# Patient Record
Sex: FEMALE | Race: WHITE | NOT HISPANIC OR LATINO | Employment: UNEMPLOYED | URBAN - METROPOLITAN AREA
[De-identification: names, ages, dates, MRNs, and addresses within clinical notes are randomized per-mention and may not be internally consistent; named-entity substitution may affect disease eponyms.]

---

## 2009-06-05 LAB
EXTERNAL HIV CONFIRMATION: NORMAL
EXTERNAL HIV SCREEN: NORMAL

## 2017-02-01 ENCOUNTER — GENERIC CONVERSION - ENCOUNTER (OUTPATIENT)
Dept: OTHER | Facility: OTHER | Age: 37
End: 2017-02-01

## 2017-03-01 ENCOUNTER — ALLSCRIPTS OFFICE VISIT (OUTPATIENT)
Dept: OTHER | Facility: OTHER | Age: 37
End: 2017-03-01

## 2017-03-02 ENCOUNTER — ALLSCRIPTS OFFICE VISIT (OUTPATIENT)
Dept: OTHER | Facility: OTHER | Age: 37
End: 2017-03-02

## 2017-03-13 ENCOUNTER — ALLSCRIPTS OFFICE VISIT (OUTPATIENT)
Dept: OTHER | Facility: OTHER | Age: 37
End: 2017-03-13

## 2017-04-11 ENCOUNTER — ALLSCRIPTS OFFICE VISIT (OUTPATIENT)
Dept: OTHER | Facility: OTHER | Age: 37
End: 2017-04-11

## 2017-04-11 DIAGNOSIS — M54.9 DORSALGIA: ICD-10-CM

## 2017-04-11 DIAGNOSIS — M79.7 FIBROMYALGIA: ICD-10-CM

## 2017-04-14 ENCOUNTER — TRANSCRIBE ORDERS (OUTPATIENT)
Dept: SLEEP CENTER | Facility: CLINIC | Age: 37
End: 2017-04-14

## 2017-04-14 ENCOUNTER — HOSPITAL ENCOUNTER (OUTPATIENT)
Dept: SLEEP CENTER | Facility: CLINIC | Age: 37
Discharge: HOME | End: 2017-04-14
Payer: COMMERCIAL

## 2017-04-14 DIAGNOSIS — G47.411 NARCOLEPSY WITH CATAPLEXY(347.01): Primary | ICD-10-CM

## 2017-04-14 DIAGNOSIS — G47.33 OBSTRUCTIVE SLEEP APNEA (ADULT) (PEDIATRIC): ICD-10-CM

## 2017-04-26 ENCOUNTER — ALLSCRIPTS OFFICE VISIT (OUTPATIENT)
Dept: OTHER | Facility: OTHER | Age: 37
End: 2017-04-26

## 2017-05-02 ENCOUNTER — GENERIC CONVERSION - ENCOUNTER (OUTPATIENT)
Dept: OTHER | Facility: OTHER | Age: 37
End: 2017-05-02

## 2017-05-06 ENCOUNTER — HOSPITAL ENCOUNTER (EMERGENCY)
Facility: HOSPITAL | Age: 37
Discharge: HOME | End: 2017-05-06
Attending: EMERGENCY MEDICINE | Admitting: EMERGENCY MEDICINE
Payer: COMMERCIAL

## 2017-05-06 ENCOUNTER — APPOINTMENT (EMERGENCY)
Dept: RADIOLOGY | Facility: HOSPITAL | Age: 37
End: 2017-05-06
Payer: COMMERCIAL

## 2017-05-06 VITALS
HEART RATE: 86 BPM | HEIGHT: 60 IN | RESPIRATION RATE: 18 BRPM | TEMPERATURE: 97.5 F | BODY MASS INDEX: 33.38 KG/M2 | OXYGEN SATURATION: 97 % | WEIGHT: 170 LBS | DIASTOLIC BLOOD PRESSURE: 59 MMHG | SYSTOLIC BLOOD PRESSURE: 96 MMHG

## 2017-05-06 DIAGNOSIS — N92.1 MENORRHAGIA WITH IRREGULAR CYCLE: ICD-10-CM

## 2017-05-06 DIAGNOSIS — R10.2 CHRONIC SUPRAPUBIC PAIN: ICD-10-CM

## 2017-05-06 DIAGNOSIS — N93.9 VAGINAL BLEEDING: Primary | ICD-10-CM

## 2017-05-06 DIAGNOSIS — G89.29 CHRONIC SUPRAPUBIC PAIN: ICD-10-CM

## 2017-05-06 LAB
ALBUMIN SERPL BCP-MCNC: 3.4 G/DL (ref 3.5–5)
ALP SERPL-CCNC: 50 U/L (ref 46–116)
ALT SERPL W P-5'-P-CCNC: 18 U/L (ref 12–78)
ANION GAP SERPL CALCULATED.3IONS-SCNC: 9 MMOL/L (ref 4–13)
AST SERPL W P-5'-P-CCNC: 11 U/L (ref 5–45)
BACTERIA UR QL AUTO: ABNORMAL /HPF
BASOPHILS # BLD AUTO: 0.01 THOUSANDS/ΜL (ref 0–0.1)
BASOPHILS NFR BLD AUTO: 0 % (ref 0–1)
BILIRUB SERPL-MCNC: 0.14 MG/DL (ref 0.2–1)
BILIRUB UR QL STRIP: NEGATIVE
BUN SERPL-MCNC: 13 MG/DL (ref 5–25)
CALCIUM SERPL-MCNC: 8.5 MG/DL (ref 8.3–10.1)
CHLORIDE SERPL-SCNC: 111 MMOL/L (ref 100–108)
CLARITY UR: CLEAR
CO2 SERPL-SCNC: 23 MMOL/L (ref 21–32)
COLOR UR: ABNORMAL
COLOR, POC: NORMAL
CREAT SERPL-MCNC: 0.96 MG/DL (ref 0.6–1.3)
EOSINOPHIL # BLD AUTO: 0.04 THOUSAND/ΜL (ref 0–0.61)
EOSINOPHIL NFR BLD AUTO: 1 % (ref 0–6)
ERYTHROCYTE [DISTWIDTH] IN BLOOD BY AUTOMATED COUNT: 16.7 % (ref 11.6–15.1)
GFR SERPL CREATININE-BSD FRML MDRD: >60 ML/MIN/1.73SQ M
GLUCOSE SERPL-MCNC: 119 MG/DL (ref 65–140)
GLUCOSE UR STRIP-MCNC: NEGATIVE MG/DL
HCG UR QL: NEGATIVE
HCT VFR BLD AUTO: 34.7 % (ref 34.8–46.1)
HGB BLD-MCNC: 11.1 G/DL (ref 11.5–15.4)
HGB UR QL STRIP.AUTO: ABNORMAL
HYALINE CASTS #/AREA URNS LPF: ABNORMAL /LPF
KETONES UR STRIP-MCNC: NEGATIVE MG/DL
LEUKOCYTE ESTERASE UR QL STRIP: NEGATIVE
LYMPHOCYTES # BLD AUTO: 2.07 THOUSANDS/ΜL (ref 0.6–4.47)
LYMPHOCYTES NFR BLD AUTO: 34 % (ref 14–44)
MCH RBC QN AUTO: 27.5 PG (ref 26.8–34.3)
MCHC RBC AUTO-ENTMCNC: 32 G/DL (ref 31.4–37.4)
MCV RBC AUTO: 86 FL (ref 82–98)
MONOCYTES # BLD AUTO: 0.34 THOUSAND/ΜL (ref 0.17–1.22)
MONOCYTES NFR BLD AUTO: 6 % (ref 4–12)
NEUTROPHILS # BLD AUTO: 3.55 THOUSANDS/ΜL (ref 1.85–7.62)
NEUTS SEG NFR BLD AUTO: 59 % (ref 43–75)
NITRITE UR QL STRIP: NEGATIVE
NON-SQ EPI CELLS URNS QL MICRO: ABNORMAL /HPF
NRBC BLD AUTO-RTO: 0 /100 WBCS
PH UR STRIP.AUTO: 6 [PH] (ref 4.5–8)
PLATELET # BLD AUTO: 244 THOUSANDS/UL (ref 149–390)
PMV BLD AUTO: 8.9 FL (ref 8.9–12.7)
POTASSIUM SERPL-SCNC: 3.5 MMOL/L (ref 3.5–5.3)
PROT SERPL-MCNC: 7 G/DL (ref 6.4–8.2)
PROT UR STRIP-MCNC: ABNORMAL MG/DL
RBC # BLD AUTO: 4.03 MILLION/UL (ref 3.81–5.12)
RBC #/AREA URNS AUTO: ABNORMAL /HPF
SODIUM SERPL-SCNC: 143 MMOL/L (ref 136–145)
SP GR UR STRIP.AUTO: 1.02 (ref 1–1.03)
TSH SERPL DL<=0.05 MIU/L-ACNC: 3.22 UIU/ML (ref 0.36–3.74)
UROBILINOGEN UR QL STRIP.AUTO: 0.2 E.U./DL
WBC # BLD AUTO: 6.02 THOUSAND/UL (ref 4.31–10.16)
WBC #/AREA URNS AUTO: ABNORMAL /HPF

## 2017-05-06 PROCEDURE — 87086 URINE CULTURE/COLONY COUNT: CPT

## 2017-05-06 PROCEDURE — 99284 EMERGENCY DEPT VISIT MOD MDM: CPT

## 2017-05-06 PROCEDURE — 96374 THER/PROPH/DIAG INJ IV PUSH: CPT

## 2017-05-06 PROCEDURE — 81001 URINALYSIS AUTO W/SCOPE: CPT

## 2017-05-06 PROCEDURE — 36415 COLL VENOUS BLD VENIPUNCTURE: CPT | Performed by: EMERGENCY MEDICINE

## 2017-05-06 PROCEDURE — 81025 URINE PREGNANCY TEST: CPT | Performed by: EMERGENCY MEDICINE

## 2017-05-06 PROCEDURE — 85025 COMPLETE CBC W/AUTO DIFF WBC: CPT | Performed by: EMERGENCY MEDICINE

## 2017-05-06 PROCEDURE — 76856 US EXAM PELVIC COMPLETE: CPT

## 2017-05-06 PROCEDURE — 76830 TRANSVAGINAL US NON-OB: CPT

## 2017-05-06 PROCEDURE — 80053 COMPREHEN METABOLIC PANEL: CPT | Performed by: EMERGENCY MEDICINE

## 2017-05-06 PROCEDURE — 81002 URINALYSIS NONAUTO W/O SCOPE: CPT | Performed by: EMERGENCY MEDICINE

## 2017-05-06 PROCEDURE — 85245 CLOT FACTOR VIII VW RISTOCTN: CPT | Performed by: EMERGENCY MEDICINE

## 2017-05-06 PROCEDURE — 84443 ASSAY THYROID STIM HORMONE: CPT | Performed by: EMERGENCY MEDICINE

## 2017-05-06 RX ORDER — TRAMADOL HYDROCHLORIDE 50 MG/1
TABLET ORAL
COMMUNITY
Start: 2016-01-05 | End: 2018-04-26 | Stop reason: SDUPTHER

## 2017-05-06 RX ORDER — KETOROLAC TROMETHAMINE 30 MG/ML
15 INJECTION, SOLUTION INTRAMUSCULAR; INTRAVENOUS ONCE
Status: COMPLETED | OUTPATIENT
Start: 2017-05-06 | End: 2017-05-06

## 2017-05-06 RX ORDER — DULOXETIN HYDROCHLORIDE 60 MG/1
90 CAPSULE, DELAYED RELEASE ORAL DAILY
COMMUNITY
End: 2017-10-19 | Stop reason: ALTCHOICE

## 2017-05-06 RX ORDER — PYRIDOXINE HCL (VITAMIN B6) 100 MG
150 TABLET ORAL
COMMUNITY
End: 2018-09-14

## 2017-05-06 RX ORDER — FOLIC ACID 0.8 MG
TABLET ORAL
COMMUNITY
End: 2018-04-26 | Stop reason: SDUPTHER

## 2017-05-06 RX ORDER — TRAZODONE HYDROCHLORIDE 100 MG/1
100 TABLET ORAL
COMMUNITY
End: 2018-04-26 | Stop reason: SDUPTHER

## 2017-05-06 RX ORDER — RIZATRIPTAN BENZOATE 10 MG/1
TABLET, ORALLY DISINTEGRATING ORAL
COMMUNITY
End: 2018-04-26 | Stop reason: SDUPTHER

## 2017-05-06 RX ORDER — CYCLOBENZAPRINE HCL 5 MG
10 TABLET ORAL
COMMUNITY
Start: 2015-08-24 | End: 2018-04-26 | Stop reason: SDUPTHER

## 2017-05-06 RX ORDER — TOPIRAMATE 50 MG/1
TABLET, FILM COATED ORAL
COMMUNITY
Start: 2015-08-10 | End: 2018-04-26 | Stop reason: SDUPTHER

## 2017-05-06 RX ORDER — LORAZEPAM 1 MG/1
2 TABLET ORAL
COMMUNITY
Start: 2015-06-12 | End: 2018-04-26 | Stop reason: ALTCHOICE

## 2017-05-06 RX ORDER — DULOXETIN HYDROCHLORIDE 30 MG/1
CAPSULE, DELAYED RELEASE ORAL
COMMUNITY
Start: 2014-05-05 | End: 2018-04-14 | Stop reason: SDUPTHER

## 2017-05-06 RX ORDER — GABAPENTIN 400 MG/1
400 CAPSULE ORAL 3 TIMES DAILY
COMMUNITY
Start: 2017-06-13 | End: 2018-04-26 | Stop reason: SDUPTHER

## 2017-05-06 RX ORDER — NAPROXEN 500 MG/1
500 TABLET ORAL 2 TIMES DAILY WITH MEALS
Qty: 10 TABLET | Refills: 0 | Status: SHIPPED | OUTPATIENT
Start: 2017-05-06 | End: 2017-10-19 | Stop reason: ALTCHOICE

## 2017-05-06 RX ORDER — RIBOFLAVIN (VITAMIN B2) 100 MG
TABLET ORAL
COMMUNITY
End: 2018-09-14

## 2017-05-06 RX ADMIN — KETOROLAC TROMETHAMINE 15 MG: 30 INJECTION, SOLUTION INTRAMUSCULAR at 13:05

## 2017-05-08 LAB — BACTERIA UR CULT: NORMAL

## 2017-05-10 LAB — VWF:RCO ACT/NOR PPP PL AGG: 59 % (ref 50–200)

## 2017-05-24 ENCOUNTER — ALLSCRIPTS OFFICE VISIT (OUTPATIENT)
Dept: OTHER | Facility: OTHER | Age: 37
End: 2017-05-24
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
## 2017-05-31 ENCOUNTER — LAB CONVERSION - ENCOUNTER (OUTPATIENT)
Dept: OTHER | Facility: OTHER | Age: 37
End: 2017-05-31

## 2017-05-31 ENCOUNTER — GENERIC CONVERSION - ENCOUNTER (OUTPATIENT)
Dept: OTHER | Facility: OTHER | Age: 37
End: 2017-05-31

## 2017-05-31 LAB
LYME 18 KD IGG (HISTORICAL): REACTIVE
LYME 23 KD IGG (HISTORICAL): REACTIVE
LYME 23 KD IGM (HISTORICAL): REACTIVE
LYME 28 KD IGG (HISTORICAL): ABNORMAL
LYME 30 KD IGG (HISTORICAL): ABNORMAL
LYME 39 KD IGG (HISTORICAL): REACTIVE
LYME 39 KD IGM (HISTORICAL): ABNORMAL
LYME 41 KD IGG (HISTORICAL): REACTIVE
LYME 41 KD IGM (HISTORICAL): ABNORMAL
LYME 45 KD IGG (HISTORICAL): ABNORMAL
LYME 58 KD IGG (HISTORICAL): REACTIVE
LYME 66 KD IGG (HISTORICAL): ABNORMAL
LYME 93 KD IGG (HISTORICAL): ABNORMAL
LYME IGG (HISTORICAL): POSITIVE
LYME IGG/IGM AB (HISTORICAL): 1.15 INDEX
LYME IGM (HISTORICAL): NEGATIVE

## 2017-06-14 ENCOUNTER — ALLSCRIPTS OFFICE VISIT (OUTPATIENT)
Dept: OTHER | Facility: OTHER | Age: 37
End: 2017-06-14

## 2017-07-05 ENCOUNTER — ALLSCRIPTS OFFICE VISIT (OUTPATIENT)
Dept: OTHER | Facility: OTHER | Age: 37
End: 2017-07-05

## 2017-08-25 ENCOUNTER — ALLSCRIPTS OFFICE VISIT (OUTPATIENT)
Dept: OTHER | Facility: OTHER | Age: 37
End: 2017-08-25

## 2017-10-04 ENCOUNTER — GENERIC CONVERSION - ENCOUNTER (OUTPATIENT)
Dept: OTHER | Facility: OTHER | Age: 37
End: 2017-10-04

## 2017-10-12 ENCOUNTER — HOSPITAL ENCOUNTER (OUTPATIENT)
Dept: SLEEP CENTER | Facility: CLINIC | Age: 37
Discharge: HOME | End: 2017-10-12
Payer: COMMERCIAL

## 2017-10-12 ENCOUNTER — TRANSCRIBE ORDERS (OUTPATIENT)
Dept: SLEEP CENTER | Facility: CLINIC | Age: 37
End: 2017-10-12

## 2017-10-12 DIAGNOSIS — G47.411 CATAPLEXY: Primary | ICD-10-CM

## 2017-10-12 DIAGNOSIS — G47.411 NARCOLEPSY WITH CATAPLEXY: ICD-10-CM

## 2017-10-19 ENCOUNTER — HOSPITAL ENCOUNTER (EMERGENCY)
Facility: HOSPITAL | Age: 37
Discharge: HOME | End: 2017-10-19
Attending: EMERGENCY MEDICINE
Payer: COMMERCIAL

## 2017-10-19 VITALS
BODY MASS INDEX: 34.36 KG/M2 | SYSTOLIC BLOOD PRESSURE: 109 MMHG | DIASTOLIC BLOOD PRESSURE: 71 MMHG | HEART RATE: 82 BPM | WEIGHT: 175 LBS | RESPIRATION RATE: 18 BRPM | TEMPERATURE: 98.7 F | HEIGHT: 60 IN | OXYGEN SATURATION: 100 %

## 2017-10-19 DIAGNOSIS — M79.7 FIBROMYALGIA: Primary | ICD-10-CM

## 2017-10-19 LAB
BILIRUB UR QL STRIP: NEGATIVE
CLARITY UR: CLEAR
COLOR UR: YELLOW
COLOR, POC: NORMAL
EXT PREG TEST URINE: NEGATIVE
GLUCOSE UR STRIP-MCNC: NEGATIVE MG/DL
HGB UR QL STRIP.AUTO: NEGATIVE
KETONES UR STRIP-MCNC: NEGATIVE MG/DL
LEUKOCYTE ESTERASE UR QL STRIP: NEGATIVE
NITRITE UR QL STRIP: NEGATIVE
PH UR STRIP.AUTO: 5 [PH] (ref 4.5–8)
PROT UR STRIP-MCNC: NEGATIVE MG/DL
SP GR UR STRIP.AUTO: 1.02 (ref 1–1.03)
UROBILINOGEN UR QL STRIP.AUTO: 0.2 E.U./DL

## 2017-10-19 PROCEDURE — 96374 THER/PROPH/DIAG INJ IV PUSH: CPT

## 2017-10-19 PROCEDURE — 81003 URINALYSIS AUTO W/O SCOPE: CPT

## 2017-10-19 PROCEDURE — 81025 URINE PREGNANCY TEST: CPT | Performed by: EMERGENCY MEDICINE

## 2017-10-19 PROCEDURE — 99283 EMERGENCY DEPT VISIT LOW MDM: CPT

## 2017-10-19 PROCEDURE — 81002 URINALYSIS NONAUTO W/O SCOPE: CPT | Performed by: EMERGENCY MEDICINE

## 2017-10-19 RX ADMIN — LIDOCAINE HYDROCHLORIDE 119 MG: 10 INJECTION, SOLUTION INFILTRATION; PERINEURAL at 20:22

## 2017-10-19 NOTE — ED PROVIDER NOTES
History  Chief Complaint   Patient presents with    Pain     pt reports fibromyalgia flare up since monday afternoon  pt reports pain along both sides of head and bilateral flank and knee pain  pt reports tops of feet hurting as well  HPI     49-year-old female presents for all over body pain  Patient reports having fibromyalgia since he was 6years old  Over the past two days she has had worsening of her typical fibromyalgia flares  Pains in her shoulders back he has top of her foot more on the right  Denies any neurologic symptoms, fever chills dysuria vaginal bleeding vaginal discharge  Exam is unremarkable  Assessment plan:  Chronic pain  Informed the patient we cannot give her opioids  Will treat with IV lidocaine  Medical decision making:  Patient reports minimal relief  Will be discharged Voltaren cream provided  Prior to Admission Medications   Prescriptions Last Dose Informant Patient Reported? Taking?    Calcium-Magnesium-Vitamin D (CALCIUM 500 PO) 10/19/2017 at Unknown time  Yes Yes   Sig: Calcium 500 MG CAPS  TAKE 1 CAPSULE 3 times daily   Refills: 0    Active   Cholecalciferol (VITAMIN D3) 2000 units CHEW 10/19/2017 at Unknown time  Yes Yes   Sig: Vitamin D3 2000 UNIT Oral Tablet  Take 1 tablet twice daily   Refills: 0    Active   DULoxetine (CYMBALTA) 30 mg delayed release capsule 10/19/2017 at Unknown time  Yes Yes   Sig: DULoxetine HCl - 30 MG Oral Capsule Delayed Release Particles  TAKE 3 CAPSULES DAILY   Quantity: 90;  Refills: 3       Ariella Coyer M D ;  Started 5-May-2014  Active   LORazepam (ATIVAN) 1 mg tablet 10/18/2017 at Unknown time  Yes Yes   Si mg daily at bedtime as needed   Magnesium 500 MG CAPS 10/19/2017 at Unknown time  Yes Yes   Sig: Magnesium 500 MG Oral Capsule  Take 1 capsule twice daily   Refills: 0    Active   Multiple Vitamins-Minerals (DAILY MULTIVITAMIN PO) 10/19/2017 at Unknown time  Yes Yes   Sig: Daily Multivitamin TABS  Take 1 tablet twice daily   Refills: 0    Active   Pyridoxine HCl (VITAMIN B-6) 100 MG TABS 10/19/2017 at Unknown time  Yes Yes   Si mg   Riboflavin (B-2) 100 MG TABS 10/19/2017 at Unknown time  Yes Yes   Sig: B-2 TABS  TAKE 1 TABLET DAILY  Refills: 0    Active   cyclobenzaprine (FLEXERIL) 5 mg tablet 10/19/2017 at Unknown time  Yes Yes   Sig: 10 mg daily at bedtime   gabapentin (NEURONTIN) 400 mg capsule Past Month at Unknown time  Yes Yes   Sig: Take 400 mg by mouth 3 (three) times a day     rizatriptan (MAXALT-MLT) 10 MG disintegrating tablet Past Month at Unknown time  Yes Yes   Sig: Rizatriptan Benzoate 10 MG Oral Tablet Dispersible  TAKE 1 TABLET AT ONSET OF HEADACHE  MAY REPEAT EVERY 2 HOURS AS NEEDED  MAXIMUM 3 TABLETS IN 24 HOURS  Quantity: 9;  Refills: 1       Jose TURPIN ; Active   topiramate (TOPAMAX) 50 MG tablet 10/19/2017 at Unknown time  Yes Yes   Sig: Topiramate 50 MG Oral Tablet  take 2 tablets by mouth at bedtime   Quantity: 60;  Refills: 6       Rigoberto Crump MD;  Rajni Espinoza 10-Aug-2015  Active   traMADol Alvena Patience) 50 mg tablet 10/19/2017 at Unknown time  Yes Yes   Sig: TraMADol HCl - 50 MG Oral Tablet  TAKE 1 TABLET 3 TIMES DAILY AS NEEDED  Quantity: 90;  Refills: 5       Maranda Spencer DO;  Started 2016  Active   traZODone (DESYREL) 100 mg tablet 10/19/2017 at Unknown time  Yes Yes   Sig: Take 100 mg by mouth      Facility-Administered Medications: None       Past Medical History:   Diagnosis Date    Fibromyalgia     Migraine     Psychiatric disorder        History reviewed  No pertinent surgical history  History reviewed  No pertinent family history  I have reviewed and agree with the history as documented  Social History   Substance Use Topics    Smoking status: Never Smoker    Smokeless tobacco: Never Used    Alcohol use No        Review of Systems   Constitutional: Negative for diaphoresis, fatigue and fever     HENT: Negative for facial swelling and nosebleeds  Eyes: Negative for pain and visual disturbance  Respiratory: Negative for apnea, cough, shortness of breath and wheezing  Cardiovascular: Negative for chest pain and leg swelling  Gastrointestinal: Negative for abdominal distention, abdominal pain, anal bleeding, blood in stool, nausea, rectal pain and vomiting  Genitourinary: Negative for difficulty urinating, dysuria and flank pain  Musculoskeletal: Positive for myalgias  Negative for back pain, neck pain and neck stiffness  Neurological: Negative for dizziness, syncope, weakness, light-headedness and headaches  All other systems reviewed and are negative  Physical Exam  ED Triage Vitals [10/19/17 1849]   Temperature Pulse Respirations Blood Pressure SpO2   98 7 °F (37 1 °C) 89 18 103/66 98 %      Temp Source Heart Rate Source Patient Position - Orthostatic VS BP Location FiO2 (%)   Oral Monitor Sitting Left arm --      Pain Score       Worst Possible Pain           Physical Exam   Constitutional: She is oriented to person, place, and time  She appears well-developed and well-nourished  No distress  HENT:   Head: Normocephalic and atraumatic  Nose: Nose normal    Eyes: Conjunctivae and EOM are normal  Pupils are equal, round, and reactive to light  No scleral icterus  Neck: Normal range of motion  Neck supple  No JVD present  No tracheal deviation present  No thyromegaly present  Cardiovascular: Normal rate, regular rhythm, normal heart sounds and intact distal pulses  Exam reveals no gallop and no friction rub  Pulmonary/Chest: Effort normal and breath sounds normal  No respiratory distress  She has no wheezes  She has no rales  She exhibits no tenderness  Abdominal: Soft  Bowel sounds are normal  She exhibits no distension and no mass  There is no tenderness  There is no rebound and no guarding  No hernia  Musculoskeletal: Normal range of motion  She exhibits no edema, tenderness or deformity     Neurological: She is alert and oriented to person, place, and time  She has normal reflexes  No cranial nerve deficit  Coordination normal    Skin: Skin is warm and dry  She is not diaphoretic  No erythema  Psychiatric: She has a normal mood and affect  Her behavior is normal    Nursing note and vitals reviewed  ED Medications  Medications   lidocaine (XYLOCAINE) 1 % 119 mg in dextrose 5 % 50 mL IVPB (119 mg Intravenous Given 10/19/17 2022)       Diagnostic Studies  Labs Reviewed   POCT PREGNANCY, URINE - Normal       Result Value Ref Range Status    EXT PREG TEST UR (Ref: Negative) negative   Final   POCT URINALYSIS DIPSTICK - Normal    Color, UA see results   Final   ED URINE MACROSCOPIC - Normal    Color, UA Yellow   Final    Clarity, UA Clear   Final    pH, UA 5 0  4 5 - 8 0 Final    Leukocytes, UA Negative  Negative Final    Nitrite, UA Negative  Negative Final    Protein, UA Negative  Negative mg/dl Final    Glucose, UA Negative  Negative mg/dl Final    Ketones, UA Negative  Negative mg/dl Final    Urobilinogen, UA 0 2  0 2, 1 0 E U /dl E U /dl Final    Bilirubin, UA Negative  Negative Final    Blood, UA Negative  Negative Final    Specific Gravity, UA 1 020  1 003 - 1 030 Final    Narrative:     CLINITEK RESULT       No orders to display       Procedures  Procedures      Phone Consults  ED Phone Contact    ED Course  ED Course                                MDM  Number of Diagnoses or Management Options  Fibromyalgia: new and does not require workup    CritCare Time    Disposition  Final diagnoses:   Fibromyalgia     ED Disposition     ED Disposition Condition Comment    Discharge  Esther Griffith discharge to home/self care      Condition at discharge: Good        Follow-up Information     Follow up With Specialties Details Why Contact Info    Katherine Logan MD Family Medicine  As needed [address] [facility]  [phone]          Discharge Medication List as of 10/19/2017  9:03 PM      CONTINUE these medications which have NOT CHANGED    Details   Calcium-Magnesium-Vitamin D (CALCIUM 500 PO) Calcium 500 MG CAPS  TAKE 1 CAPSULE 3 times daily   Refills: 0    Active, Historical Med      Cholecalciferol (VITAMIN D3) 2000 units CHEW Vitamin D3 2000 UNIT Oral Tablet  Take 1 tablet twice daily   Refills: 0    Active, Historical Med      cyclobenzaprine (FLEXERIL) 5 mg tablet 10 mg daily at bedtime, Starting 8/24/2015, Until Discontinued, Historical Med      DULoxetine (CYMBALTA) 30 mg delayed release capsule DULoxetine HCl - 30 MG Oral Capsule Delayed Release Particles  TAKE 3 CAPSULES DAILY   Quantity: 90;  Refills: 3       Sharon TURPIN ;  Started [id number]  Active, Historical Med      gabapentin (NEURONTIN) 400 mg capsule Take 400 mg by mouth 3 (three) times a day  , Starting Tue 6/13/2017, Historical Med      LORazepam (ATIVAN) 1 mg tablet 2 mg daily at bedtime as needed, Starting 6/12/2015, Until Discontinued, Historical Med      Magnesium 500 MG CAPS Magnesium 500 MG Oral Capsule  Take 1 capsule twice daily   Refills: 0    Active, Historical Med      Multiple Vitamins-Minerals (DAILY MULTIVITAMIN PO) Daily Multivitamin TABS  Take 1 tablet twice daily   Refills: 0    Active, Historical Med      Pyridoxine HCl (VITAMIN B-6) 100 MG TABS 150 mg, Until Discontinued, Historical Med      Riboflavin (B-2) 100 MG TABS B-2 TABS  TAKE 1 TABLET DAILY  Refills: 0    Active, Historical Med      rizatriptan (MAXALT-MLT) 10 MG disintegrating tablet Rizatriptan Benzoate 10 MG Oral Tablet Dispersible  TAKE 1 TABLET AT ONSET OF HEADACHE  MAY REPEAT EVERY 2 HOURS AS NEEDED  MAXIMUM 3 TABLETS IN 24 HOURS     Quantity: 9;  Refills: 1       Yunior TURPIN ; Active, Historical Med      topiramate (TOPAMAX) 50 MG tablet Topiramate 50 MG Oral Tablet  take 2 tablets by mouth at bedtime   Quantity: 60;  Refills: 6       Jesus Manuel Hernandez MD;  Started 10-Aug-2015  Active, Historical Med      traMADol (ULTRAM) 50 mg tablet TraMADol HCl - 50 MG Oral Tablet  TAKE 1 TABLET 3 TIMES DAILY AS NEEDED  Quantity: 90;  Refills: 5       Maranda Spencer DO;  Started 5-Jan-2016  Active, Historical Med      traZODone (DESYREL) 100 mg tablet Take 100 mg by mouth, Until Discontinued, Historical Med           No discharge procedures on file  ED Provider  Attending physically available and evaluated Marilou Olvera I managed the patient along with the ED Attending      Electronically Signed by       Carlos Greer DO  Resident  10/20/17 0284

## 2017-10-20 NOTE — DISCHARGE INSTRUCTIONS
Fibromyalgia   WHAT YOU NEED TO KNOW:   Fibromyalgia is a long-term condition that causes pain and tender points throughout your body  Fibromyalgia can start at any age and is more common in women than in men  DISCHARGE INSTRUCTIONS:   Medicines:   · Acetaminophen and ibuprofen: These medicines decrease pain  They are available without a doctor's order  Ask your healthcare provider which medicine is right for you  Ask how much to take and how often to take it  Follow directions  These medicines can cause stomach bleeding if not taken correctly  Ibuprofen can cause kidney damage  Acetaminophen can cause liver damage  · Pain medicine: You may be given a prescription medicine to decrease pain  Do not wait until the pain is severe before you take this medicine  · Muscle relaxers  help decrease pain and muscle spasms  · Antidepressants: These help decrease depression, pain, and fatigue  · Antiseizure medicine: This is used to reduce fibromyalgia pain  · Take your medicine as directed  Contact your healthcare provider if you think your medicine is not helping or if you have side effects  Tell him of her if you are allergic to any medicine  Keep a list of the medicines, vitamins, and herbs you take  Include the amounts, and when and why you take them  Bring the list or the pill bottles to follow-up visits  Carry your medicine list with you in case of an emergency  Follow up with your healthcare provider or pain specialist as directed:  Write down your questions so you remember to ask them during your visits  Manage your symptoms:   · Keep a pain diary:  Record your symptoms and what activity caused them  This may also help you track pain cycles and show a pattern to your symptoms  · Exercise:  Ask your healthcare provider about the best exercise plan for you  Exercise and other strength-training activities may decrease pain and sleep problems      · Set good sleep habits:  Do not nap during the day  Go to bed at the same time each night  Make sure your bedroom is dark, quiet, and comfortable  Do not stay in bed if you cannot sleep  Get up and do something relaxing until you are sleepy  Do not drink caffeine or alcohol right before you go to bed  These can make it difficult for you to sleep  Limit other liquids to help decrease your need to urinate in the night  Contact your healthcare provider or pain specialist if:   · Your pain increases, even after you take pain medicine  · You have difficulty sleeping  · You have questions or concerns about your condition or care  Return to the emergency department if:   · You are depressed and feel you cannot cope with your condition  © 2017 [employer] Information is for End User's use only and may not be sold, redistributed or otherwise used for commercial purposes  All illustrations and images included in CareNotes® are the copyrighted property of [employer]  or Gilberto Gutierrez  The above information is an  only  It is not intended as medical advice for individual conditions or treatments  Talk to your doctor, nurse or pharmacist before following any medical regimen to see if it is safe and effective for you

## 2017-10-20 NOTE — ED ATTENDING ATTESTATION
Rachael Batres DO, saw and evaluated the patient  I have discussed the patient with the resident/non-physician practitioner and agree with the resident's/non-physician practitioner's findings, Plan of Care, and MDM as documented in the resident's/non-physician practitioner's note, except where noted  All available labs and Radiology studies were reviewed  At this point I agree with the current assessment done in the Emergency Department  I have conducted an independent evaluation of this patient a history and physical is as follows:  49-year-old female presenting with acute on chronic pain  Patient states he has history of repeated episodes of Lyme disease as well as fibromyalgia  Patient does follow with a psychiatrist for episode of depression as well  No recent injury or illness  No recent trauma  Patient states she has pain in her similar regions to her chronic issues but now has developed bilateral knee pain as well as bilateral ankle pain  There is no warmth or erythema on either of those joints  Patient is afebrile  There is no meningismus or nuchal rigidity  No rash  Denies any chest pain or shortness of breath or abdominal pain  No nausea or vomiting  No diarrhea  No change in medications recently  Patient is able to tolerate her chronic pain medications  After lengthy discussion with patient and review of records, will administer IV lidocaine at 1 5 milligrams/kilogram per lidocaine for pain protocol  Will reassess after medication, will check urinalysis in the interim  Negative urinalysis, patient was treated with IV lidocaine per protocol with relief of symptoms and patient states she feels better  Follow-up with PCP, return if worsens        Critical Care Time  CritCare Time

## 2017-12-21 ENCOUNTER — GENERIC CONVERSION - ENCOUNTER (OUTPATIENT)
Dept: FAMILY MEDICINE CLINIC | Facility: CLINIC | Age: 37
End: 2017-12-21

## 2017-12-21 ENCOUNTER — GENERIC CONVERSION - ENCOUNTER (OUTPATIENT)
Dept: OTHER | Facility: OTHER | Age: 37
End: 2017-12-21

## 2018-01-09 NOTE — PSYCH
Treatment Plan Tracking    #1 Treatment Plan not completed within required time limits due to: Client presented with emotional/behavioral issues that required clinical intervention            Signatures   Electronically signed by : HUBERT Baca,JOSÉ; Mar 23 2016  3:08PM EST                       (Author)

## 2018-01-09 NOTE — PSYCH
Psych Med Mgmt    Appearance: was calm and cooperative, adequate hygiene and grooming and good eye contact  Observed mood: depressed and anxious  Observed mood: affect was constricted  Speech: a normal rate and fluent  Thought processes: coherent/organized  Hallucinations: no hallucinations present  Thought Content: no delusions  Abnormal Thoughts: The patient has no suicidal thoughts and no homicidal thoughts  Orientation: The patient is oriented to person, place and time, oriented to person, oriented to place and oriented to time  Recent and Remote Memory: short term memory intact and long term memory intact  Attention Span And Concentration: concentration intact  Insight: Limited insight  Judgment: Her judgment was limited  Muscle Strength And Tone  Muscle strength and tone were normal    The patient is experiencing moderate to severe pain  From fibromyalgia  Goals addressed in session: Medication Management       Treatment Recommendations: Continue current treatment  Risks, Benefits And Possible Side Effects Of Medications: Risks, benefits, and possible side effects of medications explained to patient and patient verbalizes understanding  She reports normal appetite, normal energy level, no weight change and increase in number of sleep hours   Patient stated that she feel Fibromyalgia is not well controlled and due to pain she remains sedentary and often feels fatigued and unmotivated  She is reporting feeling more depressed and feeling hopeless about getting any better  She did mentioned she will like to seek a second opinion from another rheumatologist       Assessment    1  Major depressive disorder, recurrent episode, moderate with anxious distress (296 32)   (F33 1)    Plan    1  DULoxetine HCl - 30 MG Oral Capsule Delayed Release Particles (Cymbalta);   TAKE 3 CAPSULE Once In The Morning    2   LORazepam 1 MG Oral Tablet; TAKE 1 TABLET Every 6 hours    Review of Systems    Constitutional: as noted in HPI  Substance Abuse Hx    Substance Abuse History: Denies  Active Problems    1  Acute bilateral low back pain without sciatica (724 2,338 19) (M54 5)   2  Alcoholism (303 90) (F10 20)   3  Allergic rhinitis (477 9) (J30 9)   4  Back pain (724 5) (M54 9)   5  Chronic fatigue (780 79) (R53 82)   6  Chronic migraine without aura (346 70) (G43 709)   7  Chronic pain (338 29) (G89 29)   8  Classic migraine with aura (346 00) (G43 109)   9  Classic Migraine With Typical Aura (346 00)   10  Depression with anxiety (300 4) (F41 8)   11  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, with unspecified complication (019 6)    (F19 159)   12  Fever (780 60) (R50 9)   13  Fibromyalgia (729 1) (M79 7)   14  Headache (784 0) (R51)   15  Hypokalemia (276 8) (E87 6)   16  Impaired fasting glucose (790 21) (R73 01)   17  Insomnia (780 52) (G47 00)   18  Lyme disease (088 81) (A69 20)   19  Major depressive disorder, recurrent episode, moderate with anxious distress (296 32)    (F33 1)   20  Malaise and fatigue (780 79) (R53 81,R53 83)   21  Menorrhagia (626 2) (N92 0)   22  Muscle spasms of head or neck (728 85) (M62 838)   23  Myalgia (729 1) (M79 1)   24  Narcolepsy (347 00) (G47 419)   25  Neck pain (723 1) (M54 2)   26  Obesity (278 00) (E66 9)   27  Sinus disease (473 9) (J34 9)   28  Sleep apnea (780 57) (G47 30)   29  Snoring (786 09) (R06 83)   30  Tension type headache (339 10) (G44 209)   31  Tick bite (919 4,E906 4) [facility])    Past Medical History    1  History of Acute otitis externa, unspecified laterality   2  History of Acute otitis media, unspecified laterality   3  History of Acute upper respiratory infection (465 9) (J06 9)   4  History of Atypical chest pain (786 59) (R07 89)   5  History of Chronic sinusitis (473 9) (J32 9)   6  History of Dysuria (788 1) (R30 0)   7   History of Encounter for routine gynecological examination with Papanicolaou smear of   cervix (V72 31,V76 2) (Z01 419)   8  History of depression (V11 8) (Z86 59)   9  History of migraine (V12 49) (Z86 69)   10  History of polyarthritis (V13 4) (Z87 39)   11  History of Memory loss (780 93) (R41 3)    The active problems and past medical history were reviewed and updated today  Allergies    1  Penicillins   2  Decadron TABS   3  Tetracyclines    4  Seasonal    Current Meds   1  B-2 TABS; TAKE 1 TABLET DAILY; Therapy: (Recorded:09May2016) to Recorded   2  B-6 100 MG Oral Tablet; TAKE 1 TABLET DAILY AS DIRECTED; Therapy: (Recorded:09May2016) to Recorded   3  Calcium 500 MG CAPS; TAKE 1 CAPSULE 3 times daily; Therapy: (Recorded:09May2016) to Recorded   4  Cyclobenzaprine HCl - 10 MG Oral Tablet; TAKE 1 TABLET AT BEDTIME as needed for   muscle spasms; Therapy: 95BNJ2824 to (Freddy Malik)  Requested for: 70GMV0436; Last   Rx:26Jan2017 Ordered   5  Daily Multivitamin TABS; Take 1 tablet twice daily; Therapy: (Recorded:09May2016) to Recorded   6  DULoxetine HCl - 30 MG Oral Capsule Delayed Release Particles (Cymbalta); TAKE 3   CAPSULE Once In The Morning; Therapy: 65VCF5739 to (Freddy Malik)  Requested for: 31Wvh8801; Last   Rx:26Csk1938 Ordered   7  Gabapentin 300 MG Oral Capsule; TAKE  1  CAPSULE 3 times daily; Therapy: 75Ddf5932 to (Evaluate:18Aqy2675)  Requested for: 70NBM7769; Last   Rx:26Feb2017 Ordered   8  Gabapentin 300 MG Oral Capsule; Take 1 capsule twice daily for 1 week, then take 1   capsule 3 times a day; Last Rx:30Nov2016 Ordered   9  LORazepam 1 MG Oral Tablet; TAKE 1 TABLET Every 6 hours; Therapy: 04OGK3056 to (Evaluate:08Apr2017)  Requested for: 86FJW9350; Last   Rx:09Mar2017 Ordered   10  Magnesium 500 MG Oral Capsule; Take 1 capsule twice daily; Therapy: (Recorded:09May2016) to Recorded   11   Rizatriptan Benzoate 10 MG Oral Tablet Dispersible (Maxalt-MLT); TAKE 1 TAB AT ONSET    OF HEADACHE MAY REPEAT EVERY 2 HOURS AS NEEDED MAX 3 TABS/24 HRS; Therapy: 87Nrm0153 to (Evaluate:29Nov2016)  Requested for: 27Lqw3265; Last    Rx:85Sco9206 Ordered   12  Topiramate 50 MG Oral Tablet (Topamax); take 2 tablets by mouth at bedtime; Therapy: 51VWH9955 to (Evaluate:24Apr2017)  Requested for: 79Ojh1759; Last    Rx:44Dyo0312 Ordered   13  TraZODone HCl - 100 MG Oral Tablet; TAKE 1 OR 2 TABLETS AT BEDTIME AS NEEDED    FOR SLEEP; Therapy: 41KDK4990 to (Evaluate:21Apr2017)  Requested for: 58EIT7568; Last    Rx:61Itc0667 Ordered   14  Vitamin D3 2000 UNIT Oral Tablet; Take 1 tablet twice daily; Therapy: (Recorded:70Rci7973) to Recorded    The medication list was reviewed and updated today  Family Psych History  Father    1  Family history of Diabetes Mellitus (V18 0)  Brother    2  Family history of substance abuse (V17 0) (Z81 4)  Maternal Grandmother    3  Family history of Diabetes Mellitus (V18 0)   4  Family history of rheumatoid arthritis (V17 7) (Z82 61)   5  Family history of Malignant Melanoma Of The Skin (V16 8)  Paternal Grandmother    10  Family history of chronic kidney disease (V18 69) (Z84 1)   7  Family history of rheumatoid arthritis (V17 7) (Z82 61)   8  Family history of Major depressive disorder, recurrent episode, severe  Maternal Great Grandmother    9  Family history of malignant neoplasm of stomach (V16 0) (Z80 0)  Paternal Grandfather    8  Family history of Diabetes Mellitus (V18 0)   11  Family history of Lung Cancer (V16 1)  Paternal Aunt    15  Family history of Major depressive disorder, recurrent episode, severe  Maternal Uncle    13  Family history of substance abuse (V17 0) (Z81 4)  Family History    15  Family history of Alcoholism   15  Denied: Family history of Drug Use   16  Denied: Family history of Crohn's disease   16  Denied: Family history of psoriasis   18  Denied: Family history of systemic lupus erythematosus   19  Denied: Family history of ulcerative colitis   20   Family history of Never A Smoker    The family history was reviewed and updated today  Social History    · Caffeine use (V49 89) (F15 90)   · Denied: History of Drug Use   · Never A Smoker   · No drug use   · Rarely consumes alcohol (V49 89) (Z78 9)   · Single   · Social alcohol use (Z78 9)  The social history was reviewed and updated today  The social history was reviewed and is unchanged  End of Encounter Meds    1  Gabapentin 300 MG Oral Capsule; TAKE  1  CAPSULE 3 times daily; Therapy: 86Nyl2696 to (Evaluate:36Dzo8250)  Requested for: 61FCL7187; Last   Rx:26Feb2017 Ordered    2  Topiramate 50 MG Oral Tablet (Topamax); take 2 tablets by mouth at bedtime; Therapy: 04KCA5741 to (Evaluate:24Apr2017)  Requested for: 58PWQ8352; Last   Rx:26Sep2016 Ordered    3  Rizatriptan Benzoate 10 MG Oral Tablet Dispersible (Maxalt-MLT); TAKE 1 TAB AT ONSET   OF HEADACHE MAY REPEAT EVERY 2 HOURS AS NEEDED MAX 3 TABS/24 HRS; Therapy: 36Uri5526 to (Evaluate:29Nov2016)  Requested for: 58Rmp0967; Last   Rx:58Uyy6309 Ordered    4  TraZODone HCl - 100 MG Oral Tablet; TAKE 1 OR 2 TABLETS AT BEDTIME AS NEEDED   FOR SLEEP; Therapy: 97GWM0316 to (Evaluate:21Apr2017)  Requested for: 29GCT6778; Last   Rx:23Oct2016 Ordered    5  DULoxetine HCl - 30 MG Oral Capsule Delayed Release Particles (Cymbalta); TAKE 3   CAPSULE Once In The Morning; Therapy: 34CAF0560 to (Evaluate:70Szs3784)  Requested for: 10RLA5215; Last   Rx:13Mar2017 Ordered    6  Cyclobenzaprine HCl - 10 MG Oral Tablet; TAKE 1 TABLET AT BEDTIME as needed for   muscle spasms; Therapy: 33GKT4599 to (Jose Christie)  Requested for: 65MKE0759; Last   Rx:26Jan2017 Ordered   7  Gabapentin 300 MG Oral Capsule; Take 1 capsule twice daily for 1 week, then take 1   capsule 3 times a day; Last Rx:30Nov2016 Ordered    8  LORazepam 1 MG Oral Tablet; TAKE 1 TABLET Every 6 hours; Therapy: 04ZKF5611 to (Evaluate:11Jun2017)  Requested for: 43KJB5005; Last   Rx:13Mar2017 Ordered    9   B-2 TABS; TAKE 1 TABLET DAILY; Therapy: (Recorded:09May2016) to Recorded   10  B-6 100 MG Oral Tablet; TAKE 1 TABLET DAILY AS DIRECTED; Therapy: (Recorded:09May2016) to Recorded   11  Calcium 500 MG CAPS; TAKE 1 CAPSULE 3 times daily; Therapy: (Recorded:09May2016) to Recorded   12  Daily Multivitamin TABS; Take 1 tablet twice daily; Therapy: (Recorded:09May2016) to Recorded   13  Magnesium 500 MG Oral Capsule; Take 1 capsule twice daily; Therapy: (Recorded:09May2016) to Recorded   14  Vitamin D3 2000 UNIT Oral Tablet; Take 1 tablet twice daily;     Therapy: (Recorded:09May2016) to Recorded    Future Appointments    Date/Time Provider Specialty Site   04/05/2017 01:15 PM Janet Villarreal MS, APRN, [facility]BS  [facility]   05/03/2017 01:15 PM Janet Villarreal MS, APRN, [facility]BS  [facility]   06/07/2017 01:15 PM Papo Macario MS, APRN, [facility]NS[facility]BS  [facility]     Signatures   Electronically signed by : FARIBA Pickard ; Mar 13 2017  2:23PM EST                       (Author)

## 2018-01-09 NOTE — PSYCH
Message  Message Free Text Note Form: at 3:20 PM this writer (her therapist)I had left her a voice mail as she had not yet arrived for her 3 PM ind  counseling appointment scheduled for today at 3 PM; she will be regarded as a "no show" appointment  Active Problems    1  Alcoholism (303 90) (F10 20)   2  Allergic rhinitis (477 9) (J30 9)   3  Chronic fatigue (780 79) (R53 82)   4  Chronic migraine without aura (346 70) (G43 709)   5  Classic migraine with aura (346 00) (G43 109)   6  Classic Migraine With Typical Aura (346 00)   7  Depression with anxiety (300 4) (F41 8)   8  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, with unspecified complication (574 4)   (J51 773)   9  Fever (780 60) (R50 9)   10  Fibromyalgia (729 1) (M79 7)   11  Headache (784 0) (R51)   12  Hypokalemia (276 8) (E87 6)   13  Impaired fasting glucose (790 21) (R73 01)   14  Insomnia (780 52) (G47 00)   15  Lyme disease (088 81) (A69 20)   16  Major depressive disorder, recurrent episode, moderate with anxious distress (296 32)    (F33 1)   17  Malaise and fatigue (780 79) (R53 81,R53 83)   18  Muscle spasms of head or neck (728 85) (M62 838)   19  Obesity (278 00) (E66 9)   20  Polyarthritis (716 50) (M13 0)   21  Sinus disease (473 9) (J34 9)   22  Snoring (786 09) (R06 83)   23  Tension type headache (339 10) (G44 209)   24  Tick bite (919 4,E906 4) (W57 XXXA)    Current Meds   1  B-2 TABS; TAKE 1 TABLET DAILY; Therapy: (Recorded:01Waq1195) to Recorded   2  B-6 100 MG Oral Tablet; TAKE 1 TABLET DAILY AS DIRECTED; Therapy: (Recorded:57Tbb2558) to Recorded   3  Calcium 500 MG CAPS; TAKE 1 CAPSULE 3 times daily; Therapy: (Eura Actis) to Recorded   4  Cyclobenzaprine HCl - 5 MG Oral Tablet; TAKE 1 TABLET BY MOUTH TWICE A DAY AS   NEEDED FOR MUSCLE SPASM; Therapy: 81Evf4872 to (Evaluate:80Xyi8131)  Requested for: 47BTP0834; Last   Rx:25Jan2016 Ordered   5  Daily Multivitamin TABS; Take 1 tablet twice daily;    Therapy: (Jaya Avila) to Recorded   6  DULoxetine HCl - 30 MG Oral Capsule Delayed Release Particles; take 3 capsules daily; Therapy: 19BJS5083 to (Evaluate:57Gju4289)  Requested for: 13Gzw9371; Last   Rx:01Hwo6081 Ordered   7  Ginger CAPS; Take 1 capsule twice daily; Therapy: (Jaya Avila) to Recorded   8  LORazepam 1 MG Oral Tablet; TAKE 1 TABLET Twice daily PRN; Therapy: 07EFB1495 to (Evaluate:55Yfv7478)  Requested for: 09Ovo2063; Last   Rx:63Lev7712 Ordered   9  Magnesium 500 MG Oral Capsule; Take 1 capsule twice daily; Therapy: (Recorded:87Yvo7711) to Recorded   10  Rizatriptan Benzoate 10 MG Oral Tablet Dispersible (Maxalt-MLT); TAKE 1 TABLET AT    ONSET OF HEADACHE  MAY REPEAT EVERY 2 HOURS AS NEEDED  MAXIMUM 3    TABLETS IN 24 HOURS  Requested for: 17Hhp1958; Last Rx:87Lkk8382 Ordered   11  Topiramate 50 MG Oral Tablet (Topamax); take 2 tablets by mouth at bedtime; Therapy: 11JTX7471 to (Nova Lowers)  Requested for: 29SUP8949; Last    Rx:07Mar2016 Ordered   12  TraMADol HCl - 50 MG Oral Tablet; TAKE 1 TABLET 3 TIMES DAILY AS NEEDED; Therapy: 67GEW9431 to (Evaluate:04Pgg2382); Last Rx:05Jan2016 Ordered   13  TraZODone HCl - 100 MG Oral Tablet; TAKE 1 OR 2 TABLETS AT BEDTIME AS NEEDED    FOR SLEEP; Therapy: 22BAO2891 to (Evaluate:30Oct2016)  Requested for: 35YHY8945; Last    Rx:32Eao4647 Ordered   14  Turmeric CAPS; Take 1 capsule twice daily; Therapy: (Jaya Avila) to Recorded   15  Vitamin D3 2000 UNIT Oral Tablet; Take 1 tablet twice daily; Therapy: (Recorded:48Uqj4047) to Recorded    Allergies    1  Penicillins   2  Decadron TABS   3  Tetracyclines    4   Seasonal    Signatures   Electronically signed by : Terry Shields, HUBERT,PAYALW; May  9 2016  3:48PM EST                       (Author)
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
## 2018-01-10 ENCOUNTER — ALLSCRIPTS OFFICE VISIT (OUTPATIENT)
Dept: OTHER | Facility: OTHER | Age: 38
End: 2018-01-10
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
## 2018-01-10 NOTE — MISCELLANEOUS
Provider Comments  Provider Comments:   Pt  was a no show for her Rheumatology re-evaluation today, 3/1/17        Signatures   Electronically signed by : Shannen Uribe DO; Mar  1 2017 11:45AM EST                       (Author)

## 2018-01-10 NOTE — PSYCH
Psych Med Mgmt    Appearance: was calm and cooperative, adequate hygiene and grooming and good eye contact  Observed mood: mood appropriate  Observed mood: affect was constricted  Speech: a normal rate and fluent  Thought processes: coherent/organized  Hallucinations: no hallucinations present  Thought Content: no delusions  Abnormal Thoughts: The patient has no suicidal thoughts and no homicidal thoughts  Orientation: The patient is oriented to person, place and time, oriented to person, oriented to place and oriented to time  Recent and Remote Memory: short term memory intact and long term memory intact  Attention Span And Concentration: concentration intact  Insight: Limited insight  Judgment: Her judgment was limited  Muscle Strength And Tone  Muscle strength and tone were normal    The patient is experiencing moderate to severe pain  Fibromyalgia  Goals addressed in session: Medication Management       Treatment Recommendations: Continue current medications  Risks, Benefits And Possible Side Effects Of Medications: Risks, benefits, and possible side effects of medications explained to patient and patient verbalizes understanding  She reports normal appetite, decreased energy, no weight change and normal number of sleep hours  She stated that the increase in Cymbalta has helped her with her depression and chronic pain  Her sleep specialist had recommended Adderall twice daily for daytime fatigue and somnolence but she is not able to afford the cost of the medication  Vitals  Signs [Data Includes: Current Encounter]   Recorded: 15Apr2016 10:15AM   Height: 5 ft   Weight: 176 lb   BMI Calculated: 34 37  BSA Calculated: 1 77    Assessment    1  Major depressive disorder, recurrent episode, moderate with anxious distress (296 32)   (F33 1)    Plan    1   LORazepam 1 MG Oral Tablet; TAKE 1 TABLET Twice daily PRN    Review of Systems    Constitutional: No fever, no chills, feels well, no tiredness, no recent weight gain or loss and as noted in HPI  Cardiovascular: no complaints of slow or fast heart rate, no chest pain, no palpitations  Respiratory: no complaints of shortness of breath, no wheezing, no dyspnea on exertion  Gastrointestinal: no complaints of abdominal pain, no constipation, no nausea, no diarrhea, no vomiting  Genitourinary: no complaints of dysuria, no incontinence, no pelvic pain, no urinary frequency  Musculoskeletal: no complaints of arthralgia, no myalgias, no limb pain, no joint stiffness  Integumentary: no complaints of skin rash, no itching, no dry skin  Neurological: no complaints of headache, no confusion, no numbness, no dizziness  Substance Abuse Hx    Substance Abuse History: Denies  Active Problems    1  Alcoholism (303 90) (F10 20)   2  Allergic rhinitis (477 9) (J30 9)   3  Chronic fatigue (780 79) (R53 82)   4  Chronic migraine without aura (346 70) (G43 709)   5  Classic migraine with aura (346 00) (G43 109)   6  Classic Migraine With Typical Aura (346 00)   7  Depression with anxiety (300 4) (F41 8)   8  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, with unspecified complication (256 8)   (K80 396)   9  Fever (780 60) (R50 9)   10  Fibromyalgia (729 1) (M79 7)   11  Headache (784 0) (R51)   12  Hypokalemia (276 8) (E87 6)   13  Impaired fasting glucose (790 21) (R73 01)   14  Insomnia (780 52) (G47 00)   15  Lyme disease (088 81) (A69 20)   16  Major depressive disorder, recurrent episode, moderate with anxious distress (296 32)    (F33 1)   17  Malaise and fatigue (780 79) (R53 81,R53 83)   18  Muscle spasms of head or neck (728 85) (M62 838)   19  Obesity (278 00) (E66 9)   20  Polyarthritis (716 50) (M13 0)   21  Sinus disease (473 9) (J34 9)   22  Snoring (786 09) (R06 83)   23  Tension type headache (339 10) (G44 209)   24  Tick bite (919 4,E906 4) [facility])    Past Medical History    1   History of Acute otitis externa, unspecified laterality   2  History of Acute otitis media, unspecified laterality   3  History of Acute upper respiratory infection (465 9) (J06 9)   4  History of Atypical chest pain (786 59) (R07 89)   5  History of Chronic sinusitis (473 9) (J32 9)   6  History of Dysuria (788 1) (R30 0)   7  History of Encounter for routine gynecological examination with Papanicolaou smear of   cervix (V72 31,V76 2) (Z01 419)   8  History of depression (V11 8) (Z86 59)   9  History of migraine (V12 49) (Z86 69)   10  History of Memory loss (780 93) (R41 3)    The active problems and past medical history were reviewed and updated today  Allergies    1  Penicillins   2  Decadron TABS   3  Tetracyclines    4  Seasonal    Current Meds   1  B-2 TABS; TAKE 1 TABLET DAILY; Therapy: (Recorded:12Bva7776) to Recorded   2  B-6 100 MG Oral Tablet; TAKE 1 TABLET DAILY AS DIRECTED; Therapy: (Recorded:21Sep2015) to Recorded   3  Calcium 500 MG CAPS; TAKE 1 CAPSULE 3 times daily; Therapy: (Guerrero Mcfarland) to Recorded   4  Cyclobenzaprine HCl - 5 MG Oral Tablet; TAKE 1 TABLET BY MOUTH TWICE A DAY AS   NEEDED FOR MUSCLE SPASM; Therapy: 84Aeh6676 to (Evaluate:11Fuu3030)  Requested for: 06MOZ0322; Last   Rx:25Jan2016 Ordered   5  Daily Multivitamin TABS; Take 1 tablet twice daily; Therapy: (Guerrero Mcfarland) to Recorded   6  DULoxetine HCl - 30 MG Oral Capsule Delayed Release Particles; take 3 capsules daily; Therapy: 97HDC4522 to (Evaluate:68Kfo3252)  Requested for: 04Apr2016; Last   Rx:04Apr2016 Ordered   7  Ginger CAPS; Take 1 capsule twice daily; Therapy: (Guerrero Mcfarland) to Recorded   8  LORazepam 1 MG Oral Tablet; TAKE 1 TABLET Twice daily PRN; Therapy: 71PDF8217 to (Pura Johnson)  Requested for: 45TOL3284; Last   Rx:55Oxl6205 Ordered   9  Magnesium 500 MG Oral Capsule; Take 1 capsule twice daily; Therapy: (Recorded:98Zga2310) to Recorded   10   Rizatriptan Benzoate 10 MG Oral Tablet Dispersible; TAKE 1 TABLET AT ONSET OF    HEADACHE  MAY REPEAT EVERY 2 HOURS AS NEEDED  MAXIMUM 3 TABLETS IN 24    HOURS  Requested for: 02Eot4822; Last Rx:93Izq3944 Ordered   11  Topiramate 50 MG Oral Tablet; take 2 tablets by mouth at bedtime; Therapy: 56TKG5913 to (Veronika Pipe)  Requested for: 93ICZ0611; Last    Rx:07Mar2016 Ordered   12  TraMADol HCl - 50 MG Oral Tablet; TAKE 1 TABLET 3 TIMES DAILY AS NEEDED; Therapy: 25ACH3428 to (Evaluate:23Tcv7892); Last Rx:89Nbq6670 Ordered   13  TraZODone HCl - 100 MG Oral Tablet; TAKE 1 OR 2 TABLETS AT BEDTIME AS NEEDED    FOR SLEEP  Requested for: 86Bzx5193; Last Rx:74Sts8048 Ordered   14  Turmeric CAPS; Take 1 capsule twice daily; Therapy: (Carson Rodriguez) to Recorded   15  Vitamin D3 2000 UNIT Oral Tablet; Take 1 tablet twice daily; Therapy: (Recorded:63Flq7043) to Recorded    The medication list was reviewed and updated today  Family Psych History    1  Family history of Diabetes Mellitus (V18 0)    2  Family history of substance abuse (V17 0) (Z81 4)    3  Family history of Diabetes Mellitus (V18 0)   4  Family history of rheumatoid arthritis (V17 7) (Z82 61)   5  Family history of Malignant Melanoma Of The Skin (V16 8)    6  Family history of rheumatoid arthritis (V17 7) (Z82 61)   7  Family history of Major depressive disorder, recurrent episode, severe    8  Family history of Diabetes Mellitus (V18 0)   9  Family history of Lung Cancer (V16 1)    10  Family history of Major depressive disorder, recurrent episode, severe    11  Family history of substance abuse (V17 0) (Z81 4)    12  Family history of Alcoholism   13  Denied: Family history of Drug Use   14  Denied: Family history of Crohn's disease   13  Denied: Family history of psoriasis   16  Denied: Family history of systemic lupus erythematosus   17  Denied: Family history of ulcerative colitis   18   Family history of Never A Smoker    The family history was reviewed and updated today  Social History    · Caffeine use (V49 89) (F15 90)   · Denied: History of Drug Use   · Never A Smoker   · No alcohol use   · No drug use   · Single   · Social alcohol use (Z78 9)  The social history was reviewed and updated today  The social history was reviewed and is unchanged  End of Encounter Meds    1  Topiramate 50 MG Oral Tablet (Topamax); take 2 tablets by mouth at bedtime; Therapy: 61LQV2531 to (Brad Arriaza)  Requested for: 68OZL7793; Last   Rx:07Mar2016 Ordered    2  Rizatriptan Benzoate 10 MG Oral Tablet Dispersible (Maxalt-MLT); TAKE 1 TABLET AT   ONSET OF HEADACHE  MAY REPEAT EVERY 2 HOURS AS NEEDED  MAXIMUM 3   TABLETS IN 24 HOURS  Requested for: 68Hzu7453; Last Rx:73Tgb8624 Ordered    3  TraZODone HCl - 100 MG Oral Tablet; TAKE 1 OR 2 TABLETS AT BEDTIME AS NEEDED   FOR SLEEP  Requested for: 55Qjn1097; Last Rx:80Keh7658 Ordered    4  DULoxetine HCl - 30 MG Oral Capsule Delayed Release Particles; take 3 capsules daily; Therapy: 65AOK4733 to (Evaluate:95Whz3787)  Requested for: 18Puf1169; Last   Rx:94Egi3836 Ordered    5  LORazepam 1 MG Oral Tablet; TAKE 1 TABLET Twice daily PRN; Therapy: 57YRA9006 to (Evaluate:42Grp1046)  Requested for: 91Ltb4611; Last   Rx:41Vjm9948 Ordered    6  TraMADol HCl - 50 MG Oral Tablet; TAKE 1 TABLET 3 TIMES DAILY AS NEEDED; Therapy: 72YLJ5636 to (Evaluate:75Row9982); Last Rx:05Jan2016 Ordered    7  Cyclobenzaprine HCl - 5 MG Oral Tablet; TAKE 1 TABLET BY MOUTH TWICE A DAY AS   NEEDED FOR MUSCLE SPASM; Therapy: 92Zxq3663 to (Evaluate:69Gfj1832)  Requested for: 12OAE6092; Last   Rx:25Jan2016 Ordered    8  B-2 TABS; TAKE 1 TABLET DAILY; Therapy: (July Tom) to Recorded   9  B-6 100 MG Oral Tablet; TAKE 1 TABLET DAILY AS DIRECTED; Therapy: (Recorded:24Nij5016) to Recorded   10  Calcium 500 MG CAPS; TAKE 1 CAPSULE 3 times daily; Therapy: (July Tom) to Recorded   11   Daily Multivitamin TABS; Take 1 tablet twice daily; Therapy: (Livermore Breeding) to Recorded   12  Jelly CAPS; Take 1 capsule twice daily; Therapy: (Trace Breeding) to Recorded   13  Magnesium 500 MG Oral Capsule; Take 1 capsule twice daily; Therapy: (Livermore Breeding) to Recorded   14  Turmeric CAPS; Take 1 capsule twice daily; Therapy: (Trace Breeding) to Recorded   15  Vitamin D3 2000 UNIT Oral Tablet; Take 1 tablet twice daily;     Therapy: (Recorded:48Cis3809) to Recorded    Future Appointments    Date/Time Provider Specialty Site   06/01/2016 10:20 AM Alexander Mattson DO Rheumatology [facility]   06/30/2016 08:00 AM Nadia Franklin [facility] [place] Neurology [facility] NEUROLOGY [facility]   04/27/2016 09:00 AM [facility] [place]   05/02/2016 08:00 AM Ad[facility]   05/09/2016 03:00 PM Dyane [place], MSW, LCSW Psychiatry [facility] THERAPISTS   05/16/2016 08:00 AM Dyane [place], MSW, LCSW Psychiatry [facility] THERAPISTS   05/23/2016 08:00 AM Dyane [place], MSW, LCSW Psychiatry [facility] THERAPISTS   05/31/2016 09:00 AM Dyane [place], MSW, LCSW Psychiatry [facility]ISTS   06/06/2016 08:00 AM Dyane [place], MSW, LCSW Psychiatry [facility] THERAPISTS   06/13/2016 08:00 AM Dyane [place], MSW, LCSW Psychiatry [facility] THERAPISTS   06/20/2016 09:00 AM Dyane [place], MSW, LCSW Psychiatry [facility] THERAPISTS   06/27/2016 09:00 AM Dyane [place]     Signatures   Electronically signed by : FARIBA Brooks ; Apr 15 2016 10:19AM EST                       (Author)

## 2018-01-10 NOTE — PSYCH
1  Depression with anxiety (300 4) (F41 8)   2  Major depressive disorder, recurrent episode, moderate with anxious distress (296 32)   (F33 1)      Date of Initial Treatment Plan: 7/22/15  Date of Current Treatment Plan: 4/27/16  Treatment Plan 3  Strengths/Personal Resources for Self Care: "(I am) smart, funny, friendly, loyal, patient, fun, interesting, artistic,different "  Diagnosis:   Axis I: F41 8, F 33 1   Axis II: deferred   Axis III: please refer to "active problems"     Current Challenges/Problems/Needs: depression; chronic medical issues with chronic pain;  Long Term Goals:   #1 I want to feel more assertive (not aggressive)  Target Date: 8/27/16      #2 I want to remove ( effectively manage) the resentment from memories  Target Date: 8/27/16      #3 I will continue to more effectively cope with the fibromyalgia including apply for disability  Target Date: 8/27/16      Short Term Objectives:   Goal 1:   A  I want to understand the difference between communication that is assertive and aggressive including specific strategies  B  I want to identify those with whom I do well with assertiveness and those with whom I could do better with assertiveness  C  I will understand the relationship between victim and assertiveness vs aggressiveness  D  I will choose a new rheumatologist ("I don't feel as though I am making any headway  I will meet with my fam  dr  to get a referral for a new rheumatologist ")  E  I will learn Linda Shames my qualities/strengths/assets and learn/review/update my affirmations  F  I will take other steps to improve my self-confidence  Target Date: 8/27/16      Goal 2:   A  I will identify the memories associated with the resentment  B  I will identify the thinking (neg ) I use with the resentment  C  I will identify thinking (pos /realistic) to help reduce/eliminate the resentment and include acceptance  Target Date: 8/27/16      Goal 3:   A   I will contact the Soc  Sec  office regarding my recent denial  B  I will take next steps as directed (including via Soc  Sec  office and my )  Target Date: 8/27/16      GOAL 1: Modality: Individual 1 x per month Target Date: 8/27/16         GOAL 2: Modality: Individual 1 x per month Target Date: 8/27/16         GOAL 3: Modality: Individual 1 x per month Target Date: 8/27/176         The first scheduled review date is 8/27/26  The expected length of service is ongoing  Level of functioning at initial assessment: 50  The highest level of functioning in the past year was unknown  The current level of functioning is 56             Patient Signature: _________________________________ Date/Time: ______________       Electronically signed by : Zachery Abernathy, MSWLCSWMSEZEQUIEL,JOSÉ; Apr 27 2016 10:12AM EST                       (Author)

## 2018-01-11 NOTE — PSYCH
Progress Note  Psychotherapy Provided [facility]: Group Therapy provided today  Goals addressed in session:   Goal #1  D: Pt was one of 3 pts who participated in today's "Pain and Positive Coping" Group  Topics explored included: Introductions; Confidentiality; Each pt's Sharing of their current Level of Pain, contributing factors,and Treatments they were undergoing for the Pain; some Psychoeducation by this Therapist/ Facilitator re: Medications they were receiving, and to give non-opioid meds  a chance to work, along with Adjunctive Therapies and Activities; Shelby Support Shared peer-to peer by members of Group; and the final segment of today's Group was comprised of Calm Deep-Breathing/ Relaxation/ Guided Imagery and Mindfulness, accompanied by [employer] via CD  Pt participated actively in Group  She stated that her Pain=7 today, from her Fibromyalgia, and she expressed being discouraged, depressed, frustrated, related to not being able to tolerate Work anymore, due to Pain and fatigue,and lack of pain relief from medications which have been prescribed (including Gabapentin currently)  Pt stated she resorted to taking a rare/ prn dose of her past- prescribed percocet, when her pain was unbearable one day, in the past 2 weeks  (Pt was given Listening, and Validation as a person,by this Therapist, but also was advised to notify her current Pain Management physician of the pain Med  which worked/ relieved her pain,as her prescription is up to 1years old, pt reported ) Pt also expressed her Financial stress, no income currently, she lives at her parents' home again after some years on her own, which entails some interpersonal stress of its own  Pt has applied for Social Security Disability, has an 's for advocacy now, as she feels very badly about accepting money from her elderly Grandmother, whom pt helps at home  Pt sleeps a lot, during her days at home,and has low motivation   Pt' s Purpose in life, personal strengths and goals, were explored, in Group  Peers gave input and support  Pt identified that her current Purpose is to help be Supportive of BOTH of her Grandmothers, who live at her parents' home currently,and to help parents with household chores, as able  Pt to get up, do at least one Goal per day,and give Self-affirmation for same  Pt had no evidence of any SI Adrien Yeni /AH or VH today  Pt stated she felt a little more relaxed, after the Deep-Breathing/ Relaxation segment of Group  A: Major Depressive disorder, recurrent, moderate, F33 1; Chronic pain/ Fibromyalgia, M79 7; Financial stress  P: Continue Treatment Plan, Meds, and Psychotherapy  Pain Scale and Suicide Risk [facility]: Current Pain Assessment: moderate to severe   On a scale of 0 to 10, the patient rates current pain at 7   Current suicide risk is low   Behavioral Health Treatment Plan [facility]: Diagnosis and Treatment Plan explained to patient, patient relates understanding diagnosis and is agreeable to Treatment Plan  Assessment    1  Major depressive disorder, recurrent episode, moderate with anxious distress (296 32)   (F33 1)   2  Chronic pain (338 29) (G89 29)   3   Fibromyalgia (729 1) (M79 7)    Signatures   Electronically signed by : Zulma Thacker MSAPRNPMHCNS-BC; Mar  1 2017  6:52PM EST                       (Author)

## 2018-01-11 NOTE — PSYCH
Progress Note  Psychotherapy Provided [facility]: Individual Psychotherapy 45 mins  minutes provided today  Goals addressed in session:   (G# 1) states her recent diagnosis of narcolepsy warranted the sleep specialist to prescribe Xyrem, " (to be taken) between 9:30 PM and 10:30 PM,at which point was advised to awaken herself appox  10:30 PM to take the second dose with sleep duration expected until 9 AM the following day; At that time she reports is to take a stimulant (several being considered by her insurance company for authorization including Adderoll and Ritain); states will be solely taking the stimulant alleging that "my insurance denied it (the Xyrem);" states her Soc  Sec  dis  application was denied due to her report of her being late with submitting a portion of the required medical information; She notes will resubmit the information and add Dr Terry Baker sleep study results  When discussing assertiveness she cited examples of her ability to do so adding she has the most difficult time doing so with her father  She added in her observations of her parents' interactions, he has no difficulty with her mother's (his wife's) assertiveness with him  A: presents as receptive to the counseling process and as eager to create a quality of life for herself; P: (G#1) will explore factors in her relationship with her father regarding the issue of assertiveness;   Pain Scale and Suicide Risk [facility]: On a scale of 0 to 10, the patient rates current pain at 0   Current suicide risk is low   Behavioral Health Treatment Plan [facility]: Diagnosis and Treatment Plan explained to patient, patient relates understanding diagnosis and is agreeable to Treatment Plan            Results/Data  Encounter Results   PHQ-9 Adult Depression Screening [id number] 12:15PM Breonna Liao     Test Name Result Flag Reference   PHQ-9 Adult Depression Score 11     Q1: 1, Q2: 1, Q3: 1, Q4: 2, Q5: 3, Q6: 1, Q7: 2, Q8: 0, Q9: 0 PHQ-9 Adult Depression Screening Positive     PHQ-9 Difficulty Level Somewhat difficult     PHQ-9 Severity Moderate Depression       Results   PHQ-9 Adult Depression Screening 23Mar2016 12:15PM Laura Simple     Test Name Result Flag Reference   PHQ-9 Adult Depression Score 11     Q1: 1, Q2: 1, Q3: 1, Q4: 2, Q5: 3, Q6: 1, Q7: 2, Q8: 0, Q9: 0   PHQ-9 Adult Depression Screening Positive     PHQ-9 Difficulty Level Somewhat difficult     PHQ-9 Severity Moderate Depression         Assessment    1  Depression with anxiety (300 4) (F41 8)   2   Major depressive disorder, recurrent episode, moderate with anxious distress (296 32)   (F33 1)    Signatures   Electronically signed by : Kaitlynn Echevarria, HUBERT,PAYALW; Mar 23 2016  1:26PM EST                       (Author)

## 2018-01-11 NOTE — PSYCH
Progress Note  Psychotherapy Provided [facility]: Individual Psychotherapy 45 mins  minutes provided today  Goals addressed in session:   (G# 1 )  is worried about her mother who is not only involved in caregiving for Esther's pat  gr  mother (the latter resides with Danielle Heard and her parents) as well as her mother's own mother, age 80 (latter lives outside their family home);  has been taking on a once/day bradford gelatin in smoothies ("today is day three")-it is supposed to firm up your ligaments and take your pain away;" "It was really good  " states the tension in the home has "leveled off" since, per her report, her father has become more involved in his mother's care needs; She contends he is "figuring out" the stress this (his mother's health care needs) is creating in the family, particularly experienced by Danielle Heard and her mother  She noted that her pat  gr  mo  was denied Hospice status at this time; She states has "a sort of date" tonight which she is looking forward to  discussed/reviewed the importance of her establishing some limits for herself regarding her caregiving as well as other responsibilities; A: presents today as somewhat less stressed and cites a sense of "relief" that her father is beginning to involve himself in his mother's care needs; P:(G# 1) will continue to assert herself regarding her needs (including her protecting her time); Pain Scale and Suicide Risk [facility]: On a scale of 0 to 10, the patient rates current pain at 6   Current suicide risk is low   Behavioral Health Treatment Plan [facility]: Diagnosis and Treatment Plan explained to patient, patient relates understanding diagnosis and is agreeable to Treatment Plan            Results/Data  PHQ-9 Adult Depression Screening [id number] 03:28PM rBodie Baig     Test Name Result Flag Reference   PHQ-9 Adult Depression Score 7     Q1: 2, Q2: 1, Q3: 3, Q4: 1, Q5: 0, Q6: 0, Q7: 0, Q8: 0, Q9: 0   PHQ-9 Adult Depression Screening Negative     PHQ-9 Difficulty Level Very difficult     PHQ-9 Severity Mild Depression       PHQ-9 Adult Depression Screening [id number] 03:28PM Nida Haas     Test Name Result Flag Reference   PHQ-9 Adult Depression Score 7     Q1: 2, Q2: 1, Q3: 3, Q4: 1, Q5: 0, Q6: 0, Q7: 0, Q8: 0, Q9: 0   PHQ-9 Adult Depression Screening Negative     PHQ-9 Difficulty Level Very difficult     PHQ-9 Severity Mild Depression         Assessment    1   Depression with anxiety (300 4) (F41 8)    Signatures   Electronically signed by : Sina Carranza, HUBERT,PAYALW; Jul 20 2016  5:21PM EST                       (Author)

## 2018-01-12 NOTE — PSYCH
Message  Message Free Text Note Form: left message today at 12: 32 PM at the number provided as she had not yet arrived for her ind  psych  session scheduled for today at [facility]; will be regarded as a "now show" appointment and correspondence will be sent; Active Problems    1  Acute bilateral low back pain without sciatica (724 2,338 19) (M54 5)   2  Alcoholism (303 90) (F10 20)   3  Allergic rhinitis (477 9) (J30 9)   4  Back pain (724 5) (M54 9)   5  Chronic fatigue (780 79) (R53 82)   6  Chronic migraine without aura (346 70) (G43 709)   7  Classic migraine with aura (346 00) (G43 109)   8  Classic Migraine With Typical Aura (346 00)   9  Depression with anxiety (300 4) (F41 8)   10  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, with unspecified complication (298 2)    (F19 159)   11  Fever (780 60) (R50 9)   12  Fibromyalgia (729 1) (M79 7)   13  Headache (784 0) (R51)   14  Hypokalemia (276 8) (E87 6)   15  Impaired fasting glucose (790 21) (R73 01)   16  Insomnia (780 52) (G47 00)   17  Lyme disease (088 81) (A69 20)   18  Major depressive disorder, recurrent episode, moderate with anxious distress (296 32)    (F33 1)   19  Malaise and fatigue (780 79) (R53 81,R53 83)   20  Muscle spasms of head or neck (728 85) (M62 838)   21  Myalgia (729 1) (M79 1)   22  Neck pain (723 1) (M54 2)   23  Obesity (278 00) (E66 9)   24  Polyarthritis (716 50) (M13 0)   25  Sinus disease (473 9) (J34 9)   26  Sleep apnea (780 57) (G47 30)   27  Snoring (786 09) (R06 83)   28  Tension type headache (339 10) (G44 209)   29  Tick bite (919 4,E906 4) (W57 XXXA)    Current Meds   1  B-2 TABS; TAKE 1 TABLET DAILY; Therapy: (Recorded:78Tym8896) to Recorded   2  B-6 100 MG Oral Tablet; TAKE 1 TABLET DAILY AS DIRECTED; Therapy: (Recorded:09May2016) to Recorded   3  Calcium 500 MG CAPS; TAKE 1 CAPSULE 3 times daily; Therapy: (Recorded:09May2016) to Recorded   4  Cephalexin 500 MG Oral Capsule; abdi 1 caps    TID with food;   Therapy: 20EVP0397 to (Last Rx:10Aug2016)  Requested for: 10Aug2016 Ordered   5  Cyclobenzaprine HCl - 10 MG Oral Tablet; take 1 tab  at bedtime for muscle spasms; Therapy: 42DOS0821 to (Last Rx:01Jvo4063)  Requested for: 37Vlz1939 Ordered   6  Daily Multivitamin TABS; Take 1 tablet twice daily; Therapy: (Recorded:09May2016) to Recorded   7  DULoxetine HCl - 30 MG Oral Capsule Delayed Release Particles (Cymbalta); TAKE 3   CAPSULE Once In The Morning; Therapy: 72KFU0498 to (Evaluate:09Oct2016)  Requested for: 10NWJ3361; Last   Rx:06Uif7321 Ordered   8  LORazepam 1 MG Oral Tablet; TAKE 1 TABLET Every 6 hours; Therapy: 93DQH7364 to (Evaluate:09Oct2016)  Requested for: 72PVA5978; Last   Rx:91Wbf0721 Ordered   9  Magnesium 500 MG Oral Capsule; Take 1 capsule twice daily; Therapy: (Recorded:09May2016) to Recorded   10  Metaxalone 800 MG Oral Tablet (Skelaxin); TAKE 1 TABLET AT BEDTIME; Therapy: 90ZES2806 to (Mesha Astorga)  Requested for: 98MOQ3249; Last    Rx:01Jun2016 Ordered   11  Rizatriptan Benzoate 10 MG Oral Tablet Dispersible (Maxalt-MLT); TAKE 1 TABLET AT    ONSET OF HEADACHE  MAY REPEAT EVERY 2 HOURS AS NEEDED  MAXIMUM 3    TABLETS IN 24 HOURS  Requested for: 29MFB3637; Last Rx:00Won1190 Ordered   12  Topiramate 50 MG Oral Tablet (Topamax); take 2 tablets by mouth at bedtime; Therapy: 28VLH7516 to (Willrosina Beer)  Requested for: 02BUM2023; Last    Rx:07Mar2016 Ordered   13  TraZODone HCl - 100 MG Oral Tablet; TAKE 1 OR 2 TABLETS AT BEDTIME AS NEEDED    FOR SLEEP; Therapy: 45SNO4972 to (Evaluate:30Oct2016)  Requested for: 61GIA1771; Last    Rx:03May2016 Ordered   14  Vitamin D3 2000 UNIT Oral Tablet; Take 1 tablet twice daily; Therapy: (Recorded:09May2016) to Recorded    Allergies    1  Penicillins   2  Decadron TABS   3  Tetracyclines    4   Seasonal    Signatures   Electronically signed by : Rodney Jay, MAGALYSWHERMELINDA,JOSÉ; Aug 15 2016 12:33PM EST (Author)

## 2018-01-12 NOTE — PSYCH
Message  Message Free Text Note Form: Pt called/ Cx for Pain Group, sick today  --MT      Active Problems    1  Acute bilateral low back pain without sciatica (724 2,338 19) (M54 5)   2  Alcoholism (303 90) (F10 20)   3  Allergic rhinitis (477 9) (J30 9)   4  Back pain (724 5) (M54 9)   5  Chronic fatigue (780 79) (R53 82)   6  Chronic migraine without aura (346 70) (G43 709)   7  Chronic pain (338 29) (G89 29)   8  Classic migraine with aura (346 00) (G43 109)   9  Classic Migraine With Typical Aura (346 00)   10  Depression with anxiety (300 4) (F41 8)   11  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, with unspecified complication (079 5)    (F19 159)   12  Fever (780 60) (R50 9)   13  Fibromyalgia (729 1) (M79 7)   14  Headache (784 0) (R51)   15  Hypokalemia (276 8) (E87 6)   16  Impaired fasting glucose (790 21) (R73 01)   17  Insomnia (780 52) (G47 00)   18  Lyme disease (088 81) (A69 20)   19  Major depressive disorder, recurrent episode, moderate with anxious distress (296 32)    (F33 1)   20  Malaise and fatigue (780 79) (R53 81,R53 83)   21  Menorrhagia (626 2) (N92 0)   22  Muscle spasms of head or neck (728 85) (M62 838)   23  Myalgia (729 1) (M79 1)   24  Narcolepsy (347 00) (G47 419)   25  Neck pain (723 1) (M54 2)   26  Obesity (278 00) (E66 9)   27  Sinus disease (473 9) (J34 9)   28  Sleep apnea (780 57) (G47 30)   29  Snoring (786 09) (R06 83)   30  Tension type headache (339 10) (G44 209)   31  Tick bite (919 4,E906 4) (W57 XXXA)    Current Meds   1  B-2 TABS; TAKE 1 TABLET DAILY; Therapy: (Recorded:59Blg7860) to Recorded   2  B-6 100 MG Oral Tablet; TAKE 1 TABLET DAILY AS DIRECTED; Therapy: (Recorded:47Cne0610) to Recorded   3  Calcium 500 MG CAPS; TAKE 1 CAPSULE 3 times daily; Therapy: (Recorded:09May2016) to Recorded   4  Cyclobenzaprine HCl - 10 MG Oral Tablet; TAKE 1 TABLET AT BEDTIME as needed for   muscle spasms;    Therapy: 20AIX3207 to (Evaluate:26May2017)  Requested for: 49JEX9199; Last   WN:82GOX8940 Ordered   5  Daily Multivitamin TABS; Take 1 tablet twice daily; Therapy: (Recorded:09May2016) to Recorded   6  DULoxetine HCl - 30 MG Oral Capsule Delayed Release Particles (Cymbalta); TAKE 3   CAPSULE Once In The Morning; Therapy: 66FVR8488 to (Evaluate:74Oat5354)  Requested for: 49OUS0078; Last   Rx:25Nov2016 Ordered   7  Gabapentin 300 MG Oral Capsule; Take 1 capsule twice daily for 1 week, then take 1   capsule 3 times a day; Last Rx:30Nov2016 Ordered   8  LORazepam 1 MG Oral Tablet; TAKE 4 TABLET Bedtime; Therapy: (Recorded:30Nov2016) to Recorded   9  Magnesium 500 MG Oral Capsule; Take 1 capsule twice daily; Therapy: (Recorded:09May2016) to Recorded   10  Rizatriptan Benzoate 10 MG Oral Tablet Dispersible (Maxalt-MLT); TAKE 1 TAB AT ONSET    OF HEADACHE MAY REPEAT EVERY 2 HOURS AS NEEDED MAX 3 TABS/24 HRS; Therapy: 56Ybw0841 to (Evaluate:29Nov2016)  Requested for: 35Hpe1886; Last    Rx:91Pqh8589 Ordered   11  Topiramate 50 MG Oral Tablet (Topamax); take 2 tablets by mouth at bedtime; Therapy: 81CBT5795 to (Evaluate:24Apr2017)  Requested for: 76NGO8812; Last    Rx:47Ocq5572 Ordered   12  TraZODone HCl - 100 MG Oral Tablet; TAKE 1 OR 2 TABLETS AT BEDTIME AS NEEDED    FOR SLEEP; Therapy: 37ITG1614 to (Evaluate:21Apr2017)  Requested for: 63EKQ1196; Last    Rx:23Pag5613 Ordered   13  Vitamin D3 2000 UNIT Oral Tablet; Take 1 tablet twice daily; Therapy: (Recorded:09May2016) to Recorded    Allergies    1  Penicillins   2  Decadron TABS   3  Tetracyclines    4   Seasonal    Signatures   Electronically signed by : ANITA MarieMHCNS-BC; Feb 1 2017  3:36PM EST                       (Author)

## 2018-01-12 NOTE — RESULT NOTES
Message   Call patient   Iron level is normal       Verified Results  (1) IRON [id number] 04:57PM Jaswinder Horton   REPORT COMMENT:  CBC DONE FOR TASSCIA MAURILIO CC TO FOLLOW  FASTING:NO     Test Name Result Flag Reference   IRON, TOTAL 130 mcg/dL

## 2018-01-12 NOTE — PSYCH
Progress Note  Psychotherapy Provided [facility]: Individual Psychotherapy 45 mins  minutes provided today  Goals addressed in session:   (G#1 ) reports "trouble falling asleep just this week   there is a reason for it: excitement about the trip (going to the beach with family)" and learning about a close friend's reported difficulty with his marriage; discussed her role as his friend to be effectively responsive to he friend; "I'm at a tale end of a nasty cold  I am really glad it is finally ending  So I can feel good (for her impending vacation)  " She states is completing a regimen of an antibiotic  states had met with her rheumatologist regarding the suspecting of the Lyme Disease and her report of a recent week of a high level of pain; effective 8/31/16 was prescribed Gabapenton 300 mg tabs  (re "pain the nerves") which she plans to start this evening for one week followed by 2x/day and the third week 3x/day; describes her pain level today (level "6") as "much better;" discussed her ongoing efforts at self-care and self-advocacy in her personal relationships as well in her dealing with her medical issues; confiirmed this practice is providing Soc  Sec  Dis  office with required information as part of the application process; A: presents today as somewhat less stressed; states feels good about her recent appt  with the rheumatologist adding, "I felt my needs were met " P: (G# 1) will follow through on her vacation within realistic limits regarding her medical issues; Pain Scale and Suicide Risk [facility]: On a scale of 0 to 10, the patient rates current pain at 6   Current suicide risk is low   Behavioral Health Treatment Plan [facility]: Diagnosis and Treatment Plan explained to patient, patient relates understanding diagnosis and is agreeable to Treatment Plan            Results/Data  PHQ-9 Adult Depression Screening [id number] 11:18AM [facility]     Test Name Result Flag Reference   PHQ-9 Adult Depression Score 7     Over the last two weeks, how often have you been bothered by any of the following problems? Little interest or pleasure in doing things: Not at all - 0  Feeling down, depressed, or hopeless: Several days - 1  Trouble falling or staying asleep, or sleeping too much: More than half the days - 2  Feeling tired or having little energy: More than half the days - 2  Poor appetite or over eating: Not at all - 0  Feeling bad about yourself - or that you are a failure or have let yourself or your family down: Not at all - 0  Trouble concentrating on things, such as reading the newspaper or watching television: More than half the days - 2  Moving or speaking so slowly that other people could have noticed  Or the opposite -  being so fidgety or restless that you have been moving around a lot more than usual: Not at all - 0  Thoughts that you would be better off dead, or of hurting yourself in some way: Not at all - 0   PHQ-9 Adult Depression Screening Negative     PHQ-9 Difficulty Level Somewhat difficult     PHQ-9 Severity Mild Depression       PHQ-9 Adult Depression Screening [id number] 11:18AM "[employer]" [employer]     Test Name Result Flag Reference   PHQ-9 Adult Depression Score 7     Over the last two weeks, how often have you been bothered by any of the following problems? Little interest or pleasure in doing things: Not at all - 0  Feeling down, depressed, or hopeless: Several days - 1  Trouble falling or staying asleep, or sleeping too much: More than half the days - 2  Feeling tired or having little energy: More than half the days - 2  Poor appetite or over eating: Not at all - 0  Feeling bad about yourself - or that you are a failure or have let yourself or your family down: Not at all - 0  Trouble concentrating on things, such as reading the newspaper or watching television: More than half the days - 2  Moving or speaking so slowly that other people could have noticed   Or the opposite - being so fidgety or restless that you have been moving around a lot more than usual: Not at all - 0  Thoughts that you would be better off dead, or of hurting yourself in some way: Not at all - 0   PHQ-9 Adult Depression Screening Negative     PHQ-9 Difficulty Level Somewhat difficult     PHQ-9 Severity Mild Depression         Assessment    1  Major depressive disorder, recurrent episode, moderate with anxious distress (296 32)   (F33 1)   2   Depression with anxiety (300 4) (F41 8)    Signatures   Electronically signed by : HUBERT Perez LCSW; Sep  1 2016 12:04PM EST                       (Author)    Electronically signed by : HUBERT Perez LCSW; Sep  1 2016  3:21PM EST                       (Author)

## 2018-01-13 VITALS
HEART RATE: 84 BPM | BODY MASS INDEX: 29.45 KG/M2 | WEIGHT: 172.5 LBS | RESPIRATION RATE: 18 BRPM | HEIGHT: 64 IN | DIASTOLIC BLOOD PRESSURE: 74 MMHG | TEMPERATURE: 98 F | SYSTOLIC BLOOD PRESSURE: 102 MMHG

## 2018-01-13 VITALS
BODY MASS INDEX: 30.11 KG/M2 | HEART RATE: 94 BPM | DIASTOLIC BLOOD PRESSURE: 70 MMHG | SYSTOLIC BLOOD PRESSURE: 98 MMHG | RESPIRATION RATE: 16 BRPM | TEMPERATURE: 98.4 F | WEIGHT: 176.38 LBS | HEIGHT: 64 IN

## 2018-01-13 NOTE — PSYCH
Psych Med Mgmt    Appearance: was calm and cooperative, adequate hygiene and grooming and good eye contact  Observed mood: depressed and anxious  Observed mood: affect was constricted  Speech: a normal rate and fluent  Thought processes: coherent/organized  Hallucinations: no hallucinations present  Thought Content: no delusions  Abnormal Thoughts: The patient has no suicidal thoughts and no homicidal thoughts  Orientation: The patient is oriented to person, place and time, oriented to person, oriented to place and oriented to time  Recent and Remote Memory: short term memory intact and long term memory intact  Attention Span And Concentration: concentration intact  Insight: Limited insight  Judgment: Her judgment was limited  Muscle Strength And Tone  Muscle strength and tone were normal    The patient is experiencing moderate to severe pain  Goals addressed in session: Medication Management       Treatment Recommendations: Continue current treatment  Risks, Benefits And Possible Side Effects Of Medications: Risks, benefits, and possible side effects of medications explained to patient and patient verbalizes understanding  She reports normal appetite, decreased energy, no weight change and decrease in number of sleep hours   Patient stated that her depression is the same or maybe slightly worse because she continues to struggle with chronic pain  She stated her rheumatologist started her on gabapentin but she is not able to say if it is beneficial at all yet  She did joined the chronic pain support group offered here  Denies medication side effects related to Cymbalta  When I asked about sleep she stated that either Gabapentin or her muscle relaxer are helping     She stated her disability case was denied for a third time and she will continue to appeal       Vitals  Signs   Recorded: 31DGS9713 01:43PM   Height: 5 ft 4 in  Weight: 160 lb   BMI Calculated: 27 46  BSA Calculated: 1 78    Assessment    1  Major depressive disorder, recurrent episode, moderate with anxious distress (296 32)   (F33 1)   2  Insomnia (780 52) (G47 00)   3  Chronic pain (338 29) (G89 29)   4  Fibromyalgia (729 1) (M79 7)    Plan    1  DULoxetine HCl - 30 MG Oral Capsule Delayed Release Particles (Cymbalta);   TAKE 3 CAPSULE Once In The Morning    2  LORazepam 1 MG Oral Tablet; TAKE 1 TABLET Every 6 hours    Review of Systems    Constitutional: as noted in HPI  Active Problems    1  Acute bilateral low back pain without sciatica (724 2,338 19) (M54 5)   2  Alcoholism (303 90) (F10 20)   3  Allergic rhinitis (477 9) (J30 9)   4  Back pain (724 5) (M54 9)   5  Chronic fatigue (780 79) (R53 82)   6  Chronic migraine without aura (346 70) (G43 709)   7  Chronic pain (338 29) (G89 29)   8  Classic migraine with aura (346 00) (G43 109)   9  Classic Migraine With Typical Aura (346 00)   10  Depression with anxiety (300 4) (F41 8)   11  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, with unspecified complication (386 4)    (F19 159)   12  Fever (780 60) (R50 9)   13  Fibromyalgia (729 1) (M79 7)   14  Headache (784 0) (R51)   15  Hypokalemia (276 8) (E87 6)   16  Impaired fasting glucose (790 21) (R73 01)   17  Insomnia (780 52) (G47 00)   18  Lyme disease (088 81) (A69 20)   19  Major depressive disorder, recurrent episode, moderate with anxious distress (296 32)    (F33 1)   20  Malaise and fatigue (780 79) (R53 81,R53 83)   21  Menorrhagia (626 2) (N92 0)   22  Muscle spasms of head or neck (728 85) (M62 838)   23  Myalgia (729 1) (M79 1)   24  Neck pain (723 1) (M54 2)   25  Obesity (278 00) (E66 9)   26  Sinus disease (473 9) (J34 9)   27  Sleep apnea (780 57) (G47 30)   28  Snoring (786 09) (R06 83)   29  Tension type headache (339 10) (G41 209)   30  Tick bite (919 4,E906 4) [facility])    Past Medical History    1  History of Acute otitis externa, unspecified laterality   2   History of Acute otitis media, unspecified laterality   3  History of Acute upper respiratory infection (465 9) (J06 9)   4  History of Atypical chest pain (786 59) (R07 89)   5  History of Chronic sinusitis (473 9) (J32 9)   6  History of Dysuria (788 1) (R30 0)   7  History of Encounter for routine gynecological examination with Papanicolaou smear of   cervix (V72 31,V76 2) (Z01 419)   8  History of depression (V11 8) (Z86 59)   9  History of migraine (V12 49) (Z86 69)   10  History of polyarthritis (V13 4) (Z87 39)   11  History of Memory loss (780 93) (R41 3)    The active problems and past medical history were reviewed and updated today  Allergies    1  Penicillins   2  Decadron TABS   3  Tetracyclines    4  Seasonal    Current Meds   1  B-2 TABS; TAKE 1 TABLET DAILY; Therapy: (Recorded:09May2016) to Recorded   2  B-6 100 MG Oral Tablet; TAKE 1 TABLET DAILY AS DIRECTED; Therapy: (Recorded:09May2016) to Recorded   3  Calcium 500 MG CAPS; TAKE 1 CAPSULE 3 times daily; Therapy: (Recorded:09May2016) to Recorded   4  Cyclobenzaprine HCl - 10 MG Oral Tablet; take 1 tab  at bedtime for muscle spasms; Therapy: 94MBF7766 to (Last Rx:04Oct2016)  Requested for: 04Oct2016 Ordered   5  Daily Multivitamin TABS; Take 1 tablet twice daily; Therapy: (Recorded:09May2016) to Recorded   6  DULoxetine HCl - 30 MG Oral Capsule Delayed Release Particles (Cymbalta); TAKE 3   CAPSULE Once In The Morning; Therapy: 77EOI9720 to ([phone])  Requested for: 75Qaj0126; Last   Rx:47Eav4434 Ordered   7  Gabapentin 300 MG Oral Capsule; 1 TAB QHS X 1 week, THEN 1 TAB BID X 1 week,   THEN 1 TAB TID; Therapy: 41Mic7208 to (Evaluate:61Ylw8386)  Requested for: 41Qbg2993; Last   Rx:92Fai7835 Ordered   8  LORazepam 1 MG Oral Tablet; TAKE 1 TABLET Every 6 hours; Therapy: 61YQP6493 to (Evaluate:09Oct2016)  Requested for: 92EOR3601; Last   Rx:23Xjj4659 Ordered   9  Magnesium 500 MG Oral Capsule; Take 1 capsule twice daily;    Therapy: (Recorded:09May2016) to Recorded   10  Rizatriptan Benzoate 10 MG Oral Tablet Dispersible (Maxalt-MLT); TAKE 1 TAB AT ONSET    OF HEADACHE MAY REPEAT EVERY 2 HOURS AS NEEDED MAX 3 TABS/24 HRS; Therapy: 33Wvp8415 to (Evaluate:29Nov2016)  Requested for: 19Xxu5819; Last    Rx:23Mfk8582 Ordered   11  Topiramate 50 MG Oral Tablet (Topamax); take 2 tablets by mouth at bedtime; Therapy: 31OJJ9181 to (Evaluate:24Apr2017)  Requested for: 13VRA7883; Last    Rx:92Xxo7016 Ordered   12  TraZODone HCl - 100 MG Oral Tablet; TAKE 1 OR 2 TABLETS AT BEDTIME AS NEEDED    FOR SLEEP; Therapy: 56LMY2238 to (Evaluate:30Oct2016)  Requested for: 61OEE7301; Last    Rx:85Jnc6345 Ordered   13  Vitamin D3 2000 UNIT Oral Tablet; Take 1 tablet twice daily; Therapy: (Recorded:09May2016) to Recorded    The medication list was reviewed and updated today  Family Psych History  Father    1  Family history of Diabetes Mellitus (V18 0)  Brother    2  Family history of substance abuse (V17 0) (Z81 4)  Maternal Grandmother    3  Family history of Diabetes Mellitus (V18 0)   4  Family history of rheumatoid arthritis (V17 7) (Z82 61)   5  Family history of Malignant Melanoma Of The Skin (V16 8)  Paternal Grandmother    10  Family history of rheumatoid arthritis (V17 7) (Z82 61)   7  Family history of Major depressive disorder, recurrent episode, severe  Maternal Great Grandmother    8  Family history of malignant neoplasm of stomach (V16 0) (Z80 0)  Paternal Grandfather    5  Family history of Diabetes Mellitus (V18 0)   10  Family history of Lung Cancer (V16 1)  Paternal Aunt    6  Family history of Major depressive disorder, recurrent episode, severe  Maternal Uncle    12  Family history of substance abuse (V17 0) (Z81 4)  Family History    15  Family history of Alcoholism   14  Denied: Family history of Drug Use   15  Denied: Family history of Crohn's disease   12  Denied: Family history of psoriasis   17   Denied: Family history of systemic lupus erythematosus   18  Denied: Family history of ulcerative colitis   19  Family history of Never A Smoker    The family history was reviewed and updated today  Social History    · Caffeine use (V49 89) (F15 90)   · Denied: History of Drug Use   · Never A Smoker   · No alcohol use   · No drug use   · Single   · Social alcohol use (Z78 9)  The social history was reviewed and updated today  The social history was reviewed and is unchanged  End of Encounter Meds    1  Gabapentin 300 MG Oral Capsule; 1 TAB QHS X 1 week, THEN 1 TAB BID X 1 week,   THEN 1 TAB TID; Therapy: 68Ehz2664 to (Evaluate:27Feb2017)  Requested for: 09Eri6286; Last   Rx:35Atz3067 Ordered    2  Topiramate 50 MG Oral Tablet (Topamax); take 2 tablets by mouth at bedtime; Therapy: 87UAZ4670 to (Evaluate:24Apr2017)  Requested for: 02DVV2525; Last   Rx:96Ifm5560 Ordered    3  Rizatriptan Benzoate 10 MG Oral Tablet Dispersible (Maxalt-MLT); TAKE 1 TAB AT ONSET   OF HEADACHE MAY REPEAT EVERY 2 HOURS AS NEEDED MAX 3 TABS/24 HRS; Therapy: 64Pxi3306 to (Evaluate:29Nov2016)  Requested for: 57Heu0353; Last   Rx:07Ggw4237 Ordered    4  TraZODone HCl - 100 MG Oral Tablet; TAKE 1 OR 2 TABLETS AT BEDTIME AS NEEDED   FOR SLEEP; Therapy: 90OZU5165 to (Evaluate:30Oct2016)  Requested for: 31GRH1962; Last   Rx:69Zdw2900 Ordered    5  DULoxetine HCl - 30 MG Oral Capsule Delayed Release Particles (Cymbalta); TAKE 3   CAPSULE Once In The Morning; Therapy: 22PDI9734 to (Evaluate:09Jan2017)  Requested for: 73Nwc9900; Last   Rx:11Oct2016 Ordered    6  LORazepam 1 MG Oral Tablet; TAKE 1 TABLET Every 6 hours; Therapy: 67YRO6383 to (Evaluate:09Jan2017)  Requested for: 27TNS1793; Last   Rx:11Oct2016 Ordered    7  Cyclobenzaprine HCl - 10 MG Oral Tablet; take 1 tab  at bedtime for muscle spasms; Therapy: 57GWP2904 to (Last Rx:04Oct2016)  Requested for: 04Oct2016 Ordered    8  B-2 TABS; TAKE 1 TABLET DAILY;    Therapy: (Recorded:36Hyf7062) to Recorded   9  B-6 100 MG Oral Tablet; TAKE 1 TABLET DAILY AS DIRECTED; Therapy: (Recorded:09May2016) to Recorded   10  Calcium 500 MG CAPS; TAKE 1 CAPSULE 3 times daily; Therapy: (Recorded:09May2016) to Recorded   11  Daily Multivitamin TABS; Take 1 tablet twice daily; Therapy: (Recorded:09May2016) to Recorded   12  Magnesium 500 MG Oral Capsule; Take 1 capsule twice daily; Therapy: (Recorded:09May2016) to Recorded   13  Vitamin D3 2000 UNIT Oral Tablet; Take 1 tablet twice daily;     Therapy: (Recorded:09May2016) to Recorded    Future Appointments    Date/Time Provider Specialty Site   11/30/2016 01:20 PM [facility],  Rheumatology [address]    10/24/2016 01:00 PM HERMELINDA Jack, JOSÉ Psychiatry [facility]   11/10/2016 03:00 PM HERMELINDA Jack LCSW Psychiatry [facility]   11/28/2016 11:00 AM HERMELINDA Jack LCSW Psychiatry [facility]   12/12/2016 04:00 PM HERMELINDA Jack LCSW Psychiatry [facility]   12/27/2016 03:00 PM HERMELINDA Jack, JOSÉ Psychiatry [facility]     Signatures   Electronically signed by : FARIBA Brooks ; Oct 11 2016  1:47PM EST                       (Author)

## 2018-01-13 NOTE — PSYCH
Psych Med Mgmt    Appearance: was calm and cooperative, adequate hygiene and grooming and good eye contact  Observed mood: depressed  Observed mood: affect was constricted  Speech: a normal rate and fluent  Thought processes: coherent/organized  Hallucinations: no hallucinations present  Thought Content: no delusions  Abnormal Thoughts: The patient has no suicidal thoughts and no homicidal thoughts  Orientation: The patient is oriented to person, place and time, oriented to person, oriented to place and oriented to time  Recent and Remote Memory: short term memory intact and long term memory intact  Attention Span And Concentration: concentration impaired  Insight: Limited insight  Judgment: Her judgment was limited  Muscle Strength And Tone  Muscle strength and tone were normal    The patient is experiencing moderate to severe pain  Goals addressed in session: Medication Management       Treatment Recommendations: Continue current medications  Risks, Benefits And Possible Side Effects Of Medications: Risks, benefits, and possible side effects of medications explained to patient and patient verbalizes understanding  She reports normal appetite, normal energy level, no weight change and decrease in number of sleep hours   Mood is stable  She suffers from chronic pain and she has seen a rheumatologist whom recommended increase in Cymbalta to 120 mg but that did not helped the pain and made her feel more depressed or feel apathetic  She is going to see a Lyme's disease specialist and is pursuing alternative medicine approach for pain  She continues to see Dr Lacey Landa for migraines, reported, increased anxiety and she wanted to try again a q6 hour dosing  Assessment    1  Fibromyalgia (729 1) (M79 7)   2  Major depressive disorder, recurrent episode, moderate with anxious distress (296 32)   (F33 1)   3  Insomnia (780 52) (G47 00)    Plan    1   From  DULoxetine HCl - 60 MG Oral Capsule Delayed Release Particles TAKE   2 CAPSULES AT BEDTIME To DULoxetine HCl - 30 MG Oral Capsule Delayed Release   Particles (Cymbalta) TAKE 3 CAPSULE Once In The Morning    2  From  LORazepam 1 MG Oral Tablet TAKE 1 TABLET Twice daily PRN To   LORazepam 1 MG Oral Tablet TAKE 1 TABLET Every 6 hours    Review of Systems    Constitutional: feeling tired  Substance Abuse Hx    Substance Abuse History: Denies current  Active Problems    1  Acute bilateral low back pain without sciatica (724 2,338 19) (M54 5)   2  Alcoholism (303 90) (F10 20)   3  Allergic rhinitis (477 9) (J30 9)   4  Chronic fatigue (780 79) (R53 82)   5  Chronic migraine without aura (346 70) (G43 709)   6  Classic migraine with aura (346 00) (G43 109)   7  Classic Migraine With Typical Aura (346 00)   8  Depression with anxiety (300 4) (F41 8)   9  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, with unspecified complication (634 9)   (I84 247)   10  Fever (780 60) (R50 9)   11  Fibromyalgia (729 1) (M79 7)   12  Headache (784 0) (R51)   13  Hypokalemia (276 8) (E87 6)   14  Impaired fasting glucose (790 21) (R73 01)   15  Insomnia (780 52) (G47 00)   16  Lyme disease (088 81) (A69 20)   17  Major depressive disorder, recurrent episode, moderate with anxious distress (296 32)    (F33 1)   18  Malaise and fatigue (780 79) (R53 81,R53 83)   19  Muscle spasms of head or neck (728 85) (M62 838)   20  Neck pain (723 1) (M54 2)   21  Obesity (278 00) (E66 9)   22  Polyarthritis (716 50) (M13 0)   23  Sinus disease (473 9) (J34 9)   24  Sleep apnea (780 57) (G47 30)   25  Snoring (786 09) (R06 83)   26  Tension type headache (339 10) (G44 209)   27  Tick bite (919 4,E906 4) [facility])    Past Medical History    1  History of Acute otitis externa, unspecified laterality   2  History of Acute otitis media, unspecified laterality   3  History of Acute upper respiratory infection (465 9) (J06 9)   4   History of Atypical chest pain (786 59) (R07 89)   5  History of Chronic sinusitis (473 9) (J32 9)   6  History of Dysuria (788 1) (R30 0)   7  History of Encounter for routine gynecological examination with Papanicolaou smear of   cervix (V72 31,V76 2) (Z01 419)   8  History of depression (V11 8) (Z86 59)   9  History of migraine (V12 49) (Z86 69)   10  History of Memory loss (780 93) (R41 3)    The active problems and past medical history were reviewed and updated today  Allergies    1  Penicillins   2  Decadron TABS   3  Tetracyclines    4  Seasonal    Current Meds   1  B-2 TABS; TAKE 1 TABLET DAILY; Therapy: (Recorded:09May2016) to Recorded   2  B-6 100 MG Oral Tablet; TAKE 1 TABLET DAILY AS DIRECTED; Therapy: (Recorded:09May2016) to Recorded   3  Calcium 500 MG CAPS; TAKE 1 CAPSULE 3 times daily; Therapy: (Recorded:09May2016) to Recorded   4  Daily Multivitamin TABS; Take 1 tablet twice daily; Therapy: (Recorded:09May2016) to Recorded   5  DULoxetine HCl - 60 MG Oral Capsule Delayed Release Particles; TAKE 2 CAPSULES   AT BEDTIME; Therapy: 67NRR4634 to (Fauzia Mass)  Requested for: 41TVK2708; Last   Rx:01Jun2016 Ordered   6  LORazepam 1 MG Oral Tablet; TAKE 1 TABLET Twice daily PRN; Therapy: 35CYI9130 to (Evaluate:99Iha8386)  Requested for: 62YIO3331; Last   Rx:15Apr2016 Ordered   7  Magnesium 500 MG Oral Capsule; Take 1 capsule twice daily; Therapy: (Recorded:09May2016) to Recorded   8  Metaxalone 800 MG Oral Tablet; TAKE 1 TABLET AT BEDTIME; Therapy: 01HSL8283 to (Fauzia Mass)  Requested for: 01FAG3322; Last   Rx:01Jun2016 Ordered   9  Rizatriptan Benzoate 10 MG Oral Tablet Dispersible; TAKE 1 TABLET AT ONSET OF   HEADACHE  MAY REPEAT EVERY 2 HOURS AS NEEDED  MAXIMUM 3 TABLETS IN 24   HOURS  Requested for: 68OEO8936; Last Rx:83Lwb5124 Ordered   10  Topiramate 50 MG Oral Tablet; take 2 tablets by mouth at bedtime;     Therapy: 25RKZ9749 to (Jorge Barron)  Requested for: 66VJL1196; Last    Rx:07Mar2016 Ordered   11  TraZODone HCl - 100 MG Oral Tablet; TAKE 1 OR 2 TABLETS AT BEDTIME AS NEEDED    FOR SLEEP; Therapy: 83BBC1657 to (Evaluate:72Edk5925)  Requested for: 19BWH8089; Last    Rx:86Cne6887 Ordered   12  Vitamin D3 2000 UNIT Oral Tablet; Take 1 tablet twice daily; Therapy: (Recorded:09May2016) to Recorded    The medication list was reviewed and updated today  Family Psych History  Father    1  Family history of Diabetes Mellitus (V18 0)  Brother    2  Family history of substance abuse (V17 0) (Z81 4)  Maternal Grandmother    3  Family history of Diabetes Mellitus (V18 0)   4  Family history of rheumatoid arthritis (V17 7) (Z82 61)   5  Family history of Malignant Melanoma Of The Skin (V16 8)  Paternal Grandmother    10  Family history of rheumatoid arthritis (V17 7) (Z82 61)   7  Family history of Major depressive disorder, recurrent episode, severe  Maternal Great Grandmother    8  Family history of malignant neoplasm of stomach (V16 0) (Z80 0)  Paternal Grandfather    5  Family history of Diabetes Mellitus (V18 0)   10  Family history of Lung Cancer (V16 1)  Paternal Aunt    6  Family history of Major depressive disorder, recurrent episode, severe  Maternal Uncle    12  Family history of substance abuse (V17 0) (Z81 4)  Family History    15  Family history of Alcoholism   14  Denied: Family history of Drug Use   15  Denied: Family history of Crohn's disease   12  Denied: Family history of psoriasis   17  Denied: Family history of systemic lupus erythematosus   18  Denied: Family history of ulcerative colitis   19  Family history of Never A Smoker    The family history was reviewed and updated today  Social History    · Caffeine use (V49 89) (F15 90)   · Denied: History of Drug Use   · Never A Smoker   · No alcohol use   · No drug use   · Single   · Social alcohol use (Z78 9)  The social history was reviewed and updated today  The social history was reviewed and is unchanged        End of Encounter Meds    1  Topiramate 50 MG Oral Tablet (Topamax); take 2 tablets by mouth at bedtime; Therapy: 87JVH6111 to ([id number] [phone] [id number])  Requested for: 14PGN1659; Last   Rx:07Mar2016 Ordered    2  Rizatriptan Benzoate 10 MG Oral Tablet Dispersible (Maxalt-MLT); TAKE 1 TABLET AT   ONSET OF HEADACHE  MAY REPEAT EVERY 2 HOURS AS NEEDED  MAXIMUM 3   TABLETS IN 24 HOURS  Requested for: 38UWH4968; Last Rx:49Edh8414 Ordered    3  TraZODone HCl - 100 MG Oral Tablet; TAKE 1 OR 2 TABLETS AT BEDTIME AS NEEDED   FOR SLEEP; Therapy: 12JMP0758 to (Evaluate:30Oct2016)  Requested for: 44WHP4765; Last   Rx:03May2016 Ordered    4  DULoxetine HCl - 30 MG Oral Capsule Delayed Release Particles (Cymbalta); TAKE 3   CAPSULE Once In The Morning; Therapy: 63FUD3046 to (Evaluate:09Oct2016)  Requested for: 69NIE4249; Last   Rx:16Ylz1260 Ordered   5  Metaxalone 800 MG Oral Tablet (Skelaxin); TAKE 1 TABLET AT BEDTIME; Therapy: 80SZU2874 to (26 100188)  Requested for: 43KXP4905; Last   Rx:01Jun2016 Ordered    6  LORazepam 1 MG Oral Tablet; TAKE 1 TABLET Every 6 hours; Therapy: 46DIY6362 to (Evaluate:09Oct2016)  Requested for: 19LUN8462; Last   Rx:56Fai2360 Ordered    7  B-2 TABS; TAKE 1 TABLET DAILY; Therapy: (Recorded:09May2016) to Recorded   8  B-6 100 MG Oral Tablet; TAKE 1 TABLET DAILY AS DIRECTED; Therapy: (Recorded:09May2016) to Recorded   9  Calcium 500 MG CAPS; TAKE 1 CAPSULE 3 times daily; Therapy: (Recorded:09May2016) to Recorded   10  Daily Multivitamin TABS; Take 1 tablet twice daily; Therapy: (Recorded:09May2016) to Recorded   11  Magnesium 500 MG Oral Capsule; Take 1 capsule twice daily; Therapy: (Recorded:09May2016) to Recorded   12  Vitamin D3 2000 UNIT Oral Tablet; Take 1 tablet twice daily;     Therapy: (Recorded:09May2016) to Recorded    Future Appointments    Date/Time Provider Specialty Site   08/31/2016 10:00 AM Dayton Brown DO Rheumatology [facility]   07/20/2016 03:00 PM Alejandro Varela, MSW, LCSW Psychiatry [facility]   08/01/2016 11:00 AM Darcy Santos, MSW, LCSW Psychiatry [facility]   08/08/2016 10:00 AM Darcy Santos, MSW, LCSW Psychiatry [facility]   08/15/2016 12:00 PM Darcy Santos MSW, LCSW Psychiatry [facility]   09/01/2016 11:00 AM Darcy Snatos, MSW, LCSW Psychiatry [facility]   09/15/2016 01:00 PM Darcy Santos MSW, LCSW Psychiatry [facility]   09/22/2016 02:00 PM Darcy Santos, MSW, LCSW Psychiatry [facility]     Signatures   Electronically signed by : FARIBA Jane ; Jul 11 2016  9:59AM EST                       (Author)

## 2018-01-13 NOTE — PSYCH
Progress Note  Psychotherapy Provided [facility]: Individual Psychotherapy 45 mins  minutes provided today  Goals addressed in session:   (G# 1 ) "It is kind of down day  My pain level is at an "8" today " She states is "on pause" with doing her (pat) grandmother's grocery shopping indicating, "I just can't handle it that well  I'm stressed out about my other (maternal) grandmother  She was hospitalized overnight this past week-end  "Both of my grandmother's have COPD " As a result of her follow up sleep study appointment, on Feb 8-9 will have an on-site sleep study  states her follow up with her neurologist will be continuing to study her migraine experiences and how to treat them; She reports having had a very negative experience (she referred to it as a "psychosis") with steroids (as a form of treatment for migraines) following a three-day period of symptoms (ex , reported hallucinations, tremors)  She states has learned that the migraines she experiences are correlated, in part, with her menses  discussed her having set limits with what she is able to do regarding the care needs of both of her grandmothers to afford some time to devote to her own self-care; A: presents as trying to be positive regarding her reported medical issues and was quite pleasant today despite her reported pain level; P: (G# 1) will continue to be assertive regarding balancing her self-care needs with what others expect of/want from her;   Pain Scale and Suicide Risk [facility]: On a scale of 0 to 10, the patient rates current pain at 8   Current suicide risk is low   Behavioral Health Treatment Plan [facility]: Diagnosis and Treatment Plan explained to patient, patient relates understanding diagnosis and is agreeable to Treatment Plan            Results/Data  Encounter Results   PHQ-9 Adult Depression Screening [id number] 04:21PM Brittani Padron     Test Name Result Flag Reference   PHQ-9 Adult Depression Score 8     Q1: 1, Q2: 2, Q3: 2, Q4: 2, Q5: 0, Q6: 0, Q7: 0, Q8: 1, Q9: 0   PHQ-9 Adult Depression Screening Negative     PHQ-9 Difficulty Level Somewhat difficult     PHQ-9 Severity Mild Depression       Results   PHQ-9 Adult Depression Screening [id number] 04:21PM Guy Foster     Test Name Result Flag Reference   PHQ-9 Adult Depression Score 8     Q1: 1, Q2: 2, Q3: 2, Q4: 2, Q5: 0, Q6: 0, Q7: 0, Q8: 1, Q9: 0   PHQ-9 Adult Depression Screening Negative     PHQ-9 Difficulty Level Somewhat difficult     PHQ-9 Severity Mild Depression         Assessment    1  Depression with anxiety (300 4) (F41 8)   2   Major depressive disorder, recurrent episode, moderate with anxious distress (296 32)   (F33 1)    Signatures   Electronically signed by : BAY Young; Feb  3 2016  6:33PM EST                       (Author)

## 2018-01-13 NOTE — PSYCH
Treatment Plan Tracking    #1 Treatment Plan not completed within required time limits due to: Client presented with emotional/behavioral issues that required clinical intervention            Signatures   Electronically signed by : HUBERT Izquierdo,JOSÉ; Dec 12 2016  5:02PM EST                       (Author)

## 2018-01-13 NOTE — PSYCH
1  Major depressive disorder, recurrent episode, moderate with anxious distress (296 32)   (F33 1)      Date of Initial Treatment Plan: 7/22/15  Date of Current Treatment Plan: 8/08/16  Treatment Plan 4  Strengths/Personal Resources for Self Care: "(I am) smart, funny, friendly, loyal, patient, fun interesting, artistic, different "  Diagnosis:   Axis I: F33 1   Axis II: deferred   Axis III: please refer to"active problem;"     Area of Needs: depression; chronic medical issues with chronic pain;  Long Term Goals:   #1 I want to continue to find ways to express my freedom (ex , assertiveness) while living with my family so that I may be content (enjoy life) despite illness and limitations  Target Date: 12/8/16      #2 I want to remove (effectively manage) the resentment from memories  Completion Date: 8/8/16     #3 I will continue to more effectively cope with the fibromyalgia including the social security disability application process  Target Date: 12/8/16    Short Term Objectives:   Goal 1:   A  I will continue to use assertiveness strategies to convert from being a victim to being a survivor (learning from the neg  experiences)  B  I will be more mindful of reaching out to extended family to maintain positive interactions with them  C  As differing viewpoints may occur within my family, I will select which situations warrant discussions  Goal 2:       Completion Date: 8/8/16     Goal 3:   A  I will continue to take care of myself (ex , challenging neg  thinking) during the waiting period of a decision about my Soc  Sec  Dis  application ("a brand new application" submitted on 7 / 27 /16)  B  I will contact my , if indicated during this waiting period     Target Date: 12/8/16      GOAL 1: Modality: Individual 2 x per month Target Date: 12/8/16         GOAL 2: Modality: Individual 2 x per month Target Date: 12/8/16         GOAL 3: Modality: Individual 2 x per month Target Date: 12/8/16             The first scheduled review date is 12/8/16  The expected length of service is ongoing  Level of functioning at initial assessment: 50  The highest level of functioning in the past year was unknown  The current level of functioning is 57             Patient Signature: _________________________________ Date/Time: ______________       Electronically signed by : Julia Pavon MSWLCSWMSEZEQUIEL,JOSÉ; Aug  8 2016 11:23AM EST                       (Author)

## 2018-01-13 NOTE — PSYCH
Message  Message Free Text Note Form: 11:20 AM called Canelo Britt at telephone provided as she had not yet arrived for her ind  psych  appt  She noted she had overslept due to her report of fibromyalgia pain and interrupted sleep  She will be regarded as a "no show" appointment  Active Problems    1  Acute bilateral low back pain without sciatica (724 2,338 19) (M54 5)   2  Alcoholism (303 90) (F10 20)   3  Allergic rhinitis (477 9) (J30 9)   4  Chronic fatigue (780 79) (R53 82)   5  Chronic migraine without aura (346 70) (G43 709)   6  Classic migraine with aura (346 00) (G43 109)   7  Classic Migraine With Typical Aura (346 00)   8  Depression with anxiety (300 4) (F41 8)   9  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, with unspecified complication (424 4)   (F29 461)   10  Fever (780 60) (R50 9)   11  Fibromyalgia (729 1) (M79 7)   12  Headache (784 0) (R51)   13  Hypokalemia (276 8) (E87 6)   14  Impaired fasting glucose (790 21) (R73 01)   15  Insomnia (780 52) (G47 00)   16  Lyme disease (088 81) (A69 20)   17  Major depressive disorder, recurrent episode, moderate with anxious distress (296 32)    (F33 1)   18  Malaise and fatigue (780 79) (R53 81,R53 83)   19  Muscle spasms of head or neck (728 85) (M62 838)   20  Neck pain (723 1) (M54 2)   21  Obesity (278 00) (E66 9)   22  Polyarthritis (716 50) (M13 0)   23  Sinus disease (473 9) (J34 9)   24  Sleep apnea (780 57) (G47 30)   25  Snoring (786 09) (R06 83)   26  Tension type headache (339 10) (G44 209)   27  Tick bite (919 4,E906 4) (W57 XXXA)    Current Meds   1  B-2 TABS; TAKE 1 TABLET DAILY; Therapy: (Recorded:09May2016) to Recorded   2  B-6 100 MG Oral Tablet; TAKE 1 TABLET DAILY AS DIRECTED; Therapy: (Recorded:09May2016) to Recorded   3  Calcium 500 MG CAPS; TAKE 1 CAPSULE 3 times daily; Therapy: (Recorded:09May2016) to Recorded   4  Daily Multivitamin TABS; Take 1 tablet twice daily; Therapy: (Recorded:09May2016) to Recorded   5  DULoxetine HCl - 30 MG Oral Capsule Delayed Release Particles (Cymbalta); TAKE 3   CAPSULE Once In The Morning; Therapy: 25PPU2452 to (Evaluate:09Oct2016)  Requested for: 68OSH0539; Last   Rx:19Lpm5649 Ordered   6  LORazepam 1 MG Oral Tablet; TAKE 1 TABLET Every 6 hours; Therapy: 97WNX1615 to (Evaluate:09Oct2016)  Requested for: 75GLA2976; Last   Rx:40Qeb1148 Ordered   7  Magnesium 500 MG Oral Capsule; Take 1 capsule twice daily; Therapy: (Recorded:09May2016) to Recorded   8  Metaxalone 800 MG Oral Tablet (Skelaxin); TAKE 1 TABLET AT BEDTIME; Therapy: 19KON8880 to (Murkerry Alcaraz)  Requested for: 48DKI8732; Last   Rx:01Jun2016 Ordered   9  Rizatriptan Benzoate 10 MG Oral Tablet Dispersible (Maxalt-MLT); TAKE 1 TABLET AT   ONSET OF HEADACHE  MAY REPEAT EVERY 2 HOURS AS NEEDED  MAXIMUM 3   TABLETS IN 24 HOURS  Requested for: 89ZAP4529; Last Rx:74Dtc8210 Ordered   10  Topiramate 50 MG Oral Tablet (Topamax); take 2 tablets by mouth at bedtime; Therapy: 06SYF3484 to (Lori Vernon)  Requested for: 37YSM2425; Last    Rx:07Mar2016 Ordered   11  TraZODone HCl - 100 MG Oral Tablet; TAKE 1 OR 2 TABLETS AT BEDTIME AS NEEDED    FOR SLEEP; Therapy: 04RZO1104 to (Evaluate:30Oct2016)  Requested for: 94BJL0718; Last    Rx:39Chc5979 Ordered   12  Vitamin D3 2000 UNIT Oral Tablet; Take 1 tablet twice daily; Therapy: (Recorded:09May2016) to Recorded    Allergies    1  Penicillins   2  Decadron TABS   3  Tetracyclines    4   Seasonal    Signatures   Electronically signed by : Kelli Adan, HERMELINDALCSWMSEZEQUIEL,PAYALW; Aug  1 2016  3:36PM EST                       (Author)

## 2018-01-13 NOTE — PSYCH
Progress Note  Psychotherapy Provided [facility]: Individual Psychotherapy 45 mins  minutes provided today  Goals addressed in session:   (G# 1 ) states her grandmother was transferred from the hospital to [facility] facilities for physical therapy and occupational therapy services for an initial six days; She states is feeling some relief with her not being required to provide caregiving services to her grandmother  She shared an example of her report of this grandmother "trying to guilt me out" regarding a favor she had asked of [facility]  She noted her father acknowledged that the situation at home cannot continue (regarding the reported degree of care needs of her grandmother) including "I'd be better off if I ran away and never came back  It is not that I want to kill myself " She noted the high level of pressure and anxiety she experiences regarding the alleged expectations of her father regarding his mother's care needs as well as her own medical issues including chronic pain  discussed/reviewed the importance of self-care including asserting herself and establishing some limits; discussed [facility]; A: presents as tearful regarding the reported caregiving expectations of her father as well as her own medical issues; She noted does not have time to participate in the PHP due to demands on her time regarding her grandmother's caregiving needs  P:(G#1) will consider the PHP program and will attend her first session of the pain mgt  group facilitated by licensed nurse practitioner, Kofi Jacobo;   Pain Scale and Suicide Risk  [facility]runo Escobar: On a scale of 0 to 10, the patient rates current pain at 6   Current suicide risk is low   Behavioral Health Treatment Plan [facility]: Diagnosis and Treatment Plan explained to patient, patient relates understanding diagnosis and is agreeable to Treatment Plan            Results/Data  Encounter Results   PHQ-9 Adult Depression Screening [id number] 08: [address]     Test Name Result Flag Reference   PHQ-9 Adult Depression Score 19     Q1: 2, Q2: 3, Q3: 3, Q4: 3, Q5: 1, Q6: 3, Q7: 2, Q8: 1, Q9: 1   PHQ-9 Adult Depression Screening Positive     PHQ-9 Difficulty Level Extremely difficult     PHQ-9 Severity      Moderately Severe Depression     Results   PHQ-9 Adult Depression Screening 06Jun2016 08:30AM William Cramer     Test Name Result Flag Reference   PHQ-9 Adult Depression Score 19     Q1: 2, Q2: 3, Q3: 3, Q4: 3, Q5: 1, Q6: 3, Q7: 2, Q8: 1, Q9: 1   PHQ-9 Adult Depression Screening Positive     PHQ-9 Difficulty Level Extremely difficult     PHQ-9 Severity      Moderately Severe Depression       Assessment    1  Depression with anxiety (300 4) (F41 8)   2   Major depressive disorder, recurrent episode, moderate with anxious distress (296 32)   (F33 1)    Signatures   Electronically signed by : Radha Grigsby, MSWLCSWMSEZEQUIEL,PAYALW; Jun 6 2016  9:20AM EST                       (Author)

## 2018-01-13 NOTE — PSYCH
Message  Message Free Text Note Form: was informed that she had left a message on 10/3/1/6 @ 2:58 PM to cancel her id psych  appt  for 11/10/16 @ 3 PM due to her report of her having surgery; Active Problems    1  Acute bilateral low back pain without sciatica (724 2,338 19) (M54 5)   2  Alcoholism (303 90) (F10 20)   3  Allergic rhinitis (477 9) (J30 9)   4  Back pain (724 5) (M54 9)   5  Chronic fatigue (780 79) (R53 82)   6  Chronic migraine without aura (346 70) (G43 709)   7  Chronic pain (338 29) (G89 29)   8  Classic migraine with aura (346 00) (G43 109)   9  Classic Migraine With Typical Aura (346 00)   10  Depression with anxiety (300 4) (F41 8)   11  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, with unspecified complication (979 3)    (F19 159)   12  Fever (780 60) (R50 9)   13  Fibromyalgia (729 1) (M79 7)   14  Headache (784 0) (R51)   15  Hypokalemia (276 8) (E87 6)   16  Impaired fasting glucose (790 21) (R73 01)   17  Insomnia (780 52) (G47 00)   18  Lyme disease (088 81) (A69 20)   19  Major depressive disorder, recurrent episode, moderate with anxious distress (296 32)    (F33 1)   20  Malaise and fatigue (780 79) (R53 81,R53 83)   21  Menorrhagia (626 2) (N92 0)   22  Muscle spasms of head or neck (728 85) (M62 838)   23  Myalgia (729 1) (M79 1)   24  Neck pain (723 1) (M54 2)   25  Obesity (278 00) (E66 9)   26  Sinus disease (473 9) (J34 9)   27  Sleep apnea (780 57) (G47 30)   28  Snoring (786 09) (R06 83)   29  Tension type headache (339 10) (G44 209)   30  Tick bite (919 4,E906 4) (W57 XXXA)    Current Meds   1  B-2 TABS; TAKE 1 TABLET DAILY; Therapy: (Recorded:16Jhc1637) to Recorded   2  B-6 100 MG Oral Tablet; TAKE 1 TABLET DAILY AS DIRECTED; Therapy: (Recorded:09May2016) to Recorded   3  Calcium 500 MG CAPS; TAKE 1 CAPSULE 3 times daily; Therapy: (Recorded:09May2016) to Recorded   4   Cyclobenzaprine HCl - 10 MG Oral Tablet; take 1 tab  at bedtime for muscle spasms; Therapy: 59UCJ4661 to (Last Rx:04Oct2016)  Requested for: 91Lvf4963 Ordered   5  Daily Multivitamin TABS; Take 1 tablet twice daily; Therapy: (Recorded:09May2016) to Recorded   6  DULoxetine HCl - 30 MG Oral Capsule Delayed Release Particles (Cymbalta); TAKE 3   CAPSULE Once In The Morning; Therapy: 28EPA2321 to (Evaluate:09Jan2017)  Requested for: 11HLS3354; Last   Rx:11Oct2016 Ordered   7  Gabapentin 300 MG Oral Capsule; 1 TAB QHS X 1 week, THEN 1 TAB BID X 1 week,   THEN 1 TAB TID; Therapy: 71Erc9202 to (Evaluate:27Feb2017)  Requested for: 68Von7515; Last   Rx:83Rmq7142 Ordered   8  LORazepam 1 MG Oral Tablet; TAKE 1 TABLET Every 6 hours; Therapy: 62XIJ0791 to (Evaluate:09Jan2017)  Requested for: 84MXV5458; Last   Rx:11Oct2016 Ordered   9  Magnesium 500 MG Oral Capsule; Take 1 capsule twice daily; Therapy: (Recorded:09May2016) to Recorded   10  Rizatriptan Benzoate 10 MG Oral Tablet Dispersible (Maxalt-MLT); TAKE 1 TAB AT ONSET    OF HEADACHE MAY REPEAT EVERY 2 HOURS AS NEEDED MAX 3 TABS/24 HRS; Therapy: 98Dpl2784 to (Evaluate:29Nov2016)  Requested for: 77Tsa2221; Last    Rx:60Lfm5886 Ordered   11  Topiramate 50 MG Oral Tablet (Topamax); take 2 tablets by mouth at bedtime; Therapy: 54VNC1654 to (Evaluate:24Apr2017)  Requested for: 67CNW5269; Last    Rx:24Vze5962 Ordered   12  TraZODone HCl - 100 MG Oral Tablet; TAKE 1 OR 2 TABLETS AT BEDTIME AS NEEDED    FOR SLEEP; Therapy: 28IOK6329 to (Evaluate:21Apr2017)  Requested for: 04GJA0858; Last    Rx:12Yaw6969 Ordered   13  Vitamin D3 2000 UNIT Oral Tablet; Take 1 tablet twice daily; Therapy: (Recorded:09May2016) to Recorded    Allergies    1  Penicillins   2  Decadron TABS   3  Tetracyclines    4   Seasonal    Signatures   Electronically signed by : Timmy Mooney, HUBERTJOSÉ; Nov 1 2016  9:19AM EST                       (Author)

## 2018-01-14 VITALS
SYSTOLIC BLOOD PRESSURE: 106 MMHG | HEIGHT: 64 IN | BODY MASS INDEX: 29.43 KG/M2 | HEART RATE: 92 BPM | TEMPERATURE: 98.5 F | WEIGHT: 172.38 LBS | DIASTOLIC BLOOD PRESSURE: 80 MMHG | RESPIRATION RATE: 16 BRPM
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
## 2018-01-14 NOTE — RESULT NOTES
Verified Results  (Q) LYME DISEASE AB, TOTAL W/REFL WB (IGG, IGM) [id number] 03:52PM Rafi [place]     Test Name Result Flag Reference   LYME AB SCREEN 1 15 index H    Index                Interpretation                     -----                --------------                     < 0 90               Negative                     0  90-1 09            Equivocal                     > 1 09               Positive      As recommended by the Food and Drug Administration   (FDA), all samples with positive or equivocal   results in a Borrelia burgdorferi antibody screen  will be tested using a blot method  Positive or   equivocal screening test results should not be   interpreted as truly positive until verified as such   using a supplemental assay (e g , B  burgdorferi blot)  The screening test and/or blot for B  burgdorferi   antibodies may be falsely negative in early stages  of Lyme disease, including the period when erythema   migrans is apparent  LYME DISEASE AB (IGG) WB POSITIVE A NEGATIVE   18 KD (IGG) BAND REACTIVE A    23 KD (IGG) BAND REACTIVE A    28 KD (IGG) BAND NON-REACTIVE     30 KD (IGG) BAND NON-REACTIVE     39 KD (IGG) BAND REACTIVE A    41 KD (IGG) BAND REACTIVE A    45 KD (IGG) BAND NON-REACTIVE     58 KD (IGG) BAND REACTIVE A    66 KD (IGG) BAND NON-REACTIVE     93 KD (IGG) BAND NON-REACTIVE     LYME DISEASE AB (IGM) WB NEGATIVE  NEGATIVE   23 KD (IGM) BAND REACTIVE A    39 KD (IGM) BAND NON-REACTIVE     41 KD (IGM) BAND NON-REACTIVE     As per CDC criteria, a Lyme disease IgG Immunoblot must  show reactivity to at least 5 of 10 specific borrelial  proteins to be considered positive; similarly, a   positive Lyme disease IgM immunoblot requires  reactivity to 2 of 3 specific borrelial proteins    Although considered negative, IgG reactivity to fewer  specific borrelial proteins or IgM reactivity to only  1 protein may indicate recent B  burgdorferi infection  and warrant testing of a later sample  A positive IgM  but negative IgG result obtained more than a month  after onset of symptoms likely represents a false-  positive IgM result rather than acute Lyme disease  In rare instances, Lyme disease immunoblot reactivity  may represent antibodies induced by exposure to other  spirochetes

## 2018-01-15 NOTE — RESULT NOTES
Message  I called patient with blood work results  Lyme disease test by [facility] is positive for IgG Ab  Patient reports tick bites earlier  this summer  Patient complains generalized body aches  Will start on Keflex 500 mg one capsule 3 times daily for 2 weeks  Patient would like to schedule  appointment with Lyms disease specialist in [place]  Recommended to call our office with physician's name, will  fax test results  Verified Results  (Q) CBC (INCLUDES DIFF/PLT) (REFL) [id number] 11:47AM Kaushal Doty     Test Name Result Flag Reference   WHITE BLOOD CELL COUNT 6 5 Thousand/uL  3 8-10 8   RED BLOOD CELL COUNT 4 42 Million/uL  3 80-5 10   HEMOGLOBIN 13 1 g/dL  11 7-15 5   HEMATOCRIT 40 3 %  35 0-45 0   MCV 91 3 fL  80 0-100 0   MCH 29 8 pg  27 0-33 0   MCHC 32 6 g/dL  32 0-36 0   RDW 14 3 %  11 0-15 0   PLATELET COUNT 237 Thousand/uL  140-400   MPV 8 4 fL  7 5-11 5   ABSOLUTE NEUTROPHILS 3933 cells/uL  3764-8881   ABSOLUTE LYMPHOCYTES 2152 cells/uL  850-3900   ABSOLUTE MONOCYTES 364 cells/uL  200-950   ABSOLUTE EOSINOPHILS 33 cells/uL     ABSOLUTE BASOPHILS 20 cells/uL  0-200   NEUTROPHILS 60 5 %     LYMPHOCYTES 33 1 %     MONOCYTES 5 6 %     EOSINOPHILS 0 5 %     BASOPHILS 0 3 %       (Q) COMPREHENSIVE METABOLIC PNL W/ADJUSTED CALCIUM [id number] 11:47AM Kaushal Doty     Test Name Result Flag Reference   GLUCOSE 90 mg/dL  65-99   Fasting reference interval   UREA NITROGEN (BUN) 14 mg/dL  7-25   CREATININE 0 97 mg/dL  0 50-1 10   eGFR NON-AFR   AMERICAN 74 mL/min/1 73m2  > OR = 60   eGFR AFRICAN AMERICAN 86 mL/min/1 73m2  > OR = 60   BUN/CREATININE RATIO   4-56   NOT APPLICABLE (calc)   SODIUM 140 mmol/L  135-146   POTASSIUM 3 7 mmol/L  3 5-5 3   CHLORIDE 109 mmol/L     CARBON DIOXIDE 20 mmol/L  20-31   CALCIUM 9 0 mg/dL  8 6-10 2   CALCIUM (ADJUSTED FOR$ALBUMIN) 9 1 mg/dL (calc)  8 6-10 2   PROTEIN, TOTAL 6 5 g/dL  6 1-8 1   ALBUMIN 4 2 g/dL  3 6-5 1 GLOBULIN 2 3 g/dL (calc)  1 9-3 7   ALBUMIN/GLOBULIN RATIO 1 8 (calc)  1 0-2 5   BILIRUBIN, TOTAL 0 3 mg/dL  0 2-1 2   ALKALINE PHOSPHATASE 49 U/L     AST 22 U/L  10-30   ALT 15 U/L  6-29     (Q) TSH, 3RD GENERATION [id number] 11:47AM Gena Salazar   REPORT COMMENT:  FASTING:YES     Test Name Result Flag Reference   TSH 1 77 mIU/L     Reference Range                         > or = 20 Years  0 40-4 50                              Pregnancy Ranges            First trimester    0 26-2 66            Second trimester   0 55-2 73            Third trimester    0 43-2 91     (Q) LYME DISEASE ANTIBODIES (IGG, IGM) [facility] [id number] 11:47AM Gena Salazar     Test Name Result Flag Reference   LYME DISEASE AB (IGG) WB POSITIVE A NEGATIVE   18 KD (IGG) BAND REACTIVE A    23 KD (IGG) BAND REACTIVE A    28 KD (IGG) BAND REACTIVE A    30 KD (IGG) BAND REACTIVE A    39 KD (IGG) BAND REACTIVE A    41 KD (IGG) BAND REACTIVE A    45 KD (IGG) BAND NON-REACTIVE     58 KD (IGG) BAND REACTIVE A    66 KD (IGG) BAND NON-REACTIVE     93 KD (IGG) BAND NON-REACTIVE     LYME DISEASE AB (IGM) WB NEGATIVE  NEGATIVE   23 KD (IGM) BAND NON-REACTIVE     39 KD (IGM) BAND NON-REACTIVE     41 KD (IGM) BAND NON-REACTIVE     As per CDC criteria, a Lyme disease IgG Immunoblot must  show reactivity to at least 5 of 10 specific borrelial  proteins to be considered positive; similarly, a   positive Lyme disease IgM immunoblot requires  reactivity to 2 of 3 specific borrelial proteins  Although considered negative, IgG reactivity to fewer  specific borrelial proteins or IgM reactivity to only  1 protein may indicate recent B  burgdorferi infection  and warrant testing of a later sample  A positive IgM  but negative IgG result obtained more than a month  after onset of symptoms likely represents a false-  positive IgM result rather than acute Lyme disease    In rare instances, Lyme disease immunoblot reactivity  may represent antibodies induced by exposure to other  spirochetes  Plan  Lyme disease    · Cephalexin 500 MG Oral Capsule; abdi 1 caps    TID with food    Signatures   Electronically signed by : FARIBA Parekh ; Aug 10 2016  4:36PM EST                       (Author)

## 2018-01-15 NOTE — PSYCH
Message  Message Free Text Note Form: called her at 1:30 PM as she had not yet arrived for her ind  psych  appt  for today at 1 PM; She stated she is home ill and was waiting for a call from her treating physician to determine next steps; Her appt  will be cancelled  Active Problems    1  Acute bilateral low back pain without sciatica (724 2,338 19) (M54 5)   2  Alcoholism (303 90) (F10 20)   3  Allergic rhinitis (477 9) (J30 9)   4  Back pain (724 5) (M54 9)   5  Chronic fatigue (780 79) (R53 82)   6  Chronic migraine without aura (346 70) (G43 709)   7  Chronic pain (338 29) (G89 29)   8  Classic migraine with aura (346 00) (G43 109)   9  Classic Migraine With Typical Aura (346 00)   10  Depression with anxiety (300 4) (F41 8)   11  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, with unspecified complication (827 8)    (F19 159)   12  Fever (780 60) (R50 9)   13  Fibromyalgia (729 1) (M79 7)   14  Headache (784 0) (R51)   15  Hypokalemia (276 8) (E87 6)   16  Impaired fasting glucose (790 21) (R73 01)   17  Insomnia (780 52) (G47 00)   18  Lyme disease (088 81) (A69 20)   19  Major depressive disorder, recurrent episode, moderate with anxious distress (296 32)    (F33 1)   20  Malaise and fatigue (780 79) (R53 81,R53 83)   21  Menorrhagia (626 2) (N92 0)   22  Muscle spasms of head or neck (728 85) (M62 838)   23  Myalgia (729 1) (M79 1)   24  Neck pain (723 1) (M54 2)   25  Obesity (278 00) (E66 9)   26  Sinus disease (473 9) (J34 9)   27  Sleep apnea (780 57) (G47 30)   28  Snoring (786 09) (R06 83)   29  Tension type headache (339 10) (G44 209)   30  Tick bite (919 4,E906 4) (W57 XXXA)    Current Meds   1  B-2 TABS; TAKE 1 TABLET DAILY; Therapy: (Recorded:02Ood6041) to Recorded   2  B-6 100 MG Oral Tablet; TAKE 1 TABLET DAILY AS DIRECTED; Therapy: (Recorded:09May2016) to Recorded   3  Calcium 500 MG CAPS; TAKE 1 CAPSULE 3 times daily; Therapy: (Recorded:09May2016) to Recorded   4  Cyclobenzaprine HCl - 10 MG Oral Tablet; take 1 tab  at bedtime for muscle spasms; Therapy: 53XZX3719 to (Last Rx:04Oct2016)  Requested for: 04Oct2016 Ordered   5  Daily Multivitamin TABS; Take 1 tablet twice daily; Therapy: (Recorded:09May2016) to Recorded   6  DULoxetine HCl - 30 MG Oral Capsule Delayed Release Particles (Cymbalta); TAKE 3   CAPSULE Once In The Morning; Therapy: 97QQE7317 to (Evaluate:09Jan2017)  Requested for: 39DKZ6545; Last   Rx:11Oct2016 Ordered   7  Gabapentin 300 MG Oral Capsule; 1 TAB QHS X 1 week, THEN 1 TAB BID X 1 week,   THEN 1 TAB TID; Therapy: 45Pws8128 to (Evaluate:27Feb2017)  Requested for: 56Ian6839; Last   Rx:90Hgb8994 Ordered   8  LORazepam 1 MG Oral Tablet; TAKE 1 TABLET Every 6 hours; Therapy: 78TAV7962 to (Evaluate:09Jan2017)  Requested for: 60FTM2473; Last   Rx:11Oct2016 Ordered   9  Magnesium 500 MG Oral Capsule; Take 1 capsule twice daily; Therapy: (Recorded:09May2016) to Recorded   10  Rizatriptan Benzoate 10 MG Oral Tablet Dispersible (Maxalt-MLT); TAKE 1 TAB AT ONSET    OF HEADACHE MAY REPEAT EVERY 2 HOURS AS NEEDED MAX 3 TABS/24 HRS; Therapy: 48Cez0527 to (Evaluate:29Nov2016)  Requested for: 49Aqz7019; Last    Rx:85Vee5443 Ordered   11  Topiramate 50 MG Oral Tablet (Topamax); take 2 tablets by mouth at bedtime; Therapy: 36XDR2743 to (Evaluate:24Apr2017)  Requested for: 92NPV1578; Last    Rx:15Olu6183 Ordered   12  TraZODone HCl - 100 MG Oral Tablet; TAKE 1 OR 2 TABLETS AT BEDTIME AS NEEDED    FOR SLEEP; Therapy: 80CTY1887 to (Evaluate:21Apr2017)  Requested for: 72PRZ2213; Last    Rx:35Ebi4533 Ordered   13  Vitamin D3 2000 UNIT Oral Tablet; Take 1 tablet twice daily; Therapy: (Recorded:09May2016) to Recorded    Allergies    1  Penicillins   2  Decadron TABS   3  Tetracyclines    4   Seasonal    Signatures   Electronically signed by : Katelynn Amezquita, HERMELINDALCSWHERMELINDA,JOSÉ; Oct 24 2016  1:50PM EST                       (Author)

## 2018-01-15 NOTE — PSYCH
Psych Med Mgmt    Appearance: was calm and cooperative, adequate hygiene and grooming and good eye contact  Observed mood: depressed  Observed mood: affect was constricted  Speech: a normal rate and fluent  Thought processes: coherent/organized  Hallucinations: no hallucinations present  Thought Content: no delusions  Abnormal Thoughts: The patient has no suicidal thoughts and no homicidal thoughts  Orientation: The patient is oriented to person, place and time, oriented to person, oriented to place and oriented to time  Recent and Remote Memory: short term memory intact and long term memory intact  Attention Span And Concentration: concentration intact  Insight: Limited insight  Judgment: Her judgment was limited  Muscle Strength And Tone  Muscle strength and tone were normal    The patient is experiencing moderate to severe pain  Goals addressed in session: Medication Management       Treatment Recommendations: Continue current treatment  Risks, Benefits And Possible Side Effects Of Medications: Risks, benefits, and possible side effects of medications explained to patient and patient verbalizes understanding  She reports normal appetite, decreased energy, no weight change and increase in number of sleep hours   Mood is dysphoric as she is struggling with chronic pain  She was referred to specialist to evaluate chronic Lyme's disease  She also sees Neurologist for Migraines and Rheumatologist    She stated she is appealing SSI  She stated that increasing Gabapentin was initially difficult because it exacerbated her pain  We talked about slowing down on further increases and she agress  Assessment    1  Major depressive disorder, recurrent episode, moderate with anxious distress (296 32)   (F33 1)   2  Insomnia (780 52) (G47 00)    Plan    1   Gabapentin 400 MG Oral Capsule; TAKE 1 CAPSULE 3 TIMES DAILY    2  TraZODone HCl - 100 MG Oral Tablet; TAKE 1 OR 2 TABLETS AT BEDTIME AS   NEEDED FOR SLEEP    3  DULoxetine HCl - 30 MG Oral Capsule Delayed Release Particles (Cymbalta);   TAKE 3 CAPSULE Once In The Morning    4  LORazepam 1 MG Oral Tablet; TAKE 1 TABLET Every 6 hours; Do Not Fill Before:   [id number]    Review of Systems    Constitutional: as noted in HPI  Active Problems    1  Acute bilateral low back pain without sciatica (724 2,338 19) (M54 5)   2  Alcoholism (303 90) (F10 20)   3  Allergic rhinitis (477 9) (J30 9)   4  Back pain (724 5) (M54 9)   5  Chronic fatigue (780 79) (R53 82)   6  Chronic migraine without aura (346 70) (G43 709)   7  Chronic pain (338 29) (G89 29)   8  Classic migraine with aura (346 00) (G43 109)   9  Classic Migraine With Typical Aura (346 00)   10  Depression with anxiety (300 4) (F41 8)   11  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, with unspecified complication (035 2)    (F19 159)   12  Fever (780 60) (R50 9)   13  Fibromyalgia (729 1) (M79 7)   14  Headache (784 0) (R51)   15  Hypokalemia (276 8) (E87 6)   16  Impaired fasting glucose (790 21) (R73 01)   17  Insomnia (780 52) (G47 00)   18  Lyme disease (088 81) (A69 20)   19  Major depressive disorder, recurrent episode, moderate with anxious distress (296 32)    (F33 1)   20  Malaise and fatigue (780 79) (R53 81,R53 83)   21  Menorrhagia (626 2) (N92 0)   22  Muscle spasms of head or neck (728 85) (M62 838)   23  Myalgia (729 1) (M79 1)   24  Narcolepsy (347 00) (G47 419)   25  Neck pain (723 1) (M54 2)   26  Obesity (278 00) (E66 9)   27  Sinus disease (473 9) (J34 9)   28  Sleep apnea (780 57) (G47 30)   29  Snoring (786 09) (R06 83)   30  Tension type headache (339 10) (G44 209)   31  Tick bite (919 4,E906 4) [facility]    Past Medical History    1  History of Acute otitis externa, unspecified laterality   2  History of Acute otitis media, unspecified laterality   3  History of Acute upper respiratory infection (465 9) (J06 9)   4   History of Atypical chest pain (786 59) (R07 89)   5  History of Chronic sinusitis (473 9) (J32 9)   6  History of Dysuria (788 1) (R30 0)   7  History of Encounter for routine gynecological examination with Papanicolaou smear of   cervix (V72 31,V76 2) (Z01 419)   8  History of depression (V11 8) (Z86 59)   9  History of migraine (V12 49) (Z86 69)   10  History of polyarthritis (V13 4) (Z87 39)   11  History of Memory loss (780 93) (R41 3)    The active problems and past medical history were reviewed and updated today  Allergies    1  Penicillins   2  Decadron TABS   3  Tetracyclines    4  Seasonal    Current Meds   1  B-2 TABS; TAKE 1 TABLET DAILY; Therapy: (Recorded:09May2016) to Recorded   2  B-6 100 MG Oral Tablet; TAKE 1 TABLET DAILY AS DIRECTED; Therapy: (Recorded:09May2016) to Recorded   3  Calcium 500 MG CAPS; TAKE 1 CAPSULE 3 times daily; Therapy: (Recorded:09May2016) to Recorded   4  Cyclobenzaprine HCl - 10 MG Oral Tablet; TAKE 1 TABLET AT BEDTIME as needed for   muscle spasms; Therapy: 21IWI5194 to (Bebeto Torre)  Requested for: 71CCH6420; Last   Rx:26Jan2017 Ordered   5  Daily Multivitamin TABS; Take 1 tablet twice daily; Therapy: (Recorded:09May2016) to Recorded   6  DULoxetine HCl - 30 MG Oral Capsule Delayed Release Particles; TAKE 3 CAPSULE   Once In The Morning; Therapy: 97QEU6832 to (Evaluate:25Aug2017)  Requested for: 69LFW3631; Last   Rx:13Mar2017 Ordered   7  Gabapentin 400 MG Oral Capsule; TAKE 1 CAPSULE 3 TIMES DAILY; Therapy: 91Kmo7553 to (Evaluate:11Jun2017)  Requested for: 22BZX0762; Last   Rx:13Mar2017 Ordered   8  LORazepam 1 MG Oral Tablet; TAKE 1 TABLET Every 6 hours; Therapy: 75HMI7744 to (Evaluate:11Jun2017)  Requested for: 90KXK8445; Last   Rx:13Mar2017 Ordered   9  Magnesium 500 MG Oral Capsule; Take 1 capsule twice daily; Therapy: (Recorded:09May2016) to Recorded   10   Rizatriptan Benzoate 10 MG Oral Tablet Dispersible; TAKE 1 TAB AT ONSET OF    HEADACHE MAY REPEAT EVERY 2 HOURS AS NEEDED MAX 3 TABS/24 HRS; Therapy: 33Vjw9363 to (Evaluate:05Kmw9621)  Requested for: 74Fpl0751; Last    Rx:33Zcr5570 Ordered   11  Topiramate 50 MG Oral Tablet; take 2 tablets by mouth at bedtime; Therapy: 58NML3241 to (Evaluate:24Apr2017)  Requested for: 92CPU4601; Last    Rx:50Jso4788 Ordered   12  TraZODone HCl - 100 MG Oral Tablet; TAKE 1 OR 2 TABLETS AT BEDTIME AS NEEDED    FOR SLEEP; Therapy: 67VMQ6746 to (Evaluate:53Byt8001)  Requested for: 69TXW8983; Last    Rx:37Wsw3247 Ordered   13  Vitamin D3 2000 UNIT Oral Tablet; Take 1 tablet twice daily; Therapy: (Recorded:61Par9086) to Recorded    The medication list was reviewed and updated today  Family Psych History  Father    1  Family history of Diabetes Mellitus (V18 0)  Brother    2  Family history of substance abuse (V17 0) (Z81 4)  Maternal Grandmother    3  Family history of Diabetes Mellitus (V18 0)   4  Family history of rheumatoid arthritis (V17 7) (Z82 61)   5  Family history of Malignant Melanoma Of The Skin (V16 8)  Paternal Grandmother    10  Family history of chronic kidney disease (V18 69) (Z84 1)   7  Family history of rheumatoid arthritis (V17 7) (Z82 61)   8  Family history of Major depressive disorder, recurrent episode, severe  Maternal Great Grandmother    9  Family history of malignant neoplasm of stomach (V16 0) (Z80 0)  Paternal Grandfather    8  Family history of Diabetes Mellitus (V18 0)   11  Family history of Lung Cancer (V16 1)  Paternal Aunt    15  Family history of Major depressive disorder, recurrent episode, severe  Maternal Uncle    13  Family history of substance abuse (V17 0) (Z81 4)  Family History    15  Family history of Alcoholism   15  Denied: Family history of Drug Use   16  Denied: Family history of Crohn's disease   16  Denied: Family history of psoriasis   18  Denied: Family history of systemic lupus erythematosus   19  Denied: Family history of ulcerative colitis   20   Family history of Never A Smoker    The family history was reviewed and updated today  Social History    · Caffeine use (V49 89) (F15 90)   · Denied: History of Drug Use   · Never A Smoker   · No drug use   · Rarely consumes alcohol (V49 89) (Z78 9)   · Single   · Social alcohol use (Z78 9)  The social history was reviewed and updated today  The social history was reviewed and is unchanged  End of Encounter Meds    1  Gabapentin 400 MG Oral Capsule; TAKE 1 CAPSULE 3 TIMES DAILY; Therapy: 12Axk6313 to (Evaluate:12Jrv8129)  Requested for: 26Apr2017; Last   Rx:26Apr2017; Status: ACTIVE - Transmit to Pharmacy - Awaiting Verification Ordered    2  Topiramate 50 MG Oral Tablet (Topamax); take 2 tablets by mouth at bedtime; Therapy: 56GCZ2070 to (Evaluate:24Apr2017)  Requested for: 51OIQ2630; Last   Rx:63Ouj8708 Ordered    3  Rizatriptan Benzoate 10 MG Oral Tablet Dispersible (Maxalt-MLT); TAKE 1 TAB AT ONSET   OF HEADACHE MAY REPEAT EVERY 2 HOURS AS NEEDED MAX 3 TABS/24 HRS; Therapy: 18Mwb4078 to (Evaluate:29Nov2016)  Requested for: 47Oes4647; Last   Rx:02Zol6577 Ordered    4  TraZODone HCl - 100 MG Oral Tablet; TAKE 1 OR 2 TABLETS AT BEDTIME AS NEEDED   FOR SLEEP; Therapy: 68OBT8385 to (Zach Barnhart)  Requested for: 26Apr2017; Last   Rx:26Apr2017; Status: ACTIVE - Transmit to Pharmacy - Awaiting Verification Ordered    5  DULoxetine HCl - 30 MG Oral Capsule Delayed Release Particles (Cymbalta); TAKE 3   CAPSULE Once In The Morning; Therapy: 73QJE8694 to (Evaluate:55Phi6837)  Requested for: 26Apr2017; Last   Rx:13Mar2017 Ordered    6  Cyclobenzaprine HCl - 10 MG Oral Tablet; TAKE 1 TABLET AT BEDTIME as needed for   muscle spasms; Therapy: 52YPH6575 to (Delmi Pendleton)  Requested for: 00RFK2771; Last   Rx:26Jan2017 Ordered    7  LORazepam 1 MG Oral Tablet; TAKE 1 TABLET Every 6 hours; Therapy: 33HPE2215 to (Evaluate:57Mrb5561)  Requested for: 26Apr2017; Last   Rx:26Apr2017 Ordered    8   B-2 TABS; TAKE 1 TABLET DAILY; Therapy: (Recorded:09May2016) to Recorded   9  B-6 100 MG Oral Tablet; TAKE 1 TABLET DAILY AS DIRECTED; Therapy: (Recorded:09May2016) to Recorded   10  Calcium 500 MG CAPS; TAKE 1 CAPSULE 3 times daily; Therapy: (Recorded:09May2016) to Recorded   11  Daily Multivitamin TABS; Take 1 tablet twice daily; Therapy: (Recorded:09May2016) to Recorded   12  Magnesium 500 MG Oral Capsule; Take 1 capsule twice daily; Therapy: (Recorded:09May2016) to Recorded   13  Vitamin D3 2000 UNIT Oral Tablet; Take 1 tablet twice daily;     Therapy: (Recorded:09May2016) to Recorded    Future Appointments    Date/Time Provider Specialty Site   05/03/2017 01:15 PM Beatrice Guthrie MS, APRN, [facility]_BS  [facility]   06/07/2017 01:15 PM Beatrice Guthrie MS, APRN, [facility]BS  [facility]   07/05/2017 01:15 PM Yolande Macario MS, APRN, [facility]NS_BS  [facility]     Signatures   Electronically signed by : FARIBA Proctor ; Apr 26 2017  3:25PM EST                       (Author)

## 2018-01-15 NOTE — PSYCH
Psych Med Mgmt    Appearance: was calm and cooperative, adequate hygiene and grooming and good eye contact  Observed mood: depressed  Observed mood: affect was constricted  Speech: a normal rate and fluent  Thought processes: coherent/organized  Hallucinations: no hallucinations present  Thought Content: no delusions  Abnormal Thoughts: The patient has no suicidal thoughts and no homicidal thoughts  Orientation: The patient is oriented to person, place and time, oriented to person, oriented to place and oriented to time  Recent and Remote Memory: short term memory intact and long term memory intact  Attention Span And Concentration: concentration impaired  Insight: Limited insight  Judgment: Her judgment was limited  Muscle Strength And Tone  Muscle strength and tone were normal    The patient is experiencing moderate to severe pain  Goals addressed in session: Medication Management     Treatment Recommendations: Continue current medications  Risks, Benefits And Possible Side Effects Of Medications: Risks, benefits, and possible side effects of medications explained to patient and patient verbalizes understanding  She reports normal appetite, decreased energy, no weight change and decrease in number of sleep hours   Patient stated she met with the doctor from infectious disease to rule out chronic Lyme's disease and she was told there is no such thing  She was not given a better explanation for her symptoms and she was not offered any treatment  She is feeling depressed and discouraged  She mention probably seeing a Holistic Health practitioner for advice  She stated she continues to feel pain, increasing Cymbalta in the past resulted in worsening mood symptoms  We will then increase Gabapentin to 400 mg tid  Continues to wait on the decision for her appeal    She stated that she and her mother started gathering her GM belongings and clearing her room after her death  Assessment    1  Insomnia (780 52) (G47 00)   2  Fibromyalgia (729 1) (M79 7)   3  Major depressive disorder, recurrent episode, moderate with anxious distress (296 32)   (F33 1)    Plan    1  From  Gabapentin 300 MG Oral Capsule TAKE 1 CAPSULE 3 TIMES DAILY To   Gabapentin 400 MG Oral Capsule TAKE 1 CAPSULE 3 TIMES DAILY    2  TraZODone HCl - 100 MG Oral Tablet; TAKE 1 OR 2 TABLETS AT BEDTIME AS   NEEDED FOR SLEEP    3  DULoxetine HCl - 30 MG Oral Capsule Delayed Release Particles (Cymbalta);   TAKE 3 CAPSULE Once In The Morning    4  LORazepam 1 MG Oral Tablet; TAKE 1 TABLET Every 6 hours; Do Not Fill Before:   64Sow5411    Review of Systems    Constitutional: No fever, no chills, feels well, no tiredness, no recent weight gain or loss  Cardiovascular: no complaints of slow or fast heart rate, no chest pain, no palpitations  Respiratory: no complaints of shortness of breath, no wheezing, no dyspnea on exertion  Gastrointestinal: no complaints of abdominal pain, no constipation, no nausea, no diarrhea, no vomiting  Genitourinary: no complaints of dysuria, no incontinence, no pelvic pain, no urinary frequency  Musculoskeletal: no complaints of arthralgia, no myalgias, no limb pain, no joint stiffness  Integumentary: no complaints of skin rash, no itching, no dry skin  Neurological: no complaints of headache, no confusion, no numbness, no dizziness  Substance Abuse Hx    Substance Abuse History: Denies  Active Problems    1  Acne vulgaris (706 1) (L70 0)   2  Acute bilateral low back pain without sciatica (724 2,338 19) (M54 5)   3  Alcoholism (303 90) (F10 20)   4  Allergic rhinitis (477 9) (J30 9)   5  Back pain (724 5) (M54 9)   6  Chronic fatigue (780 79) (R53 82)   7  Chronic migraine without aura (346 70) (G43 709)   8  Chronic pain (338 29) (G89 29)   9  Classic migraine with aura (346 00) (G43 109)   10  Classic Migraine With Typical Aura (346 00)   11   Depression with anxiety (300 4) (F41 8)   12  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, with unspecified complication (061 6)    (F19 159)   13  Fever (780 60) (R50 9)   14  Fibromyalgia (729 1) (M79 7)   15  Headache (784 0) (R51)   16  Hypokalemia (276 8) (E87 6)   17  Impaired fasting glucose (790 21) (R73 01)   18  Insomnia (780 52) (G47 00)   19  Lyme disease (088 81) (A69 20)   20  Major depressive disorder, recurrent episode, moderate with anxious distress (296 32)    (F33 1)   21  Malaise and fatigue (780 79) (R53 81,R53 83)   22  Menorrhagia (626 2) (N92 0)   23  Muscle spasms of head or neck (728 85) (M62 838)   24  Myalgia (729 1) (M79 1)   25  Narcolepsy (347 00) (G47 419)   26  Neck pain (723 1) (M54 2)   27  Obesity (278 00) (E66 9)   28  Sinus disease (473 9) (J34 9)   29  Sleep apnea (780 57) (G47 30)   30  Snoring (786 09) (R06 83)   31  Tension type headache (339 10) (G44 209)   32  Tick bite (919 4,E906 4) (W57 XXXA)   33  Vaginal yeast infection (112 1) (B37 3)    Past Medical History    1  History of Acute otitis externa, unspecified laterality   2  History of Acute otitis media, unspecified laterality   3  History of Acute upper respiratory infection (465 9) (J06 9)   4  History of Atypical chest pain (786 59) (R07 89)   5  History of Chronic sinusitis (473 9) (J32 9)   6  History of Dysuria (788 1) (R30 0)   7  History of Encounter for routine gynecological examination with Papanicolaou smear of   cervix (V72 31,V76 2) (Z01 419)   8  History of depression (V11 8) (Z86 59)   9  History of migraine (V12 49) (Z86 69)   10  History of polyarthritis (V13 4) (Z87 39)   11  History of Memory loss (780 93) (R41 3)    The active problems and past medical history were reviewed and updated today  Allergies    1  Penicillins   2  Decadron TABS   3  Tetracyclines    4  Seasonal    Current Meds   1  Curcumin 95 CAPS; Take 1 capsule twice daily; Therapy: (Recorded:71Dgs0758) to Recorded   2   Cyclobenzaprine HCl - 10 MG Oral Tablet; TAKE 1 TABLET AT BEDTIME as needed for   muscle spasms; Therapy: 23HSB3913 to (Chiqui Hurtado)  Requested for: 98CJD6762; Last   Rx:04Jun2017 Ordered   3  Daily Multivitamin TABS; Take 1 tablet twice daily; Therapy: (Recorded:09May2016) to Recorded   4  DULoxetine HCl - 30 MG Oral Capsule Delayed Release Particles; TAKE 3 CAPSULE   Once In The Morning; Therapy: 41KUF3142 to (Evaluate:13Nov2017)  Requested for: 14Ytf0534; Last   Rx:06Pvs9168 Ordered   5  Gabapentin 300 MG Oral Capsule; TAKE 1 CAPSULE 3 TIMES DAILY; Therapy: (Recorded:69Xmu5002) to Recorded   6  LORazepam 1 MG Oral Tablet; TAKE 1 TABLET Every 6 hours; Therapy: 35DUT7564 to (Evaluate:78Zre6831)  Requested for: 26Apr2017; Last   Rx:26Apr2017 Ordered   7  Magnesium 500 MG Oral Capsule; Take 1 capsule twice daily; Therapy: (Recorded:09May2016) to Recorded   8  Norethindrone 0 35 MG Oral Tablet; Therapy: 33RIH2211 to Recorded   9  Rizatriptan Benzoate 10 MG Oral Tablet Disintegrating; TAKE 1 TAB AT ONSET OF   HEADACHE MAY REPEAT EVERY 2 HOURS AS NEEDED MAX 3 TABS/24 HRS; Therapy: 27Tbp5896 to (Evaluate:29Nov2016)  Requested for: 74Dvp8813; Last   Rx:80Erx6262 Ordered   10  Topiramate 50 MG Oral Tablet; take 2 tablets by mouth at bedtime; Therapy: 00DAK3811 to (Evaluate:61Euk3158)  Requested for: 00RKJ2586; Last    Rx:22May2017 Ordered   11  TraZODone HCl - 100 MG Oral Tablet; TAKE 1 OR 2 TABLETS AT BEDTIME AS NEEDED    FOR SLEEP; Therapy: 40LBF6491 to (Gwendalyn Kehr)  Requested for: 26Apr2017; Last    Rx:26Apr2017 Ordered   12  Vitamin D3 2000 UNIT Oral Tablet; Take 1 tablet twice daily; Therapy: (Recorded:09May2016) to Recorded    The medication list was reviewed and updated today  Family Psych History  Father    1  Family history of Diabetes Mellitus (V18 0)  Brother    2  Family history of substance abuse (V17 0) (Z81 4)  Maternal Grandmother    3  Family history of Diabetes Mellitus (V18 0)   4  Family history of rheumatoid arthritis (V17 7) (Z82 61)   5  Family history of Malignant Melanoma Of The Skin (V16 8)  Paternal Grandmother    10  Family history of chronic kidney disease (V18 69) (Z84 1)   7  Family history of rheumatoid arthritis (V17 7) (Z82 61)   8  Family history of Major depressive disorder, recurrent episode, severe  Maternal Great Grandmother    9  Family history of malignant neoplasm of stomach (V16 0) (Z80 0)  Paternal Grandfather    8  Family history of Diabetes Mellitus (V18 0)   11  Family history of Lung Cancer (V16 1)  Paternal Aunt    15  Family history of Major depressive disorder, recurrent episode, severe  Maternal Uncle    13  Family history of substance abuse (V17 0) (Z81 4)  Family History    15  Family history of Alcoholism   15  Denied: Family history of Drug Use   16  Denied: Family history of Crohn's disease   16  Denied: Family history of psoriasis   18  Denied: Family history of systemic lupus erythematosus   19  Denied: Family history of ulcerative colitis   20  Family history of Never A Smoker    The family history was reviewed and updated today  Social History    · Caffeine use (V49 89) (F15 90)   · Denied: History of Drug Use   · Never A Smoker   · No drug use   · Rarely consumes alcohol (V49 89) (Z78 9)   · Single   · Social alcohol use (Z78 9)  The social history was reviewed and updated today  The social history was reviewed and is unchanged  End of Encounter Meds    1  Topiramate 50 MG Oral Tablet (Topamax); take 2 tablets by mouth at bedtime; Therapy: 68OCR2252 to (Evaluate:24Kfu4688)  Requested for: 31RHO1876; Last   Rx:99Ndq5103 Ordered    2  Curcumin 95 CAPS; Take 1 capsule twice daily; Therapy: (Recorded:05Ema4660) to Recorded   3  Gabapentin 400 MG Oral Capsule; TAKE 1 CAPSULE 3 TIMES DAILY; Last   Rx:43Lpr6270 Ordered    4   Rizatriptan Benzoate 10 MG Oral Tablet Disintegrating (Maxalt-MLT); TAKE 1 TAB AT   ONSET OF HEADACHE MAY REPEAT EVERY 2 HOURS AS NEEDED MAX 3 TABS/24   HRS; Therapy: 63Zmn6531 to (Evaluate:29Nov2016)  Requested for: 70Xpj7815; Last   Rx:27Air3571 Ordered    5  TraZODone HCl - 100 MG Oral Tablet; TAKE 1 OR 2 TABLETS AT BEDTIME AS NEEDED   FOR SLEEP; Therapy: 91JLI4601 to (Riley Christianson)  Requested for: 31Fnj0490; Last   Rx:59Oja8356 Ordered    6  DULoxetine HCl - 30 MG Oral Capsule Delayed Release Particles (Cymbalta); TAKE 3   CAPSULE Once In The Morning; Therapy: 95PCL6577 to (Evaluate:13Nov2017)  Requested for: 50Dzm0286; Last   Rx:70Pop0356 Ordered    7  Cyclobenzaprine HCl - 10 MG Oral Tablet; TAKE 1 TABLET AT BEDTIME as needed for   muscle spasms; Therapy: 27ZBD9784 to (Get Garcia)  Requested for: 74QFB3079; Last   Rx:04Jun2017 Ordered    8  LORazepam 1 MG Oral Tablet; TAKE 1 TABLET Every 6 hours; Therapy: 90NXO1409 to (Evaluate:22Mnq4043)  Requested for: 98Feb4738; Last   Rx:27Rgx6699 Ordered    9  Daily Multivitamin TABS; Take 1 tablet twice daily; Therapy: (Recorded:09May2016) to Recorded   10  Magnesium 500 MG Oral Capsule; Take 1 capsule twice daily; Therapy: (Recorded:09May2016) to Recorded   11  Norethindrone 0 35 MG Oral Tablet; Therapy: 79WPV0781 to Recorded   12  Vitamin D3 2000 UNIT Oral Tablet; Take 1 tablet twice daily;     Therapy: (Recorded:09May2016) to Recorded    Signatures   Electronically signed by : FARIBA Mckeon ; Aug 25 2017 10:47AM EST                       (Author)

## 2018-01-15 NOTE — PSYCH
Progress Note  Psychotherapy Provided  [facility]: Individual Psychotherapy 45 mins  minutes provided today  Goals addressed in session:   (G# 1) states "the pain last night was everywhere;" states took "two of each of Tramadol, Clyclobenaprine and states, "It did not help " states has a level "5" pain today "mostly in my lower back;" She states has hesitated to contact her specialist (rheumatologist) due to her reported concern of her possibly being viewed as "seeking drugs " She noted that her rheumatologist's alleged impression was based on her falling asleep during the 20 min  wait in this physician's "back office" while waiting for the doctor to come in  "I am kind of at a loss as to what to say (during future visits with this specialist)  " She noted within the past week was diagnosed with narcolepsy which she states may account for her reportedly falling asleep while waiting for her rheumatologist; discussed her previous efforts to address her reported chronic pain including her taking "detox baths:" epsom salts/lavender oil/baking soda; "It is relaxing " She reports she, also, uses a "cool mister" at night while in bed  discussed ways to more effectively represent herself (advocate for herself) during her doctors' appointments to include her making certain her sleep study report is transferred to the rheumatologist's files [facility]; discussed the use of imagry (handout) as part of a a pain mgt  process; discussed assertiveness strategies in addressing her pain mgt  concerns with her rheumatologist; A: presents as frustrated with her report of a lack of an effective medication regimen in the treating of her chronic pain (fibromyalgia); She wants to advocate for herself to have a more effective treatment regimen; P:(G#1) will assert herself in her discussions with her rheumatologist regarding regarding pain mgt   will read the imagry handout to explore the use of this technique in pain mgt  Pain Scale and Suicide Risk [facility]: On a scale of 0 to 10, the patient rates current pain at 5   Current suicide risk is low   Behavioral Health Treatment Plan [facility]: Diagnosis and Treatment Plan explained to patient, patient relates understanding diagnosis and is agreeable to Treatment Plan  Results/Data  Encounter Results   PHQ-9 Adult Depression Screening 02Mar2016 04:38PM Magan Verma     Test Name Result Flag Reference   PHQ-9 Adult Depression Score 11     Q1: 1, Q2: 1, Q3: 2, Q4: 2, Q5: 3, Q6: 1, Q7: 0, Q8: 0, Q9: 1   PHQ-9 Adult Depression Screening Positive     PHQ-9 Difficulty Level Somewhat difficult     PHQ-9 Severity Moderate Depression       Results   PHQ-9 Adult Depression Screening 02Mar2016 04:38PM Magan Verma     Test Name Result Flag Reference   PHQ-9 Adult Depression Score 11     Q1: 1, Q2: 1, Q3: 2, Q4: 2, Q5: 3, Q6: 1, Q7: 0, Q8: 0, Q9: 1   PHQ-9 Adult Depression Screening Positive     PHQ-9 Difficulty Level Somewhat difficult     PHQ-9 Severity Moderate Depression         Assessment    1  Depression with anxiety (300 4) (F41 8)   2   Major depressive disorder, recurrent episode, moderate with anxious distress (296 32)   (F33 1)    Signatures   Electronically signed by : HUBERT Puente,JOSÉ; Mar  2 2016  7:40PM EST                       (Author)
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
## 2018-01-15 NOTE — MISCELLANEOUS
Provider Comments  Provider Comments:   I left message on patients cell phone regarding her missed appointment from today  I left our office number if she would like to re- schedule  Signatures   Electronically signed by : KARI Rouse;  Aug  9 2016  6:22PM EST                       (Author)

## 2018-01-16 NOTE — PSYCH
Progress Note  Psychotherapy Provided [facility]: Group Therapy provided today  Goals addressed in session:   Goal #1  D: Pt was one of 4 patients who participated in "Pain and Positive Coping" GROUP today, facilitated by this Therapist Aggie Macario, MS, APRN, PMHCNS)  Topics explored today included: Overview; Confidentiality; Introduction of self by each member; Sharing of Types of Pain, Origin, and Level of Pain which each member experiences today; Psychoeducation by group facilitator re: Chronic vs  Acute pain, and some Treatments available; Group members' Sharing of Treatments, Meds  , and Therapies which have helped them; [place] Support and Validation by Group members; and the final segment was comprised of Calm Deep-Breathing/ Visualization/ Mindfulness, accompanied by [employer] via CD  Pt  Román Aminah) participated actively,and shared her history of Migraine Headaches, Fibromyalgia,and other medical conditions  Her pain =8 today, including headache and all-over-body pain  She shared that topical "heat patches" help to decrease her pain the most  She offered empathy and support to other Group members  She stated that she struggles with Major Depressive Disorder, in addition to the Chronic Pain, and that she has been unable to work for years, now  She expressed frustration with the [employer] system,as she has been Denied Disability benefits 3 times, now,and she has substantial Financial hardship and stress---has thousands of dollars of unpaid medical bills,and she is currently living back home with parent(s)  She stated she has also struggled with past alcohol abuse, but she is coping without the use of alcohol , at this time  Pt was assisted with some positive cognitive- reframing,and she is taking "one day at a time" [address] Mindfulness, to go forward each day   she did have determination to actually attend today's Group, which was a positive step for her to do   Pt expressed appreciation to Group members for their support,and she thanked this Facilitator for having Group  Pt had no evidence of any SI / HI / AH / VH during Group  A: Major Depressive Disorder, Recurrent, moderate, F33 1; Chronic Pain, G89 29; Pt appeared to benefit from Group Therapy  P: Continue Treatment Plan, Medications, Individual Psychotherapy by Pilar Molina, MSW/ PAYALW,and Group Psychotherapy provide by this therapist        Pain Scale and Suicide Risk [facility]: Current Pain Assessment: moderate to severe   On a scale of 0 to 10, the patient rates current pain at 8   Current suicide risk is low   Assessment    1  Major depressive disorder, recurrent episode, moderate with anxious distress (296 32)   (F33 1)   2   Chronic pain (338 29) (G89 29)    Signatures   Electronically signed by : Olimpia Melendez MSAPRNPMHCNS-BC; Oct  5 2016  3:26PM EST                       (Author)

## 2018-01-16 NOTE — PSYCH
Progress Note  Psychotherapy Provided [facility]: Individual Psychotherapy 45 mins  minutes provided today  Goals addressed in session:   (G#3 )  had made a main mistake when recently completing her Soc  Sec (SS)  Dis  application and noted was able to alert the Soc  Sec representatives about this matter; She stated was informed by the [employer] [employer] [facility] representatives that a mailing for this practice to complete (Dr Emily Marsh and this writer, her therapist) as part of the application process) will be submitted to this practice shortly  She noted her mother was supportive of her during this process of applying for Social Security Disability and in dealing her mistake  She states her mother "barks as me" which she notes has her question her mother's happiness  "I usually just ignore it (her alleged "barking") " reports her pain level of "5" today "is mostly in my legs and knees;" reports has a follow up 1/22/16 appointment re a recent sleep study; , also,has a follow up appt  with the neurologist within the next two or three weeks  She did share a situation about being in contact with a long-time friend whom she reports is experiencing family problems and reported it was a positive experience for her  She states is looking forward to having future conversations with this person  discussed/reviewed the empowerment she is experiencing in her efforts to better understand her mother as opposed to Adrianmarito Christin engaging in conflicts with her; discussed/reviewed strategies to deal with her mother's alleged behaviors directed toward Esther; A: presents as having a broader view of her life (ex , exploring options) in anticipation of her being more independent in her future; P: (G# 3) will continue to follow the steps as part of the SS disability application process;   Pain Scale and Suicide Risk [facility]: On a scale of 0 to 10, the patient rates current pain at 5   Current suicide risk is low      Behavioral Health Treatment Plan [facility]: Diagnosis and Treatment Plan explained to patient, patient relates understanding diagnosis and is agreeable to Treatment Plan  Assessment    1   Depression with anxiety (300 4) (F41 8)    Signatures   Electronically signed by : BAY Hudson; Jan 19 2016  5:09PM EST                       (Author)

## 2018-01-16 NOTE — PSYCH
Progress Note  Psychotherapy Provided [facility]: Individual Psychotherapy 45 mins  minutes provided today  Goals addressed in session:   (G# 1 ) reports a pain level of 8 5 "starting in the back of my head and ending at the back of my hips; It is nice to reason to leave the house (ex , for this appointment)  " reports this week is the first week of taking the full dose of the Gabatentin 300mg dosage daily; reports sleeping too much since most recent counseling appointment (9/1/16); "It seems like that is all I do " "I think it might have (in part) to do with my period  "  has had a three week cycle of her menses at this time;  is, also, gradually adjusting to the discontinuance of depo pravera injection which she states has had every three months for the past two years;  had had much pain and heavy flows which she states the depo pravera is supposed to address; She states had been diagnose with endometriosis  states has a rheumatology appointment in two months and reports had had more frequent migraine headaches; She described her father as becoming more impatient and as having "minimal tolerance" for his mother  She described her paternal grandmother as having increasing short term memory impairment  "When my dad is upset, he takes it out on me " discussed strategies for addressing her reported concerns with her relationship with her father; discussed/reviewed the ongoing importance of her advocating for herself regarding her care needs; A: presents as wanting to continue to advocate for and empower herself regarding her relationships with others, particularly with her father; P: (G# 1) will continue to assert herself with her father;   Pain Scale and Suicide Risk [facility]: On a scale of 0 to 10, the patient rates current pain at 8   Current suicide risk is low      Behavioral Health Treatment Plan [facility]: Diagnosis and Treatment Plan explained to patient, patient relates understanding diagnosis and is agreeable to Treatment Plan  Results/Data  PHQ-9 Adult Depression Screening 22Sep2016 02:20PM Chip Bouchangely     Test Name Result Flag Reference   PHQ-9 Adult Depression Score 12     Over the last two weeks, how often have you been bothered by any of the following problems? Little interest or pleasure in doing things: Nearly every day - 3  Feeling down, depressed, or hopeless: Nearly every day - 3  Trouble falling or staying asleep, or sleeping too much: Nearly every day - 3  Feeling tired or having little energy: Nearly every day - 3  Poor appetite or over eating: Not at all - 0  Feeling bad about yourself - or that you are a failure or have let yourself or your family down: Not at all - 0  Trouble concentrating on things, such as reading the newspaper or watching television: Not at all - 0  Moving or speaking so slowly that other people could have noticed  Or the opposite -  being so fidgety or restless that you have been moving around a lot more than usual: Not at all - 0  Thoughts that you would be better off dead, or of hurting yourself in some way: Not at all - 0   PHQ-9 Adult Depression Screening Positive     PHQ-9 Difficulty Level Very difficult     PHQ-9 Severity Moderate Depression       PHQ-9 Adult Depression Screening 22Sep2016 02:20PM Chip Bouchangely     Test Name Result Flag Reference   PHQ-9 Adult Depression Score 12     Over the last two weeks, how often have you been bothered by any of the following problems?   Little interest or pleasure in doing things: Nearly every day - 3  Feeling down, depressed, or hopeless: Nearly every day - 3  Trouble falling or staying asleep, or sleeping too much: Nearly every day - 3  Feeling tired or having little energy: Nearly every day - 3  Poor appetite or over eating: Not at all - 0  Feeling bad about yourself - or that you are a failure or have let yourself or your family down: Not at all - 0  Trouble concentrating on things, such as reading the newspaper or watching television: Not at all - 0  Moving or speaking so slowly that other people could have noticed  Or the opposite -  being so fidgety or restless that you have been moving around a lot more than usual: Not at all - 0  Thoughts that you would be better off dead, or of hurting yourself in some way: Not at all - 0   PHQ-9 Adult Depression Screening Positive     PHQ-9 Difficulty Level Very difficult     PHQ-9 Severity Moderate Depression         Assessment    1  Depression with anxiety (300 4) (F41 8)   2   Major depressive disorder, recurrent episode, moderate with anxious distress (296 32)   (F33 1)    Signatures   Electronically signed by : HUBERT Mccartney LCSW; Sep 22 2016  3:06PM EST                       (Author)    Electronically signed by : HUBERT Mccartney LCSW; Sep 22 2016  3:07PM EST                       (Author)

## 2018-01-16 NOTE — PSYCH
Progress Note  Psychotherapy Provided [facility]: Individual Psychotherapy 45 mins  minutes provided today  Goals addressed in session:   (G# 1 ) states did contact the Soc  Sec  office to request an appeal to her reported recent denial; states her mother contacted the [address] [place] [address] on Aging regarding the importance of her grandmother having a needs assessment; She states her mother spoke candidly with Hari Fernandes about her (renea) "putting her life on hold" regarding her grandmother's needs  reports a stress level of "7-8" ("My eyeballs are pulsating today  I have a busy day today ") today; She noted has an aqua therapy [facility]) appointment following this session  "I feel good while I am doing it  It (temporarily) relieves pain, and I am exercising " states has taken supplements and "things that have been medically proven to give energy and they just don't work on me; It may have something to do with my sleep  I don't enter REM sleep (sleep study, Feb , 2016)  " states while the sleep specialist, Dr Darrick Weiner, had recommended a certain medication (liquid) which she states her insurance did not cover due to reported "narcotic" implications; states while her insurance covered "the stimulant" part of this liquid medication was covered, she reported the co-pay "was in the hundreds of dollars;" states she is using sleep hygiene strategies some of which are inconsistent in achieving quality sleep; discussed/reviewed the ongoing importance of her advocating for herself regarding her medical care and other self-care needs; A: presents as determined to be more assertive and consistent in advocating for herself; She is realistic in anticipating the change (regarding her directing the responsibility for her grandmother's care to her father) will be challenging   While her mother is supportive of her (per above), Lor Newsome her awareness of her mother's alleged difficulties in dealing with her  ( Esther's father)  P: (G#1) will continue with her asserting herself regarding the need for her to protect her time;   Pain Scale and Suicide Risk [facility]: On a scale of 0 to 10, the patient rates current pain at 0   Current suicide risk is low   Behavioral Health Treatment Plan [facility]: Diagnosis and Treatment Plan explained to patient, patient relates understanding diagnosis and is agreeable to Treatment Plan  Results/Data  Encounter Results   PHQ-9 Adult Depression Screening [id number] 08:16AM Laura Eldridge     Test Name Result Flag Reference   PHQ-9 Adult Depression Score 13     Q1: 2, Q2: 2, Q3: 1, Q4: 3, Q5: 2, Q6: 1, Q7: 2, Q8: 0, Q9: 0   PHQ-9 Adult Depression Screening Positive     PHQ-9 Difficulty Level Very difficult     PHQ-9 Severity Moderate Depression       Results   PHQ-9 Adult Depression Screening [id number] 08:16AM Gilles Mcmanus     Test Name Result Flag Reference   PHQ-9 Adult Depression Score 13     Q1: 2, Q2: 2, Q3: 1, Q4: 3, Q5: 2, Q6: 1, Q7: 2, Q8: 0, Q9: 0   PHQ-9 Adult Depression Screening Positive     PHQ-9 Difficulty Level Very difficult     PHQ-9 Severity Moderate Depression         Assessment    1   Depression with anxiety (300 4) (F41 8)    Signatures   Electronically signed by : Kaitlynn Echevarria, MAGALYSWHERMELINDA,PAYALW; May  2 2016  9:09AM EST                       (Author)

## 2018-01-16 NOTE — PROGRESS NOTES
Assessment    1  Fibromyalgia (729 1) (M79 7)   2  Chronic fatigue (780 79) (R53 82)   3  Chronic migraine without aura (346 70) (G43 709)   4  Major depressive disorder, recurrent episode, moderate with anxious distress (296 32)   (F33 1)   5  Obesity (278 00) (E66 9)    Plan     1  From  Gabapentin 300 MG Oral Capsule TAKE 1 CAPSULE Bedtime To   Gabapentin 300 MG Oral Capsule Take 1 capsule twice daily for 1 week, then take   1 capsule 3 times a day   2  Call [phone] if: The pain seems worse ; Status:Complete;   Done: 44CKB0729   3  Call [phone] if: You have questions or concerns about your problem ;   Status:Complete;   Done: 34MDA8012    Follow-up visit in 3 months Evaluation and Treatment  Follow-up  Status: Hold For - Scheduling  Requested for: 56GHE0500  Ordered; For: Chronic fatigue, Chronic migraine without aura, Fibromyalgia; Ordered By: Hitesh Tong  Performed:   Due: 46ZUF9750     Discussion/Summary    Ms Griffith continues to have good and bad days as far as her pain  She continues to have significant pain in her shoulders, back, and posterior hips  She often has difficulty sleeping due to pain  She states that her pain is constant  Staying in the same position for prolonged periods or moving for more than 2 hours seems to worsen her pain  She states that cold and heat exposure also worsen her pain  She is currently taking gabapentin 300 mg only at bedtime as she did not titrate the dose up to 300 mg 3 times a day  She continues to take Topamax for her headaches, but she continues to have headaches on a near daily basis  She denies any obvious joint swelling  She does complaints of tremors in her hands when holding objects, as well as some larger tremors in her arms at times  She reports morning stiffness typically lasting one hour before improvement  She also reports nonrestorative sleep and fatigue  On exam, there is no active synovitis   She has diffuse myofascial tender points throughout the spine, as well as the bilateral upper and lower extremities  No new labs were available for review today  At this time, her fibromyalgia does appear active and uncontrolled despite Cymbalta and gabapentin  I will increase her gabapentin to 300 mg twice a day for the next week, then increase it further to 300 mg 3 times a day  She will continue the Cymbalta at its current dose  I will reevaluate her in 3 months  She will call in the interim if there are any questions or concerns  Counseling  Rheumatology Counseling Documentation: The patient was counseled regarding instructions for management, impressions and risks and benefits of treatment options  Chief Complaint  F/U FMS   Patient is here today for follow up of chronic conditions described in HPI  History of Present Illness  Pt  returns for F/U for FMS  Having good and bad days  c/o significant pain in shoulders, back, and posterior hips  + difficulty sleeping due to pain  Parr is constant  Staying in the same position for prolonged periods or moving for more than 2 hours worsens pain  Cold and heat also worsen pain  Taking Gabapentin 300mg QHS currently -> she did not titrate dose  Still taking Topamax for HA, but still having chronic HA  No obvious joint swelling  c/o tremors in arms when holding objects in hands  Occasionally has larger tremors in arms  + AM stiffness x 1 hour  + non-restorative sleep  + fatigue  RAPID3: 16 3/30      Review of Systems    Constitutional: fever, fatigue and chills, but no recent weight gain, no recent weight loss and no anorexia  HEENT: feeling congested and sore throat  Cardiovascular: dyspnea on exertion, but no swelling in the arms or legs    The patient presents with complaints of nocturnal episodes of chest pain or pressure, described as pleuritic  Respiratory: pleurisy, but no shortness of breath    The patient presents with complaints of unusual or persistent cough, described as dry  Gastrointestinal: nausea, but no vomiting, no heartburn, no diarrhea, no constipation, no melena and no BRBPR    The patient presents with complaints of occasional episodes of abdominal pain  Genitourinary: no dysuria and no hematuria  Musculoskeletal: as noted in HPI  Integumentary no rash  Endocrine no polyuria and no polydipsia  Hematologic/Lymphatic: no unusual bleeding    The patient presents with complaints of a tendency for easy bruising  Symptoms are unchanged  Neurological: headache, but no tingling and no weakness    The patient presents with complaints of vertigo or dizziness, described as spinning sensation (upon awakening in AM)  Psychiatric: insomnia and non-restorative sleep  Active Problems    1  Acute bilateral low back pain without sciatica (724 2,338 19) (M54 5)   2  Alcoholism (303 90) (F10 20)   3  Allergic rhinitis (477 9) (J30 9)   4  Back pain (724 5) (M54 9)   5  Chronic fatigue (780 79) (R53 82)   6  Chronic migraine without aura (346 70) (G43 709)   7  Chronic pain (338 29) (G89 29)   8  Classic migraine with aura (346 00) (G43 109)   9  Classic Migraine With Typical Aura (346 00)   10  Depression with anxiety (300 4) (F41 8)   11  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, with unspecified complication (231 6)    (F19 159)   12  Fever (780 60) (R50 9)   13  Fibromyalgia (729 1) (M79 7)   14  Headache (784 0) (R51)   15  Hypokalemia (276 8) (E87 6)   16  Impaired fasting glucose (790 21) (R73 01)   17  Insomnia (780 52) (G47 00)   18  Lyme disease (088 81) (A69 20)   19  Major depressive disorder, recurrent episode, moderate with anxious distress (296 32)    (F33 1)   20  Malaise and fatigue (780 79) (R53 81,R53 83)   21  Menorrhagia (626 2) (N92 0)   22  Muscle spasms of head or neck (728 85) (M62 838)   23  Myalgia (729 1) (M79 1)   24  Neck pain (723 1) (M54 2)   25  Obesity (278 00) (E66 9)   26  Sinus disease (473 9) (J34 9)   27   Sleep apnea (780 57) (G47 30)   28  Snoring (786 09) (R06 83)   29  Tension type headache (339 10) (G44 209)   30  Tick bite (919 4,E906 4) [facility])    Past Medical History    1  History of Acute otitis externa, unspecified laterality   2  History of Acute otitis media, unspecified laterality   3  History of Acute upper respiratory infection (465 9) (J06 9)   4  History of Atypical chest pain (786 59) (R07 89)   5  History of Chronic sinusitis (473 9) (J32 9)   6  History of Dysuria (788 1) (R30 0)   7  History of Encounter for routine gynecological examination with Papanicolaou smear of   cervix (V72 31,V76 2) (Z01 419)   8  History of depression (V11 8) (Z86 59)   9  History of migraine (V12 49) (Z86 69)   10  History of polyarthritis (V13 4) (Z87 39)   11  History of Memory loss (780 93) (R41 3)    The active problems and past medical history were reviewed and updated today  Surgical History    1  History of Endometrial Biopsy By Hysteroscopy    The surgical history was reviewed and updated today  Family History  Father    1  Family history of Diabetes Mellitus (V18 0)  Brother    2  Family history of substance abuse (V17 0) (Z81 4)  Maternal Grandmother    3  Family history of Diabetes Mellitus (V18 0)   4  Family history of rheumatoid arthritis (V17 7) (Z82 61)   5  Family history of Malignant Melanoma Of The Skin (V16 8)  Paternal Grandmother    10  Family history of chronic kidney disease (V18 69) (Z84 1)   7  Family history of rheumatoid arthritis (V17 7) (Z82 61)   8  Family history of Major depressive disorder, recurrent episode, severe  Maternal Great Grandmother    9  Family history of malignant neoplasm of stomach (V16 0) (Z80 0)  Paternal Grandfather    8  Family history of Diabetes Mellitus (V18 0)   11  Family history of Lung Cancer (V16 1)  Paternal Aunt    15  Family history of Major depressive disorder, recurrent episode, severe  Maternal Uncle    13   Family history of substance abuse (V17 0) (Z81 4)  Family History    14  Family history of Alcoholism   15  Denied: Family history of Drug Use   16  Denied: Family history of Crohn's disease   16  Denied: Family history of psoriasis   18  Denied: Family history of systemic lupus erythematosus   19  Denied: Family history of ulcerative colitis   20  Family history of Never A Smoker    The family history was reviewed and updated today  Social History    · Caffeine use (V49 89) (F15 90)   · Denied: History of Drug Use   · Never A Smoker   · No drug use   · Rarely consumes alcohol (V49 89) (Z78 9)   · Single   · Social alcohol use (Z78 9)  The social history was reviewed and updated today  The social history was reviewed and is unchanged  Current Meds   1  B-2 TABS; TAKE 1 TABLET DAILY; Therapy: (Recorded:09May2016) to Recorded   2  B-6 100 MG Oral Tablet; TAKE 1 TABLET DAILY AS DIRECTED; Therapy: (Recorded:09May2016) to Recorded   3  Calcium 500 MG CAPS; TAKE 1 CAPSULE 3 times daily; Therapy: (Recorded:09May2016) to Recorded   4  Cyclobenzaprine HCl - 10 MG Oral Tablet; take 1 tab  at bedtime for muscle spasms; Therapy: 51LTR5404 to (Last Rx:04Oct2016)  Requested for: 04Oct2016 Ordered   5  Daily Multivitamin TABS; Take 1 tablet twice daily; Therapy: (Recorded:09May2016) to Recorded   6  DULoxetine HCl - 30 MG Oral Capsule Delayed Release Particles; TAKE 3 CAPSULE   Once In The Morning; Therapy: 61PPZ5348 to (Evaluate:94Imr9631)  Requested for: 15AQT6614; Last   Rx:25Nov2016 Ordered   7  Gabapentin 300 MG Oral Capsule; TAKE 1 CAPSULE Bedtime; Therapy: (Recorded:30Nov2016) to Recorded   8  LORazepam 1 MG Oral Tablet; TAKE 4 TABLET Bedtime; Therapy: (Recorded:30Nov2016) to Recorded   9  Magnesium 500 MG Oral Capsule; Take 1 capsule twice daily; Therapy: (Recorded:09May2016) to Recorded   10   Rizatriptan Benzoate 10 MG Oral Tablet Dispersible; TAKE 1 TAB AT ONSET OF    HEADACHE MAY REPEAT EVERY 2 HOURS AS NEEDED MAX 3 TABS/24 HRS; Therapy: 14Nqx3520 to (Evaluate:29Nov2016)  Requested for: 47Wgq1281; Last    Rx:36Npv9018 Ordered   11  Topiramate 50 MG Oral Tablet; take 2 tablets by mouth at bedtime; Therapy: 60HER0639 to (Evaluate:24Apr2017)  Requested for: 02NYX9457; Last    Rx:29Xlo9754 Ordered   12  TraZODone HCl - 100 MG Oral Tablet; TAKE 1 OR 2 TABLETS AT BEDTIME AS NEEDED    FOR SLEEP; Therapy: 84OHL5191 to (Evaluate:21Apr2017)  Requested for: 66KEW5099; Last    Rx:75Csu4019 Ordered   13  Vitamin D3 2000 UNIT Oral Tablet; Take 1 tablet twice daily; Therapy: (Recorded:72Ofa4925) to Recorded    The medication list was reviewed and updated today  Immunizations  Influenza --- Michela Blumney: [id number] Felt: 13-Oct-2015     Allergies    1  Penicillins   2  Decadron TABS   3  Tetracyclines    4  Seasonal    Vitals  Signs   Recorded: 76WON2873 01:51PM   Heart Rate: 88  Systolic: 052  Diastolic: 66  Weight: 544 lb   BMI Calculated: 28 32  BSA Calculated: 1 8    Physical Exam    Constitutional   General appearance: Abnormal   overweight and appears tired  Eyes   Conjunctiva and lids: No swelling, erythema or discharge  Pupils and irises: Equal, round and reactive to light  Ears, Nose, Mouth, and Throat   External inspection of ears and nose: Normal     Oropharynx: Normal with no erythema, edema, exudate lesions, or ulcers  Pulmonary   Respiratory effort: No increased work of breathing or signs of respiratory distress  Auscultation of lungs: Clear to auscultation  Cardiovascular   Auscultation of heart: Normal rate and rhythm, normal S1 and S2, without murmurs  Examination of extremities for edema and/or varicosities: Normal     Lymphatic   Palpation of lymph nodes in neck: No lymphadenopathy  Psychiatric   Orientation to person, place, and time: Normal     Mood and affect: Normal         Right elbow tenderness  Right glenohumeral joint tenderness  Left Elbow tenderness  Left glenohumeral joint tenderness  Right ankle tenderness  Right hip tenderness  Left ankle tenderness  Left hip tenderness  Skin - Skin and subcutaneous tissue: Abnormal  Clinical impression: acne on the face  Neurologic - Sensation: Normal    Additional Findings - + multiple myofascial tender points  The patient has tenderness in all PIP and DIP joints of the right hand  The patient has tenderness in all PIP and DIP joints of the left hand  Future Appointments    Date/Time Provider Specialty Site   01/10/2017 01:40 PM FARIBA Shaw   Psychiatry [address] [facility]   03/01/2017 11:20 AM Malinda Amaya DO Rheumatology [facility]OLOG[facility]   12/07/2016 01:15 PM Amy Lau MS, APRN, PMHCNS_BS  [facility] THER[facility]   12/12/2016 04:00 PM HERMELINDA Taylor, [facility] Psychiatry  [address] THERAPISTS     Signatures   Electronically signed by : Bishop Chayo DO; Nov 30 2016  3:48PM EST                       (Author)

## 2018-01-17 ENCOUNTER — GENERIC CONVERSION - ENCOUNTER (OUTPATIENT)
Dept: OTHER | Facility: OTHER | Age: 38
End: 2018-01-17

## 2018-01-17 NOTE — PSYCH
Message  Message Free Text Note Form: at 8: 20 AM had left a message with Sheryle Pickle as she had not yet arrived for her 8 AM ind  counseling appointment; She left a message at 9:26 AM to cancel her ind  counseling appt  scheduled for today at 8 AM;      Active Problems    1  Acute bilateral low back pain without sciatica (724 2,338 19) (M54 5)   2  Alcoholism (303 90) (F10 20)   3  Allergic rhinitis (477 9) (J30 9)   4  Chronic fatigue (780 79) (R53 82)   5  Chronic migraine without aura (346 70) (G43 709)   6  Classic migraine with aura (346 00) (G43 109)   7  Classic Migraine With Typical Aura (346 00)   8  Depression with anxiety (300 4) (F41 8)   9  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, with unspecified complication (349 5)   (Z12 180)   10  Fever (780 60) (R50 9)   11  Fibromyalgia (729 1) (M79 7)   12  Headache (784 0) (R51)   13  Hypokalemia (276 8) (E87 6)   14  Impaired fasting glucose (790 21) (R73 01)   15  Insomnia (780 52) (G47 00)   16  Lyme disease (088 81) (A69 20)   17  Major depressive disorder, recurrent episode, moderate with anxious distress (296 32)    (F33 1)   18  Malaise and fatigue (780 79) (R53 81,R53 83)   19  Muscle spasms of head or neck (728 85) (M62 838)   20  Neck pain (723 1) (M54 2)   21  Obesity (278 00) (E66 9)   22  Polyarthritis (716 50) (M13 0)   23  Sinus disease (473 9) (J34 9)   24  Sleep apnea (780 57) (G47 30)   25  Snoring (786 09) (R06 83)   26  Tension type headache (339 10) (G44 209)   27  Tick bite (919 4,E906 4) (W57 XXXA)    Current Meds   1  B-2 TABS; TAKE 1 TABLET DAILY; Therapy: (Recorded:09May2016) to Recorded   2  B-6 100 MG Oral Tablet; TAKE 1 TABLET DAILY AS DIRECTED; Therapy: (Recorded:09May2016) to Recorded   3  Calcium 500 MG CAPS; TAKE 1 CAPSULE 3 times daily; Therapy: (Recorded:09May2016) to Recorded   4  Daily Multivitamin TABS; Take 1 tablet twice daily; Therapy: (Recorded:09May2016) to Recorded   5   DULoxetine HCl - 60 MG Oral Capsule Delayed Release Particles; TAKE 2 CAPSULES   AT BEDTIME; Therapy: 40DPD8895 to (Arty Carota)  Requested for: 01YUG2361; Last   Rx:01Jun2016 Ordered   6  LORazepam 1 MG Oral Tablet; TAKE 1 TABLET Twice daily PRN; Therapy: 91XDR4914 to (Evaluate:07Twc5775)  Requested for: 75TMS2152; Last   Rx:15Apr2016 Ordered   7  Magnesium 500 MG Oral Capsule; Take 1 capsule twice daily; Therapy: (Recorded:09May2016) to Recorded   8  Metaxalone 800 MG Oral Tablet (Skelaxin); TAKE 1 TABLET AT BEDTIME; Therapy: 20DOL6069 to (Arty Carota)  Requested for: 30MCX8486; Last   Rx:01Jun2016 Ordered   9  Rizatriptan Benzoate 10 MG Oral Tablet Dispersible (Maxalt-MLT); TAKE 1 TABLET AT   ONSET OF HEADACHE  MAY REPEAT EVERY 2 HOURS AS NEEDED  MAXIMUM 3   TABLETS IN 24 HOURS  Requested for: 13KAI2247; Last Rx:57Ney1260 Ordered   10  Topiramate 50 MG Oral Tablet (Topamax); take 2 tablets by mouth at bedtime; Therapy: 39RUG9280 to [phone])  Requested for: 75QMR1770; Last    Rx:07Mar2016 Ordered   11  TraZODone HCl - 100 MG Oral Tablet; TAKE 1 OR 2 TABLETS AT BEDTIME AS NEEDED    FOR SLEEP; Therapy: 26FMW3472 to (Evaluate:30Oct2016)  Requested for: 84SDN9198; Last    Rx:62Vcw7408 Ordered   12  Vitamin D3 2000 UNIT Oral Tablet; Take 1 tablet twice daily; Therapy: (Recorded:09May2016) to Recorded    Allergies    1  Penicillins   2  Decadron TABS   3  Tetracyclines    4   Seasonal    Signatures   Electronically signed by : Domingo Cast, MSWLCSWMSEZEQUIEL,PAYALW; Jun 13 2016 10:21AM EST                       (Author)

## 2018-01-17 NOTE — PSYCH
Progress Note  Psychotherapy Provided [facility]: Individual Psychotherapy 45 mins  minutes provided today  Goals addressed in session:   (G# 1 ) reports a pain level of "8" today which she attributed to the weather; She noted recent surgery was to "remove a mystery mass in my uterus and a D & C;" states while the biopsy was negative, she noted the "left over skin inside, it looks a little iffy (re Cancer); They want to put me on birth control " states had had previous "success" with Progesteron (sp?); states her 80 yr  old paternal grandmother (resides with Ramesh Meléndez and her parents) was not approved for Hospice and the need for basic care in the home remains; She noted it as a key stressor in her life as well as the well-being of her maternal grandmother who resides in her own home with Esther's uncle and his wife  She states attempts to maintain as consistent contact with her as possible  discussed the importance of her remaining mindful of the boundaries regarding the reported care needs of her two grandmothers; A: presents as concerned about the reported need for her to be on a birth control regimen; She is making efforts to establish limits with herself regarding the reported care needs of both of her grandmothers  P:(G#1) will continue to protect her time for self-care in her dealing with chronic pain and other medical issues; Pain Scale and Suicide Risk [facility]: On a scale of 0 to 10, the patient rates current pain at 8   Current suicide risk is low   Behavioral Health Treatment Plan [employer]: Diagnosis and Treatment Plan explained to patient, patient relates understanding diagnosis and is agreeable to Treatment Plan  Results/Data  PHQ-9 Adult Depression Screening [id number] 04:51PM Yaakovtiffany Arguellesaramis     Test Name Result Flag Reference   PHQ-9 Adult Depression Score 15     Over the last two weeks, how often have you been bothered by any of the following problems?   Little interest or pleasure in doing things: Nearly every day - 3  Feeling down, depressed, or hopeless: More than half the days - 2  Trouble falling or staying asleep, or sleeping too much: More than half the days - 2  Feeling tired or having little energy: More than half the days - 2  Poor appetite or over eating: Not at all - 0  Feeling bad about yourself - or that you are a failure or have let yourself or your family down: Not at all - 0  Trouble concentrating on things, such as reading the newspaper or watching television: Nearly every day - 3  Moving or speaking so slowly that other people could have noticed  Or the opposite -  being so fidgety or restless that you have been moving around a lot more than usual: Nearly every day - 3  Thoughts that you would be better off dead, or of hurting yourself in some way: Not at all - 0   PHQ-9 Adult Depression Screening Positive     PHQ-9 Difficulty Level Extremely difficult     PHQ-9 Severity      Moderately Severe Depression       Assessment    1   Major depressive disorder, recurrent episode, moderate with anxious distress (296 32)   (F33 1)    Signatures   Electronically signed by : Daryle Bottoms, MSWLCSWMSEZEQUIEL,JOSÉ; Dec 12 2016  7:48PM EST                       (Author)

## 2018-01-17 NOTE — PSYCH
Progress Note  Psychotherapy Provided [facility]: Individual Psychotherapy 45 mins  minutes provided today  Goals addressed in session:   (G# 1) states her paternal grandmother has been changed to Hospice status; She reports her grandmother has returned home (lives with Yaya Anaya and her parents), and she noted, "She is not doing well " She stated that her grandmother's memory is worsening adding, "She is very weak " She needs assistance with transfer  She noted her grandmother fell in the middle of the night with her parents having to assist her with getting back into bed  She noted her anxiety is increasing at the thought of her returning home (lives with her parents and her paternal grandmother) to resume caregiving responsibilities with her paternal grandmother  She noted her grandmother has visiting nurse services twice/week along with occupational therapy and physical therapy once/week  "Since I have been house sitting," she states, her friend helped her food shop  "I feel really good to which she, in part, attributes to eating more healthfully while house sitting as well as remembering to take her vitamins  states her using aroma therapy can be helpful at times; discussed her improved awareness of the need for her to establish limits for herself regarding allowing time for her self-care; A: presents as creating a little more time for herself and confirms she is looking forward to a week beach trip during mid-Sept ; She presented as a little more upbeat today and confirmed her father is aware he is to be assuming the lead regarding her paternal grandmother's (her father's mother's) care needs; P: (G# 1) will continue to assert herself regarding her care needs;   Pain Scale and Suicide Risk [facility]: On a scale of 0 to 10, the patient rates current pain at 0   Current suicide risk is low      Behavioral Health Treatment Plan [facility]: Diagnosis and Treatment Plan explained to patient, patient relates understanding diagnosis and is agreeable to Treatment Plan  Results/Data  Encounter Results   PHQ-9 Adult Depression Screening [id number] 02:32PM William Chess     Test Name Result Flag Reference   PHQ-9 Adult Depression Score 3     Q1: 0, Q2: 0, Q3: 3, Q4: 0, Q5: 0, Q6: 0, Q7: 0, Q8: 0, Q9: 0   PHQ-9 Adult Depression Screening Negative     PHQ-9 Difficulty Level Not difficult at all     PHQ-9 Severity Minimal Depression       Results   PHQ-9 Adult Depression Screening [id number] 02:32PM Vesta Pain, Elvira Mediate     Test Name Result Flag Reference   PHQ-9 Adult Depression Score 3     Q1: 0, Q2: 0, Q3: 3, Q4: 0, Q5: 0, Q6: 0, Q7: 0, Q8: 0, Q9: 0   PHQ-9 Adult Depression Screening Negative     PHQ-9 Difficulty Level Not difficult at all     PHQ-9 Severity Minimal Depression         Assessment    1   Depression with anxiety (300 4) (F41 8)    Signatures   Electronically signed by : Radha Grigsby, MSWLCSWMSEZEQUIEL,PAYALW; Jul 7 2016  3:07PM EST                       (Author)

## 2018-01-17 NOTE — CONSULTS
Chief Complaint  Chief Complaint Free Text Note Form: Here for evaluation of Lyme disease      History of Present Illness  HPI: 27-year-old female with a diagnosis of chronic fatigue, chronic pain, and fibromyalgia  She was previously being seen by rheumatology with the last visit in November 2016  She did have a no-show in March  The patient states that she is currently seeking another rheumatologist  The patient states that she was diagnosed with Lyme disease in the past and has had multiple courses of doxycycline  Review of her prior lab results reveal positive Lyme Western blot IgG in June 2015, August 2016, and more recently in May of this year  The patient states she gets "outbreaks" of Lyme characterized by incapacitating pain in her joints requiring her to lay in bed associated with fevers and chills  Most recently in May of this year she had multiple "tick bites" on her leg  She did not see the tick but saw the scab  She had extremely itchy red streak in this area but no large ovoid rash  She was seen by her primary care physician at that time and was treated with Keflex  She had normal CBC and CMP at that time  Upon questioning today her main complaints are fogginess, back pain, and joint pain  After discussing in detail the pathophysiology of Lyme and the lack of evidence for continued antibiotic treatment the patient expressed that she was not interested in antibiotics and was seeking alternative therapies like ?silver colloid  Review of Systems  Complete-Female:   Constitutional: feeling poorly and feeling tired, but no fever and no chills  Eyes: No complaints of eye pain, no red eyes, no eyesight problems, no discharge, no dry eyes, no itching of eyes  ENT: no complaints of earache, no loss of hearing, no nose bleeds, no nasal discharge, no sore throat, no hoarseness     Cardiovascular: No complaints of slow heart rate, no fast heart rate, no chest pain, no palpitations, no leg claudication, no lower extremity edema  Respiratory: No complaints of shortness of breath, no wheezing, no cough, no SOB on exertion, no orthopnea, no PND  Gastrointestinal: No complaints of abdominal pain, no constipation, no nausea or vomiting, no diarrhea, no bloody stools  Musculoskeletal: No complaints of arthralgias, no myalgias, no joint swelling or stiffness, no limb pain or swelling  Integumentary: No complaints of skin rash or lesions, no itching, no skin wounds, no breast pain or lump  Neurological: as noted in HPI  Psychiatric: as noted in HPI  Endocrine: as noted in HPI  Hematologic/Lymphatic: No complaints of swollen glands, no swollen glands in the neck, does not bleed easily, does not bruise easily  ROS Reviewed:   ROS reviewed  Past Medical History  Active Problems And Past Medical History Reviewed: The active problems and past medical history were reviewed and updated today  Surgical History  Surgical History Reviewed: The surgical history was reviewed and updated today  Family History  Family History Reviewed: The family history was reviewed and updated today  Social History  Social History Reviewed: The social history was reviewed and updated today  The social history was reviewed and is unchanged  Current Meds   1  Curcumin 95 CAPS; Take 1 capsule twice daily; Therapy: (Recorded:07Lvz6851) to Recorded   2  Cyclobenzaprine HCl - 10 MG Oral Tablet; TAKE 1 TABLET AT BEDTIME as needed for   muscle spasms; Therapy: 43WHB5342 to (Andra Mota)  Requested for: 79HKL7999; Last   Rx:04Jun2017 Ordered   3  Daily Multivitamin TABS; Take 1 tablet twice daily; Therapy: (Recorded:04Sup8037) to Recorded   4  DULoxetine HCl - 30 MG Oral Capsule Delayed Release Particles; TAKE 3 CAPSULE   Once In The Morning; Therapy: 22HAQ1761 to (Evaluate:98Rfv1460)  Requested for: 26Apr2017; Last   Rx:13Mar2017 Ordered   5   Gabapentin 300 MG Oral Capsule; TAKE 1 CAPSULE 3 TIMES DAILY; Therapy: (Recorded:29Qlz7465) to Recorded   6  LORazepam 1 MG Oral Tablet; TAKE 1 TABLET Every 6 hours; Therapy: 24DWC7343 to (Evaluate:50Cmm0022)  Requested for: 26Apr2017; Last   Rx:26Apr2017 Ordered   7  Magnesium 500 MG Oral Capsule; Take 1 capsule twice daily; Therapy: (Recorded:09May2016) to Recorded   8  Norethindrone 0 35 MG Oral Tablet; Therapy: 62BYQ1733 to Recorded   9  Rizatriptan Benzoate 10 MG Oral Tablet Disintegrating; TAKE 1 TAB AT ONSET OF   HEADACHE MAY REPEAT EVERY 2 HOURS AS NEEDED MAX 3 TABS/24 HRS; Therapy: 02Vfc2755 to (Evaluate:29Nov2016)  Requested for: 74App6349; Last   Rx:00Atw6703 Ordered   10  Topiramate 50 MG Oral Tablet; take 2 tablets by mouth at bedtime; Therapy: 74KSL0412 to (Evaluate:94Qsz2317)  Requested for: 59YQD5660; Last    Rx:22May2017 Ordered   11  TraZODone HCl - 100 MG Oral Tablet; TAKE 1 OR 2 TABLETS AT BEDTIME AS NEEDED    FOR SLEEP; Therapy: 17CAG6246 to (Shyrl Maye)  Requested for: 26Apr2017; Last    Rx:26Apr2017 Ordered   12  Vitamin D3 2000 UNIT Oral Tablet; Take 1 tablet twice daily; Therapy: (Recorded:09May2016) to Recorded    Allergies    1  Penicillins   2  Decadron TABS   3  Tetracyclines    4  Seasonal    Vitals  Signs   Recorded: 42DJS6160 10:13AM   Temperature: 97 7 F  Heart Rate: 98  Respiration: 16  Systolic: 719  Diastolic: 68  BP Cuff Size: Large  Height: 5 ft 0 5 in  Weight: 179 lb 12 8 oz  BMI Calculated: 34 54  BSA Calculated: 1 79    Physical Exam    Constitutional   General appearance: No acute distress, well appearing and well nourished  Eyes   Conjunctiva and lids: No swelling, erythema or discharge  Ears, Nose, Mouth, and Throat   External inspection of ears and nose: Normal     Oropharynx: Normal with no erythema, edema, exudate or lesions  Pulmonary   Respiratory effort: No increased work of breathing or signs of respiratory distress  Auscultation of lungs: Clear to auscultation  Cardiovascular   Auscultation of heart: Normal rate and rhythm, normal S1 and S2, without murmurs  Examination of extremities for edema and/or varicosities: Normal     Abdomen   Abdomen: Non-tender, no masses  Liver and spleen: No hepatomegaly or splenomegaly  Lymphatic   Palpation of lymph nodes in neck: No lymphadenopathy  Musculoskeletal   Gait and station: Normal     Digits and nails: Normal without clubbing or cyanosis  Inspection/palpation of joints, bones, and muscles: Normal     Skin   Skin and subcutaneous tissue: Normal without rashes or lesions  Neurologic   Sensation: No sensory loss  Psychiatric   Orientation to person, place, and time: Normal     Mood and affect: Normal   Slightly slow to answer questions  Results/Data  (Q) LYME DISEASE AB, TOTAL W/REFL WB (IGG, IGM) [id number] 03:52PM Rose Mary Chung     Test Name Result Flag Reference   LYME AB SCREEN 1 15 index H    Index                Interpretation                     -----                --------------                     < 0 90               Negative                     0  90-1 09            Equivocal                     > 1 09               Positive      As recommended by the Food and Drug Administration   (FDA), all samples with positive or equivocal   results in a Borrelia burgdorferi antibody screen  will be tested using a blot method  Positive or   equivocal screening test results should not be   interpreted as truly positive until verified as such   using a supplemental assay (e g , B  burgdorferi blot)  The screening test and/or blot for B  burgdorferi   antibodies may be falsely negative in early stages  of Lyme disease, including the period when erythema   migrans is apparent     LYME DISEASE AB (IGG) WB POSITIVE A NEGATIVE   18 KD (IGG) BAND REACTIVE A    23 KD (IGG) BAND REACTIVE A    28 KD (IGG) BAND NON-REACTIVE     30 KD (IGG) BAND NON-REACTIVE     39 KD (IGG) BAND REACTIVE A    41 KD (IGG) BAND REACTIVE A    45 KD (IGG) BAND NON-REACTIVE     58 KD (IGG) BAND REACTIVE A    66 KD (IGG) BAND NON-REACTIVE     93 KD (IGG) BAND NON-REACTIVE     LYME DISEASE AB (IGM) WB NEGATIVE  NEGATIVE   23 KD (IGM) BAND REACTIVE A    39 KD (IGM) BAND NON-REACTIVE     41 KD (IGM) BAND NON-REACTIVE     As per CDC criteria, a Lyme disease IgG Immunoblot must  show reactivity to at least 5 of 10 specific borrelial  proteins to be considered positive; similarly, a   positive Lyme disease IgM immunoblot requires  reactivity to 2 of 3 specific borrelial proteins  Although considered negative, IgG reactivity to fewer  specific borrelial proteins or IgM reactivity to only  1 protein may indicate recent B  burgdorferi infection  and warrant testing of a later sample  A positive IgM  but negative IgG result obtained more than a month  after onset of symptoms likely represents a false-  positive IgM result rather than acute Lyme disease  In rare instances, Lyme disease immunoblot reactivity  may represent antibodies induced by exposure to other  spirochetes       (Q) CBC (INCLUDES DIFF/PLT) (REFL) [id number] 11:47AM "[employer]"     Test Name Result Flag Reference   WHITE BLOOD CELL COUNT 6 5 Thousand/uL  3 8-10 8   RED BLOOD CELL COUNT 4 42 Million/uL  3 80-5 10   HEMOGLOBIN 13 1 g/dL  11 7-15 5   HEMATOCRIT 40 3 %  35 0-45 0   MCV 91 3 fL  80 0-100 0   MCH 29 8 pg  27 0-33 0   MCHC 32 6 g/dL  32 0-36 0   RDW 14 3 %  11 0-15 0   PLATELET COUNT 112 Thousand/uL  140-400   MPV 8 4 fL  7 5-11 5   ABSOLUTE NEUTROPHILS 3933 cells/uL  0660-4661   ABSOLUTE LYMPHOCYTES 2152 cells/uL  850-3900   ABSOLUTE MONOCYTES 364 cells/uL  200-950   ABSOLUTE EOSINOPHILS 33 cells/uL     ABSOLUTE BASOPHILS 20 cells/uL  0-200   NEUTROPHILS 60 5 %     LYMPHOCYTES 33 1 %     MONOCYTES 5 6 %     EOSINOPHILS 0 5 %     BASOPHILS 0 3 %       (Q) COMPREHENSIVE METABOLIC PNL W/ADJUSTED CALCIUM [id number] 11:47AM "[employer]"     Test Name Result Flag Reference   GLUCOSE 90 mg/dL  65-99   Fasting reference interval   UREA NITROGEN (BUN) 14 mg/dL  7-25   CREATININE 0 97 mg/dL  0 50-1 10   eGFR NON-AFR  AMERICAN 74 mL/min/1 73m2  > OR = 60   eGFR AFRICAN AMERICAN 86 mL/min/1 73m2  > OR = 60   BUN/CREATININE RATIO   1-20   NOT APPLICABLE (calc)   SODIUM 140 mmol/L  135-146   POTASSIUM 3 7 mmol/L  3 5-5 3   CHLORIDE 109 mmol/L     CARBON DIOXIDE 20 mmol/L  20-31   CALCIUM 9 0 mg/dL  8 6-10 2   CALCIUM (ADJUSTED FOR$ALBUMIN) 9 1 mg/dL (calc)  8 6-10 2   PROTEIN, TOTAL 6 5 g/dL  6 1-8 1   ALBUMIN 4 2 g/dL  3 6-5 1   GLOBULIN 2 3 g/dL (calc)  1 9-3 7   ALBUMIN/GLOBULIN RATIO 1 8 (calc)  1 0-2 5   BILIRUBIN, TOTAL 0 3 mg/dL  0 2-1 2   ALKALINE PHOSPHATASE 49 U/L     AST 22 U/L  10-30   ALT 15 U/L  6-29     (Q) LYME DISEASE ANTIBODIES (IGG, IGM) WESTERN BLOT [id number] 11:47AM Sari Leyden     Test Name Result Flag Reference   LYME DISEASE AB (IGG) WB POSITIVE A NEGATIVE   18 KD (IGG) BAND REACTIVE A    23 KD (IGG) BAND REACTIVE A    28 KD (IGG) BAND REACTIVE A    30 KD (IGG) BAND REACTIVE A    39 KD (IGG) BAND REACTIVE A    41 KD (IGG) BAND REACTIVE A    45 KD (IGG) BAND NON-REACTIVE     58 KD (IGG) BAND REACTIVE A    66 KD (IGG) BAND NON-REACTIVE     93 KD (IGG) BAND NON-REACTIVE     LYME DISEASE AB (IGM) WB NEGATIVE  NEGATIVE   23 KD (IGM) BAND NON-REACTIVE     39 KD (IGM) BAND NON-REACTIVE     41 KD (IGM) BAND NON-REACTIVE     As per CDC criteria, a Lyme disease IgG Immunoblot must  show reactivity to at least 5 of 10 specific borrelial  proteins to be considered positive; similarly, a   positive Lyme disease IgM immunoblot requires  reactivity to 2 of 3 specific borrelial proteins  Although considered negative, IgG reactivity to fewer  specific borrelial proteins or IgM reactivity to only  1 protein may indicate recent B  burgdorferi infection  and warrant testing of a later sample   A positive IgM  but negative IgG result obtained more than a month  after onset of symptoms likely represents a false-  positive IgM result rather than acute Lyme disease  In rare instances, Lyme disease immunoblot reactivity  may represent antibodies induced by exposure to other  spirochetes  (Q) LYME DISEASE AB, TOTAL W/REFL WB (IGG, IGM) [id number] 12:39PM Azalia Crenshaw     Test Name Result Flag Reference   LYME AB SCREEN 2 05 index H    Index                 Interpretation                     -----                 --------------                     < or = 0 90           Negative                     0  91-1 09             Equivocal                     > or = 1 10           Positive     The use of purified VlsE-1 and PepC10 antigens in this  assay provides improved specificity compared to assays  that utilize whole cell lysates of B  burgdorferi, the  causative agent of Lyme disease, and slightly better  sensitivity compared to the C6 antibody assay  As recommended by the Food and Drug   Administration (FDA), all samples with positive or   equivocal results in a Borrelia burgdorferi antibody    EIA (screening) will be tested using a blot method  Positive or equivocal screening test results should not   be interpreted as truly positive until verified as such   using a supplemental assay (e g , B  burgdorferi blot)  The screening test and/or blot for B  burgdorferi   antibodies may be falsely negative in early stages of   Lyme disease, including the period when erythema   migrans is apparent     LYME DISEASE AB (IGG) WB NEGATIVE  NEGATIVE   18 KD (IGG) BAND REACTIVE A    23 KD (IGG) BAND NON-REACTIVE     28 KD (IGG) BAND NON-REACTIVE     30 KD (IGG) BAND NON-REACTIVE     39 KD (IGG) BAND REACTIVE A    41 KD (IGG) BAND REACTIVE A    45 KD (IGG) BAND NON-REACTIVE     58 KD (IGG) BAND REACTIVE A    66 KD (IGG) BAND NON-REACTIVE     93 KD (IGG) BAND NON-REACTIVE     LYME DISEASE AB (IGM) WB NEGATIVE  NEGATIVE   23 KD (IGM) BAND NON-REACTIVE     39 KD (IGM) BAND NON-REACTIVE     41 KD (IGM) BAND NON-REACTIVE     As per CDC criteria, a Lyme disease IgG Immunoblot must  show reactivity to at least 5 of 10 specific borrelial  proteins to be considered positive; similarly, a   positive Lyme disease IgM immunoblot requires  reactivity to 2 of 3 specific borrelial proteins  Although considered negative, IgG reactivity to fewer  specific borrelial proteins or IgM reactivity to only  1 protein may indicate recent B  burgdorferi infection  and warrant testing of a later sample  A positive IgM  but negative IgG result obtained more than a month  after onset of symptoms likely represents a false-  positive IgM result rather than acute Lyme disease  In rare instances, Lyme disease immunoblot reactivity  may represent antibodies induced by exposure to other  spirochetes  * MRI Brain With and Without Contrast 18Jun2015 09:49AM Roya Pinto     Test Name Result Flag Reference   MRI Brain W & W/O (Report)     [address] [facility];;[place];[address]   06/18/2015 1015   06/18/2015 1059   N/A     MRI BRAIN WITH AND WITHOUT CONTRAST     INDICATION- Whole head migraines  Visual disturbance  346 70     COMPARISON- None  TECHNIQUE-   Sagittal T1, axial T2, axial FLAIR, axial T1, axial gradient imaging,   axial diffusion  Sagittal, axial and coronal T1 postcontrast   Axial T1 3-D FSPGR  8 mL of Gadavist was injected intravenously without immediate   consequence  IMAGE QUALITY-  Diagnostic  FINDINGS-     BRAIN PARENCHYMA- There is no discrete mass, mass effect or midline   shift  No abnormal white matter signal identified  Brainstem and   cerebellum demonstrate normal signal  There is no intracranial   hemorrhage  There is no evidence of acute infarction and diffusion   imaging is unremarkable  VENTRICLES- Normal      POSTCONTRAST IMAGING- Postcontrast imaging of the brain demonstrates   no abnormal enhancement       SELLA AND PITUITARY GLAND- Normal      ORBITS- Normal      PARANASAL SINUSES- Moderate diffuse paranasal sinus mucosal thickening   most pronounced within the maxillary sinuses where there is peripheral   retention cyst formation  VASCULATURE- Evaluation of the major intracranial vasculature   demonstrates appropriate flow voids  CALVARIUM AND SKULL BASE- Normal      EXTRACRANIAL SOFT TISSUES- Normal      IMPRESSION-     Normal MRI of the brain  Moderate diffuse paranasal sinus mucosal thickening  Transcribed on- CAMILLA Putnam DO   [place] Radiologist- [address] CAMILLA Gonzalez DO   Electronically [facility]    Released Date Time- 06/18/15 1437   ------------------------------------------------------------------------------   9381^GENE A TORRIE   9381^GENE A TRORIE     (Q) LYME DISEASE AB, TOTAL W/REFL WB (IGG, IGM) [id number] 02:31PM Tawanna Aj   REPORT COMMENT:  FASTING:NO     Test Name Result Flag Reference   LYME AB SCREEN 2 02 index H    Index                 Interpretation                     -----                 --------------                     < or = 0 90           Negative                     0  91-1 09             Equivocal                     > or = 1 10           Positive     The use of purified VlsE-1 and PepC10 antigens in this  assay provides improved specificity compared to assays  that utilize whole cell lysates of B  burgdorferi, the  causative agent of Lyme disease, and slightly better  sensitivity compared to the C6 antibody assay  As recommended by the Food and Drug   Administration (FDA), all samples with positive or   equivocal results in a Borrelia burgdorferi antibody    EIA (screening) will be tested using a blot method  Positive or equivocal screening test results should not   be interpreted as truly positive until verified as such   using a supplemental assay (e g , B  burgdorferi blot)  The screening test and/or blot for B  burgdorferi   antibodies may be falsely negative in early stages of   Lyme disease, including the period when erythema   migrans is apparent  LYME DISEASE AB (IGG) WB POSITIVE A NEGATIVE   18 KD (IGG) BAND REACTIVE A    23 KD (IGG) BAND REACTIVE A    28 KD (IGG) BAND NON-REACTIVE     30 KD (IGG) BAND NON-REACTIVE     39 KD (IGG) BAND REACTIVE A    41 KD (IGG) BAND REACTIVE A    45 KD (IGG) BAND NON-REACTIVE     58 KD (IGG) BAND REACTIVE A    66 KD (IGG) BAND NON-REACTIVE     93 KD (IGG) BAND NON-REACTIVE     LYME DISEASE AB (IGM) WB NEGATIVE  NEGATIVE   23 KD (IGM) BAND NON-REACTIVE     39 KD (IGM) BAND NON-REACTIVE     41 KD (IGM) BAND NON-REACTIVE     As per CDC criteria, a Lyme disease IgG Immunoblot must  show reactivity to at least 5 of 10 specific borrelial  proteins to be considered positive; similarly, a   positive Lyme disease IgM immunoblot requires  reactivity to 2 of 3 specific borrelial proteins  Although considered negative, IgG reactivity to fewer  specific borrelial proteins or IgM reactivity to only  1 protein may indicate recent B  burgdorferi infection  and warrant testing of a later sample  A positive IgM  but negative IgG result obtained more than a month  after onset of symptoms likely represents a false-  positive IgM result rather than acute Lyme disease  In rare instances, Lyme disease immunoblot reactivity  may represent antibodies induced by exposure to other  spirochetes  Assessment    1  Chronic pain (338 29) (G89 29)   2  Chronic fatigue (780 79) (R53 82)   3  Fibromyalgia (729 1) (M79 7)   4  Lyme disease (088 81) (A69 20)    Discussion/Summary  Discussion Summary:   1   History of Lyme disease  Status post multiple treatment courses of doxycycline  No clinical or exam evidence of acute infection  Recent reported tick bite seems most consistent with allergic reaction versus acute Lyme  Suspect recurrent IgG Lyme Western blot reflects prior infection  Rec:  -Explained the pathophysiology of Lyme disease in detail with the patient  I emphasized that her current clinical syndrome appears to be on likely related to active infection  I explained she has been adequately treated for any prior line infection  No further workup or treatment from an infectious disease standpoint is indicated  -Explained to the patient I am unfamiliar with any alternative Lyme treatments  Advised her to exhibit caution when seeking alternative medical opinions and evaluate the source objectively  Emphasized that we follow currently published IDSA guidelines that do not endorse these therapies  2  Fibromyalgia/chronic fatigue/chronic pain  Unlikely related to #1  Rec:  -Encouraged continued outpatient follow-up with PMD  -The patient is currently seeking another rheumatologist    RTO PRN  Future Appointments    Date/Time Provider Specialty Site   08/25/2017 09:40 AM [facility]     Signatures   Electronically signed by : FARIBA Hummel ; Jul 5 2017 11:06AM EST                       (Author)
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
## 2018-01-18 NOTE — MISCELLANEOUS
Provider Comments  Provider Comments:   Patient no showed for her rheumatology appointment today        Signatures   Electronically signed by : MARIEL Nielsen; Mar  2 2017  4:20PM EST                       (Author)

## 2018-01-18 NOTE — PROGRESS NOTES
Assessment    1  Fibromyalgia (729 1) (M79 7)   2  Chronic fatigue (780 79) (R53 82)   3  Chronic migraine without aura (346 70) (G43 709)   4  Major depressive disorder, recurrent episode, moderate with anxious distress (296 32)   (F33 1)   5  Lyme disease (088 81) (A69 20)   6  Obesity (278 00) (E66 9)    Plan    1  Gabapentin 300 MG Oral Capsule; 1 TAB QHS X 1 week, THEN 1 TAB BID X 1   week, THEN 1 TAB TID   2  Follow-up visit in 3 months Evaluation and Treatment  Follow-up  Status: Hold For -   Scheduling  Requested for: 03Rpd1599    3  DULoxetine HCl - 30 MG Oral Capsule Delayed Release Particles (Cymbalta);   TAKE 3 CAPSULE Once In The Morning    4  Call [phone] if: The pain seems worse ; Status:Complete;   Done: 36Zvy3586   5  Call [phone] if: You have questions or concerns about your problem ;   Status:Complete;   Done: 43Yti8458    Discussion/Summary    Ms Griffith continues to have significant pain, which she is unsure if this may be due to ongoing issues with her Lyme disease  She recently underwent repeat Lyme testing, and was found to be positive again, so she is being treated with another course of and a biotics  She complains of pain throughout her spine, from the base of her neck to the tip of her tailbone  She also complains of issues with intermittent sciatica at times  She also states that she can have muscle or joint pain in the arms and legs as well  She also complains of significant pain in her knees and feet  She states that her pain is intermittent and barometric pressure changes seem to worsen her pain at times  She is not currently taking any medications for her pain  She reports swelling in her knees  She denies any other obvious joint swelling  She reports morning stiffness typically lasting several hours before improvement  She also reports difficulty sleeping because of pain, nonrestorative sleep, and fatigue   She does state that she felt feverish several days this last week, but she did not take her temperature  On exam, there is no active synovitis  She has diffuse myofascial tender points throughout the spine, as well as the bilateral upper and lower extremities  Review of laboratory studies from August 8, 2016 revealed a normal CBC, CMP, and TSH  A Lyme Western blot showed a negative IgM antibody, but a reactive IgG antibody  At this time, her history, exam, and laboratory studies do appear most consistent with fibromyalgia which is active and uncontrolled  I do not believe that Lyme disease is having any impact on her symptoms at this time  We did discuss several treatment options, and we have opted to add gabapentin 300 mg daily at bedtime for 1 week, then 300 mg twice a day for 1 week, then 300 mg 3 times a day  She will continue her Cymbalta and Topamax at their current doses  We will reevaluate her in 3 months  She will call in the interim if there are any questions or concerns  Counseling  Rheumatology Counseling Documentation: The patient was counseled regarding instructions for management, impressions and risks and benefits of treatment options  Chief Complaint  F/U FMS   Patient is here today for follow up of chronic conditions described in HPI  History of Present Illness  Pt  returns for F/U for FMS  Still with significant pain  Unsure if it is related to continued Lyme disease  c/o pain throughout spine -> from base of neck to tip of tailbone  c/o issues with sciatica at times  Can have muscle or joint pain in arms and legs as well  + significant pain in knees and feet  Pain is intermittent  Barometric pressure changes worsen pain sometimes  No pain meds taken at this time  + swelling in knees  No other obvious joint swelling  + AM stiffness x several hours  + difficulty sleeping due to pain  + non-restorative sleep  + fatigue  Felt feverish several days last week       RAPID3: not completed      Review of Systems    Constitutional: fatigue, but no fever, no recent weight gain, no chills and no anorexia    The patient presents with complaints of recent 10 lb weight loss (in 2 months; unintentional)  HEENT: feeling congested    The patient presents with complaints of sore throat (2/2 recent URI)  Cardiovascular: no chest pain or pressure, no dyspnea on exertion and no swelling in the arms or legs  Respiratory: sputum, but no shortness of breath and no pleurisy    The patient presents with complaints of unusual or persistent cough (2/2 recent URI; + clear sputum)  Gastrointestinal: heartburn, but no abdominal pain, no nausea, no vomiting, no diarrhea, no constipation, no melena and no BRBPR  Genitourinary: no dysuria and no hematuria  Musculoskeletal: as noted in HPI  Integumentary no rash  The patient presents with complaints of polydipsia starting about 4 days ago  Hematologic/Lymphatic: no unusual bleeding    The patient presents with complaints of a tendency for easy bruising  Symptoms are unchanged  Neurological: headache, but no tingling and no weakness    The patient presents with complaints of vertigo or dizziness, described as spinning sensation starting about 4 days ago Her symptoms are caused by URI (? 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, with unspecified complication (803 3)    (F19 159)   11  Fever (780 60) (R50 9)   12  Fibromyalgia (729 1) (M79 7)   13  Headache (784 0) (R51)   14   Hypokalemia (276 8) (E87 6)   15  Impaired fasting glucose (790 21) (R73 01)   16  Insomnia (780 52) (G47 00)   17  Lyme disease (088 81) (A69 20)   18  Major depressive disorder, recurrent episode, moderate with anxious distress (296 32)    (F33 1)   19  Malaise and fatigue (780 79) (R53 81,R53 83)   20  Muscle spasms of head or neck (728 85) (M62 838)   21  Myalgia (729 1) (M79 1)   22  Neck pain (723 1) (M54 2)   23  Obesity (278 00) (E66 9)   24  Sinus disease (473 9) (J34 9)   25  Sleep apnea (780 57) (G47 30)   26  Snoring (786 09) (R06 83)   27  Tension type headache (339 10) (G44 209)   28  Tick bite (919 4,E906 4) [facility])    Past Medical History    1  History of Acute otitis externa, unspecified laterality   2  History of Acute otitis media, unspecified laterality   3  History of Acute upper respiratory infection (465 9) (J06 9)   4  History of Atypical chest pain (786 59) (R07 89)   5  History of Chronic sinusitis (473 9) (J32 9)   6  History of Dysuria (788 1) (R30 0)   7  History of Encounter for routine gynecological examination with Papanicolaou smear of   cervix (V72 31,V76 2) (Z01 419)   8  History of depression (V11 8) (Z86 59)   9  History of migraine (V12 49) (Z86 69)   10  History of polyarthritis (V13 4) (Z87 39)   11  History of Memory loss (780 93) (R41 3)    The active problems and past medical history were reviewed and updated today  Surgical History    1  Denied: History Of Prior Surgery    The surgical history was reviewed and updated today  Family History  Father    1  Family history of Diabetes Mellitus (V18 0)  Brother    2  Family history of substance abuse (V17 0) (Z81 4)  Maternal Grandmother    3  Family history of Diabetes Mellitus (V18 0)   4  Family history of rheumatoid arthritis (V17 7) (Z82 61)   5  Family history of Malignant Melanoma Of The Skin (V16 8)  Paternal Grandmother    10  Family history of rheumatoid arthritis (V17 7) (Z82 61)   7   Family history of Major depressive disorder, recurrent episode, severe  Maternal Great Grandmother    8  Family history of malignant neoplasm of stomach (V16 0) (Z80 0)  Paternal Grandfather    5  Family history of Diabetes Mellitus (V18 0)   10  Family history of Lung Cancer (V16 1)  Paternal Aunt    6  Family history of Major depressive disorder, recurrent episode, severe  Maternal Uncle    12  Family history of substance abuse (V17 0) (Z81 4)  Family History    15  Family history of Alcoholism   14  Denied: Family history of Drug Use   15  Denied: Family history of Crohn's disease   12  Denied: Family history of psoriasis   17  Denied: Family history of systemic lupus erythematosus   18  Denied: Family history of ulcerative colitis   19  Family history of Never A Smoker    The family history was reviewed and updated today  Social History    · Caffeine use (V49 89) (F15 90)   · Denied: History of Drug Use   · Never A Smoker   · No alcohol use   · No drug use   · Single   · Social alcohol use (Z78 9)  The social history was reviewed and updated today  The social history was reviewed and is unchanged  Current Meds   1  B-2 TABS; TAKE 1 TABLET DAILY; Therapy: (Recorded:09May2016) to Recorded   2  B-6 100 MG Oral Tablet; TAKE 1 TABLET DAILY AS DIRECTED; Therapy: (Recorded:09May2016) to Recorded   3  Calcium 500 MG CAPS; TAKE 1 CAPSULE 3 times daily; Therapy: (Recorded:09May2016) to Recorded   4  Cephalexin 500 MG Oral Capsule; abdi 1 caps  TID with food; Therapy: 47NPN8650 to (Last Rx:10Aug2016)  Requested for: 10Aug2016 Ordered   5  Cyclobenzaprine HCl - 10 MG Oral Tablet; take 1 tab  at bedtime for muscle spasms; Therapy: 89ZEG9050 to (Last Rx:13Ytl9873)  Requested for: 75Vmw7320 Ordered   6  Daily Multivitamin TABS; Take 1 tablet twice daily; Therapy: (Recorded:09May2016) to Recorded   7  DULoxetine HCl - 30 MG Oral Capsule Delayed Release Particles; TAKE 3 CAPSULE   Once In The Morning;    Therapy: 69LWN5022 to (Evaluate:09Oct2016)  Requested for: 81FTM2358; Last   Rx:82Rhi5603 Ordered   8  LORazepam 1 MG Oral Tablet; TAKE 1 TABLET Every 6 hours; Therapy: 32OFH2954 to (Evaluate:09Oct2016)  Requested for: 46OSN4113; Last   Rx:63Zmm0999 Ordered   9  Magnesium 500 MG Oral Capsule; Take 1 capsule twice daily; Therapy: (Recorded:09May2016) to Recorded   10  Metaxalone 800 MG Oral Tablet; TAKE 1 TABLET AT BEDTIME; Therapy: 40SOR4824 to (Geovanni Soriano)  Requested for: 22GLY1347; Last    Rx:01Jun2016 Ordered   11  Rizatriptan Benzoate 10 MG Oral Tablet Dispersible; TAKE 1 TABLET AT ONSET OF    HEADACHE  MAY REPEAT EVERY 2 HOURS AS NEEDED  MAXIMUM 3 TABLETS IN 24    HOURS  Requested for: 65YAT0316; Last Rx:29Jjf6760 Ordered   12  Topiramate 50 MG Oral Tablet; take 2 tablets by mouth at bedtime; Therapy: 31PCV4817 to (Harlan Pump)  Requested for: 10OBV0129; Last    Rx:07Mar2016 Ordered   13  TraZODone HCl - 100 MG Oral Tablet; TAKE 1 OR 2 TABLETS AT BEDTIME AS NEEDED    FOR SLEEP; Therapy: 37PFR6640 to (Evaluate:30Oct2016)  Requested for: 77ASE9912; Last    Rx:03May2016 Ordered   14  Vitamin D3 2000 UNIT Oral Tablet; Take 1 tablet twice daily; Therapy: (Recorded:09May2016) to Recorded    The medication list was reviewed and updated today  Immunizations  Influenza --- Yosi Backers: [id number] Lye: 13-Oct-2017     Allergies    1  Penicillins   2  Decadron TABS   3  Tetracyclines    4  Seasonal    Vitals  Signs   Recorded: 45OTI0888 40:93FL   Systolic: 826  Diastolic: 64  Heart Rate: 80  Weight: 159 lb 2 08 oz    Physical Exam    Constitutional   General appearance: Abnormal   overweight and appears tired  Eyes   Conjunctiva and lids: No swelling, erythema or discharge  Pupils and irises: Equal, round and reactive to light  Ears, Nose, Mouth, and Throat   External inspection of ears and nose: Normal     Oropharynx: Normal with no erythema, edema, exudate lesions, or ulcers      Pulmonary Respiratory effort: No increased work of breathing or signs of respiratory distress  Auscultation of lungs: Clear to auscultation  Cardiovascular   Auscultation of heart: Normal rate and rhythm, normal S1 and S2, without murmurs  Examination of extremities for edema and/or varicosities: Normal     Lymphatic   Palpation of lymph nodes in neck: No lymphadenopathy  Psychiatric   Orientation to person, place, and time: Normal     Mood and affect: Normal         Right elbow tenderness  Right glenohumeral joint tenderness  Left glenohumeral joint tenderness  Right ankle tenderness  Left ankle tenderness  Left hip tenderness  Skin - Skin and subcutaneous tissue: Abnormal  Clinical impression: acne on the face  Neurologic - Sensation: Normal    Additional Findings - + multiple myofascial tender points  The patient has tenderness in all MCP and PIP joints of the right hand        Results/Data  (Q) CBC (INCLUDES DIFF/PLT) (REFL) 33TFR0555 11:47AM [facility]     Test Name Result Flag Reference   WHITE BLOOD CELL COUNT 6 5 Thousand/uL  3 8-10 8   RED BLOOD CELL COUNT 4 42 Million/uL  3 80-5 10   HEMOGLOBIN 13 1 g/dL  11 7-15 5   HEMATOCRIT 40 3 %  35 0-45 0   MCV 91 3 fL  80 0-100 0   MCH 29 8 pg  27 0-33 0   MCHC 32 6 g/dL  32 0-36 0   RDW 14 3 %  11 0-15 0   PLATELET COUNT 430 Thousand/uL  140-400   MPV 8 4 fL  7 5-11 5   ABSOLUTE NEUTROPHILS 3933 cells/uL  0126-5284   ABSOLUTE LYMPHOCYTES 2152 cells/uL  850-3900   ABSOLUTE MONOCYTES 364 cells/uL  200-950   ABSOLUTE EOSINOPHILS 33 cells/uL     ABSOLUTE BASOPHILS 20 cells/uL  0-200   NEUTROPHILS 60 5 %     LYMPHOCYTES 33 1 %     MONOCYTES 5 6 %     EOSINOPHILS 0 5 %     BASOPHILS 0 3 %       (Q) COMPREHENSIVE METABOLIC PNL W/ADJUSTED CALCIUM 85Bau3174 11:47AM [facility]     Test Name Result Flag Reference   GLUCOSE 90 mg/dL  65-99   Fasting reference interval   UREA NITROGEN (BUN) 14 mg/dL  7-25   CREATININE 0 97 mg/dL 0  50-1 10   eGFR NON-AFR  AMERICAN 74 mL/min/1 73m2  > OR = 60   eGFR AFRICAN AMERICAN 86 mL/min/1 73m2  > OR = 60   BUN/CREATININE RATIO   8-86   NOT APPLICABLE (calc)   SODIUM 140 mmol/L  135-146   POTASSIUM 3 7 mmol/L  3 5-5 3   CHLORIDE 109 mmol/L     CARBON DIOXIDE 20 mmol/L  20-31   CALCIUM 9 0 mg/dL  8 6-10 2   CALCIUM (ADJUSTED FOR$ALBUMIN) 9 1 mg/dL (calc)  8 6-10 2   PROTEIN, TOTAL 6 5 g/dL  6 1-8 1   ALBUMIN 4 2 g/dL  3 6-5 1   GLOBULIN 2 3 g/dL (calc)  1 9-3 7   ALBUMIN/GLOBULIN RATIO 1 8 (calc)  1 0-2 5   BILIRUBIN, TOTAL 0 3 mg/dL  0 2-1 2   ALKALINE PHOSPHATASE 49 U/L     AST 22 U/L  10-30   ALT 15 U/L  6-29     (Q) TSH, 3RD GENERATION 85Lwr7126 11:47AM [facility]    REPORT COMMENT:  FASTING:YES     Test Name Result Flag Reference   TSH 1 77 mIU/L     Reference Range                         > or = 20 Years  0 40-4 50                              Pregnancy Ranges            First trimester    0 26-2 66            Second trimester   0 55-2 73            Third trimester    0 43-2 91     (Q) LYME DISEASE ANTIBODIES (IGG, IGM) WESTERN BLOT 03Dzm4521 11:47AM [facility]      Test Name Result Flag Reference   LYME DISEASE AB (IGG) WB POSITIVE A NEGATIVE   18 KD (IGG) BAND REACTIVE A    23 KD (IGG) BAND REACTIVE A    28 KD (IGG) BAND REACTIVE A    30 KD (IGG) BAND REACTIVE A    39 KD (IGG) BAND REACTIVE A    41 KD (IGG) BAND REACTIVE A    45 KD (IGG) BAND NON-REACTIVE     58 KD (IGG) BAND REACTIVE A    66 KD (IGG) BAND NON-REACTIVE     93 KD (IGG) BAND NON-REACTIVE     LYME DISEASE AB (IGM) WB NEGATIVE  NEGATIVE   23 KD (IGM) BAND NON-REACTIVE     39 KD (IGM) BAND NON-REACTIVE     41 KD (IGM) BAND NON-REACTIVE     As per CDC criteria, a Lyme disease IgG Immunoblot must  show reactivity to at least 5 of 10 specific borrelial  proteins to be considered positive; similarly, a   positive Lyme disease IgM immunoblot requires  reactivity to 2 of 3 specific borrelial proteins    Although considered negative, IgG reactivity to fewer  specific borrelial proteins or IgM reactivity to only  1 protein may indicate recent B  burgdorferi infection  and warrant testing of a later sample  A positive IgM  but negative IgG result obtained more than a month  after onset of symptoms likely represents a false-  positive IgM result rather than acute Lyme disease  In rare instances, Lyme disease immunoblot reactivity  may represent antibodies induced by exposure to other  spirochetes  Future Appointments    Date/Time Provider Specialty Site   10/11/2016 01:20 PM FARIBA Gentile   Psychiatry [facility]   11/30/2016 01:20 PM Tammie Felix DO Rheumatology [facility] RHEUMATOLOGY [address] [place] [address] E   09/07/2016 01:15 PM Bruno Macario, MS, APRN, PMHCNS_BS  [facility]   10/05/2016 01:15 PM Julia Aceves MS, APRN, PMHCNS_BS  [facility]   09/01/2016 11:00 AM HERMELINDA Null, JOSÉ Psychiatry [facility]   09/15/2016 01:00 PM HERMELINDA Null, JOSÉ Psychiatry [facility]   09/22/2016 02:00 PM HERMELINDA Null, [facility] Psychiatry [facility] [address] THERAPISTS     Signatures   Electronically signed by : Ranulfo Johnson DO; Aug 31 2016 10:50AM EST                       (Author)

## 2018-01-19 ENCOUNTER — APPOINTMENT (EMERGENCY)
Dept: RADIOLOGY | Facility: HOSPITAL | Age: 38
End: 2018-01-19
Payer: COMMERCIAL

## 2018-01-19 ENCOUNTER — HOSPITAL ENCOUNTER (EMERGENCY)
Facility: HOSPITAL | Age: 38
Discharge: HOME | End: 2018-01-19
Attending: EMERGENCY MEDICINE | Admitting: EMERGENCY MEDICINE
Payer: COMMERCIAL

## 2018-01-19 VITALS
WEIGHT: 170 LBS | RESPIRATION RATE: 16 BRPM | SYSTOLIC BLOOD PRESSURE: 120 MMHG | BODY MASS INDEX: 33.2 KG/M2 | OXYGEN SATURATION: 99 % | HEART RATE: 77 BPM | DIASTOLIC BLOOD PRESSURE: 67 MMHG | TEMPERATURE: 98.1 F

## 2018-01-19 DIAGNOSIS — R10.9 ABDOMINAL PAIN: Primary | ICD-10-CM

## 2018-01-19 LAB
BILIRUB UR QL STRIP: NEGATIVE
CLARITY UR: CLEAR
COLOR UR: YELLOW
COLOR, POC: NORMAL
EXT PREG TEST URINE: NEGATIVE
GLUCOSE UR STRIP-MCNC: NEGATIVE MG/DL
HGB UR QL STRIP.AUTO: NEGATIVE
KETONES UR STRIP-MCNC: NEGATIVE MG/DL
LEUKOCYTE ESTERASE UR QL STRIP: NEGATIVE
NITRITE UR QL STRIP: NEGATIVE
PH UR STRIP.AUTO: 5.5 [PH] (ref 4.5–8)
PROT UR STRIP-MCNC: NEGATIVE MG/DL
SP GR UR STRIP.AUTO: <=1.005 (ref 1–1.03)
UROBILINOGEN UR QL STRIP.AUTO: 0.2 E.U./DL

## 2018-01-19 PROCEDURE — 74176 CT ABD & PELVIS W/O CONTRAST: CPT

## 2018-01-19 PROCEDURE — 96372 THER/PROPH/DIAG INJ SC/IM: CPT

## 2018-01-19 PROCEDURE — 81003 URINALYSIS AUTO W/O SCOPE: CPT

## 2018-01-19 PROCEDURE — 81025 URINE PREGNANCY TEST: CPT | Performed by: EMERGENCY MEDICINE

## 2018-01-19 PROCEDURE — 81002 URINALYSIS NONAUTO W/O SCOPE: CPT | Performed by: EMERGENCY MEDICINE

## 2018-01-19 PROCEDURE — 99284 EMERGENCY DEPT VISIT MOD MDM: CPT

## 2018-01-19 RX ORDER — NAPROXEN 500 MG/1
500 TABLET ORAL 2 TIMES DAILY WITH MEALS
Qty: 20 TABLET | Refills: 0 | Status: SHIPPED | OUTPATIENT
Start: 2018-01-19 | End: 2018-04-26 | Stop reason: ALTCHOICE

## 2018-01-19 RX ORDER — ACETAMINOPHEN 325 MG/1
650 TABLET ORAL ONCE
Status: COMPLETED | OUTPATIENT
Start: 2018-01-19 | End: 2018-01-19

## 2018-01-19 RX ORDER — KETOROLAC TROMETHAMINE 30 MG/ML
15 INJECTION, SOLUTION INTRAMUSCULAR; INTRAVENOUS ONCE
Status: COMPLETED | OUTPATIENT
Start: 2018-01-19 | End: 2018-01-19

## 2018-01-19 RX ADMIN — KETOROLAC TROMETHAMINE 15 MG: 30 INJECTION, SOLUTION INTRAMUSCULAR at 22:07

## 2018-01-19 RX ADMIN — ACETAMINOPHEN 650 MG: 325 TABLET, FILM COATED ORAL at 20:17

## 2018-01-20 NOTE — ED PROVIDER NOTES
History  Chief Complaint   Patient presents with    Pelvic Pain     Pt states she has IUD placed 4 weeks ago  Pt states that she has had pelvic pain since it has been placed, pt states that today the cramping has been getting worse  HPI  This is a 66-year-old female that presents today with diffuse abdominal pain after having a Mirena IUD in place about a month ago  She states she started having this pain for which she did follow up with her gynecologist who stated this is normal tap some pain after IUD placement  Patient states her pain has not gotten any better no nausea or vomiting  No diarrhea  She states diffuse abdominal pain  She states she has been taking Tylenol with mild relief  Denies any fevers or chills  States he has been having some normal vaginal discharge  No urinary complaints  66-year-old female presenting with pelvic pain  Will do a CT scan to assess the location of the IUD  Check urine and symptomatic treatment  Prior to Admission Medications   Prescriptions Last Dose Informant Patient Reported? Taking?    Calcium-Magnesium-Vitamin D (CALCIUM 500 PO)   Yes No   Sig: Calcium 500 MG CAPS  TAKE 1 CAPSULE 3 times daily   Refills: 0    Active   Cholecalciferol (VITAMIN D3) 2000 units CHEW   Yes No   Sig: Vitamin D3 2000 UNIT Oral Tablet  Take 1 tablet twice daily   Refills: 0    Active   DULoxetine (CYMBALTA) 30 mg delayed release capsule   Yes No   Sig: DULoxetine HCl - 30 MG Oral Capsule Delayed Release Particles  TAKE 3 CAPSULES DAILY   Quantity: 90;  Refills: 3       [place] LINK D ;  Started 5-May-2014  Active   LORazepam (ATIVAN) 1 mg tablet   Yes No   Si mg daily at bedtime as needed   Magnesium 500 MG CAPS   Yes No   Sig: Magnesium 500 MG Oral Capsule  Take 1 capsule twice daily   Refills: 0    Active   Multiple Vitamins-Minerals (DAILY MULTIVITAMIN PO)   Yes No   Sig: Daily Multivitamin TABS  Take 1 tablet twice daily   Refills: 0    Active   Pyridoxine HCl (VITAMIN B-6) 100 MG TABS   Yes No   Si mg   Riboflavin (B-2) 100 MG TABS   Yes No   Sig: B-2 TABS  TAKE 1 TABLET DAILY  Refills: 0    Active   cyclobenzaprine (FLEXERIL) 5 mg tablet   Yes No   Sig: 10 mg daily at bedtime   diclofenac sodium (VOLTAREN) 1 %   No No   Sig: Apply 2 g topically 4 (four) times a day   gabapentin (NEURONTIN) 400 mg capsule   Yes No   Sig: Take 400 mg by mouth 3 (three) times a day     rizatriptan (MAXALT-MLT) 10 MG disintegrating tablet   Yes No   Sig: Rizatriptan Benzoate 10 MG Oral Tablet Dispersible  TAKE 1 TABLET AT ONSET OF HEADACHE  MAY REPEAT EVERY 2 HOURS AS NEEDED  MAXIMUM 3 TABLETS IN 24 HOURS  Quantity: 9;  Refills: 1       Eric TURPIN ; Active   topiramate (TOPAMAX) 50 MG tablet   Yes No   Sig: Topiramate 50 MG Oral Tablet  take 2 tablets by mouth at bedtime   Quantity: 60;  Refills: 6       Jennifer Reaves 10-Aug-2015  Active   traMADol (ULTRAM) 50 mg tablet   Yes No   Sig: TraMADol HCl - 50 MG Oral Tablet  TAKE 1 TABLET 3 TIMES DAILY AS NEEDED  Quantity: 90;  Refills: 5       Maranda Spencer DO;  Started 2016  Active   traZODone (DESYREL) 100 mg tablet   Yes No   Sig: Take 100 mg by mouth      Facility-Administered Medications: None       Past Medical History:   Diagnosis Date    Fibromyalgia     Migraine     Psychiatric disorder        History reviewed  No pertinent surgical history  History reviewed  No pertinent family history  I have reviewed and agree with the history as documented      Social History   Substance Use Topics    Smoking status: Never Smoker    Smokeless tobacco: Never Used    Alcohol use No        Review of Systems  REVIEW OF SYSTEMS  Constitutional:  Denies fever or chills   Eyes:  Denies change in visual acuity   HENT:  Denies nasal congestion or sore throat   Respiratory:  Denies cough or shortness of breath   Cardiovascular:  Denies chest pain or edema   GI:  Denies nausea, vomiting, bloody stools or diarrhea +abdominal pain  :  Denies dysuria   Musculoskeletal:  Denies back pain or joint pain   Integument:  Denies rash   Neurologic:  Denies headache, focal weakness or sensory changes   Endocrine:  Denies polyuria or polydipsia   Lymphatic:  Denies swollen glands   Psychiatric:  Denies depression or anxiety     Physical Exam  ED Triage Vitals [01/19/18 1724]   Temperature Pulse Respirations Blood Pressure SpO2   98 2 °F (36 8 °C) 95 18 118/65 98 %      Temp Source Heart Rate Source Patient Position - Orthostatic VS BP Location FiO2 (%)   Oral Monitor Sitting Left arm --      Pain Score       Worst Possible Pain           Orthostatic Vital Signs  Vitals:    01/19/18 1724 01/19/18 2014 01/19/18 2200   BP: 118/65 120/78 120/67   Pulse: 95 71 77   Patient Position - Orthostatic VS: Sitting Sitting        Physical Exam  PHYSICAL EXAM    Constitutional:  Well developed, well nourished, no acute distress, non-toxic appearance    HEENT:  Atraumatic, PERRL, conjunctiva normal  Oropharynx moist, no pharyngeal exudates  Neck- normal range of motion, no tenderness, supple   Respiratory:  No respiratory distress, normal breath sounds, no rales, no wheezing   Cardiovascular:  Normal rate, normal rhythm, no murmurs, no gallops, no rubs   GI:  Soft, nondistended, normal bowel sounds, mild tenderness diffusely abdomen , no organomegaly, no mass, no rebound, no guarding   :  No costovertebral angle tenderness   Musculoskeletal:  No edema, no tenderness, no deformities   Back- no tenderness  Integument:  Well hydrated, no rash   Lymphatic:  No lymphadenopathy noted   Neurologic:  Alert & oriented x 3, CN 2-12 normal, normal motor function, normal sensory function, no focal deficits noted   Psychiatric:  Speech and behavior appropriate     ED Medications  Medications   acetaminophen (TYLENOL) tablet 650 mg (650 mg Oral Given 1/19/18 2017)   ketorolac (TORADOL) injection 15 mg (15 mg Intramuscular Given 1/19/18 2207) Diagnostic Studies  Results Reviewed     Procedure Component Value Units Date/Time    POCT urinalysis dipstick [[id number]]  (Normal) Resulted:  01/19/18 2213    Lab Status:  Final result Specimen:  Urine Updated:  01/19/18 2213     Color, UA yellow/clear    POCT pregnancy, urine [[id number]]  (Normal) Resulted:  01/19/18 2213    Lab Status:  Final result Updated:  01/19/18 2213     EXT PREG TEST UR (Ref: Negative) NEGATIVE    ED Urine Macroscopic [[id number]]  (Normal) Collected:  01/19/18 2215    Lab Status:  Final result Specimen:  Urine Updated:  01/19/18 2212     Color, UA Yellow     Clarity, UA Clear     pH, UA 5 5     Leukocytes, UA Negative     Nitrite, UA Negative     Protein, UA Negative mg/dl      Glucose, UA Negative mg/dl      Ketones, UA Negative mg/dl      Urobilinogen, UA 0 2 E U /dl      Bilirubin, UA Negative     Blood, UA Negative     Specific Gravity, UA <=1 005    Narrative:       [facility] RESULT                 CT abdomen pelvis wo contrast   Final Result by Wayne Kamara MD (01/19 2255)         1  No evidence of bowel obstruction, colitis, diverticulitis or appendicitis  Stool distending the entire colon may indicate constipation  2  Intrauterine device in expected position within the uterus  Workstation performed: [id number]               Procedures  Procedures      Phone Consults  ED Phone Contact    ED Course  ED Course                                MDM  CritCare Time    Disposition  Final diagnoses:   Abdominal pain     Time reflects when diagnosis was documented in both MDM as applicable and the Disposition within this note     Time User Action Codes Description Comment    1/19/2018 10:59 PM Quynh Young [R10 9] Abdominal pain       ED Disposition     ED Disposition Condition Comment    Discharge  Yisel James discharge to home/self care      Condition at discharge: Good        Follow-up Information     Follow up With Specialties Details Why Contact Troy Zimmerman Polly Galeana MD Gynecology Schedule an appointment as soon as possible for a visit  [address] [place] [address] Dillon [address]cl[address]ton [address] [place] [address]  [phone]          Discharge Medication List as of 1/19/2018 11:00 PM      START taking these medications    Details   naproxen (NAPROSYN) 500 mg tablet Take 1 tablet by mouth 2 (two) times a day with meals, Starting Fri 1/19/2018, Print         CONTINUE these medications which have NOT CHANGED    Details   Calcium-Magnesium-Vitamin D (CALCIUM 500 PO) Calcium 500 MG CAPS  TAKE 1 CAPSULE 3 times daily   Refills: 0    Active, Historical Med      Cholecalciferol (VITAMIN D3) 2000 units CHEW Vitamin D3 2000 UNIT Oral Tablet  Take 1 tablet twice daily   Refills: 0    Active, Historical Med      cyclobenzaprine (FLEXERIL) 5 mg tablet 10 mg daily at bedtime, Starting 8/24/2015, Until Discontinued, Historical Med      diclofenac sodium (VOLTAREN) 1 % Apply 2 g topically 4 (four) times a day, Starting Thu 10/19/2017, Print      DULoxetine (CYMBALTA) 30 mg delayed release capsule DULoxetine HCl - 30 MG Oral Capsule Delayed Release Particles  TAKE 3 CAPSULES DAILY   Quantity: 90;  Refills: 3       Shaquille TURPIN ;  Started [id number]  Active, Historical Med      gabapentin (NEURONTIN) 400 mg capsule Take 400 mg by mouth 3 (three) times a day  , Starting Tue 6/13/2017, Historical Med      LORazepam (ATIVAN) 1 mg tablet 2 mg daily at bedtime as needed, Starting 6/12/2015, Until Discontinued, Historical Med      Magnesium 500 MG CAPS Magnesium 500 MG Oral Capsule  Take 1 capsule twice daily   Refills: 0    Active, Historical Med      Multiple Vitamins-Minerals (DAILY MULTIVITAMIN PO) Daily Multivitamin TABS  Take 1 tablet twice daily   Refills: 0    Active, Historical Med      Pyridoxine HCl (VITAMIN B-6) 100 MG TABS 150 mg, Until Discontinued, Historical Med      Riboflavin (B-2) 100 MG TABS B-2 TABS  TAKE 1 TABLET DAILY     Refills: 0    Active, Historical Med      rizatriptan (MAXALT-MLT) 10 MG disintegrating tablet Rizatriptan Benzoate 10 MG Oral Tablet Dispersible  TAKE 1 TABLET AT ONSET OF HEADACHE  MAY REPEAT EVERY 2 HOURS AS NEEDED  MAXIMUM 3 TABLETS IN 24 HOURS  Quantity: 9;  Refills: 1       Jolie TURPIN ; Active, Historical Med      topiramate (TOPAMAX) 50 MG tablet Topiramate 50 MG Oral Tablet  take 2 tablets by mouth at bedtime   Quantity: 60;  Refills: 6       Julisa Rolon MD;  Started 10-Aug-2015  Active, Historical Med      traMADol (ULTRAM) 50 mg tablet TraMADol HCl - 50 MG Oral Tablet  TAKE 1 TABLET 3 TIMES DAILY AS NEEDED  Quantity: 90;  Refills: 5       Maranda Spencer DO;  Started 5-Jan-2016  Active, Historical Med      traZODone (DESYREL) 100 mg tablet Take 100 mg by mouth, Until Discontinued, Historical Med           No discharge procedures on file  ED Provider  Attending physically available and evaluated Nelsonjt Archibald  ZIA managed the patient along with the ED Attending      Electronically Signed by         Rosy Vidal MD  01/20/18 4378

## 2018-01-20 NOTE — ED NOTES
Pt reported "I had an IUD placed 4 weeks ago, I have been in contact with my OBGYN, and even saw her 2 weeks ago, she told me I could expect this pain for about the next 3 months, but I think something is wrong, I did have occasional spotting, and a very light discharge"     Schuyler Deal, ALBA  01/19/18 4820

## 2018-01-20 NOTE — DISCHARGE INSTRUCTIONS
Abdominal Pain   WHAT YOU NEED TO KNOW:   Abdominal pain can be dull, achy, or sharp  You may have pain in one area of your abdomen, or in your entire abdomen  Your pain may be caused by a condition such as constipation, food sensitivity or poisoning, infection, or a blockage  Abdominal pain can also be from a hernia, appendicitis, or an ulcer  Liver, gallbladder, or kidney conditions can also cause abdominal pain  The cause of your abdominal pain may be unknown  DISCHARGE INSTRUCTIONS:   Return to the emergency department if:   · You have new chest pain or shortness of breath  · You have pulsing pain in your upper abdomen or lower back that suddenly becomes constant  · Your pain is in the right lower abdominal area and worsens with movement  · You have a fever over 100 4°F (38°C) or shaking chills  · You are vomiting and cannot keep food or liquids down  · Your pain does not improve or gets worse over the next 8 to 12 hours  · You see blood in your vomit or bowel movements, or they look black and tarry  · Your skin or the whites of your eyes turn yellow  · You are a woman and have a large amount of vaginal bleeding that is not your monthly period  Contact your healthcare provider if:   · You have pain in your lower back  · You are a man and have pain in your testicles  · You have pain when you urinate  · You have questions or concerns about your condition or care  Follow up with your healthcare provider within 24 hours or as directed:  Write down your questions so you remember to ask them during your visits  Medicines:   · Medicines  may be given to calm your stomach and prevent vomiting or to decrease pain  Ask how to take pain medicine safely  · Take your medicine as directed  Contact your healthcare provider if you think your medicine is not helping or if you have side effects  Tell him of her if you are allergic to any medicine   Keep a list of the medicines, vitamins, and herbs you take  Include the amounts, and when and why you take them  Bring the list or the pill bottles to follow-up visits  Carry your medicine list with you in case of an emergency  © 2017 [address] [employer] Information is for End User's use only and may not be sold, redistributed or otherwise used for commercial purposes  All illustrations and images included in CareNotes® are the copyrighted property of [employer]  or [address]  The above information is an  only  It is not intended as medical advice for individual conditions or treatments  Talk to your doctor, nurse or pharmacist before following any medical regimen to see if it is safe and effective for you

## 2018-01-20 NOTE — ED ATTENDING ATTESTATION
Kareem Aaron MD, saw and evaluated the patient  I have discussed the patient with the resident/non-physician practitioner and agree with the resident's/non-physician practitioner's findings, Plan of Care, and MDM as documented in the resident's/non-physician practitioner's note, except where noted  All available labs and Radiology studies were reviewed  At this point I agree with the current assessment done in the Emergency Department  I have conducted an independent evaluation of this patient a history and physical is as follows:      Critical Care Time  CritCare Time    Procedures     33 yo female with hx of fibromyalgia, c/o diffuse abdominal pain since placing mirena for last four weeks  Pt followed up with ob who reassured patient  Pt with no n/v/d, no urinary complaints  Vss, afebrile, lungs cta, rrr, abdomen soft diffuse tenderness, no rebound  Non contrast ct, urine, pain meds

## 2018-01-22 VITALS
DIASTOLIC BLOOD PRESSURE: 68 MMHG | BODY MASS INDEX: 33.95 KG/M2 | RESPIRATION RATE: 16 BRPM | TEMPERATURE: 97.7 F | SYSTOLIC BLOOD PRESSURE: 114 MMHG | WEIGHT: 179.8 LBS | HEART RATE: 98 BPM | HEIGHT: 61 IN

## 2018-01-22 VITALS
HEIGHT: 61 IN | BODY MASS INDEX: 33.12 KG/M2 | TEMPERATURE: 98.6 F | WEIGHT: 175.4 LBS | RESPIRATION RATE: 16 BRPM | SYSTOLIC BLOOD PRESSURE: 102 MMHG | HEART RATE: 84 BPM | DIASTOLIC BLOOD PRESSURE: 68 MMHG

## 2018-01-23 NOTE — PSYCH
Message  Message Free Text Note Form: lm today at 1: 62 PM to cancel her ind  psych  appt  for today @ 2 PM; reports not feeling well and going to the ED; Active Problems    1  Acne vulgaris (706 1) (L70 0)   2  Acute bilateral low back pain without sciatica (724 2,338 19) (M54 5)   3  Acute bilateral low back pain without sciatica (724 2,338 19) (M54 5)   4  Alcoholism (303 90) (F10 20)   5  Allergic rhinitis (477 9) (J30 9)   6  Back pain (724 5) (M54 9)   7  Chronic fatigue (780 79) (R53 82)   8  Chronic migraine without aura (346 70) (G43 709)   9  Chronic pain (338 29) (G89 29)   10  Classic migraine with aura (346 00) (G43 109)   11  Classic Migraine With Typical Aura (346 00)   12  Depression with anxiety (300 4) (F41 8)   13  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, with unspecified complication (239 4)    (F19 959)   14  Fever (780 60) (R50 9)   15  Fibromyalgia (729 1) (M79 7)   16  Headache (784 0) (R51)   17  Hypokalemia (276 8) (E87 6)   18  Impaired fasting glucose (790 21) (R73 01)   19  Insomnia (780 52) (G47 00)   20  Lyme disease (088 81) (A69 20)   21  Major depressive disorder, recurrent episode, moderate with anxious distress (296 32)    (F33 1)   22  Malaise and fatigue (780 79) (R53 81,R53 83)   23  Menorrhagia (626 2) (N92 0)   24  Muscle spasms of head or neck (728 85) (M62 838)   25  Myalgia (729 1) (M79 1)   26  Narcolepsy (347 00) (G47 419)   27  Neck pain (723 1) (M54 2)   28  Need for influenza vaccination (V04 81) (Z23)   29  Obesity (278 00) (E66 9)   30  Sinus disease (473 9) (J34 9)   31  Sleep apnea (780 57) (G47 30)   32  Snoring (786 09) (R06 83)   33  Tension type headache (339 10) (G44 209)   34  Tick bite (919 4,E906 4) (W57 XXXA)   35  Vaginal yeast infection (112 1) (B37 3)    Current Meds   1  Curcumin 95 CAPS; Take 1 capsule twice daily; Therapy: (Recorded:77Svm8058) to Recorded   2   Cyclobenzaprine HCl - 10 MG Oral Tablet; TAKE 1 TABLET AT BEDTIME as needed for   muscle spasms; Therapy: 13FTU2658 to (Evaluate:11Jun2018)  Requested for: 36Upv6949; Last   Rx:62Pxa0739 Ordered   3  Daily Multivitamin TABS; Take 1 tablet twice daily; Therapy: (Recorded:09May2016) to Recorded   4  DULoxetine HCl - 30 MG Oral Capsule Delayed Release Particles (Cymbalta); TAKE 3   CAPSULE Once In The Morning; Therapy: 93HCB5937 to (Andrea Reasons)  Requested for: 92NIN9632; Last   Rx:10Jan2018 Ordered   5  Hydrocodone-Acetaminophen 5-325 MG Oral Tablet; TAKE 1 TABLET Every twelve hours   PRN severe pain; Therapy[facility] 74GPJ3925 [facility]); Last Rx:16Jan2018 Ordered   6  LORazepam 1 MG Oral Tablet; TAKE 1 TABLET Every 6 hours; Therapy: 43LMZ8997 to (Evaluate:56Abq6145)  Requested for: 13HPB9486; Last   Rx:10Jan2018 Ordered   7  Lyrica 75 MG Oral Capsule; TAKE 1 CAPSULE TWICE DAILY; Therapy: 65TWD7611 to (Evaluate:09Feb2018); Last Rx:10Jan2018 Ordered   8  Magnesium 500 MG Oral Capsule; Take 1 capsule twice daily; Therapy: (Recorded:09May2016) to Recorded   9  Norethindrone 0 35 MG Oral Tablet; Therapy: 18RLF9711 to Recorded   10  Rizatriptan Benzoate 10 MG Oral Tablet Disintegrating (Maxalt-MLT); TAKE 1 TAB AT    ONSET OF HEADACHE MAY REPEAT EVERY 2 HOURS AS NEEDED MAX 3 TABS/24    HRS; Therapy: 75Cuw9324 to (Evaluate:29Nov2016)  Requested for: 53Ucn9241; Last    Rx:76Bcv1921 Ordered   11  Topiramate 50 MG Oral Tablet (Topamax); take 2 tablets by mouth at bedtime; Therapy: 11TIM4048 to (Evaluate:29Pgv5250)  Requested for: 14Esw4667; Last    Rx:78Lur1355 Ordered   12  TraZODone HCl - 100 MG Oral Tablet; TAKE 1 OR 2 TABLETS AT BEDTIME AS NEEDED    FOR SLEEP; Therapy: 67SDP1752 to [phone])  Requested for: 00FES6690; Last    Rx:63Ygu5991 Ordered   13  Vitamin D3 2000 UNIT Oral Tablet; Take 1 tablet twice daily; Therapy: (Recorded:31Mdn0767) to Recorded    Allergies    1  Penicillins   2  Decadron TABS   3  Tetracyclines    4  Seasonal    Signatures   Electronically signed by : HERMELINDA LewisLCSWHERMELINDA,JOSÉ; Jan 17 2018  2:19PM EST                       (Author)

## 2018-02-26 NOTE — PSYCH
Psych Med Mgmt    Appearance: was calm and cooperative, adequate hygiene and grooming and good eye contact  Observed mood: mood appropriate  Observed mood: affect appropriate  Speech: a normal rate  Thought processes: coherent/organized  Hallucinations: no hallucinations present  Thought Content: no delusions  Abnormal Thoughts: The patient has no suicidal thoughts and no homicidal thoughts  Orientation: The patient is oriented to person, place and time, oriented to person, oriented to place and oriented to time  Recent and Remote Memory: short term memory intact and long term memory intact  Attention Span And Concentration: concentration intact  Insight: Limited insight  Judgment: Her judgment was limited  Muscle Strength And Tone  Muscle strength and tone were normal       Goals addressed in session: Medication Management     Treatment Recommendations: Continue current treatment  Risks, Benefits And Possible Side Effects Of Medications: Risks, benefits, and possible side effects of medications explained to patient and patient verbalizes understanding  The patient has been filling controlled prescriptions on time as prescribed to [facility]     She reports normal appetite, normal energy level, no weight change and normal number of sleep hours  Mood has been depressed  Denies medication side effects   Still reporting body aches and pain that interfere with her ability to function on a daily basis, feels very hopeless and helpless  No recent health changes   No new medications  Assessment    1  Fibromyalgia (729 1) (M79 7)   2  Major depressive disorder, recurrent episode, moderate with anxious distress (296 32)   (F33 1)    Plan    1  TraZODone HCl - 100 MG Oral Tablet; TAKE 1 OR 2 TABLETS AT BEDTIME AS   NEEDED FOR SLEEP    2   DULoxetine HCl - 30 MG Oral Capsule Delayed Release Particles (Cymbalta);   TAKE 3 CAPSULE Once In The Morning    3  Lyrica 75 MG Oral Capsule; TAKE 1 CAPSULE TWICE DAILY    4  LORazepam 1 MG Oral Tablet; TAKE 1 TABLET Every 6 hours    Review of Systems    Constitutional: No fever, no chills, feels well, no tiredness, no recent weight gain or loss  Cardiovascular: no complaints of slow or fast heart rate, no chest pain, no palpitations  Respiratory: no complaints of shortness of breath, no wheezing, no dyspnea on exertion  Gastrointestinal: no complaints of abdominal pain, no constipation, no nausea, no diarrhea, no vomiting  Genitourinary: no complaints of dysuria, no incontinence, no pelvic pain, no urinary frequency  Musculoskeletal: arthralgias and myalgias  Integumentary: no complaints of skin rash, no itching, no dry skin  Neurological: no complaints of headache, no confusion, no numbness, no dizziness  Substance Abuse Hx    Substance Abuse History: Denies  Active Problems    1  Acne vulgaris (706 1) (L70 0)   2  Acute bilateral low back pain without sciatica (724 2,338 19) (M54 5)   3  Acute bilateral low back pain without sciatica (724 2,338 19) (M54 5)   4  Alcoholism (303 90) (F10 20)   5  Allergic rhinitis (477 9) (J30 9)   6  Back pain (724 5) (M54 9)   7  Chronic fatigue (780 79) (R53 82)   8  Chronic migraine without aura (346 70) (G43 709)   9  Chronic pain (338 29) (G89 29)   10  Classic migraine with aura (346 00) (G43 109)   11  Classic Migraine With Typical Aura (346 00)   12  Depression with anxiety (300 4) (F41 8)   13  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, with unspecified complication (001 5)    (F19 959)   14  Fever (780 60) (R50 9)   15  Fibromyalgia (729 1) (M79 7)   16  Headache (784 0) (R51)   17  Hypokalemia (276 8) (E87 6)   18  Impaired fasting glucose (790 21) (R73 01)   19  Insomnia (780 52) (G47 00)   20  Lyme disease (088 81) (A69 20)   21  Major depressive disorder, recurrent episode, moderate with anxious distress (296 32)    (F33 1)   22  Malaise and fatigue (780 79) (R53 81,R53 83)   23  Menorrhagia (626 2) (N92 0)   24  Muscle spasms of head or neck (728 85) (M62 838)   25  Myalgia (729 1) (M79 1)   26  Narcolepsy (347 00) (G47 419)   27  Neck pain (723 1) (M54 2)   28  Need for influenza vaccination (V04 81) (Z23)   29  Obesity (278 00) (E66 9)   30  Sinus disease (473 9) (J34 9)   31  Sleep apnea (780 57) (G47 30)   32  Snoring (786 09) (R06 83)   33  Tension type headache (339 10) (G44 209)   34  Tick bite (919 4,E906 4) (W57 XXXA)   35  Vaginal yeast infection (112 1) (B37 3)    Past Medical History    1  History of Acute otitis externa, unspecified laterality   2  History of Acute otitis media, unspecified laterality   3  History of Acute upper respiratory infection (465 9) (J06 9)   4  History of Atypical chest pain (786 59) (R07 89)   5  History of Chronic sinusitis (473 9) (J32 9)   6  History of Dysuria (788 1) (R30 0)   7  History of Encounter for routine gynecological examination with Papanicolaou smear of   cervix (V72 31,V76 2) (Z01 419)   8  History of depression (V11 8) (Z86 59)   9  History of migraine (V12 49) (Z86 69)   10  History of polyarthritis (V13 4) (Z87 39)   11  History of Memory loss (780 93) (R41 3)    The active problems and past medical history were reviewed and updated today  Allergies    1  Penicillins   2  Decadron TABS   3  Tetracyclines    4  Seasonal    Current Meds   1  Curcumin 95 CAPS; Take 1 capsule twice daily; Therapy: (Recorded:38Owh5767) to Recorded   2  Cyclobenzaprine HCl - 10 MG Oral Tablet; TAKE 1 TABLET AT BEDTIME as needed for   muscle spasms; Therapy: 96OJW0874 to (Evaluate:11Jun2018)  Requested for: 14Zhq7820; Last   Rx:22Bux9019 Ordered   3  Daily Multivitamin TABS; Take 1 tablet twice daily; Therapy: (Recorded:98Yrt9721) to Recorded   4  DULoxetine HCl - 30 MG Oral Capsule Delayed Release Particles; TAKE 3 CAPSULE   Once In The Morning;    Therapy: 45YHQ1804 to (Evaluate:18Feb2018) Requested for: 20Nov2017; Last   Rx:20Nov2017 Ordered   5  Hydrocodone-Acetaminophen 5-325 MG Oral Tablet; TAKE 1 TABLET Every twelve hours   PRN severe pain; Therapy: 68FOY5829 to (Evaluate:02Nov2017); Last Rx:26Oct2017 Ordered   6  LORazepam 1 MG Oral Tablet; TAKE 1 TABLET Every 6 hours; Therapy: 64QQS6258 to (Evaluate:46Arl8579)  Requested for: 11Syl3044; Last   Rx:14Pbm8424 Ordered   7  Magnesium 500 MG Oral Capsule; Take 1 capsule twice daily; Therapy: (Recorded:09May2016) to Recorded   8  Norethindrone 0 35 MG Oral Tablet; Therapy: 52KYX6629 to Recorded   9  Rizatriptan Benzoate 10 MG Oral Tablet Disintegrating; TAKE 1 TAB AT ONSET OF   HEADACHE MAY REPEAT EVERY 2 HOURS AS NEEDED MAX 3 TABS/24 HRS; Therapy: 35Woy5464 to (Evaluate:29Nov2016)  Requested for: 55Qvk6633; Last   Rx:42Ods4269 Ordered   10  Topiramate 50 MG Oral Tablet; take 2 tablets by mouth at bedtime; Therapy: 45PFE5309 to (Evaluate:38Leu4323)  Requested for: 56Rfu3770; Last    Rx:58Ktj8999 Ordered   11  TraZODone HCl - 100 MG Oral Tablet; TAKE 1 OR 2 TABLETS AT BEDTIME AS NEEDED    FOR SLEEP; Therapy: 84QGD4395 to (Evaluate:19Mar2018)  Requested for: 76FWF2481; Last    Rx:04Cyk7941 Ordered   12  Vitamin D3 2000 UNIT Oral Tablet; Take 1 tablet twice daily; Therapy: (Recorded:09May2016) to Recorded    The medication list was reviewed and updated today  Family Psych History  Father    1  Family history of Diabetes Mellitus (V18 0)  Brother    2  Family history of substance abuse (V17 0) (Z81 4)  Maternal Grandmother    3  Family history of Diabetes Mellitus (V18 0)   4  Family history of rheumatoid arthritis (V17 7) (Z82 61)   5  Family history of Malignant Melanoma Of The Skin (V16 8)  Paternal Grandmother    10  Family history of chronic kidney disease (V18 69) (Z84 1)   7  Family history of rheumatoid arthritis (V17 7) (Z82 61)   8   Family history of Major depressive disorder, recurrent episode, severe  Maternal Roc Grow Grandmother    9  Family history of malignant neoplasm of stomach (V16 0) (Z80 0)  Paternal Grandfather    8  Family history of Diabetes Mellitus (V18 0)   11  Family history of Lung Cancer (V16 1)  Paternal Aunt    15  Family history of Major depressive disorder, recurrent episode, severe  Maternal Uncle    13  Family history of substance abuse (V17 0) (Z81 4)  Family History    15  Family history of Alcoholism   15  Denied: Family history of Drug Use   16  Denied: Family history of Crohn's disease   16  Denied: Family history of psoriasis   18  Denied: Family history of systemic lupus erythematosus   19  Denied: Family history of ulcerative colitis   20  Family history of Never A Smoker    The family history was reviewed and updated today  Social History    · Caffeine use (V49 89) (F15 90)   · Denied: History of Drug Use   · Never A Smoker   · No drug use   · Rarely consumes alcohol (V49 89) (Z78 9)   · Single   · Social alcohol use (Z78 9)  The social history was reviewed and updated today  The social history was reviewed and is unchanged  End of Encounter Meds    1  Hydrocodone-Acetaminophen 5-325 MG Oral Tablet; TAKE 1 TABLET Every twelve hours   PRN severe pain; Therapy: 75KCC2202 to (Evaluate:02Nov2017); Last Rx:26Oct2017 Ordered    2  Topiramate 50 MG Oral Tablet (Topamax); take 2 tablets by mouth at bedtime; Therapy: 18XMC1416 to (Evaluate:82Ftp7912)  Requested for: 12Hns9663; Last   Rx:21Lpt9637 Ordered    3  Curcumin 95 CAPS; Take 1 capsule twice daily; Therapy: (Recorded:82Rnz4473) to Recorded    4  Rizatriptan Benzoate 10 MG Oral Tablet Disintegrating (Maxalt-MLT); TAKE 1 TAB AT   ONSET OF HEADACHE MAY REPEAT EVERY 2 HOURS AS NEEDED MAX 3 TABS/24   HRS; Therapy: 82Gcc0880 to (Evaluate:29Nov2016)  Requested for: 32Laj3265; Last   Rx:54Svd7102 Ordered    5  TraZODone HCl - 100 MG Oral Tablet; TAKE 1 OR 2 TABLETS AT BEDTIME AS NEEDED   FOR SLEEP;    Therapy: 03ZNF5282 to [phone])  Requested for: 86AGG6642; Last   Rx:07Jto2214 Ordered    6  DULoxetine HCl - 30 MG Oral Capsule Delayed Release Particles (Cymbalta); TAKE 3   CAPSULE Once In The Morning; Therapy: 52XHA3281 to ([place])  Requested for: 00GMM0659; Last   Rx:10Jan2018 Ordered    7  Cyclobenzaprine HCl - 10 MG Oral Tablet; TAKE 1 TABLET AT BEDTIME as needed for   muscle spasms; Therapy: 63YSC3724 to (Evaluate:11Jun2018)  Requested for: 13Dec2017; Last   Rx:13Dec2017 Ordered   8  Lyrica 75 MG Oral Capsule; TAKE 1 CAPSULE TWICE DAILY; Therapy: 49PMD8155 to (Evaluate:09Feb2018); Last Rx:10Jan2018 Ordered    9  LORazepam 1 MG Oral Tablet; TAKE 1 TABLET Every 6 hours; Therapy: 44OBB7020 to (Evaluate:10Apr2018)  Requested for: 78VGF6046; Last   Rx:10Jan2018 Ordered    10  Daily Multivitamin TABS; Take 1 tablet twice daily; Therapy: (Recorded:09May2016) to Recorded   11  Magnesium 500 MG Oral Capsule; Take 1 capsule twice daily; Therapy: (Recorded:09May2016) to Recorded   12  Norethindrone 0 35 MG Oral Tablet; Therapy: 26ZYO7340 to Recorded   13  Vitamin D3 2000 UNIT Oral Tablet; Take 1 tablet twice daily; Therapy: (Recorded:84Edk5816) to Recorded    Future Appointments    Date/Time Provider Specialty Site   04/26/2018 01:20 PM FARIBA Vegas   Psychiatry [facility]   01/17/2018 02:00 PM [facility]     Signatures   Electronically signed by : FARIBA Mireles ; Jan 12 2018 12:50PM EST                       (Author)

## 2018-03-07 NOTE — PSYCH
Message  Patient No Show Letter - Behavioral Health:     Date: 12/08/2016     Dear Benedicto Toledo,     We missed seeing you for a scheduled appointment at   [facility] on 12-7-16 at 1:15pm with Yesica Torrez  Our goal is to offer the best possible care to our patients, so we are concerned when you are unable to keep a scheduled appointment  Please call us at [phone] so that we can reschedule the appointment for a date and time that will work for you  We understand that circumstances may arise which make it impossible for you to keep a scheduled appointment  Should this happen in the future, please call us as soon as you know the appointment will be missed  The earlier you let us know, the more likely we can offer your scheduled appointment time to another patient  We hope to hear from you soon       Sincerely,   Yesica Torrez      Signatures   Electronically signed by : Jace Watkins, ; Dec  8 2016  8:37AM EST                       (Author)

## 2018-04-14 DIAGNOSIS — F33.2 MDD (MAJOR DEPRESSIVE DISORDER), RECURRENT SEVERE, WITHOUT PSYCHOSIS (HCC): Primary | ICD-10-CM

## 2018-04-16 RX ORDER — DULOXETIN HYDROCHLORIDE 30 MG/1
CAPSULE, DELAYED RELEASE ORAL
Qty: 30 CAPSULE | Refills: 2 | Status: SHIPPED | OUTPATIENT
Start: 2018-04-16 | End: 2018-04-26 | Stop reason: SDUPTHER

## 2018-04-16 RX ORDER — DULOXETIN HYDROCHLORIDE 60 MG/1
CAPSULE, DELAYED RELEASE ORAL
Qty: 30 CAPSULE | Refills: 2 | Status: SHIPPED | OUTPATIENT
Start: 2018-04-16 | End: 2018-04-26 | Stop reason: SDUPTHER

## 2018-04-26 ENCOUNTER — DOCUMENTATION (OUTPATIENT)
Dept: PSYCHIATRY | Facility: CLINIC | Age: 38
End: 2018-04-26

## 2018-04-26 ENCOUNTER — OFFICE VISIT (OUTPATIENT)
Dept: PSYCHIATRY | Facility: CLINIC | Age: 38
End: 2018-04-26
Payer: COMMERCIAL

## 2018-04-26 DIAGNOSIS — F33.2 MDD (MAJOR DEPRESSIVE DISORDER), RECURRENT SEVERE, WITHOUT PSYCHOSIS (HCC): Primary | ICD-10-CM

## 2018-04-26 DIAGNOSIS — M79.7 FIBROMYALGIA: ICD-10-CM

## 2018-04-26 DIAGNOSIS — F33.1 MAJOR DEPRESSIVE DISORDER, RECURRENT EPISODE, MODERATE WITH ANXIOUS DISTRESS (HCC): ICD-10-CM

## 2018-04-26 DIAGNOSIS — F51.05 INSOMNIA DUE TO OTHER MENTAL DISORDER: ICD-10-CM

## 2018-04-26 DIAGNOSIS — F99 INSOMNIA DUE TO OTHER MENTAL DISORDER: ICD-10-CM

## 2018-04-26 PROBLEM — L70.0 ACNE VULGARIS: Status: ACTIVE | Noted: 2017-05-24

## 2018-04-26 PROBLEM — B37.3 VAGINAL YEAST INFECTION: Status: ACTIVE | Noted: 2017-06-14

## 2018-04-26 PROBLEM — G43.909 MIGRAINE: Status: ACTIVE | Noted: 2018-01-30

## 2018-04-26 PROBLEM — G47.411 NARCOLEPSY CATAPLEXY SYNDROME: Status: ACTIVE | Noted: 2018-01-30

## 2018-04-26 PROBLEM — B37.31 VAGINAL YEAST INFECTION: Status: ACTIVE | Noted: 2017-06-14

## 2018-04-26 PROCEDURE — 99213 OFFICE O/P EST LOW 20 MIN: CPT | Performed by: PSYCHIATRY & NEUROLOGY

## 2018-04-26 RX ORDER — TOPIRAMATE 50 MG/1
2 TABLET, FILM COATED ORAL 2 TIMES DAILY
COMMUNITY
Start: 2015-08-10 | End: 2018-07-11 | Stop reason: SDUPTHER

## 2018-04-26 RX ORDER — GABAPENTIN 400 MG/1
1 CAPSULE ORAL 3 TIMES DAILY
COMMUNITY
End: 2018-04-26 | Stop reason: SDUPTHER

## 2018-04-26 RX ORDER — RIZATRIPTAN BENZOATE 10 MG/1
1 TABLET, ORALLY DISINTEGRATING ORAL AS NEEDED
COMMUNITY
Start: 2016-08-31 | End: 2018-05-29 | Stop reason: SDUPTHER

## 2018-04-26 RX ORDER — DULOXETIN HYDROCHLORIDE 30 MG/1
1 CAPSULE, DELAYED RELEASE ORAL DAILY
COMMUNITY
Start: 2016-06-01 | End: 2018-04-26 | Stop reason: SDUPTHER

## 2018-04-26 RX ORDER — DULOXETIN HYDROCHLORIDE 30 MG/1
30 CAPSULE, DELAYED RELEASE ORAL DAILY
Qty: 30 CAPSULE | Refills: 2 | Status: SHIPPED | OUTPATIENT
Start: 2018-04-26 | End: 2018-07-17 | Stop reason: SDUPTHER

## 2018-04-26 RX ORDER — TRAZODONE HYDROCHLORIDE 100 MG/1
100-200 TABLET ORAL
Qty: 60 TABLET | Refills: 2 | Status: SHIPPED | OUTPATIENT
Start: 2018-04-26 | End: 2019-04-18 | Stop reason: SDUPTHER

## 2018-04-26 RX ORDER — DULOXETIN HYDROCHLORIDE 60 MG/1
60 CAPSULE, DELAYED RELEASE ORAL DAILY
Qty: 30 CAPSULE | Refills: 2 | Status: SHIPPED | OUTPATIENT
Start: 2018-04-26 | End: 2018-07-17 | Stop reason: SDUPTHER

## 2018-04-26 RX ORDER — PREGABALIN 75 MG/1
1 CAPSULE ORAL 2 TIMES DAILY
COMMUNITY
Start: 2018-01-10 | End: 2018-07-18 | Stop reason: SDUPTHER

## 2018-04-26 RX ORDER — TRAZODONE HYDROCHLORIDE 100 MG/1
1-2 TABLET ORAL
COMMUNITY
Start: 2016-05-03 | End: 2018-04-26 | Stop reason: SDUPTHER

## 2018-04-26 RX ORDER — PROPRANOLOL/HYDROCHLOROTHIAZID 40 MG-25MG
1 TABLET ORAL 2 TIMES DAILY
COMMUNITY
End: 2018-09-14

## 2018-04-26 RX ORDER — ACETAMINOPHEN AND CODEINE PHOSPHATE 120; 12 MG/5ML; MG/5ML
1 SOLUTION ORAL DAILY
COMMUNITY
Start: 2017-05-09 | End: 2018-09-14

## 2018-04-26 RX ORDER — LORAZEPAM 1 MG/1
1 TABLET ORAL EVERY 6 HOURS
COMMUNITY
Start: 2017-03-09 | End: 2018-04-26 | Stop reason: SDUPTHER

## 2018-04-26 RX ORDER — LORAZEPAM 1 MG/1
1 TABLET ORAL EVERY 6 HOURS
Qty: 120 TABLET | Refills: 2 | Status: SHIPPED | OUTPATIENT
Start: 2018-04-26 | End: 2018-10-01 | Stop reason: SDUPTHER

## 2018-04-26 RX ORDER — ERGOCALCIFEROL (VITAMIN D2) 10 MCG
1 TABLET ORAL 2 TIMES DAILY
COMMUNITY
End: 2018-04-26 | Stop reason: SDUPTHER

## 2018-04-26 RX ORDER — THIAMINE HCL 100 MG
1 TABLET ORAL 2 TIMES DAILY
COMMUNITY
End: 2018-04-26 | Stop reason: SDUPTHER

## 2018-04-26 RX ORDER — HYDROCODONE BITARTRATE AND ACETAMINOPHEN 5; 325 MG/1; MG/1
1 TABLET ORAL EVERY 12 HOURS PRN
COMMUNITY
Start: 2016-05-13 | End: 2018-09-14

## 2018-04-26 RX ORDER — ACETAMINOPHEN 160 MG
1 TABLET,DISINTEGRATING ORAL 2 TIMES DAILY
COMMUNITY
End: 2019-03-24

## 2018-04-26 RX ORDER — CYCLOBENZAPRINE HCL 10 MG
1 TABLET ORAL
COMMUNITY
Start: 2016-08-04 | End: 2018-04-26 | Stop reason: SDUPTHER

## 2018-04-26 RX ORDER — DULOXETIN HYDROCHLORIDE 60 MG/1
1 CAPSULE, DELAYED RELEASE ORAL DAILY
COMMUNITY
Start: 2018-01-18 | End: 2018-04-26 | Stop reason: SDUPTHER

## 2018-04-26 NOTE — PSYCH
Subjective: Medication management      Patient ID: Marcellus Bal is a 34 y o  female  HPI ROS Appetite Changes and Sleep: normal appetite, normal energy level, no weight change and normal number of sleep hours   Patient reported depressed mood related to her chronic pain  She is sending application for Assistance with Lyrica since her insurance will not cover  Review Of Systems:     Mood Anxiety, Depression and Emotional Lability   Behavior Impulsive Behavior   Thought Content Disturbing Thoughts, Feelings and Unreasonalbe or Irrational Fears   General Relationship Problems, Emotional Problems, Sleep Disturbances and Decreased Functioning   Personality Normal   Other Psych Symptoms Normal   Constitutional Negative   ENT Negative   Cardiovascular Negative   Respiratory Negative   Gastrointestinal Negative   Genitourinary Negative   Musculoskeletal Negative   Integumentary Negative   Neurological Negative   Endocrine Normal    Other Symptoms Normal              Laboratory Results: No results found for this or any previous visit  Substance Abuse History:  History   Drug Use No       Family Psychiatric History: No family history on file  The following portions of the patient's history were reviewed and updated as appropriate: allergies, current medications, past family history, past medical history, past social history, past surgical history and problem list     Social History     Social History    Marital status: Single     Spouse name: N/A    Number of children: N/A    Years of education: N/A     Occupational History    Not on file       Social History Main Topics    Smoking status: Never Smoker    Smokeless tobacco: Never Used    Alcohol use No    Drug use: No    Sexual activity: Not on file     Other Topics Concern    Not on file     Social History Narrative    No narrative on file     Social History     Social History Narrative    No narrative on file       Objective:       Mental status:  Appearance calm and cooperative , adequate hygiene and grooming and good eye contact    Mood depressed and anxious   Affect affect was constricted   Speech a normal rate   Thought Processes coherent/organized and normal thought processes   Hallucinations no hallucinations present    Thought Content no delusions   Abnormal Thoughts no suicidal thoughts  and no homicidal thoughts    Orientation  oriented to person and place and time   Remote Memory short term memory intact and long term memory intact   Attention Span concentration impaired   Intellect Appears to be of Average Intelligence   Insight Limited insight   Judgement judgment was limited   Muscle Strength Muscle strength and tone were normal and Normal gait    Language no difficulty naming common objects, no difficulty repeating a phrase  and no difficulty writing a sentence    Fund of Knowledge displays adequate knowledge of current events, adequate fund of knowledge regarding past history and adequate fund of knowledge regarding vocabulary    Pain moderate to severe   Pain Scale 8       Assessment/Plan:       Diagnoses and all orders for this visit:    MDD (major depressive disorder), recurrent severe, without psychosis ([facility] )  -     DULoxetine (CYMBALTA) 30 mg delayed release capsule; Take 1 capsule (30 mg total) by mouth daily  -     DULoxetine (CYMBALTA) 60 mg delayed release capsule; Take 1 capsule (60 mg total) by mouth daily  -     LORazepam (ATIVAN) 1 mg tablet; Take 1 tablet (1 mg total) by mouth every 6 (six) hours  -     traZODone (DESYREL) 100 mg tablet; Take 1-2 tablets (100-200 mg total) by mouth daily at bedtime    Other orders  -     Turmeric 500 MG CAPS; Take 1 capsule by mouth 2 (two) times a day  -     HYDROcodone-acetaminophen (NORCO) 5-325 mg per tablet; Take 1 tablet by mouth every 12 (twelve) hours as needed  -     pregabalin (LYRICA) 75 mg capsule;  Take 1 capsule by mouth 2 (two) times a day  -     norethindrone (Jose Magen) 0 35 MG tablet; Take 1 tablet by mouth daily  -     Cholecalciferol (VITAMIN D3) 2000 units capsule; Take 1 tablet by mouth 2 (two) times a day  -     Discontinue: cyclobenzaprine (FLEXERIL) 10 mg tablet; Take 1 tablet by mouth daily at bedtime  -     Discontinue: DULoxetine (CYMBALTA) 30 mg delayed release capsule; Take 1 capsule by mouth daily  -     Discontinue: DULoxetine (CYMBALTA) 60 mg delayed release capsule; Take 1 capsule by mouth daily  -     rizatriptan (MAXALT-MLT) 10 MG disintegrating tablet; Take 1 tablet by mouth as needed  -     topiramate (TOPAMAX) 50 MG tablet; Take 2 tablets by mouth 2 (two) times a day  -             Treatment Recommendations- Risks Benefits      Immediate Medical/Psychiatric/Psychotherapy Treatments and Any Precautions: continue current treatment     Risks, Benefits And Possible Side Effects Of Medications:  {PSYCH RISK, BENEFITS AND POSSIBLE SIDE EFFECTS (Optional):95098    Controlled Medication Discussion: Discussed with patient Black Box warning on concurrent use of benzodiazepines and opioid medications including sedation, respiratory depression, coma and death  Patient understands the risk of treatment with benzodiazepines in addition to opioids and wants to continue taking those medications  , Discussed with patient the risks of sedation, respiratory depression, impairment of ability to drive and potential for abuse and addiction related to treatment with benzodiazepine medications  The patient understands risk of treatment with benzodiazepine medications, agrees to not drive if feels impaired and agrees to take medications as prescribed   and The patient has been filling controlled prescriptions on time as prescribed to [facility]

## 2018-04-26 NOTE — PROGRESS NOTES
Spoke to patient, she forgot her lorazepam prescription at the office after the appointment, confirmed with her, she only uses [facility] pharmacy on [address]  [address],patient notified that will call the prescription in  Called in Lorazepam prescription to [facility] pharmacy prescriber tera

## 2018-05-02 ENCOUNTER — TELEPHONE (OUTPATIENT)
Dept: PSYCHIATRY | Facility: CLINIC | Age: 38
End: 2018-05-02

## 2018-05-02 NOTE — TELEPHONE ENCOUNTER
----- Message from Say Novoa sent at 5/2/2018  1:24 PM EDT -----  Vito Smith came for a 1pm appt today with [facility] but was not scheduled  She said she was a former patient of Marii's but hasn't seen her in more than a year  Can you follow up with her?  Her telephone number is [phone]

## 2018-05-22 ENCOUNTER — TELEPHONE (OUTPATIENT)
Dept: BEHAVIORAL/MENTAL HEALTH CLINIC | Facility: CLINIC | Age: 38
End: 2018-05-22

## 2018-05-23 ENCOUNTER — TELEPHONE (OUTPATIENT)
Dept: BEHAVIORAL/MENTAL HEALTH CLINIC | Facility: CLINIC | Age: 38
End: 2018-05-23

## 2018-05-29 DIAGNOSIS — G43.009 MIGRAINE WITHOUT AURA AND WITHOUT STATUS MIGRAINOSUS, NOT INTRACTABLE: Primary | ICD-10-CM

## 2018-05-29 RX ORDER — RIZATRIPTAN BENZOATE 10 MG/1
TABLET, ORALLY DISINTEGRATING ORAL
Qty: 9 TABLET | Refills: 2 | Status: SHIPPED | OUTPATIENT
Start: 2018-05-29 | End: 2018-06-07 | Stop reason: ALTCHOICE

## 2018-06-06 ENCOUNTER — TELEPHONE (OUTPATIENT)
Dept: FAMILY MEDICINE CLINIC | Facility: CLINIC | Age: 38
End: 2018-06-06

## 2018-06-06 NOTE — TELEPHONE ENCOUNTER
Pharmacy stated that patient's insurance does not cover Rizatriptan  I called patient and asked what other medication she tried, she said Sumatriptan caused severe headaches, and that is what the insurance would cover  She tried Meloxicam in the past and had no problem

## 2018-06-06 NOTE — TELEPHONE ENCOUNTER
Called [facility] pharmacy and the lady I spoke to helped out by trying to run it to see the error messages  Bartholome Pinks Bartholome Pinks They insurance won't cover the Disintegrating tablets, will cover regular tablets  Bartholome Pinks Bartholome Pinks The [employer] of the medication that is covered is # [address]-33 and we can add that to pharmacy notes

## 2018-06-07 DIAGNOSIS — G43.709 CHRONIC MIGRAINE WITHOUT AURA WITHOUT STATUS MIGRAINOSUS, NOT INTRACTABLE: Primary | ICD-10-CM

## 2018-06-07 RX ORDER — RIZATRIPTAN BENZOATE 10 MG/1
10 TABLET ORAL ONCE AS NEEDED
Qty: 9 TABLET | Refills: 3 | Status: SHIPPED | OUTPATIENT
Start: 2018-06-07 | End: 2018-09-19 | Stop reason: SDUPTHER

## 2018-06-07 NOTE — TELEPHONE ENCOUNTER
Call patient  Notify her that Rx was sent to pharmacy for Maxalt tablets W  [employer] will not cover disintegrating tablets)

## 2018-06-15 DIAGNOSIS — M62.838 MUSCLE SPASM: Primary | ICD-10-CM

## 2018-06-15 RX ORDER — CYCLOBENZAPRINE HCL 10 MG
TABLET ORAL
Qty: 30 TABLET | Refills: 5 | Status: SHIPPED | OUTPATIENT
Start: 2018-06-15 | End: 2019-01-02 | Stop reason: SDUPTHER

## 2018-07-11 DIAGNOSIS — G43.709 CHRONIC MIGRAINE WITHOUT AURA WITHOUT STATUS MIGRAINOSUS, NOT INTRACTABLE: Primary | ICD-10-CM

## 2018-07-11 RX ORDER — TOPIRAMATE 50 MG/1
TABLET, FILM COATED ORAL
Qty: 60 TABLET | Refills: 6 | Status: SHIPPED | OUTPATIENT
Start: 2018-07-11 | End: 2019-02-14 | Stop reason: SDUPTHER

## 2018-07-16 NOTE — TELEPHONE ENCOUNTER
Is completely out of her lerica and you have to call it in through [employer], the assistant program  Please call her back she has all the information you will need

## 2018-07-17 DIAGNOSIS — F33.2 MDD (MAJOR DEPRESSIVE DISORDER), RECURRENT SEVERE, WITHOUT PSYCHOSIS (HCC): ICD-10-CM

## 2018-07-17 RX ORDER — DULOXETIN HYDROCHLORIDE 60 MG/1
CAPSULE, DELAYED RELEASE ORAL
Qty: 30 CAPSULE | Refills: 2 | Status: SHIPPED | OUTPATIENT
Start: 2018-07-17 | End: 2018-10-09 | Stop reason: SDUPTHER

## 2018-07-17 RX ORDER — DULOXETIN HYDROCHLORIDE 30 MG/1
CAPSULE, DELAYED RELEASE ORAL
Qty: 30 CAPSULE | Refills: 2 | Status: SHIPPED | OUTPATIENT
Start: 2018-07-17 | End: 2018-10-09 | Stop reason: SDUPTHER

## 2018-07-17 NOTE — TELEPHONE ENCOUNTER
I called Sharon Marquez and she states she has been accepted for [employer] Patient Assistance Program but she now needs a refill  She last had filed Lyrica on 5/24/2018  She states she likes how this medication works for her  Has been out of it for 3 weeks  A call needs to be placed at [facility] at: [phone] Option #3  Lyrica- Dose was 75 mg BID     Prescription # [id number]  Patient ID # [id number]

## 2018-07-18 DIAGNOSIS — M79.7 FIBROMYALGIA: Primary | ICD-10-CM

## 2018-07-18 RX ORDER — PREGABALIN 75 MG/1
75 CAPSULE ORAL 2 TIMES DAILY
Qty: 60 CAPSULE | Refills: 3
Start: 2018-07-18 | End: 2018-07-24 | Stop reason: SDUPTHER

## 2018-07-18 NOTE — TELEPHONE ENCOUNTER
I spoke with a representative for [employer]  Requested a printed prescription be faxed to them at [phone]  Include name, patient ID # and birth date on cover letter and at the bottom of the prescription  Will ask Dr Cookie Pepper to print if not done already

## 2018-07-18 NOTE — TELEPHONE ENCOUNTER
I am aware of Stella's call with [facility]  A prescription for Lyrica needs to be called in to them  If you order, I can call it in

## 2018-07-24 DIAGNOSIS — M79.7 FIBROMYALGIA: ICD-10-CM

## 2018-07-24 RX ORDER — PREGABALIN 75 MG/1
75 CAPSULE ORAL 2 TIMES DAILY
Qty: 60 CAPSULE | Refills: 2 | Status: SHIPPED | OUTPATIENT
Start: 2018-07-24 | End: 2018-09-04 | Stop reason: SDUPTHER

## 2018-08-08 ENCOUNTER — OFFICE VISIT (OUTPATIENT)
Dept: BEHAVIORAL/MENTAL HEALTH CLINIC | Facility: CLINIC | Age: 38
End: 2018-08-08
Payer: COMMERCIAL

## 2018-08-08 DIAGNOSIS — F33.2 MAJOR DEPRESSIVE DISORDER, RECURRENT SEVERE WITHOUT PSYCHOTIC FEATURES (HCC): Primary | ICD-10-CM

## 2018-08-08 PROCEDURE — 90791 PSYCH DIAGNOSTIC EVALUATION: CPT | Performed by: SOCIAL WORKER
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
## 2018-08-21 ENCOUNTER — DOCUMENTATION (OUTPATIENT)
Dept: BEHAVIORAL/MENTAL HEALTH CLINIC | Facility: CLINIC | Age: 38
End: 2018-08-21

## 2018-08-21 ENCOUNTER — TELEPHONE (OUTPATIENT)
Dept: PSYCHIATRY | Facility: CLINIC | Age: 38
End: 2018-08-21

## 2018-08-21 ENCOUNTER — OFFICE VISIT (OUTPATIENT)
Dept: BEHAVIORAL/MENTAL HEALTH CLINIC | Facility: CLINIC | Age: 38
End: 2018-08-21
Payer: COMMERCIAL

## 2018-08-21 DIAGNOSIS — F41.1 ANXIETY, GENERALIZED: ICD-10-CM

## 2018-08-21 DIAGNOSIS — F33.1 MAJOR DEPRESSIVE DISORDER, RECURRENT EPISODE, MODERATE DEGREE (HCC): Primary | ICD-10-CM

## 2018-08-21 PROCEDURE — 90834 PSYTX W PT 45 MINUTES: CPT | Performed by: SOCIAL WORKER

## 2018-08-21 NOTE — PSYCH
Psychotherapy Provided: Individual Psychotherapy 45 minutes PHQ 9 = 25; states has not had a med ck appt with Dr Yesenia Sutton since 4/26/18; states her plan to view the Lyrica website regarding  (currently @ 75 mg at 2x/day; reports taking the Lyrica via Dr Yesenia Sutton for approximately two weeks) whether to increase the dosage to three times/day; "My body pain is just so bad " The pain was making me so unlike myself that she asked Nadia Mukherjee to consider Lyrica  states the Lyrica "has helped a lot;" reports her pain level reported today is "mild (at level 7)" compared to the previous week; "It makes me so tired  If it were not for this appointment I would probably still be asleep " states due to having no income "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 " She listed a host of bills/expenses pending within the next month  states was denied by Soc  Sec  Dis  benefits for the first time in 2011 and most recently within the past week; discussed self-esteem as integral to her emotional well-being; A: presents as sad regarding the reported SSD benefits denial yet willing to perservere and reapply; P:(G#1 ) will develop her treatment plan to include addressing self-esteem issues; Length of time in session: 45 minutes, follow up in 2 week    Goals addressed in session: Goal 1     Pain:      moderate to severe    7    Current suicide risk : [employer]: Diagnosis and Treatment Plan explained to Mamie Exon relates understanding diagnosis and is agreeable to Treatment Plan   Yes

## 2018-08-21 NOTE — PROGRESS NOTES
Deidre Calvo  1980       Date of Initial Treatment Plan: 8/21/18   Date of Current Treatment Plan: 08/21/18    Treatment Plan Number1    Strengths/Personal Resources for Self Care: "witty, smart, funny, creative, caring, resourceful;"    Diagnosis:   F41 1, F33 1    Area of Needs:depression; anxiety; chronic pain; Long Term Goal 1: AI want to continue to work on effectively managing the depression  Target Date:12/21/18  Completion Date: n/a         Short Term Objectives for Goal 1: AI want to identify/review the depression/neg self-talk mesages  B  I will identify review the depression-recuing positive/realistic self-talk messages  C  I will continue to work with Dr Gretchen Whelan regarding meidcation management  D  I will continue to make sure I am taking care of myself  Target Date: 12/21/18  Completion Date: n/a    Long Term Goal 2: I want to continue to effectively manage the anxiety  Target Date: 12/21/18  Completion Date: N/A    Short Term Objectives for Goal 2: AA  I will identify the anxiety self-talk messages (ex , expecting to predict the future)  B  I will identify anxiety-reducing pos  realistic self-talk messages (ex , focus on what I know so far)  C  I want to remain of what can set off (ex , "My dad ")the anxiety  C  I will continue to make sure I am taking of myself (ex , praying)  D  I will continue with medication maangement  Target Date: 12/2/118  Completion Date: n/a         Long Term Goal # 3: I want to continue to work on strengthening my self-esteem  Target Date: 12/21/18  Completion Date: N/A    Short Term Objectives for Goal 3: AI will identify those who have impacted me negatively and those, positively  B  I will identify my strengths and what I like about myself  C  I will continue with self-care     Target Date: 12/21/18  Completion Date: n/a    GOAL 1: Modality: Individual 2x per month   Completion Date n/a The person responsible for carrying out the plan is Esther    GOAL 2: Modality: Individual 2x per month   Completion Date n/a and The person(s) responsible for carrying out the plan is  Esther     GOAL 3: Modality: Individual 2x per month   Completion Date n/a and The person(s) responsible for carrying out the plan is  Nuzhat 24: Diagnosis and Treatment Plan explained to Sacha Dante relates understanding diagnosis and is agreeable to Treatment Plan  yes    Client Comments : Please share your thoughts, feelings, need and/or experiences regarding your treatment plan:       __________________________________________________________________    __________________________________________________________________    __________________________________________________________________    __________________________________________________________________    _______________________________________                Patient signature, Date Time: __________________________________________             Physician cosigner signature, Date, Time: ________________________________

## 2018-08-21 NOTE — TELEPHONE ENCOUNTER
Pt was wondering if it would be possible to increase her current dosage on Lyrica Rx  Pt is aware you are out of office

## 2018-09-04 DIAGNOSIS — M79.7 FIBROMYALGIA: ICD-10-CM

## 2018-09-04 RX ORDER — PREGABALIN 100 MG/1
100 CAPSULE ORAL 2 TIMES DAILY
Qty: 60 CAPSULE | Refills: 2 | Status: SHIPPED | OUTPATIENT
Start: 2018-09-04 | End: 2018-09-05 | Stop reason: SDUPTHER

## 2018-09-05 DIAGNOSIS — M79.7 FIBROMYALGIA: ICD-10-CM

## 2018-09-05 RX ORDER — PREGABALIN 100 MG/1
100 CAPSULE ORAL 2 TIMES DAILY
Qty: 60 CAPSULE | Refills: 2 | Status: SHIPPED | OUTPATIENT
Start: 2018-09-05 | End: 2018-09-11 | Stop reason: SDUPTHER

## 2018-09-10 ENCOUNTER — TELEPHONE (OUTPATIENT)
Dept: PSYCHIATRY | Facility: CLINIC | Age: 38
End: 2018-09-10

## 2018-09-10 NOTE — TELEPHONE ENCOUNTER
Message left :  pripr auth needed for Lyrica 100mg, BID  Insuarance:  Skip Hop, phone [phone]  ID #[id number]

## 2018-09-11 DIAGNOSIS — M79.7 FIBROMYALGIA: ICD-10-CM

## 2018-09-11 RX ORDER — PREGABALIN 100 MG/1
100 CAPSULE ORAL 2 TIMES DAILY
Qty: 60 CAPSULE | Refills: 0 | Status: SHIPPED | OUTPATIENT
Start: 2018-09-11 | End: 2018-10-09 | Stop reason: SDUPTHER

## 2018-09-11 NOTE — TELEPHONE ENCOUNTER
I spoke with Ashlee Melton  Lyrica prescription for 100mg BID was sent to retail (which would need prior auth), but Ashlee Melton is still participating in [facility] Patient Assistance Program  I reviewed renewal in July with refills  Ashlee Melton said she called Leonel Donohue, but was told she had not refills and did not call the office, thinking she had to wait until her appointment to talk with Dr Barbara Babb  I gave Ashlee Melton my number and I will call her when prescription faxed so she can follow up with P  A P to receive medication

## 2018-09-11 NOTE — TELEPHONE ENCOUNTER
On 07/24/18, printed script for Lyrica 75mg BID, #60 for 30 days, 2 RF was faxed to [employer] as [facility] was participating in their Patient Assistance program  Will ask Dr Carmelo Pollard to review  If still participating, would need to fax the printed script as was done in July

## 2018-09-14 ENCOUNTER — OFFICE VISIT (OUTPATIENT)
Dept: FAMILY MEDICINE CLINIC | Facility: CLINIC | Age: 38
End: 2018-09-14
Payer: COMMERCIAL

## 2018-09-14 VITALS
HEART RATE: 80 BPM | WEIGHT: 164 LBS | BODY MASS INDEX: 32.2 KG/M2 | DIASTOLIC BLOOD PRESSURE: 80 MMHG | RESPIRATION RATE: 12 BRPM | HEIGHT: 60 IN | SYSTOLIC BLOOD PRESSURE: 122 MMHG | TEMPERATURE: 98.8 F

## 2018-09-14 DIAGNOSIS — G43.009 MIGRAINE WITHOUT AURA AND WITHOUT STATUS MIGRAINOSUS, NOT INTRACTABLE: Primary | ICD-10-CM

## 2018-09-14 PROCEDURE — 99213 OFFICE O/P EST LOW 20 MIN: CPT | Performed by: NURSE PRACTITIONER

## 2018-09-14 PROCEDURE — 3008F BODY MASS INDEX DOCD: CPT | Performed by: NURSE PRACTITIONER

## 2018-09-14 RX ORDER — KETOROLAC TROMETHAMINE 30 MG/ML
30 INJECTION, SOLUTION INTRAMUSCULAR; INTRAVENOUS ONCE
Status: COMPLETED | OUTPATIENT
Start: 2018-09-14 | End: 2018-09-14

## 2018-09-14 RX ADMIN — KETOROLAC TROMETHAMINE 30 MG: 30 INJECTION, SOLUTION INTRAMUSCULAR; INTRAVENOUS at 15:32

## 2018-09-14 NOTE — PROGRESS NOTES
Chief Complaint   Patient presents with    Migraine     patient states she has has a migraine come and go for the last 4 days     Assessment/Plan:    Migraine without aura and without status migrainosus, not intractable  Toradol 30 mg IM injection administered in the office today  Continue Topamax 50 mg QD via Neurology   Has Maxalt for prn use  Rest in a cool, dark room  Stay well hydrated, ensure adequate sleep  Keep a HA diary  Schedule a follow up appointment with Neurology        Diagnoses and all orders for this visit:    Migraine without aura and without status migrainosus, not intractable  -     ketorolac (TORADOL) injection 30 mg; Inject 1 mL (30 mg total) into a muscle once           Subjective:      Patient ID: Diane Oliva is a 34 y o  female  HPI   Pt presents by herself today for an acute visit  C/O a migraine on and off for the past 4 days   Associated symptoms include nausea but no vomiting  Mild dizziness  No photo/phonophobia  No weakness, numbness/ tingling   No change in vision, tinnitus    No change to her routine (diet, exercise or sleep)  Not menstruating   Does report a common trigger for her is seasonal changes     Pt was following with Neurology for migraines, last seen over a year ago (is overdue for a visit)  She is taking Topamax 50 mg QD for her migraines via Dr Clarissa Stanford  Also has Maxalt for prn use   Maxalt typically helps relieve a migraine but is not currently helping    Pt follows with Rheumatology for Fibromyalgia ([facility])  Currently taking Cymbalta, Lyrica    The following portions of the patient's history were reviewed and updated as appropriate: allergies, current medications, past medical history, past social history and problem list     Review of Systems   Constitutional: Positive for fatigue  Negative for activity change, appetite change, chills, diaphoresis, fever and unexpected weight change     HENT: Negative for congestion, ear discharge, ear pain, sinus pressure, sneezing, tinnitus, trouble swallowing and voice change  Eyes: Negative for photophobia, pain, discharge, redness, itching and visual disturbance  Respiratory: Negative for cough, chest tightness, shortness of breath and wheezing  Cardiovascular: Negative for chest pain, palpitations and leg swelling  Gastrointestinal: Positive for nausea  Negative for abdominal pain, blood in stool, constipation, diarrhea and vomiting  Genitourinary: Negative for dysuria  Musculoskeletal: Negative for arthralgias and myalgias  Skin: Negative for wound  Neurological: Positive for dizziness and headaches  Negative for tremors, seizures, syncope, facial asymmetry, speech difficulty, weakness, light-headedness and numbness  Hematological: Negative for adenopathy  Objective:      /80   Pulse 80   Temp 98 8 °F (37 1 °C) (Tympanic)   Resp 12   Ht 5' (1 524 m)   Wt 74 4 kg (164 lb)   LMP 08/24/2018   BMI 32 03 kg/m²          Physical Exam   Constitutional: She is oriented to person, place, and time  She appears well-developed and well-nourished  No distress  HENT:   Head: Normocephalic and atraumatic  Eyes: Conjunctivae are normal  Pupils are equal, round, and reactive to light  Neck: Normal range of motion  Neck supple  Cardiovascular: Normal rate, regular rhythm and normal heart sounds  No murmur heard  Pulmonary/Chest: Effort normal and breath sounds normal  No respiratory distress  She has no wheezes  Abdominal: Soft  Bowel sounds are normal  She exhibits no distension  There is no tenderness  Musculoskeletal: Normal range of motion  Lymphadenopathy:     She has no cervical adenopathy  Neurological: She is alert and oriented to person, place, and time  She has normal reflexes  No cranial nerve deficit  Coordination normal    Skin: Skin is warm and dry  No rash noted  She is not diaphoretic  No erythema  Psychiatric: She has a normal mood and affect

## 2018-09-14 NOTE — ASSESSMENT & PLAN NOTE
Toradol 30 mg IM injection administered in the office today  Continue Topamax 50 mg QD via Neurology   Has Maxalt for prn use  Rest in a cool, dark room  Stay well hydrated, ensure adequate sleep  Keep a HA diary  Schedule a follow up appointment with Neurology

## 2018-09-19 DIAGNOSIS — G43.709 CHRONIC MIGRAINE WITHOUT AURA WITHOUT STATUS MIGRAINOSUS, NOT INTRACTABLE: ICD-10-CM

## 2018-09-19 RX ORDER — RIZATRIPTAN BENZOATE 10 MG/1
10 TABLET ORAL ONCE AS NEEDED
Qty: 9 TABLET | Refills: 0 | Status: SHIPPED | OUTPATIENT
Start: 2018-09-19 | End: 2018-12-24 | Stop reason: SDUPTHER

## 2018-09-19 NOTE — TELEPHONE ENCOUNTER
Attempted to fax printed prescription for Lyrica multiple times  Successfully faxed today from alternate office  I spoke with [facility] and reviewed info  She has my number and will call with any concerns

## 2018-09-27 ENCOUNTER — TELEPHONE (OUTPATIENT)
Dept: NEUROLOGY | Facility: CLINIC | Age: 38
End: 2018-09-27

## 2018-10-01 DIAGNOSIS — F33.2 MDD (MAJOR DEPRESSIVE DISORDER), RECURRENT SEVERE, WITHOUT PSYCHOSIS (HCC): ICD-10-CM

## 2018-10-01 RX ORDER — LORAZEPAM 1 MG/1
1 TABLET ORAL EVERY 6 HOURS
Qty: 120 TABLET | Refills: 0 | Status: SHIPPED | OUTPATIENT
Start: 2018-10-01 | End: 2018-11-07 | Stop reason: SDUPTHER

## 2018-10-05 ENCOUNTER — DOCUMENTATION (OUTPATIENT)
Dept: PSYCHIATRY | Facility: CLINIC | Age: 38
End: 2018-10-05

## 2018-10-08 ENCOUNTER — OFFICE VISIT (OUTPATIENT)
Dept: BEHAVIORAL/MENTAL HEALTH CLINIC | Facility: CLINIC | Age: 38
End: 2018-10-08
Payer: COMMERCIAL

## 2018-10-08 ENCOUNTER — TELEPHONE (OUTPATIENT)
Dept: BEHAVIORAL/MENTAL HEALTH CLINIC | Facility: CLINIC | Age: 38
End: 2018-10-08

## 2018-10-08 DIAGNOSIS — F41.1 ANXIETY, GENERALIZED: ICD-10-CM

## 2018-10-08 DIAGNOSIS — F33.1 MAJOR DEPRESSIVE DISORDER, RECURRENT EPISODE, MODERATE (HCC): Primary | ICD-10-CM

## 2018-10-08 PROCEDURE — 90834 PSYTX W PT 45 MINUTES: CPT | Performed by: SOCIAL WORKER
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
## 2018-10-09 DIAGNOSIS — M79.7 FIBROMYALGIA: ICD-10-CM

## 2018-10-09 DIAGNOSIS — F33.2 MDD (MAJOR DEPRESSIVE DISORDER), RECURRENT SEVERE, WITHOUT PSYCHOSIS (HCC): ICD-10-CM

## 2018-10-09 RX ORDER — DULOXETIN HYDROCHLORIDE 60 MG/1
CAPSULE, DELAYED RELEASE ORAL
Qty: 30 CAPSULE | Refills: 2 | Status: SHIPPED | OUTPATIENT
Start: 2018-10-09 | End: 2019-01-06 | Stop reason: SDUPTHER

## 2018-10-09 RX ORDER — DULOXETIN HYDROCHLORIDE 30 MG/1
CAPSULE, DELAYED RELEASE ORAL
Qty: 30 CAPSULE | Refills: 2 | Status: SHIPPED | OUTPATIENT
Start: 2018-10-09 | End: 2019-01-06 | Stop reason: SDUPTHER

## 2018-10-09 RX ORDER — PREGABALIN 100 MG/1
100 CAPSULE ORAL 2 TIMES DAILY
Qty: 60 CAPSULE | Refills: 2 | Status: SHIPPED | OUTPATIENT
Start: 2018-10-09 | End: 2018-11-12 | Stop reason: SDUPTHER

## 2018-10-18 ENCOUNTER — TELEPHONE (OUTPATIENT)
Dept: FAMILY MEDICINE CLINIC | Facility: CLINIC | Age: 38
End: 2018-10-18

## 2018-10-18 DIAGNOSIS — G43.009 MIGRAINE WITHOUT AURA AND WITHOUT STATUS MIGRAINOSUS, NOT INTRACTABLE: Primary | ICD-10-CM

## 2018-10-18 NOTE — TELEPHONE ENCOUNTER
Patient has an appointment at [address] [place] [address] Neurology 10/25/18 for migraines and needs an order placed in [facility]

## 2018-10-23 ENCOUNTER — OFFICE VISIT (OUTPATIENT)
Dept: BEHAVIORAL/MENTAL HEALTH CLINIC | Facility: CLINIC | Age: 38
End: 2018-10-23
Payer: COMMERCIAL

## 2018-10-23 DIAGNOSIS — F33.2 MAJOR DEPRESSIVE DISORDER, RECURRENT SEVERE WITHOUT PSYCHOTIC FEATURES (HCC): ICD-10-CM

## 2018-10-23 DIAGNOSIS — F33.1 MAJOR DEPRESSIVE DISORDER, RECURRENT EPISODE, MODERATE (HCC): Primary | ICD-10-CM

## 2018-10-23 DIAGNOSIS — F41.1 ANXIETY, GENERALIZED: ICD-10-CM

## 2018-10-23 PROCEDURE — 90834 PSYTX W PT 45 MINUTES: CPT | Performed by: SOCIAL WORKER

## 2018-10-24 ENCOUNTER — OFFICE VISIT (OUTPATIENT)
Dept: FAMILY MEDICINE CLINIC | Facility: CLINIC | Age: 38
End: 2018-10-24
Payer: COMMERCIAL

## 2018-10-24 VITALS
HEIGHT: 60 IN | DIASTOLIC BLOOD PRESSURE: 68 MMHG | WEIGHT: 166 LBS | OXYGEN SATURATION: 99 % | BODY MASS INDEX: 32.59 KG/M2 | HEART RATE: 90 BPM | SYSTOLIC BLOOD PRESSURE: 108 MMHG | TEMPERATURE: 98.4 F | RESPIRATION RATE: 12 BRPM

## 2018-10-24 DIAGNOSIS — R10.13 DYSPEPSIA: Primary | ICD-10-CM

## 2018-10-24 DIAGNOSIS — R19.7 DIARRHEA OF PRESUMED INFECTIOUS ORIGIN: ICD-10-CM

## 2018-10-24 PROBLEM — B37.3 VAGINAL YEAST INFECTION: Status: RESOLVED | Noted: 2017-06-14 | Resolved: 2018-10-24

## 2018-10-24 PROBLEM — B37.31 VAGINAL YEAST INFECTION: Status: RESOLVED | Noted: 2017-06-14 | Resolved: 2018-10-24

## 2018-10-24 PROCEDURE — 99214 OFFICE O/P EST MOD 30 MIN: CPT | Performed by: FAMILY MEDICINE

## 2018-10-24 RX ORDER — SACCHAROMYCES BOULARDII 250 MG
250 CAPSULE ORAL 2 TIMES DAILY
Qty: 20 CAPSULE | Refills: 1 | Status: SHIPPED | OUTPATIENT
Start: 2018-10-24 | End: 2019-06-04 | Stop reason: ALTCHOICE

## 2018-10-24 RX ORDER — METRONIDAZOLE 500 MG/1
500 TABLET ORAL EVERY 12 HOURS SCHEDULED
Qty: 14 TABLET | Refills: 0 | Status: SHIPPED | OUTPATIENT
Start: 2018-10-24 | End: 2018-10-31

## 2018-10-24 RX ORDER — ONDANSETRON 4 MG/1
4 TABLET, FILM COATED ORAL EVERY 8 HOURS PRN
Qty: 30 TABLET | Refills: 0 | Status: SHIPPED | OUTPATIENT
Start: 2018-10-24 | End: 2018-12-09 | Stop reason: SDUPTHER

## 2018-10-24 NOTE — ASSESSMENT & PLAN NOTE
Will start probiotic  Prescription given for Zofran 4 mg to take 1 tablet every 8 h r as needed for nausea      Consider gastroenterology evaluation if symptoms persist

## 2018-10-24 NOTE — ASSESSMENT & PLAN NOTE
Will check stool culture, stool for Giardia  Recommended to increase fluid intake, avoid fried, fatty foods  Will start Flagyl 500 mg 1 tablet twice daily after she submits stool samples to the lab  Take  Florastor 250 mg 1 capsule twice daily  Check CBC with dif, CMP

## 2018-10-24 NOTE — PROGRESS NOTES
Chief Complaint   Patient presents with    Nausea    Diarrhea     x 10 days     Health Maintenance   Topic Date Due    Pneumococcal PPSV23 Medium Risk Adult (1 of 1 - PPSV23) 01/06/1997    INFLUENZA VACCINE  07/01/2018    DTaP,Tdap,and Td Vaccines (2 - Td) 09/29/2019     Assessment/Plan:    Diarrhea of presumed infectious origin  Will check stool culture, stool for Giardia  Recommended to increase fluid intake, avoid fried, fatty foods  Will start Flagyl 500 mg 1 tablet twice daily after she submits stool samples to the lab  Take  Florastor 250 mg 1 capsule twice daily  Check CBC with dif, CMP  Dyspepsia  Will start probiotic  Prescription given for Zofran 4 mg to take 1 tablet every 8 h r as needed for nausea  Consider gastroenterology evaluation if symptoms persist     Schedule follow-up visit in 2 weeks  Diagnoses and all orders for this visit:    Dyspepsia  -     saccharomyces boulardii (FLORASTOR) 250 mg capsule; Take 1 capsule (250 mg total) by mouth 2 (two) times a day  -     Comprehensive metabolic panel; Future  -     ondansetron (ZOFRAN) 4 mg tablet; Take 1 tablet (4 mg total) by mouth every 8 (eight) hours as needed for nausea or vomiting    Diarrhea of presumed infectious origin  -     Stool Enteric Bacterial Panel by PCR; Future  -     Giardia antigen; Future  -     metroNIDAZOLE (FLAGYL) 500 mg tablet; Take 1 tablet (500 mg total) by mouth every 12 (twelve) hours for 7 days  -     saccharomyces boulardii (FLORASTOR) 250 mg capsule; Take 1 capsule (250 mg total) by mouth 2 (two) times a day  -     CBC and differential; Future  -     Comprehensive metabolic panel; Future          Subjective:      Patient ID: Siena Tracy is a 34 y o  female  HPI     Patient presents to the office c/o nausea, decreased appetite, diarrheal stools with yellow mucus, abdominal cramps for the last 10 days  Denies vomiting  C/o low grade fever  No blood in stool      Patient states that her symptoms developed after she ate a heavy meal and drank  flavored tea  Denies recent travel  No recent antibiotic use  No recent changes in medications  Denies tobacco use  No Prior H/o IBS  The following portions of the patient's history were reviewed and updated as appropriate: allergies, past family history, past medical history, past social history, past surgical history and problem list     Review of Systems   Constitutional: Positive for appetite change (decreased), fatigue and fever (low grade )  Negative for chills  HENT: Negative for congestion, ear pain, sore throat and trouble swallowing  Eyes: Negative for pain, discharge, redness, itching and visual disturbance  Respiratory: Negative for cough, chest tightness, shortness of breath and wheezing  Cardiovascular: Negative for chest pain, palpitations and leg swelling  Gastrointestinal:        See HPI   Genitourinary: Negative for difficulty urinating, dysuria, flank pain, frequency and hematuria  Musculoskeletal: Positive for arthralgias and myalgias  Negative for joint swelling  Skin: Negative for rash and wound  Neurological: Negative for dizziness, syncope and headaches  Hematological: Negative  Objective:      /68 (BP Location: Left arm, Patient Position: Sitting, Cuff Size: Adult)   Pulse 90   Temp 98 4 °F (36 9 °C) (Oral)   Resp 12   Ht 5' (1 524 m)   Wt 75 3 kg (166 lb)   SpO2 99%   BMI 32 42 kg/m²        Physical Exam   Constitutional: She appears well-developed and well-nourished  No distress  Obese   HENT:   Head: Normocephalic and atraumatic  Right Ear: External ear normal    Left Ear: External ear normal    Mouth/Throat: Oropharynx is clear and moist    Eyes: Pupils are equal, round, and reactive to light  Conjunctivae are normal    Cardiovascular: Normal rate, regular rhythm and normal heart sounds  No murmur heard    No BL LE edema   Pulmonary/Chest: Effort normal and breath sounds normal  She has no wheezes  She has no rales  Abdominal: Soft  Bowel sounds are normal  There is no tenderness  Musculoskeletal: Normal range of motion  She exhibits no edema, tenderness or deformity  Skin: Skin is warm and dry  Acne on face   Psychiatric: She has a normal mood and affect  Nursing note and vitals reviewed

## 2018-10-26 ENCOUNTER — APPOINTMENT (OUTPATIENT)
Dept: LAB | Facility: HOSPITAL | Age: 38
End: 2018-10-26
Payer: COMMERCIAL

## 2018-10-26 DIAGNOSIS — R10.13 DYSPEPSIA: ICD-10-CM

## 2018-10-26 DIAGNOSIS — R19.7 DIARRHEA OF PRESUMED INFECTIOUS ORIGIN: ICD-10-CM

## 2018-10-26 LAB
ALBUMIN SERPL BCP-MCNC: 3.9 G/DL (ref 3.5–5)
ALP SERPL-CCNC: 45 U/L (ref 46–116)
ALT SERPL W P-5'-P-CCNC: 24 U/L (ref 12–78)
ANION GAP SERPL CALCULATED.3IONS-SCNC: 8 MMOL/L (ref 4–13)
AST SERPL W P-5'-P-CCNC: 12 U/L (ref 5–45)
BASOPHILS # BLD AUTO: 0.02 THOUSANDS/ΜL (ref 0–0.1)
BASOPHILS NFR BLD AUTO: 0 % (ref 0–1)
BILIRUB SERPL-MCNC: 0.49 MG/DL (ref 0.2–1)
BUN SERPL-MCNC: 11 MG/DL (ref 5–25)
CALCIUM SERPL-MCNC: 8.5 MG/DL (ref 8.3–10.1)
CHLORIDE SERPL-SCNC: 112 MMOL/L (ref 100–108)
CO2 SERPL-SCNC: 20 MMOL/L (ref 21–32)
CREAT SERPL-MCNC: 1.02 MG/DL (ref 0.6–1.3)
EOSINOPHIL # BLD AUTO: 0.02 THOUSAND/ΜL (ref 0–0.61)
EOSINOPHIL NFR BLD AUTO: 0 % (ref 0–6)
ERYTHROCYTE [DISTWIDTH] IN BLOOD BY AUTOMATED COUNT: 13.2 % (ref 11.6–15.1)
GFR SERPL CREATININE-BSD FRML MDRD: 69 ML/MIN/1.73SQ M
GLUCOSE P FAST SERPL-MCNC: 85 MG/DL (ref 65–99)
HCT VFR BLD AUTO: 40.4 % (ref 34.8–46.1)
HGB BLD-MCNC: 13.1 G/DL (ref 11.5–15.4)
IMM GRANULOCYTES # BLD AUTO: 0.02 THOUSAND/UL (ref 0–0.2)
IMM GRANULOCYTES NFR BLD AUTO: 0 % (ref 0–2)
LYMPHOCYTES # BLD AUTO: 2.22 THOUSANDS/ΜL (ref 0.6–4.47)
LYMPHOCYTES NFR BLD AUTO: 31 % (ref 14–44)
MCH RBC QN AUTO: 29.9 PG (ref 26.8–34.3)
MCHC RBC AUTO-ENTMCNC: 32.4 G/DL (ref 31.4–37.4)
MCV RBC AUTO: 92 FL (ref 82–98)
MONOCYTES # BLD AUTO: 0.33 THOUSAND/ΜL (ref 0.17–1.22)
MONOCYTES NFR BLD AUTO: 5 % (ref 4–12)
NEUTROPHILS # BLD AUTO: 4.65 THOUSANDS/ΜL (ref 1.85–7.62)
NEUTS SEG NFR BLD AUTO: 64 % (ref 43–75)
NRBC BLD AUTO-RTO: 0 /100 WBCS
PLATELET # BLD AUTO: 225 THOUSANDS/UL (ref 149–390)
PMV BLD AUTO: 9.6 FL (ref 8.9–12.7)
POTASSIUM SERPL-SCNC: 3.7 MMOL/L (ref 3.5–5.3)
PROT SERPL-MCNC: 7.4 G/DL (ref 6.4–8.2)
RBC # BLD AUTO: 4.38 MILLION/UL (ref 3.81–5.12)
SODIUM SERPL-SCNC: 140 MMOL/L (ref 136–145)
WBC # BLD AUTO: 7.26 THOUSAND/UL (ref 4.31–10.16)

## 2018-10-26 PROCEDURE — 36415 COLL VENOUS BLD VENIPUNCTURE: CPT

## 2018-10-26 PROCEDURE — 85025 COMPLETE CBC W/AUTO DIFF WBC: CPT

## 2018-10-26 PROCEDURE — 80053 COMPREHEN METABOLIC PANEL: CPT

## 2018-10-30 ENCOUNTER — TELEPHONE (OUTPATIENT)
Dept: FAMILY MEDICINE CLINIC | Facility: CLINIC | Age: 38
End: 2018-10-30

## 2018-10-30 ENCOUNTER — HOSPITAL ENCOUNTER (EMERGENCY)
Facility: HOSPITAL | Age: 38
Discharge: HOME | End: 2018-10-30
Attending: EMERGENCY MEDICINE
Payer: COMMERCIAL

## 2018-10-30 VITALS
HEART RATE: 68 BPM | BODY MASS INDEX: 32.59 KG/M2 | SYSTOLIC BLOOD PRESSURE: 103 MMHG | WEIGHT: 166.01 LBS | HEIGHT: 60 IN | RESPIRATION RATE: 18 BRPM | OXYGEN SATURATION: 98 % | DIASTOLIC BLOOD PRESSURE: 65 MMHG | TEMPERATURE: 98.7 F

## 2018-10-30 DIAGNOSIS — M79.10 MYALGIA: Primary | ICD-10-CM

## 2018-10-30 LAB
ANION GAP SERPL CALCULATED.3IONS-SCNC: 7 MMOL/L (ref 4–13)
BASOPHILS # BLD AUTO: 0.02 THOUSANDS/ΜL (ref 0–0.1)
BASOPHILS NFR BLD AUTO: 0 % (ref 0–1)
BUN SERPL-MCNC: 10 MG/DL (ref 5–25)
CALCIUM SERPL-MCNC: 8.4 MG/DL (ref 8.3–10.1)
CHLORIDE SERPL-SCNC: 108 MMOL/L (ref 100–108)
CO2 SERPL-SCNC: 21 MMOL/L (ref 21–32)
CREAT SERPL-MCNC: 1.04 MG/DL (ref 0.6–1.3)
CRP SERPL HS-MCNC: 5.03 MG/L
EOSINOPHIL # BLD AUTO: 0.01 THOUSAND/ΜL (ref 0–0.61)
EOSINOPHIL NFR BLD AUTO: 0 % (ref 0–6)
ERYTHROCYTE [DISTWIDTH] IN BLOOD BY AUTOMATED COUNT: 13.2 % (ref 11.6–15.1)
GFR SERPL CREATININE-BSD FRML MDRD: 67 ML/MIN/1.73SQ M
GLUCOSE SERPL-MCNC: 70 MG/DL (ref 65–140)
HCT VFR BLD AUTO: 38.1 % (ref 34.8–46.1)
HGB BLD-MCNC: 12.7 G/DL (ref 11.5–15.4)
IMM GRANULOCYTES # BLD AUTO: 0.02 THOUSAND/UL (ref 0–0.2)
IMM GRANULOCYTES NFR BLD AUTO: 0 % (ref 0–2)
LYMPHOCYTES # BLD AUTO: 2.06 THOUSANDS/ΜL (ref 0.6–4.47)
LYMPHOCYTES NFR BLD AUTO: 25 % (ref 14–44)
MCH RBC QN AUTO: 30.1 PG (ref 26.8–34.3)
MCHC RBC AUTO-ENTMCNC: 33.3 G/DL (ref 31.4–37.4)
MCV RBC AUTO: 90 FL (ref 82–98)
MONOCYTES # BLD AUTO: 0.42 THOUSAND/ΜL (ref 0.17–1.22)
MONOCYTES NFR BLD AUTO: 5 % (ref 4–12)
NEUTROPHILS # BLD AUTO: 5.59 THOUSANDS/ΜL (ref 1.85–7.62)
NEUTS SEG NFR BLD AUTO: 70 % (ref 43–75)
NRBC BLD AUTO-RTO: 0 /100 WBCS
PLATELET # BLD AUTO: 176 THOUSANDS/UL (ref 149–390)
PMV BLD AUTO: 9.8 FL (ref 8.9–12.7)
POTASSIUM SERPL-SCNC: 3.4 MMOL/L (ref 3.5–5.3)
RBC # BLD AUTO: 4.22 MILLION/UL (ref 3.81–5.12)
SODIUM SERPL-SCNC: 136 MMOL/L (ref 136–145)
WBC # BLD AUTO: 8.12 THOUSAND/UL (ref 4.31–10.16)

## 2018-10-30 PROCEDURE — 99283 EMERGENCY DEPT VISIT LOW MDM: CPT

## 2018-10-30 PROCEDURE — 86141 C-REACTIVE PROTEIN HS: CPT | Performed by: EMERGENCY MEDICINE

## 2018-10-30 PROCEDURE — 96374 THER/PROPH/DIAG INJ IV PUSH: CPT

## 2018-10-30 PROCEDURE — 36415 COLL VENOUS BLD VENIPUNCTURE: CPT | Performed by: EMERGENCY MEDICINE

## 2018-10-30 PROCEDURE — 80048 BASIC METABOLIC PNL TOTAL CA: CPT | Performed by: EMERGENCY MEDICINE

## 2018-10-30 PROCEDURE — 96361 HYDRATE IV INFUSION ADD-ON: CPT

## 2018-10-30 PROCEDURE — 96375 TX/PRO/DX INJ NEW DRUG ADDON: CPT

## 2018-10-30 PROCEDURE — 85025 COMPLETE CBC W/AUTO DIFF WBC: CPT | Performed by: EMERGENCY MEDICINE

## 2018-10-30 RX ORDER — KETOROLAC TROMETHAMINE 30 MG/ML
15 INJECTION, SOLUTION INTRAMUSCULAR; INTRAVENOUS ONCE
Status: COMPLETED | OUTPATIENT
Start: 2018-10-30 | End: 2018-10-30

## 2018-10-30 RX ORDER — ONDANSETRON 2 MG/ML
4 INJECTION INTRAMUSCULAR; INTRAVENOUS ONCE
Status: COMPLETED | OUTPATIENT
Start: 2018-10-30 | End: 2018-10-30

## 2018-10-30 RX ORDER — PYRIDOXINE HCL (VITAMIN B6) 100 MG
100 TABLET ORAL DAILY
COMMUNITY
End: 2019-01-04

## 2018-10-30 RX ORDER — KETOROLAC TROMETHAMINE 30 MG/ML
15 INJECTION, SOLUTION INTRAMUSCULAR; INTRAVENOUS ONCE
Status: DISCONTINUED | OUTPATIENT
Start: 2018-10-30 | End: 2018-10-30

## 2018-10-30 RX ORDER — ONDANSETRON HYDROCHLORIDE 4 MG/5ML
4 SOLUTION ORAL ONCE
Status: DISCONTINUED | OUTPATIENT
Start: 2018-10-30 | End: 2018-10-30

## 2018-10-30 RX ADMIN — ONDANSETRON 4 MG: 2 INJECTION INTRAMUSCULAR; INTRAVENOUS at 18:28

## 2018-10-30 RX ADMIN — SODIUM CHLORIDE 1000 ML: 0.9 INJECTION, SOLUTION INTRAVENOUS at 18:28

## 2018-10-30 RX ADMIN — KETOROLAC TROMETHAMINE 15 MG: 30 INJECTION, SOLUTION INTRAMUSCULAR at 18:28

## 2018-10-30 NOTE — ED PROVIDER NOTES
History  Chief Complaint   Patient presents with    Generalized Body Aches     Pt stated seen family physician last week and put on rice and jello diet  Pt c/o generalized body aches and loss of appeitte     HPI   Patient is 42-year-old female past medical history fibromyalgia, anxiety, depression presenting with 2 days of generalized body pain, myalgia  Patient states that she developed abdominal pain nausea, loose bowel movements roughly 2 weeks ago, evaluated in her primary care provider's office 1 week ago for this complaint, placed on rice and Jell-O diet, started on metronidazole for presumed infectious diarrhea  Patient states that she has not had a bowel movement since that time  Additionally, patient states that around the time of evaluation she had brief nonproductive cough, subjective fevers  Patient states that she woke up yesterday morning with a generalized severe body aches from the tips of her toes to the top of her head   Patient states that this pain is unlike the pain that she has previously had with her fibromyalgia  At this time, patient states that she has subjective fever and chills but denies cough, sputum production, chest pain, abdominal pain, abdominal distention  Patient states that she is has been passing flatus  Prior to Admission Medications   Prescriptions Last Dose Informant Patient Reported? Taking?    Cholecalciferol (VITAMIN D3) 2000 units capsule 10/30/2018 at Unknown time Self Yes Yes   Sig: Take 1 tablet by mouth 2 (two) times a day   DULoxetine (CYMBALTA) 30 mg delayed release capsule 10/30/2018 at Unknown time Self No Yes   Sig: TAKE 1 CAPSULE ONCE IN THE MORNING   DULoxetine (CYMBALTA) 60 mg delayed release capsule 10/30/2018 at Unknown time Self No Yes   Sig: TAKE ONE CAPSULE BY MOUTH EVERY DAY   LORazepam (ATIVAN) 1 mg tablet 10/30/2018 at Unknown time Self No Yes   Sig: Take 1 tablet (1 mg total) by mouth every 6 (six) hours   cyclobenzaprine (FLEXERIL) 10 mg tablet 10/30/2018 at Unknown time Self No Yes   Sig: TAKE 1 TABLET AT BEDTIME AS NEEDED FOR MUSCLE SPASMS   metroNIDAZOLE (FLAGYL) 500 mg tablet 10/30/2018 at Unknown time  No Yes   Sig: Take 1 tablet (500 mg total) by mouth every 12 (twelve) hours for 7 days   ondansetron (ZOFRAN) 4 mg tablet 10/30/2018 at Unknown time  No Yes   Sig: Take 1 tablet (4 mg total) by mouth every 8 (eight) hours as needed for nausea or vomiting   pregabalin (LYRICA) 100 mg capsule 10/30/2018 at Unknown time Self No Yes   Sig: Take 1 capsule (100 mg total) by mouth 2 (two) times a day   pyridoxine (B-6) 100 MG tablet 10/30/2018 at Unknown time  Yes Yes   Sig: Take 100 mg by mouth daily   rizatriptan (MAXALT) 10 MG tablet Past Month at Unknown time Self No Yes   Sig: Take 1 tablet (10 mg total) by mouth once as needed for migraine for up to 1 dose May repeat in 2 hours if needed   saccharomyces boulardii (FLORASTOR) 250 mg capsule 10/30/2018 at Unknown time  No Yes   Sig: Take 1 capsule (250 mg total) by mouth 2 (two) times a day   topiramate (TOPAMAX) 50 MG tablet 10/29/2018 at Unknown time Self No Yes   Sig: TAKE 2 TABLETS BY MOUTH AT BEDTIME   traZODone (DESYREL) 100 mg tablet 10/29/2018 at Unknown time Self No Yes   Sig: Take 1-2 tablets (100-200 mg total) by mouth daily at bedtime      Facility-Administered Medications: None       Past Medical History:   Diagnosis Date    Chronic sinusitis     Depression     Fibromyalgia     Migraine     Polyarthritis     Last assessed 9/21/2015    Psychiatric disorder        Past Surgical History:   Procedure Laterality Date    HYSTEROSCOPY      Endometrial Biopsy By Hysteroscopy       Family History   Problem Relation Age of Onset    Diabetes Father     Substance Abuse Brother     Diabetes Maternal Grandmother     Rheum arthritis Maternal Grandmother     Melanoma Maternal Grandmother     Kidney disease Paternal Grandmother     Rheum arthritis Paternal Grandmother     Depression Paternal Grandmother     Diabetes Paternal Grandfather     Lung cancer Paternal Grandfather     Substance Abuse Maternal Uncle     Depression Paternal Aunt     Alcohol abuse Family     Stomach cancer Family      I have reviewed and agree with the history as documented  Social History   Substance Use Topics    Smoking status: Never Smoker    Smokeless tobacco: Never Used    Alcohol use No      Comment: Per [employer]; Social use        Review of Systems   Constitutional: Positive for fever (subjective)  Negative for chills and fatigue  HENT: Negative for hearing loss, rhinorrhea and sinus pain  Eyes: Negative for visual disturbance  Respiratory: Negative for cough, chest tightness and shortness of breath  Cardiovascular: Negative for chest pain  Gastrointestinal: Positive for constipation and diarrhea  Negative for abdominal distention, abdominal pain, nausea and vomiting  Endocrine: Negative for polydipsia and polyuria  Genitourinary: Negative for dysuria and hematuria  Musculoskeletal: Positive for myalgias (generalized for past 48 hours)  Skin: Negative for color change and rash  Neurological: Negative for dizziness and headaches  Physical Exam  ED Triage Vitals [10/30/18 1533]   Temperature Pulse Respirations Blood Pressure SpO2   98 7 °F (37 1 °C) 74 18 116/59 97 %      Temp Source Heart Rate Source Patient Position - Orthostatic VS BP Location FiO2 (%)   Oral Monitor Sitting Left arm --      Pain Score       6           Orthostatic Vital Signs  Vitals:    10/30/18 1533 10/30/18 1832   BP: 116/59 103/65   Pulse: 74 68   Patient Position - Orthostatic VS: Sitting Lying       Physical Exam   Constitutional: She is oriented to person, place, and time  She appears well-developed and well-nourished  No distress  HENT:   Head: Normocephalic and atraumatic     Right Ear: External ear normal    Left Ear: External ear normal    Nose: Nose normal    Mouth/Throat: Oropharynx is clear and moist  No oropharyngeal exudate  Eyes: Pupils are equal, round, and reactive to light  Neck: Normal range of motion  Cardiovascular: Normal rate, regular rhythm and normal heart sounds  Exam reveals no gallop and no friction rub  No murmur heard  Pulmonary/Chest: Effort normal and breath sounds normal  No respiratory distress  She has no wheezes  She exhibits no tenderness  Abdominal: Soft  Bowel sounds are normal  She exhibits no distension and no mass  There is no tenderness  There is no guarding  Musculoskeletal: Normal range of motion  She exhibits no edema or deformity  Lymphadenopathy:     She has no cervical adenopathy  Neurological: She is alert and oriented to person, place, and time  Skin: Skin is warm and dry  Capillary refill takes less than 2 seconds  She is not diaphoretic  Psychiatric: She has a normal mood and affect  Vitals reviewed  ED Medications  Medications   sodium chloride 0 9 % bolus 1,000 mL (0 mL Intravenous Stopped 10/30/18 1928)   ketorolac (TORADOL) injection 15 mg (15 mg Intravenous Given 10/30/18 1828)   ondansetron (ZOFRAN) injection 4 mg (4 mg Intravenous Given 10/30/18 1828)       Diagnostic Studies  Results Reviewed     Procedure Component Value Units Date/Time    Basic metabolic panel [[id number]]  (Abnormal) Collected:  10/30/18 1827    Lab Status:  Final result Specimen:  Blood from Arm, Left Updated:  10/30/18 1910     Sodium 136 mmol/L      Potassium 3 4 (L) mmol/L      Chloride 108 mmol/L      CO2 21 mmol/L      ANION GAP 7 mmol/L      BUN 10 mg/dL      Creatinine 1 04 mg/dL      Glucose 70 mg/dL      Calcium 8 4 mg/dL      eGFR 67 ml/min/1 73sq m     Narrative:         National Kidney Disease Education Program recommendations are as follows:  GFR calculation is accurate only with a steady state creatinine  Chronic Kidney disease less than 60 ml/min/1 73 sq  meters  Kidney failure less than 15 ml/min/1 73 sq  meters      High sensitivity CRP [[id number]] Collected:  10/30/18 1827    Lab Status:  Final result Specimen:  Blood from Arm, Left Updated:  10/30/18 1910     CRP, High Sensitivity 5 03 mg/L     Narrative:               HsCRP Level       Relative Risk           <1 0 mg/L          Low           1 0 to 3 0 mg/L    Average           >3 0 mg/L          High      CBC and differential [[id number]] Collected:  10/30/18 1827    Lab Status:  Final result Specimen:  Blood from Arm, Left Updated:  10/30/18 1856     WBC 8 12 Thousand/uL      RBC 4 22 Million/uL      Hemoglobin 12 7 g/dL      Hematocrit 38 1 %      MCV 90 fL      MCH 30 1 pg      MCHC 33 3 g/dL      RDW 13 2 %      MPV 9 8 fL      Platelets 072 Thousands/uL      nRBC 0 /100 WBCs      Neutrophils Relative 70 %      Immat GRANS % 0 %      Lymphocytes Relative 25 %      Monocytes Relative 5 %      Eosinophils Relative 0 %      Basophils Relative 0 %      Neutrophils Absolute 5 59 Thousands/µL      Immature Grans Absolute 0 02 Thousand/uL      Lymphocytes Absolute 2 06 Thousands/µL      Monocytes Absolute 0 42 Thousand/µL      Eosinophils Absolute 0 01 Thousand/µL      Basophils Absolute 0 02 Thousands/µL     POCT pregnancy, urine [[id number]]     Lab Status:  No result     POCT urinalysis dipstick [[id number]]     Lab Status:  No result                  No orders to display         Procedures  Procedures      Phone Consults  ED Phone Contact    ED Course                               MDM  Number of Diagnoses or Management Options  Myalgia:   Diagnosis management comments: Patient is 34year old female pmh fibromyalgia presenting with 1 week of diarrhea followed by constipation, 48 hours of myalgias  Patient has additionally had several days of subjective fevers  Patient has not had any recent change in physical activity, is not on any medications that would put her at risk for rhabdomyolysis   CBC, BMP, CRP all evaluated and normal  Patient given IM toradol with improvement of symptoms, 1 L normal saline, discharged to home with instructions to follow up with her PCP  Amount and/or Complexity of Data Reviewed  Clinical lab tests: ordered and reviewed  Decide to obtain previous medical records or to obtain history from someone other than the patient: yes  Review and summarize past medical records: yes      CritCare Time    Disposition  Final diagnoses:   Myalgia     Time reflects when diagnosis was documented in both MDM as applicable and the Disposition within this note     Time User Action Codes Description Comment    10/30/2018  7:15 PM Ruben Aguilar [M79 10] Myalgia       ED Disposition     ED Disposition Condition Comment    Discharge  Krzysztof Santos discharge to home/self care  Condition at discharge: Good        Follow-up Information     Follow up With Specialties Details Why Contact Info Additional [address] [facility] Emergency Medicine  As needed [address]  [phone]  ED, [address] [place] [address], [address] [place] [address], [address] In 1 week  [facility] [address] [facility]  [phone]             Discharge Medication List as of 10/30/2018  7:18 PM      CONTINUE these medications which have NOT CHANGED    Details   Cholecalciferol (VITAMIN D3) 2000 units capsule Take 1 tablet by mouth 2 (two) times a day, Historical Med      cyclobenzaprine (FLEXERIL) 10 mg tablet TAKE 1 TABLET AT BEDTIME AS NEEDED FOR MUSCLE SPASMS, Normal      !! DULoxetine (CYMBALTA) 30 mg delayed release capsule TAKE 1 CAPSULE ONCE IN THE MORNING, Normal      !!  DULoxetine (CYMBALTA) 60 mg delayed release capsule TAKE ONE CAPSULE BY MOUTH EVERY DAY, Normal      LORazepam (ATIVAN) 1 mg tablet Take 1 tablet (1 mg total) by mouth every 6 (six) hours, Starting Mon 10/1/2018, Normal      metroNIDAZOLE (FLAGYL) 500 mg tablet Take 1 tablet (500 mg total) by mouth every 12 (twelve) hours for 7 days, Starting Wed 10/24/2018, Until Wed 10/31/2018, Normal      ondansetron (ZOFRAN) 4 mg tablet Take 1 tablet (4 mg total) by mouth every 8 (eight) hours as needed for nausea or vomiting, Starting Wed 10/24/2018, Normal      pregabalin (LYRICA) 100 mg capsule Take 1 capsule (100 mg total) by mouth 2 (two) times a day, Starting Tue 10/9/2018, Normal      pyridoxine (B-6) 100 MG tablet Take 100 mg by mouth daily, Historical Med      rizatriptan (MAXALT) 10 MG tablet Take 1 tablet (10 mg total) by mouth once as needed for migraine for up to 1 dose May repeat in 2 hours if needed, Starting Wed 9/19/2018, Normal      saccharomyces boulardii (FLORASTOR) 250 mg capsule Take 1 capsule (250 mg total) by mouth 2 (two) times a day, Starting Wed 10/24/2018, Normal      topiramate (TOPAMAX) 50 MG tablet TAKE 2 TABLETS BY MOUTH AT BEDTIME, Normal      traZODone (DESYREL) 100 mg tablet Take 1-2 tablets (100-200 mg total) by mouth daily at bedtime, Starting Thu 4/26/2018, Normal       !! - Potential duplicate medications found  Please discuss with provider  No discharge procedures on file  ED Provider  Attending physically available and evaluated [place] Pasquale LIZARRAGA managed the patient along with the ED Attending      Electronically Signed by         Alvaro Tian MD  10/30/18 5115

## 2018-10-30 NOTE — TELEPHONE ENCOUNTER
FYI:  Patient was here last Wednesday and has been eating the rice and jello diet - she is now unable to eat the rice and hurts head to toe and has not been able to pass her bowels to give a specimen  Patient is going to [facility] Emergency Room

## 2018-10-30 NOTE — ED ATTENDING ATTESTATION
Justine Cavazos DO, saw and evaluated the patient  I have discussed the patient with the resident/non-physician practitioner and agree with the resident's/non-physician practitioner's findings, Plan of Care, and MDM as documented in the resident's/non-physician practitioner's note, except where noted  All available labs and Radiology studies were reviewed  At this point I agree with the current assessment done in the Emergency Department  I have conducted an independent evaluation of this patient a history and physical is as follows:    Patient is a 49-year-old female complaining of generalized myalgias and body aches  Patient states pain is most notable in her bilateral hips  Patient has history of fibromyalgia and states she normally gets flares like this but this 1 feels more intense  Has also noted some nausea and vomiting since last week  Patient did see her primary care physician who had some routine labs which were unremarkable except for a stat bicarb of 20 and patient placed on a brat diet at that time  No further episodes of vomiting but has had some episodes of nausea  No recent travel, no recent antibiotics  No recent change in medications  Patient is afebrile, heart is regular with no murmurs rubs gallops, abdomen soft nontender nondistended, there is no rash, no meningismus  There is no lower extremity edema or calf tenderness  Will check baseline labs compared to recent previous values, add CRP, administer IV fluids, IV Toradol and reassess  Labs reviewed  Patient feels better after IV fluids and IV Toradol  Follow up with primary care physician, return if worsens  Patient is not tachycardic, tachypneic nor hypoxic  There is no calf pain tenderness or asymmetry  No shortness of breath  Afebrile            Critical Care Time  CritCare Time    Procedures

## 2018-11-05 ENCOUNTER — TELEPHONE (OUTPATIENT)
Dept: BEHAVIORAL/MENTAL HEALTH CLINIC | Facility: CLINIC | Age: 38
End: 2018-11-05

## 2018-11-05 RX ORDER — MEDROXYPROGESTERONE ACETATE 10 MG/1
TABLET ORAL
Refills: 0 | COMMUNITY
Start: 2018-10-22 | End: 2019-01-31 | Stop reason: ALTCHOICE

## 2018-11-05 NOTE — TELEPHONE ENCOUNTER
Patient phoned, she is still having symptoms - everything she eats feels like she is digesting glass, has a headache with no fever, body aches and wants to sleep all of the time  Patient would like a return call at [phone]

## 2018-11-07 ENCOUNTER — LAB (OUTPATIENT)
Dept: LAB | Facility: HOSPITAL | Age: 38
End: 2018-11-07
Payer: COMMERCIAL

## 2018-11-07 ENCOUNTER — OFFICE VISIT (OUTPATIENT)
Dept: FAMILY MEDICINE CLINIC | Facility: CLINIC | Age: 38
End: 2018-11-07
Payer: COMMERCIAL

## 2018-11-07 ENCOUNTER — TRANSCRIBE ORDERS (OUTPATIENT)
Dept: LAB | Facility: HOSPITAL | Age: 38
End: 2018-11-07

## 2018-11-07 VITALS
WEIGHT: 159 LBS | BODY MASS INDEX: 31.22 KG/M2 | DIASTOLIC BLOOD PRESSURE: 80 MMHG | HEART RATE: 104 BPM | TEMPERATURE: 98.6 F | SYSTOLIC BLOOD PRESSURE: 116 MMHG | HEIGHT: 60 IN | OXYGEN SATURATION: 97 %

## 2018-11-07 DIAGNOSIS — M25.50 ARTHRALGIA OF MULTIPLE JOINTS: Primary | ICD-10-CM

## 2018-11-07 DIAGNOSIS — E87.6 HYPOKALEMIA: ICD-10-CM

## 2018-11-07 DIAGNOSIS — M25.50 ARTHRALGIA OF MULTIPLE JOINTS: ICD-10-CM

## 2018-11-07 DIAGNOSIS — R52 GENERALIZED BODY ACHES: ICD-10-CM

## 2018-11-07 DIAGNOSIS — K59.09 OTHER CONSTIPATION: ICD-10-CM

## 2018-11-07 DIAGNOSIS — M79.7 FIBROMYALGIA: ICD-10-CM

## 2018-11-07 DIAGNOSIS — A69.20 LYME DISEASE: ICD-10-CM

## 2018-11-07 DIAGNOSIS — M25.50 PAIN IN JOINT, MULTIPLE SITES: Primary | ICD-10-CM

## 2018-11-07 DIAGNOSIS — R10.13 DYSPEPSIA: ICD-10-CM

## 2018-11-07 DIAGNOSIS — F33.2 MDD (MAJOR DEPRESSIVE DISORDER), RECURRENT SEVERE, WITHOUT PSYCHOSIS (HCC): ICD-10-CM

## 2018-11-07 LAB
ANION GAP SERPL CALCULATED.3IONS-SCNC: 10 MMOL/L (ref 4–13)
BUN SERPL-MCNC: 15 MG/DL (ref 5–25)
CALCIUM SERPL-MCNC: 8.7 MG/DL (ref 8.3–10.1)
CHLORIDE SERPL-SCNC: 110 MMOL/L (ref 100–108)
CO2 SERPL-SCNC: 19 MMOL/L (ref 21–32)
CREAT SERPL-MCNC: 1.03 MG/DL (ref 0.6–1.3)
GFR SERPL CREATININE-BSD FRML MDRD: 68 ML/MIN/1.73SQ M
GLUCOSE SERPL-MCNC: 115 MG/DL (ref 65–140)
POTASSIUM SERPL-SCNC: 3.5 MMOL/L (ref 3.5–5.3)
SODIUM SERPL-SCNC: 139 MMOL/L (ref 136–145)

## 2018-11-07 PROCEDURE — 86038 ANTINUCLEAR ANTIBODIES: CPT

## 2018-11-07 PROCEDURE — 86617 LYME DISEASE ANTIBODY: CPT

## 2018-11-07 PROCEDURE — 36415 COLL VENOUS BLD VENIPUNCTURE: CPT

## 2018-11-07 PROCEDURE — 86618 LYME DISEASE ANTIBODY: CPT

## 2018-11-07 PROCEDURE — 86430 RHEUMATOID FACTOR TEST QUAL: CPT

## 2018-11-07 PROCEDURE — 99214 OFFICE O/P EST MOD 30 MIN: CPT | Performed by: FAMILY MEDICINE

## 2018-11-07 PROCEDURE — 80048 BASIC METABOLIC PNL TOTAL CA: CPT

## 2018-11-07 NOTE — TELEPHONE ENCOUNTER
Kristie Mcallister, pharmacist from [facility],called to report that Lyrica 100 mg will need prior authorization

## 2018-11-07 NOTE — PROGRESS NOTES
Chief Complaint   Patient presents with    Follow-up     2 week follow up     Health Maintenance   Topic Date Due    Pneumococcal PPSV23 Medium Risk Adult (1 of 1 - PPSV23) 01/06/1997    INFLUENZA VACCINE  07/01/2018    DTaP,Tdap,and Td Vaccines (2 - Td) 09/29/2019     Assessment/Plan:    Arthralgia of multiple joints  Will check MANJIT, Rheumatoid factor, Lyme disease Ab by Western Blot  Recommended to schedule rheumatology evaluation  Take Tylenol PRN for pain  Dyspepsia  Avoid fried, fatty foods, caffeine  Take  Zofran 4 mg 1 tablet every 6-8 hours as needed for nausea  Schedule gastroenterology evaluation  Other constipation  Recommended to keep sufficient fluid intake, increase fiber in diet  Start Metamucil daily  Schedule gastroenterology evaluation  Fibromyalgia  Patient currently taking Lyrica 100 mg twice daily  Recommended rheumatology evaluation  Hypokalemia  Patient was advised to increase dietary potassium intake  Check BMP  Diagnoses and all orders for this visit:    Arthralgia of multiple joints  -     Lyme disease, western blot; Future  -     Ambulatory referral to Rheumatology; Future  -     MANJIT Screen w/ Reflex to Titer/Pattern; Future  -     Rheumatoid Factor; Future    Generalized body aches  -     Lyme disease, western blot; Future  -     Ambulatory referral to Rheumatology; Future    Dyspepsia  -     Ambulatory referral to Gastroenterology; Future    Other constipation  -     Ambulatory referral to Gastroenterology; Future    Fibromyalgia  -     Ambulatory referral to Rheumatology; Future    Hypokalemia  -     Basic metabolic panel; Future          Subjective:      Patient ID: Massimo Flaherty is a 34 y o  female  HPI     Patient presents for 2 week follow-up office visit  She was seen in the office on 10/24/18 c/o diarrhea, nausea, decreased appetite, abdominal cramps      She was started on Flagyl 500 mg twice daily for 1 week, prescribed Zofran for nausea and recommended to start on probiotic  Diarrhea resolved  Now patient c/o constipation, continues with nausea, decreased appetite  She was seen in [facility] emergency room on 10/30/18 for generalized body aches subjective fevers  Blood work showed creatinine 1 04, glucose 70, potassium 3 4  Patient has h/o Fibromyalgia, Lyme disease in the past   She denies recent tick bites, but reports possible spider bites on her neck  afew days ago  No rashes  C/o generalized joint pains and pain on the top of her head  Patient has anxiety, depression  She has been followed by psychiatry  Patient is planning to schedule appointment with rheumatologist     Denies tobacco, alcohol or drug use  The following portions of the patient's history were reviewed and updated as appropriate: allergies, current medications, past family history, past social history, past surgical history and problem list     Review of Systems   Constitutional: Positive for appetite change (decreased appetite), fatigue and fever (subjective fevers)  Negative for activity change and chills  HENT: Negative for congestion, ear pain, nosebleeds, sore throat, tinnitus and trouble swallowing  Eyes: Negative for pain, discharge, redness, itching and visual disturbance  Respiratory: Negative for cough, chest tightness, shortness of breath and wheezing  Cardiovascular: Negative for chest pain, palpitations and leg swelling  Gastrointestinal: Positive for abdominal pain (abdominal crapms), constipation and nausea  Negative for blood in stool, diarrhea and vomiting  Genitourinary: Negative for difficulty urinating, dysuria, flank pain, frequency, hematuria and pelvic pain  Musculoskeletal: Positive for arthralgias and myalgias  Negative for back pain and joint swelling  Skin: Negative for rash and wound  Neurological: Positive for headaches (pain on the top of her head)   Negative for dizziness and syncope  Hematological: Negative  Psychiatric/Behavioral: Positive for sleep disturbance  Negative for suicidal ideas  Anxiety / Depression         Objective:      /80 (BP Location: Left arm, Patient Position: Sitting, Cuff Size: Adult)   Pulse 104   Temp 98 6 °F (37 °C) (Oral)   Ht 5' (1 524 m)   Wt 72 1 kg (159 lb)   SpO2 97%   BMI 31 05 kg/m²          Physical Exam   Constitutional: She appears well-developed and well-nourished  No distress  Obese   HENT:   Head: Normocephalic and atraumatic  Right Ear: External ear normal    Mouth/Throat: Oropharynx is clear and moist    Eyes: Pupils are equal, round, and reactive to light  Conjunctivae are normal    Neck: Normal range of motion  Neck supple  Cardiovascular: Normal rate, regular rhythm and normal heart sounds  No murmur heard  No BL LE edema   Pulmonary/Chest: Effort normal and breath sounds normal  She has no wheezes  She has no rales  Abdominal: Soft  Bowel sounds are normal  There is no tenderness  Musculoskeletal: Normal range of motion  She exhibits no edema, tenderness or deformity  Lymphadenopathy:     She has no cervical adenopathy  Skin: Skin is warm and dry  Acne on face   Psychiatric:   Anxious   Nursing note and vitals reviewed

## 2018-11-08 ENCOUNTER — DOCUMENTATION (OUTPATIENT)
Dept: PSYCHIATRY | Facility: CLINIC | Age: 38
End: 2018-11-08

## 2018-11-08 LAB
B BURGDOR IGG SER IA-ACNC: 2.46
B BURGDOR IGM SER IA-ACNC: 0.35
RHEUMATOID FACT SER QL LA: NEGATIVE

## 2018-11-08 RX ORDER — LORAZEPAM 1 MG/1
1 TABLET ORAL EVERY 6 HOURS
Qty: 120 TABLET | Refills: 0 | Status: SHIPPED | OUTPATIENT
Start: 2018-11-08 | End: 2018-12-09 | Stop reason: SDUPTHER

## 2018-11-08 NOTE — ASSESSMENT & PLAN NOTE
Will check MANJIT, Rheumatoid factor, Lyme disease Ab by Western Blot  Recommended to schedule rheumatology evaluation  Take Tylenol PRN for pain

## 2018-11-08 NOTE — ASSESSMENT & PLAN NOTE
Avoid fried, fatty foods, caffeine  Take  Zofran 4 mg 1 tablet every 6-8 hours as needed for nausea  Schedule gastroenterology evaluation

## 2018-11-08 NOTE — PROGRESS NOTES
Prior authorization for Lyrica 100 mg sent to [facility] via [employer]   Will await outcome of prior authorization request

## 2018-11-08 NOTE — ASSESSMENT & PLAN NOTE
Recommended to keep sufficient fluid intake, increase fiber in diet  Start Metamucil daily  Schedule gastroenterology evaluation

## 2018-11-08 NOTE — PROGRESS NOTES
Left message on patient's cell phone to call back for results, as well as on the home phone with her spouse

## 2018-11-09 ENCOUNTER — TELEPHONE (OUTPATIENT)
Dept: FAMILY MEDICINE CLINIC | Facility: CLINIC | Age: 38
End: 2018-11-09

## 2018-11-09 LAB
B BURGDOR IGG PATRN SER IB-IMP: POSITIVE
B BURGDOR IGM PATRN SER IB-IMP: POSITIVE
B BURGDOR18KD IGG SER QL IB: PRESENT
B BURGDOR23KD IGG SER QL IB: PRESENT
B BURGDOR23KD IGM SER QL IB: PRESENT
B BURGDOR28KD IGG SER QL IB: ABNORMAL
B BURGDOR30KD IGG SER QL IB: ABNORMAL
B BURGDOR39KD IGG SER QL IB: PRESENT
B BURGDOR39KD IGM SER QL IB: PRESENT
B BURGDOR41KD IGG SER QL IB: PRESENT
B BURGDOR41KD IGM SER QL IB: ABNORMAL
B BURGDOR45KD IGG SER QL IB: ABNORMAL
B BURGDOR58KD IGG SER QL IB: PRESENT
B BURGDOR66KD IGG SER QL IB: ABNORMAL
B BURGDOR93KD IGG SER QL IB: ABNORMAL
RYE IGE QN: NEGATIVE

## 2018-11-09 RX ORDER — CEPHALEXIN 500 MG/1
500 CAPSULE ORAL EVERY 6 HOURS SCHEDULED
Qty: 42 CAPSULE | Refills: 0 | Status: SHIPPED | OUTPATIENT
Start: 2018-11-09 | End: 2018-11-13 | Stop reason: SDUPTHER

## 2018-11-09 NOTE — TELEPHONE ENCOUNTER
Patient stated that Dr Isaura Andino called her today to tell her to go to a Rheumatologist  She checked and there was a year long waiting list  Wanted to know if Dr Isaura Andino would recommend another Rheumatologist or what she should do in the meantime  Can be reached at [phone]

## 2018-11-11 NOTE — TELEPHONE ENCOUNTER
Please call [facility] to schedule appointment for patient  ( Abram Stephens, Dr Sushma Rodriguez or other physician in the group) for Generalized arthralgias, positive Lyme diasease test, Fibromyalgia)  Send records if necessary  Notify patient

## 2018-11-12 ENCOUNTER — TELEPHONE (OUTPATIENT)
Dept: PSYCHIATRY | Facility: CLINIC | Age: 38
End: 2018-11-12

## 2018-11-12 ENCOUNTER — DOCUMENTATION (OUTPATIENT)
Dept: PSYCHIATRY | Facility: CLINIC | Age: 38
End: 2018-11-12

## 2018-11-12 DIAGNOSIS — M79.7 FIBROMYALGIA: ICD-10-CM

## 2018-11-12 RX ORDER — PREGABALIN 100 MG/1
100 CAPSULE ORAL 2 TIMES DAILY
Qty: 60 CAPSULE | Refills: 2 | Status: SHIPPED | OUTPATIENT
Start: 2018-11-12 | End: 2019-05-31 | Stop reason: SDUPTHER

## 2018-11-12 NOTE — PROGRESS NOTES
A new P  A for Lyrica was resubmitted to [place] [id number] via [address] [place] [address]  Fax confirmed   Contact plan to follow up on [id number]

## 2018-11-12 NOTE — TELEPHONE ENCOUNTER
Called [facility] and scheduled patient with Dr Barney Ladd for this Thursday, 11/15/18 at 1pm  Location is at their [facility] office, [address] [place] [address]  Called phone # [phone]  I faxed notes and lab results to them at [phone]  Made patient aware

## 2018-11-12 NOTE — TELEPHONE ENCOUNTER
Lyrica was approved from 8/118/18 to 11/8/19   I called [facility] pharmacy and they are processing the request

## 2018-11-13 ENCOUNTER — OFFICE VISIT (OUTPATIENT)
Dept: GASTROENTEROLOGY | Facility: MEDICAL CENTER | Age: 38
End: 2018-11-13
Payer: COMMERCIAL

## 2018-11-13 VITALS
HEIGHT: 60 IN | HEART RATE: 105 BPM | WEIGHT: 160 LBS | TEMPERATURE: 97.7 F | BODY MASS INDEX: 31.41 KG/M2 | SYSTOLIC BLOOD PRESSURE: 114 MMHG | DIASTOLIC BLOOD PRESSURE: 72 MMHG

## 2018-11-13 DIAGNOSIS — K59.09 OTHER CONSTIPATION: ICD-10-CM

## 2018-11-13 DIAGNOSIS — M25.50 PAIN IN JOINT, MULTIPLE SITES: ICD-10-CM

## 2018-11-13 DIAGNOSIS — R10.13 DYSPEPSIA: ICD-10-CM

## 2018-11-13 DIAGNOSIS — A69.20 LYME DISEASE: ICD-10-CM

## 2018-11-13 PROCEDURE — 99244 OFF/OP CNSLTJ NEW/EST MOD 40: CPT | Performed by: INTERNAL MEDICINE

## 2018-11-13 RX ORDER — POLYETHYLENE GLYCOL 3350 17 G/17G
17 POWDER, FOR SOLUTION ORAL DAILY
Qty: 528 G | Refills: 1 | Status: SHIPPED | OUTPATIENT
Start: 2018-11-13 | End: 2019-01-31 | Stop reason: ALTCHOICE

## 2018-11-13 RX ORDER — OMEPRAZOLE 40 MG/1
40 CAPSULE, DELAYED RELEASE ORAL DAILY
Qty: 30 CAPSULE | Refills: 1 | Status: SHIPPED | OUTPATIENT
Start: 2018-11-13 | End: 2019-03-24

## 2018-11-13 RX ORDER — CEPHALEXIN 500 MG/1
500 CAPSULE ORAL EVERY 6 HOURS SCHEDULED
Qty: 56 CAPSULE | Refills: 0 | Status: SHIPPED | OUTPATIENT
Start: 2018-11-13 | End: 2018-11-27

## 2018-11-13 NOTE — LETTER
November 13, 2018     [address]    Patient: Antoinette Medina   YOB: 1980   Date of Visit: 11/13/2018       Dear Dr He Conte:    Thank you for referring Antoinette Medina to me for evaluation  Below are my notes for this consultation  If you have questions, please do not hesitate to call me  I look forward to following your patient along with you  Sincerely,        Balnca Matt MD        CC: No Recipients  Blanca Matt MD  11/13/2018 12:31 PM  Sign at close encounter  [facility] - Outpatient Consultation  Antoinette Medina [address] y o  female MRN: [id number]  Encounter: [id number]          ASSESSMENT AND PLAN:      1  Dyspepsia  - it could be associated with stress and functional pain  - stool antigen test to rule out H pylori infection  - a trial of PPI to see whether that can relieve her symptoms  - abdominal US to rule out gallstone disease  2  Other constipation  - patient was counseled on importance of increased fluid and fiber intake  - a trial of MiraLax to see whether it can repeat leave her constipation  Follow up in 1 month in the office  If she continues to have symptoms or her symptoms worsens, consider to do  ______________________________________________________________________    HPI:     18-year-old female with history of fibromyalgia, recent diagnosis of Lyme disease referred for evaluation of epigastric pain  Patient reported she has been having abdominal pain in the past 2 weeks  She reported every time she eats she feels digesting glasses in my intestine   Patient reported only time she does not feel abdominal pain is when she was asleep  She takes trazodone to go to sleep every night  Every morning when she wake up she started to feel abdominal pain  pain was slightly relieved with apple cider vinegar in the past 3 weeks but remains persistent       Patient reported she has been having a lot of stress in life which exacerbates her abdominal pain  She underwent a CT scan in January for abdominal pain was found to have significant amount of stool  She reported feeling constipated  She has bowel movement daily but small amount of bowel movement every time  She admitted to have small pellet stool  REVIEW OF SYSTEMS:    CONSTITUTIONAL: Denies any fever, chills, rigors, and weight loss  HEENT: No earache or tinnitus  Denies hearing loss or visual disturbances  CARDIOVASCULAR: No chest pain or palpitations  RESPIRATORY: Denies any cough, hemoptysis, shortness of breath or dyspnea on exertion  GASTROINTESTINAL: As noted in the History of Present Illness  GENITOURINARY: No problems with urination  Denies any hematuria or dysuria  NEUROLOGIC: No dizziness or vertigo, denies headaches  MUSCULOSKELETAL: Denies any muscle or joint pain  SKIN: Denies skin rashes or itching  ENDOCRINE: Denies excessive thirst  Denies intolerance to heat or cold  PSYCHOSOCIAL: Denies depression or anxiety  Denies any recent memory loss  Historical Information   Past Medical History:   Diagnosis Date    Chronic sinusitis     Depression     Fibromyalgia     Migraine     Obesity     Polyarthritis     Last assessed 9/21/2015    Psychiatric disorder      Past Surgical History:   Procedure Laterality Date    HYSTEROSCOPY      Endometrial Biopsy By Hysteroscopy     Social History   History   Alcohol Use No     Comment: Per [employer];  Social use     History   Drug Use No     History   Smoking Status    Never Smoker   Smokeless Tobacco    Never Used     Family History   Problem Relation Age of Onset    Diabetes Father     Substance Abuse Brother     Diabetes Maternal Grandmother     Rheum arthritis Maternal Grandmother     Melanoma Maternal Grandmother     Kidney disease Paternal Grandmother     Rheum arthritis Paternal Grandmother     Depression Paternal Grandmother     Diabetes Paternal Grandfather     Lung cancer Paternal Grandfather     Substance Abuse Maternal Uncle     Depression Paternal Aunt     Alcohol abuse Family     Stomach cancer Family        Meds/Allergies       Current Outpatient Prescriptions:     cephalexin (KEFLEX) 500 mg capsule    Cholecalciferol (VITAMIN D3) 2000 units capsule    cyclobenzaprine (FLEXERIL) 10 mg tablet    DULoxetine (CYMBALTA) 30 mg delayed release capsule    DULoxetine (CYMBALTA) 60 mg delayed release capsule    LORazepam (ATIVAN) 1 mg tablet    medroxyPROGESTERone (PROVERA) 10 mg tablet    ondansetron (ZOFRAN) 4 mg tablet    pregabalin (LYRICA) 100 mg capsule    pyridoxine (B-6) 100 MG tablet    rizatriptan (MAXALT) 10 MG tablet    saccharomyces boulardii (FLORASTOR) 250 mg capsule    topiramate (TOPAMAX) 50 MG tablet    traZODone (DESYREL) 100 mg tablet    Allergies   Allergen Reactions    Decadrol [Dexamethasone] Other (See Comments)     Category: Adverse Reaction;   psychosis    Dexamethasone Sodium Phosphate     Other     Penicillins Hives and Other (See Comments)     Category: Allergy; Hives/Uticaria    Tetracycline Hives and Other (See Comments)     Hives/Uticaria    Tetracyclines & Related Hives     Category: Allergy;            Objective     Blood pressure 114/72, pulse 105, temperature 97 7 °F (36 5 °C), temperature source Tympanic, height 5' (1 524 m), weight 72 6 kg (160 lb)  Body mass index is 31 25 kg/m²  PHYSICAL EXAM:      General Appearance:   Alert, cooperative, no distress   HEENT:   Normocephalic, atraumatic, anicteric      Neck:  Supple, symmetrical, trachea midline   Lungs:   Clear to auscultation bilaterally; no rales, rhonchi or wheezing; respirations unlabored    Heart[de-identified]   Regular rate and rhythm; no murmur, rub, or gallop     Abdomen:   Soft, non-tender, non-distended; normal bowel sounds; no masses, no organomegaly    Genitalia:   Deferred    Rectal:   Deferred    Extremities:  No cyanosis, clubbing or edema    Pulses:  2+ and symmetric    Skin:  No jaundice, rashes, or lesions    Lymph nodes:  No palpable cervical lymphadenopathy        Lab Results:   No visits with results within 1 Day(s) from this visit  Latest known visit with results is:   Lab on 11/07/2018   Component Date Value    Sodium 11/07/2018 139     Potassium 11/07/2018 3 5     Chloride 11/07/2018 110*    CO2 11/07/2018 19*    ANION GAP 11/07/2018 10     BUN 11/07/2018 15     Creatinine 11/07/2018 1 03     Glucose 11/07/2018 115     Calcium 11/07/2018 8 7     eGFR 11/07/2018 68     MANJIT 11/07/2018 Negative          Radiology Results:   No results found

## 2018-11-13 NOTE — TELEPHONE ENCOUNTER
Prescription sent was for 42 Capsules of Keflex, but for 14 days, 4 times daily would need 56 tablets

## 2018-11-13 NOTE — LETTER
November 13, 2018     No Recipients    Patient: Migdalia Veras   YOB: 1980   Date of Visit: 11/13/2018       Dear Dr Tiffany Painter Recipients: Thank you for referring Migdalia Veras to me for evaluation  Below are my notes for this consultation  If you have questions, please do not hesitate to call me  I look forward to following your patient along with you  Sincerely,        Natalie Yost MD        CC: No Recipients  Natalie Yost MD  11/13/2018 12:31 PM  Sign at close encounter  [facility] - Outpatient Consultation  Migdalia Veras 34 y o  female MRN: [id number]  Encounter: [id number]          ASSESSMENT AND PLAN:      1  Dyspepsia  - it could be associated with stress and functional pain  - stool antigen test to rule out H pylori infection  - a trial of PPI to see whether that can relieve her symptoms  - abdominal US to rule out gallstone disease  2  Other constipation  - patient was counseled on importance of increased fluid and fiber intake  - a trial of MiraLax to see whether it can repeat leave her constipation  Follow up in 1 month in the office  If she continues to have symptoms or her symptoms worsens, consider to do  ______________________________________________________________________    HPI:     70-year-old female with history of fibromyalgia, recent diagnosis of Lyme disease referred for evaluation of epigastric pain  Patient reported she has been having abdominal pain in the past 2 weeks  She reported every time she eats she feels digesting glasses in my intestine   Patient reported only time she does not feel abdominal pain is when she was asleep  She takes trazodone to go to sleep every night  Every morning when she wake up she started to feel abdominal pain  pain was slightly relieved with apple cider vinegar in the past 3 weeks but remains persistent       Patient reported she has been having a lot of stress in life which exacerbates her abdominal pain  She underwent a CT scan in January for abdominal pain was found to have significant amount of stool  She reported feeling constipated  She has bowel movement daily but small amount of bowel movement every time  She admitted to have small pellet stool  REVIEW OF SYSTEMS:    CONSTITUTIONAL: Denies any fever, chills, rigors, and weight loss  HEENT: No earache or tinnitus  Denies hearing loss or visual disturbances  CARDIOVASCULAR: No chest pain or palpitations  RESPIRATORY: Denies any cough, hemoptysis, shortness of breath or dyspnea on exertion  GASTROINTESTINAL: As noted in the History of Present Illness  GENITOURINARY: No problems with urination  Denies any hematuria or dysuria  NEUROLOGIC: No dizziness or vertigo, denies headaches  MUSCULOSKELETAL: Denies any muscle or joint pain  SKIN: Denies skin rashes or itching  ENDOCRINE: Denies excessive thirst  Denies intolerance to heat or cold  PSYCHOSOCIAL: Denies depression or anxiety  Denies any recent memory loss  Historical Information   Past Medical History:   Diagnosis Date    Chronic sinusitis     Depression     Fibromyalgia     Migraine     Obesity     Polyarthritis     Last assessed 9/21/2015    Psychiatric disorder      Past Surgical History:   Procedure Laterality Date    HYSTEROSCOPY      Endometrial Biopsy By Hysteroscopy     Social History   History   Alcohol Use No     Comment: Per [employer];  Social use     History   Drug Use No     History   Smoking Status    Never Smoker   Smokeless Tobacco    Never Used     Family History   Problem Relation Age of Onset    Diabetes Father     Substance Abuse Brother     Diabetes Maternal Grandmother     Rheum arthritis Maternal Grandmother     Melanoma Maternal Grandmother     Kidney disease Paternal Grandmother     Rheum arthritis Paternal Grandmother     Depression Paternal Grandmother     Diabetes Paternal Grandfather     Lung cancer Paternal Grandfather     Substance Abuse Maternal Uncle     Depression Paternal Aunt     Alcohol abuse Family     Stomach cancer Family        Meds/Allergies       Current Outpatient Prescriptions:     cephalexin (KEFLEX) 500 mg capsule    Cholecalciferol (VITAMIN D3) 2000 units capsule    cyclobenzaprine (FLEXERIL) 10 mg tablet    DULoxetine (CYMBALTA) 30 mg delayed release capsule    DULoxetine (CYMBALTA) 60 mg delayed release capsule    LORazepam (ATIVAN) 1 mg tablet    medroxyPROGESTERone (PROVERA) 10 mg tablet    ondansetron (ZOFRAN) 4 mg tablet    pregabalin (LYRICA) 100 mg capsule    pyridoxine (B-6) 100 MG tablet    rizatriptan (MAXALT) 10 MG tablet    saccharomyces boulardii (FLORASTOR) 250 mg capsule    topiramate (TOPAMAX) 50 MG tablet    traZODone (DESYREL) 100 mg tablet    Allergies   Allergen Reactions    Decadrol [Dexamethasone] Other (See Comments)     Category: Adverse Reaction;   psychosis    Dexamethasone Sodium Phosphate     Other     Penicillins Hives and Other (See Comments)     Category: Allergy; Hives/Uticaria    Tetracycline Hives and Other (See Comments)     Hives/Uticaria    Tetracyclines & Related Hives     Category: Allergy;            Objective     Blood pressure 114/72, pulse 105, temperature 97 7 °F (36 5 °C), temperature source Tympanic, height 5' (1 524 m), weight 72 6 kg (160 lb)  Body mass index is 31 25 kg/m²  PHYSICAL EXAM:      General Appearance:   Alert, cooperative, no distress   HEENT:   Normocephalic, atraumatic, anicteric      Neck:  Supple, symmetrical, trachea midline   Lungs:   Clear to auscultation bilaterally; no rales, rhonchi or wheezing; respirations unlabored    Heart[de-identified]   Regular rate and rhythm; no murmur, rub, or gallop     Abdomen:   Soft, non-tender, non-distended; normal bowel sounds; no masses, no organomegaly    Genitalia:   Deferred    Rectal:   Deferred    Extremities:  No cyanosis, clubbing or edema    Pulses:  2+ and symmetric  Skin:  No jaundice, rashes, or lesions    Lymph nodes:  No palpable cervical lymphadenopathy        Lab Results:   No visits with results within 1 Day(s) from this visit  Latest known visit with results is:   Lab on 11/07/2018   Component Date Value    Sodium 11/07/2018 139     Potassium 11/07/2018 3 5     Chloride 11/07/2018 110*    CO2 11/07/2018 19*    ANION GAP 11/07/2018 10     BUN 11/07/2018 15     Creatinine 11/07/2018 1 03     Glucose 11/07/2018 115     Calcium 11/07/2018 8 7     eGFR 11/07/2018 68     MANJIT 11/07/2018 Negative          Radiology Results:   No results found

## 2018-11-13 NOTE — PROGRESS NOTES
[facility] - Outpatient Consultation  Antoinette Medina 34 y o  female MRN: [id number]  Encounter: [id number]          ASSESSMENT AND PLAN:      1  Dyspepsia  - it could be associated with stress and functional pain  - stool antigen test to rule out H pylori infection  - a trial of PPI to see whether that can relieve her symptoms  - abdominal US to rule out gallstone disease  2  Other constipation  - patient was counseled on importance of increased fluid and fiber intake  - a trial of MiraLax to see whether it can repeat leave her constipation  Follow up in 1 month in the office  If she continues to have symptoms or her symptoms worsens, consider to do endoscopic intervention  ______________________________________________________________________    HPI:     69-year-old female with history of fibromyalgia, recent diagnosis of Lyme disease referred for evaluation of epigastric pain  Patient reported she has been having abdominal pain in the past 2 weeks  She reported every time she eats she feels digesting glasses in my intestine   Patient reported only time she does not feel abdominal pain is when she was asleep  She takes trazodone to go to sleep every night  Every morning when she wake up she started to feel abdominal pain  pain was slightly relieved with apple cider vinegar in the past 3 weeks but remains persistent  Patient reported she has been having a lot of stress in life which exacerbates her abdominal pain  She underwent a CT scan in January for abdominal pain was found to have significant amount of stool  She reported feeling constipated  She has bowel movement daily but small amount of bowel movement every time  She admitted to have small pellet stool  REVIEW OF SYSTEMS:    CONSTITUTIONAL: Denies any fever, chills, rigors, and weight loss  HEENT: No earache or tinnitus  Denies hearing loss or visual disturbances    CARDIOVASCULAR: No chest pain or palpitations  RESPIRATORY: Denies any cough, hemoptysis, shortness of breath or dyspnea on exertion  GASTROINTESTINAL: As noted in the History of Present Illness  GENITOURINARY: No problems with urination  Denies any hematuria or dysuria  NEUROLOGIC: No dizziness or vertigo, denies headaches  MUSCULOSKELETAL: Denies any muscle or joint pain  SKIN: Denies skin rashes or itching  ENDOCRINE: Denies excessive thirst  Denies intolerance to heat or cold  PSYCHOSOCIAL: Denies depression or anxiety  Denies any recent memory loss  Historical Information   Past Medical History:   Diagnosis Date    Chronic sinusitis     Depression     Fibromyalgia     Migraine     Obesity     Polyarthritis     Last assessed 9/21/2015    Psychiatric disorder      Past Surgical History:   Procedure Laterality Date    HYSTEROSCOPY      Endometrial Biopsy By Hysteroscopy     Social History   History   Alcohol Use No     Comment: Per [employer];  Social use     History   Drug Use No     History   Smoking Status    Never Smoker   Smokeless Tobacco    Never Used     Family History   Problem Relation Age of Onset    Diabetes Father     Substance Abuse Brother     Diabetes Maternal Grandmother     Rheum arthritis Maternal Grandmother     Melanoma Maternal Grandmother     Kidney disease Paternal Grandmother     Rheum arthritis Paternal Grandmother     Depression Paternal Grandmother     Diabetes Paternal Grandfather     Lung cancer Paternal Grandfather     Substance Abuse Maternal Uncle     Depression Paternal Aunt     Alcohol abuse Family     Stomach cancer Family        Meds/Allergies       Current Outpatient Prescriptions:     cephalexin (KEFLEX) 500 mg capsule    Cholecalciferol (VITAMIN D3) 2000 units capsule    cyclobenzaprine (FLEXERIL) 10 mg tablet    DULoxetine (CYMBALTA) 30 mg delayed release capsule    DULoxetine (CYMBALTA) 60 mg delayed release capsule    LORazepam (ATIVAN) 1 mg tablet    medroxyPROGESTERone (PROVERA) 10 mg tablet    ondansetron (ZOFRAN) 4 mg tablet    pregabalin (LYRICA) 100 mg capsule    pyridoxine (B-6) 100 MG tablet    rizatriptan (MAXALT) 10 MG tablet    saccharomyces boulardii (FLORASTOR) 250 mg capsule    topiramate (TOPAMAX) 50 MG tablet    traZODone (DESYREL) 100 mg tablet    Allergies   Allergen Reactions    Decadrol [Dexamethasone] Other (See Comments)     Category: Adverse Reaction;   psychosis    Dexamethasone Sodium Phosphate     Other     Penicillins Hives and Other (See Comments)     Category: Allergy; Hives/Uticaria    Tetracycline Hives and Other (See Comments)     Hives/Uticaria    Tetracyclines & Related Hives     Category: Allergy;            Objective     Blood pressure 114/72, pulse 105, temperature 97 7 °F (36 5 °C), temperature source Tympanic, height 5' (1 524 m), weight 72 6 kg (160 lb)  Body mass index is 31 25 kg/m²  PHYSICAL EXAM:      General Appearance:   Alert, cooperative, no distress   HEENT:   Normocephalic, atraumatic, anicteric      Neck:  Supple, symmetrical, trachea midline   Lungs:   Clear to auscultation bilaterally; no rales, rhonchi or wheezing; respirations unlabored    Heart[de-identified]   Regular rate and rhythm; no murmur, rub, or gallop  Abdomen:   Soft, non-tender, non-distended; normal bowel sounds; no masses, no organomegaly    Genitalia:   Deferred    Rectal:   Deferred    Extremities:  No cyanosis, clubbing or edema    Pulses:  2+ and symmetric    Skin:  No jaundice, rashes, or lesions    Lymph nodes:  No palpable cervical lymphadenopathy        Lab Results:   No visits with results within 1 Day(s) from this visit     Latest known visit with results is:   Lab on 11/07/2018   Component Date Value    Sodium 11/07/2018 139     Potassium 11/07/2018 3 5     Chloride 11/07/2018 110*    CO2 11/07/2018 19*    ANION GAP 11/07/2018 10     BUN 11/07/2018 15     Creatinine 11/07/2018 1 03     Glucose 11/07/2018 115     Calcium 11/07/2018 8 7     eGFR 11/07/2018 68     MANJIT 11/07/2018 Negative          Radiology Results:   No results found

## 2018-11-15 ENCOUNTER — TELEPHONE (OUTPATIENT)
Dept: BEHAVIORAL/MENTAL HEALTH CLINIC | Facility: CLINIC | Age: 38
End: 2018-11-15

## 2018-11-16 ENCOUNTER — HOSPITAL ENCOUNTER (OUTPATIENT)
Dept: ULTRASOUND IMAGING | Facility: MEDICAL CENTER | Age: 38
Discharge: HOME | End: 2018-11-16
Attending: INTERNAL MEDICINE
Payer: COMMERCIAL

## 2018-11-16 DIAGNOSIS — R10.13 DYSPEPSIA: ICD-10-CM

## 2018-11-16 PROCEDURE — 76705 ECHO EXAM OF ABDOMEN: CPT

## 2018-11-23 DIAGNOSIS — K76.9 LIVER LESION: Primary | ICD-10-CM

## 2018-11-26 ENCOUNTER — DOCUMENTATION (OUTPATIENT)
Dept: GASTROENTEROLOGY | Facility: MEDICAL CENTER | Age: 38
End: 2018-11-26

## 2018-11-30 ENCOUNTER — TELEPHONE (OUTPATIENT)
Dept: FAMILY MEDICINE CLINIC | Facility: CLINIC | Age: 38
End: 2018-11-30

## 2018-11-30 NOTE — TELEPHONE ENCOUNTER
Please call patient  She can take Tylenol for pain, if pain persists recommend to go to the urgent care center to get evaluated or call tomorrow morning for acute visit  I reviewed U/S of the abdomen which showed liver lesion, possible hemangioma which is a benign structure for which radiologist recommended MRI of the abdomen  Recommend to follow up with gastroenterology regarding test results

## 2018-11-30 NOTE — TELEPHONE ENCOUNTER
Requesting Dr Lenin Henning give her a call, concerning instructions on healthcare situation      [phone]

## 2018-11-30 NOTE — TELEPHONE ENCOUNTER
I called patient back, she states she has pain on both sides of her back near her kidneys, since yesterday continuing to today  Since it began, it has been a constant feeling of a dull ache, but on the high scale of discomfort, never felt this sensation before  U/S done by GI Dr Duarte Fearing, showed liver lesion that she is following up with an MRI for  She denies and nausea, vomiting, or diarrhea, and denies any blood in urine  Please advise?

## 2018-12-06 ENCOUNTER — APPOINTMENT (OUTPATIENT)
Dept: LAB | Facility: HOSPITAL | Age: 38
End: 2018-12-06
Payer: COMMERCIAL

## 2018-12-06 ENCOUNTER — TRANSCRIBE ORDERS (OUTPATIENT)
Dept: LAB | Facility: HOSPITAL | Age: 38
End: 2018-12-06

## 2018-12-06 DIAGNOSIS — R53.82 CHRONIC FATIGUE: ICD-10-CM

## 2018-12-06 DIAGNOSIS — M13.0 POLYARTHROPATHY: ICD-10-CM

## 2018-12-06 DIAGNOSIS — Z13.21 SCREENING FOR MALNUTRITION: ICD-10-CM

## 2018-12-06 DIAGNOSIS — M79.7 SCAPULOHUMERAL FIBROSITIS: ICD-10-CM

## 2018-12-06 DIAGNOSIS — Z79.899 NEED FOR PROPHYLACTIC CHEMOTHERAPY: Primary | ICD-10-CM

## 2018-12-06 DIAGNOSIS — R51.9 FACIAL PAIN: ICD-10-CM

## 2018-12-06 DIAGNOSIS — M79.10 MYALGIA: ICD-10-CM

## 2018-12-06 DIAGNOSIS — A69.20 LYME DISEASE: ICD-10-CM

## 2018-12-06 LAB
25(OH)D3 SERPL-MCNC: 22.5 NG/ML (ref 30–100)
ALBUMIN SERPL BCP-MCNC: 3.8 G/DL (ref 3.5–5)
ALP SERPL-CCNC: 53 U/L (ref 46–116)
ALT SERPL W P-5'-P-CCNC: 58 U/L (ref 12–78)
ANION GAP SERPL CALCULATED.3IONS-SCNC: 7 MMOL/L (ref 4–13)
AST SERPL W P-5'-P-CCNC: 30 U/L (ref 5–45)
BASOPHILS # BLD AUTO: 0.02 THOUSANDS/ΜL (ref 0–0.1)
BASOPHILS NFR BLD AUTO: 0 % (ref 0–1)
BILIRUB SERPL-MCNC: 0.27 MG/DL (ref 0.2–1)
BILIRUB UR QL STRIP: NEGATIVE
BUN SERPL-MCNC: 21 MG/DL (ref 5–25)
CALCIUM SERPL-MCNC: 8.6 MG/DL (ref 8.3–10.1)
CHLORIDE SERPL-SCNC: 108 MMOL/L (ref 100–108)
CK SERPL-CCNC: 50 U/L (ref 26–192)
CLARITY UR: ABNORMAL
CO2 SERPL-SCNC: 23 MMOL/L (ref 21–32)
COLOR UR: ABNORMAL
CREAT SERPL-MCNC: 0.94 MG/DL (ref 0.6–1.3)
CRP SERPL QL: 4.7 MG/L
EOSINOPHIL # BLD AUTO: 0.02 THOUSAND/ΜL (ref 0–0.61)
EOSINOPHIL NFR BLD AUTO: 0 % (ref 0–6)
ERYTHROCYTE [DISTWIDTH] IN BLOOD BY AUTOMATED COUNT: 13.3 % (ref 11.6–15.1)
ERYTHROCYTE [SEDIMENTATION RATE] IN BLOOD: 5 MM/HOUR (ref 0–20)
GFR SERPL CREATININE-BSD FRML MDRD: 76 ML/MIN/1.73SQ M
GLUCOSE SERPL-MCNC: 109 MG/DL (ref 65–140)
GLUCOSE UR STRIP-MCNC: NEGATIVE MG/DL
HCT VFR BLD AUTO: 39.4 % (ref 34.8–46.1)
HGB BLD-MCNC: 12.9 G/DL (ref 11.5–15.4)
HGB UR QL STRIP.AUTO: NEGATIVE
IMM GRANULOCYTES # BLD AUTO: 0.04 THOUSAND/UL (ref 0–0.2)
IMM GRANULOCYTES NFR BLD AUTO: 0 % (ref 0–2)
KETONES UR STRIP-MCNC: ABNORMAL MG/DL
LEUKOCYTE ESTERASE UR QL STRIP: NEGATIVE
LYMPHOCYTES # BLD AUTO: 2.41 THOUSANDS/ΜL (ref 0.6–4.47)
LYMPHOCYTES NFR BLD AUTO: 25 % (ref 14–44)
MCH RBC QN AUTO: 29.9 PG (ref 26.8–34.3)
MCHC RBC AUTO-ENTMCNC: 32.7 G/DL (ref 31.4–37.4)
MCV RBC AUTO: 91 FL (ref 82–98)
MONOCYTES # BLD AUTO: 0.55 THOUSAND/ΜL (ref 0.17–1.22)
MONOCYTES NFR BLD AUTO: 6 % (ref 4–12)
NEUTROPHILS # BLD AUTO: 6.65 THOUSANDS/ΜL (ref 1.85–7.62)
NEUTS SEG NFR BLD AUTO: 69 % (ref 43–75)
NITRITE UR QL STRIP: NEGATIVE
NRBC BLD AUTO-RTO: 0 /100 WBCS
PH UR STRIP.AUTO: 5.5 [PH] (ref 4.5–8)
PLATELET # BLD AUTO: 188 THOUSANDS/UL (ref 149–390)
PMV BLD AUTO: 9.2 FL (ref 8.9–12.7)
POTASSIUM SERPL-SCNC: 3.5 MMOL/L (ref 3.5–5.3)
PROT SERPL-MCNC: 7.2 G/DL (ref 6.4–8.2)
PROT UR STRIP-MCNC: NEGATIVE MG/DL
PTH-INTACT SERPL-MCNC: 34.9 PG/ML (ref 18.4–80.1)
RBC # BLD AUTO: 4.31 MILLION/UL (ref 3.81–5.12)
SODIUM SERPL-SCNC: 138 MMOL/L (ref 136–145)
SP GR UR STRIP.AUTO: 1.03 (ref 1–1.03)
TSH SERPL DL<=0.05 MIU/L-ACNC: 3.89 UIU/ML (ref 0.36–3.74)
UROBILINOGEN UR QL STRIP.AUTO: 0.2 E.U./DL
VIT B12 SERPL-MCNC: 534 PG/ML (ref 100–900)
WBC # BLD AUTO: 9.69 THOUSAND/UL (ref 4.31–10.16)

## 2018-12-06 PROCEDURE — 86430 RHEUMATOID FACTOR TEST QUAL: CPT

## 2018-12-06 PROCEDURE — 83970 ASSAY OF PARATHORMONE: CPT

## 2018-12-06 PROCEDURE — 84165 PROTEIN E-PHORESIS SERUM: CPT | Performed by: PATHOLOGY

## 2018-12-06 PROCEDURE — 82955 ASSAY OF G6PD ENZYME: CPT

## 2018-12-06 PROCEDURE — 82306 VITAMIN D 25 HYDROXY: CPT

## 2018-12-06 PROCEDURE — 86803 HEPATITIS C AB TEST: CPT

## 2018-12-06 PROCEDURE — 81003 URINALYSIS AUTO W/O SCOPE: CPT

## 2018-12-06 PROCEDURE — 82607 VITAMIN B-12: CPT

## 2018-12-06 PROCEDURE — 85652 RBC SED RATE AUTOMATED: CPT

## 2018-12-06 PROCEDURE — 81374 HLA I TYPING 1 ANTIGEN LR: CPT

## 2018-12-06 PROCEDURE — 86706 HEP B SURFACE ANTIBODY: CPT

## 2018-12-06 PROCEDURE — 86140 C-REACTIVE PROTEIN: CPT

## 2018-12-06 PROCEDURE — 86200 CCP ANTIBODY: CPT

## 2018-12-06 PROCEDURE — 86704 HEP B CORE ANTIBODY TOTAL: CPT

## 2018-12-06 PROCEDURE — 82550 ASSAY OF CK (CPK): CPT

## 2018-12-06 PROCEDURE — 86480 TB TEST CELL IMMUN MEASURE: CPT

## 2018-12-06 PROCEDURE — 84443 ASSAY THYROID STIM HORMONE: CPT

## 2018-12-06 PROCEDURE — 87340 HEPATITIS B SURFACE AG IA: CPT

## 2018-12-06 PROCEDURE — 84165 PROTEIN E-PHORESIS SERUM: CPT

## 2018-12-06 PROCEDURE — 86038 ANTINUCLEAR ANTIBODIES: CPT

## 2018-12-06 PROCEDURE — 85025 COMPLETE CBC W/AUTO DIFF WBC: CPT

## 2018-12-06 PROCEDURE — 80053 COMPREHEN METABOLIC PANEL: CPT

## 2018-12-06 PROCEDURE — 36415 COLL VENOUS BLD VENIPUNCTURE: CPT

## 2018-12-06 PROCEDURE — 82164 ANGIOTENSIN I ENZYME TEST: CPT

## 2018-12-07 LAB
ACE SERPL-CCNC: 31 U/L (ref 14–82)
G6PD BLD QN: 244 U/10E12 RBC (ref 146–376)
HBV CORE AB SER QL: NORMAL
HBV SURFACE AB SER-ACNC: <3.1 MIU/ML
HBV SURFACE AG SER QL: NORMAL
HCV AB SER QL: NORMAL
RBC # BLD AUTO: 4.84 X10E6/UL (ref 3.77–5.28)
RHEUMATOID FACT SER QL LA: NEGATIVE
RYE IGE QN: NEGATIVE

## 2018-12-08 LAB
ALBUMIN SERPL ELPH-MCNC: 4.49 G/DL (ref 3.5–5)
ALBUMIN SERPL ELPH-MCNC: 63.2 % (ref 52–65)
ALPHA1 GLOB SERPL ELPH-MCNC: 0.31 G/DL (ref 0.1–0.4)
ALPHA1 GLOB SERPL ELPH-MCNC: 4.4 % (ref 2.5–5)
ALPHA2 GLOB SERPL ELPH-MCNC: 0.6 G/DL (ref 0.4–1.2)
ALPHA2 GLOB SERPL ELPH-MCNC: 8.5 % (ref 7–13)
BETA GLOB ABNORMAL SERPL ELPH-MCNC: 0.41 G/DL (ref 0.4–0.8)
BETA1 GLOB SERPL ELPH-MCNC: 5.8 % (ref 5–13)
BETA2 GLOB SERPL ELPH-MCNC: 4.7 % (ref 2–8)
BETA2+GAMMA GLOB SERPL ELPH-MCNC: 0.33 G/DL (ref 0.2–0.5)
CCP IGA+IGG SERPL IA-ACNC: 5 UNITS (ref 0–19)
GAMMA GLOB ABNORMAL SERPL ELPH-MCNC: 0.95 G/DL (ref 0.5–1.6)
GAMMA GLOB SERPL ELPH-MCNC: 13.4 % (ref 12–22)
IGG/ALB SER: 1.72 {RATIO} (ref 1.1–1.8)
PROT PATTERN SERPL ELPH-IMP: NORMAL
PROT SERPL-MCNC: 7.1 G/DL (ref 6.4–8.2)

## 2018-12-09 DIAGNOSIS — R10.13 DYSPEPSIA: ICD-10-CM

## 2018-12-09 DIAGNOSIS — F33.2 MDD (MAJOR DEPRESSIVE DISORDER), RECURRENT SEVERE, WITHOUT PSYCHOSIS (HCC): ICD-10-CM

## 2018-12-09 RX ORDER — ONDANSETRON 4 MG/1
TABLET, FILM COATED ORAL
Qty: 30 TABLET | Refills: 0 | Status: SHIPPED | OUTPATIENT
Start: 2018-12-09 | End: 2019-04-02 | Stop reason: HOSPADM

## 2018-12-10 ENCOUNTER — HOSPITAL ENCOUNTER (OUTPATIENT)
Dept: MRI IMAGING | Facility: HOSPITAL | Age: 38
Discharge: HOME | End: 2018-12-10
Attending: INTERNAL MEDICINE
Payer: COMMERCIAL

## 2018-12-10 DIAGNOSIS — K76.9 LIVER LESION: ICD-10-CM

## 2018-12-10 LAB
GAMMA INTERFERON BACKGROUND BLD IA-ACNC: 0.02 IU/ML
M TB IFN-G BLD-IMP: NEGATIVE
M TB IFN-G CD4+ BCKGRND COR BLD-ACNC: 0 IU/ML
M TB IFN-G CD4+ BCKGRND COR BLD-ACNC: 0 IU/ML
MITOGEN IGNF BCKGRD COR BLD-ACNC: >10 IU/ML

## 2018-12-10 PROCEDURE — 74183 MRI ABD W/O CNTR FLWD CNTR: CPT

## 2018-12-10 PROCEDURE — A9585 GADOBUTROL INJECTION: HCPCS | Performed by: INTERNAL MEDICINE

## 2018-12-10 RX ORDER — LORAZEPAM 1 MG/1
1 TABLET ORAL EVERY 6 HOURS
Qty: 120 TABLET | Refills: 0 | Status: SHIPPED | OUTPATIENT
Start: 2018-12-10 | End: 2019-01-15 | Stop reason: SDUPTHER

## 2018-12-10 RX ADMIN — GADOBUTROL 7 ML: 604.72 INJECTION INTRAVENOUS at 09:21

## 2018-12-13 ENCOUNTER — DOCUMENTATION (OUTPATIENT)
Dept: PSYCHIATRY | Facility: CLINIC | Age: 38
End: 2018-12-13

## 2018-12-13 LAB — HLA-B27 QL NAA+PROBE: NEGATIVE

## 2018-12-18 ENCOUNTER — DOCUMENTATION (OUTPATIENT)
Dept: PSYCHIATRY | Facility: CLINIC | Age: 38
End: 2018-12-18

## 2018-12-24 DIAGNOSIS — G43.709 CHRONIC MIGRAINE WITHOUT AURA WITHOUT STATUS MIGRAINOSUS, NOT INTRACTABLE: ICD-10-CM

## 2018-12-24 NOTE — TELEPHONE ENCOUNTER
Patient is requesting a prior auth for her Rizatriptan 10 mg  Prn for migraines      [facility]

## 2018-12-26 ENCOUNTER — TELEPHONE (OUTPATIENT)
Dept: GASTROENTEROLOGY | Facility: MEDICAL CENTER | Age: 38
End: 2018-12-26

## 2018-12-26 NOTE — TELEPHONE ENCOUNTER
I get a response that they cannot find patient in database when I send through her insurance authorization

## 2018-12-26 NOTE — TELEPHONE ENCOUNTER
----- Message from La Lopez MD sent at 12/21/2018  2:33 PM EST -----  MRI confirmed the liver nodule is a hemangioma, a benign condition  No need for further imaging testing

## 2018-12-31 RX ORDER — RIZATRIPTAN BENZOATE 10 MG/1
10 TABLET ORAL ONCE AS NEEDED
Qty: 9 TABLET | Refills: 0 | Status: SHIPPED | OUTPATIENT
Start: 2018-12-31 | End: 2019-10-22

## 2019-01-02 DIAGNOSIS — M62.838 MUSCLE SPASM: ICD-10-CM

## 2019-01-02 RX ORDER — CYCLOBENZAPRINE HCL 10 MG
TABLET ORAL
Qty: 30 TABLET | Refills: 5 | Status: SHIPPED | OUTPATIENT
Start: 2019-01-02 | End: 2019-03-24

## 2019-01-04 ENCOUNTER — OFFICE VISIT (OUTPATIENT)
Dept: GASTROENTEROLOGY | Facility: MEDICAL CENTER | Age: 39
End: 2019-01-04
Payer: COMMERCIAL

## 2019-01-04 VITALS
SYSTOLIC BLOOD PRESSURE: 114 MMHG | HEART RATE: 88 BPM | BODY MASS INDEX: 32.98 KG/M2 | WEIGHT: 168 LBS | TEMPERATURE: 98.8 F | HEIGHT: 60 IN | DIASTOLIC BLOOD PRESSURE: 76 MMHG

## 2019-01-04 DIAGNOSIS — R10.13 DYSPEPSIA: Primary | ICD-10-CM

## 2019-01-04 DIAGNOSIS — K59.09 OTHER CONSTIPATION: ICD-10-CM

## 2019-01-04 PROCEDURE — 99213 OFFICE O/P EST LOW 20 MIN: CPT | Performed by: PHYSICIAN ASSISTANT

## 2019-01-04 NOTE — PROGRESS NOTES
[facility] - Outpatient Follow-up Note  Alexis Gosselin 34 y o  female MRN: [id number]  Encounter: [id number]          ASSESSMENT AND PLAN:      1  Dyspepsia: she was started on PPI but is no longer taking this  She states she was diagnosed with lyme disease and after starting antibiotic and using apple cidar vinegar at bedtime her symptoms improved  She is no longer on PPI  She uses antacid as needed for spicy meals   -may use tums or H2 blocker PRN    2  Other constipation: resolved with miralax  -continue  miralax 17g daily PRN    -f/u PRN    ______________________________________________________________________    SUBJECTIVE:  33-year-old female here for follow-up of dyspepsia and constipation  She states that she was recently diagnosed with Lyme disease and started on an antibiotic  She states after starting this medication her symptoms greatly improved  She was previously on a PPI for acid reflux however she used apple cider vinegar at bedtime and now her symptoms and abdominal pain have completely resolved  She was using MiraLax for 3 days in her constipation resolved  She denies any GI complaints including dysphagia, daily reflux, abdominal pain, diarrhea, constipation, melena or hematochezia  REVIEW OF SYSTEMS IS OTHERWISE NEGATIVE  Historical Information   Past Medical History:   Diagnosis Date    Chronic sinusitis     Depression     Fibromyalgia     Migraine     Obesity     Polyarthritis     Last assessed 9/21/2015    Psychiatric disorder      Past Surgical History:   Procedure Laterality Date    HYSTEROSCOPY      Endometrial Biopsy By Hysteroscopy     Social History   History   Alcohol Use No     Comment: Per [employer];  Social use     History   Drug Use No     History   Smoking Status    Never Smoker   Smokeless Tobacco    Never Used     Family History   Problem Relation Age of Onset    Diabetes Father     Substance Abuse Brother     Diabetes Maternal Grandmother     Rheum arthritis Maternal Grandmother     Melanoma Maternal Grandmother     Kidney disease Paternal Grandmother     Rheum arthritis Paternal Grandmother     Depression Paternal Grandmother     Diabetes Paternal Grandfather     Lung cancer Paternal Grandfather     Substance Abuse Maternal Uncle     Depression Paternal Aunt     Alcohol abuse Family     Stomach cancer Family        Meds/Allergies       Current Outpatient Prescriptions:     Cholecalciferol (VITAMIN D3) 2000 units capsule    cyclobenzaprine (FLEXERIL) 10 mg tablet    DULoxetine (CYMBALTA) 30 mg delayed release capsule    DULoxetine (CYMBALTA) 60 mg delayed release capsule    LORazepam (ATIVAN) 1 mg tablet    medroxyPROGESTERone (PROVERA) 10 mg tablet    omeprazole (PriLOSEC) 40 MG capsule    ondansetron (ZOFRAN) 4 mg tablet    pregabalin (LYRICA) 100 mg capsule    rizatriptan (MAXALT) 10 MG tablet    saccharomyces boulardii (FLORASTOR) 250 mg capsule    topiramate (TOPAMAX) 50 MG tablet    traZODone (DESYREL) 100 mg tablet    polyethylene glycol (GLYCOLAX) powder    Allergies   Allergen Reactions    Decadrol [Dexamethasone] Other (See Comments)     Category: Adverse Reaction;   psychosis    Dexamethasone Sodium Phosphate     Other     Penicillins Hives and Other (See Comments)     Category: Allergy; Hives/Uticaria    Tetracycline Hives and Other (See Comments)     Hives/Uticaria    Tetracyclines & Related Hives     Category: Allergy;            Objective     Blood pressure 114/76, pulse 88, temperature 98 8 °F (37 1 °C), temperature source Tympanic, height 5' (1 524 m), weight 76 2 kg (168 lb)  Body mass index is 32 81 kg/m²  PHYSICAL EXAM:      General Appearance:   Alert, cooperative, no distress   HEENT:   Normocephalic, atraumatic, anicteric   right eye exhibits no discharge  Left eye exhibits no discharge   No scleral icterus   Neck:  Supple, symmetrical, trachea midline, no stridor    Lungs:   Clear to auscultation bilaterally; no rales, rhonchi or wheezing; respirations unlabored    Heart[de-identified]   Regular rate and rhythm; no murmur, rub, or gallop  Abdomen:   Soft, non-tender, non-distended; normal bowel sounds; no masses, no organomegaly    Genitalia:   Deferred    Rectal:   Deferred    Extremities:  No cyanosis, clubbing or edema        Skin:  No jaundice, rashes, or lesions          Lab Results:   No visits with results within 1 Day(s) from this visit     Latest known visit with results is:   Transcribe Orders on 12/06/2018   Component Date Value    Color, UA 12/06/2018 Dk Yellow     Clarity, UA 12/06/2018 Cloudy     Specific Gravity, UA 12/06/2018 1 030     pH, UA 12/06/2018 5 5     Leukocytes, UA 12/06/2018 Negative     Nitrite, UA 12/06/2018 Negative     Protein, UA 12/06/2018 Negative     Glucose, UA 12/06/2018 Negative     Ketones, UA 12/06/2018 Trace*    Urobilinogen, UA 12/06/2018 0 2     Bilirubin, UA 12/06/2018 Negative     Blood, UA 12/06/2018 Negative     WBC 12/06/2018 9 69     RBC 12/06/2018 4 31     Hemoglobin 12/06/2018 12 9     Hematocrit 12/06/2018 39 4     MCV 12/06/2018 91     MCH 12/06/2018 29 9     MCHC 12/06/2018 32 7     RDW 12/06/2018 13 3     MPV 12/06/2018 9 2     Platelets 41/82/2175 188     nRBC 12/06/2018 0     Neutrophils Relative 12/06/2018 69     Immat GRANS % 12/06/2018 0     Lymphocytes Relative 12/06/2018 25     Monocytes Relative 12/06/2018 6     Eosinophils Relative 12/06/2018 0     Basophils Relative 12/06/2018 0     Neutrophils Absolute 12/06/2018 6 65     Immature Grans Absolute 12/06/2018 0 04     Lymphocytes Absolute 12/06/2018 2 41     Monocytes Absolute 12/06/2018 0 55     Eosinophils Absolute 12/06/2018 0 02     Basophils Absolute 12/06/2018 0 02     Sodium 12/06/2018 138     Potassium 12/06/2018 3 5     Chloride 12/06/2018 108     CO2 12/06/2018 23     ANION GAP 12/06/2018 7     BUN 12/06/2018 21     Creatinine 12/06/2018 0 94     Glucose 12/06/2018 109     Calcium 12/06/2018 8 6     AST 12/06/2018 30     ALT 12/06/2018 58     Alkaline Phosphatase 12/06/2018 53     Total Protein 12/06/2018 7 2     Albumin 12/06/2018 3 8     Total Bilirubin 12/06/2018 0 27     eGFR 12/06/2018 76     CRP 12/06/2018 4 7*    Sed Rate 12/06/2018 5     Hep B Core Total Ab 12/06/2018 Non-reactive     Hep B S Ab 12/06/2018 <3 10     Hepatitis B Surface Ag 12/06/2018 Non-reactive     Hepatitis C Ab 12/06/2018 Non-reactive     QFT Nil 12/06/2018 0 02     QFT TB1-NIL 12/06/2018 0 00     QFT TB2-NIL 12/06/2018 0 00     QFT Mitogen-NIL 12/06/2018 >10 00     QFT Final Interpretation 12/06/2018 Negative     MANJIT 12/06/2018 Negative     Angio Convert Enzyme 12/06/2018 31     Total CK 12/06/2018 50     Vit D, 25-Hydroxy 12/06/2018 22 5*    Vitamin B-12 12/06/2018 534     TSH 3RD GENERATON 12/06/2018 3 890*    HLA B27 12/06/2018 Negative     A/G Ratio 12/06/2018 1 72     Albumin Electrophoresis 12/06/2018 63 2     Albumin CONC 12/06/2018 4 49     Alpha 1 12/06/2018 4 4     ALPHA 1 CONC 12/06/2018 0 31     Alpha 2 12/06/2018 8 5     ALPHA 2 CONC 12/06/2018 0 60     Beta-1 12/06/2018 5 8     BETA 1 CONC 12/06/2018 0 41     Beta-2 12/06/2018 4 7     BETA 2 CONC 12/06/2018 0 33     Gamma Globulin 12/06/2018 13 4     GAMMA CONC 12/06/2018 0 95     SPEP Interpretation 12/06/2018 The serum total protein, albumin and electrophoresis are within normal limits  No monoclonal bands noted   Reviewed by: Roula Patton MD  ([id number])  **Electronic Signature**     Total Protein 43/52/3444 7 1     Cyclic Citrullin Peptide* 12/06/2018 5     G6PD, Quant 12/06/2018 244     Red Blood Cell Count 12/06/2018 4 84     PTH 12/06/2018 34 9     Rheumatoid Factor 12/06/2018 Negative          Radiology Results:   Mri Abdomen W Wo Contrast    Result Date: 12/11/2018  Narrative: MRI OF THE ABDOMEN (LIVER) WITH AND WITHOUT CONTRAST INDICATION: Liver lesion seen on ultrasound COMPARISON:  January 19, 2018 CT, unenhanced  November 16, 2018 ultrasound TECHNIQUE:  The following pulse sequences were obtained on a 1 5 T scanner:  Coronal and axial T2 with TE of 90 and 180 respectively, axial T2 with fat saturation, axial FIESTA fat-sat, axial T1-weighted in-and-out-of phase, axial DWI/ADC, pre-contrast axial T1 with fat saturation, post-contrast dynamic axial T1 with fat saturation at 20, 70, and 180 seconds, followed by coronal and 7 minute delayed axial T1 with fat saturation  IV Contrast:  7 mL of gadobutrol injection (MULTI-DOSE) FINDINGS: LIVER:  General:  Normal in size and contour  No loss of signal on out-of-phase images to suggest hepatic steatosis  Lesions:  Several liver lesions are identified  The largest correlates with that seen on the recent ultrasound, located in posterior right segment (segment 7) measuring 1 2 x 1 2 cm  It is round well-defined and homogeneously T2 hyperintense  It is hypointense on T1-weighted imaging and shows discontinuous peripheral centripetal enhancement pattern, most consistent with that seen in a benign cavernous hemangioma  This correlates well with the sonographic features as well  There are at least 3 additional tiny nodules, 5 mm and less in size with similar features suggesting additional smaller hemangiomata  These are too small to more accurately characterize  Specifically, anterior segment series 11 image 53, left lateral segment image 40 posterior segment image 33  Vasculature:  Portal and hepatic veins patent without evidence of thrombosis  BILIARY TREE:  Normal   GALLBLADDER:  Normal  PANCREAS:  Normal  ADRENAL GLANDS:  Normal  SPLEEN:  Normal  KIDNEYS:  Normal  ABDOMINAL CAVITY:  No lymphadenopathy or mass  No ascites  BOWEL:  Unremarkable MRI appearance  OSSEOUS STRUCTURES:  No osseous destruction   EXTRAHEPATIC VASCULAR STRUCTURES:  Visualized vasculature is normal  ABDOMINAL WALL:  Normal  LOWER CHEST:  Unremarkable MRI appearance  Impression: Nodule seen on recent ultrasound is demonstrated on this exam and most consistent with a hemangioma  Additional tiny nodules with similar features as noted above, likely also representing hemangiomata  These are too small to more accurately characterize

## 2019-01-06 DIAGNOSIS — F33.2 MDD (MAJOR DEPRESSIVE DISORDER), RECURRENT SEVERE, WITHOUT PSYCHOSIS (HCC): ICD-10-CM

## 2019-01-06 RX ORDER — DULOXETIN HYDROCHLORIDE 30 MG/1
CAPSULE, DELAYED RELEASE ORAL
Qty: 30 CAPSULE | Refills: 2 | Status: ON HOLD | OUTPATIENT
Start: 2019-01-06 | End: 2019-04-02 | Stop reason: SDUPTHER

## 2019-01-06 RX ORDER — DULOXETIN HYDROCHLORIDE 60 MG/1
CAPSULE, DELAYED RELEASE ORAL
Qty: 30 CAPSULE | Refills: 2 | Status: ON HOLD | OUTPATIENT
Start: 2019-01-06 | End: 2019-04-02 | Stop reason: SDUPTHER

## 2019-01-11 ENCOUNTER — TELEPHONE (OUTPATIENT)
Dept: FAMILY MEDICINE CLINIC | Facility: CLINIC | Age: 39
End: 2019-01-11

## 2019-01-11 DIAGNOSIS — G43.009 MIGRAINE WITHOUT AURA AND WITHOUT STATUS MIGRAINOSUS, NOT INTRACTABLE: Primary | ICD-10-CM

## 2019-01-11 NOTE — TELEPHONE ENCOUNTER
Stephen Andrade, Neurology () phoned requesting a physician order with diagnosis [id number] for an appointment on 1/18/19

## 2019-01-15 DIAGNOSIS — F33.2 MDD (MAJOR DEPRESSIVE DISORDER), RECURRENT SEVERE, WITHOUT PSYCHOSIS (HCC): ICD-10-CM

## 2019-01-15 RX ORDER — LORAZEPAM 1 MG/1
1 TABLET ORAL EVERY 6 HOURS
Qty: 120 TABLET | Refills: 0 | Status: SHIPPED | OUTPATIENT
Start: 2019-01-15 | End: 2019-02-28 | Stop reason: SDUPTHER

## 2019-01-30 ENCOUNTER — TELEPHONE (OUTPATIENT)
Dept: BEHAVIORAL/MENTAL HEALTH CLINIC | Facility: CLINIC | Age: 39
End: 2019-01-30

## 2019-01-30 ENCOUNTER — DOCUMENTATION (OUTPATIENT)
Dept: BEHAVIORAL/MENTAL HEALTH CLINIC | Facility: CLINIC | Age: 39
End: 2019-01-30

## 2019-01-30 NOTE — PROGRESS NOTES
Treatment Plan Tracking    # 1Treatment Plan not completed within required time limits due to: Client cancelled her ind psych appt for 1/30/19 due to inclement weather and , thus, unable to update treatment plan  Ria Julien

## 2019-01-31 ENCOUNTER — OFFICE VISIT (OUTPATIENT)
Dept: FAMILY MEDICINE CLINIC | Facility: CLINIC | Age: 39
End: 2019-01-31

## 2019-01-31 VITALS
HEART RATE: 80 BPM | SYSTOLIC BLOOD PRESSURE: 110 MMHG | RESPIRATION RATE: 16 BRPM | DIASTOLIC BLOOD PRESSURE: 60 MMHG | WEIGHT: 173.2 LBS | BODY MASS INDEX: 34 KG/M2 | HEIGHT: 60 IN | TEMPERATURE: 97.9 F

## 2019-01-31 DIAGNOSIS — J01.90 ACUTE NON-RECURRENT SINUSITIS, UNSPECIFIED LOCATION: Primary | ICD-10-CM

## 2019-01-31 PROCEDURE — 1036F TOBACCO NON-USER: CPT | Performed by: NURSE PRACTITIONER

## 2019-01-31 PROCEDURE — 3008F BODY MASS INDEX DOCD: CPT | Performed by: NURSE PRACTITIONER

## 2019-01-31 PROCEDURE — 99213 OFFICE O/P EST LOW 20 MIN: CPT | Performed by: NURSE PRACTITIONER

## 2019-01-31 RX ORDER — GUAIFENESIN 600 MG
1200 TABLET, EXTENDED RELEASE 12 HR ORAL EVERY 12 HOURS SCHEDULED
Qty: 20 TABLET | Refills: 0 | Status: SHIPPED | OUTPATIENT
Start: 2019-01-31 | End: 2019-03-24

## 2019-01-31 RX ORDER — LEVOFLOXACIN 750 MG/1
750 TABLET ORAL EVERY 24 HOURS
Qty: 5 TABLET | Refills: 0 | Status: SHIPPED | OUTPATIENT
Start: 2019-01-31 | End: 2019-02-05

## 2019-01-31 NOTE — PROGRESS NOTES
Chief Complaint   Patient presents with    Cold Like Symptoms     possible sinus infection, for 7 days     Assessment/Plan:    Acute sinusitis- to start Levaquin  May take Mucinex  mg 1-2 tabs BID prn  Flonase or saline nasal spray OTC  Declined a cough suppressant today  Warm, salt water gargles, throat lozenges  Increase PO fluids and rest   OTC Tylenol prn, max of 3g/24 hours  Call office if symptoms worsen or persist     Diagnoses and all orders for this visit:    Acute non-recurrent sinusitis, unspecified location  -     levofloxacin (LEVAQUIN) 750 mg tablet; Take 1 tablet (750 mg total) by mouth every 24 hours for 5 days  -     guaiFENesin (MUCINEX) 600 mg 12 hr tablet; Take 2 tablets (1,200 mg total) by mouth every 12 (twelve) hours          Subjective:      Patient ID: Joanne Duarte is a 37 y o  female  HPI     Pt presents by herself today for an acute visit  C/O a sore throat for the past week   Feels her glands in her neck are swollen   +chest congestion  +productive cough  +nasal congestion with rhinorrhea  +dull headache   Feels warm but not checking her temps at home  No body aches  No N/V/D, appetite is normal     Non smoker  Denies H/O asthma   She did not have her flu vaccine this season     Taking OTC generic cold and flu medication     The following portions of the patient's history were reviewed and updated as appropriate: allergies, current medications, past medical history, past social history and problem list     Review of Systems   Constitutional: Positive for chills, fatigue and fever  Negative for unexpected weight change  HENT: Positive for congestion, postnasal drip, rhinorrhea, sinus pressure and sore throat  Negative for ear pain and hearing loss  Eyes: Negative for visual disturbance  Respiratory: Positive for cough  Negative for chest tightness, shortness of breath and wheezing  Cardiovascular: Negative for chest pain, palpitations and leg swelling  Gastrointestinal: Negative for abdominal pain, blood in stool, constipation, diarrhea, nausea and vomiting  Genitourinary: Negative for dysuria  Musculoskeletal: Negative for arthralgias and myalgias  Skin: Negative for color change, pallor, rash and wound  Neurological: Negative for dizziness, weakness, numbness and headaches  Objective:      /60   Pulse 80   Temp 97 9 °F (36 6 °C) (Oral)   Resp 16   Ht 5' (1 524 m)   Wt 78 6 kg (173 lb 3 2 oz)   BMI 33 83 kg/m²          Physical Exam   Constitutional: She is oriented to person, place, and time  She appears well-developed and well-nourished  No distress  HENT:   Head: Normocephalic and atraumatic  Right Ear: Hearing, tympanic membrane, external ear and ear canal normal    Left Ear: Hearing, tympanic membrane, external ear and ear canal normal    Nose: Mucosal edema (erythematous) present  Right sinus exhibits maxillary sinus tenderness and frontal sinus tenderness  Left sinus exhibits maxillary sinus tenderness and frontal sinus tenderness  Mouth/Throat: Mucous membranes are normal  Posterior oropharyngeal erythema present  No oropharyngeal exudate  Eyes: Pupils are equal, round, and reactive to light  Conjunctivae are normal    Neck: Normal range of motion  Neck supple  No thyromegaly present  Cardiovascular: Normal rate, regular rhythm and normal heart sounds  No murmur heard  Pulmonary/Chest: Effort normal and breath sounds normal  No respiratory distress  She has no wheezes  Abdominal: Soft  Bowel sounds are normal  She exhibits no distension  There is no tenderness  Musculoskeletal: Normal range of motion  Lymphadenopathy:     She has no cervical adenopathy  Neurological: She is alert and oriented to person, place, and time  Skin: Skin is warm and dry  She is not diaphoretic  Psychiatric: She has a normal mood and affect

## 2019-02-14 DIAGNOSIS — G43.709 CHRONIC MIGRAINE WITHOUT AURA WITHOUT STATUS MIGRAINOSUS, NOT INTRACTABLE: ICD-10-CM

## 2019-02-14 RX ORDER — TOPIRAMATE 50 MG/1
TABLET, FILM COATED ORAL
Qty: 60 TABLET | Refills: 0 | Status: SHIPPED | OUTPATIENT
Start: 2019-02-14 | End: 2019-03-16 | Stop reason: SDUPTHER

## 2019-02-27 ENCOUNTER — DOCUMENTATION (OUTPATIENT)
Dept: BEHAVIORAL/MENTAL HEALTH CLINIC | Facility: CLINIC | Age: 39
End: 2019-02-27

## 2019-02-27 ENCOUNTER — OFFICE VISIT (OUTPATIENT)
Dept: BEHAVIORAL/MENTAL HEALTH CLINIC | Facility: CLINIC | Age: 39
End: 2019-02-27
Payer: COMMERCIAL

## 2019-02-27 DIAGNOSIS — F41.1 ANXIETY, GENERALIZED: ICD-10-CM

## 2019-02-27 DIAGNOSIS — F33.2 SEVERE RECURRENT MAJOR DEPRESSION WITHOUT PSYCHOTIC FEATURES (HCC): Primary | ICD-10-CM

## 2019-02-27 PROCEDURE — 90834 PSYTX W PT 45 MINUTES: CPT | Performed by: SOCIAL WORKER

## 2019-02-27 NOTE — PROGRESS NOTES
Treatment Plan Tracking    # 1Treatment Plan not completed within required time limits due to: Client ahd to cancel two appointments

## 2019-02-27 NOTE — PROGRESS NOTES
Lieutenant Scherer  1980       Date of Initial Treatment Plan:8/21/18  Date of Current Treatment Plan: 02/27/19    Treatment Plan Number 2    Strengths/Personal Resources for Self Care: "witty, smart, funny, creative, caring, resourceful, good hair;"      Diagnosis:   F41 1, F33 1    Area of Needs: depression, anxiety, chronic pain    Long Term Goal 1: AI want to continue to work on effectively managing the depression  Target Date: 6/27/19  Completion Date: n/a         Short Term Objectives for Goal 1: AI will cotinue to be aware of the depression/neg self-talk message  B  I will continue to be aware of the depressio-reducing positive/realistic self-talk messages; C  I will continue to work with Dr Carlos Enrique Mosher regarding my medication management  D  I will continue to make sure I am taking care of myself  Target Date: 6/27/19  Completion Date: n/a    Long Term Goal 2: I will continue to effectively manage the anxiety  Target Date: 6/27/19  Completion Date:     Short Term Objectives for Goal 2: AI will remain aware of the anxiety self-talk messages (ex , predicting the future)  B  I will remain aware of the anxiety-reducing postiive  realistic self-talk messages (focusing on what I know)  C I want to remain aware of the triggers and ways to offset them  D  I will continue to take care of myself  E I will continue with medication management  Target Date: 6/27/19  Completion Date: n/a         Long Term Goal # 3: I will continue to work on strengthening my self-esteem  Target Date: 6/27/19  Completion Date: N/A    Short Term Objectives for Goal 3: AA> I will identify those who have impacted me neg  and pos  B  I willidentify/use my strengths/assets/qualities  and what I like about myself  C  I will continue with self care  D  I will continue to explore options (advocate) to improve my quality of life       Target Date: 6/27/19  Completion Date: n/a    GOAL 1: Modality: Individual 1x per month   Completion Date n/a and The person(s) responsible for carrying out the plan is  Esther    GOAL 2: Modality: Individual 1x per month   Completion Date n/a and The person(s) responsible for carrying out the plan is   Esther     GOAL 3: Modality: Individual 1x per month   Completion Date n/a  and The person(s) responsible for carrying out the plan is  [place] [address]: Diagnosis and Treatment Plan explained to Christina Decree relates understanding diagnosis and is agreeable to Treatment Plan  yes      Client Comments : Please share your thoughts, feelings, need and/or experiences regarding your treatment plan:       __________________________________________________________________    __________________________________________________________________    __________________________________________________________________    __________________________________________________________________    _______________________________________                Patient signature, Date Time: __________________________________________             Physician cosigner signature, Date, Time: ________________________________

## 2019-02-27 NOTE — PSYCH
Psychotherapy Provided: Individual Psychotherapy 45 minutes PHQ 9 = 22; CAROL 7 = 16; reports the Soc Sec Dis was denied during mid 2018;  has a suicidal plan but not the means to harm herself;  has plans to request her records from [facility] as a basis to submit for another Soc Sec Dis application; "I am reading all of the rules (to re apply for Soc Sec dis status)  "  had been sick for one month with Lyme Disease with a strong regimen of antibiotics; She noted had contracted a "bad head cold (states her father and mother had had "head colds" prior to [facility] having become ill with it)" which caused another delay factor regarding her being able to deal with her effects   has no money to have her car inspected which she states would not pass due to a reported cracke dwinshield which she, also, cannot, afford to have repaired; reports an increased pain level leading to a decreased energy level; states her mother is "at her wits end about my grandmother;"  is just beginning to reconnect "a little" with some friends; A: offered her the option to participate in the [employer] (a previous participant) to which she responded with "40 boxes of stuff" she needs to sort through including her additional effects at a grandmother's home which was auctioned (had been in reverse mortgage status); She is open to considering public housing   P: (G# 1) will continue to follow through on the SSD application process and explore housing options;  will reconsider [facility] if indicated following her having to attend to other matters; will schedule a med ck appt with Dr Padmini Arriaza following this session today;    Length of time in session: 45 minutes, follow up in 1 month    Goals addressed in session: Goal 1     Pain:      moderate to severe    5    Current suicide risk : Tahmina 1153: Diagnosis and Treatment Plan explained to Cynthia Willy rodrigues understanding diagnosis and is agreeable to Treatment Plan   Yes

## 2019-02-28 DIAGNOSIS — F33.2 MDD (MAJOR DEPRESSIVE DISORDER), RECURRENT SEVERE, WITHOUT PSYCHOSIS (HCC): ICD-10-CM

## 2019-02-28 RX ORDER — LORAZEPAM 1 MG/1
1 TABLET ORAL EVERY 6 HOURS
Qty: 120 TABLET | Refills: 0 | Status: SHIPPED | OUTPATIENT
Start: 2019-02-28 | End: 2019-04-02 | Stop reason: HOSPADM

## 2019-03-16 DIAGNOSIS — G43.709 CHRONIC MIGRAINE WITHOUT AURA WITHOUT STATUS MIGRAINOSUS, NOT INTRACTABLE: ICD-10-CM

## 2019-03-18 RX ORDER — TOPIRAMATE 50 MG/1
TABLET, FILM COATED ORAL
Qty: 60 TABLET | Refills: 0 | Status: SHIPPED | OUTPATIENT
Start: 2019-03-18 | End: 2019-04-02 | Stop reason: HOSPADM

## 2019-03-24 ENCOUNTER — TELEPHONE (OUTPATIENT)
Dept: OTHER | Facility: OTHER | Age: 39
End: 2019-03-24

## 2019-03-24 ENCOUNTER — HOSPITAL ENCOUNTER (EMERGENCY)
Facility: HOSPITAL | Age: 39
End: 2019-03-25
Attending: EMERGENCY MEDICINE | Admitting: EMERGENCY MEDICINE
Payer: COMMERCIAL

## 2019-03-24 DIAGNOSIS — F33.2 SEVERE EPISODE OF RECURRENT MAJOR DEPRESSIVE DISORDER, WITHOUT PSYCHOTIC FEATURES (HCC): Primary | ICD-10-CM

## 2019-03-24 LAB
AMPHETAMINES SERPL QL SCN: NEGATIVE
BARBITURATES UR QL: NEGATIVE
BENZODIAZ UR QL: NEGATIVE
BILIRUB UR QL STRIP: NEGATIVE
CLARITY UR: CLEAR
COCAINE UR QL: NEGATIVE
COLOR UR: YELLOW
COLOR, POC: YELLOW
ETHANOL EXG-MCNC: 0 MG/DL
EXT PREG TEST URINE: NEGATIVE
GLUCOSE UR STRIP-MCNC: NEGATIVE MG/DL
HGB UR QL STRIP.AUTO: NEGATIVE
KETONES UR STRIP-MCNC: NEGATIVE MG/DL
LEUKOCYTE ESTERASE UR QL STRIP: NEGATIVE
METHADONE UR QL: NEGATIVE
NITRITE UR QL STRIP: NEGATIVE
OPIATES UR QL SCN: NEGATIVE
PCP UR QL: NEGATIVE
PH UR STRIP.AUTO: 6 [PH] (ref 4.5–8)
PROT UR STRIP-MCNC: NEGATIVE MG/DL
SP GR UR STRIP.AUTO: 1.01 (ref 1–1.03)
THC UR QL: NEGATIVE
UROBILINOGEN UR QL STRIP.AUTO: 0.2 E.U./DL

## 2019-03-24 PROCEDURE — 81025 URINE PREGNANCY TEST: CPT | Performed by: EMERGENCY MEDICINE

## 2019-03-24 PROCEDURE — 80307 DRUG TEST PRSMV CHEM ANLYZR: CPT

## 2019-03-24 PROCEDURE — 99285 EMERGENCY DEPT VISIT HI MDM: CPT

## 2019-03-24 PROCEDURE — 82075 ASSAY OF BREATH ETHANOL: CPT | Performed by: EMERGENCY MEDICINE

## 2019-03-24 PROCEDURE — 81003 URINALYSIS AUTO W/O SCOPE: CPT

## 2019-03-24 RX ORDER — ACETAMINOPHEN 325 MG/1
975 TABLET ORAL ONCE
Status: COMPLETED | OUTPATIENT
Start: 2019-03-24 | End: 2019-03-24

## 2019-03-24 RX ADMIN — ACETAMINOPHEN 975 MG: 325 TABLET ORAL at 23:32

## 2019-03-24 NOTE — TELEPHONE ENCOUNTER
Pauly Vidal 1980  CONFIDENTIALTY NOTICE: This fax transmission is intended only for the addressee  It contains information that is legally privileged,  confidential or otherwise protected from use or disclosure  If you are not the intended recipient, you are strictly prohibited from reviewing,  disclosing, copying using or disseminating any of this information or taking any action in reliance on or regarding this information  If you have  received this fax in error, please notify us immediately by telephone so that we can arrange for its return to us  Page:   Call Id: [id number]  Health Call  Standard Call Report  Health Call  Patient Name: Pauly Vidal  Gender: Female  : 1980  Age: 37 Y 2 M 25 D  Return Phone  Number: [phone] (Current)  Address: [address] [place] [address]/[facility]/Zip: [address] [place] [address]  Practice Name: [address] [place] [address]  Practice Charged:  Physician:  Caller Name: Damon Wild  Relationship To  Patient: Mother  Return Phone Number: [phone] (Current)  Presenting Problem: "I want to know if I should take my  daughter to get evaluated in the ER  My daughter does not want to get  out of bed and I know she'smore  depressed "  Service Type: Triage  Charged Service 1: N/A  Pharmacy Name and  Number:  Nurse Assessment  Nurse: Rocky Campuzano Date/Time: 3/24/2019 4:42:09 PM  Type of assessment required:  ---General (Adult or Child)  Duration of Current S/S  ---Has been on-going  Location/Radiation  ---Psychiatric  Temperature (F) and route:  ---Denies fever  Symptom Specific Meds (Dose/Time):  ---Daily meds as ordered  Other S/S  ---Patient is very soft spoken and has a very flat tone  Patient stated that she has a  history of depression and that she feels herself "going further down deep " Has only  been out of bed to go to the bathroom, not talkative, and has showered just once  Pain Scale on scale of 1-10, 10 being the worst:  ---Denies pain    Symptom progression:  Ayanna Johnie 1980  CONFIDENTIALTY NOTICE: This fax transmission is intended only for the addressee  It contains information that is legally privileged,  confidential or otherwise protected from use or disclosure  If you are not the intended recipient, you are strictly prohibited from reviewing,  disclosing, copying using or disseminating any of this information or taking any action in reliance on or regarding this information  If you have  received this fax in error, please notify us immediately by telephone so that we can arrange for its return to us  Page: 2 of 2  Call Id: [id number]  Nurse Assessment  ---worse  Intake and Output  ---Decreased appetite but is drinking fluids Normal output  LMP/ Pregnancy:  ---1 week ago / Denies pregnancy  Breastfeeding  ---No  Last Exam/Treatment:  ---01/31/2019 for sinus infection  Protocols  Protocol Title Nurse Date/Time  Depression Mikaela Santossona 3/24/2019 4:16:57 PM  Question Caller Affirmed  Disp  Time Disposition Final User  3/24/2019 5:08:43 PM Go to ED Now Mela Rodríguez RN, Jamie Vidal  3/24/2019 5:09:35 PM RN Triaged Yes Mela Rodríguez RN, [facility] Advice Given Per Protocol  GO TO ED NOW: You need to be seen in the Emergency Department  Go to the ER at _______[facility]  Leave  now  Drive carefully  ALTERNATE DISPOSITION - CALL MENTAL HEALTH PROFESSIONAL NOW: If patient has a private  psychiatrist, psychologist or counselor, recommend the caller phone the mental health professional now  DRIVING: Another adult should  drive  CARE ADVICE given per Depression (Adult) guideline  Caller Understands: Yes  Caller Disagree/Comply: Comply  PreDisposition: Unsure  Comments  User: Jonny Awan RN Date/Time: 3/24/2019 5:08:27 PM  Spoke with patient and her mother  Mother is very concerned and feels helpless  Patient stated that she does not have any  intentions of hurting herself or anyone else   Patient stated that she just feels as though depression is worse  Unable to perform  ADL's  Patient is willing to go to ER  Patient stated, "It's the last place I want to go but I think I need to " Parents are going to  bring her to [facility]
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
## 2019-03-24 NOTE — ED ATTENDING ATTESTATION
Deandre Garcia MD, saw and evaluated the patient  I have discussed the patient with the resident/non-physician practitioner and agree with the resident's/non-physician practitioner's findings, Plan of Care, and MDM as documented in the resident's/non-physician practitioner's note, except where noted  All available labs and Radiology studies were reviewed  I was present for key portions of any procedure(s) performed by the resident/non-physician practitioner and I was immediately available to provide assistance  At this point I agree with the current assessment done in the Emergency Department    I have conducted an independent evaluation of this patient a history and physical is as follows:   the patient is here with depression she states he is very depressed she has been hospitalized for depression past she has been sleeping 20 hours a day   she has not homicidal   crisis consult  Critical Care Time  Procedures

## 2019-03-25 ENCOUNTER — HOSPITAL ENCOUNTER (INPATIENT)
Facility: HOSPITAL | Age: 39
LOS: 8 days | Discharge: HOME | DRG: 751 | End: 2019-04-02
Attending: PSYCHIATRY & NEUROLOGY | Admitting: PSYCHIATRY & NEUROLOGY
Payer: COMMERCIAL

## 2019-03-25 VITALS
SYSTOLIC BLOOD PRESSURE: 100 MMHG | WEIGHT: 173.28 LBS | TEMPERATURE: 98.1 F | BODY MASS INDEX: 34.02 KG/M2 | HEART RATE: 82 BPM | HEIGHT: 60 IN | DIASTOLIC BLOOD PRESSURE: 65 MMHG | OXYGEN SATURATION: 100 % | RESPIRATION RATE: 16 BRPM

## 2019-03-25 DIAGNOSIS — F51.05 INSOMNIA DUE TO OTHER MENTAL DISORDER: ICD-10-CM

## 2019-03-25 DIAGNOSIS — F33.2 SEVERE EPISODE OF RECURRENT MAJOR DEPRESSIVE DISORDER, WITHOUT PSYCHOTIC FEATURES (HCC): ICD-10-CM

## 2019-03-25 DIAGNOSIS — F99 INSOMNIA DUE TO OTHER MENTAL DISORDER: ICD-10-CM

## 2019-03-25 DIAGNOSIS — F33.2 MAJOR DEPRESSIVE DISORDER, RECURRENT SEVERE WITHOUT PSYCHOTIC FEATURES (HCC): Primary | ICD-10-CM

## 2019-03-25 DIAGNOSIS — F33.2 MDD (MAJOR DEPRESSIVE DISORDER), RECURRENT SEVERE, WITHOUT PSYCHOSIS (HCC): ICD-10-CM

## 2019-03-25 LAB
AMPHETAMINES SERPL QL SCN: NEGATIVE
BARBITURATES UR QL: NEGATIVE
BENZODIAZ UR QL: NEGATIVE
COCAINE UR QL: NEGATIVE
METHADONE UR QL: NEGATIVE
OPIATES UR QL SCN: NEGATIVE
PCP UR QL: NEGATIVE
THC UR QL: NEGATIVE
TSH SERPL DL<=0.05 MIU/L-ACNC: 6.47 UIU/ML (ref 0.36–3.74)

## 2019-03-25 PROCEDURE — 84443 ASSAY THYROID STIM HORMONE: CPT | Performed by: NURSE PRACTITIONER

## 2019-03-25 PROCEDURE — 80307 DRUG TEST PRSMV CHEM ANLYZR: CPT | Performed by: NURSE PRACTITIONER

## 2019-03-25 RX ORDER — OLANZAPINE 5 MG/1
5 TABLET ORAL EVERY 6 HOURS PRN
Status: CANCELLED | OUTPATIENT
Start: 2019-03-25

## 2019-03-25 RX ORDER — BENZTROPINE MESYLATE 1 MG/ML
1 INJECTION INTRAMUSCULAR; INTRAVENOUS EVERY 6 HOURS PRN
Status: DISCONTINUED | OUTPATIENT
Start: 2019-03-25 | End: 2019-04-02 | Stop reason: HOSPADM

## 2019-03-25 RX ORDER — HALOPERIDOL 5 MG/ML
5 INJECTION INTRAMUSCULAR EVERY 6 HOURS PRN
Status: DISCONTINUED | OUTPATIENT
Start: 2019-03-25 | End: 2019-04-02 | Stop reason: HOSPADM

## 2019-03-25 RX ORDER — ACETAMINOPHEN 325 MG/1
650 TABLET ORAL EVERY 6 HOURS PRN
Status: CANCELLED | OUTPATIENT
Start: 2019-03-25

## 2019-03-25 RX ORDER — LORAZEPAM 1 MG/1
1 TABLET ORAL EVERY 6 HOURS PRN
Status: DISCONTINUED | OUTPATIENT
Start: 2019-03-25 | End: 2019-04-02 | Stop reason: HOSPADM

## 2019-03-25 RX ORDER — ACETAMINOPHEN 325 MG/1
650 TABLET ORAL EVERY 4 HOURS PRN
Status: CANCELLED | OUTPATIENT
Start: 2019-03-25

## 2019-03-25 RX ORDER — IBUPROFEN 400 MG/1
400 TABLET ORAL ONCE
Status: COMPLETED | OUTPATIENT
Start: 2019-03-25 | End: 2019-03-25

## 2019-03-25 RX ORDER — LORAZEPAM 2 MG/ML
1 INJECTION INTRAMUSCULAR EVERY 6 HOURS PRN
Status: DISCONTINUED | OUTPATIENT
Start: 2019-03-25 | End: 2019-04-02 | Stop reason: HOSPADM

## 2019-03-25 RX ORDER — OLANZAPINE 5 MG/1
5 TABLET ORAL EVERY 6 HOURS PRN
Status: DISCONTINUED | OUTPATIENT
Start: 2019-03-25 | End: 2019-04-02 | Stop reason: HOSPADM

## 2019-03-25 RX ORDER — MAGNESIUM HYDROXIDE/ALUMINUM HYDROXICE/SIMETHICONE 120; 1200; 1200 MG/30ML; MG/30ML; MG/30ML
30 SUSPENSION ORAL EVERY 4 HOURS PRN
Status: CANCELLED | OUTPATIENT
Start: 2019-03-25

## 2019-03-25 RX ORDER — DULOXETIN HYDROCHLORIDE 20 MG/1
20 CAPSULE, DELAYED RELEASE ORAL DAILY
Status: DISCONTINUED | OUTPATIENT
Start: 2019-03-25 | End: 2019-03-26

## 2019-03-25 RX ORDER — HYDROXYZINE HYDROCHLORIDE 25 MG/1
25 TABLET, FILM COATED ORAL EVERY 6 HOURS PRN
Status: CANCELLED | OUTPATIENT
Start: 2019-03-25

## 2019-03-25 RX ORDER — TOPIRAMATE 25 MG/1
50 TABLET ORAL 2 TIMES DAILY
Status: CANCELLED | OUTPATIENT
Start: 2019-03-25

## 2019-03-25 RX ORDER — DULOXETIN HYDROCHLORIDE 20 MG/1
20 CAPSULE, DELAYED RELEASE ORAL DAILY
Status: CANCELLED | OUTPATIENT
Start: 2019-03-25

## 2019-03-25 RX ORDER — BENZTROPINE MESYLATE 1 MG/ML
1 INJECTION INTRAMUSCULAR; INTRAVENOUS EVERY 6 HOURS PRN
Status: CANCELLED | OUTPATIENT
Start: 2019-03-25

## 2019-03-25 RX ORDER — LORAZEPAM 0.5 MG/1
1 TABLET ORAL EVERY 6 HOURS PRN
Status: CANCELLED | OUTPATIENT
Start: 2019-03-25

## 2019-03-25 RX ORDER — HYDROXYZINE HYDROCHLORIDE 25 MG/1
25 TABLET, FILM COATED ORAL EVERY 6 HOURS PRN
Status: DISCONTINUED | OUTPATIENT
Start: 2019-03-25 | End: 2019-04-02 | Stop reason: HOSPADM

## 2019-03-25 RX ORDER — MAGNESIUM HYDROXIDE/ALUMINUM HYDROXICE/SIMETHICONE 120; 1200; 1200 MG/30ML; MG/30ML; MG/30ML
30 SUSPENSION ORAL EVERY 4 HOURS PRN
Status: DISCONTINUED | OUTPATIENT
Start: 2019-03-25 | End: 2019-04-02 | Stop reason: HOSPADM

## 2019-03-25 RX ORDER — BENZTROPINE MESYLATE 1 MG/1
1 TABLET ORAL EVERY 6 HOURS PRN
Status: CANCELLED | OUTPATIENT
Start: 2019-03-25

## 2019-03-25 RX ORDER — HALOPERIDOL 5 MG
5 TABLET ORAL EVERY 8 HOURS PRN
Status: CANCELLED | OUTPATIENT
Start: 2019-03-25

## 2019-03-25 RX ORDER — OLANZAPINE 10 MG/1
5 INJECTION, POWDER, LYOPHILIZED, FOR SOLUTION INTRAMUSCULAR EVERY 6 HOURS PRN
Status: DISCONTINUED | OUTPATIENT
Start: 2019-03-25 | End: 2019-04-02 | Stop reason: HOSPADM

## 2019-03-25 RX ORDER — RISPERIDONE 1 MG/1
1 TABLET, ORALLY DISINTEGRATING ORAL EVERY 8 HOURS PRN
Status: CANCELLED | OUTPATIENT
Start: 2019-03-25

## 2019-03-25 RX ORDER — HALOPERIDOL 5 MG/ML
5 INJECTION INTRAMUSCULAR EVERY 6 HOURS PRN
Status: CANCELLED | OUTPATIENT
Start: 2019-03-25

## 2019-03-25 RX ORDER — ACETAMINOPHEN 325 MG/1
650 TABLET ORAL EVERY 4 HOURS PRN
Status: DISCONTINUED | OUTPATIENT
Start: 2019-03-25 | End: 2019-04-02 | Stop reason: HOSPADM

## 2019-03-25 RX ORDER — BENZTROPINE MESYLATE 1 MG/1
1 TABLET ORAL EVERY 6 HOURS PRN
Status: DISCONTINUED | OUTPATIENT
Start: 2019-03-25 | End: 2019-04-02 | Stop reason: HOSPADM

## 2019-03-25 RX ORDER — TOPIRAMATE 25 MG/1
50 TABLET ORAL 2 TIMES DAILY
Status: DISCONTINUED | OUTPATIENT
Start: 2019-03-25 | End: 2019-03-26

## 2019-03-25 RX ORDER — TRAZODONE HYDROCHLORIDE 50 MG/1
50 TABLET ORAL
Status: CANCELLED | OUTPATIENT
Start: 2019-03-25

## 2019-03-25 RX ORDER — RISPERIDONE 1 MG/1
1 TABLET, ORALLY DISINTEGRATING ORAL EVERY 8 HOURS PRN
Status: DISCONTINUED | OUTPATIENT
Start: 2019-03-25 | End: 2019-04-02 | Stop reason: HOSPADM

## 2019-03-25 RX ORDER — LORAZEPAM 2 MG/ML
1 INJECTION INTRAMUSCULAR EVERY 6 HOURS PRN
Status: CANCELLED | OUTPATIENT
Start: 2019-03-25

## 2019-03-25 RX ORDER — ACETAMINOPHEN 325 MG/1
975 TABLET ORAL EVERY 6 HOURS PRN
Status: CANCELLED | OUTPATIENT
Start: 2019-03-25

## 2019-03-25 RX ORDER — TRAZODONE HYDROCHLORIDE 100 MG/1
100 TABLET ORAL ONCE
Status: COMPLETED | OUTPATIENT
Start: 2019-03-25 | End: 2019-03-25

## 2019-03-25 RX ORDER — TRAZODONE HYDROCHLORIDE 50 MG/1
50 TABLET ORAL
Status: DISCONTINUED | OUTPATIENT
Start: 2019-03-25 | End: 2019-03-26

## 2019-03-25 RX ORDER — HALOPERIDOL 5 MG
5 TABLET ORAL EVERY 8 HOURS PRN
Status: DISCONTINUED | OUTPATIENT
Start: 2019-03-25 | End: 2019-04-02 | Stop reason: HOSPADM

## 2019-03-25 RX ORDER — ACETAMINOPHEN 325 MG/1
975 TABLET ORAL EVERY 6 HOURS PRN
Status: DISCONTINUED | OUTPATIENT
Start: 2019-03-25 | End: 2019-04-02 | Stop reason: HOSPADM

## 2019-03-25 RX ORDER — ACETAMINOPHEN 325 MG/1
650 TABLET ORAL EVERY 6 HOURS PRN
Status: DISCONTINUED | OUTPATIENT
Start: 2019-03-25 | End: 2019-04-02 | Stop reason: HOSPADM

## 2019-03-25 RX ORDER — OLANZAPINE 10 MG/1
5 INJECTION, POWDER, LYOPHILIZED, FOR SOLUTION INTRAMUSCULAR EVERY 6 HOURS PRN
Status: CANCELLED | OUTPATIENT
Start: 2019-03-25

## 2019-03-25 RX ADMIN — IBUPROFEN 400 MG: 400 TABLET ORAL at 04:46

## 2019-03-25 RX ADMIN — TOPIRAMATE 50 MG: 25 TABLET, FILM COATED ORAL at 17:54

## 2019-03-25 RX ADMIN — TRAZODONE HYDROCHLORIDE 50 MG: 50 TABLET ORAL at 21:53

## 2019-03-25 RX ADMIN — DULOXETINE HYDROCHLORIDE 20 MG: 20 CAPSULE, DELAYED RELEASE ORAL at 17:55

## 2019-03-25 RX ADMIN — TRAZODONE HYDROCHLORIDE 100 MG: 100 TABLET ORAL at 02:29

## 2019-03-25 NOTE — ED NOTES
Insurance Authorization:   Phone call placed to [employer]  Phone number: [phone]  Spoke to Mata Lavon    2 days approved  Level of care: IPU  Review on pending placement   Authorization # call upon arrival         EVS (Eligibility Verification System) called  [phone]    Automated system indicates: Managed care with [facility]

## 2019-03-25 NOTE — EMTALA/ACUTE CARE TRANSFER
[address]  [address] [place] [id number]  Dept: Lexie BLACKMAN Rex Wood                                         1980                              MRN [id number]    I have been informed of my rights regarding examination, treatment, and transfer   by Dr Federica Fuentes DO    Benefits: Specialized equipment and/or services available at the receiving facility (Include comment)________________________    Risks: Increased discomfort during transfer      Consent for Transfer:  I acknowledge that my medical condition has been evaluated and explained to me by the emergency department physician or other qualified medical person and/or my attending physician, who has recommended that I be transferred to the service of  Accepting Physician: Aleks Bahena at [address]  Name, [address] : Rosemarie Marcum  The above potential benefits of such transfer, the potential risks associated with such transfer, and the probable risks of not being transferred have been explained to me, and I fully understand them  The doctor has explained that, in my case, the benefits of transfer outweigh the risks  I agree to be transferred  I authorize the performance of emergency medical procedures and treatments upon me in both transit and upon arrival at the receiving facility  Additionally, I authorize the release of any and all medical records to the receiving facility and request they be transported with me, if possible  I understand that the safest mode of transportation during a medical emergency is an ambulance and that the Hospital advocates the use of this mode of transport  Risks of traveling to the receiving facility by car, including absence of medical control, life sustaining equipment, such as oxygen, and medical personnel has been explained to me and I fully understand them      (PENNY CORRECT BOX BELOW)  [  ]  I consent to the stated transfer and to be transported by ambulance/helicopter  [  ]  I consent to the stated transfer, but refuse transportation by ambulance and accept full responsibility for my transportation by car  I understand the risks of non-ambulance transfers and I exonerate the Hospital and its staff from any deterioration in my condition that results from this refusal     X___________________________________________    DATE  19  TIME________  Signature of patient or legally responsible individual signing on patient behalf           RELATIONSHIP TO PATIENT_________________________          Provider Certification    NAME Jana Lino                                         1980                              MRN [id number]    A medical screening exam was performed on the above named patient  Based on the examination:    Condition Necessitating Transfer The encounter diagnosis was Severe episode of recurrent major depressive disorder, without psychotic features ([facility] [address] )  Patient Condition: The patient has been stabilized such that within reasonable medical probability, no material deterioration of the patient condition or the condition of the unborn child(dashawn) is likely to result from the transfer    Reason for Transfer: Level of Care needed not available at this facility    Transfer Requirements: [address]   · Space available and qualified personnel available for treatment as acknowledged by    · Agreed to accept transfer and to provide appropriate medical treatment as acknowledged by       [place]  · Appropriate medical records of the examination and treatment of the patient are provided at the time of transfer   [address], [address] _______  · Transfer will be performed by qualified personnel from    and appropriate transfer equipment as required, including the use of necessary and appropriate life support measures      Provider Certification: I have examined the patient and explained the following risks and benefits of being transferred/refusing transfer to the patient/family:  Consent was not obtained as patient is committed to psychiatric facility and transfer is mandated      Based on these reasonable risks and benefits to the patient and/or the unborn child(dashawn), and based upon the information available at the time of the patients examination, I certify that the medical benefits reasonably to be expected from the provision of appropriate medical treatments at another medical facility outweigh the increasing risks, if any, to the individuals medical condition, and in the case of labor to the unborn child, from effecting the transfer      X____________________________________________ DATE 03/25/19        TIME_______      ORIGINAL - SEND TO MEDICAL RECORDS   COPY - SEND WITH PATIENT DURING TRANSFER

## 2019-03-25 NOTE — ED NOTES
Patient is accepted at [facility] 2[facility]  Patient is accepted by SHYANNE Chaudhry per [address] is arranged with [facility] is scheduled for      Nurse report is to be called to [phone] prior to patient transfer

## 2019-03-25 NOTE — ED NOTES
Pt came to the ED tonight with her mother because she ahs been sleeping until 8 or 9pm for the past week  Pt states that she is only getting up to eat and go to the bathroom  Mother who is at bedside confirms this  She reports that 3mths ago she had a medication change with her psychiatrist and she feels since the change she has gotten progressively worse  She has not been able to make her appointments because her psychiatrist has "weird appointment times that" are "throwing" her off and making her slightly late  Pt states that it takes all of her energy to get to her appointments and it can take hours for her to get ready  Pt reports taking her meds but does not like the combination  She denies SI/HI/AH/VH however, her increased depression is making her unable to function within her everyday life  Pt willing and signed 61 51 81,    Pt is requesting to stay within [facility] because her psychiatrist is with them   There are no younger adult beds available at this time, pt need morning placement within [facility]

## 2019-03-25 NOTE — ED NOTES
Two bags of patient belongings returned to patient and transport team at this time  Patient confirmed that these were the only two belongings that she had with her        Princess Jaramillo RN  03/25/19 3523

## 2019-03-26 LAB
ALBUMIN SERPL BCP-MCNC: 3.9 G/DL (ref 3.5–5)
ALP SERPL-CCNC: 53 U/L (ref 46–116)
ALT SERPL W P-5'-P-CCNC: 39 U/L (ref 12–78)
ANION GAP SERPL CALCULATED.3IONS-SCNC: 10 MMOL/L (ref 4–13)
AST SERPL W P-5'-P-CCNC: 23 U/L (ref 5–45)
BASOPHILS # BLD AUTO: 0.02 THOUSANDS/ΜL (ref 0–0.1)
BASOPHILS NFR BLD AUTO: 0 % (ref 0–1)
BILIRUB SERPL-MCNC: 0.4 MG/DL (ref 0.2–1)
BUN SERPL-MCNC: 13 MG/DL (ref 5–25)
CALCIUM SERPL-MCNC: 8.8 MG/DL (ref 8.3–10.1)
CHLORIDE SERPL-SCNC: 107 MMOL/L (ref 100–108)
CHOLEST SERPL-MCNC: 166 MG/DL (ref 50–200)
CO2 SERPL-SCNC: 23 MMOL/L (ref 21–32)
CREAT SERPL-MCNC: 0.84 MG/DL (ref 0.6–1.3)
EOSINOPHIL # BLD AUTO: 0.06 THOUSAND/ΜL (ref 0–0.61)
EOSINOPHIL NFR BLD AUTO: 1 % (ref 0–6)
ERYTHROCYTE [DISTWIDTH] IN BLOOD BY AUTOMATED COUNT: 13.2 % (ref 11.6–15.1)
GFR SERPL CREATININE-BSD FRML MDRD: 87 ML/MIN/1.73SQ M
GLUCOSE P FAST SERPL-MCNC: 89 MG/DL (ref 65–99)
GLUCOSE SERPL-MCNC: 89 MG/DL (ref 65–140)
HCT VFR BLD AUTO: 38.1 % (ref 34.8–46.1)
HDLC SERPL-MCNC: 52 MG/DL (ref 40–60)
HGB BLD-MCNC: 12.9 G/DL (ref 11.5–15.4)
IMM GRANULOCYTES # BLD AUTO: 0.02 THOUSAND/UL (ref 0–0.2)
IMM GRANULOCYTES NFR BLD AUTO: 0 % (ref 0–2)
LDLC SERPL CALC-MCNC: 94 MG/DL (ref 0–100)
LYMPHOCYTES # BLD AUTO: 3.67 THOUSANDS/ΜL (ref 0.6–4.47)
LYMPHOCYTES NFR BLD AUTO: 35 % (ref 14–44)
MCH RBC QN AUTO: 30.5 PG (ref 26.8–34.3)
MCHC RBC AUTO-ENTMCNC: 33.9 G/DL (ref 31.4–37.4)
MCV RBC AUTO: 90 FL (ref 82–98)
MONOCYTES # BLD AUTO: 0.6 THOUSAND/ΜL (ref 0.17–1.22)
MONOCYTES NFR BLD AUTO: 6 % (ref 4–12)
NEUTROPHILS # BLD AUTO: 6.18 THOUSANDS/ΜL (ref 1.85–7.62)
NEUTS SEG NFR BLD AUTO: 58 % (ref 43–75)
NONHDLC SERPL-MCNC: 114 MG/DL
NRBC BLD AUTO-RTO: 0 /100 WBCS
PLATELET # BLD AUTO: 211 THOUSANDS/UL (ref 149–390)
PMV BLD AUTO: 9.5 FL (ref 8.9–12.7)
POTASSIUM SERPL-SCNC: 3.6 MMOL/L (ref 3.5–5.3)
PROT SERPL-MCNC: 7.2 G/DL (ref 6.4–8.2)
RBC # BLD AUTO: 4.23 MILLION/UL (ref 3.81–5.12)
RPR SER QL: NORMAL
SODIUM SERPL-SCNC: 140 MMOL/L (ref 136–145)
TRIGL SERPL-MCNC: 99 MG/DL
WBC # BLD AUTO: 10.55 THOUSAND/UL (ref 4.31–10.16)

## 2019-03-26 PROCEDURE — 86592 SYPHILIS TEST NON-TREP QUAL: CPT | Performed by: NURSE PRACTITIONER

## 2019-03-26 PROCEDURE — 80061 LIPID PANEL: CPT | Performed by: NURSE PRACTITIONER

## 2019-03-26 PROCEDURE — 99253 IP/OBS CNSLTJ NEW/EST LOW 45: CPT | Performed by: PHYSICIAN ASSISTANT

## 2019-03-26 PROCEDURE — 80053 COMPREHEN METABOLIC PANEL: CPT | Performed by: NURSE PRACTITIONER

## 2019-03-26 PROCEDURE — 99223 1ST HOSP IP/OBS HIGH 75: CPT | Performed by: PSYCHIATRY & NEUROLOGY

## 2019-03-26 PROCEDURE — 90686 IIV4 VACC NO PRSV 0.5 ML IM: CPT | Performed by: PSYCHIATRY & NEUROLOGY

## 2019-03-26 PROCEDURE — 85025 COMPLETE CBC W/AUTO DIFF WBC: CPT | Performed by: NURSE PRACTITIONER

## 2019-03-26 RX ORDER — ARIPIPRAZOLE 2 MG/1
2 TABLET ORAL DAILY
Status: DISCONTINUED | OUTPATIENT
Start: 2019-03-26 | End: 2019-03-28

## 2019-03-26 RX ORDER — SACCHAROMYCES BOULARDII 250 MG
250 CAPSULE ORAL 2 TIMES DAILY
Status: DISCONTINUED | OUTPATIENT
Start: 2019-03-26 | End: 2019-04-02 | Stop reason: HOSPADM

## 2019-03-26 RX ORDER — ZOLPIDEM TARTRATE 5 MG/1
5 TABLET ORAL
Status: DISCONTINUED | OUTPATIENT
Start: 2019-03-26 | End: 2019-03-28

## 2019-03-26 RX ORDER — TRAZODONE HYDROCHLORIDE 100 MG/1
100 TABLET ORAL
Status: DISCONTINUED | OUTPATIENT
Start: 2019-03-26 | End: 2019-04-02 | Stop reason: HOSPADM

## 2019-03-26 RX ORDER — PREGABALIN 100 MG/1
100 CAPSULE ORAL 2 TIMES DAILY
Status: DISCONTINUED | OUTPATIENT
Start: 2019-03-26 | End: 2019-04-02 | Stop reason: HOSPADM

## 2019-03-26 RX ORDER — SUMATRIPTAN 25 MG/1
50 TABLET, FILM COATED ORAL ONCE AS NEEDED
Status: DISCONTINUED | OUTPATIENT
Start: 2019-03-26 | End: 2019-04-02 | Stop reason: HOSPADM

## 2019-03-26 RX ORDER — DULOXETIN HYDROCHLORIDE 30 MG/1
90 CAPSULE, DELAYED RELEASE ORAL
Status: DISCONTINUED | OUTPATIENT
Start: 2019-03-26 | End: 2019-04-02 | Stop reason: HOSPADM

## 2019-03-26 RX ADMIN — INFLUENZA VIRUS VACCINE 0.5 ML: 15; 15; 15; 15 SUSPENSION INTRAMUSCULAR at 22:09

## 2019-03-26 RX ADMIN — DULOXETINE 90 MG: 30 CAPSULE, DELAYED RELEASE ORAL at 21:47

## 2019-03-26 RX ADMIN — Medication 250 MG: at 10:11

## 2019-03-26 RX ADMIN — TRAZODONE HYDROCHLORIDE 100 MG: 100 TABLET ORAL at 00:21

## 2019-03-26 RX ADMIN — ZOLPIDEM TARTRATE 5 MG: 5 TABLET, FILM COATED ORAL at 21:48

## 2019-03-26 RX ADMIN — ARIPIPRAZOLE 2 MG: 2 TABLET ORAL at 10:11

## 2019-03-26 RX ADMIN — PREGABALIN 100 MG: 100 CAPSULE ORAL at 10:11

## 2019-03-26 RX ADMIN — Medication 250 MG: at 18:05

## 2019-03-26 RX ADMIN — PREGABALIN 100 MG: 100 CAPSULE ORAL at 18:05

## 2019-03-27 ENCOUNTER — TELEPHONE (OUTPATIENT)
Dept: PSYCHIATRY | Facility: CLINIC | Age: 39
End: 2019-03-27

## 2019-03-27 LAB
T4 FREE SERPL-MCNC: 0.88 NG/DL (ref 0.76–1.46)
TSH SERPL DL<=0.05 MIU/L-ACNC: 4.92 UIU/ML (ref 0.36–3.74)

## 2019-03-27 PROCEDURE — 84439 ASSAY OF FREE THYROXINE: CPT | Performed by: PSYCHIATRY & NEUROLOGY

## 2019-03-27 PROCEDURE — 84443 ASSAY THYROID STIM HORMONE: CPT | Performed by: PSYCHIATRY & NEUROLOGY

## 2019-03-27 PROCEDURE — 99232 SBSQ HOSP IP/OBS MODERATE 35: CPT | Performed by: PSYCHIATRY & NEUROLOGY

## 2019-03-27 RX ADMIN — Medication 250 MG: at 09:49

## 2019-03-27 RX ADMIN — DULOXETINE 90 MG: 30 CAPSULE, DELAYED RELEASE ORAL at 21:52

## 2019-03-27 RX ADMIN — ACETAMINOPHEN 650 MG: 325 TABLET, FILM COATED ORAL at 14:44

## 2019-03-27 RX ADMIN — ARIPIPRAZOLE 2 MG: 2 TABLET ORAL at 09:49

## 2019-03-27 RX ADMIN — Medication 250 MG: at 18:23

## 2019-03-27 RX ADMIN — ZOLPIDEM TARTRATE 5 MG: 5 TABLET, FILM COATED ORAL at 21:52

## 2019-03-27 RX ADMIN — PREGABALIN 100 MG: 100 CAPSULE ORAL at 09:49

## 2019-03-27 RX ADMIN — PREGABALIN 100 MG: 100 CAPSULE ORAL at 18:23

## 2019-03-28 ENCOUNTER — TELEPHONE (OUTPATIENT)
Dept: BEHAVIORAL/MENTAL HEALTH CLINIC | Facility: CLINIC | Age: 39
End: 2019-03-28

## 2019-03-28 PROCEDURE — 99232 SBSQ HOSP IP/OBS MODERATE 35: CPT | Performed by: PSYCHIATRY & NEUROLOGY

## 2019-03-28 RX ORDER — ARIPIPRAZOLE 5 MG/1
5 TABLET ORAL DAILY
Status: DISCONTINUED | OUTPATIENT
Start: 2019-03-29 | End: 2019-04-02 | Stop reason: HOSPADM

## 2019-03-28 RX ORDER — ZOLPIDEM TARTRATE 5 MG/1
10 TABLET ORAL
Status: DISCONTINUED | OUTPATIENT
Start: 2019-03-28 | End: 2019-04-02 | Stop reason: HOSPADM

## 2019-03-28 RX ORDER — ARIPIPRAZOLE 2 MG/1
2 TABLET ORAL ONCE
Status: COMPLETED | OUTPATIENT
Start: 2019-03-28 | End: 2019-03-28

## 2019-03-28 RX ADMIN — DULOXETINE 90 MG: 30 CAPSULE, DELAYED RELEASE ORAL at 21:35

## 2019-03-28 RX ADMIN — ARIPIPRAZOLE 2 MG: 2 TABLET ORAL at 08:23

## 2019-03-28 RX ADMIN — Medication 250 MG: at 08:23

## 2019-03-28 RX ADMIN — PREGABALIN 100 MG: 100 CAPSULE ORAL at 17:39

## 2019-03-28 RX ADMIN — Medication 250 MG: at 17:39

## 2019-03-28 RX ADMIN — HYDROXYZINE HYDROCHLORIDE 25 MG: 25 TABLET, FILM COATED ORAL at 15:39

## 2019-03-28 RX ADMIN — ARIPIPRAZOLE 2 MG: 2 TABLET ORAL at 11:30

## 2019-03-28 RX ADMIN — TRAZODONE HYDROCHLORIDE 100 MG: 100 TABLET ORAL at 01:24

## 2019-03-28 RX ADMIN — PREGABALIN 100 MG: 100 CAPSULE ORAL at 08:23

## 2019-03-28 RX ADMIN — ZOLPIDEM TARTRATE 10 MG: 5 TABLET, FILM COATED ORAL at 21:35

## 2019-03-29 ENCOUNTER — TELEPHONE (OUTPATIENT)
Dept: FAMILY MEDICINE CLINIC | Facility: CLINIC | Age: 39
End: 2019-03-29

## 2019-03-29 PROCEDURE — 99232 SBSQ HOSP IP/OBS MODERATE 35: CPT | Performed by: PSYCHIATRY & NEUROLOGY

## 2019-03-29 RX ADMIN — ARIPIPRAZOLE 5 MG: 5 TABLET ORAL at 09:16

## 2019-03-29 RX ADMIN — HYDROXYZINE HYDROCHLORIDE 25 MG: 25 TABLET, FILM COATED ORAL at 12:36

## 2019-03-29 RX ADMIN — DULOXETINE 90 MG: 30 CAPSULE, DELAYED RELEASE ORAL at 21:41

## 2019-03-29 RX ADMIN — PREGABALIN 100 MG: 100 CAPSULE ORAL at 09:16

## 2019-03-29 RX ADMIN — ZOLPIDEM TARTRATE 10 MG: 5 TABLET, FILM COATED ORAL at 21:41

## 2019-03-29 RX ADMIN — PREGABALIN 100 MG: 100 CAPSULE ORAL at 18:51

## 2019-03-29 RX ADMIN — Medication 250 MG: at 18:51

## 2019-03-29 RX ADMIN — Medication 250 MG: at 09:16

## 2019-03-30 PROCEDURE — 99232 SBSQ HOSP IP/OBS MODERATE 35: CPT | Performed by: PSYCHIATRY & NEUROLOGY

## 2019-03-30 RX ADMIN — DULOXETINE 90 MG: 30 CAPSULE, DELAYED RELEASE ORAL at 21:16

## 2019-03-30 RX ADMIN — ZOLPIDEM TARTRATE 10 MG: 5 TABLET, FILM COATED ORAL at 21:16

## 2019-03-30 RX ADMIN — PREGABALIN 100 MG: 100 CAPSULE ORAL at 09:38

## 2019-03-30 RX ADMIN — Medication 250 MG: at 09:38

## 2019-03-30 RX ADMIN — Medication 250 MG: at 18:12

## 2019-03-30 RX ADMIN — PREGABALIN 100 MG: 100 CAPSULE ORAL at 18:13

## 2019-03-30 RX ADMIN — ARIPIPRAZOLE 5 MG: 5 TABLET ORAL at 09:38

## 2019-03-31 PROCEDURE — 99232 SBSQ HOSP IP/OBS MODERATE 35: CPT | Performed by: PSYCHIATRY & NEUROLOGY

## 2019-03-31 RX ADMIN — PREGABALIN 100 MG: 100 CAPSULE ORAL at 09:14

## 2019-03-31 RX ADMIN — DULOXETINE 90 MG: 30 CAPSULE, DELAYED RELEASE ORAL at 21:11

## 2019-03-31 RX ADMIN — PREGABALIN 100 MG: 100 CAPSULE ORAL at 18:18

## 2019-03-31 RX ADMIN — ARIPIPRAZOLE 5 MG: 5 TABLET ORAL at 09:14

## 2019-03-31 RX ADMIN — Medication 250 MG: at 18:18

## 2019-03-31 RX ADMIN — ZOLPIDEM TARTRATE 10 MG: 5 TABLET, FILM COATED ORAL at 21:11

## 2019-03-31 RX ADMIN — Medication 250 MG: at 09:14

## 2019-04-01 PROCEDURE — 99232 SBSQ HOSP IP/OBS MODERATE 35: CPT | Performed by: PSYCHIATRY & NEUROLOGY

## 2019-04-01 RX ADMIN — PREGABALIN 100 MG: 100 CAPSULE ORAL at 09:34

## 2019-04-01 RX ADMIN — ZOLPIDEM TARTRATE 10 MG: 5 TABLET, FILM COATED ORAL at 21:28

## 2019-04-01 RX ADMIN — Medication 250 MG: at 17:14

## 2019-04-01 RX ADMIN — Medication 250 MG: at 09:34

## 2019-04-01 RX ADMIN — DULOXETINE 90 MG: 30 CAPSULE, DELAYED RELEASE ORAL at 21:28

## 2019-04-01 RX ADMIN — TRAZODONE HYDROCHLORIDE 100 MG: 100 TABLET ORAL at 00:44

## 2019-04-01 RX ADMIN — ARIPIPRAZOLE 5 MG: 5 TABLET ORAL at 09:34

## 2019-04-01 RX ADMIN — PREGABALIN 100 MG: 100 CAPSULE ORAL at 17:14

## 2019-04-02 VITALS
DIASTOLIC BLOOD PRESSURE: 80 MMHG | RESPIRATION RATE: 20 BRPM | HEART RATE: 94 BPM | BODY MASS INDEX: 33.57 KG/M2 | HEIGHT: 60 IN | WEIGHT: 171 LBS | SYSTOLIC BLOOD PRESSURE: 135 MMHG | TEMPERATURE: 98 F

## 2019-04-02 PROCEDURE — 99239 HOSP IP/OBS DSCHRG MGMT >30: CPT | Performed by: PSYCHIATRY & NEUROLOGY

## 2019-04-02 RX ORDER — DULOXETIN HYDROCHLORIDE 60 MG/1
CAPSULE, DELAYED RELEASE ORAL
Qty: 30 CAPSULE | Refills: 0 | Status: SHIPPED | OUTPATIENT
Start: 2019-04-02 | End: 2019-04-25 | Stop reason: SDUPTHER

## 2019-04-02 RX ORDER — ARIPIPRAZOLE 5 MG/1
5 TABLET ORAL DAILY
Qty: 30 TABLET | Refills: 0 | Status: SHIPPED | OUTPATIENT
Start: 2019-04-03 | End: 2019-04-25 | Stop reason: SDUPTHER

## 2019-04-02 RX ORDER — DULOXETIN HYDROCHLORIDE 30 MG/1
CAPSULE, DELAYED RELEASE ORAL
Qty: 30 CAPSULE | Refills: 0 | Status: SHIPPED | OUTPATIENT
Start: 2019-04-02 | End: 2019-04-25 | Stop reason: SDUPTHER

## 2019-04-02 RX ORDER — ZOLPIDEM TARTRATE 10 MG/1
10 TABLET ORAL
Qty: 30 TABLET | Refills: 0 | Status: SHIPPED | OUTPATIENT
Start: 2019-04-02 | End: 2019-04-18

## 2019-04-02 RX ADMIN — PREGABALIN 100 MG: 100 CAPSULE ORAL at 09:19

## 2019-04-02 RX ADMIN — ARIPIPRAZOLE 5 MG: 5 TABLET ORAL at 09:19

## 2019-04-02 RX ADMIN — Medication 250 MG: at 09:19

## 2019-04-03 ENCOUNTER — OFFICE VISIT (OUTPATIENT)
Dept: PSYCHIATRY | Facility: CLINIC | Age: 39
End: 2019-04-03
Payer: COMMERCIAL

## 2019-04-03 ENCOUNTER — OFFICE VISIT (OUTPATIENT)
Dept: PSYCHOLOGY | Facility: CLINIC | Age: 39
End: 2019-04-03
Payer: COMMERCIAL

## 2019-04-03 VITALS
RESPIRATION RATE: 18 BRPM | SYSTOLIC BLOOD PRESSURE: 124 MMHG | BODY MASS INDEX: 32.39 KG/M2 | HEART RATE: 90 BPM | DIASTOLIC BLOOD PRESSURE: 82 MMHG | TEMPERATURE: 98 F | WEIGHT: 165 LBS | HEIGHT: 60 IN

## 2019-04-03 DIAGNOSIS — F33.2 SEVERE RECURRENT MAJOR DEPRESSION WITHOUT PSYCHOTIC FEATURES (HCC): Primary | ICD-10-CM

## 2019-04-03 DIAGNOSIS — F33.2 MAJOR DEPRESSIVE DISORDER, RECURRENT SEVERE WITHOUT PSYCHOTIC FEATURES (HCC): Primary | ICD-10-CM

## 2019-04-03 PROBLEM — R19.7 DIARRHEA OF PRESUMED INFECTIOUS ORIGIN: Status: RESOLVED | Noted: 2018-10-24 | Resolved: 2019-04-03

## 2019-04-03 PROBLEM — G43.909 MIGRAINE: Status: RESOLVED | Noted: 2018-01-30 | Resolved: 2019-04-03

## 2019-04-03 PROCEDURE — 90791 PSYCH DIAGNOSTIC EVALUATION: CPT

## 2019-04-03 PROCEDURE — H0035 MH PARTIAL HOSP TX UNDER 24H: HCPCS

## 2019-04-03 PROCEDURE — 99213 OFFICE O/P EST LOW 20 MIN: CPT | Performed by: PSYCHIATRY & NEUROLOGY

## 2019-04-04 ENCOUNTER — OFFICE VISIT (OUTPATIENT)
Dept: PSYCHOLOGY | Facility: CLINIC | Age: 39
End: 2019-04-04
Payer: COMMERCIAL

## 2019-04-04 VITALS
DIASTOLIC BLOOD PRESSURE: 80 MMHG | TEMPERATURE: 98.1 F | SYSTOLIC BLOOD PRESSURE: 120 MMHG | HEART RATE: 80 BPM | RESPIRATION RATE: 16 BRPM

## 2019-04-04 DIAGNOSIS — F33.2 SEVERE RECURRENT MAJOR DEPRESSION WITHOUT PSYCHOTIC FEATURES (HCC): Primary | ICD-10-CM

## 2019-04-04 PROCEDURE — H0035 MH PARTIAL HOSP TX UNDER 24H: HCPCS

## 2019-04-05 ENCOUNTER — TRANSITIONAL CARE MANAGEMENT (OUTPATIENT)
Dept: FAMILY MEDICINE CLINIC | Facility: CLINIC | Age: 39
End: 2019-04-05

## 2019-04-05 ENCOUNTER — OFFICE VISIT (OUTPATIENT)
Dept: FAMILY MEDICINE CLINIC | Facility: CLINIC | Age: 39
End: 2019-04-05
Payer: COMMERCIAL

## 2019-04-05 ENCOUNTER — OFFICE VISIT (OUTPATIENT)
Dept: PSYCHOLOGY | Facility: CLINIC | Age: 39
End: 2019-04-05
Payer: COMMERCIAL

## 2019-04-05 VITALS
HEART RATE: 80 BPM | SYSTOLIC BLOOD PRESSURE: 122 MMHG | RESPIRATION RATE: 18 BRPM | DIASTOLIC BLOOD PRESSURE: 76 MMHG | TEMPERATURE: 98 F

## 2019-04-05 VITALS
WEIGHT: 167 LBS | TEMPERATURE: 97.6 F | BODY MASS INDEX: 32.79 KG/M2 | RESPIRATION RATE: 16 BRPM | SYSTOLIC BLOOD PRESSURE: 120 MMHG | HEART RATE: 100 BPM | HEIGHT: 60 IN | OXYGEN SATURATION: 99 % | DIASTOLIC BLOOD PRESSURE: 82 MMHG

## 2019-04-05 DIAGNOSIS — E55.9 VITAMIN D DEFICIENCY: ICD-10-CM

## 2019-04-05 DIAGNOSIS — F33.2 MAJOR DEPRESSIVE DISORDER, RECURRENT SEVERE WITHOUT PSYCHOTIC FEATURES (HCC): Primary | ICD-10-CM

## 2019-04-05 DIAGNOSIS — F41.1 ANXIETY, GENERALIZED: ICD-10-CM

## 2019-04-05 DIAGNOSIS — F33.2 SEVERE RECURRENT MAJOR DEPRESSION WITHOUT PSYCHOTIC FEATURES (HCC): Primary | ICD-10-CM

## 2019-04-05 DIAGNOSIS — Z12.39 SCREENING FOR BREAST CANCER: ICD-10-CM

## 2019-04-05 DIAGNOSIS — R73.01 IMPAIRED FASTING GLUCOSE: ICD-10-CM

## 2019-04-05 DIAGNOSIS — M79.7 FIBROMYALGIA: ICD-10-CM

## 2019-04-05 DIAGNOSIS — R79.89 ELEVATED TSH: ICD-10-CM

## 2019-04-05 PROCEDURE — 1111F DSCHRG MED/CURRENT MED MERGE: CPT | Performed by: FAMILY MEDICINE

## 2019-04-05 PROCEDURE — H0035 MH PARTIAL HOSP TX UNDER 24H: HCPCS

## 2019-04-05 PROCEDURE — 99496 TRANSJ CARE MGMT HIGH F2F 7D: CPT | Performed by: FAMILY MEDICINE

## 2019-04-08 ENCOUNTER — OFFICE VISIT (OUTPATIENT)
Dept: PSYCHOLOGY | Facility: CLINIC | Age: 39
End: 2019-04-08
Payer: COMMERCIAL

## 2019-04-08 DIAGNOSIS — F33.2 SEVERE RECURRENT MAJOR DEPRESSION WITHOUT PSYCHOTIC FEATURES (HCC): Primary | ICD-10-CM

## 2019-04-08 PROCEDURE — H0035 MH PARTIAL HOSP TX UNDER 24H: HCPCS

## 2019-04-09 ENCOUNTER — OFFICE VISIT (OUTPATIENT)
Dept: PSYCHOLOGY | Facility: CLINIC | Age: 39
End: 2019-04-09
Payer: COMMERCIAL

## 2019-04-09 DIAGNOSIS — F33.2 SEVERE RECURRENT MAJOR DEPRESSION WITHOUT PSYCHOTIC FEATURES (HCC): Primary | ICD-10-CM

## 2019-04-09 PROCEDURE — H0035 MH PARTIAL HOSP TX UNDER 24H: HCPCS

## 2019-04-10 ENCOUNTER — OFFICE VISIT (OUTPATIENT)
Dept: GASTROENTEROLOGY | Facility: MEDICAL CENTER | Age: 39
End: 2019-04-10
Payer: COMMERCIAL

## 2019-04-10 VITALS
DIASTOLIC BLOOD PRESSURE: 98 MMHG | TEMPERATURE: 98.1 F | BODY MASS INDEX: 31.72 KG/M2 | HEART RATE: 105 BPM | WEIGHT: 162.4 LBS | SYSTOLIC BLOOD PRESSURE: 105 MMHG

## 2019-04-10 DIAGNOSIS — R10.13 DYSPEPSIA: ICD-10-CM

## 2019-04-10 DIAGNOSIS — K59.04 CHRONIC IDIOPATHIC CONSTIPATION: Primary | ICD-10-CM

## 2019-04-10 DIAGNOSIS — R19.5 LOOSE STOOLS: ICD-10-CM

## 2019-04-10 DIAGNOSIS — K92.1 HEMATOCHEZIA: ICD-10-CM

## 2019-04-10 PROCEDURE — 99214 OFFICE O/P EST MOD 30 MIN: CPT | Performed by: PHYSICIAN ASSISTANT

## 2019-04-11 ENCOUNTER — OFFICE VISIT (OUTPATIENT)
Dept: PSYCHOLOGY | Facility: CLINIC | Age: 39
End: 2019-04-11
Payer: COMMERCIAL

## 2019-04-11 DIAGNOSIS — F33.2 SEVERE RECURRENT MAJOR DEPRESSION WITHOUT PSYCHOTIC FEATURES (HCC): Primary | ICD-10-CM

## 2019-04-11 PROBLEM — K92.1 HEMATOCHEZIA: Status: ACTIVE | Noted: 2019-04-11

## 2019-04-11 PROBLEM — K59.04 CHRONIC IDIOPATHIC CONSTIPATION: Status: ACTIVE | Noted: 2019-04-11

## 2019-04-11 PROBLEM — R19.5 LOOSE STOOLS: Status: ACTIVE | Noted: 2019-04-11

## 2019-04-11 PROCEDURE — H0035 MH PARTIAL HOSP TX UNDER 24H: HCPCS

## 2019-04-12 ENCOUNTER — OFFICE VISIT (OUTPATIENT)
Dept: PSYCHOLOGY | Facility: CLINIC | Age: 39
End: 2019-04-12

## 2019-04-12 ENCOUNTER — DOCUMENTATION (OUTPATIENT)
Dept: PSYCHOLOGY | Facility: CLINIC | Age: 39
End: 2019-04-12

## 2019-04-12 DIAGNOSIS — F33.2 MAJOR DEPRESSIVE DISORDER, RECURRENT SEVERE WITHOUT PSYCHOTIC FEATURES (HCC): Primary | ICD-10-CM

## 2019-04-15 ENCOUNTER — TELEPHONE (OUTPATIENT)
Dept: BEHAVIORAL/MENTAL HEALTH CLINIC | Facility: CLINIC | Age: 39
End: 2019-04-15

## 2019-04-15 ENCOUNTER — DOCUMENTATION (OUTPATIENT)
Dept: PSYCHOLOGY | Facility: CLINIC | Age: 39
End: 2019-04-15

## 2019-04-15 ENCOUNTER — OFFICE VISIT (OUTPATIENT)
Dept: PSYCHOLOGY | Facility: CLINIC | Age: 39
End: 2019-04-15
Payer: COMMERCIAL

## 2019-04-15 DIAGNOSIS — F33.2 SEVERE RECURRENT MAJOR DEPRESSION WITHOUT PSYCHOTIC FEATURES (HCC): Primary | ICD-10-CM

## 2019-04-16 ENCOUNTER — TELEPHONE (OUTPATIENT)
Dept: PSYCHIATRY | Facility: CLINIC | Age: 39
End: 2019-04-16

## 2019-04-17 ENCOUNTER — OFFICE VISIT (OUTPATIENT)
Dept: BEHAVIORAL/MENTAL HEALTH CLINIC | Facility: CLINIC | Age: 39
End: 2019-04-17
Payer: COMMERCIAL

## 2019-04-17 ENCOUNTER — HOSPITAL ENCOUNTER (EMERGENCY)
Facility: HOSPITAL | Age: 39
Discharge: HOME | End: 2019-04-17
Attending: EMERGENCY MEDICINE | Admitting: EMERGENCY MEDICINE
Payer: COMMERCIAL

## 2019-04-17 VITALS
SYSTOLIC BLOOD PRESSURE: 186 MMHG | OXYGEN SATURATION: 96 % | RESPIRATION RATE: 18 BRPM | DIASTOLIC BLOOD PRESSURE: 108 MMHG | TEMPERATURE: 98.1 F | HEART RATE: 89 BPM

## 2019-04-17 DIAGNOSIS — F33.2 MAJOR DEPRESSIVE DISORDER, RECURRENT SEVERE WITHOUT PSYCHOTIC FEATURES (HCC): ICD-10-CM

## 2019-04-17 DIAGNOSIS — F41.1 ANXIETY, GENERALIZED: ICD-10-CM

## 2019-04-17 DIAGNOSIS — F41.9 ANXIETY: Primary | ICD-10-CM

## 2019-04-17 PROCEDURE — 99284 EMERGENCY DEPT VISIT MOD MDM: CPT

## 2019-04-17 PROCEDURE — 90834 PSYTX W PT 45 MINUTES: CPT | Performed by: SOCIAL WORKER

## 2019-04-17 PROCEDURE — 99283 EMERGENCY DEPT VISIT LOW MDM: CPT | Performed by: EMERGENCY MEDICINE

## 2019-04-17 RX ORDER — LORAZEPAM 0.5 MG/1
1 TABLET ORAL ONCE
Status: COMPLETED | OUTPATIENT
Start: 2019-04-17 | End: 2019-04-17

## 2019-04-17 RX ADMIN — LORAZEPAM 1 MG: 0.5 TABLET ORAL at 07:00

## 2019-04-18 ENCOUNTER — OFFICE VISIT (OUTPATIENT)
Dept: PSYCHIATRY | Facility: CLINIC | Age: 39
End: 2019-04-18
Payer: COMMERCIAL

## 2019-04-18 VITALS
HEART RATE: 112 BPM | WEIGHT: 159 LBS | BODY MASS INDEX: 31.22 KG/M2 | DIASTOLIC BLOOD PRESSURE: 92 MMHG | HEIGHT: 60 IN | RESPIRATION RATE: 16 BRPM | SYSTOLIC BLOOD PRESSURE: 142 MMHG

## 2019-04-18 DIAGNOSIS — M79.7 FIBROMYALGIA: Primary | ICD-10-CM

## 2019-04-18 DIAGNOSIS — F33.2 MAJOR DEPRESSIVE DISORDER, RECURRENT SEVERE WITHOUT PSYCHOTIC FEATURES (HCC): ICD-10-CM

## 2019-04-18 DIAGNOSIS — F41.1 ANXIETY, GENERALIZED: ICD-10-CM

## 2019-04-18 DIAGNOSIS — F33.2 MDD (MAJOR DEPRESSIVE DISORDER), RECURRENT SEVERE, WITHOUT PSYCHOSIS (HCC): ICD-10-CM

## 2019-04-18 PROCEDURE — 99213 OFFICE O/P EST LOW 20 MIN: CPT | Performed by: NURSE PRACTITIONER

## 2019-04-18 RX ORDER — TRAZODONE HYDROCHLORIDE 100 MG/1
100-200 TABLET ORAL
Qty: 30 TABLET | Refills: 1 | Status: SHIPPED | OUTPATIENT
Start: 2019-04-18 | End: 2019-05-31 | Stop reason: SDUPTHER

## 2019-04-18 RX ORDER — CHOLECALCIFEROL (VITAMIN D3) 125 MCG
5 CAPSULE ORAL
COMMUNITY
End: 2019-06-04 | Stop reason: ALTCHOICE

## 2019-04-22 ENCOUNTER — OFFICE VISIT (OUTPATIENT)
Dept: BEHAVIORAL/MENTAL HEALTH CLINIC | Facility: CLINIC | Age: 39
End: 2019-04-22
Payer: COMMERCIAL

## 2019-04-22 DIAGNOSIS — F33.2 SEVERE RECURRENT MAJOR DEPRESSION WITHOUT PSYCHOTIC FEATURES (HCC): Primary | ICD-10-CM

## 2019-04-22 DIAGNOSIS — F41.1 ANXIETY, GENERALIZED: ICD-10-CM

## 2019-04-22 PROCEDURE — 90834 PSYTX W PT 45 MINUTES: CPT | Performed by: SOCIAL WORKER

## 2019-04-25 ENCOUNTER — OFFICE VISIT (OUTPATIENT)
Dept: PSYCHIATRY | Facility: CLINIC | Age: 39
End: 2019-04-25
Payer: COMMERCIAL

## 2019-04-25 DIAGNOSIS — F33.2 MAJOR DEPRESSIVE DISORDER, RECURRENT SEVERE WITHOUT PSYCHOTIC FEATURES (HCC): ICD-10-CM

## 2019-04-25 DIAGNOSIS — F41.1 GAD (GENERALIZED ANXIETY DISORDER): Primary | ICD-10-CM

## 2019-04-25 DIAGNOSIS — F33.2 MDD (MAJOR DEPRESSIVE DISORDER), RECURRENT SEVERE, WITHOUT PSYCHOSIS (HCC): ICD-10-CM

## 2019-04-25 PROCEDURE — 99213 OFFICE O/P EST LOW 20 MIN: CPT | Performed by: PSYCHIATRY & NEUROLOGY

## 2019-04-25 RX ORDER — LORAZEPAM 1 MG/1
0.5 TABLET ORAL EVERY 6 HOURS PRN
Qty: 120 TABLET | Refills: 0 | Status: SHIPPED | OUTPATIENT
Start: 2019-04-25 | End: 2019-05-31 | Stop reason: SDUPTHER

## 2019-04-25 RX ORDER — DULOXETIN HYDROCHLORIDE 30 MG/1
CAPSULE, DELAYED RELEASE ORAL
Qty: 30 CAPSULE | Refills: 2 | Status: SHIPPED | OUTPATIENT
Start: 2019-04-25 | End: 2019-09-02 | Stop reason: SDUPTHER

## 2019-04-25 RX ORDER — DULOXETIN HYDROCHLORIDE 60 MG/1
CAPSULE, DELAYED RELEASE ORAL
Qty: 30 CAPSULE | Refills: 2 | Status: SHIPPED | OUTPATIENT
Start: 2019-04-25 | End: 2019-09-02 | Stop reason: SDUPTHER

## 2019-04-25 RX ORDER — ARIPIPRAZOLE 5 MG/1
5 TABLET ORAL DAILY
Qty: 30 TABLET | Refills: 2 | Status: SHIPPED | OUTPATIENT
Start: 2019-04-25 | End: 2019-05-31 | Stop reason: SDUPTHER

## 2019-05-02 DIAGNOSIS — M79.7 FIBROMYALGIA: ICD-10-CM

## 2019-05-06 ENCOUNTER — OFFICE VISIT (OUTPATIENT)
Dept: BEHAVIORAL/MENTAL HEALTH CLINIC | Facility: CLINIC | Age: 39
End: 2019-05-06
Payer: COMMERCIAL

## 2019-05-06 DIAGNOSIS — F33.2 MAJOR DEPRESSIVE DISORDER, RECURRENT SEVERE WITHOUT PSYCHOTIC FEATURES (HCC): ICD-10-CM

## 2019-05-06 DIAGNOSIS — M79.7 FIBROMYALGIA: ICD-10-CM

## 2019-05-06 DIAGNOSIS — F41.1 ANXIETY, GENERALIZED: ICD-10-CM

## 2019-05-06 PROCEDURE — 90834 PSYTX W PT 45 MINUTES: CPT | Performed by: SOCIAL WORKER

## 2019-05-06 RX ORDER — PREGABALIN 100 MG/1
100 CAPSULE ORAL 2 TIMES DAILY
Qty: 60 CAPSULE | Refills: 2 | Status: SHIPPED | OUTPATIENT
Start: 2019-05-06 | End: 2019-07-15 | Stop reason: SDUPTHER

## 2019-05-20 ENCOUNTER — OFFICE VISIT (OUTPATIENT)
Dept: BEHAVIORAL/MENTAL HEALTH CLINIC | Facility: CLINIC | Age: 39
End: 2019-05-20
Payer: COMMERCIAL

## 2019-05-20 DIAGNOSIS — F33.2 SEVERE RECURRENT MAJOR DEPRESSION WITHOUT PSYCHOTIC FEATURES (HCC): ICD-10-CM

## 2019-05-20 DIAGNOSIS — F41.1 ANXIETY, GENERALIZED: Primary | ICD-10-CM

## 2019-05-20 PROCEDURE — 90834 PSYTX W PT 45 MINUTES: CPT | Performed by: SOCIAL WORKER

## 2019-05-30 ENCOUNTER — TELEPHONE (OUTPATIENT)
Dept: GASTROENTEROLOGY | Facility: MEDICAL CENTER | Age: 39
End: 2019-05-30

## 2019-05-31 ENCOUNTER — OFFICE VISIT (OUTPATIENT)
Dept: PSYCHIATRY | Facility: CLINIC | Age: 39
End: 2019-05-31
Payer: COMMERCIAL

## 2019-05-31 DIAGNOSIS — F41.1 GAD (GENERALIZED ANXIETY DISORDER): ICD-10-CM

## 2019-05-31 DIAGNOSIS — F33.2 MDD (MAJOR DEPRESSIVE DISORDER), RECURRENT SEVERE, WITHOUT PSYCHOSIS (HCC): ICD-10-CM

## 2019-05-31 DIAGNOSIS — M79.7 FIBROMYALGIA: ICD-10-CM

## 2019-05-31 DIAGNOSIS — F33.2 MAJOR DEPRESSIVE DISORDER, RECURRENT SEVERE WITHOUT PSYCHOTIC FEATURES (HCC): ICD-10-CM

## 2019-05-31 PROCEDURE — 99213 OFFICE O/P EST LOW 20 MIN: CPT | Performed by: PSYCHIATRY & NEUROLOGY

## 2019-05-31 RX ORDER — ARIPIPRAZOLE 2 MG/1
2 TABLET ORAL DAILY
Qty: 30 TABLET | Refills: 0 | Status: SHIPPED | OUTPATIENT
Start: 2019-05-31 | End: 2019-10-15 | Stop reason: ALTCHOICE

## 2019-05-31 RX ORDER — LORAZEPAM 0.5 MG/1
0.5 TABLET ORAL EVERY 6 HOURS PRN
Qty: 120 TABLET | Refills: 2 | Status: SHIPPED | OUTPATIENT
Start: 2019-05-31 | End: 2019-08-30 | Stop reason: SDUPTHER

## 2019-05-31 RX ORDER — TRAZODONE HYDROCHLORIDE 100 MG/1
100-200 TABLET ORAL
Qty: 60 TABLET | Refills: 2 | Status: SHIPPED | OUTPATIENT
Start: 2019-05-31 | End: 2019-08-30 | Stop reason: SDUPTHER

## 2019-05-31 RX ORDER — CYCLOBENZAPRINE HCL 10 MG
10 TABLET ORAL
Refills: 5 | COMMUNITY
Start: 2019-05-27 | End: 2019-08-30 | Stop reason: SDUPTHER

## 2019-06-03 ENCOUNTER — ANESTHESIA EVENT (OUTPATIENT)
Dept: GASTROENTEROLOGY | Facility: MEDICAL CENTER | Age: 39
End: 2019-06-03

## 2019-06-04 ENCOUNTER — HOSPITAL ENCOUNTER (OUTPATIENT)
Dept: GASTROENTEROLOGY | Facility: MEDICAL CENTER | Age: 39
Setting detail: OUTPATIENT SURGERY
Discharge: HOME | End: 2019-06-04
Attending: INTERNAL MEDICINE | Admitting: INTERNAL MEDICINE
Payer: COMMERCIAL

## 2019-06-04 ENCOUNTER — ANESTHESIA (OUTPATIENT)
Dept: GASTROENTEROLOGY | Facility: MEDICAL CENTER | Age: 39
End: 2019-06-04

## 2019-06-04 VITALS
WEIGHT: 169 LBS | DIASTOLIC BLOOD PRESSURE: 70 MMHG | RESPIRATION RATE: 16 BRPM | OXYGEN SATURATION: 100 % | HEART RATE: 78 BPM | HEIGHT: 60 IN | TEMPERATURE: 98.4 F | SYSTOLIC BLOOD PRESSURE: 105 MMHG | BODY MASS INDEX: 33.18 KG/M2

## 2019-06-04 DIAGNOSIS — R19.5 OTHER FECAL ABNORMALITIES: ICD-10-CM

## 2019-06-04 DIAGNOSIS — K92.1 MELENA: ICD-10-CM

## 2019-06-04 DIAGNOSIS — K59.04 CHRONIC IDIOPATHIC CONSTIPATION: ICD-10-CM

## 2019-06-04 LAB — EXT PREGNANCY TEST URINE: NEGATIVE

## 2019-06-04 PROCEDURE — 45380 COLONOSCOPY AND BIOPSY: CPT | Performed by: INTERNAL MEDICINE

## 2019-06-04 PROCEDURE — 81025 URINE PREGNANCY TEST: CPT | Performed by: ANESTHESIOLOGY

## 2019-06-04 PROCEDURE — 88305 TISSUE EXAM BY PATHOLOGIST: CPT | Performed by: PATHOLOGY

## 2019-06-04 RX ORDER — CHOLECALCIFEROL (VITAMIN D3) 25 MCG
1 CAPSULE ORAL DAILY
COMMUNITY

## 2019-06-04 RX ORDER — SODIUM CHLORIDE 9 MG/ML
125 INJECTION, SOLUTION INTRAVENOUS CONTINUOUS
Status: DISCONTINUED | OUTPATIENT
Start: 2019-06-04 | End: 2019-06-08 | Stop reason: HOSPADM

## 2019-06-04 RX ORDER — PROPOFOL 10 MG/ML
INJECTION, EMULSION INTRAVENOUS AS NEEDED
Status: DISCONTINUED | OUTPATIENT
Start: 2019-06-04 | End: 2019-06-04 | Stop reason: SURG

## 2019-06-04 RX ADMIN — SODIUM CHLORIDE: 0.9 INJECTION, SOLUTION INTRAVENOUS at 13:35

## 2019-06-04 RX ADMIN — PROPOFOL 30 MG: 10 INJECTION, EMULSION INTRAVENOUS at 13:40

## 2019-06-04 RX ADMIN — PROPOFOL 20 MG: 10 INJECTION, EMULSION INTRAVENOUS at 13:34

## 2019-06-04 RX ADMIN — SODIUM CHLORIDE 125 ML/HR: 0.9 INJECTION, SOLUTION INTRAVENOUS at 12:04

## 2019-06-04 RX ADMIN — PROPOFOL 30 MG: 10 INJECTION, EMULSION INTRAVENOUS at 13:26

## 2019-06-04 RX ADMIN — PROPOFOL 30 MG: 10 INJECTION, EMULSION INTRAVENOUS at 13:31

## 2019-06-04 RX ADMIN — PROPOFOL 60 MG: 10 INJECTION, EMULSION INTRAVENOUS at 13:21

## 2019-06-04 RX ADMIN — PROPOFOL 30 MG: 10 INJECTION, EMULSION INTRAVENOUS at 13:23

## 2019-06-04 RX ADMIN — PROPOFOL 30 MG: 10 INJECTION, EMULSION INTRAVENOUS at 13:43

## 2019-06-04 RX ADMIN — PROPOFOL 30 MG: 10 INJECTION, EMULSION INTRAVENOUS at 13:28

## 2019-06-04 RX ADMIN — LIDOCAINE HYDROCHLORIDE 40 MG: 20 INJECTION, SOLUTION INTRAVENOUS at 13:21

## 2019-06-04 RX ADMIN — PROPOFOL 30 MG: 10 INJECTION, EMULSION INTRAVENOUS at 13:37

## 2019-06-10 ENCOUNTER — HOSPITAL ENCOUNTER (OUTPATIENT)
Dept: RADIOLOGY | Facility: HOSPITAL | Age: 39
Discharge: HOME | End: 2019-06-10
Payer: COMMERCIAL

## 2019-06-10 VITALS — HEIGHT: 60 IN | WEIGHT: 169 LBS | BODY MASS INDEX: 33.18 KG/M2

## 2019-06-10 DIAGNOSIS — Z12.39 SCREENING FOR BREAST CANCER: ICD-10-CM

## 2019-06-10 PROCEDURE — 77067 SCR MAMMO BI INCL CAD: CPT

## 2019-06-13 ENCOUNTER — HOSPITAL ENCOUNTER (OUTPATIENT)
Dept: MAMMOGRAPHY | Facility: CLINIC | Age: 39
Discharge: HOME | End: 2019-06-13
Payer: COMMERCIAL

## 2019-06-13 ENCOUNTER — HOSPITAL ENCOUNTER (OUTPATIENT)
Dept: ULTRASOUND IMAGING | Facility: CLINIC | Age: 39
Discharge: HOME | End: 2019-06-13
Payer: COMMERCIAL

## 2019-06-13 VITALS — HEIGHT: 60 IN | BODY MASS INDEX: 33.18 KG/M2 | WEIGHT: 169 LBS

## 2019-06-13 DIAGNOSIS — R92.8 ABNORMAL MAMMOGRAM: ICD-10-CM

## 2019-06-13 PROCEDURE — 77065 DX MAMMO INCL CAD UNI: CPT

## 2019-06-13 PROCEDURE — G0279 TOMOSYNTHESIS, MAMMO: HCPCS

## 2019-06-13 PROCEDURE — 76642 ULTRASOUND BREAST LIMITED: CPT

## 2019-06-17 ENCOUNTER — HOSPITAL ENCOUNTER (OUTPATIENT)
Dept: MAMMOGRAPHY | Facility: CLINIC | Age: 39
Discharge: HOME | End: 2019-06-17
Payer: COMMERCIAL

## 2019-06-17 ENCOUNTER — HOSPITAL ENCOUNTER (OUTPATIENT)
Dept: ULTRASOUND IMAGING | Facility: CLINIC | Age: 39
Discharge: HOME | End: 2019-06-17
Admitting: FAMILY MEDICINE
Payer: COMMERCIAL

## 2019-06-17 VITALS
BODY MASS INDEX: 33.18 KG/M2 | HEART RATE: 68 BPM | SYSTOLIC BLOOD PRESSURE: 118 MMHG | HEIGHT: 60 IN | WEIGHT: 169 LBS | DIASTOLIC BLOOD PRESSURE: 72 MMHG

## 2019-06-17 DIAGNOSIS — Z98.890 STATUS POST BREAST BIOPSY: ICD-10-CM

## 2019-06-17 DIAGNOSIS — R92.8 ABNORMAL ULTRASOUND OF BREAST: ICD-10-CM

## 2019-06-17 PROCEDURE — 19083 BX BREAST 1ST LESION US IMAG: CPT

## 2019-06-17 PROCEDURE — 88305 TISSUE EXAM BY PATHOLOGIST: CPT | Performed by: PATHOLOGY

## 2019-06-17 RX ORDER — LIDOCAINE HYDROCHLORIDE 10 MG/ML
4 INJECTION, SOLUTION INFILTRATION; PERINEURAL ONCE
Status: COMPLETED | OUTPATIENT
Start: 2019-06-17 | End: 2019-06-17

## 2019-06-17 RX ADMIN — LIDOCAINE HYDROCHLORIDE 4 ML: 10 INJECTION, SOLUTION INFILTRATION; PERINEURAL at 11:25

## 2019-06-18 ENCOUNTER — TELEPHONE (OUTPATIENT)
Dept: MAMMOGRAPHY | Facility: CLINIC | Age: 39
End: 2019-06-18

## 2019-06-24 ENCOUNTER — SOCIAL WORK (OUTPATIENT)
Dept: BEHAVIORAL/MENTAL HEALTH CLINIC | Facility: CLINIC | Age: 39
End: 2019-06-24
Payer: COMMERCIAL

## 2019-06-24 DIAGNOSIS — F33.2 SEVERE RECURRENT MAJOR DEPRESSION WITHOUT PSYCHOTIC FEATURES (HCC): ICD-10-CM

## 2019-06-24 DIAGNOSIS — F41.1 ANXIETY, GENERALIZED: ICD-10-CM

## 2019-06-24 DIAGNOSIS — R52 GENERALIZED BODY ACHES: ICD-10-CM

## 2019-06-24 PROCEDURE — 90834 PSYTX W PT 45 MINUTES: CPT | Performed by: SOCIAL WORKER

## 2019-07-15 ENCOUNTER — SOCIAL WORK (OUTPATIENT)
Dept: BEHAVIORAL/MENTAL HEALTH CLINIC | Facility: CLINIC | Age: 39
End: 2019-07-15
Payer: COMMERCIAL

## 2019-07-15 ENCOUNTER — TELEPHONE (OUTPATIENT)
Dept: PSYCHIATRY | Facility: CLINIC | Age: 39
End: 2019-07-15

## 2019-07-15 DIAGNOSIS — F41.1 GAD (GENERALIZED ANXIETY DISORDER): ICD-10-CM

## 2019-07-15 DIAGNOSIS — F41.1 GENERALIZED ANXIETY DISORDER: ICD-10-CM

## 2019-07-15 DIAGNOSIS — F33.2 SEVERE RECURRENT MAJOR DEPRESSION WITHOUT PSYCHOTIC FEATURES (HCC): Primary | ICD-10-CM

## 2019-07-15 DIAGNOSIS — M79.7 FIBROMYALGIA: ICD-10-CM

## 2019-07-15 PROCEDURE — 90834 PSYTX W PT 45 MINUTES: CPT | Performed by: SOCIAL WORKER

## 2019-07-15 RX ORDER — PREGABALIN 100 MG/1
100 CAPSULE ORAL 2 TIMES DAILY
Qty: 60 CAPSULE | Refills: 2 | Status: SHIPPED | OUTPATIENT
Start: 2019-07-15 | End: 2019-10-15 | Stop reason: SDUPTHER

## 2019-07-15 NOTE — TELEPHONE ENCOUNTER
Patient will be in later today to also drop off paperwork and an LOKESH for [place] assistance she just  Received this and it needs to be done by Thursday

## 2019-07-15 NOTE — TELEPHONE ENCOUNTER
Patient stopped in with form for you to fill out- [place] Form  Form and LOKESH are on your schedule  She would like to  the form when completed
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
## 2019-07-16 RX ORDER — LORAZEPAM 1 MG/1
TABLET ORAL
Qty: 60 TABLET | Refills: 1 | Status: SHIPPED | OUTPATIENT
Start: 2019-07-16 | End: 2019-10-02 | Stop reason: SDUPTHER

## 2019-07-30 DIAGNOSIS — F33.2 MAJOR DEPRESSIVE DISORDER, RECURRENT SEVERE WITHOUT PSYCHOTIC FEATURES (HCC): ICD-10-CM

## 2019-07-30 RX ORDER — ARIPIPRAZOLE 5 MG/1
5 TABLET ORAL DAILY
Qty: 30 TABLET | Refills: 2 | Status: SHIPPED | OUTPATIENT
Start: 2019-07-30 | End: 2019-10-15 | Stop reason: ALTCHOICE

## 2019-08-07 ENCOUNTER — SOCIAL WORK (OUTPATIENT)
Dept: BEHAVIORAL/MENTAL HEALTH CLINIC | Facility: CLINIC | Age: 39
End: 2019-08-07
Payer: COMMERCIAL

## 2019-08-07 DIAGNOSIS — F33.2 SEVERE RECURRENT MAJOR DEPRESSION WITHOUT PSYCHOTIC FEATURES (HCC): Primary | ICD-10-CM

## 2019-08-07 DIAGNOSIS — F41.1 GENERALIZED ANXIETY DISORDER: ICD-10-CM

## 2019-08-07 PROCEDURE — 90834 PSYTX W PT 45 MINUTES: CPT | Performed by: SOCIAL WORKER

## 2019-08-07 NOTE — PSYCH
Psychotherapy Provided: Individual Psychotherapy 45 minutes states had been in significant pain following attending an all day [employer] event with reported cramped seating; states she and her mother have been working together regarding the care of needs for her maternal grandmother (who is currently hospitalized for approximately 5 days); "There is nothing anybody can do (regarding the maternal grandmother's medical status)  " [facility] sees herself as being supportive to her mother with her report of the mother's two brothers not being as helpful  "They (her mother's two brothers) have been in a long  standing feud which complicates things (reagarding caregiving issues with the maternal grandmother) " She states her mother retired from her job and is in training 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")  "I am feeling ok, and I am very tired a lot  So I nap " She noted her mother asks her why she is napping   She reported while was granted eligibility the program was discontinued effective 7/31/19  states was denied SSI benefits and reports has obtained  (Shelry Copeland ) to address the matter; discussed guided imagry as a helpful coping mechanism regarding pain mgt strategies; discussed/reviewed the psychological (ex , depression) and physiological impact (ex , insomnia) of chroni pain handout) and suggested [facility] shared the information in the handout with her mother;   A: is receptive to using guided imagry and created her own "scene" during a guided imagry exercise during this session; P:(G#1) will practice her guided imagry exercise; will share the handout information with her mother for discussion;    Length of time in session: 45 minutes, follow up in 2 week    Goals addressed in session: Goal 1     Pain:      moderate to severe    5    Current suicide risk : [address]: Diagnosis and Treatment Plan explained to Mamie Exon relates understanding diagnosis and is agreeable to Treatment Plan   Yes

## 2019-08-19 DIAGNOSIS — M62.838 MUSCLE SPASM: ICD-10-CM

## 2019-08-19 RX ORDER — CYCLOBENZAPRINE HCL 10 MG
TABLET ORAL
Qty: 30 TABLET | Refills: 5 | Status: SHIPPED | OUTPATIENT
Start: 2019-08-19 | End: 2020-02-24

## 2019-08-21 DIAGNOSIS — M79.7 FIBROMYALGIA: ICD-10-CM

## 2019-08-21 DIAGNOSIS — F33.2 MAJOR DEPRESSIVE DISORDER, RECURRENT SEVERE WITHOUT PSYCHOTIC FEATURES (HCC): ICD-10-CM

## 2019-08-21 RX ORDER — ARIPIPRAZOLE 5 MG/1
5 TABLET ORAL DAILY
Qty: 30 TABLET | Refills: 2 | Status: CANCELLED | OUTPATIENT
Start: 2019-08-21

## 2019-08-21 RX ORDER — PREGABALIN 100 MG/1
100 CAPSULE ORAL 2 TIMES DAILY
Qty: 60 CAPSULE | Refills: 2 | Status: CANCELLED | OUTPATIENT
Start: 2019-08-21

## 2019-08-26 ENCOUNTER — SOCIAL WORK (OUTPATIENT)
Dept: BEHAVIORAL/MENTAL HEALTH CLINIC | Facility: CLINIC | Age: 39
End: 2019-08-26
Payer: COMMERCIAL

## 2019-08-26 DIAGNOSIS — F41.1 GENERALIZED ANXIETY DISORDER: ICD-10-CM

## 2019-08-26 DIAGNOSIS — F33.2 SEVERE RECURRENT MAJOR DEPRESSION WITHOUT PSYCHOTIC FEATURES (HCC): Primary | ICD-10-CM

## 2019-08-26 PROCEDURE — 90834 PSYTX W PT 45 MINUTES: CPT | Performed by: SOCIAL WORKER

## 2019-08-26 NOTE — PSYCH
Psychotherapy Provided: Individual Psychotherapy 45 minutes states her maternal grandmother is currently in a nursing home receiving rehab services after which time will, hopefully, be discharged to her assisted living residence; "She has been slipping here and there for the past couple of years  " states she has experienced a lot of pain during the past weekend with her report of her spending a lot of time in bed as a result;  "puttered around" in her room when she had gotten out of bed and was able to spend time with her friend this past Friday (prior to the onset of her reported pain during this past weekend);  is waiting for her Soc Sec Dis (SSD)  information to arrive at her home followed by her completing the appeal information followed by the submission of all of this information to her , Justin Scotland ("He will review the information and decide if he will accept my case ");  is has been a little easier during the past few days regarding her father's temperament; "He seems a little bit happier these days  I think it has to do with my mom being home (semi retiring)  " states her mother will be driving a [employer] for special needs children during this coming '19-'20 school year;  will be house sitting with a pool which she can use which she states is looking forward to; discussed/reviewed the importance of her ongoing efforts to create/maintain a quality of life regarding her reported stressors within her family as well as her own medical issues (including chronic pain); A: She presents as encouraged by her contact with the SSD  "to see if he will accept my case " She is in the process of expediting the transfer for her clinical information to the , Justin Scotland  She offers no complaints about her medications   She did give her mother the handout about the psychological and psychological impact of chronic pain (no f/u discussion at this time); P:(G#1) will initiate a converstion with her mother regarding the handout from the previous most recent counseling session;       Length of time in session: 45 minutes, follow up in 1 month    Goals addressed in session: Goal 1     Pain:      moderate to severe: "My legs and the top of my head (she rubbed the top of her head at this time)  "    5    Current suicide risk : Low         Behavioral Health Treatment Plan [facility]: Diagnosis and Treatment Plan explained to Cody Other relates understanding diagnosis and is agreeable to Treatment Plan   Yes

## 2019-08-27 ENCOUNTER — OFFICE VISIT (OUTPATIENT)
Dept: GASTROENTEROLOGY | Facility: MEDICAL CENTER | Age: 39
End: 2019-08-27
Payer: COMMERCIAL

## 2019-08-27 VITALS
HEART RATE: 73 BPM | WEIGHT: 189 LBS | SYSTOLIC BLOOD PRESSURE: 116 MMHG | DIASTOLIC BLOOD PRESSURE: 78 MMHG | TEMPERATURE: 96.8 F | HEIGHT: 60 IN | BODY MASS INDEX: 37.11 KG/M2

## 2019-08-27 DIAGNOSIS — K59.04 CHRONIC IDIOPATHIC CONSTIPATION: Primary | ICD-10-CM

## 2019-08-27 DIAGNOSIS — K92.1 HEMATOCHEZIA: ICD-10-CM

## 2019-08-27 PROCEDURE — 99214 OFFICE O/P EST MOD 30 MIN: CPT | Performed by: PHYSICIAN ASSISTANT

## 2019-08-27 RX ORDER — MEDROXYPROGESTERONE ACETATE 150 MG/ML
INJECTION, SUSPENSION INTRAMUSCULAR
Refills: 5 | COMMUNITY
Start: 2019-08-08 | End: 2022-01-06

## 2019-08-27 NOTE — PROGRESS NOTES
Assessment/Plan:     Diagnoses and all orders for this visit:    Chronic idiopathic constipation  She has a history of constipation  She is having daily bowel movements with MiraLax however feels and incomplete evacuation  I did suggest she can titrate her MiraLax up to twice a day to see if this would help  We also discussed Linzess and Amitiza and if this is ineffective we can try 1 of these other medications  Also recommended increasing water intake as well as fiber as this will help her as well  Hematochezia  She was previously having rectal bleeding as well but has not had any recent episodes  This is likely due to her hemorrhoids are seen on colonoscopy and her constipation as mentioned above  Hopefully by treating her constipation she will have no further episodes  Will see her back in 6 months or sooner if necessary  Subjective:      Patient ID: Servando Wells is a 37 y o  female  HPI     This is a follow-up for constipation, rectal bleeding and to discuss her recent colonoscopy  She states her constipation has continued  She is taking MiraLax daily and does have 1 bowel movement per day however feels as if she needs to go more  She has not had any rectal bleeding since her last visit  She denies any abdominal pain  She denies any nausea or vomiting  She does have occasional heartburn when she eats spicy foods for which she takes Tums as needed  Colonoscopy was performed in June which showed a poor bowel prep, 1 polyp in the sigmoid and small internal hemorrhoids  Random biopsies were negative for microscopic colitis and the polyp was colonic mucosa      Patient Active Problem List   Diagnosis    Acne vulgaris    Allergic rhinitis    Back pain    Chronic fatigue    Chronic pain    Migraine without aura and without status migrainosus, not intractable    Migraine with aura    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, [facility]    Fibromyalgia    Headache    Impaired fasting glucose    Lyme disease    Malaise and fatigue    Menorrhagia    Muscle spasms of head or neck    Myalgia    Narcolepsy    Narcolepsy cataplexy syndrome    Neck pain    Obesity    Sinus disease    Sleep apnea    Snoring    Tick bite    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 psychotic features ([facility])    Generalized anxiety disorder    Dyspepsia    Arthralgia of multiple joints    Generalized body aches    Other constipation    Vitamin D deficiency    Elevated TSH    Loose stools    Hematochezia    Chronic idiopathic constipation     Allergies   Allergen Reactions    Decadrol [Dexamethasone] Other (See Comments)     Category: Adverse Reaction;   psychosis    Dexamethasone Sodium Phosphate     Other     Penicillins Hives and Other (See Comments)     Category: Allergy; Hives/Uticaria    Tetracycline Hives and Other (See Comments)     Hives/Uticaria    Tetracyclines & Related Hives     Category:  Allergy;      Current Outpatient Medications on File Prior to Visit   Medication Sig    ARIPiprazole (ABILIFY) 2 mg tablet Take 1 tablet (2 mg total) by mouth daily At 9am    ARIPiprazole (ABILIFY) 5 mg tablet TAKE 1 TABLET (5 MG TOTAL) BY MOUTH DAILY AT 9AM    Calcium Carbonate-Vitamin D (CALCIUM 500/D PO) Take 1 capsule by mouth daily    Cholecalciferol (VITAMIN D-3) 1000 units CAPS Take 1 capsule by mouth daily    cyclobenzaprine (FLEXERIL) 10 mg tablet Take 10 mg by mouth daily at bedtime as needed    cyclobenzaprine (FLEXERIL) 10 mg tablet TAKE 1 TABLET AT BEDTIME AS NEEDED FOR MUSCLE SPASMS    DULoxetine (CYMBALTA) 30 mg delayed release capsule Take 1 capsule (30mg) with 60mg capsule (90mg total) by mouth daily (9am)    DULoxetine (CYMBALTA) 60 mg delayed release capsule Take 1 capsule (60mg) with 30mg capsule (90mg total) by mouth daily (9am)    IRON PO Take by mouth    LORazepam (ATIVAN) 0 5 mg tablet Take 1 tablet (0 5 mg total) by mouth every 6 (six) hours as needed for anxiety    LORazepam (ATIVAN) 1 mg tablet TAKE 1/2 TABLET (0 5 MG TOTAL) BY MOUTH EVERY 6 (SIX) HOURS AS NEEDED FOR ANXIETY    pregabalin (LYRICA) 100 mg capsule Take 1 capsule (100 mg total) by mouth 2 (two) times a day    rizatriptan (MAXALT) 10 MG tablet Take 1 tablet (10 mg total) by mouth once as needed for migraine for up to 1 dose May repeat in 2 hours if needed    traZODone (DESYREL) 100 mg tablet Take 1-2 tablets (100-200 mg total) by mouth daily at bedtime as needed for sleep    MAGNESIUM OXIDE PO Take by mouth    medroxyPROGESTERone (DEPO-PROVERA) 150 mg/mL injection INJECT MONTHLY FOR 6 MONTH     No current facility-administered medications on file prior to visit        Family History   Problem Relation Age of Onset    Diabetes Father     Kidney disease Father     Substance Abuse Brother     Diabetes Maternal Grandmother     Rheum arthritis Maternal Grandmother     Melanoma Maternal Grandmother     Kidney disease Paternal Grandmother     Rheum arthritis Paternal Grandmother     Depression Paternal Grandmother     Diabetes Paternal Grandfather     Lung cancer Paternal Grandfather     Substance Abuse Maternal Uncle     Prostate cancer Maternal Uncle 61    Depression Paternal Aunt     Alcohol abuse Family     Stomach cancer Family     Irregular heart beat Mother      Past Medical History:   Diagnosis Date    Anxiety     Chronic sinusitis     Depression     Fibromyalgia     Migraine     Obesity     Polyarthritis     Last assessed 9/21/2015    Psychiatric disorder      Social History     Socioeconomic History    Marital status: Single     Spouse name: None    Number of children: 0    Years of education: 15 years     Highest education level: GED or equivalent   Occupational History    Occupation: unemployed   Social Needs    Financial resource strain: Hard    Food insecurity:     Worry: Never true     Inability: Never true   Keerthi Honorio Transportation needs:     Medical: Yes     Non-medical: Yes   Tobacco Use    Smoking status: Never Smoker    Smokeless tobacco: Never Used   Substance and Sexual Activity    Alcohol use: No     Frequency: Never     Drinks per session: 1 or 2     Binge frequency: Never     Comment: 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     Drug use: Not Currently    Sexual activity: Not Currently   Lifestyle    Physical activity:     Days per week: 5 days     Minutes per session: 30 min    Stress: To some extent   Relationships    Social connections:     Talks on phone: More than three times a week     Gets together: More than three times a week     Attends Confucianism service: More than 4 times per year     Active member of club or organization: Yes     Attends meetings of clubs or organizations: More than 4 times per year     Relationship status: Never     Intimate partner violence:     Fear of current or ex partner: No     Emotionally abused: No     Physically abused: No     Forced sexual activity: No   Other Topics Concern    None   Social History Narrative    Caffeine use     Past Surgical History:   Procedure Laterality Date    DENTAL SURGERY      HYSTEROSCOPY      Endometrial Biopsy By Hysteroscopy    REMOVAL OF INTRAUTERINE DEVICE (IUD)      US GUIDED BREAST BIOPSY RIGHT COMPLETE Right 6/17/2019         Review of Systems   All other systems reviewed and are negative  Objective:      /78 (BP Location: Left arm, Patient Position: Sitting, Cuff Size: Adult)   Pulse 73   Temp (!) 96 8 °F (36 °C) (Tympanic)   Ht 5' (1 524 m)   Wt 85 7 kg (189 lb)   BMI 36 91 kg/m²          Physical Exam   Constitutional: She is oriented to person, place, and time  She appears well-developed and well-nourished  HENT:   Head: Normocephalic and atraumatic  Eyes: Conjunctivae and EOM are normal    Neck: Normal range of motion  Cardiovascular: Normal rate and regular rhythm  Pulmonary/Chest: Effort normal and breath sounds normal    Abdominal: Soft  Bowel sounds are normal  She exhibits no distension  There is no tenderness  Musculoskeletal: Normal range of motion  Neurological: She is alert and oriented to person, place, and time  Skin: Skin is warm and dry  Psychiatric: She has a normal mood and affect   Her behavior is normal

## 2019-08-30 ENCOUNTER — OFFICE VISIT (OUTPATIENT)
Dept: FAMILY MEDICINE CLINIC | Facility: CLINIC | Age: 39
End: 2019-08-30
Payer: COMMERCIAL

## 2019-08-30 VITALS
DIASTOLIC BLOOD PRESSURE: 70 MMHG | TEMPERATURE: 99.3 F | HEART RATE: 96 BPM | RESPIRATION RATE: 16 BRPM | HEIGHT: 60 IN | SYSTOLIC BLOOD PRESSURE: 110 MMHG | WEIGHT: 188.8 LBS | OXYGEN SATURATION: 97 % | BODY MASS INDEX: 37.07 KG/M2

## 2019-08-30 DIAGNOSIS — R73.01 IMPAIRED FASTING GLUCOSE: Primary | ICD-10-CM

## 2019-08-30 DIAGNOSIS — F33.2 SEVERE RECURRENT MAJOR DEPRESSION WITHOUT PSYCHOTIC FEATURES (HCC): ICD-10-CM

## 2019-08-30 DIAGNOSIS — R53.82 CHRONIC FATIGUE: ICD-10-CM

## 2019-08-30 DIAGNOSIS — F33.2 MDD (MAJOR DEPRESSIVE DISORDER), RECURRENT SEVERE, WITHOUT PSYCHOSIS (HCC): ICD-10-CM

## 2019-08-30 DIAGNOSIS — R79.89 ELEVATED TSH: ICD-10-CM

## 2019-08-30 DIAGNOSIS — E55.9 VITAMIN D DEFICIENCY: ICD-10-CM

## 2019-08-30 DIAGNOSIS — J02.9 SORE THROAT: ICD-10-CM

## 2019-08-30 DIAGNOSIS — E66.09 CLASS 2 OBESITY DUE TO EXCESS CALORIES WITHOUT SERIOUS COMORBIDITY WITH BODY MASS INDEX (BMI) OF 36.0 TO 36.9 IN ADULT: ICD-10-CM

## 2019-08-30 DIAGNOSIS — M79.7 FIBROMYALGIA: ICD-10-CM

## 2019-08-30 DIAGNOSIS — F41.1 GENERALIZED ANXIETY DISORDER: ICD-10-CM

## 2019-08-30 PROCEDURE — 3008F BODY MASS INDEX DOCD: CPT | Performed by: FAMILY MEDICINE

## 2019-08-30 PROCEDURE — 99214 OFFICE O/P EST MOD 30 MIN: CPT | Performed by: FAMILY MEDICINE

## 2019-08-30 RX ORDER — TRAZODONE HYDROCHLORIDE 100 MG/1
100-200 TABLET ORAL
Qty: 60 TABLET | Refills: 2 | Status: SHIPPED | OUTPATIENT
Start: 2019-08-30 | End: 2019-12-11 | Stop reason: SDUPTHER

## 2019-08-30 NOTE — TELEPHONE ENCOUNTER
Refill request received for Esther's Trazodone  Next appointment with Dr Rachael Hernandez is 10/15/19  For Nikki's review in Dr Timbo Hernandez absence

## 2019-08-30 NOTE — PROGRESS NOTES
Chief Complaint   Patient presents with    Follow-up     4 month follow up     Health Maintenance   Topic Date Due    Pneumococcal Vaccine: Pediatrics (0 to 5 Years) and At-Risk Patients (6 to 59 Years) (1 of 3 - PCV13) 01/06/1984    INFLUENZA VACCINE  10/08/2019 (Originally 7/1/2019)    DTaP,Tdap,and Td Vaccines (2 - Td) 09/29/2019    MAMMOGRAM  06/13/2020    BMI: Followup Plan  08/30/2020    BMI: Adult  08/30/2020    CRC Screening: Colonoscopy  06/04/2022    Pneumococcal Vaccine: 65+ Years (1 of 2 - PCV13) 01/06/2043    Hepatitis C Screening  Completed    HEPATITIS B VACCINES  Aged Out     BMI Counseling: Body mass index is 36 87 kg/m²  Discussed the patient's BMI with her  The BMI is above average  BMI counseling and education was provided to the patient  Nutrition recommendations include reducing portion sizes, decreasing overall calorie intake, 3-5 servings of fruits/vegetables daily, reducing fast food intake, consuming healthier snacks, decreasing soda and/or juice intake, moderation in carbohydrate intake, increasing intake of lean protein, reducing intake of saturated fat and trans fat and reducing intake of cholesterol  Exercise recommendations include moderate aerobic physical activity for 150 minutes/week  Assessment/Plan:    Impaired fasting glucose  Follow a low carb diet, avoid concentrated sweets, work on weight reduction  Fibromyalgia  Symptoms are stable  Continue Lyrica 100 mg twice daily, Cymbalta  Encouraged regular exercise  Patient plans to start doing Pilates at home  Class 2 obesity due to excess calories without serious comorbidity in adult  Discussed dietary and lifestyle modifications with patient  Encouraged weight reduction  Generalized anxiety disorder  Patient denies recent panic, anxiety attacks  Continue psychotherapy  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 psychotic features ([facility] )  Mood has been stable      Follow- up with psychiatrist   Eric Raymond  Elevated TSH  TSH level was borderline elevated in March 2019  Recommended to recheck blood work   Reprinted orders from 4/19  Vitamin D deficiency  Continue Vit D 1000 IU daily  Sore throat  R/o allergies  Start Claritin 10 mg daily  Recommended to call with update on symptoms in 1-2 weeks  HM: patient is due for Tdap booster next month  She will check with insurance regarding coverage  Follow up with gynecologist for routine pelvic exam and Pap smear  Check BL screening mammogram next year  I have spent 25 minutes with Patient  today in which greater than 50% of this time was spent in counseling/coordination of care regarding Diagnostic results, Risks and benefits of tx options, Intructions for management, Patient and family education, Importance of tx compliance, Risk factor reductions and Impressions  Schedule follow-up office visit in 6 months  Diagnoses and all orders for this visit:    Impaired fasting glucose    Chronic fatigue    Fibromyalgia    Class 2 obesity due to excess calories without serious comorbidity with body mass index (BMI) of 36 0 to 36 9 in adult    Generalized anxiety disorder    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 psychotic features (HCC)    Elevated TSH    Vitamin D deficiency    Sore throat          Subjective:      Patient ID: Lawyer Morris is a 37 y o  female  HPI     Patient is 47-year-old female with fibromyalgia, chronic fatigue, impaired fasting glucose, obesity, generalized anxiety disorder, depression, Vit D deficiency, chronic constipation  She presents for 4 month follow-up office visit  Reviewed current medications, last blood test results from March 2019  TSH was mildly elevated at 4 921  Lipid panel - within normal range  Hb 12 9  Patient is not seeing rheumatologist anymore  She takes Lyrica and Cymbalta for fibromyalgia  C/o low back pain/ muscle spasms in lower back      She plans to start doing Pilates at home, has video types  Denies chest pain, shortness of breath, dizziness  Depression / Anxiety -symptoms are stable  Management per psychiatry  C/o sore throat, pain in her mouth for the past 2 weeks  No fever or chills  Denies postnasal drip  Patient had mammogram done in June 2017 which showed right breast mass  She had R breast diagnostic mammogram, R breast ultrasound and  ultrasound-guided core biopsy of right breast mass  Pathology report showed fibroadenoma  Patient has chronic constipation  She was seen on 8/27/129 by gastroenterologist Dr Millie Loyd who recommended to take MiraLax 1-2 times daily  Patient had colonoscopy done in 6/19 which showed no significant abnormalities  She has hemorrhoids  Denies rectal bleeding  Denies tobacco use  The following portions of the patient's history were reviewed and updated as appropriate: allergies, current medications, past medical history, past social history, past surgical history and problem list     Review of Systems   Constitutional: Positive for fatigue (chronic)  Negative for activity change, appetite change, chills and fever  HENT: Positive for sore throat  Negative for congestion, dental problem, ear pain, hearing loss, mouth sores, nosebleeds, postnasal drip and trouble swallowing  Eyes: Negative for pain, discharge, redness, itching and visual disturbance  Respiratory: Negative for cough, chest tightness, shortness of breath and wheezing  Cardiovascular: Negative for chest pain, palpitations and leg swelling  Gastrointestinal: Positive for constipation  Negative for abdominal pain, blood in stool, diarrhea, nausea and vomiting  Genitourinary: Negative for difficulty urinating, dysuria, flank pain, frequency, hematuria and pelvic pain  Musculoskeletal: Positive for back pain (low back pain ) and myalgias  Negative for arthralgias, gait problem, joint swelling and neck pain  Skin: Negative for rash and wound  Neurological: Positive for headaches (ocasionally)  Negative for dizziness and syncope  Hematological: Negative  Psychiatric/Behavioral: Positive for sleep disturbance  Negative for suicidal ideas  Anxiety / Depression - mood has been stable         Objective:      /70 (BP Location: Left arm, Patient Position: Sitting, Cuff Size: Adult)   Pulse 96   Temp 99 3 °F (37 4 °C) (Tympanic)   Resp 16   Ht 5' (1 524 m)   Wt 85 6 kg (188 lb 12 8 oz)   SpO2 97%   BMI 36 87 kg/m²          Physical Exam   Constitutional: She appears well-developed and well-nourished  Obese   HENT:   Head: Normocephalic and atraumatic  Right Ear: External ear normal    Left Ear: External ear normal    Nose: Nose normal    Mild posterior pharyngeal erythema  No exudate  Eyes: Pupils are equal, round, and reactive to light  Conjunctivae are normal    Neck: Normal range of motion  Neck supple  No thyromegaly present  Cardiovascular: Normal rate, regular rhythm and normal heart sounds  No murmur heard  No BL LE edema   Pulmonary/Chest: Effort normal and breath sounds normal    Abdominal: Soft  Bowel sounds are normal  There is no tenderness  Musculoskeletal: Normal range of motion  She exhibits no edema, tenderness or deformity  Lymphadenopathy:     She has no cervical adenopathy  Skin: Skin is warm and dry  No rash noted  Psychiatric: She has a normal mood and affect  Her behavior is normal    Nursing note and vitals reviewed

## 2019-08-31 PROBLEM — J02.9 SORE THROAT: Status: ACTIVE | Noted: 2019-08-31

## 2019-08-31 NOTE — ASSESSMENT & PLAN NOTE
Symptoms are stable  Continue Lyrica 100 mg twice daily, Cymbalta  Encouraged regular exercise  Patient plans to start doing Pilates at home

## 2019-08-31 NOTE — ASSESSMENT & PLAN NOTE
R/o allergies  Start Claritin 10 mg daily  Recommended to call with update on symptoms in 1-2 weeks

## 2019-08-31 NOTE — ASSESSMENT & PLAN NOTE
TSH level was borderline elevated in March 2019  Recommended to recheck blood work   Reprinted orders from 4/19

## 2019-09-02 DIAGNOSIS — F33.2 MDD (MAJOR DEPRESSIVE DISORDER), RECURRENT SEVERE, WITHOUT PSYCHOSIS (HCC): ICD-10-CM

## 2019-09-02 RX ORDER — DULOXETIN HYDROCHLORIDE 60 MG/1
CAPSULE, DELAYED RELEASE ORAL
Qty: 30 CAPSULE | Refills: 2 | Status: SHIPPED | OUTPATIENT
Start: 2019-09-02 | End: 2019-11-27 | Stop reason: SDUPTHER

## 2019-09-02 RX ORDER — DULOXETIN HYDROCHLORIDE 30 MG/1
CAPSULE, DELAYED RELEASE ORAL
Qty: 30 CAPSULE | Refills: 2 | Status: SHIPPED | OUTPATIENT
Start: 2019-09-02 | End: 2019-11-27 | Stop reason: SDUPTHER

## 2019-09-16 ENCOUNTER — SOCIAL WORK (OUTPATIENT)
Dept: BEHAVIORAL/MENTAL HEALTH CLINIC | Facility: CLINIC | Age: 39
End: 2019-09-16
Payer: COMMERCIAL

## 2019-09-16 DIAGNOSIS — F33.2 SEVERE RECURRENT MAJOR DEPRESSION WITHOUT PSYCHOTIC FEATURES (HCC): ICD-10-CM

## 2019-09-16 DIAGNOSIS — F41.1 GENERALIZED ANXIETY DISORDER: Primary | ICD-10-CM

## 2019-09-16 PROCEDURE — 90834 PSYTX W PT 45 MINUTES: CPT | Performed by: SOCIAL WORKER

## 2019-09-16 NOTE — PSYCH
Psychotherapy Provided: Individual Psychotherapy 45 minutes PHQ 9 = 1; attributes her reported minimal level of depression to her having dog sat for two dogs for one week and staying in the family home for whom had babysat two dogs  : " I had the entire day to myself everyday "  has received her clinical records to submit to Torsten Jihan "so he can decide if he will take my case or not;"  She had been denied SSI in June, 2019 and is reapplying at this time  She notes is applying solely on basis of behavioral health factors "because I was told (informally) that my chances are getter with using one medical issue;" is not dealing with a rheumatologist at this time nor a pain management specialist at this time; "The Lyrica is pretty much taking care of it (the pain levels) and added is able to contact her family doctor if pain levels increase/become more difficult to manage  She noted the most recent time she had felt the need to contact her family doctor regarding pain issues was "last year " She reports she continues with her being able to use food stamps  She noted, "out of the blue an old friend of mind" started chatting with her on line and adds she has not heard from him for 21 years  states had chatted with him during four consecutive evenings and clov[place]aye has had no response from him in 6 -7 days; She described her father as "cranky" adding, "I know his health is not that great, but it would be nice if he would just be civil to me " headache patterns:  attributes having eaten too much cheese recently to her reported current headache; states stress levels are also a factor; heat/humidity/barimetric pressure are reported factors to the headaches she reports she experiences; When asked about her self-esteem, "I am doing pretty good  I am better at blocking them (the negative self put-down thinking)  (The positive self talk) seems a little more permanent   I don't seem to get down on myself as often, feeling guilty or self blame for things I am not responsible for " When asked she notes her social life is a positive force in her life  "You do need to cultivate your friendships " She noted she enjoys walking at a trail near by her home  Discussed/reviewed that despite the fact of her father's alleged behaviors toward her,, she is learning to accept the matter and redirect her attention elsewhere; A: presents as not as depressed and contends is not experiencing anxiety at this time; P: (G#1) will continue with her self care and follow through with Ramya Bender regarding her SSI application;      Length of time in session: 45 minutes, follow up in 2 week    Goals addressed in session: Goal 1     Pain:      moderate to severe   "I have a headache "  5    Current suicide risk : [address]: Diagnosis and Treatment Plan explained to Alisson Booth relates understanding diagnosis and is agreeable to Treatment Plan   Yes

## 2019-09-17 DIAGNOSIS — G43.709 CHRONIC MIGRAINE WITHOUT AURA WITHOUT STATUS MIGRAINOSUS, NOT INTRACTABLE: ICD-10-CM

## 2019-09-17 RX ORDER — RIZATRIPTAN BENZOATE 10 MG/1
TABLET ORAL
Qty: 9 TABLET | Refills: 3 | Status: SHIPPED | OUTPATIENT
Start: 2019-09-17 | End: 2019-10-22

## 2019-09-25 ENCOUNTER — APPOINTMENT (OUTPATIENT)
Dept: LAB | Facility: HOSPITAL | Age: 39
End: 2019-09-25
Payer: COMMERCIAL

## 2019-09-25 DIAGNOSIS — R73.01 IMPAIRED FASTING GLUCOSE: ICD-10-CM

## 2019-09-25 DIAGNOSIS — R79.89 ELEVATED TSH: ICD-10-CM

## 2019-09-25 DIAGNOSIS — E55.9 VITAMIN D DEFICIENCY: ICD-10-CM

## 2019-09-25 LAB
25(OH)D3 SERPL-MCNC: 34.7 NG/ML (ref 30–100)
ALBUMIN SERPL BCP-MCNC: 3.8 G/DL (ref 3.5–5)
ALP SERPL-CCNC: 42 U/L (ref 46–116)
ALT SERPL W P-5'-P-CCNC: 20 U/L (ref 12–78)
ANION GAP SERPL CALCULATED.3IONS-SCNC: 5 MMOL/L (ref 4–13)
AST SERPL W P-5'-P-CCNC: 12 U/L (ref 5–45)
BILIRUB SERPL-MCNC: 0.4 MG/DL (ref 0.2–1)
BUN SERPL-MCNC: 16 MG/DL (ref 5–25)
CALCIUM SERPL-MCNC: 8.8 MG/DL (ref 8.3–10.1)
CHLORIDE SERPL-SCNC: 110 MMOL/L (ref 100–108)
CO2 SERPL-SCNC: 23 MMOL/L (ref 21–32)
CREAT SERPL-MCNC: 0.97 MG/DL (ref 0.6–1.3)
EST. AVERAGE GLUCOSE BLD GHB EST-MCNC: 105 MG/DL
GFR SERPL CREATININE-BSD FRML MDRD: 73 ML/MIN/1.73SQ M
GLUCOSE P FAST SERPL-MCNC: 93 MG/DL (ref 65–99)
HBA1C MFR BLD: 5.3 % (ref 4.2–6.3)
POTASSIUM SERPL-SCNC: 3.8 MMOL/L (ref 3.5–5.3)
PROT SERPL-MCNC: 7.2 G/DL (ref 6.4–8.2)
SODIUM SERPL-SCNC: 138 MMOL/L (ref 136–145)
TSH SERPL DL<=0.05 MIU/L-ACNC: 1.5 UIU/ML (ref 0.36–3.74)

## 2019-09-25 PROCEDURE — 36415 COLL VENOUS BLD VENIPUNCTURE: CPT

## 2019-09-25 PROCEDURE — 82306 VITAMIN D 25 HYDROXY: CPT

## 2019-09-25 PROCEDURE — 83036 HEMOGLOBIN GLYCOSYLATED A1C: CPT

## 2019-09-25 PROCEDURE — 80053 COMPREHEN METABOLIC PANEL: CPT

## 2019-09-25 PROCEDURE — 84443 ASSAY THYROID STIM HORMONE: CPT

## 2019-10-02 DIAGNOSIS — F41.1 GAD (GENERALIZED ANXIETY DISORDER): ICD-10-CM

## 2019-10-03 RX ORDER — LORAZEPAM 1 MG/1
TABLET ORAL
Qty: 60 TABLET | Refills: 1 | Status: SHIPPED | OUTPATIENT
Start: 2019-10-03 | End: 2019-12-29 | Stop reason: SDUPTHER

## 2019-10-07 ENCOUNTER — SOCIAL WORK (OUTPATIENT)
Dept: BEHAVIORAL/MENTAL HEALTH CLINIC | Facility: CLINIC | Age: 39
End: 2019-10-07
Payer: COMMERCIAL

## 2019-10-07 ENCOUNTER — TELEPHONE (OUTPATIENT)
Dept: PSYCHIATRY | Facility: CLINIC | Age: 39
End: 2019-10-07

## 2019-10-07 DIAGNOSIS — R52 GENERALIZED BODY ACHES: ICD-10-CM

## 2019-10-07 DIAGNOSIS — F41.1 GENERALIZED ANXIETY DISORDER: Primary | ICD-10-CM

## 2019-10-07 DIAGNOSIS — F33.2 SEVERE RECURRENT MAJOR DEPRESSION WITHOUT PSYCHOTIC FEATURES (HCC): ICD-10-CM

## 2019-10-07 PROCEDURE — 90834 PSYTX W PT 45 MINUTES: CPT | Performed by: SOCIAL WORKER

## 2019-10-07 NOTE — TELEPHONE ENCOUNTER
Patient says her depression is getting worse  She wanted to speak to you before her appointment  She is also in pain

## 2019-10-07 NOTE — BH TREATMENT PLAN
Berl Distance  1980       Date of Initial Treatment Plan: 8/21/18  Date of Current Treatment Plan: 10/07/19    Treatment Plan Number 4     Strengths/Personal Resources for Self Care: "witty, smart, funny, creative, caring, resourceful, good hair;"    Diagnosis:   1  Generalized anxiety disorder     2  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 psychotic features ([facility] [address] )     3  Generalized body aches         Area of Needs: depression, anxiety and   chronic pain    Long Term Goal 1: AI want to continue to effectively manage the depression ans anxiety  Target Date: 2/7/20  Completion Date: n/a         Short Term Objectives for Goal 1: AI will continue to be aware of the depression/anxiety - neg self-talk mesages and revert to being more aware of the depression/anxiety pos-realistic self talk messages (lower depression/anxiety)  B  I will continue to work with Dr Easton Singh on medication management  C  I will continue to make sure I am taking care of myself  Target Date: 2/7/20  Completion Date: n/a    Long Term Goal 2: I will continue to work on strengthening my self-esteem  Target Date: 2/7/20  Completion Date: N/A    Short Term Objectives for Goal 2: AI will remain aware of those who have impacted me positively and disregard the messages from those who were negative influences  B  I will reivew and use my qualities/strengths/assets as reminderrs of my worth  C  I will continue with self care (including using my effective communication skills such as assertiveness)  D   I will continue to exploreoptions to improve my quality of life  Target Date: 2/7/20  Completion Date: n/a         Long Term Goal # 3: I willc otninue to kerryarn and implement effective pain management strategies  Target Date: 2/7/20  Completion Date: N/A    Short Term Objectives for Goal 3: AI will use effectively pain management strategies  B  I will explore medical cannibus (to include discussing the matter with Dr Easton Singh)  Target Date: 2/7/20  Completion Date: n/a        GOAL 1: Modality: Individual 2x per month   Completion Date n/a  Chelsey Anisha is responsible for her treatment plan        GOAL 2: Modality: Ind 2x/month; completion date: Fannie Kelly is responsible for completing her treatment plan      GOAL 3: Modality: Individual 2x per month   Completion Date n/a and The person(s) responsible for carrying out the plan is  Nuz[address]: Diagnosis and Treatment Plan explained to Sofie Mejia relates understanding diagnosis and is agreeable to Treatment Plan  yes      Client Comments : Please share your thoughts, feelings, need and/or experiences regarding your treatment plan:

## 2019-10-07 NOTE — PSYCH
Psychotherapy Provided: Individual Psychotherapy 45 minutes PHQ 9 = 12; CAROL 7 = 6; reports no "outside things" are contributing to her reported symptoms including a reported increse in pain regarding the fybromyalgia with "a lot of sleep over the weekend;" "It could be the change in seasons  I'm am concerned  It is a good thing I see Dr Kamran Hazel next week (10/15/19) "  When asked she identified her greatest concern that her reported "slump will become permanent " She noted this reported experience has happened before which she noted had resulted in her being admitted to the in pt behavioral health unit at the [facility] (approximately, )  She reports is in a holding pattern regarding the SSI with Ramya Richards ("whether or not he wants to take my case")  reports the chronic pain she reports she is experiencing caused her to cancel plans within the past week; She reports had to cancel attending a Sikhism service and a   "When I hurt that bad, all I want to do is sleep  Not that is makes it any better  If I go an hybernate a little bit, I can come out of the funk  I don't want to sleep through my life  It seems that is what I am doing lately "  When asked she described the chronic pain as background noise that becomes so loud it takes over  To quiet it (the noise) I go to bed and sleep "  She reports the chronic pain "does not affect it (her relationships with her friends) too much  I do keep in touch via text " "Because I do not have the same exact meal schedule that he (her father) does  He thinks all that I do is sit around and eat  I think he likes to annoy me "  She noted she "picks my battles" with her father   states her mother continues with her new job as a [employer]   as well as her dealing with her mother (Esther's 80year old maternal grandmother) regarding care giving needs (including the latter's recent one week hospitalization); discussed/reviewed her efforts at self care to deal with the reported chronic pain and her decision to contact Dr Madiha Guillen prior to the next med ck appt(please refer to information noted above); A: presents as working to be positive adding her looking forward to her coordinating an outfit for a special [employer] service this coming weekend; She , also, reports her participating in a social event at [employer] is a positive experpience for her  P:(G#1 ) will continue with self care strategies and follow up with Dr Reyes Barrs;    Length of time in session: 45 minutes, follow up in 2 week    Goals addressed in session: Goal 1     Pain:      moderate to severe (FOR THE PAST WEEK)    8    Current suicide risk : [address]: Diagnosis and Treatment Plan explained to Nils Solitario relates understanding diagnosis and is agreeable to Treatment Plan   Yes

## 2019-10-08 NOTE — TELEPHONE ENCOUNTER
Nursing called Esther back and left VM with Nursing phone # as there was no answer  Next apptmnt 10/15/19 with Dr Iesha Kuhn

## 2019-10-15 ENCOUNTER — OFFICE VISIT (OUTPATIENT)
Dept: PSYCHIATRY | Facility: CLINIC | Age: 39
End: 2019-10-15
Payer: COMMERCIAL

## 2019-10-15 ENCOUNTER — DOCUMENTATION (OUTPATIENT)
Dept: PSYCHIATRY | Facility: CLINIC | Age: 39
End: 2019-10-15

## 2019-10-15 DIAGNOSIS — M79.7 FIBROMYALGIA: ICD-10-CM

## 2019-10-15 DIAGNOSIS — F33.2 MDD (MAJOR DEPRESSIVE DISORDER), RECURRENT SEVERE, WITHOUT PSYCHOSIS (HCC): Primary | ICD-10-CM

## 2019-10-15 PROCEDURE — 99213 OFFICE O/P EST LOW 20 MIN: CPT | Performed by: PSYCHIATRY & NEUROLOGY

## 2019-10-15 RX ORDER — PREGABALIN 100 MG/1
100 CAPSULE ORAL 2 TIMES DAILY
Qty: 60 CAPSULE | Refills: 2 | Status: SHIPPED | OUTPATIENT
Start: 2019-10-15 | End: 2020-02-12 | Stop reason: ALTCHOICE

## 2019-10-15 RX ORDER — QUETIAPINE FUMARATE 50 MG/1
50 TABLET, EXTENDED RELEASE ORAL
Qty: 30 TABLET | Refills: 0 | Status: SHIPPED | OUTPATIENT
Start: 2019-10-15 | End: 2019-11-15 | Stop reason: SDUPTHER

## 2019-10-15 NOTE — PSYCH
Subjective: Medication Management      Patient ID: Shirley Bolanos is a 37 y o  female  HPI ROS Appetite Changes and Sleep: normal appetite, decreased energy, no weight change and normal number of sleep hours     Patient stated she continues to struggle with depressed mood and symptoms are lasting weeks at time and overall feels more depressed  She was not able to tolerate Abilify 5 mg and current dose 2 mg does not seem to be effective  Will switch to Seroquel XR 50 mg qhs and will increase to 100 mg after 3 days  Will f/u in 5 weeks  Review Of Systems:     Mood Depression   Behavior Normal    Thought Content Disturbing Thoughts, Feelings and Unreasonalbe or Irrational Fears   General Emotional Problems and Decreased Functioning   Personality Normal   Other Psych Symptoms Normal   Constitutional Negative   ENT Negative   Cardiovascular Negative   Respiratory Negative   Gastrointestinal Negative   Genitourinary Negative   Musculoskeletal Negative   Integumentary Negative   Neurological Negative   Endocrine Normal    Other Symptoms Normal              Laboratory Results: No results found for this or any previous visit      Substance Abuse History:  Social History     Substance and Sexual Activity   Drug Use Not Currently       Family Psychiatric History:   Family History   Problem Relation Age of Onset    Diabetes Father     Kidney disease Father     Substance Abuse Brother     Diabetes Maternal Grandmother     Rheum arthritis Maternal Grandmother     Melanoma Maternal Grandmother     Kidney disease Paternal Grandmother     Rheum arthritis Paternal Grandmother     Depression Paternal Grandmother     Diabetes Paternal Grandfather     Lung cancer Paternal Grandfather     Substance Abuse Maternal Uncle     Prostate cancer Maternal Uncle 61    Depression Paternal Aunt     Alcohol abuse Family     Stomach cancer Family     Irregular heart beat Mother        The following portions of the patient's history were reviewed and updated as appropriate: allergies, current medications, past family history, past medical history, past social history, past surgical history and problem list     Social History     Socioeconomic History    Marital status: Single     Spouse name: Not on file    Number of children: 0    Years of education: 15 years     Highest education level: GED or equivalent   Occupational History    Occupation: unemployed   Social Needs    Financial resource strain: Hard    Food insecurity:     Worry: Never true     Inability: Never true    Transportation needs:     Medical: Yes     Non-medical: Yes   Tobacco Use    Smoking status: Never Smoker    Smokeless tobacco: Never Used   Substance and Sexual Activity    Alcohol use: No     Frequency: Never     Drinks per session: 1 or 2     Binge frequency: Never     Comment: 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     Drug use: Not Currently    Sexual activity: Not Currently   Lifestyle    Physical activity:     Days per week: 5 days     Minutes per session: 30 min    Stress:  To some extent   Relationships    Social connections:     Talks on phone: More than three times a week     Gets together: More than three times a week     Attends Amish service: More than 4 times per year     Active member of club or organization: Yes     Attends meetings of clubs or organizations: More than 4 times per year     Relationship status: Never     Intimate partner violence:     Fear of current or ex partner: No     Emotionally abused: No     Physically abused: No     Forced sexual activity: No   Other Topics Concern    Not on file   Social History Narrative    Caffeine use     Social History     Social History Narrative    Caffeine use       Objective:       Mental status:  Appearance calm and cooperative , adequate hygiene and grooming and good eye contact    Mood dysphoric and depressed   Affect affect was constricted   Speech a normal rate and fluent   Thought Processes coherent/organized and normal thought processes   Hallucinations no hallucinations present    Thought Content no delusions   Abnormal Thoughts no suicidal thoughts  and no homicidal thoughts    Orientation  oriented to person and place and time   Remote Memory short term memory intact and long term memory intact   Attention Span concentration intact   Intellect Appears to be of Average Intelligence   Insight Limited insight   Judgement judgment was limited   Muscle Strength Muscle strength and tone were normal and Normal gait    Language no difficulty naming common objects, no difficulty repeating a phrase  and no difficulty writing a sentence    Fund of Knowledge displays adequate knowledge of current events, adequate fund of knowledge regarding past history and adequate fund of knowledge regarding vocabulary                Assessment/Plan:       Diagnoses and all orders for this visit:    MDD (major depressive disorder), recurrent severe, without psychosis ([facility] )  -     QUEtiapine (SEROquel XR) 50 mg; Take 1 tablet (50 mg total) by mouth daily at bedtime    Fibromyalgia  -     pregabalin (LYRICA) 100 mg capsule;  Take 1 capsule (100 mg total) by mouth 2 (two) times a day            Treatment Recommendations- Risks Benefits      Immediate Medical/Psychiatric/Psychotherapy Treatments and Any Precautions: d/c Abilify, start trial of Seroquel XR 50 mg     Risks, Benefits And Possible Side Effects Of Medications:  {PSYCH RISK, BENEFITS AND POSSIBLE SIDE EFFECTS (Optional):31691

## 2019-10-15 NOTE — PROGRESS NOTES
Appointment 8/19/19 was cancelled due to Provider being out of the office, Will RS at a later time      Supervised by Phil Griffith MA

## 2019-10-17 ENCOUNTER — TELEPHONE (OUTPATIENT)
Dept: PSYCHIATRY | Facility: CLINIC | Age: 39
End: 2019-10-17

## 2019-10-17 NOTE — TELEPHONE ENCOUNTER
Daxa River returned Nursing call and stated that she is still taking Trazodone to sleep ordered by Jourdan Franco in Dr Pauline Rosenthal absence  Nursing will wait for Dr Pauline Rosenthal instructions before contacting [employer]  For Dr Pauline Rosenthal review

## 2019-10-17 NOTE — TELEPHONE ENCOUNTER
Fax received from [phone]  stating "Alternative Requested"   If Trazodone has been discontinued, please contact [facility]'s insurance company in order for Quetiapine ER 50 mg tablet to be filled "    On [facility]'s current medications Trazodone has not been discontinued  Last ordered on 8/30/19 1-2 tabs at  with two refills by [employer] [facility]  Please advise if Trazodone will be discontinued so insurance company can be contacted to approve fill for Quetiapine ER 50 mg tabs  For Dr Peyman Velazquez review

## 2019-10-18 ENCOUNTER — TELEPHONE (OUTPATIENT)
Dept: PSYCHIATRY | Facility: CLINIC | Age: 39
End: 2019-10-18

## 2019-10-18 NOTE — TELEPHONE ENCOUNTER
Nursing submitted prior auth for Esther's Quetiapine ER 50 mg to [employer] via Igor Solo [phone]  Waiting for decision on prior auth  For Dr Erica Castillo information

## 2019-10-18 NOTE — TELEPHONE ENCOUNTER
Nursing called [facility] in response to Alternative Requested notice for Quetiapine ER 50 mg  They stated that we should have received a prior auth request for Quetiapine as it will not go through insurance even though Trazodone has been D/C'd  They were not able to give BIN or PCN numbers stating that they did not have them  NA#[id number]  Nursing will follow up with [facility] [phone]      For Dr Marquis Menchaca information

## 2019-10-20 DIAGNOSIS — F33.2 MAJOR DEPRESSIVE DISORDER, RECURRENT SEVERE WITHOUT PSYCHOTIC FEATURES (HCC): ICD-10-CM

## 2019-10-21 ENCOUNTER — TELEPHONE (OUTPATIENT)
Dept: PSYCHIATRY | Facility: CLINIC | Age: 39
End: 2019-10-21

## 2019-10-21 RX ORDER — ARIPIPRAZOLE 5 MG/1
5 TABLET ORAL DAILY
Qty: 30 TABLET | Refills: 2 | Status: SHIPPED | OUTPATIENT
Start: 2019-10-21 | End: 2019-11-01 | Stop reason: ALTCHOICE

## 2019-10-21 NOTE — TELEPHONE ENCOUNTER
Nursing called Elva Stewart to inform that Prior Auth for Quetiapine ER 50 mg Tabs have been approved but for one month only (usually no prior auth needed but since Elva Stewart is taking Aripiprazole 5 mg tabs, a prior auth was needed ) [facility] also advising that Quetiapine ER 50 mg tab will be covered as long as Aripiprazole 5 mg tab is not refilled  Since there was no answer, VM was left for Elva Stewart requesting a return call and Nursing phone # left  For Dr Lou Veliz review: Will Aripiprazole continue to be prescribed along with Quetiapine as it appears from [facility]'s letter that additional prior authorizations may be required if both are prescribed together

## 2019-10-21 NOTE — TELEPHONE ENCOUNTER
Prior Auth approved for Esther's Quetiapine ER 50 mg tabs by Seferino Ramos  Approval is for one month only from 10/18/19-11/18/19 as approval states:  "This medicatin is formulary/preferred and will pay on its own at pharmacy point of service for next fill provided Aripiprazole 5 mg tab is not refilled " [facility] further stated that if [facility] pharmacy has any billing issues they should contact Seferino Ramos directly  Colleen Merritt and her pharmacy called to inform of above prior auth and additional statement from S[employer]erino Ramos  For Dr Erica Castillo information

## 2019-10-22 DIAGNOSIS — G43.709 CHRONIC MIGRAINE WITHOUT AURA WITHOUT STATUS MIGRAINOSUS, NOT INTRACTABLE: ICD-10-CM

## 2019-10-22 RX ORDER — RIZATRIPTAN BENZOATE 10 MG/1
TABLET ORAL
Qty: 9 TABLET | Refills: 3 | Status: SHIPPED | OUTPATIENT
Start: 2019-10-22 | End: 2020-06-03

## 2019-10-22 NOTE — TELEPHONE ENCOUNTER
Patient is requesting a refill of rizatriptan (MAXALT) 10 mg tablet, take 1 as needed, 9 tablets to [facility], [address], [place]  Per patient, this medication will need prior authorization  Patient can be contacted at [phone] with any questions

## 2019-10-23 NOTE — TELEPHONE ENCOUNTER
Nursing called Joi Hernandez to review that Seroquel is formulary under her insurance and will not need prior auth, they stated, upon refill if Abilify has been discontinued  Dr Sunil Perdue confirmed that Joi Hernandez will not longer take Abilify with introduction of Seroquel  There was no answer and detailed VM was left again with Nursing phone number for return call with any questions or additional concerns  For Dr Tiago wolfe

## 2019-10-29 ENCOUNTER — DOCUMENTATION (OUTPATIENT)
Dept: BEHAVIORAL/MENTAL HEALTH CLINIC | Facility: CLINIC | Age: 39
End: 2019-10-29

## 2019-10-29 NOTE — PROGRESS NOTES
10/29/19Comfort deluca today @ 8:52 AM to cancel her ind psych appt for today @ 9 AM citing illness;

## 2019-11-01 ENCOUNTER — TELEPHONE (OUTPATIENT)
Dept: PSYCHIATRY | Facility: CLINIC | Age: 39
End: 2019-11-01

## 2019-11-01 NOTE — TELEPHONE ENCOUNTER
Nursing called Rocio Isaacs and communicated Dr Kamille Vieyra message and Rocio Isaacs stated she would call to follow up next week  For Dr Kamille Vieyra information

## 2019-11-01 NOTE — TELEPHONE ENCOUNTER
I don't think it is related but if no improvement over the weekend then may stop Seroquel and call the office

## 2019-11-01 NOTE — TELEPHONE ENCOUNTER
Emmanuel Sesay called and left a VM on Nursing VM stating that she is experiencing numbness and tingling in her hands and feet and wanted to know if Dr Hernandez Speaker thinks this is a side effect of Seroquel or interaction of Seroquel with her other medications  Nursing returned call but there was no answer  A VM was left with Nursing phone number for return call  For Dr Maicol wolfe

## 2019-11-05 ENCOUNTER — TELEPHONE (OUTPATIENT)
Dept: PSYCHIATRY | Facility: CLINIC | Age: 39
End: 2019-11-05

## 2019-11-05 NOTE — TELEPHONE ENCOUNTER
Nursing called Esther to relay Dr Ammy Mcguire message and there was no answer on her mobile  VM was left with message and Nursing phone # to follow up  For Dr Ammy Mcguire information

## 2019-11-05 NOTE — TELEPHONE ENCOUNTER
Nursing received a call from [facility] Patient stating that she is still having numbness in her feet, and now in one arm and in one hand  As advised by Dr Hernandez Nogueira, she is calling to let her know that she stopped the Seroquel due to this continuing, and worsening numbness  She wanted to know if Dr Hernandez Nogueira was going to order a new prescription for her to replace Seroquel  She asked Nursing to call and let her know  For Dr Perla wolfe

## 2019-11-11 NOTE — TELEPHONE ENCOUNTER
Nursing received VM from [facility] Patient  She asked if Dr Hernandez Nogueira order a replacement medication for Seroquel that was stopped due to side effects of numbness in hands and feet  She stated that her depression is increasing and is "really bad "    For Dr Perla Garcia review

## 2019-11-12 ENCOUNTER — TELEPHONE (OUTPATIENT)
Dept: PSYCHIATRY | Facility: CLINIC | Age: 39
End: 2019-11-12

## 2019-11-12 DIAGNOSIS — F33.2 MDD (MAJOR DEPRESSIVE DISORDER), RECURRENT SEVERE, WITHOUT PSYCHOSIS (HCC): Primary | ICD-10-CM

## 2019-11-12 RX ORDER — ZIPRASIDONE HYDROCHLORIDE 20 MG/1
20 CAPSULE ORAL 2 TIMES DAILY WITH MEALS
Qty: 60 CAPSULE | Refills: 0 | Status: SHIPPED | OUTPATIENT
Start: 2019-11-12 | End: 2019-12-09 | Stop reason: SDUPTHER

## 2019-11-12 NOTE — TELEPHONE ENCOUNTER
Nursing will call Esther to inform Dr Iesha Kuhn sent Zulma to her pharmacy  For Dr Kamille Vieyra information

## 2019-11-12 NOTE — TELEPHONE ENCOUNTER
Nursing spoke with Emmanuel Sesay and relayed Dr Maicol Michael message that she can prescribe Geodon if Emmanuel Sesay agreed  She stated that she is willing to try this medication  Pharmacy still [facility]  For Dr Maicol Michael review

## 2019-11-13 ENCOUNTER — SOCIAL WORK (OUTPATIENT)
Dept: BEHAVIORAL/MENTAL HEALTH CLINIC | Facility: CLINIC | Age: 39
End: 2019-11-13
Payer: COMMERCIAL

## 2019-11-13 DIAGNOSIS — F33.2 SEVERE RECURRENT MAJOR DEPRESSION WITHOUT PSYCHOTIC FEATURES (HCC): ICD-10-CM

## 2019-11-13 DIAGNOSIS — F33.2 MAJOR DEPRESSIVE DISORDER, RECURRENT SEVERE WITHOUT PSYCHOTIC FEATURES (HCC): Primary | ICD-10-CM

## 2019-11-13 PROCEDURE — 90834 PSYTX W PT 45 MINUTES: CPT | Performed by: SOCIAL WORKER

## 2019-11-13 NOTE — PSYCH
Psychotherapy Provided: Individual Psychotherapy 45 minutes  PHQ 9 =18; CAROL 7 = 12; "My depression seems out of control right now (for the past four or five weeks)  My mood is constantly so low  I really don't get anything done during the day " reports Dr Ruba Berry had prescribed a mood stabilizer and reported numbness in her feet adding Dr Quique Greenberg, then, d/c'd this medication (Rohit Judith)  states this med had been discontinued for approximately two weeks adding that the reported numbness in her feet "finally stopped (for the past four days);" She reports is waiting for the insurance (states had been contacted by the pharmacy on 11/12/19) to approve the newly prescribed Geodon to replace the Quitiapine  She reports had previously been on a "high dose" of Abilify adding, as a result, "My mood was too high " "Waiting is so hard when I can't seem to focus on anything other than (my ) being drepessed " "I am proud of myself (that I came to this counseling session on a cold day) adding, "I really didn't feel like coming and would have gone back to sleep " She attributed this current depression level to "bio chemical" factors and "being in the process of applications with Social Security Disability (SSD) and her "not knowing what is going on for a long period of time  I am anxious about the outcome  I am sitting and waiting for things to get better like sitting and waiting for a medication to take effect " While she describes her mother as supportive, she noted her mother acknowledged she "has not been through it herself  She has asked me a few times if I need to go to the hospital (and her confirming declining to consider this option)  " She noted her mother has observed, "I have been staring a lot " A: When asked her to consider that the reporting staring may be a coping mechanism to offset the challenge of waiting for the outcome of the SSD application   She reports that "starting and stopping medication has made me gain weight (reports has gained 5 lbs within the past month) "  She noted in her research had found information indicating that Geogeorgina is not known for causing weight gain  states her efforts to contact some friends to go for coffee, etc , have not been responded to; She attributed this factor to some of her friends being  with children  She states, "I have been forgotten (by her Rastafari )  I expect it (as part of the culture in her specific [place])  " She contended if one does not attend services it is assumed something is wrong and people do not inquire  states her family conducts weekly Yazidi at home adding, "It can make us stronger (in one's relationship with one's family) or it can lead to contention (among us) "  She noted, "He (her father) does not respect me " discussed/reviewed the ongoing importance of her own self care efforts to include her review of her affirmations and considering (had previously been offered this option) her participating in a support group (this writer, her psychotherapist facilitates groups two of which are appropriate for her); A: She did make the effort to attend her counseling session today despite the extreme cold weather  She seems reluctant to participate in one of the groups adding that the time of day (5 PM) is a heavy traffic time  She is receptive to reviewing her affirmations and when asked what she can do for herself today (including her attendance at this counseling session), she stated she plans to wash her blankets  P: (G#1 ) will continue with self care including her reviewing her affirmations; Length of time in session: 45 minutes, follow up in 2 week    Goals addressed in session: Goal 1     Pain:      none    0    Current suicide risk : Low         Behavioral Health Treatment Plan [facility]: Diagnosis and Treatment Plan explained to Emma Blake relates understanding diagnosis and is agreeable to Treatment Plan   Yes

## 2019-11-15 DIAGNOSIS — F33.2 MDD (MAJOR DEPRESSIVE DISORDER), RECURRENT SEVERE, WITHOUT PSYCHOSIS (HCC): ICD-10-CM

## 2019-11-15 RX ORDER — QUETIAPINE FUMARATE 50 MG/1
TABLET, EXTENDED RELEASE ORAL
Qty: 30 TABLET | Refills: 0 | Status: SHIPPED | OUTPATIENT
Start: 2019-11-15 | End: 2020-02-12 | Stop reason: ALTCHOICE

## 2019-11-19 ENCOUNTER — TELEPHONE (OUTPATIENT)
Dept: PSYCHIATRY | Facility: CLINIC | Age: 39
End: 2019-11-19

## 2019-11-27 DIAGNOSIS — F33.2 MDD (MAJOR DEPRESSIVE DISORDER), RECURRENT SEVERE, WITHOUT PSYCHOSIS (HCC): ICD-10-CM

## 2019-11-27 RX ORDER — DULOXETIN HYDROCHLORIDE 30 MG/1
CAPSULE, DELAYED RELEASE ORAL
Qty: 30 CAPSULE | Refills: 2 | Status: SHIPPED | OUTPATIENT
Start: 2019-11-27 | End: 2020-02-12 | Stop reason: SDUPTHER

## 2019-11-27 RX ORDER — DULOXETIN HYDROCHLORIDE 60 MG/1
CAPSULE, DELAYED RELEASE ORAL
Qty: 30 CAPSULE | Refills: 2 | Status: SHIPPED | OUTPATIENT
Start: 2019-11-27 | End: 2020-02-12 | Stop reason: SDUPTHER

## 2019-12-03 ENCOUNTER — DOCUMENTATION (OUTPATIENT)
Dept: BEHAVIORAL/MENTAL HEALTH CLINIC | Facility: CLINIC | Age: 39
End: 2019-12-03

## 2019-12-03 NOTE — PROGRESS NOTES
TELEPHONE 12/3/19 @ 12: 27 PM She lm to cancel her ind psych appt for today @ 3 PM reporting she has the flu

## 2019-12-09 ENCOUNTER — TELEPHONE (OUTPATIENT)
Dept: PSYCHIATRY | Facility: CLINIC | Age: 39
End: 2019-12-09

## 2019-12-09 DIAGNOSIS — F33.2 MDD (MAJOR DEPRESSIVE DISORDER), RECURRENT SEVERE, WITHOUT PSYCHOSIS (HCC): ICD-10-CM

## 2019-12-09 RX ORDER — ZIPRASIDONE HYDROCHLORIDE 40 MG/1
40 CAPSULE ORAL 2 TIMES DAILY WITH MEALS
Qty: 60 CAPSULE | Refills: 2 | Status: SHIPPED | OUTPATIENT
Start: 2019-12-09 | End: 2019-12-27 | Stop reason: SDUPTHER

## 2019-12-09 NOTE — TELEPHONE ENCOUNTER
Nursing received a phone message from patient and she is requesting to have her Geodon increased  She feels slight effectiveness from the medication and wanted to inquire about an increase  She  is reporting insomnia and is inquiring whether she can go back on Trazodone for her sleep difficulties  Nursing did attempt to call patient but there was no answer and nursing left message for patient to call office    For Dr Nj Muñoz to review

## 2019-12-10 ENCOUNTER — TELEPHONE (OUTPATIENT)
Dept: PSYCHIATRY | Facility: CLINIC | Age: 39
End: 2019-12-10

## 2019-12-10 NOTE — PROGRESS NOTES
Meme Mcdonough left  on Nursing phone stating she does need refill of Trazodone please  She stated at some point in the future she hopes she will not need it but for now it is needed  For Dr Dee Kamara review

## 2019-12-11 ENCOUNTER — IMMUNIZATIONS (OUTPATIENT)
Dept: FAMILY MEDICINE CLINIC | Facility: CLINIC | Age: 39
End: 2019-12-11
Payer: COMMERCIAL

## 2019-12-11 DIAGNOSIS — Z23 ENCOUNTER FOR IMMUNIZATION: ICD-10-CM

## 2019-12-11 DIAGNOSIS — F33.2 MDD (MAJOR DEPRESSIVE DISORDER), RECURRENT SEVERE, WITHOUT PSYCHOSIS (HCC): ICD-10-CM

## 2019-12-11 PROCEDURE — 90471 IMMUNIZATION ADMIN: CPT

## 2019-12-11 PROCEDURE — 90686 IIV4 VACC NO PRSV 0.5 ML IM: CPT

## 2019-12-11 RX ORDER — TRAZODONE HYDROCHLORIDE 100 MG/1
100-200 TABLET ORAL
Qty: 60 TABLET | Refills: 2 | Status: SHIPPED | OUTPATIENT
Start: 2019-12-11 | End: 2020-02-12 | Stop reason: SDUPTHER

## 2019-12-12 DIAGNOSIS — F33.2 MDD (MAJOR DEPRESSIVE DISORDER), RECURRENT SEVERE, WITHOUT PSYCHOSIS (HCC): ICD-10-CM

## 2019-12-12 RX ORDER — ZIPRASIDONE HYDROCHLORIDE 20 MG/1
20 CAPSULE ORAL 2 TIMES DAILY WITH MEALS
Qty: 60 CAPSULE | Refills: 0 | OUTPATIENT
Start: 2019-12-12

## 2019-12-23 ENCOUNTER — TELEPHONE (OUTPATIENT)
Dept: PSYCHIATRY | Facility: CLINIC | Age: 39
End: 2019-12-23

## 2019-12-23 NOTE — TELEPHONE ENCOUNTER
Kim Centeno left  on Nursing phone stating that she "wanted to bring to Dr Boy Kearns attentions that the Geodon 40 mgs ordered twice daily is not working for me and I am not feeling like I should " She asked if Dr Nj Muñoz had any additional suggestions for her regarding alternative medication  For Dr Boy Kearns review

## 2019-12-27 DIAGNOSIS — F33.2 MDD (MAJOR DEPRESSIVE DISORDER), RECURRENT SEVERE, WITHOUT PSYCHOSIS (HCC): ICD-10-CM

## 2019-12-27 RX ORDER — ZIPRASIDONE HYDROCHLORIDE 60 MG/1
60 CAPSULE ORAL 2 TIMES DAILY WITH MEALS
Qty: 60 CAPSULE | Refills: 2 | Status: SHIPPED | OUTPATIENT
Start: 2019-12-27 | End: 2020-02-12 | Stop reason: ALTCHOICE

## 2019-12-27 NOTE — TELEPHONE ENCOUNTER
I spoke with Chika Olivares and and reviewed Dr Stephanie Toro' question  She is willing to try an increase in dose of Geodon  She felt the increase from 20 mg to 40 mg had initially helped  I verified the pharmacy in EHR is her preferred pharmacy and she will get a notification if there is a new prescription ready for her  I gave her the nursing number to call as needed

## 2019-12-29 DIAGNOSIS — F41.1 GAD (GENERALIZED ANXIETY DISORDER): ICD-10-CM

## 2019-12-30 RX ORDER — LORAZEPAM 1 MG/1
TABLET ORAL
Qty: 60 TABLET | Refills: 1 | Status: SHIPPED | OUTPATIENT
Start: 2019-12-30 | End: 2020-02-12 | Stop reason: SDUPTHER

## 2020-01-08 ENCOUNTER — SOCIAL WORK (OUTPATIENT)
Dept: BEHAVIORAL/MENTAL HEALTH CLINIC | Facility: CLINIC | Age: 40
End: 2020-01-08
Payer: COMMERCIAL

## 2020-01-08 DIAGNOSIS — F33.2 SEVERE RECURRENT MAJOR DEPRESSION WITHOUT PSYCHOTIC FEATURES (HCC): Primary | ICD-10-CM

## 2020-01-08 PROCEDURE — 90834 PSYTX W PT 45 MINUTES: CPT | Performed by: SOCIAL WORKER

## 2020-01-08 NOTE — PSYCH
Psychotherapy Provided: Individual Psychotherapy 45 minutes states her maternal grandmother had passed away last week (resided in a long term care community); "She (her mother)'s ok " states her mother is making arrangements for a [employer] for [employer] maternal grandmotherher; "She (her mother) is super woman " "I am stressing out at the idea of working part time (per her report, via her parents wanting her to do so)  " states has been denied ("again") SSI within the past several weeks and in engaged in the appeal process; She expressed concern about her being able to afford insurance premiums and medications if she has limited health insurance coverage and income with a part time job  states during the six week period of her taking a mood stabilizer ("They have already upped the dose twice   most recently during week of January 1, 2020"), Geodon, "It is helping but not sure it is helping to the degree that I need " She noted her desire to give the medication "a little more time" prior to conferring with Dr Iesha Kuhn  When reflecting on part time work, she expressed much apprehension about working around other people  She noted her "best jobs" involved her working with minimal people around her  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, reception areas/clients, filing;"  Discussed/reviewed her exploring types of jobs available from simply exploring with NO commitment or plan; discussed/reviewed her focus to be on "self" and to distance herself from others' expectations of her; A: presents as upset with her parents and states feels minimal, if any support or (informed) understanding from them; She does not want to create additional conflicts with them  She is willing to explore the "job world " P:(G#1) will explore job opportunities knowing with her health there would be significant limitations;     Length of time in session: 45 minutes, follow up in 2 week    Goals addressed in session: Goal 1     Pain:      moderate to severe    8    Current suicide risk : Low         Behavioral Health Treatment Plan [facility]: Diagnosis and Treatment Plan explained to Erin Suarez relates understanding diagnosis and is agreeable to Treatment Plan   Yes

## 2020-02-05 ENCOUNTER — DOCUMENTATION (OUTPATIENT)
Dept: BEHAVIORAL/MENTAL HEALTH CLINIC | Facility: CLINIC | Age: 40
End: 2020-02-05

## 2020-02-05 NOTE — PROGRESS NOTES
2/5/2020 @ 10:09 AM She lm to cancel her ind psych for today @ 10 AM reporting that her car will not start

## 2020-02-05 NOTE — PROGRESS NOTES
Treatment Plan Tracking    # 1Treatment Plan not completed within required time limits due to: She "alted cacncelled due to reported car troubles and, thus, unable to update her txt plan  Les Lobato

## 2020-02-11 NOTE — TELEPHONE ENCOUNTER
Nursing left message for patient that Dr Stephanie Toro did send a new prescription for increased dosage of Geodon  Message also left that Dr Stephanie Toro feels increase in Geodon will assist with sleep difficulties, requested for patient to call office to discuss changes and what her thoughts were regarding her request about the Trazodone  Will await return call from patient  Subjective: She continues to have bleeding on and off.  She had a normal CBC in December 2019.  She had an ultrasound that suggested a polyp in the endometrium and also a small ovarian cyst.  The ultrasound was repeated today.  Dr. Stoll reported to me verbally that the ultrasound shows a polyp in the endometrium but the ovarian cyst has resolved.    She is asking to have an ablation or something to control the bleeding.  She is not a candidate for oral contraceptives because she is diabetic.  She is over 35.  She declines a Mirena IUD.  She is wondering about an ablation.      The past medical history, social history, past surgical history and family history as shown below have been reviewed by me today.    Past Medical History:   Diagnosis Date     Abnormal Pap smear 2006, 2007,     ASC-H, ASCUS + HPV 45     Calculus of kidney 2002     Cervical high risk HPV (human papillomavirus) test positive     + HPV 45 (high risk)     History of colposcopy with cervical biopsy 2/9/06, 3/15/07    koilocytosis 2007        Allergies   Allergen Reactions     Amoxicillin Hives     Anti-Nausea      Anxiety, agitation,severe paranoia. Zofran, Reglan are some of them.  DRAMAMINE WITHOUT ISSUES       Demerol Hcl [Meperidine Hcl] Nausea     Indomethacin Nausea and Vomiting     Macrobid [Nitrofuran Derivatives] Hives     Reglan [Metoclopramide] Other (See Comments)     Acute paranoia, severe     Zofran [Ondansetron] Other (See Comments)     Severe anxiety, agitation     Current Outpatient Medications   Medication Sig Dispense Refill     acetaminophen (TYLENOL) 500 MG tablet Take 1,000 mg by mouth every 6 hours as needed for mild pain       blood glucose (CONTOUR NEXT TEST) test strip 1 strip by In Vitro route 4 times daily 150 each 3     blood glucose monitoring (NO BRAND SPECIFIED) meter device kit Use to test blood sugar 2 times daily or as directed. 1 kit 0     cyclobenzaprine (FLEXERIL) 5 MG tablet Take 1 tablet (5 mg) by mouth 3  times daily as needed for muscle spasms 90 tablet 0     ibuprofen (ADVIL) 200 MG tablet Take 200 mg by mouth every 4 hours as needed for mild pain       metFORMIN (GLUCOPHAGE) 500 MG tablet 1 tablet BID 60 tablet 11     Omega-3 Fatty Acids (FISH OIL) 1200 MG capsule Take 1,200 mg by mouth daily       omeprazole (PRILOSEC) 20 MG DR capsule TAKE 1 CAPSULE BY MOUTH DAILY, 30 TO 60 MINUTES BEFORE A MEAL. 30 capsule 11     Past Surgical History:   Procedure Laterality Date     C REMOVAL OF KIDNEY STONE      two surgeries, 2002,2003     CHOLECYSTECTOMY, LAPOROSCOPIC  5/11/2007    Cholecystectomy, Laparoscopic     COLONOSCOPY  05/17/10     COLONOSCOPY N/A 8/27/2019    Procedure: COLONOSCOPY;  Surgeon: Paramjit Kingston DO;  Location: [facility]     CONIZATION  7/1/2011    Procedure:CONIZATION; cold knife and punch biopsy of mole on back; Surgeon:SYDNEY FORD; Location:[facility]     DILATION AND CURETTAGE, HYSTEROSCOPY, LAPAROSCOPY, COMBINED Right 9/24/2015    Procedure: COMBINED DILATION AND CURETTAGE, HYSTEROSCOPY, LAPAROSCOPY;  Surgeon: Sydney Ford MD;  [facility]     DISCECTOMY LUMBAR MINIMALLY INVASIVE ONE LEVEL Left 1/23/2019    Procedure: Minimally Invasive Surgery Left Lumbar 5-Sacral 1 microdiscectomy;  Surgeon: Mason Roe MD;  [facility]     ESOPHAGOSCOPY, GASTROSCOPY, DUODENOSCOPY (EGD), COMBINED  5/21/2014    Procedure: COMBINED ESOPHAGOSCOPY, GASTROSCOPY, DUODENOSCOPY (EGD), BIOPSY SINGLE OR MULTIPLE;  Surgeon: Earl Lane MD;  Location: [facility]     EXCISE LESION TRUNK  7/1/2011    Procedure:EXCISE LESION TRUNK; Surgeon:SYDNEY FORD; Location:[facility]     HC FRAGMENTING OF KIDNEY STONE  11/30/2005     HC RX ECTOP PREG BY SCOPE,RMV TUBE/OVRY  12/20/2007    Right sided salpingectomy and removal of ruptured ectopic pregnancy. D&C.     HC UGI ENDOSCOPY, SIMPLE EXAM  09/01/09     LAPAROSCOPIC APPENDECTOMY N/A 9/24/2015    Procedure: LAPAROSCOPIC  APPENDECTOMY;  Surgeon: James Cuellar MD;  [facility]     LAPAROSCOPIC CYSTECTOMY OVARIAN (BENIGN) N/A 9/24/2015    Procedure: LAPAROSCOPIC CYSTECTOMY OVARIAN (BENIGN);  Surgeon: Greg Ford MD;  [facility]     LAPAROSCOPIC OOPHORECTOMY Right 9/24/2015    Procedure: LAPAROSCOPIC OOPHORECTOMY;  Surgeon: Greg Ford MD;  [facility]     LITHOTRIPSY  01/17/2007     PELVIS LAPAROSCOPY,DX  10/16/2000    Diagnostic laparoscopy, Endometrial biopsy.     TUBAL/ECTOPIC PREGNANCY  01/23/2007    Lap removal of left ruptured ectopic pregnancy & salpingectomy, D&C     Social History     Socioeconomic History     Marital status:      Spouse name: Joseph     Number of children: 1     Years of education: 9     Highest education level: None   Occupational History     Employer: NONE   Social Needs     Financial resource strain: None     Food insecurity:     Worry: None     Inability: None     Transportation needs:     Medical: None     Non-medical: None   Tobacco Use     Smoking status: Current Some Day Smoker     Packs/day: 0.00     Years: 12.00     Pack years: 0.00     Types: Cigarettes     Last attempt to quit: 7/14/2009     Years since quitting: 10.5     Smokeless tobacco: Never Used     Tobacco comment: As of 10 /2014, pt has had a few cigs here and there   Substance and Sexual Activity     Alcohol use: Yes     Alcohol/week: 0.0 standard drinks     Comment: every few months     Drug use: No     Sexual activity: Yes     Partners: Male     Birth control/protection: None   Lifestyle     Physical activity:     Days per week: None     Minutes per session: None     Stress: None   Relationships     Social connections:     Talks on phone: None     Gets together: None     Attends Nondenominational service: None     Active member of club or organization: None     Attends meetings of clubs or organizations: None     Relationship status: None     Intimate partner violence:     Fear of  current or ex partner: None     Emotionally abused: None     Physically abused: None     Forced sexual activity: None   Other Topics Concern      Service No     Blood Transfusions No     Caffeine Concern Yes     Comment: Reports 1 can soda/week  Advised not more than 16 ounces caffeien/day.     Occupational Exposure No     Hobby Hazards No     Sleep Concern No     Stress Concern Yes     Comment: will discuss with      Weight Concern Yes     Comment: Eating disorder in childhood.  Hx. gestational diab.     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     Exercise No     Comment: Advised walking 30 min/day.     Bike Helmet No     Seat Belt Yes     Self-Exams Not Asked     Parent/sibling w/ CABG, MI or angioplasty before 65F 55M? Not Asked   Social History Narrative     and lives at home with her  and daughter.     Family History   Problem Relation Age of Onset     Diabetes Maternal Grandmother         adult onset     Anesthesia Reaction Maternal Grandmother         can't tolerate Novacaine.     Respiratory Maternal Grandmother         COPD and emphysema.     Thyroid Disease Maternal Grandmother      Heart Disease Maternal Grandmother         CHF     Diabetes Paternal Grandfather         adult o nset     Hypertension Paternal Grandfather      Cerebrovascular Disease Paternal Grandfather      Heart Disease Paternal Grandfather         MI, replaced valve,angioplasty     Thyroid Disease Paternal Grandfather      Cancer Maternal Grandfather         skin cancer     Heart Disease Maternal Grandfather         MI     Obesity Mother         on thyroid medication     Thyroid Disease Mother      Diabetes Mother      Rheumatologic Disease Mother      Heart Disease Father      Hypertension Father         and TIA     Lipids Father      Breast Cancer Paternal Grandmother      Arrhythmia Other      Cancer Maternal Aunt         breast/brain cancer      Depression Paternal Aunt      Cerebrovascular Disease Paternal Uncle      Cerebrovascular Disease Paternal Aunt        ROS: A 12 point review of systems was done. Except for what is listed above in the HPI, the systems review is negative .      Objective: Vital signs: Blood pressure 124/84, pulse 74, temperature 97.7  F (36.5  C), temperature source Temporal, resp. rate 14, weight 98.2 kg (216 lb 9.6 oz), SpO2 97 %, not currently breastfeeding.  HEENT:    Sclerae and conjunctiva are normal.   Ear canals and TMs look normal.  Nasal mucosa is pink  - no polyps or masses seen.  Throat is unremarkable . Mucous membranes are moist.   Neck is supple, mobile, no adenopathy or masses palpable. The thyroid feels normal.   Normal range of motion noted.  Chest is clear to auscultation.  No wheezes, rales or rhonchi heard.  Cardiac exam is normal with s1, s2, no murmurs or adventitious sounds.Normal rate and rhythm is heard.   Abdomen is soft,  nondistended, No masses felt.No HSM. No guarding or rigidity or rebound   noted. Palpation reveals  no    tenderness   Normal bowel sounds heard.    Pelvic exam done with my nurse Triny present.  External genitalia look normal.  Vaginal vault is without bleeding or discharge.  Cervix is recessed.  A Pap was obtained.  After receiving her informed consent and endometrial biopsy was obtained.  Pipelle sounded to 6 cm.  Bimanual exam was not repeated because she was uncomfortable and she just had a pelvic ultrasound today.        Assessment/Plan:  A total of 25 minutes were spent face-to-face with this patient during today's consultation, with more than 50% of that time devoted to conversation and counseling about the management decisions.      1.  35-year-old female with menorrhagia.  She is absolutely sure she cannot be pregnant because she has had bilateral salpingectomies for ruptured ectopic pregnancies.  She is requesting endometrial ablation.  I think this is a reasonable plan.  She  has had previous cervical dysplasia and would also consider hysterectomy but she is not a good surgical risk at this point because she is morbidly obese and her diabetes is not well controlled.    2.  I will review the endometrial pathology from the biopsy today and then we will make some plans for the ablation.  I have given her a booklet to read and she will come back in several weeks to discuss the pathology results and what she has reviewed in the booklet.  If it is normal biopsy then we will proceed with ablation if she is willing.         The above information was dictating using Dragon voice software and as a result there may be some irregularities that were not detected in my review of this note.    SHARAN Ford MD

## 2020-02-12 ENCOUNTER — OFFICE VISIT (OUTPATIENT)
Dept: PSYCHIATRY | Facility: CLINIC | Age: 40
End: 2020-02-12
Payer: COMMERCIAL

## 2020-02-12 DIAGNOSIS — F41.1 GAD (GENERALIZED ANXIETY DISORDER): ICD-10-CM

## 2020-02-12 DIAGNOSIS — F33.2 MDD (MAJOR DEPRESSIVE DISORDER), RECURRENT SEVERE, WITHOUT PSYCHOSIS (HCC): ICD-10-CM

## 2020-02-12 PROCEDURE — 99213 OFFICE O/P EST LOW 20 MIN: CPT | Performed by: PSYCHIATRY & NEUROLOGY

## 2020-02-12 RX ORDER — LORAZEPAM 1 MG/1
0.5 TABLET ORAL EVERY 6 HOURS PRN
Qty: 60 TABLET | Refills: 2 | Status: SHIPPED | OUTPATIENT
Start: 2020-02-12 | End: 2020-06-04

## 2020-02-12 RX ORDER — TOPIRAMATE 25 MG/1
25 CAPSULE, COATED PELLETS ORAL 2 TIMES DAILY
Qty: 60 CAPSULE | Refills: 2 | Status: SHIPPED | OUTPATIENT
Start: 2020-02-12 | End: 2020-03-25 | Stop reason: SDUPTHER

## 2020-02-12 RX ORDER — TRAZODONE HYDROCHLORIDE 100 MG/1
100 TABLET ORAL
Qty: 30 TABLET | Refills: 2 | Status: SHIPPED | OUTPATIENT
Start: 2020-02-12 | End: 2020-03-11

## 2020-02-12 RX ORDER — DULOXETIN HYDROCHLORIDE 30 MG/1
30 CAPSULE, DELAYED RELEASE ORAL DAILY
Qty: 30 CAPSULE | Refills: 2 | Status: SHIPPED | OUTPATIENT
Start: 2020-02-12 | End: 2020-05-29

## 2020-02-12 RX ORDER — DULOXETIN HYDROCHLORIDE 60 MG/1
60 CAPSULE, DELAYED RELEASE ORAL DAILY
Qty: 30 CAPSULE | Refills: 2 | Status: SHIPPED | OUTPATIENT
Start: 2020-02-12 | End: 2020-05-29

## 2020-02-12 NOTE — PSYCH
Subjective:Medication Management    Patient ID: Marilyn Cosme is a 41 y o  female  HPI ROS Appetite Changes and Sleep: normal appetite, decreased energy, no weight change and normal number of sleep hours     Patient stated she tried Geodon and increase dose to 60 mg bid and she took with food every time but no significant improvement in depression  Tried before Wellbutrin, Buspar, Abilify, and her insurance will   Not cover for Seroquel XR  Agree to start trial of Topamax 25 mg bid  Review Of Systems:     Mood Anxiety and Depression   Behavior Normal    Thought Content Disturbing Thoughts, Feelings   General Emotional Problems and Decreased Functioning   Personality Normal   Other Psych Symptoms Normal   Constitutional Negative   ENT Negative   Cardiovascular Negative   Respiratory Negative   Gastrointestinal Negative   Genitourinary Negative   Musculoskeletal Negative   Integumentary Negative   Neurological Negative   Endocrine Normal    Other Symptoms Normal              Laboratory Results: No results found for this or any previous visit      Substance Abuse History:  Social History     Substance and Sexual Activity   Drug Use Not Currently       Family Psychiatric History:   Family History   Problem Relation Age of Onset    Diabetes Father     Kidney disease Father     Substance Abuse Brother     Diabetes Maternal Grandmother     Rheum arthritis Maternal Grandmother     Melanoma Maternal Grandmother     Kidney disease Paternal Grandmother     Rheum arthritis Paternal Grandmother     Depression Paternal Grandmother     Diabetes Paternal Grandfather     Lung cancer Paternal Grandfather     Substance Abuse Maternal Uncle     Prostate cancer Maternal Uncle 61    Depression Paternal Aunt     Alcohol abuse Family     Stomach cancer Family     Irregular heart beat Mother        The following portions of the patient's history were reviewed and updated as appropriate: allergies, current medications, past family history, past medical history, past social history, past surgical history and problem list     Social History     Socioeconomic History    Marital status: Single     Spouse name: Not on file    Number of children: 0    Years of education: 15 years     Highest education level: GED or equivalent   Occupational History    Occupation: unemployed   Social Needs    Financial resource strain: Hard    Food insecurity:     Worry: Never true     Inability: Never true    Transportation needs:     Medical: Yes     Non-medical: Yes   Tobacco Use    Smoking status: Never Smoker    Smokeless tobacco: Never Used   Substance and Sexual Activity    Alcohol use: No     Frequency: Never     Drinks per session: 1 or 2     Binge frequency: Never     Comment: 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     Drug use: Not Currently    Sexual activity: Not Currently   Lifestyle    Physical activity:     Days per week: 5 days     Minutes per session: 30 min    Stress:  To some extent   Relationships    Social connections:     Talks on phone: More than three times a week     Gets together: More than three times a week     Attends Bahai service: More than 4 times per year     Active member of club or organization: Yes     Attends meetings of clubs or organizations: More than 4 times per year     Relationship status: Never     Intimate partner violence:     Fear of current or ex partner: No     Emotionally abused: No     Physically abused: No     Forced sexual activity: No   Other Topics Concern    Not on file   Social History Narrative    Caffeine use     Social History     Social History Narrative    Caffeine use       Objective:       Mental status:  Appearance calm and cooperative , adequate hygiene and grooming and good eye contact    Mood dysphoric and depressed   Affect affect was constricted   Speech a normal rate and fluent   Thought Processes coherent/organized and normal thought processes   Hallucinations no hallucinations present    Thought Content no delusions   Abnormal Thoughts no suicidal thoughts  and no homicidal thoughts    Orientation  oriented to person and place and time   Remote Memory short term memory intact, short term memory impaired and long term memory impaired   Attention Span concentration intact   Intellect Appears to be of Average Intelligence   Insight Limited insight   Judgement judgment was limited   Muscle Strength Muscle strength and tone were normal and Normal gait    Language no difficulty naming common objects, no difficulty repeating a phrase  and no difficulty writing a sentence    Fund of Knowledge displays adequate knowledge of current events, adequate fund of knowledge regarding past history and adequate fund of knowledge regarding vocabulary    Pain none   Pain Scale 0       Assessment/Plan:       Diagnoses and all orders for this visit:    MDD (major depressive disorder), recurrent severe, without psychosis ([facility] )  -     traZODone (DESYREL) 100 mg tablet; Take 1 tablet (100 mg total) by mouth daily at bedtime as needed for sleep  -     DULoxetine (CYMBALTA) 60 mg delayed release capsule; Take 1 capsule (60 mg total) by mouth daily  -     DULoxetine (CYMBALTA) 30 mg delayed release capsule; Take 1 capsule (30 mg total) by mouth daily  -     topiramate (TOPAMAX) 25 mg sprinkle capsule; Take 1 capsule (25 mg total) by mouth 2 (two) times a day    CAROL (generalized anxiety disorder)  -     LORazepam (ATIVAN) 1 mg tablet; Take 0 5 tablets (0 5 mg total) by mouth every 6 (six) hours as needed for anxiety            Treatment Recommendations- Risks Benefits      Immediate Medical/Psychiatric/Psychotherapy Treatments and Any Precautions: d/c Geodon, start trial of Topamax 25 mg po bid for MDD augmentation      Risks, Benefits And Possible Side Effects Of Medications:  {PSYCH RISK, BENEFITS AND POSSIBLE SIDE EFFECTS (Optional):58332    Controlled Medication Discussion: Discussed with patient Black Box warning on concurrent use of benzodiazepines and opioid medications including sedation, respiratory depression, coma and death  Patient understands the risk of treatment with benzodiazepines in addition to opioids and wants to continue taking those medications  , Discussed with patient the risks of sedation, respiratory depression, impairment of ability to drive and potential for abuse and addiction related to treatment with benzodiazepine medications  The patient understands risk of treatment with benzodiazepine medications, agrees to not drive if feels impaired and agrees to take medications as prescribed   and The patient has been filling controlled prescriptions on time as prescribed to [facility] [address] [place] [address]

## 2020-02-16 ENCOUNTER — TELEPHONE (OUTPATIENT)
Dept: OTHER | Facility: OTHER | Age: 40
End: 2020-02-16

## 2020-02-17 ENCOUNTER — TELEPHONE (OUTPATIENT)
Dept: PSYCHIATRY | Facility: CLINIC | Age: 40
End: 2020-02-17

## 2020-02-17 NOTE — TELEPHONE ENCOUNTER
Bulmaro Callahan returned my call and informed me that she still feels as though she is having some side effects from stopping the Geodon  She feels hot, more depressed, tired, and feels as though if her skin wasn't on her she would be going in all different directions  States she was advised by Dr Sally Griffith to stop the Geodon and start Topamax  She did this on Friday  She can deal with it for another day or so while waiting for further direction  She was advised to go to the ER if symptoms become unmanageable  Will refer to Dr Mavis Wild in Dr Luz Maria Rojas absence

## 2020-02-17 NOTE — TELEPHONE ENCOUNTER
Patient is calling because she saw Maria Antonia Vines 2/12 and she was instructed to stop taking a Giodon 60mg and she thinks she is having withdrawn symptoms and will like to consult with the Dr  On call      Paged pt info to Dr Falk via TT

## 2020-02-17 NOTE — TELEPHONE ENCOUNTER
Spoke with patient  She reports having a lot of jitteriness, hot flashes since stopping Geodon about 3 days ago  She reports that she abruptly stopped the medication and hasn't been feeling well since  Discussed with patient taking Geodon 60 mg once daily for 1 week and then stopping to ease any discontinuation side effects  Also advised to use Ativan prn for restlessness feelings  Will f/u with Dr Fatimah Mora on return to office

## 2020-02-17 NOTE — TELEPHONE ENCOUNTER
Called Esther and left a message that I wanted to follow up and see how she is doing  Left office number for her to call back

## 2020-02-24 DIAGNOSIS — M62.838 MUSCLE SPASM: ICD-10-CM

## 2020-02-24 RX ORDER — CYCLOBENZAPRINE HCL 10 MG
TABLET ORAL
Qty: 30 TABLET | Refills: 5 | Status: SHIPPED | OUTPATIENT
Start: 2020-02-24 | End: 2020-08-25

## 2020-02-25 ENCOUNTER — SOCIAL WORK (OUTPATIENT)
Dept: BEHAVIORAL/MENTAL HEALTH CLINIC | Facility: CLINIC | Age: 40
End: 2020-02-25
Payer: COMMERCIAL

## 2020-02-25 ENCOUNTER — DOCUMENTATION (OUTPATIENT)
Dept: BEHAVIORAL/MENTAL HEALTH CLINIC | Facility: CLINIC | Age: 40
End: 2020-02-25

## 2020-02-25 DIAGNOSIS — F33.2 SEVERE RECURRENT MAJOR DEPRESSION WITHOUT PSYCHOTIC FEATURES (HCC): Primary | ICD-10-CM

## 2020-02-25 DIAGNOSIS — F41.1 GENERALIZED ANXIETY DISORDER: ICD-10-CM

## 2020-02-25 PROCEDURE — 1036F TOBACCO NON-USER: CPT | Performed by: SOCIAL WORKER

## 2020-02-25 PROCEDURE — 90834 PSYTX W PT 45 MINUTES: CPT | Performed by: SOCIAL WORKER

## 2020-02-25 NOTE — BH TREATMENT PLAN
Rudolph Mccauley  1980        Date of Initial Treatment Plan:  8/21/2018   Date of Current Treatment Plan: 02/25/20    Treatment Plan Number  5   Strengths/Personal Resources for Self Care: " witty, smart, funny creative, caring, resourceful, good hair;"  Diagnosis:   1  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 psychotic features ([facility] [address] )     2  Generalized anxiety disorder         Area of Needs: depression/anxiety/chronic pain    Long Term Goal 1: AI want to continue to effectively manage the depression and the anxiety  Target Date: 6/25/2020  Completion Date: n/a         Short Term Objectives for Goal 1: AI will continue to be aware of the depression/anxiety-neg self talk messages and redirect to being more aware of the depression/anxiety pos-realitic messages (lower depression/anxiety)  B I will continue to work iwgenesis Grant on medication management  C I will continue to make sure I am taking care of myself  Target Date: 6/25/2020  Completion Date: n/a    Long Term Goal 2: I will continue to work on strengthening my self esteem  Target Date: 6/25/2020  Completion Date: N/A    Short Term Objectives for Goal 2: AI will remain aware of those who have impacted me positively and disregard the messages from those who were negative influences  B  I will review and use my qualities/strengths/assets asreminders of my worth  C  I will continue with self care (including using my  effective communication skills such as assertiveness)  D  I will continue to explore options to improve my qulaity of life  Target Date: 6/25/2020  Completion Date: na         Long Term Goal # 3: I will continue to learn and implement effective pain management strategies  Target Date: 6/25/2020  Completion Date: N/A    Short Term Objectives for Goal 3: AI will use effective pain management strategies  B   I will explore medical cannibus (to include discussing the matter with Dr Alma Grant)       Target Date: 6/25/2020  Completion Date: n/a    GOAL 1: Modality: Individual 2x per month   Completion Date n/a and The person(s) responsible for carrying out the plan is  Esther    GOAL 2: Modality: Individual 2x per month   Completion Date n/a and The person(s) responsible for carrying out the plan is  Esther     GOAL 3: Modality: Individual 2x per month   Completion Date n/a and The person(s) responsible for carrying out the plan is  [address]: Diagnosis and Treatment Plan explained to Allyson Ryder relates understanding diagnosis and is agreeable to Treatment Plan  yes      Client Comments : Please share your thoughts, feelings, need and/or experiences regarding your treatment plan:

## 2020-02-25 NOTE — PSYCH
Psychotherapy Provided: Individual Psychotherapy 45 minutes "It was a rough week " per her report in her efforts discontinue the Geodon  Reports had lm with the answering service during the week Presidents' Day weekend alleging she had not received a call back from the on call physician  States in Dr Dee Kamara absence (2/17/2020) she reports contacted "the nurse line Toma Glynn)" adding the experience was "good" with a return call reported from another psychiatrist in Dr Dee Kamara absence on 2/17/2020  "Hopefully this week will go ok (as of 2/24/2020 is no longer taking Geodon)  " states her fatigue level (as had reported was to an extreme during the initial "cold turkey" plan to d/c the Geodon) has improved at this time; She noted, "We are going to have a couple living with us upstairs (in her parents' home where BRAD, also, lives until the couple obtains their assignment as part of the [address])  "I have my hearing date for the Social Security Disability hearing as:  4/21/2020 at the Social Security offices: 4th and [address] [facility], [facility], [address] [facility]  She is dealing with Foster Triana Esq as her representative  Discussed/reviewed boundaries regarding the addition of two people to her home for an indefinite period of time; discussed/reviewed her chronic pain and her efforts to manage it (within a reported minimally supportive environment at home with her parents); A: continues with her pleasant demeanor and in her efforts to cope with the reported stressors of living at home with her parents (alleges no source of income ofher own); P:(G#1 ) will udpate txt plan to include self care;    Length of time in session: 45 minutes, follow up in 1 month    Goals addressed in session: Goal 1     Pain:      moderate to severe ("in my back")    4    Current suicide risk : [address]: Diagnosis and Treatment Plan explained to Troy Strickland relates understanding diagnosis and is agreeable to Treatment Plan   Yes

## 2020-02-25 NOTE — PROGRESS NOTES
Treatment Plan Tracking    # 1Treatment Plan not completed within required time limits due to: Client had cancelled two consecutive apointments: illness and car trouble reproted; Ibis Sequin

## 2020-02-27 ENCOUNTER — OFFICE VISIT (OUTPATIENT)
Dept: GASTROENTEROLOGY | Facility: MEDICAL CENTER | Age: 40
End: 2020-02-27
Payer: COMMERCIAL

## 2020-02-27 VITALS
DIASTOLIC BLOOD PRESSURE: 80 MMHG | WEIGHT: 198 LBS | TEMPERATURE: 97.6 F | HEART RATE: 100 BPM | SYSTOLIC BLOOD PRESSURE: 126 MMHG | HEIGHT: 60 IN | BODY MASS INDEX: 38.87 KG/M2

## 2020-02-27 DIAGNOSIS — K59.04 CHRONIC IDIOPATHIC CONSTIPATION: Primary | ICD-10-CM

## 2020-02-27 DIAGNOSIS — K63.5 HYPERPLASTIC COLONIC POLYP, UNSPECIFIED PART OF COLON: ICD-10-CM

## 2020-02-27 PROCEDURE — 3008F BODY MASS INDEX DOCD: CPT | Performed by: PHYSICIAN ASSISTANT

## 2020-02-27 PROCEDURE — 99214 OFFICE O/P EST MOD 30 MIN: CPT | Performed by: PHYSICIAN ASSISTANT

## 2020-02-27 PROCEDURE — 1036F TOBACCO NON-USER: CPT | Performed by: PHYSICIAN ASSISTANT

## 2020-02-27 NOTE — PROGRESS NOTES
[facility] - Outpatient Follow-up Note  Chloe Castillo 41 y o  female MRN: [id number]  Encounter: [id number]          ASSESSMENT AND PLAN:      1  Chronic idiopathic constipation: hx of constipation  She was previously on miralax 17g daily  Last visit in 8/2019 she was still having incomplete evacuation with the miralax once a day  Today she states she is no longer on miralax and her constipation has resolved  She is having one formed BM daily without straining or hard stools and her hematochezia has also resolved  -recommend 20-25 g of fiber daily  -recommend drinking 8 glasses of 8 oz of water daily  -daily cardiovascular exercise  -MiraLax as needed  -follow up p r n     2  Colon cancer screening:   She underwent a colonoscopy in June of 2019 with a small polyp removed, poor bowel prep otherwise normal   Biopsies revealed a benign polyp as well as random colon biopsy  Repeat recommended in 3 years due to poor prep  -recall entered for June of 2022    ______________________________________________________________________    SUBJECTIVE:  Chloe Castillo is a 47-year-old female here for follow-up of chronic idiopathic constipation complicated by hematochezia  She underwent a colonoscopy in June of 2019 with a small polyp removed, poor bowel prep, small internal hemorrhoids otherwise colonoscopy was normal   The biopsies revealed a benign polyp as well as random colon biopsy  Repeat was recommended in 3 years due to her fair bowel prep  She was taking MiraLax 17 g once a day and felt like she was not having complete evacuation  She was recommended to increase this to twice a day  She states that at this time she is no longer on MiraLax or any stool softener or laxative and she is having 1 formed soft bowel movement on a daily basis without straining, hard stools or any further hematochezia  Done her of what change, she denies any medication, diet changes    She denies any nausea, vomiting, reflux, poor appetite, weight loss, change in bowel habits, melena or hematochezia  REVIEW OF SYSTEMS IS OTHERWISE NEGATIVE        Historical Information   Past Medical History:   Diagnosis Date    Anxiety     Chronic sinusitis     Depression     Fibromyalgia     Migraine     Obesity     Polyarthritis     Last assessed 9/21/2015    Psychiatric disorder      Past Surgical History:   Procedure Laterality Date    COLONOSCOPY  06/2019   Lamine Singh DENTAL SURGERY      HYSTEROSCOPY      Endometrial Biopsy By Hysteroscopy    REMOVAL OF INTRAUTERINE DEVICE (IUD)      US GUIDED BREAST BIOPSY RIGHT COMPLETE Right 6/17/2019     Social History   Social History     Substance and Sexual Activity   Alcohol Use No    Frequency: Never    Drinks per session: 1 or 2    Binge frequency: Never    Comment: 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      Social History     Substance and Sexual Activity   Drug Use Not Currently     Social History     Tobacco Use   Smoking Status Never Smoker   Smokeless Tobacco Never Used     Family History   Problem Relation Age of Onset    Diabetes Father     Kidney disease Father     Substance Abuse Brother     Diabetes Maternal Grandmother     Rheum arthritis Maternal Grandmother     Melanoma Maternal Grandmother     Kidney disease Paternal Grandmother     Rheum arthritis Paternal Grandmother     Depression Paternal Grandmother     Diabetes Paternal Grandfather     Lung cancer Paternal Grandfather     Substance Abuse Maternal Uncle     Prostate cancer Maternal Uncle 61    Depression Paternal Aunt     Alcohol abuse Family     Stomach cancer Family     Irregular heart beat Mother        Meds/Allergies       Current Outpatient Medications:     Calcium Carbonate-Vitamin D (CALCIUM 500/D PO)    Cholecalciferol (VITAMIN D-3) 1000 units CAPS    cyclobenzaprine (FLEXERIL) 10 mg tablet    DULoxetine (CYMBALTA) 30 mg delayed release capsule    DULoxetine (CYMBALTA) 60 mg delayed release capsule    IRON PO    LORazepam (ATIVAN) 1 mg tablet    medroxyPROGESTERone (DEPO-PROVERA) 150 mg/mL injection    rizatriptan (MAXALT) 10 MG tablet    topiramate (TOPAMAX) 25 mg sprinkle capsule    traZODone (DESYREL) 100 mg tablet    MAGNESIUM OXIDE PO    Allergies   Allergen Reactions    Decadrol [Dexamethasone] Other (See Comments)     Category: Adverse Reaction;   psychosis    Dexamethasone Sodium Phosphate     Other     Penicillins Hives and Other (See Comments)     Category: Allergy; Hives/Uticaria    Tetracycline Hives and Other (See Comments)     Hives/Uticaria    Tetracyclines & Related Hives     Category: Allergy;            Objective     Blood pressure 126/80, pulse 100, temperature 97 6 °F (36 4 °C), temperature source Tympanic, height 5' (1 524 m), weight 89 8 kg (198 lb)  Body mass index is 38 67 kg/m²  PHYSICAL EXAM:      General Appearance:   Alert, cooperative, no distress   HEENT:   Normocephalic, atraumatic, anicteric  Right eye exhibits no discharge  Left eye exhibits no discharge  No scleral icterus    Neck:  Supple, symmetrical, trachea midline, no stridor    Lungs:   Clear to auscultation bilaterally; no rales, rhonchi or wheezing; respirations unlabored    Heart[de-identified]   Regular rate and rhythm; no murmur, rub, or gallop  Abdomen:   Soft, non-tender, non-distended; normal bowel sounds; no masses, no organomegaly    Genitalia:   Deferred    Rectal:   Deferred    Extremities:  No cyanosis, clubbing or edema        Skin:  No jaundice, rashes, or lesions          Lab Results:   No visits with results within 1 Day(s) from this visit     Latest known visit with results is:   Appointment on 09/25/2019   Component Date Value    Vit D, 25-Hydroxy 09/25/2019 34 7     Sodium 09/25/2019 138     Potassium 09/25/2019 3 8     Chloride 09/25/2019 110*    CO2 09/25/2019 23     ANION GAP 09/25/2019 5     BUN 09/25/2019 16     Creatinine 09/25/2019 0 97  Glucose, Fasting 09/25/2019 93     Calcium 09/25/2019 8 8     AST 09/25/2019 12     ALT 09/25/2019 20     Alkaline Phosphatase 09/25/2019 42*    Total Protein 09/25/2019 7 2     Albumin 09/25/2019 3 8     Total Bilirubin 09/25/2019 0 40     eGFR 09/25/2019 73     Hemoglobin A1C 09/25/2019 5 3     EAG 09/25/2019 105     TSH 3RD GENERATON 09/25/2019 1 500          Radiology Results:   No results found

## 2020-03-01 PROBLEM — K59.04 CHRONIC IDIOPATHIC CONSTIPATION: Status: ACTIVE | Noted: 2018-11-07

## 2020-03-10 ENCOUNTER — SOCIAL WORK (OUTPATIENT)
Dept: BEHAVIORAL/MENTAL HEALTH CLINIC | Facility: CLINIC | Age: 40
End: 2020-03-10
Payer: COMMERCIAL

## 2020-03-10 DIAGNOSIS — F33.2 SEVERE RECURRENT MAJOR DEPRESSION WITHOUT PSYCHOTIC FEATURES (HCC): Primary | ICD-10-CM

## 2020-03-10 DIAGNOSIS — F41.1 GENERALIZED ANXIETY DISORDER: ICD-10-CM

## 2020-03-10 PROCEDURE — 90834 PSYTX W PT 45 MINUTES: CPT | Performed by: SOCIAL WORKER

## 2020-03-10 NOTE — PSYCH
Psychotherapy Provided: Individual Psychotherapy 45 minutes  PHQ 9 = 15; CAROL 7 = 15;  is "pleaseantly surprised at the depression scale score ("I thought it would be higher ") and "surprised it (the score is not higher regarding the Anxiety score); She noted, per her reported recent discussion with Dr Ricci Pickard, she had completed the d/cing of the Geodon and completed the transition to taking the replacement medication, Topomax  She responded that the current dosage of Topomax is "just ok " She noted her plans to discuss the possible increase in the Topomax dosage with Dr Ricci Pickard  She reports, "I am pretty tired all of the time  I don't think the medications have anything to do with it  I am not doing (much of ) anything  I think it is related to the depression "  She noted with [employer] members staying with her family temporarily, things are less tense sosa her family members in the home    states her parents (with whom she resides) are also each dealing with colds with reported  durations of 5 -10 days, to date;  has a Soc Sec  Dis  hearing date: Tuesday, April 21, 2020 @ 8 :30 AM at the [facility] site   has an appt later today with her  regarding related matters; "I have been sleeping as much as I can  I know that is not good for me " She commented that with her parents being ill, they are not "after me, on me (about what they think I should be doing)  "  "If I would walk and have tea, I have people for that  I do have an network of friends I could fall back on  It is hit or miss   They have their own things " discussed the importance of her own self care and taking initiative which can be difficult for her due to her medical issues; A: presents as frustrated and having cautious expectations regarding the upcoming SS Dis hearing; P:(G#1 ) will continue to focus on self care;    Length of time in session: 45 minutes, follow up in 2 week    Goals addressed in session: Goal 1     Pain:      moderate; "It is a headache (upon awakening)  "    4    Current suicide risk : Low         Behavioral Health Treatment Plan [facility]: Diagnosis and Treatment Plan explained to Allyson Ryder relates understanding diagnosis and is agreeable to Treatment Plan   Yes

## 2020-03-11 ENCOUNTER — TELEPHONE (OUTPATIENT)
Dept: PSYCHIATRY | Facility: CLINIC | Age: 40
End: 2020-03-11

## 2020-03-11 DIAGNOSIS — F33.2 MDD (MAJOR DEPRESSIVE DISORDER), RECURRENT SEVERE, WITHOUT PSYCHOSIS (HCC): ICD-10-CM

## 2020-03-11 RX ORDER — TRAZODONE HYDROCHLORIDE 100 MG/1
TABLET ORAL
Qty: 60 TABLET | Refills: 2 | Status: SHIPPED | OUTPATIENT
Start: 2020-03-11 | End: 2020-06-06

## 2020-03-16 ENCOUNTER — TELEPHONE (OUTPATIENT)
Dept: BEHAVIORAL/MENTAL HEALTH CLINIC | Facility: CLINIC | Age: 40
End: 2020-03-16

## 2020-03-16 NOTE — TELEPHONE ENCOUNTER
Left message for Ian Chowdhury to come in and sign an LOKESH Esmer Valdivia [place]) at the request of Sara Villa

## 2020-03-23 ENCOUNTER — DOCUMENTATION (OUTPATIENT)
Dept: BEHAVIORAL/MENTAL HEALTH CLINIC | Facility: CLINIC | Age: 40
End: 2020-03-23

## 2020-03-23 NOTE — PROGRESS NOTES
3/23/2020 @ 8:47 AM This writer (her psychotherapist) lm at the telephone number provided for follow up regarding a message that, per support staff, Sahara Schilling, had left on 3/16/2020 for Shalom Gr to come to the office to sign a release of information regarding the processing of her behavioral health records as part of the [address] [facility] application process

## 2020-03-25 ENCOUNTER — TELEMEDICINE (OUTPATIENT)
Dept: PSYCHIATRY | Facility: CLINIC | Age: 40
End: 2020-03-25
Payer: COMMERCIAL

## 2020-03-25 DIAGNOSIS — F33.2 MDD (MAJOR DEPRESSIVE DISORDER), RECURRENT SEVERE, WITHOUT PSYCHOSIS (HCC): ICD-10-CM

## 2020-03-25 PROCEDURE — 99212 OFFICE O/P EST SF 10 MIN: CPT | Performed by: PSYCHIATRY & NEUROLOGY

## 2020-03-25 RX ORDER — TOPIRAMATE 25 MG/1
50 CAPSULE, COATED PELLETS ORAL 2 TIMES DAILY
Qty: 120 CAPSULE | Refills: 2 | Status: SHIPPED | OUTPATIENT
Start: 2020-03-25 | End: 2020-06-12 | Stop reason: SDUPTHER

## 2020-03-25 NOTE — PSYCH
Virtual Regular Visit    Problem List Items Addressed This Visit     None      Visit Diagnoses     MDD (major depressive disorder), recurrent severe, without psychosis ([facility] [address] )        Relevant Medications    topiramate (TOPAMAX) 25 mg sprinkle capsule            Reason for visit is  Medication management     Encounter provider Memo Bautista MD    Provider located at [facility]         After connecting through [employer], the patient was identified by name and date of birth  Merly Jones was informed that this is a telemedicine visit and that the visit is being conducted through telephone which may not be secure and therefore, might not be HIPAA-compliant  My office door was closed  No one else was in the room  She acknowledged consent and understanding of privacy and security of the video platform  The patient has agreed to participate and understands they can discontinue the visit at any time  Subjective  eMrly Jones is a 41 y o  female with MDD and CAROL  Emmanuel Sesay was not able to come in for appointment because she has been ill and she suspects to have COVID 19  She will be reaching out to PCP for evaluation and testing  She stated she otherwise tolerating her medication regimen well and denies side effects  No other health changes  She will be ready to try dose increase on Topamax to 50 mg bid for mood stabilization   Will f/u in 6 weeks        Past Medical History:   Diagnosis Date    Anxiety     Chronic sinusitis     Depression     Fibromyalgia     Migraine     Obesity     Polyarthritis     Last assessed 9/21/2015    Psychiatric disorder        Past Surgical History:   Procedure Laterality Date    COLONOSCOPY  06/2019   Alisha Waddell DENTAL SURGERY      HYSTEROSCOPY      Endometrial Biopsy By Hysteroscopy    REMOVAL OF INTRAUTERINE DEVICE (IUD)      US GUIDED BREAST BIOPSY RIGHT COMPLETE Right 6/17/2019       Current Outpatient Medications   Medication Sig Dispense Refill    Calcium Carbonate-Vitamin D (CALCIUM 500/D PO) Take 1 capsule by mouth daily      Cholecalciferol (VITAMIN D-3) 1000 units CAPS Take 1 capsule by mouth daily      cyclobenzaprine (FLEXERIL) 10 mg tablet TAKE 1 TABLET BY MOUTH AT BEDTIME AS NEEDED FOR MUSCLE SPASMS 30 tablet 5    DULoxetine (CYMBALTA) 30 mg delayed release capsule Take 1 capsule (30 mg total) by mouth daily 30 capsule 2    DULoxetine (CYMBALTA) 60 mg delayed release capsule Take 1 capsule (60 mg total) by mouth daily 30 capsule 2    IRON PO Take by mouth      LORazepam (ATIVAN) 1 mg tablet Take 0 5 tablets (0 5 mg total) by mouth every 6 (six) hours as needed for anxiety 60 tablet 2    MAGNESIUM OXIDE PO Take by mouth      medroxyPROGESTERone (DEPO-PROVERA) 150 mg/mL injection INJECT MONTHLY FOR 6 MONTH  5    rizatriptan (MAXALT) 10 MG tablet Take 1 tablet by mouth once as needed for migraine, May repeat in 2 hours if needed 9 tablet 3    topiramate (TOPAMAX) 25 mg sprinkle capsule Take 2 capsules (50 mg total) by mouth 2 (two) times a day 120 capsule 2    traZODone (DESYREL) 100 mg tablet TAKE 1-2 TABLETS (100-200 MG TOTAL) BY MOUTH DAILY AT BEDTIME AS NEEDED FOR SLEEP 60 tablet 2     No current facility-administered medications for this visit  Allergies   Allergen Reactions    Decadrol [Dexamethasone] Other (See Comments)     Category: Adverse Reaction;   psychosis    Dexamethasone Sodium Phosphate     Other     Penicillins Hives and Other (See Comments)     Category: Allergy; Hives/Uticaria    Tetracycline Hives and Other (See Comments)     Hives/Uticaria    Tetracyclines & Related Hives     Category: Allergy; Review of Systems: as stated in HPI      I spent 15 minutes with the patient during this visit

## 2020-04-07 ENCOUNTER — TELEPHONE (OUTPATIENT)
Dept: PSYCHIATRY | Facility: CLINIC | Age: 40
End: 2020-04-07

## 2020-04-14 ENCOUNTER — TELEPHONE (OUTPATIENT)
Dept: PSYCHIATRY | Facility: CLINIC | Age: 40
End: 2020-04-14

## 2020-04-22 ENCOUNTER — TELEMEDICINE (OUTPATIENT)
Dept: BEHAVIORAL/MENTAL HEALTH CLINIC | Facility: CLINIC | Age: 40
End: 2020-04-22
Payer: COMMERCIAL

## 2020-04-22 DIAGNOSIS — F33.2 SEVERE RECURRENT MAJOR DEPRESSION WITHOUT PSYCHOTIC FEATURES (HCC): Primary | ICD-10-CM

## 2020-04-22 DIAGNOSIS — F41.1 GENERALIZED ANXIETY DISORDER: ICD-10-CM

## 2020-04-22 PROCEDURE — 90832 PSYTX W PT 30 MINUTES: CPT | Performed by: SOCIAL WORKER

## 2020-05-05 ENCOUNTER — TELEMEDICINE (OUTPATIENT)
Dept: BEHAVIORAL/MENTAL HEALTH CLINIC | Facility: CLINIC | Age: 40
End: 2020-05-05
Payer: COMMERCIAL

## 2020-05-05 DIAGNOSIS — F41.1 GENERALIZED ANXIETY DISORDER: ICD-10-CM

## 2020-05-05 DIAGNOSIS — F33.2 SEVERE RECURRENT MAJOR DEPRESSION WITHOUT PSYCHOTIC FEATURES (HCC): Primary | ICD-10-CM

## 2020-05-05 PROCEDURE — 90834 PSYTX W PT 45 MINUTES: CPT | Performed by: SOCIAL WORKER

## 2020-05-29 DIAGNOSIS — F33.2 MDD (MAJOR DEPRESSIVE DISORDER), RECURRENT SEVERE, WITHOUT PSYCHOSIS (HCC): ICD-10-CM

## 2020-05-29 RX ORDER — DULOXETIN HYDROCHLORIDE 30 MG/1
CAPSULE, DELAYED RELEASE ORAL
Qty: 30 CAPSULE | Refills: 2 | Status: SHIPPED | OUTPATIENT
Start: 2020-05-29 | End: 2020-08-06 | Stop reason: SDUPTHER

## 2020-05-29 RX ORDER — DULOXETIN HYDROCHLORIDE 60 MG/1
CAPSULE, DELAYED RELEASE ORAL
Qty: 30 CAPSULE | Refills: 2 | Status: SHIPPED | OUTPATIENT
Start: 2020-05-29 | End: 2020-08-06 | Stop reason: SDUPTHER

## 2020-06-02 ENCOUNTER — TELEMEDICINE (OUTPATIENT)
Dept: BEHAVIORAL/MENTAL HEALTH CLINIC | Facility: CLINIC | Age: 40
End: 2020-06-02
Payer: COMMERCIAL

## 2020-06-02 DIAGNOSIS — F33.2 SEVERE RECURRENT MAJOR DEPRESSION WITHOUT PSYCHOTIC FEATURES (HCC): Primary | ICD-10-CM

## 2020-06-02 DIAGNOSIS — F41.1 GAD (GENERALIZED ANXIETY DISORDER): ICD-10-CM

## 2020-06-02 DIAGNOSIS — F41.1 GENERALIZED ANXIETY DISORDER: ICD-10-CM

## 2020-06-02 PROCEDURE — 90832 PSYTX W PT 30 MINUTES: CPT | Performed by: SOCIAL WORKER

## 2020-06-03 DIAGNOSIS — G43.709 CHRONIC MIGRAINE WITHOUT AURA WITHOUT STATUS MIGRAINOSUS, NOT INTRACTABLE: ICD-10-CM

## 2020-06-03 RX ORDER — RIZATRIPTAN BENZOATE 10 MG/1
TABLET ORAL
Qty: 9 TABLET | Refills: 3 | Status: SHIPPED | OUTPATIENT
Start: 2020-06-03 | End: 2020-07-21 | Stop reason: SDUPTHER

## 2020-06-04 RX ORDER — LORAZEPAM 1 MG/1
0.5 TABLET ORAL EVERY 6 HOURS PRN
Qty: 60 TABLET | Refills: 2 | Status: SHIPPED | OUTPATIENT
Start: 2020-06-04 | End: 2020-08-06 | Stop reason: SDUPTHER

## 2020-06-06 DIAGNOSIS — F33.2 MDD (MAJOR DEPRESSIVE DISORDER), RECURRENT SEVERE, WITHOUT PSYCHOSIS (HCC): ICD-10-CM

## 2020-06-06 RX ORDER — TRAZODONE HYDROCHLORIDE 100 MG/1
TABLET ORAL
Qty: 60 TABLET | Refills: 2 | Status: SHIPPED | OUTPATIENT
Start: 2020-06-06 | End: 2020-08-06 | Stop reason: SDUPTHER

## 2020-06-12 ENCOUNTER — TELEMEDICINE (OUTPATIENT)
Dept: PSYCHIATRY | Facility: CLINIC | Age: 40
End: 2020-06-12
Payer: COMMERCIAL

## 2020-06-12 DIAGNOSIS — F33.2 MDD (MAJOR DEPRESSIVE DISORDER), RECURRENT SEVERE, WITHOUT PSYCHOSIS (HCC): ICD-10-CM

## 2020-06-12 DIAGNOSIS — F33.2 SEVERE RECURRENT MAJOR DEPRESSION WITHOUT PSYCHOTIC FEATURES (HCC): Primary | ICD-10-CM

## 2020-06-12 PROCEDURE — 99213 OFFICE O/P EST LOW 20 MIN: CPT | Performed by: PSYCHIATRY & NEUROLOGY

## 2020-06-12 RX ORDER — TOPIRAMATE 25 MG/1
50 CAPSULE, COATED PELLETS ORAL 2 TIMES DAILY
Qty: 120 CAPSULE | Refills: 2 | Status: SHIPPED | OUTPATIENT
Start: 2020-06-12 | End: 2020-08-06

## 2020-06-12 RX ORDER — QUETIAPINE FUMARATE 50 MG/1
50 TABLET, EXTENDED RELEASE ORAL
Qty: 30 TABLET | Refills: 2 | Status: SHIPPED | OUTPATIENT
Start: 2020-06-12 | End: 2020-06-22

## 2020-06-16 ENCOUNTER — TELEPHONE (OUTPATIENT)
Dept: PSYCHIATRY | Facility: CLINIC | Age: 40
End: 2020-06-16

## 2020-06-16 ENCOUNTER — TELEMEDICINE (OUTPATIENT)
Dept: FAMILY MEDICINE CLINIC | Facility: CLINIC | Age: 40
End: 2020-06-16
Payer: COMMERCIAL

## 2020-06-16 VITALS
DIASTOLIC BLOOD PRESSURE: 87 MMHG | HEIGHT: 60 IN | HEART RATE: 101 BPM | SYSTOLIC BLOOD PRESSURE: 127 MMHG | TEMPERATURE: 98.5 F | WEIGHT: 182.8 LBS | BODY MASS INDEX: 35.89 KG/M2

## 2020-06-16 DIAGNOSIS — M79.10 MYALGIA: ICD-10-CM

## 2020-06-16 DIAGNOSIS — M25.50 ARTHRALGIA OF MULTIPLE JOINTS: Primary | ICD-10-CM

## 2020-06-16 DIAGNOSIS — M79.7 FIBROMYALGIA: ICD-10-CM

## 2020-06-16 DIAGNOSIS — G43.009 MIGRAINE WITHOUT AURA AND WITHOUT STATUS MIGRAINOSUS, NOT INTRACTABLE: ICD-10-CM

## 2020-06-16 PROBLEM — J02.9 SORE THROAT: Status: RESOLVED | Noted: 2019-08-31 | Resolved: 2020-06-16

## 2020-06-16 PROCEDURE — 99214 OFFICE O/P EST MOD 30 MIN: CPT | Performed by: FAMILY MEDICINE

## 2020-06-17 ENCOUNTER — TELEPHONE (OUTPATIENT)
Dept: NEUROLOGY | Facility: CLINIC | Age: 40
End: 2020-06-17

## 2020-06-21 ENCOUNTER — TELEPHONE (OUTPATIENT)
Dept: OTHER | Facility: OTHER | Age: 40
End: 2020-06-21

## 2020-06-22 ENCOUNTER — TELEPHONE (OUTPATIENT)
Dept: PSYCHIATRY | Facility: CLINIC | Age: 40
End: 2020-06-22

## 2020-06-22 DIAGNOSIS — R39.15 URINARY URGENCY: Primary | ICD-10-CM

## 2020-06-22 DIAGNOSIS — F33.2 SEVERE RECURRENT MAJOR DEPRESSION WITHOUT PSYCHOTIC FEATURES (HCC): Primary | ICD-10-CM

## 2020-06-22 RX ORDER — ARIPIPRAZOLE 2 MG/1
2 TABLET ORAL DAILY
Qty: 30 TABLET | Refills: 2 | Status: SHIPPED | OUTPATIENT
Start: 2020-06-22 | End: 2020-08-06 | Stop reason: SDUPTHER

## 2020-06-22 NOTE — TELEPHONE ENCOUNTER
Spoke with renea  She said she took Seroquel back in December and had to stop due to the tingling so she really didn't want to start this back up again  She denies any tingling of hands or feet currently and she is basing this off of the past experience  She is willing to start the abilify  She uses [facility] on [address]

## 2020-06-22 NOTE — TELEPHONE ENCOUNTER
Her options are Abilify, Rexulti vs Seroquel XR  Is she having symptoms now ????  Or is just based in the past???

## 2020-06-22 NOTE — TELEPHONE ENCOUNTER
Alka Ortega called stating that at her last appoinmtment she was prescribed Seroquel  She said in the past she had taken Seroquel and had to stop because of tingling in her hands and feet  She is asking if it is possible to try a different drug instead?

## 2020-06-23 ENCOUNTER — APPOINTMENT (OUTPATIENT)
Dept: LAB | Facility: HOSPITAL | Age: 40
End: 2020-06-23
Payer: COMMERCIAL

## 2020-06-23 LAB
BACTERIA UR QL AUTO: ABNORMAL /HPF
BILIRUB UR QL STRIP: NEGATIVE
CLARITY UR: ABNORMAL
COLOR UR: YELLOW
GLUCOSE UR STRIP-MCNC: NEGATIVE MG/DL
HGB UR QL STRIP.AUTO: NEGATIVE
HYALINE CASTS #/AREA URNS LPF: ABNORMAL /LPF
KETONES UR STRIP-MCNC: NEGATIVE MG/DL
LEUKOCYTE ESTERASE UR QL STRIP: ABNORMAL
NITRITE UR QL STRIP: NEGATIVE
NON-SQ EPI CELLS URNS QL MICRO: ABNORMAL /HPF
PH UR STRIP.AUTO: 5.5 [PH]
PROT UR STRIP-MCNC: NEGATIVE MG/DL
RBC #/AREA URNS AUTO: ABNORMAL /HPF
SP GR UR STRIP.AUTO: 1.01 (ref 1–1.03)
UROBILINOGEN UR QL STRIP.AUTO: 0.2 E.U./DL
WBC #/AREA URNS AUTO: ABNORMAL /HPF

## 2020-06-23 PROCEDURE — 87086 URINE CULTURE/COLONY COUNT: CPT | Performed by: FAMILY MEDICINE

## 2020-06-23 PROCEDURE — 81001 URINALYSIS AUTO W/SCOPE: CPT | Performed by: FAMILY MEDICINE

## 2020-06-24 DIAGNOSIS — A69.20 LYME DISEASE: Primary | ICD-10-CM

## 2020-06-24 DIAGNOSIS — M79.10 MYALGIA: ICD-10-CM

## 2020-06-24 DIAGNOSIS — M25.50 ARTHRALGIA OF MULTIPLE JOINTS: ICD-10-CM

## 2020-06-24 LAB
25(OH)D3 SERPL-MCNC: 31 NG/ML (ref 30–100)
ALBUMIN SERPL-MCNC: 4.5 G/DL (ref 3.6–5.1)
ALBUMIN/GLOB SERPL: 2 (CALC) (ref 1–2.5)
ALP SERPL-CCNC: 60 U/L (ref 31–125)
ALT SERPL-CCNC: 10 U/L (ref 6–29)
ANA SER QL IF: NEGATIVE
AST SERPL-CCNC: 14 U/L (ref 10–30)
B BURGDOR AB SER QL IA: 1.11 INDEX
B BURGDOR IGG SER QL IB: POSITIVE
B BURGDOR IGM SER QL IB: NEGATIVE
B BURGDOR18KD IGG SER QL IB: REACTIVE
B BURGDOR23KD IGG SER QL IB: REACTIVE
B BURGDOR23KD IGM SER QL IB: ABNORMAL
B BURGDOR28KD IGG SER QL IB: ABNORMAL
B BURGDOR30KD IGG SER QL IB: ABNORMAL
B BURGDOR39KD IGG SER QL IB: REACTIVE
B BURGDOR39KD IGM SER QL IB: ABNORMAL
B BURGDOR41KD IGG SER QL IB: REACTIVE
B BURGDOR41KD IGM SER QL IB: ABNORMAL
B BURGDOR45KD IGG SER QL IB: ABNORMAL
B BURGDOR58KD IGG SER QL IB: REACTIVE
B BURGDOR66KD IGG SER QL IB: ABNORMAL
B BURGDOR93KD IGG SER QL IB: ABNORMAL
BACTERIA UR CULT: NORMAL
BASOPHILS # BLD AUTO: 30 CELLS/UL (ref 0–200)
BASOPHILS NFR BLD AUTO: 0.4 %
BILIRUB SERPL-MCNC: 0.5 MG/DL (ref 0.2–1.2)
BUN SERPL-MCNC: 15 MG/DL (ref 7–25)
BUN/CREAT SERPL: ABNORMAL (CALC) (ref 6–22)
CALCIUM SERPL-MCNC: 9.4 MG/DL (ref 8.6–10.2)
CHLORIDE SERPL-SCNC: 108 MMOL/L (ref 98–110)
CO2 SERPL-SCNC: 22 MMOL/L (ref 20–32)
CREAT SERPL-MCNC: 1.09 MG/DL (ref 0.5–1.1)
CRP SERPL-MCNC: 4.3 MG/L
EOSINOPHIL # BLD AUTO: 53 CELLS/UL (ref 15–500)
EOSINOPHIL NFR BLD AUTO: 0.7 %
ERYTHROCYTE [DISTWIDTH] IN BLOOD BY AUTOMATED COUNT: 12.9 % (ref 11–15)
ERYTHROCYTE [SEDIMENTATION RATE] IN BLOOD BY WESTERGREN METHOD: 6 MM/H
GLOBULIN SER CALC-MCNC: 2.3 G/DL (CALC) (ref 1.9–3.7)
GLUCOSE SERPL-MCNC: 100 MG/DL (ref 65–99)
HCT VFR BLD AUTO: 41.4 % (ref 35–45)
HGB BLD-MCNC: 14.1 G/DL (ref 11.7–15.5)
LYMPHOCYTES # BLD AUTO: 2706 CELLS/UL (ref 850–3900)
LYMPHOCYTES NFR BLD AUTO: 35.6 %
MCH RBC QN AUTO: 31.6 PG (ref 27–33)
MCHC RBC AUTO-ENTMCNC: 34.1 G/DL (ref 32–36)
MCV RBC AUTO: 92.8 FL (ref 80–100)
MONOCYTES # BLD AUTO: 380 CELLS/UL (ref 200–950)
MONOCYTES NFR BLD AUTO: 5 %
NEUTROPHILS # BLD AUTO: 4431 CELLS/UL (ref 1500–7800)
NEUTROPHILS NFR BLD AUTO: 58.3 %
PLATELET # BLD AUTO: 220 THOUSAND/UL (ref 140–400)
PMV BLD REES-ECKER: 9.6 FL (ref 7.5–12.5)
POTASSIUM SERPL-SCNC: 3.6 MMOL/L (ref 3.5–5.3)
PROT SERPL-MCNC: 6.8 G/DL (ref 6.1–8.1)
RBC # BLD AUTO: 4.46 MILLION/UL (ref 3.8–5.1)
SL AMB EGFR AFRICAN AMERICAN: 73 ML/MIN/1.73M2
SL AMB EGFR NON AFRICAN AMERICAN: 63 ML/MIN/1.73M2
SODIUM SERPL-SCNC: 138 MMOL/L (ref 135–146)
TSH SERPL-ACNC: 2.2 MIU/L
WBC # BLD AUTO: 7.6 THOUSAND/UL (ref 3.8–10.8)

## 2020-06-24 RX ORDER — CEPHALEXIN 500 MG/1
CAPSULE ORAL
Qty: 30 CAPSULE | Refills: 0 | Status: SHIPPED | OUTPATIENT
Start: 2020-06-24 | End: 2020-07-04

## 2020-06-30 ENCOUNTER — HOSPITAL ENCOUNTER (EMERGENCY)
Facility: HOSPITAL | Age: 40
Discharge: HOME | End: 2020-06-30
Attending: EMERGENCY MEDICINE | Admitting: EMERGENCY MEDICINE
Payer: COMMERCIAL

## 2020-06-30 ENCOUNTER — APPOINTMENT (EMERGENCY)
Dept: RADIOLOGY | Facility: HOSPITAL | Age: 40
End: 2020-06-30
Payer: COMMERCIAL

## 2020-06-30 VITALS
TEMPERATURE: 99.2 F | RESPIRATION RATE: 16 BRPM | HEART RATE: 101 BPM | SYSTOLIC BLOOD PRESSURE: 116 MMHG | WEIGHT: 182 LBS | OXYGEN SATURATION: 100 % | BODY MASS INDEX: 35.54 KG/M2 | DIASTOLIC BLOOD PRESSURE: 65 MMHG

## 2020-06-30 DIAGNOSIS — M54.9 BACK PAIN: ICD-10-CM

## 2020-06-30 DIAGNOSIS — R10.9 ABDOMINAL PAIN: Primary | ICD-10-CM

## 2020-06-30 LAB
ALBUMIN SERPL BCP-MCNC: 3.9 G/DL (ref 3.5–5)
ALP SERPL-CCNC: 66 U/L (ref 46–116)
ALT SERPL W P-5'-P-CCNC: 16 U/L (ref 12–78)
ANION GAP SERPL CALCULATED.3IONS-SCNC: 10 MMOL/L (ref 4–13)
AST SERPL W P-5'-P-CCNC: 7 U/L (ref 5–45)
BACTERIA UR QL AUTO: ABNORMAL /HPF
BASOPHILS # BLD AUTO: 0.02 THOUSANDS/ΜL (ref 0–0.1)
BASOPHILS NFR BLD AUTO: 0 % (ref 0–1)
BILIRUB SERPL-MCNC: 0.32 MG/DL (ref 0.2–1)
BILIRUB UR QL STRIP: NEGATIVE
BUN SERPL-MCNC: 12 MG/DL (ref 5–25)
CALCIUM SERPL-MCNC: 8.8 MG/DL (ref 8.3–10.1)
CHLORIDE SERPL-SCNC: 112 MMOL/L (ref 100–108)
CLARITY UR: CLEAR
CO2 SERPL-SCNC: 20 MMOL/L (ref 21–32)
COLOR UR: YELLOW
CREAT SERPL-MCNC: 0.87 MG/DL (ref 0.6–1.3)
EOSINOPHIL # BLD AUTO: 0.03 THOUSAND/ΜL (ref 0–0.61)
EOSINOPHIL NFR BLD AUTO: 0 % (ref 0–6)
ERYTHROCYTE [DISTWIDTH] IN BLOOD BY AUTOMATED COUNT: 13.8 % (ref 11.6–15.1)
EXT PREG TEST URINE: NEGATIVE
EXT. CONTROL ED NAV: NORMAL
GFR SERPL CREATININE-BSD FRML MDRD: 82 ML/MIN/1.73SQ M
GLUCOSE SERPL-MCNC: 116 MG/DL (ref 65–140)
GLUCOSE UR STRIP-MCNC: NEGATIVE MG/DL
HCT VFR BLD AUTO: 39.6 % (ref 34.8–46.1)
HGB BLD-MCNC: 13.2 G/DL (ref 11.5–15.4)
HGB UR QL STRIP.AUTO: ABNORMAL
HYALINE CASTS #/AREA URNS LPF: ABNORMAL /LPF
IMM GRANULOCYTES # BLD AUTO: 0.03 THOUSAND/UL (ref 0–0.2)
IMM GRANULOCYTES NFR BLD AUTO: 0 % (ref 0–2)
KETONES UR STRIP-MCNC: NEGATIVE MG/DL
LEUKOCYTE ESTERASE UR QL STRIP: ABNORMAL
LIPASE SERPL-CCNC: 73 U/L (ref 73–393)
LYMPHOCYTES # BLD AUTO: 2.63 THOUSANDS/ΜL (ref 0.6–4.47)
LYMPHOCYTES NFR BLD AUTO: 38 % (ref 14–44)
MCH RBC QN AUTO: 31.2 PG (ref 26.8–34.3)
MCHC RBC AUTO-ENTMCNC: 33.3 G/DL (ref 31.4–37.4)
MCV RBC AUTO: 94 FL (ref 82–98)
MONOCYTES # BLD AUTO: 0.41 THOUSAND/ΜL (ref 0.17–1.22)
MONOCYTES NFR BLD AUTO: 6 % (ref 4–12)
NEUTROPHILS # BLD AUTO: 3.75 THOUSANDS/ΜL (ref 1.85–7.62)
NEUTS SEG NFR BLD AUTO: 56 % (ref 43–75)
NITRITE UR QL STRIP: NEGATIVE
NON-SQ EPI CELLS URNS QL MICRO: ABNORMAL /HPF
NRBC BLD AUTO-RTO: 0 /100 WBCS
PH UR STRIP.AUTO: 5 [PH] (ref 4.5–8)
PLATELET # BLD AUTO: 197 THOUSANDS/UL (ref 149–390)
PMV BLD AUTO: 9.3 FL (ref 8.9–12.7)
POTASSIUM SERPL-SCNC: 3.4 MMOL/L (ref 3.5–5.3)
PROT SERPL-MCNC: 7.2 G/DL (ref 6.4–8.2)
PROT UR STRIP-MCNC: NEGATIVE MG/DL
RBC # BLD AUTO: 4.23 MILLION/UL (ref 3.81–5.12)
RBC #/AREA URNS AUTO: ABNORMAL /HPF
SODIUM SERPL-SCNC: 142 MMOL/L (ref 136–145)
SP GR UR STRIP.AUTO: 1.02 (ref 1–1.03)
UROBILINOGEN UR QL STRIP.AUTO: 0.2 E.U./DL
WBC # BLD AUTO: 6.87 THOUSAND/UL (ref 4.31–10.16)
WBC #/AREA URNS AUTO: ABNORMAL /HPF

## 2020-06-30 PROCEDURE — 96361 HYDRATE IV INFUSION ADD-ON: CPT

## 2020-06-30 PROCEDURE — 81025 URINE PREGNANCY TEST: CPT | Performed by: EMERGENCY MEDICINE

## 2020-06-30 PROCEDURE — 36415 COLL VENOUS BLD VENIPUNCTURE: CPT | Performed by: EMERGENCY MEDICINE

## 2020-06-30 PROCEDURE — 96374 THER/PROPH/DIAG INJ IV PUSH: CPT

## 2020-06-30 PROCEDURE — 74177 CT ABD & PELVIS W/CONTRAST: CPT

## 2020-06-30 PROCEDURE — 99284 EMERGENCY DEPT VISIT MOD MDM: CPT

## 2020-06-30 PROCEDURE — 85025 COMPLETE CBC W/AUTO DIFF WBC: CPT | Performed by: EMERGENCY MEDICINE

## 2020-06-30 PROCEDURE — 81001 URINALYSIS AUTO W/SCOPE: CPT

## 2020-06-30 PROCEDURE — 99284 EMERGENCY DEPT VISIT MOD MDM: CPT | Performed by: EMERGENCY MEDICINE

## 2020-06-30 PROCEDURE — 80053 COMPREHEN METABOLIC PANEL: CPT | Performed by: EMERGENCY MEDICINE

## 2020-06-30 PROCEDURE — 83690 ASSAY OF LIPASE: CPT | Performed by: EMERGENCY MEDICINE

## 2020-06-30 RX ORDER — KETOROLAC TROMETHAMINE 30 MG/ML
15 INJECTION, SOLUTION INTRAMUSCULAR; INTRAVENOUS ONCE
Status: COMPLETED | OUTPATIENT
Start: 2020-06-30 | End: 2020-06-30

## 2020-06-30 RX ORDER — LIDOCAINE 50 MG/G
1 PATCH TOPICAL ONCE
Status: DISCONTINUED | OUTPATIENT
Start: 2020-06-30 | End: 2020-06-30 | Stop reason: HOSPADM

## 2020-06-30 RX ADMIN — IOHEXOL 100 ML: 350 INJECTION, SOLUTION INTRAVENOUS at 19:37

## 2020-06-30 RX ADMIN — SODIUM CHLORIDE 1000 ML: 0.9 INJECTION, SOLUTION INTRAVENOUS at 18:28

## 2020-06-30 RX ADMIN — KETOROLAC TROMETHAMINE 15 MG: 30 INJECTION, SOLUTION INTRAMUSCULAR at 18:27

## 2020-06-30 NOTE — ED PROVIDER NOTES
History  Chief Complaint   Patient presents with    Back Pain     driving home from grocery store reported pain in LLQ groin pain "scratching from the inside pain", also left lower back pain, no fever or chills, no nausea no vomiting, severe back pain for 2 weeks, negative lyme titer, given clindamycin for bladder infection     Rex Wood is a 41year old female with a past medical history significant for MDD, CAROL, and Lyme disease (Lyme test two days ago was negative) who presents today with acute LLQ abdominal pain  About one month ago the patient began experiencing diffuse abdominal pain which has progressed throughout the month  The patient was also diagnosed with acute cystitis by her PCP last Thursday and was prescribed clindamycin which has provided minimal relief  On her way to the grocery store today she developed acute stabbing pain in her LLQ  The pain is described as both throbbing and stabbing and is rated as a 9/10 without radiation  She has a history of ovarian cysts, but states that this feels different  Her last menstrual period was last week  She is not sexually active and is currently on a contraceptive agent  She has not had any other vaginal discharge  She denies nausea, vomiting, diarrhea, constipation, and fevers  Prior to Admission Medications   Prescriptions Last Dose Informant Patient Reported? Taking?    ARIPiprazole (ABILIFY) 2 mg tablet   No No   Sig: Take 1 tablet (2 mg total) by mouth daily   Calcium Carbonate-Vitamin D (CALCIUM 500/D PO)  Self Yes No   Sig: Take 1 capsule by mouth daily   Cholecalciferol (VITAMIN D-3) 1000 units CAPS  Self Yes No   Sig: Take 1 capsule by mouth daily   DULoxetine (CYMBALTA) 30 mg delayed release capsule  Self No No   Sig: TAKE 1 CAPSULE BY MOUTH EVERY DAY   DULoxetine (CYMBALTA) 60 mg delayed release capsule  Self No No   Sig: TAKE 1 CAPSULE BY MOUTH EVERY DAY   IRON PO  Self Yes No   Sig: Take by mouth   LORazepam (ATIVAN) 1 mg tablet Self No No   Sig: TAKE 0 5 TABLETS (0 5 MG TOTAL) BY MOUTH EVERY 6 (SIX) HOURS AS NEEDED FOR ANXIETY   MAGNESIUM OXIDE PO  Self Yes No   Sig: Take by mouth   cephalexin (KEFLEX) 500 mg capsule   No No   Sig: Take 1 caps   3 times daily with food   cyclobenzaprine (FLEXERIL) 10 mg tablet  Self No No   Sig: TAKE 1 TABLET BY MOUTH AT BEDTIME AS NEEDED FOR MUSCLE SPASMS   medroxyPROGESTERone (DEPO-PROVERA) 150 mg/mL injection  Self Yes No   Sig: INJECT MONTHLY FOR 6 MONTH   rizatriptan (MAXALT) 10 MG tablet  Self No No   Sig: TAKE 1 TABLET BY MOUTH ONCE AS NEEDED FOR MIGRAINE, MAY REPEAT IN 2 HOURS IF NEEDED   topiramate (TOPAMAX) 25 mg sprinkle capsule  Self No No   Sig: Take 2 capsules (50 mg total) by mouth 2 (two) times a day   traZODone (DESYREL) 100 mg tablet  Self No No   Sig: TAKE 1-2 TABLETS (100-200 MG TOTAL) BY MOUTH DAILY AT BEDTIME AS NEEDED FOR SLEEP      Facility-Administered Medications: None       Past Medical History:   Diagnosis Date    Anxiety     Chronic sinusitis     Depression     Fibromyalgia     Migraine     Obesity     Polyarthritis     Last assessed 9/21/2015    Psychiatric disorder        Past Surgical History:   Procedure Laterality Date    COLONOSCOPY  06/2019    DENTAL SURGERY      HYSTEROSCOPY      Endometrial Biopsy By Hysteroscopy    REMOVAL OF INTRAUTERINE DEVICE (IUD)      US GUIDED BREAST BIOPSY RIGHT COMPLETE Right 6/17/2019       Family History   Problem Relation Age of Onset    Diabetes Father     Kidney disease Father     Substance Abuse Brother     Diabetes Maternal Grandmother     Rheum arthritis Maternal Grandmother     Melanoma Maternal Grandmother     Kidney disease Paternal Grandmother     Rheum arthritis Paternal Grandmother     Depression Paternal Grandmother     Diabetes Paternal Grandfather     Lung cancer Paternal Grandfather     Substance Abuse Maternal Uncle     Prostate cancer Maternal Uncle 61    Depression Paternal Aunt     Alcohol abuse Family     Stomach cancer Family     Irregular heart beat Mother      I have reviewed and agree with the history as documented  E-Cigarette/Vaping    E-Cigarette Use Never User      E-Cigarette/Vaping Substances    Nicotine No     THC No     CBD No      Social History     Tobacco Use    Smoking status: Never Smoker    Smokeless tobacco: Never Used   Substance Use Topics    Alcohol use: No     Frequency: Never     Drinks per session: 1 or 2     Binge frequency: Never     Comment: 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     Drug use: Not Currently        Review of Systems   Constitutional: Negative for chills, diaphoresis, fatigue and fever  Respiratory: Negative for cough and shortness of breath  Cardiovascular: Negative for chest pain and palpitations  Gastrointestinal: Positive for abdominal pain  Negative for abdominal distention, constipation, diarrhea, nausea and vomiting  Genitourinary: Positive for difficulty urinating, frequency and urgency  Negative for dysuria and hematuria  Musculoskeletal: Positive for back pain  Negative for arthralgias, myalgias and neck pain  Neurological: Negative for dizziness, syncope, light-headedness and headaches  All other systems reviewed and are negative        Physical Exam  ED Triage Vitals   Temperature Pulse Respirations Blood Pressure SpO2   06/30/20 1708 06/30/20 1708 06/30/20 1708 06/30/20 1708 06/30/20 1708   98 °F (36 7 °C) (!) 107 18 117/80 97 %      Temp Source Heart Rate Source Patient Position - Orthostatic VS BP Location FiO2 (%)   06/30/20 1708 06/30/20 1708 06/30/20 1708 06/30/20 1708 --   Oral Monitor Sitting Left arm       Pain Score       06/30/20 1742       9             Orthostatic Vital Signs  Vitals:    06/30/20 1708 06/30/20 1742 06/30/20 1943   BP: 117/80 141/86 116/65   Pulse: (!) 107 (!) 117 101   Patient Position - Orthostatic VS: Sitting Sitting        Physical Exam   Constitutional: She is oriented to person, place, and time  She appears well-nourished  HENT:   Head: Normocephalic and atraumatic  Right Ear: External ear normal    Left Ear: External ear normal    Mouth/Throat: Oropharynx is clear and moist    Eyes: Pupils are equal, round, and reactive to light  Conjunctivae and EOM are normal  Right eye exhibits no discharge  Left eye exhibits no discharge  No scleral icterus  Neck: Normal range of motion  Neck supple  No JVD present  Cardiovascular: Normal rate, regular rhythm, normal heart sounds and intact distal pulses  Exam reveals no gallop and no friction rub  No murmur heard  Pulmonary/Chest: Effort normal and breath sounds normal  No respiratory distress  She has no wheezes  She has no rales  Abdominal: Soft  Bowel sounds are normal  She exhibits no distension  There is tenderness in the left lower quadrant  There is no rigidity, no rebound, no guarding and no CVA tenderness  Musculoskeletal: Normal range of motion  She exhibits no tenderness or deformity  Back diffusely tender to palpation  Neurological: She is alert and oriented to person, place, and time  She has normal strength  No cranial nerve deficit or sensory deficit  She exhibits normal muscle tone  Coordination normal    Skin: Skin is warm  She is not diaphoretic  Psychiatric: She has a normal mood and affect  Her behavior is normal  Judgment and thought content normal    Vitals reviewed        ED Medications  Medications   lidocaine (LIDODERM) 5 % patch 1 patch (has no administration in time range)   sodium chloride 0 9 % bolus 1,000 mL (0 mL Intravenous Stopped 6/30/20 1932)   ketorolac (TORADOL) injection 15 mg (15 mg Intravenous Given 6/30/20 1827)   iohexol (OMNIPAQUE) 350 MG/ML injection (MULTI-DOSE) 100 mL (100 mL Intravenous Given 6/30/20 1937)       Diagnostic Studies  Results Reviewed     Procedure Component Value Units Date/Time    Lipase [[id number]]  (Normal) Collected:  06/30/20 1828    Lab Status:  Final result Specimen:  Blood from Arm, Left Updated:  06/30/20 1915     Lipase 73 u/L     Comprehensive metabolic panel [[id number]]  (Abnormal) Collected:  06/30/20 1828    Lab Status:  Final result Specimen:  Blood from Arm, Left Updated:  06/30/20 1915     Sodium 142 mmol/L      Potassium 3 4 mmol/L      Chloride 112 mmol/L      CO2 20 mmol/L      ANION GAP 10 mmol/L      BUN 12 mg/dL      Creatinine 0 87 mg/dL      Glucose 116 mg/dL      Calcium 8 8 mg/dL      AST 7 U/L      ALT 16 U/L      Alkaline Phosphatase 66 U/L      Total Protein 7 2 g/dL      Albumin 3 9 g/dL      Total Bilirubin 0 32 mg/dL      eGFR 82 ml/min/1 73sq m     Narrative:       National Kidney Disease Foundation guidelines for Chronic Kidney Disease (CKD):     Stage 1 with normal or high GFR (GFR > 90 mL/min/1 73 square meters)    Stage 2 Mild CKD (GFR = 60-89 mL/min/1 73 square meters)    Stage 3A Moderate CKD (GFR = 45-59 mL/min/1 73 square meters)    Stage 3B Moderate CKD (GFR = 30-44 mL/min/1 73 square meters)    Stage 4 Severe CKD (GFR = 15-29 mL/min/1 73 square meters)    Stage 5 End Stage CKD (GFR <15 mL/min/1 73 square meters)  Note: GFR calculation is accurate only with a steady state creatinine    Urine Microscopic [[id number]]  (Abnormal) Collected:  06/30/20 1848    Lab Status:  Final result Specimen:  Urine, Clean Catch Updated:  06/30/20 1910     RBC, UA 4-10 /hpf      WBC, UA 4-10 /hpf      Epithelial Cells Moderate /hpf      Bacteria, UA None Seen /hpf      Hyaline Casts, UA 5-10 /lpf     CBC and differential [[id number]] Collected:  06/30/20 1828    Lab Status:  Final result Specimen:  Blood from Arm, Left Updated:  06/30/20 1855     WBC 6 87 Thousand/uL      RBC 4 23 Million/uL      Hemoglobin 13 2 g/dL      Hematocrit 39 6 %      MCV 94 fL      MCH 31 2 pg      MCHC 33 3 g/dL      RDW 13 8 %      MPV 9 3 fL      Platelets 824 Thousands/uL      nRBC 0 /100 WBCs      Neutrophils Relative 56 %      Immat GRANS % 0 % Lymphocytes Relative 38 %      Monocytes Relative 6 %      Eosinophils Relative 0 %      Basophils Relative 0 %      Neutrophils Absolute 3 75 Thousands/µL      Immature Grans Absolute 0 03 Thousand/uL      Lymphocytes Absolute 2 63 Thousands/µL      Monocytes Absolute 0 41 Thousand/µL      Eosinophils Absolute 0 03 Thousand/µL      Basophils Absolute 0 02 Thousands/µL     POCT pregnancy, urine [[id number]]  (Normal) Resulted:  06/30/20 1849    Lab Status:  Final result Updated:  06/30/20 1849     EXT PREG TEST UR (Ref: Negative) negative     Control valid    POCT urinalysis dipstick [[id number]]  (Normal) Resulted:  06/30/20 1849    Lab Status:  Final result Specimen:  Urine Updated:  06/30/20 1849    Urine Macroscopic, POC [[id number]]  (Abnormal) Collected:  06/30/20 1848    Lab Status:  Final result Specimen:  Urine Updated:  06/30/20 1848     Color, UA Yellow     Clarity, UA Clear     pH, UA 5 0     Leukocytes, UA Trace     Nitrite, UA Negative     Protein, UA Negative mg/dl      Glucose, UA Negative mg/dl      Ketones, UA Negative mg/dl      Urobilinogen, UA 0 2 E U /dl      Bilirubin, UA Negative     Blood, UA Trace     Specific [place], UA 1 025    Narrative:       [facility] RESULT                 CT abdomen pelvis with contrast   Final Result by Aroldo Davila MD (06/30 1954)      No acute CT findings  Workstation performed: [id number]               Procedures  Procedures      ED Course                                           MDM  Number of Diagnoses or Management Options  Abdominal pain:   Back pain:   Diagnosis management comments: Urinalysis without signs of infection  Blood work was overall without signs of acute pathology  CT scan of abdomen and pelvis failed to reveal any significant underlying pathology  Patient felt improved after receiving fluids and Toradol here in the emergency department  A Lidoderm patch was provided for generalized, longer duration analgesia for her chronic back pain  I discussed that there is nothing acute from our standpoint at this time that she should follow-up with her primary care provider for further investigation as needed  Return precautions discussed  Disposition  Final diagnoses:   Abdominal pain   Back pain     Time reflects when diagnosis was documented in both MDM as applicable and the Disposition within this note     Time User Action Codes Description Comment    6/30/2020  8:08 PM Fauzia Dago Add [R10 9] Abdominal pain     6/30/2020  8:08 PM Fauzia Dago Add [M54 9] Back pain       ED Disposition     ED Disposition Condition Date/Time Comment    Discharge Stable Tue Jun 30, 2020  8:07 PM Leticiamonika Scherer discharge to home/self care  Follow-up Information     Follow up With Specialties Details Why Contact Info Additional Information    Adali Meyers MD Family Medicine  Discuss ED visit [id number] [facility] [phone]       [address] [place] [address] Emergency Department Emergency Medicine   [phone]  [phone] [facility] ED, [address] [place] [address], [id number]   [phone]          Discharge Medication List as of 6/30/2020  8:09 PM      CONTINUE these medications which have NOT CHANGED    Details   ARIPiprazole (ABILIFY) 2 mg tablet Take 1 tablet (2 mg total) by mouth daily, Starting Mon 6/22/2020, Normal      Calcium Carbonate-Vitamin D (CALCIUM 500/D PO) Take 1 capsule by mouth daily, Historical Med      cephalexin (KEFLEX) 500 mg capsule Take 1 caps  3 times daily with food, Normal      Cholecalciferol (VITAMIN D-3) 1000 units CAPS Take 1 capsule by mouth daily, Historical Med      cyclobenzaprine (FLEXERIL) 10 mg tablet TAKE 1 TABLET BY MOUTH AT BEDTIME AS NEEDED FOR MUSCLE SPASMS, Normal      !! DULoxetine (CYMBALTA) 30 mg delayed release capsule TAKE 1 CAPSULE BY MOUTH EVERY DAY, Normal      !!  DULoxetine (CYMBALTA) 60 mg delayed release capsule TAKE 1 CAPSULE BY MOUTH EVERY DAY, Normal      IRON PO Take by mouth, Historical Med      LORazepam (ATIVAN) 1 mg tablet TAKE 0 5 TABLETS (0 5 MG TOTAL) BY MOUTH EVERY 6 (SIX) HOURS AS NEEDED FOR ANXIETY, Starting Thu 6/4/2020, Normal      MAGNESIUM OXIDE PO Take by mouth, Historical Med      medroxyPROGESTERone (DEPO-PROVERA) 150 mg/mL injection INJECT MONTHLY FOR 6 MONTH, Historical Med      rizatriptan (MAXALT) 10 MG tablet TAKE 1 TABLET BY MOUTH ONCE AS NEEDED FOR MIGRAINE, MAY REPEAT IN 2 HOURS IF NEEDED, Normal      topiramate (TOPAMAX) 25 mg sprinkle capsule Take 2 capsules (50 mg total) by mouth 2 (two) times a day, Starting Fri 6/12/2020, Normal      traZODone (DESYREL) 100 mg tablet TAKE 1-2 TABLETS (100-200 MG TOTAL) BY MOUTH DAILY AT BEDTIME AS NEEDED FOR SLEEP, Normal       !! - Potential duplicate medications found  Please discuss with provider  No discharge procedures on file  PDMP Review       Value Time User    PDMP Reviewed  Yes 2/12/2020 10:47 AM Alix Queen MD           ED Provider  Attending physically available and evaluated Alexis Gosselin  I managed the patient along with the ED Attending      Electronically Signed by         Mago Arora MD  06/30/20 5450

## 2020-06-30 NOTE — ED ATTENDING ATTESTATION
6/30/2020  Tina LIZARRAGA MD, saw and evaluated the patient  I have discussed the patient with the resident/non-physician practitioner and agree with the resident's/non-physician practitioner's findings, Plan of Care, and MDM as documented in the resident's/non-physician practitioner's note, except where noted  All available labs and Radiology studies were reviewed  I was present for key portions of any procedure(s) performed by the resident/non-physician practitioner and I was immediately available to provide assistance  At this point I agree with the current assessment done in the Emergency Department  I have conducted an independent evaluation of this patient a history and physical is as follows:    ED Course     Patient presents for evaluation due to sudden onset of left lower quadrant abdominal pain while driving  Patient states the pain is severe and does not radiate  Patient has a history of ovarian cyst but states that this pain is different  She denies any vaginal bleeding, discharge, urinary complaints, or diarrhea  No additional complaints  Exam: AAOx3, NAD, RRR, CTA, left lower quadrant tenderness palpation, no motor/sensory deficits  A/P:  Abdominal pain  Will check labs, urine, will CT abdomen and pelvis to evaluate for intra-abdominal pathology      Critical Care Time  Procedures

## 2020-07-02 ENCOUNTER — HOSPITAL ENCOUNTER (OUTPATIENT)
Dept: RADIOLOGY | Facility: HOSPITAL | Age: 40
Discharge: HOME | End: 2020-07-02
Payer: COMMERCIAL

## 2020-07-02 ENCOUNTER — OFFICE VISIT (OUTPATIENT)
Dept: FAMILY MEDICINE CLINIC | Facility: CLINIC | Age: 40
End: 2020-07-02
Payer: COMMERCIAL

## 2020-07-02 ENCOUNTER — TRANSCRIBE ORDERS (OUTPATIENT)
Dept: RADIOLOGY | Facility: HOSPITAL | Age: 40
End: 2020-07-02

## 2020-07-02 VITALS
RESPIRATION RATE: 14 BRPM | HEIGHT: 60 IN | DIASTOLIC BLOOD PRESSURE: 70 MMHG | SYSTOLIC BLOOD PRESSURE: 110 MMHG | HEART RATE: 106 BPM | TEMPERATURE: 98.9 F | OXYGEN SATURATION: 98 % | BODY MASS INDEX: 36.32 KG/M2 | WEIGHT: 185 LBS

## 2020-07-02 DIAGNOSIS — R10.32 LEFT LOWER QUADRANT ABDOMINAL PAIN: ICD-10-CM

## 2020-07-02 DIAGNOSIS — M54.50 ACUTE MIDLINE LOW BACK PAIN WITHOUT SCIATICA: ICD-10-CM

## 2020-07-02 DIAGNOSIS — M54.50 ACUTE MIDLINE LOW BACK PAIN WITHOUT SCIATICA: Primary | ICD-10-CM

## 2020-07-02 DIAGNOSIS — M79.7 FIBROMYALGIA: ICD-10-CM

## 2020-07-02 DIAGNOSIS — F41.1 GENERALIZED ANXIETY DISORDER: ICD-10-CM

## 2020-07-02 DIAGNOSIS — E87.6 HYPOKALEMIA: ICD-10-CM

## 2020-07-02 PROCEDURE — 3008F BODY MASS INDEX DOCD: CPT | Performed by: FAMILY MEDICINE

## 2020-07-02 PROCEDURE — 99214 OFFICE O/P EST MOD 30 MIN: CPT | Performed by: FAMILY MEDICINE

## 2020-07-02 PROCEDURE — 1036F TOBACCO NON-USER: CPT | Performed by: FAMILY MEDICINE

## 2020-07-02 PROCEDURE — 72110 X-RAY EXAM L-2 SPINE 4/>VWS: CPT

## 2020-07-02 RX ORDER — HYOSCYAMINE SULFATE 0.125 MG
0.12 TABLET ORAL EVERY 6 HOURS PRN
Qty: 30 TABLET | Refills: 0 | Status: SHIPPED | OUTPATIENT
Start: 2020-07-02 | End: 2020-07-14 | Stop reason: CLARIF

## 2020-07-02 NOTE — PROGRESS NOTES
Chief Complaint   Patient presents with    Follow-up     ED 6/30/2020 Back Pain, UTI     Health Maintenance   Topic Date Due    HIV Screening  01/06/1993    Annual Physical  01/06/1996    Cervical Cancer Screening  01/06/1999    DTaP,Tdap,and Td Vaccines (2 - Td) 09/29/2019    Influenza Vaccine  07/01/2020    MAMMOGRAM  06/13/2020    BMI: Followup Plan  08/31/2020    BMI: Adult  07/02/2021    CRC Screening: Colonoscopy  06/04/2022    Pneumococcal Vaccine: 65+ Years (1 of 2 - PCV13) 01/06/2043    Hepatitis C Screening  Completed    Pneumococcal Vaccine: Pediatrics (0 to 5 Years) and At-Risk Patients (6 to 59 Years)  Aged Out    HIB Vaccine  Aged Out    Hepatitis B Vaccine  Aged Out    IPV Vaccine  Aged Out    Hepatitis A Vaccine  Aged Out    Meningococcal ACWY Vaccine  Aged Out    HPV Vaccine  Aged Out     BMI Counseling: Body mass index is 36 13 kg/m²  The BMI is above normal  Nutrition recommendations include reducing portion sizes, decreasing overall calorie intake, 3-5 servings of fruits/vegetables daily, reducing fast food intake, consuming healthier snacks, decreasing soda and/or juice intake, moderation in carbohydrate intake, increasing intake of lean protein, reducing intake of saturated fat and trans fat and reducing intake of cholesterol  Exercise recommendations include exercising 3-5 times per week  Assessment/Plan:    Back pain  Will order x-ray of the lumbar spine to rule out bone abnormality  Recommended to take Flexeril 10 mg one tablet at bedtime and 5 mg during the day for back spasms  Referral given for physical therapy  Consider evaluation by pain management if symptoms persist or worsen  Patient was advised to call office with update on symptoms next week  Left lower quadrant abdominal pain  CT of the abdomen and pelvis showed no acute finding  recommended to stay well hydrated avoid fried fatty foods    Prescription sent to the pharmacy for Levsin 0 125 mg to take 1 tablet every 6 hours PRN for abdominal spasms  Patient has history of ovarian cysts  Recommended to schedule appointment with her gynecologist for pelvic exam     Fibromyalgia  Continue Cymbalta  Take Flexeril for muscle spasms  Generalized anxiety disorder  Management per psychiatrist Dr Adrien Carvajal  Continue psychotherapy every 2 weeks  Hypokalemia  Blood work done in ER on June 30, 2020 showed potassium 3 4  Recommended patient to increase dietary potassium intake: bananas,  oranges, orange juice  I have spent 25 minutes with Patient  today in which greater than 50% of this time was spent in counseling/coordination of care regarding Diagnostic results, Risks and benefits of tx options, Intructions for management, Patient and family education, Importance of tx compliance, Risk factor reductions and Impressions  Diagnoses and all orders for this visit:    Acute midline low back pain without sciatica  -     XR spine lumbar minimum 4 views non injury; Future  -     Ambulatory referral to Physical Therapy; Future    Left lower quadrant abdominal pain  -     hyoscyamine (Levsin) 0 125 MG tablet; Take 1 tablet (0 125 mg total) by mouth every 6 (six) hours as needed for cramping    Fibromyalgia    Generalized anxiety disorder    Hypokalemia    Other orders  -     Quercetin 250 MG TABS; Take 250 mg by mouth 2 (two) times a day          Subjective:      Patient ID: Lety Alvarado is a 41 y o  female  HPI     Patient presents for ER follow-up visit for low back pain, left lower quadrant pain  Patient was seen in [facility] emergency room on June 30, 2020  CT of the abdomen and pelvis showed no acute findings  Blood work showed creatinine 0 87, potassium 3 4, LFT's - WNL, lipase 73, glucose 116  Patient was given IV fluids and Toradol 15 mg IV with mild improvement in symptoms  Patient had urine culture done on June 23, 2020 which showed mixed contaminants  Denies fever, chills  Still c/o moderate to severe pain midline low back pain increasing with certain movements  Denies tingling, numbness in lower extremities  No prior history of herniated disc  No falls  Patient has history of ovarian cysts  Currently on Depo Provera injections every 3 months as per gynecology  Patient states that she started getting periods with Depo-Provera every 3 months and was not having periods when was getting Depo-Provera monthly  Denies burning on urination  No blood in urine  Still c/o pain in left lower abdomen  No nausea, vomiting, diarrhea  No constipation  Patient has H/o Fibromyalgia, Lyme disease  She had blood test done on 6/22/20 which showed Lyme disease IgG antibody positive, IgM antibody negative  Due to worsening arthralgias and myalgias 2 weeks ago patient was started on antibiotic therapy with Cephalexin for possible flare up of  Lyme disease  Patient reports improvement muscles and joint pains  She takes Flexeril 10 mg at bedtime  Patient has generalized anxietydisorder, recurrent depression  Management per psychiatrist Dr Jonathan Thacker  Patient is seen psychotherapist every 2 weeks  The following portions of the patient's history were reviewed and updated as appropriate: allergies, current medications, past family history, past medical history, past surgical history and problem list     Review of Systems   Constitutional: Positive for fatigue (chronic)  Negative for activity change, appetite change, chills and fever  HENT: Negative for congestion, ear pain, mouth sores, nosebleeds, sore throat, tinnitus and trouble swallowing  Eyes: Negative for pain, discharge, redness, itching and visual disturbance  Respiratory: Negative for cough, chest tightness, shortness of breath and wheezing  Cardiovascular: Negative for chest pain, palpitations and leg swelling     Gastrointestinal:        See HPI   Genitourinary: Positive for frequency  Negative for difficulty urinating, dysuria, flank pain, hematuria, vaginal bleeding and vaginal discharge  Musculoskeletal: Positive for arthralgias, back pain and myalgias (improved)  Negative for joint swelling and neck pain  Skin: Negative for rash and wound  Neurological: Negative for dizziness, syncope and headaches  Hematological: Negative  Psychiatric/Behavioral:        Anxiety / Depression - mood has been stable  Objective:      /70 (BP Location: Left arm, Patient Position: Sitting, Cuff Size: Adult)   Pulse (!) 106   Temp 98 9 °F (37 2 °C) (Oral)   Resp 14   Ht 5' (1 524 m)   Wt 83 9 kg (185 lb)   LMP 06/15/2020   SpO2 98%   BMI 36 13 kg/m²          Physical Exam   Constitutional: She appears well-developed and well-nourished  Obese   HENT:   Head: Normocephalic and atraumatic  Right Ear: External ear normal    Left Ear: External ear normal    Eyes: Pupils are equal, round, and reactive to light  Conjunctivae are normal    Neck: Normal range of motion  Neck supple  Cardiovascular: Normal rate, regular rhythm and normal heart sounds  No murmur heard  No BL LE edema   Pulmonary/Chest: Effort normal and breath sounds normal    Abdominal: Soft  Bowel sounds are normal  There is tenderness (in L LQ)  There is no rebound and no guarding  Musculoskeletal:   Low back: tenderness in L-S area at midline  Decreased ROM with flexion , extension, lateral bending  SLR is neg  BL   Skin: Skin is warm and dry  No rash noted  Psychiatric: She has a normal mood and affect  Nursing note and vitals reviewed

## 2020-07-02 NOTE — ASSESSMENT & PLAN NOTE
Will order x-ray of the lumbar spine to rule out bone abnormality  Recommended to take Flexeril 10 mg one tablet at bedtime and 5 mg during the day for back spasms  Referral given for physical therapy  Consider evaluation by pain management if symptoms persist or worsen  Patient was advised to call office with update on symptoms next week

## 2020-07-02 NOTE — ASSESSMENT & PLAN NOTE
CT of the abdomen and pelvis showed no acute finding  recommended to stay well hydrated avoid fried fatty foods  Prescription sent to the pharmacy for Levsin 0 125 mg to take 1 tablet every 6 hours PRN for abdominal spasms  Patient has history of ovarian cysts    Recommended to schedule appointment with her gynecologist for pelvic exam

## 2020-07-02 NOTE — ASSESSMENT & PLAN NOTE
Blood work done in ER on June 30, 2020 showed potassium 3 4  Recommended patient to increase dietary potassium intake: bananas,  oranges, orange juice

## 2020-07-06 ENCOUNTER — TELEPHONE (OUTPATIENT)
Dept: PSYCHIATRY | Facility: CLINIC | Age: 40
End: 2020-07-06

## 2020-07-09 ENCOUNTER — TELEPHONE (OUTPATIENT)
Dept: FAMILY MEDICINE CLINIC | Facility: CLINIC | Age: 40
End: 2020-07-09

## 2020-07-14 DIAGNOSIS — R10.32 LEFT LOWER QUADRANT ABDOMINAL PAIN: Primary | ICD-10-CM

## 2020-07-14 RX ORDER — DICYCLOMINE HYDROCHLORIDE 10 MG/1
CAPSULE ORAL
Qty: 30 CAPSULE | Refills: 0 | Status: SHIPPED | OUTPATIENT
Start: 2020-07-14 | End: 2020-10-22

## 2020-07-14 NOTE — TELEPHONE ENCOUNTER
Please inform patient that I sent prescription to pharmacy for Dicyclomine 10 mg to take 1 capsule every 6-8 hours PRN for abdominal spasms

## 2020-07-14 NOTE — TELEPHONE ENCOUNTER
Patient's insurance denied the request  She would have to have tried Dicyclomine, Diphenoxylate-Atropine, or Lomotil first  Please advise?

## 2020-07-16 ENCOUNTER — TELEMEDICINE (OUTPATIENT)
Dept: PSYCHIATRY | Facility: CLINIC | Age: 40
End: 2020-07-16
Payer: COMMERCIAL

## 2020-07-16 DIAGNOSIS — F41.1 GENERALIZED ANXIETY DISORDER: Primary | ICD-10-CM

## 2020-07-16 DIAGNOSIS — F33.1 MAJOR DEPRESSIVE DISORDER, RECURRENT EPISODE, MODERATE (HCC): ICD-10-CM

## 2020-07-16 PROCEDURE — 99213 OFFICE O/P EST LOW 20 MIN: CPT | Performed by: PSYCHIATRY & NEUROLOGY

## 2020-07-16 NOTE — PSYCH
Virtual Regular Visit      Assessment/Plan:    Problem List Items Addressed This Visit     None               Reason for visit is for follow up   Encounter provider Anna Escobar MD    Provider located at [address] [place] [address] [address]      Recent Visits  No visits were found meeting these conditions  Showing recent visits within past 7 days and meeting all other requirements     Today's Visits  Date Type Provider Dept   07/16/20 Telemedicine Anna Escobar MD [facility] today's visits and meeting all other requirements     Future Appointments  No visits were found meeting these conditions  Showing future appointments within next 150 days and meeting all other requirements        The patient was identified by name and date of birth  Crys Last was informed that this is a telemedicine visit and that the visit is being conducted through [employer]  My office door was closed  No one else was in the room  She acknowledged consent and understanding of privacy and security of the video platform  The patient has agreed to participate and understands they can discontinue the visit at any time  Patient is aware this is a billable service  Subjective  Crys Last is a 41 y o  female with MDD and CAROL  Since last evaluated ORLÉANS stated she did not started Seroquel XR 50 mg because she remembered she had tried this before and she experienced side effects from it  I decided to switch her to Abilify 2 mg and she stated she has tolerated well the medication change and she has avila feeling less depressed and more motivated since it was started  She will like to continue current dose and check back in with me in 4 weeks to determine if a dose increase is needed  She denies recent health changes or new medications  She does not need any medication refills at this time        HPI     Past Medical History:   Diagnosis Date    Anxiety     Chronic sinusitis     Depression     Fibromyalgia     Migraine     Obesity     Polyarthritis     Last assessed 9/21/2015    Psychiatric disorder        Past Surgical History:   Procedure Laterality Date    COLONOSCOPY  06/2019   [facility] DENTAL SURGERY      HYSTEROSCOPY      Endometrial Biopsy By Hysteroscopy    REMOVAL OF INTRAUTERINE DEVICE (IUD)      US GUIDED BREAST BIOPSY RIGHT COMPLETE Right 6/17/2019       Current Outpatient Medications   Medication Sig Dispense Refill    ARIPiprazole (ABILIFY) 2 mg tablet Take 1 tablet (2 mg total) by mouth daily 30 tablet 2    Calcium Carbonate-Vitamin D (CALCIUM 500/D PO) Take 1 capsule by mouth daily      Cholecalciferol (VITAMIN D-3) 1000 units CAPS Take 1 capsule by mouth daily      cyclobenzaprine (FLEXERIL) 10 mg tablet TAKE 1 TABLET BY MOUTH AT BEDTIME AS NEEDED FOR MUSCLE SPASMS 30 tablet 5    dicyclomine (BENTYL) 10 mg capsule Take 1 caps  every 6-8 hr  PRN for abdominal spasms 30 capsule 0    DULoxetine (CYMBALTA) 30 mg delayed release capsule TAKE 1 CAPSULE BY MOUTH EVERY DAY 30 capsule 2    DULoxetine (CYMBALTA) 60 mg delayed release capsule TAKE 1 CAPSULE BY MOUTH EVERY DAY 30 capsule 2    IRON PO Take by mouth      LORazepam (ATIVAN) 1 mg tablet TAKE 0 5 TABLETS (0 5 MG TOTAL) BY MOUTH EVERY 6 (SIX) HOURS AS NEEDED FOR ANXIETY 60 tablet 2    MAGNESIUM OXIDE PO Take by mouth      medroxyPROGESTERone (DEPO-PROVERA) 150 mg/mL injection INJECT MONTHLY FOR 6 MONTH  5    Quercetin 250 MG TABS Take 250 mg by mouth 2 (two) times a day      rizatriptan (MAXALT) 10 MG tablet TAKE 1 TABLET BY MOUTH ONCE AS NEEDED FOR MIGRAINE, MAY REPEAT IN 2 HOURS IF NEEDED 9 tablet 3    topiramate (TOPAMAX) 25 mg sprinkle capsule Take 2 capsules (50 mg total) by mouth 2 (two) times a day 120 capsule 2    traZODone (DESYREL) 100 mg tablet TAKE 1-2 TABLETS (100-200 MG TOTAL) BY MOUTH DAILY AT BEDTIME AS NEEDED FOR SLEEP 60 tablet 2     No current facility-administered medications for this visit  Allergies   Allergen Reactions    Decadrol [Dexamethasone] Other (See Comments)     Category: Adverse Reaction;   psychosis    Dexamethasone Sodium Phosphate     Other     Penicillins Hives and Other (See Comments)     Category: Allergy; Hives/Uticaria    Tetracycline Hives and Other (See Comments)     Hives/Uticaria    Tetracyclines & Related Hives     Category: Allergy; Review of Systems      Mood Anxiety and Depression   Behavior Normal    Thought Content Disturbing Thoughts, Feelings   General Emotional Problems and Decreased Functioning   Personality Normal   Other Psych Symptoms Normal   Constitutional Negative   ENT Negative   Cardiovascular Negative   Respiratory Negative   Gastrointestinal Negative   Genitourinary Negative   Musculoskeletal Negative   Integumentary Negative   Neurological Negative   Endocrine Normal    Other Symptoms Normal              Laboratory Results: No results found for this or any previous visit      Substance Abuse History:  Social History     Substance and Sexual Activity   Drug Use Not Currently       Family Psychiatric History:   Family History   Problem Relation Age of Onset    Diabetes Father     Kidney disease Father     Substance Abuse Brother     Diabetes Maternal Grandmother     Rheum arthritis Maternal Grandmother     Melanoma Maternal Grandmother     Kidney disease Paternal Grandmother     Rheum arthritis Paternal Grandmother     Depression Paternal Grandmother     Diabetes Paternal Grandfather     Lung cancer Paternal Grandfather     Substance Abuse Maternal Uncle     Prostate cancer Maternal Uncle 61    Depression Paternal Aunt     Alcohol abuse Family     Stomach cancer Family     Irregular heart beat Mother        The following portions of the patient's history were reviewed and updated as appropriate: allergies, current medications, past family history, past medical history, past social history, past surgical history and problem list     Social History     Socioeconomic History    Marital status: Single     Spouse name: Not on file    Number of children: 0    Years of education: 15 years     Highest education level: GED or equivalent   Occupational History    Occupation: unemployed   Social Needs    Financial resource strain: Hard    Food insecurity:     Worry: Never true     Inability: Never true    Transportation needs:     Medical: Yes     Non-medical: Yes   Tobacco Use    Smoking status: Never Smoker    Smokeless tobacco: Never Used   Substance and Sexual Activity    Alcohol use: No     Frequency: Never     Drinks per session: 1 or 2     Binge frequency: Never     Comment: 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     Drug use: Not Currently    Sexual activity: Not Currently   Lifestyle    Physical activity:     Days per week: 5 days     Minutes per session: 30 min    Stress:  To some extent   Relationships    Social connections:     Talks on phone: More than three times a week     Gets together: More than three times a week     Attends [employer] service: More than 4 times per year     Active member of club or organization: Yes     Attends meetings of clubs or organizations: More than 4 times per year     Relationship status: Never     Intimate partner violence:     Fear of current or ex partner: No     Emotionally abused: No     Physically abused: No     Forced sexual activity: No   Other Topics Concern    Not on file   Social History Narrative    Caffeine use     Social History     Social History Narrative    Caffeine use       Objective:       Mental status:  Appearance calm and cooperative , adequate hygiene and grooming and good eye contact    Mood dysphoric and depressed   Affect affect was constricted   Speech a normal rate and fluent   Thought Processes coherent/organized and normal thought processes Hallucinations no hallucinations present    Thought Content no delusions   Abnormal Thoughts no suicidal thoughts  and no homicidal thoughts    Orientation  oriented to person and place and time   Remote Memory short term memory intact and long term memory intact   Attention Span concentration intact   Intellect Appears to be of Average Intelligence   Insight Limited insight   Judgement judgment was limited   Muscle Strength n/a   Language no difficulty naming common objects, no difficulty repeating a phrase  and no difficulty writing a sentence    Fund of Knowledge displays adequate knowledge of current events, adequate fund of knowledge regarding past history and adequate fund of knowledge regarding vocabulary    Pain none   Pain Scale 0       Assessment/Plan:       There are no diagnoses linked to this encounter  Treatment Recommendations- Risks Benefits      Immediate Medical/Psychiatric/Psychotherapy Treatments and Any Precautions: continue current treatment     Risks, Benefits And Possible Side Effects Of Medications:  {PSYCH RISK, BENEFITS AND POSSIBLE SIDE EFFECTS (Optional):98658    Controlled Medication Discussion: Discussed with patient Black Box warning on concurrent use of benzodiazepines and opioid medications including sedation, respiratory depression, coma and death  Patient understands the risk of treatment with benzodiazepines in addition to opioids and wants to continue taking those medications  , Discussed with patient the risks of sedation, respiratory depression, impairment of ability to drive and potential for abuse and addiction related to treatment with benzodiazepine medications  The patient understands risk of treatment with benzodiazepine medications, agrees to not drive if feels impaired and agrees to take medications as prescribed   and The patient has been filling controlled prescriptions on time as prescribed to [facility]  Psychotherapy Provided:     Individual psychotherapy provided: No    I spent 20 minutes directly with the patient during this visit      VIRTUAL VISIT DISCLAIMER    Migdalia Rather acknowledges that she has consented to an online visit or consultation  She understands that the online visit is based solely on information provided by her, and that, in the absence of a face-to-face physical evaluation by the physician, the diagnosis she receives is both limited and provisional in terms of accuracy and completeness  This is not intended to replace a full medical face-to-face evaluation by the physician  Migdalia Rather understands and accepts these terms

## 2020-07-20 ENCOUNTER — EVALUATION (OUTPATIENT)
Dept: PHYSICAL THERAPY | Facility: CLINIC | Age: 40
End: 2020-07-20
Payer: COMMERCIAL

## 2020-07-20 DIAGNOSIS — M54.50 ACUTE MIDLINE LOW BACK PAIN WITHOUT SCIATICA: Primary | ICD-10-CM

## 2020-07-20 PROCEDURE — 97161 PT EVAL LOW COMPLEX 20 MIN: CPT | Performed by: PHYSICAL THERAPIST

## 2020-07-20 PROCEDURE — 97110 THERAPEUTIC EXERCISES: CPT | Performed by: PHYSICAL THERAPIST

## 2020-07-20 NOTE — PROGRESS NOTES
PT Evaluation     Today's date: 2020  Patient name: Lorraine Madsen  : 1980  MRN: [id number]  Referring provider: Brittani Boo MD  Dx:   Encounter Diagnosis     ICD-10-CM    1  Acute midline low back pain without sciatica M54 5 Ambulatory referral to Physical Therapy                  Assessment  Assessment details: Lorraine Madsen is a 41 y o  Female who presents with LBP  Pt has decreased B LE strength and pain with movement  Pt currently has difficulty bending, interrupted sleep, pain with twisting and pushing, pain first thing in the morning  Pt would benefit from skilled PT to address the above impairments and to return to pain free function  Impairments: impaired physical strength, lacks appropriate home exercise program and pain with function  Functional limitations: difficulty bending, interrupted sleep, pain with twisting and pushing, pain first thing in the morning  Symptom irritability: moderateUnderstanding of Dx/Px/POC: good   Prognosis: good    Goals  1  Pt will be independent with HEP upon DC  2  Pt's lumbar ROM will be pain free to allow for bending with ease  3  Pt's B LE strength will be at least 4/5 t/o   4  Pt's pain will be no more than 3/10 to allow for uninterrupted sleep  Plan  Patient would benefit from: skilled physical therapy  Planned modality interventions: low level laser therapy  Planned therapy interventions: joint mobilization, manual therapy, therapeutic activities, therapeutic exercise and neuromuscular re-education  Frequency: 2x week  Duration in visits: 12  Duration in weeks: 6  Treatment plan discussed with: patient        Subjective Evaluation    History of Present Illness  Date of onset: 6/15/2020  Mechanism of injury: Pt reports an insidious onset of LBP that began 6 weeks ago  X-rays were unremarkable  Pt has attempted anti-inflammatories which have not given her relief  Pt denies radicular symptoms  Pt presents to OP PT            Not a recurrent problem   Quality of life: good    Pain  Current pain ratin  At best pain ratin  At worst pain ratin  Location: LB  Quality: dull ache and burning  Relieving factors: rest  Progression: no change      Diagnostic Tests  X-ray: normal  Treatments  No previous or current treatments  Patient Goals  Patient goals for therapy: decreased pain          Objective     Palpation   Left   Tenderness of the lumbar paraspinals and quadratus lumborum  Right   Tenderness of the lumbar paraspinals and quadratus lumborum  Active Range of Motion     Lumbar   Normal active range of motion    Joint Play     Hypermobile: L3, L4 and L5     Pain: L3, L4 and L5     Strength/Myotome Testing     Left Hip   Planes of Motion   Flexion: 4-  Extension: 4  Abduction: 4-    Right Hip   Planes of Motion   Flexion: 4-  Extension: 4  Abduction: 4-    Left Knee   Flexion: 5  Extension: 5    Right Knee   Flexion: 5  Extension: 5    Left Ankle/Foot   Dorsiflexion: 5    Right Ankle/Foot   Dorsiflexion: 5    Tests     Lumbar     Left   Positive passive SLR  Right   Positive passive SLR                Precautions: none      Manuals 7/20            PA glides to lumbar spine             Laser to B lumbar paraspinals             Massage to B lumbar paraspinals                          Neuro Re-Ed                                                                 Ther Ex             Bike with LR             DKTC 10x10"            LTR 5"x15            Supine hip flexor stretch 3x30"            PPT with ball squeeze             Supine SLR             Bridging             SL clamshells             Prone hip ext             Prone prop                                                    Ther Activity             Tband walk out with press             Posture tband

## 2020-07-21 ENCOUNTER — CONSULT (OUTPATIENT)
Dept: NEUROLOGY | Facility: CLINIC | Age: 40
End: 2020-07-21
Payer: COMMERCIAL

## 2020-07-21 VITALS
HEART RATE: 115 BPM | WEIGHT: 182.4 LBS | SYSTOLIC BLOOD PRESSURE: 112 MMHG | TEMPERATURE: 98.9 F | DIASTOLIC BLOOD PRESSURE: 70 MMHG | BODY MASS INDEX: 35.62 KG/M2

## 2020-07-21 DIAGNOSIS — G43.709 CHRONIC MIGRAINE WITHOUT AURA WITHOUT STATUS MIGRAINOSUS, NOT INTRACTABLE: ICD-10-CM

## 2020-07-21 DIAGNOSIS — G43.109 MIGRAINE WITH AURA AND WITHOUT STATUS MIGRAINOSUS, NOT INTRACTABLE: ICD-10-CM

## 2020-07-21 PROCEDURE — 99243 OFF/OP CNSLTJ NEW/EST LOW 30: CPT | Performed by: PSYCHIATRY & NEUROLOGY

## 2020-07-21 PROCEDURE — 1036F TOBACCO NON-USER: CPT | Performed by: PSYCHIATRY & NEUROLOGY

## 2020-07-21 RX ORDER — TOPIRAMATE 50 MG/1
50 TABLET, FILM COATED ORAL DAILY
Qty: 30 TABLET | Refills: 2 | Status: SHIPPED | OUTPATIENT
Start: 2020-07-21 | End: 2020-08-06 | Stop reason: SDUPTHER

## 2020-07-21 RX ORDER — RIZATRIPTAN BENZOATE 10 MG/1
10 TABLET ORAL AS NEEDED
Qty: 15 TABLET | Refills: 0 | Status: SHIPPED | OUTPATIENT
Start: 2020-07-21 | End: 2020-08-17

## 2020-07-21 NOTE — ASSESSMENT & PLAN NOTE
Assessment   Joanne Duarte is a 41 y o  right handed female with a past medical history significant for migraine disorder for the last 20 years who is seen in Neurology Clinic for headaches  The history and exam are most consistent with migraines  Patient has tried Topamax, with no relief and reports taking Maxalt for abortive therapy which seems to help  Return to clinic for follow up in 3 months  Recommendations as listed below:  Plan/Recommendations:   · Continue Topamax, take 50 mg in the morning and 100 mg at night, if after 1 month headaches haven't improved increase the Topamax to 100mg in the morning  · Continue taking Maxalt 10 mg p r n   With onset of acute headache avoid taking more than 2x a week  · May take naproxen as needed along with the Maxalt  · Recommend to stay well hydrated and ensure adequate sleep  · Avoid migraine triggers  · Follow-up recommended in:

## 2020-07-21 NOTE — PROGRESS NOTES
Patient ID: Yisel James is a 41 y o  female  Assessment/Plan:    Migraine without aura and without status migrainosus, not intractable  Assessment   Yisel James is a 41 y o  right handed female with a past medical history significant for migraine disorder for the last 20 years who is seen in Neurology Clinic for headaches  The history and exam are most consistent with migraines  Patient has tried Topamax, with no relief and reports taking Maxalt for abortive therapy which seems to help  Return to clinic for follow up in 3 months  Recommendations as listed below:  Plan/Recommendations:   · Continue Topamax, take 50 mg in the morning and 100 mg at night, if after 1 month headaches haven't improved increase the Topamax to 100mg in the morning  · Continue taking Maxalt 10 mg p r n  With onset of acute headache avoid taking more than 2x a week  · May take naproxen as needed along with the Maxalt  · Recommend to stay well hydrated and ensure adequate sleep  · Avoid migraine triggers  · Follow-up recommended in:                  Subjective:    HPI     Yisel James is a 41 y o  female  With a past medical history significant for previous migraines for the last 20 years who is seen in neurology clinic today for headaches  Patient states headaches which initially began 20 year(s) ago  Headache is described as throbbing, occionsally dull ache also occionsinally  Burning or ice pack sensation  Description of pain: throbbing pain, bilateral in the temporal area  Duration of individual headaches: last for about 3 hours  The headaches start in the temple region and spread to the front of her head and move to the top  She reports she will occionally get them in the occipital region as well  Sometimes 1/2 of the time when they are on the top of her head, she feels a burning sensation and touches her head and it feels warm to the touch and well as the top of her head    During a migraine the pain scale is 10/10  Since they began 20 years ago the character of the headache as not changed  Patient reports she gets the headache 2-3 times a week  She d  She has tired Topamax in the past, She stopped topamax 5 years ago and then started again reports does not work, currently she is taking 50mg BID prescribed to her by her psychiatrist for concurrent depression  Associated with aura, smell metallic burning occionsally N no vomitting  , during the headache she reports she gets photophobia and phonophobia feels like tunnel vision,   Prior neurological history: negative for migraine headaches  Prior medications tried:  - Preventative:  Topamax 50 mg b i d ,   -Abortive:  Maxalt 10 mg (rizatriptan)  On further questioning patient also reports that for the past 6 weeks she has been getting electrical shock-like sensations in her upper and middle back  She reports that does not feel muscular patient, and it happens randomly  She reports that she has been having these is sensations for the past 6 weeks continuously  She also reports on further questioning that she has had trouble with urinary incontinence, reports that this happens at least once a week for the past 6 weeks  She reports that she feels that she has to go to the bathroom, and when she closed the the bathroom she she sometimes has an accident prior to reaching the toilet  No family history of multiple sclerosis or any neurological issues  Patient denies blurry vision, gait abnormalities  The following portions of the patient's history were reviewed and updated as appropriate: allergies, current medications, past family history, past medical history, past social history, past surgical history and problem list          Objective:    Blood pressure 112/70, pulse (!) 115, temperature 98 9 °F (37 2 °C), weight 82 7 kg (182 lb 6 4 oz)  Physical Exam   Constitutional: She appears well-developed and well-nourished  HENT:   Head: Normocephalic and atraumatic     Eyes: Pupils are equal, round, and reactive to light  EOM are normal    Pulmonary/Chest: No respiratory distress  Musculoskeletal: Normal range of motion  She exhibits no edema, tenderness or deformity  Neurological: She is alert  Nursing note and vitals reviewed  Neurological Exam  Mental Status  Alert  Cranial Nerves  CN III, IV, VI: Extraocular movements intact bilaterally  Pupils equal round and reactive to light bilaterally  Mental Status: The patient was awake, alert, attentive, oriented to person, place, and time  Recent and remote memory intact to conversation with no evidence of language dysfunction  Satisfactory fund of knowledge  Normal attention span and concentration  Able to name, repeat, describe a complex scene  Cranial Nerves:   I: smell Not tested   II: visual fields Full to confrontation  Pupils equal, round, reactive to light with normal accomodation  Fundus: benign fundus  III,IV,VI: extraocular muscles EOMI, no nystagmus   V: masseter and pterygoid strength full  Sensation in the V1 through V3 distributions intact to pinprick and light touch bilaterally  VII: Face is symmetric with no weakness noted  VIII: Audition intact to finger rub bilaterally  IX/X: Uvula midline  Soft palate elevation symmetric  XI: Trapezius and SCM strength 5/5 B/L  XII: Tongue midline with no atrophy or fasciculations with appropriate movement  Motor Examination:   No pronator drift  Bulk: Normal  No atrophy Tone: Normal  Fasciculations: None        Deltoid Biceps Triceps WE   WF   FF IO     Right        5         5          5         5      5      5   5        Left           5        5          5          5      5     5   5                       IP        Quad   Ham     TA       Gastroc   Right      5            5          5         5                5  Left         5            5         5         5                5       Reflexes:                   Biceps Brachioradialis Triceps Patella Achilles Plantars   Right          2+            2+                  2+        2+       2+         Down   Left            2+             2+                 2+         2+       2+         Down     Clonus: None      Coordination: Patient able to perform normal finger-to-nose and heel to shin appropriately  Normal rapid alternating movements  Sensory: Normal sensation to light touch, pin prick and vibratory sensation throughout  Gait:normal stance and posture, normal stride length and arm swing, normal turn around  Patient able to perform tandem gait without difficulty  Able toe walk and heel walk without difficulty  Romberg negative  ROS:    Review of Systems   Constitutional: Positive for fatigue  Negative for appetite change and fever  HENT: Positive for congestion, tinnitus and trouble swallowing  Negative for hearing loss and voice change  Hoarseness     Eyes: Negative  Negative for photophobia and pain  Respiratory: Positive for cough  Negative for shortness of breath  Cardiovascular: Negative  Negative for palpitations  Gastrointestinal: Positive for abdominal pain  Negative for nausea and vomiting  Endocrine: Negative  Negative for cold intolerance  Genitourinary: Positive for frequency and urgency  Negative for dysuria  Musculoskeletal: Positive for back pain and gait problem  Negative for myalgias and neck pain  Skin: Negative  Negative for rash  Neurological: Positive for light-headedness and headaches  Negative for dizziness, tremors, seizures, syncope, facial asymmetry, speech difficulty, weakness and numbness  Memory problems   Hematological: Bruises/bleeds easily  Psychiatric/Behavioral: Negative for confusion, hallucinations and sleep disturbance  The patient is nervous/anxious           Depression

## 2020-07-24 ENCOUNTER — OFFICE VISIT (OUTPATIENT)
Dept: PHYSICAL THERAPY | Facility: CLINIC | Age: 40
End: 2020-07-24
Payer: COMMERCIAL

## 2020-07-24 DIAGNOSIS — M54.50 ACUTE MIDLINE LOW BACK PAIN WITHOUT SCIATICA: Primary | ICD-10-CM

## 2020-07-24 PROCEDURE — 97110 THERAPEUTIC EXERCISES: CPT

## 2020-07-24 PROCEDURE — 97140 MANUAL THERAPY 1/> REGIONS: CPT

## 2020-07-24 NOTE — PROGRESS NOTES
Daily Note     Today's date: 2020  Patient name: Eliane Giordano  : 1980  MRN: [id number]  Referring provider: Tej Reyes MD  Dx:   Encounter Diagnosis     ICD-10-CM    1  Acute midline low back pain without sciatica M54 5                   Subjective: Pt reports feeling a little sore after IE  "Pretty good" upon arrival to therapy  Objective: See treatment diary below      Assessment: Reviewed/performed HEP  Performed new exercises w/o complaint  Added laser to B lumbar PVM, f/b STM to same  Responded well to the latter  Tolerated treatment well  Issued updated written HEP instructions, reviewed same w/pt  Patient would benefit from continued PT      Plan: Continue per plan of care        Precautions: none      Manuals  724           PA glides to lumbar spine             Laser to B lumbar paraspinals  4'           Massage to B lumbar paraspinals  8'                        Neuro Re-Ed                                                                 Ther Ex             Bike with LR  5'           DKTC 10x10" 10x  10"           LTR 5"x15 5"x15           Supine hip flexor stretch 3x30" 3x30"           PPT with ball squeeze  5"x10           Supine SLR  x10ea           Bridging  x10           SL clamshells             Prone hip ext             Prone prop                                                    Ther Activity             Tband walk out with press             Posture tband

## 2020-07-28 ENCOUNTER — OFFICE VISIT (OUTPATIENT)
Dept: PHYSICAL THERAPY | Facility: CLINIC | Age: 40
End: 2020-07-28
Payer: COMMERCIAL

## 2020-07-28 DIAGNOSIS — M54.50 ACUTE MIDLINE LOW BACK PAIN WITHOUT SCIATICA: Primary | ICD-10-CM

## 2020-07-28 PROCEDURE — 97110 THERAPEUTIC EXERCISES: CPT

## 2020-07-28 PROCEDURE — 97140 MANUAL THERAPY 1/> REGIONS: CPT

## 2020-07-28 NOTE — PROGRESS NOTES
Daily Note     Today's date: 2020  Patient name: Luisito Mccarthy  : 1980  MRN: [id number]  Referring provider: Anam Sue MD  Dx:   Encounter Diagnosis     ICD-10-CM    1  Acute midline low back pain without sciatica M54 5                   Subjective: "I had no pain for a good 3 hours after LV " Notes "yesterday was bad," adding today she feels good  Reports she had pain R T12-L1 area with LTR yesterday, limited repetitions  Objective: See treatment diary below      Assessment: Performed new exercises and modified LTR (shoulder width) w/o difficulty or discomfort  Responded well to manual therapies  Issued updated written HEP instructions, reviewed same w/pt  Tolerated treatment well  Would benefit from cont therapy  Plan: Continue per plan of care        Precautions: none      Manuals  724           PA glides to lumbar spine             Laser to B lumbar paraspinals  4' 4'          Massage to B lumbar paraspinals  8' 8'                       Neuro Re-Ed                                                                 Ther Ex             Bike with LR  5' 5'          DKTC 10x10" 10x  10" 10x  10"          LTR 5"x15 5"x15 5"x15          Supine hip flexor stretch 3x30" 3x30" 3x30"          PPT with ball squeeze  5"x10 5"x10          Supine SLR  x10ea x10ea          Bridging  x10 x10          SL clamshells   x10ea          Prone hip ext   x10ea          Prone prop    2'                                                 Ther Activity             Tband walk out with press             Posture tband

## 2020-07-31 ENCOUNTER — OFFICE VISIT (OUTPATIENT)
Dept: PHYSICAL THERAPY | Facility: CLINIC | Age: 40
End: 2020-07-31
Payer: COMMERCIAL

## 2020-07-31 DIAGNOSIS — M54.50 ACUTE MIDLINE LOW BACK PAIN WITHOUT SCIATICA: Primary | ICD-10-CM

## 2020-07-31 PROCEDURE — 97140 MANUAL THERAPY 1/> REGIONS: CPT

## 2020-07-31 PROCEDURE — 97110 THERAPEUTIC EXERCISES: CPT

## 2020-07-31 NOTE — PROGRESS NOTES
Daily Note     Today's date: 2020  Patient name: Paradise Guardado  : 1980  MRN: [id number]  Referring provider: Katharina Armas MD  Dx: No diagnosis found  Subjective: Pt overslept and was 10 minutes late for appt  "I felt great, for about 4 hours," after LV  Notes she woke during the night with some pain, applied topical analgesic with some relief  Reports her LB feels pretty good this morning  Objective: See treatment diary below      Assessment: Performed exercise program and progression w/o complaint  Tolerated treatment well  Patient would benefit from continued PT      Plan: Continue per plan of care        Precautions: none      Manuals  724          PA glides to lumbar spine             Laser to B lumbar paraspinals  4' 4' 4'         Massage to B lumbar paraspinals  8' 8' 8'                      Neuro Re-Ed                                                                 Ther Ex             Bike with LR  5' 5' 5'         DKTC 10x10" 10x  10" 10x  10" 10x  10"         LTR 5"x15 5"x15 5"x15 5"x15         Supine hip flexor stretch 3x30" 3x30" 3x30" 3x30"         PPT with ball squeeze  5"x10 5"x10 5"x15         Supine SLR  x10ea x10ea x10ea         Bridging  x10 x10 x10         SL clamshells   x10ea x10         Prone hip ext   x10ea x10ea         Prone prop    2'   2'                                                Ther Activity             Tband walk out with press             Posture tband

## 2020-08-03 ENCOUNTER — OFFICE VISIT (OUTPATIENT)
Dept: PHYSICAL THERAPY | Facility: CLINIC | Age: 40
End: 2020-08-03
Payer: COMMERCIAL

## 2020-08-03 DIAGNOSIS — M54.50 ACUTE MIDLINE LOW BACK PAIN WITHOUT SCIATICA: Primary | ICD-10-CM

## 2020-08-03 PROCEDURE — 97140 MANUAL THERAPY 1/> REGIONS: CPT

## 2020-08-03 PROCEDURE — 97110 THERAPEUTIC EXERCISES: CPT

## 2020-08-03 NOTE — PROGRESS NOTES
Daily Note     Today's date: 8/3/2020  Patient name: Kaleigh Cates  : 1980  MRN: [id number]  Referring provider: Margaret Bond MD  Dx:   Encounter Diagnosis     ICD-10-CM    1  Acute midline low back pain without sciatica  M54 5                   Subjective: "My back has been killing me " Notes it started Saturday night  Objective: See treatment diary below      Assessment: Performed exercise program w/o increasing symptoms  Responded well to manual therapies  Tolerated treatment well  Relief after tx  Patient would benefit from continued PT      Plan: Continue per plan of care        Precautions: none      Manuals  724 7/28 7/31 8/3        PA glides to lumbar spine     MD        Laser to B lumbar paraspinals  4' 4' 4' 4'        Massage to B lumbar paraspinals  8' 8' 8' 8'                     Neuro Re-Ed                                                                 Ther Ex             Bike with LR  5' 5' 5' 5'        DKTC 10x10" 10x  10" 10x  10" 10x  10" 10x  10"        LTR 5"x15 5"x15 5"x15 5"x15 5"x15        Supine hip flexor stretch 3x30" 3x30" 3x30" 3x30" 3x30"        PPT with ball squeeze  5"x10 5"x10 5"x15 5"x15        Supine SLR  x10ea x10ea x10ea x10ea        Bridging  x10 x10 x10 x10        SL clamshells   x10ea x10 x15ea        Prone hip ext   x10ea x10ea  x10ea        Prone prop    2'   2'  2'                                               Ther Activity             Tband walk out with press             Posture tband

## 2020-08-06 ENCOUNTER — APPOINTMENT (OUTPATIENT)
Dept: PHYSICAL THERAPY | Facility: CLINIC | Age: 40
End: 2020-08-06
Payer: COMMERCIAL

## 2020-08-06 ENCOUNTER — OFFICE VISIT (OUTPATIENT)
Dept: PSYCHIATRY | Facility: CLINIC | Age: 40
End: 2020-08-06
Payer: COMMERCIAL

## 2020-08-06 DIAGNOSIS — F41.1 GAD (GENERALIZED ANXIETY DISORDER): ICD-10-CM

## 2020-08-06 DIAGNOSIS — F33.2 MDD (MAJOR DEPRESSIVE DISORDER), RECURRENT SEVERE, WITHOUT PSYCHOSIS (HCC): ICD-10-CM

## 2020-08-06 DIAGNOSIS — F33.2 SEVERE RECURRENT MAJOR DEPRESSION WITHOUT PSYCHOTIC FEATURES (HCC): ICD-10-CM

## 2020-08-06 DIAGNOSIS — G43.109 MIGRAINE WITH AURA AND WITHOUT STATUS MIGRAINOSUS, NOT INTRACTABLE: ICD-10-CM

## 2020-08-06 PROCEDURE — 99213 OFFICE O/P EST LOW 20 MIN: CPT | Performed by: PSYCHIATRY & NEUROLOGY

## 2020-08-06 RX ORDER — DULOXETIN HYDROCHLORIDE 30 MG/1
30 CAPSULE, DELAYED RELEASE ORAL DAILY
Qty: 30 CAPSULE | Refills: 2 | Status: SHIPPED | OUTPATIENT
Start: 2020-08-06 | End: 2020-10-22 | Stop reason: SDUPTHER

## 2020-08-06 RX ORDER — DULOXETIN HYDROCHLORIDE 60 MG/1
60 CAPSULE, DELAYED RELEASE ORAL DAILY
Qty: 30 CAPSULE | Refills: 2 | Status: SHIPPED | OUTPATIENT
Start: 2020-08-06 | End: 2020-10-22 | Stop reason: SDUPTHER

## 2020-08-06 RX ORDER — LORAZEPAM 1 MG/1
0.5 TABLET ORAL EVERY 6 HOURS PRN
Qty: 60 TABLET | Refills: 2 | Status: SHIPPED | OUTPATIENT
Start: 2020-08-06 | End: 2020-10-22 | Stop reason: SDUPTHER

## 2020-08-06 RX ORDER — TRAZODONE HYDROCHLORIDE 100 MG/1
TABLET ORAL
Qty: 60 TABLET | Refills: 2 | Status: SHIPPED | OUTPATIENT
Start: 2020-08-06 | End: 2020-10-22

## 2020-08-06 RX ORDER — ARIPIPRAZOLE 2 MG/1
2 TABLET ORAL DAILY
Qty: 30 TABLET | Refills: 2 | Status: SHIPPED | OUTPATIENT
Start: 2020-08-06 | End: 2020-10-22 | Stop reason: SDUPTHER

## 2020-08-06 RX ORDER — TOPIRAMATE 50 MG/1
TABLET, FILM COATED ORAL
Qty: 90 TABLET | Refills: 2 | Status: SHIPPED | OUTPATIENT
Start: 2020-08-06 | End: 2020-10-22 | Stop reason: SDUPTHER

## 2020-08-06 NOTE — PSYCH
Subjective:Medication Management    Patient ID: Nicole Francisco is a 41 y o  female  HPI ROS Appetite Changes and Sleep: normal appetite, decreased energy, no weight change and normal number of sleep hours   Patient remains compliant with medications and denies side effects  No recent health changes or new medications  She stated her mood continues to be stable after agding Abilify 2 mg to her regimen  She will like to continue current regimen and agrees to f/u in 8 weeks this time  Will send prescriptions refills  She also mentioned neurologist increase Topamax to 50 mg qam and 100 mg qhs for her migraines which has helped  Review Of Systems:     Mood Anxiety, Depression and Emotional Lability   Behavior Normal    Thought Content Disturbing Thoughts, Feelings   General Emotional Problems and Decreased Functioning   Personality Normal   Other Psych Symptoms Normal   Constitutional Negative   ENT Negative   Cardiovascular Negative   Respiratory Negative   Gastrointestinal Negative   Genitourinary Negative   Musculoskeletal Negative   Integumentary Negative   Neurological Negative   Endocrine Normal    Other Symptoms Normal              Laboratory Results: No results found for this or any previous visit      Substance Abuse History:  Social History     Substance and Sexual Activity   Drug Use Not Currently       Family Psychiatric History:   Family History   Problem Relation Age of Onset    Diabetes Father     Kidney disease Father     Substance Abuse Brother     Diabetes Maternal Grandmother     Rheum arthritis Maternal Grandmother     Melanoma Maternal Grandmother     Kidney disease Paternal Grandmother     Rheum arthritis Paternal Grandmother     Depression Paternal Grandmother     Diabetes Paternal Grandfather     Lung cancer Paternal Grandfather     Substance Abuse Maternal Uncle     Prostate cancer Maternal Uncle 61    Depression Paternal Aunt     Alcohol abuse Family     Stomach cancer Family  Irregular heart beat Mother        The following portions of the patient's history were reviewed and updated as appropriate: allergies, current medications, past family history, past medical history, past social history, past surgical history and problem list     Social History     Socioeconomic History    Marital status: Single     Spouse name: Not on file    Number of children: 0    Years of education: 15 years     Highest education level: GED or equivalent   Occupational History    Occupation: unemployed   Social Needs    Financial resource strain: Hard    Food insecurity     Worry: Never true     Inability: Never true    Transportation needs     Medical: Yes     Non-medical: Yes   Tobacco Use    Smoking status: Never Smoker    Smokeless tobacco: Never Used   Substance and Sexual Activity    Alcohol use: No     Frequency: Never     Drinks per session: 1 or 2     Binge frequency: Never     Comment: 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     Drug use: Not Currently    Sexual activity: Not Currently   Lifestyle    Physical activity     Days per week: 5 days     Minutes per session: 30 min    Stress:  To some extent   Relationships    Social connections     Talks on phone: More than three times a week     Gets together: More than three times a week     Attends [employer] service: More than 4 times per year     Active member of club or organization: Yes     Attends meetings of clubs or organizations: More than 4 times per year     Relationship status: Never     Intimate partner violence     Fear of current or ex partner: No     Emotionally abused: No     Physically abused: No     Forced sexual activity: No   Other Topics Concern    Not on file   Social History Narrative    Caffeine use     Social History     Social History Narrative    Caffeine use       Objective:       Mental status:  Appearance calm and cooperative , adequate hygiene and grooming and good eye contact Mood depressed   Affect affect was constricted   Speech a normal rate and fluent   Thought Processes coherent/organized and normal thought processes   Hallucinations no hallucinations present    Thought Content no delusions   Abnormal Thoughts no suicidal thoughts  and no homicidal thoughts    Orientation  oriented to person and place and time   Remote Memory short term memory intact and long term memory intact   Attention Span concentration intact   Intellect Appears to be of Average Intelligence   Insight Limited insight   Judgement judgment was limited   Muscle Strength Muscle strength and tone were normal and Normal gait    Language no difficulty naming common objects, no difficulty repeating a phrase  and no difficulty writing a sentence    Fund of Knowledge displays adequate knowledge of current events, adequate fund of knowledge regarding past history and adequate fund of knowledge regarding vocabulary    Pain moderate to severe   Pain Scale 6       Assessment/Plan:       Diagnoses and all orders for this visit:    MDD (major depressive disorder), recurrent severe, without psychosis ([facility] )  -     DULoxetine (CYMBALTA) 30 mg delayed release capsule; Take 1 capsule (30 mg total) by mouth daily  -     DULoxetine (CYMBALTA) 60 mg delayed release capsule; Take 1 capsule (60 mg total) by mouth daily  -     traZODone (DESYREL) 100 mg tablet; Take 1-2 tabs po qhs    CAROL (generalized anxiety disorder)  -     LORazepam (ATIVAN) 1 mg tablet; Take 0 5 tablets (0 5 mg total) by mouth every 6 (six) hours as needed for anxiety    Migraine with aura and without status migrainosus, not intractable  -     topiramate (TOPAMAX) 50 MG tablet; Take 1 pill  (50 mg) in the morning and take 2 pills (100mg) at night  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 psychotic features (HCC)  -     ARIPiprazole (ABILIFY) 2 mg tablet;  Take 1 tablet (2 mg total) by mouth daily            Treatment Recommendations- Risks Benefits Immediate Medical/Psychiatric/Psychotherapy Treatments and Any Precautions: continue current medications     Risks, Benefits And Possible Side Effects Of Medications:  {PSYCH RISK, BENEFITS AND POSSIBLE SIDE EFFECTS (Optional):76361    Controlled Medication Discussion: Discussed with patient Black Box warning on concurrent use of benzodiazepines and opioid medications including sedation, respiratory depression, coma and death  Patient understands the risk of treatment with benzodiazepines in addition to opioids and wants to continue taking those medications  , Discussed with patient the risks of sedation, respiratory depression, impairment of ability to drive and potential for abuse and addiction related to treatment with benzodiazepine medications  The patient understands risk of treatment with benzodiazepine medications, agrees to not drive if feels impaired and agrees to take medications as prescribed  and The patient has been filling controlled prescriptions on time as prescribed to [facility]  program       Psychotherapy Provided:     Individual psychotherapy provided: Yes  Counseling was provided during the session today for 16 minutes  Medications, treatment progress and treatment plan reviewed with Alka Ortega  Medication changes discussed with Esther  Medication education provided to Alka Ortega  Coping strategies including compliance with medications and contacting a therapist reviewed with Alka Ortega  Importance of medication and treatment compliance reviewed with Esther  Educated on importance of medication and treatment compliance  Supportive therapy provided

## 2020-08-10 ENCOUNTER — OFFICE VISIT (OUTPATIENT)
Dept: PHYSICAL THERAPY | Facility: CLINIC | Age: 40
End: 2020-08-10
Payer: COMMERCIAL

## 2020-08-10 DIAGNOSIS — M54.50 ACUTE MIDLINE LOW BACK PAIN WITHOUT SCIATICA: Primary | ICD-10-CM

## 2020-08-10 PROCEDURE — 97110 THERAPEUTIC EXERCISES: CPT

## 2020-08-10 PROCEDURE — 97140 MANUAL THERAPY 1/> REGIONS: CPT

## 2020-08-10 NOTE — PROGRESS NOTES
Daily Note     Today's date: 8/10/2020  Patient name: Nelson Archibald  : 1980  MRN: [id number]  Referring provider: Jolie Hand MD  Dx:   Encounter Diagnosis     ICD-10-CM    1  Acute midline low back pain without sciatica  M54 5                   Subjective: "I felt great after LV "  "It was all over the place over the weekend," adding she had menstrual cramps and a HA  Today reports LBP level is 3/10, upon arrival to therapy  Objective: See treatment diary below      Assessment: Performed new exercises and ex progressions w/o increasing symptoms  Responded well to manual therapies  Tolerated treatment well  Relief after tx, decreased pain level 0/10  Patient would benefit from continued PT      Plan: Continue per plan of care        Precautions: none      Manuals  724 7/28 7/31 8/3 8/10       PA glides to lumbar spine     MD LOBATO       Laser to B lumbar paraspinals  4' 4' 4' 4' 4'       Massage to B lumbar paraspinals  8' 8' 8' 8' 8'                    Neuro Re-Ed                                                                 Ther Ex             Bike with LR  5' 5' 5' 5' 6'       DKTC 10x10" 10x  10" 10x  10" 10x  10" 10x  10" 10x  10"       LTR 5"x15 5"x15 5"x15 5"x15 5"x15 5"x15       Supine hip flexor stretch 3x30" 3x30" 3x30" 3x30" 3x30" 3x30"       PPT with ball squeeze  5"x10 5"x10 5"x15 5"x15 5"x15       Supine SLR  x10ea x10ea x10ea x10ea x15ea       Bridging  x10 x10 x10 x10 x15 TB      SL clamshells   x10ea x10 x15ea x15       Prone hip ext   x10ea x10ea  x10ea         Prone prop    2'   2'  2'  2'                                              Ther Activity             Tband walk out with press             Posture tband      GTB  [id number]

## 2020-08-13 ENCOUNTER — SOCIAL WORK (OUTPATIENT)
Dept: BEHAVIORAL/MENTAL HEALTH CLINIC | Facility: CLINIC | Age: 40
End: 2020-08-13
Payer: COMMERCIAL

## 2020-08-13 ENCOUNTER — OFFICE VISIT (OUTPATIENT)
Dept: PHYSICAL THERAPY | Facility: CLINIC | Age: 40
End: 2020-08-13
Payer: COMMERCIAL

## 2020-08-13 DIAGNOSIS — M54.50 ACUTE MIDLINE LOW BACK PAIN WITHOUT SCIATICA: Primary | ICD-10-CM

## 2020-08-13 DIAGNOSIS — F41.1 GENERALIZED ANXIETY DISORDER: ICD-10-CM

## 2020-08-13 DIAGNOSIS — F33.1 MAJOR DEPRESSIVE DISORDER, RECURRENT EPISODE, MODERATE (HCC): Primary | ICD-10-CM

## 2020-08-13 PROCEDURE — 97110 THERAPEUTIC EXERCISES: CPT

## 2020-08-13 PROCEDURE — 97140 MANUAL THERAPY 1/> REGIONS: CPT

## 2020-08-13 PROCEDURE — 90834 PSYTX W PT 45 MINUTES: CPT | Performed by: PSYCHIATRY & NEUROLOGY
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
## 2020-08-15 DIAGNOSIS — G43.709 CHRONIC MIGRAINE WITHOUT AURA WITHOUT STATUS MIGRAINOSUS, NOT INTRACTABLE: ICD-10-CM

## 2020-08-15 DIAGNOSIS — G43.109 MIGRAINE WITH AURA AND WITHOUT STATUS MIGRAINOSUS, NOT INTRACTABLE: ICD-10-CM

## 2020-08-17 ENCOUNTER — OFFICE VISIT (OUTPATIENT)
Dept: PHYSICAL THERAPY | Facility: CLINIC | Age: 40
End: 2020-08-17
Payer: COMMERCIAL

## 2020-08-17 DIAGNOSIS — M54.50 ACUTE MIDLINE LOW BACK PAIN WITHOUT SCIATICA: Primary | ICD-10-CM

## 2020-08-17 PROCEDURE — 97112 NEUROMUSCULAR REEDUCATION: CPT | Performed by: PHYSICAL MEDICINE & REHABILITATION

## 2020-08-17 PROCEDURE — 97140 MANUAL THERAPY 1/> REGIONS: CPT | Performed by: PHYSICAL MEDICINE & REHABILITATION

## 2020-08-17 PROCEDURE — 97110 THERAPEUTIC EXERCISES: CPT | Performed by: PHYSICAL MEDICINE & REHABILITATION

## 2020-08-17 RX ORDER — RIZATRIPTAN BENZOATE 10 MG/1
10 TABLET ORAL AS NEEDED
Qty: 12 TABLET | Refills: 1 | Status: SHIPPED | OUTPATIENT
Start: 2020-08-17 | End: 2020-10-09

## 2020-08-17 NOTE — PROGRESS NOTES
Daily Note     Today's date: 2020  Patient name: Pati Cohn  : 1980  MRN: [id number]  Referring provider: Jarred Orozco MD  Dx:   Encounter Diagnosis     ICD-10-CM    1  Acute midline low back pain without sciatica  M54 5                   Subjective: Patient notes increased pain and "inflammation through the muscle groups we worked on last time " Increased pain with onset about 3 hours after last session  Objective: See treatment diary below      Assessment: Patient able to perform intervention as charted with frequent encouragement despite little objective challenge  Timed breathing instructed today for home use, with positive response within session  Patient would benefit from continued PT  Assess response nv and continue as able  Plan: Continue per plan of care        Precautions: none      Manuals  724  8/3 8/10 8/13 8/17     PA glides to lumbar spine     MD MD LOBATO LH     Laser to B lumbar paraspinals  4' 4' 4' 4' 4' 4' np     Massage to B lumbar paraspinals  8' 8' 8' 8' 8' 8' [facility]                  Neuro Re-Ed                                                                 Ther Ex             Bike with LR  5' 5' 5' 5' 6' 6' 6'     DKTC 10x10" 10x  10" 10x  10" 10x  10" 10x  10" 10x  10" 10x  10" W/ pball, 10x10"     LTR 5"x15 5"x15 5"x15 5"x15 5"x15 5"x15 5"x15 10x5"     Supine hip flexor stretch 3x30" 3x30" 3x30" 3x30" 3x30" 3x30" 3x30"      PPT with ball squeeze  5"x10 5"x10 5"x15 5"x15 5"x15 5"x20      Supine SLR  x10ea x10ea x10ea x10ea x15ea x15ea      Bridging  x10 x10 x10 x10 x15 x15      SL clamshells   x10ea x10 x15ea x15 RTB  x15      Prone hip ext   x10ea x10ea  x10ea    x10ea      Prone prop    2'   2'  2'  2'  2'              Lumbar roll outs 10x             Seated Lumbar ROM on pball 10x ea             Deep breathing 5'     Ther Activity             Tband walk out with press             Posture tband      GTB  x15ea GTB  x15  ea

## 2020-08-18 ENCOUNTER — OFFICE VISIT (OUTPATIENT)
Dept: FAMILY MEDICINE CLINIC | Facility: CLINIC | Age: 40
End: 2020-08-18
Payer: COMMERCIAL

## 2020-08-18 VITALS
RESPIRATION RATE: 16 BRPM | WEIGHT: 181.2 LBS | DIASTOLIC BLOOD PRESSURE: 82 MMHG | TEMPERATURE: 99 F | SYSTOLIC BLOOD PRESSURE: 120 MMHG | HEIGHT: 60 IN | HEART RATE: 98 BPM | BODY MASS INDEX: 35.57 KG/M2 | OXYGEN SATURATION: 98 %

## 2020-08-18 DIAGNOSIS — M54.41 ACUTE MIDLINE LOW BACK PAIN WITH BILATERAL SCIATICA: Primary | ICD-10-CM

## 2020-08-18 DIAGNOSIS — M54.42 ACUTE MIDLINE LOW BACK PAIN WITH BILATERAL SCIATICA: Primary | ICD-10-CM

## 2020-08-18 PROCEDURE — 3008F BODY MASS INDEX DOCD: CPT | Performed by: FAMILY MEDICINE

## 2020-08-18 PROCEDURE — 1036F TOBACCO NON-USER: CPT | Performed by: FAMILY MEDICINE

## 2020-08-18 PROCEDURE — 99213 OFFICE O/P EST LOW 20 MIN: CPT | Performed by: FAMILY MEDICINE

## 2020-08-18 RX ORDER — LIDOCAINE 50 MG/G
1 PATCH TOPICAL DAILY
Qty: 10 PATCH | Refills: 1 | Status: SHIPPED | OUTPATIENT
Start: 2020-08-18 | End: 2020-10-05

## 2020-08-18 RX ORDER — METHOCARBAMOL 750 MG/1
750 TABLET, FILM COATED ORAL EVERY 8 HOURS SCHEDULED
Qty: 30 TABLET | Refills: 1 | Status: SHIPPED | OUTPATIENT
Start: 2020-08-18 | End: 2020-08-21 | Stop reason: ALTCHOICE

## 2020-08-18 NOTE — ASSESSMENT & PLAN NOTE
Recommended to stop Flexeril  Prescription sent to the pharmacy for Methocarbamol 750 mg to take 1 tablet every 8 hr  PRN for back pain/back spasms  Try Lidocaine 5 % patches  Continue physical therapy with modalities  Consider evaluation by pain management if symptoms persist or worsen

## 2020-08-18 NOTE — PROGRESS NOTES
Chief Complaint   Patient presents with    Muscle Pain     Health Maintenance   Topic Date Due    HIV Screening  01/06/1993    Annual Physical  01/06/1996    Cervical Cancer Screening  01/06/1999    DTaP,Tdap,and Td Vaccines (2 - Td) 09/29/2019    MAMMOGRAM  06/13/2020    Influenza Vaccine  07/01/2020    BMI: Followup Plan  07/02/2021    BMI: Adult  08/18/2021    Colonoscopy Surveillance  06/04/2022    Hepatitis C Screening  Completed    Pneumococcal Vaccine: Pediatrics (0 to 5 Years) and At-Risk Patients (6 to 59 Years)  Aged Out    HIB Vaccine  Aged Out    Hepatitis B Vaccine  Aged Out    IPV Vaccine  Aged Out    Hepatitis A Vaccine  Aged Out    Meningococcal ACWY Vaccine  Aged Out    HPV Vaccine  Aged Out     Assessment/Plan:    Acute midline low back pain with sciatica  Recommended to stop Flexeril  Prescription sent to the pharmacy for Methocarbamol 750 mg to take 1 tablet every 8 hr  PRN for back pain/back spasms  Try Lidocaine 5 % patches  Continue physical therapy with modalities  Consider evaluation by pain management if symptoms persist or worsen  Diagnoses and all orders for this visit:    Acute midline low back pain with bilateral sciatica  -     methocarbamol (ROBAXIN) 750 mg tablet; Take 1 tablet (750 mg total) by mouth every 8 (eight) hours  -     lidocaine (LIDODERM) 5 %; Apply 1 patch topically daily Remove & Discard patch within 12 hours or as directed by MD          Subjective:      Patient ID: Kathlyn Leventhal is a 41 y o  female  HPI     Patient presents today c/o worsening back pain since yesterday after her physical therapy session  C/o pain starting at mid- back area and going down to lower back, hips, posterior thighs  Back pain worsens with movements  Patient started taking Flexeril 10 mg 3 times daily, Took Ibuprofen 800 mg with no significant relief  She tried a heating pad  Denies tingling, numbness in lower extremities        Patient has H/o fibromyalgia, CAROL, Depression  Currently taking Cymbalta 90 mg daily  Patient reports developing psychosis while taking Prednisone in the past     X-ray of the lumbar spine from July 2, 2020 showed degenerative disc disease  No evidence of acute fracture or destructive osseous lesion  The following portions of the patient's history were reviewed and updated as appropriate: allergies, past family history, past medical history, past social history, past surgical history and problem list     Review of Systems   Constitutional: Positive for fatigue  Negative for activity change, appetite change, chills and fever  Respiratory: Negative for cough, chest tightness, shortness of breath and wheezing  Cardiovascular: Negative for chest pain, palpitations and leg swelling  Gastrointestinal: Negative for abdominal pain, constipation, diarrhea, nausea and vomiting  Genitourinary: Negative for difficulty urinating, dysuria, flank pain, frequency and hematuria  Musculoskeletal: Positive for back pain and myalgias  Negative for joint swelling and neck pain  Skin: Negative for rash and wound  Neurological: Positive for headaches  Negative for dizziness  Hematological: Negative  Psychiatric/Behavioral: Positive for sleep disturbance  Anxiety /Depression          Objective:      /82 (BP Location: Left arm, Patient Position: Sitting, Cuff Size: Large)   Pulse 98   Temp 99 °F (37 2 °C) (Tympanic)   Resp 16   Ht 5' (1 524 m)   Wt 82 2 kg (181 lb 3 2 oz)   SpO2 98%   BMI 35 39 kg/m²          Physical Exam  Vitals signs and nursing note reviewed  Constitutional:       General: She is not in acute distress  Appearance: Normal appearance  She is obese  HENT:      Head: Normocephalic and atraumatic  Eyes:      Conjunctiva/sclera: Conjunctivae normal       Pupils: Pupils are equal, round, and reactive to light  Neck:      Musculoskeletal: Normal range of motion and neck supple  Cardiovascular:      Rate and Rhythm: Regular rhythm  Heart sounds: No murmur  Pulmonary:      Effort: Pulmonary effort is normal       Breath sounds: Normal breath sounds  Abdominal:      General: Bowel sounds are normal  There is no distension  Palpations: Abdomen is soft  Tenderness: There is no abdominal tenderness  Musculoskeletal:      Comments: Back exam: decreased ROM with flexion, extension  Tenderness in mid-back, L-S area  Neurological:      Mental Status: She is alert

## 2020-08-19 DIAGNOSIS — M62.838 MUSCLE SPASM: ICD-10-CM

## 2020-08-19 RX ORDER — CYCLOBENZAPRINE HCL 10 MG
TABLET ORAL
Qty: 30 TABLET | Refills: 5 | OUTPATIENT
Start: 2020-08-19

## 2020-08-19 NOTE — TELEPHONE ENCOUNTER
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  After hours call the   hospital at [phone] or   [phone]. Ask for the BMT   fellow on-call            Follow-ups after your visit        Your next 10 appointments already scheduled     Aug 25, 2017 11:30 AM CDT   [facility] Lab Draw with [facility] LAB DRAW   [facility] Lab Draw ([facility])    [address] [place] [id number]   [phone]            Aug 25, 2017 12:00 PM CDT   (Arrive by 11:45 AM)   BMT 28 Day Anniversary Visit with Charanjit Umanzor MD   [facility] Blood and Marrow Transplant ([facility])    [address] [place] [id number]   [phone]            Sep 05, 2017 10:30 AM CDT   [facility] Lab Draw with [facility] LAB DRAW   [facility] Draw ([facility])    [address] [place] [address]   [phone]            Sep 05, 2017 11:00 AM CDT   BMT Anniversary Visit with  BMT MIMA #1   [facility] Marrow Transplant ([facility])    [address] [place] [phone]  Requested medication(s) are due for refill today: Yes  Patient has already received a courtesy refill: No  Other reason request has been forwarded to provider:   [phone]            Sep 12, 2017 10:30 AM CDT   [facility] Lab Draw with [facility] DRAW   [facility] Lab Draw ([facility])    [address] [id number] [phone]            Sep 12, 2017 11:00 AM CDT   BMT Anniversary Visit with  BMT MIMA #1   [facility] Blood and Marrow Transplant ([facility])    [address] [id number] [phone]            Sep 22, 2017 10:00 AM CDT   [facility] Lab Draw with  [facility] LAB DRAW   [facility] Draw ([facility])    [address] [id number] [phone]            Sep 22, 2017 10:30 AM CDT   BMT Anniversary Visit with Charanjit Umanzor MD   [facility] Blood and Marrow Transplant ([facility])    [address] [place] [id number] [phone]            Sep 26, 2017 10:30 AM CDT   [facility] Lab Draw with  [facility] LAB DRAW   [facility] Draw ([facility])    [address] [id number] [phone]            Sep 26, 2017 11:00 AM CDT   BMT Anniversary Visit with  BMT MIMA #1   [facility] Mar[facility] ([facility])    [address] [place] [id number] [phone]              Who to contact     If you have questions or need follow up information about today's clinic visit or your schedule please contact [facility] BLOOD AND MARROW TRANSPLANT directly at [phone].  Normal or non-critical lab and imaging results will be communicated to you by MyChart, letter or phone within 4 business days after the clinic has received the results. If you do not hear from us within 7 days, please contact the clinic through MyChart or phone. If you have a critical or abnormal lab result, we will notify you by phone  "as soon as possible.  Submit refill requests through [employer] or call your pharmacy and they will forward the refill request to us. Please allow 3 business days for your refill to be completed.          Additional Information About Your Visit        [employer] Information     [employer] lets you send messages to your doctor, view your test results, renew your prescriptions, schedule appointments and more. To sign up, go to [email or web address] . Click on \"Log in\" on the left side of the screen, which will take you to the Welcome page. Then click on \"Sign up Now\" on the right side of the page.     You will be asked to enter the access code listed below, as well as some personal information. Please follow the directions to create your username and password.     Your access code is: [email or web address]  Expires: 2017  3:48 PM     Your access code will  in 90 days. If you need help or a new code, please call your [place] clinic or [phone].        Care EveryWhere ID     This is your Care EveryWhere ID. This could be used by other organizations to access your [place] medical records  [id number]        Your Vitals Were     Pulse Temperature Respirations Height Pulse Oximetry BMI (Body Mass Index)    94 97.7  F (36.5  C) (Oral) 16 1.73 m (5' 8.11\") 98% 30.9 kg/m2       Blood Pressure from Last 3 Encounters:   17 151/81   17 143/83   17 (!) 169/96    Weight from Last 3 Encounters:   17 92.5 kg (203 lb 14.4 oz)   17 93.4 kg (206 lb)   17 92.9 kg (204 lb 11.2 oz)              We Performed the Following     CBC with platelets differential     CMV DNA quantification     Comprehensive metabolic panel     Magnesium     Tacrolimus level          Today's Medication Changes          These changes are accurate as of: 17 10:38 AM.  If you have any questions, ask your nurse or doctor.               These medicines have changed or have updated prescriptions.        Dose/Directions    " magnesium oxide 400 MG tablet   Commonly known as:  MAG-OX   This may have changed:  additional instructions   Used for:  AML (acute myeloid leukemia) in remission (H)        8 tabs daily   Quantity:  100 tablet   Refills:  1         Stop taking these medicines if you haven't already. Please contact your care team if you have questions.     levofloxacin 250 MG tablet   Commonly known as:  LEVAQUIN                Where to get your medicines      These medications were sent to [facility] #[id number] - [place], [place] - [address]  [address] [facility] [address]     Phone:  [phone]     acyclovir 800 MG tablet    diltiazem 360 MG 24 hr CD capsule    magnesium oxide 400 MG tablet    pantoprazole 40 MG EC tablet    ursodiol 300 MG capsule         Some of these will need a paper prescription and others can be bought over the counter.  Ask your nurse if you have questions.     Bring a paper prescription for each of these medications     LORazepam 0.5 MG tablet                Recent Review Flowsheet Data     BMT Recent Results Latest Ref Rng & Units 8/7/2017 8/9/2017 8/11/2017 8/15/2017 8/16/2017 8/18/2017 8/22/2017    WBC 4.0 - 11.0 10e9/L 5.8 5.1 4.1 2.4(L) 3.4(L) 3.3(L) 2.8(L)    Hemoglobin 13.3 - 17.7 g/dL 11.6(L) 12.0(L) 10.9(L) 11.2(L) 11.1(L) 11.3(L) 11.4(L)    Platelet Count 150 - 450 10e9/L 75(L) 117(L) 156 152 144(L) 135(L) 134(L)    Neutrophils (Absolute) 1.6 - 8.3 10e9/L 3.3 3.0 2.4 1.0(L) 2.3 2.0 -    INR 0.86 - 1.14 - - - - - - -    Sodium 133 - 144 mmol/L 136 137 138 137 137 137 139    Potassium 3.4 - 5.3 mmol/L 4.2 4.2 3.9 4.2 4.0 3.9 3.7    Chloride 94 - 109 mmol/L 103 105 104 104 104 105 105    Glucose 70 - 99 mg/dL 178(H) 153(H) 194(H) 186(H) 160(H) 154(H) 203(H)    Urea Nitrogen 7 - 30 mg/dL 14 13 12 14 12 12 11    Creatinine 0.66 - 1.25 mg/dL 0.99 0.96 1.03 0.96 0.94 1.00 0.98    Calcium (Total) 8.5 - 10.1 mg/dL 8.6 8.7 8.6 8.7 8.8 9.0 8.8    Protein (Total) 6.8 - 8.8 g/dL 6.7(L)  - - 7.2 - - 6.9    Albumin 3.4 - 5.0 g/dL 3.4 - - 3.4 - - 3.5    Bilirubin (Direct) 0.0 - 0.2 mg/dL - - - - - - -    Alkaline Phosphatase 40 - 150 U/L 90 - - 76 - - 72    AST 0 - 45 U/L 20 - - 20 - - 20    ALT 0 - 70 U/L 24 - - 21 - - 24    MCV 78 - 100 fl 94 92 93 92 90 91 91               Primary Care Provider    Physician No Ref-Primary       No address on file        Equal Access to Services     ASHLYN FLANAGAN : Hadii aad ku hadsimonegeovanna Sobrenda, waaxda luqadaha, qaybta kaalmada lolita, vishal albright . So [facility] [place] [facility] [phone].    ATENCIÓN: Si habla español, tiene a gusman disposición servicios gratuitos de asistencia lingüística. Ll[facility] [phone].    We comply with applicable federal civil rights laws and [place] laws. We do not discriminate on the basis of race, color, national origin, age, disability sex, sexual orientation or gender identity.            Thank you!     Thank you for choosing [facility] BLOOD [facility] MARROW TRANSPLANT  for your care. Our goal is always to provide you with excellent care. Hearing back from our patients is one way we can continue to improve our services. Please take a few minutes to complete the written survey that you may receive in the mail after your visit with us. Thank you!             Your Updated Medication List - Protect others around you: Learn how to safely use, store and throw away your medicines at www.disposemymeds.org.          This list is accurate as of: 8/22/17 10:38 AM.  Always use your most recent med list.                   Brand Name Dispense Instructions for use Diagnosis    acyclovir 800 MG tablet    ZOVIRAX    150 tablet    Take 1 tablet (800 mg) by mouth 5 times daily    Stem cell transplant candidate, Acute myeloid leukemia in remission (H), History of peripheral stem cell transplant (H), Aspergillosis (H)       cholecalciferol 1000 UNITS capsule    vitamin  -D     Take 1 capsule by mouth daily        cyclobenzaprine 5 MG tablet     FLEXERIL     Take 1 tablet (5 mg) by mouth 3 times daily as needed for muscle spasms        diltiazem 360 MG 24 hr CD capsule    CARDIZEM CD; CARTIA XT    90 capsule    Take 1 capsule (360 mg) by mouth daily    AML (acute myeloid leukemia) in remission (H)       FLONASE NA      Spray in nostril as needed        guaiFENesin 600 MG 12 hr tablet    MUCINEX     Take 600 mg by mouth        heparin lock flush 10 UNIT/ML Soln injection     30 vial    5 mLs by Intracatheter route daily In each lumen    AML (acute myeloid leukemia) in remission (H)       insulin aspart 100 UNIT/ML injection    NovoLOG FLEXPEN    3 mL    Give insulin based on High sliding scale  Also give prescribed amount before meals based on carbohydrate coverage    AML (acute myeloid leukemia) in remission (H)       insulin glargine 100 UNIT/ML injection    LANTUS    3 mL    Inject 10 Units Subcutaneous daily    Type 2 diabetes mellitus without complication, without long-term current use of insulin (H)       insulin pen needle 30G X 8 MM     100 each    Use when delivering insulin as directed.    Type 2 diabetes mellitus without complication, without long-term current use of insulin (H)       LORazepam 0.5 MG tablet    ATIVAN    40 tablet    Take 1-2 tablets (0.5-1 mg) by mouth every 4 hours as needed for anxiety (nausea/vomiting/sleep)    Stem cell transplant candidate       losartan 50 MG tablet    COZAAR    30 tablet    Take 1 tablet (50 mg) by mouth daily    AML (acute myeloid leukemia) in remission (H)       magnesium oxide 400 MG tablet    MAG-OX    100 tablet    8 tabs daily    AML (acute myeloid leukemia) in remission (H)       MULTI COMPLETE PO           mycophenolate 500 MG tablet    GENERIC EQUIVALENT    84 tablet    Take 3 tablets (1,500 mg) by mouth 2 times daily    AML (acute myeloid leukemia) in remission (H)       ondansetron 8 MG tablet    ZOFRAN    30 tablet    Take 1 tablet (8 mg) by mouth every 8 hours as needed for nausea    Stem cell  transplant candidate       pantoprazole 40 MG EC tablet    PROTONIX    90 tablet    Take 1 tablet (40 mg) by mouth daily    Stem cell transplant candidate       psyllium Packet    METAMUCIL/KONSYL    90 packet    Take 1 packet by mouth 3 times daily    AML (acute myeloid leukemia) in remission (H)       sulfamethoxazole-trimethoprim 800-160 MG per tablet    BACTRIM DS/SEPTRA DS    30 tablet    Clinic will tell you when to start 1 tablet twice daily by mouth on Mondays and Tuesdays, start around day +28    Stem cell transplant candidate       tacrolimus 0.5 MG capsule    GENERIC EQUIVALENT    56 capsule    Take 2 capsules (1 mg) by mouth 2 times daily    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

## 2020-08-20 ENCOUNTER — APPOINTMENT (OUTPATIENT)
Dept: PHYSICAL THERAPY | Facility: CLINIC | Age: 40
End: 2020-08-20
Payer: COMMERCIAL

## 2020-08-21 ENCOUNTER — TELEPHONE (OUTPATIENT)
Dept: FAMILY MEDICINE CLINIC | Facility: CLINIC | Age: 40
End: 2020-08-21

## 2020-08-21 DIAGNOSIS — M54.42 ACUTE MIDLINE LOW BACK PAIN WITH BILATERAL SCIATICA: Primary | ICD-10-CM

## 2020-08-21 DIAGNOSIS — M54.41 ACUTE MIDLINE LOW BACK PAIN WITH BILATERAL SCIATICA: Primary | ICD-10-CM

## 2020-08-21 RX ORDER — NAPROXEN 500 MG/1
500 TABLET ORAL 2 TIMES DAILY WITH MEALS
Qty: 30 TABLET | Refills: 0 | Status: SHIPPED | OUTPATIENT
Start: 2020-08-21 | End: 2020-10-20 | Stop reason: ALTCHOICE

## 2020-08-21 NOTE — TELEPHONE ENCOUNTER
I called patient  He c/o low back pain, back spasm  Pain radiates down to her legs  Recommended to stop Methocarbamol and start taking Flexeril 10 mg 1 tablet every 8 hours as needed for muscle spasms  Patient has Flexeril tablets at home  Rx sent to the pharmacy for Naproxen 500 mg to take 1 tablet twice daily with food for pain  Instructed patient not to take Aleve, Ibuprofen while taking Naproxen  Will refer to pain management for further evaluation  Order placed in chart  Patient was advised to go to the ER over the weekend if pain persists or worsens

## 2020-08-24 ENCOUNTER — OFFICE VISIT (OUTPATIENT)
Dept: PHYSICAL THERAPY | Facility: CLINIC | Age: 40
End: 2020-08-24
Payer: COMMERCIAL

## 2020-08-24 DIAGNOSIS — M54.50 ACUTE MIDLINE LOW BACK PAIN WITHOUT SCIATICA: Primary | ICD-10-CM

## 2020-08-24 PROCEDURE — 97110 THERAPEUTIC EXERCISES: CPT | Performed by: PHYSICAL THERAPIST

## 2020-08-24 PROCEDURE — 97140 MANUAL THERAPY 1/> REGIONS: CPT | Performed by: PHYSICAL THERAPIST

## 2020-08-25 DIAGNOSIS — M62.838 MUSCLE SPASM: ICD-10-CM

## 2020-08-25 RX ORDER — CYCLOBENZAPRINE HCL 10 MG
TABLET ORAL
Qty: 30 TABLET | Refills: 3 | Status: SHIPPED | OUTPATIENT
Start: 2020-08-25 | End: 2020-12-18

## 2020-08-25 NOTE — TELEPHONE ENCOUNTER
I spoke to patient and she said the methocarbamol wasn't working so she said you gave her the cyclobenzaprine instead  She had 3 left and requested the refill

## 2020-08-27 ENCOUNTER — OFFICE VISIT (OUTPATIENT)
Dept: PHYSICAL THERAPY | Facility: CLINIC | Age: 40
End: 2020-08-27
Payer: COMMERCIAL

## 2020-08-27 DIAGNOSIS — M54.50 ACUTE MIDLINE LOW BACK PAIN WITHOUT SCIATICA: Primary | ICD-10-CM

## 2020-08-27 PROCEDURE — 97140 MANUAL THERAPY 1/> REGIONS: CPT

## 2020-08-27 PROCEDURE — 97110 THERAPEUTIC EXERCISES: CPT

## 2020-08-27 NOTE — PROGRESS NOTES
Daily Note     Today's date: 2020  Patient name: Joanne Duarte  : 1980  MRN: [id number]  Referring provider: Dot Cool MD  Dx: No diagnosis found  Subjective:  "Feeling better," vs LV  Objective: See treatment diary below      Assessment: Performed exercise program w/o complaint  Responded well to manual therapies  Pt was pain free after MET for R ant rot  Tolerated treatment well  Patient would benefit from continued PT      Plan: Continue per plan of care        Precautions: none      Manuals  724  8/3 8/10 8/13 8/17 8/24 8/27   PA glides to lumbar spine     MD MD LOBATO [facility] MD np   Laser to B lumbar paraspinals  4' 4' 4' 4' 4' 4' np 4' 4'   Massage to B lumbar paraspinals  8' 8' 8' 8' 8' 8' LH MD DEAN   MET for R ant rot         MD KT   Neuro Re-Ed                                                                 Ther Ex            Bike with LR  5' 5' 5' 5' 6' 6' 6'  6'   DKTC 10x10" 10x  10" 10x  10" 10x  10" 10x  10" 10x  10" 10x  10" W/ pball, 10x10" 10x10"  10x10"   LTR 5"x15 5"x15 5"x15 5"x15 5"x15 5"x15 5"x15 10x5" 5"x15 5"x15   Supine hip flexor stretch 3x30" 3x30" 3x30" 3x30" 3x30" 3x30" 3x30"      PPT with ball squeeze  5"x10 5"x10 5"x15 5"x15 5"x15 5"x20  5"x20 5"x20   Supine SLR  x10ea x10ea x10ea x10ea x15ea x15ea  x15 ea x15 ea   Bridging  x10 x10 x10 x10 x15 x15  x15 x15   SL clamshells   x10ea x10 x15ea x15 RTB  x15  RTBx 15 RTB  x15   Prone hip ext   x10ea x10ea  x10ea    x10ea  x10ea x10ea   Prone prop    2'   2'  2'  2'  2'  2'  2'           Lumbar roll outs 10x             Seated Lumbar ROM on pball 10x ea             Deep breathing 5'     Ther Activity             Tband walk out with press             Posture tband      GTB  x15ea GTB  x15  ea

## 2020-08-31 ENCOUNTER — OFFICE VISIT (OUTPATIENT)
Dept: PHYSICAL THERAPY | Facility: CLINIC | Age: 40
End: 2020-08-31
Payer: COMMERCIAL

## 2020-08-31 DIAGNOSIS — M54.50 ACUTE MIDLINE LOW BACK PAIN WITHOUT SCIATICA: Primary | ICD-10-CM

## 2020-08-31 PROCEDURE — 97140 MANUAL THERAPY 1/> REGIONS: CPT

## 2020-08-31 PROCEDURE — 97110 THERAPEUTIC EXERCISES: CPT

## 2020-08-31 NOTE — PROGRESS NOTES
Daily Note     Today's date: 2020  Patient name: Lorraine Madsen  : 1980  MRN: [id number]  Referring provider: Brittani Boo MD  Dx:   Encounter Diagnosis     ICD-10-CM    1  Acute midline low back pain without sciatica  M54 5                   Subjective: "Pretty good "      Objective: See treatment diary below      Assessment: Demonstrates good knowledge of exercise program, performed same w/o issue  Tolerated treatment well  Patient would benefit from continued PT      Plan: Continue per plan of care        Precautions: none      Manuals 8/31  7/28 7/31 8/3 8/10 8/13 8/17 8/24 8/27   PA glides to lumbar spine     MD MD LOBATO [address] MD np   Laser to B lumbar paraspinals 4'  4' 4' 4' 4' 4' np 4' 4'   Massage to B lumbar paraspinals 8' MO  8' 8' 8' 8' 8' LH MD MO   MET for R ant rot         MD KT   Neuro Re-Ed                                                                 Ther Ex            Bike with LR 6'  5' 5' 5' 6' 6' 6'  6'   DKTC 10x10"  10x  10" 10x  10" 10x  10" 10x  10" 10x  10" W/ pball, 10x10" 10x10"  10x10"   LTR 5"x15  5"x15 5"x15 5"x15 5"x15 5"x15 10x5" 5"x15 5"x15   Supine hip flexor stretch   3x30" 3x30" 3x30" 3x30" 3x30"      PPT with ball squeeze 5"x20  5"x10 5"x15 5"x15 5"x15 5"x20  5"x20 5"x20   Supine SLR x15ea  x10ea x10ea x10ea x15ea x15ea  x15 ea x15 ea   Bridging x15  x10 x10 x10 x15 x15  x15 x15   SL clamshells RTB  x15  x10ea x10 x15ea x15 RTB  x15  RTBx 15 RTB  x15   Prone hip ext x10ea  x10ea x10ea  x10ea    x10ea  x10ea x10ea   Prone prop 2'   2'   2'  2'  2'  2'  2'  2'           Lumbar roll outs 10x             Seated Lumbar ROM on pball 10x ea             Deep breathing 5'     Ther Activity             Tband walk out with press             Posture tband      GTB  x15ea GTB  x15  ea

## 2020-09-03 ENCOUNTER — OFFICE VISIT (OUTPATIENT)
Dept: PHYSICAL THERAPY | Facility: CLINIC | Age: 40
End: 2020-09-03
Payer: COMMERCIAL

## 2020-09-03 DIAGNOSIS — M54.50 ACUTE MIDLINE LOW BACK PAIN WITHOUT SCIATICA: Primary | ICD-10-CM

## 2020-09-03 PROCEDURE — 97140 MANUAL THERAPY 1/> REGIONS: CPT

## 2020-09-03 PROCEDURE — 97110 THERAPEUTIC EXERCISES: CPT

## 2020-09-03 NOTE — PROGRESS NOTES
Daily Note     Today's date: 9/3/2020  Patient name: Ani Peres  : 1980  MRN: [id number]  Referring provider: Salud Quintanilla MD  Dx: No diagnosis found  Subjective: "It's ok, I just have pain sometimes, it's annoying "      Objective: See treatment diary below      Assessment: Performed exercise progressions w/o complaint  Cont to respond well to manual therapies  Tolerated treatment well  Patient would benefit from continued PT      Plan: Continue per plan of care  Precautions: none      Manuals 8/31 9/3  7/31 8/3 8/10 8/13 8/17 8/24 8/27   PA glides to lumbar spine     MD MD LOBATO [facility] MD np   Laser to B lumbar paraspinals 4' 4'  4' 4' 4' 4' np 4' 4'   Massage to B lumbar paraspinals 8' MO 8' MO  8' 8' 8' 8' LH MD DEAN   MET for R ant rot         MD KT   Neuro Re-Ed                                                                 Ther Ex            Bike with LR 6' 6'  5' 5' 6' 6' 6'  6'   DKTC 10x10" 10x10"  10x  10" 10x  10" 10x  10" 10x  10" W/ pball, 10x10" 10x10"  10x10"   LTR 5"x15 5'x15  5"x15 5"x15 5"x15 5"x15 10x5" 5"x15 5"x15   Supine hip flexor stretch    3x30" 3x30" 3x30" 3x30"      PPT with ball squeeze 5"x20 5"x20  5"x15 5"x15 5"x15 5"x20  5"x20 5"x20   Supine SLR x15ea x20ea  x10ea x10ea x15ea x15ea  x15 ea x15 ea   Bridging x15 W/ball sq   5"  x20  x10 x10 x15 x15  x15 x15   SL clamshells RTB  x15 GTB  x15    x10 x15ea x15 RTB  x15  RTBx 15 RTB  x15   Prone hip ext x10ea x15ea  x10ea  x10ea    x10ea  x10ea x10ea   Prone prop 2'  2'    2'  2'  2'  2'  2'  2'           Lumbar roll outs 10x             Seated Lumbar ROM on pball 10x ea             Deep breathing 5'     Ther Activity             Tband walk out with press             Posture tband      GTB  x15ea GTB  x15  ea

## 2020-09-08 ENCOUNTER — APPOINTMENT (OUTPATIENT)
Dept: PHYSICAL THERAPY | Facility: CLINIC | Age: 40
End: 2020-09-08
Payer: COMMERCIAL

## 2020-09-10 ENCOUNTER — OFFICE VISIT (OUTPATIENT)
Dept: PHYSICAL THERAPY | Facility: CLINIC | Age: 40
End: 2020-09-10
Payer: COMMERCIAL

## 2020-09-10 DIAGNOSIS — M54.50 ACUTE MIDLINE LOW BACK PAIN WITHOUT SCIATICA: Primary | ICD-10-CM

## 2020-09-10 PROCEDURE — 97140 MANUAL THERAPY 1/> REGIONS: CPT

## 2020-09-10 PROCEDURE — 97110 THERAPEUTIC EXERCISES: CPT

## 2020-09-10 NOTE — PROGRESS NOTES
Daily Note     Today's date: 9/10/2020  Patient name: Alina Wallace  : 1980  MRN: [id number]  Referring provider: Sari Leyden, MD  Dx: No diagnosis found  Subjective: Pt reports she had a 36 hour "flu," symptoms were fever, vomiting, "I hurt all over " Notes her fever broke Tues night  Pt reports she called her Drs office, was told there is "something going around "    Objective: See treatment diary below      Assessment: Able to perform exercise program w/o issue  Relief after manual therapies  Tolerated treatment well  Patient would benefit from continued PT      Plan: Continue per plan of care  Precautions: none      Manuals 8/31 9/3 9/10  8/3 8/10 8/13 8/17 8/24 8/27   PA glides to lumbar spine     MD MD L[facility]ATO [facility] MD np   Laser to B lumbar paraspinals 4' 4' 4'  4' 4' 4' np 4' 4'   Massage to B lumbar paraspinals 8' MO 8' MO 8' MO  8' 8' 8' LH MD DEAN   MET for R ant rot         MD KT   Neuro Re-Ed                                                                 Ther Ex            Bike with LR 6' 6' 6'  5' 6' 6' 6'  6'   DKTC 10x10" 10x10" 10x10"  10x  10" 10x  10" 10x  10" W/ pball, 10x10" 10x10"  10x10"   LTR 5"x15 5"x15 5"x15  5"x15 5"x15 5"x15 10x5" 5"x15 5"x15   Supine hip flexor stretch     3x30" 3x30" 3x30"      PPT with ball squeeze 5"x20 5"x20 5"x20  5"x15 5"x15 5"x20  5"x20 5"x20   Supine SLR x15ea x20ea x20ea  x10ea x15ea x15ea  x15 ea x15 ea   Bridging x15 W/ball sq   5"  x20 W/ball  Sq 5"  x20  x10 x15 x15  x15 x15   SL clamshells RTB  x15 GTB  x15   GTB  x15    x15ea x15 RTB  x15  RTBx 15 RTB  x15   Prone hip ext x10ea x15ea x15ea   x10ea    x10ea  x10ea x10ea   Prone prop 2'  2' 2'   2'  2'  2'  2'  2'           Lumbar roll outs 10x             Seated Lumbar ROM on pball 10x ea             Deep breathing 5'     Ther Activity             Tband walk out with press             Posture tband      GTB  x15ea GTB  x15  ea

## 2020-09-15 ENCOUNTER — OFFICE VISIT (OUTPATIENT)
Dept: PHYSICAL THERAPY | Facility: CLINIC | Age: 40
End: 2020-09-15
Payer: COMMERCIAL

## 2020-09-15 DIAGNOSIS — M54.50 ACUTE MIDLINE LOW BACK PAIN WITHOUT SCIATICA: Primary | ICD-10-CM

## 2020-09-15 PROCEDURE — 97110 THERAPEUTIC EXERCISES: CPT | Performed by: PHYSICAL THERAPIST

## 2020-09-15 PROCEDURE — 97112 NEUROMUSCULAR REEDUCATION: CPT | Performed by: PHYSICAL THERAPIST

## 2020-09-15 NOTE — PROGRESS NOTES
Daily Note     Today's date: 9/15/2020  Patient name: Lorraine Madsen  : 1980  MRN: [id number]  Referring provider: Brittani Boo MD  Dx:   Encounter Diagnosis     ICD-10-CM    1  Acute midline low back pain without sciatica  M54 5                   Subjective: "I don't have any pain  I'm feeling good "      Objective: See treatment diary below      Assessment: Held on all manual therapy due to pt being pain free  Progressed program with good tolerance  Plan: Continue per plan of care  Precautions: none      Manuals 8/31 9/3 9/10 9/15    8/17 8/24 8/27   PA glides to lumbar spine         MD np   Laser to B lumbar paraspinals 4' 4' 4'     np 4' 4'   Massage to B lumbar paraspinals 8' MO 8' MO 8' MO     LH MD MO   MET for R ant rot         MD KT   Neuro Re-Ed             Posture tband    GTB x15ea         Walk out with tband press    GTB x15ea         Step up with tband pull down                          Ther Ex            Bike with LR 6' 6' 6' 6' 8'   6'  6'   DKTC 10x10" 10x10" 10x10" DC    W/ pball, 10x10" 10x10"  10x10"   LTR 5"x15 5"x15 5"x15 5"x15    10x5" 5"x15 5"x15   Supine hip flexor stretch    DC         PPT with ball squeeze 5"x20 5"x20 5"x20 DC     5"x20 5"x20   Supine SLR x15ea x20ea x20ea x20ea     x15 ea x15 ea   Bridging x15 W/ball sq   5"  x20 W/ball  Sq 5"  x20 5"x20     x15 x15   SL clamshells RTB  x15 GTB  x15   GTB  x15   BTB 5"x20     RTBx 15 RTB  x15   Prone hip ext x10ea x15ea x15ea x20ea     x10ea x10ea   Prone alt UE/LE    x15ea         Prone donkey kick    x15ea         Quad alt UE/LE             Prone prop 2'  2' 2'      2'  2'           Lumbar roll outs 10x                               Ther Activity

## 2020-09-18 ENCOUNTER — OFFICE VISIT (OUTPATIENT)
Dept: PHYSICAL THERAPY | Facility: CLINIC | Age: 40
End: 2020-09-18
Payer: COMMERCIAL

## 2020-09-18 DIAGNOSIS — M54.50 ACUTE MIDLINE LOW BACK PAIN WITHOUT SCIATICA: Primary | ICD-10-CM

## 2020-09-18 PROCEDURE — 97112 NEUROMUSCULAR REEDUCATION: CPT

## 2020-09-18 PROCEDURE — 97110 THERAPEUTIC EXERCISES: CPT

## 2020-09-18 NOTE — PROGRESS NOTES
Daily Note     Today's date: 2020  Patient name: Prabhjot Malagon  : 1980  MRN: [id number]  Referring provider: Bharat Hernández MD  Dx:   Encounter Diagnosis     ICD-10-CM    1  Acute midline low back pain without sciatica  M54 5                   Subjective: Pt reports she did well w/o manual therapies LV  Today states, "I'm hurting in my back, and between my shoulder blades "      Objective: See treatment diary below      Assessment: Performed exercise program w/o increasing symptoms  Required vcing throughout GTB exercises  Tolerated treatment well  Patient would benefit from continued PT      Plan: Continue per plan of care  Precautions: none      Manuals 8/31 9/3 9/10 9/15 9/18   8/17 8/24 8/27   PA glides to lumbar spine         MD np   Laser to B lumbar paraspinals 4' 4' 4'     np 4' 4'   Massage to B lumbar paraspinals 8' MO 8' MO 8' MO     LH MD MO   MET for R ant rot         MD KT   Neuro Re-Ed             Posture tband    GTB x15ea GTB  x15ea        Walk out with tband press    GTB x15ea GTB  x15ea        Step up with tband pull down                          Ther Ex            Bike with LR 6' 6' 6' 6' 8'   6'  6'   DKTC 10x10" 10x10" 10x10" DC ----   W/ pball, 10x10" 10x10"  10x10"   LTR 5"x15 5"x15 5"x15 5"x15 5"x15   10x5" 5"x15 5"x15   Supine hip flexor stretch    DC ----        PPT with ball squeeze 5"x20 5"x20 5"x20 DC ----    5"x20 5"x20   Supine SLR x15ea x20ea x20ea x20ea x20ea    x15 ea x15 ea   Bridging x15 W/ball sq   5"  x20 W/ball  Sq 5"  x20 5"x20 5"x20    x15 x15   SL clamshells RTB  x15 GTB  x15   GTB  x15   BTB 5"x20 BTB  5"x20    RTBx 15 RTB  x15   Prone hip ext x10ea x15ea x15ea x20ea  x20ea    x10ea x10ea   Prone alt UE/LE    x15ea  x15ea        Prone donkey kick    x15ea  x15ea        Quad alt UE/LE             Prone prop 2'  2' 2'      2'  2'           Lumbar roll outs 10x                               Ther Activity

## 2020-09-21 ENCOUNTER — OFFICE VISIT (OUTPATIENT)
Dept: PHYSICAL THERAPY | Facility: CLINIC | Age: 40
End: 2020-09-21
Payer: COMMERCIAL

## 2020-09-21 DIAGNOSIS — M54.50 ACUTE MIDLINE LOW BACK PAIN WITHOUT SCIATICA: Primary | ICD-10-CM

## 2020-09-21 PROCEDURE — 97110 THERAPEUTIC EXERCISES: CPT

## 2020-09-21 PROCEDURE — 97112 NEUROMUSCULAR REEDUCATION: CPT

## 2020-09-21 NOTE — PROGRESS NOTES
Daily Note     Today's date: 2020  Patient name: Prabhjot Malagon  : 1980  MRN: [id number]  Referring provider: Bharat Hernández MD  Dx:   Encounter Diagnosis     ICD-10-CM    1  Acute midline low back pain without sciatica  M54 5                   Subjective: "A little worn out " Notes her LB "it's OK, I didn't need a lidacaine patch "      Objective: See treatment diary below      Assessment: Performed exercise progressions w/o issue  Tolerated treatment well  Patient would benefit from continued PT      Plan: Continue per plan of care  Precautions: none      Manuals 8/31 9/3 9/10 9/15 9/18 9/21  8/17 8/24 8/27   PA glides to lumbar spine         MD np   Laser to B lumbar paraspinals 4' 4' 4'     np 4' 4'   Massage to B lumbar paraspinals 8' MO 8' MO 8' MO     LH MD DEAN   MET for R ant rot         MD KT   Neuro Re-Ed             Posture tband    GTB x15ea GTB  x15ea GTB  x20ea       Walk out with tband press    GTB x15ea GTB  x15ea GTB  x20ea       Step up with tband pull down                          Ther Ex            Bike with LR 6' 6' 6' 6' 8' 8'  6'  6'   DKTC 10x10" 10x10" 10x10" DC ---- ----  W/ pball, 10x10" 10x10"  10x10"   LTR 5"x15 5"x15 5"x15 5"x15 5"x15 5'x15  10x5" 5"x15 5"x15   Supine hip flexor stretch    DC ---- ----       PPT with ball squeeze 5"x20 5"x20 5"x20 DC ---- ----   5"x20 5"x20   Supine SLR x15ea x20ea x20ea x20ea x20ea x20ea   x15 ea x15 ea   Bridging x15 W/ball sq   5"  x20 W/ball  Sq 5"  x20 5"x20 5"x20 5"x20   x15 x15   SL clamshells RTB  x15 GTB  x15   GTB  x15   BTB 5"x20 BTB  5"x20 BTB  5"x20   RTBx 15 RTB  x15   Prone hip ext x10ea x15ea x15ea x20ea  x20ea x20ea   x10ea x10ea   Prone alt UE/LE    x15ea  x15ea x20ea       Prone donkey kick    x15ea  x15ea x20ea       Quad alt UE/LE             Prone prop 2'  2' 2'      2'  2'           Lumbar roll outs 10x                               Ther Activity

## 2020-09-24 ENCOUNTER — OFFICE VISIT (OUTPATIENT)
Dept: PHYSICAL THERAPY | Facility: CLINIC | Age: 40
End: 2020-09-24
Payer: COMMERCIAL

## 2020-09-24 DIAGNOSIS — M54.50 ACUTE MIDLINE LOW BACK PAIN WITHOUT SCIATICA: Primary | ICD-10-CM

## 2020-09-24 PROCEDURE — 97110 THERAPEUTIC EXERCISES: CPT

## 2020-09-24 PROCEDURE — 97112 NEUROMUSCULAR REEDUCATION: CPT

## 2020-09-24 NOTE — PROGRESS NOTES
Daily Note     Today's date: 2020  Patient name: Yisel James  : 1980  MRN: [id number]  Referring provider: Lindsey Lopes MD  Dx:   Encounter Diagnosis     ICD-10-CM    1  Acute midline low back pain without sciatica  M54 5                   Subjective: Pt called to say she was running late, arrived 17 min late, but was accommodated for tx  Pt states she feels "OK," adding "I'll be glad to sleep in my own bed " Pt has been dog sitting at a friends house this week  Objective: See treatment diary below      Assessment: Performed exercise program w/o difficulty or discomfort  Tolerated treatment well  Patient would benefit from continued PT      Plan: Continue per plan of care  Precautions: none      Manuals 8/31 9/3 9/10 9/15 9/18 9/21 9/24      PA glides to lumbar spine             Laser to B lumbar paraspinals 4' 4' 4'          Massage to B lumbar paraspinals 8' MO 8' MO 8' MO          MET for R ant rot             Neuro Re-Ed             Posture tband    GTB x15ea GTB  x15ea GTB  x20ea GTB  x20ea      Walk out with tband press    GTB x15ea GTB  x15ea GTB  x20ea GTB  x20ea      Step up with tband pull down                          Ther Ex             Bike with LR 6' 6' 6' 6' 8' 8' 5'      DKTC 10x10" 10x10" 10x10" DC ---- ---- ----      LTR 5"x15 5"x15 5"x15 5"x15 5"x15 5"x15 5"x15      Supine hip flexor stretch    DC ---- ---- ----      PPT with ball squeeze 5"x20 5"x20 5"x20 DC ---- ---- ----      Supine SLR x15ea x20ea x20ea x20ea x20ea x20ea x20ea      Bridging x15 W/ball sq   5"  x20 W/ball  Sq 5"  x20 5"x20 5"x20 5"x20 5"x20      SL clamshells RTB  x15 GTB  x15   GTB  x15   BTB 5"x20 BTB  5"x20 BTB  5"x20 BTB  5'x20      Prone hip ext x10ea x15ea x15ea x20ea  x20ea x20ea x20ea      Prone alt UE/LE    x15ea  x15ea x20ea x20ea      Prone donkey kick    x15ea  x15ea x20ea x20ea      Quad alt UE/LE             Prone prop 2'  2' 2'                                                 Ther Activity                                                                                                                        D

## 2020-09-28 ENCOUNTER — APPOINTMENT (OUTPATIENT)
Dept: PHYSICAL THERAPY | Facility: CLINIC | Age: 40
End: 2020-09-28
Payer: COMMERCIAL

## 2020-09-29 ENCOUNTER — TELEPHONE (OUTPATIENT)
Dept: NEUROLOGY | Facility: CLINIC | Age: 40
End: 2020-09-29

## 2020-09-29 NOTE — TELEPHONE ENCOUNTER
Left message for patient confirming the appointment with Dr Christopher Nayak on 10/13/20 at 2 pm in the [facility] office  Asking for patient to call the office to confirm that they will be keeping the appointment  Please discuss and document

## 2020-09-30 ENCOUNTER — TELEPHONE (OUTPATIENT)
Dept: OTHER | Facility: OTHER | Age: 40
End: 2020-09-30

## 2020-09-30 NOTE — TELEPHONE ENCOUNTER
PT is returning missed call from [employer]  She's call to confirm that Friday morning at 10am is fine  She will be in the office for her appointment

## 2020-10-01 ENCOUNTER — OFFICE VISIT (OUTPATIENT)
Dept: PHYSICAL THERAPY | Facility: CLINIC | Age: 40
End: 2020-10-01
Payer: COMMERCIAL

## 2020-10-01 DIAGNOSIS — M54.50 ACUTE MIDLINE LOW BACK PAIN WITHOUT SCIATICA: Primary | ICD-10-CM

## 2020-10-01 PROCEDURE — 97112 NEUROMUSCULAR REEDUCATION: CPT

## 2020-10-01 PROCEDURE — 97110 THERAPEUTIC EXERCISES: CPT

## 2020-10-02 ENCOUNTER — SOCIAL WORK (OUTPATIENT)
Dept: BEHAVIORAL/MENTAL HEALTH CLINIC | Facility: CLINIC | Age: 40
End: 2020-10-02
Payer: COMMERCIAL

## 2020-10-02 DIAGNOSIS — F33.1 MAJOR DEPRESSIVE DISORDER, RECURRENT EPISODE, MODERATE (HCC): Primary | ICD-10-CM

## 2020-10-02 DIAGNOSIS — F41.1 GENERALIZED ANXIETY DISORDER: ICD-10-CM

## 2020-10-02 PROCEDURE — 90834 PSYTX W PT 45 MINUTES: CPT | Performed by: PSYCHIATRY & NEUROLOGY

## 2020-10-03 DIAGNOSIS — M54.41 ACUTE MIDLINE LOW BACK PAIN WITH BILATERAL SCIATICA: ICD-10-CM

## 2020-10-03 DIAGNOSIS — M54.42 ACUTE MIDLINE LOW BACK PAIN WITH BILATERAL SCIATICA: ICD-10-CM

## 2020-10-05 ENCOUNTER — APPOINTMENT (OUTPATIENT)
Dept: PHYSICAL THERAPY | Facility: CLINIC | Age: 40
End: 2020-10-05
Payer: COMMERCIAL

## 2020-10-05 RX ORDER — LIDOCAINE 50 MG/G
1 PATCH TOPICAL DAILY
Qty: 10 PATCH | Refills: 1 | Status: SHIPPED | OUTPATIENT
Start: 2020-10-05 | End: 2021-10-05

## 2020-10-08 ENCOUNTER — APPOINTMENT (OUTPATIENT)
Dept: PHYSICAL THERAPY | Facility: CLINIC | Age: 40
End: 2020-10-08
Payer: COMMERCIAL

## 2020-10-09 DIAGNOSIS — G43.109 MIGRAINE WITH AURA AND WITHOUT STATUS MIGRAINOSUS, NOT INTRACTABLE: ICD-10-CM

## 2020-10-09 DIAGNOSIS — G43.709 CHRONIC MIGRAINE WITHOUT AURA WITHOUT STATUS MIGRAINOSUS, NOT INTRACTABLE: ICD-10-CM

## 2020-10-09 RX ORDER — RIZATRIPTAN BENZOATE 10 MG/1
10 TABLET ORAL AS NEEDED
Qty: 12 TABLET | Refills: 1 | Status: SHIPPED | OUTPATIENT
Start: 2020-10-09 | End: 2021-04-20

## 2020-10-12 ENCOUNTER — OFFICE VISIT (OUTPATIENT)
Dept: PHYSICAL THERAPY | Facility: CLINIC | Age: 40
End: 2020-10-12
Payer: COMMERCIAL

## 2020-10-12 DIAGNOSIS — M54.50 ACUTE MIDLINE LOW BACK PAIN WITHOUT SCIATICA: Primary | ICD-10-CM

## 2020-10-12 PROCEDURE — 97112 NEUROMUSCULAR REEDUCATION: CPT

## 2020-10-12 PROCEDURE — 97110 THERAPEUTIC EXERCISES: CPT

## 2020-10-13 ENCOUNTER — OFFICE VISIT (OUTPATIENT)
Dept: NEUROLOGY | Facility: CLINIC | Age: 40
End: 2020-10-13
Payer: COMMERCIAL

## 2020-10-13 VITALS
DIASTOLIC BLOOD PRESSURE: 76 MMHG | TEMPERATURE: 94.4 F | WEIGHT: 184.4 LBS | SYSTOLIC BLOOD PRESSURE: 118 MMHG | HEART RATE: 101 BPM | BODY MASS INDEX: 36.01 KG/M2

## 2020-10-13 DIAGNOSIS — G43.709 CHRONIC MIGRAINE WITHOUT AURA WITHOUT STATUS MIGRAINOSUS, NOT INTRACTABLE: Primary | ICD-10-CM

## 2020-10-13 DIAGNOSIS — G43.009 MIGRAINE WITHOUT AURA AND WITHOUT STATUS MIGRAINOSUS, NOT INTRACTABLE: ICD-10-CM

## 2020-10-13 PROCEDURE — 99214 OFFICE O/P EST MOD 30 MIN: CPT | Performed by: PSYCHIATRY & NEUROLOGY

## 2020-10-15 ENCOUNTER — OFFICE VISIT (OUTPATIENT)
Dept: PHYSICAL THERAPY | Facility: CLINIC | Age: 40
End: 2020-10-15
Payer: COMMERCIAL

## 2020-10-15 DIAGNOSIS — M54.50 ACUTE MIDLINE LOW BACK PAIN WITHOUT SCIATICA: Primary | ICD-10-CM

## 2020-10-15 PROCEDURE — 97110 THERAPEUTIC EXERCISES: CPT

## 2020-10-15 PROCEDURE — 97112 NEUROMUSCULAR REEDUCATION: CPT

## 2020-10-19 ENCOUNTER — OFFICE VISIT (OUTPATIENT)
Dept: PHYSICAL THERAPY | Facility: CLINIC | Age: 40
End: 2020-10-19
Payer: COMMERCIAL

## 2020-10-19 DIAGNOSIS — M54.50 ACUTE MIDLINE LOW BACK PAIN WITHOUT SCIATICA: Primary | ICD-10-CM

## 2020-10-19 PROCEDURE — 97112 NEUROMUSCULAR REEDUCATION: CPT

## 2020-10-19 PROCEDURE — 97110 THERAPEUTIC EXERCISES: CPT

## 2020-10-20 ENCOUNTER — APPOINTMENT (OUTPATIENT)
Dept: RADIOLOGY | Facility: MEDICAL CENTER | Age: 40
End: 2020-10-20
Payer: COMMERCIAL

## 2020-10-20 ENCOUNTER — OFFICE VISIT (OUTPATIENT)
Dept: RHEUMATOLOGY | Facility: CLINIC | Age: 40
End: 2020-10-20

## 2020-10-20 VITALS
HEIGHT: 60 IN | BODY MASS INDEX: 36.12 KG/M2 | WEIGHT: 184 LBS | DIASTOLIC BLOOD PRESSURE: 78 MMHG | TEMPERATURE: 98.4 F | SYSTOLIC BLOOD PRESSURE: 112 MMHG

## 2020-10-20 DIAGNOSIS — G89.29 CHRONIC BILATERAL LOW BACK PAIN WITHOUT SCIATICA: Primary | ICD-10-CM

## 2020-10-20 DIAGNOSIS — G89.29 CHRONIC BILATERAL LOW BACK PAIN WITHOUT SCIATICA: ICD-10-CM

## 2020-10-20 DIAGNOSIS — M54.50 CHRONIC BILATERAL LOW BACK PAIN WITHOUT SCIATICA: Primary | ICD-10-CM

## 2020-10-20 DIAGNOSIS — M25.50 ARTHRALGIA OF MULTIPLE JOINTS: ICD-10-CM

## 2020-10-20 DIAGNOSIS — M54.50 CHRONIC BILATERAL LOW BACK PAIN WITHOUT SCIATICA: ICD-10-CM

## 2020-10-20 DIAGNOSIS — A69.20 LYME DISEASE: ICD-10-CM

## 2020-10-20 DIAGNOSIS — M79.10 MYALGIA: ICD-10-CM

## 2020-10-20 PROCEDURE — 72200 X-RAY EXAM SI JOINTS: CPT

## 2020-10-20 PROCEDURE — 99244 OFF/OP CNSLTJ NEW/EST MOD 40: CPT | Performed by: INTERNAL MEDICINE

## 2020-10-20 RX ORDER — CELECOXIB 100 MG/1
100 CAPSULE ORAL 2 TIMES DAILY
Qty: 60 CAPSULE | Refills: 6 | Status: SHIPPED | OUTPATIENT
Start: 2020-10-20 | End: 2021-04-19 | Stop reason: SDUPTHER

## 2020-10-20 NOTE — PROGRESS NOTES
Assessment and Plan: Carmencita Munson is a 41 y o   female who presents as a Rheumatology consult referred by her PCP Maninder Pandya MD for evaluation of possible inflammatory arthritis  Inflammatory arthritis workup in the past was negative  SI joint x-rays ordered to workup patient's back pain but returned unremarkable  Her diffuse body pain and fibromyalgia tender points seems more consistent with fibromyalgia  Asked patient to follow-up with psychiatry regarding adding on nortriptyline for fibromyalgia  Hip bursitis exercises printed out for patient for her trochanteric bursitis found on physical exam  Her joint pain is likely secondary to osteoarthritis rather than inflammatory arthritis  Prescribed celecoxib 100mg po bid for joint pain  Plan:  Diagnoses and all orders for this visit:    Chronic bilateral low back pain without sciatica  -     XR sacroiliac joints < 3 views; Future    Lyme disease  -     Ambulatory referral to Rheumatology    Myalgia  -     Ambulatory referral to Rheumatology    Arthralgia of multiple joints  -     Ambulatory referral to Rheumatology  -     celecoxib (CeleBREX) 100 mg capsule; Take 1 capsule (100 mg total) by mouth 2 (two) times a day    Follow-up plan: Return to clinic in 6 months      HPI  Carmencita Munson is a 41 y o   female who presents as a Rheumatology consult referred by her PCP Maninedr Pandya MD for evaluation of possible inflammatory arthritis  Patient used to see Dr Curtis Brian in the past; last clinic visit was 11/3/16; was diagnosed with fibromyalgia, gabapentin didn't help, was on Lyrica, which stopped helping a year ago  She then saw [facility] rheumatologist Dr Karen Barbour on 1/3018  She complains of upper and lower back pain and stiffness worse in morning and later in day; lasts 3 hours in the morning  Back pain is 7/10 in severity  Also admits that her elbows and knees get swollen and warm  Pain in shoulders, elbows, hips, and knees are 3/10 in severity   Has tried naproxen, Advil, Tylenol, aspirin, and meloxicam      Review of Systems  Review of Systems   Constitutional: Positive for fatigue  Negative for chills, fever and unexpected weight change  HENT: Positive for sinus pressure and sinus pain  Negative for mouth sores and trouble swallowing  Eyes: Positive for pain  Negative for visual disturbance  Respiratory: Positive for cough  Negative for shortness of breath  Cardiovascular: Positive for chest pain  Negative for leg swelling  Gastrointestinal: Positive for constipation  Negative for abdominal pain, blood in stool, diarrhea and nausea  Indigestion/heartburn   Endocrine: Positive for polydipsia  Musculoskeletal: Positive for arthralgias, back pain, myalgias and neck pain  Negative for joint swelling  Skin: Negative for color change and rash  Allergic/Immunologic: Positive for environmental allergies  Neurological: Positive for dizziness, weakness and headaches  Negative for numbness  Hematological: Positive for adenopathy  Bruises/bleeds easily  Psychiatric/Behavioral: Positive for dysphoric mood and sleep disturbance  The patient is nervous/anxious  Allergies  Allergies   Allergen Reactions    Amoxicillin-Pot Clavulanate     Decadrol [Dexamethasone] Other (See Comments)     Category: Adverse Reaction;   psychosis    Dexamethasone Sodium Phosphate     Other     Penicillins Hives and Other (See Comments)     Category: Allergy; Hives/Uticaria    Tetracycline Hives and Other (See Comments)     Hives/Uticaria    Tetracyclines & Related Hives     Category:  Allergy;        Home Medications    Current Outpatient Medications:     Calcium Carbonate-Vitamin D (CALCIUM 500/D PO), Take 1 capsule by mouth daily, Disp: , Rfl:     Cholecalciferol (VITAMIN D-3) 1000 units CAPS, Take 1 capsule by mouth daily, Disp: , Rfl:     Galcanezumab-gnlm 120 MG/ML SOAJ, Inject 1 ml SC in each thigh or stomach for a total of two injections the first time  Then 1 ml SC every 30 days, Disp: 2 pen, Rfl: 3    IRON PO, Take by mouth, Disp: , Rfl:     lidocaine (LIDODERM) 5 %, APPLY 1 PATCH TOPICALLY DAILY REMOVE & DISCARD PATCH WITHIN 12 HOURS OR AS DIRECTED BY MD, Disp: 10 patch, Rfl: 1    MAGNESIUM OXIDE PO, Take by mouth, Disp: , Rfl:     medroxyPROGESTERone (DEPO-PROVERA) 150 mg/mL injection, INJECT MONTHLY FOR 6 MONTH, Disp: , Rfl: 5    Quercetin 250 MG TABS, Take 250 mg by mouth 2 (two) times a day, Disp: , Rfl:     rizatriptan (MAXALT) 10 MG tablet, TAKE 1 TABLET (10 MG TOTAL) BY MOUTH AS NEEDED FOR MIGRAINE FOR UP TO 15 DOSES TAKE ONE PILL AT THE ONSET OF HEADACHE, MAY REPEAT IN 2 HOURS IF NEEDED   PLEASE TAKE REGIMEN UP TO MAX 2X PER WEEK , Disp: 12 tablet, Rfl: 1    ARIPiprazole (ABILIFY) 2 mg tablet, Take 1 tablet (2 mg total) by mouth daily, Disp: 30 tablet, Rfl: 2    celecoxib (CeleBREX) 100 mg capsule, Take 1 capsule (100 mg total) by mouth 2 (two) times a day, Disp: 60 capsule, Rfl: 6    cyclobenzaprine (FLEXERIL) 10 mg tablet, TAKE 1 TABLET BY MOUTH AT BEDTIME AS NEEDED FOR MUSCLE SPASMS, Disp: 30 tablet, Rfl: 3    DULoxetine (CYMBALTA) 30 mg delayed release capsule, Take 1 capsule (30 mg total) by mouth daily, Disp: 30 capsule, Rfl: 2    DULoxetine (CYMBALTA) 60 mg delayed release capsule, Take 1 capsule (60 mg total) by mouth daily, Disp: 30 capsule, Rfl: 2    LORazepam (ATIVAN) 1 mg tablet, Take 0 5 tablets (0 5 mg total) by mouth every 6 (six) hours as needed for anxiety, Disp: 60 tablet, Rfl: 2    nortriptyline (PAMELOR) 10 mg capsule, TAKE 1 CAPSULE (10 MG TOTAL) BY MOUTH DAILY AT BEDTIME, Disp: 30 capsule, Rfl: 2    propranolol (INDERAL) 20 mg tablet, TAKE 1 TABLET BY MOUTH NIGHTLY, Disp: 30 tablet, Rfl: 1    zonisamide (ZONEGRAN) 100 mg capsule, Take 1 cap po qhs for 1 week, then 2 caps po qhs for 1 week then 3 caps po qhs for 1 week, Disp: 63 capsule, Rfl: 0    Past Medical History  Past Medical History:   Diagnosis Date  Anxiety     Chronic sinusitis     Depression     Fibromyalgia     Migraine     Obesity     Polyarthritis     Last assessed 9/21/2015    Psychiatric disorder        Past Surgical History   Past Surgical History:   Procedure Laterality Date    COLONOSCOPY  06/2019    DENTAL SURGERY      HYSTEROSCOPY      Endometrial Biopsy By Hysteroscopy    REMOVAL OF INTRAUTERINE DEVICE (IUD)      US GUIDED BREAST BIOPSY RIGHT COMPLETE Right 6/17/2019       Family History    Family History   Problem Relation Age of Onset    Diabetes Father     Kidney disease Father     Substance Abuse Brother     Diabetes Maternal Grandmother     Rheum arthritis Maternal Grandmother     Melanoma Maternal Grandmother     Kidney disease Paternal Grandmother     Rheum arthritis Paternal Grandmother     Depression Paternal Grandmother     Diabetes Paternal Grandfather     Lung cancer Paternal Grandfather     Substance Abuse Maternal Uncle     Prostate cancer Maternal Uncle 61    Depression Paternal Aunt     Alcohol abuse Family     Stomach cancer Family     Irregular heart beat Mother    father - stage V CKD from diabetes  Maternal grandmother - RA      Social History  Occupation: used to work as an  for an office, last worked 5 years ago  Social History     Substance and Sexual Activity   Alcohol Use No    Frequency: Never    Drinks per session: 1 or 2    Binge frequency: Never    Comment: 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      Social History     Substance and Sexual Activity   Drug Use Not Currently     Social History     Tobacco Use   Smoking Status Never Smoker   Smokeless Tobacco Never Used       Objective:  Vitals:    10/20/20 0845   BP: 112/78   BP Location: Right arm   Patient Position: Sitting   Cuff Size: Standard   Temp: 98 4 °F (36 9 °C)   TempSrc: Temporal   Weight: 83 5 kg (184 lb)   Height: 5' (1 524 m)       Physical Exam  Constitutional:       General: She is not in acute distress  Appearance: She is well-developed  HENT:      Head: Normocephalic and atraumatic  Eyes:      General: Lids are normal  No scleral icterus  Conjunctiva/sclera: Conjunctivae normal    Neck:      Musculoskeletal: Neck supple  No muscular tenderness  Thyroid: No thyromegaly  Cardiovascular:      Rate and Rhythm: Normal rate and regular rhythm  Heart sounds: S1 normal and S2 normal  No murmur  No friction rub  Pulmonary:      Effort: Pulmonary effort is normal  No tachypnea or respiratory distress  Breath sounds: Normal breath sounds  No wheezing, rhonchi or rales  Musculoskeletal:         General: Tenderness present  Comments: paraspinal and spinal tenderness; diffuse fibromyalgia tender points; bilateral trochanteric bursa tenderness   Lymphadenopathy:      Head:      Right side of head: No submental or submandibular adenopathy  Left side of head: No submental or submandibular adenopathy  Cervical: No cervical adenopathy  Skin:     General: Skin is warm and dry  Findings: No rash  Nails: There is no clubbing  Neurological:      Mental Status: She is alert  Sensory: No sensory deficit  Psychiatric:         Behavior: Behavior normal  Behavior is cooperative  Reviewed labs and imaging  Imaging:   SI Joint x-rays 10/20/20  IMPRESSION:  Minimal asymmetry of the SI joints but without erosion or sclerosis  Disc space narrowing at L5-S1      Labs:   Admission on 06/30/2020, Discharged on 06/30/2020   Component Date Value Ref Range Status    WBC 06/30/2020 6 87  4 31 - 10 16 Thousand/uL Final    RBC 06/30/2020 4 23  3 81 - 5 12 Million/uL Final    Hemoglobin 06/30/2020 13 2  11 5 - 15 4 g/dL Final    Hematocrit 06/30/2020 39 6  34 8 - 46 1 % Final    MCV 06/30/2020 94  82 - 98 fL Final    MCH 06/30/2020 31 2  26 8 - 34 3 pg Final    MCHC 06/30/2020 33 3  31 4 - 37 4 g/dL Final    RDW 06/30/2020 13 8  11 6 - 15 1 % Final    MPV 06/30/2020 9 3  8 9 - 12 7 fL Final    Platelets 33/92/9155 197  149 - 390 Thousands/uL Final    nRBC 06/30/2020 0  /100 WBCs Final    Neutrophils Relative 06/30/2020 56  43 - 75 % Final    Immat GRANS % 06/30/2020 0  0 - 2 % Final    Lymphocytes Relative 06/30/2020 38  14 - 44 % Final    Monocytes Relative 06/30/2020 6  4 - 12 % Final    Eosinophils Relative 06/30/2020 0  0 - 6 % Final    Basophils Relative 06/30/2020 0  0 - 1 % Final    Neutrophils Absolute 06/30/2020 3 75  1 85 - 7 62 Thousands/µL Final    Immature Grans Absolute 06/30/2020 0 03  0 00 - 0 20 Thousand/uL Final    Lymphocytes Absolute 06/30/2020 2 63  0 60 - 4 47 Thousands/µL Final    Monocytes Absolute 06/30/2020 0 41  0 17 - 1 22 Thousand/µL Final    Eosinophils Absolute 06/30/2020 0 03  0 00 - 0 61 Thousand/µL Final    Basophils Absolute 06/30/2020 0 02  0 00 - 0 10 Thousands/µL Final    Sodium 06/30/2020 142  136 - 145 mmol/L Final    Potassium 06/30/2020 3 4* 3 5 - 5 3 mmol/L Final    Chloride 06/30/2020 112* 100 - 108 mmol/L Final    CO2 06/30/2020 20* 21 - 32 mmol/L Final    ANION GAP 06/30/2020 10  4 - 13 mmol/L Final    BUN 06/30/2020 12  5 - 25 mg/dL Final    Creatinine 06/30/2020 0 87  0 60 - 1 30 mg/dL Final    Standardized to IDMS reference method    Glucose 06/30/2020 116  65 - 140 mg/dL Final      If the patient is fasting, the ADA then defines impaired fasting glucose as > 100 mg/dL and diabetes as > or equal to 123 mg/dL  Specimen collection should occur prior to Sulfasalazine administration due to the potential for falsely depressed results  Specimen collection should occur prior to Sulfapyridine administration due to the potential for falsely elevated results   Calcium 06/30/2020 8 8  8 3 - 10 1 mg/dL Final    AST 06/30/2020 7  5 - 45 U/L Final      Specimen collection should occur prior to Sulfasalazine administration due to the potential for falsely depressed results       ALT 06/30/2020 16  12 - 78 U/L Final      Specimen collection should occur prior to Sulfasalazine and/or Sulfapyridine administration due to the potential for falsely depressed results   Alkaline Phosphatase 06/30/2020 66  46 - 116 U/L Final    Total Protein 06/30/2020 7 2  6 4 - 8 2 g/dL Final    Albumin 06/30/2020 3 9  3 5 - 5 0 g/dL Final    Total Bilirubin 06/30/2020 0 32  0 20 - 1 00 mg/dL Final      Use of this assay is not recommended for patients undergoing treatment with eltrombopag due to the potential for falsely elevated results      eGFR 06/30/2020 82  ml/min/1 73sq m Final    Lipase 06/30/2020 73  73 - 393 u/L Final    EXT PREG TEST UR (Ref: Negative) 06/30/2020 negative   Final    Control 06/30/2020 valid   Final    Color, UA 06/30/2020 Yellow   Final    Clarity, UA 06/30/2020 Clear   Final    pH, UA 06/30/2020 5 0  4 5 - 8 0 Final    Leukocytes, UA 06/30/2020 Trace* Negative Final    Nitrite, UA 06/30/2020 Negative  Negative Final    Protein, UA 06/30/2020 Negative  Negative mg/dl Final    Glucose, UA 06/30/2020 Negative  Negative mg/dl Final    Ketones, UA 06/30/2020 Negative  Negative mg/dl Final    Urobilinogen, UA 06/30/2020 0 2  0 2, 1 0 E U /dl E U /dl Final    Bilirubin, UA 06/30/2020 Negative  Negative Final    Blood, UA 06/30/2020 Trace* Negative Final    Specific [place], UA 06/30/2020 1 025  1 003 - 1 030 Final    RBC, UA 06/30/2020 4-10* None Seen, 0-5 /hpf Final    WBC, UA 06/30/2020 4-10* None Seen, 0-5, 5-55, 5-65 /hpf Final    Epithelial Cells 06/30/2020 Moderate* None Seen, Occasional /hpf Final    Bacteria, UA 06/30/2020 None Seen  None Seen, Occasional /hpf Final    Hyaline Casts, UA 06/30/2020 5-10* None Seen /lpf Final   Orders Only on 06/22/2020   Component Date Value Ref Range Status    Glucose, Random 06/22/2020 100* 65 - 99 mg/dL Final    Comment:               Fasting reference interval     For someone without known diabetes, a glucose value  between 100 and 125 mg/dL is consistent with  prediabetes and should be confirmed with a  follow-up test          BUN 06/22/2020 15  7 - 25 mg/dL Final    Creatinine 06/22/2020 1 09  0 50 - 1 10 mg/dL Final    eGFR Non  06/22/2020 63  > OR = 60 mL/min/1 73m2 Final    eGFR  06/22/2020 73  > OR = 60 mL/min/1 73m2 Final    SL AMB BUN/CREATININE RATIO 23/57/3264 NOT APPLICABLE  6 - 22 (calc) Final    Sodium 06/22/2020 138  135 - 146 mmol/L Final    Potassium 06/22/2020 3 6  3 5 - 5 3 mmol/L Final    Chloride 06/22/2020 108  98 - 110 mmol/L Final    CO2 06/22/2020 22  20 - 32 mmol/L Final    Calcium 06/22/2020 9 4  8 6 - 10 2 mg/dL Final    Protein, Total 06/22/2020 6 8  6 1 - 8 1 g/dL Final    Albumin 06/22/2020 4 5  3 6 - 5 1 g/dL Final    Globulin 06/22/2020 2 3  1 9 - 3 7 g/dL (calc) Final    Albumin/Globulin Ratio 06/22/2020 2 0  1 0 - 2 5 (calc) Final    TOTAL BILIRUBIN 06/22/2020 0 5  0 2 - 1 2 mg/dL Final    Alkaline Phosphatase 06/22/2020 60  31 - 125 U/L Final    AST 06/22/2020 14  10 - 30 U/L Final    ALT 06/22/2020 10  6 - 29 U/L Final    Sedimentation Rate 06/22/2020 6  < OR = 20 mm/h Final    White Blood Cell Count 06/22/2020 7 6  3 8 - 10 8 Thousand/uL Final    Red Blood Cell Count 06/22/2020 4 46  3 80 - 5 10 Million/uL Final    Hemoglobin 06/22/2020 14 1  11 7 - 15 5 g/dL Final    HCT 06/22/2020 41 4  35 0 - 45 0 % Final    MCV 06/22/2020 92 8  80 0 - 100 0 fL Final    MCH 06/22/2020 31 6  27 0 - 33 0 pg Final    MCHC 06/22/2020 34 1  32 0 - 36 0 g/dL Final    RDW 06/22/2020 12 9  11 0 - 15 0 % Final    Platelet Count 23/10/4859 220  140 - 400 Thousand/uL Final    SL AMB MPV 06/22/2020 9 6  7 5 - 12 5 fL Final    Neutrophils (Absolute) 06/22/2020 4,431  1,500 - 7,800 cells/uL Final    Lymphocytes (Absolute) 06/22/2020 2,706  850 - 3,900 cells/uL Final    Monocytes (Absolute) 06/22/2020 380  200 - 950 cells/uL Final    Eosinophils (Absolute) 06/22/2020 53  15 - 500 cells/uL Final    Basophils ABS 06/22/2020 30  0 - 200 cells/uL Final    Neutrophils 06/22/2020 58 3  % Final    Lymphocytes 06/22/2020 35 6  % Final    Monocytes 06/22/2020 5 0  % Final    Eosinophils 06/22/2020 0 7  % Final    Basophils PCT 06/22/2020 0 4  % Final    Lyme Ab Screen 06/22/2020 1 11* index Final    Comment:                    Index                Interpretation                     -----                --------------                     < 0 90               Negative                     0  90-1 09            Equivocal                     > 1 09               Positive      As recommended by the Food and Drug Administration   (FDA), all samples with positive or equivocal   results in a Borrelia burgdorferi antibody screen  will be tested using a blot method  Positive or   equivocal screening test results should not be   interpreted as truly positive until verified as such   using a supplemental assay (e g , B  burgdorferi blot)  The screening test and/or blot for B  burgdorferi   antibodies may be falsely negative in early stages  of Lyme disease, including the period when erythema   migrans is apparent           Lyme Disease Ab (IgG), Blot 06/22/2020 POSITIVE* NEGATIVE Final    Lyme 18 kD IgG 06/22/2020 REACTIVE*  Final    Lyme 23 kD IgG 06/22/2020 REACTIVE*  Final    Lyme 28 kD IgG 06/22/2020 NON-REACTIVE   Final    Lyme 30 kD IgG 06/22/2020 NON-REACTIVE   Final    Lyme 39 kD IgG 06/22/2020 REACTIVE*  Final    Lyme 41 kD IgG 06/22/2020 REACTIVE*  Final    Lyme 45 kD IgG 06/22/2020 NON-REACTIVE   Final    Lyme 58 kD IgG 06/22/2020 REACTIVE*  Final    Lyme 66 kD IgG 06/22/2020 NON-REACTIVE   Final    Lyme 93 kD IgG 06/22/2020 NON-REACTIVE   Final    Lyme Disease Ab (IgM), Blot 06/22/2020 NEGATIVE  NEGATIVE Final    Lyme 23 kD IgM 06/22/2020 NON-REACTIVE   Final    Lyme 39 kD IgM 06/22/2020 NON-REACTIVE   Final    Lyme 41 kD IgM 06/22/2020 NON-REACTIVE Final    Comment: As per CDC criteria, a Lyme disease IgG Immunoblot must  show reactivity to at least 5 of 10 specific borrelial  proteins to be considered positive; similarly, a   positive Lyme disease IgM immunoblot requires  reactivity to 2 of 3 specific borrelial proteins  Although considered negative, IgG reactivity to fewer  specific borrelial proteins or IgM reactivity to only  1 protein may indicate recent B  burgdorferi infection  and warrant testing of a later sample  A positive IgM  but negative IgG result obtained more than a month  after onset of symptoms likely represents a false-  positive IgM result rather than acute Lyme disease  In rare instances, Lyme disease immunoblot reactivity  may represent antibodies induced by exposure to other  spirochetes  Lyme immunoblot testing should only be performed on  samples from patients who have had a Positive or  Equivocal result in a screening assay   MANJIT Screen, IFA 06/22/2020 NEGATIVE  NEGATIVE Final    Comment: MANJIT IFA is a first line screen for detecting the  presence of up to approximately 150 autoantibodies in  various autoimmune diseases  A negative MANJIT IFA result  suggests an MANJIT-associated autoimmune disease is not  present at this time, but is not definitive  If there  is high clinical suspicion for Sjogren's syndrome,  testing for anti-SS-A/Ro antibody should be considered  Anti-Cindy-1 antibody should be considered for clinically  suspected inflammatory myopathies  AC-0: Negative     International Consensus on MANJIT Patterns  (https://doi org/10 1515/iccs-3926-9080)     For additional information, please refer to  http://Mosaic/faq/TTF669  (This link is being provided for informational/  educational purposes only )          C-Reactive Protein, Quant 06/22/2020 4 3  <8 0 mg/L Final    Vitamin D, 25-Hydroxy, Serum 06/22/2020 31  30 - 100 ng/mL Final    Comment: Vitamin D Status         25-OH Vitamin D: Deficiency:                    <20 ng/mL  Insufficiency:             20 - 29 ng/mL  Optimal:                 > or = 30 ng/mL     For 25-OH Vitamin D testing on patients on   D2-supplementation and patients for whom quantitation   of D2 and D3 fractions is required, the QuestAssureD(TM)  25-OH VIT D, (D2,D3), LC/MS/MS is recommended: order   code 39004 (patients >2yrs)  See Note 1     Note 1     For additional information, please refer to   http://edulio/faq/TPG077   (This link is being provided for informational/  educational purposes only )      TSH W/RFX TO FREE T4 06/22/2020 2 20  mIU/L Final    Comment:           Reference Range                         > or = 20 Years  0 40-4 50                              Pregnancy Ranges            First trimester    0 26-2 66            Second trimester   0 55-2 73            Third trimester    0 43-2 91     Orders Only on 06/22/2020   Component Date Value Ref Range Status    Urine Culture 06/23/2020 80,000-89,000 cfu/ml    Final    Mixed Contaminants X4    Clarity, UA 06/23/2020 Cloudy   Final    Color, UA 06/23/2020 Yellow   Final    Specific [place], UA 06/23/2020 1 015  1 003 - 1 030 Final    pH, UA 06/23/2020 5 5  4 5, 5 0, 5 5, 6 0, 6 5, 7 0, 7 5, 8 0 Final    Glucose, UA 06/23/2020 Negative  Negative mg/dl Final    Ketones, UA 06/23/2020 Negative  Negative mg/dl Final    Blood, UA 06/23/2020 Negative  Negative Final    Protein, UA 06/23/2020 Negative  Negative mg/dl Final    Nitrite, UA 06/23/2020 Negative  Negative Final    Bilirubin, UA 06/23/2020 Negative  Negative Final    Urobilinogen, UA 06/23/2020 0 2  0 2, 1 0 E U /dl E U /dl Final    Leukocytes, UA 06/23/2020 Small* Negative Final    WBC, UA 06/23/2020 4-10* None Seen, 0-5, 5-55, 5-65 /hpf Final    RBC, UA 06/23/2020 None Seen  None Seen, 0-5 /hpf Final    Hyaline Casts, UA 06/23/2020 None Seen  None Seen /lpf Final    Bacteria, UA 06/23/2020 None Seen  None Seen, Occasional /hpf Final    Epithelial Cells 06/23/2020 Moderate* None Seen, Occasional /hpf Final

## 2020-10-20 NOTE — PATIENT INSTRUCTIONS
Do SI joint x-rays  Try celecoxib twice a day  Follow-up with psychiatry regarding adding on nortriptyline for fibromyalgia    Return to clinic in 6 months    Fibromyalgia   WHAT Martinead:   What is fibromyalgia? Fibromyalgia is a long-term condition that causes pain and tender points throughout your body  Fibromyalgia can start at any age and is more common in women than in men  What causes fibromyalgia? Healthcare providers do not know exactly what causes fibromyalgia  Problems with chemicals that send pain messages to and from the brain are thought to cause fibromyalgia  It may also be caused or triggered by any of the following:  · Hormone changes    · Physical injury    · Intense emotional trauma from sexual, physical, or emotional abuse  What are the signs and symptoms of fibromyalgia? The most common symptom of fibromyalgia is widespread pain for at least 3 months  You may also have tender spots  Tender spots are specific areas or points on both sides of your body that are painful when pressed  You may have tender spots in your neck, upper chest, shoulders, or shoulder blades  Other common areas are the elbows, lower back, sides of the thighs, and knees  You may also have any of the following:  · Fatigue and difficulty sleeping    · Diarrhea, constipation, pain, or bloating    · Headaches, memory problems, difficulty concentrating, or anxiety    · Numbness, muscle stiffness, or swelling of the hands and feet    · Pounding, racing heartbeats or chest pain  How is fibromyalgia diagnosed? Your healthcare provider will examine you and ask about your symptoms and other health conditions  He will do a manual tender point exam and press on specific sites or points in your body  Increased pain in most of these spots means a positive tender point exam  There are no specific lab tests to diagnose fibromyalgia   Blood and urine tests, a spinal tap, or sleep studies may be done to rule out other causes of pain   How is fibromyalgia treated? Fibromyalgia can be treated but not cured  The following can help you manage your pain and other symptoms:  · Acetaminophen and ibuprofen: These medicines decrease pain  They are available without a doctor's order  Ask your healthcare provider which medicine is right for you  Ask how much to take and how often to take it  Follow directions  These medicines can cause stomach bleeding if not taken correctly  Ibuprofen can cause kidney damage  Acetaminophen can cause liver damage  · Pain medicine: You may be given a prescription medicine to decrease pain  Do not wait until the pain is severe before you take this medicine  · Muscle relaxers  help decrease pain and muscle spasms  · Antidepressants: These help decrease depression, pain, and fatigue  · Antiseizure medicine: This is used to reduce fibromyalgia pain  What are the risks of fibromyalgia? If untreated, your symptoms may get worse  Pain may make it difficult to do daily activities  Your risk for fatigue, headaches, and depression may increase  How can I manage my symptoms? · Keep a pain diary:  Record your symptoms and what activity caused them  This may also help you track pain cycles and show a pattern to your symptoms  · Exercise:  Ask your healthcare provider about the best exercise plan for you  Exercise and other strength-training activities may decrease pain and sleep problems  · Set good sleep habits:  Do not nap during the day  Go to bed at the same time each night  Make sure your bedroom is dark, quiet, and comfortable  Do not stay in bed if you cannot sleep  Get up and do something relaxing until you are sleepy  Do not drink caffeine or alcohol right before you go to bed  These can make it difficult for you to sleep  Limit other liquids to help decrease your need to urinate in the night  Where can I find support and more information?    · National Chronic Fatigue Syndrome and [facility] , [address] [place] [address]  Phone: 4- 111 - 219-5634  Web Address: [employer] ee  org  When should I contact my healthcare provider? · You pain increases, even after you take your pain medicine  · You have difficulty sleeping  · You have questions or concerns about your condition or care  When should I seek immediate care or call 911? · You are depressed and feel you cannot cope with your condition  CARE AGREEMENT:   You have the right to help plan your care  Learn about your health condition and how it may be treated  Discuss treatment options with your caregivers to decide what care you want to receive  You always have the right to refuse treatment  The above information is an  only  It is not intended as medical advice for individual conditions or treatments  Talk to your doctor, nurse or pharmacist before following any medical regimen to see if it is safe and effective for you  © 2017 [address] [employer] Information is for End User's use only and may not be sold, redistributed or otherwise used for commercial purposes  All illustrations and images included in CareNotes® are the copyrighted property of [employer]  or Gilberto Gutierrez  Arthritis   AMBULATORY CARE:   Arthritis  is a disease that causes inflammation in one or more joints  There are many types of arthritis, such as osteoarthritis, rheumatoid arthritis, and septic arthritis  Some types cause inflammation in the joints  Other types wear away the cartilage between joints  This makes the bones of the joint rub together when you move the joint  Your symptoms may be constant, or symptoms may come and go  Arthritis often gets worse over time and can cause permanent joint damage    Common signs and symptoms of arthritis:   · Pain, swelling, or stiffness in the joint    · Limited range of motion in the joint    · Warmth or redness over the joint    · Tenderness when you touch the joint    · Stiff joints in the morning that loosen with movement    · A creaking or grinding sound when you move the joint    · Fever  Seek care immediately if:   · You have a fever and severe joint pain or swelling  · You cannot move the affected joint  · You have severe joint pain you cannot tolerate  Contact your healthcare provider if:   · Your pain or swelling does not get better with treatment  · You have questions or concerns about your condition or care  Treatment  will depend on the type of arthritis you have and if it is severe  You may need any of the following:  · Acetaminophen  decreases pain and fever  It is available without a doctor's order  Ask how much to take and how often to take it  Follow directions  Acetaminophen can cause liver damage if not taken correctly  · NSAIDs , such as ibuprofen, help decrease swelling, pain, and fever  This medicine is available with or without a doctor's order  NSAIDs can cause stomach bleeding or kidney problems in certain people  If you take blood thinner medicine, always ask your healthcare provider if NSAIDs are safe for you  Always read the medicine label and follow directions  · Steroid medicine  helps reduce swelling and pain  · Surgery  may be needed to repair or replace a damaged joint  Manage arthritis:   · Rest your painful joint so it can heal   Your healthcare provider may recommend crutches or a walker if the affected joint is in a leg  · Apply ice or heat to the joint  Both can help decrease swelling and pain  Ice may also help prevent tissue damage  Use an ice pack, or put crushed ice in a plastic bag  Cover it with a towel and place it on your joint for 15 to 20 minutes every hour or as directed  You can apply heat for 20 minutes every 2 hours  Heat treatment includes hot packs or heat lamps  · Elevate your joint  Elevation helps reduce swelling and pain   Raise your joint above the level of your heart as often as you can  Prop your painful joint on pillows to keep it above your heart comfortably  · Go to physical or occupational therapy as directed  A physical therapist can teach you exercises to improve flexibility and range of motion  You may also be shown non-weight-bearing exercises that are safe for your joints, such as swimming  Exercise can help keep your joints flexible and reduce pain  An occupational therapist can help you learn to do your daily activities when your joints are stiff or sore  · Maintain a healthy weight  Extra weight puts increased pressure on your joints  Ask your healthcare provider what you should weigh  If you need to lose weight, he can help you create a weight loss program  Weight loss can help reduce pain and increase your ability to do your activities  The amount of exercise you do may vary each day, depending on your symptoms  · Wear flat or low-heeled shoes  This will help decrease pain and reduce pressure on your ankle, knee, and hip joints  · Use support devices as directed  You may be given splints to wear on your hands to help your joints rest and to decrease inflammation  While you sleep, use a pillow that is firm enough to support your neck and head  Other equipment  that may help you move and prevent falls:  · Orthotic shoes or insoles  help support your feet when you walk  · Crutches, a cane, or a walker  may help decrease your risk for falling  They also decrease stress on affected joints  · Devices to prevent falls  include raised toilet seats and bathtub bars to help you get up from sitting  Handrails can be placed in areas where you need balance and support  Follow up with your healthcare provider or rheumatologist as directed:  Write down your questions so you remember to ask them during your visits    © 2017 [facility] Information is for End User's use only and may not be sold, redistributed or otherwise used for commercial purposes  All illustrations and images included in CareNotes® are the copyrighted property of [employer]  or Gilberto Gtuierrez  The above information is an  only  It is not intended as medical advice for individual conditions or treatments  Talk to your doctor, nurse or pharmacist before following any medical regimen to see if it is safe and effective for you

## 2020-10-20 NOTE — LETTER
January 27, 2021     Rickie Marsh, [address]    Patient: Jones Dong   YOB: 1980   Date of Visit: 10/20/2020       Dear Dr Reji Sanches:    Thank you for referring Jones Dong to me for evaluation  Below are my notes for this consultation  If you have questions, please do not hesitate to call me  I look forward to following your patient along with you  Sincerely,        Mookie Bacon MD        CC: No Recipients  Mookie Bacon MD  1/27/2021  9:52 AM  Signed  Assessment and Plan: Jones Dong is a 41 y o   female who presents as a Rheumatology consult referred by her PCP Beck Sullivan MD for evaluation of possible inflammatory arthritis  Inflammatory arthritis workup in the past was negative  SI joint x-rays ordered to workup patient's back pain but returned unremarkable  Her diffuse body pain and fibromyalgia tender points seems more consistent with fibromyalgia  Asked patient to follow-up with psychiatry regarding adding on nortriptyline for fibromyalgia  Hip bursitis exercises printed out for patient for her trochanteric bursitis found on physical exam  Her joint pain is likely secondary to osteoarthritis rather than inflammatory arthritis  Prescribed celecoxib 100mg po bid for joint pain  Plan:  Diagnoses and all orders for this visit:    Chronic bilateral low back pain without sciatica  -     XR sacroiliac joints < 3 views; Future    Lyme disease  -     Ambulatory referral to Rheumatology    Myalgia  -     Ambulatory referral to Rheumatology    Arthralgia of multiple joints  -     Ambulatory referral to Rheumatology  -     celecoxib (CeleBREX) 100 mg capsule; Take 1 capsule (100 mg total) by mouth 2 (two) times a day    Follow-up plan: Return to clinic in 6 months      HPI  Jones Dong is a 41 y o   female who presents as a Rheumatology consult referred by her PCP Beck Sullivan MD for evaluation of possible inflammatory arthritis  Patient used to see Dr Malika Valdez in the past; last clinic visit was 11/3/16; was diagnosed with fibromyalgia, gabapentin didn't help, was on Lyrica, which stopped helping a year ago  She then saw [facility] rheumatologist Dr Bob Coleman on 1/3018  She complains of upper and lower back pain and stiffness worse in morning and later in day; lasts 3 hours in the morning  Back pain is 7/10 in severity  Also admits that her elbows and knees get swollen and warm  Pain in shoulders, elbows, hips, and knees are 3/10 in severity  Has tried naproxen, Advil, Tylenol, aspirin, and meloxicam      Review of Systems  Review of Systems   Constitutional: Positive for fatigue  Negative for chills, fever and unexpected weight change  HENT: Positive for sinus pressure and sinus pain  Negative for mouth sores and trouble swallowing  Eyes: Positive for pain  Negative for visual disturbance  Respiratory: Positive for cough  Negative for shortness of breath  Cardiovascular: Positive for chest pain  Negative for leg swelling  Gastrointestinal: Positive for constipation  Negative for abdominal pain, blood in stool, diarrhea and nausea  Indigestion/heartburn   Endocrine: Positive for polydipsia  Musculoskeletal: Positive for arthralgias, back pain, myalgias and neck pain  Negative for joint swelling  Skin: Negative for color change and rash  Allergic/Immunologic: Positive for environmental allergies  Neurological: Positive for dizziness, weakness and headaches  Negative for numbness  Hematological: Positive for adenopathy  Bruises/bleeds easily  Psychiatric/Behavioral: Positive for dysphoric mood and sleep disturbance  The patient is nervous/anxious  Allergies  Allergies   Allergen Reactions    Amoxicillin-Pot Clavulanate     Decadrol [Dexamethasone] Other (See Comments)     Category:  Adverse Reaction;   psychosis    Dexamethasone Sodium Phosphate     Other     Penicillins Hives and Other (See Comments) Category: Allergy; Hives/Uticaria    Tetracycline Hives and Other (See Comments)     Hives/Uticaria    Tetracyclines & Related Hives     Category: Allergy;        Home Medications    Current Outpatient Medications:     Calcium Carbonate-Vitamin D (CALCIUM 500/D PO), Take 1 capsule by mouth daily, Disp: , Rfl:     Cholecalciferol (VITAMIN D-3) 1000 units CAPS, Take 1 capsule by mouth daily, Disp: , Rfl:     Galcanezumab-gnlm 120 MG/ML SOAJ, Inject 1 ml SC in each thigh or stomach for a total of two injections the first time  Then 1 ml SC every 30 days, Disp: 2 pen, Rfl: 3    IRON PO, Take by mouth, Disp: , Rfl:     lidocaine (LIDODERM) 5 %, APPLY 1 PATCH TOPICALLY DAILY REMOVE & DISCARD PATCH WITHIN 12 HOURS OR AS DIRECTED BY MD, Disp: 10 patch, Rfl: 1    MAGNESIUM OXIDE PO, Take by mouth, Disp: , Rfl:     medroxyPROGESTERone (DEPO-PROVERA) 150 mg/mL injection, INJECT MONTHLY FOR 6 MONTH, Disp: , Rfl: 5    Quercetin 250 MG TABS, Take 250 mg by mouth 2 (two) times a day, Disp: , Rfl:     rizatriptan (MAXALT) 10 MG tablet, TAKE 1 TABLET (10 MG TOTAL) BY MOUTH AS NEEDED FOR MIGRAINE FOR UP TO 15 DOSES TAKE ONE PILL AT THE ONSET OF HEADACHE, MAY REPEAT IN 2 HOURS IF NEEDED   PLEASE TAKE REGIMEN UP TO MAX 2X PER WEEK , Disp: 12 tablet, Rfl: 1    ARIPiprazole (ABILIFY) 2 mg tablet, Take 1 tablet (2 mg total) by mouth daily, Disp: 30 tablet, Rfl: 2    celecoxib (CeleBREX) 100 mg capsule, Take 1 capsule (100 mg total) by mouth 2 (two) times a day, Disp: 60 capsule, Rfl: 6    cyclobenzaprine (FLEXERIL) 10 mg tablet, TAKE 1 TABLET BY MOUTH AT BEDTIME AS NEEDED FOR MUSCLE SPASMS, Disp: 30 tablet, Rfl: 3    DULoxetine (CYMBALTA) 30 mg delayed release capsule, Take 1 capsule (30 mg total) by mouth daily, Disp: 30 capsule, Rfl: 2    DULoxetine (CYMBALTA) 60 mg delayed release capsule, Take 1 capsule (60 mg total) by mouth daily, Disp: 30 capsule, Rfl: 2    LORazepam (ATIVAN) 1 mg tablet, Take 0 5 tablets (0 5 mg total) by mouth every 6 (six) hours as needed for anxiety, Disp: 60 tablet, Rfl: 2    nortriptyline (PAMELOR) 10 mg capsule, TAKE 1 CAPSULE (10 MG TOTAL) BY MOUTH DAILY AT BEDTIME, Disp: 30 capsule, Rfl: 2    propranolol (INDERAL) 20 mg tablet, TAKE 1 TABLET BY MOUTH NIGHTLY, Disp: 30 tablet, Rfl: 1    zonisamide (ZONEGRAN) 100 mg capsule, Take 1 cap po qhs for 1 week, then 2 caps po qhs for 1 week then 3 caps po qhs for 1 week, Disp: 63 capsule, Rfl: 0    Past Medical History  Past Medical History:   Diagnosis Date    Anxiety     Chronic sinusitis     Depression     Fibromyalgia     Migraine     Obesity     Polyarthritis     Last assessed 9/21/2015    Psychiatric disorder        Past Surgical History   Past Surgical History:   Procedure Laterality Date    COLONOSCOPY  06/2019    DENTAL SURGERY      HYSTEROSCOPY      Endometrial Biopsy By Hysteroscopy    REMOVAL OF INTRAUTERINE DEVICE (IUD)      US GUIDED BREAST BIOPSY RIGHT COMPLETE Right 6/17/2019       Family History    Family History   Problem Relation Age of Onset    Diabetes Father     Kidney disease Father     Substance Abuse Brother     Diabetes Maternal Grandmother     Rheum arthritis Maternal Grandmother     Melanoma Maternal Grandmother     Kidney disease Paternal Grandmother     Rheum arthritis Paternal Grandmother     Depression Paternal Grandmother     Diabetes Paternal Grandfather     Lung cancer Paternal Grandfather     Substance Abuse Maternal Uncle     Prostate cancer Maternal Uncle 61    Depression Paternal Aunt     Alcohol abuse Family     Stomach cancer Family     Irregular heart beat Mother    father - stage V CKD from diabetes  Maternal grandmother - RA      Social History  Occupation: used to work as an  for an office, last worked 5 years ago  Social History     Substance and Sexual Activity   Alcohol Use No    Frequency: Never    Drinks per session: 1 or 2    Binge frequency: Never Comment: 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      Social History     Substance and Sexual Activity   Drug Use Not Currently     Social History     Tobacco Use   Smoking Status Never Smoker   Smokeless Tobacco Never Used       Objective:  Vitals:    10/20/20 0845   BP: 112/78   BP Location: Right arm   Patient Position: Sitting   Cuff Size: Standard   Temp: 98 4 °F (36 9 °C)   TempSrc: Temporal   Weight: 83 5 kg (184 lb)   Height: 5' (1 524 m)       Physical Exam  Constitutional:       General: She is not in acute distress  Appearance: She is well-developed  HENT:      Head: Normocephalic and atraumatic  Eyes:      General: Lids are normal  No scleral icterus  Conjunctiva/sclera: Conjunctivae normal    Neck:      Musculoskeletal: Neck supple  No muscular tenderness  Thyroid: No thyromegaly  Cardiovascular:      Rate and Rhythm: Normal rate and regular rhythm  Heart sounds: S1 normal and S2 normal  No murmur  No friction rub  Pulmonary:      Effort: Pulmonary effort is normal  No tachypnea or respiratory distress  Breath sounds: Normal breath sounds  No wheezing, rhonchi or rales  Musculoskeletal:         General: Tenderness present  Comments: paraspinal and spinal tenderness; diffuse fibromyalgia tender points; bilateral trochanteric bursa tenderness   Lymphadenopathy:      Head:      Right side of head: No submental or submandibular adenopathy  Left side of head: No submental or submandibular adenopathy  Cervical: No cervical adenopathy  Skin:     General: Skin is warm and dry  Findings: No rash  Nails: There is no clubbing  Neurological:      Mental Status: She is alert  Sensory: No sensory deficit  Psychiatric:         Behavior: Behavior normal  Behavior is cooperative  Reviewed labs and imaging      Imaging:   SI Joint x-rays 10/20/20  IMPRESSION:  Minimal asymmetry of the SI joints but without erosion or sclerosis  Disc space narrowing at L5-S1      Labs:   Admission on 06/30/2020, Discharged on 06/30/2020   Component Date Value Ref Range Status    WBC 06/30/2020 6 87  4 31 - 10 16 Thousand/uL Final    RBC 06/30/2020 4 23  3 81 - 5 12 Million/uL Final    Hemoglobin 06/30/2020 13 2  11 5 - 15 4 g/dL Final    Hematocrit 06/30/2020 39 6  34 8 - 46 1 % Final    MCV 06/30/2020 94  82 - 98 fL Final    MCH 06/30/2020 31 2  26 8 - 34 3 pg Final    MCHC 06/30/2020 33 3  31 4 - 37 4 g/dL Final    RDW 06/30/2020 13 8  11 6 - 15 1 % Final    MPV 06/30/2020 9 3  8 9 - 12 7 fL Final    Platelets 88/09/6007 197  149 - 390 Thousands/uL Final    nRBC 06/30/2020 0  /100 WBCs Final    Neutrophils Relative 06/30/2020 56  43 - 75 % Final    Immat GRANS % 06/30/2020 0  0 - 2 % Final    Lymphocytes Relative 06/30/2020 38  14 - 44 % Final    Monocytes Relative 06/30/2020 6  4 - 12 % Final    Eosinophils Relative 06/30/2020 0  0 - 6 % Final    Basophils Relative 06/30/2020 0  0 - 1 % Final    Neutrophils Absolute 06/30/2020 3 75  1 85 - 7 62 Thousands/µL Final    Immature Grans Absolute 06/30/2020 0 03  0 00 - 0 20 Thousand/uL Final    Lymphocytes Absolute 06/30/2020 2 63  0 60 - 4 47 Thousands/µL Final    Monocytes Absolute 06/30/2020 0 41  0 17 - 1 22 Thousand/µL Final    Eosinophils Absolute 06/30/2020 0 03  0 00 - 0 61 Thousand/µL Final    Basophils Absolute 06/30/2020 0 02  0 00 - 0 10 Thousands/µL Final    Sodium 06/30/2020 142  136 - 145 mmol/L Final    Potassium 06/30/2020 3 4* 3 5 - 5 3 mmol/L Final    Chloride 06/30/2020 112* 100 - 108 mmol/L Final    CO2 06/30/2020 20* 21 - 32 mmol/L Final    ANION GAP 06/30/2020 10  4 - 13 mmol/L Final    BUN 06/30/2020 12  5 - 25 mg/dL Final    Creatinine 06/30/2020 0 87  0 60 - 1 30 mg/dL Final    Standardized to IDMS reference method    Glucose 06/30/2020 116  65 - 140 mg/dL Final      If the patient is fasting, the ADA then defines impaired fasting glucose as > 100 mg/dL and diabetes as > or equal to 123 mg/dL  Specimen collection should occur prior to Sulfasalazine administration due to the potential for falsely depressed results  Specimen collection should occur prior to Sulfapyridine administration due to the potential for falsely elevated results   Calcium 06/30/2020 8 8  8 3 - 10 1 mg/dL Final    AST 06/30/2020 7  5 - 45 U/L Final      Specimen collection should occur prior to Sulfasalazine administration due to the potential for falsely depressed results   ALT 06/30/2020 16  12 - 78 U/L Final      Specimen collection should occur prior to Sulfasalazine and/or Sulfapyridine administration due to the potential for falsely depressed results   Alkaline Phosphatase 06/30/2020 66  46 - 116 U/L Final    Total Protein 06/30/2020 7 2  6 4 - 8 2 g/dL Final    Albumin 06/30/2020 3 9  3 5 - 5 0 g/dL Final    Total Bilirubin 06/30/2020 0 32  0 20 - 1 00 mg/dL Final      Use of this assay is not recommended for patients undergoing treatment with eltrombopag due to the potential for falsely elevated results      eGFR 06/30/2020 82  ml/min/1 73sq m Final    Lipase 06/30/2020 73  73 - 393 u/L Final    EXT PREG TEST UR (Ref: Negative) 06/30/2020 negative   Final    Control 06/30/2020 valid   Final    Color, UA 06/30/2020 Yellow   Final    Clarity, UA 06/30/2020 Clear   Final    pH, UA 06/30/2020 5 0  4 5 - 8 0 Final    Leukocytes, UA 06/30/2020 Trace* Negative Final    Nitrite, UA 06/30/2020 Negative  Negative Final    Protein, UA 06/30/2020 Negative  Negative mg/dl Final    Glucose, UA 06/30/2020 Negative  Negative mg/dl Final    Ketones, UA 06/30/2020 Negative  Negative mg/dl Final    Urobilinogen, UA 06/30/2020 0 2  0 2, 1 0 E U /dl E U /dl Final    Bilirubin, UA 06/30/2020 Negative  Negative Final    Blood, UA 06/30/2020 Trace* Negative Final    Specific [place], UA 06/30/2020 1 025  1 003 - 1 030 Final    RBC, UA 06/30/2020 4-10* None Seen, 0-5 /hpf Final    WBC, UA 06/30/2020 4-10* None Seen, 0-5, 5-55, 5-65 /hpf Final    Epithelial Cells 06/30/2020 Moderate* None Seen, Occasional /hpf Final    Bacteria, UA 06/30/2020 None Seen  None Seen, Occasional /hpf Final    Hyaline Casts, UA 06/30/2020 5-10* None Seen /lpf Final   Orders Only on 06/22/2020   Component Date Value Ref Range Status    Glucose, Random 06/22/2020 100* 65 - 99 mg/dL Final    Comment:               Fasting reference interval     For someone without known diabetes, a glucose value  between 100 and 125 mg/dL is consistent with  prediabetes and should be confirmed with a  follow-up test          BUN 06/22/2020 15  7 - 25 mg/dL Final    Creatinine 06/22/2020 1 09  0 50 - 1 10 mg/dL Final    eGFR Non  06/22/2020 63  > OR = 60 mL/min/1 73m2 Final    eGFR  06/22/2020 73  > OR = 60 mL/min/1 73m2 Final    SL AMB BUN/CREATININE RATIO 02/02/9094 NOT APPLICABLE  6 - 22 (calc) Final    Sodium 06/22/2020 138  135 - 146 mmol/L Final    Potassium 06/22/2020 3 6  3 5 - 5 3 mmol/L Final    Chloride 06/22/2020 108  98 - 110 mmol/L Final    CO2 06/22/2020 22  20 - 32 mmol/L Final    Calcium 06/22/2020 9 4  8 6 - 10 2 mg/dL Final    Protein, Total 06/22/2020 6 8  6 1 - 8 1 g/dL Final    Albumin 06/22/2020 4 5  3 6 - 5 1 g/dL Final    Globulin 06/22/2020 2 3  1 9 - 3 7 g/dL (calc) Final    Albumin/Globulin Ratio 06/22/2020 2 0  1 0 - 2 5 (calc) Final    TOTAL BILIRUBIN 06/22/2020 0 5  0 2 - 1 2 mg/dL Final    Alkaline Phosphatase 06/22/2020 60  31 - 125 U/L Final    AST 06/22/2020 14  10 - 30 U/L Final    ALT 06/22/2020 10  6 - 29 U/L Final    Sedimentation Rate 06/22/2020 6  < OR = 20 mm/h Final    White Blood Cell Count 06/22/2020 7 6  3 8 - 10 8 Thousand/uL Final    Red Blood Cell Count 06/22/2020 4 46  3 80 - 5 10 Million/uL Final    Hemoglobin 06/22/2020 14 1  11 7 - 15 5 g/dL Final    HCT 06/22/2020 41 4  35 0 - 45 0 % Final    MCV 06/22/2020 92 8  80 0 - 100 0 fL Final    MCH 06/22/2020 31 6  27 0 - 33 0 pg Final    MCHC 06/22/2020 34 1  32 0 - 36 0 g/dL Final    RDW 06/22/2020 12 9  11 0 - 15 0 % Final    Platelet Count 63/15/3899 220  140 - 400 Thousand/uL Final    SL AMB MPV 06/22/2020 9 6  7 5 - 12 5 fL Final    Neutrophils (Absolute) 06/22/2020 4,431  1,500 - 7,800 cells/uL Final    Lymphocytes (Absolute) 06/22/2020 2,706  850 - 3,900 cells/uL Final    Monocytes (Absolute) 06/22/2020 380  200 - 950 cells/uL Final    Eosinophils (Absolute) 06/22/2020 53  15 - 500 cells/uL Final    Basophils ABS 06/22/2020 30  0 - 200 cells/uL Final    Neutrophils 06/22/2020 58 3  % Final    Lymphocytes 06/22/2020 35 6  % Final    Monocytes 06/22/2020 5 0  % Final    Eosinophils 06/22/2020 0 7  % Final    Basophils PCT 06/22/2020 0 4  % Final    Lyme Ab Screen 06/22/2020 1 11* index Final    Comment:                    Index                Interpretation                     -----                --------------                     < 0 90               Negative                     0  90-1 09            Equivocal                     > 1 09               Positive      As recommended by the Food and Drug Administration   (FDA), all samples with positive or equivocal   results in a Borrelia burgdorferi antibody screen  will be tested using a blot method  Positive or   equivocal screening test results should not be   interpreted as truly positive until verified as such   using a supplemental assay (e g , B  burgdorferi blot)  The screening test and/or blot for B  burgdorferi   antibodies may be falsely negative in early stages  of Lyme disease, including the period when erythema   migrans is apparent           Lyme Disease Ab (IgG), Blot 06/22/2020 POSITIVE* NEGATIVE Final    Lyme 18 kD IgG 06/22/2020 REACTIVE*  Final    Lyme 23 kD IgG 06/22/2020 REACTIVE*  Final    Lyme 28 kD IgG 06/22/2020 NON-REACTIVE   Final    Lyme 30 kD IgG 06/22/2020 NON-REACTIVE   Final    Lyme 39 kD IgG 06/22/2020 REACTIVE*  Final    Lyme 41 kD IgG 06/22/2020 REACTIVE*  Final    Lyme 45 kD IgG 06/22/2020 NON-REACTIVE   Final    Lyme 58 kD IgG 06/22/2020 REACTIVE*  Final    Lyme 66 kD IgG 06/22/2020 NON-REACTIVE   Final    Lyme 93 kD IgG 06/22/2020 NON-REACTIVE   Final    Lyme Disease Ab (IgM), Blot 06/22/2020 NEGATIVE  NEGATIVE Final    Lyme 23 kD IgM 06/22/2020 NON-REACTIVE   Final    Lyme 39 kD IgM 06/22/2020 NON-REACTIVE   Final    Lyme 41 kD IgM 06/22/2020 NON-REACTIVE   Final    Comment: As per CDC criteria, a Lyme disease IgG Immunoblot must  show reactivity to at least 5 of 10 specific borrelial  proteins to be considered positive; similarly, a   positive Lyme disease IgM immunoblot requires  reactivity to 2 of 3 specific borrelial proteins  Although considered negative, IgG reactivity to fewer  specific borrelial proteins or IgM reactivity to only  1 protein may indicate recent B  burgdorferi infection  and warrant testing of a later sample  A positive IgM  but negative IgG result obtained more than a month  after onset of symptoms likely represents a false-  positive IgM result rather than acute Lyme disease  In rare instances, Lyme disease immunoblot reactivity  may represent antibodies induced by exposure to other  spirochetes  Lyme immunoblot testing should only be performed on  samples from patients who have had a Positive or  Equivocal result in a screening assay   MANJIT Screen, IFA 06/22/2020 NEGATIVE  NEGATIVE Final    Comment: MANJIT IFA is a first line screen for detecting the  presence of up to approximately 150 autoantibodies in  various autoimmune diseases  A negative MANJIT IFA result  suggests an MANJIT-associated autoimmune disease is not  present at this time, but is not definitive  If there  is high clinical suspicion for Sjogren's syndrome,  testing for anti-SS-A/Ro antibody should be considered    Anti-Cindy-1 antibody should be considered for clinically  suspected inflammatory myopathies  AC-0: Negative     International Consensus on MANJIT Patterns  (https://doi org/10 1515/lklz-3227-4738)     For additional information, please refer to  http://[email or web address]  (This link is being provided for informational/  educational purposes only )          C-Reactive Protein, Quant 06/22/2020 4 3  <8 0 mg/L Final    Vitamin D, 25-Hydroxy, Serum 06/22/2020 31  30 - 100 ng/mL Final    Comment: Vitamin D Status         25-OH Vitamin D:     Deficiency:                    <20 ng/mL  Insufficiency:             20 - 29 ng/mL  Optimal:                 > or = 30 ng/mL     For 25-OH Vitamin D testing on patients on   D2-supplementation and patients for whom quantitation   of D2 and D3 fractions is required, the QuestAssureD(TM)  25-OH VIT D, (D2,D3), LC/MS/MS is recommended: order   code 53553 (patients >2yrs)  See Note 1     Note 1     For additional information, please refer to   [email or web address]/AUU648   (This link is being provided for informational/  educational purposes only )      TSH W/RFX TO FREE T4 06/22/2020 2 20  mIU/L Final    Comment:           Reference Range                         > or = 20 Years  0 40-4 50                              Pregnancy Ranges            First trimester    0 26-2 66            Second trimester   0 55-2 73            Third trimester    0 43-2 91     Orders Only on 06/22/2020   Component Date Value Ref Range Status    Urine Culture 06/23/2020 80,000-89,000 cfu/ml    Final    Mixed Contaminants X4    Clarity, UA 06/23/2020 Cloudy   Final    Color, UA 06/23/2020 Yellow   Final    Specific [place], UA 06/23/2020 1 015  1 003 - 1 030 Final    pH, UA 06/23/2020 5 5  4 5, 5 0, 5 5, 6 0, 6 5, 7 0, 7 5, 8 0 Final    Glucose, UA 06/23/2020 Negative  Negative mg/dl Final    Ketones, UA 06/23/2020 Negative  Negative mg/dl Final    Blood, UA 06/23/2020 Negative  Negative Final  Protein, UA 06/23/2020 Negative  Negative mg/dl Final    Nitrite, UA 06/23/2020 Negative  Negative Final    Bilirubin, UA 06/23/2020 Negative  Negative Final    Urobilinogen, UA 06/23/2020 0 2  0 2, 1 0 E U /dl E U /dl Final    Leukocytes, UA 06/23/2020 Small* Negative Final    WBC, UA 06/23/2020 4-10* None Seen, 0-5, 5-55, 5-65 /hpf Final    RBC, UA 06/23/2020 None Seen  None Seen, 0-5 /hpf Final    Hyaline Casts, UA 06/23/2020 None Seen  None Seen /lpf Final    Bacteria, UA 06/23/2020 None Seen  None Seen, Occasional /hpf Final    Epithelial Cells 06/23/2020 Moderate* None Seen, Occasional /hpf Final

## 2020-10-21 ENCOUNTER — TELEPHONE (OUTPATIENT)
Dept: NEUROLOGY | Facility: CLINIC | Age: 40
End: 2020-10-21

## 2020-10-21 DIAGNOSIS — G43.709 CHRONIC MIGRAINE WITHOUT AURA WITHOUT STATUS MIGRAINOSUS, NOT INTRACTABLE: Primary | ICD-10-CM

## 2020-10-22 ENCOUNTER — OFFICE VISIT (OUTPATIENT)
Dept: PSYCHIATRY | Facility: CLINIC | Age: 40
End: 2020-10-22
Payer: COMMERCIAL

## 2020-10-22 ENCOUNTER — OFFICE VISIT (OUTPATIENT)
Dept: PHYSICAL THERAPY | Facility: CLINIC | Age: 40
End: 2020-10-22
Payer: COMMERCIAL

## 2020-10-22 DIAGNOSIS — M54.50 ACUTE MIDLINE LOW BACK PAIN WITHOUT SCIATICA: Primary | ICD-10-CM

## 2020-10-22 DIAGNOSIS — G43.109 MIGRAINE WITH AURA AND WITHOUT STATUS MIGRAINOSUS, NOT INTRACTABLE: ICD-10-CM

## 2020-10-22 DIAGNOSIS — M79.7 FIBROMYALGIA: Primary | ICD-10-CM

## 2020-10-22 DIAGNOSIS — F33.2 SEVERE RECURRENT MAJOR DEPRESSION WITHOUT PSYCHOTIC FEATURES (HCC): ICD-10-CM

## 2020-10-22 DIAGNOSIS — F41.1 GAD (GENERALIZED ANXIETY DISORDER): ICD-10-CM

## 2020-10-22 DIAGNOSIS — F33.2 MDD (MAJOR DEPRESSIVE DISORDER), RECURRENT SEVERE, WITHOUT PSYCHOSIS (HCC): ICD-10-CM

## 2020-10-22 PROCEDURE — 97110 THERAPEUTIC EXERCISES: CPT

## 2020-10-22 PROCEDURE — 97140 MANUAL THERAPY 1/> REGIONS: CPT

## 2020-10-22 PROCEDURE — 99213 OFFICE O/P EST LOW 20 MIN: CPT | Performed by: PSYCHIATRY & NEUROLOGY

## 2020-10-22 RX ORDER — ARIPIPRAZOLE 2 MG/1
2 TABLET ORAL DAILY
Qty: 30 TABLET | Refills: 2 | Status: SHIPPED | OUTPATIENT
Start: 2020-10-22 | End: 2021-01-20 | Stop reason: SDUPTHER

## 2020-10-22 RX ORDER — TOPIRAMATE 50 MG/1
TABLET, FILM COATED ORAL
Qty: 60 TABLET | Refills: 2 | OUTPATIENT
Start: 2020-10-22

## 2020-10-22 RX ORDER — DULOXETIN HYDROCHLORIDE 30 MG/1
30 CAPSULE, DELAYED RELEASE ORAL DAILY
Qty: 30 CAPSULE | Refills: 2 | Status: SHIPPED | OUTPATIENT
Start: 2020-10-22 | End: 2021-01-20 | Stop reason: SDUPTHER

## 2020-10-22 RX ORDER — TOPIRAMATE 50 MG/1
50 TABLET, FILM COATED ORAL 2 TIMES DAILY
Qty: 60 TABLET | Refills: 2 | Status: SHIPPED | OUTPATIENT
Start: 2020-10-22 | End: 2020-11-09

## 2020-10-22 RX ORDER — LORAZEPAM 1 MG/1
0.5 TABLET ORAL EVERY 6 HOURS PRN
Qty: 60 TABLET | Refills: 2 | Status: SHIPPED | OUTPATIENT
Start: 2020-10-22 | End: 2021-01-20 | Stop reason: SDUPTHER

## 2020-10-22 RX ORDER — NORTRIPTYLINE HYDROCHLORIDE 10 MG/1
10 CAPSULE ORAL
Qty: 30 CAPSULE | Refills: 2 | Status: SHIPPED | OUTPATIENT
Start: 2020-10-22 | End: 2021-01-19

## 2020-10-22 RX ORDER — TOPIRAMATE 50 MG/1
50 TABLET, FILM COATED ORAL 2 TIMES DAILY
Qty: 60 TABLET | Refills: 2 | Status: SHIPPED | OUTPATIENT
Start: 2020-10-22 | End: 2020-10-22 | Stop reason: SDUPTHER

## 2020-10-22 RX ORDER — DULOXETIN HYDROCHLORIDE 60 MG/1
60 CAPSULE, DELAYED RELEASE ORAL DAILY
Qty: 30 CAPSULE | Refills: 2 | Status: SHIPPED | OUTPATIENT
Start: 2020-10-22 | End: 2021-01-20 | Stop reason: SDUPTHER

## 2020-10-23 ENCOUNTER — IMMUNIZATIONS (OUTPATIENT)
Dept: FAMILY MEDICINE CLINIC | Facility: CLINIC | Age: 40
End: 2020-10-23
Payer: COMMERCIAL

## 2020-10-23 DIAGNOSIS — Z23 NEED FOR INFLUENZA VACCINATION: Primary | ICD-10-CM

## 2020-10-23 PROCEDURE — 90686 IIV4 VACC NO PRSV 0.5 ML IM: CPT

## 2020-10-23 PROCEDURE — 90471 IMMUNIZATION ADMIN: CPT

## 2020-10-27 ENCOUNTER — OFFICE VISIT (OUTPATIENT)
Dept: PHYSICAL THERAPY | Facility: CLINIC | Age: 40
End: 2020-10-27
Payer: COMMERCIAL

## 2020-10-27 DIAGNOSIS — M54.50 ACUTE MIDLINE LOW BACK PAIN WITHOUT SCIATICA: Primary | ICD-10-CM

## 2020-10-27 PROCEDURE — 97110 THERAPEUTIC EXERCISES: CPT

## 2020-10-27 PROCEDURE — 97140 MANUAL THERAPY 1/> REGIONS: CPT

## 2020-10-30 ENCOUNTER — EVALUATION (OUTPATIENT)
Dept: PHYSICAL THERAPY | Facility: CLINIC | Age: 40
End: 2020-10-30
Payer: COMMERCIAL

## 2020-10-30 ENCOUNTER — SOCIAL WORK (OUTPATIENT)
Dept: BEHAVIORAL/MENTAL HEALTH CLINIC | Facility: CLINIC | Age: 40
End: 2020-10-30
Payer: COMMERCIAL

## 2020-10-30 DIAGNOSIS — M54.50 ACUTE MIDLINE LOW BACK PAIN WITHOUT SCIATICA: Primary | ICD-10-CM

## 2020-10-30 DIAGNOSIS — F41.1 GENERALIZED ANXIETY DISORDER: ICD-10-CM

## 2020-10-30 DIAGNOSIS — F33.1 MAJOR DEPRESSIVE DISORDER, RECURRENT EPISODE, MODERATE (HCC): Primary | ICD-10-CM

## 2020-10-30 PROCEDURE — 97110 THERAPEUTIC EXERCISES: CPT | Performed by: PHYSICAL THERAPIST

## 2020-10-30 PROCEDURE — 97140 MANUAL THERAPY 1/> REGIONS: CPT | Performed by: PHYSICAL THERAPIST

## 2020-10-30 PROCEDURE — 90834 PSYTX W PT 45 MINUTES: CPT | Performed by: PSYCHIATRY & NEUROLOGY

## 2020-10-31 RX ORDER — PROPRANOLOL HYDROCHLORIDE 20 MG/1
TABLET ORAL
Qty: 30 TABLET | Refills: 1 | Status: SHIPPED | OUTPATIENT
Start: 2020-10-31 | End: 2020-12-18

## 2020-11-02 ENCOUNTER — TELEPHONE (OUTPATIENT)
Dept: NEUROLOGY | Facility: CLINIC | Age: 40
End: 2020-11-02

## 2020-11-03 ENCOUNTER — OFFICE VISIT (OUTPATIENT)
Dept: PHYSICAL THERAPY | Facility: CLINIC | Age: 40
End: 2020-11-03
Payer: COMMERCIAL

## 2020-11-03 DIAGNOSIS — M54.50 ACUTE MIDLINE LOW BACK PAIN WITHOUT SCIATICA: Primary | ICD-10-CM

## 2020-11-03 PROCEDURE — 97140 MANUAL THERAPY 1/> REGIONS: CPT

## 2020-11-03 PROCEDURE — 97110 THERAPEUTIC EXERCISES: CPT

## 2020-11-06 ENCOUNTER — APPOINTMENT (OUTPATIENT)
Dept: PHYSICAL THERAPY | Facility: CLINIC | Age: 40
End: 2020-11-06
Payer: COMMERCIAL

## 2020-11-08 DIAGNOSIS — G43.109 MIGRAINE WITH AURA AND WITHOUT STATUS MIGRAINOSUS, NOT INTRACTABLE: ICD-10-CM

## 2020-11-09 RX ORDER — TOPIRAMATE 50 MG/1
TABLET, FILM COATED ORAL
Qty: 90 TABLET | Refills: 2 | Status: SHIPPED | OUTPATIENT
Start: 2020-11-09 | End: 2021-01-20

## 2020-11-10 ENCOUNTER — OFFICE VISIT (OUTPATIENT)
Dept: PHYSICAL THERAPY | Facility: CLINIC | Age: 40
End: 2020-11-10
Payer: COMMERCIAL

## 2020-11-10 DIAGNOSIS — M54.50 ACUTE MIDLINE LOW BACK PAIN WITHOUT SCIATICA: Primary | ICD-10-CM

## 2020-11-10 PROCEDURE — 97140 MANUAL THERAPY 1/> REGIONS: CPT

## 2020-11-10 PROCEDURE — 97110 THERAPEUTIC EXERCISES: CPT

## 2020-11-12 ENCOUNTER — SOCIAL WORK (OUTPATIENT)
Dept: BEHAVIORAL/MENTAL HEALTH CLINIC | Facility: CLINIC | Age: 40
End: 2020-11-12
Payer: COMMERCIAL

## 2020-11-12 DIAGNOSIS — F41.1 GENERALIZED ANXIETY DISORDER: ICD-10-CM

## 2020-11-12 DIAGNOSIS — F33.2 SEVERE RECURRENT MAJOR DEPRESSION WITHOUT PSYCHOTIC FEATURES (HCC): Primary | ICD-10-CM

## 2020-11-12 PROCEDURE — 90834 PSYTX W PT 45 MINUTES: CPT | Performed by: PSYCHIATRY & NEUROLOGY

## 2020-11-13 ENCOUNTER — OFFICE VISIT (OUTPATIENT)
Dept: PHYSICAL THERAPY | Facility: CLINIC | Age: 40
End: 2020-11-13
Payer: COMMERCIAL

## 2020-11-13 DIAGNOSIS — M54.50 ACUTE MIDLINE LOW BACK PAIN WITHOUT SCIATICA: Primary | ICD-10-CM

## 2020-11-13 PROCEDURE — 97110 THERAPEUTIC EXERCISES: CPT

## 2020-11-13 PROCEDURE — 97140 MANUAL THERAPY 1/> REGIONS: CPT

## 2020-11-17 ENCOUNTER — OFFICE VISIT (OUTPATIENT)
Dept: PHYSICAL THERAPY | Facility: CLINIC | Age: 40
End: 2020-11-17
Payer: COMMERCIAL

## 2020-11-17 DIAGNOSIS — M54.50 ACUTE MIDLINE LOW BACK PAIN WITHOUT SCIATICA: Primary | ICD-10-CM

## 2020-11-17 PROCEDURE — 97110 THERAPEUTIC EXERCISES: CPT

## 2020-11-17 PROCEDURE — 97140 MANUAL THERAPY 1/> REGIONS: CPT

## 2020-11-20 ENCOUNTER — OFFICE VISIT (OUTPATIENT)
Dept: PHYSICAL THERAPY | Facility: CLINIC | Age: 40
End: 2020-11-20
Payer: COMMERCIAL

## 2020-11-20 DIAGNOSIS — M54.50 ACUTE MIDLINE LOW BACK PAIN WITHOUT SCIATICA: Primary | ICD-10-CM

## 2020-11-20 PROCEDURE — 97110 THERAPEUTIC EXERCISES: CPT

## 2020-11-20 PROCEDURE — 97140 MANUAL THERAPY 1/> REGIONS: CPT

## 2020-11-24 ENCOUNTER — OFFICE VISIT (OUTPATIENT)
Dept: PHYSICAL THERAPY | Facility: CLINIC | Age: 40
End: 2020-11-24
Payer: COMMERCIAL

## 2020-11-24 DIAGNOSIS — M54.50 ACUTE MIDLINE LOW BACK PAIN WITHOUT SCIATICA: Primary | ICD-10-CM

## 2020-11-24 PROCEDURE — 97140 MANUAL THERAPY 1/> REGIONS: CPT

## 2020-11-24 PROCEDURE — 97110 THERAPEUTIC EXERCISES: CPT

## 2020-11-27 ENCOUNTER — OFFICE VISIT (OUTPATIENT)
Dept: PHYSICAL THERAPY | Facility: CLINIC | Age: 40
End: 2020-11-27
Payer: COMMERCIAL

## 2020-11-27 DIAGNOSIS — M54.50 ACUTE MIDLINE LOW BACK PAIN WITHOUT SCIATICA: Primary | ICD-10-CM

## 2020-11-27 PROCEDURE — 97110 THERAPEUTIC EXERCISES: CPT

## 2020-11-27 PROCEDURE — 97140 MANUAL THERAPY 1/> REGIONS: CPT

## 2020-12-02 ENCOUNTER — EVALUATION (OUTPATIENT)
Dept: PHYSICAL THERAPY | Facility: CLINIC | Age: 40
End: 2020-12-02
Payer: COMMERCIAL

## 2020-12-02 ENCOUNTER — APPOINTMENT (OUTPATIENT)
Dept: PHYSICAL THERAPY | Facility: CLINIC | Age: 40
End: 2020-12-02
Payer: COMMERCIAL

## 2020-12-02 DIAGNOSIS — M54.50 ACUTE MIDLINE LOW BACK PAIN WITHOUT SCIATICA: Primary | ICD-10-CM

## 2020-12-02 PROCEDURE — 97110 THERAPEUTIC EXERCISES: CPT | Performed by: PHYSICAL THERAPIST

## 2020-12-02 PROCEDURE — 97112 NEUROMUSCULAR REEDUCATION: CPT | Performed by: PHYSICAL THERAPIST

## 2020-12-02 PROCEDURE — 97140 MANUAL THERAPY 1/> REGIONS: CPT | Performed by: PHYSICAL THERAPIST

## 2020-12-16 ENCOUNTER — TELEMEDICINE (OUTPATIENT)
Dept: BEHAVIORAL/MENTAL HEALTH CLINIC | Facility: CLINIC | Age: 40
End: 2020-12-16
Payer: COMMERCIAL

## 2020-12-16 DIAGNOSIS — F41.1 GENERALIZED ANXIETY DISORDER: ICD-10-CM

## 2020-12-16 DIAGNOSIS — F33.1 MAJOR DEPRESSIVE DISORDER, RECURRENT EPISODE, MODERATE (HCC): Primary | ICD-10-CM

## 2020-12-16 PROCEDURE — 90834 PSYTX W PT 45 MINUTES: CPT | Performed by: PSYCHIATRY & NEUROLOGY

## 2020-12-17 DIAGNOSIS — G43.709 CHRONIC MIGRAINE WITHOUT AURA WITHOUT STATUS MIGRAINOSUS, NOT INTRACTABLE: ICD-10-CM

## 2020-12-18 DIAGNOSIS — M62.838 MUSCLE SPASM: ICD-10-CM

## 2020-12-18 RX ORDER — CYCLOBENZAPRINE HCL 10 MG
TABLET ORAL
Qty: 30 TABLET | Refills: 3 | Status: SHIPPED | OUTPATIENT
Start: 2020-12-18 | End: 2021-04-12

## 2020-12-18 RX ORDER — PROPRANOLOL HYDROCHLORIDE 20 MG/1
TABLET ORAL
Qty: 30 TABLET | Refills: 1 | Status: SHIPPED | OUTPATIENT
Start: 2020-12-18 | End: 2021-02-23 | Stop reason: SDUPTHER

## 2021-01-06 ENCOUNTER — TELEMEDICINE (OUTPATIENT)
Dept: BEHAVIORAL/MENTAL HEALTH CLINIC | Facility: CLINIC | Age: 41
End: 2021-01-06
Payer: COMMERCIAL

## 2021-01-06 DIAGNOSIS — F41.1 GENERALIZED ANXIETY DISORDER: ICD-10-CM

## 2021-01-06 DIAGNOSIS — F33.2 SEVERE RECURRENT MAJOR DEPRESSION WITHOUT PSYCHOTIC FEATURES (HCC): Primary | ICD-10-CM

## 2021-01-06 PROCEDURE — 90834 PSYTX W PT 45 MINUTES: CPT | Performed by: PSYCHIATRY & NEUROLOGY

## 2021-01-06 NOTE — PSYCH
This note was not shared with the patient due to this is a psychotherapy note    Virtual Regular Visit  Session time 9785-6651 (total time 33 minutes)    Assessment/Plan:    Problem List Items Addressed This Visit        Other    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 psychotic features ([facility] [address] ) - Primary    Generalized anxiety disorder               Reason for visit is No chief complaint on file  Encounter provider HERMELINDA Watkins    Provider located at [address] [place] [address] [address]  [facility] [id number]      Recent Visits  No visits were found meeting these conditions  Showing recent visits within past 7 days and meeting all other requirements     Future Appointments  No visits were found meeting these conditions  Showing future appointments within next 150 days and meeting all other requirements        The patient was identified by name and date of birth  John Guevara was informed that this is a telemedicine visit and that the visit is being conducted through [employer] and patient was informed that this is a secure, HIPAA-compliant platform  She agrees to proceed     My office door was closed  No one else was in the room  She acknowledged consent and understanding of privacy and security of the video platform  The patient has agreed to participate and understands they can discontinue the visit at any time  Patient is aware this is a billable service  Subjective  John Guevara is a 35 y o  female presenting for follow up  [facility] shared that she has been doing somewhat better lately, with her [employer] account restored and making a bit more money on that, as well as feeling more motivated to work on downsizing and organizing her second room to create her reading space  She also is no longer doing physical therapy, so she is in less pain right now, which helps her mood    She noted that she is not currently dating but would like to be, but has been reaching out to friends and keeping more in touch with them  Although she is typically the initiator of contact, she said that she understands that her friends are busy, that she used to be the one that they had to reach out to, and now it is her turn to be the one to reach out  Collin Vizcarra shared that the holidays were quiet and relaxing with her mom home to be a buffer between herself and her father, but that her father has been "picking on" her more lately for little things like blowing her nose too loudly  Therefore, she tries not to spend too much time around him  She also noted that her maternal grandmother  a year ago New Year's Aster, so she and her mom were reminiscing about her, struggling with some sadness and grief about their loss  Encouraged Collin Vizcarra to take the time she needs to remember and talk about her grandmother, to process her grief  A: Esther overall seems to be making some improvements in both managing her anxiety and depression  She was more cheerful today, and did not have as much on her mind to talk about today, as she has more positive things happening right now that are keeping her focused  P: Collin Vizcarra will continue to reach out socially, will work on [employer] and organizing her space for quiet time, and will try to set aside her father's negative comments to her in order to continue with her positive mood  She will follow up in Feb as scheduled, unless a cancellation allows for earlier appointment        HPI     Past Medical History:   Diagnosis Date    Anxiety     Chronic sinusitis     Depression     Fibromyalgia     Migraine     Obesity     Polyarthritis     Last assessed 2015    Psychiatric disorder        Past Surgical History:   Procedure Laterality Date    COLONOSCOPY  2019   [facility] DENTAL SURGERY      HYSTEROSCOPY      Endometrial Biopsy By Hysteroscopy    REMOVAL OF INTRAUTERINE DEVICE (IUD)      US GUIDED BREAST BIOPSY RIGHT COMPLETE Right 2019 Current Outpatient Medications   Medication Sig Dispense Refill    ARIPiprazole (ABILIFY) 2 mg tablet Take 1 tablet (2 mg total) by mouth daily 30 tablet 2    Calcium Carbonate-Vitamin D (CALCIUM 500/D PO) Take 1 capsule by mouth daily      celecoxib (CeleBREX) 100 mg capsule Take 1 capsule (100 mg total) by mouth 2 (two) times a day 60 capsule 6    Cholecalciferol (VITAMIN D-3) 1000 units CAPS Take 1 capsule by mouth daily      cyclobenzaprine (FLEXERIL) 10 mg tablet TAKE 1 TABLET BY MOUTH AT BEDTIME AS NEEDED FOR MUSCLE SPASMS 30 tablet 3    DULoxetine (CYMBALTA) 30 mg delayed release capsule Take 1 capsule (30 mg total) by mouth daily 30 capsule 2    DULoxetine (CYMBALTA) 60 mg delayed release capsule Take 1 capsule (60 mg total) by mouth daily 30 capsule 2    Galcanezumab-gnlm 120 MG/ML SOAJ Inject 1 ml SC in each thigh or stomach for a total of two injections the first time  Then 1 ml SC every 30 days 2 pen 3    IRON PO Take by mouth      lidocaine (LIDODERM) 5 % APPLY 1 PATCH TOPICALLY DAILY REMOVE & DISCARD PATCH WITHIN 12 HOURS OR AS DIRECTED BY MD 10 patch 1    LORazepam (ATIVAN) 1 mg tablet Take 0 5 tablets (0 5 mg total) by mouth every 6 (six) hours as needed for anxiety 60 tablet 2    MAGNESIUM OXIDE PO Take by mouth      medroxyPROGESTERone (DEPO-PROVERA) 150 mg/mL injection INJECT MONTHLY FOR 6 MONTH  5    nortriptyline (PAMELOR) 10 mg capsule Take 1 capsule (10 mg total) by mouth daily at bedtime 30 capsule 2    propranolol (INDERAL) 20 mg tablet TAKE 1 TABLET BY MOUTH NIGHTLY 30 tablet 1    Quercetin 250 MG TABS Take 250 mg by mouth 2 (two) times a day      rizatriptan (MAXALT) 10 MG tablet TAKE 1 TABLET (10 MG TOTAL) BY MOUTH AS NEEDED FOR MIGRAINE FOR UP TO 15 DOSES TAKE ONE PILL AT THE ONSET OF HEADACHE, MAY REPEAT IN 2 HOURS IF NEEDED  PLEASE TAKE REGIMEN UP TO MAX 2X PER WEEK   12 tablet 1    topiramate (TOPAMAX) 50 MG tablet TAKE 1 PILL (50 MG) IN THE MORNING AND TAKE 2 PILLS (100MG) AT NIGHT  90 tablet 2     No current facility-administered medications for this visit  Allergies   Allergen Reactions    Amoxicillin-Pot Clavulanate     Decadrol [Dexamethasone] Other (See Comments)     Category: Adverse Reaction;   psychosis    Dexamethasone Sodium Phosphate     Other     Penicillins Hives and Other (See Comments)     Category: Allergy; Hives/Uticaria    Tetracycline Hives and Other (See Comments)     Hives/Uticaria    Tetracyclines & Related Hives     Category: Allergy; Review of Systems    Video Exam    There were no vitals filed for this visit  Physical Exam     I spent 33 minutes directly with the patient during this visit      VIRTUAL VISIT DISCLAIMER    Barbie Serra acknowledges that she has consented to an online visit or consultation  She understands that the online visit is based solely on information provided by her, and that, in the absence of a face-to-face physical evaluation by the physician, the diagnosis she receives is both limited and provisional in terms of accuracy and completeness  This is not intended to replace a full medical face-to-face evaluation by the physician  Barbie Serra understands and accepts these terms

## 2021-01-08 ENCOUNTER — TELEPHONE (OUTPATIENT)
Dept: PSYCHIATRY | Facility: CLINIC | Age: 41
End: 2021-01-08

## 2021-01-08 NOTE — TELEPHONE ENCOUNTER
Ailyn Ortiz [facility] requesting a refill for lorazepam, she spoke to the pharmacy Monday and they were going to request a refill for her  Medication list reviewed and spoke with [facility]  The prescription from 10/22/20 was on hold  She was able to process and will be ready later today  I spoke with Ailyn Ortiz and reviewed the above

## 2021-01-18 DIAGNOSIS — M79.7 FIBROMYALGIA: ICD-10-CM

## 2021-01-19 RX ORDER — NORTRIPTYLINE HYDROCHLORIDE 10 MG/1
10 CAPSULE ORAL
Qty: 30 CAPSULE | Refills: 2 | Status: SHIPPED | OUTPATIENT
Start: 2021-01-19 | End: 2021-04-15

## 2021-01-20 ENCOUNTER — OFFICE VISIT (OUTPATIENT)
Dept: PSYCHIATRY | Facility: CLINIC | Age: 41
End: 2021-01-20
Payer: COMMERCIAL

## 2021-01-20 DIAGNOSIS — E66.9 OBESITY (BMI 35.0-39.9 WITHOUT COMORBIDITY): Primary | ICD-10-CM

## 2021-01-20 DIAGNOSIS — F33.2 SEVERE RECURRENT MAJOR DEPRESSION WITHOUT PSYCHOTIC FEATURES (HCC): ICD-10-CM

## 2021-01-20 DIAGNOSIS — F33.2 MDD (MAJOR DEPRESSIVE DISORDER), RECURRENT SEVERE, WITHOUT PSYCHOSIS (HCC): ICD-10-CM

## 2021-01-20 DIAGNOSIS — F41.1 GAD (GENERALIZED ANXIETY DISORDER): ICD-10-CM

## 2021-01-20 PROCEDURE — 99213 OFFICE O/P EST LOW 20 MIN: CPT | Performed by: PSYCHIATRY & NEUROLOGY

## 2021-01-20 RX ORDER — DULOXETIN HYDROCHLORIDE 30 MG/1
30 CAPSULE, DELAYED RELEASE ORAL DAILY
Qty: 30 CAPSULE | Refills: 2 | Status: SHIPPED | OUTPATIENT
Start: 2021-01-20 | End: 2021-04-20

## 2021-01-20 RX ORDER — ARIPIPRAZOLE 2 MG/1
2 TABLET ORAL DAILY
Qty: 30 TABLET | Refills: 2 | Status: SHIPPED | OUTPATIENT
Start: 2021-01-20 | End: 2021-04-20

## 2021-01-20 RX ORDER — LORAZEPAM 1 MG/1
0.5 TABLET ORAL EVERY 6 HOURS PRN
Qty: 60 TABLET | Refills: 2 | Status: SHIPPED | OUTPATIENT
Start: 2021-01-20 | End: 2021-04-20 | Stop reason: SDUPTHER

## 2021-01-20 RX ORDER — ZONISAMIDE 100 MG/1
CAPSULE ORAL
Qty: 63 CAPSULE | Refills: 0 | Status: SHIPPED | OUTPATIENT
Start: 2021-01-20 | End: 2021-02-13

## 2021-01-20 RX ORDER — DULOXETIN HYDROCHLORIDE 60 MG/1
60 CAPSULE, DELAYED RELEASE ORAL DAILY
Qty: 30 CAPSULE | Refills: 2 | Status: SHIPPED | OUTPATIENT
Start: 2021-01-20 | End: 2021-04-20

## 2021-01-20 NOTE — PSYCH
Subjective: Medication Management      Patient ID: Giovana Solitario is a 35 y o  female  HPI ROS Appetite Changes and Sleep: normal appetite, normal energy level, no weight change and normal number of sleep hours   Patient remains compliant with medications and denies side effects  No recent health changes or new medications  She has tolerated Nortriptyline at bedtime and is sleeping better  She continues Topamax but does not feel benefits when it comes to weight loss  Will switch to Zonegran for weight loss  She agrees to follow up in 3 months or sooner if needed  Review Of Systems:     Mood Anxiety and Depression   Behavior Normal    Thought Content Disturbing Thoughts, Feelings   General Emotional Problems and Decreased Functioning   Personality Normal   Other Psych Symptoms Normal   Constitutional Negative   ENT Negative   Cardiovascular Negative   Respiratory Negative   Gastrointestinal Negative   Genitourinary Negative   Musculoskeletal Negative   Integumentary Negative   Neurological Negative   Endocrine Normal    Other Symptoms Normal              Laboratory Results: No results found for this or any previous visit      Substance Abuse History:  Social History     Substance and Sexual Activity   Drug Use Not Currently       Family Psychiatric History:   Family History   Problem Relation Age of Onset    Diabetes Father     Kidney disease Father     Substance Abuse Brother     Diabetes Maternal Grandmother     Rheum arthritis Maternal Grandmother     Melanoma Maternal Grandmother     Kidney disease Paternal Grandmother     Rheum arthritis Paternal Grandmother     Depression Paternal Grandmother     Diabetes Paternal Grandfather     Lung cancer Paternal Grandfather     Substance Abuse Maternal Uncle     Prostate cancer Maternal Uncle 61    Depression Paternal Aunt     Alcohol abuse Family     Stomach cancer Family     Irregular heart beat Mother        The following portions of the patient's history were reviewed and updated as appropriate: allergies, current medications, past family history, past medical history, past social history, past surgical history and problem list     Social History     Socioeconomic History    Marital status: Single     Spouse name: Not on file    Number of children: 0    Years of education: 15 years     Highest education level: GED or equivalent   Occupational History    Occupation: unemployed   Social Needs    Financial resource strain: Hard    Food insecurity     Worry: Never true     Inability: Never true    Transportation needs     Medical: Yes     Non-medical: Yes   Tobacco Use    Smoking status: Never Smoker    Smokeless tobacco: Never Used   Substance and Sexual Activity    Alcohol use: No     Frequency: Never     Drinks per session: 1 or 2     Binge frequency: Never     Comment: 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     Drug use: Not Currently    Sexual activity: Not Currently   Lifestyle    Physical activity     Days per week: 5 days     Minutes per session: 30 min    Stress:  To some extent   Relationships    Social connections     Talks on phone: More than three times a week     Gets together: More than three times a week     Attends Jewish service: More than 4 times per year     Active member of club or organization: Yes     Attends meetings of clubs or organizations: More than 4 times per year     Relationship status: Never     Intimate partner violence     Fear of current or ex partner: No     Emotionally abused: No     Physically abused: No     Forced sexual activity: No   Other Topics Concern    Not on file   Social History Narrative    Caffeine use     Social History     Social History Narrative    Caffeine use       Objective:       Mental status:  Appearance calm and cooperative , adequate hygiene and grooming and good eye contact    Mood dysphoric and depressed   Affect affect was constricted   Speech a normal rate and fluent   Thought Processes coherent/organized and normal thought processes   Hallucinations no hallucinations present    Thought Content no delusions   Abnormal Thoughts no suicidal thoughts  and no homicidal thoughts    Orientation  oriented to person and place and time   Remote Memory short term memory intact and long term memory intact   Attention Span concentration impaired   Intellect Appears to be of Average Intelligence   Insight Limited insight   Judgement judgment was limited   Muscle Strength Muscle strength and tone were normal and Normal gait    Language no difficulty naming common objects, no difficulty repeating a phrase  and no difficulty writing a sentence    Fund of Knowledge displays adequate knowledge of current events, adequate fund of knowledge regarding past history and adequate fund of knowledge regarding vocabulary                Assessment/Plan:       Diagnoses and all orders for this visit:    Obesity (BMI 35 0-39 9 without comorbidity)  -     zonisamide (ZONEGRAN) 100 mg capsule; Take 1 cap po qhs for 1 week, then 2 caps po qhs for 1 week then 3 caps po qhs for 1 week    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 psychotic features (HCC)  -     ARIPiprazole (ABILIFY) 2 mg tablet; Take 1 tablet (2 mg total) by mouth daily    MDD (major depressive disorder), recurrent severe, without psychosis (HCC)  -     DULoxetine (CYMBALTA) 30 mg delayed release capsule; Take 1 capsule (30 mg total) by mouth daily  -     DULoxetine (CYMBALTA) 60 mg delayed release capsule; Take 1 capsule (60 mg total) by mouth daily    CAROL (generalized anxiety disorder)  -     LORazepam (ATIVAN) 1 mg tablet;  Take 0 5 tablets (0 5 mg total) by mouth every 6 (six) hours as needed for anxiety            Treatment Recommendations- Risks Benefits      Immediate Medical/Psychiatric/Psychotherapy Treatments and Any Precautions: continue current treatment     Risks, Benefits And Possible Side Effects Of Medications:  {PSYCH RISK, BENEFITS AND POSSIBLE SIDE EFFECTS (Optional):11257    Controlled Medication Discussion: Discussed with patient Black Box warning on concurrent use of benzodiazepines and opioid medications including sedation, respiratory depression, coma and death  Patient understands the risk of treatment with benzodiazepines in addition to opioids and wants to continue taking those medications  , Discussed with patient the risks of sedation, respiratory depression, impairment of ability to drive and potential for abuse and addiction related to treatment with benzodiazepine medications  The patient understands risk of treatment with benzodiazepine medications, agrees to not drive if feels impaired and agrees to take medications as prescribed  and The patient has been filling controlled prescriptions on time as prescribed to [facility]  program       Psychotherapy Provided:     Individual psychotherapy provided: Yes  Counseling was provided during the session today for 16 minutes  Medications, treatment progress and treatment plan reviewed with Emmanuel Sesay  Medication changes discussed with Esther  Medication education provided to Emmanuel Sesay  Coping strategies including compliance with medications, exercising, maintain healthy diet, maintain heathy sleeping hygiene and maintain positive attitude reviewed with Esther  Importance of medication and treatment compliance reviewed with Esther  Educated on importance of medication and treatment compliance  Importance of follow up with family physician for medical issues reviewed with Emmanuel Sesay  Supportive therapy provided

## 2021-02-12 DIAGNOSIS — E66.9 OBESITY (BMI 35.0-39.9 WITHOUT COMORBIDITY): ICD-10-CM

## 2021-02-13 RX ORDER — ZONISAMIDE 100 MG/1
300 CAPSULE ORAL DAILY
Qty: 90 CAPSULE | Refills: 2 | Status: SHIPPED | OUTPATIENT
Start: 2021-02-13 | End: 2021-03-02

## 2021-02-22 ENCOUNTER — TELEPHONE (OUTPATIENT)
Dept: BEHAVIORAL/MENTAL HEALTH CLINIC | Facility: CLINIC | Age: 41
End: 2021-02-22

## 2021-02-22 NOTE — TELEPHONE ENCOUNTER
[facility] canceled her appointment today at 1200 due to migraine   ----- Message from Wilma Fiore sent at 2/22/2021 11:26 AM EST -----  Regarding: Cancellation  Migraine

## 2021-02-23 DIAGNOSIS — G43.709 CHRONIC MIGRAINE WITHOUT AURA WITHOUT STATUS MIGRAINOSUS, NOT INTRACTABLE: ICD-10-CM

## 2021-02-23 RX ORDER — PROPRANOLOL HYDROCHLORIDE 20 MG/1
TABLET ORAL
Qty: 30 TABLET | Refills: 1 | Status: SHIPPED | OUTPATIENT
Start: 2021-02-23 | End: 2021-04-15

## 2021-02-23 NOTE — TELEPHONE ENCOUNTER
Medication refill check list    Correct patient? yes   Correct medication name, dose, and pill size? yes   Correct provider? yes   Last and Next appt  scheduled? Yes, last date 10/13/20 & next date 05/25/21   Right pharmacy listed? yes   Correct quantity for 30 or 90 days? yes   Is the patient out of refills? When was it last prescribed? Yes, last date 12/18/20   Directions match what the patient says they are taking?  yes   Enough refills? (none for controlled substances, 1 year for routine medications) yes

## 2021-03-01 ENCOUNTER — TELEPHONE (OUTPATIENT)
Dept: PSYCHIATRY | Facility: CLINIC | Age: 41
End: 2021-03-01

## 2021-03-01 NOTE — TELEPHONE ENCOUNTER
Rocio Isaacs called and stated for about a week now she has had a bad headache  She is thinking this is from the Zonisamide she was prescribed  She is asking for direction on how to wean off of this medication       Please review

## 2021-03-01 NOTE — TELEPHONE ENCOUNTER
Spoke with renea and advised to wean off by 100 mg per week as instructed by Dr Ricci Taylor verbalized understanding of same and will follow up with nursing

## 2021-03-02 ENCOUNTER — HOSPITAL ENCOUNTER (EMERGENCY)
Facility: HOSPITAL | Age: 41
Discharge: HOME | End: 2021-03-02
Attending: EMERGENCY MEDICINE
Payer: COMMERCIAL

## 2021-03-02 ENCOUNTER — NURSE TRIAGE (OUTPATIENT)
Dept: OTHER | Facility: OTHER | Age: 41
End: 2021-03-02

## 2021-03-02 ENCOUNTER — TELEPHONE (OUTPATIENT)
Dept: FAMILY MEDICINE CLINIC | Facility: CLINIC | Age: 41
End: 2021-03-02

## 2021-03-02 VITALS
SYSTOLIC BLOOD PRESSURE: 142 MMHG | HEART RATE: 94 BPM | OXYGEN SATURATION: 99 % | HEIGHT: 60 IN | TEMPERATURE: 98.2 F | DIASTOLIC BLOOD PRESSURE: 85 MMHG | WEIGHT: 185 LBS | BODY MASS INDEX: 36.32 KG/M2 | RESPIRATION RATE: 16 BRPM

## 2021-03-02 DIAGNOSIS — G44.209 ACUTE NON INTRACTABLE TENSION-TYPE HEADACHE: Primary | ICD-10-CM

## 2021-03-02 PROCEDURE — 99284 EMERGENCY DEPT VISIT MOD MDM: CPT | Performed by: EMERGENCY MEDICINE

## 2021-03-02 PROCEDURE — 96365 THER/PROPH/DIAG IV INF INIT: CPT

## 2021-03-02 PROCEDURE — 99283 EMERGENCY DEPT VISIT LOW MDM: CPT

## 2021-03-02 PROCEDURE — 96375 TX/PRO/DX INJ NEW DRUG ADDON: CPT

## 2021-03-02 RX ORDER — MEDROXYPROGESTERONE ACETATE 150 MG/ML
INJECTION, SUSPENSION INTRAMUSCULAR
COMMUNITY
Start: 2020-12-13 | End: 2021-10-05

## 2021-03-02 RX ORDER — KETOROLAC TROMETHAMINE 30 MG/ML
15 INJECTION, SOLUTION INTRAMUSCULAR; INTRAVENOUS ONCE
Status: COMPLETED | OUTPATIENT
Start: 2021-03-02 | End: 2021-03-02

## 2021-03-02 RX ORDER — MAGNESIUM SULFATE HEPTAHYDRATE 40 MG/ML
2 INJECTION, SOLUTION INTRAVENOUS ONCE
Status: COMPLETED | OUTPATIENT
Start: 2021-03-02 | End: 2021-03-02

## 2021-03-02 RX ORDER — TOPIRAMATE 50 MG/1
50 TABLET, FILM COATED ORAL 2 TIMES DAILY
COMMUNITY
Start: 2021-02-09 | End: 2021-03-14

## 2021-03-02 RX ORDER — METOCLOPRAMIDE HYDROCHLORIDE 5 MG/ML
10 INJECTION INTRAMUSCULAR; INTRAVENOUS ONCE
Status: COMPLETED | OUTPATIENT
Start: 2021-03-02 | End: 2021-03-02

## 2021-03-02 RX ADMIN — KETOROLAC TROMETHAMINE 15 MG: 30 INJECTION, SOLUTION INTRAMUSCULAR at 20:46

## 2021-03-02 RX ADMIN — METOCLOPRAMIDE 10 MG: 5 INJECTION, SOLUTION INTRAMUSCULAR; INTRAVENOUS at 20:46

## 2021-03-02 RX ADMIN — SODIUM CHLORIDE 1000 ML: 0.9 INJECTION, SOLUTION INTRAVENOUS at 20:49

## 2021-03-02 RX ADMIN — MAGNESIUM SULFATE HEPTAHYDRATE 2 G: 40 INJECTION, SOLUTION INTRAVENOUS at 20:47

## 2021-03-02 NOTE — TELEPHONE ENCOUNTER
I spoke to patient  She is established with Neurology and will see them again in May  She said Advil and Tramadol do not relieve her symptoms, but she said that Tylenol #3 with Codeine and Hydrocodone do work  I stated that we would not prescribe a narcotic for headaches  She is prescribed Rizatriptan but said she can't take it while she is also on a beta blocker    Please advise?

## 2021-03-02 NOTE — TELEPHONE ENCOUNTER
Chelsey Lancaster called again today and LM on nursing line  She said the headaches are so bad she has to lay in bed all day  She is wondering if she could get something ordered for the headaches  OTC pain reliever are ineffective       Chelsey Lancaster [phone]

## 2021-03-02 NOTE — TELEPHONE ENCOUNTER
Please call patient  Check what medication she has been taking previously for headaches  Check if patient has been followed by Neurology

## 2021-03-02 NOTE — TELEPHONE ENCOUNTER
Patient is on Zonisamide from her psychiatrist   Florencia Vasquez it is causing her headaches  She called psych office and was told this is a side effect from this med and to call PCP to get something for her headaches

## 2021-03-02 NOTE — TELEPHONE ENCOUNTER
Tam Quijano opted to call her PCP and see what they recommend   She said Ibuprofen does not work for her

## 2021-03-03 NOTE — ED PROVIDER NOTES
History  Chief Complaint   Patient presents with    Headache     pt c/o headache starting Saturday  Pt has hx of migraines but unable to take usual medication due to starting a beta blocker  HPI    40-year-old woman with a history of headaches presents to the emergency department for headache  Patient has been having her current headache since Saturday  She feels throbbing pain on the top of her head  She tried treating the headache with acetaminophen and CBD oil without any significant improvement  The patient has been seen in the emergency department previously for headaches and says she normally gets relief with a migraine cocktail  She was recently started on an appetite suppressant by her psychiatrist, who also told her that this could increase the frequency/intensity of her headaches  Her psychiatrist recently decreased the dose to see if that would help  Patient also follows with a neurologist for her headaches, although says she is not currently on any kind of abortive medicines for headaches  She has no visual changes, nausea, vomiting, numbness or weakness in extremities, difficulty with balance  She has not been febrile  No neck pain  No falls/head injuries  Prior to Admission Medications   Prescriptions Last Dose Informant Patient Reported? Taking?    ARIPiprazole (ABILIFY) 2 mg tablet   No No   Sig: Take 1 tablet (2 mg total) by mouth daily   Calcium Carbonate-Vitamin D (CALCIUM 500/D PO)  Self Yes No   Sig: Take 1 capsule by mouth daily   Cholecalciferol (VITAMIN D-3) 1000 units CAPS  Self Yes No   Sig: Take 1 capsule by mouth daily   DULoxetine (CYMBALTA) 30 mg delayed release capsule   No No   Sig: Take 1 capsule (30 mg total) by mouth daily   DULoxetine (CYMBALTA) 60 mg delayed release capsule   No No   Sig: Take 1 capsule (60 mg total) by mouth daily   Galcanezumab-gnlm 120 MG/ML SOAJ   No No   Sig: Inject 1 ml SC in each thigh or stomach for a total of two injections the first time  Then 1 ml SC every 30 days   IRON PO  Self Yes No   Sig: Take by mouth   LORazepam (ATIVAN) 1 mg tablet   No No   Sig: Take 0 5 tablets (0 5 mg total) by mouth every 6 (six) hours as needed for anxiety   MAGNESIUM OXIDE PO  Self Yes No   Sig: Take by mouth   Quercetin 250 MG TABS  Self Yes No   Sig: Take 250 mg by mouth 2 (two) times a day   celecoxib (CeleBREX) 100 mg capsule   No No   Sig: Take 1 capsule (100 mg total) by mouth 2 (two) times a day   cyclobenzaprine (FLEXERIL) 10 mg tablet   No No   Sig: TAKE 1 TABLET BY MOUTH AT BEDTIME AS NEEDED FOR MUSCLE SPASMS   lidocaine (LIDODERM) 5 %   No No   Sig: APPLY 1 PATCH TOPICALLY DAILY REMOVE & DISCARD PATCH WITHIN 12 HOURS OR AS DIRECTED BY MD   medroxyPROGESTERone (DEPO-PROVERA) 150 mg/mL injection  Self Yes No   Sig: INJECT MONTHLY FOR 6 MONTH   medroxyPROGESTERone acetate (DEPO-PROVERA SYRINGE) 150 mg/mL injection   Yes No   Sig: INJECT MONTHLY X 6 MONTHS, BRING TO OFFICE   nortriptyline (PAMELOR) 10 mg capsule   No No   Sig: TAKE 1 CAPSULE (10 MG TOTAL) BY MOUTH DAILY AT BEDTIME   propranolol (INDERAL) 20 mg tablet   No No   Sig: TAKE 1 TABLET BY MOUTH NIGHTLY   rizatriptan (MAXALT) 10 MG tablet   No No   Sig: TAKE 1 TABLET (10 MG TOTAL) BY MOUTH AS NEEDED FOR MIGRAINE FOR UP TO 15 DOSES TAKE ONE PILL AT THE ONSET OF HEADACHE, MAY REPEAT IN 2 HOURS IF NEEDED  PLEASE TAKE REGIMEN UP TO MAX 2X PER WEEK     topiramate (TOPAMAX) 50 MG tablet   Yes No   Sig: Take 50 mg by mouth 2 (two) times a day      Facility-Administered Medications: None       Past Medical History:   Diagnosis Date    Anxiety     Chronic sinusitis     Depression     Fibromyalgia     Migraine     Obesity     Polyarthritis     Last assessed 9/21/2015    Psychiatric disorder        Past Surgical History:   Procedure Laterality Date    [facility]OSCOPY  06/2019   [facility]      HYSTEROSCOPY      Endometrial Biopsy By Hysteroscopy    REMOVAL OF INTRAUTERINE DEVICE (IUD)      US GUIDED BREAST BIOPSY RIGHT COMPLETE Right 6/17/2019       Family History   Problem Relation Age of Onset    Diabetes Father     Kidney disease Father     Substance Abuse Brother     Diabetes Maternal Grandmother     Rheum arthritis Maternal Grandmother     Melanoma Maternal Grandmother     Kidney disease Paternal Grandmother     Rheum arthritis Paternal Grandmother     Depression Paternal Grandmother     Diabetes Paternal Grandfather     Lung cancer Paternal Grandfather     Substance Abuse Maternal Uncle     Prostate cancer Maternal Uncle 61    Depression Paternal Aunt     Alcohol abuse Family     Stomach cancer Family     Irregular heart beat Mother      I have reviewed and agree with the history as documented  E-Cigarette/Vaping    E-Cigarette Use Never User      E-Cigarette/Vaping Substances    Nicotine No     THC No     CBD No      Social History     Tobacco Use    Smoking status: Never Smoker    Smokeless tobacco: Never Used   Substance Use Topics    Alcohol use: No     Frequency: Never     Drinks per session: 1 or 2     Binge frequency: Never     Comment: 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     Drug use: Not Currently        Review of Systems   Constitutional: Negative for chills and fever  Respiratory: Negative for shortness of breath  Cardiovascular: Negative for chest pain  Gastrointestinal: Negative for abdominal pain, nausea and vomiting  Musculoskeletal: Negative for arthralgias, myalgias, neck pain and neck stiffness  Neurological: Positive for headaches  Negative for dizziness, weakness, light-headedness and numbness  All other systems reviewed and are negative        Physical Exam  ED Triage Vitals   Temperature Pulse Respirations Blood Pressure SpO2   03/02/21 2000 03/02/21 2007 03/02/21 2007 03/02/21 2007 03/02/21 2007   98 2 °F (36 8 °C) 94 16 142/85 99 %      Temp Source Heart Rate Source Patient Position - Orthostatic VS BP Location FiO2 (%)   03/02/21 2000 03/02/21 2007 03/02/21 2007 03/02/21 2007 --   Oral Monitor Lying Right arm       Pain Score       03/02/21 2008       8             Orthostatic Vital Signs  Vitals:    03/02/21 2007   BP: 142/85   Pulse: 94   Patient Position - Orthostatic VS: Lying       Physical Exam  Vitals signs and nursing note reviewed  Constitutional:       General: She is not in acute distress  Appearance: She is well-developed  She is not diaphoretic  HENT:      Head: Normocephalic and atraumatic  Eyes:      General: No scleral icterus  Conjunctiva/sclera: Conjunctivae normal       Pupils: Pupils are equal, round, and reactive to light  Comments: Visual fields grossly intact  Neck:      Musculoskeletal: Normal range of motion and neck supple  Cardiovascular:      Rate and Rhythm: Normal rate and regular rhythm  Heart sounds: No murmur  No friction rub  No gallop  Pulmonary:      Breath sounds: Normal breath sounds  No wheezing or rales  Abdominal:      General: There is no distension  Palpations: Abdomen is soft  Tenderness: There is no abdominal tenderness  There is no guarding or rebound  Musculoskeletal: Normal range of motion  General: No tenderness  Skin:     General: Skin is warm and dry  Coloration: Skin is not pale  Findings: No erythema  Neurological:      Mental Status: She is alert and oriented to person, place, and time  Cranial Nerves: No cranial nerve deficit  Sensory: No sensory deficit  Motor: No abnormal muscle tone     Psychiatric:         Behavior: Behavior normal          ED Medications  Medications   ketorolac (TORADOL) injection 15 mg (15 mg Intravenous Given 3/2/21 2046)   metoclopramide (REGLAN) injection 10 mg (10 mg Intravenous Given 3/2/21 2046)   sodium chloride 0 9 % bolus 1,000 mL (0 mL Intravenous Stopped 3/2/21 2149)   magnesium sulfate 2 g/50 mL IVPB (premix) 2 g (0 g Intravenous Stopped 3/2/21 2149) Diagnostic Studies  Results Reviewed     None                 No orders to display         Procedures  Procedures      ED Course                                       MDM  Number of Diagnoses or Management Options  Acute non intractable tension-type headache: new and does not require workup     Amount and/or Complexity of Data Reviewed  Decide to obtain previous medical records or to obtain history from someone other than the patient: yes  Review and summarize past medical records: yes    Patient Progress  Patient progress: improved     47-year-old woman here with headache  Patient has no focal neurologic deficit  She has normal vitals  No thunderclap onset concerning for [address]  No infectious signs/symptoms concerning for meningitis  Will treat symptomatically with IV fluids, ketorolac, 2 g of IV magnesium  Patiental ready took acetaminophen prior to arrival     Patient reassessed prior to to discharge and had improvement with above intervention  She will follow-up with neurology discuss abortive medications for headaches  Disposition  Final diagnoses:   Acute non intractable tension-type headache     Time reflects when diagnosis was documented in both MDM as applicable and the Disposition within this note     Time User Action Codes Description Comment    3/2/2021 10:04 PM Neena Aguilar [[id number] [id number]] Acute non intractable tension-type headache       ED Disposition     ED Disposition Condition Date/Time Comment    Discharge Good Tue Mar 2, 2021 10:04 PM Trevon Sinha discharge to home/self care              Follow-up Information     Follow up With Specialties Details Why Contact Info    [address], DO Neurology Schedule an appointment as soon as possible for a visit  Headache managment [address] [place] [address] [address]  [phone]            Discharge Medication List as of 3/2/2021 10:05 PM      CONTINUE these medications which have NOT CHANGED    Details   ARIPiprazole (ABILIFY) 2 mg tablet Take 1 tablet (2 mg total) by mouth daily, Starting Wed 1/20/2021, Normal      Calcium Carbonate-Vitamin D (CALCIUM 500/D PO) Take 1 capsule by mouth daily, Historical Med      celecoxib (CeleBREX) 100 mg capsule Take 1 capsule (100 mg total) by mouth 2 (two) times a day, Starting Tue 10/20/2020, Normal      Cholecalciferol (VITAMIN D-3) 1000 units CAPS Take 1 capsule by mouth daily, Historical Med      cyclobenzaprine (FLEXERIL) 10 mg tablet TAKE 1 TABLET BY MOUTH AT BEDTIME AS NEEDED FOR MUSCLE SPASMS, Normal      !! DULoxetine (CYMBALTA) 30 mg delayed release capsule Take 1 capsule (30 mg total) by mouth daily, Starting Wed 1/20/2021, Normal      !! DULoxetine (CYMBALTA) 60 mg delayed release capsule Take 1 capsule (60 mg total) by mouth daily, Starting Wed 1/20/2021, Normal      Galcanezumab-gnlm 120 MG/ML SOAJ Inject 1 ml SC in each thigh or stomach for a total of two injections the first time   Then 1 ml SC every 30 days, Normal      IRON PO Take by mouth, Historical Med      lidocaine (LIDODERM) 5 % APPLY 1 PATCH TOPICALLY DAILY REMOVE & DISCARD PATCH WITHIN 12 HOURS OR AS DIRECTED BY MD, Starting Mon 10/5/2020, Normal      LORazepam (ATIVAN) 1 mg tablet Take 0 5 tablets (0 5 mg total) by mouth every 6 (six) hours as needed for anxiety, Starting Wed 1/20/2021, Normal      MAGNESIUM OXIDE PO Take by mouth, Historical Med      medroxyPROGESTERone (DEPO-PROVERA) 150 mg/mL injection INJECT MONTHLY FOR 6 MONTH, Historical Med      medroxyPROGESTERone acetate (DEPO-PROVERA SYRINGE) 150 mg/mL injection INJECT MONTHLY X 6 MONTHS, BRING TO OFFICE, Historical Med      nortriptyline (PAMELOR) 10 mg capsule TAKE 1 CAPSULE (10 MG TOTAL) BY MOUTH DAILY AT BEDTIME, Starting Tue 1/19/2021, Normal      propranolol (INDERAL) 20 mg tablet TAKE 1 TABLET BY MOUTH NIGHTLY, Normal      Quercetin 250 MG TABS Take 250 mg by mouth 2 (two) times a day, Historical Med      rizatriptan (MAXALT) 10 MG tablet TAKE 1 TABLET (10 MG TOTAL) BY MOUTH AS NEEDED FOR MIGRAINE FOR UP TO 15 DOSES TAKE ONE PILL AT THE ONSET OF HEADACHE, MAY REPEAT IN 2 HOURS IF NEEDED  PLEASE TAKE REGIMEN UP TO MAX 2X PER WEEK , Starting Fri 10/9/2020, Normal      topiramate (TOPAMAX) 50 MG tablet Take 50 mg by mouth 2 (two) times a day, Starting Tue 2/9/2021, Historical Med       !! - Potential duplicate medications found  Please discuss with provider  No discharge procedures on file  PDMP Review       Value Time User    PDMP Reviewed  Yes 1/20/2021  9:50 AM Winston Montague MD           ED Provider  Attending physically available and evaluated Carmencita Munson I managed the patient along with the ED Attending      Electronically Signed by         Jose David Adrian MD  03/04/21 [id number]

## 2021-03-03 NOTE — ED ATTENDING ATTESTATION
3/2/2021  IJalen MD, saw and evaluated the patient  I have discussed the patient with the resident/non-physician practitioner and agree with the resident's/non-physician practitioner's findings, Plan of Care, and MDM as documented in the resident's/non-physician practitioner's note, except where noted  All available labs and Radiology studies were reviewed  I was present for key portions of any procedure(s) performed by the resident/non-physician practitioner and I was immediately available to provide assistance  At this point I agree with the current assessment done in the Emergency Department  I have conducted an independent evaluation of this patient a history and physical is as follows:    ED Course     Patient presents for evaluation due to several days of a headache  Patient states that she has a history of migraines and usually has improvement with a migraine cocktail  Patient was recently started on Zonegran as an appetite suppressant and believes that this is the cause of her headache  No additional complaints  A/P:  Headache  Patient with improvement after assuring cocktail  Will discharge with outpatient follow-up      Critical Care Time  Procedures

## 2021-03-03 NOTE — TELEPHONE ENCOUNTER
Reason for Disposition   [1] SEVERE headache (e g , excruciating) AND [2] not improved after 2 hours of pain medicine    Answer Assessment - Initial Assessment Questions  1  LOCATION: "Where does it hurt?"       "mostly around the top and in the back kind of like a tension headache but worse"  2  ONSET: "When did the headache start?" (Minutes, hours or days)       Over the weekend  3  PATTERN: "Does the pain come and go, or has it been constant since it started?"      constant  4  SEVERITY: "How bad is the pain?" and "What does it keep you from doing?"  (e g , Scale 1-10; mild, moderate, or severe)    - MILD (1-3): doesn't interfere with normal activities     - MODERATE (4-7): interferes with normal activities or awakens from sleep     - SEVERE (8-10): excruciating pain, unable to do any normal activities         7  5  RECURRENT SYMPTOM: "Have you ever had headaches before?" If so, ask: "When was the last time?" and "What happened that time?"       Usually doesn't get headaches like this usually migraines  6  CAUSE: "What do you think is causing the headache?"      The medication she has been taking from psychiatrist   7  MIGRAINE: "Have you been diagnosed with migraine headaches?" If so, ask: "Is this headache similar?"       Yes- no feels different  8  HEAD INJURY: "Has there been any recent injury to the head?"       denies  9  OTHER SYMPTOMS: "Do you have any other symptoms?" (fever, stiff neck, eye pain, sore throat, cold symptoms)      denies  10   PREGNANCY: "Is there any chance you are pregnant?" "When was your last menstrual period?"        denies    Protocols used: HEADACHE-ADULT-

## 2021-03-03 NOTE — TELEPHONE ENCOUNTER
Regarding: Headache  ----- Message from Kapil Sorto sent at 3/2/2021  7:01 PM EST -----  " I have a headache"

## 2021-03-03 NOTE — TELEPHONE ENCOUNTER
I received a message from answering service  Patient c/o severe headache  Recommended to go to ER for evaluation  Patient agreed

## 2021-03-03 NOTE — TELEPHONE ENCOUNTER
Kevin Abreu @ / Esther Griffith  [birth year]/ c/o of severe headache- spoke with nurse in office today who advised narcotics would not be prescribed, patient unsure what to do "doesn't feel like a normal migraine" being weaned of medication by psychiatry causing the headaches- multiple medication interactions per pt tylenol or anything OTC does not work for her- please advise

## 2021-03-12 DIAGNOSIS — G43.109 MIGRAINE WITH AURA, NOT INTRACTABLE, WITHOUT STATUS MIGRAINOSUS: ICD-10-CM

## 2021-03-14 RX ORDER — TOPIRAMATE 50 MG/1
TABLET, FILM COATED ORAL
Qty: 60 TABLET | Refills: 2 | Status: SHIPPED | OUTPATIENT
Start: 2021-03-14 | End: 2021-04-20

## 2021-03-19 ENCOUNTER — TELEPHONE (OUTPATIENT)
Dept: PSYCHIATRY | Facility: CLINIC | Age: 41
End: 2021-03-19

## 2021-03-19 ENCOUNTER — TELEMEDICINE (OUTPATIENT)
Dept: BEHAVIORAL/MENTAL HEALTH CLINIC | Facility: CLINIC | Age: 41
End: 2021-03-19
Payer: COMMERCIAL

## 2021-03-19 DIAGNOSIS — F33.1 MAJOR DEPRESSIVE DISORDER, RECURRENT EPISODE, MODERATE (HCC): Primary | ICD-10-CM

## 2021-03-19 DIAGNOSIS — F41.1 GENERALIZED ANXIETY DISORDER: ICD-10-CM

## 2021-03-19 PROCEDURE — 90834 PSYTX W PT 45 MINUTES: CPT | Performed by: PSYCHIATRY & NEUROLOGY

## 2021-03-19 NOTE — TELEPHONE ENCOUNTER
Kristy Contreras called and LM on nursing line  She said she has discontinued the Ziprasidone and her headaches are better  Now she is having trouble sleeping  She is asking if she can take her Trazadone she has and if there are any interactions she needs to be worried about       Please review

## 2021-03-19 NOTE — PSYCH
This note was not shared with the patient due to this is a psychotherapy note    Virtual Regular Visit  It was my intent to perform this visit via video technology but the patient was not able to do a video connection so the visit was completed via audio telephone only  Session time [id number] (total time 31 minutes)    Assessment/Plan:    Problem List Items Addressed This Visit        Other    Major depressive disorder, recurrent episode, moderate (HCC) - Primary    Generalized anxiety disorder               Reason for visit is No chief complaint on file  Encounter provider HERMELINDA Elaine    Provider located at [address] [place] [id number] [phone]      Recent Visits  No visits were found meeting these conditions  Showing recent visits within past 7 days and meeting all other requirements     Future Appointments  No visits were found meeting these conditions  Showing future appointments within next 150 days and meeting all other requirements        The patient was identified by name and date of birth  Varinder Rodriguez was informed that this is a telemedicine visit and that the visit is being conducted through telephone  My office door was closed  No one else was in the room  She acknowledged consent and understanding of privacy and security of the video platform  The patient has agreed to participate and understands they can discontinue the visit at any time  Patient is aware this is a billable service  Subjective  Varinder Rodriguez is a 35 y o  female presenting for follow up  Shalom Gr shared that things are going well for her, 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, and 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  She has been reaching out to friends, and recently asked a elier friend over for dinner in the hopes of dating    She noted that things at home are "good," about the same as before with her father, so she continues to spend as little time around him as possible  She said that overall, her attitude has been better, trying not to take others' irritability or stress personally, as she recognizes that everyone is having a difficult time right now  She also said that she is building her self-confidence, learning a lot about what she is capable of doing with her new business  Encouraged Esther's positive shift in mindset and her efforts at building her confidence  A: Chika Olivares presented as excited and euthymic today, with a bright sound to her voice and much more positive statements about herself than in past sessions  She seems motivated to continue to take action to improve her life, and is more confident in herself  P: Chika Olivares will continue to focus on building her business and becoming more financially stable, and will continue to focus on positive attitude  She will return in two weeks for follow up        HPI     Past Medical History:   Diagnosis Date    Anxiety     Chronic sinusitis     Depression     Fibromyalgia     Migraine     Obesity     Polyarthritis     Last assessed 9/21/2015    Psychiatric disorder        Past Surgical History:   Procedure Laterality Date    COLONOSCOPY  06/2019    DENTAL SURGERY      HYSTEROSCOPY      Endometrial Biopsy By Hysteroscopy    REMOVAL OF INTRAUTERINE DEVICE (IUD)      US GUIDED BREAST BIOPSY RIGHT COMPLETE Right 6/17/2019       Current Outpatient Medications   Medication Sig Dispense Refill    ARIPiprazole (ABILIFY) 2 mg tablet Take 1 tablet (2 mg total) by mouth daily 30 tablet 2    Calcium Carbonate-Vitamin D (CALCIUM 500/D PO) Take 1 capsule by mouth daily      celecoxib (CeleBREX) 100 mg capsule Take 1 capsule (100 mg total) by mouth 2 (two) times a day 60 capsule 6    Cholecalciferol (VITAMIN D-3) 1000 units CAPS Take 1 capsule by mouth daily      cyclobenzaprine (FLEXERIL) 10 mg tablet TAKE 1 TABLET BY MOUTH AT BEDTIME AS NEEDED FOR MUSCLE SPASMS 30 tablet 3    DULoxetine (CYMBALTA) 30 mg delayed release capsule Take 1 capsule (30 mg total) by mouth daily 30 capsule 2    DULoxetine (CYMBALTA) 60 mg delayed release capsule Take 1 capsule (60 mg total) by mouth daily 30 capsule 2    Galcanezumab-gnlm 120 MG/ML SOAJ Inject 1 ml SC in each thigh or stomach for a total of two injections the first time  Then 1 ml SC every 30 days 2 pen 3    IRON PO Take by mouth      lidocaine (LIDODERM) 5 % APPLY 1 PATCH TOPICALLY DAILY REMOVE & DISCARD PATCH WITHIN 12 HOURS OR AS DIRECTED BY MD 10 patch 1    LORazepam (ATIVAN) 1 mg tablet Take 0 5 tablets (0 5 mg total) by mouth every 6 (six) hours as needed for anxiety 60 tablet 2    MAGNESIUM OXIDE PO Take by mouth      medroxyPROGESTERone (DEPO-PROVERA) 150 mg/mL injection INJECT MONTHLY FOR 6 MONTH  5    medroxyPROGESTERone acetate (DEPO-PROVERA SYRINGE) 150 mg/mL injection INJECT MONTHLY X 6 MONTHS, BRING TO OFFICE      nortriptyline (PAMELOR) 10 mg capsule TAKE 1 CAPSULE (10 MG TOTAL) BY MOUTH DAILY AT BEDTIME 30 capsule 2    propranolol (INDERAL) 20 mg tablet TAKE 1 TABLET BY MOUTH NIGHTLY 30 tablet 1    Quercetin 250 MG TABS Take 250 mg by mouth 2 (two) times a day      rizatriptan (MAXALT) 10 MG tablet TAKE 1 TABLET (10 MG TOTAL) BY MOUTH AS NEEDED FOR MIGRAINE FOR UP TO 15 DOSES TAKE ONE PILL AT THE ONSET OF HEADACHE, MAY REPEAT IN 2 HOURS IF NEEDED  PLEASE TAKE REGIMEN UP TO MAX 2X PER WEEK  12 tablet 1    topiramate (TOPAMAX) 50 MG tablet TAKE 1 TABLET BY MOUTH TWICE A DAY 60 tablet 2     No current facility-administered medications for this visit  Allergies   Allergen Reactions    Amoxicillin-Pot Clavulanate     Decadrol [Dexamethasone] Other (See Comments)     Category: Adverse Reaction;   psychosis    Dexamethasone Sodium Phosphate     Other     Penicillins Hives and Other (See Comments)     Category: Allergy;    Hives/Uticaria    Tetracycline Hives and Other (See Comments)     Hives/Uticaria    Tetracyclines & Related Hives     Category: Allergy; Review of Systems    Video Exam    There were no vitals filed for this visit  Physical Exam     I spent 31 minutes directly with the patient during this visit      VIRTUAL VISIT DISCLAIMER    Marilyn Cosme acknowledges that she has consented to an online visit or consultation  She understands that the online visit is based solely on information provided by her, and that, in the absence of a face-to-face physical evaluation by the physician, the diagnosis she receives is both limited and provisional in terms of accuracy and completeness  This is not intended to replace a full medical face-to-face evaluation by the physician  Marilyn Cosme understands and accepts these terms

## 2021-03-19 NOTE — BH TREATMENT PLAN
Roldan Jean  1980        Date of Initial Treatment Plan:  8/21/2018   Date of Current Treatment Plan: 03/19/21    Treatment Plan Number  7    Strengths/Personal Resources for Self Care: " witty, smart, funny creative, caring, resourceful, good hair;"  Diagnosis:   1  Major depressive disorder, recurrent episode, moderate ([facility] [address] )     2  Generalized anxiety disorder         Area of Needs: depression/anxiety/chronic pain    Long Term Goal 1: AI want to continue to effectively manage the depression and the anxiety  Target Date: 7/19/2021  Completion Date: n/a         Short Term Objectives for Goal 1: A I will continue to be aware of the depression/anxiety and negative self talk messages and redirect to positive and realistic messages (lower depression/anxiety)  B I will continue to work with Dr Jacob Porras on medication management  C I will continue to make sure I am taking care of myself  D: I will continue to reach out to friends to stay socially connected     Long Term Goal 2: I will continue to work on strengthening my self esteem  Target Date: 7/19/2021  Completion Date: N/A    Short Term Objectives for Goal 2: AI will remain aware of those who have impacted me positively and disregard the messages from those who were negative influences  B  I will review and use my qualities/strengths/assets as reminders of my worth  C  I will continue with self care (including using my effective communication skills such as assertiveness)  D  I will continue to explore options to improve my quality of life  Long Term Goal # 3: I will continue to learn and implement effective pain management strategies  Target Date: 7/19/2021  Completion Date: N/A    Short Term Objectives for Goal 3: AI will use effective pain management strategies   B   I will explore medical cannibis (to include discussing the matter with Dr Jacob Porras), C: I will explore yoga as a possible pain management strategy     GOAL 1: Modality: Individual 2x per month   Completion Date n/a and The person(s) responsible for carrying out the plan is  Esther    GOAL 2: Modality: Individual 2x per month   Completion Date n/a and The person(s) responsible for carrying out the plan is  Esther     GOAL 3: Modality: Individual 2x per month   Completion Date n/a and The person(s) responsible for carrying out the plan is  Dwightddingchyna 24: Diagnosis and Treatment Plan explained to Georgiana Montalvo relates understanding diagnosis and is agreeable to Treatment Plan  yes      Client Comments : Please share your thoughts, feelings, need and/or experiences regarding your treatment plan: n/a    **Verbal consent provided today due to Jeni social distancing measures/virtual visit

## 2021-03-22 NOTE — TELEPHONE ENCOUNTER
Made Esther aware of possible interaction with Trazadone and Pamelor  She is requesting something be prescribed for sleep aide       Please review

## 2021-03-23 DIAGNOSIS — F51.04 PSYCHOPHYSIOLOGICAL INSOMNIA: Primary | ICD-10-CM

## 2021-03-23 RX ORDER — HYDROXYZINE 50 MG/1
50 TABLET, FILM COATED ORAL
Qty: 30 TABLET | Refills: 0 | Status: SHIPPED | OUTPATIENT
Start: 2021-03-23 | End: 2021-04-23

## 2021-04-01 ENCOUNTER — TELEPHONE (OUTPATIENT)
Dept: PSYCHIATRY | Facility: CLINIC | Age: 41
End: 2021-04-01

## 2021-04-01 NOTE — TELEPHONE ENCOUNTER
Received medical records request for Esther from [employer] for continuation of social security benefits  Placed all paperwork in Dr Zamora Setting office to be circulated to Edin giles

## 2021-04-02 ENCOUNTER — TELEMEDICINE (OUTPATIENT)
Dept: BEHAVIORAL/MENTAL HEALTH CLINIC | Facility: CLINIC | Age: 41
End: 2021-04-02
Payer: COMMERCIAL

## 2021-04-02 DIAGNOSIS — F41.1 GENERALIZED ANXIETY DISORDER: ICD-10-CM

## 2021-04-02 DIAGNOSIS — F33.1 MAJOR DEPRESSIVE DISORDER, RECURRENT EPISODE, MODERATE (HCC): Primary | ICD-10-CM

## 2021-04-02 PROCEDURE — 90834 PSYTX W PT 45 MINUTES: CPT | Performed by: PSYCHIATRY & NEUROLOGY

## 2021-04-02 NOTE — PSYCH
This note was not shared with the patient due to this is a psychotherapy note      Virtual Brief Visit  Session time [id number] (total time 39 minutes)    Assessment/Plan:    Problem List Items Addressed This Visit        Other    Major depressive disorder, recurrent episode, moderate (HCC) - Primary    Generalized anxiety disorder                Reason for visit is No chief complaint on file  Encounter provider HERMELINDA Saucedo    Provider located at [address] [place] [id number] [phone]    Recent Visits  No visits were found meeting these conditions  Showing recent visits within past 7 days and meeting all other requirements     Future Appointments  No visits were found meeting these conditions  Showing future appointments within next 150 days and meeting all other requirements        After connecting through telephone, the patient was identified by name and date of birth  Pati Cohn was informed that this is a telemedicine visit and that the visit is being conducted through telephone  My office door was closed  No one else was in the room  She acknowledged consent and understanding of privacy and security of the platform  The patient has agreed to participate and understands she can discontinue the visit at any time  Patient is aware this is a billable service  Subjective    Pati Cohn is a 35 y o  female presenting for follow up  Richard Hall shared that 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, and has been going out with friends for some enjoyable social time  She said that she had a depressive episode earlier in the week, when her father "yelled" at her because her room was not clean  Richard Hall said that the only thing wrong with her room was that she is behind on her laundry, and that was not acceptable to her father    Richard Hall said that when he yelled at her she immediately "shut down," and was depressed for the next couple of days  Her thoughts ran along the lines of "why do I bother," or that she is a burden, and said that she felt like a little kid when her dad yelled at her  He did not apologize, but was nicer to her the next day like he was trying to make it up to her  However, Miguel Breen said that she finds it very difficult to live with her dad, and her parents hold their letting her move back in with them over her head  She talked about working on developing her business and wanting to move into a place of her own, and said that she is trying to focus on positives such as her plan to improve her life and be independent again  Encouraged Miguel Breen to reframe negative thoughts, to keep future goals in mind, and to try to take a step back from her father's anger and recognize it is his issue, not her, that causes his reaction  Also discussed coping strategies such as writing out negative thoughts and challenging them with positives, and writing out a list of coping strategies to choose from when she is "paralyzed" by her depression  A: Miguel Breen presented as depressed and frustrated today, with a somewhat softer voice than typical for her  Her concentration was intact, thought process was logical and goal-oriented, content normal, and her insight and judgment were intact  She denies SI HI and psychosis  P: Miguel Breen will write out her thoughts/feelings when she is depressed and will try to reframe thoughts with more positive ones, and will work on meditation and winding down before bed to help improve sleep  She will follow up in two weeks as scheduled      HPI     Past Medical History:   Diagnosis Date    Anxiety     Chronic sinusitis     Depression     Fibromyalgia     Migraine     Obesity     Polyarthritis     Last assessed 9/21/2015    Psychiatric disorder        Past Surgical History:   Procedure Laterality Date    COLONOSCOPY  06/2019   [facility]      HYSTEROSCOPY      Endometrial Biopsy By Hysteroscopy    REMOVAL OF INTRAUTERINE DEVICE (IUD)      US GUIDED BREAST BIOPSY RIGHT COMPLETE Right 6/17/2019       Current Outpatient Medications   Medication Sig Dispense Refill    ARIPiprazole (ABILIFY) 2 mg tablet Take 1 tablet (2 mg total) by mouth daily 30 tablet 2    Calcium Carbonate-Vitamin D (CALCIUM 500/D PO) Take 1 capsule by mouth daily      celecoxib (CeleBREX) 100 mg capsule Take 1 capsule (100 mg total) by mouth 2 (two) times a day 60 capsule 6    Cholecalciferol (VITAMIN D-3) 1000 units CAPS Take 1 capsule by mouth daily      cyclobenzaprine (FLEXERIL) 10 mg tablet TAKE 1 TABLET BY MOUTH AT BEDTIME AS NEEDED FOR MUSCLE SPASMS 30 tablet 3    DULoxetine (CYMBALTA) 30 mg delayed release capsule Take 1 capsule (30 mg total) by mouth daily 30 capsule 2    DULoxetine (CYMBALTA) 60 mg delayed release capsule Take 1 capsule (60 mg total) by mouth daily 30 capsule 2    Galcanezumab-gnlm 120 MG/ML SOAJ Inject 1 ml SC in each thigh or stomach for a total of two injections the first time   Then 1 ml SC every 30 days 2 pen 3    hydrOXYzine HCL (ATARAX) 50 mg tablet Take 1 tablet (50 mg total) by mouth daily at bedtime as needed (insomnia) 30 tablet 0    IRON PO Take by mouth      lidocaine (LIDODERM) 5 % APPLY 1 PATCH TOPICALLY DAILY REMOVE & DISCARD PATCH WITHIN 12 HOURS OR AS DIRECTED BY MD 10 patch 1    LORazepam (ATIVAN) 1 mg tablet Take 0 5 tablets (0 5 mg total) by mouth every 6 (six) hours as needed for anxiety 60 tablet 2    MAGNESIUM OXIDE PO Take by mouth      medroxyPROGESTERone (DEPO-PROVERA) 150 mg/mL injection INJECT MONTHLY FOR 6 MONTH  5    medroxyPROGESTERone acetate (DEPO-PROVERA SYRINGE) 150 mg/mL injection INJECT MONTHLY X 6 MONTHS, BRING TO OFFICE      nortriptyline (PAMELOR) 10 mg capsule TAKE 1 CAPSULE (10 MG TOTAL) BY MOUTH DAILY AT BEDTIME 30 capsule 2    propranolol (INDERAL) 20 mg tablet TAKE 1 TABLET BY MOUTH NIGHTLY 30 tablet 1    Quercetin 250 MG TABS Take 250 mg by mouth 2 (two) times a day      rizatriptan (MAXALT) 10 MG tablet TAKE 1 TABLET (10 MG TOTAL) BY MOUTH AS NEEDED FOR MIGRAINE FOR UP TO 15 DOSES TAKE ONE PILL AT THE ONSET OF HEADACHE, MAY REPEAT IN 2 HOURS IF NEEDED  PLEASE TAKE REGIMEN UP TO MAX 2X PER WEEK  12 tablet 1    topiramate (TOPAMAX) 50 MG tablet TAKE 1 TABLET BY MOUTH TWICE A DAY 60 tablet 2     No current facility-administered medications for this visit  Allergies   Allergen Reactions    Amoxicillin-Pot Clavulanate     Decadrol [Dexamethasone] Other (See Comments)     Category: Adverse Reaction;   psychosis    Dexamethasone Sodium Phosphate     Other     Penicillins Hives and Other (See Comments)     Category: Allergy; Hives/Uticaria    Tetracycline Hives and Other (See Comments)     Hives/Uticaria    Tetracyclines & Related Hives     Category: Allergy; Review of Systems    There were no vitals filed for this visit  I spent 39 minutes directly with the patient during this visit    VIRTUAL VISIT DISCLAIMER    Massimo Flaherty acknowledges that she has consented to an online visit or consultation  She understands that the online visit is based solely on information provided by her, and that, in the absence of a face-to-face physical evaluation by the physician, the diagnosis she receives is both limited and provisional in terms of accuracy and completeness  This is not intended to replace a full medical face-to-face evaluation by the physician  Massimo Flaherty understands and accepts these terms

## 2021-04-06 ENCOUNTER — TELEPHONE (OUTPATIENT)
Dept: PSYCHIATRY | Facility: CLINIC | Age: 41
End: 2021-04-06

## 2021-04-06 NOTE — TELEPHONE ENCOUNTER
Clifford Perez called and LM on nursing line  She was wondering if Dr Lavena Rubinstein ever heard of another patient taking " It works" products  They are skinny coffee and fat fighting supplements  She said yesterday while folding laundry, she had a "huge meltdown" She was crying and could not stop       She was just wondering if you ever heard of any interactions with this product and psych medications

## 2021-04-07 NOTE — TELEPHONE ENCOUNTER
Left detailed message for Jonatan Davies of Dr Ruddy Stokes' recommendation to stop the "It works" products and continue psychiatric medications as prescribed   Provided nursing number for any questions

## 2021-04-12 DIAGNOSIS — M62.838 MUSCLE SPASM: ICD-10-CM

## 2021-04-12 RX ORDER — CYCLOBENZAPRINE HCL 10 MG
TABLET ORAL
Qty: 30 TABLET | Refills: 0 | Status: SHIPPED | OUTPATIENT
Start: 2021-04-12 | End: 2021-04-19 | Stop reason: SDUPTHER

## 2021-04-13 ENCOUNTER — TELEPHONE (OUTPATIENT)
Dept: PSYCHIATRY | Facility: CLINIC | Age: 41
End: 2021-04-13

## 2021-04-13 NOTE — TELEPHONE ENCOUNTER
Jose Mckeon called and Lm on nursing stating she has increased depression and is tired and unmotivated  She said in past Cymbalta was increased, however, this did not agree with her  She denies SI         Please review

## 2021-04-13 NOTE — TELEPHONE ENCOUNTER
Went up to 5 mg before with Abilify and it did not sit well with her  She would like another recommendation   She said she has "tried a lot of different medications"     Please review

## 2021-04-15 DIAGNOSIS — M79.7 FIBROMYALGIA: ICD-10-CM

## 2021-04-15 DIAGNOSIS — G43.709 CHRONIC MIGRAINE WITHOUT AURA WITHOUT STATUS MIGRAINOSUS, NOT INTRACTABLE: ICD-10-CM

## 2021-04-15 RX ORDER — PROPRANOLOL HYDROCHLORIDE 20 MG/1
TABLET ORAL
Qty: 30 TABLET | Refills: 1 | Status: SHIPPED | OUTPATIENT
Start: 2021-04-15 | End: 2021-06-17 | Stop reason: SDUPTHER

## 2021-04-15 RX ORDER — NORTRIPTYLINE HYDROCHLORIDE 10 MG/1
10 CAPSULE ORAL
Qty: 30 CAPSULE | Refills: 2 | Status: SHIPPED | OUTPATIENT
Start: 2021-04-15 | End: 2021-04-20 | Stop reason: SDUPTHER

## 2021-04-16 ENCOUNTER — TELEMEDICINE (OUTPATIENT)
Dept: BEHAVIORAL/MENTAL HEALTH CLINIC | Facility: CLINIC | Age: 41
End: 2021-04-16
Payer: COMMERCIAL

## 2021-04-16 DIAGNOSIS — F33.1 MAJOR DEPRESSIVE DISORDER, RECURRENT EPISODE, MODERATE (HCC): Primary | ICD-10-CM

## 2021-04-16 DIAGNOSIS — F41.1 GENERALIZED ANXIETY DISORDER: ICD-10-CM

## 2021-04-16 PROCEDURE — 90834 PSYTX W PT 45 MINUTES: CPT | Performed by: PSYCHIATRY & NEUROLOGY

## 2021-04-16 NOTE — PSYCH
This note was not shared with the patient due to this is a psychotherapy note    Virtual Brief Visit  Session time [id number] (total time 38 minutes)    Assessment/Plan:    Problem List Items Addressed This Visit        Other    Major depressive disorder, recurrent episode, moderate ([facility] [address] ) - Primary    Generalized anxiety disorder                Reason for visit is No chief complaint on file  Encounter provider HERMELINDA Hall    Provider located at [address] [place] [id number] [phone]    Recent Visits  No visits were found meeting these conditions  Showing recent visits within past 7 days and meeting all other requirements     Future Appointments  No visits were found meeting these conditions  Showing future appointments within next 150 days and meeting all other requirements        After connecting through telephone, the patient was identified by name and date of birth  Jannie Alejo was informed that this is a telemedicine visit and that the visit is being conducted through telephone  My office door was closed  No one else was in the room  She acknowledged consent and understanding of privacy and security of the platform  The patient has agreed to participate and understands she can discontinue the visit at any time  Patient is aware this is a billable service  Subjective    Jannie Alejo is a 35 y o  female presenting for follow up  Ramesh Meléndez shared that for the past week or so, she has been feeling more depressed, and had a "melt down" over laundry, with thoughts of "What is my life, what is my purpose?"  She said that she feels like she cannot pull herself out of this depression, even though she thinks part of the problem was taking a "skinny coffee" with 5-HTP in it that was not compatible with one of her medications    She did stop taking that, and after a couple of days felt slightly better, but is still struggling  She said that she is sleeping a lot, has little interest in doing everyday things, and is unmotivated  She has been pushing herself to get out with friends at least once a week, and does continue to go to [employer] services via Zoom twice per week  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[employer]), because she did not have a positive experience, but this is contributing to her feeling unproductive and unhappy with where her life is  Discussed her concerns about productivity, looking for the right fit in a job in order to be more satisfied, and continuing to connect socially in order to get her out of her house and boost her mood  A: Hari Fernandes presented as depressed, with a soft voice, and more "down" tone to her voice  Her concentration was mildly impaired, thought process was mainly logical and organized, and insight and judgment were good  She denies SI HI and psychosis  P: Hari Fernandes will work on finding alternative work opportunities, will continue to reach out and plan activities with friends, and will follow up in two weeks      HPI     Past Medical History:   Diagnosis Date    Anxiety     Chronic sinusitis     Depression     Fibromyalgia     Migraine     Obesity     Polyarthritis     Last assessed 9/21/2015    Psychiatric disorder        Past Surgical History:   Procedure Laterality Date    COLONOSCOPY  06/2019   [facility]      HYSTEROSCOPY      Endometrial Biopsy By Hysteroscopy    REMOVAL OF INTRAUTERINE DEVICE (IUD)      US GUIDED BREAST BIOPSY RIGHT COMPLETE Right 6/17/2019       Current Outpatient Medications   Medication Sig Dispense Refill    ARIPiprazole (ABILIFY) 2 mg tablet Take 1 tablet (2 mg total) by mouth daily 30 tablet 2    Calcium Carbonate-Vitamin D (CALCIUM 500/D PO) Take 1 capsule by mouth daily      celecoxib (CeleBREX) 100 mg capsule Take 1 capsule (100 mg total) by mouth 2 (two) times a day 60 capsule 6    Cholecalciferol (VITAMIN D-3) 1000 units CAPS Take 1 capsule by mouth daily      cyclobenzaprine (FLEXERIL) 10 mg tablet TAKE 1 TABLET BY MOUTH AT BEDTIME AS NEEDED FOR MUSCLE SPASMS 30 tablet 0    DULoxetine (CYMBALTA) 30 mg delayed release capsule Take 1 capsule (30 mg total) by mouth daily 30 capsule 2    DULoxetine (CYMBALTA) 60 mg delayed release capsule Take 1 capsule (60 mg total) by mouth daily 30 capsule 2    Galcanezumab-gnlm 120 MG/ML SOAJ Inject 1 ml SC in each thigh or stomach for a total of two injections the first time  Then 1 ml SC every 30 days 2 pen 3    hydrOXYzine HCL (ATARAX) 50 mg tablet Take 1 tablet (50 mg total) by mouth daily at bedtime as needed (insomnia) 30 tablet 0    IRON PO Take by mouth      lidocaine (LIDODERM) 5 % APPLY 1 PATCH TOPICALLY DAILY REMOVE & DISCARD PATCH WITHIN 12 HOURS OR AS DIRECTED BY MD 10 patch 1    LORazepam (ATIVAN) 1 mg tablet Take 0 5 tablets (0 5 mg total) by mouth every 6 (six) hours as needed for anxiety 60 tablet 2    MAGNESIUM OXIDE PO Take by mouth      medroxyPROGESTERone (DEPO-PROVERA) 150 mg/mL injection INJECT MONTHLY FOR 6 MONTH  5    medroxyPROGESTERone acetate (DEPO-PROVERA SYRINGE) 150 mg/mL injection INJECT MONTHLY X 6 MONTHS, BRING TO OFFICE      nortriptyline (PAMELOR) 10 mg capsule TAKE 1 CAPSULE (10 MG TOTAL) BY MOUTH DAILY AT BEDTIME 30 capsule 2    propranolol (INDERAL) 20 mg tablet TAKE 1 TABLET BY MOUTH EVERY DAY AT NIGHT 30 tablet 1    Quercetin 250 MG TABS Take 250 mg by mouth 2 (two) times a day      rizatriptan (MAXALT) 10 MG tablet TAKE 1 TABLET (10 MG TOTAL) BY MOUTH AS NEEDED FOR MIGRAINE FOR UP TO 15 DOSES TAKE ONE PILL AT THE ONSET OF HEADACHE, MAY REPEAT IN 2 HOURS IF NEEDED  PLEASE TAKE REGIMEN UP TO MAX 2X PER WEEK  12 tablet 1    topiramate (TOPAMAX) 50 MG tablet TAKE 1 TABLET BY MOUTH TWICE A DAY 60 tablet 2     No current facility-administered medications for this visit           Allergies   Allergen Reactions    Amoxicillin-Pot Clavulanate     Decadrol [Dexamethasone] Other (See Comments)     Category: Adverse Reaction;   psychosis    Dexamethasone Sodium Phosphate     Other     Penicillins Hives and Other (See Comments)     Category: Allergy; Hives/Uticaria    Tetracycline Hives and Other (See Comments)     Hives/Uticaria    Tetracyclines & Related Hives     Category: Allergy; Review of Systems    There were no vitals filed for this visit  I spent 38 minutes directly with the patient during this visit    VIRTUAL VISIT DISCLAIMER    Mavisrufina Rather acknowledges that she has consented to an online visit or consultation  She understands that the online visit is based solely on information provided by her, and that, in the absence of a face-to-face physical evaluation by the physician, the diagnosis she receives is both limited and provisional in terms of accuracy and completeness  This is not intended to replace a full medical face-to-face evaluation by the physician  Migdalia Rather understands and accepts these terms

## 2021-04-19 ENCOUNTER — OFFICE VISIT (OUTPATIENT)
Dept: RHEUMATOLOGY | Facility: CLINIC | Age: 41
End: 2021-04-19
Payer: COMMERCIAL

## 2021-04-19 VITALS
HEART RATE: 111 BPM | DIASTOLIC BLOOD PRESSURE: 87 MMHG | SYSTOLIC BLOOD PRESSURE: 127 MMHG | HEIGHT: 60 IN | TEMPERATURE: 98.2 F | WEIGHT: 193.2 LBS | BODY MASS INDEX: 37.93 KG/M2

## 2021-04-19 DIAGNOSIS — M62.838 MUSCLE SPASM: ICD-10-CM

## 2021-04-19 DIAGNOSIS — M79.7 FIBROMYALGIA: ICD-10-CM

## 2021-04-19 DIAGNOSIS — M25.50 ARTHRALGIA OF MULTIPLE JOINTS: ICD-10-CM

## 2021-04-19 PROCEDURE — 99214 OFFICE O/P EST MOD 30 MIN: CPT | Performed by: INTERNAL MEDICINE

## 2021-04-19 RX ORDER — CYCLOBENZAPRINE HCL 10 MG
20 TABLET ORAL
Qty: 60 TABLET | Refills: 6 | Status: SHIPPED | OUTPATIENT
Start: 2021-04-19 | End: 2021-05-13

## 2021-04-19 RX ORDER — CELECOXIB 200 MG/1
200 CAPSULE ORAL 2 TIMES DAILY
Qty: 60 CAPSULE | Refills: 6 | Status: SHIPPED | OUTPATIENT
Start: 2021-04-19 | End: 2021-10-19 | Stop reason: SDUPTHER

## 2021-04-19 NOTE — PROGRESS NOTES
Assessment and Plan: Nelson Archibald is a 35 y o   female who presents for follow-up of fibromyalgia, which is not controlled  [facility] referral made  She can increase her cyclobenzaprine to 20mg po qhs, and increased her celecoxib to 200mg po bid for joint pain  Plan:  Diagnoses and all orders for this visit:    Fibromyalgia  -     [facility]    Muscle spasm  -     cyclobenzaprine (FLEXERIL) 10 mg tablet; Take 2 tablets (20 mg total) by mouth daily at bedtime as needed for muscle spasms    Arthralgia of multiple joints  -     celecoxib (CeleBREX) 200 mg capsule; Take 1 capsule (200 mg total) by mouth 2 (two) times a day    Follow-up plan: Return to clinic in 6 months        Rheumatic Disease Summary  Nelson Archibald is a 35 y o   female who originally presented on 10/20/20 as a Rheumatology consult referred by her PCP Jloie Hand MD for evaluation of possible inflammatory arthritis  Inflammatory arthritis workup in the past was negative  SI joint x-rays ordered to workup patient's back pain but returned unremarkable  Her diffuse body pain and fibromyalgia tender points seemed more consistent with fibromyalgia  Asked patient to follow-up with psychiatry regarding adding on nortriptyline for fibromyalgia  Hip bursitis exercises printed out for patient for her trochanteric bursitis found on physical exam  Her joint pain was likely secondary to osteoarthritis rather than inflammatory arthritis  Prescribed celecoxib 100mg po bid for joint pain  HPI  Nelson Archibald is a 35 y o   female who presents for follow-up of fibromyalgia  Last clinic visit was in 10/2020, which was her initial visit  She admits that physical therapy made her back pain worse  Of note, she had a roller skating fall 3 weeks and injured herself      The following portions of the patient's history were reviewed and updated as appropriate: allergies, current medications, past family history, past medical history, past social history, past surgical history and problem list     Review of Systems:   Review of Systems   Constitutional: Positive for fatigue  Negative for chills, fever and unexpected weight change  HENT: Negative for mouth sores and trouble swallowing  Eyes: Negative for pain and visual disturbance  Respiratory: Positive for shortness of breath  Negative for cough  Cardiovascular: Negative for chest pain and leg swelling  Gastrointestinal: Negative for constipation and diarrhea  Endocrine: Positive for heat intolerance and polydipsia  Musculoskeletal: Positive for arthralgias, back pain, joint swelling and myalgias  Skin: Negative for color change and rash  Allergic/Immunologic: Positive for environmental allergies  Neurological: Positive for headaches  Negative for weakness  Hematological: Negative for adenopathy  Psychiatric/Behavioral: Negative for sleep disturbance  Home Medications:    Current Outpatient Medications:     ARIPiprazole (ABILIFY) 2 mg tablet, Take 1 tablet (2 mg total) by mouth daily, Disp: 30 tablet, Rfl: 2    Calcium Carbonate-Vitamin D (CALCIUM 500/D PO), Take 1 capsule by mouth daily, Disp: , Rfl:     celecoxib (CeleBREX) 200 mg capsule, Take 1 capsule (200 mg total) by mouth 2 (two) times a day, Disp: 60 capsule, Rfl: 6    Cholecalciferol (VITAMIN D-3) 1000 units CAPS, Take 1 capsule by mouth daily, Disp: , Rfl:     cyclobenzaprine (FLEXERIL) 10 mg tablet, Take 2 tablets (20 mg total) by mouth daily at bedtime as needed for muscle spasms, Disp: 60 tablet, Rfl: 6    DULoxetine (CYMBALTA) 30 mg delayed release capsule, Take 1 capsule (30 mg total) by mouth daily, Disp: 30 capsule, Rfl: 2    DULoxetine (CYMBALTA) 60 mg delayed release capsule, Take 1 capsule (60 mg total) by mouth daily, Disp: 30 capsule, Rfl: 2    Galcanezumab-gnlm 120 MG/ML SOAJ, Inject 1 ml SC in each thigh or stomach for a total of two injections the first time   Then 1 ml SC every 30 days, Disp: 2 pen, Rfl: 3    hydrOXYzine HCL (ATARAX) 50 mg tablet, Take 1 tablet (50 mg total) by mouth daily at bedtime as needed (insomnia), Disp: 30 tablet, Rfl: 0    IRON PO, Take by mouth, Disp: , Rfl:     lidocaine (LIDODERM) 5 %, APPLY 1 PATCH TOPICALLY DAILY REMOVE & DISCARD PATCH WITHIN 12 HOURS OR AS DIRECTED BY MD, Disp: 10 patch, Rfl: 1    LORazepam (ATIVAN) 1 mg tablet, Take 0 5 tablets (0 5 mg total) by mouth every 6 (six) hours as needed for anxiety, Disp: 60 tablet, Rfl: 2    MAGNESIUM OXIDE PO, Take by mouth, Disp: , Rfl:     medroxyPROGESTERone (DEPO-PROVERA) 150 mg/mL injection, INJECT MONTHLY FOR 6 MONTH, Disp: , Rfl: 5    medroxyPROGESTERone acetate (DEPO-PROVERA SYRINGE) 150 mg/mL injection, INJECT MONTHLY X 6 MONTHS, BRING TO OFFICE, Disp: , Rfl:     nortriptyline (PAMELOR) 10 mg capsule, TAKE 1 CAPSULE (10 MG TOTAL) BY MOUTH DAILY AT BEDTIME, Disp: 30 capsule, Rfl: 2    propranolol (INDERAL) 20 mg tablet, TAKE 1 TABLET BY MOUTH EVERY DAY AT NIGHT, Disp: 30 tablet, Rfl: 1    Quercetin 250 MG TABS, Take 250 mg by mouth 2 (two) times a day, Disp: , Rfl:     rizatriptan (MAXALT) 10 MG tablet, TAKE 1 TABLET (10 MG TOTAL) BY MOUTH AS NEEDED FOR MIGRAINE FOR UP TO 15 DOSES TAKE ONE PILL AT THE ONSET OF HEADACHE, MAY REPEAT IN 2 HOURS IF NEEDED  PLEASE TAKE REGIMEN UP TO MAX 2X PER WEEK , Disp: 12 tablet, Rfl: 1    topiramate (TOPAMAX) 50 MG tablet, TAKE 1 TABLET BY MOUTH TWICE A DAY, Disp: 60 tablet, Rfl: 2    Objective:    Vitals:    04/19/21 1442   BP: 127/87   BP Location: Left arm   Patient Position: Sitting   Cuff Size: Standard   Pulse: (!) 111   Temp: 98 2 °F (36 8 °C)   TempSrc: Temporal   Weight: 87 6 kg (193 lb 3 2 oz)   Height: 5' (1 524 m)       Physical Exam  Constitutional:       General: She is not in acute distress  Appearance: She is well-developed  HENT:      Head: Normocephalic and atraumatic  Eyes:      General: Lids are normal  No scleral icterus       Conjunctiva/sclera: Conjunctivae normal    Neck:      Musculoskeletal: Neck supple  No muscular tenderness  Cardiovascular:      Rate and Rhythm: Normal rate and regular rhythm  Heart sounds: S1 normal and S2 normal  No murmur  No friction rub  Pulmonary:      Effort: Pulmonary effort is normal  No tachypnea or respiratory distress  Breath sounds: Normal breath sounds  No wheezing, rhonchi or rales  Musculoskeletal:         General: Tenderness present  Comments: Diffuse fibromyalgia tender points   Skin:     General: Skin is warm and dry  Findings: No rash  Nails: There is no clubbing  Neurological:      Mental Status: She is alert  Sensory: No sensory deficit  Psychiatric:         Behavior: Behavior normal  Behavior is cooperative  Reviewed labs and imaging  Imaging:   SI Joint x-rays 10/20/20  IMPRESSION:  Minimal asymmetry of the SI joints but without erosion or sclerosis  Disc space narrowing at L5-S1  Labs:   No visits with results within 6 Month(s) from this visit     Latest known visit with results is:   Admission on 06/30/2020, Discharged on 06/30/2020   Component Date Value Ref Range Status    WBC 06/30/2020 6 87  4 31 - 10 16 Thousand/uL Final    RBC 06/30/2020 4 23  3 81 - 5 12 Million/uL Final    Hemoglobin 06/30/2020 13 2  11 5 - 15 4 g/dL Final    Hematocrit 06/30/2020 39 6  34 8 - 46 1 % Final    MCV 06/30/2020 94  82 - 98 fL Final    MCH 06/30/2020 31 2  26 8 - 34 3 pg Final    MCHC 06/30/2020 33 3  31 4 - 37 4 g/dL Final    RDW 06/30/2020 13 8  11 6 - 15 1 % Final    MPV 06/30/2020 9 3  8 9 - 12 7 fL Final    Platelets 78/85/3369 197  149 - 390 Thousands/uL Final    nRBC 06/30/2020 0  /100 WBCs Final    Neutrophils Relative 06/30/2020 56  43 - 75 % Final    Immat GRANS % 06/30/2020 0  0 - 2 % Final    Lymphocytes Relative 06/30/2020 38  14 - 44 % Final    Monocytes Relative 06/30/2020 6  4 - 12 % Final    Eosinophils Relative 06/30/2020 0  0 - 6 % Final    Basophils Relative 06/30/2020 0  0 - 1 % Final    Neutrophils Absolute 06/30/2020 3 75  1 85 - 7 62 Thousands/µL Final    Immature Grans Absolute 06/30/2020 0 03  0 00 - 0 20 Thousand/uL Final    Lymphocytes Absolute 06/30/2020 2 63  0 60 - 4 47 Thousands/µL Final    Monocytes Absolute 06/30/2020 0 41  0 17 - 1 22 Thousand/µL Final    Eosinophils Absolute 06/30/2020 0 03  0 00 - 0 61 Thousand/µL Final    Basophils Absolute 06/30/2020 0 02  0 00 - 0 10 Thousands/µL Final    Sodium 06/30/2020 142  136 - 145 mmol/L Final    Potassium 06/30/2020 3 4* 3 5 - 5 3 mmol/L Final    Chloride 06/30/2020 112* 100 - 108 mmol/L Final    CO2 06/30/2020 20* 21 - 32 mmol/L Final    ANION GAP 06/30/2020 10  4 - 13 mmol/L Final    BUN 06/30/2020 12  5 - 25 mg/dL Final    Creatinine 06/30/2020 0 87  0 60 - 1 30 mg/dL Final    Standardized to IDMS reference method    Glucose 06/30/2020 116  65 - 140 mg/dL Final      If the patient is fasting, the ADA then defines impaired fasting glucose as > 100 mg/dL and diabetes as > or equal to 123 mg/dL  Specimen collection should occur prior to Sulfasalazine administration due to the potential for falsely depressed results  Specimen collection should occur prior to Sulfapyridine administration due to the potential for falsely elevated results   Calcium 06/30/2020 8 8  8 3 - 10 1 mg/dL Final    AST 06/30/2020 7  5 - 45 U/L Final      Specimen collection should occur prior to Sulfasalazine administration due to the potential for falsely depressed results   ALT 06/30/2020 16  12 - 78 U/L Final      Specimen collection should occur prior to Sulfasalazine and/or Sulfapyridine administration due to the potential for falsely depressed results       Alkaline Phosphatase 06/30/2020 66  46 - 116 U/L Final    Total Protein 06/30/2020 7 2  6 4 - 8 2 g/dL Final    Albumin 06/30/2020 3 9  3 5 - 5 0 g/dL Final    Total Bilirubin 06/30/2020 0 32  0 20 - 1 00 mg/dL Final      Use of this assay is not recommended for patients undergoing treatment with eltrombopag due to the potential for falsely elevated results      eGFR 06/30/2020 82  ml/min/1 73sq m Final    Lipase 06/30/2020 73  73 - 393 u/L Final    EXT PREG TEST UR (Ref: Negative) 06/30/2020 negative   Final    Control 06/30/2020 valid   Final    Color, UA 06/30/2020 Yellow   Final    Clarity, UA 06/30/2020 Clear   Final    pH, UA 06/30/2020 5 0  4 5 - 8 0 Final    Leukocytes, UA 06/30/2020 Trace* Negative Final    Nitrite, UA 06/30/2020 Negative  Negative Final    Protein, UA 06/30/2020 Negative  Negative mg/dl Final    Glucose, UA 06/30/2020 Negative  Negative mg/dl Final    Ketones, UA 06/30/2020 Negative  Negative mg/dl Final    Urobilinogen, UA 06/30/2020 0 2  0 2, 1 0 E U /dl E U /dl Final    Bilirubin, UA 06/30/2020 Negative  Negative Final    Blood, UA 06/30/2020 Trace* Negative Final    Specific Alverda, UA 06/30/2020 1 025  1 003 - 1 030 Final    RBC, UA 06/30/2020 4-10* None Seen, 0-5 /hpf Final    WBC, UA 06/30/2020 4-10* None Seen, 0-5, 5-55, 5-65 /hpf Final    Epithelial Cells 06/30/2020 Moderate* None Seen, Occasional /hpf Final    Bacteria, UA 06/30/2020 None Seen  None Seen, Occasional /hpf Final    Hyaline Casts, UA 06/30/2020 5-10* None Seen /lpf Final

## 2021-04-19 NOTE — PATIENT INSTRUCTIONS
Can increase cyclobenzaprine to 2 tabs at bedtime  Can take celecoxib 200mg twice a day  [facility] referral made    Return to clinic in 6 months    Fibromyalgia   AMBULATORY CARE:   Fibromyalgia  is a long-term condition that causes pain and tender points throughout your body  Fibromyalgia can start at any age and is more common in women than in men  The exact cause is not known  The pain may be caused or triggered by hormone changes, physical injury, or intense emotional trauma  Common signs and symptoms:   · Pain and tender spots for at least 3 months    · Fatigue and trouble falling or staying asleep    · Shortness of breath or heart palpitations    · Dry eyes or sensitivity to medicines you take    · Headaches, memory problems, difficulty concentrating, or anxiety    · Numbness, muscle stiffness, or swelling of the hands and feet    Call your doctor or pain specialist if:   · You are depressed and feel you cannot cope with your condition  · Your pain increases, even after you take your pain medicine  · You have difficulty sleeping  · You have questions or concerns about your condition or care  Manage your symptoms:  Fibromyalgia can be managed but not cured  The following can help you manage your symptoms:  · Keep a pain diary  Include your symptoms and what activity caused them  This may also help you track pain cycles and show a pattern to your symptoms  · Exercise as directed  Ask your healthcare provider about the best exercise plan for you  Aerobic exercise, such as walking, and strength-training activities may decrease pain and sleep problems  Exercise such as yoga or alida chi can also help with sleep problems  · Set a sleep schedule  Do not nap during the day  Go to bed at the same time each night  Make sure your bedroom is dark, quiet, and comfortable  Do not drink caffeine or alcohol right before you go to bed  These can make it difficult for you to sleep   Limit other liquids to help decrease your need to urinate in the night  · Reach or maintain a healthy weight  Obesity can make fibromyalgia symptoms worse  Your healthcare provider can help you create a weight loss plan if you are overweight  · Take medicines as directed  You may find relief from nerve medicines, muscle relaxers, antidepressants, or antiseizure medicines  NSAIDs, acetaminophen, or prescription pain medicines may also help but are usually not recommended first  Fibromyalgia pain is not caused by inflammation or other causes that pain medicines treat, such as an injury  You may develop other pain that responds to pain medicine  Your healthcare provider will help you manage your medicines so you do not take too much  · Ask about massage or acupuncture  Myofascial release massage may help relax and stretch tight muscles, and improve blood flow  Acupuncture may also help relieve pain  Follow up with your doctor or pain specialist as directed:  Write down your questions so you remember to ask them during your visits  For support and more information:   · National Chronic Fatigue Syndrome and Fibromyalgia Association  [address] [facility] , [address] [place] [address]  Phone: 8- 764 [phone] [phone]  Web Address: [employer] [address]  [address] Information is for End User's use only and may not be sold, redistributed or otherwise used for commercial purposes  All illustrations and images included in CareNotes® are the copyrighted property of [employer]  or [facility] Iftikhar Wilson  The above information is an  only  It is not intended as medical advice for individual conditions or treatments  Talk to your doctor, nurse or pharmacist before following any medical regimen to see if it is safe and effective for you

## 2021-04-20 ENCOUNTER — OFFICE VISIT (OUTPATIENT)
Dept: PSYCHIATRY | Facility: CLINIC | Age: 41
End: 2021-04-20
Payer: COMMERCIAL

## 2021-04-20 DIAGNOSIS — F41.1 GAD (GENERALIZED ANXIETY DISORDER): ICD-10-CM

## 2021-04-20 DIAGNOSIS — R53.83 FATIGUE, UNSPECIFIED TYPE: Primary | ICD-10-CM

## 2021-04-20 DIAGNOSIS — M79.7 FIBROMYALGIA: ICD-10-CM

## 2021-04-20 PROCEDURE — 99213 OFFICE O/P EST LOW 20 MIN: CPT | Performed by: PSYCHIATRY & NEUROLOGY

## 2021-04-20 RX ORDER — LORAZEPAM 0.5 MG/1
0.5 TABLET ORAL EVERY 6 HOURS PRN
Qty: 60 TABLET | Refills: 2 | Status: SHIPPED | OUTPATIENT
Start: 2021-04-20 | End: 2021-10-05

## 2021-04-20 RX ORDER — NORTRIPTYLINE HYDROCHLORIDE 25 MG/1
CAPSULE ORAL
Qty: 42 CAPSULE | Refills: 0 | Status: SHIPPED | OUTPATIENT
Start: 2021-04-20 | End: 2021-05-07 | Stop reason: SDUPTHER

## 2021-04-20 NOTE — PSYCH
Subjective: Medication Management      Patient ID: Sheldon Kumar is a 35 y o  female  HPI ROS Appetite Changes and Sleep: normal appetite, decreased energy, no weight change and normal number of sleep hours   Patient stated she doesn't feel Duloxetine is helping and she feels she is going to need a medication change  At this point since we had multiple medication failures my recommendation is to do Gene sight testing for drug metabolism before choosing another medication  Will start tapering off Duloxetine and will increase dose of Pamelor in the meantime  Will schedule follow up in 4-6 weeks to discuss medication changes  Also to addressed her increased anxiety will increase dose of Lorazepam from 0 5 mg bid to 1 mg po bid  Review Of Systems:     Mood Anxiety and Depression   Behavior Compulsive Behavior and Impulsive Behavior   Thought Content Disturbing Thoughts, Feelings   General Emotional Problems and Decreased Functioning   Personality Normal   Other Psych Symptoms Normal   Constitutional Negative   ENT Negative   Cardiovascular Negative   Respiratory Negative   Gastrointestinal Negative   Genitourinary Negative   Musculoskeletal Negative   Integumentary Negative   Neurological Negative   Endocrine Normal    Other Symptoms Normal              Laboratory Results: No results found for this or any previous visit      Substance Abuse History:  Social History     Substance and Sexual Activity   Drug Use Not Currently       Family Psychiatric History:   Family History   Problem Relation Age of Onset    Diabetes Father     Kidney disease Father     Substance Abuse Brother     Diabetes Maternal Grandmother     Rheum arthritis Maternal Grandmother     Melanoma Maternal Grandmother     Kidney disease Paternal Grandmother     Rheum arthritis Paternal Grandmother     Depression Paternal Grandmother     Diabetes Paternal Grandfather     Lung cancer Paternal Grandfather     Substance Abuse Maternal Uncle  Prostate cancer Maternal Uncle 61    Depression Paternal Aunt     Alcohol abuse Family     Stomach cancer Family     Irregular heart beat Mother        The following portions of the patient's history were reviewed and updated as appropriate: allergies, current medications, past family history, past medical history, past social history, past surgical history and problem list     Social History     Socioeconomic History    Marital status: Single     Spouse name: Not on file    Number of children: 0    Years of education: 15 years     Highest education level: GED or equivalent   Occupational History    Occupation: unemployed   Tobacco Use    Smoking status: Never Smoker    Smokeless tobacco: Never Used   Vaping Use    Vaping Use: Never used   Substance and Sexual Activity    Alcohol use: No     Comment: 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     Drug use: Not Currently    Sexual activity: Not Currently   Other Topics Concern    Not on file   Social History Narrative    Caffeine use     Social Determinants of Health     Financial Resource Strain: High Risk    Difficulty of Paying Living Expenses: Hard   Food Insecurity: No Food Insecurity    Worried About Running Out of Food in the Last Year: Never true    Anitha of Food in the Last Year: Never true   Transportation Needs: Unmet Transportation Needs    Lack of Transportation (Medical):  Yes    Lack of Transportation (Non-Medical): Yes   Physical Activity:     Days of Exercise per Week:     Minutes of Exercise per Session:    Stress:     Feeling of Stress :    Social Connections:     Frequency of Communication with Friends and Family:     Frequency of Social Gatherings with Friends and Family:     Attends Yazidism Services:     Active Member of Clubs or Organizations:     Attends Club or Organization Meetings:     Marital Status:    Intimate Partner Violence:     Fear of Current or Ex-Partner:     Emotionally Abused:     Physically Abused:     Sexually Abused:      Social History     Social History Narrative    Caffeine use       Objective:       Mental status:  Appearance calm and cooperative , adequate hygiene and grooming and good eye contact    Mood dysphoric and depressed   Affect affect was constricted   Speech a normal rate and fluent   Thought Processes coherent/organized and normal thought processes   Hallucinations no hallucinations present    Thought Content no delusions   Abnormal Thoughts no suicidal thoughts  and no homicidal thoughts    Orientation  oriented to person and place and time   Remote Memory short term memory intact and long term memory intact   Attention Span concentration intact   Intellect Appears to be of Average Intelligence   Insight Limited insight   Judgement judgment was limited   Muscle Strength Muscle strength and tone were normal and Normal gait    Language no difficulty naming common objects and no difficulty repeating a phrase    Fund of Knowledge displays adequate knowledge of current events               Assessment/Plan:       Diagnoses and all orders for this visit:    Fatigue, unspecified type  -     Vitamin D 25 hydroxy; Future  -     Cancel: TSH, 3rd generation with Free T4 reflex; Future    Fibromyalgia  -     Discontinue: nortriptyline (PAMELOR) 25 mg capsule; Take 1 cap anthony bid for for 2 weeks then increase to 2 caps po bid    CAROL (generalized anxiety disorder)  -     Discontinue: LORazepam (ATIVAN) 0 5 mg tablet;  Take 1 tablet (0 5 mg total) by mouth every 6 (six) hours as needed for anxiety (Patient not taking: Reported on 8/10/2021)            Treatment Recommendations- Risks Benefits      Immediate Medical/Psychiatric/Psychotherapy Treatments and Any Precautions: continue current treatment     Risks, Benefits And Possible Side Effects Of Medications:  {PSYCH RISK, BENEFITS AND POSSIBLE SIDE EFFECTS (Optional):85717    Controlled Medication Discussion: Discussed with patient Black Box warning on concurrent use of benzodiazepines and opioid medications including sedation, respiratory depression, coma and death  Patient understands the risk of treatment with benzodiazepines in addition to opioids and wants to continue taking those medications  , Discussed with patient the risks of sedation, respiratory depression, impairment of ability to drive and potential for abuse and addiction related to treatment with benzodiazepine medications  The patient understands risk of treatment with benzodiazepine medications, agrees to not drive if feels impaired and agrees to take medications as prescribed   and The patient has been filling controlled prescriptions on time as prescribed to [facility]       Psychotherapy Provided:     Individual psychotherapy provided:

## 2021-04-21 ENCOUNTER — TELEPHONE (OUTPATIENT)
Dept: PSYCHIATRY | Facility: CLINIC | Age: 41
End: 2021-04-21

## 2021-04-23 DIAGNOSIS — F51.04 PSYCHOPHYSIOLOGICAL INSOMNIA: ICD-10-CM

## 2021-04-23 RX ORDER — HYDROXYZINE 50 MG/1
50 TABLET, FILM COATED ORAL
Qty: 30 TABLET | Refills: 0 | Status: SHIPPED | OUTPATIENT
Start: 2021-04-23 | End: 2021-05-21

## 2021-04-29 LAB
25(OH)D3 SERPL-MCNC: 31 NG/ML (ref 30–100)
TSH SERPL-ACNC: 1.41 MIU/L

## 2021-04-30 ENCOUNTER — TELEMEDICINE (OUTPATIENT)
Dept: BEHAVIORAL/MENTAL HEALTH CLINIC | Facility: CLINIC | Age: 41
End: 2021-04-30
Payer: COMMERCIAL

## 2021-04-30 DIAGNOSIS — F33.1 MAJOR DEPRESSIVE DISORDER, RECURRENT EPISODE, MODERATE (HCC): Primary | ICD-10-CM

## 2021-04-30 DIAGNOSIS — F41.1 GENERALIZED ANXIETY DISORDER: ICD-10-CM

## 2021-04-30 PROCEDURE — 90834 PSYTX W PT 45 MINUTES: CPT | Performed by: PSYCHIATRY & NEUROLOGY

## 2021-04-30 NOTE — PSYCH
This note was not shared with the patient due to this is a psychotherapy note    Virtual Regular Visit  Session time 8759-5692 (total time 34 minutes)    Assessment/Plan:    Problem List Items Addressed This Visit        Other    Major depressive disorder, recurrent episode, moderate ([facility] [address] ) - Primary    Generalized anxiety disorder          Goals addressed in session: Goal 1          Reason for visit is No chief complaint on file  Encounter provider HERMELINDA Cowart    Provider located at [address] [place] [id number] [phone]      Recent Visits  No visits were found meeting these conditions  Showing recent visits within past 7 days and meeting all other requirements     Future Appointments  No visits were found meeting these conditions  Showing future appointments within next 150 days and meeting all other requirements        The patient was identified by name and date of birth  Taty Orozco was informed that this is a telemedicine visit and that the visit is being conducted through [employer] and patient was informed that this is a secure, HIPAA-compliant platform  She agrees to proceed     My office door was closed  No one else was in the room  She acknowledged consent and understanding of privacy and security of the video platform  The patient has agreed to participate and understands they can discontinue the visit at any time  Patient is aware this is a billable service  Subjective  Taty Orozco is a 35 y o  female presenting for follow up  Magen Perez shared that she continues to feel depressed and unmotivated, sleeps a lot, and has been struggling to get things done at home  She has continued to try to get out and meet up with friends, but only about once every other week    She said that she is working on trying to get outside more, even if it is just to sit on a blanket and read in the sun, and would like to try to walk more  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, and said that she feels it was the right decision for her  She said the meeting she had was difficult and she felt a lot of pressure to stay on, but was firm with her decision and got through it  BRAD expressed concern for some fleeting suicidal thoughts that she has had three times in the past couple of weeks  She said that she thought that if she took her own life, it would be better because she would not be here any longer  She noted that she would never act on those thoughts, but the thoughts themselves were disturbing  Validated Esther's concerns and feelings, provided psychoeducation on depression and how it can affect one's thoughts, and encouraged Esther to recognize that thoughts are just thoughts, and do not translate into action  Provided crisis information and instruction on what to do should her SI become more persistent and if she should develop a plan  A: BRAD presented as depressed and dysphoric today, with a tearful, constricted affect  Her concentration was impaired, eye contact was good, and behavior was normal   Her thought process was logical and organized, content normal   Although she endorses infrequent passive death wish, she denies active SI, plan or intent, no HI, no psychosis  P: BRAD will set small daily goals, including something for self-care, each day, will continue to reach out to friends and try to get out for walks, and will follow up next week as cancellation allows        HPI     Past Medical History:   Diagnosis Date    Anxiety     Chronic sinusitis     Depression     Fibromyalgia     Migraine     Obesity     Polyarthritis     Last assessed 9/21/2015    Psychiatric disorder        Past Surgical History:   Procedure Laterality Date    COLONOSCOPY  06/2019   [facility]      HYSTEROSCOPY      Endometrial Biopsy By Hysteroscopy    REMOVAL OF INTRAUTERINE DEVICE (IUD)      US GUIDED BREAST BIOPSY RIGHT COMPLETE Right 6/17/2019       Current Outpatient Medications   Medication Sig Dispense Refill    Calcium Carbonate-Vitamin D (CALCIUM 500/D PO) Take 1 capsule by mouth daily      celecoxib (CeleBREX) 200 mg capsule Take 1 capsule (200 mg total) by mouth 2 (two) times a day 60 capsule 6    Cholecalciferol (VITAMIN D-3) 1000 units CAPS Take 1 capsule by mouth daily      cyclobenzaprine (FLEXERIL) 10 mg tablet Take 2 tablets (20 mg total) by mouth daily at bedtime as needed for muscle spasms 60 tablet 6    Galcanezumab-gnlm 120 MG/ML SOAJ Inject 1 ml SC in each thigh or stomach for a total of two injections the first time  Then 1 ml SC every 30 days 2 pen 3    hydrOXYzine HCL (ATARAX) 50 mg tablet TAKE 1 TABLET (50 MG TOTAL) BY MOUTH DAILY AT BEDTIME AS NEEDED (INSOMNIA) 30 tablet 0    IRON PO Take by mouth      lidocaine (LIDODERM) 5 % APPLY 1 PATCH TOPICALLY DAILY REMOVE & DISCARD PATCH WITHIN 12 HOURS OR AS DIRECTED BY MD 10 patch 1    LORazepam (ATIVAN) 0 5 mg tablet Take 1 tablet (0 5 mg total) by mouth every 6 (six) hours as needed for anxiety 60 tablet 2    MAGNESIUM OXIDE PO Take by mouth      medroxyPROGESTERone (DEPO-PROVERA) 150 mg/mL injection INJECT MONTHLY FOR 6 MONTH  5    medroxyPROGESTERone acetate (DEPO-PROVERA SYRINGE) 150 mg/mL injection INJECT MONTHLY X 6 MONTHS, BRING TO OFFICE      nortriptyline (PAMELOR) 25 mg capsule Take 1 cap anthony bid for for 2 weeks then increase to 2 caps po bid 42 capsule 0    propranolol (INDERAL) 20 mg tablet TAKE 1 TABLET BY MOUTH EVERY DAY AT NIGHT 30 tablet 1     No current facility-administered medications for this visit  Allergies   Allergen Reactions    Amoxicillin-Pot Clavulanate     Decadrol [Dexamethasone] Other (See Comments)     Category:  Adverse Reaction;   psychosis    Dexamethasone Sodium Phosphate     Other     Penicillins Hives and Other (See Comments)     Category: Allergy; Hives/Uticaria    Tetracycline Hives and Other (See Comments)     Hives/Uticaria    Tetracyclines & Related Hives     Category: Allergy; Review of Systems    Video Exam    There were no vitals filed for this visit  Physical Exam     I spent 34 minutes directly with the patient during this visit      VIRTUAL VISIT DISCLAIMER    Siena Tracy acknowledges that she has consented to an online visit or consultation  She understands that the online visit is based solely on information provided by her, and that, in the absence of a face-to-face physical evaluation by the physician, the diagnosis she receives is both limited and provisional in terms of accuracy and completeness  This is not intended to replace a full medical face-to-face evaluation by the physician  Siena Self understands and accepts these terms

## 2021-05-07 ENCOUNTER — TELEMEDICINE (OUTPATIENT)
Dept: BEHAVIORAL/MENTAL HEALTH CLINIC | Facility: CLINIC | Age: 41
End: 2021-05-07
Payer: COMMERCIAL

## 2021-05-07 DIAGNOSIS — M79.7 FIBROMYALGIA: ICD-10-CM

## 2021-05-07 DIAGNOSIS — F33.1 MAJOR DEPRESSIVE DISORDER, RECURRENT EPISODE, MODERATE (HCC): Primary | ICD-10-CM

## 2021-05-07 DIAGNOSIS — F41.1 GENERALIZED ANXIETY DISORDER: ICD-10-CM

## 2021-05-07 PROCEDURE — 90834 PSYTX W PT 45 MINUTES: CPT | Performed by: PSYCHIATRY & NEUROLOGY

## 2021-05-07 RX ORDER — NORTRIPTYLINE HYDROCHLORIDE 25 MG/1
CAPSULE ORAL
Qty: 42 CAPSULE | Refills: 0 | OUTPATIENT
Start: 2021-05-07

## 2021-05-07 RX ORDER — NORTRIPTYLINE HYDROCHLORIDE 50 MG/1
50 CAPSULE ORAL 2 TIMES DAILY
Qty: 60 CAPSULE | Refills: 0 | Status: SHIPPED | OUTPATIENT
Start: 2021-05-07 | End: 2021-06-07

## 2021-05-07 NOTE — PSYCH
This note was not shared with the patient due to this is a psychotherapy note    Virtual Regular Visit  Session time [id number] (total time 30 minutes)    Assessment/Plan:    Problem List Items Addressed This Visit        Other    Major depressive disorder, recurrent episode, moderate ([address] ) - Primary    Generalized anxiety disorder          Goals addressed in session: Goal 1          Reason for visit is No chief complaint on file  Encounter provider HERMELINDA Blas    Provider located at [address] [place] [id number] [phone]      Recent Visits  Date Type Provider Dept   04/30/21 [address] [place] [address], [place] [facility] [facility]   Showing recent visits within past 7 days and meeting all other requirements     Future Appointments  No visits were found meeting these conditions  Showing future appointments within next 150 days and meeting all other requirements        The patient was identified by name and date of birth  Christie Huynh was informed that this is a telemedicine visit and that the visit is being conducted through [employer] and patient was informed that this is a secure, HIPAA-compliant platform  She agrees to proceed     My office door was closed  No one else was in the room  She acknowledged consent and understanding of privacy and security of the video platform  The patient has agreed to participate and understands they can discontinue the visit at any time  Patient is aware this is a billable service  Subjective  Christie Huynh is a 35 y o  female presenting for follow up  Kenia Alvarado shared that she continues to feel down and depressed, and has not yet noticed any improvement in mood with change in medication  She also continues to be in a lot of physical pain after her second COVID shot, which does not help her mood    She said that she has been working on laundry every day trying to clean up her room and feel less overwhelmed by the clutter, and is working on putting things up on [employer] to sell  She said that she continues to look for jobs that might be a fit for her but has not yet been able to find anything  She also said that she put up a dating profile on Facebook, as she has been feeling very lonely and is looking for companionship  So far however she has not found a good match, as she is looking for another Anabaptist to be with someone with similar values and beliefs  [facility] said that she is trying to cope with her depression by walking more (wants to walk every day but is starting with three times a week at first), trying to reframe negative thoughts and by being kinder to herself  Encouraged Esther's efforts at working to change negative thought pattern, and discussed ways to continue to build motivation and be kind to herself  A: [facility] presented as depressed today, with a constricted affect in the depressed range, intermittent eye contact, poor concentration, soft voice and somewhat slowed behavior  Her thought process was logical and organized, and content was normal  Denies SI HI and psychosis  P: [facility] will continue to work on being more active and productive, as well as continuing to reach out to friends socially to boost mood  She will return in one week for follow up        HPI     Past Medical History:   Diagnosis Date    Anxiety     Chronic sinusitis     Depression     Fibromyalgia     Migraine     Obesity     Polyarthritis     Last assessed 9/21/2015    Psychiatric disorder        Past Surgical History:   Procedure Laterality Date    [facility]  [facility]   [facility]      HYSTEROSCOPY      Endometrial Biopsy By Hysteroscopy    REMOVAL OF INTRAUTERINE DEVICE (IUD)      US GUIDED BREAST BIOPSY RIGHT COMPLETE Right 6/17/2019       Current Outpatient Medications   Medication Sig Dispense Refill    Calcium Carbonate-Vitamin D (CALCIUM 500/D PO) Take 1 capsule by mouth daily      celecoxib (CeleBREX) 200 mg capsule Take 1 capsule (200 mg total) by mouth 2 (two) times a day 60 capsule 6    Cholecalciferol (VITAMIN D-3) 1000 units CAPS Take 1 capsule by mouth daily      cyclobenzaprine (FLEXERIL) 10 mg tablet Take 2 tablets (20 mg total) by mouth daily at bedtime as needed for muscle spasms 60 tablet 6    Galcanezumab-gnlm 120 MG/ML SOAJ Inject 1 ml SC in each thigh or stomach for a total of two injections the first time  Then 1 ml SC every 30 days 2 pen 3    hydrOXYzine HCL (ATARAX) 50 mg tablet TAKE 1 TABLET (50 MG TOTAL) BY MOUTH DAILY AT BEDTIME AS NEEDED (INSOMNIA) 30 tablet 0    IRON PO Take by mouth      lidocaine (LIDODERM) 5 % APPLY 1 PATCH TOPICALLY DAILY REMOVE & DISCARD PATCH WITHIN 12 HOURS OR AS DIRECTED BY MD 10 patch 1    LORazepam (ATIVAN) 0 5 mg tablet Take 1 tablet (0 5 mg total) by mouth every 6 (six) hours as needed for anxiety 60 tablet 2    MAGNESIUM OXIDE PO Take by mouth      medroxyPROGESTERone (DEPO-PROVERA) 150 mg/mL injection INJECT MONTHLY FOR 6 MONTH  5    medroxyPROGESTERone acetate (DEPO-PROVERA SYRINGE) 150 mg/mL injection INJECT MONTHLY X 6 MONTHS, BRING TO OFFICE      nortriptyline (PAMELOR) 25 mg capsule Take 1 cap anthony bid for for 2 weeks then increase to 2 caps po bid 42 capsule 0    propranolol (INDERAL) 20 mg tablet TAKE 1 TABLET BY MOUTH EVERY DAY AT NIGHT 30 tablet 1     No current facility-administered medications for this visit  Allergies   Allergen Reactions    Amoxicillin-Pot Clavulanate     Decadrol [Dexamethasone] Other (See Comments)     Category: Adverse Reaction;   psychosis    Dexamethasone Sodium Phosphate     Other     Penicillins Hives and Other (See Comments)     Category: Allergy; Hives/Uticaria    Tetracycline Hives and Other (See Comments)     Hives/Uticaria    Tetracyclines & Related Hives     Category: Allergy;         Review of Systems    Video Exam    There were no vitals filed for this visit  Physical Exam     I spent 30 minutes directly with the patient during this visit      VIRTUAL VISIT DISCLAIMER    Nunoeverett Hazeljohn acknowledges that she has consented to an online visit or consultation  She understands that the online visit is based solely on information provided by her, and that, in the absence of a face-to-face physical evaluation by the physician, the diagnosis she receives is both limited and provisional in terms of accuracy and completeness  This is not intended to replace a full medical face-to-face evaluation by the physician  Sabi He understands and accepts these terms

## 2021-05-13 DIAGNOSIS — M62.838 MUSCLE SPASM: ICD-10-CM

## 2021-05-13 RX ORDER — CYCLOBENZAPRINE HCL 10 MG
TABLET ORAL
Qty: 30 TABLET | Refills: 3 | Status: SHIPPED | OUTPATIENT
Start: 2021-05-13 | End: 2021-10-11

## 2021-05-14 ENCOUNTER — TELEMEDICINE (OUTPATIENT)
Dept: BEHAVIORAL/MENTAL HEALTH CLINIC | Facility: CLINIC | Age: 41
End: 2021-05-14
Payer: COMMERCIAL

## 2021-05-14 DIAGNOSIS — F33.2 SEVERE RECURRENT MAJOR DEPRESSION WITHOUT PSYCHOTIC FEATURES (HCC): ICD-10-CM

## 2021-05-14 DIAGNOSIS — F41.1 GENERALIZED ANXIETY DISORDER: Primary | ICD-10-CM

## 2021-05-14 PROCEDURE — 90834 PSYTX W PT 45 MINUTES: CPT | Performed by: PSYCHIATRY & NEUROLOGY

## 2021-05-14 NOTE — PSYCH
This note was not shared with the patient due to this is a psychotherapy note    Virtual Regular Visit  Session time [id number] total time 34 minutes)    Assessment/Plan:    Problem List Items Addressed This Visit        Other    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 psychotic features ([address] )    Generalized anxiety disorder - Primary          Goals addressed in session: Goal 1 and Goal 2          Reason for visit is No chief complaint on file  Encounter provider HERMELINDA Alcala    Provider located at [address] [place] [id number] [phone]      Recent Visits  Date Type Provider Dept   05/07/21 [facility], [place] [facility] Therapist [facility]   Showing recent visits within past 7 days and meeting all other requirements     Future Appointments  No visits were found meeting these conditions  Showing future appointments within next 150 days and meeting all other requirements        The patient was identified by name and date of birth  Jana Lino was informed that this is a telemedicine visit and that the visit is being conducted through [address] [place] [address] [employer] and patient was informed that this is a secure, HIPAA-compliant platform  She agrees to proceed     My office door was closed  No one else was in the room  She acknowledged consent and understanding of privacy and security of the video platform  The patient has agreed to participate and understands they can discontinue the visit at any time  Patient is aware this is a billable service  Subjective  Jana Lino is a 35 y o  female presenting for follow up  [facility] shared that she has been doing somewhat better lately, feeling more motivated to work on clearing out the [employer] and putting things up for sale on [place]  She said that her energy has been a bit better and that she is sleeping better as well    She said that lately she has been looking through some of her old writing that she found in storage and realized how angry she was as a teenager, and how much she has grown now  She said that she had to recognize that a lot of people in her life were toxic and needed to cut them out, and can see now that she has matured a lot and is now able to let a lot of things go  Tiffanie Garcia shared that she has not gotten outside as much as she would like, but intends to get outside and either go for a walk or sit on a blanket in the sun and read  She said that things with her parents are good, and while she has not gone out with friends lately, she is reaching out to some to try to make plans for this weekend  She continues to work on positivity and gratefulness in order to try to improve mood and quality of life  A: Tiffanie Garcia presented as mildly depressed today, but with a brighter more animated affect than in recent sessions  Her eye contact was good, speech and behavior were normal, and thought process was logical and organized  Concentration was mildly impaired, insight and judgment were good  Denies SI HI and psychosis  P: Tiffanie Garcia will continue to work on getting outside and reaching out to friends to have more social interaction, will work on focusing on positives, and will return in two weeks as cancellation allows        HPI     Past Medical History:   Diagnosis Date    Anxiety     Chronic sinusitis     Depression     Fibromyalgia     Migraine     Obesity     Polyarthritis     Last assessed 9/21/2015    Psychiatric disorder        Past Surgical History:   Procedure Laterality Date    COLONOS[facility]  06/2019   [facility]      HYSTEROSCOPY      Endometrial Biopsy By Hysteroscopy    REMOVAL OF INTRAUTERINE DEVICE (IUD)      US GUIDED BREAST BIOPSY RIGHT COMPLETE Right 6/17/2019       Current Outpatient Medications   Medication Sig Dispense Refill    Calcium Carbonate-Vitamin D (CALCIUM 500/D PO) Take 1 capsule by mouth daily      celecoxib (CeleBREX) 200 mg capsule Take 1 capsule (200 mg total) by mouth 2 (two) times a day 60 capsule 6    Cholecalciferol (VITAMIN D-3) 1000 units CAPS Take 1 capsule by mouth daily      cyclobenzaprine (FLEXERIL) 10 mg tablet TAKE 1 TABLET BY MOUTH AT BEDTIME AS NEEDED FOR MUSCLE SPASMS 30 tablet 3    Galcanezumab-gnlm 120 MG/ML SOAJ Inject 1 ml SC in each thigh or stomach for a total of two injections the first time  Then 1 ml SC every 30 days 2 pen 3    hydrOXYzine HCL (ATARAX) 50 mg tablet TAKE 1 TABLET (50 MG TOTAL) BY MOUTH DAILY AT BEDTIME AS NEEDED (INSOMNIA) 30 tablet 0    IRON PO Take by mouth      lidocaine (LIDODERM) 5 % APPLY 1 PATCH TOPICALLY DAILY REMOVE & DISCARD PATCH WITHIN 12 HOURS OR AS DIRECTED BY MD 10 patch 1    LORazepam (ATIVAN) 0 5 mg tablet Take 1 tablet (0 5 mg total) by mouth every 6 (six) hours as needed for anxiety 60 tablet 2    MAGNESIUM OXIDE PO Take by mouth      medroxyPROGESTERone (DEPO-PROVERA) 150 mg/mL injection INJECT MONTHLY FOR 6 MONTH  5    medroxyPROGESTERone acetate (DEPO-PROVERA SYRINGE) 150 mg/mL injection INJECT MONTHLY X 6 MONTHS, BRING TO OFFICE      nortriptyline (PAMELOR) 50 mg capsule Take 1 capsule (50 mg total) by mouth 2 (two) times a day 60 capsule 0    propranolol (INDERAL) 20 mg tablet TAKE 1 TABLET BY MOUTH EVERY DAY AT NIGHT 30 tablet 1     No current facility-administered medications for this visit  Allergies   Allergen Reactions    Amoxicillin-Pot Clavulanate     Decadrol [Dexamethasone] Other (See Comments)     Category: Adverse Reaction;   psychosis    Dexamethasone Sodium Phosphate     Other     Penicillins Hives and Other (See Comments)     Category: Allergy; Hives/Uticaria    Tetracycline Hives and Other (See Comments)     Hives/Uticaria    Tetracyclines & Related Hives     Category: Allergy; Review of Systems    Video Exam    There were no vitals filed for this visit      Physical Exam I spent 34 minutes directly with the patient during this visit      VIRTUAL VISIT DISCLAIMER    Jannie Alejo acknowledges that she has consented to an online visit or consultation  She understands that the online visit is based solely on information provided by her, and that, in the absence of a face-to-face physical evaluation by the physician, the diagnosis she receives is both limited and provisional in terms of accuracy and completeness  This is not intended to replace a full medical face-to-face evaluation by the physician  Jannie Alejo understands and accepts these terms

## 2021-05-16 DIAGNOSIS — M25.50 ARTHRALGIA OF MULTIPLE JOINTS: ICD-10-CM

## 2021-05-17 RX ORDER — CELECOXIB 100 MG/1
CAPSULE ORAL
Qty: 60 CAPSULE | Refills: 6 | OUTPATIENT
Start: 2021-05-17

## 2021-05-21 DIAGNOSIS — F51.04 PSYCHOPHYSIOLOGICAL INSOMNIA: ICD-10-CM

## 2021-05-21 RX ORDER — HYDROXYZINE 50 MG/1
50 TABLET, FILM COATED ORAL
Qty: 30 TABLET | Refills: 0 | Status: SHIPPED | OUTPATIENT
Start: 2021-05-21 | End: 2021-06-16

## 2021-05-21 NOTE — TELEPHONE ENCOUNTER
Will ask covering provider to review in Dr Jeanmarie Aceves absence   Last visit 4/20/21 Next appointment 7/8/21

## 2021-05-27 ENCOUNTER — TELEMEDICINE (OUTPATIENT)
Dept: BEHAVIORAL/MENTAL HEALTH CLINIC | Facility: CLINIC | Age: 41
End: 2021-05-27
Payer: COMMERCIAL

## 2021-05-27 DIAGNOSIS — F33.1 MAJOR DEPRESSIVE DISORDER, RECURRENT EPISODE, MODERATE (HCC): Primary | ICD-10-CM

## 2021-05-27 DIAGNOSIS — F41.1 GENERALIZED ANXIETY DISORDER: ICD-10-CM

## 2021-05-27 PROCEDURE — 90834 PSYTX W PT 45 MINUTES: CPT | Performed by: PSYCHIATRY & NEUROLOGY

## 2021-05-27 NOTE — PSYCH
This note was not shared with the patient due to this is a psychotherapy note    Virtual Regular Visit  Session time [id number] (total time 25 minutes)    Assessment/Plan:    Problem List Items Addressed This Visit        Other    Major depressive disorder, recurrent episode, moderate ([facility] [address] ) - Primary    Generalized anxiety disorder          Goals addressed in session: Goal 1 and Goal 2          Reason for visit is No chief complaint on file  Encounter provider HERMELINDA Moralez    Provider located at [address] [place] [id number] [phone]      Recent Visits  No visits were found meeting these conditions  Showing recent visits within past 7 days and meeting all other requirements     Future Appointments  No visits were found meeting these conditions  Showing future appointments within next 150 days and meeting all other requirements        The patient was identified by name and date of birth  Sheldon Kumar was informed that this is a telemedicine visit and that the visit is being conducted through [address] [place] [address] Now and patient was informed that this is a secure, HIPAA-compliant platform  She agrees to proceed     My office door was closed  No one else was in the room  She acknowledged consent and understanding of privacy and security of the video platform  The patient has agreed to participate and understands they can discontinue the visit at any time  Patient is aware this is a billable service  Subjective  Sheldon Kumar is a 35 y o  female presenting for follow up  Chelsea Malone shared that she has been doing better lately, with her mood being "medium" and little to no anxiety  She has been keeping busy working on items to post for sale on [place], and has been getting out some days to go for walks    She has reached out to some friends to get together, although still mostly one-sided with Chelsea Malone being the one to reach out and make plans  She reports things at home being good, with parents being busy with their own hobbies, and everyone getting along  Her parents are pretty serious about moving soon, either to a one level house in the area or to [place]  Miguel Breen said that her self-esteem has been improving as well, with her recognizing that she deserves better than to "waste time" on toxic or unhealthy relationships  She continues to try to date, but so far nothing seems to be working out, and while she says she has been alone for a long time and would like to be in a relationship, she is not overly worried about it right now  Miguel Breen was not feeling well today, so session was cut short   A: Miguel Breen presented as calm and generally euthymic, with good eye contact, normal speech and behavior, and intact concentration  She did seem to be lacking some energy, most likely due to not feeling well  She is making moderate progress toward goals of building self-esteem and managing her depression  P: Miguel Breen will continue to focus on productivity, positive affirmations to build self-esteem, and will return in two weeks for follow up        HPI     Past Medical History:   Diagnosis Date    Anxiety     Chronic sinusitis     Depression     Fibromyalgia     Migraine     Obesity     Polyarthritis     Last assessed 9/21/2015    Psychiatric disorder        Past Surgical History:   Procedure Laterality Date    COLONOSCOPY  06/2019   [facility]      HYSTEROSCOPY      Endometrial Biopsy By Hysteroscopy    REMOVAL OF INTRAUTERINE DEVICE (IUD)      US GUIDED BREAST BIOPSY RIGHT COMPLETE Right 6/17/2019       Current Outpatient Medications   Medication Sig Dispense Refill    Calcium Carbonate-Vitamin D (CALCIUM 500/D PO) Take 1 capsule by mouth daily      celecoxib (CeleBREX) 200 mg capsule Take 1 capsule (200 mg total) by mouth 2 (two) times a day 60 capsule 6    Cholecalciferol (VITAMIN D-3) 1000 units CAPS Take 1 capsule by mouth daily      cyclobenzaprine (FLEXERIL) 10 mg tablet TAKE 1 TABLET BY MOUTH AT BEDTIME AS NEEDED FOR MUSCLE SPASMS 30 tablet 3    Galcanezumab-gnlm 120 MG/ML SOAJ Inject 1 ml SC in each thigh or stomach for a total of two injections the first time  Then 1 ml SC every 30 days 2 pen 3    hydrOXYzine HCL (ATARAX) 50 mg tablet TAKE 1 TABLET (50 MG TOTAL) BY MOUTH DAILY AT BEDTIME AS NEEDED (INSOMNIA) 30 tablet 0    IRON PO Take by mouth      lidocaine (LIDODERM) 5 % APPLY 1 PATCH TOPICALLY DAILY REMOVE & DISCARD PATCH WITHIN 12 HOURS OR AS DIRECTED BY MD 10 patch 1    LORazepam (ATIVAN) 0 5 mg tablet Take 1 tablet (0 5 mg total) by mouth every 6 (six) hours as needed for anxiety 60 tablet 2    MAGNESIUM OXIDE PO Take by mouth      medroxyPROGESTERone (DEPO-PROVERA) 150 mg/mL injection INJECT MONTHLY FOR 6 MONTH  5    medroxyPROGESTERone acetate (DEPO-PROVERA SYRINGE) 150 mg/mL injection INJECT MONTHLY X 6 MONTHS, BRING TO OFFICE      nortriptyline (PAMELOR) 50 mg capsule Take 1 capsule (50 mg total) by mouth 2 (two) times a day 60 capsule 0    propranolol (INDERAL) 20 mg tablet TAKE 1 TABLET BY MOUTH EVERY DAY AT NIGHT 30 tablet 1     No current facility-administered medications for this visit  Allergies   Allergen Reactions    Amoxicillin-Pot Clavulanate     Decadrol [Dexamethasone] Other (See Comments)     Category: Adverse Reaction;   psychosis    Dexamethasone Sodium Phosphate     Other     Penicillins Hives and Other (See Comments)     Category: Allergy; Hives/Uticaria    Tetracycline Hives and Other (See Comments)     Hives/Uticaria    Tetracyclines & Related Hives     Category: Allergy; Review of Systems    Video Exam    There were no vitals filed for this visit      Physical Exam     I spent 22 minutes directly with the patient during this visit      VIRTUAL VISIT DISCLAIMER    [place] acknowledges that she has consented to an online visit or consultation  She understands that the online visit is based solely on information provided by her, and that, in the absence of a face-to-face physical evaluation by the physician, the diagnosis she receives is both limited and provisional in terms of accuracy and completeness  This is not intended to replace a full medical face-to-face evaluation by the physician  Claudene Papa understands and accepts these terms

## 2021-06-04 ENCOUNTER — TELEPHONE (OUTPATIENT)
Dept: PSYCHIATRY | Facility: CLINIC | Age: 41
End: 2021-06-04

## 2021-06-04 NOTE — TELEPHONE ENCOUNTER
Pallavi Moreau left a message that she hasn't been doing well the last 2 weeks and feels she's ready to increase nortriptyline dose  I spoke with Wellstonluis carlos Fernandoe to let her know the message was received  She describes herself as feeling sad, but cannot identify any stressors causing same  She denies SI and has crisis/hotline numbers as well as nursings to call as needed  Pallaviluis carlos Moreau gets a notification from her pharmacy when a prescription is waiting for  and would not need an additional call  She agree to call back with further concerns

## 2021-06-06 DIAGNOSIS — M25.50 ARTHRALGIA OF MULTIPLE JOINTS: ICD-10-CM

## 2021-06-06 DIAGNOSIS — M79.7 FIBROMYALGIA: ICD-10-CM

## 2021-06-06 RX ORDER — CELECOXIB 100 MG/1
CAPSULE ORAL
Qty: 60 CAPSULE | Refills: 6 | Status: SHIPPED | OUTPATIENT
Start: 2021-06-06 | End: 2021-10-05

## 2021-06-07 RX ORDER — NORTRIPTYLINE HYDROCHLORIDE 50 MG/1
CAPSULE ORAL
Qty: 60 CAPSULE | Refills: 0 | Status: SHIPPED | OUTPATIENT
Start: 2021-06-07 | End: 2021-06-09 | Stop reason: SDUPTHER

## 2021-06-09 DIAGNOSIS — M79.7 FIBROMYALGIA: ICD-10-CM

## 2021-06-09 RX ORDER — NORTRIPTYLINE HYDROCHLORIDE 75 MG/1
75 CAPSULE ORAL 2 TIMES DAILY
Qty: 60 CAPSULE | Refills: 2 | Status: SHIPPED | OUTPATIENT
Start: 2021-06-09 | End: 2021-09-12

## 2021-06-09 NOTE — TELEPHONE ENCOUNTER
Guanakito Fong left an additional VM following up on her message about increasing the nortriptyline

## 2021-06-11 ENCOUNTER — TELEMEDICINE (OUTPATIENT)
Dept: BEHAVIORAL/MENTAL HEALTH CLINIC | Facility: CLINIC | Age: 41
End: 2021-06-11
Payer: COMMERCIAL

## 2021-06-11 DIAGNOSIS — F41.1 GENERALIZED ANXIETY DISORDER: ICD-10-CM

## 2021-06-11 DIAGNOSIS — F33.1 MAJOR DEPRESSIVE DISORDER, RECURRENT EPISODE, MODERATE (HCC): Primary | ICD-10-CM

## 2021-06-11 PROCEDURE — 90834 PSYTX W PT 45 MINUTES: CPT | Performed by: PSYCHIATRY & NEUROLOGY

## 2021-06-11 NOTE — PSYCH
This note was not shared with the patient due to this is a psychotherapy note    Virtual Regular Visit  Session time [id number] (total time 39 minutes)    Assessment/Plan:    Problem List Items Addressed This Visit        Other    Major depressive disorder, recurrent episode, moderate ([address] ) - Primary    Generalized anxiety disorder          Goals addressed in session: Goal 1          Reason for visit is No chief complaint on file  Encounter provider HERMELINDA Snell    Provider located at [address] [place] [id number] [phone]      Recent Visits  No visits were found meeting these conditions  Showing recent visits within past 7 days and meeting all other requirements     Future Appointments  No visits were found meeting these conditions  Showing future appointments within next 150 days and meeting all other requirements        The patient was identified by name and date of birth  Migdalia Veras was informed that this is a telemedicine visit and that the visit is being conducted through [address] [place] [address] Now and patient was informed that this is a secure, HIPAA-compliant platform  She agrees to proceed     My office door was closed  No one else was in the room  She acknowledged consent and understanding of privacy and security of the video platform  The patient has agreed to participate and understands they can discontinue the visit at any time  Patient is aware this is a billable service  Serg Veras is a 35 y o  female presenting for follow up  [facility] shared that she has been feeling "lower than usual" the last week or so, and has been feeling lonely and isolated  She reports reduced motivation to do things, although has been able to keep up with [facility] business and has been able to get out and walk with her mom and friends more often    She talked about sorting through her things to see what she could sell, and came across a box of CDs, which included one from a band that she was involved with when she was in her early 25s  There was a elier named Ansley Kim that she was connected to through the band and they were together for a while, and some of his band's songs are about her  Finding the CD brought back memories and unresolved feelings that she has for him, and she notes that this has contributed to her low mood and her feeling lonely  She did not want to talk through the particulars of her relationship or that time in her life today, but was open to journaling about it  Discussed working through the good memories and good times she had with Ansley Kim, and balancing that with the reasons why things drifted apart or ended the way they did, in order to give her some perspective and help her resolve some of those old feelings  [facility] said that she is looking forward to camping at a friend's house this weekend, and has plans with her mom to go to the beach at some point this summer  She also noted that she has been in somewhat less physical pain from her fibromyalgia lately, so she is feeling better in that regard  Encouraged [facility] to give herself time to work through the emotions that have been on her mind instead of stuffing them down, and to continue to try to be as active as she is able to help boost mood and help with physical pain  A: [facility] presented as depressed today, with a tearful affect particularly when talking about Ansley Kim  Her behavior was somewhat slowed, speech soft  Her eye contact was good, and concentration was intact  Her insight and judgment were good  She denies SI HI and psychosis  Some slow progress toward goals  P: [facility] will work on journaling as discussed today, will try to keep future activities in mind to inspire some hope and happiness, and will return in 10 days for follow up        HPI     Past Medical History:   Diagnosis Date    Anxiety     Chronic sinusitis  Depression     Fibromyalgia     Migraine     Obesity     Polyarthritis     Last assessed 9/21/2015    Psychiatric disorder        Past Surgical History:   Procedure Laterality Date    [facility]ONOSCOPY  06/2019   [facility]      HYSTEROSCOPY      Endometrial Biopsy By Hysteroscopy    REMOVAL OF INTRAUTERINE DEVICE (IUD)      US GUIDED BREAST BIOPSY RIGHT COMPLETE Right 6/17/2019       Current Outpatient Medications   Medication Sig Dispense Refill    Calcium Carbonate-Vitamin D (CALCIUM 500/D PO) Take 1 capsule by mouth daily      celecoxib (CeleBREX) 100 mg capsule TAKE 1 CAPSULE BY MOUTH TWICE A DAY 60 capsule 6    celecoxib (CeleBREX) 200 mg capsule Take 1 capsule (200 mg total) by mouth 2 (two) times a day 60 capsule 6    Cholecalciferol (VITAMIN D-3) 1000 units CAPS Take 1 capsule by mouth daily      cyclobenzaprine (FLEXERIL) 10 mg tablet TAKE 1 TABLET BY MOUTH AT BEDTIME AS NEEDED FOR MUSCLE SPASMS 30 tablet 3    Galcanezumab-gnlm 120 MG/ML SOAJ Inject 1 ml SC in each thigh or stomach for a total of two injections the first time   Then 1 ml SC every 30 days 2 pen 3    hydrOXYzine HCL (ATARAX) 50 mg tablet TAKE 1 TABLET (50 MG TOTAL) BY MOUTH DAILY AT BEDTIME AS NEEDED (INSOMNIA) 30 tablet 0    IRON PO Take by mouth      lidocaine (LIDODERM) 5 % APPLY 1 PATCH TOPICALLY DAILY REMOVE & DISCARD PATCH WITHIN 12 HOURS OR AS DIRECTED BY MD 10 patch 1    LORazepam (ATIVAN) 0 5 mg tablet Take 1 tablet (0 5 mg total) by mouth every 6 (six) hours as needed for anxiety 60 tablet 2    MAGNESIUM OXIDE PO Take by mouth      medroxyPROGESTERone (DEPO-PROVERA) 150 mg/mL injection INJECT MONTHLY FOR 6 MONTH  5    medroxyPROGESTERone acetate (DEPO-PROVERA SYRINGE) 150 mg/mL injection INJECT MONTHLY X 6 MONTHS, BRING TO OFFICE      nortriptyline (PAMELOR) 75 MG capsule Take 1 capsule (75 mg total) by mouth 2 (two) times a day 60 capsule 2    propranolol (INDERAL) 20 mg tablet TAKE 1 TABLET BY MOUTH EVERY DAY AT NIGHT 30 tablet 1     No current facility-administered medications for this visit  Allergies   Allergen Reactions    Amoxicillin-Pot Clavulanate     Decadrol [Dexamethasone] Other (See Comments)     Category: Adverse Reaction;   psychosis    Dexamethasone Sodium Phosphate     Other     Penicillins Hives and Other (See Comments)     Category: Allergy; Hives/Uticaria    Tetracycline Hives and Other (See Comments)     Hives/Uticaria    Tetracyclines & Related Hives     Category: Allergy; Review of Systems    Video Exam    There were no vitals filed for this visit  Physical Exam     I spent 39 minutes directly with the patient during this visit      VIRTUAL VISIT DISCLAIMER    Kaleigh Cates acknowledges that she has consented to an online visit or consultation  She understands that the online visit is based solely on information provided by her, and that, in the absence of a face-to-face physical evaluation by the physician, the diagnosis she receives is both limited and provisional in terms of accuracy and completeness  This is not intended to replace a full medical face-to-face evaluation by the physician  Kaleigh Cates understands and accepts these terms

## 2021-06-15 ENCOUNTER — EVALUATION (OUTPATIENT)
Dept: PHYSICAL THERAPY | Age: 41
End: 2021-06-15
Payer: COMMERCIAL

## 2021-06-15 DIAGNOSIS — M79.7 FIBROMYALGIA: Primary | ICD-10-CM

## 2021-06-15 PROCEDURE — 97161 PT EVAL LOW COMPLEX 20 MIN: CPT | Performed by: PHYSICAL THERAPIST

## 2021-06-15 NOTE — PROGRESS NOTES
PT Evaluation     Today's date: 6/15/2021  Patient name: Nelson Archibald  : 1980  MRN: [id number]  Referring provider: Dominga Hodges MD  Dx:   Encounter Diagnosis     ICD-10-CM    1  Fibromyalgia  M79 7                   Assessment  Assessment details: Pt reports to PT with cc of chronic pain throughout body, with lateral hip pain beginning in last 3 months  Pt has decreased LE strength and poor activity tolerance with ADLs and will benefit form aquatic PT to improve functional mobility  Impairments: abnormal coordination, abnormal gait, abnormal muscle tone, abnormal or restricted ROM, abnormal movement, activity intolerance, impaired physical strength, lacks appropriate home exercise program, pain with function, poor posture  and poor body mechanics    Goals  In 4 weeks pt will:  -Be independent with phase I of HEP  -Increase LE strength by 1/2 grade  -Increase LE ROM by 10 degrees    By discharge pt will:  -Be independent with Phase II of HEP  -Demonstrate full LE strength  -Demonstrate full LE ROM  -Report minimal pain with ADLs    Plan  Patient would benefit from: skilled physical therapy        Subjective Evaluation    History of Present Illness  Mechanism of injury: Pt reports to PT with cc of chronic pain throughout body, with lateral hip pain beginning in last 3 months  Pt states she has difficulty with ADLs due to pain     Pain  Current pain rating: 3  At best pain rating: 3  At worst pain ratin    Patient Goals  Patient goals for therapy: decreased pain, improved balance, increased motion, increased strength, independence with ADLs/IADLs and return to sport/leisure activities          Objective     Active Range of Motion     Additional Active Range of Motion Details  Lumbar ROM as % of normal ROM    Flex:50  Ext:50  R ROT:50  L ROT:50      Strength/Myotome Testing     Left Hip   Planes of Motion   Flexion: 4  Abduction: 3+    Right Hip   Planes of Motion   Flexion: 4  Abduction: 3+    Left Knee Flexion: 4  Extension: 4    Right Knee   Flexion: 4  Extension: 4    Left Ankle/Foot   Dorsiflexion: 3+  Plantar flexion: 3+    Right Ankle/Foot   Dorsiflexion: 4-  Plantar flexion: 3+             Precautions: Chronic pain      Manuals                                                                 Neuro Re-Ed                                                                                                        Ther Ex                                                                                                                     Ther Activity                                       Gait Training                                       Modalities

## 2021-06-16 DIAGNOSIS — F51.04 PSYCHOPHYSIOLOGICAL INSOMNIA: ICD-10-CM

## 2021-06-16 RX ORDER — HYDROXYZINE 50 MG/1
50 TABLET, FILM COATED ORAL
Qty: 30 TABLET | Refills: 0 | Status: SHIPPED | OUTPATIENT
Start: 2021-06-16 | End: 2021-07-23

## 2021-06-17 ENCOUNTER — OFFICE VISIT (OUTPATIENT)
Dept: FAMILY MEDICINE CLINIC | Facility: CLINIC | Age: 41
End: 2021-06-17
Payer: COMMERCIAL

## 2021-06-17 VITALS
DIASTOLIC BLOOD PRESSURE: 88 MMHG | TEMPERATURE: 100 F | WEIGHT: 205 LBS | SYSTOLIC BLOOD PRESSURE: 120 MMHG | RESPIRATION RATE: 20 BRPM | HEART RATE: 90 BPM | HEIGHT: 60 IN | BODY MASS INDEX: 40.25 KG/M2

## 2021-06-17 DIAGNOSIS — G43.709 CHRONIC MIGRAINE WITHOUT AURA WITHOUT STATUS MIGRAINOSUS, NOT INTRACTABLE: ICD-10-CM

## 2021-06-17 DIAGNOSIS — L55.1 SUNBURN, BLISTERING: Primary | ICD-10-CM

## 2021-06-17 PROCEDURE — 99213 OFFICE O/P EST LOW 20 MIN: CPT | Performed by: NURSE PRACTITIONER

## 2021-06-17 PROCEDURE — 1036F TOBACCO NON-USER: CPT | Performed by: NURSE PRACTITIONER

## 2021-06-17 PROCEDURE — 3008F BODY MASS INDEX DOCD: CPT | Performed by: NURSE PRACTITIONER

## 2021-06-17 RX ORDER — PROPRANOLOL HYDROCHLORIDE 20 MG/1
TABLET ORAL
Qty: 30 TABLET | Refills: 5 | Status: SHIPPED | OUTPATIENT
Start: 2021-06-17 | End: 2021-12-14 | Stop reason: SDUPTHER

## 2021-06-17 NOTE — PROGRESS NOTES
Assessment/Plan:    Sunburn, blistering- discussed conservative treatment options including cool compresses/bathing, OTC Aloe, NSAIDs  Blistering on the chest is quite small  Leave blisters alone, they will pop on their own  Signs of secondary infection reviewed today  Apply OTC Bacitracin ointment to popped blister  Avoid tight fitting clothing  Protect skin from the sun, always use sunscreen with at least SPF 30, shade when possible  We did discuss a PO  Prednisone taper but I don't feel this is necessary at this time  She knows to call us with any worsening symptoms or if she develops fevers, N/V, headaches, dizziness, etc     There are no diagnoses linked to this encounter  Subjective:      Patient ID: Luisito Mccarthy is a 35 y o  female  HPI     Pt presents by herself today for an aute visit  She was lying out by a pool on Monday, 6/14/21, no sunscreen and is now sunburnt on the front of her body (legs, arms, face, chest)  She notes a few small blisters on her right sided chest and one large blister on her right upper thigh which popped on its own  She feels her sunburnt is slightly better today  Notes feeling feverish the past few days, better now  Mild nausea on occasion, seems to be improving as well  No vomiting  Denies headaches, dizziness  Urinating regularly  She has been using OTC Aloe which does alleviate her discomfort     The following portions of the patient's history were reviewed and updated as appropriate: allergies, current medications, past family history, past medical history, past social history, past surgical history and problem list     Review of Systems   Constitutional: Negative for chills and fever  HENT: Negative for ear pain and sore throat  Eyes: Negative for pain and visual disturbance  Respiratory: Negative for cough and shortness of breath  Cardiovascular: Negative for chest pain, palpitations and leg swelling  Gastrointestinal: Positive for nausea  Negative for abdominal distention, abdominal pain, diarrhea and vomiting  Genitourinary: Negative for dysuria and hematuria  Musculoskeletal: Negative for arthralgias and back pain  Skin: Negative for color change and rash  As noted in HPI   Neurological: Negative for dizziness, seizures, syncope, weakness, numbness and headaches  Hematological: Negative for adenopathy  All other systems reviewed and are negative  Objective:      /88   Pulse (!) 112   Temp 100 °F (37 8 °C) (Tympanic)   Resp 20   Ht 5' (1 524 m)   Wt 93 kg (205 lb)   BMI 40 04 kg/m²          Physical Exam  Constitutional:       General: She is not in acute distress  Appearance: She is well-developed  She is not diaphoretic  HENT:      Head: Normocephalic and atraumatic  Eyes:      Extraocular Movements: Extraocular movements intact  Conjunctiva/sclera: Conjunctivae normal       Pupils: Pupils are equal, round, and reactive to light  Neck:      Thyroid: No thyromegaly  Cardiovascular:      Rate and Rhythm: Normal rate and regular rhythm  Heart sounds: Normal heart sounds  No murmur heard  Pulmonary:      Effort: Pulmonary effort is normal  No respiratory distress  Breath sounds: Normal breath sounds  No wheezing  Abdominal:      General: Bowel sounds are normal  There is no distension  Palpations: Abdomen is soft  Tenderness: There is no abdominal tenderness  Musculoskeletal:         General: Normal range of motion  Cervical back: Normal range of motion and neck supple  Lymphadenopathy:      Cervical: No cervical adenopathy  Skin:     General: Skin is warm and dry  Comments: Sunburnt over B/L upper and lower extremities, mid chest and face  Small scattered blistering over the right sided chest   One large popped blister on the right upper thigh, draining scant amount of serous drainage    Neurological:      General: No focal deficit present        Mental Status: She is alert and oriented to person, place, and time  Psychiatric:         Mood and Affect: Mood normal          Behavior: Behavior normal          Thought Content:  Thought content normal          Judgment: Judgment normal

## 2021-06-18 ENCOUNTER — TELEPHONE (OUTPATIENT)
Dept: PSYCHIATRY | Facility: CLINIC | Age: 41
End: 2021-06-18

## 2021-06-21 ENCOUNTER — TELEPHONE (OUTPATIENT)
Dept: PSYCHIATRY | Facility: CLINIC | Age: 41
End: 2021-06-21

## 2021-06-21 NOTE — TELEPHONE ENCOUNTER
----- Message from Jessica Dorsey sent at 6/21/2021  1:19 PM EDT -----  Regarding: Patient No Show  Holly Braga [[id number]] No Showed 06/21/21  for Marcella Edwards LCSW and did not call with proper notice to Cx appt   They are marked as a No Show for today's visit

## 2021-06-23 ENCOUNTER — TELEPHONE (OUTPATIENT)
Dept: BEHAVIORAL/MENTAL HEALTH CLINIC | Facility: CLINIC | Age: 41
End: 2021-06-23

## 2021-06-23 NOTE — TELEPHONE ENCOUNTER
NO-SHOW LETTER MAILED TO Marisol Garcia    ADDRESS: [address] [place] [phone]

## 2021-06-28 ENCOUNTER — TELEPHONE (OUTPATIENT)
Dept: PSYCHIATRY | Facility: CLINIC | Age: 41
End: 2021-06-28

## 2021-06-28 NOTE — TELEPHONE ENCOUNTER
Hari Fernandes called back  Reports she did start the increase to nortriptyline on 6/10/21, but is not noticing a difference, She wants to sleep all the time, has no motivation, admits to feeling sad and hopeless and in the last few days she has had a couple of times a day when she gets teary and needs to leave the room  Her appetite is about the same and she is sleeping with the assistance of hydroxyzine  Reviewed Dr Adrien Carvajal is viewing messages remotely while covering in-patient  Will call with feedback after review

## 2021-06-28 NOTE — TELEPHONE ENCOUNTER
Esther MORRIS on nursing line  She reports the Nortriptyline was increased and she still is very depressed  She is wondering if needs another increase or another medication totally    [phone]   Please review

## 2021-06-29 NOTE — TELEPHONE ENCOUNTER
Returned call to Cookie Weiner and reviewed Dr Tora Hodgkin' feedback  Process and financial obligation for [employer] reviewed  Cookie Weiner is willing to do [employer] testing  Current insurance is with [employer]  Given web site to review  I reviewed [employer] will send the collection kit via [employer] within 24-48 hours of test order; specimen envelope does not require a signature at this time; the order form will have been completed previously; there is a consent form to be completed; the kit has instructions for return of specimen  She has the nursing number given to call with questions

## 2021-06-29 NOTE — TELEPHONE ENCOUNTER
Sumit Jeong MD  to Me        3:24 PM  She has failed multiple things , might be good candidate for genesight

## 2021-06-29 NOTE — TELEPHONE ENCOUNTER
Lucille Young called again today and Lm on nursing line  She said she called yesterday  She is waiting for word  Requesting a call   [phone]

## 2021-06-30 NOTE — TELEPHONE ENCOUNTER
[facility] and faxed to [phone]

## 2021-07-06 ENCOUNTER — APPOINTMENT (OUTPATIENT)
Dept: PHYSICAL THERAPY | Age: 41
End: 2021-07-06
Payer: COMMERCIAL

## 2021-07-07 ENCOUNTER — APPOINTMENT (OUTPATIENT)
Dept: PHYSICAL THERAPY | Age: 41
End: 2021-07-07
Payer: COMMERCIAL

## 2021-07-12 NOTE — TELEPHONE ENCOUNTER
Rock Garrison called to ask if [employer] results were back yet  According to [employer], it is still processing, however missing information  Nursing called Rock Wyman and provided [facility] to call and inquire   [phone]    She will follow up with nursing

## 2021-07-13 ENCOUNTER — OFFICE VISIT (OUTPATIENT)
Dept: PHYSICAL THERAPY | Age: 41
End: 2021-07-13
Payer: COMMERCIAL

## 2021-07-13 DIAGNOSIS — M79.7 FIBROMYALGIA: Primary | ICD-10-CM

## 2021-07-13 PROCEDURE — 97113 AQUATIC THERAPY/EXERCISES: CPT | Performed by: PHYSICAL THERAPIST

## 2021-07-13 NOTE — PROGRESS NOTES
Daily Note     Today's date: 2021  Patient name: Jatin Maharaj  : 1980  MRN: [id number]  Referring provider: Carlos Alvarado MD  Dx:   Encounter Diagnosis     ICD-10-CM    1  Fibromyalgia  M79 7                   Subjective: Pt reports no hip pain since IE, states she has been compliant with HEP      Objective: See treatment diary below      Assessment: Tolerated treatment well  Patient demonstrated fatigue post treatment, would benefit from continued PT and pt tolerated first session without increase in symptoms  Plan: Continue per plan of care  Progress treatment as tolerated         Precautions: Chronic pain      Manuals                                                                 Neuro Re-Ed                                                                                                        Ther Ex             POOL             laps 5/5            Hip abd/flex x30 ea            Step ups x20 ea            Ball press down 5" x10 L/R/C            LAQ Blue wts 3x10 ea             Sidestep  3x with wts            Add/ext paddles x20 ea                                                                             Ther Activity                                       Gait Training                                       Modalities

## 2021-07-14 NOTE — TELEPHONE ENCOUNTER
Received notification from Stephen report was ready for review         1 copy placed in scanning bin to be scanned into media    1 copy placed in provider mailbox for review    FYI

## 2021-07-15 NOTE — TELEPHONE ENCOUNTER
Aretha Alexander called inquiring about [facility] results  Nursing advised Dr Yunior Berry is out of the office until 7/22/21  She will review message on RTO  Esther verbalized understanding       Please review results on RTO

## 2021-07-19 ENCOUNTER — TELEMEDICINE (OUTPATIENT)
Dept: BEHAVIORAL/MENTAL HEALTH CLINIC | Facility: CLINIC | Age: 41
End: 2021-07-19
Payer: COMMERCIAL

## 2021-07-19 DIAGNOSIS — F33.1 MAJOR DEPRESSIVE DISORDER, RECURRENT EPISODE, MODERATE (HCC): Primary | ICD-10-CM

## 2021-07-19 DIAGNOSIS — F41.1 GENERALIZED ANXIETY DISORDER: ICD-10-CM

## 2021-07-19 PROCEDURE — 90834 PSYTX W PT 45 MINUTES: CPT | Performed by: PSYCHIATRY & NEUROLOGY

## 2021-07-20 ENCOUNTER — APPOINTMENT (OUTPATIENT)
Dept: PHYSICAL THERAPY | Age: 41
End: 2021-07-20
Payer: COMMERCIAL

## 2021-07-22 NOTE — TELEPHONE ENCOUNTER
Spoke with Mani Waller and provided names of antidepressants per Dr Denita Austin recommendation   Mani Waller will research medications and will follow up with nursing

## 2021-07-22 NOTE — TELEPHONE ENCOUNTER
[facility] would like to try the Pristiq   She also stated if Dr Leslie Hoang wants to increase any other medications she is currently on, please advise

## 2021-07-23 DIAGNOSIS — F51.04 PSYCHOPHYSIOLOGICAL INSOMNIA: ICD-10-CM

## 2021-07-23 RX ORDER — HYDROXYZINE 50 MG/1
50 TABLET, FILM COATED ORAL
Qty: 30 TABLET | Refills: 0 | Status: SHIPPED | OUTPATIENT
Start: 2021-07-23 | End: 2021-09-01

## 2021-07-29 ENCOUNTER — TELEMEDICINE (OUTPATIENT)
Dept: BEHAVIORAL/MENTAL HEALTH CLINIC | Facility: CLINIC | Age: 41
End: 2021-07-29
Payer: COMMERCIAL

## 2021-07-29 DIAGNOSIS — F41.1 GENERALIZED ANXIETY DISORDER: ICD-10-CM

## 2021-07-29 DIAGNOSIS — F33.1 MAJOR DEPRESSIVE DISORDER, RECURRENT EPISODE, MODERATE (HCC): Primary | ICD-10-CM

## 2021-07-29 PROCEDURE — 90834 PSYTX W PT 45 MINUTES: CPT | Performed by: PSYCHIATRY & NEUROLOGY

## 2021-07-29 NOTE — PSYCH
Session time 2582-3720 (total time 38 minutes)    Virtual Regular Visit    Verification of patient location:    Patient is located in the following state in which I hold an active license [place]      Assessment/Plan:    Problem List Items Addressed This Visit        Other    Major depressive disorder, recurrent episode, moderate ([address] ) - Primary    Generalized anxiety disorder          Goals addressed in session: Goal 1          Reason for visit is   Chief Complaint   Patient presents with    Virtual Regular Visit        Encounter provider HERMELINDA Loera    Provider located at [address] [place] [id number] [phone]      Recent Visits  No visits were found meeting these conditions  Showing recent visits within past 7 days and meeting all other requirements  Future Appointments  No visits were found meeting these conditions  Showing future appointments within next 150 days and meeting all other requirements       The patient was identified by name and date of birth  Nedra Schwab was informed that this is a telemedicine visit and that the visit is being conducted through[employer]List and patient was informed that this is a secure, HIPAA-compliant platform  She agrees to proceed     My office door was closed  No one else was in the room  She acknowledged consent and understanding of privacy and security of the video platform  The patient has agreed to participate and understands they can discontinue the visit at any time  Patient is aware this is a billable service  Subjective  Nedra Schwab is a 35 y o  female presenting for follow up  Walter Wilson shared that she started Pristiq on Friday and already feels like she has more energy and less depression  She said that this has given her some hope of feeling better    Her motivation has not improved yet, but said that her parents are away in [place] presently, so she is in "vacation mode," feeling like she does not have to be busy and productive while they are away  She said that for the most part, her mood has been good, her anxiety has been manageable, and she continues to utilize coping skills (reaching out to friends, writing/gratitude journaling) to manage anxiety  She talked about wanting to reach out to friends and get out more, and that she would do so this week to plan something  She also said that she has been doing some "what if" thinking about her past, noting that she is able to say that if x happened, then her life might be worse than it is now, so it "tricks" her into not thinking about those past issues any longer  She has given up on online dating presently, and said that she is ok with setting that aside and just being friends with people  She notes she has always gotten along well with guys, so is satisfied with just friendship for now  She talked about possibly moving to [facility] where her parents want to go, and said that she is ok with whatever decision they make since she is financially dependent on them right now  She said that she is not down about being dependent on them and said that she is grateful that she has that opportunity for stability, at least until SSDB hearing is over  A: Gurwinder Simmons presented as calm and generally euthymic, with a fairly bright affect, good eye contact and normal behavior  Her concentration was intact, thought process logical and organized and content positive  Insight and judgment intact  Denies SI HI and psychosis  Good progress toward goals  Joseph Wong will continue to utilize coping skills to manage anxiety and mood, will work on being productive as she feels up to it, and will return in two weeks for follow up with this writer        HPI     Past Medical History:   Diagnosis Date    Anxiety     Chronic sinusitis     Depression     Fibromyalgia     Migraine     Obesity     Polyarthritis     Last assessed 9/21/2015    Psychiatric disorder        Past Surgical History:   Procedure Laterality Date    COLONOSCOPY  06/2019   [facility] D[facility] SURGERY      HYSTEROSCOPY      Endometrial Biopsy By Hysteroscopy    REMOVAL OF INTRAUTERINE DEVICE (IUD)      US GUIDED BREAST BIOPSY RIGHT COMPLETE Right 6/17/2019       Current Outpatient Medications   Medication Sig Dispense Refill    Calcium Carbonate-Vitamin D (CALCIUM 500/D PO) Take 1 capsule by mouth daily      celecoxib (CeleBREX) 100 mg capsule TAKE 1 CAPSULE BY MOUTH TWICE A DAY 60 capsule 6    celecoxib (CeleBREX) 200 mg capsule Take 1 capsule (200 mg total) by mouth 2 (two) times a day 60 capsule 6    Cholecalciferol (VITAMIN D-3) 1000 units CAPS Take 1 capsule by mouth daily      cyclobenzaprine (FLEXERIL) 10 mg tablet TAKE 1 TABLET BY MOUTH AT BEDTIME AS NEEDED FOR MUSCLE SPASMS 30 tablet 3    desvenlafaxine succinate (PRISTIQ) 50 mg 24 hr tablet Take 1 tablet (50 mg total) by mouth daily 30 tablet 2    Galcanezumab-gnlm 120 MG/ML SOAJ Inject 1 ml SC in each thigh or stomach for a total of two injections the first time   Then 1 ml SC every 30 days 2 pen 3    hydrOXYzine HCL (ATARAX) 50 mg tablet TAKE 1 TABLET (50 MG TOTAL) BY MOUTH DAILY AT BEDTIME AS NEEDED (INSOMNIA) 30 tablet 0    IRON PO Take by mouth      lidocaine (LIDODERM) 5 % APPLY 1 PATCH TOPICALLY DAILY REMOVE & DISCARD PATCH WITHIN 12 HOURS OR AS DIRECTED BY MD 10 patch 1    LORazepam (ATIVAN) 0 5 mg tablet Take 1 tablet (0 5 mg total) by mouth every 6 (six) hours as needed for anxiety 60 tablet 2    MAGNESIUM OXIDE PO Take by mouth      medroxyPROGESTERone (DEPO-PROVERA) 150 mg/mL injection INJECT MONTHLY FOR 6 MONTH  5    medroxyPROGESTERone acetate (DEPO-PROVERA SYRINGE) 150 mg/mL injection INJECT MONTHLY X 6 MONTHS, BRING TO OFFICE      nortriptyline (PAMELOR) 75 MG capsule Take 1 capsule (75 mg total) by mouth 2 (two) times a day 60 capsule 2    propranolol (INDERAL) 20 mg tablet TAKE 1 TABLET BY MOUTH EVERY DAY AT NIGHT 30 tablet 5     No current facility-administered medications for this visit  Allergies   Allergen Reactions    Amoxicillin-Pot Clavulanate     Decadrol [Dexamethasone] Other (See Comments)     Category: Adverse Reaction;   psychosis    Dexamethasone Sodium Phosphate     Other     Penicillins Hives and Other (See Comments)     Category: Allergy; Hives/Uticaria    Tetracycline Hives and Other (See Comments)     Hives/Uticaria    Tetracyclines & Related Hives     Category: Allergy; Review of Systems    Video Exam    There were no vitals filed for this visit  Physical Exam     I spent 38 minutes directly with the patient during this visit    VIRTUAL VISIT DISCLAIMER    Suleiman Delatorreaine verbally agrees to participate in [employer]  Pt is aware that [employer] could be limited without vital signs or the ability to perform a full hands-on physical exam  Esther Griffith understands she or the provider may request at any time to terminate the video visit and request the patient to seek care or treatment in person

## 2021-07-30 ENCOUNTER — OFFICE VISIT (OUTPATIENT)
Dept: PHYSICAL THERAPY | Age: 41
End: 2021-07-30
Payer: COMMERCIAL

## 2021-07-30 DIAGNOSIS — M79.7 FIBROMYALGIA: Primary | ICD-10-CM

## 2021-07-30 PROCEDURE — 97113 AQUATIC THERAPY/EXERCISES: CPT | Performed by: SPECIALIST/TECHNOLOGIST

## 2021-07-30 NOTE — PROGRESS NOTES
Daily Note     Today's date: 2021  Patient name: Jatin Maharaj  : 1980  MRN: [id number]  Referring provider: Carlos Alvarado MD  Dx:   Encounter Diagnosis     ICD-10-CM    1  Fibromyalgia  M79 7                   Subjective: Pt reports she felt good following intitial treatment session  Objective: See treatment diary below    Assessment: Pt able to increase resistance and reps with several therex this visit while demonstrating appropriate levels of fatigue  Tolerated treatment well  Patient demonstrated fatigue post treatment, exhibited good technique with therapeutic exercises and would benefit from continued PT    Plan: Continue per plan of care  Progress treatment as tolerated         Precautions: Chronic pain      Manuals                                                                Neuro Re-Ed                                                                                                        Ther Ex  Light Blue Cuff           POOL             laps            Hip abd/flex x30 ea x30 ea           Step ups x20 ea x30 ea           Ball press down 5" x10 L/R/C 5" x15 L/R/C           LAQ Blue wts 3x10 ea  30x           Sidestep  3x with wts 7x            Add/ext paddles x20 ea x30 ea           Biking  5'                                                               Ther Activity                                       Gait Training                                       Modalities

## 2021-08-03 ENCOUNTER — OFFICE VISIT (OUTPATIENT)
Dept: PHYSICAL THERAPY | Age: 41
End: 2021-08-03
Payer: COMMERCIAL

## 2021-08-03 DIAGNOSIS — M79.7 FIBROMYALGIA: Primary | ICD-10-CM

## 2021-08-03 DIAGNOSIS — F41.1 GAD (GENERALIZED ANXIETY DISORDER): ICD-10-CM

## 2021-08-03 PROCEDURE — 97113 AQUATIC THERAPY/EXERCISES: CPT | Performed by: SPECIALIST/TECHNOLOGIST

## 2021-08-03 NOTE — PROGRESS NOTES
Daily Note     Today's date: 8/3/2021  Patient name: Deidre Calvo  : 1980  MRN: [id number]  Referring provider: Sindy Carpenter MD  Dx:   Encounter Diagnosis     ICD-10-CM    1  Fibromyalgia  M79 7                   Subjective: Mild low back soreness following previous treatment session  Objective: See treatment diary below    Assessment: Pt able to increase reps with several therex this visit while demonstrating appropriate levels of muscular fatigue  Tolerated treatment well  Patient demonstrated fatigue post treatment, exhibited good technique with therapeutic exercises and would benefit from continued PT    Plan: Continue per plan of care  Progress treatment as tolerated         Precautions: Chronic pain      Manuals 7/13 7/30 8/3                                                              Neuro Re-Ed                                                                                                        Ther Ex  Light Blue Cuff Light Blue Cuff          POOL             laps  5          Hip abd/flex x30 ea x30 ea x30 ea          Step ups x20 ea x30 ea FW/Lat speedo step 30x ea          Ball press down 5" x10 L/R/C 5" x15 L/R/C 5" 20x L/R/C           LAQ Blue wts 3x10 ea  30x           Sidestep  3x with wts 7x  10x           Add/ext,row, horiz add paddles x20 ea x30 ea x30 ea          Biking  5' 5'           FW,BW Rows   1 min ea                                                 Ther Activity                                       Gait Training                                       Modalities

## 2021-08-04 RX ORDER — LORAZEPAM 1 MG/1
0.5 TABLET ORAL EVERY 6 HOURS PRN
Qty: 60 TABLET | Refills: 2 | Status: SHIPPED | OUTPATIENT
Start: 2021-08-04 | End: 2021-10-05 | Stop reason: SDUPTHER

## 2021-08-05 ENCOUNTER — TELEPHONE (OUTPATIENT)
Dept: FAMILY MEDICINE CLINIC | Facility: CLINIC | Age: 41
End: 2021-08-05

## 2021-08-05 ENCOUNTER — TELEPHONE (OUTPATIENT)
Dept: NEUROLOGY | Facility: CLINIC | Age: 41
End: 2021-08-05

## 2021-08-05 DIAGNOSIS — G43.709 CHRONIC MIGRAINE WITHOUT AURA WITHOUT STATUS MIGRAINOSUS, NOT INTRACTABLE: Primary | ICD-10-CM

## 2021-08-05 DIAGNOSIS — G43.009 MIGRAINE WITHOUT AURA AND WITHOUT STATUS MIGRAINOSUS, NOT INTRACTABLE: ICD-10-CM

## 2021-08-05 NOTE — TELEPHONE ENCOUNTER
Pt requires an ambulatory referral to Neurology put in the system       Dx: [id number] [phone] [id number]    DOS 08/10/21   Sahra Langston  # [phone]

## 2021-08-05 NOTE — TELEPHONE ENCOUNTER
THE [facility] for patient to [facility] and confirm appointment with Dr Gonzalo Baig  Gave direct number in [facility]

## 2021-08-09 ENCOUNTER — TELEPHONE (OUTPATIENT)
Dept: NEUROLOGY | Facility: CLINIC | Age: 41
End: 2021-08-09

## 2021-08-10 ENCOUNTER — OFFICE VISIT (OUTPATIENT)
Dept: NEUROLOGY | Facility: CLINIC | Age: 41
End: 2021-08-10
Payer: COMMERCIAL

## 2021-08-10 VITALS
TEMPERATURE: 97.1 F | DIASTOLIC BLOOD PRESSURE: 76 MMHG | BODY MASS INDEX: 40.05 KG/M2 | WEIGHT: 204 LBS | HEIGHT: 60 IN | HEART RATE: 112 BPM | SYSTOLIC BLOOD PRESSURE: 118 MMHG

## 2021-08-10 DIAGNOSIS — G43.709 CHRONIC MIGRAINE WITHOUT AURA WITHOUT STATUS MIGRAINOSUS, NOT INTRACTABLE: ICD-10-CM

## 2021-08-10 DIAGNOSIS — G43.009 MIGRAINE WITHOUT AURA AND WITHOUT STATUS MIGRAINOSUS, NOT INTRACTABLE: ICD-10-CM

## 2021-08-10 PROCEDURE — 3008F BODY MASS INDEX DOCD: CPT | Performed by: NURSE PRACTITIONER

## 2021-08-10 PROCEDURE — 99213 OFFICE O/P EST LOW 20 MIN: CPT | Performed by: PSYCHIATRY & NEUROLOGY

## 2021-08-10 NOTE — ASSESSMENT & PLAN NOTE
Assessment:   Nida Garcia is a 35 y o  right handed female with a past medical history significant for migraine disorder  for the last 20 years and per who is seen in Neurology Clinic as a follow-up for headaches  Patient was last seen on 10/13/2020 at that time she had 2-3 migraines week, on Maxalt with relief most of the time  And she had been previously on gabapentin as well as Topamax  The plan was to switch patient to [facility]  However patient needed to try 1 more medication prior to insurance approval   Patient was tried on propanolol 20 mg, and she states that since this medication was added to her regimen she has only had 1 migraine in the past 6 months  Currently patient is on propranolol 20 mg in the morning and nortriptyline 75 mg at night  She denies any side effects or any new changes to her medical history  Recommendations as listed below:  Plan:   · Would recommend continuing nortriptyline 75 mg q h s  And propranolol 20 mg in the morning  · This appears to have controlled the patient's headaches, continue with this regimen    · May take naproxen as needed   · Recommend to stay well hydrated and ensure adequate sleep  · Avoid migraine triggers  · Follow-up recommended in:  9 months or sooner as needed  · Patient verbalized understanding all questions were addressed

## 2021-08-10 NOTE — PROGRESS NOTES
Patient ID: Ramsey Ramírez is a 35 y o  female  Assessment/Plan:    Migraine without aura and without status migrainosus, not intractable  Assessment:   Ramsey Ramírez is a 35 y o  right handed female with a past medical history significant for migraine disorder  for the last 20 years and per who is seen in Neurology Clinic as a follow-up for headaches  Patient was last seen on 10/13/2020 at that time she had 2-3 migraines week, on Maxalt with relief most of the time  And she had been previously on gabapentin as well as Topamax  The plan was to switch patient to [facility]  However patient needed to try 1 more medication prior to insurance approval   Patient was tried on propanolol 20 mg, and she states that since this medication was added to her regimen she has only had 1 migraine in the past 6 months  Currently patient is on propranolol 20 mg in the morning and nortriptyline 75 mg at night  She denies any side effects or any new changes to her medical history  Recommendations as listed below:  Plan:   · Would recommend continuing nortriptyline 75 mg q h s  And propranolol 20 mg in the morning  · This appears to have controlled the patient's headaches, continue with this regimen  · May take naproxen as needed   · Recommend to stay well hydrated and ensure adequate sleep  · Avoid migraine triggers  · Follow-up recommended in:  9 months or sooner as needed  · Patient verbalized understanding all questions were addressed       Diagnoses and all orders for this visit:    Chronic migraine without aura without status migrainosus, not intractable  -     Ambulatory referral to Neurology    Migraine without aura and without status migrainosus, not intractable  -     Ambulatory referral to Neurology           Subjective:    HPI   Ramesy Ramírez is a 35 y o  female today in follow-up  Patient was last seen on 10/13/2020 for headaches    Today patient states that she is no longer on Topamax, she has been on nortriptyline 75 mg at night and propanolol 20 mg in the morning  Patient was initially started on propanolol, because the plan was to switch her to [facility]   however patient states that while she has been on propanolol her headaches have been completely controlled  She denies any side effects  She denies any other headaches  She states that in the last 6 months she has only had 1 migraine  She cage Sayra Villegas continues to however sinus headaches however overall headache frequency and severity has decreased  She denies any new symptoms, she denies any hospitalizations or new issues  Neurology will exam continues to remain stable  Below his H&P from 07/21/2020  Flako Almazan is a 41 y o  female  With a past medical history significant for previous migraines for the last 20 years who is seen in neurology clinic today for headaches  Patient states headaches which initially began 20 year(s) ago  Headache is described as throbbing, occionsally dull ache also occionsinally  Burning or ice pack sensation  Description of pain: throbbing pain, bilateral in the temporal area  Duration of individual headaches: last for about 3 hours  The headaches start in the temple region and spread to the front of her head and move to the top  She reports she will occionally get them in the occipital region as well  Sometimes 1/2 of the time when they are on the top of her head, she feels a burning sensation and touches her head and it feels warm to the touch and well as the top of her head  During a migraine the pain scale is 10/10  Since they began 20 years ago the character of the headache as not changed  Patient reports she gets the headache 2-3 times a week  She d  She has tired Topamax in the past, She stopped topamax 5 years ago and then started again reports does not work, currently she is taking 50mg BID prescribed to her by her psychiatrist for concurrent depression  Associated with aura, smell metallic burning occionsally N no vomitting  , during the headache she reports she gets photophobia and phonophobia feels like tunnel vision,   Prior neurological history: negative for migraine headaches  Prior medications tried: "she reports she has tired all the trexs" she reports these give her unbearable side effects  On further questioning patient also reports that for the past 6 weeks she has been getting electrical shock-like sensations in her upper and middle back  She reports that does not feel "muscular" patient, and it happens randomly  She reports that she has been having these is sensations for the past 6 weeks continuously  She also reports on further questioning that she has had trouble with urinary incontinence, reports that this happens at least once a week for the past 6 weeks  She reports that she feels that she has to go to the bathroom, and when she closed the the bathroom she she sometimes has an accident prior to reaching the toilet  No family history of multiple sclerosis or any neurological issues  Patient denies blurry vision, gait abnormalities    - Preventative:  Topamax 50 mg b i d- not effective  ,            -Abortive:  Maxalt 10 mg (rizatriptan)        The following portions of the patient's history were reviewed and updated as appropriate: allergies, current medications, past family history, past medical history, past social history, past surgical history and problem list and review of systems reviewed  Objective:    Blood pressure 118/76, pulse (!) 112, temperature (!) 97 1 °F (36 2 °C), temperature source Temporal, height 5' (1 524 m), weight 92 5 kg (204 lb)  Physical Exam  Vitals and nursing note reviewed  Constitutional:       Appearance: Normal appearance  She is well-developed  HENT:      Head: Normocephalic and atraumatic        Nose: Nose normal       Mouth/Throat:      Mouth: Mucous membranes are moist    Eyes:      Extraocular Movements: Extraocular movements intact and EOM normal       Pupils: Pupils are equal, round, and reactive to light  Pulmonary:      Effort: Pulmonary effort is normal    Musculoskeletal:         General: Normal range of motion  Cervical back: Normal range of motion and neck supple  Skin:     General: Skin is warm and dry  Neurological:      Mental Status: She is alert  Deep Tendon Reflexes: Strength normal and reflexes are normal and symmetric  Psychiatric:         Mood and Affect: Mood normal          Behavior: Behavior normal          Neurological Exam  Mental Status  Alert  Oriented to person, place, time and situation  Language is fluent with no aphasia  Cranial Nerves  CN III, IV, VI: Extraocular movements intact bilaterally  Extraocular movements intact bilaterally  Pupils equal round and reactive to light bilaterally  Motor  Normal muscle bulk throughout  Strength is 5/5 throughout all four extremities  Sensory  Light touch is normal in upper and lower extremities  Reflexes  Deep tendon reflexes are 2+ and symmetric in all four extremities with downgoing toes bilaterally  Coordination  Right: Finger-to-nose normal   Left: Finger-to-nose normal     Gait  Casual gait is normal including stance, stride, and arm swing  Able to rise from chair without using arms  ROS:  I reviewed the below ROS and what is mentioned in HPI, the remainder of ROS was negative  Review of Systems   Constitutional: Negative  Negative for appetite change and fever  HENT: Negative  Negative for hearing loss, tinnitus, trouble swallowing and voice change  Eyes: Negative  Negative for photophobia and pain  Respiratory: Negative  Negative for shortness of breath  Cardiovascular: Negative  Negative for palpitations  Gastrointestinal: Negative  Negative for nausea and vomiting  Endocrine: Negative  Negative for cold intolerance  Genitourinary: Negative  Negative for dysuria, frequency and urgency  Musculoskeletal: Negative    Negative for myalgias and neck pain  Skin: Negative  Negative for rash  Neurological: Positive for headaches  Negative for dizziness, tremors, seizures, syncope, facial asymmetry, speech difficulty, weakness, light-headedness and numbness  Had 1 migraine 2 weeks ago    Hematological: Negative  Does not bruise/bleed easily  Psychiatric/Behavioral: Negative  Negative for confusion, hallucinations and sleep disturbance

## 2021-08-11 ENCOUNTER — TELEMEDICINE (OUTPATIENT)
Dept: BEHAVIORAL/MENTAL HEALTH CLINIC | Facility: CLINIC | Age: 41
End: 2021-08-11
Payer: COMMERCIAL

## 2021-08-11 DIAGNOSIS — F33.1 MAJOR DEPRESSIVE DISORDER, RECURRENT EPISODE, MODERATE (HCC): Primary | ICD-10-CM

## 2021-08-11 DIAGNOSIS — F41.1 GENERALIZED ANXIETY DISORDER: ICD-10-CM

## 2021-08-11 PROCEDURE — 90832 PSYTX W PT 30 MINUTES: CPT | Performed by: PSYCHIATRY & NEUROLOGY

## 2021-08-11 NOTE — PSYCH
Session time 069-250 (total time 17 minutes)    Virtual Regular Visit    Verification of patient location:    Patient is located in the following state in which I hold an active license [place]      Assessment/Plan:    Problem List Items Addressed This Visit        Other    Major depressive disorder, recurrent episode, moderate ([address] ) - Primary    Generalized anxiety disorder          Goals addressed in session: Goal 1          Reason for visit is   Chief Complaint   Patient presents with    Virtual Regular Visit        Encounter provider HERMELINDA Davis    Provider located at [address] [place] [id number] [phone]      Recent Visits  No visits were found meeting these conditions  Showing recent visits within past 7 days and meeting all other requirements  Future Appointments  No visits were found meeting these conditions  Showing future appointments within next 150 days and meeting all other requirements       The patient was identified by name and date of birth  Jovita Baeza was informed that this is a telemedicine visit and that the visit is being conducted through[employer] and patient was informed that this is a secure, HIPAA-compliant platform  She agrees to proceed     My office door was closed  No one else was in the room  She acknowledged consent and understanding of privacy and security of the video platform  The patient has agreed to participate and understands they can discontinue the visit at any time  Patient is aware this is a billable service  Subjective  Jovita Baeza is a 35 y o  female presenting for follow up  Vikram Cramer shared that her parents got back from [facility], and decided they do not want to move there now, as it is too hot  She said that she is ok with that, as she was unsure about moving anyway    She said that she has been mainly spending time at home, other than going for a walk with a friend here and there, because of the Delta variant of COVID and not wanting to risk getting sick  She has been slowly working on her [employer], but is has gotten to the point that Octavio Bertrand is not sure it is worth continuing because it makes her so little money  She is considering just donating all of the items that she has left to get rid of, in order to alleviate the stress it has caused her  Otherwise, she notes that her mood has been good, she feels calm and does not have a lot of ruminating thoughts, which she attributes to journaling  She said that she has been writing every few days, and feels that it helps her process what is on her mind, resolve it and not have to keep going back to it  Otherwise, Octavio Bertrand said that she is doing well, and agreed to stretch out time in between this session and next  A: Octavio Bertrand presented as calm and generally euthymic, with a bright affect and good eye contact  Concentration was intact, thought process logical and organized  Speech and behavior normal   Insight and judgment intact  Denies SI HI and psychosis  Good progress toward goals  P: Octavio Bertrand will continue to journal, will work on prioritizing self-care and physical rest, will reach out socially when able, and will return in three weeks for follow up        HPI     Past Medical History:   Diagnosis Date    Anxiety     Chronic sinusitis     Depression     Fibromyalgia     Migraine     Obesity     Polyarthritis     Last assessed 9/21/2015    Psychiatric disorder        Past Surgical History:   Procedure Laterality Date    COLONOSCOPY  06/2019   [facility]      HYSTEROSCOPY      Endometrial Biopsy By Hysteroscopy    REMOVAL OF INTRAUTERINE DEVICE (IUD)      US GUIDED BREAST BIOPSY RIGHT COMPLETE Right 6/17/2019       Current Outpatient Medications   Medication Sig Dispense Refill    Calcium Carbonate-Vitamin D (CALCIUM 500/D PO) Take 1 capsule by mouth daily      celecoxib (CeleBREX) 100 mg capsule TAKE 1 CAPSULE BY MOUTH TWICE A DAY (Patient not taking: Reported on 8/10/2021) 60 capsule 6    celecoxib (CeleBREX) 200 mg capsule Take 1 capsule (200 mg total) by mouth 2 (two) times a day 60 capsule 6    Cholecalciferol (VITAMIN D-3) 1000 units CAPS Take 1 capsule by mouth daily      cyclobenzaprine (FLEXERIL) 10 mg tablet TAKE 1 TABLET BY MOUTH AT BEDTIME AS NEEDED FOR MUSCLE SPASMS 30 tablet 3    desvenlafaxine succinate (PRISTIQ) 50 mg 24 hr tablet Take 1 tablet (50 mg total) by mouth daily 30 tablet 2    Galcanezumab-gnlm 120 MG/ML SOAJ Inject 1 ml SC in each thigh or stomach for a total of two injections the first time  Then 1 ml SC every 30 days (Patient not taking: Reported on 8/10/2021) 2 pen 3    hydrOXYzine HCL (ATARAX) 50 mg tablet TAKE 1 TABLET (50 MG TOTAL) BY MOUTH DAILY AT BEDTIME AS NEEDED (INSOMNIA) 30 tablet 0    IRON PO Take by mouth      lidocaine (LIDODERM) 5 % APPLY 1 PATCH TOPICALLY DAILY REMOVE & DISCARD PATCH WITHIN 12 HOURS OR AS DIRECTED BY MD (Patient not taking: Reported on 8/10/2021) 10 patch 1    LORazepam (ATIVAN) 0 5 mg tablet Take 1 tablet (0 5 mg total) by mouth every 6 (six) hours as needed for anxiety (Patient not taking: Reported on 8/10/2021) 60 tablet 2    LORazepam (ATIVAN) 1 mg tablet TAKE 0 5 TABLETS (0 5 MG TOTAL) BY MOUTH EVERY 6 (SIX) HOURS AS NEEDED FOR ANXIETY 60 tablet 2    MAGNESIUM OXIDE PO Take by mouth      medroxyPROGESTERone (DEPO-PROVERA) 150 mg/mL injection INJECT MONTHLY FOR 6 MONTH  5    medroxyPROGESTERone acetate (DEPO-PROVERA SYRINGE) 150 mg/mL injection INJECT MONTHLY X 6 MONTHS, BRING TO OFFICE (Patient not taking: Reported on 8/10/2021)      nortriptyline (PAMELOR) 75 MG capsule Take 1 capsule (75 mg total) by mouth 2 (two) times a day 60 capsule 2    propranolol (INDERAL) 20 mg tablet TAKE 1 TABLET BY MOUTH EVERY DAY AT NIGHT 30 tablet 5     No current facility-administered medications for this visit          Allergies Allergen Reactions    Amoxicillin-Pot Clavulanate     Decadrol [Dexamethasone] Other (See Comments)     Category: Adverse Reaction;   psychosis    Dexamethasone Sodium Phosphate     Other     Penicillins Hives and Other (See Comments)     Category: Allergy; Hives/Uticaria    Tetracycline Hives and Other (See Comments)     Hives/Uticaria    Tetracyclines & Related Hives     Category: Allergy; Review of Systems    Video Exam    There were no vitals filed for this visit  Physical Exam     I spent 17 minutes directly with the patient during this visit    VIRTUAL VISIT DISCLAIMER    Lynn Nash verbally agrees to participate in [employer]  Pt is aware that [employer] could be limited without vital signs or the ability to perform a full hands-on physical exam  Esther Griffith understands she or the provider may request at any time to terminate the video visit and request the patient to seek care or treatment in person

## 2021-08-12 ENCOUNTER — APPOINTMENT (OUTPATIENT)
Dept: PHYSICAL THERAPY | Age: 41
End: 2021-08-12
Payer: COMMERCIAL

## 2021-08-19 ENCOUNTER — OFFICE VISIT (OUTPATIENT)
Dept: PHYSICAL THERAPY | Age: 41
End: 2021-08-19
Payer: COMMERCIAL

## 2021-08-19 DIAGNOSIS — M79.7 FIBROMYALGIA: Primary | ICD-10-CM

## 2021-08-19 PROCEDURE — 97113 AQUATIC THERAPY/EXERCISES: CPT

## 2021-08-19 NOTE — PROGRESS NOTES
Daily Note     Today's date: 2021  Patient name: Nida Garcia  : 1980  MRN: [id number]  Referring provider: Julia Alicea MD  Dx:   Encounter Diagnosis     ICD-10-CM    1  Fibromyalgia  M79 7                   Subjective: Patient reports "I have a little bit of back pain today but that's it"      Objective: See treatment diary below      Assessment: Tolerated treatment well  No increased pain noted with exercises  Patient exhibited good technique with therapeutic exercises      Plan: Progress treatment as tolerated         Precautions: Chronic pain      Manuals 7/13 7/30 8/3 8/19                                                             Neuro Re-Ed                                                                                                        Ther Ex  Light Blue Cuff Light Blue Cuff Light blue cuff         POOL             laps  5         Hip abd/flex/ext x30 ea x30 ea x30 ea 30x ea         Step ups x20 ea x30 ea FW/Lat speedo step 30x ea FW/Lat speedo step 30x ea         Ball press down 5" x10 L/R/C 5" x15 L/R/C 5" 20x L/R/C  5" 20x L/R/C         LAQ Blue wts 3x10 ea  30x           Sidestep  3x with wts 7x  10x  10x         Add/ext,row, horiz add paddles x20 ea x30 ea x30 ea x30 ea         Biking  5' 5'  5'         FW,BW Rows   1 min ea 1 min ea                                                Ther Activity                                       Gait Training                                       Modalities

## 2021-08-24 ENCOUNTER — OFFICE VISIT (OUTPATIENT)
Dept: PHYSICAL THERAPY | Age: 41
End: 2021-08-24
Payer: COMMERCIAL

## 2021-08-24 DIAGNOSIS — M79.7 FIBROMYALGIA: Primary | ICD-10-CM

## 2021-08-24 PROCEDURE — 97113 AQUATIC THERAPY/EXERCISES: CPT | Performed by: SPECIALIST/TECHNOLOGIST

## 2021-08-24 NOTE — PROGRESS NOTES
Daily Note     Today's date: 2021  Patient name: Mady Castillo  : 1980  MRN: [id number]  Referring provider: Bird Isaac MD  Dx:   Encounter Diagnosis     ICD-10-CM    1  Fibromyalgia  M79 7                   Subjective: Pt reports she is feeling fairly well overall  Objective: See treatment diary below      Assessment: Pt able to consistently increase reps, resistance, and time with dynamic therex without exacerbation of FM sx  Tolerated treatment well  Patient demonstrated fatigue post treatment, exhibited good technique with therapeutic exercises and would benefit from continued PT      Plan: Continue per plan of care  Progress treatment as tolerated         Precautions: Chronic pain      Manuals 7/13 7/30 8/3 8/19 8/24                                                            Neuro Re-Ed                                                                                                        Ther Ex  Light Blue Cuff Light Blue Cuff Light blue cuff Royal Blue Cuff        POOL             laps         Hip abd/flex/ext x30 ea x30 ea x30 ea 30x ea 30x ea        Step ups x20 ea x30 ea FW/Lat speedo step 30x ea FW/Lat speedo step 30x ea FW/LAT speedo 30x        Ball press down 5" x10 L/R/C 5" x15 L/R/C 5" 20x L/R/C  5" 20x L/R/C 5" 20x ea        LAQ Blue wts 3x10 ea  30x           Sidestep  3x with wts 7x  10x  10x 10x        Add/ext,row, horiz add paddles x20 ea x30 ea x30 ea x30 ea x30 ea        Biking  5' 5'  5' 5'        FW,BW Rows   1 min ea 1 min ea 2 min ea        1/2 jacks, skiiers     2 min ea                                  Ther Activity                                       Gait Training                                       Modalities

## 2021-09-01 ENCOUNTER — TELEMEDICINE (OUTPATIENT)
Dept: BEHAVIORAL/MENTAL HEALTH CLINIC | Facility: CLINIC | Age: 41
End: 2021-09-01
Payer: COMMERCIAL

## 2021-09-01 DIAGNOSIS — F41.1 GENERALIZED ANXIETY DISORDER: ICD-10-CM

## 2021-09-01 DIAGNOSIS — F33.2 SEVERE RECURRENT MAJOR DEPRESSION WITHOUT PSYCHOTIC FEATURES (HCC): Primary | ICD-10-CM

## 2021-09-01 PROCEDURE — 90834 PSYTX W PT 45 MINUTES: CPT | Performed by: PSYCHIATRY & NEUROLOGY
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
## 2021-09-12 DIAGNOSIS — M79.7 FIBROMYALGIA: ICD-10-CM

## 2021-09-12 RX ORDER — NORTRIPTYLINE HYDROCHLORIDE 75 MG/1
CAPSULE ORAL
Qty: 60 CAPSULE | Refills: 2 | Status: SHIPPED | OUTPATIENT
Start: 2021-09-12 | End: 2021-12-03

## 2021-09-17 ENCOUNTER — TELEMEDICINE (OUTPATIENT)
Dept: BEHAVIORAL/MENTAL HEALTH CLINIC | Facility: CLINIC | Age: 41
End: 2021-09-17
Payer: COMMERCIAL

## 2021-09-17 DIAGNOSIS — F33.1 MAJOR DEPRESSIVE DISORDER, RECURRENT EPISODE, MODERATE (HCC): Primary | ICD-10-CM

## 2021-09-17 DIAGNOSIS — F41.1 GENERALIZED ANXIETY DISORDER: ICD-10-CM

## 2021-09-17 PROCEDURE — 90832 PSYTX W PT 30 MINUTES: CPT | Performed by: PSYCHIATRY & NEUROLOGY

## 2021-09-17 NOTE — PSYCH
Session time [phone] (Total time 24 minutes)    Virtual Regular Visit    Verification of patient location:    Patient is located in the following state in which I hold an active license [place]      Assessment/Plan:    Problem List Items Addressed This Visit        Other    Major depressive disorder, recurrent episode, moderate ([facility] [address] ) - Primary    Generalized anxiety disorder          Goals addressed in session: Goal 1          Reason for visit is   Chief Complaint   Patient presents with    Virtual Regular Visit        Encounter provider HERMELINDA Lawrence    Provider located at [address] [place] [id number] [phone]      Recent Visits  No visits were found meeting these conditions  Showing recent visits within past 7 days and meeting all other requirements  Today's Visits  Date Type Provider Dept   09/17/21 Telemedicine HERMELINDA [facility] Therapist [facility]   Showing today's visits and meeting all other requirements  Future Appointments  No visits were found meeting these conditions  Showing future appointments within next 150 days and meeting all other requirements       The patient was identified by name and date of birth  Jacque Garcia was informed that this is a telemedicine visit and that the visit is being conducted through[employer] and patient was informed that this is a secure, HIPAA-compliant platform  She agrees to proceed     My office door was closed  No one else was in the room  She acknowledged consent and understanding of privacy and security of the video platform  The patient has agreed to participate and understands they can discontinue the visit at any time  Patient is aware this is a billable service  Subjective  Jacque Garcia is a 35 y o  female presenting for follow up   Gurwinder Simmons shared that for the past two weeks, she has not been feeling well physically and therefore mentally as well  She has been extremely tired, her head feels "foggy" and her stomach has been bothering her  She has been spending a lot of time in bed, not going out nor reaching out to friends much  She has not gotten out to walk recently either due to not feeling well and it being so hot outside  She noted that while she is not really very depressed, her mood is just "blah "  She said that she does not have much planned coming up to look forward to, other than her SSD[employer] hearing on November 4th  She said that if she gets motivated enough she wants to try to work on organizing the spare room more because it is very full  She also said that she is going to try writing things out as she has not written in a long time  Discussed self-care, trying to reach out to friends to plan something that might get her out and feeling better emotionally, and choosing small, time-limited activities to try to be more active  Also recommended checking in with PCP if she still does not feel well next week  A: BRAD presented as mildly depressed wth a constricted affect, intermittent eye contact and soft voice  Her behavior was calm  Concentration impaired  Thought process logical and organized  Insight and judgment intact  Slow progress toward goals  Ghazalomer Wesley will work on incorporating some pleasurable activities into her schedule, will try to reach out to friends, and will follow up in two weeks as scheduled        HPI     Past Medical History:   Diagnosis Date    Anxiety     Chronic sinusitis     Depression     Fibromyalgia     Migraine     Obesity     Polyarthritis     Last assessed 9/21/2015    Psychiatric disorder        Past Surgical History:   Procedure Laterality Date    COLONOSCOPY  06/2019   Mame Ballard DENTAL SURGERY      HYSTEROSCOPY      Endometrial Biopsy By Hysteroscopy    REMOVAL OF INTRAUTERINE DEVICE (IUD)      US GUIDED BREAST BIOPSY RIGHT COMPLETE Right 6/17/2019       Current Outpatient Medications   Medication Sig Dispense Refill    Calcium Carbonate-Vitamin D (CALCIUM 500/D PO) Take 1 capsule by mouth daily      celecoxib (CeleBREX) 100 mg capsule TAKE 1 CAPSULE BY MOUTH TWICE A DAY (Patient not taking: Reported on 8/10/2021) 60 capsule 6    celecoxib (CeleBREX) 200 mg capsule Take 1 capsule (200 mg total) by mouth 2 (two) times a day 60 capsule 6    Cholecalciferol (VITAMIN D-3) 1000 units CAPS Take 1 capsule by mouth daily      cyclobenzaprine (FLEXERIL) 10 mg tablet TAKE 1 TABLET BY MOUTH AT BEDTIME AS NEEDED FOR MUSCLE SPASMS 30 tablet 3    desvenlafaxine succinate (PRISTIQ) 50 mg 24 hr tablet Take 1 tablet (50 mg total) by mouth daily 30 tablet 2    Galcanezumab-gnlm 120 MG/ML SOAJ Inject 1 ml SC in each thigh or stomach for a total of two injections the first time   Then 1 ml SC every 30 days (Patient not taking: Reported on 8/10/2021) 2 pen 3    hydrOXYzine HCL (ATARAX) 50 mg tablet TAKE 1 TABLET (50 MG TOTAL) BY MOUTH DAILY AT BEDTIME AS NEEDED (INSOMNIA) 30 tablet 2    IRON PO Take by mouth      lidocaine (LIDODERM) 5 % APPLY 1 PATCH TOPICALLY DAILY REMOVE & DISCARD PATCH WITHIN 12 HOURS OR AS DIRECTED BY MD (Patient not taking: Reported on 8/10/2021) 10 patch 1    LORazepam (ATIVAN) 0 5 mg tablet Take 1 tablet (0 5 mg total) by mouth every 6 (six) hours as needed for anxiety (Patient not taking: Reported on 8/10/2021) 60 tablet 2    LORazepam (ATIVAN) 1 mg tablet TAKE 0 5 TABLETS (0 5 MG TOTAL) BY MOUTH EVERY 6 (SIX) HOURS AS NEEDED FOR ANXIETY 60 tablet 2    MAGNESIUM OXIDE PO Take by mouth      medroxyPROGESTERone (DEPO-PROVERA) 150 mg/mL injection INJECT MONTHLY FOR 6 MONTH  5    medroxyPROGESTERone acetate (DEPO-PROVERA SYRINGE) 150 mg/mL injection INJECT MONTHLY X 6 MONTHS, BRING TO OFFICE (Patient not taking: Reported on 8/10/2021)      nortriptyline (PAMELOR) 75 MG capsule TAKE 1 CAPSULE BY MOUTH TWICE A DAY 60 capsule 2    propranolol (INDERAL) 20 mg tablet TAKE 1 TABLET BY MOUTH EVERY DAY AT NIGHT 30 tablet 5     No current facility-administered medications for this visit  Allergies   Allergen Reactions    Amoxicillin-Pot Clavulanate     Decadrol [Dexamethasone] Other (See Comments)     Category: Adverse Reaction;   psychosis    Dexamethasone Sodium Phosphate     Other     Penicillins Hives and Other (See Comments)     Category: Allergy; Hives/Uticaria    Tetracycline Hives and Other (See Comments)     Hives/Uticaria    Tetracyclines & Related Hives     Category: Allergy; Review of Systems    Video Exam    There were no vitals filed for this visit  Physical Exam     I spent 24 minutes directly with the patient during this visit    VIRTUAL VISIT DISCLAIMER    Joey Watkins verbally agrees to participate in [employer]  Pt is aware that [employer] could be limited without vital signs or the ability to perform a full hands-on physical exam  Esther Griffith understands she or the provider may request at any time to terminate the video visit and request the patient to seek care or treatment in person

## 2021-09-30 ENCOUNTER — TELEMEDICINE (OUTPATIENT)
Dept: BEHAVIORAL/MENTAL HEALTH CLINIC | Facility: CLINIC | Age: 41
End: 2021-09-30
Payer: COMMERCIAL

## 2021-09-30 DIAGNOSIS — F33.1 MAJOR DEPRESSIVE DISORDER, RECURRENT EPISODE, MODERATE (HCC): Primary | ICD-10-CM

## 2021-09-30 DIAGNOSIS — F41.1 GENERALIZED ANXIETY DISORDER: ICD-10-CM

## 2021-09-30 PROCEDURE — 90834 PSYTX W PT 45 MINUTES: CPT | Performed by: PSYCHIATRY & NEUROLOGY

## 2021-09-30 NOTE — BH TREATMENT PLAN
Deidre Jetersona  1980        Date of Initial Treatment Plan:  8/21/2018   Date of Current Treatment Plan: 09/30/21    Treatment Plan Number  8    Strengths/Personal Resources for Self Care: " witty, smart, funny creative, caring, resourceful, good hair;"  Diagnosis:   1  Major depressive disorder, recurrent episode, moderate ([facility] [address] )     2  Generalized anxiety disorder         Area of Needs: depression/anxiety/chronic pain    Long Term Goal 1: AI want to continue to effectively manage the depression and the anxiety  Target Date: 1/30/2022  Completion Date: n/a         Short Term Objectives for Goal 1: A I will continue to be aware of the depression/anxiety and negative self talk messages and redirect to positive and realistic messages (lower depression/anxiety)  B I will continue to work with Dr Gretchen Whelan on medication management  C I will continue to make sure I am taking care of myself  D: I will continue to reach out to friends to stay socially connected     Long Term Goal 2: I will continue to work on strengthening my self esteem  Target Date: 1/30/2022  Completion Date: N/A    Short Term Objectives for Goal 2: AI will remain aware of those who have impacted me positively and disregard the messages from those who were negative influences  B  I will review and use my qualities/strengths/assets as reminders of my worth  C  I will continue with self care (including using my effective communication skills such as assertiveness)  D  I will continue to explore options to improve my quality of life  Long Term Goal # 3: I will continue to learn and implement effective pain management strategies  Target Date: 1/30/2022  Completion Date: N/A    Short Term Objectives for Goal 3: AI will learn and practice effective pain management strategies   B  I will explore yoga as a possible pain management strategy     GOAL 1: Modality: Individual 2x per month   Completion Date n/a and The person(s) responsible for carrying out the plan is  Esther    GOAL 2: Modality: Individual 2x per month   Completion Date n/a and The person(s) responsible for carrying out the plan is  Sether     GOAL 3: Modality: Individual 2x per month   Completion Date n/a and The person(s) responsible for carrying out the plan is  [address]: Diagnosis and Treatment Plan explained to Cody Other relates understanding diagnosis and is agreeable to Treatment Plan  yes      Client Comments : Please share your thoughts, feelings, need and/or experiences regarding your treatment plan: n/a    **Verbal consent provided by Tanner Poole today, Sept 30, 2021 at [phone] due to [address] distancing measures/telehealth

## 2021-09-30 NOTE — PSYCH
Session time [id number] (total time 38 minutes)    Virtual Regular Visit    Verification of patient location:    Patient is located in the following state in which I hold an active license [place]      Assessment/Plan:    Problem List Items Addressed This Visit        Other    Major depressive disorder, recurrent episode, moderate ([address] ) - Primary    Generalized anxiety disorder          Goals addressed in session: Goal 1          Reason for visit is   Chief Complaint   Patient presents with    Virtual Regular Visit        Encounter provider HERMELINDA Liriano    Provider located at [address] [place] [id number] [phone]      Recent Visits  No visits were found meeting these conditions  Showing recent visits within past 7 days and meeting all other requirements  Today's Visits  Date Type Provider Dept   09/30/21 Telemedicine HERMELINDA [facility] Therapist [facility]   Showing today's visits and meeting all other requirements  Future Appointments  No visits were found meeting these conditions  Showing future appointments within next 150 days and meeting all other requirements       The patient was identified by name and date of birth  Mariia Hong was informed that this is a telemedicine visit and that the visit is being conducted through[employer] and patient was informed that this is a secure, HIPAA-compliant platform  She agrees to proceed     My office door was closed  No one else was in the room  She acknowledged consent and understanding of privacy and security of the video platform  The patient has agreed to participate and understands they can discontinue the visit at any time  Patient is aware this is a billable service  Subjective  Mariia Hong is a 35 y o  female presenting for follow up   ORLÉANS shared that while she is feeling somewhat better physically since last visit (reduced pain and stomach feeling better), she continues to struggle with motivation  She has not yet gone out for walks or out with friends although she has reached out to talk to friends on the phone  She [facility] friends but got very sick to her stomach and had to cancel  She notes that she wants to reconnect and make plans again, especially now that her car is fixed (parents paid for repairs) and can get out  She also wants to continue to work on organizing the spare room, but again has not had the motivation or energy to do so  Discussed the action-motivation cycle and how pushing oneself to move in short time increments can help build motivation, but action often needs to come first   Pennie Singh said that she will work on taking a short walk with her mom today to try to get herself out and moving  Pennie Singh also mentioned connecting with a man that she has been friends with since her teen years, who she has had feelings for for a long time  He recently called her to tell her that he had a crush on her years ago, and they did not get to pursue that conversation, which has left Pennie Singh wondering why he brought it up now  Discussed her thoughts and feelings about the situation and his pronouncement of his past feelings for her, and how she might pursue that further  Encouraged Pennie Singh to consider reaching back out to him to have a conversation with him to help her clarify the situation and their relationship  A: Pennie Singh presented as calm and mildly depressed, with a somewhat constricted affect in depressed range  Her eye contact was good, behavior calm  Voice soft, but normal rate and fluency  Concentration mildly impaired, thought process logical and organized  Insight and judgment intact  Denies SI HI and psychosis  Slow progress toward goals   P: Pennie Singh will work on taking action through walks or organizing spare room in small chunks of time, will write in her journal to help sort through thoughts and feelings, and will return in two weeks for follow up  HPI     Past Medical History:   Diagnosis Date    Anxiety     Chronic sinusitis     Depression     Fibromyalgia     Migraine     Obesity     Polyarthritis     Last assessed 9/21/2015    Psychiatric disorder        Past Surgical History:   Procedure Laterality Date    COLONOSCOPY  06/2019    DENTAL SURGERY      HYSTEROSCOPY      Endometrial Biopsy By Hysteroscopy    REMOVAL OF INTRAUTERINE DEVICE (IUD)      US GUIDED BREAST BIOPSY RIGHT COMPLETE Right 6/17/2019       Current Outpatient Medications   Medication Sig Dispense Refill    Calcium Carbonate-Vitamin D (CALCIUM 500/D PO) Take 1 capsule by mouth daily      celecoxib (CeleBREX) 100 mg capsule TAKE 1 CAPSULE BY MOUTH TWICE A DAY (Patient not taking: Reported on 8/10/2021) 60 capsule 6    celecoxib (CeleBREX) 200 mg capsule Take 1 capsule (200 mg total) by mouth 2 (two) times a day 60 capsule 6    Cholecalciferol (VITAMIN D-3) 1000 units CAPS Take 1 capsule by mouth daily      cyclobenzaprine (FLEXERIL) 10 mg tablet TAKE 1 TABLET BY MOUTH AT BEDTIME AS NEEDED FOR MUSCLE SPASMS 30 tablet 3    desvenlafaxine succinate (PRISTIQ) 50 mg 24 hr tablet Take 1 tablet (50 mg total) by mouth daily 30 tablet 2    Galcanezumab-gnlm 120 MG/ML SOAJ Inject 1 ml SC in each thigh or stomach for a total of two injections the first time   Then 1 ml SC every 30 days (Patient not taking: Reported on 8/10/2021) 2 pen 3    hydrOXYzine HCL (ATARAX) 50 mg tablet TAKE 1 TABLET (50 MG TOTAL) BY MOUTH DAILY AT BEDTIME AS NEEDED (INSOMNIA) 30 tablet 2    IRON PO Take by mouth      lidocaine (LIDODERM) 5 % APPLY 1 PATCH TOPICALLY DAILY REMOVE & DISCARD PATCH WITHIN 12 HOURS OR AS DIRECTED BY MD (Patient not taking: Reported on 8/10/2021) 10 patch 1    LORazepam (ATIVAN) 0 5 mg tablet Take 1 tablet (0 5 mg total) by mouth every 6 (six) hours as needed for anxiety (Patient not taking: Reported on 8/10/2021) 60 tablet 2    LORazepam (ATIVAN) 1 mg tablet TAKE 0 5 TABLETS (0 5 MG TOTAL) BY MOUTH EVERY 6 (SIX) HOURS AS NEEDED FOR ANXIETY 60 tablet 2    MAGNESIUM OXIDE PO Take by mouth      medroxyPROGESTERone (DEPO-PROVERA) 150 mg/mL injection INJECT MONTHLY FOR 6 MONTH  5    medroxyPROGESTERone acetate (DEPO-PROVERA SYRINGE) 150 mg/mL injection INJECT MONTHLY X 6 MONTHS, BRING TO OFFICE (Patient not taking: Reported on 8/10/2021)      nortriptyline (PAMELOR) 75 MG capsule TAKE 1 CAPSULE BY MOUTH TWICE A DAY 60 capsule 2    propranolol (INDERAL) 20 mg tablet TAKE 1 TABLET BY MOUTH EVERY DAY AT NIGHT 30 tablet 5     No current facility-administered medications for this visit  Allergies   Allergen Reactions    Amoxicillin-Pot Clavulanate     Decadrol [Dexamethasone] Other (See Comments)     Category: Adverse Reaction;   psychosis    Dexamethasone Sodium Phosphate     Other     Penicillins Hives and Other (See Comments)     Category: Allergy; Hives/Uticaria    Tetracycline Hives and Other (See Comments)     Hives/Uticaria    Tetracyclines & Related Hives     Category: Allergy; Review of Systems    Video Exam    There were no vitals filed for this visit  Physical Exam     I spent 38 minutes directly with the patient during this visit    VIRTUAL VISIT DISCLAIMER    Lynn Burlington verbally agrees to participate in [employer]  Pt is aware that [employer] could be limited without vital signs or the ability to perform a full hands-on physical exam  Esther Griffith understands she or the provider may request at any time to terminate the video visit and request the patient to seek care or treatment in person

## 2021-10-05 ENCOUNTER — OFFICE VISIT (OUTPATIENT)
Dept: PSYCHIATRY | Facility: CLINIC | Age: 41
End: 2021-10-05
Payer: COMMERCIAL

## 2021-10-05 DIAGNOSIS — F33.1 MAJOR DEPRESSIVE DISORDER, RECURRENT EPISODE, MODERATE (HCC): ICD-10-CM

## 2021-10-05 DIAGNOSIS — F41.1 GAD (GENERALIZED ANXIETY DISORDER): ICD-10-CM

## 2021-10-05 DIAGNOSIS — F41.1 GENERALIZED ANXIETY DISORDER: ICD-10-CM

## 2021-10-05 PROCEDURE — 99213 OFFICE O/P EST LOW 20 MIN: CPT | Performed by: PSYCHIATRY & NEUROLOGY

## 2021-10-05 RX ORDER — DESVENLAFAXINE 50 MG/1
50 TABLET, EXTENDED RELEASE ORAL DAILY
Qty: 30 TABLET | Refills: 2 | Status: SHIPPED | OUTPATIENT
Start: 2021-10-05 | End: 2021-12-10 | Stop reason: SDUPTHER

## 2021-10-05 RX ORDER — LORAZEPAM 1 MG/1
1 TABLET ORAL EVERY 8 HOURS PRN
Qty: 90 TABLET | Refills: 2 | Status: SHIPPED | OUTPATIENT
Start: 2021-10-05 | End: 2022-01-06 | Stop reason: SDUPTHER

## 2021-10-09 DIAGNOSIS — M62.838 MUSCLE SPASM: ICD-10-CM

## 2021-10-11 RX ORDER — CYCLOBENZAPRINE HCL 10 MG
TABLET ORAL
Qty: 30 TABLET | Refills: 3 | Status: SHIPPED | OUTPATIENT
Start: 2021-10-11 | End: 2021-10-19 | Stop reason: SDUPTHER

## 2021-10-13 ENCOUNTER — TELEPHONE (OUTPATIENT)
Dept: PSYCHIATRY | Facility: CLINIC | Age: 41
End: 2021-10-13

## 2021-10-13 ENCOUNTER — TELEMEDICINE (OUTPATIENT)
Dept: BEHAVIORAL/MENTAL HEALTH CLINIC | Facility: CLINIC | Age: 41
End: 2021-10-13
Payer: COMMERCIAL

## 2021-10-13 DIAGNOSIS — F33.1 MAJOR DEPRESSIVE DISORDER, RECURRENT EPISODE, MODERATE (HCC): Primary | ICD-10-CM

## 2021-10-13 DIAGNOSIS — F41.1 GENERALIZED ANXIETY DISORDER: ICD-10-CM

## 2021-10-13 PROCEDURE — 90834 PSYTX W PT 45 MINUTES: CPT | Performed by: PSYCHIATRY & NEUROLOGY

## 2021-10-19 ENCOUNTER — OFFICE VISIT (OUTPATIENT)
Dept: RHEUMATOLOGY | Facility: CLINIC | Age: 41
End: 2021-10-19
Payer: COMMERCIAL

## 2021-10-19 VITALS
BODY MASS INDEX: 39.54 KG/M2 | WEIGHT: 201.4 LBS | SYSTOLIC BLOOD PRESSURE: 110 MMHG | DIASTOLIC BLOOD PRESSURE: 80 MMHG | HEIGHT: 60 IN

## 2021-10-19 DIAGNOSIS — M79.7 FIBROMYALGIA: Primary | ICD-10-CM

## 2021-10-19 DIAGNOSIS — M62.838 MUSCLE SPASM: ICD-10-CM

## 2021-10-19 DIAGNOSIS — M25.50 ARTHRALGIA OF MULTIPLE JOINTS: ICD-10-CM

## 2021-10-19 PROCEDURE — 99214 OFFICE O/P EST MOD 30 MIN: CPT | Performed by: INTERNAL MEDICINE

## 2021-10-19 RX ORDER — CYCLOBENZAPRINE HCL 10 MG
TABLET ORAL
Qty: 180 TABLET | Refills: 3 | Status: SHIPPED | OUTPATIENT
Start: 2021-10-19 | End: 2022-06-02 | Stop reason: SDUPTHER

## 2021-10-19 RX ORDER — CELECOXIB 200 MG/1
200 CAPSULE ORAL 2 TIMES DAILY
Qty: 180 CAPSULE | Refills: 3 | Status: SHIPPED | OUTPATIENT
Start: 2021-10-19 | End: 2021-11-08

## 2021-10-27 ENCOUNTER — TELEMEDICINE (OUTPATIENT)
Dept: BEHAVIORAL/MENTAL HEALTH CLINIC | Facility: CLINIC | Age: 41
End: 2021-10-27
Payer: COMMERCIAL

## 2021-10-27 DIAGNOSIS — F33.1 MAJOR DEPRESSIVE DISORDER, RECURRENT EPISODE, MODERATE (HCC): Primary | ICD-10-CM

## 2021-10-27 DIAGNOSIS — F41.1 GENERALIZED ANXIETY DISORDER: ICD-10-CM

## 2021-10-27 PROCEDURE — 90834 PSYTX W PT 45 MINUTES: CPT | Performed by: PSYCHIATRY & NEUROLOGY

## 2021-10-29 ENCOUNTER — TELEPHONE (OUTPATIENT)
Dept: PSYCHIATRY | Facility: CLINIC | Age: 41
End: 2021-10-29

## 2021-11-12 ENCOUNTER — TELEMEDICINE (OUTPATIENT)
Dept: BEHAVIORAL/MENTAL HEALTH CLINIC | Facility: CLINIC | Age: 41
End: 2021-11-12
Payer: COMMERCIAL

## 2021-11-12 DIAGNOSIS — F41.1 GENERALIZED ANXIETY DISORDER: ICD-10-CM

## 2021-11-12 DIAGNOSIS — F33.1 MAJOR DEPRESSIVE DISORDER, RECURRENT EPISODE, MODERATE (HCC): Primary | ICD-10-CM

## 2021-11-12 PROCEDURE — 90832 PSYTX W PT 30 MINUTES: CPT | Performed by: PSYCHIATRY & NEUROLOGY

## 2021-12-03 ENCOUNTER — TELEMEDICINE (OUTPATIENT)
Dept: BEHAVIORAL/MENTAL HEALTH CLINIC | Facility: CLINIC | Age: 41
End: 2021-12-03
Payer: COMMERCIAL

## 2021-12-03 DIAGNOSIS — F33.2 SEVERE RECURRENT MAJOR DEPRESSION WITHOUT PSYCHOTIC FEATURES (HCC): Primary | ICD-10-CM

## 2021-12-03 DIAGNOSIS — F51.04 PSYCHOPHYSIOLOGICAL INSOMNIA: ICD-10-CM

## 2021-12-03 DIAGNOSIS — M79.7 FIBROMYALGIA: ICD-10-CM

## 2021-12-03 DIAGNOSIS — F41.1 GENERALIZED ANXIETY DISORDER: ICD-10-CM

## 2021-12-03 PROCEDURE — 90832 PSYTX W PT 30 MINUTES: CPT | Performed by: PSYCHIATRY & NEUROLOGY

## 2021-12-03 RX ORDER — NORTRIPTYLINE HYDROCHLORIDE 75 MG/1
CAPSULE ORAL
Qty: 60 CAPSULE | Refills: 2 | Status: SHIPPED | OUTPATIENT
Start: 2021-12-03 | End: 2022-03-17

## 2021-12-03 RX ORDER — HYDROXYZINE 50 MG/1
50 TABLET, FILM COATED ORAL
Qty: 30 TABLET | Refills: 2 | Status: SHIPPED | OUTPATIENT
Start: 2021-12-03 | End: 2022-03-07

## 2021-12-10 ENCOUNTER — TELEPHONE (OUTPATIENT)
Dept: PSYCHIATRY | Facility: CLINIC | Age: 41
End: 2021-12-10

## 2021-12-14 DIAGNOSIS — G43.709 CHRONIC MIGRAINE WITHOUT AURA WITHOUT STATUS MIGRAINOSUS, NOT INTRACTABLE: ICD-10-CM

## 2021-12-14 RX ORDER — PROPRANOLOL HYDROCHLORIDE 20 MG/1
TABLET ORAL
Qty: 30 TABLET | Refills: 5 | Status: SHIPPED | OUTPATIENT
Start: 2021-12-14 | End: 2022-06-14 | Stop reason: SDUPTHER

## 2021-12-16 ENCOUNTER — TELEMEDICINE (OUTPATIENT)
Dept: BEHAVIORAL/MENTAL HEALTH CLINIC | Facility: CLINIC | Age: 41
End: 2021-12-16
Payer: COMMERCIAL

## 2021-12-16 DIAGNOSIS — F41.1 GENERALIZED ANXIETY DISORDER: ICD-10-CM

## 2021-12-16 DIAGNOSIS — F33.1 MAJOR DEPRESSIVE DISORDER, RECURRENT EPISODE, MODERATE (HCC): Primary | ICD-10-CM

## 2021-12-16 PROCEDURE — 90832 PSYTX W PT 30 MINUTES: CPT | Performed by: PSYCHIATRY & NEUROLOGY

## 2022-01-06 ENCOUNTER — TELEMEDICINE (OUTPATIENT)
Dept: PSYCHIATRY | Facility: CLINIC | Age: 42
End: 2022-01-06
Payer: COMMERCIAL

## 2022-01-06 DIAGNOSIS — F41.1 GAD (GENERALIZED ANXIETY DISORDER): ICD-10-CM

## 2022-01-06 DIAGNOSIS — F41.1 GENERALIZED ANXIETY DISORDER: ICD-10-CM

## 2022-01-06 PROCEDURE — 99213 OFFICE O/P EST LOW 20 MIN: CPT | Performed by: PSYCHIATRY & NEUROLOGY

## 2022-01-06 RX ORDER — LORAZEPAM 1 MG/1
1 TABLET ORAL EVERY 8 HOURS PRN
Qty: 90 TABLET | Refills: 2 | Status: SHIPPED | OUTPATIENT
Start: 2022-01-06 | End: 2022-04-04

## 2022-01-06 NOTE — PSYCH
Virtual Regular Visit    Verification of patient location:    Patient is located in the following state in which I hold an active license [place]      Assessment/Plan:    Problem List Items Addressed This Visit        Other    Generalized anxiety disorder    Relevant Medications    LORazepam (ATIVAN) 1 mg tablet      Other Visit Diagnoses     CAROL (generalized anxiety disorder)        Relevant Medications    LORazepam (ATIVAN) 1 mg tablet                    Reason for visit is   Chief Complaint   Patient presents with    Virtual Regular Visit        Encounter provider Dion Isidro MD    Provider located at [address] [place] [id number] [phone]      Recent Visits  Date Type Provider Dept   01/06/22 Wilma Edwards MD [address] recent visits within past 7 days and meeting all other requirements  Future Appointments  No visits were found meeting these conditions  Showing future appointments within next 150 days and meeting all other requirements       The patient was identified by name and date of birth  Pati Cohn was informed that this is a telemedicine visit and that the [facility] and patient was informed that this is a secure, HIPAA-compliant platform  She agrees to proceed     My office door was closed  No one else was in the room  She acknowledged consent and understanding of privacy and security of the video platform  The patient has agreed to participate and understands they can discontinue the visit at any time  Patient is aware this is a billable service  Subjective  Pati Cohn is a 38 y o  female with MDD and CAROL   Patient stated that after increasing dose of  Pristiq to 100 mg daily she has noticed a better response to Pristiq  She is feeling less depressed, feels more energy and motivation   She denies medication side effects  Also dose increase of Lorazepam to 1 mg tid helped her managed better her anxiety as well  She is also following up with Neurology for migraine HA and Beta blocker has helped her  Also seeing a rheumatologist and she was referred for aqua therapy which has helped control her chronic pain  Continues to meet with her counselor on a regular basis  She was approved for disability benefits  She feels she is a stable place now and agrees to continue current treatment as prescribed  Will schedule follow up in 3 months or sooner if needed        HPI     Past Medical History:   Diagnosis Date    Anxiety     Chronic sinusitis     Depression     Fibromyalgia     Migraine     Obesity     Polyarthritis     Last assessed 9/21/2015    Psychiatric disorder        Past Surgical History:   Procedure Laterality Date    COLONOSCOPY  06/2019    DENTAL SURGERY      HYSTEROSCOPY      Endometrial Biopsy By Hysteroscopy    REMOVAL OF INTRAUTERINE DEVICE (IUD)      US GUIDED BREAST BIOPSY RIGHT COMPLETE Right 6/17/2019       Current Outpatient Medications   Medication Sig Dispense Refill    Calcium Carbonate-Vitamin D (CALCIUM 500/D PO) Take 1 capsule by mouth daily      celecoxib (CeleBREX) 200 mg capsule TAKE 1 CAPSULE BY MOUTH TWICE A DAY 60 capsule 6    Cholecalciferol (VITAMIN D-3) 1000 units CAPS Take 1 capsule by mouth daily      cyclobenzaprine (FLEXERIL) 10 mg tablet Take 2 at bedtime 180 tablet 3    desvenlafaxine succinate (PRISTIQ) 100 mg 24 hr tablet Take 1 tablet (100 mg total) by mouth daily 30 tablet 2    hydrOXYzine HCL (ATARAX) 50 mg tablet TAKE 1 TABLET (50 MG TOTAL) BY MOUTH DAILY AT BEDTIME AS NEEDED (INSOMNIA) 30 tablet 2    IRON PO Take by mouth      LORazepam (ATIVAN) 1 mg tablet Take 1 tablet (1 mg total) by mouth every 8 (eight) hours as needed for anxiety 90 tablet 2    MAGNESIUM OXIDE PO Take by mouth      nortriptyline (PAMELOR) 75 MG capsule TAKE 1 CAPSULE BY MOUTH TWICE A DAY 60 capsule 2    propranolol (INDERAL) 20 mg tablet TAKE 1 TABLET BY MOUTH EVERY DAY AT NIGHT 30 tablet 5     No current facility-administered medications for this visit  Allergies   Allergen Reactions    Amoxicillin-Pot Clavulanate     Decadrol [Dexamethasone] Other (See Comments)     Category: Adverse Reaction;   psychosis    Dexamethasone Sodium Phosphate     Other     Penicillins Hives and Other (See Comments)     Category: Allergy; Hives/Uticaria    Tetracycline Hives and Other (See Comments)     Hives/Uticaria    Tetracyclines & Related Hives     Category: Allergy; Review of Systems     Mood Anxiety and Depression   Behavior Normal    Thought Content Disturbing Thoughts, Feelings   General Emotional Problems and Decreased Functioning   Personality Normal   Other Psych Symptoms Normal   Constitutional Negative   ENT Negative   Cardiovascular Negative   Respiratory Negative   Gastrointestinal Negative   Genitourinary Negative   Musculoskeletal Negative   Integumentary Negative   Neurological Negative   Endocrine Normal    Other Symptoms Normal              Laboratory Results: No results found for this or any previous visit      Substance Abuse History:  Social History     Substance and Sexual Activity   Drug Use Not Currently       Family Psychiatric History:   Family History   Problem Relation Age of Onset    Diabetes Father     Kidney disease Father     Substance Abuse Brother     Diabetes Maternal Grandmother     Rheum arthritis Maternal Grandmother     Melanoma Maternal Grandmother     Kidney disease Paternal Grandmother     Rheum arthritis Paternal Grandmother     Depression Paternal Grandmother     Diabetes Paternal Grandfather     Lung cancer Paternal Grandfather     Substance Abuse Maternal Uncle     Prostate cancer Maternal Uncle 61    Depression Paternal Aunt     Alcohol abuse Family     Stomach cancer Family     Irregular heart beat Mother        The following portions of the patient's history were reviewed and updated as appropriate: allergies, current medications, past family history, past medical history, past social history, past surgical history and problem list     Social History     Socioeconomic History    Marital status: Single     Spouse name: Not on file    Number of children: 0    Years of education: 15 years     Highest education level: GED or equivalent   Occupational History    Occupation: unemployed   Tobacco Use    Smoking status: Never Smoker    Smokeless tobacco: Never Used   Vaping Use    Vaping Use: Never used   Substance and Sexual Activity    Alcohol use: No     Comment: 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     Drug use: Not Currently    Sexual activity: Not Currently   Other Topics Concern    Not on file   Social History Narrative    Caffeine use     Social Determinants of Health     Financial Resource Strain: Not on file   Food Insecurity: Not on file   Transportation Needs: Not on file   Physical Activity: Not on file   Stress: Not on file   Social Connections: Not on file   Intimate Partner Violence: Not on file   Housing Stability: Not on file     Social History     Social History Narrative    Caffeine use       Objective:       Mental status:  Appearance calm and cooperative , adequate hygiene and grooming and good eye contact    Mood dysphoric, depressed and anxious   Affect affect was constricted   Speech a normal rate and fluent   Thought Processes coherent/organized and normal thought processes   Hallucinations no hallucinations present    Thought Content no delusions   Abnormal Thoughts no suicidal thoughts  and no homicidal thoughts    Orientation  oriented to person and place and time   Remote Memory short term memory intact and long term memory intact   Attention Span concentration impaired   Intellect Appears to be of Average Intelligence   Insight Limited insight   Judgement judgment was limited Muscle Strength Muscle strength and tone were normal and Normal gait    Language no difficulty naming common objects and no difficulty repeating a phrase    Fund of Knowledge displays adequate knowledge of current events               Assessment/Plan:       Diagnoses and all orders for this visit:    CAROL (generalized anxiety disorder)  -     LORazepam (ATIVAN) 1 mg tablet; Take 1 tablet (1 mg total) by mouth every 8 (eight) hours as needed for anxiety    Generalized anxiety disorder            Treatment Recommendations- Risks Benefits      Immediate Medical/Psychiatric/Psychotherapy Treatments and Any Precautions: continue current treatment     Risks, Benefits And Possible Side Effects Of Medications:  {PSYCH RISK, BENEFITS AND POSSIBLE SIDE EFFECTS (Optional):94535    Controlled Medication Discussion: Discussed with patient Black Box warning on concurrent use of benzodiazepines and opioid medications including sedation, respiratory depression, coma and death  Patient understands the risk of treatment with benzodiazepines in addition to opioids and wants to continue taking those medications  , Discussed with patient the risks of sedation, respiratory depression, impairment of ability to drive and potential for abuse and addiction related to treatment with benzodiazepine medications  The patient understands risk of treatment with benzodiazepine medications, agrees to not drive if feels impaired and agrees to take medications as prescribed  and The patient has been filling controlled prescriptions on time as prescribed to [facility]       Psychotherapy Provided: Individual psychotherapy provided  Individual psychotherapy provided: Yes  Counseling was provided during the session today for 16 minutes  Medications, treatment progress and treatment plan reviewed with Clifford Perez  Medication education provided to Clifford Perez    Goals discussed during in session: improve control of depression  Recent stressor including COVID-19 issues, family issues, health issues, limited support, social difficulties, everyday stressors and ongoing anxiety discussed with Esther  Coping strategies including compliance with medications, contacting a therapist, deep/slow breathing, eliminating avoidance, engaging in previously avoided activities, exercising, getting into a good routine, improving self-esteem, increasing energy, increasing interest in usual activities, increasing motivation, increasing social interaction, keeping busy at home, maintain healthy diet, maintain heathy sleeping hygiene and maintain positive attitude reviewed with Gal Tamayo  Importance of medication and treatment compliance reviewed with Esther  Educated on importance of medication and treatment compliance  Importance of follow up with family physician for medical issues reviewed with Gal Tamayo  Supportive therapy provided  I spent 30 minutes directly with the patient during this visit    VIRTUAL VISIT DISCLAIMER    Fine Gracie verbally agrees to participate in [facility]  Pt is aware that [facility] could be limited without vital signs or the ability to perform a full hands-on physical exam  Esther Griffith understands she or the provider may request at any time to terminate the video visit and request the patient to seek care or treatment in person

## 2022-01-10 ENCOUNTER — TELEMEDICINE (OUTPATIENT)
Dept: BEHAVIORAL/MENTAL HEALTH CLINIC | Facility: CLINIC | Age: 42
End: 2022-01-10
Payer: COMMERCIAL

## 2022-01-10 DIAGNOSIS — F33.1 MAJOR DEPRESSIVE DISORDER, RECURRENT EPISODE, MODERATE (HCC): Primary | ICD-10-CM

## 2022-01-10 DIAGNOSIS — F41.1 GENERALIZED ANXIETY DISORDER: ICD-10-CM

## 2022-01-10 PROCEDURE — 90832 PSYTX W PT 30 MINUTES: CPT | Performed by: PSYCHIATRY & NEUROLOGY

## 2022-01-10 NOTE — PSYCH
Session time 060 [id number] (total time 35 minutes)    Virtual Regular Visit    Verification of patient location:    Patient is located in the following state in which I hold an active license [place]      Assessment/Plan:    Problem List Items Addressed This Visit        Other    Major depressive disorder, recurrent episode, moderate ([address] ) - Primary    Generalized anxiety disorder          Goals addressed in session: Goal 1          Reason for visit is   Chief Complaint   Patient presents with    Virtual Regular Visit        Encounter provider HERMELINDA Hunt    Provider located at [address] [place] [id number]  [phone]      Recent Visits  No visits were found meeting these conditions  Showing recent visits within past 7 days and meeting all other requirements  Future Appointments  No visits were found meeting these conditions  Showing future appointments within next 150 days and meeting all other requirements       The patient was identified by name and date of birth  Ani Peres was informed that this is a telemedicine visit and that the visit is being conducted through[employer] and patient was informed that this is a secure, HIPAA-compliant platform  She agrees to proceed     My office door was closed  No one else was in the room  She acknowledged consent and understanding of privacy and security of the video platform  The patient has agreed to participate and understands they can discontinue the visit at any time  Patient is aware this is a billable service  Subjective  Ani Peres is a 38 y o  female presenting for follow up  Jose William shared that she has been feeling better over the last couple of weeks, with sleeping more regularly at night and a medication increase being part of what has been helping    She said that she also has been spending more time with her mom, trying to get out for walks when they can, and things are good at home  She has been trying to keep in touch with friends virtually, not going out places because of COVID, but said that she feels ok with that  Her mood is reportedly much better, and she said that she feels "at peace" with where she is in life right now, not worrying about the future too much like she used to  She reports that her motivation still is not where it needs to be  Discussed action before motivation, how she can use strategies to help her take action and build motivation, and encouraged her to choose a task today to work on for 5 minutes to start  A: Jonatan Davies presented as generally euthymic today, with a much brighter, more animated affect than typical for her  Her concentration was intact, thought process logical and organized  Insight and judgment intact  Denies SI HI and psychosis  Good progress toward goals  P: Jonatan Davies will work on motivation and trying to get tasks done, will stay connected to friends as she is able, and will return in two weeks for follow up      HPI     Past Medical History:   Diagnosis Date    Anxiety     Chronic sinusitis     Depression     Fibromyalgia     Migraine     Obesity     Polyarthritis     Last assessed 9/21/2015    Psychiatric disorder        Past Surgical History:   Procedure Laterality Date    [facility]   [facility]      HYSTEROSCOPY      Endometrial Biopsy By Hysteroscopy    REMOVAL OF INTRAUTERINE DEVICE (IUD)      US GUIDED BREAST BIOPSY RIGHT COMPLETE Right 6/17/2019       Current Outpatient Medications   Medication Sig Dispense Refill    Calcium Carbonate-Vitamin D (CALCIUM 500/D PO) Take 1 capsule by mouth daily      celecoxib (CeleBREX) 200 mg capsule TAKE 1 CAPSULE BY MOUTH TWICE A DAY 60 capsule 6    Cholecalciferol (VITAMIN D-3) 1000 units CAPS Take 1 capsule by mouth daily      cyclobenzaprine (FLEXERIL) 10 mg tablet Take 2 at bedtime 180 tablet 3    desvenlafaxine succinate (PRISTIQ) 100 mg 24 hr tablet Take 1 tablet (100 mg total) by mouth daily 30 tablet 2    hydrOXYzine HCL (ATARAX) 50 mg tablet TAKE 1 TABLET (50 MG TOTAL) BY MOUTH DAILY AT BEDTIME AS NEEDED (INSOMNIA) 30 tablet 2    IRON PO Take by mouth      LORazepam (ATIVAN) 1 mg tablet Take 1 tablet (1 mg total) by mouth every 8 (eight) hours as needed for anxiety 90 tablet 2    MAGNESIUM OXIDE PO Take by mouth      nortriptyline (PAMELOR) 75 MG capsule TAKE 1 CAPSULE BY MOUTH TWICE A DAY 60 capsule 2    propranolol (INDERAL) 20 mg tablet TAKE 1 TABLET BY MOUTH EVERY DAY AT NIGHT 30 tablet 5     No current facility-administered medications for this visit  Allergies   Allergen Reactions    Amoxicillin-Pot Clavulanate     Decadrol [Dexamethasone] Other (See Comments)     Category: Adverse Reaction;   psychosis    Dexamethasone Sodium Phosphate     Other     Penicillins Hives and Other (See Comments)     Category: Allergy; Hives/Uticaria    Tetracycline Hives and Other (See Comments)     Hives/Uticaria    Tetracyclines & Related Hives     Category: Allergy; Review of Systems    Video Exam    There were no vitals filed for this visit  Physical Exam     I spent 35 minutes directly with the patient during this visit    VIRTUAL VISIT DISCLAIMER    Ani Peres verbally agrees to participate in [employer]  Pt is aware that [employer] could be limited without vital signs or the ability to perform a full hands-on physical exam  Esther Griffith understands she or the provider may request at any time to terminate the video visit and request the patient to seek care or treatment in person  Contraindicated

## 2022-01-24 ENCOUNTER — TELEMEDICINE (OUTPATIENT)
Dept: BEHAVIORAL/MENTAL HEALTH CLINIC | Facility: CLINIC | Age: 42
End: 2022-01-24
Payer: COMMERCIAL

## 2022-01-24 DIAGNOSIS — F41.1 GENERALIZED ANXIETY DISORDER: ICD-10-CM

## 2022-01-24 DIAGNOSIS — F33.1 MAJOR DEPRESSIVE DISORDER, RECURRENT EPISODE, MODERATE (HCC): Primary | ICD-10-CM

## 2022-01-24 PROCEDURE — 90832 PSYTX W PT 30 MINUTES: CPT | Performed by: PSYCHIATRY & NEUROLOGY

## 2022-01-24 NOTE — PSYCH
Session time 21  (total time 24 minutes)    Virtual Regular Visit    Verification of patient location:    Patient is located in the following state in which I hold an active license [place]      Assessment/Plan:    Problem List Items Addressed This Visit        Other    Major depressive disorder, recurrent episode, moderate ([address] ) - Primary    Generalized anxiety disorder          Goals addressed in session: Goal 1          Reason for visit is   Chief Complaint   Patient presents with    Virtual Regular Visit        Encounter provider HERMELINDA Alcala    Provider located at [address] [place] [id number] [phone]      Recent Visits  No visits were found meeting these conditions  Showing recent visits within past 7 days and meeting all other requirements  Today's Visits  Date Type Provider Dept   01/24/22 Telemedicine HERMELINDA [facility] Therapist [facility]   Showing today's visits and meeting all other requirements  Future Appointments  No visits were found meeting these conditions  Showing future appointments within next 150 days and meeting all other requirements       The patient was identified by name and date of birth  Jana Lino was informed that this is a telemedicine visit and that the visit is being conducted through[employer] and patient was informed this is a secure, HIPAA-complaint platform  She agrees to proceed     My office door was closed  No one else was in the room  She acknowledged consent and understanding of privacy and security of the video platform  The patient has agreed to participate and understands they can discontinue the visit at any time  Patient is aware this is a billable service  Subjective  Jana Lino is a 38 y o  female presenting for follow up    Nicholas May shared that she has not been feeling well the past couple of days following getting her COVID booster, noting that she is achy and tired  She has gotten off her typical routine with sleeping in a bit more the past two days, so she said that she is feeling somewhat unproductive and "lazy "  She said that this affects her mood to some degree, in feeling bad that she is not being more productive  Discussed expectations of herself, how she could try to fit some of the things she feels she "needs" to get done into her daily routine to help boost mood and motivation, and encouraged Esther to write out pros/benefits of doing even a small chunk of a task each day  Rock Wyman said that she feels that she would benefit from that, as when she writes things out, it helps her get things out of her head  Journaling in particular helps her process her thoughts and then she is able to let them go  Rock Wyman also talked about being more social, going out to meet a new friend recently and really enjoying her time together  She also has been checking in with other friends via text, so she is not feeling as isolated  Encouraged Rock Wyman to continue to try to do so as much as she is able, as it seems to really help boost her mood  A: Rock Wyman presented as "even, neither up nor down" today, with a relatively bright affect, good eye contact and calm behavior  Her concentration was intact, thought process logical and organized  Insight and judgment intact  Denies SI HI and psychosis  Good progress toward goals  P: Rock Wyman will work on incorporating small tasks into her daily routine, will continue to reach out socially, and will follow up in one month (spacing out visits)          HPI     Past Medical History:   Diagnosis Date    Anxiety     Chronic sinusitis     Depression     Fibromyalgia     Migraine     Obesity     Polyarthritis     Last assessed 9/21/2015    Psychiatric disorder        Past Surgical History:   Procedure Laterality Date    COLONOSCOPY  06/2019   [facility]ENT[facility]      HYSTEROSCOPY Endometrial Biopsy By Hysteroscopy    REMOVAL OF INTRAUTERINE DEVICE (IUD)      US GUIDED BREAST BIOPSY RIGHT COMPLETE Right 6/17/2019       Current Outpatient Medications   Medication Sig Dispense Refill    Calcium Carbonate-Vitamin D (CALCIUM 500/D PO) Take 1 capsule by mouth daily      celecoxib (CeleBREX) 200 mg capsule TAKE 1 CAPSULE BY MOUTH TWICE A DAY 60 capsule 6    Cholecalciferol (VITAMIN D-3) 1000 units CAPS Take 1 capsule by mouth daily      cyclobenzaprine (FLEXERIL) 10 mg tablet Take 2 at bedtime 180 tablet 3    desvenlafaxine succinate (PRISTIQ) 100 mg 24 hr tablet Take 1 tablet (100 mg total) by mouth daily 30 tablet 2    hydrOXYzine HCL (ATARAX) 50 mg tablet TAKE 1 TABLET (50 MG TOTAL) BY MOUTH DAILY AT BEDTIME AS NEEDED (INSOMNIA) 30 tablet 2    IRON PO Take by mouth      LORazepam (ATIVAN) 1 mg tablet Take 1 tablet (1 mg total) by mouth every 8 (eight) hours as needed for anxiety 90 tablet 2    MAGNESIUM OXIDE PO Take by mouth      nortriptyline (PAMELOR) 75 MG capsule TAKE 1 CAPSULE BY MOUTH TWICE A DAY 60 capsule 2    propranolol (INDERAL) 20 mg tablet TAKE 1 TABLET BY MOUTH EVERY DAY AT NIGHT 30 tablet 5     No current facility-administered medications for this visit  Allergies   Allergen Reactions    Amoxicillin-Pot Clavulanate     Decadrol [Dexamethasone] Other (See Comments)     Category: Adverse Reaction;   psychosis    Dexamethasone Sodium Phosphate     Other     Penicillins Hives and Other (See Comments)     Category: Allergy; Hives/Uticaria    Tetracycline Hives and Other (See Comments)     Hives/Uticaria    Tetracyclines & Related Hives     Category: Allergy; Review of Systems    Video Exam    There were no vitals filed for this visit  Physical Exam     I spent 24 minutes directly with the patient during this visit    VIRTUAL VISIT DISCLAIMER    Marisol Garcia verbally agrees to participate in [employer]   Pt is aware that [facility] could be limited without vital signs or the ability to perform a full hands-on physical exam  Esther Griffith understands she or the provider may request at any time to terminate the video visit and request the patient to seek care or treatment in person

## 2022-02-24 ENCOUNTER — TELEMEDICINE (OUTPATIENT)
Dept: BEHAVIORAL/MENTAL HEALTH CLINIC | Facility: CLINIC | Age: 42
End: 2022-02-24
Payer: COMMERCIAL

## 2022-02-24 DIAGNOSIS — F41.1 GENERALIZED ANXIETY DISORDER: ICD-10-CM

## 2022-02-24 DIAGNOSIS — F33.1 MAJOR DEPRESSIVE DISORDER, RECURRENT EPISODE, MODERATE (HCC): Primary | ICD-10-CM

## 2022-02-24 PROCEDURE — 90832 PSYTX W PT 30 MINUTES: CPT | Performed by: PSYCHIATRY & NEUROLOGY

## 2022-02-24 NOTE — PSYCH
Session time [phone] (total time 31 minutes)    Virtual Regular Visit    Verification of patient location:    Patient is located in the following state in which I hold an active license [place]      Assessment/Plan:    Problem List Items Addressed This Visit        Other    Major depressive disorder, recurrent episode, moderate ([address] ) - Primary    Generalized anxiety disorder          Goals addressed in session: Goal 1          Reason for visit is   Chief Complaint   Patient presents with    Virtual Regular Visit        Encounter provider HERMELINDA Vásquez    Provider located at [address] [place] [id number] [phone]      Recent Visits  No visits were found meeting these conditions  Showing recent visits within past 7 days and meeting all other requirements  Future Appointments  No visits were found meeting these conditions  Showing future appointments within next 150 days and meeting all other requirements       The patient was identified by name and date of birth  Joanne Duarte was informed that this is a telemedicine visit and that the visit is being conducted throughEpic Embedded and patient was informed this is a secure, HIPAA-complaint platform  She agrees to proceed     My office door was closed  No one else was in the room  She acknowledged consent and understanding of privacy and security of the video platform  The patient has agreed to participate and understands they can discontinue the visit at any time  Patient is aware this is a billable service  Subjective  Joanne Duarte is a 38 y o  female presenting for follow up  Mani Waller shared that she has been feeling "a little down" recently, partly because of physical pain, partly because of concern for her friend Dot Butler and his continued drinking    She said that she tries to just make the best of each day in dealing with her pain, because some days are better than others and she does not want to "wallow" in it, and get stuck in a depression about never getting better  She said that she tries to keep busy and as active as she can to help take her mind off her pain when possible  She said that in regards to [facility], she is recognizing that it is his issue to deal with, that she cannot change him or make him want to get better, so she is keeping her distance a bit and trying to let him figure things out for himself  She said that she is worried about him, but that she cannot let it consume her  She noted that she has also been a bit anxious because of events going on around the world, but said that she is trying to not watch too much news and is trying to not "obsess" over world events because she cannot do anything about them  Provided validation of Esther's improved mindset and progress, and encouraged her to continue to protect her wellness with the boundaries she has been setting  A: [address] presented as mildly depressed today, with a mood-congruent affect, good eye contact and calm behavior  Her concentration was intact, thought process logical and organized  Insight and judgment intact  Denies sI HI and psychosis  Moderate progress toward goals  Mary Walters will continue to focus on healthy boundaries, will continue to prioritize her wellness, and will return in two weeks for follow up as scheduled        HPI     Past Medical History:   Diagnosis Date    Anxiety     Chronic sinusitis     Depression     Fibromyalgia     Migraine     Obesity     Polyarthritis     Last assessed 9/21/2015    Psychiatric disorder        Past Surgical History:   Procedure Laterality Date    COLONOSCOPY  06/2019   [facility]ERY      HYSTEROSCOPY      Endometrial Biopsy By Hysteroscopy    REMOVAL OF INTRAUTERINE DEVICE (IUD)      US GUIDED BREAST BIOPSY RIGHT COMPLETE Right 6/17/2019       Current Outpatient Medications   Medication Sig Dispense Refill    Calcium Carbonate-Vitamin D (CALCIUM 500/D PO) Take 1 capsule by mouth daily      celecoxib (CeleBREX) 200 mg capsule TAKE 1 CAPSULE BY MOUTH TWICE A DAY 60 capsule 6    Cholecalciferol (VITAMIN D-3) 1000 units CAPS Take 1 capsule by mouth daily      cyclobenzaprine (FLEXERIL) 10 mg tablet Take 2 at bedtime 180 tablet 3    desvenlafaxine succinate (PRISTIQ) 100 mg 24 hr tablet Take 1 tablet (100 mg total) by mouth daily 30 tablet 2    hydrOXYzine HCL (ATARAX) 50 mg tablet TAKE 1 TABLET (50 MG TOTAL) BY MOUTH DAILY AT BEDTIME AS NEEDED (INSOMNIA) 30 tablet 2    IRON PO Take by mouth      LORazepam (ATIVAN) 1 mg tablet Take 1 tablet (1 mg total) by mouth every 8 (eight) hours as needed for anxiety 90 tablet 2    MAGNESIUM OXIDE PO Take by mouth      nortriptyline (PAMELOR) 75 MG capsule TAKE 1 CAPSULE BY MOUTH TWICE A DAY 60 capsule 2    propranolol (INDERAL) 20 mg tablet TAKE 1 TABLET BY MOUTH EVERY DAY AT NIGHT 30 tablet 5     No current facility-administered medications for this visit  Allergies   Allergen Reactions    Amoxicillin-Pot Clavulanate     Decadrol [Dexamethasone] Other (See Comments)     Category: Adverse Reaction;   psychosis    Dexamethasone Sodium Phosphate     Other     Penicillins Hives and Other (See Comments)     Category: Allergy; Hives/Uticaria    Tetracycline Hives and Other (See Comments)     Hives/Uticaria    Tetracyclines & Related Hives     Category: Allergy; Review of Systems    Video Exam    There were no vitals filed for this visit  Physical Exam     I spent 31 minutes directly with the patient during this visit    VIRTUAL VISIT DISCLAIMER    Eliane Giordano verbally agrees to participate in [employer]   Pt is aware that Virtual Care Services could be limited without vital signs or the ability to perform a full hands-on physical exam  Esther Griffith understands she or the provider may request at any time to terminate the video visit and request the patient to seek care or treatment in person

## 2022-02-24 NOTE — BH TREATMENT PLAN
Maricarmen Grijalva  1980        Date of Initial Treatment Plan:  8/21/2018   Date of Current Treatment Plan: 02/24/22    Treatment Plan Number  9    Strengths/Personal Resources for Self Care: " witty, smart, funny creative, caring, resourceful, good hair;"  Diagnosis:   1  Major depressive disorder, recurrent episode, moderate ([facility] [address] [facility] )     2  Generalized anxiety disorder         Area of Needs: depression/anxiety/chronic pain    Long Term Goal 1: AI want to continue to effectively manage the depression and the anxiety  Target Date: 6/24/2022  Completion Date: n/a         Short Term Objectives for Goal 1: A I will continue to be aware of the depression/anxiety and negative self talk messages and redirect to positive and realistic messages (lower depression/anxiety)  B I will continue to work with Dr Rupali Rodriguez on medication management  C I will continue to make sure I am taking care of myself  D: I will continue to reach out to friends to stay socially connected     Long Term Goal 2: I will continue to work on strengthening my self esteem  Target Date: 6/24/2022  Completion Date: N/A    Short Term Objectives for Goal 2: AI will remain aware of those who have impacted me positively and disregard the messages from those who were negative influences  B  I will review and use my qualities/strengths/assets as reminders of my worth  C  I will continue with self care (including using my effective communication skills such as assertiveness)  D  I will continue to explore options to improve my quality of life  Long Term Goal # 3: I will continue to learn and implement effective pain management strategies  Target Date: 6/24/2022  Completion Date: N/A    Short Term Objectives for Goal 3: AI will learn and practice effective pain management strategies  B  I will explore yoga as a possible pain management strategy C: I will walk and be more physically active as much as I am able      GOAL 1: Modality: Individual 2x per month   Completion Date n/a and The person(s) responsible for carrying out the plan is  Esther    GOAL 2: Modality: Individual 2x per month   Completion Date n/a and The person(s) responsible for carrying out the plan is  Esther     GOAL 3: Modality: Individual 2x per month   Completion Date n/a and The person(s) responsible for carrying out the plan is  [place] [address]: Diagnosis and Treatment Plan explained to Liam Montejo relates understanding diagnosis and is agreeable to Treatment Plan  yes      Client Comments : Please share your thoughts, feelings, need and/or experiences regarding your treatment plan: n/a    **Verbal consent provided by Todd law, 2/24/2022 at 928 due to Jeni social distancing measures/telehealth

## 2022-03-05 DIAGNOSIS — F41.1 GENERALIZED ANXIETY DISORDER: ICD-10-CM

## 2022-03-05 DIAGNOSIS — F51.04 PSYCHOPHYSIOLOGICAL INSOMNIA: ICD-10-CM

## 2022-03-05 DIAGNOSIS — F33.1 MAJOR DEPRESSIVE DISORDER, RECURRENT EPISODE, MODERATE (HCC): ICD-10-CM

## 2022-03-07 RX ORDER — DESVENLAFAXINE 100 MG/1
TABLET, EXTENDED RELEASE ORAL
Qty: 30 TABLET | Refills: 2 | Status: SHIPPED | OUTPATIENT
Start: 2022-03-07 | End: 2022-05-27

## 2022-03-07 RX ORDER — HYDROXYZINE 50 MG/1
50 TABLET, FILM COATED ORAL
Qty: 30 TABLET | Refills: 2 | Status: SHIPPED | OUTPATIENT
Start: 2022-03-07 | End: 2022-05-27

## 2022-03-17 DIAGNOSIS — M79.7 FIBROMYALGIA: ICD-10-CM

## 2022-03-17 RX ORDER — NORTRIPTYLINE HYDROCHLORIDE 75 MG/1
CAPSULE ORAL
Qty: 180 CAPSULE | Refills: 0 | Status: SHIPPED | OUTPATIENT
Start: 2022-03-17 | End: 2022-06-08

## 2022-04-04 DIAGNOSIS — F41.1 GAD (GENERALIZED ANXIETY DISORDER): ICD-10-CM

## 2022-04-04 RX ORDER — LORAZEPAM 1 MG/1
TABLET ORAL
Qty: 90 TABLET | Refills: 2 | Status: SHIPPED | OUTPATIENT
Start: 2022-04-04

## 2022-04-08 ENCOUNTER — TELEMEDICINE (OUTPATIENT)
Dept: BEHAVIORAL/MENTAL HEALTH CLINIC | Facility: CLINIC | Age: 42
End: 2022-04-08
Payer: COMMERCIAL

## 2022-04-08 DIAGNOSIS — F33.1 MAJOR DEPRESSIVE DISORDER, RECURRENT EPISODE, MODERATE (HCC): Primary | ICD-10-CM

## 2022-04-08 DIAGNOSIS — F41.1 GENERALIZED ANXIETY DISORDER: ICD-10-CM

## 2022-04-08 PROCEDURE — 90834 PSYTX W PT 45 MINUTES: CPT | Performed by: PSYCHIATRY & NEUROLOGY

## 2022-04-08 NOTE — PSYCH
Session time [phone] (total time 38 minutes)    Virtual Regular Visit    Verification of patient location:    Patient is located in the following state in which I hold an active license [place]      Assessment/Plan:    Problem List Items Addressed This Visit        Other    Major depressive disorder, recurrent episode, moderate ([address] ) - Primary    Generalized anxiety disorder          Goals addressed in session: Goal 1          Reason for visit is   Chief Complaint   Patient presents with    Virtual Regular Visit        Encounter provider HERMELINDA Christianson    Provider located at [address] [place] [id number] [phone]      Recent Visits  No visits were found meeting these conditions  Showing recent visits within past 7 days and meeting all other requirements  Today's Visits  Date Type Provider Dept   04[facility]08/22 [facility]   Showing today's visits and meeting all other requirements  Future Appointments  No visits were found meeting these conditions  Showing future appointments within next 150 days and meeting all other requirements       The patient was identified by name and date of birth  Og Rivera was informed that this is a telemedicine visit and that the visit is being conducted through[employer] [facility] and patient was informed that this is a secure, HIPAA-compliant platform  She agrees to proceed     My office door was closed  No one else was in the room  She acknowledged consent and understanding of privacy and security of the video platform  The patient has agreed to participate and understands they can discontinue the visit at any time  Patient is aware this is a billable service  Subjective  Og Rivera is a 38 y o  female presenting for follow up    Meme Mcdonough shared that overall she has been doing fairly well, reaching out and connecting socially more often, and doing some house/ which is keeping her busy  She notes that she has been in a bit more pain the last couple of days which has her feeling more down and tired, but she knows that is the cause  She shared that she has been somewhat stressed by feeling "stuck" in this phase of her life, with no solid plan for what she will do in the future  She wants to be able to work again, but is not sure what kind of job she would be able to do right now because of her physical health  She also would like to be in a relationship, but has standards that a lot of people cannot meet, and she is very cautious about others' issues, such as alcohol abuse  She said that she is praying and trying to be accepting of the way things are right now, and wants to get back into journaling to help her get things out  Provided validation of Esther's feelings, provided supportive therapy and positive feedback for her progress and positive attitude  A: Jeannie Hoffmann presented as mildly anxious today, with a somewhat constricted affect in anxious range, good eye contact and calm behavior  Her speech was normal, concentration intact, thought process logical and organized  Insight and judgment intact  Denies SI HI and psychosis  Some mod progress toward goals  Venecia Cates will continue to work on positive thinking, prayer and journaling to help her cope with stressors, and will return in two weeks for follow up as scheduled        HPI     Past Medical History:   Diagnosis Date    Anxiety     Chronic sinusitis     Depression     Fibromyalgia     Migraine     Obesity     Polyarthritis     Last assessed 9/21/2015    Psychiatric disorder        Past Surgical History:   Procedure Laterality Date    COLONOSCOPY  06/2019   [facility]      HYSTEROSCOPY      Endometrial Biopsy By Hysteroscopy    REMOVAL OF INTRAUTERINE DEVICE (IUD)      US GUIDED BREAST BIOPSY RIGHT COMPLETE Right 6/17/2019       Current Outpatient Medications   Medication Sig Dispense Refill    Calcium Carbonate-Vitamin D (CALCIUM 500/D PO) Take 1 capsule by mouth daily      celecoxib (CeleBREX) 200 mg capsule TAKE 1 CAPSULE BY MOUTH TWICE A DAY 60 capsule 6    Cholecalciferol (VITAMIN D-3) 1000 units CAPS Take 1 capsule by mouth daily      cyclobenzaprine (FLEXERIL) 10 mg tablet Take 2 at bedtime 180 tablet 3    desvenlafaxine (PRISTIQ) 100 mg 24 hr tablet TAKE 1 TABLET BY MOUTH EVERY DAY 30 tablet 2    hydrOXYzine HCL (ATARAX) 50 mg tablet TAKE 1 TABLET (50 MG TOTAL) BY MOUTH DAILY AT BEDTIME AS NEEDED (INSOMNIA) 30 tablet 2    IRON PO Take by mouth      LORazepam (ATIVAN) 1 mg tablet TAKE 1 TABLET BY MOUTH EVERY 8 HOURS AS NEEDED FOR ANXIETY 90 tablet 2    MAGNESIUM OXIDE PO Take by mouth      nortriptyline (PAMELOR) 75 MG capsule TAKE 1 CAPSULE BY MOUTH TWICE A  capsule 0    propranolol (INDERAL) 20 mg tablet TAKE 1 TABLET BY MOUTH EVERY DAY AT NIGHT 30 tablet 5     No current facility-administered medications for this visit  Allergies   Allergen Reactions    Amoxicillin-Pot Clavulanate     Decadrol [Dexamethasone] Other (See Comments)     Category: Adverse Reaction;   psychosis    Dexamethasone Sodium Phosphate     Other     Penicillins Hives and Other (See Comments)     Category: Allergy; Hives/Uticaria    Tetracycline Hives and Other (See Comments)     Hives/Uticaria    Tetracyclines & Related Hives     Category: Allergy; Review of Systems    Video Exam    There were no vitals filed for this visit  Physical Exam     I spent 38 minutes directly with the patient during this visit    VIRTUAL VISIT DISCLAIMER    Amish Razo verbally agrees to participate in [employer]   Pt is aware that Virtual Care Services could be limited without vital signs or the ability to perform a full hands-on physical exam  Esther Griffith understands she or the provider may request at any time to terminate the video visit and request the patient to seek care or treatment in person

## 2022-04-11 ENCOUNTER — TELEMEDICINE (OUTPATIENT)
Dept: PSYCHIATRY | Facility: CLINIC | Age: 42
End: 2022-04-11
Payer: COMMERCIAL

## 2022-04-11 DIAGNOSIS — F33.2 SEVERE RECURRENT MAJOR DEPRESSION WITHOUT PSYCHOTIC FEATURES (HCC): Primary | ICD-10-CM

## 2022-04-11 DIAGNOSIS — F41.1 GENERALIZED ANXIETY DISORDER: ICD-10-CM

## 2022-04-11 PROCEDURE — 99213 OFFICE O/P EST LOW 20 MIN: CPT | Performed by: PSYCHIATRY & NEUROLOGY

## 2022-04-11 NOTE — PSYCH
Virtual Regular Visit    Verification of patient location:    Patient is located in the following state in which I hold an active license [place]      Assessment/Plan:    Problem List Items Addressed This Visit        Other    Generalized anxiety disorder    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 psychotic features ([address] ) - Primary                    Reason for visit is   Chief Complaint   Patient presents with    Virtual Regular Visit        Encounter provider Bertram Dacosta MD    Provider located at [address] [place] [id number] [phone]      Recent Visits  No visits were found meeting these conditions  Showing recent visits within past 7 days and meeting all other requirements  Today's Visits  Date Type Provider Dept   04/11/22 Angel [address] 57, MD [place] today's visits and meeting all other requirements  Future Appointments  No visits were found meeting these conditions  Showing future appointments within next 150 days and meeting all other requirements       The patient was identified by name and date of birth  Aisha Grant was informed that this is a telemedicine visit and that the visit is being conducted throughEpic Embedded and patient was informed this is a secure, HIPAA-complaint platform  She agrees to proceed     My office door was closed  No one else was in the room  She acknowledged consent and understanding of privacy and security of the video platform  The patient has agreed to participate and understands they can discontinue the visit at any time  Patient is aware this is a billable service  Subjective  Aisha Grant is a 38 y o  female with MDD and CAROL   Patient stated she has been feeling depressed due to her chronic pain   She has been struggling with increased fibromyalgia pain and she only had limited benefits form medication treatment  This causes her to feel frutrasted and hopeless at times  She continues to follow up with her rheumatologist  She also continues to see her counselor on a regular basis to work on her stress coping skills  She agrees to continue current treatment as prescribed  Will schedule follow up in 3 months or sooner if needed  She has enough medication supplies          HPI     Past Medical History:   Diagnosis Date    Anxiety     Chronic sinusitis     Depression     Fibromyalgia     Migraine     Obesity     Polyarthritis     Last assessed 9/21/2015    Psychiatric disorder        Past Surgical History:   Procedure Laterality Date    COLONOSCOPY  06/2019    DENTAL SURGERY      HYSTEROSCOPY      Endometrial Biopsy By Hysteroscopy    REMOVAL OF INTRAUTERINE DEVICE (IUD)      US GUIDED BREAST BIOPSY RIGHT COMPLETE Right 6/17/2019       Current Outpatient Medications   Medication Sig Dispense Refill    Calcium Carbonate-Vitamin D (CALCIUM 500/D PO) Take 1 capsule by mouth daily      celecoxib (CeleBREX) 200 mg capsule TAKE 1 CAPSULE BY MOUTH TWICE A DAY 60 capsule 6    Cholecalciferol (VITAMIN D-3) 1000 units CAPS Take 1 capsule by mouth daily      cyclobenzaprine (FLEXERIL) 10 mg tablet Take 2 at bedtime 180 tablet 3    desvenlafaxine (PRISTIQ) 100 mg 24 hr tablet TAKE 1 TABLET BY MOUTH EVERY DAY 30 tablet 2    hydrOXYzine HCL (ATARAX) 50 mg tablet TAKE 1 TABLET (50 MG TOTAL) BY MOUTH DAILY AT BEDTIME AS NEEDED (INSOMNIA) 30 tablet 2    IRON PO Take by mouth      LORazepam (ATIVAN) 1 mg tablet TAKE 1 TABLET BY MOUTH EVERY 8 HOURS AS NEEDED FOR ANXIETY 90 tablet 2    MAGNESIUM OXIDE PO Take by mouth      nortriptyline (PAMELOR) 75 MG capsule TAKE 1 CAPSULE BY MOUTH TWICE A  capsule 0    propranolol (INDERAL) 20 mg tablet TAKE 1 TABLET BY MOUTH EVERY DAY AT NIGHT 30 tablet 5     No current facility-administered medications for this visit          Allergies   Allergen Reactions    Amoxicillin-Pot Clavulanate     Decadrol [Dexamethasone] Other (See Comments)     Category: Adverse Reaction;   psychosis    Dexamethasone Sodium Phosphate     Other     Penicillins Hives and Other (See Comments)     Category: Allergy; Hives/Uticaria    Tetracycline Hives and Other (See Comments)     Hives/Uticaria    Tetracyclines & Related Hives     Category: Allergy; Review of Systems     Mood Anxiety and Depression   Behavior Normal    Thought Content Disturbing Thoughts, Feelings   General Emotional Problems and Decreased Functioning   Personality Normal   Other Psych Symptoms Normal   Constitutional Negative   ENT Negative   Cardiovascular Negative   Respiratory Negative   Gastrointestinal Negative   Genitourinary Negative   Musculoskeletal Negative   Integumentary Negative   Neurological Negative   Endocrine Normal    Other Symptoms Normal              Laboratory Results: No results found for this or any previous visit      Substance Abuse History:  Social History     Substance and Sexual Activity   Drug Use Not Currently       Family Psychiatric History:   Family History   Problem Relation Age of Onset    Diabetes Father     Kidney disease Father     Substance Abuse Brother     Diabetes Maternal Grandmother     Rheum arthritis Maternal Grandmother     Melanoma Maternal Grandmother     Kidney disease Paternal Grandmother     Rheum arthritis Paternal Grandmother     Depression Paternal Grandmother     Diabetes Paternal Grandfather     Lung cancer Paternal Grandfather     Substance Abuse Maternal Uncle     Prostate cancer Maternal Uncle 61    Depression Paternal Aunt     Alcohol abuse Family     Stomach cancer Family     Irregular heart beat Mother        The following portions of the patient's history were reviewed and updated as appropriate: allergies, current medications, past family history, past medical history, past social history, past surgical history and problem list     Social History     Socioeconomic History    Marital status: Single     Spouse name: Not on file    Number of children: 0    Years of education: 15 years     Highest education level: GED or equivalent   Occupational History    Occupation: unemployed   Tobacco Use    Smoking status: Never Smoker    Smokeless tobacco: Never Used   Vaping Use    Vaping Use: Never used   Substance and Sexual Activity    Alcohol use: No     Comment: 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     Drug use: Not Currently    Sexual activity: Not Currently   Other Topics Concern    Not on file   Social History Narrative    Caffeine use     Social Determinants of Health     Financial Resource Strain: Not on file   Food Insecurity: Not on file   Transportation Needs: Not on file   Physical Activity: Not on file   Stress: Not on file   Social Connections: Not on file   Intimate Partner Violence: Not on file   Housing Stability: Not on file     Social History     Social History Narrative    Caffeine use       Objective:       Mental status:  Appearance calm and cooperative , adequate hygiene and grooming and good eye contact    Mood dysphoric, depressed and anxious   Affect affect was constricted   Speech a normal rate and fluent   Thought Processes coherent/organized   Hallucinations no hallucinations present    Thought Content no delusions   Abnormal Thoughts no suicidal thoughts  and no homicidal thoughts    Orientation  oriented to person and place and time   Remote Memory short term memory intact and long term memory intact   Attention Span concentration intact   Intellect Appears to be of Average Intelligence   Insight Limited insight   Judgement judgment was limited   Muscle Strength n/a   Language no difficulty naming common objects and no difficulty repeating a phrase    Fund of Knowledge displays adequate knowledge of current events, adequate fund of knowledge regarding past history and adequate fund of knowledge regarding vocabulary                Assessment/Plan:       Diagnoses and all orders for this visit:    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 psychotic features ([address] )    Generalized anxiety disorder            Treatment Recommendations- Risks Benefits      Immediate Medical/Psychiatric/Psychotherapy Treatments and Any Precautions: continue current treatment     Risks, Benefits And Possible Side Effects Of Medications:  {PSYCH RISK, BENEFITS AND POSSIBLE SIDE EFFECTS (Optional):00303    Controlled Medication Discussion: Discussed with patient Black Box warning on concurrent use of benzodiazepines and opioid medications including sedation, respiratory depression, coma and death  Patient understands the risk of treatment with benzodiazepines in addition to opioids and wants to continue taking those medications  , Discussed with patient the risks of sedation, respiratory depression, impairment of ability to drive and potential for abuse and addiction related to treatment with benzodiazepine medications  The patient understands risk of treatment with benzodiazepine medications, agrees to not drive if feels impaired and agrees to take medications as prescribed  and The patient has been filling controlled prescriptions on time as prescribed to [facility] program       Psychotherapy Provided: No                  I spent 30 minutes directly with the patient during this visit    VIRTUAL VISIT Tom Espinal verbally agrees to participate in [employer]  Pt is aware that [employer] could be limited without vital signs or the ability to perform a full hands-on physical exam  Esther Griffith understands she or the provider may request at any time to terminate the video visit and request the patient to seek care or treatment in person

## 2022-04-12 ENCOUNTER — TELEPHONE (OUTPATIENT)
Dept: PSYCHIATRY | Facility: CLINIC | Age: 42
End: 2022-04-12

## 2022-04-12 NOTE — TELEPHONE ENCOUNTER
LVM  Last seen 4/11/22  Pt needs a 3 month f/u with Dr Kamran Hazel  Previous appt was virtual  Please schedule

## 2022-04-22 ENCOUNTER — TELEMEDICINE (OUTPATIENT)
Dept: BEHAVIORAL/MENTAL HEALTH CLINIC | Facility: CLINIC | Age: 42
End: 2022-04-22
Payer: COMMERCIAL

## 2022-04-22 DIAGNOSIS — F33.1 MAJOR DEPRESSIVE DISORDER, RECURRENT EPISODE, MODERATE (HCC): Primary | ICD-10-CM

## 2022-04-22 DIAGNOSIS — F41.1 GENERALIZED ANXIETY DISORDER: ICD-10-CM

## 2022-04-22 PROCEDURE — 90834 PSYTX W PT 45 MINUTES: CPT | Performed by: PSYCHIATRY & NEUROLOGY

## 2022-04-22 NOTE — PSYCH
Session time [id number] (Total time 39 minutes)    Virtual Regular Visit    Verification of patient location:    Patient is located in the following state in which I hold an active license [place]      Assessment/Plan:    Problem List Items Addressed This Visit        Other    Major depressive disorder, recurrent episode, moderate ([address] ) - Primary    Generalized anxiety disorder          Goals addressed in session: Goal 1          Reason for visit is   Chief Complaint   Patient presents with    Virtual Regular Visit        Encounter provider HERMELINDA Ayala    Provider located at [address] [place] [id number] [phone]      Recent Visits  No visits were found meeting these conditions  Showing recent visits within past 7 days and meeting all other requirements  Future Appointments  No visits were found meeting these conditions  Showing future appointments within next 150 days and meeting all other requirements       The patient was identified by name and date of birth  Álvaro Junior was informed that this is a telemedicine visit and that the visit is being conducted through[employer] and patient was informed that this is a secure, HIPAA-compliant platform  She agrees to proceed     My office door was closed  No one else was in the room  She acknowledged consent and understanding of privacy and security of the video platform  The patient has agreed to participate and understands they can discontinue the visit at any time  Patient is aware this is a billable service  Subjective  Álvaro Junior is a 38 y o  female presenting for follow up  Ailyn Ortiz shared that she has been "up and down" mood-wise lately, mainly because her pain levels have been higher  She said that otherwise mainly good things have been happening, like house/ and being able to return in person to her [place] last night    She has been seeing friends, and things with her family are generally good  Her father's health is a concern but he refuses to go to the doctor unless he is so sick he almost needs to go to the ED  Her youngest brother just told them that he and his family are moving to [place] to be in warmer weather because of his wife's rare illness, so that also makes her sad, but she said that she completely understands the need for that  Overall she said that she is doing fairly well and had no major complaints  A: Ángel Squires presented as calm and generally euthymic today, with a bright affect, good eye contact and normal behavior  Her concentration was mildly impaired and she has mild difficulty with word-finding at times  Her thought process was logical and organized  Insight and judgment intact  Denies SI HI and psychosis  Good progress toward goals  P: Ángel Squires will continue to stay engaged socially and as active as her physical pain will allow her to be, will focus on positives such as going back to weekly meetings in person, and will return in two weeks for follow up        HPI     Past Medical History:   Diagnosis Date    Anxiety     Chronic sinusitis     Depression     Fibromyalgia     Migraine     Obesity     Polyarthritis     Last assessed 9/21/2015    Psychiatric disorder        Past Surgical History:   Procedure Laterality Date    COLONOSCOPY  06/2019   [facility] DENTAL SURGERY      HYSTEROSCOPY      Endometrial Biopsy By Hysteroscopy    REMOVAL OF INTRAUTERINE DEVICE (IUD)      US GUIDED BREAST BIOPSY RIGHT COMPLETE Right 6/17/2019       Current Outpatient Medications   Medication Sig Dispense Refill    Calcium Carbonate-Vitamin D (CALCIUM 500/D PO) Take 1 capsule by mouth daily      celecoxib (CeleBREX) 200 mg capsule TAKE 1 CAPSULE BY MOUTH TWICE A DAY 60 capsule 6    Cholecalciferol (VITAMIN D-3) 1000 units CAPS Take 1 capsule by mouth daily      cyclobenzaprine (FLEXERIL) 10 mg tablet Take 2 at bedtime 180 tablet 3    desvenlafaxine (PRISTIQ) 100 mg 24 hr tablet TAKE 1 TABLET BY MOUTH EVERY DAY 30 tablet 2    hydrOXYzine HCL (ATARAX) 50 mg tablet TAKE 1 TABLET (50 MG TOTAL) BY MOUTH DAILY AT BEDTIME AS NEEDED (INSOMNIA) 30 tablet 2    IRON PO Take by mouth      LORazepam (ATIVAN) 1 mg tablet TAKE 1 TABLET BY MOUTH EVERY 8 HOURS AS NEEDED FOR ANXIETY 90 tablet 2    MAGNESIUM OXIDE PO Take by mouth      nortriptyline (PAMELOR) 75 MG capsule TAKE 1 CAPSULE BY MOUTH TWICE A  capsule 0    propranolol (INDERAL) 20 mg tablet TAKE 1 TABLET BY MOUTH EVERY DAY AT NIGHT 30 tablet 5     No current facility-administered medications for this visit  Allergies   Allergen Reactions    Amoxicillin-Pot Clavulanate     Decadrol [Dexamethasone] Other (See Comments)     Category: Adverse Reaction;   psychosis    Dexamethasone Sodium Phosphate     Other     Penicillins Hives and Other (See Comments)     Category: Allergy; Hives/Uticaria    Tetracycline Hives and Other (See Comments)     Hives/Uticaria    Tetracyclines & Related Hives     Category: Allergy; Review of Systems    Video Exam    There were no vitals filed for this visit  Physical Exam     I spent 39 minutes directly with the patient during this visit    VIRTUAL VISIT DISCLAIMER    Giovana Solitario verbally agrees to participate in [employer]  Pt is aware that [employer] could be limited without vital signs or the ability to perform a full hands-on physical exam  Esther Griffith understands she or the provider may request at any time to terminate the video visit and request the patient to seek care or treatment in person

## 2022-05-13 NOTE — TELEPHONE ENCOUNTER
Would she be interested in dose increase or switching? ?? Admission Reconciliation is Not Complete  Discharge Reconciliation is Not Complete Admission Reconciliation is Completed  Discharge Reconciliation is Completed

## 2022-05-20 ENCOUNTER — TELEMEDICINE (OUTPATIENT)
Dept: BEHAVIORAL/MENTAL HEALTH CLINIC | Facility: CLINIC | Age: 42
End: 2022-05-20
Payer: COMMERCIAL

## 2022-05-20 ENCOUNTER — TELEPHONE (OUTPATIENT)
Dept: FAMILY MEDICINE CLINIC | Facility: CLINIC | Age: 42
End: 2022-05-20

## 2022-05-20 DIAGNOSIS — F41.1 GENERALIZED ANXIETY DISORDER: ICD-10-CM

## 2022-05-20 DIAGNOSIS — F33.1 MAJOR DEPRESSIVE DISORDER, RECURRENT EPISODE, MODERATE (HCC): Primary | ICD-10-CM

## 2022-05-20 DIAGNOSIS — M54.41 ACUTE MIDLINE LOW BACK PAIN WITH BILATERAL SCIATICA: Primary | ICD-10-CM

## 2022-05-20 DIAGNOSIS — M54.42 ACUTE MIDLINE LOW BACK PAIN WITH BILATERAL SCIATICA: Primary | ICD-10-CM

## 2022-05-20 PROCEDURE — 90832 PSYTX W PT 30 MINUTES: CPT | Performed by: PSYCHIATRY & NEUROLOGY

## 2022-05-20 RX ORDER — LIDOCAINE 50 MG/G
1 PATCH TOPICAL DAILY
Qty: 30 PATCH | Refills: 1 | Status: SHIPPED | OUTPATIENT
Start: 2022-05-20

## 2022-05-20 NOTE — TELEPHONE ENCOUNTER
Please call patient  I sent prescription for Lidocaine 5% patches   She needs to check with pharmacist if patches covered by her insurance  If not, then she can get OTC Lidocaine 4 % patches

## 2022-05-20 NOTE — TELEPHONE ENCOUNTER
Patient states pulled muscle in back on Tuesday, would like to know if she can have order for Lidocaine patches  She states has had before for her fibromyalgia pain

## 2022-05-20 NOTE — PSYCH
Session time [phone] (total time 30 minutes)    Virtual Regular Visit    Verification of patient location:    Patient is located in the following state in which I hold an active license [place]      Assessment/Plan:    Problem List Items Addressed This Visit        Other    Major depressive disorder, recurrent episode, moderate ([address] ) - Primary    Generalized anxiety disorder          Goals addressed in session: Goal 1          Reason for visit is   Chief Complaint   Patient presents with    Virtual Regular Visit        Encounter provider Leota Hodgkins, MSW    Provider located at [address] [place] [id number] [phone]      Recent Visits  No visits were found meeting these conditions  Showing recent visits within past 7 days and meeting all other requirements  Future Appointments  No visits were found meeting these conditions  Showing future appointments within next 150 days and meeting all other requirements       The patient was identified by name and date of birth  Shell Alarcon was informed that this is a telemedicine visit and that the visit is being conducted throughEpic Embedded and patient was informed this is a secure, HIPAA-complaint platform  She agrees to proceed     My office door was closed  No one else was in the room  She acknowledged consent and understanding of privacy and security of the video platform  The patient has agreed to participate and understands they can discontinue the visit at any time  Patient is aware this is a billable service  Subjective  Shell Alarcon is a 38 y o  female presenting for follow up  Elizabeth Taylor shared that she has been in a lot of physical pain lately after lifting something that she should not have, and has been struggling to manage the pain the last few days  She noted that she will likely call her doctor today to discuss    She also said that she has been a bit more depressed than usual lately, attributing this to possibly hormones, but also worry for her friend Jayden Levin who is struggling with some personal issues right now  She said that she is trying to recognize that she has no control over the situation, and to trust that Jayden Levin will cope with it as best he can  In the meantime, she has been reaching out to her core group of friends, going out with them socially to have some pleasure in her life, and has also continued to house/animal sit for friends, which has been good for her  Encouraged Gray Bellamy to continue to engage socially, to reflect on what she can and cannot control and try to let go of worry about things she cannot take action on, and to continue to plan activities that she can look forward to  A: Gray Bellamy presented as mildly depressed today, with a mildly constricted affect in depressed range, good eye contact and normal behavior  Her concentration was mainly intact, thought process logical and organized  Insight and judgment intact  Denies SI HI and psychosis  Some continued mild progress toward goals  P: Gray Bellamy will continue to engage socially as much as she can, will reframe thoughts about what she is able to control, and will follow up in two weeks as scheduled        HPI     Past Medical History:   Diagnosis Date    Anxiety     Chronic sinusitis     Depression     Fibromyalgia     Migraine     Obesity     Polyarthritis     Last assessed 9/21/2015    Psychiatric disorder        Past Surgical History:   Procedure Laterality Date    [facility]      HYSTEROSCOPY      Endometrial Biopsy By Hysteroscopy    REMOVAL OF INTRAUTERINE DEVICE (IUD)      US GUIDED BREAST BIOPSY RIGHT COMPLETE Right 6/17/2019       Current Outpatient Medications   Medication Sig Dispense Refill    Calcium Carbonate-Vitamin D (CALCIUM 500/D PO) Take 1 capsule by mouth daily      celecoxib (CeleBREX) 200 mg capsule TAKE 1 CAPSULE BY MOUTH TWICE A DAY 60 capsule 6    Cholecalciferol (VITAMIN D-3) 1000 units CAPS Take 1 capsule by mouth daily      cyclobenzaprine (FLEXERIL) 10 mg tablet Take 2 at bedtime 180 tablet 3    desvenlafaxine (PRISTIQ) 100 mg 24 hr tablet TAKE 1 TABLET BY MOUTH EVERY DAY 30 tablet 2    hydrOXYzine HCL (ATARAX) 50 mg tablet TAKE 1 TABLET (50 MG TOTAL) BY MOUTH DAILY AT BEDTIME AS NEEDED (INSOMNIA) 30 tablet 2    IRON PO Take by mouth      LORazepam (ATIVAN) 1 mg tablet TAKE 1 TABLET BY MOUTH EVERY 8 HOURS AS NEEDED FOR ANXIETY 90 tablet 2    MAGNESIUM OXIDE PO Take by mouth      nortriptyline (PAMELOR) 75 MG capsule TAKE 1 CAPSULE BY MOUTH TWICE A  capsule 0    propranolol (INDERAL) 20 mg tablet TAKE 1 TABLET BY MOUTH EVERY DAY AT NIGHT 30 tablet 5     No current facility-administered medications for this visit  Allergies   Allergen Reactions    Amoxicillin-Pot Clavulanate     Decadrol [Dexamethasone] Other (See Comments)     Category: Adverse Reaction;   psychosis    Dexamethasone Sodium Phosphate     Other     Penicillins Hives and Other (See Comments)     Category: Allergy; Hives/Uticaria    Tetracycline Hives and Other (See Comments)     Hives/Uticaria    Tetracyclines & Related Hives     Category: Allergy; Review of Systems    Video Exam    There were no vitals filed for this visit  Physical Exam     I spent 30 minutes directly with the patient during this visit    VIRTUAL VISIT DISCLAIMER    Shazia Wharton verbally agrees to participate in [employer]  Pt is aware that [employer] could be limited without vital signs or the ability to perform a full hands-on physical exam  Esther Griffith understands she or the provider may request at any time to terminate the video visit and request the patient to seek care or treatment in person

## 2022-05-27 DIAGNOSIS — F51.04 PSYCHOPHYSIOLOGICAL INSOMNIA: ICD-10-CM

## 2022-05-27 DIAGNOSIS — F41.1 GENERALIZED ANXIETY DISORDER: ICD-10-CM

## 2022-05-27 DIAGNOSIS — F33.1 MAJOR DEPRESSIVE DISORDER, RECURRENT EPISODE, MODERATE (HCC): ICD-10-CM

## 2022-05-27 RX ORDER — HYDROXYZINE 50 MG/1
50 TABLET, FILM COATED ORAL
Qty: 30 TABLET | Refills: 2 | Status: SHIPPED | OUTPATIENT
Start: 2022-05-27

## 2022-05-27 RX ORDER — DESVENLAFAXINE 100 MG/1
TABLET, EXTENDED RELEASE ORAL
Qty: 30 TABLET | Refills: 2 | Status: SHIPPED | OUTPATIENT
Start: 2022-05-27

## 2022-06-02 ENCOUNTER — OFFICE VISIT (OUTPATIENT)
Dept: FAMILY MEDICINE CLINIC | Facility: CLINIC | Age: 42
End: 2022-06-02
Payer: COMMERCIAL

## 2022-06-02 ENCOUNTER — TELEMEDICINE (OUTPATIENT)
Dept: BEHAVIORAL/MENTAL HEALTH CLINIC | Facility: CLINIC | Age: 42
End: 2022-06-02
Payer: COMMERCIAL

## 2022-06-02 ENCOUNTER — TELEPHONE (OUTPATIENT)
Dept: OBGYN CLINIC | Facility: HOSPITAL | Age: 42
End: 2022-06-02

## 2022-06-02 VITALS
WEIGHT: 195.2 LBS | BODY MASS INDEX: 38.32 KG/M2 | OXYGEN SATURATION: 100 % | RESPIRATION RATE: 16 BRPM | TEMPERATURE: 98.1 F | SYSTOLIC BLOOD PRESSURE: 118 MMHG | HEART RATE: 99 BPM | HEIGHT: 60 IN | DIASTOLIC BLOOD PRESSURE: 62 MMHG

## 2022-06-02 DIAGNOSIS — M79.7 FIBROMYALGIA: ICD-10-CM

## 2022-06-02 DIAGNOSIS — R52 GENERALIZED BODY ACHES: Primary | ICD-10-CM

## 2022-06-02 DIAGNOSIS — M62.838 MUSCLE SPASM: ICD-10-CM

## 2022-06-02 DIAGNOSIS — M25.50 ARTHRALGIA OF MULTIPLE JOINTS: ICD-10-CM

## 2022-06-02 DIAGNOSIS — F33.1 MAJOR DEPRESSIVE DISORDER, RECURRENT EPISODE, MODERATE (HCC): Primary | ICD-10-CM

## 2022-06-02 DIAGNOSIS — F41.1 GENERALIZED ANXIETY DISORDER: ICD-10-CM

## 2022-06-02 PROCEDURE — 99214 OFFICE O/P EST MOD 30 MIN: CPT | Performed by: FAMILY MEDICINE

## 2022-06-02 PROCEDURE — 90834 PSYTX W PT 45 MINUTES: CPT | Performed by: PSYCHIATRY & NEUROLOGY

## 2022-06-02 RX ORDER — GLUCOSAMINE HCL 500 MG
TABLET ORAL
Qty: 30 TABLET | Refills: 3 | Status: SHIPPED | OUTPATIENT
Start: 2022-06-02 | End: 2022-07-02

## 2022-06-02 RX ORDER — CYCLOBENZAPRINE HCL 10 MG
TABLET ORAL
Qty: 270 TABLET | Refills: 3 | Status: SHIPPED | OUTPATIENT
Start: 2022-06-02 | End: 2022-07-06 | Stop reason: ALTCHOICE

## 2022-06-02 NOTE — PROGRESS NOTES
Chief Complaint   Patient presents with    Fibromyalgia     Flare up      Health Maintenance   Topic Date Due    HIV Screening  Never done    Annual Physical  Never done    Cervical Cancer Screening  Never done    DTaP,Tdap,and Td Vaccines (2 - Td or Tdap) 09/29/2019    Breast Cancer Screening: Mammogram  06/13/2020    PT PLAN OF CARE  07/15/2021    Colorectal Cancer Screening  06/04/2022    Influenza Vaccine (Season Ended) 09/01/2022    BMI: Followup Plan  06/02/2023    BMI: Adult  06/02/2023    Depression Remission PHQ  06/02/2023    Hepatitis C Screening  Completed    COVID-19 Vaccine  Completed    Pneumococcal Vaccine: Pediatrics (0 to 5 Years) and At-Risk Patients (6 to 59 Years)  Aged Out    HIB Vaccine  Aged Out    Hepatitis B Vaccine  Aged Out    IPV Vaccine  Aged Out    Hepatitis A Vaccine  Aged Out    Meningococcal ACWY Vaccine  Aged Out    HPV Vaccine  Aged Out       BMI Counseling: Body mass index is 38 12 kg/m²  The BMI is above normal  Nutrition recommendations include decreasing portion sizes, encouraging healthy choices of fruits and vegetables, decreasing fast food intake, consuming healthier snacks, limiting drinks that contain sugar, moderation in carbohydrate intake, increasing intake of lean protein, reducing intake of saturated and trans fat and reducing intake of cholesterol  Exercise recommendations include exercising 3-5 times per week  No pharmacotherapy was ordered  Rationale for BMI follow-up plan is due to patient being overweight or obese  Assessment/Plan:    Generalized body aches  Patient presents with generalized body aches, joint pains for the past week  Symptoms are likely related to flare up of fibromyalgia  Recommended to stay well hydrated  Follow a well-balanced diet  Take magnesium supplements  Start Co Q10 100 mg 1 tablet daily      Fibromyalgia  Recommended to continue Celebrex 200 mg 1 capsule twice daily, take Flexeril 10 mg 2 tablets at bedtime and 5 mg twice daily during the day  Encouraged regular exercise  Start aqua therapy  Consider reevaluation by rheumatologist if symptoms persist or worsen  Arthralgia of multiple joints  Take Celebrex 200 mg twice daily  Apply Lidocaine 5% patch for low back pain  Muscle spasm  Refilled Rx for Flexeril  Schedule physical exam in 8 weeks  Diagnoses and all orders for this visit:    Generalized body aches  -     Coenzyme Q10 100 MG TABS; Take 1 caps  daily  -     Ambulatory Referral to Physical Therapy; Future    Fibromyalgia  -     Coenzyme Q10 100 MG TABS; Take 1 caps  daily  -     Ambulatory Referral to Physical Therapy; Future  -     cyclobenzaprine (FLEXERIL) 10 mg tablet; Take 2 at bedtime and 1/2 tab  twice daily during the day    Arthralgia of multiple joints  -     Ambulatory Referral to Physical Therapy; Future    Muscle spasm  -     cyclobenzaprine (FLEXERIL) 10 mg tablet; Take 2 at bedtime and 1/2 tab  twice daily during the day          Subjective:      Patient ID: Roldan Jean is a 38 y o  female  HPI     Patient is 38-year-old female with past medical history of fibromyalgia, migraine headaches, generalized anxiety disorder, depression presents today c/o generalized body aches, pain in shoulders, neck and low back for the past week  She thinks that her symptoms are related to flare up of fibromyalgia  Reports no fever, chills  No recent upper respiratory infection  Denies tick bites  Patient was seen by rheumatology Dr Humberto Alvarez in April 2021  Dr Michaelle Lema recommended to take Celebrex 200 mg twice daily, Flexeril 10 mg 2 tablets at bedtime  Reviewed blood work results May 24, 2022 ordered by gynecology  TSH 3 28, CBC with dif  - WNL  CAROL /Depression - management per psychiatry Dr Jacob Porras  Mood has been stable  Currently taking Hydroxyzine, Pristiq, Nortriptyline      The following portions of the patient's history were reviewed and updated as appropriate: allergies, current medications, past medical history, past social history, past surgical history and problem list     Review of Systems   Constitutional: Positive for fatigue  Negative for activity change, appetite change, chills and fever  HENT: Negative for congestion, ear pain, nosebleeds, sore throat and trouble swallowing  Eyes: Negative for pain, discharge, redness, itching and visual disturbance  Respiratory: Negative for cough, chest tightness, shortness of breath and wheezing  Cardiovascular: Negative for chest pain, palpitations and leg swelling  Gastrointestinal: Negative for abdominal pain, diarrhea, nausea and vomiting  Musculoskeletal: Positive for arthralgias, back pain and myalgias  Negative for gait problem, joint swelling and neck pain  Skin: Negative for rash  Neurological: Negative for dizziness, syncope and headaches  Hematological: Negative  Psychiatric/Behavioral: Positive for sleep disturbance  Anxiety / Depression - mood has been stable  Objective:      /62 (BP Location: Left arm, Patient Position: Sitting)   Pulse 99   Temp 98 1 °F (36 7 °C) (Tympanic)   Resp 16   Ht 5' (1 524 m)   Wt 88 5 kg (195 lb 3 2 oz)   SpO2 100%   BMI 38 12 kg/m²          Physical Exam  Vitals reviewed  Constitutional:       Appearance: Normal appearance  She is obese  HENT:      Head: Normocephalic and atraumatic  Eyes:      Conjunctiva/sclera: Conjunctivae normal       Pupils: Pupils are equal, round, and reactive to light  Cardiovascular:      Rate and Rhythm: Regular rhythm  Heart sounds: No murmur heard  Pulmonary:      Effort: Pulmonary effort is normal       Breath sounds: Normal breath sounds  Abdominal:      General: There is no distension  Palpations: Abdomen is soft  Tenderness: There is no abdominal tenderness  Musculoskeletal:         General: No swelling  Normal range of motion        Cervical back: Normal range of motion and neck supple  Right lower leg: No edema  Left lower leg: No edema  Comments: Diffuse fibromyalgia tender points in neck, back   Skin:     General: Skin is warm and dry  Findings: No rash  Neurological:      General: No focal deficit present  Mental Status: She is alert     Psychiatric:         Mood and Affect: Mood normal          Behavior: Behavior normal

## 2022-06-02 NOTE — PSYCH
Session time [phone] (total time 41 minutes)    Virtual Regular Visit    Verification of patient location:    Patient is located in the following state in which I hold an active license [place]      Assessment/Plan:    Problem List Items Addressed This Visit        Other    Major depressive disorder, recurrent episode, moderate ([address] ) - Primary    Generalized anxiety disorder          Goals addressed in session: Goal 1          Reason for visit is   Chief Complaint   Patient presents with    Virtual Regular Visit        Encounter provider HERMELINDA Prakash    Provider located at [address] [place] [id number] [phone]      Recent Visits  No visits were found meeting these conditions  Showing recent visits within past 7 days and meeting all other requirements  Today's Visits  Date Type Provider Dept   06/02/22 Telemedicine HERMELINDA [facility] Therapist [facility]   Showing today's visits and meeting all other requirements  Future Appointments  No visits were found meeting these conditions  Showing future appointments within next 150 days and meeting all other requirements       The patient was identified by name and date of birth  Carmencita Munson was informed that this is a telemedicine visit and that the visit is being conducted through[employer] and patient was informed this is a secure, HIPAA-complaint platform  She agrees to proceed     My office door was closed  No one else was in the room  She acknowledged consent and understanding of privacy and security of the video platform  The patient has agreed to participate and understands they can discontinue the visit at any time  Patient is aware this is a billable service  Subjective  Carmencita Munson is a 38 y o  female presenting for follow up   Chika Olivares shared that she has been struggling with depression more lately, the past few days due to an increase in her pain, but also because she feels left out and isolated from friends who do not invite her on outings  She said that they are mainly  with children and understands that the dynamic with them is different, but they still invite some other single friends to go out with them, but do not include Esther  She noted that she feels hurt because of that, but has been trying to focus on some of her other single friends that she enjoys, planning more time out with them instead  She talked about complicated relationships ([place]), and how that can add to her feelings of isolation  Encouraged Meme Mcdonough to consider her choice to step back from complicated relationship versus being left out (setting healthy boundary for herself), in order to help lessen feelings of isolation  She also asked about an ICM to help with tasks and such that she does not know much about  Will make referral to [facility]  A: Meme Mcdonough presented as depressed today, with a constricted affect in depressed range, good eye contact, calm behavior  Her concentration was intact, thought process logical and organized  Insight and judgment intact  Denies sI HI and psychosis  Some decompensation since last visit  P: Meme Mcdonough will try to focus on positive relationships, healthy boundaries and planning activities that she enjoys to help boost mood  She will return for follow up in two weeks        HPI     Past Medical History:   Diagnosis Date    Anxiety     Chronic sinusitis     Depression     Fibromyalgia     Migraine     Obesity     Polyarthritis     Last assessed 9/21/2015    Psychiatric disorder        Past Surgical History:   Procedure Laterality Date    COLONOSCOPY  06/2019   Rosalina Roberts DENTAL SURGERY      HYSTEROSCOPY      Endometrial Biopsy By Hysteroscopy    REMOVAL OF INTRAUTERINE DEVICE (IUD)      US GUIDED BREAST BIOPSY RIGHT COMPLETE Right 6/17/2019       Current Outpatient Medications   Medication Sig Dispense Refill    lidocaine (LIDODERM) 5 % Apply 1 patch topically in the morning  Remove & Discard patch within 12 hours or as directed by MD  30 patch 1    Calcium Carbonate-Vitamin D (CALCIUM 500/D PO) Take 1 capsule by mouth daily      celecoxib (CeleBREX) 200 mg capsule TAKE 1 CAPSULE BY MOUTH TWICE A DAY 60 capsule 6    Cholecalciferol (VITAMIN D-3) 1000 units CAPS Take 1 capsule by mouth daily      cyclobenzaprine (FLEXERIL) 10 mg tablet Take 2 at bedtime 180 tablet 3    desvenlafaxine (PRISTIQ) 100 mg 24 hr tablet TAKE 1 TABLET BY MOUTH EVERY DAY 30 tablet 2    hydrOXYzine HCL (ATARAX) 50 mg tablet TAKE 1 TABLET (50 MG TOTAL) BY MOUTH DAILY AT BEDTIME AS NEEDED (INSOMNIA) 30 tablet 2    IRON PO Take by mouth      LORazepam (ATIVAN) 1 mg tablet TAKE 1 TABLET BY MOUTH EVERY 8 HOURS AS NEEDED FOR ANXIETY 90 tablet 2    MAGNESIUM OXIDE PO Take by mouth      nortriptyline (PAMELOR) 75 MG capsule TAKE 1 CAPSULE BY MOUTH TWICE A  capsule 0    propranolol (INDERAL) 20 mg tablet TAKE 1 TABLET BY MOUTH EVERY DAY AT NIGHT 30 tablet 5     No current facility-administered medications for this visit  Allergies   Allergen Reactions    Amoxicillin-Pot Clavulanate     Decadrol [Dexamethasone] Other (See Comments)     Category: Adverse Reaction;   psychosis    Dexamethasone Sodium Phosphate     Other     Penicillins Hives and Other (See Comments)     Category: Allergy; Hives/Uticaria    Tetracycline Hives and Other (See Comments)     Hives/Uticaria    Tetracyclines & Related Hives     Category: Allergy; Review of Systems    Video Exam    There were no vitals filed for this visit  Physical Exam     I spent 41 minutes directly with the patient during this visit    VIRTUAL VISIT DISCLAIMER    Shiloh Cavazos verbally agrees to participate in [employer]   Pt is aware that [employer] could be limited without vital signs or the ability to perform a full hands-on physical exam  Esther Griffith understands she or the provider may request at any time to terminate the video visit and request the patient to seek care or treatment in person

## 2022-06-02 NOTE — ASSESSMENT & PLAN NOTE
Patient presents with generalized body aches, joint pains for the past week  Symptoms are likely related to flare up of fibromyalgia  Recommended to stay well hydrated  Follow a well-balanced diet  Take magnesium supplements  Start Co Q10 100 mg 1 tablet daily

## 2022-06-02 NOTE — TELEPHONE ENCOUNTER
Dr Bob Benavidez patient:    Patient called stating she is having a flare up and is in a lot of pain  She would like to know if there is anything she can do or take for the pain    Pharmacy: [facility] on file in chart    CB # [phone]

## 2022-06-02 NOTE — ASSESSMENT & PLAN NOTE
Recommended to continue Celebrex 200 mg 1 capsule twice daily, take Flexeril 10 mg 2 tablets at bedtime and 5 mg twice daily during the day  Encouraged regular exercise  Start aqua therapy  Consider reevaluation by rheumatologist if symptoms persist or worsen

## 2022-06-04 NOTE — TELEPHONE ENCOUNTER
Please get a description of what type of pain is bothering the patient, is it a muscle pain/cramp or nerve pain?

## 2022-06-08 DIAGNOSIS — M79.7 FIBROMYALGIA: ICD-10-CM

## 2022-06-08 RX ORDER — NORTRIPTYLINE HYDROCHLORIDE 75 MG/1
CAPSULE ORAL
Qty: 180 CAPSULE | Refills: 0 | Status: SHIPPED | OUTPATIENT
Start: 2022-06-08

## 2022-06-08 NOTE — TELEPHONE ENCOUNTER
Does she want to increase the cyclobenzaprine during the day, such as 1 tab twice a day during day and 2 at bedtime? (Currently taking half tab twice a day during day and 2 at bedtime)

## 2022-06-08 NOTE — TELEPHONE ENCOUNTER
Patient states she is having pain that is muscular in nature in both hips, arms and legs  Has been ongoing with little relief

## 2022-06-09 ENCOUNTER — TELEPHONE (OUTPATIENT)
Dept: NEUROLOGY | Facility: CLINIC | Age: 42
End: 2022-06-09

## 2022-06-09 NOTE — TELEPHONE ENCOUNTER
Spoke to patient and confirmed her 6/14/2022 @ 4 pm appointment with Dr Sunni De La Garza and confirmed new office address of [address] [facility],[address], [place] [facility] [place] for that appointment

## 2022-06-11 ENCOUNTER — HOSPITAL ENCOUNTER (EMERGENCY)
Facility: HOSPITAL | Age: 42
Discharge: HOME | End: 2022-06-11
Attending: EMERGENCY MEDICINE | Admitting: EMERGENCY MEDICINE
Payer: COMMERCIAL

## 2022-06-11 ENCOUNTER — NURSE TRIAGE (OUTPATIENT)
Dept: OTHER | Facility: OTHER | Age: 42
End: 2022-06-11

## 2022-06-11 VITALS
TEMPERATURE: 98 F | RESPIRATION RATE: 16 BRPM | SYSTOLIC BLOOD PRESSURE: 142 MMHG | HEART RATE: 93 BPM | DIASTOLIC BLOOD PRESSURE: 91 MMHG | OXYGEN SATURATION: 99 %

## 2022-06-11 DIAGNOSIS — M54.50 ACUTE LOW BACK PAIN: Primary | ICD-10-CM

## 2022-06-11 PROCEDURE — 99284 EMERGENCY DEPT VISIT MOD MDM: CPT | Performed by: EMERGENCY MEDICINE

## 2022-06-11 PROCEDURE — 96372 THER/PROPH/DIAG INJ SC/IM: CPT

## 2022-06-11 PROCEDURE — 99283 EMERGENCY DEPT VISIT LOW MDM: CPT

## 2022-06-11 RX ORDER — LIDOCAINE 50 MG/G
1 PATCH TOPICAL ONCE
Status: DISCONTINUED | OUTPATIENT
Start: 2022-06-11 | End: 2022-06-11 | Stop reason: HOSPADM

## 2022-06-11 RX ORDER — NAPROXEN 500 MG/1
500 TABLET ORAL 2 TIMES DAILY WITH MEALS
Qty: 14 TABLET | Refills: 0 | Status: SHIPPED | OUTPATIENT
Start: 2022-06-11 | End: 2022-06-18

## 2022-06-11 RX ORDER — LIDOCAINE 50 MG/G
1 PATCH TOPICAL DAILY
Qty: 15 PATCH | Refills: 0 | Status: SHIPPED | OUTPATIENT
Start: 2022-06-11

## 2022-06-11 RX ORDER — ACETAMINOPHEN 325 MG/1
975 TABLET ORAL ONCE
Status: COMPLETED | OUTPATIENT
Start: 2022-06-11 | End: 2022-06-11

## 2022-06-11 RX ORDER — KETOROLAC TROMETHAMINE 30 MG/ML
15 INJECTION, SOLUTION INTRAMUSCULAR; INTRAVENOUS ONCE
Status: COMPLETED | OUTPATIENT
Start: 2022-06-11 | End: 2022-06-11

## 2022-06-11 RX ADMIN — KETOROLAC TROMETHAMINE 15 MG: 30 INJECTION, SOLUTION INTRAMUSCULAR; INTRAVENOUS at 17:11

## 2022-06-11 RX ADMIN — LIDOCAINE 5% 1 PATCH: 700 PATCH TOPICAL at 17:11

## 2022-06-11 RX ADMIN — ACETAMINOPHEN 975 MG: 325 TABLET ORAL at 17:11

## 2022-06-11 NOTE — ED PROVIDER NOTES
History  Chief Complaint   Patient presents with    Back Pain     Lower back pain, started on Thursday  Tingling in bilat feet  Denies incont of urine or stool      Patient is a 23-year-old female with a significant past medical history of low back pain with sciatica, fibromyalgia, currently presenting with low back pain  She states that this pain 1st started approximately week ago  She denies any traumatic event or injuries to the area  She states that she has some radiation from her low back into her bilateral hips  The pain is sharp and occasionally burning  The pain is constant but seems to be worsened with motion  Rest improves the pain  She also associates some numbness of the bottom of both of her feet  She has been taking multiple over-the-counter analgesics with minimal relief, as well as her prescribed cyclobenzaprine  She has not had any urinary/bowel incontinence, or retention  She has not have any saddle anesthesia  She she has no fevers or chills  She has no history of surgeries on her back  She is not taking any corticosteroids, and has history of immunosuppression  She denies any IV drug use  This feels similar to past episodes of low back pain with sciatica, however this seems to be more prolonged of a course  She has remained ambulatory  She has had x-rays of her lumbar spine consistent with degenerative disc disease, however she has never had an MRI  She has never followed up with anybody outpatient for her back pain  Prior to Admission Medications   Prescriptions Last Dose Informant Patient Reported? Taking? Calcium Carbonate-Vitamin D (CALCIUM 500/D PO)  Self Yes No   Sig: Take 1 capsule by mouth daily   Cholecalciferol (VITAMIN D-3) 1000 units CAPS  Self Yes No   Sig: Take 1 capsule by mouth daily   Coenzyme Q10 100 MG TABS   No No   Sig: Take 1 caps   daily   IRON PO  Self Yes No   Sig: Take by mouth   LORazepam (ATIVAN) 1 mg tablet  Self No No   Sig: TAKE 1 TABLET BY MOUTH EVERY 8 HOURS AS NEEDED FOR ANXIETY   MAGNESIUM OXIDE PO  Self Yes No   Sig: Take by mouth   celecoxib (CeleBREX) 200 mg capsule  Self No No   Sig: TAKE 1 CAPSULE BY MOUTH TWICE A DAY   cyclobenzaprine (FLEXERIL) 10 mg tablet   No No   Sig: Take 2 at bedtime and 1/2 tab  twice daily during the day   desvenlafaxine (PRISTIQ) 100 mg 24 hr tablet  Self No No   Sig: TAKE 1 TABLET BY MOUTH EVERY DAY   hydrOXYzine HCL (ATARAX) 50 mg tablet  Self No No   Sig: TAKE 1 TABLET (50 MG TOTAL) BY MOUTH DAILY AT BEDTIME AS NEEDED (INSOMNIA)   lidocaine (LIDODERM) 5 %  Self No No   Sig: Apply 1 patch topically in the morning   Remove & Discard patch within 12 hours or as directed by MD    nortriptyline (PAMELOR) 75 MG capsule   No No   Sig: TAKE 1 CAPSULE BY MOUTH TWICE A DAY   propranolol (INDERAL) 20 mg tablet  Self No No   Sig: TAKE 1 TABLET BY MOUTH EVERY DAY AT NIGHT      Facility-Administered Medications: None       Past Medical History:   Diagnosis Date    Anxiety     Chronic sinusitis     Depression     Fibromyalgia     Migraine     Obesity     Polyarthritis     Last assessed 9/21/2015    Psychiatric disorder        Past Surgical History:   Procedure Laterality Date    COLONOSCOPY  06/2019    DENTAL SURGERY      HYSTEROSCOPY      Endometrial Biopsy By Hysteroscopy    REMOVAL OF INTRAUTERINE DEVICE (IUD)      US GUIDED BREAST BIOPSY RIGHT COMPLETE Right 6/17/2019       Family History   Problem Relation Age of Onset    Diabetes Father     Kidney disease Father     Substance Abuse Brother     Diabetes Maternal Grandmother     Rheum arthritis Maternal Grandmother     Melanoma Maternal Grandmother     Kidney disease Paternal Grandmother     Rheum arthritis Paternal Grandmother     Depression Paternal Grandmother     Diabetes Paternal Grandfather     Lung cancer Paternal Grandfather     Substance Abuse Maternal Uncle     Prostate cancer Maternal Uncle 61    Depression Paternal Aunt     Alcohol abuse Family     Stomach cancer Family     Irregular heart beat Mother      I have reviewed and agree with the history as documented  E-Cigarette/Vaping    E-Cigarette Use Never User      E-Cigarette/Vaping Substances    Nicotine No     THC No     CBD No      Social History     Tobacco Use    Smoking status: Never Smoker    Smokeless tobacco: Never Used   Vaping Use    Vaping Use: Never used   Substance Use Topics    Alcohol use: No     Comment: 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     Drug use: Not Currently        Review of Systems   Constitutional: Negative for chills and fever  HENT: Negative for sore throat  Eyes: Negative for visual disturbance  Respiratory: Negative for cough and shortness of breath  Cardiovascular: Negative for chest pain  Gastrointestinal: Negative for abdominal pain, constipation, diarrhea, nausea and vomiting  Genitourinary: Negative for dysuria  Musculoskeletal: Positive for back pain  Negative for neck pain  Skin: Negative for rash  Neurological: Positive for numbness  Negative for dizziness, syncope, light-headedness and headaches  Numbness/burning bilateral feet   Psychiatric/Behavioral: Negative for agitation  All other systems reviewed and are negative  Physical Exam  ED Triage Vitals [06/11/22 1607]   Temperature Pulse Respirations Blood Pressure SpO2   98 °F (36 7 °C) 93 16 142/91 99 %      Temp Source Heart Rate Source Patient Position - Orthostatic VS BP Location FiO2 (%)   Temporal Monitor Sitting Left arm --      Pain Score       8             Orthostatic Vital Signs  Vitals:    06/11/22 1607   BP: 142/91   Pulse: 93   Patient Position - Orthostatic VS: Sitting       Physical Exam  Vitals and nursing note reviewed  Constitutional:       General: She is not in acute distress  Appearance: Normal appearance  She is not ill-appearing or toxic-appearing  HENT:      Head: Normocephalic and atraumatic  Right Ear: External ear normal       Left Ear: External ear normal       Nose: Nose normal       Mouth/Throat:      Mouth: Mucous membranes are moist    Eyes:      General: No scleral icterus  Right eye: No discharge  Left eye: No discharge  Extraocular Movements: Extraocular movements intact  Conjunctiva/sclera: Conjunctivae normal    Cardiovascular:      Rate and Rhythm: Normal rate and regular rhythm  Pulses: Normal pulses  Heart sounds: Normal heart sounds  No murmur heard  No friction rub  No gallop  Pulmonary:      Effort: Pulmonary effort is normal  No respiratory distress  Breath sounds: Normal breath sounds  Abdominal:      General: Abdomen is flat  There is no distension  Palpations: Abdomen is soft  Tenderness: There is no abdominal tenderness  Genitourinary:     Comments: Deferred  Musculoskeletal:      Cervical back: Normal range of motion  Right lower leg: No edema  Left lower leg: No edema  Comments: Minimal tenderness of the paralumbar muscles, no midline bony vertebral tenderness  No obvious deformities or skin changes  Full ROM  Strength of bilateral lower extremities 5/5  Decreased sensation of the plantar aspect of the bilateral feet  Sensation of the bilateral lower extremeties otherwise intact including the S1 distribution  DP/PT pulses 2+/4  Gait intact  Skin:     General: Skin is warm and dry  Neurological:      General: No focal deficit present  Mental Status: She is alert     Psychiatric:         Mood and Affect: Mood normal          ED Medications  Medications   lidocaine (LIDODERM) 5 % patch 1 patch (1 patch Topical Medication Applied 6/11/22 1711)   acetaminophen (TYLENOL) tablet 975 mg (975 mg Oral Given 6/11/22 1711)   ketorolac (TORADOL) injection 15 mg (15 mg Intramuscular Given 6/11/22 1711)       Diagnostic Studies  Results Reviewed     None                 No orders to display Procedures  Procedures      ED Course  ED Course as of 06/11/22 1722   Sat Jun 11, 2022   1634 Temperature: 98 °F (36 7 °C)                                       MDM  Number of Diagnoses or Management Options  Acute low back pain: new and requires workup  Diagnosis management comments: Patient is a 38year old female presenting with low back pain  Differential diagnoses includes lumbago versus musculoskeletal spasm / strain versus sciatica  No back pain red flags on history or physical  Presentation not consistent with malignancy (lack of history of malignancy, lack of B symptoms), fracture (no trauma, no bony tenderness to palpation), cauda equina (no bowel or urinary incontinence/retention, no saddle anesthesia, no distal weakness), AAA, viscus perforation, osteomyelitis or epidural abscess (no IVDU, vertebral tenderness), renal colic, pyelonephritis (afebrile, no CVA tenderness, no urinary symptoms)  Given the clinical picture, no indication for imaging at this time  Plan: pain control, supportive care, reassessment, test ambulation, comprehensive [facility] follow up    On reassessment, patient ambulated without issue  Patient given analgesia and referral to comprehensive spine  Prescription for naproxen and lidoderm patches written  Patient seems to understand this plan and is agreeable  All questions answered  Patient discharged home with return precautions  Portions of the record may have been created with voice recognition software  Occasional wrong word or "sound a like" substitutions may have occurred due to the inherent limitations of voice recognition software   Read the chart carefully and recognize, using context, where substitutions have occurred           Amount and/or Complexity of Data Reviewed  Review and summarize past medical records: yes    Patient Progress  Patient progress: stable      Disposition  Final diagnoses:   Acute low back pain     Time reflects when diagnosis was documented in both MDM as applicable and the Disposition within this note     Time User Action Codes Description Comment    6/11/2022  4:49 PM Mala Diana Add [M54 50] Acute low back pain       ED Disposition     ED Disposition   Discharge    Condition   Stable    Date/Time   Sat Jun 11, 2022  5:12 PM    Comment   Grant Neftali discharge to home/self care  Follow-up Information    None         Discharge Medication List as of 6/11/2022  5:12 PM      START taking these medications    Details   !! lidocaine (Lidoderm) 5 % Apply 1 patch topically daily Remove & Discard patch within 12 hours or as directed by MD, Starting Sat 6/11/2022, Normal      naproxen (Naprosyn) 500 mg tablet Take 1 tablet (500 mg total) by mouth 2 (two) times a day with meals for 7 days, Starting Sat 6/11/2022, Until Sat 6/18/2022, Normal       !! - Potential duplicate medications found  Please discuss with provider  CONTINUE these medications which have NOT CHANGED    Details   Calcium Carbonate-Vitamin D (CALCIUM 500/D PO) Take 1 capsule by mouth daily, Historical Med      celecoxib (CeleBREX) 200 mg capsule TAKE 1 CAPSULE BY MOUTH TWICE A DAY, Normal      Cholecalciferol (VITAMIN D-3) 1000 units CAPS Take 1 capsule by mouth daily, Historical Med      Coenzyme Q10 100 MG TABS Take 1 caps  daily, Normal      cyclobenzaprine (FLEXERIL) 10 mg tablet Take 2 at bedtime and 1/2 tab  twice daily during the day, Normal      desvenlafaxine (PRISTIQ) 100 mg 24 hr tablet TAKE 1 TABLET BY MOUTH EVERY DAY, Normal      hydrOXYzine HCL (ATARAX) 50 mg tablet TAKE 1 TABLET (50 MG TOTAL) BY MOUTH DAILY AT BEDTIME AS NEEDED (INSOMNIA), Starting Fri 5/27/2022, Normal      IRON PO Take by mouth, Historical Med      !! lidocaine (LIDODERM) 5 % Apply 1 patch topically in the morning   Remove & Discard patch within 12 hours or as directed by MD , Starting Fri 5/20/2022, Normal      LORazepam (ATIVAN) 1 mg tablet TAKE 1 TABLET BY MOUTH EVERY 8 HOURS AS NEEDED FOR ANXIETY, Normal      MAGNESIUM OXIDE PO Take by mouth, Historical Med      nortriptyline (PAMELOR) 75 MG capsule TAKE 1 CAPSULE BY MOUTH TWICE A DAY, Normal      propranolol (INDERAL) 20 mg tablet TAKE 1 TABLET BY MOUTH EVERY DAY AT NIGHT, Normal       !! - Potential duplicate medications found  Please discuss with provider  PDMP Review       Value Time User    PDMP Reviewed  Yes 1/6/2022  1:40 PM Erma Anderson MD           ED Provider  Attending physically available and evaluated Dean Fleischer I managed the patient along with the ED Attending      Electronically Signed by         Sky Ordoñez DO  06/11/22 5590

## 2022-06-11 NOTE — TELEPHONE ENCOUNTER
Reason for Disposition   [1] SEVERE back pain (e g , excruciating, unable to do any normal activities) AND [2] not improved 2 hours after pain medicine    Answer Assessment - Initial Assessment Questions  1  ONSET: "When did the pain begin?"       Sx for over a week now  2  LOCATION: "Where does it hurt?" (upper, mid or lower back)      Back pain that goes to the front of the abdomen   3  SEVERITY: "How bad is the pain?"  (e g , Scale 1-10; mild, moderate, or severe)    - MILD (1-3): doesn't interfere with normal activities     - MODERATE (4-7): interferes with normal activities or awakens from sleep     - SEVERE (8-10): excruciating pain, unable to do any normal activities       Severe pain   4  PATTERN: "Is the pain constant?" (e g , yes, no; constant, intermittent)       Constant pain   5  RADIATION: "Does the pain shoot into your legs or elsewhere?"      Front to the sides of my hips  6  CAUSE:  "What do you think is causing the back pain?"       Unsure   7  BACK OVERUSE:  "Any recent lifting of heavy objects, strenuous work or exercise? Denies  8  MEDICATIONS: "What have you taken so far for the pain?" (e g , nothing, acetaminophen, NSAIDS)     Muscle relaxer with no help  Lidocaine patches   HOt baths  No help   9  NEUROLOGIC SYMPTOMS: "Do you have any weakness, numbness, or problems with bowel/bladder control?"      Numbness  10  OTHER SYMPTOMS: "Do you have any other symptoms?" (e g , fever, abdominal pain,  Denies  11  PREGNANCY: "Is there any chance you are pregnant?" (e g , yes, no; LMP)  Denies      Protocols used: BACK PAIN-ADULT-

## 2022-06-11 NOTE — TELEPHONE ENCOUNTER
Pt called in stating she has been experiencing Severe lower back pain that radiates around to both sides of her hips  Pt reported this on Friday to the office and Dr Joie Terrell wanted pt to increase her Cyclobenzaprine to 2 tablets during the day and 2 at night  However, pt never received that message  Also, pt is c/o numbness in her feet with the back pain  TT out to oncall provider to see if it is Okay to have pt increase her Cyclobenzaprine or should she be seen in the ED due to numbness now

## 2022-06-11 NOTE — ED ATTENDING ATTESTATION
6/11/2022  I, Ulysses Chinchilla, MD, saw and evaluated the patient  I have discussed the patient with the resident/non-physician practitioner and agree with the resident's/non-physician practitioner's findings, Plan of Care, and MDM as documented in the resident's/non-physician practitioner's note, except where noted  All available labs and Radiology studies were reviewed  I was present for key portions of any procedure(s) performed by the resident/non-physician practitioner and I was immediately available to provide assistance  At this point I agree with the current assessment done in the Emergency Department  I have conducted an independent evaluation of this patient a history and physical is as follows:    ED Course         Critical Care Time  Procedures    39 yo female with hx of sciatica, having one week of pain in low back pain with radiation of pain into legs and having tingling in bilateral feet  No urinary or bowel complaints, no saddle anesthesia, no fever, no chills  Pt able to ambulate  No relief with otc meds  Vss, afebrile, lungs cta, rrr, abdomen soft nontender, paraspinal lumbar tenderness, straight leg negative, ambulates with no issues  Pain meds, msk pain

## 2022-06-11 NOTE — DISCHARGE INSTRUCTIONS
You were seen in the ED today with low back pain  I prescribed you additional lidoderm patches, as well as naproxen  Do not take naproxen with your celecoxib (choose one or the other), also do not take with other NSAIDs such as ibuprofen, motrin, or aleve  You can also take tylenol and continue to use your cyclobenzaprine  I wrote you an ambulatory referral to comprehensive spine  They will call you  Please follow up with them  Please return to the ED if you have worsening symptoms, urinary incontinence or retention, worsening changes in sensation or weakness, or any other concerns

## 2022-06-13 ENCOUNTER — TELEPHONE (OUTPATIENT)
Dept: PHYSICAL THERAPY | Facility: OTHER | Age: 42
End: 2022-06-13

## 2022-06-14 ENCOUNTER — OFFICE VISIT (OUTPATIENT)
Dept: NEUROLOGY | Facility: CLINIC | Age: 42
End: 2022-06-14
Payer: COMMERCIAL

## 2022-06-14 ENCOUNTER — TELEPHONE (OUTPATIENT)
Dept: PSYCHIATRY | Facility: CLINIC | Age: 42
End: 2022-06-14

## 2022-06-14 VITALS
HEART RATE: 101 BPM | TEMPERATURE: 97.7 F | DIASTOLIC BLOOD PRESSURE: 88 MMHG | SYSTOLIC BLOOD PRESSURE: 130 MMHG | BODY MASS INDEX: 38.68 KG/M2 | HEIGHT: 60 IN | WEIGHT: 197 LBS

## 2022-06-14 DIAGNOSIS — F33.1 MAJOR DEPRESSIVE DISORDER, RECURRENT EPISODE, MODERATE (HCC): Primary | ICD-10-CM

## 2022-06-14 DIAGNOSIS — M54.9 BACK PAIN: ICD-10-CM

## 2022-06-14 DIAGNOSIS — G43.709 CHRONIC MIGRAINE WITHOUT AURA WITHOUT STATUS MIGRAINOSUS, NOT INTRACTABLE: Primary | ICD-10-CM

## 2022-06-14 PROCEDURE — 99214 OFFICE O/P EST MOD 30 MIN: CPT | Performed by: PSYCHIATRY & NEUROLOGY

## 2022-06-14 RX ORDER — PROPRANOLOL HYDROCHLORIDE 20 MG/1
TABLET ORAL
Qty: 30 TABLET | Refills: 5 | Status: SHIPPED | OUTPATIENT
Start: 2022-06-14

## 2022-06-14 RX ORDER — DESVENLAFAXINE SUCCINATE 50 MG/1
50 TABLET, EXTENDED RELEASE ORAL DAILY
Qty: 30 TABLET | Refills: 2 | Status: SHIPPED | OUTPATIENT
Start: 2022-06-14 | End: 2022-08-23

## 2022-06-14 NOTE — TELEPHONE ENCOUNTER
Spoke with Janette Dacosta  Made aware of 50 mg Pristiq sent to pharmacy  She is to take this in addition to the 100 mg for  total of 150 mg daily  She verbalized understanding

## 2022-06-14 NOTE — TELEPHONE ENCOUNTER
Patient called the  line requesting a call back from Dr Iesha Kuhn  Patient stated she is going through extreme physical pain and would like to discuss increasing antidepressant  Patient was unsure of the name of the medication at the time of the call  Please review

## 2022-06-14 NOTE — ASSESSMENT & PLAN NOTE
Assessment:   Shazia Wharton is a 38 y o  right handed female with a past medical history significant for migraine disorder  for the last 20 years who is seen in Neurology Clinic as a follow-up for headaches  Patient was last seen on 8/2021  Currently patient is on propranolol 20 mg in the morning and nortriptyline 75 mg BID  She denies any side effects or any new changes to her medical history  Neuro exam continues to remain stable  Recommendations as listed below:  Plan:   · Continue nortriptyline 75 mg BID as per psych and propranolol 20 mg   · This appears to have controlled the patient's headaches, continue with this regimen    · May take naproxen as needed   · Recommend to stay well hydrated and ensure adequate sleep  · Avoid migraine triggers  · Follow-up recommended in:  1 year or sooner as needed  · Patient verbalized understanding all questions were addressed

## 2022-06-14 NOTE — ASSESSMENT & PLAN NOTE
· Patient recently seen in the ED for acute back pain  · No associated neuro symptoms noted, she denies any urinary or bowel incon, W,N or Tingling   · Will obtain MRI L spine

## 2022-06-14 NOTE — TELEPHONE ENCOUNTER
Spoke with Zaheer French  She reports she "hurt her back somehow" and last Thursday she saw her PCP  She ended up having to go to the ER the pain was so bad  They prescribed her Naproxen  She said about two weeks ago she noticed a sad feeling that is getting progressively worse and worse  She denies suicidal thoughts or intent       She is requesting an increase in the Desvenlafaxine as her depression has "Intensified"     Please review

## 2022-06-14 NOTE — PROGRESS NOTES
Patient ID: John Guevara is a 38 y o  female  Assessment/Plan:    Chronic migraine without aura without status migrainosus, not intractable  Assessment:   John Guevara is a 38 y o  right handed female with a past medical history significant for migraine disorder  for the last 20 years who is seen in Neurology Clinic as a follow-up for headaches  Patient was last seen on 8/2021  Currently patient is on propranolol 20 mg in the morning and nortriptyline 75 mg BID  She denies any side effects or any new changes to her medical history  Neuro exam continues to remain stable  Recommendations as listed below:  Plan:   · Continue nortriptyline 75 mg BID as per psych and propranolol 20 mg   · This appears to have controlled the patient's headaches, continue with this regimen  · May take naproxen as needed   · Recommend to stay well hydrated and ensure adequate sleep  · Avoid migraine triggers  · Follow-up recommended in:  1 year or sooner as needed  · Patient verbalized understanding all questions were addressed    Back pain  · Patient recently seen in the ED for acute back pain  · No associated neuro symptoms noted, she denies any urinary or bowel incon, W,N or Tingling   · Will obtain MRI L spine        Diagnoses and all orders for this visit:    Chronic migraine without aura without status migrainosus, not intractable  -     propranolol (INDERAL) 20 mg tablet; TAKE 1 TABLET BY MOUTH EVERY DAY AT NIGHT    Back pain  -     MRI lumbar spine without contrast; Future           Subjective:    HPI  John Guevara is a 38 y o  female is seen today in the neurology office as a follow up for headaches  Patient was last seen on 8/10/2021 and since that visit she is doing well  She reports compliance with her medications and denies any further headaches or migraines  Currently maintained on Nortriptrypine 75 mg BID and Propronol 20 mg at night and is tolerating it well    Patient was initially started on propanolol, because the plan was to switch her to [facility]   however patient states that while she has been on propanolol her headaches have been completely controlled  She denies any side effects  She denies any other headaches  She was recently seen in the ED for back pain  She states initially it started a few years ago after lifting something heavy and since there will have it intermittently  She is planning on starting PT  She  denies any recent trauma  She denies any urinary or bowel incontinence  She denies any numbness, tingling or weakness radiating down her legs  The following portions of the patient's history were reviewed and updated as appropriate: allergies, current medications, past family history, past medical history, past social history, past surgical history and problem list          Objective:    Blood pressure 130/88, pulse 101, temperature 97 7 °F (36 5 °C), height 5' (1 524 m), weight 89 4 kg (197 lb)  Physical Exam  Vitals and nursing note reviewed  Constitutional:       Appearance: Normal appearance  HENT:      Head: Normocephalic and atraumatic  Nose: Nose normal    Eyes:      General: Lids are normal       Extraocular Movements: Extraocular movements intact  Pupils: Pupils are equal, round, and reactive to light  Pulmonary:      Breath sounds: Normal breath sounds  Musculoskeletal:         General: Normal range of motion  Cervical back: Normal range of motion  Neurological:      Mental Status: She is alert  Psychiatric:         Speech: Speech normal          Neurological Exam  Mental Status  Alert  Oriented to person, place and time  Speech is normal  Language is fluent with no aphasia  Cranial Nerves  CN II: Visual fields full to confrontation  CN III, IV, VI: Extraocular movements intact bilaterally  Normal lids and orbits bilaterally  Pupils equal round and reactive to light bilaterally    CN V: Facial sensation is normal   CN VII: Full and symmetric facial movement  CN VIII: Hearing is normal   CN XI: Shoulder shrug strength is normal   CN XII: Tongue midline without atrophy or fasciculations  Motor  Normal muscle bulk throughout  No fasciculations present  Normal muscle tone  Strength is 5/5 in all four extremities except as noted  Sensory  Light touch is normal in upper and lower extremities  Reflexes  Deep tendon reflexes are 2+ and symmetric except as noted  Coordination  Right: Finger-to-nose normal Left: Finger-to-nose normal     Gait  Casual gait is normal including stance, stride, and arm swing  ROS:  I reviewed the below ROS and what is mentioned in HPI, the remainder of ROS was negative  Review of Systems   Constitutional: Negative  Negative for appetite change and fever  HENT: Negative  Negative for hearing loss, tinnitus, trouble swallowing and voice change  Eyes: Negative  Negative for photophobia and pain  Respiratory: Negative  Negative for shortness of breath  Cardiovascular: Negative  Negative for palpitations  Gastrointestinal: Negative  Negative for nausea and vomiting  Endocrine: Negative  Negative for cold intolerance  Genitourinary: Negative  Negative for dysuria, frequency and urgency  Musculoskeletal: Negative  Negative for myalgias and neck pain  Skin: Negative  Negative for rash  Allergic/Immunologic: Negative  Neurological: Negative  Negative for dizziness, tremors, seizures, syncope, facial asymmetry, speech difficulty, weakness, light-headedness, numbness and headaches  Hematological: Negative  Does not bruise/bleed easily  Psychiatric/Behavioral: Negative  Negative for confusion, hallucinations and sleep disturbance  All other systems reviewed and are negative      Patient states there are no issues to address at this visit

## 2022-06-16 ENCOUNTER — TELEMEDICINE (OUTPATIENT)
Dept: BEHAVIORAL/MENTAL HEALTH CLINIC | Facility: CLINIC | Age: 42
End: 2022-06-16
Payer: COMMERCIAL

## 2022-06-16 ENCOUNTER — TELEPHONE (OUTPATIENT)
Dept: PHYSICAL THERAPY | Facility: OTHER | Age: 42
End: 2022-06-16

## 2022-06-16 DIAGNOSIS — F41.1 GENERALIZED ANXIETY DISORDER: ICD-10-CM

## 2022-06-16 DIAGNOSIS — F33.1 MAJOR DEPRESSIVE DISORDER, RECURRENT EPISODE, MODERATE (HCC): Primary | ICD-10-CM

## 2022-06-16 PROCEDURE — 90834 PSYTX W PT 45 MINUTES: CPT | Performed by: PSYCHIATRY & NEUROLOGY

## 2022-06-16 NOTE — PSYCH
Session time 080-211 (total time 38 minutes)    Virtual Regular Visit    Verification of patient location:    Patient is located in the following state in which I hold an active license [place]      Assessment/Plan:    Problem List Items Addressed This Visit        Other    Major depressive disorder, recurrent episode, moderate ([address] ) - Primary    Generalized anxiety disorder          Goals addressed in session: Goal 1          Reason for visit is   Chief Complaint   Patient presents with    Virtual Regular Visit        Encounter provider HERMELINDA Timmons    Provider located at [address] [place] [id number] [phone]      Recent Visits  No visits were found meeting these conditions  Showing recent visits within past 7 days and meeting all other requirements  Today's Visits  Date Type Provider Dept   06/16/22 Telemedicine HERMELINDA [facility] Therapist [facility]   Showing today's visits and meeting all other requirements  Future Appointments  No visits were found meeting these conditions  Showing future appointments within next 150 days and meeting all other requirements       The patient was identified by name and date of birth  Roger Lin was informed that this is a telemedicine visit and that the visit is being conducted through[employer] and patient was informed that this is a secure, HIPAA-compliant platform  She agrees to proceed     My office door was closed  No one else was in the room  She acknowledged consent and understanding of privacy and security of the video platform  The patient has agreed to participate and understands they can discontinue the visit at any time  Patient is aware this is a billable service  Subjective  Roger Lin is a 38 y o  female presenting for follow up    Kim Centeno shared that she has been struggling a lot the past couple of weeks, due to severe lower back pain that she is having difficulty finding relief for  She even went to the ED because it was so bad, but still is getting very little relief  She said that the extreme pain has been feeding her depression, and Monday night while she was lying awake trying to relax and get to sleep, the pain was so bad that she had some fleeting suicidal thoughts  She noted, however, that when she woke up the next morning they were gone and she has not had those thoughts since  She said that she feels like her depression is worsening to the point of maybe needing a hospital stay to help stabilize her  Discussed option of partial hospital program, either in person or virtually, to provide extra support right now, and Kim Centeno said that she would consider it and will let this writer know if she would like to pursue that option  Kim Centeno noted that her mother has been a significant support for her lately, and because of her mom's support, she is able to recognize that the way she feels right now is only temporary and that it will pass  This is helping her cope and get through this difficult period  Provided support and validation of Esther's struggles, encouraged her to focus on self-care, only doing what she is able to do and not pushing too hard, and reaching to mom and friends for support as needed  A: Kim Centeno presented as depressed today, with a constricted affect in depressed range, intermittent eye contact and calm behavior  Her concentration was mildly impaired, thought process logical and organized  Speech somewhat soft in volume, but normal rate and fluency  Insight and judgment intact  Denies active SI (fleeting SI one night as noted), denies HI and psychosis  Some decompensation since last visit  Fidencio Blount will focus on self-care and social support, will consider PHP for additional, more intensive support, and will return for follow up with this writer in two weeks        HPI     Past Medical History: Diagnosis Date    Anxiety     Chronic sinusitis     Depression     Fibromyalgia     Migraine     Obesity     Polyarthritis     Last assessed 9/21/2015    Psychiatric disorder        Past Surgical History:   Procedure Laterality Date    COLONOSCOPY  06/2019   [facility] Ok DENTAL SURGERY      HYSTEROSCOPY      Endometrial Biopsy By Hysteroscopy    REMOVAL OF INTRAUTERINE DEVICE (IUD)      US GUIDED BREAST BIOPSY RIGHT COMPLETE Right 6/17/2019       Current Outpatient Medications   Medication Sig Dispense Refill    Calcium Carbonate-Vitamin D (CALCIUM 500/D PO) Take 1 capsule by mouth daily      celecoxib (CeleBREX) 200 mg capsule TAKE 1 CAPSULE BY MOUTH TWICE A DAY 60 capsule 6    Cholecalciferol (VITAMIN D-3) 1000 units CAPS Take 1 capsule by mouth daily      Coenzyme Q10 100 MG TABS Take 1 caps  daily 30 tablet 3    cyclobenzaprine (FLEXERIL) 10 mg tablet Take 2 at bedtime and 1/2 tab  twice daily during the day 270 tablet 3    desvenlafaxine (PRISTIQ) 100 mg 24 hr tablet TAKE 1 TABLET BY MOUTH EVERY DAY 30 tablet 2    desvenlafaxine succinate (PRISTIQ) 50 mg 24 hr tablet Take 1 tablet (50 mg total) by mouth daily Total daily dose 150 mg daily 30 tablet 2    hydrOXYzine HCL (ATARAX) 50 mg tablet TAKE 1 TABLET (50 MG TOTAL) BY MOUTH DAILY AT BEDTIME AS NEEDED (INSOMNIA) 30 tablet 2    IRON PO Take by mouth      lidocaine (LIDODERM) 5 % Apply 1 patch topically in the morning   Remove & Discard patch within 12 hours or as directed by MD  30 patch 1    lidocaine (Lidoderm) 5 % Apply 1 patch topically daily Remove & Discard patch within 12 hours or as directed by MD 15 patch 0    LORazepam (ATIVAN) 1 mg tablet TAKE 1 TABLET BY MOUTH EVERY 8 HOURS AS NEEDED FOR ANXIETY 90 tablet 2    MAGNESIUM OXIDE PO Take by mouth      naproxen (Naprosyn) 500 mg tablet Take 1 tablet (500 mg total) by mouth 2 (two) times a day with meals for 7 days 14 tablet 0    nortriptyline (PAMELOR) 75 MG capsule TAKE 1 CAPSULE BY MOUTH TWICE A  capsule 0    propranolol (INDERAL) 20 mg tablet TAKE 1 TABLET BY MOUTH EVERY DAY AT NIGHT 30 tablet 5     No current facility-administered medications for this visit  Allergies   Allergen Reactions    Amoxicillin-Pot Clavulanate     Decadrol [Dexamethasone] Other (See Comments)     Category: Adverse Reaction;   psychosis    Dexamethasone Sodium Phosphate     Other     Penicillins Hives and Other (See Comments)     Category: Allergy; Hives/Uticaria    Tetracycline Hives and Other (See Comments)     Hives/Uticaria    Tetracyclines & Related Hives     Category: Allergy; Review of Systems    Video Exam    There were no vitals filed for this visit  Physical Exam     I spent 38 minutes directly with the patient during this visit    VIRTUAL VISIT DISCLAIMER    Devan Tesfaye verbally agrees to participate in [employer]  Pt is aware that [employer] could be limited without vital signs or the ability to perform a full hands-on physical exam  Esther Griffith understands she or the provider may request at any time to terminate the video visit and request the patient to seek care or treatment in person

## 2022-06-20 ENCOUNTER — NURSE TRIAGE (OUTPATIENT)
Dept: PHYSICAL THERAPY | Facility: OTHER | Age: 42
End: 2022-06-20

## 2022-06-20 DIAGNOSIS — M54.50 ACUTE BILATERAL LOW BACK PAIN, UNSPECIFIED WHETHER SCIATICA PRESENT: Primary | ICD-10-CM

## 2022-06-20 NOTE — TELEPHONE ENCOUNTER
Additional Information   Negative: Is this related to a work injury?  Negative: Is this related to an MVA?  Negative: Are you currently recieving homecare services?  Negative: Has the patient had unexplained weight loss?  Negative: Does the patient have a fever?  Negative: Is the patient experiencing urine retention?  Negative: Is the patient experiencing acute drop foot or paralysis?  Negative: Has the patient experienced major trauma? (fall from height, high speed collision, direct blow to spine) and is also experiencing nausea, light-headedness, or loss of consciousness?  Negative: Is the patient experiencing blood in sputum?  Negative: Is this a chronic condition? Background - Initial Assessment  Clinical complaint: bilateral and midline low back pain that radiates to B/L hips-denies radiation to legs  Date of onset: Patient states this is new pain that started 3 weeks ago- Progressively worsened which warranted an ED visit  Frequency of pain: constant  Quality of pain: shooting    Protocols used:  AMB COMPREHENSIVE SPINE PROGRAM PROTOCOL    Patient filled out on line form for  Comprehensive spine  Patient also seen in ED 6/11/22 and referral was entered  PLEASE SEE NOTES    Nurse reached out to discuss the program with her  Patient agreeable to triage for PT evaluation with [facility] therapist   Patient stated she has had prior back issues, but this is "totally new type of pain"  Triaged and NO RF s/s present  Referral entered for the [address] [place] [address] Road and contact info given to her as well  Nurse also offered a call from the [address] [place] [address] counselor d/t SBT score  Patient declined  Patient was pleasant and appropriate during this encounter  Patient very appreciative of Call and triage  Nurse wished her well and referral closed

## 2022-06-22 ENCOUNTER — EVALUATION (OUTPATIENT)
Dept: PHYSICAL THERAPY | Facility: CLINIC | Age: 42
End: 2022-06-22
Payer: COMMERCIAL

## 2022-06-22 DIAGNOSIS — M54.50 ACUTE BILATERAL LOW BACK PAIN, UNSPECIFIED WHETHER SCIATICA PRESENT: ICD-10-CM

## 2022-06-22 DIAGNOSIS — M54.10 RADICULOPATHY, UNSPECIFIED SPINAL REGION: Primary | ICD-10-CM

## 2022-06-22 PROCEDURE — 97161 PT EVAL LOW COMPLEX 20 MIN: CPT | Performed by: PHYSICAL THERAPIST

## 2022-06-22 NOTE — PROGRESS NOTES
PT Evaluation     Today's date: 2022  Patient name: Michael Pettit  : 1980  MRN: [id number]  Referring provider: Damaris Earl PT  Dx:   Encounter Diagnosis     ICD-10-CM    1  Radiculopathy, unspecified spinal region  M54 10    2  Acute bilateral low back pain, unspecified whether sciatica present  M54 50 Ambulatory referral to PT spine                  Assessment  Assessment details: Pt is a 38 y o  female who presents to outpatient PT via the comprehensive spine program with pain, decreased rom, decreased strength and decreased functional mobility  She will benefit from skilled PT to address these deficits in order to achieve her goals and maximize her functional mobility  Understanding of Dx/Px/POC: good   Prognosis: good    Goals  Short Term Goals: Independent performance of initial hep  Decrease pain 2 points on VAS      Long Term Goals: Independent performance of comprehensive hep  Work performance is returned to max level of function  Performance of IADL's is returned to max level of function  Performance in recreational activities is improved to max level of function  Able to vacuum with min pain    Plan  Planned therapy interventions: IADL retraining, joint mobilization, abdominal trunk stabilization, manual therapy, massage, patient education, postural training, strengthening, stretching, therapeutic activities, therapeutic exercise, therapeutic training, transfer training and home exercise program        Subjective Evaluation    History of Present Illness  Mechanism of injury: 3 weeks ago pt woke up with "tremendous pain" along her lumbar spine and hip  Reports that she has tried OTC medication with min pain reduction, no sig reduction with ice/heat to the area, no sig reduction with topical analgesic  Pt reports that she has increased pain when vacuuming, crouching and other lumbar flexion motion    Reports one instance of radicular symptoms but is not currently experiencing this   Reports occasional disturbances at night from pain, is currently using sleep aides  Reports that she would like to return to fitness routine  Reports that she has been under the care of chiropractor but has not had tx since this exacerbation  History of fibromyalgia  Pain  Current pain ratin  At worst pain ratin    Patient Goals  Patient goals for therapy: decreased pain, increased motion, increased strength, independence with ADLs/IADLs and return to sport/leisure activities          Objective     Lumbar spine:    ROM:   Flexion: min limited   Extension: mod limited, pain   Right Rotation: mod limited, pain   Left Rotation: min limited   Right Lateral Flexion: mod limited, pain   Left Lateral Flexion:   Mod limited, pain        Poor control of abdominals noted with TaA  Hypomobility noted with spring testing: pain produce L3-L5  Straight Leg Raise: neg  Cross SLR:  neg  Slump Test:  neg  Prone Knee Bend: neg   Directional testing: stiffness reported with repeated extension but no radicular symptoms  Decreased flexibility: B/L HS, glutes  SIJ cluster: pos R posterior inominate  [place]'s sign: neg  Prone instability test: neg  Hip IR rom: limited B/L      Myotome testin/5 t/o    Dermatome testing:      DTR:   Patellar: 2+  Achilles: 2+    Hr: 75  Bp:132/88  TEMP:98 4  SPO2:  99%           Precautions: fibromyalgia, acute on chronic lumbar pain      Manuals             Lumbar pa's             Consider mechanical traction             SIJ MET                          Neuro Re-Ed             TaA             TaA bridge             Prone hip ext             TaA LPD                                                    Ther Ex             Prone on elbows             glute stretch             HS stretch                                                                              Ther Activity             bike                          Gait Training                                       Modalities prn

## 2022-06-27 ENCOUNTER — OFFICE VISIT (OUTPATIENT)
Dept: PHYSICAL THERAPY | Facility: CLINIC | Age: 42
End: 2022-06-27
Payer: COMMERCIAL

## 2022-06-27 DIAGNOSIS — M54.50 ACUTE BILATERAL LOW BACK PAIN, UNSPECIFIED WHETHER SCIATICA PRESENT: ICD-10-CM

## 2022-06-27 DIAGNOSIS — M54.10 RADICULOPATHY, UNSPECIFIED SPINAL REGION: Primary | ICD-10-CM

## 2022-06-27 PROCEDURE — 97140 MANUAL THERAPY 1/> REGIONS: CPT

## 2022-06-27 PROCEDURE — 97110 THERAPEUTIC EXERCISES: CPT

## 2022-06-27 PROCEDURE — 97112 NEUROMUSCULAR REEDUCATION: CPT

## 2022-06-27 NOTE — PROGRESS NOTES
Daily Note     Today's date: 2022  Patient name: Chloe Castillo  : 1980  MRN: [id number]  Referring provider: Marquis Plasencia, PT  Dx:   Encounter Diagnosis     ICD-10-CM    1  Radiculopathy, unspecified spinal region  M54 10    2  Acute bilateral low back pain, unspecified whether sciatica present  M54 50        Start Time: 1450  Stop Time: 1531  Total time in clinic (min): 41 minutes    Subjective: Pt reports she is not doing great today as she hasn't slept and is very exhausted  Had a lot of company over the weekend and her back was sore from moving/lifting/etc        Objective: See treatment diary below      Assessment: Tolerated treatment fair  Pt challenged with bridge attempt due to pain, so she did TaA with glute sets, which she tolerated better  Pt noted relief following lumbar PA's  Patient demonstrated fatigue post treatment, exhibited good technique with therapeutic exercises and would benefit from continued PT  Plan: Continue per plan of care        Precautions: fibromyalgia, acute on chronic lumbar pain      Manuals             Lumbar pa's NA            Consider mechanical traction             SIJ MET                          Neuro Re-Ed             TaA 20x5"            TaA bridge 20x5" with glut set instead of bridge due to pain            Prone hip ext 10x B            TaA LPD             TaA with hooklying add squeeze 20x5"             TaA with BKFO 1x10 ea side                                                                                          Ther Ex             Prone on elbows x2'             glute stretch             HS stretch             SKTC 5x10"            LTR 10x B                                                    Ther Activity             bike 5'                         Gait Training                                       Modalities             prn

## 2022-07-01 ENCOUNTER — TELEPHONE (OUTPATIENT)
Dept: BEHAVIORAL/MENTAL HEALTH CLINIC | Facility: CLINIC | Age: 42
End: 2022-07-01

## 2022-07-01 ENCOUNTER — NURSE TRIAGE (OUTPATIENT)
Dept: OTHER | Facility: OTHER | Age: 42
End: 2022-07-01

## 2022-07-01 ENCOUNTER — HOSPITAL ENCOUNTER (EMERGENCY)
Facility: HOSPITAL | Age: 42
Discharge: HOME | End: 2022-07-01
Attending: EMERGENCY MEDICINE
Payer: COMMERCIAL

## 2022-07-01 ENCOUNTER — APPOINTMENT (EMERGENCY)
Dept: RADIOLOGY | Facility: HOSPITAL | Age: 42
End: 2022-07-01
Payer: COMMERCIAL

## 2022-07-01 ENCOUNTER — APPOINTMENT (OUTPATIENT)
Dept: PHYSICAL THERAPY | Facility: CLINIC | Age: 42
End: 2022-07-01
Payer: COMMERCIAL

## 2022-07-01 VITALS
RESPIRATION RATE: 20 BRPM | OXYGEN SATURATION: 99 % | DIASTOLIC BLOOD PRESSURE: 83 MMHG | TEMPERATURE: 97.2 F | SYSTOLIC BLOOD PRESSURE: 126 MMHG | HEART RATE: 102 BPM

## 2022-07-01 DIAGNOSIS — M54.9 BACK PAIN: Primary | ICD-10-CM

## 2022-07-01 PROCEDURE — 99283 EMERGENCY DEPT VISIT LOW MDM: CPT

## 2022-07-01 PROCEDURE — 72100 X-RAY EXAM L-S SPINE 2/3 VWS: CPT

## 2022-07-01 PROCEDURE — 99284 EMERGENCY DEPT VISIT MOD MDM: CPT | Performed by: EMERGENCY MEDICINE

## 2022-07-01 PROCEDURE — 96372 THER/PROPH/DIAG INJ SC/IM: CPT

## 2022-07-01 RX ORDER — LIDOCAINE 50 MG/G
1 PATCH TOPICAL ONCE
Status: DISCONTINUED | OUTPATIENT
Start: 2022-07-01 | End: 2022-07-01 | Stop reason: HOSPADM

## 2022-07-01 RX ORDER — DIAZEPAM 5 MG/1
5 TABLET ORAL EVERY 12 HOURS PRN
Qty: 7 TABLET | Refills: 0 | Status: SHIPPED | OUTPATIENT
Start: 2022-07-01 | End: 2022-07-06 | Stop reason: SDUPTHER

## 2022-07-01 RX ORDER — KETOROLAC TROMETHAMINE 30 MG/ML
15 INJECTION, SOLUTION INTRAMUSCULAR; INTRAVENOUS ONCE
Status: COMPLETED | OUTPATIENT
Start: 2022-07-01 | End: 2022-07-01

## 2022-07-01 RX ADMIN — LIDOCAINE 5% 1 PATCH: 700 PATCH TOPICAL at 14:45

## 2022-07-01 RX ADMIN — KETOROLAC TROMETHAMINE 15 MG: 30 INJECTION, SOLUTION INTRAMUSCULAR at 14:45

## 2022-07-01 NOTE — TELEPHONE ENCOUNTER
Reason for Disposition   [1] Pain radiates into the thigh or further down the leg AND [2] both legs    Answer Assessment - Initial Assessment Questions  1  ONSET: "When did the pain begin?"       7/1 in the AM  2  LOCATION: "Where does it hurt?" (upper, mid or lower back)      Lower back pain   3  SEVERITY: "How bad is the pain?"  (e g , Scale 1-10; mild, moderate, or severe)    - MILD (1-3): doesn't interfere with normal activities     - MODERATE (4-7): interferes with normal activities or awakens from sleep     - SEVERE (8-10): excruciating pain, unable to do any normal activities      9/10  4  PATTERN: "Is the pain constant?" (e g , yes, no; constant, intermittent)       Constant   5  RADIATION: "Does the pain shoot into your legs or elsewhere?"      Radiating upper back, buttock, and bilateral thighs   6  CAUSE:  "What do you think is causing the back pain?"     Unsure   7  BACK OVERUSE:  "Any recent lifting of heavy objects, strenuous work or exercise?"      Denies   8  MEDICATIONS: "What have you taken so far for the pain?" (e g , nothing, acetaminophen, NSAIDS)    None so far   9  NEUROLOGIC SYMPTOMS: "Do you have any weakness, numbness, or problems with bowel/bladder control?"     Denies   10  OTHER SYMPTOMS: "Do you have any other symptoms?" (e g , fever, abdominal pain, burning with urination, blood in urine)      Denies   11  PREGNANCY: "Is there any chance you are pregnant?" (e g , yes, no; LMP)       Denies  LMP: 2/12/22 Ongoing      Protocols used: BACK PAIN-ADULT-

## 2022-07-01 NOTE — TELEPHONE ENCOUNTER
Regarding: back pain care advice  ----- Message from Fabio Wolfe sent at 7/1/2022  5:22 PM EDT -----  "I went to the ED an they were not able ot help me with my back pain, I am just concerned bc it has starting radiating up and down my back and it hurts pretty bad"

## 2022-07-01 NOTE — ED PROVIDER NOTES
History  Chief Complaint   Patient presents with    Back Pain     Lower back pain that shoots up the back, has been a chronic issue, but was much worse this morning  Patient was in too much pain to attend PT  No numbness or tingling  Pt is a 39 yo female presenting for back pain  PMH significant for fibromyalgia, depression, and anxiety  Pt states the pain began three weeks ago when she woke up in the morning  Does not recall any specific motion, event, or trauma that might have caused the pain  Pain began initially at about a 5 out of 10 in her lower back (approximately L5-S1)  She presented to the ED at that time and was given naproxen, lidocaine patches, and recommended physical therapy  Pt states medications did not alleviate the pain, has been taking tylenol/advil without improvement, and on physical therapy day 3 as of today without improvement of the pain  Pt is also taking a muscle relaxer (does not help pain)  Pain has since increased in severity to a 9 out of 10  Pt reports paresthesias extending to her feet but denies weakness, numbness, loss of bladder/bowel control, saddle anesthesia  No other significant symptoms reported  Prior to Admission Medications   Prescriptions Last Dose Informant Patient Reported? Taking? Calcium Carbonate-Vitamin D (CALCIUM 500/D PO)  Self Yes No   Sig: Take 1 capsule by mouth daily   Cholecalciferol (VITAMIN D-3) 1000 units CAPS  Self Yes No   Sig: Take 1 capsule by mouth daily   Coenzyme Q10 100 MG TABS   No No   Sig: Take 1 caps   daily   IRON PO  Self Yes No   Sig: Take by mouth   LORazepam (ATIVAN) 1 mg tablet  Self No No   Sig: TAKE 1 TABLET BY MOUTH EVERY 8 HOURS AS NEEDED FOR ANXIETY   MAGNESIUM OXIDE PO  Self Yes No   Sig: Take by mouth   celecoxib (CeleBREX) 200 mg capsule  Self No No   Sig: TAKE 1 CAPSULE BY MOUTH TWICE A DAY   cyclobenzaprine (FLEXERIL) 10 mg tablet   No No   Sig: Take 2 at bedtime and 1/2 tab  twice daily during the day desvenlafaxine (PRISTIQ) 100 mg 24 hr tablet  Self No No   Sig: TAKE 1 TABLET BY MOUTH EVERY DAY   desvenlafaxine succinate (PRISTIQ) 50 mg 24 hr tablet   No No   Sig: Take 1 tablet (50 mg total) by mouth daily Total daily dose 150 mg daily   hydrOXYzine HCL (ATARAX) 50 mg tablet  Self No No   Sig: TAKE 1 TABLET (50 MG TOTAL) BY MOUTH DAILY AT BEDTIME AS NEEDED (INSOMNIA)   lidocaine (LIDODERM) 5 %  Self No No   Sig: Apply 1 patch topically in the morning   Remove & Discard patch within 12 hours or as directed by MD    lidocaine (Lidoderm) 5 %   No No   Sig: Apply 1 patch topically daily Remove & Discard patch within 12 hours or as directed by MD   naproxen (Naprosyn) 500 mg tablet   No No   Sig: Take 1 tablet (500 mg total) by mouth 2 (two) times a day with meals for 7 days   nortriptyline (PAMELOR) 75 MG capsule   No No   Sig: TAKE 1 CAPSULE BY MOUTH TWICE A DAY   propranolol (INDERAL) 20 mg tablet   No No   Sig: TAKE 1 TABLET BY MOUTH EVERY DAY AT NIGHT      Facility-Administered Medications: None       Past Medical History:   Diagnosis Date    Anxiety     Chronic sinusitis     Depression     Fibromyalgia     Migraine     Obesity     Polyarthritis     Last assessed 9/21/2015    Psychiatric disorder        Past Surgical History:   Procedure Laterality Date    COLONOSCOPY  06/2019    DENTAL SURGERY      HYSTEROSCOPY      Endometrial Biopsy By Hysteroscopy    REMOVAL OF INTRAUTERINE DEVICE (IUD)      US GUIDED BREAST BIOPSY RIGHT COMPLETE Right 6/17/2019       Family History   Problem Relation Age of Onset    Diabetes Father     Kidney disease Father     Substance Abuse Brother     Diabetes Maternal Grandmother     Rheum arthritis Maternal Grandmother     Melanoma Maternal Grandmother     Kidney disease Paternal Grandmother     Rheum arthritis Paternal Grandmother     Depression Paternal Grandmother     Diabetes Paternal Grandfather     Lung cancer Paternal Grandfather     Substance Abuse Maternal Uncle     Prostate cancer Maternal Uncle 61    Depression Paternal Aunt     Alcohol abuse Family     Stomach cancer Family     Irregular heart beat Mother      I have reviewed and agree with the history as documented  E-Cigarette/Vaping    E-Cigarette Use Never User      E-Cigarette/Vaping Substances    Nicotine No     THC No     CBD No      Social History     Tobacco Use    Smoking status: Never Smoker    Smokeless tobacco: Never Used   Vaping Use    Vaping Use: Never used   Substance Use Topics    Alcohol use: No     Comment: 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     Drug use: Not Currently        Review of Systems   Constitutional: Negative  HENT: Negative  Eyes: Negative  Respiratory: Negative  Cardiovascular: Negative  Gastrointestinal: Negative  Endocrine: Negative  Genitourinary: Negative for dysuria, pelvic pain and urgency  Musculoskeletal:        Back pain   Neurological: Negative for weakness and numbness  Parasthesias extending from lower back to feet       Physical Exam  ED Triage Vitals [07/01/22 1315]   Temperature Pulse Respirations Blood Pressure SpO2   (!) 97 2 °F (36 2 °C) 102 20 126/83 99 %      Temp Source Heart Rate Source Patient Position - Orthostatic VS BP Location FiO2 (%)   Temporal Monitor Sitting Right arm --      Pain Score       9             Orthostatic Vital Signs  Vitals:    07/01/22 1315   BP: 126/83   Pulse: 102   Patient Position - Orthostatic VS: Sitting       Physical Exam  Constitutional:       Appearance: Normal appearance  She is obese  HENT:      Head: Normocephalic and atraumatic  Cardiovascular:      Rate and Rhythm: Normal rate and regular rhythm  Pulses: Normal pulses  Heart sounds: Normal heart sounds  Pulmonary:      Effort: Pulmonary effort is normal       Breath sounds: Normal breath sounds  Abdominal:      General: Abdomen is flat   Bowel sounds are normal  Palpations: Abdomen is soft  Skin:     General: Skin is warm and dry  Neurological:      General: No focal deficit present  Mental Status: She is alert and oriented to person, place, and time  Sensory: No sensory deficit  Motor: No weakness  Comments: No point tenderness or stepoff  Some tenderness over left SI joint  Straight leg test did not elicit any increase/radiation of pain  ED Medications  Medications - No data to display    Diagnostic Studies  Results Reviewed     None                 No orders to display         Procedures  Procedures      ED Course                                       MDM  Number of Diagnoses or Management Options  Diagnosis management comments: Pt is a 39 yo female presenting for back pain  DDx: MSK back pain, sciatica, cauda equina syndrome, malignancy, back fracture   Plan: Obtain lumbar X-rays, provide lidocaine patches and toradol  Discharge home         Disposition  Final diagnoses:   None     ED Disposition     None      Follow-up Information    None         Patient's Medications   Discharge Prescriptions    No medications on file     No discharge procedures on file  PDMP Review       Value Time User    PDMP Reviewed  Yes 1/6/2022  1:40 PM Adilene Ortiz MD           ED Provider  Attending physically available and evaluated Jannie Alejo  ZIA managed the patient along with the ED Attending      Electronically Signed by         Chapito Gastelum MD  07/01/22 1163

## 2022-07-01 NOTE — DISCHARGE INSTRUCTIONS
Degenerative spondylosis most pronounced at L5-S1  Slight asymmetry of the right SI slightly more pronounced as compared to older exams and may be projectional   Follow-up sacroiliac joint series may be useful  You were seen in the Emergency Department today for  back pain  We have tested xrays of lower back and we see degenerative spondylosis most pronounced at L5-S1  Slight asymmetry of the right SI slightly more pronounced as compared to older exams and may be projectional and are sending you home with lidocaine patches after giving you a toradol shot  Continue to take tylenol and motrin at home  Please follow up with your primary care doctor in 2-3 days  Please return to the Emergency Department if you experience worsening of your current symptoms or any other concerning symptoms

## 2022-07-01 NOTE — TELEPHONE ENCOUNTER
C o lower back pain (constant) rated at 9/10, and radiating  Reports not taking any pain medicine as of this time  Denies any other symptoms  Care advice given  Informed to call back if worsening symptoms  Verbalized understanding and agreeable with disposition  No further questions

## 2022-07-04 NOTE — ED ATTENDING ATTESTATION
7/1/2022  Hayde LIZARRAGA, DO, saw and evaluated the patient  I have discussed the patient with the resident/non-physician practitioner and agree with the resident's/non-physician practitioner's findings, Plan of Care, and MDM as documented in the resident's/non-physician practitioner's note, except where noted  All available labs and Radiology studies were reviewed  I was present for key portions of any procedure(s) performed by the resident/non-physician practitioner and I was immediately available to provide assistance  At this point I agree with the current assessment done in the Emergency Department  I have conducted an independent evaluation of this patient a history and physical is as follows:    64-year-old female presents with back pain  Patient states the pain started 3 weeks ago, does not recall any specific trauma that may have caused this pain  Has been on naproxen and lidocaine patches and started physical therapy after her initial visit the emergency department  Patient states still having persistent pain  Denies radicular symptoms, no bowel or bladder complaints, no fevers  On exam-no acute distress, heart mildly tachycardic, no respiratory distress, no specific midline tenderness but has some tender muscle spasm in the lower lumbar paraspinals, muscle strength 5/5 bilateral lower extremities with sensation intact    Plan-x-ray lumbar spine, change muscle relaxer to Valium, follow-up comprehensive spine    ED Course         Critical Care Time  Procedures

## 2022-07-05 ENCOUNTER — APPOINTMENT (OUTPATIENT)
Dept: PHYSICAL THERAPY | Facility: CLINIC | Age: 42
End: 2022-07-05
Payer: COMMERCIAL

## 2022-07-05 DIAGNOSIS — M54.42 ACUTE MIDLINE LOW BACK PAIN WITH BILATERAL SCIATICA: Primary | ICD-10-CM

## 2022-07-05 DIAGNOSIS — M54.41 ACUTE MIDLINE LOW BACK PAIN WITH BILATERAL SCIATICA: Primary | ICD-10-CM

## 2022-07-05 NOTE — TELEPHONE ENCOUNTER
Patient was seen in [facility] ER on 7/1/22 for acute back pain  Please call patient to check on her symptoms  Recommend to continue PT, consider pain management evaluation if continues with pain

## 2022-07-05 NOTE — TELEPHONE ENCOUNTER
Patients states that she is in a lot of pain on scale from 1 - 10 she grades it about a 5 with medications  She'd like to be evaluated by pain management

## 2022-07-06 ENCOUNTER — TELEPHONE (OUTPATIENT)
Dept: FAMILY MEDICINE CLINIC | Facility: CLINIC | Age: 42
End: 2022-07-06

## 2022-07-06 ENCOUNTER — OFFICE VISIT (OUTPATIENT)
Dept: PHYSICAL THERAPY | Facility: CLINIC | Age: 42
End: 2022-07-06
Payer: COMMERCIAL

## 2022-07-06 DIAGNOSIS — M54.9 BACK PAIN: ICD-10-CM

## 2022-07-06 DIAGNOSIS — M54.50 ACUTE BILATERAL LOW BACK PAIN, UNSPECIFIED WHETHER SCIATICA PRESENT: Primary | ICD-10-CM

## 2022-07-06 PROCEDURE — 97112 NEUROMUSCULAR REEDUCATION: CPT | Performed by: PHYSICAL THERAPIST

## 2022-07-06 PROCEDURE — 97140 MANUAL THERAPY 1/> REGIONS: CPT | Performed by: PHYSICAL THERAPIST

## 2022-07-06 RX ORDER — DIAZEPAM 5 MG/1
TABLET ORAL
Qty: 30 TABLET | Refills: 1 | Status: SHIPPED | OUTPATIENT
Start: 2022-07-06 | End: 2022-08-16 | Stop reason: ALTCHOICE

## 2022-07-06 NOTE — TELEPHONE ENCOUNTER
Pt has discontinued the use of her flexeril  She has been taking valium per ED which she was only prescribed 7 so she only takes 1 at night

## 2022-07-06 NOTE — PROGRESS NOTES
Daily Note     Today's date: 2022  Patient name: Prabhjot Malagon  : 1980  MRN: [id number]  Referring provider: Aleida Reynolds, PT  Dx:   Encounter Diagnosis     ICD-10-CM    1  Acute bilateral low back pain, unspecified whether sciatica present  M54 50                   Subjective: pt reports that on 22 she experienced a sig increase in pain and went to the ED  Reports that she has been tx with valium that she takes at night that helps her to sleep  Reports 6/10 pain upon arrival PT today  Objective: See treatment diary below      Assessment: pt has good tolerance to manual tx and therex with no sig increase in pain noted post-tx  Plan: Continue per plan of care  Precautions: fibromyalgia, acute on chronic lumbar pain      Manuals            Lumbar pa's NA kl           Consider mechanical traction             SIJ MET             DTM  B/L umbar parapsinals   KL           Neuro Re-Ed             TaA 20x5" 5"x10           TaA bridge 20x5" with glut set instead of bridge due to pain            Prone hip ext 10x B            TaA LPD             TaA with hooklying add squeeze 20x5"             TaA with BKFO 1x10 ea side            Pelvic rocking  x20           pball press  5"x10                                                               Ther Ex             Prone on elbows x2'             glute stretch  manual           HS stretch  manual           SKTC 5x10" manual           LTR 10x B  5"x20                                                  Ther Activity             bike 5'                         Gait Training                                       Modalities             prn  Cp 10' in sitting

## 2022-07-06 NOTE — TELEPHONE ENCOUNTER
Please call patient to clarify how she takes Diazepam 5 mg   She should not take both Diazepam and Flexeril as prescribed before

## 2022-07-06 NOTE — TELEPHONE ENCOUNTER
Patient is scheduled with Pain Management 8/11/22 and will be out of diazepam 5 mg tomorrow  She is requesting a refill to [facility], [address] [facility] to hold her until the appointment  She can be contacted at [phone]

## 2022-07-07 ENCOUNTER — OFFICE VISIT (OUTPATIENT)
Dept: PHYSICAL THERAPY | Facility: CLINIC | Age: 42
End: 2022-07-07
Payer: COMMERCIAL

## 2022-07-07 DIAGNOSIS — M54.50 ACUTE BILATERAL LOW BACK PAIN, UNSPECIFIED WHETHER SCIATICA PRESENT: Primary | ICD-10-CM

## 2022-07-07 DIAGNOSIS — M54.10 RADICULOPATHY, UNSPECIFIED SPINAL REGION: ICD-10-CM

## 2022-07-07 PROCEDURE — 97112 NEUROMUSCULAR REEDUCATION: CPT

## 2022-07-07 PROCEDURE — 97140 MANUAL THERAPY 1/> REGIONS: CPT

## 2022-07-07 PROCEDURE — 97110 THERAPEUTIC EXERCISES: CPT

## 2022-07-07 NOTE — PROGRESS NOTES
Daily Note     Today's date: 2022  Patient name: Pati Cohn  : 1980  MRN: [id number]  Referring provider: Nelda Johnson, PT  Dx:   Encounter Diagnosis     ICD-10-CM    1  Acute bilateral low back pain, unspecified whether sciatica present  M54 50    2  Radiculopathy, unspecified spinal region  M54 10        Start Time: 08  Stop Time: 151  Total time in clinic (min): 52 minutes       Subjective: Patient reports she felt okay after yesterday's PT session until later in the afternoon - report using ice helped to relieve back pain  Patient reports she is feeling "tight everywhere" prior to this mornings PT session  Patient reports she is scheduled to have an MRI next Friday  Objective: See treatment diary below  Assessment: Treatment is tolerated well  Patient would benefit from continued PT to reduce back pain to improve function  Plan: Continue treatment as per PT plan of care         Precautions: fibromyalgia, acute on chronic lumbar pain      Manuals           Lumbar pa's NA kl KL          Consider mechanical traction             SIJ MET             DTM  B/L lumbar parapsinalsKL JLW                       Neuro Re-Ed             TaA 20x5" 5"x10 5"x10          TaA bridge 20x5" with glut set instead of bridge due to pain            Prone hip ext 10x B            TaA LPD             TaA with hooklying add squeeze 20x5"   5"x20          TaA with BKFO 1x10 ea side  5"x10 ea          Pelvic rocking  x20           pball press  5"x10                                                               Ther Ex             Prone on elbows x2'             glute stretch  manual manual          HS stretch  manual manual          SKTC 5x10" manual manual          LTR 10x B  5"x20 5"x20          bike   recum  5'                                    Ther Activity             bike 5'                         Gait Training                                       Modalities             prn  CP 10' in sitting CP 10' prone

## 2022-07-12 ENCOUNTER — OFFICE VISIT (OUTPATIENT)
Dept: PHYSICAL THERAPY | Facility: CLINIC | Age: 42
End: 2022-07-12
Payer: COMMERCIAL

## 2022-07-12 DIAGNOSIS — M54.10 RADICULOPATHY, UNSPECIFIED SPINAL REGION: ICD-10-CM

## 2022-07-12 DIAGNOSIS — M54.50 ACUTE BILATERAL LOW BACK PAIN, UNSPECIFIED WHETHER SCIATICA PRESENT: Primary | ICD-10-CM

## 2022-07-12 PROCEDURE — 97140 MANUAL THERAPY 1/> REGIONS: CPT

## 2022-07-12 PROCEDURE — 97112 NEUROMUSCULAR REEDUCATION: CPT

## 2022-07-12 PROCEDURE — 97110 THERAPEUTIC EXERCISES: CPT

## 2022-07-12 NOTE — PROGRESS NOTES
Daily Note     Today's date: 2022  Patient name: Luisito Mccarthy  : 1980   MRN: [id number]  Referring provider: Kennedy Fajardo, PT  Dx:   Encounter Diagnosis     ICD-10-CM    1  Acute bilateral low back pain, unspecified whether sciatica present  M54 50    2  Radiculopathy, unspecified spinal region  M54 10        Start Time: 0848  Stop Time: 931  Total time in clinic (min): 43 minutes       Subjective: Patient states, "Today has been the best day so far "  Patient reports she is scheduled for an MRI on Friday  Objective: See treatment diary below  Assessment: Treatment is tolerated well  Less soft tissue restriction felt in lumbar paraspinals during DTM today  Plan: Continue treatment as per PT plan of care         Precautions: fibromyalgia, acute on chronic lumbar pain      Manuals          Lumbar pa's NA kl KL FH         Consider mechanical traction             SIJ MET             DTM  B/L lumbar parapsinalsKL JLW JLW                      Neuro Re-Ed             TaA 20x5" 5"x10 5"x10 5"x10         TaA bridge 20x5" with glut set instead of bridge due to pain            Prone hip ext 10x B            TaA LPD             TaA with hooklying add squeeze 20x5"   5"x20 5"x20         TaA with BKFO 1x10 ea side  5"x10 ea 5"x20 ea         Pelvic rocking  x20           pball press  5"x10                                                               Ther Ex             Prone on elbows x2'             glute stretch  manual manual manual         HS stretch  manual manual manual         SKTC 5x10" manual manual manual         LTR 10x B  5"x20 5"x20 5"x20         bike   recum  5' upright  6'                                   Ther Activity             bike 5'                         Gait Training                                       Modalities             prn  CP 10' in sitting CP 10' prone CP 10' prone

## 2022-07-14 ENCOUNTER — OFFICE VISIT (OUTPATIENT)
Dept: PHYSICAL THERAPY | Facility: CLINIC | Age: 42
End: 2022-07-14
Payer: COMMERCIAL

## 2022-07-14 DIAGNOSIS — M54.10 RADICULOPATHY, UNSPECIFIED SPINAL REGION: ICD-10-CM

## 2022-07-14 DIAGNOSIS — M54.50 ACUTE BILATERAL LOW BACK PAIN, UNSPECIFIED WHETHER SCIATICA PRESENT: Primary | ICD-10-CM

## 2022-07-14 PROCEDURE — 97112 NEUROMUSCULAR REEDUCATION: CPT | Performed by: PHYSICAL THERAPIST

## 2022-07-14 PROCEDURE — 97530 THERAPEUTIC ACTIVITIES: CPT | Performed by: PHYSICAL THERAPIST

## 2022-07-14 PROCEDURE — 97110 THERAPEUTIC EXERCISES: CPT | Performed by: PHYSICAL THERAPIST

## 2022-07-14 PROCEDURE — 97140 MANUAL THERAPY 1/> REGIONS: CPT | Performed by: PHYSICAL THERAPIST

## 2022-07-14 NOTE — PROGRESS NOTES
Daily Note     Today's date: 2022  Patient name: Prabhjot Malagon  : 1980  MRN: [id number]  Referring provider: Aleida Reynolds, PT  Dx:   Encounter Diagnosis     ICD-10-CM    1  Acute bilateral low back pain, unspecified whether sciatica present  M54 50    2  Radiculopathy, unspecified spinal region  M54 10                   Subjective: 5/10 generalized back discomfort verbalized today  Objective: See treatment diary below      Assessment: Tolerated treatment well  Patient demonstrated fatigue post treatment and would benefit from continued PT as per primary PT's POC  No worsening of symptoms post tx  Plan: Continue per plan of care  Progress treatment as tolerated         Precautions: fibromyalgia, acute on chronic lumbar pain      Manuals         Lumbar pa's NA kl KL  SZ        Consider mechanical traction             SIJ MET             DTM  B/L lumbar parapsinalsKL JLW JLW                      Neuro Re-Ed             TaA 20x5" 5"x10 5"x10 5"x10 5'x20        TaA bridge 20x5" with glut set instead of bridge due to pain    20x5" w VC's        Prone hip ext 10x B    10x2        TaA LPD             TaA with hooklying add squeeze 20x5"   5"x20 5"x20         TaA with BKFO 1x10 ea side  5"x10 ea 5"x20 ea         Pelvic rocking  x20           pball press  5"x10                                                               Ther Ex             Prone on elbows x2'     3' on/off        glute stretch  manual manual manual         HS stretch  manual manual manual         SKTC 5x10" manual manual manual         LTR 10x B  5"x20 5"x20 5"x20 10x2, 5" ea        bike   recum  5' upright  6' rec bike/L-roll 10'        Prone B scap retr     10x                     Ther Activity             bike 5'    10' w L-roll/posture        abdominal breathing     10x3"        Gait Training                                       Modalities             prn  CP 10' in sitting CP 10' prone CP 10' prone

## 2022-07-15 ENCOUNTER — TELEMEDICINE (OUTPATIENT)
Dept: BEHAVIORAL/MENTAL HEALTH CLINIC | Facility: CLINIC | Age: 42
End: 2022-07-15
Payer: COMMERCIAL

## 2022-07-15 DIAGNOSIS — F41.1 GENERALIZED ANXIETY DISORDER: ICD-10-CM

## 2022-07-15 DIAGNOSIS — F33.1 MAJOR DEPRESSIVE DISORDER, RECURRENT EPISODE, MODERATE (HCC): Primary | ICD-10-CM

## 2022-07-15 PROCEDURE — 90832 PSYTX W PT 30 MINUTES: CPT | Performed by: PSYCHIATRY & NEUROLOGY

## 2022-07-15 NOTE — PSYCH
Session time 9026-0757 (total time 35 minutes)    Virtual Regular Visit    Verification of patient location:    Patient is located in the following state in which I hold an active license [place]      Assessment/Plan:    Problem List Items Addressed This Visit        Other    Major depressive disorder, recurrent episode, moderate ([address] ) - Primary    Generalized anxiety disorder          Goals addressed in session: Goal 1          Reason for visit is   Chief Complaint   Patient presents with    Virtual Regular Visit        Encounter provider HERMELINDA Hough    Provider located at [address] [place] [id number] [phone]      Recent Visits  No visits were found meeting these conditions  Showing recent visits within past 7 days and meeting all other requirements  Future Appointments  No visits were found meeting these conditions  Showing future appointments within next 150 days and meeting all other requirements       The patient was identified by name and date of birth  Lety Alvarado was informed that this is a telemedicine visit and that the visit is being conducted through[employer] and patient was informed that this is a secure, HIPAA-compliant platform  She agrees to proceed     My office door was closed  No one else was in the room  She acknowledged consent and understanding of privacy and security of the video platform  The patient has agreed to participate and understands they can discontinue the visit at any time  Patient is aware this is a billable service  Subjective  Lety Alvarado is a 38 y o  female presenting for follow up   Hari Fernandes shared that she has had a lot happening the last few weeks, and has needed to talk it through with someone "objective "  She talked about excruciating back pain and the ensuing treatment (pt, valium, family med visits and ED visits), and trying to manage that pain and deal with daily activities at the same time  She also shared that her cat is very sick and is preparing for him to die (has had him 17 years)  She also described two very significant, unexpected losses for her friend Torrie Novak, and shared her concern for his mental well-being  She said that she is worried about him, wants to check in with him regularly to make sure he is ok, but also does not want to be too intrusive  Discussed all of her concerns, how she might express her concern for Balbina and be a support, while at the same time reaching out for support herself  She noted that she is journaling every night, is keeping up with pt exercises and appointments to try to manage pain, and has been talking to her parents and brother a lot  She said that for the most part, her mood has been "pretty good, it could be better but it's not bad," and feels that overall she is coping better than she would have expected  Encouraged Nicholas May to continue to prioritize self-care and coping strategies, as well as reaching out for support as needed, and to focus on positive events such as relationship with younger brother and his family improving with parents (having a family cookout in a week) after years of not seeing each other  A: Nicholas May presented as "stressed," but her affect was brighter and more animated than in the past; her eye contact was good, behavior calm  Her concentration was intact, thought process logical and organized  Insight and judgment intact  Denies SI HI and psychosis  Continued progress toward goals  Gricelda Silva will continue to focus on self-care and utilizing helpful coping strategies, will keep positive events in mind to help boost mood, and will follow up in two weeks as scheduled        HPI     Past Medical History:   Diagnosis Date    Anxiety     Chronic sinusitis     Depression     Fibromyalgia     Migraine     Obesity     Polyarthritis     Last assessed 9/21/2015   [facility] Psychiatric disorder        Past Surgical History:   Procedure Laterality Date    COLONOSCOPY  06/2019    DENTAL SURGERY      HYSTEROSCOPY      Endometrial Biopsy By Hysteroscopy    REMOVAL OF INTRAUTERINE DEVICE (IUD)      US GUIDED BREAST BIOPSY RIGHT COMPLETE Right 6/17/2019       Current Outpatient Medications   Medication Sig Dispense Refill    Calcium Carbonate-Vitamin D (CALCIUM 500/D PO) Take 1 capsule by mouth daily      celecoxib (CeleBREX) 200 mg capsule TAKE 1 CAPSULE BY MOUTH TWICE A DAY 60 capsule 6    Cholecalciferol (VITAMIN D-3) 1000 units CAPS Take 1 capsule by mouth daily      desvenlafaxine (PRISTIQ) 100 mg 24 hr tablet TAKE 1 TABLET BY MOUTH EVERY DAY 30 tablet 2    desvenlafaxine succinate (PRISTIQ) 50 mg 24 hr tablet Take 1 tablet (50 mg total) by mouth daily Total daily dose 150 mg daily 30 tablet 2    diazepam (VALIUM) 5 mg tablet Take 1 tab  twice daily PRN for back muscle spasms 30 tablet 1    hydrOXYzine HCL (ATARAX) 50 mg tablet TAKE 1 TABLET (50 MG TOTAL) BY MOUTH DAILY AT BEDTIME AS NEEDED (INSOMNIA) 30 tablet 2    IRON PO Take by mouth      lidocaine (LIDODERM) 5 % Apply 1 patch topically in the morning  Remove & Discard patch within 12 hours or as directed by MD  30 patch 1    lidocaine (Lidoderm) 5 % Apply 1 patch topically daily Remove & Discard patch within 12 hours or as directed by MD 15 patch 0    LORazepam (ATIVAN) 1 mg tablet TAKE 1 TABLET BY MOUTH EVERY 8 HOURS AS NEEDED FOR ANXIETY 90 tablet 2    MAGNESIUM OXIDE PO Take by mouth      naproxen (Naprosyn) 500 mg tablet Take 1 tablet (500 mg total) by mouth 2 (two) times a day with meals for 7 days 14 tablet 0    nortriptyline (PAMELOR) 75 MG capsule TAKE 1 CAPSULE BY MOUTH TWICE A  capsule 0    propranolol (INDERAL) 20 mg tablet TAKE 1 TABLET BY MOUTH EVERY DAY AT NIGHT 30 tablet 5     No current facility-administered medications for this visit          Allergies   Allergen Reactions    Amoxicillin-Pot Clavulanate     Decadrol [Dexamethasone] Other (See Comments)     Category: Adverse Reaction;   psychosis    Dexamethasone Sodium Phosphate     Other     Penicillins Hives and Other (See Comments)     Category: Allergy; Hives/Uticaria    Tetracycline Hives and Other (See Comments)     Hives/Uticaria    Tetracyclines & Related Hives     Category: Allergy; Review of Systems    Video Exam    There were no vitals filed for this visit  Physical Exam     I spent 35 minutes directly with the patient during this visit    VIRTUAL VISIT DISCLAIMER    Sheldon Kumar verbally agrees to participate in [employer]  Pt is aware that [employer] could be limited without vital signs or the ability to perform a full hands-on physical exam  Esther Griffith understands she or the provider may request at any time to terminate the video visit and request the patient to seek care or treatment in person

## 2022-07-19 ENCOUNTER — OFFICE VISIT (OUTPATIENT)
Dept: PHYSICAL THERAPY | Facility: CLINIC | Age: 42
End: 2022-07-19
Payer: COMMERCIAL

## 2022-07-19 DIAGNOSIS — M54.10 RADICULOPATHY, UNSPECIFIED SPINAL REGION: ICD-10-CM

## 2022-07-19 DIAGNOSIS — M54.50 ACUTE BILATERAL LOW BACK PAIN, UNSPECIFIED WHETHER SCIATICA PRESENT: Primary | ICD-10-CM

## 2022-07-19 PROCEDURE — 97530 THERAPEUTIC ACTIVITIES: CPT | Performed by: PHYSICAL THERAPIST

## 2022-07-19 PROCEDURE — 97140 MANUAL THERAPY 1/> REGIONS: CPT | Performed by: PHYSICAL THERAPIST

## 2022-07-19 PROCEDURE — 97112 NEUROMUSCULAR REEDUCATION: CPT | Performed by: PHYSICAL THERAPIST

## 2022-07-19 PROCEDURE — 97110 THERAPEUTIC EXERCISES: CPT | Performed by: PHYSICAL THERAPIST

## 2022-07-19 NOTE — PROGRESS NOTES
Daily Note     Today's date: 2022  Patient name: Taty Orozco  : 1980  MRN: [id number]  Referring provider: Saad Munroe, PT  Dx:   Encounter Diagnosis     ICD-10-CM    1  Acute bilateral low back pain, unspecified whether sciatica present  M54 50    2  Radiculopathy, unspecified spinal region  M54 10        Start Time: 1055  Stop Time: 1140  Total time in clinic (min): 45 minutes    Subjective: Pt reports she hasn't had much pain since last visit and is actually feeling pretty good  Pt also reports that she feels looser today  Objective: See treatment diary below      Assessment: Pt had some tightness and muscle guarding with lumbar CPA's, so resumed DTM to lumbar paraspinals to decrease muscle tension which pt tolerated well and subjectively reported feeling looser  Pt tolerated manual tx well post-DTM, so plan to continue as needed  Pt able to complete therex as listed with no increases in pain  Continue progressing therex and decreasing manual tx as pt's pain/irritability decreases  Plan: Continue per plan of care  Progress treatment as tolerated         Precautions: fibromyalgia, acute on chronic lumbar pain      Manuals        Lumbar pa's NA kl KL FH SZ SG, grade II       Consider mechanical traction             SIJ MET             DTM  B/L lumbar parapsinalsKL JLW JLW  SG                    Neuro Re-Ed             TaA 20x5" 5"x10 5"x10 5"x10 5'x20 5"x20       TaA bridge 20x5" with glut set instead of bridge due to pain    20x5" w VC's 20x5"       Prone hip ext 10x B    10x2 x10ea       TaA LPD             TaA with hooklying add squeeze 20x5"   5"x20 5"x20         TaA with BKFO 1x10 ea side  5"x10 ea 5"x20 ea         Pelvic rocking  x20           pball press  5"x10                                                               Ther Ex             Prone on elbows x2'     3' on/off        glute stretch  manual manual manual  30"x3       HS stretch  manual manual manual  30"x3       SKTC 5x10" manual manual manual         LTR 10x B  5"x20 5"x20 5"x20 10x2, 5" ea        bike   recum  5' upright  6' rec bike/L-roll 10'        Prone B scap retr     10x                     Ther Activity             bike 5'    10' w L-roll/posture 8' upright       abdominal breathing     10x3"        Gait Training                                       Modalities             prn  CP 10' in sitting CP 10' prone CP 10' prone                        Treatment performed by  Edelmira Orantes SPT, I attest that this treatment was directly supervised by Justin KELLYT

## 2022-07-22 ENCOUNTER — OFFICE VISIT (OUTPATIENT)
Dept: PHYSICAL THERAPY | Facility: CLINIC | Age: 42
End: 2022-07-22
Payer: COMMERCIAL

## 2022-07-22 DIAGNOSIS — M54.10 RADICULOPATHY, UNSPECIFIED SPINAL REGION: ICD-10-CM

## 2022-07-22 DIAGNOSIS — M54.50 ACUTE BILATERAL LOW BACK PAIN, UNSPECIFIED WHETHER SCIATICA PRESENT: Primary | ICD-10-CM

## 2022-07-22 PROCEDURE — 97140 MANUAL THERAPY 1/> REGIONS: CPT | Performed by: PHYSICAL THERAPIST

## 2022-07-22 PROCEDURE — 97110 THERAPEUTIC EXERCISES: CPT

## 2022-07-22 PROCEDURE — 97140 MANUAL THERAPY 1/> REGIONS: CPT

## 2022-07-22 NOTE — PROGRESS NOTES
Daily Note     Today's date: 2022  Patient name: Skyla Morris  : 1980  MRN: [id number]  Referring provider: Snehal Carbone, PT  Dx:   Encounter Diagnosis     ICD-10-CM    1  Acute bilateral low back pain, unspecified whether sciatica present  M54 50    2  Radiculopathy, unspecified spinal region  M54 10                   Subjective: Patient reports tightness today no sx radiating down her legs  Objective: See treatment diary below      Assessment: Tolerated treatment well  Patient would benefit from continued PT      Plan: Continue per plan of care        Precautions: fibromyalgia, acute on chronic lumbar pain      Manuals       Lumbar pa's NA kl KL FH SZ SG, grade II SG grade III      Consider mechanical traction             SIJ MET             DTM  B/L lumbar parapsinalsKL JLW JLW  SG VELASQUEZ                   Neuro Re-Ed             TaA 20x5" 5"x10 5"x10 5"x10 5'x20 5"x20 5"x20      TaA bridge 20x5" with glut set instead of bridge due to pain    20x5" w VC's 20x5" 5"x20      Prone hip ext 10x B    10x2 x10ea x15 ea      TaA LPD             TaA with hooklying add squeeze 20x5"   5"x20 5"x20         TaA with BKFO 1x10 ea side  5"x10 ea 5"x20 ea         Pelvic rocking  x20           pball press  5"x10                                                               Ther Ex             Prone on elbows x2'     3' on/off        glute stretch  manual manual manual  30"x3 30"x3      HS stretch  manual manual manual  30"x3 30"x3      SKTC 5x10" manual manual manual         LTR 10x B  5"x20 5"x20 5"x20 10x2, 5" ea        bike   recum  5' upright  6' rec bike/L-roll 10'        Prone B scap retr     10x                     Ther Activity             bike 5'    10' w L-roll/posture 8' upright 8' upright      abdominal breathing     10x3"        Gait Training                                       Modalities             prn  CP 10' in sitting CP 10' prone CP 10' prone

## 2022-07-26 ENCOUNTER — APPOINTMENT (OUTPATIENT)
Dept: PHYSICAL THERAPY | Facility: CLINIC | Age: 42
End: 2022-07-26
Payer: COMMERCIAL

## 2022-07-28 ENCOUNTER — OFFICE VISIT (OUTPATIENT)
Dept: PHYSICAL THERAPY | Facility: CLINIC | Age: 42
End: 2022-07-28
Payer: COMMERCIAL

## 2022-07-28 DIAGNOSIS — M54.50 ACUTE BILATERAL LOW BACK PAIN, UNSPECIFIED WHETHER SCIATICA PRESENT: Primary | ICD-10-CM

## 2022-07-28 DIAGNOSIS — M54.10 RADICULOPATHY, UNSPECIFIED SPINAL REGION: ICD-10-CM

## 2022-07-28 PROCEDURE — 97112 NEUROMUSCULAR REEDUCATION: CPT | Performed by: PHYSICAL THERAPIST

## 2022-07-28 PROCEDURE — 97110 THERAPEUTIC EXERCISES: CPT | Performed by: PHYSICAL THERAPIST

## 2022-07-28 PROCEDURE — 97140 MANUAL THERAPY 1/> REGIONS: CPT | Performed by: PHYSICAL THERAPIST

## 2022-07-28 NOTE — PROGRESS NOTES
Daily Note     Today's date: 2022  Patient name: Kaleigh Cates  : 1980  MRN: [id number]  Referring provider: Reese Younger, PT  Dx:   Encounter Diagnosis     ICD-10-CM    1  Acute bilateral low back pain, unspecified whether sciatica present  M54 50    2  Radiculopathy, unspecified spinal region  M54 10                   Subjective: "I am stiff today " c/o pain 7/10 in LB today  Objective: See treatment diary below      Assessment: Tolerated treatment well  Patient exhibited good technique with therapeutic exercises, no worsening of discomfort post tx voiced  Pt is able to perform all therex/HEP review safely  Plan: Continue per plan of care  Progress note during next visit        Precautions: fibromyalgia, acute on chronic lumbar pain      Manuals      Lumbar pa's NA kl KL FH SZ SG, grade II SG grade III SZ   Gr  III     Consider mechanical traction             SIJ MET             DTM  B/L lumbar parapsinalsKL JLW JLW  SG VELASQUEZ SZ                  Neuro Re-Ed             TaA 20x5" 5"x10 5"x10 5"x10 5'x20 5"x20 5"x20 5"x20     TaA bridge 20x5" with glut set instead of bridge due to pain    20x5" w VC's 20x5" 5"x20 5"x20     Prone hip ext 10x B    10x2 x10ea x15 ea      TaA LPD             TaA with hooklying add squeeze 20x5"   5"x20 5"x20         TaA with BKFO 1x10 ea side  5"x10 ea 5"x20 ea         Pelvic rocking  x20           pball press  5"x10                                                               Ther Ex             Prone on elbows x2'     3' on/off        glute stretch  manual manual manual  30"x3 30"x3 3x30"     HS stretch  manual manual manual  30"x3 30"x3 3x30"     SKTC 5x10" manual manual manual         LTR 10x B  5"x20 5"x20 5"x20 10x2, 5" ea   10x2 5" ea     bike   recum  5' upright  6' rec bike/L-roll 10'        Prone B scap retr     10x                     Ther Activity             bike 5'    10' w L-roll/posture 8' upright 8' upright 10' upright/posture     abdominal breathing     10x3"        Gait Training                                       Modalities             prn  CP 10' in sitting CP 10' prone CP 10' prone    biofreeze use to LB applied

## 2022-07-29 ENCOUNTER — TELEMEDICINE (OUTPATIENT)
Dept: BEHAVIORAL/MENTAL HEALTH CLINIC | Facility: CLINIC | Age: 42
End: 2022-07-29
Payer: COMMERCIAL

## 2022-07-29 DIAGNOSIS — F33.1 MAJOR DEPRESSIVE DISORDER, RECURRENT EPISODE, MODERATE (HCC): Primary | ICD-10-CM

## 2022-07-29 DIAGNOSIS — F33.2 SEVERE RECURRENT MAJOR DEPRESSION WITHOUT PSYCHOTIC FEATURES (HCC): ICD-10-CM

## 2022-07-29 PROCEDURE — 90834 PSYTX W PT 45 MINUTES: CPT | Performed by: PSYCHIATRY & NEUROLOGY

## 2022-07-29 NOTE — BH TREATMENT PLAN
Jana Lino  1980        Date of Initial Treatment Plan:  8/21/2018   Date of Current Treatment Plan: 07/29/22    Treatment Plan Number  10    Strengths/Personal Resources for Self Care: " witty, smart, funny creative, caring, resourceful, good hair;"  Diagnosis:   1  Major depressive disorder, recurrent episode, moderate ([facility] [address] )     2  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 psychotic features ([facility] )         Area of Needs: depression/anxiety/chronic pain    Long Term Goal 1: AI want to continue to effectively manage the depression and the anxiety  Target Date: 11/29/2022  Completion Date: n/a         Short Term Objectives for Goal 1: A I will continue to be aware of the depression/anxiety and negative self talk messages and redirect to positive and realistic messages (lower depression/anxiety)  B I will continue to work with Dr Dee Isabel on medication management  C I will continue to make sure I am taking care of myself  D: I will continue to reach out to friends to stay socially connected E: I will contact [employer] to work on building skills and possibly looking into a part time job when I feel ready  Long Term Goal 2: I will continue to work on strengthening my self esteem  Target Date: 11/29/2022  Completion Date: N/A    Short Term Objectives for Goal 2: AI will remain aware of those who have impacted me positively and disregard the messages from those who were negative influences  B  I will review and use my qualities/strengths/assets as reminders of my worth  C  I will continue with self care (including using my effective communication skills such as assertiveness)  D  I will continue to explore options to improve my quality of life  Long Term Goal # 3: I will continue to learn and implement effective pain management strategies  Target Date: 11/29/2022  Completion Date: N/A    Short Term Objectives for Goal 3: AI will learn and practice effective pain management strategies   B  I will explore yoga as a possible pain management strategy C: I will walk and be more physically active as much as I am able  GOAL 1: Modality: Individual 2x per month   Completion Date n/a and The person(s) responsible for carrying out the plan is  Esther    GOAL 2: Modality: Individual 2x per month   Completion Date n/a and The person(s) responsible for carrying out the plan is  Esther     GOAL 3: Modality: Individual 2x per month   Completion Date n/a and The person(s) responsible for carrying out the plan is  Kelton Sanchez Treatment Plan [facility]: Diagnosis and Treatment Plan explained to Benna Lefort relates understanding diagnosis and is agreeable to Treatment Plan  yes      Client Comments : Please share your thoughts, feelings, need and/or experiences regarding your treatment plan: n/a    Rex Wood, 1980, actively participated in the review and update of this treatment plan during a virtual session, using the [employer] platform  Rex Wood  provided verbal consent on 7/29/2022 at 1339 PM  The treatment plan was transcribed into the [employer] at a later time

## 2022-07-29 NOTE — PSYCH
Session time [id number] (total time 40 minutes)    Virtual Regular Visit    Verification of patient location:    Patient is located in the following state in which I hold an active license [place]      Assessment/Plan:    Problem List Items Addressed This Visit        Other    Major depressive disorder, recurrent episode, moderate ([facility] [address] ) - Primary    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 psychotic features ([facility] [address] )          Goals addressed in session: Goal 1, 2 and 3         Reason for visit is   Chief Complaint   Patient presents with    Virtual Regular Visit        Encounter provider HERMELINDA Horne    Provider located at [address] [place] [id number] [phone]      Recent Visits  No visits were found meeting these conditions  Showing recent visits within past 7 days and meeting all other requirements  Today's Visits  Date Type Provider Dept   07/29/22 Telemedicine HERMELINDA [facility] Therapist [facility]   Showing today's visits and meeting all other requirements  Future Appointments  No visits were found meeting these conditions  Showing future appointments within next 150 days and meeting all other requirements       The patient was identified by name and date of birth  Maryanne Sprague was informed that this is a telemedicine visit and that the visit is being conducted through[employer] and patient was informed that this is a secure, HIPAA-compliant platform  She agrees to proceed     My office door was closed  No one else was in the room  She acknowledged consent and understanding of privacy and security of the video platform  The patient has agreed to participate and understands they can discontinue the visit at any time  Patient is aware this is a billable service  Subjective  Maryanne Sprague is a 38 y o  female presenting for follow up   Irene Catherine shared that she has had a lot going on the last few weeks, including having to put her cat down, attending the  of her friend Balbina's step father, as well as one for his brother who  suddenly days after the step father's    Alka Ortega said that it has been an emotional roller coaster with the losses and worry for Bettey Carrel and his family, but she has been trying to be a support for them, which has made her realize that she can cope and be a good support, which she was not sure she was capable of before this  She said that while her mood has been "all over the place" due to recent events, she is coping relatively well overall  She increased her Pristiq recently and feels that it has been helpful  She talked about some positive experiences, such as having a cookout with her younger brother and his family, which went very well  She is also considering volunteering at the [facility] to have something to do, and is looking to connect with a resource to help build and refine skills and possibly look into part time work at some point  Discussed [employer] and the services they provide, and Alka Ortega said that she would look into that  Provided supportive therapy as well as CBT strategies to help Alka Ortega continue to build confidence and recognize her strengths and ability to handle more than she believes she can  A: Alka Ortega presented as "ok" today, with a relatively bright affect considering the losses she has experienced lately  Her eye contact was good, behavior calm, speech normal volume, rate and fluency  Concentration was mildly impaired, thought process logical and organized  Insight and judgment intact  Denies SI HI and psychosis  Some moderate progress toward goals  Sydney Berenice will continue to stay socially connected, will focus on how well she has been coping and able to give support to others, and will look into [employer] as a resource  She will return in two weeks for follow up as scheduled        HPI     Past Medical History:   Diagnosis Date    Anxiety     Chronic sinusitis     Depression     Fibromyalgia     Migraine     Obesity     Polyarthritis     Last assessed 9/21/2015    Psychiatric disorder        Past Surgical History:   Procedure Laterality Date    COLONOSCOPY  06/2019    DENTAL SURGERY      HYSTEROSCOPY      Endometrial Biopsy By Hysteroscopy    REMOVAL OF INTRAUTERINE DEVICE (IUD)      US GUIDED BREAST BIOPSY RIGHT COMPLETE Right 6/17/2019       Current Outpatient Medications   Medication Sig Dispense Refill    Calcium Carbonate-Vitamin D (CALCIUM 500/D PO) Take 1 capsule by mouth daily      celecoxib (CeleBREX) 200 mg capsule TAKE 1 CAPSULE BY MOUTH TWICE A DAY 60 capsule 6    Cholecalciferol (VITAMIN D-3) 1000 units CAPS Take 1 capsule by mouth daily      desvenlafaxine (PRISTIQ) 100 mg 24 hr tablet TAKE 1 TABLET BY MOUTH EVERY DAY 30 tablet 2    desvenlafaxine succinate (PRISTIQ) 50 mg 24 hr tablet Take 1 tablet (50 mg total) by mouth daily Total daily dose 150 mg daily 30 tablet 2    diazepam (VALIUM) 5 mg tablet Take 1 tab  twice daily PRN for back muscle spasms 30 tablet 1    hydrOXYzine HCL (ATARAX) 50 mg tablet TAKE 1 TABLET (50 MG TOTAL) BY MOUTH DAILY AT BEDTIME AS NEEDED (INSOMNIA) 30 tablet 2    IRON PO Take by mouth      lidocaine (LIDODERM) 5 % Apply 1 patch topically in the morning   Remove & Discard patch within 12 hours or as directed by MD  30 patch 1    lidocaine (Lidoderm) 5 % Apply 1 patch topically daily Remove & Discard patch within 12 hours or as directed by MD 15 patch 0    LORazepam (ATIVAN) 1 mg tablet TAKE 1 TABLET BY MOUTH EVERY 8 HOURS AS NEEDED FOR ANXIETY 90 tablet 2    MAGNESIUM OXIDE PO Take by mouth      naproxen (Naprosyn) 500 mg tablet Take 1 tablet (500 mg total) by mouth 2 (two) times a day with meals for 7 days 14 tablet 0    nortriptyline (PAMELOR) 75 MG capsule TAKE 1 CAPSULE BY MOUTH TWICE A  capsule 0    propranolol (INDERAL) 20 mg tablet TAKE 1 TABLET BY MOUTH EVERY DAY AT NIGHT 30 tablet 5     No current facility-administered medications for this visit  Allergies   Allergen Reactions    Amoxicillin-Pot Clavulanate     Decadrol [Dexamethasone] Other (See Comments)     Category: Adverse Reaction;   psychosis    Dexamethasone Sodium Phosphate     Other     Penicillins Hives and Other (See Comments)     Category: Allergy; Hives/Uticaria    Tetracycline Hives and Other (See Comments)     Hives/Uticaria    Tetracyclines & Related Hives     Category: Allergy; Review of Systems    Video Exam    There were no vitals filed for this visit  Physical Exam     I spent 40 minutes directly with the patient during this visit    VIRTUAL VISIT DISCLAIMER    Joanne Duarte verbally agrees to participate in [employer]  Pt is aware that [employer] could be limited without vital signs or the ability to perform a full hands-on physical exam  Esther Griffith understands she or the provider may request at any time to terminate the video visit and request the patient to seek care or treatment in person

## 2022-08-01 ENCOUNTER — EVALUATION (OUTPATIENT)
Dept: PHYSICAL THERAPY | Facility: CLINIC | Age: 42
End: 2022-08-01
Payer: COMMERCIAL

## 2022-08-01 DIAGNOSIS — M54.50 ACUTE BILATERAL LOW BACK PAIN, UNSPECIFIED WHETHER SCIATICA PRESENT: Primary | ICD-10-CM

## 2022-08-01 PROCEDURE — 97110 THERAPEUTIC EXERCISES: CPT | Performed by: PHYSICAL THERAPIST

## 2022-08-01 PROCEDURE — 97164 PT RE-EVAL EST PLAN CARE: CPT | Performed by: PHYSICAL THERAPIST

## 2022-08-01 PROCEDURE — 97530 THERAPEUTIC ACTIVITIES: CPT | Performed by: PHYSICAL THERAPIST

## 2022-08-01 PROCEDURE — 97140 MANUAL THERAPY 1/> REGIONS: CPT | Performed by: PHYSICAL THERAPIST

## 2022-08-01 NOTE — PROGRESS NOTES
PT Re-Evaluation     Today's date: 2022  Patient name: Christie Huynh  : 1980  MRN: [id number]  Referring provider: Ivania Adams PT  Dx:   Encounter Diagnosis     ICD-10-CM    1  Acute bilateral low back pain, unspecified whether sciatica present  M54 50        Start Time: 1445  Stop Time: 1530  Total time in clinic (min): 45 minutes    Assessment  Assessment details: Pt is a 39 y/o F who has been seen for 8 visits of PT with focus on improving ROM, stretching, and core stabilization and decreasing pain  Based on re-evaluation findings, pt's average pain has decreased allowing her to participate in recreational activities and ADLs with less pain  Pt's ROM is slowly but steadily improving and subjectively pt is reporting less pain with motion indicating lower irritability  Pt still demonstrates increased muscle tension in paraspinals and glute region, hamstring tightness, decreased ROM, and decreased strength/neuromuscular control  Pt would benefit from continued skilled PT to address these deficits to improve functional mobility and allow her to engage in activities with minimal pain and eventual self-mgmt with d/c  Understanding of Dx/Px/POC: good   Prognosis: good    Goals  Short Term Goals: met  Independent performance of initial hep  Decrease pain 2 points on VAS      Long Term Goals:   Independent performance of comprehensive hep - ongoing  Work performance is returned to max level of function - ongoing  Performance of IADL's is returned to max level of function - ongoing  Performance in recreational activities is improved to max level of function - ongoing  Able to vacuum with min pain - ongoing    Plan  Planned therapy interventions: IADL retraining, joint mobilization, abdominal trunk stabilization, manual therapy, massage, patient education, postural training, strengthening, stretching, therapeutic activities, therapeutic exercise, therapeutic training, transfer training and home exercise program  Frequency: 2x week  Duration in weeks: 6        Subjective Evaluation    History of Present Illness  Mechanism of injury: Pt reports she is still feeling pretty achy in her low back but feels like it has gotten better over the last few visits  Pt states that sustained, flexed postures are still the ones that bother her the most like when she is washing her face and leaning forward to wash hair or body in the shower  Pt reports standing and walking don't seem to be an issue anymore as she walked ~5 miles over the weekend and didn't have any pain  Pain  Current pain ratin  At worst pain ratin    Patient Goals  Patient goals for therapy: decreased pain, increased motion, increased strength, independence with ADLs/IADLs and return to sport/leisure activities          Objective     Lumbar spine:    Evaluation:  ROM:   Flexion: min limited   Extension: mod limited, pain   Right Rotation: mod limited, pain   Left Rotation: min limited   Right Lateral Flexion: mod limited, pain   Left Lateral Flexion:   Mod limited, pain    Poor control of abdominals noted with TaA  Hypomobility noted with spring testing: pain produce L3-L5  Straight Leg Raise: neg  Cross SLR:  neg  Slump Test:  neg  Prone Knee Bend: neg   Directional testing: stiffness reported with repeated extension but no radicular symptoms  Decreased flexibility: B/L HS, glutes  SIJ cluster: pos R posterior inominate  [place]'s sign: neg  Prone instability test: neg  Hip IR rom: limited B/L    Re-evaluation:  ROM:   Flexion: WNL   Extension: min limited   Right Lateral Flexion: mod limited, pain in R low back   Left Lateral Flexion: mod limited    TA activation: able to activate abdominals with verbal cues for posterior pelvic tilt but no bridge    Joint mobility:  Lumbar : L1-L5 hypo, no pain noted t/o  Lumbar UPAs: L: L1-L2 normal, L3-L5 hypo, **pain; R: L1-L3 normal, L4-L5 hypo, **pain    SLR: L ~70 deg, R: ~60 deg, no radiating pain, just hamstring tightness noted BL    Palpation (L/R):  Lumbar paraspinals: TTP BL  Glute max: TTP BL  Glute med: +/+  Piriformis: +/+  Greater trochanter: -/-  TFL: +/+  ASIS: TTP BL - level in supine    Hip PROM:  Flexion: WNL, **pain in low back with L  ER: WNL  IR: min limited BL      Precautions: fibromyalgia, acute on chronic lumbar pain      Manuals 6/27 7/6 7/7 7/12 7/14 7/18 7/22 7/28 8/1    Lumbar pa's NA kl KL FH SZ SG, grade II SG grade III SZ   Gr  III SG  grade III, UPAs grade II    Consider mechanical traction             SIJ MET             DTM  B/L lumbar parapsinalsKL JLW JLW  SG VELASQUEZ SZ SG                 Neuro Re-Ed             TaA 20x5" 5"x10 5"x10 5"x10 5'x20 5"x20 5"x20 5"x20 5" x20    TaA bridge 20x5" with glut set instead of bridge due to pain    20x5" w VC's 20x5" 5"x20 5"x20     Prone hip ext 10x B    10x2 x10ea x15 ea      TaA LPD             TaA with hooklying add squeeze 20x5"   5"x20 5"x20         TaA with BKFO 1x10 ea side  5"x10 ea 5"x20 ea         Pelvic rocking  x20           pball press  5"x10                                                               Ther Ex             Prone on elbows x2'     3' on/off        glute stretch  manual manual manual  30"x3 30"x3 3x30"     HS stretch  manual manual manual  30"x3 30"x3 3x30" 3x30"    SKTC 5x10" manual manual manual         LTR 10x B  5"x20 5"x20 5"x20 10x2, 5" ea   10x2 5" ea 5" x20    bike   recum  5' upright  6' rec bike/L-roll 10'        Prone B scap retr     10x                     Ther Activity             bike 5'    10' w L-roll/posture 8' upright 8' upright 10' upright/posture 10' upright    abdominal breathing     10x3"        Gait Training                                       Modalities             prn  CP 10' in sitting CP 10' prone CP 10' prone    biofreeze use to LB applied                    Treatment performed by Vick Paredes SPT, I attest that this treatment was directly supervised by Radha KELLYT

## 2022-08-03 ENCOUNTER — OFFICE VISIT (OUTPATIENT)
Dept: PHYSICAL THERAPY | Facility: CLINIC | Age: 42
End: 2022-08-03
Payer: COMMERCIAL

## 2022-08-03 DIAGNOSIS — M54.50 ACUTE BILATERAL LOW BACK PAIN, UNSPECIFIED WHETHER SCIATICA PRESENT: Primary | ICD-10-CM

## 2022-08-03 PROCEDURE — 97140 MANUAL THERAPY 1/> REGIONS: CPT | Performed by: PHYSICAL THERAPIST

## 2022-08-03 PROCEDURE — 97110 THERAPEUTIC EXERCISES: CPT | Performed by: PHYSICAL THERAPIST

## 2022-08-03 PROCEDURE — 97112 NEUROMUSCULAR REEDUCATION: CPT | Performed by: PHYSICAL THERAPIST

## 2022-08-03 NOTE — PROGRESS NOTES
Daily Note     Today's date: 8/3/2022  Patient name: Ani Peres  : 1980  MRN: [id number]  Referring provider: Alayna Jay, PT  Dx:   Encounter Diagnosis     ICD-10-CM    1  Acute bilateral low back pain, unspecified whether sciatica present  M54 50        Start Time: 1105  Stop Time: 1145  Total time in clinic (min): 40 minutes     Subjective: Pt reports she has some low back stiffness today but that pain has greatly improved  Pt reports she did more housework yesterday and thinks she slept weird last night, so her quads and hamstrings feel a bit sore today  Pt reports she felt really good after last session and only had mild soreness  Objective: See treatment diary below      Assessment: Pt presents to PT with continued decreased pain and soreness  Pt subjectively reports she is still feeling benefit from manual tx and felt "great" after she left last visit, so plan to continue to allow pt to participate in ADLs and recreational activities with decreased pain  Pt is also demonstrating increased tolerance to activity with no reports of increased pain t/o therex program  Plan to start weaning manual tx over next few visits to put greater focus on therex program and include exercises in more functional positions to maximize mobility  Plan: Continue per POC       Precautions: fibromyalgia, acute on chronic lumbar pain      Manuals 6/27 7/6 7/7 7/12 7/14 7/18 7/22 7/28 8/1 8/3   Lumbar pa's NA kl KL FH SZ SG, grade II SG grade III SZ   Gr  III SG  grade III, UPAs grade II SG  grade III, UPAs grade II   Consider mechanical traction             SIJ MET             DTM  B/L lumbar parapsinalsKL JLW JLW  SG VELASQUEZ SZ SG SG                Neuro Re-Ed             TaA 20x5" 5"x10 5"x10 5"x10 5'x20 5"x20 5"x20 5"x20 5" x20 5" x20   TaA bridge 20x5" with glut set instead of bridge due to pain    20x5" w VC's 20x5" 5"x20 5"x20  5" x20   Prone hip ext 10x B    10x2 x10ea x15 ea      TaA LPD TaA with hooklying add squeeze 20x5"   5"x20 5"x20         TaA with BKFO 1x10 ea side  5"x10 ea 5"x20 ea         Pelvic rocking  x20           pball press  5"x10                                                               Ther Ex             Prone on elbows x2'     3' on/off        glute stretch  manual manual manual  30"x3 30"x3 3x30"     HS stretch  manual manual manual  30"x3 30"x3 3x30" 3x30" 3x30" BL   SKTC 5x10" manual manual manual         LTR 10x B  5"x20 5"x20 5"x20 10x2, 5" ea   10x2 5" ea 5" x20 5" x20   bike   recum  5' upright  6' rec bike/L-roll 10'        Prone B scap retr     10x        Quad stretch          30" x3   Ther Activity             bike 5'    10' w L-roll/posture 8' upright 8' upright 10' upright/posture 10' upright 10' upright   abdominal breathing     10x3"        Gait Training                                       Modalities             prn  CP 10' in sitting CP 10' prone CP 10' prone    biofreeze use to LB applied                    Treatment performed by Ryan Marquez SPT, I attest that this treatment was directly supervised by Serg Martini DPT

## 2022-08-08 ENCOUNTER — APPOINTMENT (OUTPATIENT)
Dept: PHYSICAL THERAPY | Facility: CLINIC | Age: 42
End: 2022-08-08
Payer: COMMERCIAL

## 2022-08-10 ENCOUNTER — OFFICE VISIT (OUTPATIENT)
Dept: PHYSICAL THERAPY | Facility: CLINIC | Age: 42
End: 2022-08-10
Payer: COMMERCIAL

## 2022-08-10 DIAGNOSIS — M54.50 ACUTE BILATERAL LOW BACK PAIN, UNSPECIFIED WHETHER SCIATICA PRESENT: Primary | ICD-10-CM

## 2022-08-10 DIAGNOSIS — M54.10 RADICULOPATHY, UNSPECIFIED SPINAL REGION: ICD-10-CM

## 2022-08-10 PROCEDURE — 97110 THERAPEUTIC EXERCISES: CPT

## 2022-08-10 PROCEDURE — 97530 THERAPEUTIC ACTIVITIES: CPT

## 2022-08-10 PROCEDURE — 97112 NEUROMUSCULAR REEDUCATION: CPT

## 2022-08-10 NOTE — PROGRESS NOTES
Daily Note     Today's date: 8/10/2022  Patient name: Krzysztof Santos  : 1980  MRN: [id number]  Referring provider: Anibal Ruano, PT  Dx:   Encounter Diagnosis     ICD-10-CM    1  Acute bilateral low back pain, unspecified whether sciatica present  M54 50    2  Radiculopathy, unspecified spinal region  M54 10        Start Time: 1532  Stop Time: 1614  Total time in clinic (min): 42 minutes       Subjective: Patient reports her back is feeling "a little tight" possibly from menstruation  Objective: See treatment diary below  Assessment: Progression of therapeutic exercise program is tolerated well without complaints  Theraband lat pull downs and rows were appropriate to add today to aid in improving patient's posture  Plan: Continue treatment as per PT plan of care         Precautions: fibromyalgia, acute on chronic lumbar pain      Manuals 8/10  7/7 7/12 7/14 7/18 7/22 7/28 8/1 8/3   Lumbar pa's KL  KL FH SZ SG, grade II SG grade III SZ   Gr  III SG  grade III, UPAs grade II SG  grade III, UPAs grade II   consider mechanical traction             SIJ MET             DTM   JLW JLW  SG HA SZ SG SG                Neuro Re-Ed             TaA 5"x20  5"x10 5"x10 5'x20 5"x20 5"x20 5"x20 5" x20 5" x20   TaA bridge 5"x20    20x5" w VC's 20x5" 5"x20 5"x20  5" x20   Prone hip ext 20 ea    10x2 x10ea x15 ea      TaA LPD blue  20            TaA with hooklying add squeeze   5"x20 5"x20         TaA with BKFO   5"x10 ea 5"x20 ea         Pelvic rocking             pball press             rows blue  20                                                   Ther Ex             Prone on elbows     3' on/off        glute stretch 30"x3 ea  manual manual  30"x3 30"x3 3x30"     HS stretch 30"x3 ea  manual manual  30"x3 30"x3 3x30" 3x30" 3x30" BL   SKTC 30"x3 ea  manual manual         LTR 5"x20  5"x20 5"x20 10x2, 5" ea   10x2 5" ea 5" x20 5" x20   bike   recum  5' upright  6' rec bike/L-roll 10'        prone B scap retr     10x        Quad stretch          30" x3                Ther Activity             bike 8'  recum  L-roll    10' w L-roll/posture 8' upright 8' upright 10' upright/posture 10' upright 10' upright   abdominal breathing     10x3"        Gait Training                                       Modalities             prn   CP 10' prone CP 10' prone    biofreeze use to LB applied

## 2022-08-11 ENCOUNTER — CONSULT (OUTPATIENT)
Dept: PAIN MEDICINE | Facility: CLINIC | Age: 42
End: 2022-08-11
Payer: COMMERCIAL

## 2022-08-11 VITALS
HEART RATE: 83 BPM | HEIGHT: 60 IN | BODY MASS INDEX: 37.54 KG/M2 | DIASTOLIC BLOOD PRESSURE: 76 MMHG | SYSTOLIC BLOOD PRESSURE: 108 MMHG | WEIGHT: 191.2 LBS

## 2022-08-11 DIAGNOSIS — M46.1 SACROILIITIS (HCC): ICD-10-CM

## 2022-08-11 DIAGNOSIS — M70.62 GREATER TROCHANTERIC BURSITIS OF BOTH HIPS: ICD-10-CM

## 2022-08-11 DIAGNOSIS — M79.18 MYOFASCIAL PAIN: ICD-10-CM

## 2022-08-11 DIAGNOSIS — M54.40 LOW BACK PAIN WITH SCIATICA, SCIATICA LATERALITY UNSPECIFIED, UNSPECIFIED BACK PAIN LATERALITY, UNSPECIFIED CHRONICITY: Primary | ICD-10-CM

## 2022-08-11 DIAGNOSIS — M51.36 DDD (DEGENERATIVE DISC DISEASE), LUMBAR: ICD-10-CM

## 2022-08-11 DIAGNOSIS — M70.61 GREATER TROCHANTERIC BURSITIS OF BOTH HIPS: ICD-10-CM

## 2022-08-11 PROBLEM — M51.369 DDD (DEGENERATIVE DISC DISEASE), LUMBAR: Status: ACTIVE | Noted: 2022-08-11

## 2022-08-11 PROCEDURE — 99244 OFF/OP CNSLTJ NEW/EST MOD 40: CPT | Performed by: ANESTHESIOLOGY

## 2022-08-11 RX ORDER — TIZANIDINE 2 MG/1
2 TABLET ORAL EVERY 8 HOURS PRN
Qty: 90 TABLET | Refills: 1 | Status: SHIPPED | OUTPATIENT
Start: 2022-08-11 | End: 2022-09-14

## 2022-08-11 NOTE — PROGRESS NOTES
Assessment  1  Low back pain with sciatica, sciatica laterality unspecified, unspecified back pain laterality, unspecified chronicity    2  Sacroiliitis ([address] )    3  DDD (degenerative disc disease), lumbar    4  Myofascial pain    5  Greater trochanteric bursitis of both hips        Plan  24-year-old female with a history of fibromyalgia, referred by Dr Andrew Lopez, presenting for initial consultation regarding a 3 month history of lumbosacral back pain that radiates into the buttocks and hips without any trauma or inciting event  X-ray of the lumbar spine does show some disc space narrowing at L5-S1  Patient has completed 6 weeks of physical therapy starting June 22, 2022 with her last appointment being August 10, 2022  She has found some transient relief with this  The patient has tried topical lidocaine, NSAIDs including naproxen and Celebrex, and Tylenol  Tylenol provides the most relief  She does take nortriptyline for fibromyalgia and Pristiq for mood  She has previously tried duloxetine, Lyrica, gabapentin, cyclobenzaprine, methocarbamol, and Valium  All other medications did not provide relief  She does find some relief with Valium although this causes sedation  The patient's low back pain does have a myofascial and SI mediated component  There may be a facet mediated component as well  Buttock and hip symptoms may be radicular in nature although reflexes and strength are preserved in the lower extremities  She also has a component of bilateral trochanteric bursitis  1  I will order an MRI of the lumbar spine without contrast  2  I will trial tizanidine 2 mg q 8 hours p r n  for myofascial pain  Advised patient to abstain from Valium and trial tizanidine in its place  She should not take tizanidine together with Valium  Patient verbalized understanding  3  Patient will continue with nortriptyline and Celebrex as prescribed    4  Patient will continue with PT and her home exercise program  5  I will follow up the patient in 8 weeks and we will call with results of imaging once received      My impressions and treatment recommendations were discussed in detail with the patient who verbalized understanding and had no further questions  Discharge instructions were provided  I personally saw and examined the patient and I agree with the above discussed plan of care  No orders of the defined types were placed in this encounter  No orders of the defined types were placed in this encounter  History of Present Illness    Massimo Flaherty is a 38 y o  female with a history of fibromyalgia, referred by Dr Dilip Weiner, presenting for initial consultation regarding a 3 month history of lumbosacral back pain that radiates into the buttocks and hips without any trauma or inciting event  She denies any numbness, paresthesias, or weakness in the lower extremities  She denies any bladder or bowel incontinence or saddle anesthesia  X-ray of the lumbar spine does show some disc space narrowing at L5-S1  Patient has completed 6 weeks of physical therapy starting June 22, 2022 with her last appointment being August 10, 2022  She has found some transient relief with this  The patient has tried topical lidocaine, NSAIDs including naproxen and Celebrex, and Tylenol  Tylenol provides the most relief  She does take nortriptyline for fibromyalgia and Pristiq for mood  She has previously tried duloxetine, Lyrica, gabapentin, cyclobenzaprine, methocarbamol, and Valium  All other medications did not provide relief  She does find some relief with Valium although this causes sedation  The patient rates her pain a 6 to 7/10 on the pain is nearly constant  The pain is worse in the morning  The pain is described as sharp, pressure-like, and throbbing  The pain is decreased with lying down and relaxation    The pain is increased with standing, bending, walking, coughing, bowel movements, and menstruation  She does find some relief with heat and ice application  Other than as stated above, the patient denies any interval changes in medications, medical condition, mental condition, symptoms, or allergies since the last office visit  I have personally reviewed and/or updated the patient's past medical history, past surgical history, family history, social history, current medications, allergies, and vital signs today  Review of Systems   Constitutional: Positive for unexpected weight change  Negative for fever  HENT: Negative for trouble swallowing  Eyes: Negative for visual disturbance  Respiratory: Positive for shortness of breath  Negative for wheezing  Cardiovascular: Negative for chest pain and palpitations  Gastrointestinal: Positive for abdominal pain and nausea  Negative for constipation, diarrhea and vomiting  Endocrine: Negative for cold intolerance, heat intolerance and polydipsia  Genitourinary: Negative for difficulty urinating and frequency  Musculoskeletal: Positive for arthralgias and myalgias  Negative for gait problem and joint swelling  Skin: Negative for rash  Neurological: Positive for dizziness, numbness and headaches  Negative for seizures, syncope and weakness  Hematological: Does not bruise/bleed easily  Psychiatric/Behavioral: Positive for decreased concentration  Negative for dysphoric mood  Anxiety, depression   All other systems reviewed and are negative        Patient Active Problem List   Diagnosis    Acne vulgaris    Allergic rhinitis    Acute midline low back pain with sciatica    Chronic fatigue    Chronic pain    Chronic migraine without aura without status migrainosus, not intractable    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, with unspecified complication (HCC)    Fibromyalgia    Headache    Hypokalemia    Impaired fasting glucose    Lyme disease    Major depressive disorder, recurrent episode, moderate (HCC)    Malaise and fatigue    Menorrhagia    Muscle spasm    Myalgia    Narcolepsy    Narcolepsy cataplexy syndrome    Neck pain    Class 2 obesity due to excess calories without serious comorbidity in adult    Sinus disease    Sleep apnea    Snoring    Tick bite    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 psychotic features ([address] )    Generalized anxiety disorder    Dyspepsia    Arthralgia of multiple joints    Generalized body aches    Chronic idiopathic constipation    Vitamin D deficiency    Elevated TSH    Loose stools    Hematochezia    Hyperplastic colonic polyp    Left lower quadrant abdominal pain    Back pain       Past Medical History:   Diagnosis Date    Anxiety     Chronic sinusitis     Depression     Fibromyalgia     Migraine     Obesity     Polyarthritis     Last assessed 9/21/2015    Psychiatric disorder        Past Surgical History:   Procedure Laterality Date    COLONOSCOPY  06/2019    DENTAL SURGERY      HYSTEROSCOPY      Endometrial Biopsy By Hysteroscopy    REMOVAL OF INTRAUTERINE DEVICE (IUD)      US GUIDED BREAST BIOPSY RIGHT COMPLETE Right 6/17/2019       Family History   Problem Relation Age of Onset    Diabetes Father     Kidney disease Father     Substance Abuse Brother     Diabetes Maternal Grandmother     Rheum arthritis Maternal Grandmother     Melanoma Maternal Grandmother     Kidney disease Paternal Grandmother     Rheum arthritis Paternal Grandmother     Depression Paternal Grandmother     Diabetes Paternal Grandfather     Lung cancer Paternal Grandfather     Substance Abuse Maternal Uncle     Prostate cancer Maternal Uncle 61    Depression Paternal Aunt     Alcohol abuse Family     Stomach cancer Family     Irregular heart beat Mother        Social History     Occupational History    Occupation: unemployed   Tobacco Use    Smoking status: Never Smoker    Smokeless tobacco: Never Used   Vaping Use    Vaping Use: Never used   Substance and Sexual Activity    Alcohol use: No     Comment: 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     Drug use: Not Currently    Sexual activity: Not Currently       Current Outpatient Medications on File Prior to Visit   Medication Sig    Calcium Carbonate-Vitamin D (CALCIUM 500/D PO) Take 1 capsule by mouth daily    celecoxib (CeleBREX) 200 mg capsule TAKE 1 CAPSULE BY MOUTH TWICE A DAY    Cholecalciferol (VITAMIN D-3) 1000 units CAPS Take 1 capsule by mouth daily    desvenlafaxine (PRISTIQ) 100 mg 24 hr tablet TAKE 1 TABLET BY MOUTH EVERY DAY    desvenlafaxine succinate (PRISTIQ) 50 mg 24 hr tablet Take 1 tablet (50 mg total) by mouth daily Total daily dose 150 mg daily    diazepam (VALIUM) 5 mg tablet Take 1 tab  twice daily PRN for back muscle spasms    hydrOXYzine HCL (ATARAX) 50 mg tablet TAKE 1 TABLET (50 MG TOTAL) BY MOUTH DAILY AT BEDTIME AS NEEDED (INSOMNIA)    IRON PO Take by mouth    lidocaine (LIDODERM) 5 % Apply 1 patch topically in the morning  Remove & Discard patch within 12 hours or as directed by MD Ele Hedrick lidocaine (Lidoderm) 5 % Apply 1 patch topically daily Remove & Discard patch within 12 hours or as directed by MD    LORazepam (ATIVAN) 1 mg tablet TAKE 1 TABLET BY MOUTH EVERY 8 HOURS AS NEEDED FOR ANXIETY    MAGNESIUM OXIDE PO Take by mouth    naproxen (Naprosyn) 500 mg tablet Take 1 tablet (500 mg total) by mouth 2 (two) times a day with meals for 7 days    nortriptyline (PAMELOR) 75 MG capsule TAKE 1 CAPSULE BY MOUTH TWICE A DAY    propranolol (INDERAL) 20 mg tablet TAKE 1 TABLET BY MOUTH EVERY DAY AT NIGHT     No current facility-administered medications on file prior to visit  Allergies   Allergen Reactions    Amoxicillin-Pot Clavulanate     Decadrol [Dexamethasone] Other (See Comments)     Category:  Adverse Reaction;   psychosis    Dexamethasone Sodium Phosphate     Other     Penicillins Hives and Other (See Comments) Category: Allergy; Hives/Uticaria    Tetracycline Hives and Other (See Comments)     Hives/Uticaria    Tetracyclines & Related Hives     Category: Allergy; Physical Exam    Ht 5' (1 524 m)   Wt 86 7 kg (191 lb 3 2 oz)   BMI 37 34 kg/m²     Constitutional: normal, well developed, well nourished, alert, in no distress and non-toxic and no overt pain behavior  Eyes: anicteric  HEENT: grossly intact  Neck: supple, symmetric, trachea midline and no masses   Pulmonary:even and unlabored  Cardiovascular:No edema or pitting edema present  Skin:Normal without rashes or lesions and well hydrated  Psychiatric:Mood and affect appropriate  Neurologic:Cranial Nerves II-XII grossly intact  Musculoskeletal:normal gait  Bilateral lumbar paraspinals tender to palpation ropy in texture from L2-L5  Bilateral SI joints and trochanteric flares tender to palpation  Bilateral patellar and Achilles reflexes were 2/4 and symmetrical   No clonus was noted bilaterally  Bilateral lower extremity strength 5/5 in all muscle groups  Sensation intact to light touch in L3 through S1 dermatomes bilaterally  Negative straight leg raise bilaterally  Positive Juan's, Gaenslen's, and AP compression test bilaterally  Imaging    PACS Images     Show images for XR spine lumbar 2 or 3 views injury      Study Result    Narrative & Impression   LUMBAR SPINE     INDICATION:   Back pain      COMPARISON:  7/2/2020; SI joints from 10/20/2020;; CT from 6/30/2020; 5/9/2016     VIEWS:  XR SPINE LUMBAR 2 OR 3 VIEWS INJURY        FINDINGS:     There are 5 non rib bearing lumbar vertebral bodies       There is no evidence of acute fracture or destructive osseous lesion      Alignment is unremarkable       Degenerative spondylosis is seen at L5-S1 with disc space narrowing and endplate sclerosis  This is mildly progressed since the study of 2020  Other scattered osteophytes are noted        Slight asymmetry of the right SI joint as compared to the left  Some additional narrowing could be evident on the current study but there are differences in technique  No obvious erosions      The pedicles appear intact      2 calcifications project medial to the left kidney one measuring 11 mm  A 2nd measuring 7 mm  These calculi are new since the x-ray of 2020 as well as the prior CT  The lateral view shows an anterior position of these calcifications suggesting these   are possible fragments rather than renal calculi  Significant fecal stasis is noted throughout the colon      IMPRESSION:     Degenerative spondylosis most pronounced at L5-S1      Slight asymmetry of the right SI slightly more pronounced as compared to older exams and may be projectional   Follow-up sacroiliac joint series may be useful      2 radiopacities to the left of midline appear to represent pill fragments within the colon anteriorly rather than renal calculi      Extensive fecal stasis is noted should be correlated with any history of constipation      The study was marked in EPIC for immediate notification         Workstation performed: [id number]     Study Result    Narrative & Impression   SACROILIAC JOINTS     INDICATION:   M54 5: Low back pain  G89 29: Other chronic pain  Chronic joint pain     COMPARISON:  CT abdomen from 6/30/2020     VIEWS:  XR SACROILIAC JOINTS < 3 VIEWS         FINDINGS:     The SI joints appear symmetric without evidence of focal erosions or joint space widening  Slight narrowing on the frontal view on the left is less apparent on the angled view without evidence of sclerosis or erosions  Prior CT also demonstrated no   evidence of erosion of the sacroiliac joints      There is degenerative disc space narrowing at L5-S1    Sacral arcuate lines appear intact      No fracture or pathologic bone lesions seen      Included portions of the pelvis and lumbar spine are unremarkable      IMPRESSION:     Minimal asymmetry of the SI joints but without erosion or sclerosis      Disc space narrowing at L5-S1         Workstation performed: [id number]

## 2022-08-12 ENCOUNTER — TELEMEDICINE (OUTPATIENT)
Dept: BEHAVIORAL/MENTAL HEALTH CLINIC | Facility: CLINIC | Age: 42
End: 2022-08-12
Payer: COMMERCIAL

## 2022-08-12 DIAGNOSIS — F41.1 GENERALIZED ANXIETY DISORDER: ICD-10-CM

## 2022-08-12 DIAGNOSIS — F33.1 MAJOR DEPRESSIVE DISORDER, RECURRENT EPISODE, MODERATE (HCC): Primary | ICD-10-CM

## 2022-08-12 PROCEDURE — 90834 PSYTX W PT 45 MINUTES: CPT | Performed by: PSYCHIATRY & NEUROLOGY

## 2022-08-12 NOTE — PSYCH
Session time [id number] (total time 42 minutes)    Virtual Regular Visit    Verification of patient location:    Patient is located in the following state in which I hold an active license [place]      Assessment/Plan:    Problem List Items Addressed This Visit        Other    Major depressive disorder, recurrent episode, moderate ([facility] [address] ) - Primary    Generalized anxiety disorder          Goals addressed in session: Goal 1          Reason for visit is   Chief Complaint   Patient presents with    Virtual Regular Visit        Encounter provider HERMELINDA Sky    Provider located at [address] [place] [id number] [phone]      Recent Visits  No visits were found meeting these conditions  Showing recent visits within past 7 days and meeting all other requirements  Future Appointments  No visits were found meeting these conditions  Showing future appointments within next 150 days and meeting all other requirements       The patient was identified by name and date of birth  Antoinette Medina was informed that this is a telemedicine visit and that the visit is being conducted through[employer] [facility] and patient was informed that this is a secure, HIPAA-compliant platform  She agrees to proceed     My office door was closed  No one else was in the room  She acknowledged consent and understanding of privacy and security of the video platform  The patient has agreed to participate and understands they can discontinue the visit at any time  Patient is aware this is a billable service  Subjective  Antoinette Medina is a 38 y o  female presenting for follow up  Ty Samson shared that she saw pain management yesterday, and now feels a bit more hopeful that there is a plan for her treatment    She said that she has had a lot of difficulty getting out and doing much lately because her pain has been significant, but the doctor yesterday put her on new meds that she hopes will be helpful in allowing her to live a more productive, satisfying life  She talked about putting down her cat and how much she misses him, but said that she 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 to help socialize the cats and fill the hole in her heart that her cat left  She also mentioned a recent conversation with her mom, who asked her if she had every considered that she might be on the autism spectrum  Ramesh Meléndez said that as a child, she did not like people touching her and would cry if people spoke to her with a certain tone of voice  Discussed her concerns, and gave options for possible diagnosis if that is something she wants to pursue (testing with clinical psychologist)  Otherwise, Ramesh Meléndez said that she feels she is doing "ok " A: Ramesh Meléndez presented as generally euthymic, with a relatively bright affect, good eye contact and calm behavior  Her concentration was intact, thought process logical and organized  Insight and judgment intact  Denies SI HI and psychosis  Good progress toward goals  Ayakaangely Jerry will continue to work on self-care and regular exercise to help with pain, will reach out socially as she is able to continue to maintain brighter mood, and will follow up in two weeks as scheduled         HPI     Past Medical History:   Diagnosis Date    Anxiety     Chronic sinusitis     Depression     Fibromyalgia     Migraine     Obesity     Polyarthritis     Last assessed 9/21/2015    Psychiatric disorder        Past Surgical History:   Procedure Laterality Date    COLONOSCOPY  06/2019   [facility] DENTAL SURGERY      HYSTEROSCOPY      Endometrial Biopsy By Hysteroscopy    REMOVAL OF INTRAUTERINE DEVICE (IUD)      US GUIDED BREAST BIOPSY RIGHT COMPLETE Right 6/17/2019       Current Outpatient Medications   Medication Sig Dispense Refill    Calcium Carbonate-Vitamin D (CALCIUM 500/D PO) Take 1 capsule by mouth daily      celecoxib (CeleBREX) 200 mg capsule TAKE 1 CAPSULE BY MOUTH TWICE A DAY 60 capsule 6    Cholecalciferol (VITAMIN D-3) 1000 units CAPS Take 1 capsule by mouth daily      desvenlafaxine (PRISTIQ) 100 mg 24 hr tablet TAKE 1 TABLET BY MOUTH EVERY DAY 30 tablet 2    desvenlafaxine succinate (PRISTIQ) 50 mg 24 hr tablet Take 1 tablet (50 mg total) by mouth daily Total daily dose 150 mg daily 30 tablet 2    diazepam (VALIUM) 5 mg tablet Take 1 tab  twice daily PRN for back muscle spasms 30 tablet 1    hydrOXYzine HCL (ATARAX) 50 mg tablet TAKE 1 TABLET (50 MG TOTAL) BY MOUTH DAILY AT BEDTIME AS NEEDED (INSOMNIA) 30 tablet 2    IRON PO Take by mouth      lidocaine (LIDODERM) 5 % Apply 1 patch topically in the morning  Remove & Discard patch within 12 hours or as directed by MD  30 patch 1    lidocaine (Lidoderm) 5 % Apply 1 patch topically daily Remove & Discard patch within 12 hours or as directed by MD 15 patch 0    LORazepam (ATIVAN) 1 mg tablet TAKE 1 TABLET BY MOUTH EVERY 8 HOURS AS NEEDED FOR ANXIETY (Patient not taking: Reported on 8/11/2022) 90 tablet 2    MAGNESIUM OXIDE PO Take by mouth      naproxen (Naprosyn) 500 mg tablet Take 1 tablet (500 mg total) by mouth 2 (two) times a day with meals for 7 days 14 tablet 0    nortriptyline (PAMELOR) 75 MG capsule TAKE 1 CAPSULE BY MOUTH TWICE A  capsule 0    propranolol (INDERAL) 20 mg tablet TAKE 1 TABLET BY MOUTH EVERY DAY AT NIGHT 30 tablet 5    tiZANidine (ZANAFLEX) 2 mg tablet Take 1 tablet (2 mg total) by mouth every 8 (eight) hours as needed for muscle spasms 90 tablet 1     No current facility-administered medications for this visit  Allergies   Allergen Reactions    Amoxicillin-Pot Clavulanate     Decadrol [Dexamethasone] Other (See Comments)     Category: Adverse Reaction;   psychosis    Dexamethasone Sodium Phosphate     Other     Penicillins Hives and Other (See Comments)     Category: Allergy;    Hives/Uticaria    Tetracycline Hives and Other (See Comments) Hives/Uticaria    Tetracyclines & Related Hives     Category: Allergy; Review of Systems    Video Exam    There were no vitals filed for this visit      Physical Exam     I spent 42 minutes directly with the patient during this visit

## 2022-08-13 PROBLEM — Z00.00 WELL ADULT EXAM: Status: ACTIVE | Noted: 2022-08-13

## 2022-08-15 ENCOUNTER — APPOINTMENT (OUTPATIENT)
Dept: PHYSICAL THERAPY | Facility: CLINIC | Age: 42
End: 2022-08-15
Payer: COMMERCIAL

## 2022-08-16 ENCOUNTER — OFFICE VISIT (OUTPATIENT)
Dept: FAMILY MEDICINE CLINIC | Facility: CLINIC | Age: 42
End: 2022-08-16
Payer: COMMERCIAL

## 2022-08-16 VITALS
HEART RATE: 81 BPM | HEIGHT: 60 IN | TEMPERATURE: 98.3 F | WEIGHT: 193.8 LBS | OXYGEN SATURATION: 99 % | RESPIRATION RATE: 16 BRPM | DIASTOLIC BLOOD PRESSURE: 62 MMHG | BODY MASS INDEX: 38.05 KG/M2 | SYSTOLIC BLOOD PRESSURE: 110 MMHG

## 2022-08-16 DIAGNOSIS — F33.1 MAJOR DEPRESSIVE DISORDER, RECURRENT EPISODE, MODERATE (HCC): ICD-10-CM

## 2022-08-16 DIAGNOSIS — E55.9 VITAMIN D DEFICIENCY: ICD-10-CM

## 2022-08-16 DIAGNOSIS — K63.5 POLYP OF COLON, UNSPECIFIED PART OF COLON, UNSPECIFIED TYPE: ICD-10-CM

## 2022-08-16 DIAGNOSIS — Z23 ENCOUNTER FOR IMMUNIZATION: ICD-10-CM

## 2022-08-16 DIAGNOSIS — E66.09 CLASS 2 OBESITY DUE TO EXCESS CALORIES WITHOUT SERIOUS COMORBIDITY WITH BODY MASS INDEX (BMI) OF 36.0 TO 36.9 IN ADULT: ICD-10-CM

## 2022-08-16 DIAGNOSIS — F41.1 GENERALIZED ANXIETY DISORDER: ICD-10-CM

## 2022-08-16 DIAGNOSIS — Z00.00 WELL ADULT EXAM: Primary | ICD-10-CM

## 2022-08-16 DIAGNOSIS — R73.01 IMPAIRED FASTING GLUCOSE: ICD-10-CM

## 2022-08-16 DIAGNOSIS — M79.7 FIBROMYALGIA: ICD-10-CM

## 2022-08-16 DIAGNOSIS — Z13.6 SCREENING FOR CARDIOVASCULAR CONDITION: ICD-10-CM

## 2022-08-16 DIAGNOSIS — Z12.4 SCREENING FOR CERVICAL CANCER: ICD-10-CM

## 2022-08-16 DIAGNOSIS — Z12.31 ENCOUNTER FOR SCREENING MAMMOGRAM FOR MALIGNANT NEOPLASM OF BREAST: ICD-10-CM

## 2022-08-16 PROBLEM — R10.32 LEFT LOWER QUADRANT ABDOMINAL PAIN: Status: RESOLVED | Noted: 2020-07-02 | Resolved: 2022-08-16

## 2022-08-16 PROCEDURE — 90715 TDAP VACCINE 7 YRS/> IM: CPT

## 2022-08-16 PROCEDURE — 99396 PREV VISIT EST AGE 40-64: CPT | Performed by: FAMILY MEDICINE

## 2022-08-16 PROCEDURE — 90471 IMMUNIZATION ADMIN: CPT

## 2022-08-16 NOTE — PROGRESS NOTES
Chief Complaint   Patient presents with    Physical Exam     Health Maintenance   Topic Date Due    HIV Screening  Never done    Cervical Cancer Screening  Never done    Breast Cancer Screening: Mammogram  06/13/2020    Colorectal Cancer Screening  06/04/2022    PT PLAN OF CARE  07/22/2022    Influenza Vaccine (1) 09/01/2022    BMI: Followup Plan  06/02/2023    Depression Remission PHQ  06/02/2023    BMI: Adult  08/16/2023    Annual Physical  08/16/2023    DTaP,Tdap,and Td Vaccines (3 - Td or Tdap) 08/16/2032    Hepatitis C Screening  Completed    COVID-19 Vaccine  Completed    Pneumococcal Vaccine: Pediatrics (0 to 5 Years) and At-Risk Patients (6 to 59 Years)  Aged Out    HIB Vaccine  Aged Out    Hepatitis B Vaccine  Aged Out    IPV Vaccine  Aged Out    Hepatitis A Vaccine  Aged Out    Meningococcal ACWY Vaccine  Aged Out    HPV Vaccine  Aged Out       Depression Screening and Follow-up Plan: Continue regular follow-up with their mental health provider who is managing their mental health condition(s)  Assessment/Plan:    Well adult exam  Recommended to follow a well-balanced diet, regular exercise, regular dentist visits  Schedule screening mammogram     Follow-up with [facility] gynecology for pelvic exam, Pap smear  Tdap administered today  Recommended annual Flu vaccination  Impaired fasting glucose  Recommended follow a low carb diet, regular exercise  Check Hb A1C  Class 2 obesity due to excess calories without serious comorbidity in adult  Continue to work on dietary and lifestyle modifications, lose weight  Generalized anxiety disorder  Mood has been stable  Management per psychiatry Dr Adrien Carvajal  Continue psychotherapy every 2 weeks  Major depressive disorder, recurrent episode, moderate (HCC)  Symptoms are stable  Continue Pristiq, Nortriptyline  Follow-up with psychiatry Dr Adrien Carvajal every 3 months  Continue psychotherapy every 2 weeks      Vitamin D deficiency  Continue Vit D 1000 IU daily  Polyp of colon  Colonoscopy done June 2019, 1 polyp was removed  Schedule colonoscopy this year as recommended by gastroenterology Dr Oscar Da Silva  Schedule annual physical exam in 1 year  Call office with any acute problems  Diagnoses and all orders for this visit:    Well adult exam    Fibromyalgia    Impaired fasting glucose  -     Comprehensive metabolic panel; Future  -     Hemoglobin A1C; Future    Class 2 obesity due to excess calories without serious comorbidity with body mass index (BMI) of 36 0 to 36 9 in adult  -     Comprehensive metabolic panel; Future    Generalized anxiety disorder    Major depressive disorder, recurrent episode, moderate (HCC)    Vitamin D deficiency    Polyp of colon, unspecified part of colon, unspecified type  -     Ambulatory referral for colonoscopy; Future    Screening for cardiovascular condition  -     Lipid panel; Future    Encounter for screening mammogram for malignant neoplasm of breast  -     Mammo screening bilateral w 3d & cad; Future    Screening for cervical cancer  -     Ambulatory referral to Obstetrics / Gynecology; Future    Encounter for immunization  -     TDAP VACCINE GREATER THAN OR EQUAL TO 8YO IM          Subjective:      Patient ID: Marisol Garcia is a 38 y o  female  HPI     Patient presents for pphysical exam     PMHx: Fibromyalgia, Migraine headaches, IFG, Obesity, CAROL, Depression, chronic low back pain, degenerative spondylosis, OA, vit D deficiency  Reviewed current medications, blood test results from May 2022  TSH 3 28, Hemoglobin 12 6  Patient tries to eat healthy, works on weight reduction  She was evaluated by pain management Dr Shaun Bliss on 8/11/22 for low back pain  Dr Shaun Bliss recommended to startTizanidine 2 mg every 8 hours, referred to physical therapy, order MRI of the lumbar spine  Patient reports some improvement in low back pain    Denies numbness, tingling in lower extremities  Patient was seen by rheumatology Dr Alina Mueller in 10/2021  Currently taking Celebrex 200 mg twice daily  CAROL /Depression - management per psychiatry Dr Adrien Carvajal  Currently taking Pristiq, Nortriptyline, Hydroxyzine  Mood has been stable  Migraine headaches - patient reports improvement in symptoms  She takes Propranolol 20 mg daily, Magnesium supplements  Followed by Reginald Garcias neurology  Patient c/o irregular menstrual cycles, followed by [facility] gynecology, had exam in June 2022  Colonoscopy done in June 2019, 1 polyp was removed  Gastroenterologist Dr Neftali Mcguire recommended to repeat colonoscopy in 3 years  Last mammogram done in June 2019  Received COVID booster in January 2022  The following portions of the patient's history were reviewed and updated as appropriate: allergies, past family history, past medical history, past social history, past surgical history and problem list     Review of Systems   Constitutional: Positive for fatigue (mild)  Negative for activity change, appetite change, chills and fever  HENT: Negative for congestion, ear pain, hearing loss, nosebleeds, sore throat and trouble swallowing  Eyes: Negative  Respiratory: Negative for cough, chest tightness, shortness of breath and wheezing  Cardiovascular: Negative for chest pain, palpitations and leg swelling  Gastrointestinal: Negative for abdominal pain, blood in stool, constipation, diarrhea, nausea and vomiting  Genitourinary: Positive for menstrual problem  Negative for difficulty urinating, dysuria, flank pain and hematuria  Musculoskeletal: Positive for arthralgias, back pain (chronic) and myalgias  Negative for gait problem and joint swelling  Skin: Negative for rash  Neurological: Positive for headaches (improved)  Negative for dizziness, syncope and numbness  Hematological: Negative  Psychiatric/Behavioral: Positive for sleep disturbance          Anxiety / Depression - mood has been stable         Objective:      /62 (BP Location: Left arm, Patient Position: Sitting)   Pulse 81   Temp 98 3 °F (36 8 °C) (Tympanic)   Resp 16   Ht 5' (1 524 m)   Wt 87 9 kg (193 lb 12 8 oz)   SpO2 99%   BMI 37 85 kg/m²          Physical Exam

## 2022-08-17 ENCOUNTER — OFFICE VISIT (OUTPATIENT)
Dept: PHYSICAL THERAPY | Facility: CLINIC | Age: 42
End: 2022-08-17
Payer: COMMERCIAL

## 2022-08-17 DIAGNOSIS — M54.10 RADICULOPATHY, UNSPECIFIED SPINAL REGION: ICD-10-CM

## 2022-08-17 DIAGNOSIS — M54.50 ACUTE BILATERAL LOW BACK PAIN, UNSPECIFIED WHETHER SCIATICA PRESENT: Primary | ICD-10-CM

## 2022-08-17 PROCEDURE — 97140 MANUAL THERAPY 1/> REGIONS: CPT | Performed by: PHYSICAL THERAPIST

## 2022-08-17 PROCEDURE — 97112 NEUROMUSCULAR REEDUCATION: CPT | Performed by: PHYSICAL THERAPIST

## 2022-08-17 PROCEDURE — 97110 THERAPEUTIC EXERCISES: CPT | Performed by: PHYSICAL THERAPIST

## 2022-08-17 PROCEDURE — 97530 THERAPEUTIC ACTIVITIES: CPT | Performed by: PHYSICAL THERAPIST

## 2022-08-17 NOTE — ASSESSMENT & PLAN NOTE
Symptoms are stable  Continue Pristiq, Nortriptyline  Follow-up with psychiatry Dr Severa Mart every 3 months  Continue psychotherapy every 2 weeks

## 2022-08-17 NOTE — PROGRESS NOTES
Daily Note     Today's date: 2022  Patient name: Lorraine Madsen  : 1980  MRN: [id number]  Referring provider: Todd Mccloud, PT  Dx:   Encounter Diagnosis     ICD-10-CM    1  Acute bilateral low back pain, unspecified whether sciatica present  M54 50    2  Radiculopathy, unspecified spinal region  M54 10        Start Time: 0950  Stop Time: 1030  Total time in clinic (min): 40 minutes       Subjective: Pt reports she is having some increased pain today and thinks it could be related to not sleeping well last night  Pt reports she has also been doing some work around the house and yard work which could've added to it also  Pt reports pain  set up new MRI for her next 22  Objective: See treatment diary below  Assessment: Pt presents to PT with some increased pain in low back region but was able to tolerate therex program with minimal to no increases in pain demonstrating increased tolerance to activity  Re-initiated DTM today due to increased tension and soreness found with palpation of lumbar paraspinals with pt reporting pain relief after  Pt will benefit from continued skilled PT to address strength deficits and decrease pain  Plan: Continue treatment as per PT plan of care         Precautions: fibromyalgia, acute on chronic lumbar pain      Manuals 8/10 8/17   7/14 7/18 7/22 7/28 8/1 8/3   Lumbar pa's KL SG   SZ SG, grade II SG grade III SZ   Gr  III SG  grade III, UPAs grade II SG  grade III, UPAs grade II   consider mechanical traction             SIJ MET             DTM      SG HA SZ SG SG                Neuro Re-Ed             TaA 5"x20 5" x20   5'x20 5"x20 5"x20 5"x20 5" x20 5" x20   TaA bridge 5"x20 5" x20   20x5" w VC's 20x5" 5"x20 5"x20  5" x20   Prone hip ext 20 ea    10x2 x10ea x15 ea      TaA LPD blue  20 Blue x20           TaA with hooklying add squeeze             TaA with BKFO             Pelvic rocking             pball press rows blue  20 Blue x20                                                  Ther Ex             Prone on elbows     3' on/off        glute stretch 30"x3 ea 30" x3    30"x3 30"x3 3x30"     HS stretch 30"x3 ea 30" x3 BL    30"x3 30"x3 3x30" 3x30" 3x30" BL   SKTC 30"x3 ea 30" x3 BL           LTR 5"x20 5" x20   10x2, 5" ea   10x2 5" ea 5" x20 5" x20   bike     rec bike/L-roll 10'        prone B scap retr     10x        Quad stretch          30" x3                Ther Activity             bike 8'  recum  L-roll 8' recumbent L-roll   10' w L-roll/posture 8' upright 8' upright 10' upright/posture 10' upright 10' upright   abdominal breathing     10x3"        Gait Training                                       Modalities             prn        biofreeze use to LB applied                      Treatment performed by  Edelmira Orantes SPT, I attest that this treatment was directly supervised by Justin KELLYT

## 2022-08-17 NOTE — ASSESSMENT & PLAN NOTE
Recommended to follow a well-balanced diet, regular exercise, regular dentist visits  Schedule screening mammogram     Follow-up with [facility] gynecology for pelvic exam, Pap smear  Tdap administered today  Recommended annual Flu vaccination

## 2022-08-17 NOTE — ASSESSMENT & PLAN NOTE
Mood has been stable  Management per psychiatry Dr Chelle Hooks  Continue psychotherapy every 2 weeks

## 2022-08-17 NOTE — ASSESSMENT & PLAN NOTE
Colonoscopy done June 2019, 1 polyp was removed  Schedule colonoscopy this year as recommended by gastroenterology Dr Mayi Deleon

## 2022-08-18 ENCOUNTER — HOSPITAL ENCOUNTER (EMERGENCY)
Facility: HOSPITAL | Age: 42
Discharge: HOME | End: 2022-08-18
Attending: EMERGENCY MEDICINE
Payer: COMMERCIAL

## 2022-08-18 VITALS
TEMPERATURE: 98.2 F | RESPIRATION RATE: 20 BRPM | DIASTOLIC BLOOD PRESSURE: 98 MMHG | SYSTOLIC BLOOD PRESSURE: 172 MMHG | HEART RATE: 102 BPM | OXYGEN SATURATION: 99 %

## 2022-08-18 DIAGNOSIS — T78.40XA ALLERGIC REACTION, INITIAL ENCOUNTER: Primary | ICD-10-CM

## 2022-08-18 PROCEDURE — 99284 EMERGENCY DEPT VISIT MOD MDM: CPT | Performed by: EMERGENCY MEDICINE

## 2022-08-18 PROCEDURE — 99283 EMERGENCY DEPT VISIT LOW MDM: CPT

## 2022-08-18 RX ORDER — PREDNISONE 20 MG/1
20 TABLET ORAL DAILY
Qty: 3 TABLET | Refills: 0 | Status: SHIPPED | OUTPATIENT
Start: 2022-08-18 | End: 2022-08-21

## 2022-08-18 RX ORDER — FAMOTIDINE 20 MG/1
20 TABLET, FILM COATED ORAL ONCE
Status: COMPLETED | OUTPATIENT
Start: 2022-08-18 | End: 2022-08-18

## 2022-08-18 RX ORDER — PREDNISONE 20 MG/1
60 TABLET ORAL ONCE
Status: COMPLETED | OUTPATIENT
Start: 2022-08-18 | End: 2022-08-18

## 2022-08-18 RX ORDER — DIPHENHYDRAMINE HCL 25 MG
25 TABLET ORAL ONCE
Status: COMPLETED | OUTPATIENT
Start: 2022-08-18 | End: 2022-08-18

## 2022-08-18 RX ORDER — EPINEPHRINE 0.3 MG/.3ML
0.3 INJECTION SUBCUTANEOUS ONCE
Qty: 0.6 ML | Refills: 0 | Status: SHIPPED | OUTPATIENT
Start: 2022-08-18 | End: 2022-10-12

## 2022-08-18 RX ADMIN — DIPHENHYDRAMINE HCL 25 MG: 25 TABLET ORAL at 22:03

## 2022-08-18 RX ADMIN — PREDNISONE 60 MG: 20 TABLET ORAL at 22:03

## 2022-08-18 RX ADMIN — FAMOTIDINE 20 MG: 20 TABLET ORAL at 22:03

## 2022-08-19 NOTE — ED PROVIDER NOTES
History  Chief Complaint   Patient presents with    Allergic Reaction     New rx, tizanidine x1wk  Started 30m ago, sore thorat at 16:00, 1930 hoarse throat/unable to swallow     Patient is a 39 y/o F with PMH anxiety, migraine presenting with concern for acute allergic reaction  Patient states she started noticing a sore throat around 1600 this afternoon, starting around 1930 she started to feel throat was hoarse, dry, difficulty swallowing due to how dry it felt  About 30 minutes PTA she states she felt her tongue "doubled in size" and became acutely concerned she was having allergic reaction  She has allergies to some medications but never anaphylactic type reactions or a reaction similar to what she is experiencing now  Only thing she can think of is that she started taking tizanidine about one week ago  States she looked online and found that there may be an interaction between the CBD oil she uses and the tizanidine  Denies rash, nausea, vomiting, diarrhea, difficulty breathing          Prior to Admission Medications   Prescriptions Last Dose Informant Patient Reported? Taking?    Calcium Carbonate-Vitamin D (CALCIUM 500/D PO)  Self Yes No   Sig: Take 1 capsule by mouth daily   Cholecalciferol (VITAMIN D-3) 1000 units CAPS  Self Yes No   Sig: Take 1 capsule by mouth daily   IRON PO  Self Yes No   Sig: Take by mouth   MAGNESIUM OXIDE PO  Self Yes No   Sig: Take by mouth   celecoxib (CeleBREX) 200 mg capsule  Self No No   Sig: TAKE 1 CAPSULE BY MOUTH TWICE A DAY   desvenlafaxine (PRISTIQ) 100 mg 24 hr tablet  Self No No   Sig: TAKE 1 TABLET BY MOUTH EVERY DAY   desvenlafaxine succinate (PRISTIQ) 50 mg 24 hr tablet  Self No No   Sig: Take 1 tablet (50 mg total) by mouth daily Total daily dose 150 mg daily   hydrOXYzine HCL (ATARAX) 50 mg tablet  Self No No   Sig: TAKE 1 TABLET (50 MG TOTAL) BY MOUTH DAILY AT BEDTIME AS NEEDED (INSOMNIA)   Patient not taking: Reported on 8/16/2022   lidocaine (Lidoderm) 5 %  Self No No   Sig: Apply 1 patch topically daily Remove & Discard patch within 12 hours or as directed by MD   nortriptyline (PAMELOR) 75 MG capsule  Self No No   Sig: TAKE 1 CAPSULE BY MOUTH TWICE A DAY   propranolol (INDERAL) 20 mg tablet  Self No No   Sig: TAKE 1 TABLET BY MOUTH EVERY DAY AT NIGHT   tiZANidine (ZANAFLEX) 2 mg tablet  Self No No   Sig: Take 1 tablet (2 mg total) by mouth every 8 (eight) hours as needed for muscle spasms      Facility-Administered Medications: None       Past Medical History:   Diagnosis Date    Anxiety     Chronic sinusitis     Depression     Fibromyalgia     Migraine     Obesity     Polyarthritis     Last assessed 9/21/2015    Psychiatric disorder        Past Surgical History:   Procedure Laterality Date    COLONOSCOPY  06/2019    DENTAL SURGERY      HYSTEROSCOPY      Endometrial Biopsy By Hysteroscopy    REMOVAL OF INTRAUTERINE DEVICE (IUD)      US GUIDED BREAST BIOPSY RIGHT COMPLETE Right 6/17/2019       Family History   Problem Relation Age of Onset    Diabetes Father     Kidney disease Father     Substance Abuse Brother     Diabetes Maternal Grandmother     Rheum arthritis Maternal Grandmother     Melanoma Maternal Grandmother     Kidney disease Paternal Grandmother     Rheum arthritis Paternal Grandmother     Depression Paternal Grandmother     Diabetes Paternal Grandfather     Lung cancer Paternal Grandfather     Substance Abuse Maternal Uncle     Prostate cancer Maternal Uncle 61    Depression Paternal Aunt     Alcohol abuse Family     Stomach cancer Family     Irregular heart beat Mother      I have reviewed and agree with the history as documented      E-Cigarette/Vaping    E-Cigarette Use Never User      E-Cigarette/Vaping Substances    Nicotine No     THC No     CBD No      Social History     Tobacco Use    Smoking status: Never Smoker    Smokeless tobacco: Never Used   Vaping Use    Vaping Use: Never used   Substance Use Topics    Alcohol use: No     Comment: 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     Drug use: Not Currently        Review of Systems   Constitutional: Negative for chills and fever  HENT: Positive for sore throat  Negative for ear pain  Eyes: Negative for pain and visual disturbance  Respiratory: Negative for cough and shortness of breath  Cardiovascular: Negative for chest pain and palpitations  Gastrointestinal: Negative for abdominal pain and vomiting  Genitourinary: Negative for dysuria and hematuria  Musculoskeletal: Negative for arthralgias and back pain  Skin: Negative for color change and rash  Neurological: Negative for seizures and syncope  All other systems reviewed and are negative  Physical Exam  ED Triage Vitals   Temperature Pulse Respirations Blood Pressure SpO2   08/18/22 2253 08/18/22 2145 08/18/22 2145 08/18/22 2145 08/18/22 2145   98 2 °F (36 8 °C) 102 20 (!) 172/98 99 %      Temp Source Heart Rate Source Patient Position - Orthostatic VS BP Location FiO2 (%)   08/18/22 2253 08/18/22 2145 -- -- --   Tympanic Monitor         Pain Score       --                    Orthostatic Vital Signs  Vitals:    08/18/22 2145   BP: (!) 172/98   Pulse: 102       Physical Exam  Vitals and nursing note reviewed  Constitutional:       General: She is not in acute distress  Appearance: She is not ill-appearing  HENT:      Head: Normocephalic and atraumatic  Right Ear: External ear normal       Left Ear: External ear normal       Nose: Nose normal       Mouth/Throat:      Pharynx: Oropharynx is clear  Comments: No tongue protrusion or evidence of soft tissue swelling of oropharynx or throat  Eyes:      Extraocular Movements: Extraocular movements intact  Pupils: Pupils are equal, round, and reactive to light  Cardiovascular:      Rate and Rhythm: Normal rate and regular rhythm  Pulses: Normal pulses  Heart sounds: Normal heart sounds   No murmur heard     No friction rub  No gallop  Pulmonary:      Effort: Pulmonary effort is normal  No respiratory distress  Breath sounds: Normal breath sounds  No wheezing, rhonchi or rales  Abdominal:      General: Abdomen is flat  There is no distension  Palpations: Abdomen is soft  Tenderness: There is no abdominal tenderness  There is no guarding or rebound  Musculoskeletal:         General: No deformity  Normal range of motion  Cervical back: Normal range of motion  Right lower leg: No edema  Left lower leg: No edema  Skin:     General: Skin is warm and dry  Capillary Refill: Capillary refill takes less than 2 seconds  Findings: No rash  Neurological:      General: No focal deficit present  Mental Status: She is alert and oriented to person, place, and time  Gait: Gait normal    Psychiatric:         Mood and Affect: Mood normal          ED Medications  Medications   famotidine (PEPCID) tablet 20 mg (20 mg Oral Given 8/18/22 2203)   diphenhydrAMINE (BENADRYL) tablet 25 mg (25 mg Oral Given 8/18/22 2203)   predniSONE tablet 60 mg (60 mg Oral Given 8/18/22 2203)       Diagnostic Studies  Results Reviewed     None                 No orders to display         Procedures  Procedures      ED Course  ED Course as of 08/19/22 1258   Thu Aug 18, 2022   2252 Re-assessed, feels a little better, no worsening sensation of tongue or airway swelling, breathing comfortably, tolerating PO, no rash, no wheezing                                       MDM  Number of Diagnoses or Management Options  Allergic reaction, initial encounter  Diagnosis management comments: 39 y/o F presenting with concern of allergic reaction, sensation of tongue swelling and dry throat  Pt with overall benign exam findings, normal O2 sat and resp rate  No obvious signs of soft tissue edema or respiratory compromise, no skin or GI manifestations  Will treat with benadryl, pepcid, prednisone   Pt re-assessed and monitored for 2 hours in the ED without progression of symptoms, pt feeling comfortable  Pt given short course of prednisone and epipen to  at pharmacy  Allergy referral made  Strict return precautions given  Disposition  Final diagnoses: Allergic reaction, initial encounter     Time reflects when diagnosis was documented in both MDM as applicable and the Disposition within this note     Time User Action Codes Description Comment    8/18/2022 11:23 PM Saman Aguilar [[id number]] Allergic reaction, initial encounter       ED Disposition     ED Disposition   Discharge    Condition   Stable    Date/Time   Thu Aug 18, 2022 11:42 PM    Comment   Lorraine Madsen discharge to home/self care  Follow-up Information     Follow up With Specialties Details Why Contact Info    Tayler Marie DO Allergy Call  ask about tizanidine [address] [facility]    [address] [facility] [address]      Ap Jolly MD Family Medicine   [facility] [address] [facility]  [phone]            Discharge Medication List as of 8/18/2022 11:42 PM      START taking these medications    Details   EPINEPHrine (EPIPEN) 0 3 mg/0 3 mL SOAJ Inject 0 3 mL (0 3 mg total) into a muscle once for 1 dose, Starting Thu 8/18/2022, Normal      predniSONE 20 mg tablet Take 1 tablet (20 mg total) by mouth daily for 3 days, Starting u 8/18/2022, Until Sun 8/21/2022, Normal         CONTINUE these medications which have NOT CHANGED    Details   Calcium Carbonate-Vitamin D (CALCIUM 500/D PO) Take 1 capsule by mouth daily, Historical Med      celecoxib (CeleBREX) 200 mg capsule TAKE 1 CAPSULE BY MOUTH TWICE A DAY, Normal      Cholecalciferol (VITAMIN D-3) 1000 units CAPS Take 1 capsule by mouth daily, Historical Med      !! desvenlafaxine (PRISTIQ) 100 mg 24 hr tablet TAKE 1 TABLET BY MOUTH EVERY DAY, Normal      !! desvenlafaxine succinate (PRISTIQ) 50 mg 24 hr tablet Take 1 tablet (50 mg total) by mouth daily Total daily dose 150 mg daily, Starting Tue 6/14/2022, Normal      hydrOXYzine HCL (ATARAX) 50 mg tablet TAKE 1 TABLET (50 MG TOTAL) BY MOUTH DAILY AT BEDTIME AS NEEDED (INSOMNIA), Starting Fri 5/27/2022, Normal      IRON PO Take by mouth, Historical Med      lidocaine (Lidoderm) 5 % Apply 1 patch topically daily Remove & Discard patch within 12 hours or as directed by MD, Starting Sat 6/11/2022, Normal      MAGNESIUM OXIDE PO Take by mouth, Historical Med      nortriptyline (PAMELOR) 75 MG capsule TAKE 1 CAPSULE BY MOUTH TWICE A DAY, Normal      propranolol (INDERAL) 20 mg tablet TAKE 1 TABLET BY MOUTH EVERY DAY AT NIGHT, Normal      tiZANidine (ZANAFLEX) 2 mg tablet Take 1 tablet (2 mg total) by mouth every 8 (eight) hours as needed for muscle spasms, Starting Thu 8/11/2022, Normal       !! - Potential duplicate medications found  Please discuss with provider  PDMP Review       Value Time User    PDMP Reviewed  Yes 1/6/2022  1:40 PM Abdoulaye Olvera MD           ED Provider  Attending physically available and evaluated Maricarmengilson Croninalesha LIZARRAGA managed the patient along with the ED Attending      Electronically Signed by         Linda Rodriguez MD  08/19/22 6019

## 2022-08-19 NOTE — DISCHARGE INSTRUCTIONS
Call your PCP regarding your medications, ask to see if they want you to stop the tizanidine   your epipen and short course of prednisone from pharmacy  Follow up with the allergist to try to identify possible cause for your reaction  If you develop new or worsening symptoms, please return to the Emergency Department for further evaluation

## 2022-08-19 NOTE — ED ATTENDING ATTESTATION
8/18/2022  IEdwige DO, saw and evaluated the patient  I have discussed the patient with the resident/non-physician practitioner and agree with the resident's/non-physician practitioner's findings, Plan of Care, and MDM as documented in the resident's/non-physician practitioner's note, except where noted  All available labs and Radiology studies were reviewed  I was present for key portions of any procedure(s) performed by the resident/non-physician practitioner and I was immediately available to provide assistance  At this point I agree with the current assessment done in the Emergency Department  I have conducted an independent evaluation of this patient a history and physical is as follows:    40-year-old female presents with tongue swelling/itchiness  Patient states has been on tizanidine for the past week which is new to her  Denies shortness of breath  States her tongue feels like it is swollen  Feels like her throat is itchy  No difficulty swallowing  No rash, no other itching  Denies nausea  On exam-no acute distress but appears anxious, heart mildly tachycardic, no respiratory distress, oropharynx is clear, no obvious rash    Plan-Benadryl prednisone Pepcid and observe    ED Course         Critical Care Time  Procedures

## 2022-08-21 DIAGNOSIS — F51.04 PSYCHOPHYSIOLOGICAL INSOMNIA: ICD-10-CM

## 2022-08-21 DIAGNOSIS — F41.1 GENERALIZED ANXIETY DISORDER: ICD-10-CM

## 2022-08-21 DIAGNOSIS — F33.1 MAJOR DEPRESSIVE DISORDER, RECURRENT EPISODE, MODERATE (HCC): ICD-10-CM

## 2022-08-22 ENCOUNTER — TELEPHONE (OUTPATIENT)
Dept: GASTROENTEROLOGY | Facility: AMBULARY SURGERY CENTER | Age: 42
End: 2022-08-22

## 2022-08-22 RX ORDER — HYDROXYZINE 50 MG/1
50 TABLET, FILM COATED ORAL
Qty: 30 TABLET | Refills: 2 | Status: SHIPPED | OUTPATIENT
Start: 2022-08-22

## 2022-08-22 RX ORDER — DESVENLAFAXINE 100 MG/1
TABLET, EXTENDED RELEASE ORAL
Qty: 30 TABLET | Refills: 2 | Status: SHIPPED | OUTPATIENT
Start: 2022-08-22

## 2022-08-22 NOTE — TELEPHONE ENCOUNTER
Patients GI provider:  Dr. Fan    Number to return call: ( [phone]    Reason for call: Pt calling to schedule recall colon, last done 6-2019    Scheduled procedure/appointment date if applicable: N/A

## 2022-08-23 ENCOUNTER — OFFICE VISIT (OUTPATIENT)
Dept: PHYSICAL THERAPY | Facility: CLINIC | Age: 42
End: 2022-08-23
Payer: COMMERCIAL

## 2022-08-23 DIAGNOSIS — M54.10 RADICULOPATHY, UNSPECIFIED SPINAL REGION: ICD-10-CM

## 2022-08-23 DIAGNOSIS — M54.50 ACUTE BILATERAL LOW BACK PAIN, UNSPECIFIED WHETHER SCIATICA PRESENT: Primary | ICD-10-CM

## 2022-08-23 DIAGNOSIS — F33.1 MAJOR DEPRESSIVE DISORDER, RECURRENT EPISODE, MODERATE (HCC): ICD-10-CM

## 2022-08-23 PROCEDURE — 97112 NEUROMUSCULAR REEDUCATION: CPT | Performed by: PHYSICAL THERAPIST

## 2022-08-23 PROCEDURE — 97530 THERAPEUTIC ACTIVITIES: CPT | Performed by: PHYSICAL THERAPIST

## 2022-08-23 PROCEDURE — 97140 MANUAL THERAPY 1/> REGIONS: CPT | Performed by: PHYSICAL THERAPIST

## 2022-08-23 PROCEDURE — 97110 THERAPEUTIC EXERCISES: CPT | Performed by: PHYSICAL THERAPIST

## 2022-08-23 RX ORDER — DESVENLAFAXINE 50 MG/1
50 TABLET, EXTENDED RELEASE ORAL DAILY
Qty: 30 TABLET | Refills: 2 | Status: SHIPPED | OUTPATIENT
Start: 2022-08-23

## 2022-08-23 NOTE — PROGRESS NOTES
Daily Note     Today's date: 2022  Patient name: Lieutenant Scherer  : 1980  MRN: [id number]  Referring provider: Umesh Sotelo, PT  Dx:   Encounter Diagnosis     ICD-10-CM    1  Acute bilateral low back pain, unspecified whether sciatica present  M54 50    2  Radiculopathy, unspecified spinal region  M54 10        Start Time: 1015  Stop Time: 1100  Total time in clinic (min): 45 minutes       Subjective: Pt reports she has been feeling okay since last visit but had an allergic reaction on Thursday night, so she has been having some respiratory issues  Pt reports her back has felt "excellent" all weekend but that she woke up a little sore today  Objective: See treatment diary below  Assessment: Pt presents to PT with some increased soreness, so continued therex program as listed with pt able to tolerate all exercises with no increased pain  Plan to progress therex program next visit pending pt's response as she is continuing to feel better between sessions with deports of decreased pain/soreness  Continue to utilize manual tx and STM for pain and muscle tension relief as pt responds positively to tx  Plan: Continue treatment as per PT plan of care         Precautions: fibromyalgia, acute on chronic lumbar pain      Manuals 8/10 8/17 8/23  7/14 7/18 7/22 7/28 8/1 8/3   Lumbar pa's KL SG SG  SZ SG, grade II SG grade III SZ   Gr  III SG  grade III, UPAs grade II SG  grade III, UPAs grade II   consider mechanical traction             SIJ MET             DTM   SG   SG HA SZ SG SG                Neuro Re-Ed             TaA 5"x20 5" x20 5" x20  5'x20 5"x20 5"x20 5"x20 5" x20 5" x20   TaA bridge 5"x20 5" x20 5" x20  20x5" w VC's 20x5" 5"x20 5"x20  5" x20   Prone hip ext 20 ea  20x ea bent knee  10x2 x10ea x15 ea      TaA LPD blue  20 Blue x20 Blue x20          TaA with hooklying add squeeze             TaA with BKFO             Pelvic rocking             pball press             rows blue  20 Blue x20 Blue x20                                                 Ther Ex             Prone on elbows     3' on/off        glute stretch 30"x3 ea 30" x3 30" x3 BL figure 4   30"x3 30"x3 3x30"     HS stretch 30"x3 ea 30" x3 BL 30" x3 BL   30"x3 30"x3 3x30" 3x30" 3x30" BL   SKTC 30"x3 ea 30" x3 BL 30" x3 BL          LTR 5"x20 5" x20 5" x20  10x2, 5" ea   10x2 5" ea 5" x20 5" x20   bike     rec bike/L-roll 10'        prone B scap retr     10x        Quad stretch          30" x3                Ther Activity             bike 8'  recum  L-roll 8' recumbent L-roll 8' recumbent L-roll  10' w L-roll/posture 8' upright 8' upright 10' upright/posture 10' upright 10' upright   abdominal breathing     10x3"        Gait Training                                       Modalities             prn        biofreeze use to LB applied                      Treatment performed by Luigi Cid SPT, I attest that this treatment was directly supervised by Jose A KELLYT

## 2022-08-24 ENCOUNTER — LAB (OUTPATIENT)
Dept: LAB | Facility: CLINIC | Age: 42
End: 2022-08-24
Payer: COMMERCIAL

## 2022-08-24 DIAGNOSIS — R73.01 IMPAIRED FASTING GLUCOSE: ICD-10-CM

## 2022-08-24 DIAGNOSIS — Z13.6 SCREENING FOR CARDIOVASCULAR CONDITION: ICD-10-CM

## 2022-08-24 DIAGNOSIS — E66.09 CLASS 2 OBESITY DUE TO EXCESS CALORIES WITHOUT SERIOUS COMORBIDITY WITH BODY MASS INDEX (BMI) OF 36.0 TO 36.9 IN ADULT: ICD-10-CM

## 2022-08-24 LAB
ALBUMIN SERPL BCP-MCNC: 4.1 G/DL (ref 3.5–5)
ALP SERPL-CCNC: 72 U/L (ref 46–116)
ALT SERPL W P-5'-P-CCNC: 59 U/L (ref 12–78)
ANION GAP SERPL CALCULATED.3IONS-SCNC: 6 MMOL/L (ref 4–13)
AST SERPL W P-5'-P-CCNC: 43 U/L (ref 5–45)
BILIRUB SERPL-MCNC: 0.68 MG/DL (ref 0.2–1)
BUN SERPL-MCNC: 12 MG/DL (ref 5–25)
CALCIUM SERPL-MCNC: 9 MG/DL (ref 8.3–10.1)
CHLORIDE SERPL-SCNC: 100 MMOL/L (ref 96–108)
CHOLEST SERPL-MCNC: 181 MG/DL
CO2 SERPL-SCNC: 29 MMOL/L (ref 21–32)
CREAT SERPL-MCNC: 1.04 MG/DL (ref 0.6–1.3)
GFR SERPL CREATININE-BSD FRML MDRD: 65 ML/MIN/1.73SQ M
GLUCOSE P FAST SERPL-MCNC: 224 MG/DL (ref 65–99)
HDLC SERPL-MCNC: 34 MG/DL
LDLC SERPL CALC-MCNC: 108 MG/DL (ref 0–100)
NONHDLC SERPL-MCNC: 147 MG/DL
POTASSIUM SERPL-SCNC: 4.3 MMOL/L (ref 3.5–5.3)
PROT SERPL-MCNC: 7.6 G/DL (ref 6.4–8.4)
SODIUM SERPL-SCNC: 135 MMOL/L (ref 135–147)
TRIGL SERPL-MCNC: 195 MG/DL

## 2022-08-24 PROCEDURE — 80053 COMPREHEN METABOLIC PANEL: CPT

## 2022-08-24 PROCEDURE — 80061 LIPID PANEL: CPT

## 2022-08-24 PROCEDURE — 83036 HEMOGLOBIN GLYCOSYLATED A1C: CPT

## 2022-08-24 PROCEDURE — 36415 COLL VENOUS BLD VENIPUNCTURE: CPT

## 2022-08-25 ENCOUNTER — OFFICE VISIT (OUTPATIENT)
Dept: PHYSICAL THERAPY | Facility: CLINIC | Age: 42
End: 2022-08-25
Payer: COMMERCIAL

## 2022-08-25 ENCOUNTER — TELEPHONE (OUTPATIENT)
Dept: FAMILY MEDICINE CLINIC | Facility: CLINIC | Age: 42
End: 2022-08-25

## 2022-08-25 DIAGNOSIS — M54.10 RADICULOPATHY, UNSPECIFIED SPINAL REGION: ICD-10-CM

## 2022-08-25 DIAGNOSIS — M54.50 ACUTE BILATERAL LOW BACK PAIN, UNSPECIFIED WHETHER SCIATICA PRESENT: Primary | ICD-10-CM

## 2022-08-25 LAB
EST. AVERAGE GLUCOSE BLD GHB EST-MCNC: 174 MG/DL
HBA1C MFR BLD: 7.7 %

## 2022-08-25 PROCEDURE — 97112 NEUROMUSCULAR REEDUCATION: CPT

## 2022-08-25 PROCEDURE — 97110 THERAPEUTIC EXERCISES: CPT

## 2022-08-25 PROCEDURE — 97530 THERAPEUTIC ACTIVITIES: CPT

## 2022-08-25 NOTE — TELEPHONE ENCOUNTER
----- Message from Fabio Tay sent at 8/25/2022  9:58 AM EDT -----    ----- Message -----  From: Yeyo Raza MD  Sent: 8/25/2022   9:12 AM EDT  To: Gilberto Campbell MA    hgA1C 7 7 elevated  Pt needs an appt with PCP next week

## 2022-08-25 NOTE — PROGRESS NOTES
Daily Note     Today's date: 2022  Patient name: Geoffrey South  : 1980  MRN: [id number]  Referring provider: Richard Pal, PT  Dx:   Encounter Diagnosis     ICD-10-CM    1  Acute bilateral low back pain, unspecified whether sciatica present  M54 50    2  Radiculopathy, unspecified spinal region  M54 10        Start Time: 0845  Stop Time: 924  Total time in clinic (min): 39 minutes      Subjective: Patient reports back pain was "a little intense" yesterday so she took Tylenol and avoided heavy lifting  Despite this patient reports "we are making progress "      Objective: See treatment diary below  Assessment: Fatigue noted when performing prone hip extension while isolating the glute however patient is able to complete all repetitions as noted  Patient is comfortable throughout lumbar joint mobilizations  Plan: Continue treatment as per PT plan of care         Precautions: fibromyalgia, acute on chronic lumbar pain      Manuals 8/10 8/17 8/23 8/25  7/18 7/22 7/28 8/1 8/3   Lumbar pa's KL SG SG KT  SG, grade II SG grade III SZ   Gr  III SG  grade III, UPAs grade II SG  grade III, UPAs grade II   consider mechanical traction             SIJ MET             DTM   SG   SG HA SZ SG SG                Neuro Re-Ed             TaA 5"x20 5" x20 5" x20 5"x20  5"x20 5"x20 5"x20 5" x20 5" x20   TaA bridge 5"x20 5" x20 5" x20 5"x20  20x5" 5"x20 5"x20  5" x20   Prone hip ext 20 ea  20x ea bent knee 2x10  bent knee  x10ea x15 ea      TaA LPD blue  20 Blue x20 Blue x20 blue  25         TaA with hooklying add squeeze             TaA with BKFO             Pelvic rocking             pball press             rows blue  20 Blue x20 Blue x20 blue  25                                                Ther Ex             Prone on elbows             glute stretch 30"x3 ea 30" x3 30" x3 BL figure 4 30"x3 ea  figure 4  30"x3 30"x3 3x30"     HS stretch 30"x3 ea 30" x3 BL 30" x3 BL 30"x3 ea  30"x3 30"x3 3x30" 3x30" 3x30" BL   SKTC 30"x3 ea 30" x3 BL 30" x3 BL 30"x3 ea         LTR 5"x20 5" x20 5" x20 5"x20    10x2 5" ea 5" x20 5" x20   bike             prone B scap retr             Quad stretch          30" x3                Ther Activity             bike 8'  recum  L-roll 8' recumbent L-roll 8' recumbent L-roll 8' recumbent  8' upright 8' upright 10' upright/posture 10' upright 10' upright   abdominal breathing                          Gait Training                                       Modalities             prn        biofreeze use to LB applied

## 2022-08-26 ENCOUNTER — HOSPITAL ENCOUNTER (OUTPATIENT)
Dept: RADIOLOGY | Facility: HOSPITAL | Age: 42
Discharge: HOME | End: 2022-08-26
Attending: ANESTHESIOLOGY
Payer: COMMERCIAL

## 2022-08-26 ENCOUNTER — TELEMEDICINE (OUTPATIENT)
Dept: BEHAVIORAL/MENTAL HEALTH CLINIC | Facility: CLINIC | Age: 42
End: 2022-08-26
Payer: COMMERCIAL

## 2022-08-26 DIAGNOSIS — M54.40 LOW BACK PAIN WITH SCIATICA, SCIATICA LATERALITY UNSPECIFIED, UNSPECIFIED BACK PAIN LATERALITY, UNSPECIFIED CHRONICITY: ICD-10-CM

## 2022-08-26 DIAGNOSIS — F41.1 GENERALIZED ANXIETY DISORDER: ICD-10-CM

## 2022-08-26 DIAGNOSIS — F33.1 MAJOR DEPRESSIVE DISORDER, RECURRENT EPISODE, MODERATE (HCC): Primary | ICD-10-CM

## 2022-08-26 PROCEDURE — 90834 PSYTX W PT 45 MINUTES: CPT | Performed by: PSYCHIATRY & NEUROLOGY

## 2022-08-26 PROCEDURE — 72148 MRI LUMBAR SPINE W/O DYE: CPT

## 2022-08-26 PROCEDURE — G1004 CDSM NDSC: HCPCS

## 2022-08-26 NOTE — PSYCH
Session time 901-940 (total time 39 minutes)    Virtual Regular Visit    Verification of patient location:    Patient is located in the following state in which I hold an active license [place]      Assessment/Plan:    Problem List Items Addressed This Visit        Other    Major depressive disorder, recurrent episode, moderate ([address] ) - Primary    Generalized anxiety disorder          Goals addressed in session: Goal 1          Reason for visit is   Chief Complaint   Patient presents with    Virtual Regular Visit        Encounter provider HERMELINDA Ryees    Provider located at [address] [place] [id number] [phone]      Recent Visits  No visits were found meeting these conditions  Showing recent visits within past 7 days and meeting all other requirements  Future Appointments  No visits were found meeting these conditions  Showing future appointments within next 150 days and meeting all other requirements       The patient was identified by name and date of birth  Marisol Garcia was informed that this is a telemedicine visit and that the visit is being conducted throughMondokio and patient was informed that this is a secure, HIPAA-compliant platform  She agrees to proceed     My office door was closed  No one else was in the room  She acknowledged consent and understanding of privacy and security of the video platform  The patient has agreed to participate and understands they can discontinue the visit at any time  Patient is aware this is a billable service  Subjective  Marisol Garcia is a 38 y o  female presenting for follow up  Danilo Phillips shared that she has been extremely busy with medical appointments and physical therapy, and recently found out that her A1C is elevated  She is not sure if that is from recent steroid use, or if she is developing diabetes like both parents    She will see her PCP next week to discuss further, but in the meantime Huber Cornell said that she is trying to not worry too much about it  She is trying a new diet plan to help her "reset" her body and try to lose weight, and is hopeful that this will be like a fresh start toward feeling better  She talked about her relationship with Balbina and feeling like they might be moving into a more serious relationship, but she said that she needs to have a talk with him and really clarify what they both want  She said that she does not want to lose him as a friend, but also does not want to feel strung along and get hurt by him should things not work out  Huber Cornell talked about getting out and doing things more often with friends, and talked about plans with her mother today that she is really looking forward to  Huber Cornell said that her mood has been up and down, feeling really good some days and then "crashing" into a depression for brief periods of time, but said that her anxiety is "manageable "  Provided support and validation of Esther's experiences and concerns, and used CBT strategies to help Huber Cornell focus on positives and continue to try to manage mood  A: Huber Cornell presented as "ok" today, with a bright, animated affect, good eye contact and normal behavior  Her concentration was mildly impaired, thought process logical and organized  Insight and judgment intact  Denies SI HI and psychosis  Moderate progress toward goals  P: Huber Cornell will continue to socialize with friends and engage in enjoyable activities with her mom, will focus on positive things happening in her life, and will use coping strategies to manage mood and anxiety as needed  She will return in three weeks for follow up as scheduled        HPI     Past Medical History:   Diagnosis Date    Anxiety     Chronic sinusitis     Depression     Fibromyalgia     Migraine     Obesity     Polyarthritis     Last assessed 9/21/2015    Psychiatric disorder        Past Surgical History: Procedure Laterality Date    COLONOSCOPY  06/2019    DENTAL SURGERY      HYSTEROSCOPY      Endometrial Biopsy By Hysteroscopy    REMOVAL OF INTRAUTERINE DEVICE (IUD)      US GUIDED BREAST BIOPSY RIGHT COMPLETE Right 6/17/2019       Current Outpatient Medications   Medication Sig Dispense Refill    Calcium Carbonate-Vitamin D (CALCIUM 500/D PO) Take 1 capsule by mouth daily      celecoxib (CeleBREX) 200 mg capsule TAKE 1 CAPSULE BY MOUTH TWICE A DAY 60 capsule 6    Cholecalciferol (VITAMIN D-3) 1000 units CAPS Take 1 capsule by mouth daily      desvenlafaxine (PRISTIQ) 100 mg 24 hr tablet TAKE 1 TABLET BY MOUTH EVERY DAY 30 tablet 2    desvenlafaxine succinate (PRISTIQ) 50 mg 24 hr tablet TAKE 1 TABLET (50 MG TOTAL) BY MOUTH DAILY TOTAL DAILY DOSE 150 MG DAILY 30 tablet 2    EPINEPHrine (EPIPEN) 0 3 mg/0 3 mL SOAJ Inject 0 3 mL (0 3 mg total) into a muscle once for 1 dose 0 6 mL 0    hydrOXYzine HCL (ATARAX) 50 mg tablet TAKE 1 TABLET (50 MG TOTAL) BY MOUTH DAILY AT BEDTIME AS NEEDED (INSOMNIA) 30 tablet 2    IRON PO Take by mouth      lidocaine (Lidoderm) 5 % Apply 1 patch topically daily Remove & Discard patch within 12 hours or as directed by MD 15 patch 0    MAGNESIUM OXIDE PO Take by mouth      nortriptyline (PAMELOR) 75 MG capsule TAKE 1 CAPSULE BY MOUTH TWICE A  capsule 0    propranolol (INDERAL) 20 mg tablet TAKE 1 TABLET BY MOUTH EVERY DAY AT NIGHT 30 tablet 5    tiZANidine (ZANAFLEX) 2 mg tablet Take 1 tablet (2 mg total) by mouth every 8 (eight) hours as needed for muscle spasms 90 tablet 1     No current facility-administered medications for this visit  Allergies   Allergen Reactions    Amoxicillin-Pot Clavulanate     Decadrol [Dexamethasone] Other (See Comments)     Category: Adverse Reaction;   psychosis    Dexamethasone Sodium Phosphate Other (See Comments)     psychosis    Other     Penicillins Hives and Other (See Comments)     Category:  Allergy; Hives/Uticaria    Tetracycline Hives and Other (See Comments)     Hives/Uticaria    Tetracyclines & Related Hives     Category: Allergy; Review of Systems    Video Exam    There were no vitals filed for this visit      Physical Exam     I spent 39 minutes directly with the patient during this visit

## 2022-08-29 ENCOUNTER — OFFICE VISIT (OUTPATIENT)
Dept: PHYSICAL THERAPY | Facility: CLINIC | Age: 42
End: 2022-08-29
Payer: COMMERCIAL

## 2022-08-29 DIAGNOSIS — M54.10 RADICULOPATHY, UNSPECIFIED SPINAL REGION: ICD-10-CM

## 2022-08-29 DIAGNOSIS — M54.50 ACUTE BILATERAL LOW BACK PAIN, UNSPECIFIED WHETHER SCIATICA PRESENT: Primary | ICD-10-CM

## 2022-08-29 PROCEDURE — 97530 THERAPEUTIC ACTIVITIES: CPT

## 2022-08-29 PROCEDURE — 97112 NEUROMUSCULAR REEDUCATION: CPT

## 2022-08-29 PROCEDURE — 97110 THERAPEUTIC EXERCISES: CPT

## 2022-08-29 NOTE — PROGRESS NOTES
Daily Note     Today's date: 2022  Patient name: Paradise Guardado  : 1980  MRN: [id number]  Referring provider: Maria G Chou, PT  Dx:   Encounter Diagnosis     ICD-10-CM    1  Acute bilateral low back pain, unspecified whether sciatica present  M54 50    2  Radiculopathy, unspecified spinal region  M54 10        Start Time: 1530  Stop Time: 1610  Total time in clinic (min): 40 minutes       Subjective: Patient reports her back was feeling stiff this morning however symptoms improved after being up and moving around  Objective: See treatment diary below  Assessment: Fatigue noted when performing bridges - remainder of therapeutic exercise program is tolerated well  Plan: Continue treatment as per PT plan of care         Precautions: fibromyalgia, acute on chronic lumbar pain      Manuals 8/10 8/17 8/23 8/25 8/29  7/22 7/28 8/1 8/3   Lumbar pa's KL SG SG KT SG  SG grade III SZ   Gr  III SG  grade III, UPAs grade II SG  grade III, UPAs grade II   consider mechanical traction             SIJ MET             DTM   SG    HA SZ SG SG                Neuro Re-Ed             TaA 5"x20 5" x20 5" x20 5"x20 5"x20  5"x20 5"x20 5" x20 5" x20   TaA bridge 5"x20 5" x20 5" x20 5"x20 5"x20  5"x20 5"x20  5" x20   Prone hip ext 20 ea  20x ea bent knee 2x10  bent knee 2x10 knee bent  x15 ea      TaA LPD blue  20 Blue x20 Blue x20 blue  25 blue  25        TaA with hooklying add squeeze             TaA with BKFO             Pelvic rocking             pball press             rows blue  20 Blue x20 Blue x20 blue  25 blue  25                                               Ther Ex             Prone on elbows             glute stretch 30"x3 ea 30" x3 30" x3 BL figure 4 30"x3 ea  figure 4 30"x3 ea  figure 4  30"x3 3x30"     HS stretch 30"x3 ea 30" x3 BL 30" x3 BL 30"x3 ea 30"x3 ea  30"x3 3x30" 3x30" 3x30" BL   SKTC 30"x3 ea 30" x3 BL 30" x3 BL 30"x3 ea 30"x3 ea        LTR 5"x20 5" x20 5" x20 5"x20 5"x20   10x2 5" ea 5" x20 5" x20   bike             prone B scap retr             Quad stretch manual         30" x3                Ther Activity             bike 8'  recum  L-roll 8' recumbent L-roll 8' recumbent L-roll 8' recumbent 8' recumbent L-roll  8' upright 10' upright/posture 10' upright 10' upright   abdominal breathing                          Gait Training                                       Modalities             prn        biofreeze use to LB applied

## 2022-08-30 ENCOUNTER — TELEPHONE (OUTPATIENT)
Dept: RADIOLOGY | Facility: CLINIC | Age: 42
End: 2022-08-30

## 2022-08-30 NOTE — RESULT ENCOUNTER NOTE
Patient states she was recently in office does she need to come back or can she go over the results over the phone

## 2022-08-30 NOTE — TELEPHONE ENCOUNTER
MRI of the lumbar spine shows a mild disc bulge and some arthritis with mild right foraminal stenosis (narrowing where nerve exists)  No evidence of nerve compression

## 2022-08-31 ENCOUNTER — OFFICE VISIT (OUTPATIENT)
Dept: PHYSICAL THERAPY | Facility: CLINIC | Age: 42
End: 2022-08-31
Payer: COMMERCIAL

## 2022-08-31 DIAGNOSIS — M54.50 ACUTE BILATERAL LOW BACK PAIN, UNSPECIFIED WHETHER SCIATICA PRESENT: Primary | ICD-10-CM

## 2022-08-31 DIAGNOSIS — M54.10 RADICULOPATHY, UNSPECIFIED SPINAL REGION: ICD-10-CM

## 2022-08-31 PROCEDURE — 97110 THERAPEUTIC EXERCISES: CPT | Performed by: PHYSICAL THERAPIST

## 2022-08-31 PROCEDURE — 97112 NEUROMUSCULAR REEDUCATION: CPT | Performed by: PHYSICAL THERAPIST

## 2022-08-31 PROCEDURE — 97530 THERAPEUTIC ACTIVITIES: CPT | Performed by: PHYSICAL THERAPIST

## 2022-08-31 NOTE — PROGRESS NOTES
Daily Note     Today's date: 2022  Patient name: Massimo Flaherty  : 1980  MRN: [id number]  Referring provider: Derrick Laguna, PT  Dx:   Encounter Diagnosis     ICD-10-CM    1  Acute bilateral low back pain, unspecified whether sciatica present  M54 50    2  Radiculopathy, unspecified spinal region  M54 10        Start Time: 1018  Stop Time: 1100  Total time in clinic (min): 42 minutes       Subjective: Pt reports she is sore today, but doesn't recall doing anything that would have aggravated it  Pt reports soreness is still mostly L-sided  Objective: See treatment diary below  Assessment: Pt continues to report increased soreness with no clear cause, so continued therex program today as listed with pt demonstrating good tolerance to activity but continued mm fatigue with bridges and t/o remainder of session  Plan to progress therex program next visit to pt's tolerance and continue to utilize manual tx for pain and stiffness relief  Plan: Continue treatment as per PT plan of care         Precautions: fibromyalgia, acute on chronic lumbar pain      Manuals 8/10 8/17 8/23 8/25 8/29 8/31  7/28 8/1 8/3   Lumbar pa's KL SG SG KT SG SG  SZ   Gr  III SG  grade III, UPAs grade II SG  grade III, UPAs grade II   consider mechanical traction             SIJ MET             DTM   SG   SG L paraspinals  SZ SG SG                Neuro Re-Ed             TaA 5"x20 5" x20 5" x20 5"x20 5"x20 5" x20  5"x20 5" x20 5" x20   TaA bridge 5"x20 5" x20 5" x20 5"x20 5"x20 5" x20  5"x20  5" x20   Prone hip ext 20 ea  20x ea bent knee 2x10  bent knee 2x10 knee bent 2x10 knee bent       TaA LPD blue  20 Blue x20 Blue x20 blue  25 blue  25 Blue x25       TaA with hooklying add squeeze             TaA with BKFO             Pelvic rocking             pball press             rows blue  20 Blue x20 Blue x20 blue  25 blue  25 Blue x25                                              Ther Ex             Prone on elbows glute stretch 30"x3 ea 30" x3 30" x3 BL figure 4 30"x3 ea  figure 4 30"x3 ea  figure 4 30" x3 ea figure 4  3x30"     HS stretch 30"x3 ea 30" x3 BL 30" x3 BL 30"x3 ea 30"x3 ea 30" x3 ea  3x30" 3x30" 3x30" BL   SKTC 30"x3 ea 30" x3 BL 30" x3 BL 30"x3 ea 30"x3 ea 30" x3 ea       LTR 5"x20 5" x20 5" x20 5"x20 5"x20 5" x20 ea  10x2 5" ea 5" x20 5" x20   bike             prone B scap retr             Quad stretch manual         30" x3                Ther Activity             bike 8'  recum  L-roll 8' recumbent L-roll 8' recumbent L-roll 8' recumbent 8' recumbent L-roll 10' upright  10' upright/posture 10' upright 10' upright   abdominal breathing                          Gait Training                                       Modalities             prn        biofreeze use to LB applied                      Treatment performed by  Phil Enciso SPT, I attest that this treatment was directly supervised by Viktor KELLYT

## 2022-08-31 NOTE — TELEPHONE ENCOUNTER
Her MRI does not correlate with her current symptoms  I will need to re-evaluate the patient at her next office visit to discuss next step in treatment strategy    If sooner office visit is available, okay to offer

## 2022-08-31 NOTE — TELEPHONE ENCOUNTER
Reviewed with the patient the results and inquired about her pain  She stated she continues with pain in her LB and it continues to radiate across and up her back  She also complains of numbness and tingling in her feet  She continues on the prescribed medication but it only provides mild relief  She is also continuing with PT  Her next OVS is not until Oct  Please advise   thanks

## 2022-09-01 ENCOUNTER — OFFICE VISIT (OUTPATIENT)
Dept: FAMILY MEDICINE CLINIC | Facility: CLINIC | Age: 42
End: 2022-09-01
Payer: COMMERCIAL

## 2022-09-01 VITALS
HEIGHT: 60 IN | WEIGHT: 193.6 LBS | DIASTOLIC BLOOD PRESSURE: 74 MMHG | HEART RATE: 72 BPM | BODY MASS INDEX: 38.01 KG/M2 | TEMPERATURE: 98.1 F | OXYGEN SATURATION: 96 % | SYSTOLIC BLOOD PRESSURE: 124 MMHG | RESPIRATION RATE: 16 BRPM

## 2022-09-01 DIAGNOSIS — E11.41 DIABETIC MONONEUROPATHY ASSOCIATED WITH TYPE 2 DIABETES MELLITUS (HCC): ICD-10-CM

## 2022-09-01 DIAGNOSIS — E78.5 DYSLIPIDEMIA: ICD-10-CM

## 2022-09-01 DIAGNOSIS — E11.65 TYPE 2 DIABETES MELLITUS WITH HYPERGLYCEMIA, WITHOUT LONG-TERM CURRENT USE OF INSULIN (HCC): Primary | ICD-10-CM

## 2022-09-01 DIAGNOSIS — E66.09 CLASS 2 OBESITY DUE TO EXCESS CALORIES WITHOUT SERIOUS COMORBIDITY WITH BODY MASS INDEX (BMI) OF 37.0 TO 37.9 IN ADULT: ICD-10-CM

## 2022-09-01 DIAGNOSIS — M79.7 FIBROMYALGIA: ICD-10-CM

## 2022-09-01 PROBLEM — E11.40 DIABETIC NEUROPATHY ASSOCIATED WITH TYPE 2 DIABETES MELLITUS (HCC): Status: ACTIVE | Noted: 2022-09-01

## 2022-09-01 PROCEDURE — 99214 OFFICE O/P EST MOD 30 MIN: CPT | Performed by: FAMILY MEDICINE

## 2022-09-01 NOTE — PROGRESS NOTES
No chief complaint on file  Health Maintenance   Topic Date Due    Pneumococcal Vaccine: Pediatrics (0 to 5 Years) and At-Risk Patients (6 to 59 Years) (1 - PCV) Never done    DM Eye Exam  Never done    URINE MICROALBUMIN  Never done    HIV Screening  Never done    Cervical Cancer Screening  Never done    Breast Cancer Screening: Mammogram  06/13/2020    Colorectal Cancer Screening  06/04/2022    PT PLAN OF CARE  07/22/2022    Influenza Vaccine (1) 09/01/2022    HEMOGLOBIN A1C  02/24/2023    BMI: Followup Plan  06/02/2023    Depression Remission PHQ  06/02/2023    BMI: Adult  08/16/2023    Annual Physical  08/16/2023    Diabetic Foot Exam  09/01/2023    DTaP,Tdap,and Td Vaccines (3 - Td or Tdap) 08/16/2032    Hepatitis C Screening  Completed    COVID-19 Vaccine  Completed    HIB Vaccine  Aged Out    Hepatitis B Vaccine  Aged Out    IPV Vaccine  Aged Out    Hepatitis A Vaccine  Aged Out    Meningococcal ACWY Vaccine  Aged Out    HPV Vaccine  Aged Out      Assessment/Plan:    Type 2 diabetes mellitus with hyperglycemia, without long-term current use of insulin ([facility] )    Lab Results   Component Value Date    HGBA1C 7 7 (H) 08/24/2022     Newly diagnosed type 2 DM  Discussed treatment options with patient  Reviewed dietary modifications  Recommended to follow a low carb diet, avoid starchy foods  Increase exercise, lose weight  Will refer to nutrition counseling  Start Metformin 500 mg 1 tablet twice daily with meals  Reviewed side effects  Recommended to start checking blood sugar twice daily and send in blood sugar log for review in 2 weeks  Patient was provided education how to use a glucometer  Check eye exam by ophthalmology  Diabetic neuropathy associated with type 2 diabetes mellitus ([facility] )    Lab Results   Component Value Date    HGBA1C 7 7 (H) 08/24/2022     Patient will need to establish care with podiatry      Stressed the importance of keeping blood sugar under control to prevent worsening neurological complications  Class 2 obesity due to excess calories without serious comorbidity with body mass index (BMI) of 37 0 to 37 9 in adult  Discussed dietary and lifestyle modifications  Encouraged to work weight reduction  Dyslipidemia  LDL goal < 100, triglycerides < 150  Recommended follow a low-cholesterol, low-fat diet  Increase exercise, lose weight  Referred to nutrition counseling  Recheck lipid panel in 3 months  Schedule follow-up visit in 3 months  Check labs prior to next visit  Diagnoses and all orders for this visit:    Type 2 diabetes mellitus with hyperglycemia, without long-term current use of insulin (HCC)  -     Comprehensive metabolic panel; Future  -     Hemoglobin A1C; Future  -     Microalbumin / creatinine urine ratio; Future  -     Ambulatory Referral to Nutrition Services; Future  -     Ambulatory Referral to Ophthalmology; Future  -     metFORMIN (GLUCOPHAGE) 500 mg tablet; Take 1 tablet (500 mg total) by mouth 2 (two) times a day with meals  -     Ambulatory Referral to Podiatry; Future    Diabetic mononeuropathy associated with type 2 diabetes mellitus ([facility] )  -     Ambulatory Referral to Podiatry; Future    Class 2 obesity due to excess calories without serious comorbidity with body mass index (BMI) of 37 0 to 37 9 in adult  -     Ambulatory Referral to Nutrition Services; Future    Dyslipidemia  -     Comprehensive metabolic panel; Future  -     Lipid panel; Future  -     Ambulatory Referral to Nutrition Services; Future     v     Subjective:      Patient ID: Massimo Flaherty is a 38 y o  female  HPI     Patient presents to the office to review blood test results from August 24, 2022  Cholesterol 181, triglycerides 195, HDL 34,   Fasting blood sugar 224, Hb A1C 7 7  Creatinine 1 04, potassium 4 3  LFTs- within range        Family history is positive diabetes in patient's father, paternal grandmother and maternal grandparents  Patient states that she has not been following a low carb diet and has not been exercising regularly due to chronic low back pain  She was on short course of the Prednisone 20 mg for 3 days due to allergic reaction to CBD oil and possibly Tizanidine 2 weeks ago  Denies chest pain, shortness of breath, dizziness  C/o tingling, numbness in her feet  Feels thirsty  Patient followed by pain management Dr Quyen Pruett for back pain, attends physical therapy twice per week  The following portions of the patient's history were reviewed and updated as appropriate: allergies, past family history, past medical history, past social history, past surgical history and problem list     Review of Systems   Constitutional: Positive for fatigue  Negative for activity change, appetite change, chills and fever  HENT: Negative for congestion, sore throat and trouble swallowing  Eyes: Negative for pain, discharge, redness, itching and visual disturbance  Wears glasses   Respiratory: Negative for cough, chest tightness, shortness of breath and wheezing  Cardiovascular: Negative for chest pain, palpitations and leg swelling  Gastrointestinal: Negative for abdominal pain, constipation, diarrhea, nausea and vomiting  Genitourinary: Negative for difficulty urinating, dysuria, frequency and hematuria  Musculoskeletal: Positive for arthralgias, back pain and myalgias  Negative for joint swelling  Skin: Negative for rash  Neurological: Positive for numbness (in feet)  Negative for dizziness and headaches  Hematological: Negative  Psychiatric/Behavioral: Positive for sleep disturbance  The patient is nervous/anxious            Objective:      /74 (BP Location: Left arm, Patient Position: Sitting, Cuff Size: Large)   Pulse 72   Temp 98 1 °F (36 7 °C) (Tympanic)   Resp 16   Ht 5' (1 524 m)   Wt 87 8 kg (193 lb 9 6 oz)   SpO2 96%   BMI 37 81 kg/m² Physical Exam  Vitals and nursing note reviewed  Constitutional:       Appearance: Normal appearance  She is obese  HENT:      Head: Normocephalic and atraumatic  Eyes:      Conjunctiva/sclera: Conjunctivae normal       Pupils: Pupils are equal, round, and reactive to light  Cardiovascular:      Rate and Rhythm: Normal rate and regular rhythm  Pulses: no weak pulses          Dorsalis pedis pulses are 2+ on the right side and 2+ on the left side  Heart sounds: No murmur heard  Pulmonary:      Effort: Pulmonary effort is normal       Breath sounds: Normal breath sounds  Abdominal:      General: There is no distension  Palpations: Abdomen is soft  Tenderness: There is no abdominal tenderness  Musculoskeletal:         General: No swelling  Cervical back: Normal range of motion and neck supple  Right lower leg: No edema  Left lower leg: No edema  Feet:      Right foot:      Skin integrity: No ulcer, skin breakdown, erythema, warmth, callus or dry skin  Left foot:      Skin integrity: No ulcer, skin breakdown, erythema, warmth, callus or dry skin  Skin:     General: Skin is warm and dry  Findings: No rash  Neurological:      General: No focal deficit present  Mental Status: She is alert  Psychiatric:         Mood and Affect: Mood normal          Patient's shoes and socks removed  Right Foot/Ankle   Right Foot Inspection  Skin Exam: skin normal and skin intact  No dry skin, no warmth, no callus, no erythema, no maceration, no abnormal color, no pre-ulcer, no ulcer and no callus  Toe Exam: No swelling, no tenderness, erythema and  no right toe deformity    Sensory   Monofilament testing: diminished    Vascular  The right DP pulse is 2+  Left Foot/Ankle  Left Foot Inspection  Skin Exam: skin normal and skin intact  No dry skin, no warmth, no erythema, no maceration, normal color, no pre-ulcer, no ulcer and no callus       Toe Exam: No swelling, no tenderness, no erythema and no left toe deformity  Sensory   Monofilament testing: diminished    Vascular  The left DP pulse is 2+       Assign Risk Category  No deformity present  Loss of protective sensation  No weak pulses  Risk: 1

## 2022-09-01 NOTE — ASSESSMENT & PLAN NOTE
Lab Results   Component Value Date    HGBA1C 7 7 (H) 08/24/2022     Patient will need to establish care with podiatry  Stressed the importance of keeping blood sugar under control to prevent worsening neurological complications

## 2022-09-01 NOTE — ASSESSMENT & PLAN NOTE
Lab Results   Component Value Date    HGBA1C 7 7 (H) 08/24/2022     Newly diagnosed type 2 DM  Discussed treatment options with patient  Reviewed dietary modifications  Recommended to follow a low carb diet, avoid starchy foods  Increase exercise, lose weight  Will refer to nutrition counseling  Start Metformin 500 mg 1 tablet twice daily with meals  Reviewed side effects  Recommended to start checking blood sugar twice daily and send in blood sugar log for review in 2 weeks  Patient was provided education how to use a glucometer  Check eye exam by ophthalmology

## 2022-09-01 NOTE — ASSESSMENT & PLAN NOTE
LDL goal < 100, triglycerides < 150  Recommended follow a low-cholesterol, low-fat diet  Increase exercise, lose weight  Referred to nutrition counseling  Recheck lipid panel in 3 months

## 2022-09-03 RX ORDER — NORTRIPTYLINE HYDROCHLORIDE 75 MG/1
CAPSULE ORAL
Qty: 180 CAPSULE | Refills: 0 | Status: SHIPPED | OUTPATIENT
Start: 2022-09-03

## 2022-09-06 ENCOUNTER — OFFICE VISIT (OUTPATIENT)
Dept: PHYSICAL THERAPY | Facility: CLINIC | Age: 42
End: 2022-09-06
Payer: COMMERCIAL

## 2022-09-06 DIAGNOSIS — M54.10 RADICULOPATHY, UNSPECIFIED SPINAL REGION: ICD-10-CM

## 2022-09-06 DIAGNOSIS — M54.50 ACUTE BILATERAL LOW BACK PAIN, UNSPECIFIED WHETHER SCIATICA PRESENT: Primary | ICD-10-CM

## 2022-09-06 PROCEDURE — 97112 NEUROMUSCULAR REEDUCATION: CPT

## 2022-09-06 PROCEDURE — 97110 THERAPEUTIC EXERCISES: CPT

## 2022-09-06 PROCEDURE — 97530 THERAPEUTIC ACTIVITIES: CPT

## 2022-09-06 NOTE — PROGRESS NOTES
Daily Note     Today's date: 2022  Patient name: Lieutenant Scherer  : 1980  MRN: [id number]  Referring provider: Umesh Sotelo, PT  Dx:   Encounter Diagnosis     ICD-10-CM    1  Acute bilateral low back pain, unspecified whether sciatica present  M54 50    2  Radiculopathy, unspecified spinal region  M54 10        Start Time: 1143  Stop Time: 1226  Total time in clinic (min): 43 minutes       Subjective: Patient reports back pain "hasn't been too bad "      Objective: See treatment diary below  Assessment: Patient is doing well with therapeutic exercise program   Progression to the black theraband for rows and shoulder extensions is tolerated well  Plan: Continue treatment as per PT plan of care         Precautions: fibromyalgia, acute on chronic lumbar pain      Manuals 8/10 8/17 8/23 8/25 8/29 8/31 9/6  8/1 8/3   Lumbar pa's KL SG SG KT SG SG KL  SG  grade III, UPAs grade II SG  grade III, UPAs grade II   consider mechanical traction             SIJ MET             DTM   SG   SG L paraspinals   SG SG                Neuro Re-Ed             TaA 5"x20 5" x20 5" x20 5"x20 5"x20 5" x20 5"x20  5" x20 5" x20   TaA bridge 5"x20 5" x20 5" x20 5"x20 5"x20 5" x20 5"x20   5" x20   Prone hip ext 20 ea  20x ea bent knee 2x10  bent knee 2x10 knee bent 2x10 knee bent 2x10 knee bent      TaA LPD blue  20 Blue x20 Blue x20 blue  25 blue  25 Blue x25 black  20      TaA with hooklying add squeeze             TaA with BKFO             Pelvic rocking             pball press             rows blue  20 Blue x20 Blue x20 blue  25 blue  25 Blue x25 black  20                                             Ther Ex             Prone on elbows             glute stretch 30"x3 ea 30" x3 30" x3 BL figure 4 30"x3 ea  figure 4 30"x3 ea  figure 4 30" x3 ea figure 4 30"x3 ea figure 4      HS stretch 30"x3 ea 30" x3 BL 30" x3 BL 30"x3 ea 30"x3 ea 30" x3 ea 30"x3 ea  3x30" 3x30" BL   SKTC 30"x3 ea 30" x3 BL 30" x3 BL 30"x3 ea 30"x3 ea 30" x3 ea 30"x3 ea      LTR 5"x20 5" x20 5" x20 5"x20 5"x20 5" x20 ea 5"x20 ea  5" x20 5" x20   prone B scap retr             Quad stretch manual         30" x3                Ther Activity             bike 8'  recum  L-roll 8' recumbent L-roll 8' recumbent L-roll 8' recumbent 8' recumbent L-roll 10' upright 8'  upright  10' upright 10' upright   abdominal breathing                          Gait Training                                       Modalities             prn

## 2022-09-08 ENCOUNTER — OFFICE VISIT (OUTPATIENT)
Dept: PHYSICAL THERAPY | Facility: CLINIC | Age: 42
End: 2022-09-08
Payer: COMMERCIAL

## 2022-09-08 DIAGNOSIS — M54.50 ACUTE BILATERAL LOW BACK PAIN, UNSPECIFIED WHETHER SCIATICA PRESENT: Primary | ICD-10-CM

## 2022-09-08 DIAGNOSIS — M54.10 RADICULOPATHY, UNSPECIFIED SPINAL REGION: ICD-10-CM

## 2022-09-08 PROCEDURE — 97140 MANUAL THERAPY 1/> REGIONS: CPT | Performed by: PHYSICAL THERAPIST

## 2022-09-08 PROCEDURE — 97530 THERAPEUTIC ACTIVITIES: CPT | Performed by: PHYSICAL THERAPIST

## 2022-09-08 PROCEDURE — 97112 NEUROMUSCULAR REEDUCATION: CPT | Performed by: PHYSICAL THERAPIST

## 2022-09-08 PROCEDURE — 97110 THERAPEUTIC EXERCISES: CPT | Performed by: PHYSICAL THERAPIST

## 2022-09-08 NOTE — PROGRESS NOTES
Daily Note     Today's date: 2022  Patient name: Luisito Mccarthy  : 1980  MRN: [id number]  Referring provider: Kennedy Fajardo, PT  Dx:   Encounter Diagnosis     ICD-10-CM    1  Acute bilateral low back pain, unspecified whether sciatica present  M54 50    2  Radiculopathy, unspecified spinal region  M54 10        Start Time: 1102  Stop Time: 1146  Total time in clinic (min): 44 minutes       Subjective: Pt reports she has been feeling good since last visit  Pt is doing a 30 day cleanse and is on day 9, so she thinks that is helping as she has been eating better  Objective: See treatment diary below  Assessment: Pt presents to PT with decreased pain reports, so continued therex program as listed with pt able to complete all exercises with no symptom onset  Pt is demonstrating increased tolerance to activity, so plan to progress therex program at next visit pending pt's response and soreness  Pt also continues to demonstrate good tolerance to manual tx and responds positively to DTM to paraspinals, initiated after noted mm tightness with palpation  Plan: Continue treatment as per PT plan of care         Precautions: fibromyalgia, acute on chronic lumbar pain      Manuals 8/10 8/17 8/23 8/25 8/29 8/31 9/6 9/8  8/3   Lumbar pa's KL SG SG KT SG SG KL SG  SG  grade III, UPAs grade II   consider mechanical traction             SIJ MET             DTM   SG   SG L paraspinals  SG paraspinals  SG                Neuro Re-Ed             TaA 5"x20 5" x20 5" x20 5"x20 5"x20 5" x20 5"x20 5" x20  5" x20   TaA bridge 5"x20 5" x20 5" x20 5"x20 5"x20 5" x20 5"x20 5" x20  5" x20   Prone hip ext 20 ea  20x ea bent knee 2x10  bent knee 2x10 knee bent 2x10 knee bent 2x10 knee bent 2x10 knee bent     TaA LPD blue  20 Blue x20 Blue x20 blue  25 blue  25 Blue x25 black  20 Black x20     TaA with hooklying add squeeze             TaA with BKFO             Pelvic rocking             pball press             rows blue  20 Blue x20 Blue x20 blue  25 blue  25 Blue x25 black  20 Blue x30                                            Ther Ex             Prone on elbows             glute stretch 30"x3 ea 30" x3 30" x3 BL figure 4 30"x3 ea  figure 4 30"x3 ea  figure 4 30" x3 ea figure 4 30"x3 ea figure 4 30" x3 ea figure 4     HS stretch 30"x3 ea 30" x3 BL 30" x3 BL 30"x3 ea 30"x3 ea 30" x3 ea 30"x3 ea 30" x3  3x30" BL   SKTC 30"x3 ea 30" x3 BL 30" x3 BL 30"x3 ea 30"x3 ea 30" x3 ea 30"x3 ea 30" x3     LTR 5"x20 5" x20 5" x20 5"x20 5"x20 5" x20 ea 5"x20 ea 5" x20 ea  5" x20   prone B scap retr             Quad stretch manual         30" x3                Ther Activity             bike 8'  recum  L-roll 8' recumbent L-roll 8' recumbent L-roll 8' recumbent 8' recumbent L-roll 10' upright 8'  upright 10' upright  10' upright   abdominal breathing                          Gait Training                                       Modalities             prn                              Treatment performed by  [facility], I attest that this treatment was directly supervised by Dane Reza DPT

## 2022-09-12 ENCOUNTER — OFFICE VISIT (OUTPATIENT)
Dept: PHYSICAL THERAPY | Facility: CLINIC | Age: 42
End: 2022-09-12
Payer: COMMERCIAL

## 2022-09-12 DIAGNOSIS — M54.50 ACUTE BILATERAL LOW BACK PAIN, UNSPECIFIED WHETHER SCIATICA PRESENT: Primary | ICD-10-CM

## 2022-09-12 DIAGNOSIS — M54.10 RADICULOPATHY, UNSPECIFIED SPINAL REGION: ICD-10-CM

## 2022-09-12 PROCEDURE — 97112 NEUROMUSCULAR REEDUCATION: CPT

## 2022-09-12 PROCEDURE — 97530 THERAPEUTIC ACTIVITIES: CPT

## 2022-09-12 PROCEDURE — 97110 THERAPEUTIC EXERCISES: CPT

## 2022-09-12 NOTE — PROGRESS NOTES
Daily Note     Today's date: 2022  Patient name: Antoinette Medina  : 1980  MRN: [id number]  Referring provider: Estelita Petit, PT  Dx:   Encounter Diagnosis     ICD-10-CM    1  Acute bilateral low back pain, unspecified whether sciatica present  M54 50    2  Radiculopathy, unspecified spinal region  M54 10        Start Time: 1540  Stop Time: 1620  Total time in clinic (min): 40 minutes       Subjective: Patient reports feeling "a little bit of sciatica" over the last couple of days  Objective: See treatment diary below  Assessment: Treatment is tolerated well  Additional prone quad stretching performed without complaints  Plan: Continue treatment as per PT plan of care         Precautions: fibromyalgia, acute on chronic lumbar pain      Manuals 8/10 8/17 8/23 8/25 8/29 8/31 9/6 9/8 9/12    Lumbar pa's KL SG SG KT SG SG KL SG KL    consider mechanical traction             SIJ MET             DTM   SG   SG L paraspinals  SG paraspinals                  Neuro Re-Ed             TaA 5"x20 5" x20 5" x20 5"x20 5"x20 5" x20 5"x20 5" x20 5"x20    TaA bridge 5"x20 5" x20 5" x20 5"x20 5"x20 5" x20 5"x20 5" x20 5"x20    Prone hip ext 20 ea  20x ea bent knee 2x10  bent knee 2x10 knee bent 2x10 knee bent 2x10 knee bent 2x10 knee bent 2x10 knee bent    TaA LPD blue  20 Blue x20 Blue x20 blue  25 blue  25 Blue x25 black  20 Black x20 black  20    TaA with hooklying add squeeze             TaA with BKFO             Pelvic rocking             pball press             rows blue  20 Blue x20 Blue x20 blue  25 blue  25 Blue x25 black  20 Blue x30 black  20                                           Ther Ex             Prone on elbows             glute stretch 30"x3 ea 30" x3 30" x3 BL figure 4 30"x3 ea  figure 4 30"x3 ea  figure 4 30" x3 ea figure 4 30"x3 ea figure 4 30" x3 ea figure 4 30" x3 ea figure 4    HS stretch 30"x3 ea 30" x3 BL 30" x3 BL 30"x3 ea 30"x3 ea 30" x3 ea 30"x3 ea 30" x3 30"x3    SKTC 30"x3 ea 30" x3 BL 30" x3 BL 30"x3 ea 30"x3 ea 30" x3 ea 30"x3 ea 30" x3 30"x3    LTR 5"x20 5" x20 5" x20 5"x20 5"x20 5" x20 ea 5"x20 ea 5" x20 ea 5"x20 ea    prone B scap retr             Quad stretch manual        strap  30"x3                 Ther Activity             bike 8'  recum  L-roll 8' recumbent L-roll 8' recumbent L-roll 8' recumbent 8' recumbent L-roll 10' upright 8'  upright 10' upright 8'  upright    abdominal breathing                          Gait Training                                       Modalities             prn

## 2022-09-14 ENCOUNTER — OFFICE VISIT (OUTPATIENT)
Dept: PAIN MEDICINE | Facility: CLINIC | Age: 42
End: 2022-09-14
Payer: COMMERCIAL

## 2022-09-14 ENCOUNTER — APPOINTMENT (OUTPATIENT)
Dept: PHYSICAL THERAPY | Facility: CLINIC | Age: 42
End: 2022-09-14
Payer: COMMERCIAL

## 2022-09-14 VITALS
SYSTOLIC BLOOD PRESSURE: 118 MMHG | HEART RATE: 90 BPM | DIASTOLIC BLOOD PRESSURE: 81 MMHG | WEIGHT: 192 LBS | BODY MASS INDEX: 37.69 KG/M2 | HEIGHT: 60 IN

## 2022-09-14 DIAGNOSIS — M79.18 MYOFASCIAL PAIN SYNDROME: ICD-10-CM

## 2022-09-14 DIAGNOSIS — M46.1 SACROILIITIS (HCC): Primary | ICD-10-CM

## 2022-09-14 PROCEDURE — 3079F DIAST BP 80-89 MM HG: CPT | Performed by: NURSE PRACTITIONER

## 2022-09-14 PROCEDURE — 99214 OFFICE O/P EST MOD 30 MIN: CPT | Performed by: NURSE PRACTITIONER

## 2022-09-14 PROCEDURE — 3074F SYST BP LT 130 MM HG: CPT | Performed by: NURSE PRACTITIONER

## 2022-09-14 RX ORDER — TIZANIDINE 4 MG/1
4 TABLET ORAL EVERY 8 HOURS PRN
Qty: 90 TABLET | Refills: 1 | Status: SHIPPED | OUTPATIENT
Start: 2022-09-14 | End: 2022-10-24

## 2022-09-14 NOTE — PROGRESS NOTES
Assessment:  1  Sacroiliitis ([facility] [address] ) - Bilateral    2  Myofascial pain syndrome        Plan:  1  Based on patient report and physical exam, the patient's symptomatology does seem to be consistent with sacroiliac mediated pain from sacroiliitis  We will schedule the patient for a bilateral SIJ injection to decrease any inflammatory component of the patient's pain symptoms  Complete risks and benefits including bleeding, infection, tissue reaction, nerve injury and allergic reaction were discussed  The patient was agreeable and verbalized an understanding  2  I increase tizanidine to 4 mg q 8 hours p r n  myofascial pain  I advised the patient that they should not drive or operate machinery while on this medication until they see how it affects them, as it could cause lethargy and mental cloudyness  I advised the patient to call our office if they experience any side effects or issues with the medication changes  The patient verbalized an understanding  3  Patient may continue nortriptyline and Celebrex as prescribed  4  Patient will continue with her home exercise program and physical therapy   5  May consider an EMG of the lower extremities  6  Follow-up after procedure or sooner if needed     History of Present Illness: The patient is a 38 y o  female with a history of fibromyalgia last seen on 8/11/22 who presents for a follow up office visit in regards to chronic low back pain that will radiate into the buttocks  Patient denies bowel or bladder incontinence or saddle anesthesia  She does occasionally endorse some numbness in her feet and her bilateral 5th fingers  She did have recent MRI of the lumbar spine which revealed facet changes at L5-S1 with some mild right foraminal narrowing, otherwise unremarkable  She is currently taking tizanidine 2 mg q 8 hours p r n , Celebrex 100 mg p r n , and nortriptyline 75 mg daily with 10% improvement of her pain which causes sedation      Patient rates her pain as 7 5/10 on the numeric pain rating scale  She constantly has pain throughout the day which is described as sharp, throbbing and pressure-like  I have personally reviewed and/or updated the patient's past medical history, past surgical history, family history, social history, current medications, allergies, and vital signs today  Review of Systems:    Review of Systems   Respiratory: Negative for shortness of breath  Cardiovascular: Negative for chest pain  Gastrointestinal: Negative for constipation, diarrhea, nausea and vomiting  Musculoskeletal: Negative for arthralgias, gait problem, joint swelling and myalgias  Skin: Negative for rash  Neurological: Negative for dizziness, seizures and weakness  All other systems reviewed and are negative          Past Medical History:   Diagnosis Date    Anxiety     Chronic sinusitis     Depression     Fibromyalgia     Migraine     Obesity     Polyarthritis     Last assessed 9/21/2015    Psychiatric disorder        Past Surgical History:   Procedure Laterality Date    COLONOSCOPY  06/2019    DENTAL SURGERY      HYSTEROSCOPY      Endometrial Biopsy By Hysteroscopy    REMOVAL OF INTRAUTERINE DEVICE (IUD)      US GUIDED BREAST BIOPSY RIGHT COMPLETE Right 6/17/2019       Family History   Problem Relation Age of Onset    Diabetes Father     Kidney disease Father     Substance Abuse Brother     Diabetes Maternal Grandmother     Rheum arthritis Maternal Grandmother     Melanoma Maternal Grandmother     Kidney disease Paternal Grandmother     Rheum arthritis Paternal Grandmother     Depression Paternal Grandmother     Diabetes Paternal Grandfather     Lung cancer Paternal Grandfather     Substance Abuse Maternal Uncle     Prostate cancer Maternal Uncle 61    Depression Paternal Aunt     Alcohol abuse Family     Stomach cancer Family     Irregular heart beat Mother        Social History     Occupational History    Occupation: unemployed Tobacco Use    Smoking status: Never Smoker    Smokeless tobacco: Never Used   Vaping Use    Vaping Use: Never used   Substance and Sexual Activity    Alcohol use: No     Comment: 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     Drug use: Not Currently    Sexual activity: Not Currently         Current Outpatient Medications:     tiZANidine (ZANAFLEX) 4 mg tablet, Take 1 tablet (4 mg total) by mouth every 8 (eight) hours as needed for muscle spasms, Disp: 90 tablet, Rfl: 1    Calcium Carbonate-Vitamin D (CALCIUM 500/D PO), Take 1 capsule by mouth daily, Disp: , Rfl:     celecoxib (CeleBREX) 200 mg capsule, TAKE 1 CAPSULE BY MOUTH TWICE A DAY, Disp: 60 capsule, Rfl: 6    Cholecalciferol (VITAMIN D-3) 1000 units CAPS, Take 1 capsule by mouth daily, Disp: , Rfl:     desvenlafaxine (PRISTIQ) 100 mg 24 hr tablet, TAKE 1 TABLET BY MOUTH EVERY DAY, Disp: 30 tablet, Rfl: 2    desvenlafaxine succinate (PRISTIQ) 50 mg 24 hr tablet, TAKE 1 TABLET (50 MG TOTAL) BY MOUTH DAILY TOTAL DAILY DOSE 150 MG DAILY, Disp: 30 tablet, Rfl: 2    EPINEPHrine (EPIPEN) 0 3 mg/0 3 mL SOAJ, Inject 0 3 mL (0 3 mg total) into a muscle once for 1 dose, Disp: 0 6 mL, Rfl: 0    hydrOXYzine HCL (ATARAX) 50 mg tablet, TAKE 1 TABLET (50 MG TOTAL) BY MOUTH DAILY AT BEDTIME AS NEEDED (INSOMNIA), Disp: 30 tablet, Rfl: 2    IRON PO, Take by mouth, Disp: , Rfl:     lidocaine (Lidoderm) 5 %, Apply 1 patch topically daily Remove & Discard patch within 12 hours or as directed by MD, Disp: 15 patch, Rfl: 0    MAGNESIUM OXIDE PO, Take by mouth, Disp: , Rfl:     metFORMIN (GLUCOPHAGE) 500 mg tablet, Take 1 tablet (500 mg total) by mouth 2 (two) times a day with meals, Disp: 60 tablet, Rfl: 5    nortriptyline (PAMELOR) 75 MG capsule, TAKE 1 CAPSULE BY MOUTH TWICE A DAY, Disp: 180 capsule, Rfl: 0    propranolol (INDERAL) 20 mg tablet, TAKE 1 TABLET BY MOUTH EVERY DAY AT NIGHT, Disp: 30 tablet, Rfl: 5    Allergies   Allergen Reactions    Amoxicillin-Pot Clavulanate     Decadrol [Dexamethasone] Other (See Comments)     Category: Adverse Reaction;   psychosis    Dexamethasone Sodium Phosphate Other (See Comments)     psychosis    Other Throat Swelling     CBD oil     Penicillins Hives and Other (See Comments)     Category: Allergy; Hives/Uticaria    Tetracycline Hives and Other (See Comments)     Hives/Uticaria    Tetracyclines & Related Hives     Category: Allergy; Physical Exam:    /81   Pulse 90   Ht 5' (1 524 m)   Wt 87 1 kg (192 lb)   BMI 37 50 kg/m²     Constitutional:normal, well developed, well nourished, alert, in no distress and non-toxic and no overt pain behavior  Eyes:anicteric  HEENT:grossly intact  Neck:supple, symmetric, trachea midline and no masses   Pulmonary:even and unlabored  Cardiovascular:No edema or pitting edema present  Skin:Normal without rashes or lesions and well hydrated  Psychiatric:Mood and affect appropriate  Neurologic:Cranial Nerves II-XII grossly intact  Musculoskeletal:normal gait  Bilateral SI joints tender to palpation  Bilateral lower extremity strength 5/5 in all muscle groups  Negative straight leg raise bilaterally  Positive Valentin finger, AP compression, and Juan's test bilaterally      Imaging  FL spine and pain procedure    (Results Pending)       MRI LUMBAR SPINE WITHOUT CONTRAST   INDICATION: M[age]: Lumbago with sciatica, unspecified side  COMPARISON: Plain films 7/1/2022  TECHNIQUE: Sagittal T1, sagittal T2, sagittal inversion recovery, axial T1 and axial T2, coronal T2    IMAGE QUALITY: Diagnostic   FINDINGS:   VERTEBRAL BODIES: There are 5 lumbar type vertebral bodies  Normal alignment of the lumbar spine  No spondylolysis or spondylolisthesis  No scoliosis  No compression fracture  Normal marrow signal is identified within the visualized bony   structures  No discrete marrow lesion  SACRUM: Normal signal within the sacrum   No evidence of insufficiency or stress fracture  DISTAL CORD AND CONUS: Normal size and signal within the distal cord and conus  PARASPINAL SOFT TISSUES: Paraspinal soft tissues are unremarkable  LOWER THORACIC DISC SPACES: Normal disc height and signal  No disc herniation, canal stenosis or foraminal narrowing  LUMBAR DISC SPACES:   L1-L2: Normal    L2-L3: Normal    L3-L4: Normal    L4-L5: Normal    L5-S1: Mild Modic type II endplate change  Disc desiccation with slight loss of disc height  Diffuse disc bulge with right greater than left facet hypertrophy  Central canal remains patent  There is mild right foraminal stenosis  Correlate   clinically for exiting right L5 radiculitis  IMPRESSION:   Mild L5-S1 degenerative change resulting in mild right foraminal stenosis and possible impingement of exiting right L5 nerve root  No focal disc herniation throughout the lumbar spine  No evidence of critical stenosis       Orders Placed This Encounter   Procedures    FL spine and pain procedure

## 2022-09-16 ENCOUNTER — TELEPHONE (OUTPATIENT)
Dept: PSYCHIATRY | Facility: CLINIC | Age: 42
End: 2022-09-16

## 2022-09-16 ENCOUNTER — TELEPHONE (OUTPATIENT)
Dept: FAMILY MEDICINE CLINIC | Facility: CLINIC | Age: 42
End: 2022-09-16

## 2022-09-16 ENCOUNTER — TELEPHONE (OUTPATIENT)
Dept: BEHAVIORAL/MENTAL HEALTH CLINIC | Facility: CLINIC | Age: 42
End: 2022-09-16

## 2022-09-16 NOTE — TELEPHONE ENCOUNTER
Called patient to let her know that provider have to cancel today appt due to emergency      Patient understood and would like reschedule

## 2022-09-16 NOTE — TELEPHONE ENCOUNTER
Pt called in stating no link was received for her 2 pm appt  Pt was asking what was going  Writer explained to pt that I would try to find out and let her know

## 2022-09-16 NOTE — TELEPHONE ENCOUNTER
Patient was diagnosed with Type 2 DM  Please obtain prior authorization for glucometer to check blood sugar daily

## 2022-09-19 ENCOUNTER — OFFICE VISIT (OUTPATIENT)
Dept: PHYSICAL THERAPY | Facility: CLINIC | Age: 42
End: 2022-09-19
Payer: COMMERCIAL

## 2022-09-19 DIAGNOSIS — M54.50 ACUTE BILATERAL LOW BACK PAIN, UNSPECIFIED WHETHER SCIATICA PRESENT: Primary | ICD-10-CM

## 2022-09-19 DIAGNOSIS — M54.10 RADICULOPATHY, UNSPECIFIED SPINAL REGION: ICD-10-CM

## 2022-09-19 PROCEDURE — 97110 THERAPEUTIC EXERCISES: CPT

## 2022-09-19 PROCEDURE — 97112 NEUROMUSCULAR REEDUCATION: CPT

## 2022-09-19 NOTE — PROGRESS NOTES
Daily Note     Today's date: 2022  Patient name: Yisel James  : 1980  MRN: [id number]  Referring provider: Danuta Toledo, PT  Dx:   Encounter Diagnosis     ICD-10-CM    1  Acute bilateral low back pain, unspecified whether sciatica present  M54 50    2  Radiculopathy, unspecified spinal region  M54 10                   Subjective:  Pt was 17 min late for appt  , but was accommodated for tx  "Over the weekend I was really, really sore," adding "I'm getting a cortisone injection on Wednesday, so hopefully that will help "    Objective: See treatment diary below    Assessment: Performed exercise program w/o difficulty or discomfort  Responded well to manual therapies  Relief after tx  Will monitor  Plan:  Cont per POC      Precautions: fibromyalgia, acute on chronic lumbar pain      Manuals 8/10 8/17 8/23 8/25 8/29 8/31 9/6 9/8 9/12 9/19   Lumbar pa's KL SG SG KT SG SG KL SG KL KK   consider mechanical traction             SIJ MET             DTM   SG   SG L paraspinals  SG paraspinals                  Neuro Re-Ed             TaA 5"x20 5" x20 5" x20 5"x20 5"x20 5" x20 5"x20 5" x20 5"x20 5"x20   TaA bridge 5"x20 5" x20 5" x20 5"x20 5"x20 5" x20 5"x20 5" x20 5"x20 5"x20   Prone hip ext 20 ea  20x ea bent knee 2x10  bent knee 2x10 knee bent 2x10 knee bent 2x10 knee bent 2x10 knee bent 2x10 knee bent 2x10  Knee bent   TaA LPD blue  20 Blue x20 Blue x20 blue  25 blue  25 Blue x25 black  20 Black x20 black  20 Black  20   TaA with hooklying add squeeze             TaA with BKFO             Pelvic rocking             pball press             rows blue  20 Blue x20 Blue x20 blue  25 blue  25 Blue x25 black  20 Blue x30 black  20 Black  20                                          Ther Ex             Prone on elbows             glute stretch 30"x3 ea 30" x3 30" x3 BL figure 4 30"x3 ea  figure 4 30"x3 ea  figure 4 30" x3 ea figure 4 30"x3 ea figure 4 30" x3 ea figure 4 30" x3 ea figure 4 30"x3  Ea figure 4   HS stretch 30"x3 ea 30" x3 BL 30" x3 BL 30"x3 ea 30"x3 ea 30" x3 ea 30"x3 ea 30" x3 30"x3 30"x3   SKTC 30"x3 ea 30" x3 BL 30" x3 BL 30"x3 ea 30"x3 ea 30" x3 ea 30"x3 ea 30" x3 30"x3 30"x3   LTR 5"x20 5" x20 5" x20 5"x20 5"x20 5" x20 ea 5"x20 ea 5" x20 ea 5"x20 ea 5"x20  ea   prone B scap retr             Quad stretch manual        strap  30"x3 Strap  30"x3                Ther Activity             bike 8'  recum  L-roll 8' recumbent L-roll 8' recumbent L-roll 8' recumbent 8' recumbent L-roll 10' upright 8'  upright 10' upright 8'  upright 5' upright   abdominal breathing                          Gait Training                                       Modalities             prn

## 2022-09-21 ENCOUNTER — HOSPITAL ENCOUNTER (OUTPATIENT)
Dept: RADIOLOGY | Facility: CLINIC | Age: 42
Discharge: HOME | End: 2022-09-21
Payer: COMMERCIAL

## 2022-09-21 VITALS
TEMPERATURE: 97.1 F | OXYGEN SATURATION: 98 % | SYSTOLIC BLOOD PRESSURE: 107 MMHG | HEART RATE: 82 BPM | RESPIRATION RATE: 16 BRPM | DIASTOLIC BLOOD PRESSURE: 72 MMHG

## 2022-09-21 DIAGNOSIS — M46.1 SACROILIITIS (HCC): ICD-10-CM

## 2022-09-21 PROCEDURE — 27096 INJECT SACROILIAC JOINT: CPT | Performed by: ANESTHESIOLOGY

## 2022-09-21 RX ORDER — METHYLPREDNISOLONE ACETATE 40 MG/ML
80 INJECTION, SUSPENSION INTRA-ARTICULAR; INTRALESIONAL; INTRAMUSCULAR; PARENTERAL; SOFT TISSUE ONCE
Status: COMPLETED | OUTPATIENT
Start: 2022-09-21 | End: 2022-09-21

## 2022-09-21 RX ORDER — LIDOCAINE HYDROCHLORIDE 10 MG/ML
5 INJECTION, SOLUTION EPIDURAL; INFILTRATION; INTRACAUDAL; PERINEURAL ONCE
Status: COMPLETED | OUTPATIENT
Start: 2022-09-21 | End: 2022-09-21

## 2022-09-21 RX ORDER — BUPIVACAINE HCL/PF 2.5 MG/ML
30 VIAL (ML) INJECTION ONCE
Status: COMPLETED | OUTPATIENT
Start: 2022-09-21 | End: 2022-09-21

## 2022-09-21 RX ADMIN — METHYLPREDNISOLONE ACETATE 80 MG: 40 INJECTION, SUSPENSION INTRA-ARTICULAR; INTRALESIONAL; INTRAMUSCULAR; SOFT TISSUE at 09:00

## 2022-09-21 RX ADMIN — IOHEXOL 1 ML: 300 INJECTION, SOLUTION INTRAVENOUS at 09:00

## 2022-09-21 RX ADMIN — BUPIVACAINE HYDROCHLORIDE 3 ML: 2.5 INJECTION, SOLUTION EPIDURAL; INFILTRATION; INTRACAUDAL at 08:59

## 2022-09-21 RX ADMIN — LIDOCAINE HYDROCHLORIDE 4 ML: 10 INJECTION, SOLUTION EPIDURAL; INFILTRATION; INTRACAUDAL; PERINEURAL at 09:00

## 2022-09-21 NOTE — H&P
History of Present Illness: The patient is a 38 y o  female who presents with complaints of low back pain      Patient Active Problem List   Diagnosis    Acne vulgaris    Allergic rhinitis    Chronic fatigue    Chronic pain    Chronic migraine without aura without status migrainosus, not intractable    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, with unspecified complication (HCC)    Fibromyalgia    Headache    Hypokalemia    Lyme disease    Major depressive disorder, recurrent episode, moderate (HCC)    Malaise and fatigue    Menorrhagia    Muscle spasm    Myalgia    Narcolepsy    Narcolepsy cataplexy syndrome    Neck pain    Class 2 obesity due to excess calories without serious comorbidity with body mass index (BMI) of 37 0 to 37 9 in adult    Sinus disease    Sleep apnea    Snoring    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 psychotic features (HCC)    Generalized anxiety disorder    Dyspepsia    Arthralgia of multiple joints    Generalized body aches    Chronic idiopathic constipation    Vitamin D deficiency    Elevated TSH    Loose stools    Hematochezia    Polyp of colon    Back pain    Sacroiliitis ([address] )    DDD (degenerative disc disease), lumbar    Well adult exam    Type 2 diabetes mellitus with hyperglycemia, without long-term current use of insulin ([facility])    Dyslipidemia    Diabetic neuropathy associated with type 2 diabetes mellitus ([address] )       Past Medical History:   Diagnosis Date    Anxiety     Chronic sinusitis     Depression     Fibromyalgia     Migraine     Obesity     Polyarthritis     Last assessed 9/21/2015    Psychiatric disorder        Past Surgical History:   Procedure Laterality Date    COLONOSCOPY  06/2019    DENTAL SURGERY      HYSTEROSCOPY      Endometrial Biopsy By Hysteroscopy    REMOVAL OF INTRAUTERINE DEVICE (IUD)      US GUIDED BREAST BIOPSY RIGHT COMPLETE Right 6/17/2019         Current Outpatient Medications:     Calcium Carbonate-Vitamin D (CALCIUM 500/D PO), Take 1 capsule by mouth daily, Disp: , Rfl:     celecoxib (CeleBREX) 200 mg capsule, TAKE 1 CAPSULE BY MOUTH TWICE A DAY, Disp: 60 capsule, Rfl: 6    Cholecalciferol (VITAMIN D-3) 1000 units CAPS, Take 1 capsule by mouth daily, Disp: , Rfl:     desvenlafaxine (PRISTIQ) 100 mg 24 hr tablet, TAKE 1 TABLET BY MOUTH EVERY DAY, Disp: 30 tablet, Rfl: 2    desvenlafaxine succinate (PRISTIQ) 50 mg 24 hr tablet, TAKE 1 TABLET (50 MG TOTAL) BY MOUTH DAILY TOTAL DAILY DOSE 150 MG DAILY, Disp: 30 tablet, Rfl: 2    EPINEPHrine (EPIPEN) 0 3 mg/0 3 mL SOAJ, Inject 0 3 mL (0 3 mg total) into a muscle once for 1 dose, Disp: 0 6 mL, Rfl: 0    hydrOXYzine HCL (ATARAX) 50 mg tablet, TAKE 1 TABLET (50 MG TOTAL) BY MOUTH DAILY AT BEDTIME AS NEEDED (INSOMNIA), Disp: 30 tablet, Rfl: 2    IRON PO, Take by mouth, Disp: , Rfl:     lidocaine (Lidoderm) 5 %, Apply 1 patch topically daily Remove & Discard patch within 12 hours or as directed by MD, Disp: 15 patch, Rfl: 0    MAGNESIUM OXIDE PO, Take by mouth, Disp: , Rfl:     metFORMIN (GLUCOPHAGE) 500 mg tablet, Take 1 tablet (500 mg total) by mouth 2 (two) times a day with meals, Disp: 60 tablet, Rfl: 5    nortriptyline (PAMELOR) 75 MG capsule, TAKE 1 CAPSULE BY MOUTH TWICE A DAY, Disp: 180 capsule, Rfl: 0    propranolol (INDERAL) 20 mg tablet, TAKE 1 TABLET BY MOUTH EVERY DAY AT NIGHT, Disp: 30 tablet, Rfl: 5    tiZANidine (ZANAFLEX) 4 mg tablet, Take 1 tablet (4 mg total) by mouth every 8 (eight) hours as needed for muscle spasms, Disp: 90 tablet, Rfl: 1    Current Facility-Administered Medications:     bupivacaine (PF) (MARCAINE) 0 25 % injection 30 mL, 30 mL, Intra-articular, Once, Jeremy Meneses, DO    iohexol (OMNIPAQUE) 300 mg/mL injection 50 mL, 50 mL, Intra-articular, Once, Jeremy Meneses, DO    lidocaine (PF) (XYLOCAINE-MPF) 1 % injection 5 mL, 5 mL, Other, Once, Jeremy Meneses, DO    methylPREDNISolone acetate (DEPO-MEDROL) injection 80 mg, 80 mg, Intra-articular, Once, Andra Swift,     Allergies   Allergen Reactions    Amoxicillin-Pot Clavulanate     Decadrol [Dexamethasone] Other (See Comments)     Category: Adverse Reaction;   psychosis    Dexamethasone Sodium Phosphate Other (See Comments)     psychosis    Other Throat Swelling     CBD oil     Penicillins Hives and Other (See Comments)     Category: Allergy; Hives/Uticaria    Tetracycline Hives and Other (See Comments)     Hives/Uticaria    Tetracyclines & Related Hives     Category: Allergy; Physical Exam: There were no vitals filed for this visit  General: Awake, Alert, Oriented x 3, Mood and affect appropriate  Respiratory: Respirations even and unlabored  Cardiovascular: Peripheral pulses intact; no edema  Musculoskeletal Exam: TTP over bilateral SI joints    ASA Score: 2         Assessment:   1   Sacroiliitis (HCC) - Bilateral        Plan: B/L SIJ injections

## 2022-09-21 NOTE — DISCHARGE INSTRUCTIONS
Steroid Joint Injection   WHAT YOU NEED TO KNOW:   A steroid joint injection is a procedure to inject steroid medicine into a joint  Steroid medicine decreases pain and inflammation  The injection may also contain an anesthetic (numbing medicine) to decrease pain  It may be done to treat conditions such as arthritis, gout, or carpal tunnel syndrome  The injections may be given in your knee, ankle, shoulder, elbow, wrist, ankle or sacroiliac joint  Do not apply heat to any area that is numb  If you have discomfort or soreness at the injection site, you may apply ice today, 20 minutes on and 20 minutes off  Tomorrow you may use ice or warm, moist heat  Do not apply ice or heat directly to the skin  You may have an increase or change in the discomfort for 36-48 hours after your treatment  Apply ice and continue with any pain medicine you have been prescribed  Do not do anything strenuous today  You may shower, but no tub baths or hot tubs today  You may resume your normal activities tomorrow, but do not overdo it  Resume normal activities slowly when you are feeling better  If you experience redness, drainage or swelling at the injection site, or if you develop a fever above 100 degrees, please call [facility] at [phone] or go to the Emergency Room  Continue to take all routine medicines prescribed by your primary care physician unless otherwise instructed by our staff  Most blood thinners should be started again according to your regularly scheduled dosing  If you have any questions, please give our office a call  As no general anesthesia was used in today's procedure, you should not experience any side effects related to anesthesia  If you are diabetic, the steroids used in today's injection may temporarily increase your blood sugar levels after the first few days after your injection   Please keep a close eye on your sugars and alert the doctor who manages your diabetes if your sugars are significantly high from your baseline or you are symptomatic  If you have a problem specifically related to your procedure, please call our office at [phone]  Problems not related to your procedure should be directed to your primary care physician

## 2022-09-22 ENCOUNTER — OFFICE VISIT (OUTPATIENT)
Dept: PHYSICAL THERAPY | Facility: CLINIC | Age: 42
End: 2022-09-22
Payer: COMMERCIAL

## 2022-09-22 DIAGNOSIS — M54.10 RADICULOPATHY, UNSPECIFIED SPINAL REGION: ICD-10-CM

## 2022-09-22 DIAGNOSIS — M54.50 ACUTE BILATERAL LOW BACK PAIN, UNSPECIFIED WHETHER SCIATICA PRESENT: Primary | ICD-10-CM

## 2022-09-22 PROCEDURE — 97112 NEUROMUSCULAR REEDUCATION: CPT | Performed by: PHYSICAL THERAPIST

## 2022-09-22 PROCEDURE — 97110 THERAPEUTIC EXERCISES: CPT | Performed by: PHYSICAL THERAPIST

## 2022-09-22 NOTE — PROGRESS NOTES
Daily Note     Today's date: 2022  Patient name: Skyla Morris  : 1980  MRN: [id number]  Referring provider: Snehal Carbone, PT  Dx:   Encounter Diagnosis     ICD-10-CM    1  Acute bilateral low back pain, unspecified whether sciatica present  M54 50    2  Radiculopathy, unspecified spinal region  M54 10        Start Time: 0845  Stop Time: 0916  Total time in clinic (min): 31 minutes    Subjective: Patient reports that she had sacral CSI yesterday  Consequently, she is very sore  Objective: See treatment diary below      Assessment: Exercise modified today due to CSI within past 24 hours  Patient able to complete flexibility and gentle core stabilization without       Plan: Continue per plan of care  Cont per POC      Precautions: fibromyalgia, acute on chronic lumbar pain      Manuals     Lumbar pa's KT SG SG KL SG KL KK nv    consider mechanical traction            SIJ MET            DTM   SG L paraspinals  SG paraspinals                   Neuro Re-Ed            TaA 5"x20 5"x20 5" x20 5"x20 5" x20 5"x20 5"x20 10"x20    TaA bridge 5"x20 5"x20 5" x20 5"x20 5" x20 5"x20 5"x20 5"x20    Prone hip ext 2x10  bent knee 2x10 knee bent 2x10 knee bent 2x10 knee bent 2x10 knee bent 2x10 knee bent 2x10  Knee bent 2x10 knee bent    TaA LPD blue  25 blue  25 Blue x25 black  20 Black x20 black  20 Black  20 nv    TaA with hooklying add squeeze        20x5"     TaA with BKFO            Pelvic rocking            pball press        Stand 20x5"     rows blue  25 blue  25 Blue x25 black  20 Blue x30 black  20 Black  20 nv                                        Ther Ex            Prone on elbows            glute stretch 30"x3 ea  figure 4 30"x3 ea  figure 4 30" x3 ea figure 4 30"x3 ea figure 4 30" x3 ea figure 4 30" x3 ea figure 4 30"x3  Ea figure 4 30"x3  Ea figure 4    HS stretch 30"x3 ea 30"x3 ea 30" x3 ea 30"x3 ea 30" x3 30"x3 30"x3 30"x3    SKTC 30"x3 ea 30"x3 ea 30" x3 ea 30"x3 ea 30" x3 30"x3 30"x3 30"x3    LTR 5"x20 5"x20 5" x20 ea 5"x20 ea 5" x20 ea 5"x20 ea 5"x20  ea 5"x20  ea    prone B scap retr            Quad stretch      strap  30"x3 Strap  30"x3 Strap  30"x3                Ther Activity            bike 8' recumbent 8' recumbent L-roll 10' upright 8'  upright 10' upright 8'  upright 5' upright 5' upright    abdominal breathing                        Gait Training                                    Modalities            prn

## 2022-09-26 ENCOUNTER — OFFICE VISIT (OUTPATIENT)
Dept: PHYSICAL THERAPY | Facility: CLINIC | Age: 42
End: 2022-09-26
Payer: COMMERCIAL

## 2022-09-26 DIAGNOSIS — M54.10 RADICULOPATHY, UNSPECIFIED SPINAL REGION: ICD-10-CM

## 2022-09-26 DIAGNOSIS — M54.50 ACUTE BILATERAL LOW BACK PAIN, UNSPECIFIED WHETHER SCIATICA PRESENT: Primary | ICD-10-CM

## 2022-09-26 PROCEDURE — 97112 NEUROMUSCULAR REEDUCATION: CPT

## 2022-09-26 PROCEDURE — 97110 THERAPEUTIC EXERCISES: CPT

## 2022-09-26 NOTE — PROGRESS NOTES
Daily Note     Today's date: 2022  Patient name: Alexis Gosselin  : 1980  MRN: [id number]  Referring provider: Michelle Mcfarland PT  Dx:   Encounter Diagnosis     ICD-10-CM    1  Acute bilateral low back pain, unspecified whether sciatica present  M54 50    2  Radiculopathy, unspecified spinal region  M54 10                   Subjective: "The weekend was rough, but I'm feeling better "    Objective: See treatment diary below    Assessment: Performed exercise program w/o complaint  Responded well to lumbar PA's  Will monitor  Plan:  Cont per POC      Precautions: fibromyalgia, acute on chronic lumbar pain      Manuals    Lumbar pa's KT SG SG KL SG KL KK nv KL   consider mechanical traction            SIJ MET            DTM   SG L paraspinals  SG paraspinals                   Neuro Re-Ed            TaA 5"x20 5"x20 5" x20 5"x20 5" x20 5"x20 5"x20 10"x20 10"x20   TaA bridge 5"x20 5"x20 5" x20 5"x20 5" x20 5"x20 5"x20 5"x20 5"x20   Prone hip ext 2x10  bent knee 2x10 knee bent 2x10 knee bent 2x10 knee bent 2x10 knee bent 2x10 knee bent 2x10  Knee bent 2x10 knee bent 2x10  Knee bent   TaA LPD blue  25 blue  25 Blue x25 black  20 Black x20 black  20 Black  20 nv Black  20   TaA with hooklying add squeeze        20x5"  20x5"   TaA with BKFO            Pelvic rocking            pball press        Stand 20x5"  Stand  20x5"   rows blue  25 blue  25 Blue x25 black  20 Blue x30 black  20 Black  20 nv Black  20                                       Ther Ex            Prone on elbows            glute stretch 30"x3 ea  figure 4 30"x3 ea  figure 4 30" x3 ea figure 4 30"x3 ea figure 4 30" x3 ea figure 4 30" x3 ea figure 4 30"x3  Ea figure 4 30"x3  Ea figure 4 30"x3 ea figure 4   HS stretch 30"x3 ea 30"x3 ea 30" x3 ea 30"x3 ea 30" x3 30"x3 30"x3 30"x3 30"x3   SKTC 30"x3 ea 30"x3 ea 30" x3 ea 30"x3 ea 30" x3 30"x3 30"x3 30"x3 30"x3   LTR 5"x20 5"x20 5" x20 ea 5"x20 ea 5" x20 ea 5"x20 ea 5"x20  ea 5"x20  ea 5"x20   prone B scap retr            Quad stretch      strap  30"x3 Strap  30"x3 Strap  30"x3 Strap  30"x3               Ther Activity            bike 8' recumbent 8' recumbent L-roll 10' upright 8'  upright 10' upright 8'  upright 5' upright 5' upright  nv   abdominal breathing                        Gait Training                                    Modalities            prn

## 2022-09-27 DIAGNOSIS — M54.50 ACUTE LOW BACK PAIN: ICD-10-CM

## 2022-09-27 RX ORDER — LIDOCAINE 50 MG/G
PATCH TOPICAL
Qty: 30 PATCH | Refills: 1 | Status: SHIPPED | OUTPATIENT
Start: 2022-09-27

## 2022-09-28 ENCOUNTER — TELEPHONE (OUTPATIENT)
Dept: PAIN MEDICINE | Facility: CLINIC | Age: 42
End: 2022-09-28

## 2022-09-30 ENCOUNTER — EVALUATION (OUTPATIENT)
Dept: PHYSICAL THERAPY | Facility: CLINIC | Age: 42
End: 2022-09-30
Payer: COMMERCIAL

## 2022-09-30 ENCOUNTER — TELEMEDICINE (OUTPATIENT)
Dept: BEHAVIORAL/MENTAL HEALTH CLINIC | Facility: CLINIC | Age: 42
End: 2022-09-30
Payer: COMMERCIAL

## 2022-09-30 DIAGNOSIS — F41.1 GENERALIZED ANXIETY DISORDER: ICD-10-CM

## 2022-09-30 DIAGNOSIS — M54.50 ACUTE BILATERAL LOW BACK PAIN, UNSPECIFIED WHETHER SCIATICA PRESENT: Primary | ICD-10-CM

## 2022-09-30 DIAGNOSIS — M54.10 RADICULOPATHY, UNSPECIFIED SPINAL REGION: ICD-10-CM

## 2022-09-30 DIAGNOSIS — F33.1 MAJOR DEPRESSIVE DISORDER, RECURRENT EPISODE, MODERATE (HCC): Primary | ICD-10-CM

## 2022-09-30 PROCEDURE — 97140 MANUAL THERAPY 1/> REGIONS: CPT | Performed by: PHYSICAL THERAPIST

## 2022-09-30 PROCEDURE — 97530 THERAPEUTIC ACTIVITIES: CPT | Performed by: PHYSICAL THERAPIST

## 2022-09-30 PROCEDURE — 90834 PSYTX W PT 45 MINUTES: CPT | Performed by: PSYCHIATRY & NEUROLOGY

## 2022-09-30 PROCEDURE — 97110 THERAPEUTIC EXERCISES: CPT | Performed by: PHYSICAL THERAPIST

## 2022-09-30 NOTE — PSYCH
Session time [id number] (total time 42 minutes)    Virtual Regular Visit    Verification of patient location:    Patient is located in the following state in which I hold an active license [place]      Assessment/Plan:    Problem List Items Addressed This Visit        Other    Major depressive disorder, recurrent episode, moderate ([address] ) - Primary    Generalized anxiety disorder          Goals addressed in session: Goal 1          Reason for visit is   Chief Complaint   Patient presents with    Virtual Regular Visit        Encounter provider HERMELINDA Dupont    Provider located at [address] [place] [address]  Bisi Sousa [address] [address] [phone]      Recent Visits  No visits were found meeting these conditions  Showing recent visits within past 7 days and meeting all other requirements  Future Appointments  No visits were found meeting these conditions  Showing future appointments within next 150 days and meeting all other requirements       The patient was identified by name and date of birth  Eliane Giordano was informed that this is a telemedicine visit and that the [facility] and patient was informed that this is a secure, HIPAA-compliant platform  She agrees to proceed     My office door was closed  No one else was in the room  She acknowledged consent and understanding of privacy and security of the video platform  The patient has agreed to participate and understands they can discontinue the visit at any time  Patient is aware this is a billable service  Subjective  Eliane Giordano is a 38 y o  female presenting for follow up  Cookie Weiner shared that she fell down her stairs yesterday and is sore and bruised, so she is trying to take it easy and rest today    She said that overall she is doing ok, going out with friends, talking to Sumaré, and trying to keep up her connection with her brothers now that things with them are better  She noted, though, that she feels like the people in her life have a lot of big issues to deal with, and with her own issues, she cannot always handle taking on theirs and trying to help them  Discussed importance of healthy boundaries, knowing when to step back and when it might be ok to provide help and support, and encouraged Esther to prioritize her needs  Yolande Escalera shared that she has been having some difficulty sleeping lately, often waking up every 3 hours or so, staying awake for at least an hour  She feels fatigued during the day because of that, and is working on trying different things to help improve her sleep  She did note that she is focusing on eating a more balanced, healthier diet, and has lost a little weight and feels somewhat more energy because of this change  She has been cleaning her room better, and said that this has given her a sense of being productive and calmer now that her room is straightened  A: Yolande Escalera presented as mildly anxious, with a mood-congruent affect, good eye contact and calm behavior  Her concentration was mildly impaired, thought process logical and organized  Insight and judgment intact  Denies SI HI and psychosis  Mild progress toward goals  P: Yolande Escalera will continue to work on healthy boundaries while prioritizing her own mental and physical wellness  She will return in two weeks for follow up as scheduled        HPI     Past Medical History:   Diagnosis Date    Anxiety     Chronic sinusitis     Depression     Fibromyalgia     Migraine     Obesity     Polyarthritis     Last assessed 9/21/2015    Psychiatric disorder        Past Surgical History:   Procedure Laterality Date    [facility]OSCOPY  06/2019   [facility]      HYSTEROSCOPY      Endometrial Biopsy By Hysteroscopy    REMOVAL OF INTRAUTERINE DEVICE (IUD)      US GUIDED BREAST BIOPSY RIGHT COMPLETE Right 6/17/2019       Current Outpatient Medications   Medication Sig Dispense Refill    Calcium Carbonate-Vitamin D (CALCIUM 500/D PO) Take 1 capsule by mouth daily      celecoxib (CeleBREX) 200 mg capsule TAKE 1 CAPSULE BY MOUTH TWICE A DAY 60 capsule 6    Cholecalciferol (VITAMIN D-3) 1000 units CAPS Take 1 capsule by mouth daily      desvenlafaxine (PRISTIQ) 100 mg 24 hr tablet TAKE 1 TABLET BY MOUTH EVERY DAY 30 tablet 2    desvenlafaxine succinate (PRISTIQ) 50 mg 24 hr tablet TAKE 1 TABLET (50 MG TOTAL) BY MOUTH DAILY TOTAL DAILY DOSE 150 MG DAILY 30 tablet 2    EPINEPHrine (EPIPEN) 0 3 mg/0 3 mL SOAJ Inject 0 3 mL (0 3 mg total) into a muscle once for 1 dose 0 6 mL 0    hydrOXYzine HCL (ATARAX) 50 mg tablet TAKE 1 TABLET (50 MG TOTAL) BY MOUTH DAILY AT BEDTIME AS NEEDED (INSOMNIA) 30 tablet 2    IRON PO Take by mouth      lidocaine (LIDODERM) 5 % APPLY 1 PATCH TOPICALLY IN THE MORNING  REMOVE & DISCARD PATCH WITHIN 12 HOURS OR AS DIRECTED BY MD  30 patch 1    MAGNESIUM OXIDE PO Take by mouth      metFORMIN (GLUCOPHAGE) 500 mg tablet Take 1 tablet (500 mg total) by mouth 2 (two) times a day with meals 60 tablet 5    nortriptyline (PAMELOR) 75 MG capsule TAKE 1 CAPSULE BY MOUTH TWICE A  capsule 0    propranolol (INDERAL) 20 mg tablet TAKE 1 TABLET BY MOUTH EVERY DAY AT NIGHT 30 tablet 5    tiZANidine (ZANAFLEX) 4 mg tablet Take 1 tablet (4 mg total) by mouth every 8 (eight) hours as needed for muscle spasms 90 tablet 1     No current facility-administered medications for this visit  Allergies   Allergen Reactions    Amoxicillin-Pot Clavulanate     Decadrol [Dexamethasone] Other (See Comments)     Category: Adverse Reaction;   psychosis    Dexamethasone Sodium Phosphate Other (See Comments)     psychosis    Other Throat Swelling     CBD oil     Penicillins Hives and Other (See Comments)     Category: Allergy; Hives/Uticaria    Tetracycline Hives and Other (See Comments)     Hives/Uticaria    Tetracyclines & Related Hives     Category:  Allergy; Review of Systems    Video Exam    There were no vitals filed for this visit      Physical Exam     I spent 42 minutes directly with the patient during this visit

## 2022-09-30 NOTE — PROGRESS NOTES
PT Re-Evaluation     Today's date: 2022  Patient name: Alina Wallace  : 1980  MRN: [id number]  Referring provider: Doug Bernal PT  Dx:   Encounter Diagnosis     ICD-10-CM    1  Acute bilateral low back pain, unspecified whether sciatica present  M54 50    2  Radiculopathy, unspecified spinal region  M54 10                   Assessment  Assessment details: Pt is being treated with outpatient PT  She has  made slow gains in rom, strength, pain reduction and functional mobility but continues to have deficits  She will benefit from continued skilled PT to address these deficits and to progress to an independent hep  Thank you for this referral         Understanding of Dx/Px/POC: good   Prognosis: good    Goals  Short Term Goals: met  Independent performance of initial hep  Decrease pain 2 points on VAS      Long Term Goals: Independent performance of comprehensive hep - ongoing  Work performance is returned to max level of function - ongoing  Performance of IADL's is returned to max level of function - ongoing  Performance in recreational activities is improved to max level of function - ongoing  Able to vacuum with min pain - ongoing    Plan  Planned therapy interventions: IADL retraining, joint mobilization, abdominal trunk stabilization, manual therapy, massage, patient education, postural training, strengthening, stretching, therapeutic activities, therapeutic exercise, therapeutic training, transfer training and home exercise program  Frequency: 2x week  Duration in weeks: 4        Subjective Evaluation    History of Present Illness  Mechanism of injury: Pt reports that overall she feels she is improving since starting PT but hat she suffered a recent set back  Reports that she fell when walking down her stairs yesterday  Reports and increase in the intensity of her pain but no change in the quality of location  Denies any radicular symptoms from the fall     Pt states that sustained, flexed postures are still the ones that bother her the most like when she is washing her face and leaning forward to wash hair or body in the shower  Pt reports standing and walking don't seem to be an issue anymore as she walked ~5 miles over the weekend and didn't have any pain  Pain  Current pain ratin  At worst pain ratin    Patient Goals  Patient goals for therapy: decreased pain, increased motion, increased strength, independence with ADLs/IADLs and return to sport/leisure activities          Objective     Lumbar spine:      ROM:   Flexion: min limited   Extension: mod limited, pain   Right Rotation: min limited, pain   Left Rotation: min limited   Right Lateral Flexion: mod limited, pain   Left Lateral Flexion: Mod limited, pain    Improved control of abdominals noted with TaA but pt fatigues quickly during stabilization therex  Hypomobility noted with spring testing: pain produce L3-L5  Straight Leg Raise: neg  Cross SLR:  neg  Slump Test:  neg  Prone Knee Bend: neg   Directional testing: stiffness initially reported but this improves with reps     Decreased flexibility: B/L HS, glutes  SIJ cluster: neg  [place]'s sign: neg  Prone instability test: neg  Hip IR rom: limited B/L  Hip PROM:  Flexion: WNL, **pain in low back with L  ER: WNL  IR: min limited BL      Precautions: fibromyalgia, acute on chronic lumbar pain      Manuals    Lumbar pa's KT SG SG KL SG KL KK nv KL kl   consider mechanical traction             SIJ MET             DTM   SG L paraspinals  SG paraspinals     kl                Neuro Re-Ed             TaA 5"x20 5"x20 5" x20 5"x20 5" x20 5"x20 5"x20 10"x20 10"x20    TaA bridge 5"x20 5"x20 5" x20 5"x20 5" x20 5"x20 5"x20 5"x20 5"x20 5"x20   Prone hip ext 2x10  bent knee 2x10 knee bent 2x10 knee bent 2x10 knee bent 2x10 knee bent 2x10 knee bent 2x10  Knee bent 2x10 knee bent 2x10  Knee bent    TaA LPD blue  25 blue  25 Blue x25 black  20 Black x20 black  20 Black  20 nv Black  20 Blue x20   TaA with hooklying add squeeze        20x5"  20x5" 5"x20   TaA with BKFO             Pelvic rocking             pball press        Stand 20x5"  Stand  20x5"    rows blue  25 blue  25 Blue x25 black  20 Blue x30 black  20 Black  20 nv Black  20    palloff press          nv   quad hip ext          nv                Ther Ex             Prone on elbows             glute stretch 30"x3 ea  figure 4 30"x3 ea  figure 4 30" x3 ea figure 4 30"x3 ea figure 4 30" x3 ea figure 4 30" x3 ea figure 4 30"x3  Ea figure 4 30"x3  Ea figure 4 30"x3 ea figure 4 30"x3   HS stretch 30"x3 ea 30"x3 ea 30" x3 ea 30"x3 ea 30" x3 30"x3 30"x3 30"x3 30"x3    SKTC 30"x3 ea 30"x3 ea 30" x3 ea 30"x3 ea 30" x3 30"x3 30"x3 30"x3 30"x3 30x3   LTR 5"x20 5"x20 5" x20 ea 5"x20 ea 5" x20 ea 5"x20 ea 5"x20  ea 5"x20  ea 5"x20 5"x20   prone B scap retr             Quad stretch      strap  30"x3 Strap  30"x3 Strap  30"x3 Strap  30"x3    Pelvic rocking          x20   Ther Activity             bike 8' recumbent 8' recumbent L-roll 10' upright 8'  upright 10' upright 8'  upright 5' upright 5' upright  nv nv   abdominal breathing                          Gait Training                                       Modalities             prn

## 2022-10-03 ENCOUNTER — OFFICE VISIT (OUTPATIENT)
Dept: FAMILY MEDICINE CLINIC | Facility: CLINIC | Age: 42
End: 2022-10-03
Payer: COMMERCIAL

## 2022-10-03 ENCOUNTER — OFFICE VISIT (OUTPATIENT)
Dept: PHYSICAL THERAPY | Facility: CLINIC | Age: 42
End: 2022-10-03
Payer: COMMERCIAL

## 2022-10-03 VITALS
TEMPERATURE: 99.1 F | WEIGHT: 181 LBS | RESPIRATION RATE: 16 BRPM | HEART RATE: 100 BPM | BODY MASS INDEX: 35.53 KG/M2 | DIASTOLIC BLOOD PRESSURE: 80 MMHG | SYSTOLIC BLOOD PRESSURE: 110 MMHG | HEIGHT: 60 IN

## 2022-10-03 DIAGNOSIS — M54.50 ACUTE BILATERAL LOW BACK PAIN, UNSPECIFIED WHETHER SCIATICA PRESENT: Primary | ICD-10-CM

## 2022-10-03 DIAGNOSIS — M54.50 LUMBAR BACK PAIN: Primary | ICD-10-CM

## 2022-10-03 DIAGNOSIS — M54.10 RADICULOPATHY, UNSPECIFIED SPINAL REGION: ICD-10-CM
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
## 2022-10-03 PROBLEM — E55.9 VITAMIN D DEFICIENCY: Status: RESOLVED | Noted: 2019-04-05 | Resolved: 2022-10-03

## 2022-10-03 PROBLEM — L70.0 ACNE VULGARIS: Status: RESOLVED | Noted: 2017-05-24 | Resolved: 2022-10-03

## 2022-10-03 PROCEDURE — 97112 NEUROMUSCULAR REEDUCATION: CPT

## 2022-10-03 PROCEDURE — 97140 MANUAL THERAPY 1/> REGIONS: CPT

## 2022-10-03 PROCEDURE — 99214 OFFICE O/P EST MOD 30 MIN: CPT | Performed by: NURSE PRACTITIONER

## 2022-10-03 PROCEDURE — 97110 THERAPEUTIC EXERCISES: CPT

## 2022-10-03 NOTE — ASSESSMENT & PLAN NOTE
The patient with a slip and fall incident at home at which time she fell down approximately 6 stairs on Thursday evening  The patient landed on her back and slid down the stairs  Since that time the patient has had an increase in her lumbar back pain  She denies any radiculopathy  She does have full range of motion of her back with some increase in tenderness with bending and twisting  She does have underlying back issues  An x-ray of her back has been ordered  Tylenol or Motrin have been recommended  Lumbar back exercises were provided to the patient

## 2022-10-03 NOTE — PROGRESS NOTES
Daily Note     Today's date: 10/3/2022  Patient name: Aurea Bowman  : 1980  MRN: [id number]  Referring provider: Domenico Noriega, PT  Dx:   Encounter Diagnosis     ICD-10-CM    1  Acute bilateral low back pain, unspecified whether sciatica present  M54 50    2  Radiculopathy, unspecified spinal region  M54 10                   Subjective: Pt reports she is not quite as sore from her fall on Thursday  Notes she has been soaking in epsom salt baths  LBP level upon arrival to therap is 6/q0  Objective: See treatment diary below      Assessment: Performed exercise program w/o increasing symptoms  Responded well to manual therapies  Relief after tx, decreased pain level 3/10  Will monitor  Cont PT to decrease pain, increase mobility  Plan:Cont per POC          Precautions: fibromyalgia, acute on chronic lumbar pain      Manuals   10/3  8/31 9/6 9/8 9/12 9/19 9/22 9/26   Lumbar pa's FH  SG KL SG KL KK nv KL   consider mechanical traction            SIJ MET            DTM MO  SG L paraspinals  SG paraspinals                   Neuro Re-Ed            TaA 10"20  5" x20 5"x20 5" x20 5"x20 5"x20 10"x20 10"x20   TaA bridge 5"x20  5" x20 5"x20 5" x20 5"x20 5"x20 5"x20 5"x20   Prone hip ext 2x10  2x10 knee bent 2x10 knee bent 2x10 knee bent 2x10 knee bent 2x10  Knee bent 2x10 knee bent 2x10  Knee bent   TaA LPD Black  20  Blue x25 black  20 Black x20 black  20 Black  20 nv Black  20   TaA with hooklying add squeeze 20x5"       20x5"  20x5"   TaA with BKFO            Pelvic rocking            pball press Stand  20x5"       Stand 20x5"  Stand  20x5"   rows Black   20  Blue x25 black  20 Blue x30 black  20 Black  20 nv Black  20                                       Ther Ex            Prone on elbows            glute stretch 30"x3 ea fig 4  30" x3 ea figure 4 30"x3 ea figure 4 30" x3 ea figure 4 30" x3 ea figure 4 30"x3  Ea figure 4 30"x3  Ea figure 4 30"x3 ea figure 4   HS stretch 30"x3 ea  30" x3 ea 30"x3 ea 30" x3 30"x3 30"x3 30"x3 30"x3   SKTC 30"x3  ea  30" x3 ea 30"x3 ea 30" x3 30"x3 30"x3 30"x3 30"x3   LTR 5"x20  ea  5" x20 ea 5"x20 ea 5" x20 ea 5"x20 ea 5"x20  ea 5"x20  ea 5"x20   prone B scap retr            Quad stretch Strap  30"x3     strap  30"x3 Strap  30"x3 Strap  30"x3 Strap  30"x3               Ther Activity            bike nv  10' upright 8'  upright 10' upright 8'  upright 5' upright 5' upright  nv   abdominal breathing                        Gait Training                                    Modalities            prn

## 2022-10-03 NOTE — PROGRESS NOTES
[facility]    NAME: Antoinette Medina  AGE: 38 y o  SEX: female  : 1980     DATE: 10/3/2022     Assessment and Plan:     Problem List Items Addressed This Visit        Other    Fall    Relevant Orders    XR spine lumbar 2 or 3 views injury    Lumbar back pain - Primary     The patient with a slip and fall incident at home at which time she fell down approximately 6 stairs on Thursday evening  The patient landed on her back and slid down the stairs  Since that time the patient has had an increase in her lumbar back pain  She denies any radiculopathy  She does have full range of motion of her back with some increase in tenderness with bending and twisting  She does have underlying back issues  An x-ray of her back has been ordered  Tylenol or Motrin have been recommended  Lumbar back exercises were provided to the patient  No follow-ups on file  Chief Complaint:     Chief Complaint   Patient presents with    Fall        History of Present Illness:   Patient presents to the office today after sustaining a fall at home on Thursday evening  This is discussed in the above assessment and plan  Lumbar x-ray ordered  NSAIDs for pain relief  Heat or ice to      Review of Systems:     Review of Systems   Constitutional: Negative for activity change, fatigue and fever  HENT: Negative for congestion, hearing loss, rhinorrhea, trouble swallowing and voice change  Eyes: Negative for photophobia, pain, discharge and visual disturbance  Respiratory: Negative for cough, chest tightness and shortness of breath  Cardiovascular: Negative for chest pain, palpitations and leg swelling  Gastrointestinal: Negative for abdominal pain, blood in stool, constipation, nausea and vomiting  Endocrine: Negative for cold intolerance and heat intolerance     Genitourinary: Negative for difficulty urinating, frequency, hematuria, urgency, vaginal bleeding and vaginal discharge  Musculoskeletal: Positive for back pain  Negative for arthralgias and myalgias  Skin: Negative  Neurological: Negative for dizziness, weakness, numbness and headaches  Psychiatric/Behavioral: Negative for decreased concentration  The patient is not nervous/anxious           Problem List:     Patient Active Problem List   Diagnosis    Allergic rhinitis    Chronic fatigue    Chronic pain    Chronic migraine without aura without status migrainosus, not intractable    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, with unspecified complication (HCC)    Fibromyalgia    Headache    Hypokalemia    Lyme disease    Major depressive disorder, recurrent episode, moderate (HCC)    Malaise and fatigue    Menorrhagia    Muscle spasm    Myalgia    Narcolepsy    Narcolepsy cataplexy syndrome    Class 2 obesity due to excess calories without serious comorbidity with body mass index (BMI) of 37 0 to 37 9 in adult    Sinus disease    Sleep apnea    Snoring    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 psychotic features (HCC)    Generalized anxiety disorder    Dyspepsia    Arthralgia of multiple joints    Generalized body aches    Chronic idiopathic constipation    Elevated TSH    Loose stools    Hematochezia    Polyp of colon    Back pain    Sacroiliitis ([address] )    DDD (degenerative disc disease), lumbar    Well adult exam    Type 2 diabetes mellitus with hyperglycemia, without long-term current use of insulin ([address] )    Dyslipidemia    Diabetic neuropathy associated with type 2 diabetes mellitus ([address] )    Fall    Lumbar back pain        Objective:     /80   Pulse 100   Temp 99 1 °F (37 3 °C) (Tympanic)   Resp 16   Ht 5' (1 524 m)   Wt 82 1 kg (181 lb)   BMI 35 35 kg/m²     Current Outpatient Medications   Medication Sig Dispense Refill    Calcium Carbonate-Vitamin D (CALCIUM 500/D PO) Take 1 capsule by mouth daily      celecoxib (CeleBREX) 200 mg capsule TAKE 1 CAPSULE BY MOUTH TWICE A DAY 60 capsule 6    Cholecalciferol (VITAMIN D-3) 1000 units CAPS Take 1 capsule by mouth daily      desvenlafaxine (PRISTIQ) 100 mg 24 hr tablet TAKE 1 TABLET BY MOUTH EVERY DAY 30 tablet 2    desvenlafaxine succinate (PRISTIQ) 50 mg 24 hr tablet TAKE 1 TABLET (50 MG TOTAL) BY MOUTH DAILY TOTAL DAILY DOSE 150 MG DAILY 30 tablet 2    EPINEPHrine (EPIPEN) 0 3 mg/0 3 mL SOAJ Inject 0 3 mL (0 3 mg total) into a muscle once for 1 dose 0 6 mL 0    hydrOXYzine HCL (ATARAX) 50 mg tablet TAKE 1 TABLET (50 MG TOTAL) BY MOUTH DAILY AT BEDTIME AS NEEDED (INSOMNIA) 30 tablet 2    IRON PO Take by mouth      lidocaine (LIDODERM) 5 % APPLY 1 PATCH TOPICALLY IN THE MORNING  REMOVE & DISCARD PATCH WITHIN 12 HOURS OR AS DIRECTED BY MD  30 patch 1    MAGNESIUM OXIDE PO Take by mouth      metFORMIN (GLUCOPHAGE) 500 mg tablet Take 1 tablet (500 mg total) by mouth 2 (two) times a day with meals 60 tablet 5    nortriptyline (PAMELOR) 75 MG capsule TAKE 1 CAPSULE BY MOUTH TWICE A  capsule 0    propranolol (INDERAL) 20 mg tablet TAKE 1 TABLET BY MOUTH EVERY DAY AT NIGHT 30 tablet 5    tiZANidine (ZANAFLEX) 4 mg tablet Take 1 tablet (4 mg total) by mouth every 8 (eight) hours as needed for muscle spasms 90 tablet 1     No current facility-administered medications for this visit  Physical Exam  Vitals reviewed  Constitutional:       Appearance: Normal appearance  She is obese  HENT:      Head: Normocephalic  Nose: Nose normal       Mouth/Throat:      Mouth: Mucous membranes are moist       Pharynx: Oropharynx is clear  Eyes:      Extraocular Movements: Extraocular movements intact  Pupils: Pupils are equal, round, and reactive to light  Cardiovascular:      Rate and Rhythm: Normal rate and regular rhythm  Pulmonary:      Effort: Pulmonary effort is normal       Breath sounds: Normal breath sounds  Musculoskeletal:         General: Normal range of motion  Lumbar back: Tenderness present  Skin:     General: Skin is warm and dry  Neurological:      General: No focal deficit present  Mental Status: She is alert and oriented to person, place, and time  Psychiatric:         Mood and Affect: Mood normal          Behavior: Behavior normal          Thought Content:  Thought content normal          Judgment: Judgment normal          Leanne Low

## 2022-10-03 NOTE — PATIENT INSTRUCTIONS
Lower Back Exercises   AMBULATORY CARE:   Lower back exercises  help heal and strengthen your back muscles to prevent another injury  Ask your healthcare provider if you need to see a physical therapist for more advanced exercises  Seek care immediately if:   You have severe pain that prevents you from moving  Contact your healthcare provider if:   Your pain becomes worse  You have new pain  You have questions or concerns about your condition or care  Do lower back exercises safely:   Do the exercises on a mat or firm surface  (not on a bed) to support your spine and prevent low back pain  Move slowly and smoothly  Avoid fast or jerky motions  Breathe normally  Do not hold your breath  Stop if you feel pain  It is normal to feel some discomfort at first  Regular exercise will help decrease your discomfort over time  Lower back exercises: Your healthcare provider may recommend that you do back exercises 10 to 30 minutes each day  He may also recommend that you do exercises 1 to 3 times each day  Ask your healthcare provider which exercises are best for you and how often to do them  Ankle pumps:  Lie on your back  Move your foot up (with your toes pointing toward your head)  Then, move your foot down (with your toes pointing away from you)  Repeat this exercise 10 times on each side  Heel slides:  Lie on your back  Slowly bend one leg and then straighten it  Next, bend the other leg and then straighten it  Repeat 10 times on each side  Pelvic tilt:  Lie on your back with your knees bent and feet flat on the floor  Place your arms in a relaxed position beside your body  Tighten the muscles of your abdomen and flatten your back against the floor  Hold for 5 seconds  Repeat 5 times  Back stretch:  Lie on your back with your hands behind your head  Bend your knees and turn the lower half of your body to one side  Hold this position for 10 seconds   Repeat 3 times on each side  Straight leg raises:  Lie on your back with one leg straight  Bend the other knee  Tighten your abdomen and then slowly lift the straight leg up about 6 to 12 inches off the floor  Hold for 1 to 5 seconds  Lower your leg slowly  Repeat 10 times on each leg  Knee-to-chest:  Lie on your back with your knees bent and feet flat on the floor  Pull one of your knees toward your chest and hold it there for 5 seconds  Return your leg to the starting position  Lift the other knee toward your chest and hold for 5 seconds  Do this 5 times on each side  Cat and camel:  Place your hands and knees on the floor  Arch your back upward toward the ceiling and lower your head  Round out your spine as much as you can  Hold for 5 seconds  Lift your head upward and push your chest downward toward the floor  Hold for 5 seconds  Do 3 sets or as directed  Wall squats:  Stand with your back against a wall  Tighten the muscles of your abdomen  Slowly lower your body until your knees are bent at a 45 degree angle  Hold this position for 5 seconds  Slowly move back up to a standing position  Repeat 10 times  Curl up:  Lie on your back with your knees bent and feet flat on the floor  Place your hands, palms down, underneath the curve in your lower back  Next, with your elbows on the floor, lift your shoulders and chest 2 to 3 inches  Keep your head in line with your shoulders  Hold this position for 5 seconds  When you can do this exercise without pain for 10 to 15 seconds, you may add a rotation  While your shoulders and chest are lifted off the ground, turn slightly to the left and hold  Repeat on the other side  Bird dog:  Place your hands and knees on the floor  Keep your wrists directly below your shoulders and your knees directly below your hips  Pull your belly button in toward your spine  Do not flatten or arch your back  Tighten your abdominal muscles   Raise one arm straight out so that it is aligned with your head  Next, raise the leg opposite your arm  Hold this position for 15 seconds  Lower your arm and leg slowly and change sides  Do 5 sets  © Copyright [employer] 2022 Information is for End User's use only and may not be sold, redistributed or otherwise used for commercial purposes  All illustrations and images included in CareNotes® are the copyrighted property of A D A M , Inc  or [facility] Iftikhar Apple   The above information is an  only  It is not intended as medical advice for individual conditions or treatments  Talk to your doctor, nurse or pharmacist before following any medical regimen to see if it is safe and effective for you

## 2022-10-04 ENCOUNTER — APPOINTMENT (OUTPATIENT)
Dept: RADIOLOGY | Facility: CLINIC | Age: 42
End: 2022-10-04
Payer: COMMERCIAL

## 2022-10-04 ENCOUNTER — TELEPHONE (OUTPATIENT)
Dept: FAMILY MEDICINE CLINIC | Facility: CLINIC | Age: 42
End: 2022-10-04
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
## 2022-10-04 DIAGNOSIS — E11.65 TYPE 2 DIABETES MELLITUS WITH HYPERGLYCEMIA, WITHOUT LONG-TERM CURRENT USE OF INSULIN (HCC): Primary | ICD-10-CM

## 2022-10-04 PROCEDURE — 72100 X-RAY EXAM L-S SPINE 2/3 VWS: CPT

## 2022-10-04 RX ORDER — BLOOD-GLUCOSE METER
1 KIT MISCELLANEOUS
Qty: 1 KIT | Refills: 0 | Status: SHIPPED | OUTPATIENT
Start: 2022-10-04

## 2022-10-04 NOTE — TELEPHONE ENCOUNTER
Patient ([phone]) called in regards to glucose monitor  State insurance will only cover 2 brands- Accucheck and Contour  Asks that script be updated and sent to [facility] on [address] [facility]

## 2022-10-06 ENCOUNTER — OFFICE VISIT (OUTPATIENT)
Dept: PHYSICAL THERAPY | Facility: CLINIC | Age: 42
End: 2022-10-06
Payer: COMMERCIAL

## 2022-10-06 DIAGNOSIS — M54.10 RADICULOPATHY, UNSPECIFIED SPINAL REGION: ICD-10-CM

## 2022-10-06 DIAGNOSIS — M54.50 ACUTE BILATERAL LOW BACK PAIN, UNSPECIFIED WHETHER SCIATICA PRESENT: Primary | ICD-10-CM

## 2022-10-06 PROCEDURE — 97140 MANUAL THERAPY 1/> REGIONS: CPT

## 2022-10-06 PROCEDURE — 97110 THERAPEUTIC EXERCISES: CPT

## 2022-10-06 PROCEDURE — 97112 NEUROMUSCULAR REEDUCATION: CPT

## 2022-10-06 NOTE — PROGRESS NOTES
Daily Note     Today's date: 10/6/2022  Patient name: Aurea Bowman  : 1980  MRN: [id number]  Referring provider: Domenico Noriega, PT  Dx:   Encounter Diagnosis     ICD-10-CM    1  Acute bilateral low back pain, unspecified whether sciatica present  M54 50    2  Radiculopathy, unspecified spinal region  M54 10                   Subjective: Pt reports she is feeling better today  Reports LBP level upon arrival to therapy is 4/10  Objective: See treatment diary below    Assessment: Demonstrates good knowledge of exercise program  Relief after manual therapies  Will monitor  Plan: Cont per POC      Precautions: fibromyalgia, acute on chronic lumbar pain      Manuals   10/3  10/6  9/6 9/8 9/12 9/19 9/22 9/26   Lumbar pa's FH KL  KL SG KL KK nv KL   consider mechanical traction            SIJ MET            DTM MO MO   SG paraspinals                   Neuro Re-Ed            TaA 10"20 10"x20  5"x20 5" x20 5"x20 5"x20 10"x20 10"x20   TaA bridge 5"x20 5"x20  5"x20 5" x20 5"x20 5"x20 5"x20 5"x20   Prone hip ext 2x10 2x10  Knee bent  2x10 knee bent 2x10 knee bent 2x10 knee bent 2x10  Knee bent 2x10 knee bent 2x10  Knee bent   TaA LPD Black  20 Black  x20  black  20 Black x20 black  20 Black  20 nv Black  20   TaA with hooklying add squeeze 20x5" 20x5"      20x5"  20x5"   TaA with BKFO            Pelvic rocking            pball press Stand  20x5" Stand  20x5"      Stand 20x5"  Stand  20x5"   rows Black   20 Black  20  black  20 Blue x30 black  20 Black  20 nv Black  20                                       Ther Ex            Prone on elbows            glute stretch 30"x3 ea fig 4 30"x3 ea fig 4  30"x3 ea figure 4 30" x3 ea figure 4 30" x3 ea figure 4 30"x3  Ea figure 4 30"x3  Ea figure 4 30"x3 ea figure 4   HS stretch 30"x3 ea 30"x3  ea  30"x3 ea 30" x3 30"x3 30"x3 30"x3 30"x3   SKTC 30"x3  ea np  30"x3 ea 30" x3 30"x3 30"x3 30"x3 30"x3   LTR 5"x20  ea 5"x20  ea  5"x20 ea 5" x20 ea 5"x20 ea 5"x20  ea 5"x20  ea 5"x20   prone B scap retr            Quad stretch Strap  30"x3 Strap  3x30"    strap  30"x3 Strap  30"x3 Strap  30"x3 Strap  30"x3               Ther Activity            bike nv 5'  upright  8'  upright 10' upright 8'  upright 5' upright 5' upright  nv   abdominal breathing                        Gait Training                                    Modalities            prn

## 2022-10-10 ENCOUNTER — HOSPITAL ENCOUNTER (OUTPATIENT)
Dept: MAMMOGRAPHY | Facility: MEDICAL CENTER | Age: 42
Discharge: HOME | End: 2022-10-10
Payer: COMMERCIAL

## 2022-10-10 VITALS — BODY MASS INDEX: 35.53 KG/M2 | HEIGHT: 60 IN | WEIGHT: 181 LBS

## 2022-10-10 DIAGNOSIS — Z12.31 ENCOUNTER FOR SCREENING MAMMOGRAM FOR MALIGNANT NEOPLASM OF BREAST: ICD-10-CM

## 2022-10-10 PROCEDURE — 77063 BREAST TOMOSYNTHESIS BI: CPT

## 2022-10-10 PROCEDURE — 77067 SCR MAMMO BI INCL CAD: CPT

## 2022-10-11 ENCOUNTER — TELEPHONE (OUTPATIENT)
Dept: FAMILY MEDICINE CLINIC | Facility: CLINIC | Age: 42
End: 2022-10-11

## 2022-10-11 ENCOUNTER — OFFICE VISIT (OUTPATIENT)
Dept: PHYSICAL THERAPY | Facility: CLINIC | Age: 42
End: 2022-10-11
Payer: COMMERCIAL

## 2022-10-11 DIAGNOSIS — M54.10 RADICULOPATHY, UNSPECIFIED SPINAL REGION: ICD-10-CM

## 2022-10-11 DIAGNOSIS — M54.50 ACUTE BILATERAL LOW BACK PAIN, UNSPECIFIED WHETHER SCIATICA PRESENT: Primary | ICD-10-CM

## 2022-10-11 PROCEDURE — 97140 MANUAL THERAPY 1/> REGIONS: CPT

## 2022-10-11 PROCEDURE — 97110 THERAPEUTIC EXERCISES: CPT

## 2022-10-11 PROCEDURE — 97530 THERAPEUTIC ACTIVITIES: CPT

## 2022-10-11 PROCEDURE — 97112 NEUROMUSCULAR REEDUCATION: CPT

## 2022-10-11 NOTE — TELEPHONE ENCOUNTER
I called the patient to discuss results of lumbar x ray  Voice message left for patient to call back to discuss   Message also sent to patient via my chart

## 2022-10-11 NOTE — PROGRESS NOTES
Daily Note     Today's date: 10/11/2022  Patient name: Aurea Bowman  : 1980  MRN: [id number]  Referring provider: Domencio Noriega, PT  Dx:   Encounter Diagnosis     ICD-10-CM    1  Acute bilateral low back pain, unspecified whether sciatica present  M54 50    2  Radiculopathy, unspecified spinal region  M54 10        Start Time: 0850  Stop Time: 0932  Total time in clinic (min): 42 minutes      Subjective: Patient states, "I am pretty tight "  Prior to today's PT treatment, patient is complaining of tightness on B/L sides of her low back  Patient reports waking up this morning with back pain rated 8/10 - "Now it's 6/10 "      Objective: See treatment diary below  Assessment: Therapeutic exercise program is tolerated well  Mild tightness noted in the lumbar paraspinals during trigger point release  Plan: Continue treatment as per PT plan of care           Precautions: fibromyalgia, acute on chronic lumbar pain      Manuals   10/3  10/6 10/11  9/8 9/12 9/19 9/22 9/26   Lumbar pa's FH KL KL  SG KL KK nv KL   consider mechanical traction            SIJ MET            DTM MO MO TPR  JLW  SG paraspinals                   Neuro Re-Ed            TaA 10"20 10"x20 5"x20  5" x20 5"x20 5"x20 10"x20 10"x20   TaA bridge 5"x20 5"x20   5" x20 5"x20 5"x20 5"x20 5"x20   Prone hip ext 2x10 2x10  Knee bent 2x10  knee bent  2x10 knee bent 2x10 knee bent 2x10  Knee bent 2x10 knee bent 2x10  Knee bent   TaA LPD Black  20 Black  x20 black  20  Black x20 black  20 Black  20 nv Black  20   TaA with hooklying add squeeze 20x5" 20x5"      20x5"  20x5"   TaA with BKFO            Pelvic rocking            pball press Stand  20x5" Stand  20x5"      Stand 20x5"  Stand  20x5"   rows Black   20 Black  20 black  20  Blue x30 black  20 Black  20 nv Black  20                                       Ther Ex            Prone on elbows            glute stretch 30"x3 ea fig 4 30"x3 ea fig 4 30"x3  ea  figure 4  30" x3 ea figure 4 30" x3 ea figure 4 30"x3  Ea figure 4 30"x3  Ea figure 4 30"x3 ea figure 4   HS stretch 30"x3 ea 30"x3  ea 30"x3 ea  30" x3 30"x3 30"x3 30"x3 30"x3   SKTC 30"x3  ea np   30" x3 30"x3 30"x3 30"x3 30"x3   LTR 5"x20  ea 5"x20  ea 5"x20 ea  5" x20 ea 5"x20 ea 5"x20  ea 5"x20  ea 5"x20   Quad stretch Strap  30"x3 Strap  3x30" strap  30"x3 ea   strap  30"x3 Strap  30"x3 Strap  30"x3 Strap  30"x3   pball flexion stretch    10"x10                     Ther Activity            bike nv 5'  upright 8'  recum  10' upright 8'  upright 5' upright 5' upright  nv   abdominal breathing                        Gait Training                                    Modalities            prn

## 2022-10-12 PROBLEM — Z00.00 WELL ADULT EXAM: Status: RESOLVED | Noted: 2022-08-13 | Resolved: 2022-10-12

## 2022-10-13 ENCOUNTER — OFFICE VISIT (OUTPATIENT)
Dept: PHYSICAL THERAPY | Facility: CLINIC | Age: 42
End: 2022-10-13
Payer: COMMERCIAL

## 2022-10-13 DIAGNOSIS — M54.50 ACUTE BILATERAL LOW BACK PAIN, UNSPECIFIED WHETHER SCIATICA PRESENT: Primary | ICD-10-CM

## 2022-10-13 DIAGNOSIS — M54.10 RADICULOPATHY, UNSPECIFIED SPINAL REGION: ICD-10-CM

## 2022-10-13 PROCEDURE — 97110 THERAPEUTIC EXERCISES: CPT | Performed by: PHYSICAL THERAPIST

## 2022-10-13 PROCEDURE — 97530 THERAPEUTIC ACTIVITIES: CPT | Performed by: PHYSICAL THERAPIST

## 2022-10-13 PROCEDURE — 97140 MANUAL THERAPY 1/> REGIONS: CPT | Performed by: PHYSICAL THERAPIST

## 2022-10-13 NOTE — PROGRESS NOTES
Daily Note     Today's date: 10/13/2022  Patient name: Maricarmen Grijalva  : 1980  MRN: [id number]  Referring provider: Sveta Banegas, PT  Dx:   Encounter Diagnosis     ICD-10-CM    1  Acute bilateral low back pain, unspecified whether sciatica present  M54 50    2  Radiculopathy, unspecified spinal region  M54 10                   Subjective: LB today 6/10, mid/upper back 3/10 today verbalized  Pt reports limitations with ADL's at home  Objective: See treatment diary below      Assessment: Tolerated treatment well  Patient exhibited good technique with therapeutic exercises and would benefit from continued PT as per POC  Pt was able to perform all therex safely  No pain worsening post tx  Plan: Continue per plan of care  Progress treatment as tolerated         Precautions: fibromyalgia, acute on chronic lumbar pain      Manuals   10/3  10/6 10/11 10/13    Lumbar pa's FH KL KL SZ    consider mechanical traction        SIJ MET        DTM MO MO TPR  JLW TPR SZ            Neuro Re-Ed        TaA 10"20 10"x20 5"x20 5"x20    Butterfly PF stretch    3x30" ea    TaA bridge 5"x20 5"x20  Semi 10x5"    Prone hip ext 2x10 2x10  Knee bent 2x10  knee bent 2x15 knee bent    TaA LPD Black  20 Black  x20 black  20     TaA with hooklying add squeeze 20x5" 20x5"  20x5"    TaA with BKFO        Pelvic rocking        pball press Stand  20x5" Stand  20x5"      rows Black   20 Black  20 black  20                             Ther Ex        Prone on elbows        glute stretch 30"x3 ea fig 4 30"x3 ea fig 4 30"x3  ea  figure 4     HS stretch 30"x3 ea 30"x3  ea 30"x3 ea     SKTC 30"x3  ea np      LTR 5"x20  ea 5"x20  ea 5"x20 ea     Quad stretch Strap  30"x3 Strap  3x30" strap  30"x3 ea Strap 30" x 3 ea LE    pball flexion stretch    10"x10             Ther Activity        bike nv 5'  upright 8'  recum 8' rec bike    abdominal breathing                Gait Training                        Modalities        prn

## 2022-10-14 ENCOUNTER — TELEMEDICINE (OUTPATIENT)
Dept: BEHAVIORAL/MENTAL HEALTH CLINIC | Facility: CLINIC | Age: 42
End: 2022-10-14
Payer: COMMERCIAL

## 2022-10-14 DIAGNOSIS — F41.1 GENERALIZED ANXIETY DISORDER: ICD-10-CM

## 2022-10-14 DIAGNOSIS — F33.1 MAJOR DEPRESSIVE DISORDER, RECURRENT EPISODE, MODERATE (HCC): Primary | ICD-10-CM

## 2022-10-14 PROCEDURE — 90834 PSYTX W PT 45 MINUTES: CPT | Performed by: PSYCHIATRY & NEUROLOGY

## 2022-10-14 NOTE — PSYCH
Visit Time    Visit Start Time: 9:01 AM  Visit Stop Time: 9:44 AM  Total Visit Duration: 43 minutes    Virtual Regular Visit    Verification of patient location:    Patient is located in the following state in which I hold an active license [place]      Assessment/Plan:    Problem List Items Addressed This Visit        Other    Major depressive disorder, recurrent episode, moderate ([facility] [address] ) - Primary    Generalized anxiety disorder          Goals addressed in session: Goal 1          Reason for visit is   Chief Complaint   Patient presents with   • Virtual Regular Visit        Encounter provider HERMELINDA Hunt    Provider located at [address] [place] [id number] [phone]      Recent Visits  No visits were found meeting these conditions  Showing recent visits within past 7 days and meeting all other requirements  Future Appointments  No visits were found meeting these conditions  Showing future appointments within next 150 days and meeting all other requirements       The patient was identified by name and date of birth  Ani Peres was informed that this is a telemedicine visit and [facility] and patient was informed that this is a secure, HIPAA-compliant platform  She agrees to proceed     My office door was closed  No one else was in the room  She acknowledged consent and understanding of privacy and security of the video platform  The patient has agreed to participate and understands they can discontinue the visit at any time  Patient is aware this is a billable service  Subjective  Ani Peres is a 38 y o  female presenting for follow up    Jose William shared that she has been struggling with more depression this week, partly due to increased physical pain after her fall and her fibromyalgia, as well as because she has not had any contact with [place] this week (due to health issue)  She said that she is finding it hard to motivate to do things she should be doing (cleaning, laundry) and she is "beating myself up" over things  Discussed her thoughts about herself, how hard she is being on herself when she would not do that to others, and processed ways to be kinder to herself and more accepting of limitations (radical acceptance, positive affirmations, partializing)  Used supportive therapy, CBT and DBT concepts to facilitate discussion  A: BRAD presented as depressed today, with a constricted affect in depressed range, good eye contact and calm behavior  Her concentration was mildly impaired, thought process logical and organized  Insight and judgment good  Denies sI HI and psychosis  Some mild decompensation since last visit  P: BRAD will focus on self-care and positive self-talk, and will work on techniques discussed today to help boost mood  She will return in two weeks for follow up        HPI     Past Medical History:   Diagnosis Date   • Anxiety    • Chronic sinusitis    • Depression    • Diabetes ([facility] [address] )    • Fibromyalgia    • Migraine    • Obesity    • Polyarthritis     Last assessed 9/21/2015   • Psychiatric disorder        Past Surgical History:   Procedure Laterality Date   • COLONOSCOPY  06/2019   • DENTAL SURGERY     • HYSTEROSCOPY      Endometrial Biopsy By Hysteroscopy   • REMOVAL OF INTRAUTERINE DEVICE (IUD)     • US GUIDED BREAST BIOPSY RIGHT COMPLETE Right 6/17/2019       Current Outpatient Medications   Medication Sig Dispense Refill   • Blood Glucose Monitoring Suppl (Contour Blood Glucose System) w/Device KIT Use 1 kit 2 (two) times a day before meals 1 kit 0   • Calcium Carbonate-Vitamin D (CALCIUM 500/D PO) Take 1 capsule by mouth daily     • celecoxib (CeleBREX) 200 mg capsule TAKE 1 CAPSULE BY MOUTH TWICE A DAY 60 capsule 6   • Cholecalciferol (VITAMIN D-3) 1000 units CAPS Take 1 capsule by mouth daily     • desvenlafaxine (PRISTIQ) 100 mg 24 hr tablet TAKE 1 TABLET BY MOUTH EVERY DAY 30 tablet 2   • desvenlafaxine succinate (PRISTIQ) 50 mg 24 hr tablet TAKE 1 TABLET (50 MG TOTAL) BY MOUTH DAILY TOTAL DAILY DOSE 150 MG DAILY 30 tablet 2   • EPINEPHrine (EPIPEN) 0 3 mg/0 3 mL SOAJ Inject 0 3 mL (0 3 mg total) into a muscle once for 1 dose 0 6 mL 0   • hydrOXYzine HCL (ATARAX) 50 mg tablet TAKE 1 TABLET (50 MG TOTAL) BY MOUTH DAILY AT BEDTIME AS NEEDED (INSOMNIA) 30 tablet 2   • IRON PO Take by mouth     • lidocaine (LIDODERM) 5 % APPLY 1 PATCH TOPICALLY IN THE MORNING  REMOVE & DISCARD PATCH WITHIN 12 HOURS OR AS DIRECTED BY MD  30 patch 1   • MAGNESIUM OXIDE PO Take by mouth     • metFORMIN (GLUCOPHAGE) 500 mg tablet Take 1 tablet (500 mg total) by mouth 2 (two) times a day with meals 60 tablet 5   • nortriptyline (PAMELOR) 75 MG capsule TAKE 1 CAPSULE BY MOUTH TWICE A  capsule 0   • propranolol (INDERAL) 20 mg tablet TAKE 1 TABLET BY MOUTH EVERY DAY AT NIGHT 30 tablet 5   • tiZANidine (ZANAFLEX) 4 mg tablet Take 1 tablet (4 mg total) by mouth every 8 (eight) hours as needed for muscle spasms 90 tablet 1     No current facility-administered medications for this visit  Allergies   Allergen Reactions   • Cannabidiol Anxiety, Confusion, Hypertension, Itching, Palpitations, Shortness Of Breath, Swelling, Throat Swelling and Tongue Swelling   • Amoxicillin-Pot Clavulanate    • Decadrol [Dexamethasone] Other (See Comments)     Category: Adverse Reaction;   psychosis   • Dexamethasone Sodium Phosphate Other (See Comments)     psychosis   • Other Throat Swelling     CBD oil    • Penicillins Hives and Other (See Comments)     Category: Allergy; Hives/Uticaria   • Tetracycline Hives and Other (See Comments)     Hives/Uticaria   • Tetracyclines & Related Hives     Category: Allergy; Review of Systems    Video Exam    There were no vitals filed for this visit      Physical Exam     I spent 43 minutes directly with the patient during this visit

## 2022-10-14 NOTE — TELEPHONE ENCOUNTER
Caller: Todd Carrillo     Doctor: Stalin Villaseñor     Reason for call: 25% improvement and pain level 8/10    Call back#: [phone]

## 2022-10-17 ENCOUNTER — OFFICE VISIT (OUTPATIENT)
Dept: PHYSICAL THERAPY | Facility: CLINIC | Age: 42
End: 2022-10-17
Payer: COMMERCIAL

## 2022-10-17 DIAGNOSIS — M54.50 ACUTE BILATERAL LOW BACK PAIN, UNSPECIFIED WHETHER SCIATICA PRESENT: Primary | ICD-10-CM

## 2022-10-17 DIAGNOSIS — M54.10 RADICULOPATHY, UNSPECIFIED SPINAL REGION: ICD-10-CM

## 2022-10-17 PROCEDURE — 97110 THERAPEUTIC EXERCISES: CPT

## 2022-10-17 PROCEDURE — 97140 MANUAL THERAPY 1/> REGIONS: CPT

## 2022-10-17 PROCEDURE — 97530 THERAPEUTIC ACTIVITIES: CPT

## 2022-10-17 PROCEDURE — 97112 NEUROMUSCULAR REEDUCATION: CPT

## 2022-10-17 NOTE — PROGRESS NOTES
Daily Note     Today's date: 10/17/2022  Patient name: Skyla Morris  : 1980  MRN: [id number]  Referring provider: Snehal Carbone, PT  Dx:   Encounter Diagnosis     ICD-10-CM    1  Acute bilateral low back pain, unspecified whether sciatica present  M54 50    2  Radiculopathy, unspecified spinal region  M54 10        Start Time: 0807  Stop Time: 0850  Total time in clinic (min): 43 minutes       Subjective: Patient states, "Over the weekend it was horrible "  Patient complains of continued lower back pain this morning however it's slightly improved as compared to the weekend  Patient denies leg pain  Objective: See treatment diary below  Assessment: Therapeutic exercise program is tolerated well without complaints  Tightness noted L > R during trigger point release  Plan: Continue treatment as per PT plan of care         Precautions: fibromyalgia, acute on chronic lumbar pain      Manuals   10/3  10/6 10/11 10/13 10/17    Lumbar pa's FH KL KL SZ KL    consider mechanical traction         SIJ MET         DTM MO MO TPR  JLW TPR SZ TPR  JLW             Neuro Re-Ed         TaA 10"20 10"x20 5"x20 5"x20 5"x20    Butterfly PF stretch    3x30" ea     TaA bridge 5"x20 5"x20  Semi 10x5" 5"x20    Prone hip ext 2x10 2x10  Knee bent 2x10  knee bent 2x15 knee bent 2x10  knee bent    TaA LPD Black  20 Black  x20 black  20  black  20    TaA with hooklying add squeeze 20x5" 20x5"  20x5" 5"x20    TaA with BKFO         Pelvic rocking         pball press Stand  20x5" Stand  20x5"       rows Black   20 Black  20 black  20  black  20                               Ther Ex         Prone on elbows         glute stretch 30"x3 ea fig 4 30"x3 ea fig 4 30"x3  ea  figure 4  30"x3 ea fig 4    HS stretch 30"x3 ea 30"x3  ea 30"x3 ea  30"x3 ea    SKTC 30"x3  ea np       LTR 5"x20  ea 5"x20  ea 5"x20 ea  5"x20 ea    Quad stretch Strap  30"x3 Strap  3x30" strap  30"x3 ea Strap 30" x 3 ea LE strap  30"x3 ea    pball flexion stretch    10"x10               Ther Activity         bike nv 5'  upright 8'  recum 8' rec bike 8'  recum    abdominal breathing                  Gait Training                           Modalities         prn

## 2022-10-18 ENCOUNTER — TELEPHONE (OUTPATIENT)
Dept: PAIN MEDICINE | Facility: CLINIC | Age: 42
End: 2022-10-18

## 2022-10-18 NOTE — TELEPHONE ENCOUNTER
S/w pt and advised to stop Tizanidine  Pt reports that since Saturday she has woken up between 1 and 2  with visions and auditory hallucinations  She is unable to go back to sleep  Pt will cb with update

## 2022-10-18 NOTE — TELEPHONE ENCOUNTER
Caller: Patient     Doctor: Aashish Martinez    Reason for call: Pt is having some hallucination, and blurred vision  side effects from the medication tiZANidine (ZANAFLEX) 4 mg tablet     Sent a Tiger Text  to doctor on call       Call back#: [phone]

## 2022-10-19 ENCOUNTER — TELEPHONE (OUTPATIENT)
Dept: FAMILY MEDICINE CLINIC | Facility: CLINIC | Age: 42
End: 2022-10-19

## 2022-10-19 ENCOUNTER — TELEPHONE (OUTPATIENT)
Dept: RADIOLOGY | Facility: CLINIC | Age: 42
End: 2022-10-19

## 2022-10-19 ENCOUNTER — OFFICE VISIT (OUTPATIENT)
Dept: PHYSICAL THERAPY | Facility: CLINIC | Age: 42
End: 2022-10-19
Payer: COMMERCIAL

## 2022-10-19 DIAGNOSIS — M54.10 RADICULOPATHY, UNSPECIFIED SPINAL REGION: ICD-10-CM

## 2022-10-19 DIAGNOSIS — M54.50 ACUTE BILATERAL LOW BACK PAIN, UNSPECIFIED WHETHER SCIATICA PRESENT: Primary | ICD-10-CM

## 2022-10-19 PROCEDURE — 97140 MANUAL THERAPY 1/> REGIONS: CPT | Performed by: PHYSICAL THERAPIST

## 2022-10-19 PROCEDURE — 97530 THERAPEUTIC ACTIVITIES: CPT | Performed by: PHYSICAL THERAPIST

## 2022-10-19 PROCEDURE — 97110 THERAPEUTIC EXERCISES: CPT | Performed by: PHYSICAL THERAPIST

## 2022-10-19 NOTE — TELEPHONE ENCOUNTER
Patient ([phone]) LMOM in regards to glucose monitor and test strips  States insurances will only cover Contour brand  Asks that scripts be re-sent and states they prior Sal Gauthier is needed  Requests call back with updates when possible

## 2022-10-19 NOTE — TELEPHONE ENCOUNTER
Caller: Patient     Doctor/Office: Dr Levi Mccall asked to speak to: F/U from the medication side effects    Call was transferred to: Nurse

## 2022-10-19 NOTE — TELEPHONE ENCOUNTER
----- Message from Keith Avila RN sent at 10/19/2022  7:44 AM EDT -----    ----- Message -----  From: Gertrude Epley, DO  Sent: 10/18/2022   4:38 PM EDT  To: [facility]    Stop tizanidine immediately    If symptoms persist present to ED

## 2022-10-19 NOTE — PROGRESS NOTES
Daily Note     Today's date: 10/19/2022  Patient name: Lety Alvarado  : 1980  MRN: [id number]  Referring provider: Eber Enciso, PT  Dx:   Encounter Diagnosis     ICD-10-CM    1  Acute bilateral low back pain, unspecified whether sciatica present  M54 50    2  Radiculopathy, unspecified spinal region  M54 10                      Subjective: pt reports less pain after her LV but that she was unable to sleep last night secondary to having very vivid dreams that appear to be a side effect from increased dosage on her mm relaxor  She has discussed this with her physician  Objective: See treatment diary below  Assessment: decreased stiffness reports after manual tx  Instructed pt to add TaA hip IR iso to her hep  Progress stabilization program as alex nV  Plan: Continue treatment as per PT plan of care         Precautions: fibromyalgia, acute on chronic lumbar pain      Manuals   10/3  10/6 10/11 10/13 10/17 10/19   Lumbar pa's FH KL KL SZ KL kl   consider mechanical traction         SIJ MET         DTM MO MO TPR  JLW TPR SZ TPR  JLW kl            Neuro Re-Ed         TaA 10"20 10"x20 5"x20 5"x20 5"x20 5"x20   Butterfly PF stretch    3x30" ea     TaA bridge 5"x20 5"x20  Semi 10x5" 5"x20 5"x20   Prone hip ext 2x10 2x10  Knee bent 2x10  knee bent 2x15 knee bent 2x10  knee bent x20   TaA LPD Black  20 Black  x20 black  20  black  20 Black x20   TaA with hooklying add squeeze 20x5" 20x5"  20x5" 5"x20 5"x20   TaA with BKFO         Pelvic rocking         pball press Stand  20x5" Stand  20x5"       rows Black   20 Black  20 black  20  black  20 Black x20   Prone ir ball squeeze iso      5"x20                     Ther Ex         Prone on elbows         glute stretch 30"x3 ea fig 4 30"x3 ea fig 4 30"x3  ea  figure 4  30"x3 ea fig 4 30"x3   HS stretch 30"x3 ea 30"x3  ea 30"x3 ea  30"x3 ea 30"x3   SKTC 30"x3  ea np       LTR 5"x20  ea 5"x20  ea 5"x20 ea  5"x20 ea 5"x20   Quad stretch Strap  30"x3 Strap  3x30" strap  30"x3 ea Strap 30" x 3 ea LE strap  30"x3 ea 30"x3   pball flexion stretch    10"x10               Ther Activity         bike nv 5'  upright 8'  recum 8' rec bike 8'  recum 8'   abdominal breathing                  Gait Training                           Modalities         prn

## 2022-10-21 DIAGNOSIS — R53.82 CHRONIC FATIGUE: Primary | ICD-10-CM

## 2022-10-21 DIAGNOSIS — E11.9 TYPE 2 DIABETES MELLITUS WITHOUT COMPLICATION, WITHOUT LONG-TERM CURRENT USE OF INSULIN (HCC): Primary | ICD-10-CM

## 2022-10-24 ENCOUNTER — OFFICE VISIT (OUTPATIENT)
Dept: PAIN MEDICINE | Facility: CLINIC | Age: 42
End: 2022-10-24
Payer: COMMERCIAL

## 2022-10-24 VITALS
WEIGHT: 179 LBS | SYSTOLIC BLOOD PRESSURE: 117 MMHG | HEIGHT: 60 IN | BODY MASS INDEX: 35.14 KG/M2 | DIASTOLIC BLOOD PRESSURE: 83 MMHG | HEART RATE: 96 BPM

## 2022-10-24 DIAGNOSIS — M46.1 SACROILIITIS (HCC): ICD-10-CM

## 2022-10-24 DIAGNOSIS — M54.16 LUMBAR RADICULOPATHY: Primary | ICD-10-CM

## 2022-10-24 DIAGNOSIS — M51.36 DDD (DEGENERATIVE DISC DISEASE), LUMBAR: ICD-10-CM

## 2022-10-24 PROCEDURE — 99214 OFFICE O/P EST MOD 30 MIN: CPT | Performed by: NURSE PRACTITIONER

## 2022-10-24 NOTE — PROGRESS NOTES
Assessment:  1  Lumbar radiculopathy    2  DDD (degenerative disc disease), lumbar    3  Sacroiliitis ([address] )        Plan:  1  Patient will be scheduled for bilateral L5 TFESI to address the inflammatory component the patient's pain  Complete risks and benefits including bleeding, infection, tissue reaction, nerve injury and allergic reaction were discussed  The patient was agreeable and verbalized an understanding  2  Patient may continue Celebrex and nortriptyline as prescribed   3  Patient was given information regarding medical marijuana  4  Continue with home exercise program  5  Follow-up after procedure or sooner if needed      History of Present Illness: The patient is a 38 y o  female with a history of fibromyalgia last seen on 9/14/22 who presents for a follow up office visit in regards to chronic low back pain that radiates into the buttocks  The patient denies bowel or bladder incontinence or saddle anesthesia  She does endorse some numbness in her feet  MRI of the lumbar spine reveals facet she L5-S1 with some foraminal narrowing with possible impingement of the right L5 nerve root  She is had bilateral SI joint injections without much relief  She has tried and failed gabapentin, Lyrica, duloxetine, numerous muscle relaxants, and is currently taking Celebrex and nortriptyline 75 mg daily with minimal improvement  She was recently trialed on tizanidine which caused hallucinations therefore was discontinued  She continues in physical therapy with minimal to mild relief    The patient rates her pain a 6/10 on the numeric pain rating scale  She constantly has pain in the morning and at night which is described as dull aching, throbbing, and pressure-like    I have personally reviewed and/or updated the patient's past medical history, past surgical history, family history, social history, current medications, allergies, and vital signs today         Review of Systems:    Review of Systems Respiratory: Negative for shortness of breath  Cardiovascular: Negative for chest pain  Gastrointestinal: Negative for constipation, diarrhea, nausea and vomiting  Musculoskeletal: Positive for gait problem  Negative for arthralgias, joint swelling and myalgias  Skin: Negative for rash  Neurological: Positive for dizziness  Negative for seizures and weakness  All other systems reviewed and are negative          Past Medical History:   Diagnosis Date   • Anxiety    • Chronic sinusitis    • Depression    • Diabetes ([address] )    • Fibromyalgia    • Migraine    • Obesity    • Polyarthritis     Last assessed 9/21/2015   • Psychiatric disorder        Past Surgical History:   Procedure Laterality Date   • COLONOSCOPY  06/2019   • DENTAL SURGERY     • HYSTEROSCOPY      Endometrial Biopsy By Hysteroscopy   • REMOVAL OF INTRAUTERINE DEVICE (IUD)     • US GUIDED BREAST BIOPSY RIGHT COMPLETE Right 6/17/2019       Family History   Problem Relation Age of Onset   • Irregular heart beat Mother    • Diabetes Father    • Kidney disease Father    • Diabetes Maternal Grandmother    • Rheum arthritis Maternal Grandmother    • Melanoma Maternal Grandmother 80   • No Known Problems Maternal Grandfather    • Kidney disease Paternal Grandmother    • Rheum arthritis Paternal Grandmother    • Depression Paternal Grandmother    • Diabetes Paternal Grandfather    • Lung cancer Paternal Grandfather 52   • Substance Abuse Brother    • No Known Problems Brother    • Substance Abuse Maternal Uncle    • Prostate cancer Maternal Uncle 61   • Depression Paternal Aunt    • Alcohol abuse Family    • Stomach cancer Family        Social History     Occupational History   • Occupation: unemployed   Tobacco Use   • Smoking status: Never Smoker   • Smokeless tobacco: Never Used   Vaping Use   • Vaping Use: Never used   Substance and Sexual Activity   • Alcohol use: No     Comment: 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    • Drug use: Not Currently   • Sexual activity: Not Currently         Current Outpatient Medications:   •  Blood Glucose Monitoring Suppl (Contour Blood Glucose System) w/Device KIT, Use 1 kit 2 (two) times a day before meals, Disp: 1 kit, Rfl: 0  •  Calcium Carbonate-Vitamin D (CALCIUM 500/D PO), Take 1 capsule by mouth daily, Disp: , Rfl:   •  celecoxib (CeleBREX) 200 mg capsule, TAKE 1 CAPSULE BY MOUTH TWICE A DAY, Disp: 60 capsule, Rfl: 6  •  Cholecalciferol (VITAMIN D-3) 1000 units CAPS, Take 1 capsule by mouth daily, Disp: , Rfl:   •  desvenlafaxine (PRISTIQ) 100 mg 24 hr tablet, TAKE 1 TABLET BY MOUTH EVERY DAY, Disp: 30 tablet, Rfl: 2  •  desvenlafaxine succinate (PRISTIQ) 50 mg 24 hr tablet, TAKE 1 TABLET (50 MG TOTAL) BY MOUTH DAILY TOTAL DAILY DOSE 150 MG DAILY, Disp: 30 tablet, Rfl: 2  •  EPINEPHrine (EPIPEN) 0 3 mg/0 3 mL SOAJ, Inject 0 3 mL (0 3 mg total) into a muscle once for 1 dose, Disp: 0 6 mL, Rfl: 0  •  hydrOXYzine HCL (ATARAX) 50 mg tablet, TAKE 1 TABLET (50 MG TOTAL) BY MOUTH DAILY AT BEDTIME AS NEEDED (INSOMNIA), Disp: 30 tablet, Rfl: 2  •  IRON PO, Take by mouth, Disp: , Rfl:   •  lidocaine (LIDODERM) 5 %, APPLY 1 PATCH TOPICALLY IN THE MORNING  REMOVE & DISCARD PATCH WITHIN 12 HOURS OR AS DIRECTED BY MD , Disp: 30 patch, Rfl: 1  •  MAGNESIUM OXIDE PO, Take by mouth, Disp: , Rfl:   •  metFORMIN (GLUCOPHAGE) 500 mg tablet, Take 1 tablet (500 mg total) by mouth 2 (two) times a day with meals, Disp: 60 tablet, Rfl: 5  •  nortriptyline (PAMELOR) 75 MG capsule, TAKE 1 CAPSULE BY MOUTH TWICE A DAY, Disp: 180 capsule, Rfl: 0  •  propranolol (INDERAL) 20 mg tablet, TAKE 1 TABLET BY MOUTH EVERY DAY AT NIGHT, Disp: 30 tablet, Rfl: 5    Allergies   Allergen Reactions   • Cannabidiol Anxiety, Confusion, Hypertension, Itching, Palpitations, Shortness Of Breath, Swelling, Throat Swelling and Tongue Swelling   • Amoxicillin-Pot Clavulanate    • Decadrol [Dexamethasone] Other (See Comments)     Category: Adverse Reaction;   psychosis   • Dexamethasone Sodium Phosphate Other (See Comments)     psychosis   • Other Throat Swelling     CBD oil    • Penicillins Hives and Other (See Comments)     Category: Allergy; Hives/Uticaria   • Tetracycline Hives and Other (See Comments)     Hives/Uticaria   • Tetracyclines & Related Hives     Category: Allergy; Physical Exam:    /83   Pulse 96   Ht 5' (1 524 m)   Wt 81 2 kg (179 lb)   BMI 34 96 kg/m²     Constitutional:normal, well developed, well nourished, alert, in no distress and non-toxic and no overt pain behavior  Eyes:anicteric  HEENT:grossly intact  Neck:supple, symmetric, trachea midline and no masses   Pulmonary:even and unlabored  Cardiovascular:No edema or pitting edema present  Skin:Normal without rashes or lesions and well hydrated  Psychiatric:Mood and affect appropriate  Neurologic:Cranial Nerves II-XII grossly intact  Musculoskeletal:normal gait      Imaging  FL spine and pain procedure    (Results Pending)     MRI LUMBAR SPINE WITHOUT CONTRAST   INDICATION: M[age]: Lumbago with sciatica, unspecified side  COMPARISON: Plain films 7/1/2022  TECHNIQUE: Sagittal T1, sagittal T2, sagittal inversion recovery, axial T1 and axial T2, coronal T2    IMAGE QUALITY: Diagnostic   FINDINGS:   VERTEBRAL BODIES: There are 5 lumbar type vertebral bodies  Normal alignment of the lumbar spine  No spondylolysis or spondylolisthesis  No scoliosis  No compression fracture  Normal marrow signal is identified within the visualized bony   structures  No discrete marrow lesion  SACRUM: Normal signal within the sacrum  No evidence of insufficiency or stress fracture  DISTAL CORD AND CONUS: Normal size and signal within the distal cord and conus  PARASPINAL SOFT TISSUES: Paraspinal soft tissues are unremarkable  LOWER THORACIC DISC SPACES: Normal disc height and signal  No disc herniation, canal stenosis or foraminal narrowing     LUMBAR DISC SPACES:   L1-L2: Normal    L2-L3: Normal    L3-L4: Normal    L4-L5: Normal    L5-S1: Mild Modic type II endplate change  Disc desiccation with slight loss of disc height  Diffuse disc bulge with right greater than left facet hypertrophy  Central canal remains patent  There is mild right foraminal stenosis  Correlate   clinically for exiting right L5 radiculitis  IMPRESSION:   Mild L5-S1 degenerative change resulting in mild right foraminal stenosis and possible impingement of exiting right L5 nerve root  No focal disc herniation throughout the lumbar spine  No evidence of critical stenosis     Workstation performed: [id number]      Orders Placed This Encounter   Procedures   • FL spine and pain procedure

## 2022-10-25 ENCOUNTER — OFFICE VISIT (OUTPATIENT)
Dept: PHYSICAL THERAPY | Facility: CLINIC | Age: 42
End: 2022-10-25
Payer: COMMERCIAL

## 2022-10-25 DIAGNOSIS — M54.50 ACUTE BILATERAL LOW BACK PAIN, UNSPECIFIED WHETHER SCIATICA PRESENT: Primary | ICD-10-CM

## 2022-10-25 DIAGNOSIS — M54.10 RADICULOPATHY, UNSPECIFIED SPINAL REGION: ICD-10-CM

## 2022-10-25 DIAGNOSIS — E11.65 TYPE 2 DIABETES MELLITUS WITH HYPERGLYCEMIA, WITHOUT LONG-TERM CURRENT USE OF INSULIN (HCC): Primary | ICD-10-CM

## 2022-10-25 PROCEDURE — 97110 THERAPEUTIC EXERCISES: CPT | Performed by: PHYSICAL THERAPIST

## 2022-10-25 PROCEDURE — 97140 MANUAL THERAPY 1/> REGIONS: CPT | Performed by: PHYSICAL THERAPIST

## 2022-10-25 PROCEDURE — 97530 THERAPEUTIC ACTIVITIES: CPT | Performed by: PHYSICAL THERAPIST

## 2022-10-25 RX ORDER — BLOOD-GLUCOSE METER
EACH MISCELLANEOUS 2 TIMES DAILY
Qty: 1 KIT | Refills: 0 | Status: SHIPPED | OUTPATIENT
Start: 2022-10-25 | End: 2022-10-25 | Stop reason: SDUPTHER

## 2022-10-25 NOTE — PROGRESS NOTES
Daily Note     Today's date: 10/25/2022  Patient name: Roma Gunn  : 1980  MRN: [id number]  Referring provider: Angelique Osuna, PT  Dx:   Encounter Diagnosis     ICD-10-CM    1  Acute bilateral low back pain, unspecified whether sciatica present  M54 50    2  Radiculopathy, unspecified spinal region  M54 10                   Subjective: Pt reports continued significant pain today rated at 7/10  Pt states that since Friday, she has been feeling pain  Pt has scheduled to get injections in lower back   Objective: See treatment diary below      Assessment: Pt tolerates treatment well with good form throughout  Pt with no significant discomfort and with good response to manual therapy  Pt will benefit from continued skilled PT  Plan: Continue per plan of care        Precautions: fibromyalgia, acute on chronic lumbar pain      Manuals   10/3  10/6 10/11 10/13 10/17 10/19 10/25   Lumbar pa's FH KL KL SZ KL kl DL   consider mechanical traction          SIJ MET          DTM MO MO TPR  JLW TPR SZ TPR  JLW kl DL             Neuro Re-Ed          TaA 10"20 10"x20 5"x20 5"x20 5"x20 5"x20 5"x20   Butterfly PF stretch    3x30" ea      TaA bridge 5"x20 5"x20  Semi 10x5" 5"x20 5"x20 5"x20   Prone hip ext 2x10 2x10  Knee bent 2x10  knee bent 2x15 knee bent 2x10  knee bent x20 x20   TaA LPD Black  20 Black  x20 black  20  black  20 Black x20 Black x20   TaA with hooklying add squeeze 20x5" 20x5"  20x5" 5"x20 5"x20 5"x20   TaA with BKFO          Pelvic rocking          pball press Stand  20x5" Stand  20x5"        rows Black   20 Black  20 black  20  black  20 Black x20 Black x20    Prone ir ball squeeze iso      5"x20 5"x20                       Ther Ex          Prone on elbows          glute stretch 30"x3 ea fig 4 30"x3 ea fig 4 30"x3  ea  figure 4  30"x3 ea fig 4 30"x3 30"x3   HS stretch 30"x3 ea 30"x3  ea 30"x3 ea  30"x3 ea 30"x3 30"x3   SKTC 30"x3  ea np        LTR 5"x20  ea 5"x20  ea 5"x20 ea  5"x20 ea 5"x20 5"x20   Quad stretch Strap  30"x3 Strap  3x30" strap  30"x3 ea Strap 30" x 3 ea LE strap  30"x3 ea 30"x3    pball flexion stretch    10"x10                 Ther Activity          bike nv 5'  upright 8'  recum 8' rec bike 8'  recum 8' 8'   abdominal breathing                    Gait Training                              Modalities          prn

## 2022-10-28 ENCOUNTER — TELEPHONE (OUTPATIENT)
Dept: BEHAVIORAL/MENTAL HEALTH CLINIC | Facility: CLINIC | Age: 42
End: 2022-10-28

## 2022-10-28 ENCOUNTER — APPOINTMENT (OUTPATIENT)
Dept: PHYSICAL THERAPY | Facility: CLINIC | Age: 42
End: 2022-10-28

## 2022-10-28 ENCOUNTER — OFFICE VISIT (OUTPATIENT)
Dept: FAMILY MEDICINE CLINIC | Facility: CLINIC | Age: 42
End: 2022-10-28

## 2022-10-28 ENCOUNTER — TELEPHONE (OUTPATIENT)
Dept: NEUROLOGY | Facility: CLINIC | Age: 42
End: 2022-10-28

## 2022-10-28 ENCOUNTER — PATIENT MESSAGE (OUTPATIENT)
Dept: NEUROLOGY | Facility: CLINIC | Age: 42
End: 2022-10-28

## 2022-10-28 VITALS
RESPIRATION RATE: 16 BRPM | DIASTOLIC BLOOD PRESSURE: 72 MMHG | OXYGEN SATURATION: 99 % | BODY MASS INDEX: 35.69 KG/M2 | HEIGHT: 60 IN | TEMPERATURE: 98.9 F | SYSTOLIC BLOOD PRESSURE: 118 MMHG | HEART RATE: 97 BPM | WEIGHT: 181.8 LBS

## 2022-10-28 DIAGNOSIS — M54.40 LOW BACK PAIN WITH SCIATICA, SCIATICA LATERALITY UNSPECIFIED, UNSPECIFIED BACK PAIN LATERALITY, UNSPECIFIED CHRONICITY: Primary | ICD-10-CM

## 2022-10-28 DIAGNOSIS — R53.82 CHRONIC FATIGUE: ICD-10-CM

## 2022-10-28 DIAGNOSIS — M54.50 ACUTE MIDLINE LOW BACK PAIN WITHOUT SCIATICA: Primary | ICD-10-CM

## 2022-10-28 LAB
25(OH)D3 SERPL-MCNC: 45 NG/ML (ref 30–100)
TSH SERPL-ACNC: 2.03 MIU/L

## 2022-10-28 RX ORDER — CYCLOBENZAPRINE HCL 5 MG
5 TABLET ORAL 3 TIMES DAILY PRN
Qty: 30 TABLET | Refills: 0 | Status: SHIPPED | OUTPATIENT
Start: 2022-10-28

## 2022-10-28 NOTE — ASSESSMENT & PLAN NOTE
Patient presents the office today with concerns of increasing back pain  She is followed by pain management and does have an upcoming procedure scheduled the 2nd week of November  She did contact their office this morning as well as Neurology who referred them back to pain management  The pain management provider will attempt to get the patient in for sooner appointment  Patient states that she had tried Flexeril in the past that helped somewhat with her back pain  This was sent to her pharmacy  She is aware of the side effects  She will continue with her lidocaine patches as well as Tylenol for her discomfort

## 2022-10-28 NOTE — TELEPHONE ENCOUNTER
Patient left voicemail regarding continued pain  She is requesting a call back as she has additional information she would like to relay to [address]: [phone]      (FYI  Patient states she left message 10/27/2022 but did not get a call back  I do not see any documentation of this   In case she mentions it )

## 2022-10-28 NOTE — TELEPHONE ENCOUNTER
Called patient mom and informed that patient virtual appt for today 10/28/22 will be canceled due to provider have a family emergency      Appt is out of the schedule

## 2022-10-28 NOTE — TELEPHONE ENCOUNTER
Returned phone call to Chase Estrada  Back pain significantly worse in the last few days  Had a fall earlier this month which triggered her back pain again  XR was done and no fractures  Wants to know what she can do for the pain  She has been on Cymbalta, gabapentin, lyrica in the past and did not have benefit with any of them  Was last on steroids for 3 days in August for an allergic reaction otherwise has not been on steroids this year  Is scheduled for injection with pain management on 11/9  She denies saddle anesthesia, urinary incontinence, bowel incontinence, urinary retention  Has some numbness going down the back of each leg  No red flag signs at present  We discussed options for her pain today  She went to her PCP earlier today and they sent in Rx for Flexeril  She just took the first dose in the last few hours and hasn't noticed any improvement yet  Advised that she continue to take that for the next day or two to see how her pain is doing  If at that point pain is not improved or worsening, could then try PO steroid taper  Have asked for her to provide an update in the next few days with how she is doing and we will discuss how to proceed from there      Yrn Moralez DO  10/28/2022 6:19 PM

## 2022-10-28 NOTE — PROGRESS NOTES
[facility]    NAME: Jami Blanc  AGE: 38 y o  SEX: female  : 1980     DATE: 10/28/2022     Assessment and Plan:     Problem List Items Addressed This Visit        Other    Chronic fatigue    Relevant Orders    Vitamin D 25 hydroxy    Back pain - Primary     Patient presents the office today with concerns of increasing back pain  She is followed by pain management and does have an upcoming procedure scheduled the 2nd week of November  She did contact their office this morning as well as Neurology who referred them back to pain management  The pain management provider will attempt to get the patient in for sooner appointment  Patient states that she had tried Flexeril in the past that helped somewhat with her back pain  This was sent to her pharmacy  She is aware of the side effects  She will continue with her lidocaine patches as well as Tylenol for her discomfort  Relevant Medications    cyclobenzaprine (FLEXERIL) 5 mg tablet              No follow-ups on file  Chief Complaint:     Chief Complaint   Patient presents with   • Fatigue        History of Present Illness: The patient presents to the office today with a primary symptom of back pain  She is being followed by pain management and Neurology however she contacted their office regarding the symptoms without a plan  The patient is scheduled for a procedure in pain management on  and they are going to try to move this up to a sooner appointment  I did send Flexeril to her pharmacy for the symptoms  In addition the patient is complaining of increased fatigue, depression, brain fog, memory issues  The patient is on multiple medications for her migraines, fibromyalgia and other issues  I believe the combination of these medications may be contributing to her side effects and symptoms  Her diabetes is well controlled    She would like her thyroid checked and this has been ordered  I will contact her next week with those results  Review of Systems:     Review of Systems   Constitutional: Positive for diaphoresis  Negative for activity change, fatigue and fever  HENT: Negative for congestion, hearing loss, rhinorrhea, trouble swallowing and voice change  Eyes: Negative for photophobia, pain, discharge and visual disturbance  Respiratory: Negative for cough, chest tightness and shortness of breath  Cardiovascular: Negative for chest pain, palpitations and leg swelling  Gastrointestinal: Negative for abdominal pain, blood in stool, constipation, nausea and vomiting  Endocrine: Negative for cold intolerance and heat intolerance  Genitourinary: Negative for difficulty urinating, frequency, hematuria, urgency, vaginal bleeding and vaginal discharge  Musculoskeletal: Positive for back pain  Negative for arthralgias and myalgias  Skin: Negative  Neurological: Positive for headaches  Negative for dizziness, weakness and numbness  Psychiatric/Behavioral: Positive for dysphoric mood  Negative for decreased concentration  The patient is nervous/anxious           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, with unspecified complication (HCC)   • Fibromyalgia   • Headache   • Hypokalemia   • Lyme disease   • Major depressive disorder, recurrent episode, moderate (HCC)   • Malaise and fatigue   • Menorrhagia   • Muscle spasm   • Myalgia   • Narcolepsy   • Narcolepsy cataplexy syndrome   • Class 2 obesity due to excess calories without serious comorbidity with body mass index (BMI) of 37 0 to 37 9 in adult   • Sinus disease   • Sleep apnea   • Snoring   • 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 psychotic features ([facility] [address] )   • Generalized anxiety disorder   • Dyspepsia • Arthralgia of multiple joints   • Generalized body aches   • Chronic idiopathic constipation   • Elevated TSH   • Loose stools   • Hematochezia   • Polyp of colon   • Back pain   • Sacroiliitis (HCC)   • DDD (degenerative disc disease), lumbar   • Type 2 diabetes mellitus with hyperglycemia, without long-term current use of insulin (HCC)   • Dyslipidemia   • Diabetic neuropathy associated with type 2 diabetes mellitus ([facility] [address] )   • Fall   • Lumbar back pain        Objective:     /72 (BP Location: Left arm, Patient Position: Sitting)   Pulse 97   Temp 98 9 °F (37 2 °C) (Tympanic)   Resp 16   Ht 5' (1 524 m)   Wt 82 5 kg (181 lb 12 8 oz)   SpO2 99%   BMI 35 51 kg/m²     Current Outpatient Medications   Medication Sig Dispense Refill   • cyclobenzaprine (FLEXERIL) 5 mg tablet Take 1 tablet (5 mg total) by mouth 3 (three) times a day as needed for muscle spasms 30 tablet 0   • Blood Glucose Monitoring Suppl (Contour Blood Glucose System) w/Device KIT Use 1 kit 2 (two) times a day before meals 1 kit 0   • Blood Glucose Monitoring Suppl (OneTouch Verio Flex System) w/Device KIT Use 2 (two) times a day 1 kit 0   • Calcium Carbonate-Vitamin D (CALCIUM 500/D PO) Take 1 capsule by mouth daily     • celecoxib (CeleBREX) 200 mg capsule TAKE 1 CAPSULE BY MOUTH TWICE A DAY 60 capsule 6   • Cholecalciferol (VITAMIN D-3) 1000 units CAPS Take 1 capsule by mouth daily     • desvenlafaxine (PRISTIQ) 100 mg 24 hr tablet TAKE 1 TABLET BY MOUTH EVERY DAY 30 tablet 2   • desvenlafaxine succinate (PRISTIQ) 50 mg 24 hr tablet TAKE 1 TABLET (50 MG TOTAL) BY MOUTH DAILY TOTAL DAILY DOSE 150 MG DAILY 30 tablet 2   • EPINEPHrine (EPIPEN) 0 3 mg/0 3 mL SOAJ Inject 0 3 mL (0 3 mg total) into a muscle once for 1 dose 0 6 mL 0   • hydrOXYzine HCL (ATARAX) 50 mg tablet TAKE 1 TABLET (50 MG TOTAL) BY MOUTH DAILY AT BEDTIME AS NEEDED (INSOMNIA) 30 tablet 2   • IRON PO Take by mouth     • lidocaine (LIDODERM) 5 % APPLY 1 PATCH TOPICALLY IN THE MORNING  REMOVE & DISCARD PATCH WITHIN 12 HOURS OR AS DIRECTED BY MD  30 patch 1   • MAGNESIUM OXIDE PO Take by mouth     • metFORMIN (GLUCOPHAGE) 500 mg tablet Take 1 tablet (500 mg total) by mouth 2 (two) times a day with meals 60 tablet 5   • nortriptyline (PAMELOR) 75 MG capsule TAKE 1 CAPSULE BY MOUTH TWICE A  capsule 0   • propranolol (INDERAL) 20 mg tablet TAKE 1 TABLET BY MOUTH EVERY DAY AT NIGHT 30 tablet 5     No current facility-administered medications for this visit  Physical Exam  Vitals reviewed  Constitutional:       Appearance: Normal appearance  She is obese  HENT:      Head: Normocephalic  Nose: Nose normal       Mouth/Throat:      Mouth: Mucous membranes are moist       Pharynx: Oropharynx is clear  Eyes:      Extraocular Movements: Extraocular movements intact  Pupils: Pupils are equal, round, and reactive to light  Cardiovascular:      Rate and Rhythm: Normal rate and regular rhythm  Pulmonary:      Effort: Pulmonary effort is normal       Breath sounds: Normal breath sounds  Musculoskeletal:         General: Normal range of motion  Skin:     General: Skin is warm and dry  Neurological:      General: No focal deficit present  Mental Status: She is alert and oriented to person, place, and time  Psychiatric:         Mood and Affect: Mood is anxious and depressed  Behavior: Behavior normal          Thought Content:  Thought content normal          Cognition and Memory: Cognition and memory normal          Judgment: Judgment normal          Leanne Low

## 2022-10-30 RX ORDER — METHYLPREDNISOLONE 4 MG/1
TABLET ORAL
Qty: 1 EACH | Refills: 0 | Status: SHIPPED | OUTPATIENT
Start: 2022-10-30

## 2022-10-31 DIAGNOSIS — M62.838 MUSCLE SPASM: ICD-10-CM

## 2022-10-31 RX ORDER — BLOOD-GLUCOSE METER
EACH MISCELLANEOUS 2 TIMES DAILY
Qty: 1 KIT | Refills: 0 | Status: SHIPPED | OUTPATIENT
Start: 2022-10-31 | End: 2023-03-21

## 2022-11-01 RX ORDER — CYCLOBENZAPRINE HCL 10 MG
20 TABLET ORAL
Qty: 60 TABLET | Refills: 6 | Status: SHIPPED | OUTPATIENT
Start: 2022-11-01

## 2022-11-08 ENCOUNTER — HOSPITAL ENCOUNTER (OUTPATIENT)
Dept: RADIOLOGY | Facility: CLINIC | Age: 42
Discharge: HOME | End: 2022-11-08

## 2022-11-08 VITALS
OXYGEN SATURATION: 100 % | RESPIRATION RATE: 20 BRPM | TEMPERATURE: 97.1 F | SYSTOLIC BLOOD PRESSURE: 114 MMHG | HEART RATE: 91 BPM | DIASTOLIC BLOOD PRESSURE: 79 MMHG

## 2022-11-08 DIAGNOSIS — M54.16 LUMBAR RADICULOPATHY: ICD-10-CM

## 2022-11-08 RX ORDER — PAPAVERINE HCL 150 MG
15 CAPSULE, EXTENDED RELEASE ORAL ONCE
Status: COMPLETED | OUTPATIENT
Start: 2022-11-08 | End: 2022-11-08

## 2022-11-08 RX ADMIN — DEXAMETHASONE SODIUM PHOSPHATE 15 MG: 10 INJECTION, SOLUTION INTRAMUSCULAR; INTRAVENOUS at 08:54

## 2022-11-08 RX ADMIN — IOHEXOL 2 ML: 300 INJECTION, SOLUTION INTRAVENOUS at 08:53

## 2022-11-08 RX ADMIN — LIDOCAINE HYDROCHLORIDE 2 ML: 20 INJECTION, SOLUTION EPIDURAL; INFILTRATION; INTRACAUDAL; PERINEURAL at 08:54

## 2022-11-08 NOTE — H&P
History of Present Illness: The patient is a 38 y o  female who presents with complaints of low back and buttock pain      Past Medical History:   Diagnosis Date   • Anxiety    • Chronic sinusitis    • Depression    • Diabetes ([address] )    • Fibromyalgia    • Migraine    • Obesity    • Polyarthritis     Last assessed 9/21/2015   • Psychiatric disorder        Past Surgical History:   Procedure Laterality Date   • COLONOSCOPY  06/2019   • DENTAL SURGERY     • HYSTEROSCOPY      Endometrial Biopsy By Hysteroscopy   • REMOVAL OF INTRAUTERINE DEVICE (IUD)     • US GUIDED BREAST BIOPSY RIGHT COMPLETE Right 6/17/2019         Current Outpatient Medications:   •  Blood Glucose Monitoring Suppl (OneTouch Verio Flex System) w/Device KIT, Use 2 (two) times a day, Disp: 1 kit, Rfl: 0  •  Blood Glucose Monitoring Suppl (Contour Blood Glucose System) w/Device KIT, Use 1 kit 2 (two) times a day before meals, Disp: 1 kit, Rfl: 0  •  Calcium Carbonate-Vitamin D (CALCIUM 500/D PO), Take 1 capsule by mouth daily, Disp: , Rfl:   •  celecoxib (CeleBREX) 200 mg capsule, TAKE 1 CAPSULE BY MOUTH TWICE A DAY, Disp: 60 capsule, Rfl: 6  •  Cholecalciferol (VITAMIN D-3) 1000 units CAPS, Take 1 capsule by mouth daily, Disp: , Rfl:   •  cyclobenzaprine (FLEXERIL) 10 mg tablet, TAKE 2 TABLETS (20 MG TOTAL) BY MOUTH DAILY AT BEDTIME AS NEEDED FOR MUSCLE SPASMS, Disp: 60 tablet, Rfl: 6  •  cyclobenzaprine (FLEXERIL) 5 mg tablet, Take 1 tablet (5 mg total) by mouth 3 (three) times a day as needed for muscle spasms, Disp: 30 tablet, Rfl: 0  •  desvenlafaxine (PRISTIQ) 100 mg 24 hr tablet, TAKE 1 TABLET BY MOUTH EVERY DAY, Disp: 30 tablet, Rfl: 2  •  desvenlafaxine succinate (PRISTIQ) 50 mg 24 hr tablet, TAKE 1 TABLET (50 MG TOTAL) BY MOUTH DAILY TOTAL DAILY DOSE 150 MG DAILY, Disp: 30 tablet, Rfl: 2  •  EPINEPHrine (EPIPEN) 0 3 mg/0 3 mL SOAJ, Inject 0 3 mL (0 3 mg total) into a muscle once for 1 dose, Disp: 0 6 mL, Rfl: 0  •  hydrOXYzine HCL (ATARAX) 50 mg tablet, TAKE 1 TABLET (50 MG TOTAL) BY MOUTH DAILY AT BEDTIME AS NEEDED (INSOMNIA), Disp: 30 tablet, Rfl: 2  •  IRON PO, Take by mouth, Disp: , Rfl:   •  lidocaine (LIDODERM) 5 %, APPLY 1 PATCH TOPICALLY IN THE MORNING  REMOVE & DISCARD PATCH WITHIN 12 HOURS OR AS DIRECTED BY MD , Disp: 30 patch, Rfl: 1  •  MAGNESIUM OXIDE PO, Take by mouth, Disp: , Rfl:   •  metFORMIN (GLUCOPHAGE) 500 mg tablet, Take 1 tablet (500 mg total) by mouth 2 (two) times a day with meals, Disp: 60 tablet, Rfl: 5  •  methylPREDNISolone 4 MG tablet therapy pack, Use as directed on package, Disp: 1 each, Rfl: 0  •  nortriptyline (PAMELOR) 75 MG capsule, TAKE 1 CAPSULE BY MOUTH TWICE A DAY, Disp: 180 capsule, Rfl: 0  •  propranolol (INDERAL) 20 mg tablet, TAKE 1 TABLET BY MOUTH EVERY DAY AT NIGHT, Disp: 30 tablet, Rfl: 5    Current Facility-Administered Medications:   •  dexamethasone (PF) (DECADRON) injection 15 mg, 15 mg, Epidural, Once, Ishmael Hallman,   •  iohexol (OMNIPAQUE) 300 mg/mL injection 2 mL, 2 mL, Epidural, Once, Jeremy Meneses, DO  •  lidocaine (PF) (XYLOCAINE-MPF) 2 % injection 2 mL, 2 mL, Epidural, Once, Jeremy Meneses,     Allergies   Allergen Reactions   • Cannabidiol Anxiety, Confusion, Hypertension, Itching, Palpitations, Shortness Of Breath, Swelling, Throat Swelling and Tongue Swelling   • Amoxicillin-Pot Clavulanate    • Decadrol [Dexamethasone] Other (See Comments)     Category: Adverse Reaction;   psychosis   • Dexamethasone Sodium Phosphate Other (See Comments)     psychosis   • Other Throat Swelling     CBD oil    • Penicillins Hives and Other (See Comments)     Category: Allergy; Hives/Uticaria   • Tetracycline Hives and Other (See Comments)     Hives/Uticaria   • Tetracyclines & Related Hives     Category: Allergy;         Physical Exam:   Vitals:    11/08/22 0831   BP: 112/76   Pulse: 88   Resp: 20   Temp: (!) 97 1 °F (36 2 °C)   SpO2: 100%     General: Awake, Alert, Oriented x 3, Mood and affect appropriate  Respiratory: Respirations even and unlabored  Cardiovascular: Peripheral pulses intact; no edema  Musculoskeletal Exam:  Bilateral lumbar paraspinals tender to palpation    ASA Score: 2    Patient/Chart Verification  Patient ID Verified: Verbal  ID Band Applied: No  Consents Confirmed: Procedural, To be obtained in the Pre-Procedure area  Interval H&P(within 24 hr) Complete (required for Outpatients and Surgery Admit only): To be obtained in the Pre-Procedure area  Allergies Reviewed: Yes  Anticoag/NSAID held?: NA  Currently on antibiotics?: No  Pregnancy denied?: Yes    Assessment:   1   Lumbar radiculopathy        Plan: B/L L5 TFESI

## 2022-11-08 NOTE — DISCHARGE INSTRUCTIONS
Epidural Steroid Injection   WHAT YOU NEED TO KNOW:   An epidural steroid injection ([place]) is a procedure to inject steroid medicine into the epidural space  The epidural space is between your spinal cord and vertebrae  Steroids reduce inflammation and fluid buildup in your spine that may be causing pain  You may be given pain medicine along with the steroids  ACTIVITY  Do not drive or operate machinery today  No strenuous activity today - bending, lifting, etc   You may resume normal activites starting tomorrow - start slowly and as tolerated  You may shower today, but no tub baths or hot tubs  You may have numbness for several hours from the local anesthetic  Please use caution and common sense, especially with weight-bearing activities  CARE OF THE INJECTION SITE  If you have soreness or pain, apply ice to the area today (20 minutes on/20 minutes off)  Starting tomorrow, you may use warm, moist heat or ice if needed  You may have an increase or change in your discomfort for 36-48 hours after your treatment  Apply ice and continue with any pain medication you have been prescribed  Notify the [facility] if you have any of the following: redness, drainage, swelling, headache, stiff neck or fever above 100°F     SPECIAL INSTRUCTIONS  Our office will contact you in approximately 7 days for a progress report  MEDICATIONS  Continue to take all routine medications  Our office may have instructed you to hold some medications  As no general anesthesia was used in today's procedure, you should not experience any side effects related to anesthesia  If you are diabetic, the steroids used in today's injection may temporarily increase your blood sugar levels after the first few days after your injection  Please keep a close eye on your sugars and alert the doctor who manages your diabetes if your sugars are significantly high from your baseline or you are symptomatic       If you have a problem specifically related to your procedure, please call our office at [phone]  Problems not related to your procedure should be directed to your primary care physician

## 2022-11-09 ENCOUNTER — OFFICE VISIT (OUTPATIENT)
Dept: OBGYN CLINIC | Facility: CLINIC | Age: 42
End: 2022-11-09

## 2022-11-09 VITALS — SYSTOLIC BLOOD PRESSURE: 124 MMHG | DIASTOLIC BLOOD PRESSURE: 90 MMHG | BODY MASS INDEX: 34.76 KG/M2 | WEIGHT: 178 LBS

## 2022-11-09 DIAGNOSIS — N93.9 ABNORMAL UTERINE BLEEDING (AUB): Primary | ICD-10-CM

## 2022-11-09 RX ORDER — MEGESTROL ACETATE 40 MG/1
40 TABLET ORAL 3 TIMES DAILY
Qty: 15 TABLET | Refills: 0 | Status: SHIPPED | OUTPATIENT
Start: 2022-11-09 | End: 2022-11-14

## 2022-11-09 NOTE — PROGRESS NOTES
Endometrial biopsy    Date/Time: 11/9/2022 2:15 PM  Performed by: ADELAIDA Cardenas  Authorized by: ADELAIDA Cardenas   Universal Protocol:  Consent: Verbal consent obtained  Risks and benefits: risks, benefits and alternatives were discussed  Consent given by: patient  Timeout called at: 11/9/2022 2:15 PM   Patient understanding: patient states understanding of the procedure being performed  Patient identity confirmed: verbally with patient      Indication:     Indications: Other disorder of menstruation and other abnormal bleeding from female genital tract    Procedure:     Procedure: endometrial biopsy with Pipelle      A bivalve speculum was placed in the vagina: yes      Cervix cleaned and prepped: yes      The cervix was dilated: yes      Uterus sounded: yes      Uterus sound depth (cm):  7    Specimen collected: specimen collected and sent to pathology    Findings:     Uterus size:  <6 weeks    Cervix: normal      Adnexa: normal    Comments:     Procedure comments:  Endometrial biopsy:  Cervix prepped with Betadine   Anterior lip cervix grasped with forceps   A Pipelle was inserted into the endometrial cavity   Uterus sounded to 7 cm   The Pipelle was removed in a circumferential manner obtaining a sample from all 4 quadrants   Patient tolerated procedure well

## 2022-11-09 NOTE — PROGRESS NOTES
Lilliana Strickland 40-year-old nulliparous new patient who presents with complaint of bleeding since February 11th  The bleeding has been daily - most days it is heavy (8 to 9 pads per day)  There are a few days where it gets lighter  She experiences cramps, bloating, back pain  She saw her prior GYN in April and was offered the option of an IUD, no exam was performed  Patient has had two IUD's in the past - one perforated her uterine wall and the 2nd IUD caused her to experience intense pain and it had to be removed  So an IUD is not an optimal option for her  She was on Depo-provera and her last dose was in April  She had been on the Depo-provera for 9 months prior to discontinuing  She states she was administered Depo-Provera 150mg monthly for 6 months between August and January 2021 to "help with shedding uterine cyst"  Her following dose was then in April  No additional dose administered  Last CBC was 5/24/22 which was     No LMP recorded  ROS:  As indicated in HPI  All other ROS negative  Physical Exam  Constitutional:       Appearance: Normal appearance  Genitourinary:      Right Labia: No rash, tenderness, lesions, skin changes or Bartholin's cyst      Left Labia: No tenderness, lesions, skin changes, Bartholin's cyst or rash  No labial fusion noted  Vaginal bleeding (there is small amount of blood noted in the vaginal vault ) present  No vaginal prolapse present  No vaginal atrophy present  Right Adnexa: not tender, not full, no mass present and not absent  Left Adnexa: not tender, not full, no mass present and not absent  Cervix is nulliparous  No cervical friability, lesion or nabothian cyst       Uterus is not enlarged, tender or prolapsed  Uterus is anteverted  Pelvic exam was performed with patient in the lithotomy position  HENT:      Head: Normocephalic and atraumatic  Musculoskeletal:      Cervical back: Neck supple     Neurological: Mental Status: She is alert  Skin:     General: Skin is warm  Psychiatric:         Behavior: Behavior is cooperative  Vitals and nursing note reviewed  Jeannie Hoffmann was seen today for menorrhagia  Diagnoses and all orders for this visit:    Abnormal uterine bleeding (AUB)  -     CBC and differential  -     US pelvis complete w transvaginal; Future  -     megestrol (MEGACE) 40 mg tablet; Take 1 tablet (40 mg total) by mouth 3 (three) times a day for 5 days  -     Tissue Exam  -     Endometrial biopsy      See procedure note for EBX  She will make arrangements for her pelvic US  Megace prescribed for 5 days  She is to keep me posted on her bleeding pattern  Results will be released to [facility], if abnormal will call to review and discuss treatment plan

## 2022-11-11 ENCOUNTER — TELEMEDICINE (OUTPATIENT)
Dept: BEHAVIORAL/MENTAL HEALTH CLINIC | Facility: CLINIC | Age: 42
End: 2022-11-11

## 2022-11-11 DIAGNOSIS — F33.1 MAJOR DEPRESSIVE DISORDER, RECURRENT EPISODE, MODERATE (HCC): Primary | ICD-10-CM

## 2022-11-11 DIAGNOSIS — F41.1 GENERALIZED ANXIETY DISORDER: ICD-10-CM

## 2022-11-11 LAB
BASOPHILS # BLD AUTO: 54 CELLS/UL (ref 0–200)
BASOPHILS NFR BLD AUTO: 0.4 %
EOSINOPHIL # BLD AUTO: 68 CELLS/UL (ref 15–500)
EOSINOPHIL NFR BLD AUTO: 0.5 %
ERYTHROCYTE [DISTWIDTH] IN BLOOD BY AUTOMATED COUNT: 12.3 % (ref 11–15)
HCT VFR BLD AUTO: 38.9 % (ref 35–45)
HGB BLD-MCNC: 13.1 G/DL (ref 11.7–15.5)
LYMPHOCYTES # BLD AUTO: 4415 CELLS/UL (ref 850–3900)
LYMPHOCYTES NFR BLD AUTO: 32.7 %
MCH RBC QN AUTO: 29.2 PG (ref 27–33)
MCHC RBC AUTO-ENTMCNC: 33.7 G/DL (ref 32–36)
MCV RBC AUTO: 86.8 FL (ref 80–100)
MONOCYTES # BLD AUTO: 824 CELLS/UL (ref 200–950)
MONOCYTES NFR BLD AUTO: 6.1 %
NEUTROPHILS # BLD AUTO: 8141 CELLS/UL (ref 1500–7800)
NEUTROPHILS NFR BLD AUTO: 60.3 %
PLATELET # BLD AUTO: 304 THOUSAND/UL (ref 140–400)
PMV BLD REES-ECKER: 9.2 FL (ref 7.5–12.5)
RBC # BLD AUTO: 4.48 MILLION/UL (ref 3.8–5.1)
WBC # BLD AUTO: 13.5 THOUSAND/UL (ref 3.8–10.8)

## 2022-11-11 NOTE — PSYCH
Virtual Regular Visit    Verification of patient location:    Patient is located in the following state in which I hold an active license [place]      Assessment/Plan:    Problem List Items Addressed This Visit        Other    Major depressive disorder, recurrent episode, moderate ([facility] [address] ) - Primary    Generalized anxiety disorder          Goals addressed in session: Goal 1          Reason for visit is   Chief Complaint   Patient presents with   • Virtual Regular Visit        Encounter provider HERMELINDA Romero    Provider located at [address] [place] [id number] [phone]      Recent Visits  No visits were found meeting these conditions  Showing recent visits within past 7 days and meeting all other requirements  Future Appointments  No visits were found meeting these conditions  Showing future appointments within next 150 days and meeting all other requirements       The patient was identified by name and date of birth  Marilyn Cosme was informed that this is a telemedicine visit and that the visit is being conducted throughthe [employer] platform  She agrees to proceed     My office door was closed  No one else was in the room  She acknowledged consent and understanding of privacy and security of the video platform  The patient has agreed to participate and understands they can discontinue the visit at any time  Patient is aware this is a billable service  Subjective  Marilyn Cosme is a 38 y o  female presenting for follow up  Joi Hernandez shared that she has been doing fairly well lately, using journaling daily to help her sort through thoughts and issues in her life, and to figure out what she wants for herself    She has put her relationship with Ayden Alcides in the past (romantically), realizing that he needs to deal with his own issues and health right now, and she feels like it has been a good decision to move forward  She talked about another old friend that she has reconnected with and is excited to see, and said that she is not going to put expectations on the relationship and will just see where it goes  She is spending more time walkong with her mom, and has enjoyed that time with her as well as the activity  She said that overall she feels that she is managing fairly well, although still has up and down days  Encouraged Margaret Aaron to recognize that life has ups and downs, and to cope with the downs as they come, reminding herself of her successes in coming to where she is now  A: Margaret Aaron presented as calm and generally euthymic, with a bright affect, good eye contact, and normal behavior  Her concentration was intact, thought process logical and organize d Insight and judgment intact  Denies SI HI and psychosis  Good progress toward goals   Angelo Marie will work on utilizing coping strategies as needed, and will return in two weeks for follow up as scheduled    HPI     Past Medical History:   Diagnosis Date   • Anxiety    • Chronic sinusitis    • Depression    • Diabetes ([facility] [address] )    • Fibromyalgia    • Migraine    • Obesity    • Polyarthritis     Last assessed 9/21/2015   • Psychiatric disorder        Past Surgical History:   Procedure Laterality Date   • COLONOSCOPY  06/2019   • DENTAL SURGERY     • HYSTEROSCOPY      Endometrial Biopsy By Hysteroscopy   • REMOVAL OF INTRAUTERINE DEVICE (IUD)     • US GUIDED BREAST BIOPSY RIGHT COMPLETE Right 6/17/2019       Current Outpatient Medications   Medication Sig Dispense Refill   • Blood Glucose Monitoring Suppl (Contour Blood Glucose System) w/Device KIT Use 1 kit 2 (two) times a day before meals 1 kit 0   • Blood Glucose Monitoring Suppl (OneTouch Verio Flex System) w/Device KIT Use 2 (two) times a day 1 kit 0   • Calcium Carbonate-Vitamin D (CALCIUM 500/D PO) Take 1 capsule by mouth daily     • celecoxib (CeleBREX) 200 mg capsule TAKE 1 CAPSULE BY MOUTH TWICE A DAY 60 capsule 6   • Cholecalciferol (VITAMIN D-3) 1000 units CAPS Take 1 capsule by mouth daily     • cyclobenzaprine (FLEXERIL) 10 mg tablet TAKE 2 TABLETS (20 MG TOTAL) BY MOUTH DAILY AT BEDTIME AS NEEDED FOR MUSCLE SPASMS 60 tablet 6   • cyclobenzaprine (FLEXERIL) 5 mg tablet Take 1 tablet (5 mg total) by mouth 3 (three) times a day as needed for muscle spasms 30 tablet 0   • desvenlafaxine (PRISTIQ) 100 mg 24 hr tablet TAKE 1 TABLET BY MOUTH EVERY DAY 30 tablet 2   • desvenlafaxine succinate (PRISTIQ) 50 mg 24 hr tablet TAKE 1 TABLET (50 MG TOTAL) BY MOUTH DAILY TOTAL DAILY DOSE 150 MG DAILY 30 tablet 2   • EPINEPHrine (EPIPEN) 0 3 mg/0 3 mL SOAJ Inject 0 3 mL (0 3 mg total) into a muscle once for 1 dose 0 6 mL 0   • hydrOXYzine HCL (ATARAX) 50 mg tablet TAKE 1 TABLET (50 MG TOTAL) BY MOUTH DAILY AT BEDTIME AS NEEDED (INSOMNIA) 30 tablet 2   • IRON PO Take by mouth     • lidocaine (LIDODERM) 5 % APPLY 1 PATCH TOPICALLY IN THE MORNING  REMOVE & DISCARD PATCH WITHIN 12 HOURS OR AS DIRECTED BY MD  30 patch 1   • MAGNESIUM OXIDE PO Take by mouth     • megestrol (MEGACE) 40 mg tablet Take 1 tablet (40 mg total) by mouth 3 (three) times a day for 5 days 15 tablet 0   • metFORMIN (GLUCOPHAGE) 500 mg tablet Take 1 tablet (500 mg total) by mouth 2 (two) times a day with meals 60 tablet 5   • methylPREDNISolone 4 MG tablet therapy pack Use as directed on package (Patient not taking: Reported on 11/9/2022) 1 each 0   • nortriptyline (PAMELOR) 75 MG capsule TAKE 1 CAPSULE BY MOUTH TWICE A  capsule 0   • propranolol (INDERAL) 20 mg tablet TAKE 1 TABLET BY MOUTH EVERY DAY AT NIGHT 30 tablet 5     No current facility-administered medications for this visit          Allergies   Allergen Reactions   • Cannabidiol Anxiety, Confusion, Hypertension, Itching, Palpitations, Shortness Of Breath, Swelling, Throat Swelling and Tongue Swelling   • Amoxicillin-Pot Clavulanate    • Decadrol [Dexamethasone] Other (See Comments) Category: Adverse Reaction;   psychosis   • Dexamethasone Sodium Phosphate Other (See Comments)     psychosis   • Other Throat Swelling     CBD oil    • Penicillins Hives and Other (See Comments)     Category: Allergy; Hives/Uticaria   • Tetracycline Hives and Other (See Comments)     Hives/Uticaria   • Tetracyclines & Related Hives     Category: Allergy; Review of Systems    Video Exam    There were no vitals filed for this visit      Physical Exam     Visit Time    Visit Start Time: 1831  Visit Stop Time: 1494  Total Visit Duration: 32 minutes

## 2022-11-14 ENCOUNTER — HOSPITAL ENCOUNTER (OUTPATIENT)
Dept: ULTRASOUND IMAGING | Facility: HOSPITAL | Age: 42
Discharge: HOME | End: 2022-11-14

## 2022-11-14 DIAGNOSIS — N93.9 ABNORMAL UTERINE BLEEDING (AUB): ICD-10-CM

## 2022-11-15 ENCOUNTER — TELEPHONE (OUTPATIENT)
Dept: PAIN MEDICINE | Facility: CLINIC | Age: 42
End: 2022-11-15

## 2022-11-16 NOTE — TELEPHONE ENCOUNTER
Caller: Janie Hamilton  Doctor/office: Carrie Jimenez  #: [phone]    % of improvement: 70  Pain Scale (1-10): 3

## 2022-11-19 DIAGNOSIS — F51.04 PSYCHOPHYSIOLOGICAL INSOMNIA: ICD-10-CM

## 2022-11-19 DIAGNOSIS — F41.1 GENERALIZED ANXIETY DISORDER: ICD-10-CM

## 2022-11-19 DIAGNOSIS — F33.1 MAJOR DEPRESSIVE DISORDER, RECURRENT EPISODE, MODERATE (HCC): ICD-10-CM

## 2022-11-19 DIAGNOSIS — M25.50 ARTHRALGIA OF MULTIPLE JOINTS: ICD-10-CM

## 2022-11-19 RX ORDER — CELECOXIB 200 MG/1
CAPSULE ORAL
Qty: 60 CAPSULE | Refills: 6 | Status: SHIPPED | OUTPATIENT
Start: 2022-11-19

## 2022-11-20 DIAGNOSIS — M54.50 ACUTE LOW BACK PAIN: ICD-10-CM

## 2022-11-20 DIAGNOSIS — M79.7 FIBROMYALGIA: ICD-10-CM

## 2022-11-20 RX ORDER — LIDOCAINE 50 MG/G
PATCH TOPICAL
Qty: 30 PATCH | Refills: 1 | Status: SHIPPED | OUTPATIENT
Start: 2022-11-20

## 2022-11-21 RX ORDER — HYDROXYZINE 50 MG/1
50 TABLET, FILM COATED ORAL
Qty: 30 TABLET | Refills: 2 | Status: SHIPPED | OUTPATIENT
Start: 2022-11-21

## 2022-11-21 RX ORDER — NORTRIPTYLINE HYDROCHLORIDE 75 MG/1
CAPSULE ORAL
Qty: 180 CAPSULE | Refills: 0 | Status: SHIPPED | OUTPATIENT
Start: 2022-11-21

## 2022-11-21 RX ORDER — DESVENLAFAXINE 100 MG/1
TABLET, EXTENDED RELEASE ORAL
Qty: 30 TABLET | Refills: 2 | Status: SHIPPED | OUTPATIENT
Start: 2022-11-21

## 2022-11-21 RX ORDER — DESVENLAFAXINE 50 MG/1
50 TABLET, EXTENDED RELEASE ORAL DAILY
Qty: 30 TABLET | Refills: 2 | Status: SHIPPED | OUTPATIENT
Start: 2022-11-21

## 2022-11-23 ENCOUNTER — TELEMEDICINE (OUTPATIENT)
Dept: BEHAVIORAL/MENTAL HEALTH CLINIC | Facility: CLINIC | Age: 42
End: 2022-11-23

## 2022-11-23 DIAGNOSIS — F41.1 GENERALIZED ANXIETY DISORDER: ICD-10-CM

## 2022-11-23 DIAGNOSIS — F33.1 MAJOR DEPRESSIVE DISORDER, RECURRENT EPISODE, MODERATE (HCC): Primary | ICD-10-CM

## 2022-11-23 NOTE — PSYCH
Virtual Regular Visit    Verification of patient location:    Patient is located in the following state in which I hold an active license [place]      Assessment/Plan:    Problem List Items Addressed This Visit        Other    Major depressive disorder, recurrent episode, moderate ([address] ) - Primary    Generalized anxiety disorder       Goals addressed in session: Goal 1          Reason for visit is   Chief Complaint   Patient presents with   • Virtual Regular Visit        Encounter provider HERMELINDA Jimenez    Provider located at [address] [place] [id number] [phone]      Recent Visits  No visits were found meeting these conditions  Showing recent visits within past 7 days and meeting all other requirements  Today's Visits  Date Type Provider Dept   11/23/22 Telemedicine HERMELINDA [facility] Therapist [facility]   Showing today's visits and meeting all other requirements  Future Appointments  No visits were found meeting these conditions  Showing future appointments within next 150 days and meeting all other requirements       The patient was identified by name and date of birth  Shayy Pepe was informed that this is a telemedicine visit and that the [facility]  She agrees to proceed     My office door was closed  No one else was in the room  She acknowledged consent and understanding of privacy and security of the video platform  The patient has agreed to participate and understands they can discontinue the visit at any time  Patient is aware this is a billable service  Subjective  Shayy Pepe is a 38 y o  female presenting for follow up  Elva Stewart shared that she is "in a weird space emotionally and mentally" lately, relating it to issues with her brother, a breakup and some physical health issues    She said that she feels down, somewhat disorganized and unable to focus, and not quite herself right now  She continues to meet friends and go out socially, and is in the process of moving to the upstairs apartment in her parents' home, so she is trying to fill her time with things to keep her busy  Provided supportive therapy, encouraged Esther to focus on self-care and prioritizing her needs, including rest, and to focus on things that she is looking forward to  A: Ian Chowdhury presented as mildly depressed today, with a mood-congruent affect, good eye contact and calm behavior  Her speech was somewhat soft in volume, but normal rate and fluency  Concentration was intact, thought process logical and organized  Insight and judgment good  Denies SI HI and psychosis  Some mild progress toward goals  Jose Rawls will continue to stay connected socially, will walk and be active when possible to help boost mood, and will return in two weeks for follow up as scheduled        HPI     Past Medical History:   Diagnosis Date   • Anxiety    • Chronic sinusitis    • Depression    • Diabetes ([facility] [address] )    • Fibromyalgia    • Migraine    • Obesity    • Polyarthritis     Last assessed 9/21/2015   • Psychiatric disorder        Past Surgical History:   Procedure Laterality Date   • COLONOSCOPY  06/2019   • DENTAL SURGERY     • HYSTEROSCOPY      Endometrial Biopsy By Hysteroscopy   • REMOVAL OF INTRAUTERINE DEVICE (IUD)     • US GUIDED BREAST BIOPSY RIGHT COMPLETE Right 6/17/2019       Current Outpatient Medications   Medication Sig Dispense Refill   • Blood Glucose Monitoring Suppl (Contour Blood Glucose System) w/Device KIT Use 1 kit 2 (two) times a day before meals 1 kit 0   • Blood Glucose Monitoring Suppl (OneTouch Verio Flex System) w/Device KIT Use 2 (two) times a day 1 kit 0   • Calcium Carbonate-Vitamin D (CALCIUM 500/D PO) Take 1 capsule by mouth daily     • celecoxib (CeleBREX) 200 mg capsule TAKE 1 CAPSULE BY MOUTH TWICE A DAY 60 capsule 6   • Cholecalciferol (VITAMIN D-3) 1000 units CAPS Take 1 capsule by mouth daily     • cyclobenzaprine (FLEXERIL) 10 mg tablet TAKE 2 TABLETS (20 MG TOTAL) BY MOUTH DAILY AT BEDTIME AS NEEDED FOR MUSCLE SPASMS 60 tablet 6   • cyclobenzaprine (FLEXERIL) 5 mg tablet Take 1 tablet (5 mg total) by mouth 3 (three) times a day as needed for muscle spasms 30 tablet 0   • desvenlafaxine (PRISTIQ) 100 mg 24 hr tablet TAKE 1 TABLET BY MOUTH EVERY DAY 30 tablet 2   • desvenlafaxine succinate (PRISTIQ) 50 mg 24 hr tablet TAKE 1 TABLET (50 MG TOTAL) BY MOUTH DAILY TOTAL DAILY DOSE 150 MG DAILY 30 tablet 2   • EPINEPHrine (EPIPEN) 0 3 mg/0 3 mL SOAJ Inject 0 3 mL (0 3 mg total) into a muscle once for 1 dose 0 6 mL 0   • hydrOXYzine HCL (ATARAX) 50 mg tablet TAKE 1 TABLET (50 MG TOTAL) BY MOUTH DAILY AT BEDTIME AS NEEDED (INSOMNIA) 30 tablet 2   • IRON PO Take by mouth     • lidocaine (LIDODERM) 5 % APPLY 1 PATCH TOPICALLY IN THE MORNING  REMOVE & DISCARD PATCH WITHIN 12 HOURS OR AS DIRECTED BY MD  30 patch 1   • MAGNESIUM OXIDE PO Take by mouth     • megestrol (MEGACE) 40 mg tablet Take 1 tablet (40 mg total) by mouth 3 (three) times a day for 5 days 15 tablet 0   • metFORMIN (GLUCOPHAGE) 500 mg tablet Take 1 tablet (500 mg total) by mouth 2 (two) times a day with meals 60 tablet 5   • methylPREDNISolone 4 MG tablet therapy pack Use as directed on package (Patient not taking: Reported on 11/9/2022) 1 each 0   • nortriptyline (PAMELOR) 75 MG capsule TAKE 1 CAPSULE BY MOUTH TWICE A  capsule 0   • propranolol (INDERAL) 20 mg tablet TAKE 1 TABLET BY MOUTH EVERY DAY AT NIGHT 30 tablet 5     No current facility-administered medications for this visit  Allergies   Allergen Reactions   • Cannabidiol Anxiety, Confusion, Hypertension, Itching, Palpitations, Shortness Of Breath, Swelling, Throat Swelling and Tongue Swelling   • Amoxicillin-Pot Clavulanate    • Decadrol [Dexamethasone] Other (See Comments)     Category:  Adverse Reaction;   psychosis   • Dexamethasone Sodium Phosphate Other (See Comments)     psychosis   • Other Throat Swelling     CBD oil    • Penicillins Hives and Other (See Comments)     Category: Allergy; Hives/Uticaria   • Tetracycline Hives and Other (See Comments)     Hives/Uticaria   • Tetracyclines & Related Hives     Category: Allergy; Review of Systems    Video Exam    There were no vitals filed for this visit      Physical Exam     Visit Time    Visit Start Time: 698  Visit Stop Time: 538   Total Visit Duration: 39 minutes

## 2022-11-29 ENCOUNTER — OFFICE VISIT (OUTPATIENT)
Dept: OBGYN CLINIC | Facility: CLINIC | Age: 42
End: 2022-11-29

## 2022-11-29 VITALS
WEIGHT: 182 LBS | BODY MASS INDEX: 35.73 KG/M2 | SYSTOLIC BLOOD PRESSURE: 142 MMHG | DIASTOLIC BLOOD PRESSURE: 88 MMHG | HEIGHT: 60 IN

## 2022-11-29 DIAGNOSIS — N93.9 ABNORMAL UTERINE BLEEDING (AUB): ICD-10-CM

## 2022-11-29 DIAGNOSIS — N84.0 ENDOMETRIAL POLYP: Primary | ICD-10-CM

## 2022-11-29 DIAGNOSIS — Z30.09 CONSULTATION FOR FEMALE STERILIZATION: ICD-10-CM

## 2022-11-29 NOTE — PROGRESS NOTES
Varinder Rodriguez 38year-old here for review of pelvic us results and endometrial biopsy that was done due to complaint of  AUB since February  EBX results endometrial hyperplasia without atypia, involving fragments of endometrial polyp  Pelvic US shows an endometrial lining of 10 mm - endometrial hyperplasia or polyps  There is a left ovarian with cyst within a cyst and paraovarian cyst      She states she is certain that is she will not have any children and is interested in a consult for permanent sterilization  Will provide patient with referral for consult for sterilization and for further evaluation of endometrial polyp with hysteroscope  She can also discuss the option of a uterine ablation during her consult visit  All questions and concerns addressed and patient verbalized understanding  Patient's last menstrual period was 02/12/2022 (exact date)  ROS:  As indicated in HPI  All other ROS negative  Shalom Gr was seen today for results  Diagnoses and all orders for this visit:    Endometrial polyp  -     Ambulatory Referral to Gynecology; Future    Abnormal uterine bleeding (AUB)  -     Ambulatory Referral to Gynecology; Future    Consultation for female sterilization  -     Ambulatory Referral to Gynecology; Future      Call to schedule consult appt  Follow-up as needed

## 2022-12-01 LAB
ALBUMIN SERPL-MCNC: 4.2 G/DL (ref 3.6–5.1)
ALBUMIN/CREAT UR: 65 MCG/MG CREAT
ALBUMIN/GLOB SERPL: 2 (CALC) (ref 1–2.5)
ALP SERPL-CCNC: 55 U/L (ref 31–125)
ALT SERPL-CCNC: 21 U/L (ref 6–29)
AST SERPL-CCNC: 18 U/L (ref 10–30)
BILIRUB SERPL-MCNC: 0.5 MG/DL (ref 0.2–1.2)
BUN SERPL-MCNC: 14 MG/DL (ref 7–25)
BUN/CREAT SERPL: ABNORMAL (CALC) (ref 6–22)
CALCIUM SERPL-MCNC: 9.2 MG/DL (ref 8.6–10.2)
CHLORIDE SERPL-SCNC: 101 MMOL/L (ref 98–110)
CHOLEST SERPL-MCNC: 180 MG/DL
CHOLEST/HDLC SERPL: 4.5 (CALC)
CO2 SERPL-SCNC: 25 MMOL/L (ref 20–32)
CREAT SERPL-MCNC: 0.89 MG/DL (ref 0.5–0.99)
CREAT UR-MCNC: 82 MG/DL (ref 20–275)
GFR/BSA.PRED SERPLBLD CYS-BASED-ARV: 82 ML/MIN/1.73M2
GLOBULIN SER CALC-MCNC: 2.1 G/DL (CALC) (ref 1.9–3.7)
GLUCOSE SERPL-MCNC: 164 MG/DL (ref 65–99)
HBA1C MFR BLD: 6.6 % OF TOTAL HGB
HDLC SERPL-MCNC: 40 MG/DL
LDLC SERPL CALC-MCNC: 116 MG/DL (CALC)
MICROALBUMIN UR-MCNC: 5.3 MG/DL
NONHDLC SERPL-MCNC: 140 MG/DL (CALC)
POTASSIUM SERPL-SCNC: 4.3 MMOL/L (ref 3.5–5.3)
PROT SERPL-MCNC: 6.3 G/DL (ref 6.1–8.1)
SODIUM SERPL-SCNC: 136 MMOL/L (ref 135–146)
TRIGL SERPL-MCNC: 127 MG/DL

## 2022-12-07 ENCOUNTER — TELEPHONE (OUTPATIENT)
Dept: PSYCHIATRY | Facility: CLINIC | Age: 42
End: 2022-12-07

## 2022-12-07 ENCOUNTER — TELEMEDICINE (OUTPATIENT)
Dept: PSYCHIATRY | Facility: CLINIC | Age: 42
End: 2022-12-07

## 2022-12-07 DIAGNOSIS — F33.1 MAJOR DEPRESSIVE DISORDER, RECURRENT EPISODE, MODERATE (HCC): Primary | ICD-10-CM

## 2022-12-07 DIAGNOSIS — F33.2 SEVERE RECURRENT MAJOR DEPRESSION WITHOUT PSYCHOTIC FEATURES (HCC): ICD-10-CM

## 2022-12-07 DIAGNOSIS — F41.1 GENERALIZED ANXIETY DISORDER: ICD-10-CM

## 2022-12-07 DIAGNOSIS — G43.709 CHRONIC MIGRAINE WITHOUT AURA WITHOUT STATUS MIGRAINOSUS, NOT INTRACTABLE: ICD-10-CM

## 2022-12-07 RX ORDER — PROPRANOLOL HYDROCHLORIDE 20 MG/1
TABLET ORAL
Qty: 30 TABLET | Refills: 11 | Status: SHIPPED | OUTPATIENT
Start: 2022-12-07

## 2022-12-07 NOTE — TELEPHONE ENCOUNTER
LVM  Last seen 12/7/22  Pt needs a 3 month f/u with Dr Jeanmarie Aceves  Previous appt was virtual  Please schedule

## 2022-12-07 NOTE — PSYCH
Virtual Regular Visit    Verification of patient location:    Patient is located in the following state in which I hold an active license [place]      Assessment/Plan:    Problem List Items Addressed This Visit        Other    Generalized anxiety disorder    RESOLVED: Major depressive disorder, recurrent episode, moderate ([facility] [address] ) - Primary    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 psychotic features ([facility] [address] )                Reason for visit is   Chief Complaint   Patient presents with   • Virtual Regular Visit        Encounter provider Roberto Gonzalez MD    Provider located at [address] [place] [id number] [phone]      Recent Visits  No visits were found meeting these conditions  Showing recent visits within past 7 days and meeting all other requirements  Future Appointments  No visits were found meeting these conditions  Showing future appointments within next 150 days and meeting all other requirements       The patient was identified by name and date of birth  Lety Alvarado was informed that this is a telemedicine visit and that the visit is being conducted throughthe [employer]  She agrees to proceed     My office door was closed  No one else was in the room  She acknowledged consent and understanding of privacy and security of the video platform  The patient has agreed to participate and understands they can discontinue the visit at any time  Patient is aware this is a billable service  Subjective  Lety Alvarado is a 38 y o  female with MDD and CAROL   Patient remains compliant with medications and denies side effects  She stated in the past 3 months she was diagnosed with type 2 diabetes and has been taking Metformin and working on her diet and has made significant improvements on her Hg A1C  No new treatments for fibromyalgia   She stated her mood has been stable and denies feeling depressed or anxious  She agrees to continue current treatment as prescribed  Will schedule follow up in 3 months or sooner if needed  She has enough medication supplies        HPI     Past Medical History:   Diagnosis Date   • Anxiety    • Chronic sinusitis    • Depression    • Diabetes ([address] )    • Fibromyalgia    • Migraine    • Obesity    • Polyarthritis     Last assessed 9/21/2015   • Psychiatric disorder        Past Surgical History:   Procedure Laterality Date   • COLONOSCOPY  06/2019   • DENTAL SURGERY     • HYSTEROSCOPY      Endometrial Biopsy By Hysteroscopy   • REMOVAL OF INTRAUTERINE DEVICE (IUD)     • US GUIDED BREAST BIOPSY RIGHT COMPLETE Right 6/17/2019       Current Outpatient Medications   Medication Sig Dispense Refill   • Blood Glucose Monitoring Suppl (Contour Blood Glucose System) w/Device KIT Use 1 kit 2 (two) times a day before meals 1 kit 0   • Blood Glucose Monitoring Suppl (OneTouch Verio Flex System) w/Device KIT Use 2 (two) times a day 1 kit 0   • celecoxib (CeleBREX) 200 mg capsule TAKE 1 CAPSULE BY MOUTH TWICE A DAY 60 capsule 6   • Cholecalciferol (VITAMIN D-3) 1000 units CAPS Take 1 capsule by mouth daily     • cyclobenzaprine (FLEXERIL) 10 mg tablet TAKE 2 TABLETS (20 MG TOTAL) BY MOUTH DAILY AT BEDTIME AS NEEDED FOR MUSCLE SPASMS 60 tablet 6   • desvenlafaxine (PRISTIQ) 100 mg 24 hr tablet TAKE 1 TABLET BY MOUTH EVERY DAY 30 tablet 2   • desvenlafaxine succinate (PRISTIQ) 50 mg 24 hr tablet TAKE 1 TABLET (50 MG TOTAL) BY MOUTH DAILY TOTAL DAILY DOSE 150 MG DAILY 30 tablet 2   • EPINEPHrine (EPIPEN) 0 3 mg/0 3 mL SOAJ Inject 0 3 mL (0 3 mg total) into a muscle once for 1 dose 0 6 mL 0   • hydrOXYzine HCL (ATARAX) 50 mg tablet TAKE 1 TABLET (50 MG TOTAL) BY MOUTH DAILY AT BEDTIME AS NEEDED (INSOMNIA) 30 tablet 2   • IRON PO Take by mouth     • lidocaine (LIDODERM) 5 % APPLY 1 PATCH TOPICALLY IN THE MORNING  REMOVE & DISCARD PATCH WITHIN 12 HOURS OR AS DIRECTED BY MD  30 patch 1   • MAGNESIUM OXIDE PO Take by mouth     • megestrol (MEGACE) 40 mg tablet Take 1 tablet (40 mg total) by mouth 3 (three) times a day for 5 days 15 tablet 0   • metFORMIN (GLUCOPHAGE) 500 mg tablet Take 1 tablet (500 mg total) by mouth 2 (two) times a day with meals 60 tablet 5   • nortriptyline (PAMELOR) 75 MG capsule TAKE 1 CAPSULE BY MOUTH TWICE A  capsule 0   • propranolol (INDERAL) 20 mg tablet TAKE 1 TABLET BY MOUTH EVERY DAY AT NIGHT 30 tablet 11     No current facility-administered medications for this visit  Allergies   Allergen Reactions   • Cannabidiol Anxiety, Confusion, Hypertension, Itching, Palpitations, Shortness Of Breath, Swelling, Throat Swelling and Tongue Swelling   • Amoxicillin-Pot Clavulanate    • Decadrol [Dexamethasone] Other (See Comments)     Category: Adverse Reaction;   psychosis   • Dexamethasone Sodium Phosphate Other (See Comments)     psychosis   • Other Throat Swelling     CBD oil    • Penicillins Hives and Other (See Comments)     Category: Allergy; Hives/Uticaria   • Tetracycline Hives and Other (See Comments)     Hives/Uticaria   • Tetracyclines & Related Hives     Category: Allergy;         Review of Systems    Mood Anxiety and Depression   Behavior Normal    Thought Content Disturbing Thoughts, Feelings   General Emotional Problems and Decreased Functioning   Personality Normal   Other Psych Symptoms Normal   Constitutional Negative   ENT Negative   Cardiovascular Negative   Respiratory Negative   Gastrointestinal Negative   Genitourinary Negative   Musculoskeletal Negative   Integumentary Negative   Neurological Negative   Endocrine Normal    Other Symptoms Normal        Laboratory Results:   Most Recent Labs:   Lab Results   Component Value Date    WBC 13 5 (H) 11/11/2022    RBC 4 48 11/11/2022    HGB 13 1 11/11/2022    HCT 38 9 11/11/2022     11/11/2022    RDW 12 3 11/11/2022    NEUTROABS 8,141 (H) 11/11/2022     08/08/2016    K 4 3 12/01/2022     12/01/2022 CO2 25 12/01/2022    BUN 14 12/01/2022    CREATININE 0 89 12/01/2022    GLUCOSE 109 06/11/2015    CALCIUM 9 2 12/01/2022    AST 18 12/01/2022    ALT 21 12/01/2022    ALKPHOS 55 12/01/2022    PROT 6 5 08/08/2016    BILITOT 0 3 08/08/2016    CHOL 64 12/24/2014    CHOLESTEROL 180 12/01/2022    TRIG 127 12/01/2022    HDL 40 (L) 12/01/2022    LDLCALC 116 (H) 12/01/2022    [place] 147 08/24/2022    VLV2EYGMXNEL 1 500 09/25/2019    FREET4 0 88 03/27/2019    PREGTESTUR negative 05/06/2017    RPR Non-Reactive 03/26/2019       Substance Abuse History:  Social History     Substance and Sexual Activity   Drug Use Not Currently       Family Psychiatric History:   Family History   Problem Relation Age of Onset   • Irregular heart beat Mother    • Diabetes Father    • Kidney disease Father    • Diabetes Maternal Grandmother    • Rheum arthritis Maternal Grandmother    • Melanoma Maternal Grandmother 80   • No Known Problems Maternal Grandfather    • Kidney disease Paternal Grandmother    • Rheum arthritis Paternal Grandmother    • Depression Paternal Grandmother    • Diabetes Paternal Grandfather    • Lung cancer Paternal Grandfather 52   • Substance Abuse Brother    • No Known Problems Brother    • Substance Abuse Maternal Uncle    • Prostate cancer Maternal Uncle 61   • Depression Paternal Aunt    • Alcohol abuse Family    • Stomach cancer Family        The following portions of the patient's history were reviewed and updated as appropriate: allergies, current medications, past family history, past medical history, past social history, past surgical history and problem list     Social History     Socioeconomic History   • Marital status: Single     Spouse name: Not on file   • Number of children: 0   • Years of education: 12 years    • Highest education level: GED or equivalent   Occupational History   • Occupation: unemployed   Tobacco Use   • Smoking status: Never   • Smokeless tobacco: Never   Vaping Use   • Vaping Use: Never used   Substance and Sexual Activity   • Alcohol use: No     Comment: 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    • Drug use: Not Currently   • Sexual activity: Not Currently   Other Topics Concern   • Not on file   Social History Narrative    Caffeine use        What type of home do you live in: Single house    Age of your home: 46 yrs    How long have you been living there: 42 yrs    Type of heat: Baseboard    Type of fuel: Electric    What type of samir is in your bedroom: Carpet    Do you have the following in or near your home:    Air products: Window air conditioning and Ionic air purifier    Pests: Mice    Pets: Cat    Basement: None     Social Determinants of Health     Financial Resource Strain: Not on file   Food Insecurity: Not on file   Transportation Needs: Not on file   Physical Activity: Insufficiently Active   • Days of Exercise per Week: 2 days   • Minutes of Exercise per Session: 60 min   Stress: Not on file   Social Connections: Not on file   Intimate Partner Violence: Not on file   Housing Stability: Not on file     Social History     Social History Narrative    Caffeine use        What type of home do you live in: Single house    Age of your home: 46 yrs    How long have you been living there: 42 yrs    Type of heat: Baseboard    Type of fuel: Electric    What type of samir is in your bedroom: Carpet    Do you have the following in or near your home:    Air products: Window air conditioning and Ionic air purifier    Pests: Mice    Pets: Cat    Basement: None       Objective:       Mental status:  Appearance calm and cooperative , adequate hygiene and grooming and good eye contact    Mood dysphoric   Affect affect was constricted   Speech a normal rate and fluent   Thought Processes coherent/organized and normal thought processes   Hallucinations no hallucinations present    Thought Content no delusions   Abnormal Thoughts somatic preoccupation, no suicidal thoughts and no homicidal thoughts    Orientation  oriented to person and place and time   Remote Memory short term memory intact and long term memory intact   Attention Span concentration intact   Intellect Appears to be of Average Intelligence   Insight Limited insight   Judgement judgment was limited   Muscle Strength n/a   Language no difficulty naming common objects and no difficulty repeating a phrase    Fund of Knowledge displays adequate knowledge of current events, adequate fund of knowledge regarding past history and adequate fund of knowledge regarding vocabulary                Assessment/Plan:       Diagnoses and all orders for this visit:    Major depressive disorder, recurrent episode, moderate (HCC)    Generalized anxiety disorder    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 psychotic features ([facility] [address] )            Treatment Recommendations- Risks Benefits      Immediate Medical/Psychiatric/Psychotherapy Treatments and Any Precautions: continue current treatment     Risks, Benefits And Possible Side Effects Of Medications:  {PSYCH RISK, BENEFITS AND POSSIBLE SIDE EFFECTS (Optional):61484    Controlled Medication Discussion: Discussed with patient Black Box warning on concurrent use of benzodiazepines and opioid medications including sedation, respiratory depression, coma and death  Patient understands the risk of treatment with benzodiazepines in addition to opioids and wants to continue taking those medications  , Discussed with patient the risks of sedation, respiratory depression, impairment of ability to drive and potential for abuse and addiction related to treatment with benzodiazepine medications  The patient understands risk of treatment with benzodiazepine medications, agrees to not drive if feels impaired and agrees to take medications as prescribed   and The patient has been filling controlled prescriptions on time as prescribed to [address] [place] [address] Drive Monitoring program       Psychotherapy Provided: NO          Visit Time    Visit Start Time: 2:30  Visit Stop Time: 3:00  Total Visit Duration: 30 minutes

## 2022-12-09 ENCOUNTER — TELEMEDICINE (OUTPATIENT)
Dept: BEHAVIORAL/MENTAL HEALTH CLINIC | Facility: CLINIC | Age: 42
End: 2022-12-09

## 2022-12-09 DIAGNOSIS — F33.2 SEVERE RECURRENT MAJOR DEPRESSION WITHOUT PSYCHOTIC FEATURES (HCC): Primary | ICD-10-CM

## 2022-12-09 DIAGNOSIS — F41.1 GENERALIZED ANXIETY DISORDER: ICD-10-CM

## 2022-12-09 NOTE — PROGRESS NOTES
Assessment:  1  Lumbar radiculopathy    2  DDD (degenerative disc disease), lumbar        Plan:  1  We can repeat bilateral L5 TFESI as needed  I discussed with the patient that since there has been moderate to significant improvement in the pain symptoms, we will hold off on any repeat injections at this point in time  However, I reviewed with the patient that if their symptoms should return or worsen,  they should call our office to schedule to discuss repeating the injection  #2 patient may continue Celebrex and nortriptyline as prescribed  3  Continue with home exercise program  4  Follow-up on a as needed basis at this time    History of Present Illness: The patient is a 38 y o  female with a history of fibromyalgia last seen on 10/24/22 who presents for a follow up office visit in regards to chronic low back pain that radiates into the buttocks  She denies bowel or bladder incontinence or saddle anesthesia  Patient is status post bilateral L5 TFESI on November 8, 2022 with a 40 to 50% improvement of her pain from the procedure  She has had bilateral SI joint injections without much relief in the past   She has tried and failed gabapentin, pregabalin, duloxetine, numerous muscle relaxants, and is currently taking Celebrex and nortriptyline 75 mg daily as prescribed by rheumatology/neurology with minimal improvement  Overall, she feels that the epidural has improved her function and she continues with manageable pain  She has no new complaints today    Patient rates her pain a 5 out of 10 on the numeric pain rating scale  She constantly is pain in the morning and at night which is described as throbbing and pressure-like    I have personally reviewed and/or updated the patient's past medical history, past surgical history, family history, social history, current medications, allergies, and vital signs today         Review of Systems:    Review of Systems   Respiratory: Negative for shortness of breath  Cardiovascular: Negative for chest pain  Gastrointestinal: Negative for constipation, diarrhea, nausea and vomiting  Musculoskeletal: Positive for gait problem  Negative for arthralgias, joint swelling and myalgias  Skin: Negative for rash  Neurological: Negative for dizziness, seizures and weakness  All other systems reviewed and are negative          Past Medical History:   Diagnosis Date   • Anxiety    • Chronic sinusitis    • Depression    • Diabetes ([facility] [address] )    • Fibromyalgia    • Migraine    • Obesity    • Polyarthritis     Last assessed 9/21/2015   • Psychiatric disorder        Past Surgical History:   Procedure Laterality Date   • COLONOSCOPY  06/2019   • DENTAL SURGERY     • HYSTEROSCOPY      Endometrial Biopsy By Hysteroscopy   • REMOVAL OF INTRAUTERINE DEVICE (IUD)     • US GUIDED BREAST BIOPSY RIGHT COMPLETE Right 6/17/2019       Family History   Problem Relation Age of Onset   • Irregular heart beat Mother    • Diabetes Father    • Kidney disease Father    • Diabetes Maternal Grandmother    • Rheum arthritis Maternal Grandmother    • Melanoma Maternal Grandmother 80   • No Known Problems Maternal Grandfather    • Kidney disease Paternal Grandmother    • Rheum arthritis Paternal Grandmother    • Depression Paternal Grandmother    • Diabetes Paternal Grandfather    • Lung cancer Paternal Grandfather 52   • Substance Abuse Brother    • No Known Problems Brother    • Substance Abuse Maternal Uncle    • Prostate cancer Maternal Uncle 61   • Depression Paternal Aunt    • Alcohol abuse Family    • Stomach cancer Family        Social History     Occupational History   • Occupation: unemployed   Tobacco Use   • Smoking status: Never   • Smokeless tobacco: Never   Vaping Use   • Vaping Use: Never used   Substance and Sexual Activity   • Alcohol use: No     Comment: 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    • Drug use: Not Currently   • Sexual activity: Not Currently Current Outpatient Medications:   •  Blood Glucose Monitoring Suppl (Contour Blood Glucose System) w/Device KIT, Use 1 kit 2 (two) times a day before meals, Disp: 1 kit, Rfl: 0  •  Blood Glucose Monitoring Suppl (OneTouch Verio Flex System) w/Device KIT, Use 2 (two) times a day, Disp: 1 kit, Rfl: 0  •  celecoxib (CeleBREX) 200 mg capsule, TAKE 1 CAPSULE BY MOUTH TWICE A DAY, Disp: 60 capsule, Rfl: 6  •  Cholecalciferol (VITAMIN D-3) 1000 units CAPS, Take 1 capsule by mouth daily, Disp: , Rfl:   •  cyclobenzaprine (FLEXERIL) 10 mg tablet, TAKE 2 TABLETS (20 MG TOTAL) BY MOUTH DAILY AT BEDTIME AS NEEDED FOR MUSCLE SPASMS, Disp: 60 tablet, Rfl: 6  •  desvenlafaxine (PRISTIQ) 100 mg 24 hr tablet, TAKE 1 TABLET BY MOUTH EVERY DAY, Disp: 30 tablet, Rfl: 2  •  desvenlafaxine succinate (PRISTIQ) 50 mg 24 hr tablet, TAKE 1 TABLET (50 MG TOTAL) BY MOUTH DAILY TOTAL DAILY DOSE 150 MG DAILY, Disp: 30 tablet, Rfl: 2  •  hydrOXYzine HCL (ATARAX) 50 mg tablet, TAKE 1 TABLET (50 MG TOTAL) BY MOUTH DAILY AT BEDTIME AS NEEDED (INSOMNIA), Disp: 30 tablet, Rfl: 2  •  IRON PO, Take by mouth, Disp: , Rfl:   •  lidocaine (LIDODERM) 5 %, APPLY 1 PATCH TOPICALLY IN THE MORNING  REMOVE & DISCARD PATCH WITHIN 12 HOURS OR AS DIRECTED BY MD , Disp: 30 patch, Rfl: 1  •  MAGNESIUM OXIDE PO, Take by mouth, Disp: , Rfl:   •  metFORMIN (GLUCOPHAGE) 500 mg tablet, Take 1 tablet (500 mg total) by mouth 2 (two) times a day with meals, Disp: 60 tablet, Rfl: 5  •  nortriptyline (PAMELOR) 75 MG capsule, TAKE 1 CAPSULE BY MOUTH TWICE A DAY, Disp: 180 capsule, Rfl: 0  •  propranolol (INDERAL) 20 mg tablet, TAKE 1 TABLET BY MOUTH EVERY DAY AT NIGHT, Disp: 30 tablet, Rfl: 11  •  EPINEPHrine (EPIPEN) 0 3 mg/0 3 mL SOAJ, Inject 0 3 mL (0 3 mg total) into a muscle once for 1 dose, Disp: 0 6 mL, Rfl: 0  •  megestrol (MEGACE) 40 mg tablet, Take 1 tablet (40 mg total) by mouth 3 (three) times a day for 5 days, Disp: 15 tablet, Rfl: 0    Allergies   Allergen Reactions   • Cannabidiol Anxiety, Confusion, Hypertension, Itching, Palpitations, Shortness Of Breath, Swelling, Throat Swelling and Tongue Swelling   • Amoxicillin-Pot Clavulanate    • Decadrol [Dexamethasone] Other (See Comments)     Category: Adverse Reaction;   psychosis   • Dexamethasone Sodium Phosphate Other (See Comments)     psychosis   • Other Throat Swelling     CBD oil    • Penicillins Hives and Other (See Comments)     Category: Allergy; Hives/Uticaria   • Tetracycline Hives and Other (See Comments)     Hives/Uticaria   • Tetracyclines & Related Hives     Category: Allergy; Physical Exam:    /76   Pulse (!) 106   Ht 5' (1 524 m)   Wt 82 6 kg (182 lb)   BMI 35 54 kg/m²     Constitutional:normal, well developed, well nourished, alert, in no distress and non-toxic and no overt pain behavior  Eyes:anicteric  HEENT:grossly intact  Neck:supple, symmetric, trachea midline and no masses   Pulmonary:even and unlabored  Cardiovascular:No edema or pitting edema present  Skin:Normal without rashes or lesions and well hydrated  Psychiatric:Mood and affect appropriate  Neurologic:Cranial Nerves II-XII grossly intact  Musculoskeletal:normal gait      Imaging  No orders to display         No orders of the defined types were placed in this encounter

## 2022-12-09 NOTE — PSYCH
Virtual Regular Visit    Verification of patient location:    Patient is located in the following state in which I hold an active license [place]      Assessment/Plan:    Problem List Items Addressed This Visit        Other    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 psychotic features ([facility] [address] ) - Primary    Generalized anxiety disorder       Goals addressed in session: Goal 1          Reason for visit is   Chief Complaint   Patient presents with   • Virtual Regular Visit        Encounter provider HERMELINDA Saucedo    Provider located at [address] [place] [id number] [phone]      Recent Visits  No visits were found meeting these conditions  Showing recent visits within past 7 days and meeting all other requirements  Future Appointments  No visits were found meeting these conditions  Showing future appointments within next 150 days and meeting all other requirements       The patient was identified by name and date of birth  Pati Cohn was informed that this is a telemedicine visit and that the visit is being conducted throughthe [employer] platform  She agrees to proceed     My office door was closed  No one else was in the room  She acknowledged consent and understanding of privacy and security of the video platform  The patient has agreed to participate and understands they can discontinue the visit at any time  Patient is aware this is a billable service  Subjective  Pati Cohn is a 38 y o  female presenting for follow up  Richard Hall shared that she has been feeling lonely lately, with no significant other right now and feeling a bit "envious" of people that have partners  She said that she feels somewhat withdrawn socially which adds to feeling lonely, but feels that she needs some quiet time alone to "reset" right now and figure out what to do next with herself    She also talked about her parents and brother having some issues that feel worrisome to her, and needing to talk about those issues with someone other than family  She said that she needs to be willing to be vulnerable and reach out to someone to ask for support, and said that she might be willing to try to do that with a particular friend  Discussed importance of having someone to talk to for support, even if they do not fully understand the situation, in order to have a way to sort through worries and feelings and not internalize it all  Encouraged Esther to be gentle and patient with herself when she needs quiet time, as long as she recognizes that she needs that time and does not let it become a permanent habit  A: Greg Ambriz presented as "ok" today, with a somewhat subdued affect, quiet speech and good eye contact  Her behavior was calm, concentration intact  Thought process logical and organized  Insight and judgment good  Denies SI HI and psychosis  Some mild progress toward goals  Ned Netters will allow herself downtime/quiet time to sort through thoughts and feelings, will reach out to supports when needed, and will focus on self-care  She will return in two weeks for follow up        HPI     Past Medical History:   Diagnosis Date   • Anxiety    • Chronic sinusitis    • Depression    • Diabetes ([facility] [address] )    • Fibromyalgia    • Migraine    • Obesity    • Polyarthritis     Last assessed 9/21/2015   • Psychiatric disorder        Past Surgical History:   Procedure Laterality Date   • COLONOSCOPY  06/2019   • DENTAL SURGERY     • HYSTEROSCOPY      Endometrial Biopsy By Hysteroscopy   • REMOVAL OF INTRAUTERINE DEVICE (IUD)     • US GUIDED BREAST BIOPSY RIGHT COMPLETE Right 6/17/2019       Current Outpatient Medications   Medication Sig Dispense Refill   • Blood Glucose Monitoring Suppl (Contour Blood Glucose System) w/Device KIT Use 1 kit 2 (two) times a day before meals 1 kit 0   • Blood Glucose Monitoring Suppl (OneTouch Verio Flex System) w/Device KIT Use 2 (two) times a day 1 kit 0   • celecoxib (CeleBREX) 200 mg capsule TAKE 1 CAPSULE BY MOUTH TWICE A DAY 60 capsule 6   • Cholecalciferol (VITAMIN D-3) 1000 units CAPS Take 1 capsule by mouth daily     • cyclobenzaprine (FLEXERIL) 10 mg tablet TAKE 2 TABLETS (20 MG TOTAL) BY MOUTH DAILY AT BEDTIME AS NEEDED FOR MUSCLE SPASMS 60 tablet 6   • desvenlafaxine (PRISTIQ) 100 mg 24 hr tablet TAKE 1 TABLET BY MOUTH EVERY DAY 30 tablet 2   • desvenlafaxine succinate (PRISTIQ) 50 mg 24 hr tablet TAKE 1 TABLET (50 MG TOTAL) BY MOUTH DAILY TOTAL DAILY DOSE 150 MG DAILY 30 tablet 2   • EPINEPHrine (EPIPEN) 0 3 mg/0 3 mL SOAJ Inject 0 3 mL (0 3 mg total) into a muscle once for 1 dose 0 6 mL 0   • hydrOXYzine HCL (ATARAX) 50 mg tablet TAKE 1 TABLET (50 MG TOTAL) BY MOUTH DAILY AT BEDTIME AS NEEDED (INSOMNIA) 30 tablet 2   • IRON PO Take by mouth     • lidocaine (LIDODERM) 5 % APPLY 1 PATCH TOPICALLY IN THE MORNING  REMOVE & DISCARD PATCH WITHIN 12 HOURS OR AS DIRECTED BY MD  30 patch 1   • MAGNESIUM OXIDE PO Take by mouth     • megestrol (MEGACE) 40 mg tablet Take 1 tablet (40 mg total) by mouth 3 (three) times a day for 5 days 15 tablet 0   • metFORMIN (GLUCOPHAGE) 500 mg tablet Take 1 tablet (500 mg total) by mouth 2 (two) times a day with meals 60 tablet 5   • nortriptyline (PAMELOR) 75 MG capsule TAKE 1 CAPSULE BY MOUTH TWICE A  capsule 0   • propranolol (INDERAL) 20 mg tablet TAKE 1 TABLET BY MOUTH EVERY DAY AT NIGHT 30 tablet 11     No current facility-administered medications for this visit  Allergies   Allergen Reactions   • Cannabidiol Anxiety, Confusion, Hypertension, Itching, Palpitations, Shortness Of Breath, Swelling, Throat Swelling and Tongue Swelling   • Amoxicillin-Pot Clavulanate    • Decadrol [Dexamethasone] Other (See Comments)     Category:  Adverse Reaction;   psychosis   • Dexamethasone Sodium Phosphate Other (See Comments)     psychosis   • Other Throat Swelling     CBD oil    • Penicillins Hives and Other (See Comments)     Category: Allergy; Hives/Uticaria   • Tetracycline Hives and Other (See Comments)     Hives/Uticaria   • Tetracyclines & Related Hives     Category: Allergy; Review of Systems    Video Exam    There were no vitals filed for this visit      Physical Exam     Visit Time    Visit Start Time: 1102  Visit Stop Time: 6984  Total Visit Duration: 39 minutes

## 2022-12-12 ENCOUNTER — OFFICE VISIT (OUTPATIENT)
Dept: PAIN MEDICINE | Facility: CLINIC | Age: 42
End: 2022-12-12

## 2022-12-12 VITALS
SYSTOLIC BLOOD PRESSURE: 112 MMHG | DIASTOLIC BLOOD PRESSURE: 76 MMHG | WEIGHT: 182 LBS | HEIGHT: 60 IN | BODY MASS INDEX: 35.73 KG/M2 | HEART RATE: 106 BPM

## 2022-12-12 DIAGNOSIS — M51.36 DDD (DEGENERATIVE DISC DISEASE), LUMBAR: ICD-10-CM

## 2022-12-12 DIAGNOSIS — M54.16 LUMBAR RADICULOPATHY: Primary | ICD-10-CM

## 2022-12-21 ENCOUNTER — CONSULT (OUTPATIENT)
Dept: GYNECOLOGY | Facility: CLINIC | Age: 42
End: 2022-12-21

## 2022-12-21 VITALS
HEART RATE: 95 BPM | BODY MASS INDEX: 35.73 KG/M2 | WEIGHT: 182 LBS | DIASTOLIC BLOOD PRESSURE: 76 MMHG | SYSTOLIC BLOOD PRESSURE: 112 MMHG | HEIGHT: 60 IN

## 2022-12-21 DIAGNOSIS — N93.9 ABNORMAL UTERINE BLEEDING (AUB): Primary | ICD-10-CM

## 2022-12-21 DIAGNOSIS — N84.0 ENDOMETRIAL POLYP: ICD-10-CM

## 2022-12-21 DIAGNOSIS — N93.9 ABNORMAL UTERINE BLEEDING (AUB): ICD-10-CM

## 2022-12-21 DIAGNOSIS — N85.00 ENDOMETRIAL HYPERPLASIA WITHOUT ATYPIA: Primary | ICD-10-CM

## 2022-12-21 DIAGNOSIS — N85.01 COMPLEX ENDOMETRIAL HYPERPLASIA WITHOUT ATYPIA: ICD-10-CM

## 2022-12-21 DIAGNOSIS — N94.6 DYSMENORRHEA: ICD-10-CM

## 2022-12-21 DIAGNOSIS — Z01.812 PRE-OPERATIVE LABORATORY EXAMINATION: ICD-10-CM

## 2022-12-21 NOTE — PROGRESS NOTES
Assessment/Plan:         Diagnoses and all orders for this visit:    Endometrial hyperplasia without atypia; discussed treatment options including medical management versus surgical management  Patient is opted to proceed with a hysterectomy  Discussed LSH BSO versus TLH BS  She has opted to proceed with an [address] BSO  The procedure along with risks were discussed including, but not limited to, infection, hemorrhage, bowel bladder or vascular injury, possible necessity for laparotomy  Endometrial polyp  -       Abnormal uterine bleeding (AUB)  -         Dysmenorrhea        Subjective:      Patient ID: Nanci Spatz is a 38 y o  female  HPI  G 0 new patient referred by Nova Sullivan, secondary to chronic abnormal uterine bleeding/dysmenorrhea  Patient states that she has had an ongoing problem for several years with heavy and prolonged menses with increasing dysmenorrhea  This has been unresponsive to oral contraceptives, NuvaRing, Depo-Provera or IUDs  Patient desires more definitive treatment  She does not desire future childbearing  She did have an ultrasound done which revealed an endometrium of 10 mm  There was a left ovarian cyst measuring 3 4 cm with a left paraovarian cyst measuring 1 6 cm  She did have an endometrial biopsy done which was returned as showing endometrial hyperplasia without atypia involving fragments of an endometrial polyp    The following portions of the patient's history were reviewed and updated as appropriate:   She  has a past medical history of Anxiety, Chronic sinusitis, Depression, Diabetes ([address] ), Fibromyalgia, Migraine, Obesity, Polyarthritis, and Psychiatric disorder    She   Patient Active Problem List    Diagnosis Date Noted   • Lumbar radiculopathy    • Fall 10/03/2022   • Lumbar back pain 10/03/2022   • Type 2 diabetes mellitus with hyperglycemia, without long-term current use of insulin ([address] ) 09/01/2022   • Dyslipidemia 09/01/2022   • Diabetic neuropathy associated with type 2 diabetes mellitus ([place] [address] ) 09/01/2022   • Sacroiliitis ([place] [address] ) 08/11/2022   • DDD (degenerative disc disease), lumbar 08/11/2022   • Back pain 06/14/2022   • Polyp of colon 02/27/2020   • Loose stools 04/11/2019   • Hematochezia 04/11/2019   • Elevated TSH 04/05/2019   • Arthralgia of multiple joints 11/07/2018   • Generalized body aches 11/07/2018   • Chronic idiopathic constipation 11/07/2018   • Dyspepsia 10/24/2018   • Generalized anxiety disorder 08/21/2018   • 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 psychotic features ([place] [address] ) 08/08/2018   • 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/30/2018   • Narcolepsy 12/02/2016   • Chronic pain 10/05/2016   • Menorrhagia 10/04/2016   • Myalgia 08/04/2016   • Sleep apnea 06/01/2016   • Chronic fatigue 10/13/2015   • Malaise and fatigue 09/21/2015   • Fibromyalgia 08/06/2015   • Muscle spasm 08/06/2015   • Headache 07/30/2015   • Sinus disease 06/24/2015   • 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, with unspecified complication ([place] [address] ) 52/97/7164   • Chronic migraine without aura without status migrainosus, not intractable 06/04/2015   • Lyme disease 06/03/2015   • Hypokalemia 05/27/2015   • Snoring 05/27/2015   • Allergic rhinitis 03/04/2014   • Class 2 obesity due to excess calories without serious comorbidity with body mass index (BMI) of 37 0 to 37 9 in adult 03/04/2014     She  has a past surgical history that includes Hysteroscopy; REMOVAL OF INTRAUTERINE DEVICE (IUD); Dental surgery; US guided breast biopsy right complete (Right, 6/17/2019); and Colonoscopy (06/2019)    Her family history includes Alcohol abuse in her family; Depression in her paternal aunt and paternal grandmother; Diabetes in her father, maternal grandmother, and paternal grandfather; Irregular heart beat in her mother; Kidney disease in her father and paternal grandmother; Lung cancer (age of onset: 52) in her paternal grandfather; Melanoma (age of onset: 80) in her maternal grandmother; No Known Problems in her brother and maternal grandfather; Prostate cancer (age of onset: 61) in her maternal uncle; Rheum arthritis in her maternal grandmother and paternal grandmother; Stomach cancer in her family; Substance Abuse in her brother and maternal uncle  She  reports that she has never smoked  She has never used smokeless tobacco  She reports that she does not currently use drugs  She reports that she does not drink alcohol    Current Outpatient Medications   Medication Sig Dispense Refill   • Blood Glucose Monitoring Suppl (Contour Blood Glucose System) w/Device KIT Use 1 kit 2 (two) times a day before meals 1 kit 0   • Blood Glucose Monitoring Suppl (OneTouch Verio Flex System) w/Device KIT Use 2 (two) times a day 1 kit 0   • celecoxib (CeleBREX) 200 mg capsule TAKE 1 CAPSULE BY MOUTH TWICE A DAY 60 capsule 6   • Cholecalciferol (VITAMIN D-3) 1000 units CAPS Take 1 capsule by mouth daily     • cyclobenzaprine (FLEXERIL) 10 mg tablet TAKE 2 TABLETS (20 MG TOTAL) BY MOUTH DAILY AT BEDTIME AS NEEDED FOR MUSCLE SPASMS 60 tablet 6   • desvenlafaxine (PRISTIQ) 100 mg 24 hr tablet TAKE 1 TABLET BY MOUTH EVERY DAY 30 tablet 2   • desvenlafaxine succinate (PRISTIQ) 50 mg 24 hr tablet TAKE 1 TABLET (50 MG TOTAL) BY MOUTH DAILY TOTAL DAILY DOSE 150 MG DAILY 30 tablet 2   • EPINEPHrine (EPIPEN) 0 3 mg/0 3 mL SOAJ Inject 0 3 mL (0 3 mg total) into a muscle once for 1 dose 0 6 mL 0   • hydrOXYzine HCL (ATARAX) 50 mg tablet TAKE 1 TABLET (50 MG TOTAL) BY MOUTH DAILY AT BEDTIME AS NEEDED (INSOMNIA) 30 tablet 2   • IRON PO Take by mouth     • lidocaine (LIDODERM) 5 % APPLY 1 PATCH TOPICALLY IN THE MORNING  REMOVE & DISCARD PATCH WITHIN 12 HOURS OR AS DIRECTED BY MD  30 patch 1   • MAGNESIUM OXIDE PO Take by mouth     • megestrol (MEGACE) 40 mg tablet Take 1 tablet (40 mg total) by mouth 3 (three) times a day for 5 days 15 tablet 0   • metFORMIN (GLUCOPHAGE) 500 mg tablet Take 1 tablet (500 mg total) by mouth 2 (two) times a day with meals 60 tablet 5   • nortriptyline (PAMELOR) 75 MG capsule TAKE 1 CAPSULE BY MOUTH TWICE A  capsule 0   • propranolol (INDERAL) 20 mg tablet TAKE 1 TABLET BY MOUTH EVERY DAY AT NIGHT 30 tablet 11     No current facility-administered medications for this visit       Current Outpatient Medications on File Prior to Visit   Medication Sig   • Blood Glucose Monitoring Suppl (Contour Blood Glucose System) w/Device KIT Use 1 kit 2 (two) times a day before meals   • Blood Glucose Monitoring Suppl (OneTouch Verio Flex System) w/Device KIT Use 2 (two) times a day   • celecoxib (CeleBREX) 200 mg capsule TAKE 1 CAPSULE BY MOUTH TWICE A DAY   • Cholecalciferol (VITAMIN D-3) 1000 units CAPS Take 1 capsule by mouth daily   • cyclobenzaprine (FLEXERIL) 10 mg tablet TAKE 2 TABLETS (20 MG TOTAL) BY MOUTH DAILY AT BEDTIME AS NEEDED FOR MUSCLE SPASMS   • desvenlafaxine (PRISTIQ) 100 mg 24 hr tablet TAKE 1 TABLET BY MOUTH EVERY DAY   • desvenlafaxine succinate (PRISTIQ) 50 mg 24 hr tablet TAKE 1 TABLET (50 MG TOTAL) BY MOUTH DAILY TOTAL DAILY DOSE 150 MG DAILY   • EPINEPHrine (EPIPEN) 0 3 mg/0 3 mL SOAJ Inject 0 3 mL (0 3 mg total) into a muscle once for 1 dose   • hydrOXYzine HCL (ATARAX) 50 mg tablet TAKE 1 TABLET (50 MG TOTAL) BY MOUTH DAILY AT BEDTIME AS NEEDED (INSOMNIA)   • IRON PO Take by mouth   • lidocaine (LIDODERM) 5 % APPLY 1 PATCH TOPICALLY IN THE MORNING  REMOVE & DISCARD PATCH WITHIN 12 HOURS OR AS DIRECTED BY MD    • MAGNESIUM OXIDE PO Take by mouth   • megestrol (MEGACE) 40 mg tablet Take 1 tablet (40 mg total) by mouth 3 (three) times a day for 5 days   • metFORMIN (GLUCOPHAGE) 500 mg tablet Take 1 tablet (500 mg total) by mouth 2 (two) times a day with meals   • nortriptyline (PAMELOR) 75 MG capsule TAKE 1 CAPSULE BY MOUTH TWICE A DAY   • propranolol (INDERAL) 20 mg tablet TAKE 1 TABLET BY MOUTH EVERY DAY AT NIGHT     No current facility-administered medications on file prior to visit      She is allergic to cannabidiol, amoxicillin-pot clavulanate, decadrol [dexamethasone], dexamethasone sodium phosphate, other, penicillins, tetracycline, and tetracyclines & related       Review of Systems   Constitutional: Negative  Gastrointestinal: Negative  Genitourinary: Positive for menstrual problem and pelvic pain  Objective:      /76   Pulse 95   Ht 5' (1 524 m)   Wt 82 6 kg (182 lb)   BMI 35 54 kg/m²          Physical Exam  Vitals reviewed  Cardiovascular:      Rate and Rhythm: Normal rate and regular rhythm  Pulses: Normal pulses  Heart sounds: Normal heart sounds  No murmur heard  Pulmonary:      Effort: Pulmonary effort is normal  No respiratory distress  Breath sounds: Normal breath sounds  Abdominal:      General: There is no distension  Palpations: Abdomen is soft  There is no mass  Tenderness: There is no abdominal tenderness  There is no guarding or rebound  Hernia: No hernia is present  There is no hernia in the left inguinal area or right inguinal area  Genitourinary:     General: Normal vulva  Labia:         Right: No rash, tenderness or lesion  Left: No rash, tenderness or lesion  Vagina: Normal       Cervix: Normal       Uterus: Normal        Adnexa:         Right: No mass, tenderness or fullness  Left: No mass, tenderness or fullness  Musculoskeletal:      Cervical back: Normal range of motion and neck supple  No tenderness  Lymphadenopathy:      Cervical: No cervical adenopathy  Lower Body: No right inguinal adenopathy  No left inguinal adenopathy  Neurological:      Mental Status: She is alert

## 2022-12-23 ENCOUNTER — TELEMEDICINE (OUTPATIENT)
Dept: BEHAVIORAL/MENTAL HEALTH CLINIC | Facility: CLINIC | Age: 42
End: 2022-12-23

## 2022-12-23 DIAGNOSIS — F33.2 SEVERE RECURRENT MAJOR DEPRESSION WITHOUT PSYCHOTIC FEATURES (HCC): Primary | ICD-10-CM

## 2022-12-23 DIAGNOSIS — F41.1 GENERALIZED ANXIETY DISORDER: ICD-10-CM

## 2022-12-23 NOTE — PSYCH
Virtual Regular Visit    Verification of patient location:    Patient is located in the following state in which I hold an active license [place]      Assessment/Plan:    Problem List Items Addressed This Visit        Other    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 psychotic features ([facility] [address] ) - Primary    Generalized anxiety disorder       Goals addressed in session: Goal 1          Reason for visit is   Chief Complaint   Patient presents with   • Virtual Regular Visit        Encounter provider HERMELINDA Florez    Provider located at [address] [place] [id number] [phone]      Recent Visits  No visits were found meeting these conditions  Showing recent visits within past 7 days and meeting all other requirements  Today's Visits  Date Type Provider Dept   12/23/22 Telemedicine HERMELINDA [facility] Therapist [facility]   Showing today's visits and meeting all other requirements  Future Appointments  No visits were found meeting these conditions  Showing future appointments within next 150 days and meeting all other requirements       The patient was identified by name and date of birth  Nanci Spatz was informed that this is a telemedicine visit and that the visit is being conducted throughthe [employer] platform  She agrees to proceed     My office door was closed  No one else was in the room  She acknowledged consent and understanding of privacy and security of the video platform  The patient has agreed to participate and understands they can discontinue the visit at any time  Patient is aware this is a billable service  Subjective  Nanci Spatz is a 38 y o  female presenting for follow up   Burnard Getting shared that she is "medium" right now, sharing that she is not significantly depressed or anxious, but "in a funk "  She said that she is her brother's main support, and she feels badly for him in the midst of trying to decide if he and his wife are going to divorce  He has been borrowing her car and some money, and has been talking to John Moya a lot, and while she said that she likes being able to help him, it hurts her that he is struggling so much  She also talked about having to have a hysterectomy because of precancerous tissue in her uterus, which will be in May  She said that she is somewhat anxious about the surgery, but feels that it is the best decision for her  She noted that she has come to terms with the fact that she will never have children of her own, and is ok with that now  Provided supportive therapy, validation of Esther's feelings and worries, and encouraged focus on self-care and journaling to help her process feelings  A: John Moya presented as mildly depressed today, with a subdued, mood-congruent affect, good eye contact and calm behavior  Her concentration was mildly impaired, thought process generally logical and organized  Insight and judgment intact  Denies SI HI and psychosis  Slow progress toward goals  Alex Sykes will focus on self-care and healthy boundaries, will work on processing emotions through journaling regularly and will follow up in two weeks as scheduled        HPI     Past Medical History:   Diagnosis Date   • Anxiety    • Chronic sinusitis    • Depression    • Diabetes ([facility] [address] )    • Fibromyalgia    • Migraine    • Obesity    • Polyarthritis     Last assessed 9/21/2015   • Psychiatric disorder        Past Surgical History:   Procedure Laterality Date   • COLONOSCOPY  06/2019   • DENTAL SURGERY     • HYSTEROSCOPY      Endometrial Biopsy By Hysteroscopy   • REMOVAL OF INTRAUTERINE DEVICE (IUD)     • US GUIDED BREAST BIOPSY RIGHT COMPLETE Right 6/17/2019       Current Outpatient Medications   Medication Sig Dispense Refill   • Blood Glucose Monitoring Suppl (Contour Blood Glucose System) w/Device KIT Use 1 kit 2 (two) times a day before meals 1 kit 0   • Blood Glucose Monitoring Suppl (OneTouch Verio Flex System) w/Device KIT Use 2 (two) times a day 1 kit 0   • celecoxib (CeleBREX) 200 mg capsule TAKE 1 CAPSULE BY MOUTH TWICE A DAY 60 capsule 6   • Cholecalciferol (VITAMIN D-3) 1000 units CAPS Take 1 capsule by mouth daily     • cyclobenzaprine (FLEXERIL) 10 mg tablet TAKE 2 TABLETS (20 MG TOTAL) BY MOUTH DAILY AT BEDTIME AS NEEDED FOR MUSCLE SPASMS 60 tablet 6   • desvenlafaxine (PRISTIQ) 100 mg 24 hr tablet TAKE 1 TABLET BY MOUTH EVERY DAY 30 tablet 2   • desvenlafaxine succinate (PRISTIQ) 50 mg 24 hr tablet TAKE 1 TABLET (50 MG TOTAL) BY MOUTH DAILY TOTAL DAILY DOSE 150 MG DAILY 30 tablet 2   • EPINEPHrine (EPIPEN) 0 3 mg/0 3 mL SOAJ Inject 0 3 mL (0 3 mg total) into a muscle once for 1 dose 0 6 mL 0   • hydrOXYzine HCL (ATARAX) 50 mg tablet TAKE 1 TABLET (50 MG TOTAL) BY MOUTH DAILY AT BEDTIME AS NEEDED (INSOMNIA) 30 tablet 2   • IRON PO Take by mouth     • lidocaine (LIDODERM) 5 % APPLY 1 PATCH TOPICALLY IN THE MORNING  REMOVE & DISCARD PATCH WITHIN 12 HOURS OR AS DIRECTED BY MD  30 patch 1   • MAGNESIUM OXIDE PO Take by mouth     • megestrol (MEGACE) 40 mg tablet Take 1 tablet (40 mg total) by mouth 3 (three) times a day for 5 days 15 tablet 0   • metFORMIN (GLUCOPHAGE) 500 mg tablet Take 1 tablet (500 mg total) by mouth 2 (two) times a day with meals 60 tablet 5   • nortriptyline (PAMELOR) 75 MG capsule TAKE 1 CAPSULE BY MOUTH TWICE A  capsule 0   • propranolol (INDERAL) 20 mg tablet TAKE 1 TABLET BY MOUTH EVERY DAY AT NIGHT 30 tablet 11     No current facility-administered medications for this visit  Allergies   Allergen Reactions   • Cannabidiol Anxiety, Confusion, Hypertension, Itching, Palpitations, Shortness Of Breath, Swelling, Throat Swelling and Tongue Swelling   • Amoxicillin-Pot Clavulanate    • Decadrol [Dexamethasone] Other (See Comments)     Category:  Adverse Reaction;   psychosis   • Dexamethasone Sodium Phosphate Other (See Comments)     psychosis   • Other Throat Swelling     CBD oil    • Penicillins Hives and Other (See Comments)     Category: Allergy; Hives/Uticaria   • Tetracycline Hives and Other (See Comments)     Hives/Uticaria   • Tetracyclines & Related Hives     Category: Allergy; Review of Systems    Video Exam    There were no vitals filed for this visit      Physical Exam     Visit Time    Visit Start Time: 4762  Visit Stop Time: 2736  Total Visit Duration: 35 minutes

## 2023-01-04 ENCOUNTER — OFFICE VISIT (OUTPATIENT)
Dept: FAMILY MEDICINE CLINIC | Facility: CLINIC | Age: 43
End: 2023-01-04

## 2023-01-04 VITALS
HEIGHT: 60 IN | HEART RATE: 107 BPM | DIASTOLIC BLOOD PRESSURE: 79 MMHG | RESPIRATION RATE: 16 BRPM | WEIGHT: 187 LBS | TEMPERATURE: 98.9 F | OXYGEN SATURATION: 99 % | SYSTOLIC BLOOD PRESSURE: 126 MMHG | BODY MASS INDEX: 36.71 KG/M2

## 2023-01-04 DIAGNOSIS — E78.5 DYSLIPIDEMIA: ICD-10-CM

## 2023-01-04 DIAGNOSIS — E11.65 TYPE 2 DIABETES MELLITUS WITH HYPERGLYCEMIA, WITHOUT LONG-TERM CURRENT USE OF INSULIN (HCC): Primary | ICD-10-CM

## 2023-01-04 DIAGNOSIS — E11.41 DIABETIC MONONEUROPATHY ASSOCIATED WITH TYPE 2 DIABETES MELLITUS (HCC): ICD-10-CM

## 2023-01-04 DIAGNOSIS — E66.09 CLASS 2 OBESITY DUE TO EXCESS CALORIES WITHOUT SERIOUS COMORBIDITY WITH BODY MASS INDEX (BMI) OF 37.0 TO 37.9 IN ADULT: ICD-10-CM

## 2023-01-04 DIAGNOSIS — F33.2 SEVERE RECURRENT MAJOR DEPRESSION WITHOUT PSYCHOTIC FEATURES (HCC): ICD-10-CM

## 2023-01-04 DIAGNOSIS — F41.1 GENERALIZED ANXIETY DISORDER: ICD-10-CM
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
## 2023-01-04 RX ORDER — CARVEDILOL 25 MG/1
TABLET, FILM COATED ORAL
Qty: 100 STRIP | Refills: 3 | Status: SHIPPED | OUTPATIENT
Start: 2023-01-04 | End: 2023-01-04

## 2023-01-04 NOTE — ASSESSMENT & PLAN NOTE
Mood has been stable  Continue current medical therapy as per psychiatry Dr Jose Villa  Continue psychotherapy every 2 weeks

## 2023-01-04 NOTE — ASSESSMENT & PLAN NOTE
LDL goal < 100  Continue to work on dietary and lifestyle modifications  Follow a low-cholesterol, low-fat diet  Recheck lipid panel in 6 months

## 2023-01-04 NOTE — ASSESSMENT & PLAN NOTE
Patient last 6 pounds since last visit in September 2022  Encouraged to continue working on weight reduction

## 2023-01-04 NOTE — ASSESSMENT & PLAN NOTE
Lab Results   Component Value Date    HGBA1C 6 6 (H) 12/01/2022     Blood sugar control improved since 8/22  Continue Metformin 500 mg 1 tablet twice daily with meals  Follow a low-carb diet, regular exercise  Schedule eye exam with ophthalmology

## 2023-01-04 NOTE — PROGRESS NOTES
Name: Jannie Alejo      : 1980      MRN: [id number]  Encounter Provider: Jennifer Streeter MD  Encounter Date: 2023   Encounter department: [address] [place] [address] Drive    Chief Complaint   Patient presents with   • Follow-up     diabetes     Health Maintenance   Topic Date Due   • Kidney Health Evaluation: GFR  Never done   • Kidney Health Evaluation: Microalbumin/Creatinine Ratio  Never done   • Hepatitis B Vaccine (1 of 3 - 3-dose series) Never done   • Pneumococcal Vaccine: Pediatrics (0 to 5 Years) and At-Risk Patients (6 to 59 Years) (1 - PCV) Never done   • DM Eye Exam  Never done   • HIV Screening  Never done   • Cervical Cancer Screening  Never done   • COVID-19 Vaccine (4 - Booster for Moderna series) 2022   • Colorectal Cancer Screening  2022   • Influenza Vaccine (1) 2022   • PT PLAN OF CARE  10/30/2022   • Depression Remission PHQ  2022   • BMI: Followup Plan  2023   • HEMOGLOBIN A1C  2023   • Annual Physical  2023   • Diabetic Foot Exam  2023   • Breast Cancer Screening: Mammogram  10/10/2023   • BMI: Adult  2024   • DTaP,Tdap,and Td Vaccines (3 - Td or Tdap) 2032   • Hepatitis C Screening  Completed   • HIB Vaccine  Aged Out   • IPV Vaccine  Aged Out   • Hepatitis A Vaccine  Aged Out   • Meningococcal ACWY Vaccine  Aged Out   • HPV Vaccine  Aged Out       Assessment & Plan     1  Type 2 diabetes mellitus with hyperglycemia, without long-term current use of insulin (HCC)  Assessment & Plan:    Lab Results   Component Value Date    HGBA1C 6 6 (H) 2022     Blood sugar control improved since   Continue Metformin 500 mg 1 tablet twice daily with meals  Follow a low-carb diet, regular exercise  Schedule eye exam with ophthalmology  Orders:  -     Comprehensive metabolic panel;  Future; Expected date: 2023  -     Hemoglobin A1C; Future; Expected date: 2023  -     Microalbumin / creatinine urine ratio    2  Diabetic mononeuropathy associated with type 2 diabetes mellitus ([address] )  Assessment & Plan:    Lab Results   Component Value Date    HGBA1C 6 6 (H) 12/01/2022     Hb A1C improved since 8/2022  Patient reports improvement in numbness in her feet  Continue Metformin  3  Dyslipidemia  Assessment & Plan:  LDL goal < 100  Continue to work on dietary and lifestyle modifications  Follow a low-cholesterol, low-fat diet  Recheck lipid panel in 6 months  Orders:  -     Comprehensive metabolic panel; Future; Expected date: 06/20/2023  -     Lipid panel; Future; Expected date: 06/20/2023    4  Class 2 obesity due to excess calories without serious comorbidity with body mass index (BMI) of 37 0 to 37 9 in adult  Assessment & Plan:  Patient last 6 pounds since last visit in September 2022  Encouraged to continue working on weight reduction  Orders:  -     Comprehensive metabolic panel; Future; Expected date: 06/20/2023    5  Generalized anxiety disorder  Assessment & Plan:  Mood has been stable  Continue current medical therapy as per psychiatry Dr Merry San  Continue psychotherapy every 2 weeks  6  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 psychotic features [facility])  Assessment & Plan:  Patient reports improvement in symptoms  Follow-up with psychiatry Dr Merry San  Continue psychotherapy  BMI Counseling: Body mass index is 36 52 kg/m²  The BMI is above normal  Nutrition recommendations include decreasing portion sizes, encouraging healthy choices of fruits and vegetables, decreasing fast food intake, consuming healthier snacks, limiting drinks that contain sugar, moderation in carbohydrate intake, increasing intake of lean protein, reducing intake of saturated and trans fat and reducing intake of cholesterol  Exercise recommendations include exercising 3-5 times per week  No pharmacotherapy was ordered   Rationale for BMI follow-up plan is due to patient being overweight or obese  Depression Screening and Follow-up Plan: Continue regular follow-up with their mental health provider who is managing their mental health condition(s)  Schedule follow-up visit in 6 months  Check labs prior to next visit  Subjective      HPI     Patient presents for 3-month follow-up visit  PMHx: Type 2 DM, Dyslipidemia, Fibromyalgia, migraine headaches,  Obesity, CAROL, Depression, chronic low back pain,  degenerative spondylosis, OA,  Vit D deficiency  Reviewed current medications, blood test results from December 1, 2018  Hb A1C 6 6 (  improved from 7 7 in August 2022) Cholesterol 180, HDL 40,   Creatinine 0 89, potassium 4 3  Type 2 DM - patient takes Metformin 500 mg 1 tab  twice daily with meals  Reports no hypoglycemic episodes  Blood sugar before lunch ranges 85-95  Patient needs to schedule eye exam with ophthalmology  She is moving to a new apartment  Chronic low back pain - reports improvements in pain after epidural steroid injection performed by pain management Dr Princess Pruitt in November 2022  Patient has fibromyalgia, osteoarthritis  Takes Celebrex 200 mg twice daily  Reports no GI side effects  CAROL/ Depression - mood has been stable  Patient has been followed by psychiatrist Dr Sheldon Cummins every 3 months  Attends psychotherapy every 2 weeks  Patient had normal mammogram in October 2022  She was diagnosed with endometrial hyperplasia, scheduled for hysterectomy in May 2023  Colonoscopy done in June 2019, 1 polyp was removed  Gastroenterologist Dr Monae Ames recommended to repeat colonoscopy in 3 years  Patient received Flu shot and COVID booster at [facility] pharmacy in November 2022  Review of Systems   Constitutional: Positive for fatigue  Negative for activity change, appetite change, chills and fever  HENT: Negative for congestion, ear pain, nosebleeds, sore throat and trouble swallowing  Eyes: Negative      Respiratory: Negative for cough, chest tightness, shortness of breath and wheezing  Cardiovascular: Negative for chest pain, palpitations and leg swelling  Gastrointestinal: Negative for abdominal pain, blood in stool, constipation, diarrhea, nausea and vomiting  Genitourinary: Negative for difficulty urinating, dysuria and hematuria  Musculoskeletal: Positive for arthralgias (improved) and back pain  Negative for joint swelling  Skin: Negative for rash  Neurological: Positive for numbness (in feet improved) and headaches (occasional)  Negative for dizziness and syncope  Hematological: Negative  Psychiatric/Behavioral: Positive for sleep disturbance  Negative for suicidal ideas          Anxiety / Depression - mood has been stable       Current Outpatient Medications on File Prior to Visit   Medication Sig   • Blood Glucose Monitoring Suppl (Contour Blood Glucose System) w/Device KIT Use 1 kit 2 (two) times a day before meals   • Blood Glucose Monitoring Suppl (OneTouch Verio Flex System) w/Device KIT Use 2 (two) times a day   • celecoxib (CeleBREX) 200 mg capsule TAKE 1 CAPSULE BY MOUTH TWICE A DAY   • Cholecalciferol (VITAMIN D-3) 1000 units CAPS Take 1 capsule by mouth daily   • cyclobenzaprine (FLEXERIL) 10 mg tablet TAKE 2 TABLETS (20 MG TOTAL) BY MOUTH DAILY AT BEDTIME AS NEEDED FOR MUSCLE SPASMS   • desvenlafaxine (PRISTIQ) 100 mg 24 hr tablet TAKE 1 TABLET BY MOUTH EVERY DAY   • desvenlafaxine succinate (PRISTIQ) 50 mg 24 hr tablet TAKE 1 TABLET (50 MG TOTAL) BY MOUTH DAILY TOTAL DAILY DOSE 150 MG DAILY   • EPINEPHrine (EPIPEN) 0 3 mg/0 3 mL SOAJ Inject 0 3 mL (0 3 mg total) into a muscle once for 1 dose   • hydrOXYzine HCL (ATARAX) 50 mg tablet TAKE 1 TABLET (50 MG TOTAL) BY MOUTH DAILY AT BEDTIME AS NEEDED (INSOMNIA)   • IRON PO Take by mouth   • lidocaine (LIDODERM) 5 % APPLY 1 PATCH TOPICALLY IN THE MORNING  REMOVE & DISCARD PATCH WITHIN 12 HOURS OR AS DIRECTED BY MD    • MAGNESIUM OXIDE PO Take by mouth   • megestrol (MEGACE) 40 mg tablet Take 1 tablet (40 mg total) by mouth 3 (three) times a day for 5 days   • metFORMIN (GLUCOPHAGE) 500 mg tablet Take 1 tablet (500 mg total) by mouth 2 (two) times a day with meals   • nortriptyline (PAMELOR) 75 MG capsule TAKE 1 CAPSULE BY MOUTH TWICE A DAY   • propranolol (INDERAL) 20 mg tablet TAKE 1 TABLET BY MOUTH EVERY DAY AT NIGHT       Objective     /79 (BP Location: Left arm, Patient Position: Sitting)   Pulse (!) 107   Temp 98 9 °F (37 2 °C) (Tympanic)   Resp 16   Ht 5' (1 524 m)   Wt 84 8 kg (187 lb)   SpO2 99%   BMI 36 52 kg/m²     Physical Exam  Vitals and nursing note reviewed  Constitutional:       Appearance: Normal appearance  She is obese  HENT:      Head: Normocephalic and atraumatic  Eyes:      Conjunctiva/sclera: Conjunctivae normal       Pupils: Pupils are equal, round, and reactive to light  Neck:      Vascular: No carotid bruit  Cardiovascular:      Rate and Rhythm: Regular rhythm  Tachycardia present  Heart sounds: No murmur heard  Pulmonary:      Effort: Pulmonary effort is normal       Breath sounds: Normal breath sounds  Abdominal:      General: There is no distension  Palpations: Abdomen is soft  Tenderness: There is no abdominal tenderness  Musculoskeletal:         General: No swelling  Normal range of motion  Cervical back: Normal range of motion and neck supple  Right lower leg: No edema  Left lower leg: No edema  Skin:     General: Skin is warm and dry  Findings: No rash  Neurological:      General: No focal deficit present  Mental Status: She is alert     Psychiatric:         Mood and Affect: Mood normal        Aminata Long MD

## 2023-01-04 NOTE — ASSESSMENT & PLAN NOTE
Lab Results   Component Value Date    HGBA1C 6 6 (H) 12/01/2022     Hb A1C improved since 8/2022  Patient reports improvement in numbness in her feet  Continue Metformin

## 2023-01-04 NOTE — ASSESSMENT & PLAN NOTE
Patient reports improvement in symptoms  Follow-up with psychiatry Dr Mike Joshi  Continue psychotherapy

## 2023-01-05 ENCOUNTER — PREP FOR PROCEDURE (OUTPATIENT)
Dept: GASTROENTEROLOGY | Facility: CLINIC | Age: 43
End: 2023-01-05

## 2023-01-05 ENCOUNTER — TELEPHONE (OUTPATIENT)
Dept: GASTROENTEROLOGY | Facility: CLINIC | Age: 43
End: 2023-01-05

## 2023-01-05 DIAGNOSIS — Z86.010 HISTORY OF COLON POLYPS: Primary | ICD-10-CM

## 2023-01-05 NOTE — TELEPHONE ENCOUNTER
Scheduled date of colonoscopy (as of today): 3/13/23  Physician performing colonoscopy: DR Barbie Zhong  Location of colonoscopy: [facility]

## 2023-01-09 ENCOUNTER — TELEMEDICINE (OUTPATIENT)
Dept: BEHAVIORAL/MENTAL HEALTH CLINIC | Facility: CLINIC | Age: 43
End: 2023-01-09

## 2023-01-09 DIAGNOSIS — F33.2 SEVERE RECURRENT MAJOR DEPRESSION WITHOUT PSYCHOTIC FEATURES (HCC): Primary | ICD-10-CM

## 2023-01-09 DIAGNOSIS — F41.1 GENERALIZED ANXIETY DISORDER: ICD-10-CM

## 2023-01-09 NOTE — PSYCH
Virtual Regular Visit    Verification of patient location:    Patient is located in the following state in which I hold an active license [place]      Assessment/Plan:    Problem List Items Addressed This Visit        Other    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 psychotic features ([facility] [address] ) - Primary    Generalized anxiety disorder       Goals addressed in session: Goal 1          Reason for visit is   Chief Complaint   Patient presents with   • Virtual Regular Visit        Encounter provider HERMELINDA Blas    Provider located at [address] [place] [id number] [phone]      Recent Visits  Date Type Provider Dept   01/04/23 Office Visit Kelsie Chavira MD [address] recent visits within past 7 days and meeting all other requirements  Future Appointments  No visits were found meeting these conditions  Showing future appointments within next 150 days and meeting all other requirements       The patient was identified by name and date of birth  Christie Huynh was informed that this is a telemedicine visit and that the visit is being conducted throughthe [facility]  She agrees to proceed     My office door was closed  No one else was in the room  She acknowledged consent and understanding of privacy and security of the video platform  The patient has agreed to participate and understands they can discontinue the visit at any time  Patient is aware this is a billable service  Subjective  Christie Huynh is a 37 y o  female presenting for follow up  Kenia Alvarado shared that she has been having a hard time lately, feeling down and overwhelmed  She talked about her brother moving in and his issues she tries to support him with, moving her own belongings upstairs to the apartment and realizing how "messy" she is, and feeling isolated from people recently    She said that she feels overwhelmed and lonely, with no one reaching out to her and not reaching out herself  She said that she is trying to figure out how to make a fresh start and get her space in order, and to try to be more organized and intentional   Discussed how all of the stressors happening right now affect her mood, how she might create a schedule for herself to feel like she is taking action while not becoming overwhelmed, and used CBT strategies to help Danielle Heard reframe thoughts about relationships and motivation  A: Danielle Heard presented as "rough" today, with a constricted affect in depressed range  Her eye contact was good, behavior calm  Her concentration was intact, thought process logical and organized  Insight and judgment intact  Denies SI HI and psychosis  Some slow progress toward goals  Tess Ha will work on creating a schedule to organize/clean her space, will try to reach out socially to friends for support, and will return in two weeks for follow up        HPI     Past Medical History:   Diagnosis Date   • Anxiety    • Chronic sinusitis    • Depression    • Diabetes ([facility] [address] )    • Fibromyalgia    • Migraine    • Obesity    • Polyarthritis     Last assessed 9/21/2015   • Psychiatric disorder        Past Surgical History:   Procedure Laterality Date   • COLONOSCOPY  06/2019   • DENTAL SURGERY     • HYSTEROSCOPY      Endometrial Biopsy By Hysteroscopy   • REMOVAL OF INTRAUTERINE DEVICE (IUD)     • US GUIDED BREAST BIOPSY RIGHT COMPLETE Right 6/17/2019       Current Outpatient Medications   Medication Sig Dispense Refill   • Blood Glucose Monitoring Suppl (Contour Blood Glucose System) w/Device KIT Use 1 kit 2 (two) times a day before meals 1 kit 0   • Blood Glucose Monitoring Suppl (OneTouch Verio Flex System) w/Device KIT Use 2 (two) times a day 1 kit 0   • celecoxib (CeleBREX) 200 mg capsule TAKE 1 CAPSULE BY MOUTH TWICE A DAY 60 capsule 6   • Cholecalciferol (VITAMIN D-3) 1000 units CAPS Take 1 capsule by mouth daily • cyclobenzaprine (FLEXERIL) 10 mg tablet TAKE 2 TABLETS (20 MG TOTAL) BY MOUTH DAILY AT BEDTIME AS NEEDED FOR MUSCLE SPASMS 60 tablet 6   • desvenlafaxine (PRISTIQ) 100 mg 24 hr tablet TAKE 1 TABLET BY MOUTH EVERY DAY 30 tablet 2   • desvenlafaxine succinate (PRISTIQ) 50 mg 24 hr tablet TAKE 1 TABLET (50 MG TOTAL) BY MOUTH DAILY TOTAL DAILY DOSE 150 MG DAILY 30 tablet 2   • EPINEPHrine (EPIPEN) 0 3 mg/0 3 mL SOAJ Inject 0 3 mL (0 3 mg total) into a muscle once for 1 dose 0 6 mL 0   • glucose blood (Contour Test) test strip Check blood sugar once daily 100 strip 3   • hydrOXYzine HCL (ATARAX) 50 mg tablet TAKE 1 TABLET (50 MG TOTAL) BY MOUTH DAILY AT BEDTIME AS NEEDED (INSOMNIA) 30 tablet 2   • IRON PO Take by mouth     • lidocaine (LIDODERM) 5 % APPLY 1 PATCH TOPICALLY IN THE MORNING  REMOVE & DISCARD PATCH WITHIN 12 HOURS OR AS DIRECTED BY MD  30 patch 1   • MAGNESIUM OXIDE PO Take by mouth     • megestrol (MEGACE) 40 mg tablet Take 1 tablet (40 mg total) by mouth 3 (three) times a day for 5 days 15 tablet 0   • metFORMIN (GLUCOPHAGE) 500 mg tablet Take 1 tablet (500 mg total) by mouth 2 (two) times a day with meals 60 tablet 5   • nortriptyline (PAMELOR) 75 MG capsule TAKE 1 CAPSULE BY MOUTH TWICE A  capsule 0   • propranolol (INDERAL) 20 mg tablet TAKE 1 TABLET BY MOUTH EVERY DAY AT NIGHT 30 tablet 11     No current facility-administered medications for this visit  Allergies   Allergen Reactions   • Cannabidiol Anxiety, Confusion, Hypertension, Itching, Palpitations, Shortness Of Breath, Swelling, Throat Swelling and Tongue Swelling   • Amoxicillin-Pot Clavulanate    • Decadrol [Dexamethasone] Other (See Comments)     Category: Adverse Reaction;   psychosis   • Dexamethasone Sodium Phosphate Other (See Comments)     psychosis   • Other Throat Swelling     CBD oil    • Penicillins Hives and Other (See Comments)     Category: Allergy;    Hives/Uticaria   • Tetracycline Hives and Other (See Comments)     Hives/Uticaria   • Tetracyclines & Related Hives     Category: Allergy; Review of Systems    Video Exam    There were no vitals filed for this visit      Physical Exam     Visit Time    Visit Start Time: 998  Visit Stop Time: 996  Total Visit Duration: 31 minutes

## 2023-01-09 NOTE — BH TREATMENT PLAN
Farris Chelsea  1980        Date of Initial Treatment Plan:  8/21/2018   Date of Current Treatment Plan: 01/09/23    Treatment Plan Number  11    Strengths/Personal Resources for Self Care: " witty, smart, funny creative, caring, resourceful, good hair;"  Diagnosis:   1  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 psychotic features ([facility] [address] )        2  Generalized anxiety disorder            Area of Needs: depression/anxiety/chronic pain    Long Term Goal 1: AI want to continue to effectively manage the depression and the anxiety  Target Date: 7/9/2023  Completion Date: n/a         Short Term Objectives for Goal 1: A I will continue to be aware of the depression/anxiety and negative self talk messages and redirect to positive and realistic messages (lower depression/anxiety)  B I will continue to work with Dr Sheldon Cummins on medication management  C I will continue to make sure I am taking care of myself  D: I will continue to reach out to friends to stay socially connected E: I will contact [employer] to work on building skills and possibly looking into a part time job when I feel ready  F: I will create a daily schedule of household chores to help build motivation and help me feel accomplished    Long Term Goal 2: I will continue to work on strengthening my self esteem  Target Date: 7/9/2023  Completion Date: N/A    Short Term Objectives for Goal 2: AI will remain aware of those who have impacted me positively and disregard the messages from those who were negative influences  B  I will review and use my qualities/strengths/assets as reminders of my worth  C  I will continue with self care (including using my effective communication skills such as assertiveness)  D  I will continue to explore options to improve my quality of life  Long Term Goal # 3: I will continue to learn and implement effective pain management strategies       Target Date: 7/9/2023  Completion Date: N/A    Short Term Objectives for Goal 3: AI will learn and practice effective pain management strategies  B  I will explore yoga as a possible pain management strategy C: I will walk and be more physically active as much as I am able  GOAL 1: Modality: Individual 2x per month   Completion Date n/a and The person(s) responsible for carrying out the plan is  Esther    GOAL 2: Modality: Individual 2x per month   Completion Date n/a and The person(s) responsible for carrying out the plan is  Esther     GOAL 3: Modality: Individual 2x per month   Completion Date n/a and The person(s) responsible for carrying out the plan is  Kelton Sanchez Treatment Plan [facility]: Diagnosis and Treatment Plan explained to Reyna Jimenez relates understanding diagnosis and is agreeable to Treatment Plan  yes      Client Comments : Please share your thoughts, feelings, need and/or experiences regarding your treatment plan: n/a    Jana Lino, 1980, actively participated in the review and update of this treatment plan during a virtual session, using the Epic Embedded platform  Jana Lino  provided verbal consent on 1/9/2023 at 932 AM  The treatment plan was transcribed into the [employer] Record at a later time

## 2023-01-26 ENCOUNTER — TELEPHONE (OUTPATIENT)
Dept: FAMILY MEDICINE CLINIC | Facility: CLINIC | Age: 43
End: 2023-01-26

## 2023-01-26 ENCOUNTER — TELEPHONE (OUTPATIENT)
Dept: BEHAVIORAL/MENTAL HEALTH CLINIC | Facility: CLINIC | Age: 43
End: 2023-01-26

## 2023-01-26 NOTE — TELEPHONE ENCOUNTER
Patient ([phone]) called to report prior auth is needed for gluclose blood (contour Test) test strips for glucose metor

## 2023-01-26 NOTE — TELEPHONE ENCOUNTER
Called patient and lvm to informed her that appt 1/27/23 will cx  due to provider wont be in office

## 2023-01-28 ENCOUNTER — TELEPHONE (OUTPATIENT)
Dept: OTHER | Facility: OTHER | Age: 43
End: 2023-01-28

## 2023-01-28 ENCOUNTER — HOSPITAL ENCOUNTER (EMERGENCY)
Facility: HOSPITAL | Age: 43
Discharge: HOME | End: 2023-01-28
Attending: EMERGENCY MEDICINE

## 2023-01-28 ENCOUNTER — TELEPHONE (OUTPATIENT)
Dept: PSYCHIATRY | Facility: CLINIC | Age: 43
End: 2023-01-28

## 2023-01-28 VITALS
OXYGEN SATURATION: 100 % | SYSTOLIC BLOOD PRESSURE: 142 MMHG | BODY MASS INDEX: 35.74 KG/M2 | HEART RATE: 106 BPM | DIASTOLIC BLOOD PRESSURE: 98 MMHG | WEIGHT: 182.98 LBS | TEMPERATURE: 97.5 F | RESPIRATION RATE: 18 BRPM

## 2023-01-28 DIAGNOSIS — F32.A DEPRESSION: Primary | ICD-10-CM

## 2023-01-28 NOTE — ED PROVIDER NOTES
History  Chief Complaint   Patient presents with   • Psychiatric Evaluation     Pt arrives ambulatory with c/o " depression, anxiety, suicidal thoughts, plan to OD on pills, can't sleep, recurring thoughts "  pt has list of complaints on paper   denies HI  + AH " they are muffled sounds, male voice "  pt appears calm and cooperative      Patient is a 38-year-old female, history of depression, anxiety, fibromyalgia  She presents the emergency room for her mental health  She states that over the last 1 to 2 weeks she has been having increasing depression symptoms  She notes that she thinks a triggering event was trouble with a recent relationship  She states that she is not sleeping well does not get enjoyment out of her usual activities she states that a few days ago that she was having suicidal ideations  These have since resolved, however she feels like her depressive symptoms have not  No homicidal ideations, no auditory or visual hallucinations  She denies alcohol or recreational drug use  She is here seeking what her options are for treatment  States she has been hospitalized in the past and does not feel like she may need that level but would like assessment  Prior to Admission Medications   Prescriptions Last Dose Informant Patient Reported? Taking?    Blood Glucose Monitoring Suppl (Contour Blood Glucose System) w/Device KIT   No No   Sig: Use 1 kit 2 (two) times a day before meals   Blood Glucose Monitoring Suppl (OneTouch Verio Flex System) w/Device KIT   No No   Sig: Use 2 (two) times a day   Cholecalciferol (VITAMIN D-3) 1000 units CAPS   Yes No   Sig: Take 1 capsule by mouth daily   EPINEPHrine (EPIPEN) 0 3 mg/0 3 mL SOAJ   No No   Sig: Inject 0 3 mL (0 3 mg total) into a muscle once for 1 dose   IRON PO   Yes No   Sig: Take by mouth   MAGNESIUM OXIDE PO   Yes No   Sig: Take by mouth   celecoxib (CeleBREX) 200 mg capsule   No No   Sig: TAKE 1 CAPSULE BY MOUTH TWICE A DAY cyclobenzaprine (FLEXERIL) 10 mg tablet   No No   Sig: TAKE 2 TABLETS (20 MG TOTAL) BY MOUTH DAILY AT BEDTIME AS NEEDED FOR MUSCLE SPASMS   desvenlafaxine (PRISTIQ) 100 mg 24 hr tablet   No No   Sig: TAKE 1 TABLET BY MOUTH EVERY DAY   desvenlafaxine succinate (PRISTIQ) 50 mg 24 hr tablet   No No   Sig: TAKE 1 TABLET (50 MG TOTAL) BY MOUTH DAILY TOTAL DAILY DOSE 150 MG DAILY   glucose blood (Contour Test) test strip   No No   Sig: Check blood sugar once daily   hydrOXYzine HCL (ATARAX) 50 mg tablet   No No   Sig: TAKE 1 TABLET (50 MG TOTAL) BY MOUTH DAILY AT BEDTIME AS NEEDED (INSOMNIA)   lidocaine (LIDODERM) 5 %   No No   Sig: APPLY 1 PATCH TOPICALLY IN THE MORNING  REMOVE & DISCARD PATCH WITHIN 12 HOURS OR AS DIRECTED BY MD    megestrol (MEGACE) 40 mg tablet   No No   Sig: Take 1 tablet (40 mg total) by mouth 3 (three) times a day for 5 days   metFORMIN (GLUCOPHAGE) 500 mg tablet   No No   Sig: Take 1 tablet (500 mg total) by mouth 2 (two) times a day with meals   nortriptyline (PAMELOR) 75 MG capsule   No No   Sig: TAKE 1 CAPSULE BY MOUTH TWICE A DAY   propranolol (INDERAL) 20 mg tablet   No No   Sig: TAKE 1 TABLET BY MOUTH EVERY DAY AT NIGHT      Facility-Administered Medications: None       Past Medical History:   Diagnosis Date   • Anxiety    • Chronic pain disorder    • Chronic sinusitis    • Depression    • Diabetes (HCC)    • Fibromyalgia    • Migraine    • Obesity    • Polyarthritis     Last assessed 9/21/2015   • Psychiatric disorder    • Psychiatric illness    • Sleep difficulties        Past Surgical History:   Procedure Laterality Date   • COLONOSCOPY  06/2019   • DENTAL SURGERY     • HYSTEROSCOPY      Endometrial Biopsy By Hysteroscopy   • REMOVAL OF INTRAUTERINE DEVICE (IUD)     • US GUIDED BREAST BIOPSY RIGHT COMPLETE Right 6/17/2019       Family History   Problem Relation Age of Onset   • Irregular heart beat Mother    • Diabetes Father    • Kidney disease Father    • Diabetes Maternal Grandmother • Rheum arthritis Maternal Grandmother    • Melanoma Maternal Grandmother 80   • No Known Problems Maternal Grandfather    • Kidney disease Paternal Grandmother    • Rheum arthritis Paternal Grandmother    • Depression Paternal Grandmother    • Diabetes Paternal Grandfather    • Lung cancer Paternal Grandfather 52   • Substance Abuse Brother    • No Known Problems Brother    • Substance Abuse Maternal Uncle    • Prostate cancer Maternal Uncle 61   • Depression Paternal Aunt    • Alcohol abuse Family    • Stomach cancer Family      I have reviewed and agree with the history as documented  E-Cigarette/Vaping   • E-Cigarette Use Never User      E-Cigarette/Vaping Substances   • Nicotine No    • THC No    • CBD No      Social History     Tobacco Use   • Smoking status: Never   • Smokeless tobacco: Never   Vaping Use   • Vaping Use: Never used   Substance Use Topics   • Alcohol use: No     Comment: 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    • Drug use: Not Currently       Review of Systems   Constitutional: Negative  Negative for chills and fever  HENT: Negative  Negative for rhinorrhea, sore throat, trouble swallowing and voice change  Eyes: Negative  Negative for pain and visual disturbance  Respiratory: Negative  Negative for cough, shortness of breath and wheezing  Cardiovascular: Negative  Negative for chest pain and palpitations  Gastrointestinal: Negative for abdominal pain, diarrhea, nausea and vomiting  Genitourinary: Negative  Negative for dysuria and frequency  Musculoskeletal: Negative  Negative for neck pain and neck stiffness  Skin: Negative  Negative for rash  Neurological: Negative  Negative for dizziness, speech difficulty, weakness, light-headedness and numbness  Psychiatric/Behavioral: Positive for suicidal ideas  Physical Exam  Physical Exam  Vitals and nursing note reviewed  Constitutional:       General: She is not in acute distress  Appearance: She is well-developed  HENT:      Head: Normocephalic and atraumatic  Eyes:      Conjunctiva/sclera: Conjunctivae normal       Pupils: Pupils are equal, round, and reactive to light  Neck:      Trachea: No tracheal deviation  Cardiovascular:      Rate and Rhythm: Normal rate and regular rhythm  Pulmonary:      Effort: Pulmonary effort is normal  No respiratory distress  Breath sounds: Normal breath sounds  No wheezing or rales  Abdominal:      General: Bowel sounds are normal  There is no distension  Palpations: Abdomen is soft  Tenderness: There is no abdominal tenderness  There is no guarding or rebound  Musculoskeletal:         General: No tenderness or deformity  Normal range of motion  Cervical back: Normal range of motion and neck supple  Skin:     General: Skin is warm and dry  Capillary Refill: Capillary refill takes less than 2 seconds  Findings: No rash  Neurological:      Mental Status: She is alert and oriented to person, place, and time  Psychiatric:         Attention and Perception: Attention and perception normal  She is attentive  She does not perceive auditory or visual hallucinations  Mood and Affect: Mood and affect normal  Affect is not labile, blunt, angry or tearful  Speech: Speech normal  Speech is not delayed, slurred or tangential          Behavior: Behavior normal  Behavior is cooperative  Thought Content: Thought content normal  Thought content is not paranoid or delusional  Thought content does not include homicidal or suicidal ideation  Cognition and Memory: Cognition and memory normal          Judgment: Judgment normal  Judgment is not impulsive           Vital Signs  ED Triage Vitals [01/28/23 1151]   Temperature Pulse Respirations Blood Pressure SpO2   97 5 °F (36 4 °C) (!) 106 18 142/98 100 %      Temp Source Heart Rate Source Patient Position - Orthostatic VS BP Location FiO2 (%)   Tympanic Monitor Sitting Left arm --      Pain Score       --           Vitals:    01/28/23 1151   BP: 142/98   Pulse: (!) 106   Patient Position - Orthostatic VS: Sitting         Visual Acuity      ED Medications  Medications - No data to display    Diagnostic Studies  Results Reviewed     None                 No orders to display              Procedures  Procedures         ED Course                                             Medical Decision Making  20-year-old female who presented for mental health evaluation  She had had some suicidal ideations without specific plan or desire to act on her thoughts  She presented the emergency room not actively suicidal but requesting mental health evaluation for depression  Based on my evaluation and the crisis workers evaluation the patient did not meet involuntary admission criteria  Through shared decision-making she was offered both inpatient and outpatient mental health  She has good insight into her condition, she has some outpatient resources already in place and would prefer to pursue a partial program referral   She will return to the ED if her symptoms were to change or worsen  Depression: acute illness or injury      Disposition  Final diagnoses:   Depression     Time reflects when diagnosis was documented in both MDM as applicable and the Disposition within this note     Time User Action Codes Description Comment    1/28/2023  1:07 PM Irving Lancaster Add [U68  A] Depression       ED Disposition     ED Disposition   Discharge    Condition   Stable    Date/Time   Sat Jan 28, 2023  1:07 PM    Comment   Keagan Johnson discharge to home/self care                 MD Documentation    Jayce Reyes Most Recent Value   Sending MD Godwin Connelly, DO      Follow-up Information     Follow up With Specialties Details Why Contact Info Additional Information    Mayra Manning, [address] [facility]  [phone]       [address] [place] [address] 166 [facility] Psychiatry Schedule an appointment as soon as possible for a visit   Angel[address] [facility] [id number] 4470 [facility] [place], [facility], [address] [place] [address], [facility], [place], [address] [place] [address]          Discharge Medication List as of 1/28/2023  1:17 PM      CONTINUE these medications which have NOT CHANGED    Details   !! Blood Glucose Monitoring Suppl (Contour Blood Glucose System) w/Device KIT Use 1 kit 2 (two) times a day before meals, Starting Tue 10/4/2022, Normal      !!  Blood Glucose Monitoring Suppl (OneTouch Verio Flex System) w/Device KIT Use 2 (two) times a day, Starting Mon 10/31/2022, Normal      celecoxib (CeleBREX) 200 mg capsule TAKE 1 CAPSULE BY MOUTH TWICE A DAY, Normal      Cholecalciferol (VITAMIN D-3) 1000 units CAPS Take 1 capsule by mouth daily, Historical Med      cyclobenzaprine (FLEXERIL) 10 mg tablet TAKE 2 TABLETS (20 MG TOTAL) BY MOUTH DAILY AT BEDTIME AS NEEDED FOR MUSCLE SPASMS, Starting Tue 11/1/2022, Normal      !! desvenlafaxine (PRISTIQ) 100 mg 24 hr tablet TAKE 1 TABLET BY MOUTH EVERY DAY, Normal      !! desvenlafaxine succinate (PRISTIQ) 50 mg 24 hr tablet TAKE 1 TABLET (50 MG TOTAL) BY MOUTH DAILY TOTAL DAILY DOSE 150 MG DAILY, Starting Mon 11/21/2022, Normal      EPINEPHrine (EPIPEN) 0 3 mg/0 3 mL SOAJ Inject 0 3 mL (0 3 mg total) into a muscle once for 1 dose, Starting Thu 8/18/2022, Normal      glucose blood (Contour Test) test strip Check blood sugar once daily, Normal      hydrOXYzine HCL (ATARAX) 50 mg tablet TAKE 1 TABLET (50 MG TOTAL) BY MOUTH DAILY AT BEDTIME AS NEEDED (INSOMNIA), Starting Mon 11/21/2022, Normal      IRON PO Take by mouth, Historical Med      lidocaine (LIDODERM) 5 % APPLY 1 PATCH TOPICALLY IN THE MORNING  REMOVE & DISCARD PATCH WITHIN 12 HOURS OR AS DIRECTED BY MD , Normal      MAGNESIUM OXIDE PO Take by mouth, Historical Med      megestrol (MEGACE) 40 mg tablet Take 1 tablet (40 mg total) by mouth 3 (three) times a day for 5 days, Starting Wed 11/9/2022, Until Mon 11/14/2022, Normal      metFORMIN (GLUCOPHAGE) 500 mg tablet Take 1 tablet (500 mg total) by mouth 2 (two) times a day with meals, Starting Thu 9/1/2022, Normal      nortriptyline (PAMELOR) 75 MG capsule TAKE 1 CAPSULE BY MOUTH TWICE A DAY, Normal      propranolol (INDERAL) 20 mg tablet TAKE 1 TABLET BY MOUTH EVERY DAY AT NIGHT, Normal       !! - Potential duplicate medications found  Please discuss with provider  No discharge procedures on file      PDMP Review       Value Time User    PDMP Reviewed  Yes 1/6/2022  1:40 PM Erika Doyle MD          ED Provider  Electronically Signed by           Cheryle Wilson DO  01/28/23 0887

## 2023-01-28 NOTE — ED NOTES
This writer discussed the patient's current presentation and recommended discharge plan with Nam Santacruz, DO  He agrees with the patient being discharged at this time with referrals a referral to [facility]      The patient was Instructed to follow up with their outpatient therapist, HERMELINDA Baugh, in the interim to starting programming        The patient's parents are supportive and are aware of patient's current stressors  Patient has a specific assignment to help clarify values and to identify red flags in relationships  The patient is aware to call 911 or to return to the Emergency Department immediately if their situation changes at any time  This writer discussed discharge plans with the patient and she understands the plan to refer to [facility]  SAFETY PLAN  Warning Signs (thoughts, images, mood, behavior, situations) of a potential crisis: Isolating yourself, dwelling on the past, blaming yourself  Avoid social media as much as possible, particularly account of recent boyfriend  Coping Skills (what can I do to take my mind off the problem, or to keep myself safe): Get out of the house, talk to friends  Make a list of what you have learned you are looking for in a future relationship  To do this, reflect on your past experiences -What worked? What did not work? How did you feel? Did they live their values? Did they respect yours? If this is their best, what will their worst be like? Be specific with this list and know what you deserve  Note the items that are non-negotiable, and prepare to break deals if you will ultimately regret compromising  Outside Support (who can I reach out to for support and help): Friends, family, parents in particular           National Suicide Prevention Hotline:  [address] [place] [address] [phone] - [facility] Crisis/Mobile Crisis   [phone] - [facility]   [place]: [facility]: [address] [facility],[address]Th Floor [address] [phone] - Crisis   [phone] - Peer Support Talk Line (1-9pm daily)  [phone] - Teen Support Talk Line (1-9pm daily)  [address] ([place]) [phone] - [facility]

## 2023-01-28 NOTE — CASE MANAGEMENT
[address]    Name and Date of Birth:  Keagan Johnson 37 y o  1980    Date of Referral: January 28, 2023    Presenting Symptoms and Stressors:      Symptoms:  worsening depression and suicidal ideation  Stressors:  relationship problems    Access to Weapons:  No    Smoking Status: none    Substance Use:  None    Suicidal Ideation: Yes, with plan to overdose, contracts for safety    Homicidal Ideation: None    Depressed Mood: Yes, anhedonia, low energy, low motivation, poor concentration    Lucille/Hypomania: None    Psychosis: None    Agitation: No    Appetite Changes: normal appetite    Sleep Disturbance: difficulty sleeping    Diagnoses:  1  Major Depressive Disorder, recurrent, moderate  2   Adjustment Disorder with depressed mood    Current Psychiatrist or Therapist:    Psychiatrist: Dr Toño Plunkett  Therapist: HERMELINDA Cunningham    Do they Require Ambulatory Assistance: No    Communication Assistance: not required     Legal Issues: None        Eddy Carty

## 2023-01-28 NOTE — ED NOTES
The patient is a 42y/o F with history of recurrent major depression and anxiety  She is followed by Ellen Trejo MD, for medication management and by Luvenia Essex, MSW, for psychotherapy through [facility] [address]  Patient stated she had called the answering service and spoke with Ever Mullins MD, on-call psychiatrist, who referred the patient to the ED for further evaluation  Patient stated that about 3 months ago, she reconnected with a male school mate  They eventually began exclusive dating for about a month period, until 3 weeks ago, when he broke up with her due to reexamining past relationships and considering rekindling one of them  Patient stated that since that time, she has been more depressed than usual  She had started to experience suicidal thoughts  "Not that I ever would because life and my yecenia are too important to me " She stated she had pictured perhaps overdosing on sleeping pills, but had no intent whatsoever  Patient stated she has been ruminating about this relationship because they got along well, so this feels like a loss  She also reported difficulty with concentration  She described difficulty sleeping, poor energy, decreased motivation and anhedonia  Patient was to have a therapy appointment yesterday, but her therapist cancelled  When asked to describe any of her own concerns about the relationship, she did have difficulty identifying any  Eventually, she was able to identify that he was considering rekindling 2 relationships that did not work out in the first place, and leaving one that did seem to be working, so perhaps he is generally ambivalent about commitment  She was also able to identify that he was not visiting his 15 y/o daughter "because he (doesn't want her) to be negatively influenced by his own instability (he is bipolar) " So, there is ambivalence about his role as a father, as well   Patient was offered an inpatient level of care; however, she stated she does not feel that that is necessary at this time, but that she would like additional supports at this time  Patient is familiar with a partial hospitalization level of care and would like to pursue this  She is willing to return to the ED if her symptoms worsen

## 2023-01-28 NOTE — TELEPHONE ENCOUNTER
This writer called patient per her request regarding concerning signs/symptoms  Patient states that approximately 1 to 2 weeks ago, depressed mood and depressive signs/symptoms like: anhedonia, decreased sleep, diminished energy, decreased attention and concentration and diminished motivation worsened by inappropriate hearings to her previously prescribed psychotropic medication regimen prescribed by outpatient psychiatrist Dr Alberto Lynn, adding that her outpatient psychotherapist unfortunately canceled their previous appointment  Also, patient states that she is concerned about the presence of suicidal ideation without particular plan, jackson for safety at present time, stating that she is agreeable to evaluation through [address] [place] [address] ED, stating that her mother is able to take her  Patient was appreciative of this writer's call and agreeable to aforementioned recommendation for further evaluation psychiatrically to assure safety of patient       Gambia C Holter

## 2023-01-28 NOTE — ED NOTES
[place] indicates: [place]  Recipient #[id number]  [facility] managed by [facility] [place]

## 2023-01-28 NOTE — Clinical Note
Jose Bejarano was seen and treated in our emergency department on 1/28/2023  Diagnosis:     Esther    She may return on this date: 01/31/2023         If you have any questions or concerns, please don't hesitate to call        Carlyle Lomeli, DO    ______________________________           _______________          _______________  Hospital Representative                              Date                                Time

## 2023-01-28 NOTE — Clinical Note
Neal Frey was seen and treated in our emergency department on 1/28/2023  Diagnosis:     Esther    She may return on this date: 01/31/2023         If you have any questions or concerns, please don't hesitate to call        Cheryle Wilson DO    ______________________________           _______________          _______________  Hospital Representative                              Date                                Time

## 2023-01-30 ENCOUNTER — TELEPHONE (OUTPATIENT)
Dept: PSYCHOLOGY | Facility: CLINIC | Age: 43
End: 2023-01-30

## 2023-02-03 DIAGNOSIS — M79.7 FIBROMYALGIA: ICD-10-CM

## 2023-02-05 RX ORDER — NORTRIPTYLINE HYDROCHLORIDE 75 MG/1
CAPSULE ORAL
Qty: 180 CAPSULE | Refills: 0 | Status: SHIPPED | OUTPATIENT
Start: 2023-02-05 | End: 2023-02-08 | Stop reason: DRUGHIGH

## 2023-02-08 ENCOUNTER — OFFICE VISIT (OUTPATIENT)
Dept: PSYCHIATRY | Facility: CLINIC | Age: 43
End: 2023-02-08

## 2023-02-08 ENCOUNTER — OFFICE VISIT (OUTPATIENT)
Dept: PSYCHOLOGY | Facility: CLINIC | Age: 43
End: 2023-02-08

## 2023-02-08 VITALS
SYSTOLIC BLOOD PRESSURE: 138 MMHG | BODY MASS INDEX: 35.89 KG/M2 | HEART RATE: 96 BPM | DIASTOLIC BLOOD PRESSURE: 88 MMHG | WEIGHT: 182.8 LBS | HEIGHT: 60 IN

## 2023-02-08 DIAGNOSIS — F41.1 GENERALIZED ANXIETY DISORDER: ICD-10-CM

## 2023-02-08 DIAGNOSIS — F33.1 MAJOR DEPRESSIVE DISORDER, RECURRENT EPISODE, MODERATE (HCC): Primary | ICD-10-CM

## 2023-02-08 DIAGNOSIS — M79.7 FIBROMYALGIA: ICD-10-CM

## 2023-02-08 DIAGNOSIS — G43.109 MIGRAINE WITH AURA, NOT INTRACTABLE, WITHOUT STATUS MIGRAINOSUS: ICD-10-CM

## 2023-02-08 RX ORDER — NORTRIPTYLINE HYDROCHLORIDE 50 MG/1
50 CAPSULE ORAL 2 TIMES DAILY
Qty: 60 CAPSULE | Refills: 0 | Status: SHIPPED | OUTPATIENT
Start: 2023-02-08 | End: 2023-03-10

## 2023-02-08 NOTE — PATIENT INSTRUCTIONS
If you are in psychological crisis including not limited to suicidal/homicidal thoughts or thoughts of self-injury, do not hesitate to call/contact your [facility] hotline (see below) or attend to the nearest emergency department    [facility] Crisis: [address] Crisis: [phone]  [address] Crisis: [address] Crisis: [address] Crisis: [address] Crisis: [address]  Crisis: [phone]  National Suicide Prevention Hotline: 2-643.616.9368

## 2023-02-08 NOTE — BH TREATMENT PLAN
Assessment/Plan:      Diagnoses and all orders for this visit:    Major depressive disorder, recurrent episode, moderate (HCC)    Generalized anxiety disorder    Fibromyalgia    Migraine with aura, not intractable, without status migrainosus          Subjective:     Patient ID: Chaka Blake is a 37 y o  female  [facility] Treatment Plan   AREAS OF NEED: Chaka Blake has experienced worsening depression symptoms as evidenced by passive suicidal ideation (with a plan but no intent), anxious at times,  feelings of loneliness, discontentment, and hopelessness, low self- esteem, isolating from others, feels exhausted or tired all of the time, difficulty concentrating/focusing, difficulty completing tasks or beginning tasks,  with no completion, limited motivation to complete tasks due to recent shifts in her support system, her inability to set emotional boundaries with others, her father's ongoing health issues, and her brother recently moving back home     Date Initiated: 02/08/23    Strengths: Sense of humor, intelligence, creativity, empathetic     LONG TERM GOAL:   Date Initiated: 02/08/23  1 0 By the end of program, I will identify 3 ways my mental has improved and 3 coping skills or strategies I can use to improve and/or maintain my mental health wellness  Target Date: 03/08/23  Completion Date:       SHORT TERM OBJECTIVES:     Date Initiated: 02/08/23  1 1 By the end of program, I will identify 3 specific needs that I need to set boundaries for with others and clearly define what boundary needs to be set     Revision Date:   Target Date: 02/17/23  Completion Date:     Date Initiated: 02/08/23  1 2 By the end of program, I will learn 3 ways I can advocate for my needs, wants, and emotions and independently advocate with others at least 3 times by the completion of my program   Revision Date:   Target Date: 02/17/23  Completion Date:    Date Initiated: 02/08/23  1 3 I will take medications as prescribed and share questions and concerns if arise  Revision Date:  Target Date: 02/17/23  Completion Date:     Date Initiated: 02/08/23  1 4 I will identify 3 ways my supports can assist in my recovery and agree to staff/support contact as indicated  Revision Date:  Target Date: 02/17/23  Completion Date:          7 DAY REVISION:    Date Initiated:  Revision Date:   Target Date:   Completion Date:      PSYCHIATRY:  Date Initiated:  02/08/23  Medication Management and Education       Revision Date:   The person(s) responsible for carrying out the plan is Aileen Lorenzana MD    NURSING:   Date Initiated: 02/08/23  1 1,1 2,1 3,1 4 This therapist will provide wellness/symptoms and skill education groups three to five days weekly to educate Leticia Giang on signs and symptoms of diagnoses, skills to manage, and medication questions that will be addressed by the treatment team     Revision Date:  The person(s) responsible for carrying out the plan is Clare Fay MS     PSYCHOLOGY:   Date Initiated: 02/08/23       1 1, 1 2, 1 4 Provide psychotherapy group 5 times per week to allow opportunity for Leticia Giang  to explore stressors and ways of coping  Revision Date:   The person(s) responsible for carrying out the plan is Radha Platt  ALLIED THERAPY:   Date Initiated: 02/08/23  1 1,1 2 Engage Esther Griffith in AT group 5 times per week to encourage development and use of wellness tools to decrease symptoms and promote recovery through meaningful activity  Revision Date:   The person(s) responsible for carrying out the plan is STANTON Ruiz and STANTON Kiser    CASE MANAGEMENT:   Date Initiated: 02/08/23      1 0 This  will meet with Leticia Giang  3-4 times weekly to assess treatment progress, discharge planning, connection to community supports and UR as indicated    Revision Date:   The person(s) responsible for carrying out the plan is STANTON Kiser    TREATMENT REVIEW/COMMENTS:     DISCHARGE CRITERIA: Identify 3 signs of progress and complete relapse prevention plan  DISCHARGE PLAN: Connect with outpatient providers  Estimated Length of Stay: 10 treatment days        Diagnosis and Treatment Plan explained to Ramona Pablo relates understanding diagnosis and is agreeable to Treatment Plan  CLIENT COMMENTS / Please share your thoughts, feelings, need and/or experiences regarding your treatment plan with Staff  Please see follow up note with comments  Signatures can be found on [facility] Treatment plan consent form

## 2023-02-08 NOTE — PSYCH
Subjective:     Patient ID: Juancho Whitehead is a 37 y o  female  [facility] Clinical Progress Notes      Specialized Services Documentation  Therapist must complete separate progress note for each specific clinical activity in which the individual participated during the day  Other  Paperwork faxed to [facility]     Case Management Note  [id number]- QN met with Juancho Whitehead  Reviewed program structure, expectations and was given on call number and crisis phone numbers  Juancho Whitehead completed releases and OP providers/ PCP notified of admission and health care coordination form completed  Completed initial psycho-social evaluation and initial treatment goals discussed  STANTON Cosby    Current suicide risk : Low     Medications changes/added/denied? Yes; increase Nortiptyline (see Dr Victor M Bustillo psychiatric evaluation)     Treatment session number: 1    Individual Case Management Visit provided today? Yes    [facility] follow up physician's orders: Admit to PHP  Read Dr Victor M Bustillo (medical resident; attending Dr Stalin Veronica-co-signed)  psychiatric evaluation
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
## 2023-02-08 NOTE — PSYCH
Subjective:    Patient ID: Sveta Horton is a 37 y o  female      [facility] Clinical Progress Notes      Specialized Services Documentation  Therapist must complete separate progress note for each specific clinical activity in which the individual participated during the day  Education Therapy   [id number]  Sveta Horton engaged throughout the treatment day  Was engaged in learning related to Illness, Medication, Aftercare and Wellness Tools  Staff utilized Verbal, Written, A/V and Demonstration teaching methods  Sveta Horton shared area of learning and set a goal for outside of program to try implementing one sleep hygiene tool/technique tonight that she learned in group today  Tx Plan Objective: 1 1, 1 2, 1 4, Therapist: ZOHREH Tran    PSYCHOTHERAPY ([id number])  CPS Addis Welsh shared her life story as she co-led this session  The guest speaker presentation about SILKE encouraged group members to reflect on their mental health journey  regarding the power of learning about self, accepting illness, and personal responsibility in recovery  PANCHITO Syed reviewed community resources in addition to personal resources like positive affirmations and an acronym she uses to remind her about what tools she should use on a daily basis to maintain mental health wellness  Sveta Horton  attentively listened to Yahaira's presentation  Progress toward goals noted  Continue psychotherapy to encourage self -awareness and healthy engagement of supports      TX Plan Objectives: 1 1, 1 2, 1 4   Therapist: Carlos Whitaker, [address] [place] [address]

## 2023-02-08 NOTE — PSYCH
Visit Time    Visit Start Time: 4279  Visit Stop Time: 7467  Total Visit Duration: 60 minutes     Subjective:     Patient ID: Magan Landaverde is a 37 y o  y o female  [facility] Clinical Progress Notes      Specialized Services Documentation  Therapist must complete separate progress note for each specific clinical activity in which the individual participated during the day  Allied Therapy Group     Magan Landaverde actively shared in music therapy group focused on distress tolerance skill “self-soothe”  Leah Braden was observed to be engaged in therapist led visualization  Group discussed specific items that could help self soothe if in an “anxiety crisis” with encouragement to use these things regularly to manage stressors consistently  “STOP” and “TIP” skills also reviewed  Leah Braden identified they would put a candle  in  a “self soothe kit”  Some initial effort noted toward tx goal   Continue AT to encourage development of wellness skills and consistent practice     Tx Plan Objective: 1 1 Therapist:  Le EID
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
## 2023-02-08 NOTE — PSYCH
Subjective:     Patient ID: Ralph Baldwin is a 37 y o  female  [facility] Clinical Progress Notes      Specialized Services Documentation  Therapist must complete separate progress note for each specific clinical activity in which the individual participated during the day  Psychotherapeutic Group ([id number])    The group learned and discussed what happens in the body and mind during sleep, the consequences of poor sleep, things that cause poor sleep, and ways/skills to get to sleep/stay asleep  They shared methods that work, methods that don't, and learned new ones  We reviewed the stages of sleep in detail, in addition to multiple sleep hygiene handouts and sleep diaries  There were multiple handouts and worksheets in addition to white board content  This group contained a large amount of discussion and sharing  Ralph Baldwin shared actively and contributed to the group by sharing her experience with journaling before bed  Hoang Vidal explained that doing a "brain dump" prior to going to bed, helps her fall asleep faster/easier  Hoang Vidal is actively working towards goals  Continue psychotherapy groups to encourage further exploration of needs, personal awareness, and skills  Tx Plan Objective: 1 1,1 2, 1 4   Therapist: Feng Bonilla MS    Education Therapy   [id number] Ralph Baldwin was not present for morning assessment due to being in an intake evaluation         Tx Plan Objective: 1 1,1 2, 1 4   Therapist: Feng Bonilla MS

## 2023-02-09 ENCOUNTER — OFFICE VISIT (OUTPATIENT)
Dept: PSYCHOLOGY | Facility: CLINIC | Age: 43
End: 2023-02-09

## 2023-02-09 DIAGNOSIS — G43.109 MIGRAINE WITH AURA, NOT INTRACTABLE, WITHOUT STATUS MIGRAINOSUS: ICD-10-CM

## 2023-02-09 DIAGNOSIS — F41.1 GENERALIZED ANXIETY DISORDER: ICD-10-CM

## 2023-02-09 DIAGNOSIS — F33.1 MAJOR DEPRESSIVE DISORDER, RECURRENT EPISODE, MODERATE (HCC): Primary | ICD-10-CM

## 2023-02-09 DIAGNOSIS — M79.7 FIBROMYALGIA: ICD-10-CM

## 2023-02-09 NOTE — PSYCH
Subjective:     Patient ID: Garrett Lin is a 37 y o  female  [facility] Clinical Progress Notes      Specialized Services Documentation  Therapist must complete separate progress note for each specific clinical activity in which the individual participated during the day  Allied Therapy   [id number] Group participated in a 511 [employer] game consisting of review questions about sessions that week, coping skills, and the HEARS acronym as well as reasons for goal-setting  Group also participated in musical challenges such as remembering songs from previous music therapy sessions, listening to song clips to identify the song, and singing along with the   Garrett Lin participated to the best of her ability in team discussion and musical activities despite being new to the program  After the game she commented that the game was fun and she understands that she will learn a lot over her time here  Continue AT for development and practice of group interaction and reviewing skills taught in program     Tx Objectives: 1 1, 1 2, 1 4  Therapists: EDNA Bragg and BLOSSOM NaranjoBC      Education Therapy   [id number] Garrett Lin actively shared in morning assessment and goal review  Presented as Receptive related to readiness to learn  Garrett Lin did complete goal from last treatment day identifying gaining advocacy  did not present with any barriers to learning  [id number] Garrett Lin engaged throughout the treatment day  Was engaged in learning related to Illness, Medication, Aftercare and Wellness Tools  Staff utilized Verbal, Written, A/V and Demonstration teaching methods  Garrett Lin shared area of learning and set a goal for outside of program to make a list of/label her worries in order to affectively organize and address them        Tx Objective: 1 1, 1 2, 1 4 Therapist: EDNA Bragg

## 2023-02-09 NOTE — PSYCH
Subjective:    Patient ID: Neal Frey is a 37 y o  female      [facility] Clinical Progress Notes      Specialized Services Documentation  Therapist must complete separate progress note for each specific clinical activity in which the individual participated during the day  Group Psychotherapy    [id number] Neal Frey participated actively in a psychotherapy group focused on scheduled worry time  The group began with an independent "brain dump" writing activity (where participants wrote down all miscellaneous thoughts in their minds), followed by sharing and an open discussion about relationships with daily anxieties  Group members then went through detailed steps of using scheduled worry time as participants voluntarily contributed ideas to each step  Participants all received paper handouts and shared ideas were written on the whiteboard  The group was also provided with tips and disclaimers associated with the technique  Time was designated at the end of the group for members to independently create  (then voluntarily share) a scheduled worry time plan, based on steps that were discussed throughout the group  Neal Frey thoughtfully verbally and independently engaged throughout the group  Continued progress noted towards goals  Continue with psychotherapy to further develop anxiety management strategies       Tx Plan Objective 1 1, 1 2, 1 4 Therapist: ZOHREH David

## 2023-02-09 NOTE — PSYCH
Subjective:     Patient ID: Jose Raul Riggs 37 y o  Female    [facility] Clinical Progress Notes      Specialized Services Documentation  Therapist must complete separate progress note for each specific clinical activity in which the individual participated during the day  Group Psychotherapy     [id number] Jose Raul Riggs actively participated in a psychoeducational group focused on learning: How to Become Your “Future Self ” The group learned 5 steps that can support them in creating the future they want for themselves  These included:  1  Imagine you who you want to be in three years  2  Feel, deeply, what it would feel like to truly be that person   3  Shift as much in your current life to reflect your future self  4  Expected everything, attach to nothing  5  Measure the gain, not the gap  Members were encouraged to challenge their thinking patterns and engaged in a discussion about each step for their future self   validated feelings that it is easier to remember the past than to imagine a future, but it's also because we don't take time to imagine such and generally make assumptions that we cannot change   asked about assumptions that we generally make and how that impacts are ability to get unstuck   also provided ways in which we assume things about the future and ways to examine this process:     • What do you expect that will happen in the next 6 months from now? • What evidence do you have to support these expectations? • What might happen instead? • What evidence do you have to support the notion that the alternative is possible? • What do you hope to happen? • What evidence do you have to support the notion that your aspiration is possible?  used the following structure to support and lead the group psychoeducation, open ended questions, supportive environment, and structure to support learning and self-reflection   Jose Raul Riggs contributed to discussion by sharing about the topic discussed, relating to group members, and allowing self to be open/vulnerable  Danny Paulson shared about herself and her journey and how she wants to continue to learn about herself to keep evolving  beginning progress noted towards goal  Continue with psychoeducation for further exploration of increasing empowerment   Tx Plan Objective 1 1, 1 2, 1 4 Therapist: Kim Anaya

## 2023-02-09 NOTE — PSYCH
Subjective:     Patient ID: Alexandro Vanegas is a 37 y o  female  [facility] Clinical Progress Notes      Specialized Services Documentation  Therapist must complete separate progress note for each specific clinical activity in which the individual participated during the day  Case Management Note  [id number]-  Esther and BRANDI reviewed initial tx plan  She had no questions or concerns regarding her tx plan  She stated that she is enjoying groups and gained a lot of information about the stress response cycle in regards to setting designated worry time  No additional comments or concerns at this time  STANTON Sierra    Current suicide risk : Low     Medications changes/added/denied? No    Treatment session number: 2    Individual Case Management Visit provided today? Yes     [facility] follow up physician's orders: Continue to follow orders

## 2023-02-10 ENCOUNTER — DOCUMENTATION (OUTPATIENT)
Dept: PSYCHOLOGY | Facility: CLINIC | Age: 43
End: 2023-02-10

## 2023-02-10 ENCOUNTER — APPOINTMENT (OUTPATIENT)
Dept: PSYCHOLOGY | Facility: CLINIC | Age: 43
End: 2023-02-10

## 2023-02-10 NOTE — PROGRESS NOTES
Subjective:     Patient ID: Ty Tracy is a 37 y o  female  [facility] Clinical Progress Notes      Specialized Services Documentation  Therapist must complete separate progress note for each specific clinical activity in which the individual participated during the day  Case Management Note  Christina Arriola called out from program as she had concerns will illness symptoms that she may be COVID-19 positive  1426- CM called and left a voicemail for Esther to check-in with how she was feeling  CM reviewed the policy that for Esther to return she will need to provide a negative COVID-19 test result whether a picture or a written document from a provider  STANTON Bradford    Current suicide risk : Unable to assess due to absence    Medications changes/added/denied? No    Treatment session number: N/A    Individual Case Management Visit provided today? No    [facility] follow up physician's orders: Continue to follow orders

## 2023-02-13 ENCOUNTER — OFFICE VISIT (OUTPATIENT)
Dept: PSYCHOLOGY | Facility: CLINIC | Age: 43
End: 2023-02-13

## 2023-02-13 ENCOUNTER — OFFICE VISIT (OUTPATIENT)
Dept: PSYCHIATRY | Facility: CLINIC | Age: 43
End: 2023-02-13

## 2023-02-13 DIAGNOSIS — G43.109 MIGRAINE WITH AURA, NOT INTRACTABLE, WITHOUT STATUS MIGRAINOSUS: ICD-10-CM

## 2023-02-13 DIAGNOSIS — F41.1 GENERALIZED ANXIETY DISORDER: ICD-10-CM

## 2023-02-13 DIAGNOSIS — F33.1 MAJOR DEPRESSIVE DISORDER, RECURRENT EPISODE, MODERATE (HCC): Primary | ICD-10-CM

## 2023-02-13 DIAGNOSIS — F33.2 SEVERE RECURRENT MAJOR DEPRESSION WITHOUT PSYCHOTIC FEATURES (HCC): Primary | ICD-10-CM

## 2023-02-13 DIAGNOSIS — M79.7 FIBROMYALGIA: ICD-10-CM

## 2023-02-13 NOTE — PSYCH
PHP MEDICATION MANAGEMENT NOTE        [facility]    Name and Date of Birth:  Keagan Johnson 37 y o  1980 MRN: [id number]    Date of Visit: February 13, 2023    Allergies   Allergen Reactions   • Cannabidiol Anxiety, Confusion, Hypertension, Itching, Palpitations, Shortness Of Breath, Swelling, Throat Swelling and Tongue Swelling   • Amoxicillin-Pot Clavulanate    • Decadrol [Dexamethasone] Other (See Comments)     Category: Adverse Reaction;   psychosis   • Dexamethasone Sodium Phosphate Other (See Comments)     psychosis   • Other Throat Swelling     CBD oil    • Penicillins Hives and Other (See Comments)     Category: Allergy; Hives/Uticaria   • Tetracycline Hives and Other (See Comments)     Hives/Uticaria   • Tetracyclines & Related Hives     Category: Allergy; Visit Time    Visit Start Time: 3099  Visit Stop Time: 1000  Total Visit Duration: 20 minutes    SUBJECTIVE:    Jose Benson is seen today for a follow up for Major Depressive Disorder  She reports that she continues to improve gradually since beginning PHP  Jose Benson reports she is having a good experience with the partial, feeling comfortable in the groups, learning new skills  She reports significant improvement in both depression and anxiety  Tolerating change of nortriptyline and states "I do not feel any different, but I might feel clearer in my thoughts "  States she is grateful and glad she made the decision to try the partial program again  Denying any suicidal thoughts or passive death wish  Still has trouble maintaining sleep, even with the as needed hydroxyzine  States she is worried about speaking to her Catholic about what she has been going through lately, feels this is interfering with her sleep  She denies any side effects from current psychiatric medications      PLAN:  Continue psychiatric medications the same    Aware of 24 hour and weekend coverage for urgent situations accessed by calling [facility] main practice number  Continue partial hospitalization program    Diagnoses and all orders for this visit:    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 psychotic features ([address] )    Generalized anxiety disorder        Current Outpatient Medications on File Prior to Visit   Medication Sig Dispense Refill   • Blood Glucose Monitoring Suppl (Contour Blood Glucose System) w/Device KIT Use 1 kit 2 (two) times a day before meals 1 kit 0   • Blood Glucose Monitoring Suppl (OneTouch Verio Flex System) w/Device KIT Use 2 (two) times a day 1 kit 0   • celecoxib (CeleBREX) 200 mg capsule TAKE 1 CAPSULE BY MOUTH TWICE A DAY 60 capsule 6   • Cholecalciferol (VITAMIN D-3) 1000 units CAPS Take 1 capsule by mouth daily     • cyclobenzaprine (FLEXERIL) 10 mg tablet TAKE 2 TABLETS (20 MG TOTAL) BY MOUTH DAILY AT BEDTIME AS NEEDED FOR MUSCLE SPASMS 60 tablet 6   • desvenlafaxine (PRISTIQ) 100 mg 24 hr tablet TAKE 1 TABLET BY MOUTH EVERY DAY 30 tablet 2   • desvenlafaxine succinate (PRISTIQ) 50 mg 24 hr tablet TAKE 1 TABLET (50 MG TOTAL) BY MOUTH DAILY TOTAL DAILY DOSE 150 MG DAILY 30 tablet 2   • EPINEPHrine (EPIPEN) 0 3 mg/0 3 mL SOAJ Inject 0 3 mL (0 3 mg total) into a muscle once for 1 dose 0 6 mL 0   • glucose blood (Contour Test) test strip Check blood sugar once daily 100 strip 3   • hydrOXYzine HCL (ATARAX) 50 mg tablet TAKE 1 TABLET (50 MG TOTAL) BY MOUTH DAILY AT BEDTIME AS NEEDED (INSOMNIA) 30 tablet 2   • IRON PO Take by mouth     • lidocaine (LIDODERM) 5 % APPLY 1 PATCH TOPICALLY IN THE MORNING  REMOVE & DISCARD PATCH WITHIN 12 HOURS OR AS DIRECTED BY MD  30 patch 1   • MAGNESIUM OXIDE PO Take by mouth     • megestrol (MEGACE) 40 mg tablet Take 1 tablet (40 mg total) by mouth 3 (three) times a day for 5 days 15 tablet 0   • metFORMIN (GLUCOPHAGE) 500 mg tablet Take 1 tablet (500 mg total) by mouth 2 (two) times a day with meals 60 tablet 5   • nortriptyline (PAMELOR) 50 mg capsule Take 1 capsule (50 mg total) by mouth 2 (two) times a day 60 capsule 0   • propranolol (INDERAL) 20 mg tablet TAKE 1 TABLET BY MOUTH EVERY DAY AT NIGHT 30 tablet 11     No current facility-administered medications on file prior to visit  Psychotherapy Provided:     Individual psychotherapy provided: Supportive counseling provided  Medications, treatment progress and treatment plan reviewed with Zhang Baltazar  Medication changes discussed with Esther  Reassurance and supportive therapy provided  HPI ROS Appetite Changes and Sleep:     She reports frequent awakenings, fluctuating sleep pattern, fluctuating appetite, fluctuating energy levels   Denies homicidal ideation, denies suicidal ideation    Review Of Systems:      General Improved functioning   Personality no change in personality   Constitutional negative   ENT negative   Cardiovascular negative   Respiratory negative   Gastrointestinal negative   Genitourinary negative   Musculoskeletal negative   Integumentary negative   Neurological negative   Endocrine negative   Other Symptoms none, all other systems are negative     Mental Status Evaluation:    Appearance Adequate hygiene and grooming   Behavior calm and cooperative   Mood improving   Speech Normal rate and volume   Affect mood-congruent   Thought Processes Goal directed and coherent   Thought Content Does not verbalize delusional material   Associations Tightly connected   Perceptual Disturbances Denies hallucinations and does not appear to be responding to internal stimuli   Risk Potential Suicidal/Homicidal Ideation - No evidence of suicidal or homicidal ideation and patient does not verbalize suicidal or homicidal ideation  Risk of Violence - Potentially aggresive and violent  Risk of Self Mutilation - No evidence of risk for self mutilation found on assessment   Orientation oriented to person, place, time/date and situation   Memory recent and remote memory grossly intact   Consciousness alert and awake   Attention/Concentration attention span and concentration are age appropriate   Insight improving   Judgement improving   Muscle Strength and Gait normal muscle strength and normal muscle tone, normal gait/station and normal balance   Motor Activity no abnormal movements   Language no difficulty naming common objects, no difficulty repeating a phrase, no difficulty writing a sentence   Fund of Knowledge adequate knowledge of current events  adequate fund of knowledge regarding vocabulary      Past Psychiatric History Update:     Inpatient Psychiatric Admission Since Last Encounter:   no  Suicide Attempt Or Self Mutilation Since Last Encounter:   no  Incidence of Violent Behavior Since Last Encounter:   no    Traumatic History Update:     New Onset of Abuse Since Last Encounter:   no  Traumatic Events Since Last Encounter:   no    Past Medical History:    Past Medical History:   Diagnosis Date   • Anxiety    • Chronic pain disorder    • Chronic sinusitis    • Depression    • Diabetes ([facility] [address] )    • Fibromyalgia    • Migraine    • Obesity    • Polyarthritis     Last assessed 9/21/2015   • Psychiatric disorder    • Psychiatric illness    • Sleep difficulties         Past Surgical History:   Procedure Laterality Date   • COLONOSCOPY  06/2019   • DENTAL SURGERY     • HYSTEROSCOPY      Endometrial Biopsy By Hysteroscopy   • REMOVAL OF INTRAUTERINE DEVICE (IUD)     • US GUIDED BREAST BIOPSY RIGHT COMPLETE Right 6/17/2019     Allergies   Allergen Reactions   • Cannabidiol Anxiety, Confusion, Hypertension, Itching, Palpitations, Shortness Of Breath, Swelling, Throat Swelling and Tongue Swelling   • Amoxicillin-Pot Clavulanate    • Decadrol [Dexamethasone] Other (See Comments)     Category:  Adverse Reaction;   psychosis   • Dexamethasone Sodium Phosphate Other (See Comments)     psychosis   • Other Throat Swelling     CBD oil    • Penicillins Hives and Other (See Comments)     Category: Allergy; Hives/Uticaria   • Tetracycline Hives and Other (See Comments)     Hives/Uticaria   • Tetracyclines & Related Hives     Category:  Allergy;      Substance Abuse History:    Social History     Substance and Sexual Activity   Alcohol Use No    Comment: 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      Social History     Substance and Sexual Activity   Drug Use Not Currently     Social History:    Social History     Socioeconomic History   • Marital status: Single     Spouse name: Not on file   • Number of children: 0   • Years of education: 12 years    • Highest education level: GED or equivalent   Occupational History   • Occupation: unemployed   Tobacco Use   • Smoking status: Never   • Smokeless tobacco: Never   Vaping Use   • Vaping Use: Never used   Substance and Sexual Activity   • Alcohol use: No     Comment: 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    • Drug use: Not Currently   • Sexual activity: Not Currently   Other Topics Concern   • Not on file   Social History Narrative    Caffeine use        What type of home do you live in: Single house    Age of your home: 50 yrs    How long have you been living there: 44 yrs    Type of heat: Baseboard    Type of fuel: Electric    What type of samir is in your bedroom: Carpet    Do you have the following in or near your home:    Air products: Window air conditioning and Ionic air purifier    Pests: Mice    Pets: Cat    Basement: None     Social Determinants of Health     Financial Resource Strain: Not on file   Food Insecurity: Not on file   Transportation Needs: Not on file   Physical Activity: Insufficiently Active   • Days of Exercise per Week: 2 days   • Minutes of Exercise per Session: 60 min   Stress: Not on file   Social Connections: Not on file   Intimate Partner Violence: Not on file   Housing Stability: Not on file     Family Psychiatric History:     Family History   Problem Relation Age of Onset   • Irregular heart beat Mother    • Diabetes Father    • Kidney disease Father    • Diabetes Maternal Grandmother    • Rheum arthritis Maternal Grandmother    • Melanoma Maternal Grandmother 80   • No Known Problems Maternal Grandfather    • Kidney disease Paternal Grandmother    • Rheum arthritis Paternal Grandmother    • Depression Paternal Grandmother    • Diabetes Paternal Grandfather    • Lung cancer Paternal Grandfather 52   • Substance Abuse Brother    • No Known Problems Brother    • Substance Abuse Maternal Uncle    • Prostate cancer Maternal Uncle 61   • Depression Paternal Aunt    • Alcohol abuse Family    • Stomach cancer Family      History Review: The following portions of the patient's history were reviewed and updated as appropriate: allergies, current medications, past family history, past medical history, past social history, past surgical history and problem list     OBJECTIVE:     Vital signs in last 24 hours: There were no vitals filed for this visit  Laboratory Results: I have personally reviewed all pertinent laboratory/tests results  Medications Risks/Benefits:      Risks, Benefits And Possible Side Effects Of Medications:    Discussed risks and benefits of treatment with patient including risk of suicidality, serotonin syndrome, increased QTc interval and SIADH related to treatment with antidepressants;  Risk of induction of manic symptoms in certain patient populations     Controlled Medication Discussion:     Not applicable - controlled prescriptions are not prescribed by this practice    ADELAIDA Coronado 02/13/23    This note was not shared with the patient due to reasonable likelihood of causing patient harm

## 2023-02-13 NOTE — PSYCH
Subjective:     Patient ID: Jerry Wright 37 y o  Female    [facility] Clinical Progress Notes      Specialized Services Documentation  Therapist must complete separate progress note for each specific clinical activity in which the individual participated during the day  Group Psychotherapy    [id number] Jerry Wright actively participated in in an open processing group about self-care  Group members were provided with what self-care is and why self-care is important  Group members engaged in conversation about the different components of self-care:   • Physical   • Psychological   • Emotional   • Spiritual  • Personal   • Professional   Group members assessed their current self-care strategies and engagement in self-care  They also discussed challenges related to self-care and nurturing vs  depleting activities  The group was led in a discussion about being intentional with their self-care and to have balance with what is most important to them   also guided the conversation through discussion of a Fixed vs  Growth mindset   provided the group with the Self-Care wheel that encourages them to ask self-reflecting questions to increase their self-care habits  Group leaded provided space for members to share as they felt comfortable, provided active listening, and encouragement to share and offer support to group   used the following structure to support and lead the group: open ended questions, verbal and nonverbal gestures for encouragement, and space for vulnerability  Jerry Wright contributed to discussion about her journey with self care and how to find balance  good  progress noted towards goal as evidenced by engaging with peers through supportive comments and willingness to share  Continue with psychoeducation to further strengthen development of incorporating self-care skills and techniques  Tx Plan Objective 1 1, 1 2, 1 4 Therapist: Barbie Morel

## 2023-02-13 NOTE — PSYCH
Subjective:    Patient ID: Stone Miller is a 37 y o  female      [facility] Clinical Progress Notes      Specialized Services Documentation  Therapist must complete separate progress note for each specific clinical activity in which the individual participated during the day  Group Psychotherapy    [id number] Stone Miller participated actively in a psychotherapy group on creative expression in communication and teamwork  The group started with a team building exercise, then members were divided evenly into two teams and instructed to develop a team name  Participants were given blank slips of paper to independently write words/terms associated specifically with mental health & wellness (to be randomly selected by members to draw), which were folded and put into a bin  Alternating turns, members from each team pulled out a submission to draw on the whiteboard, without using written or spoken words, for their teammates to guess  Members creatively and mindfully collaborated in guessing the drawing on the board, limited by a timer which group members volunteered to manage  With some basic guidelines provided by the facilitator, the group was in charge of collaborating and compromising to create unique rules, exceptions, and specifications to be utilized for the activity  Participants communicated ideas and insights constructively and were able to come to consensus on how they wanted the group to operate during the activity  The group also demonstrated the ability to make decisions in the moment for new issues or challenges that arose throughout  Participants exhibited creativity, effective (small scare) conflict resolution, negotiation and compromise skills, active listening abilities, and strength towards peer-support  Stone Miller asked thoughtful questions and actively contributed to the activity / discussion throughout the group   For example, Dina Echevarria spoke up in advocacy for the opposing team receiving credit that might have otherwise gone unnoticed  Continue to note progress towards treatment goals  Continue with psychotherapy to encourage further practice on creative communication and teamwork      Tx Plan Objective: 1 1, 1 2, 1 4  Therapist: ZOHREH Coleman

## 2023-02-13 NOTE — PSYCH
Subjective:     Patient ID: Ralph Baldwin is a 37 y o  female  [facility] Clinical Progress Notes      Specialized Services Documentation  Therapist must complete separate progress note for each specific clinical activity in which the individual participated during the day  Allied Therapy   [id number]- Ralph Baldwin  actively shared in [facility] group focused on worrying  Group began with a music improvisation where they actively played on their instruments regarding what it feels like and sounds like to worry  Group then analyzed the body sensations and experiences associated to the music improvisation  Group listened to a song and discussed the lyrics and meaning to their own life  Each individual rewrote a section of the verse of the song and identified 3 personality characteristics or qualities they can use to limit worrying as well as learned 3 other preventative worrying strategies: the triangle technique, the responsibility pie chart, best friend role play, and list and descriptions  Ralph Baldwin participated in each music experience and presented as recpetive as observed by her taking notes when learning various strategies on how to manage worry  Some effort noted toward treatment goal   Continue AT to encourage the development and practice of mindfulness strategies to alleviate symptoms and support wellness  Tx Plan Objective: 1 1, 1 2, 1 4    Therapist:  Palmer Son, MT-BC      Other  Sent in second MA packet due to first claim being denied with incorrect information; Paperwork faxed to [facility]       Case Management Note  [id number]- JW met with Hoang Vidal  She signed an attestation form that she received a medication check on 02/08/23  Overall, Hoang Vidal reported that she had a good weekend   She stated that she finds herself using the skills she has learned from program  She identified one area she wants to improve on is physical movement or exercise but lately has been experiencing difficulty with motivating for herself to engage in exercise for an hour  BRANDI and Anant Daniel discussed the importance of realistic expectations and aiming for 10 minutes of movement and slowly increase across time  To check-in on this goal        Yvonne Rodriguez MT-BC    Current suicide risk : Low    Medications changes/added/denied? No    Treatment session number: 3    Individual Case Management Visit provided today? No    [facility] follow up physician's orders: continue to follow orders

## 2023-02-13 NOTE — PSYCH
Subjective:     Patient ID: Erica Mcadams is a 37 y o  female  [facility] Clinical Progress Notes      Specialized Services Documentation  Therapist must complete separate progress note for each specific clinical activity in which the individual participated during the day  Education Therapy   [id number] Erica Mcadams actively shared in morning assessment and goal review  Presented as Receptive related to readiness to learn  Erica Mcadams did complete goal from last treatment day identifying gaining hope and education  did not present with any barriers to learning  [id number] Erica Mcadams engaged throughout the treatment day  Was engaged in learning related to Illness, Medication, Aftercare, and Wellness Tools  Staff utilized Verbal, Written, A/V, and Demonstration teaching methods  Erica Mcadams shared area of learning and set a goal for outside of program to work on her "responsibility pie" from group today        Tx Plan Objective: 1 1,1 2, 1 4   Therapist: Bernice Toledo MS

## 2023-02-14 ENCOUNTER — OFFICE VISIT (OUTPATIENT)
Dept: PSYCHOLOGY | Facility: CLINIC | Age: 43
End: 2023-02-14

## 2023-02-14 DIAGNOSIS — M79.7 FIBROMYALGIA: ICD-10-CM

## 2023-02-14 DIAGNOSIS — F33.1 MAJOR DEPRESSIVE DISORDER, RECURRENT EPISODE, MODERATE (HCC): Primary | ICD-10-CM

## 2023-02-14 DIAGNOSIS — G43.109 MIGRAINE WITH AURA, NOT INTRACTABLE, WITHOUT STATUS MIGRAINOSUS: ICD-10-CM

## 2023-02-14 DIAGNOSIS — F41.1 GENERALIZED ANXIETY DISORDER: ICD-10-CM

## 2023-02-14 NOTE — PSYCH
Subjective:     Patient ID: Fabián Rubio 37 y o  Female    [facility] Clinical Progress Notes      Specialized Services Documentation  Therapist must complete separate progress note for each specific clinical activity in which the individual participated during the day  Group Psychotherapy     [id number] Fabián Rubio actively participated in a psychoeducational focused on Window of Tolerance  The group was led through a discussion about what the window of tolerance is and identifying their  ideal emotinal zone that a person needs for optimal functioning  In addition, they learned how to recognize ways when they step outside of their optimal zone and are hyper or hypoaroused which engages ones' fight/flight/freeze/face response  Group participants engaged in and completed a worksheet where they learned how to become more aware of their window of tolerance through identifying symptoms they experience when they are hyper and hyparoused  The group also learned about the “happiness chemicals”:   • DOSE:  o Dopamine (motivation/pleasure)   o Oxytocin (cuddle hormone)  o Serotonin (feeling important)  o Endorphins (brief euphoria)     They engaged in discussion about what chemical they feel they needed to focus on the most and what type of activity they want to engage in  Examples included,  meditation, creativity, cold shower, sunlight, massage, laughter/crying, eating spicy foods, and exercising/stretching  Fabián Rubio identified she wanted to work on feeling happy through increasing her endorphin levels and enjoying dark choclate  Lastly, the group discussed self regulation and staying within the Window of Tolerance and using the body   Such examples were:   • Centering exercise   • Grounding exercise   • 4 x 4 x 4 breathing   • Pushing against a wall with arms fully extended to focus on awareness   • Mantras: "I can be present and watch the waves of energy go by without getting caught in the story "   Other examples discussed:   • Gratitude  • Self care  • High vibrational music   • Healthy diet   • Aromatherapy   • Affirmations   used the following structure to support and lead the group worksheets to identify the severity of their hyper and hypoarousal states, open discussion/processing, peer support, and psychoeducation  good goal progress as evidenced by contributed to discussion by sharing about the topic discussed, relating to group members, and allowing self to be open/vulnerable  Continue with psychoeducation to further strengthen enhanced ability to express emotions in an appropriate way across different settings and widen window of tolerance        Tx Plan Objective 1 1, 1 2, 1 4 Therapist: Chio Mejia

## 2023-02-14 NOTE — PSYCH
Subjective:     Patient ID: Martha Peralta is a 37 y o  female  [facility] Clinical Progress Notes      Specialized Services Documentation  Therapist must complete separate progress note for each specific clinical activity in which the individual participated during the day  Allied Therapy   [id number] Group was divided into groups and assigned a common triggering situation such as "being misunderstood" or "when someone speaks unkindly to me"  Groups were prompted to write a 10-line fictional story about that situation taking place and navigating it ineffectively  Group members completed this task nonverbally, alternating turns line by line  Afterward, groups were given time to discuss observations and themes in their stories  Next, each group was given a melodic instrument and was prompted to create a chord for each line of their story as a means of exploring emotions tied to personal tendencies in the situation  Martha Peralta participated actively in all activities, and appeared to communicate well in her group by contributing and involving other group members in decision-making  She appeared to be frustrated with another peer at one point observed through the tone of her response  Continue AT for development and practice of recognizing impulses when confronted with common triggers  Tx Objectives: 1 1, 1 2, 1 4  Therapists: EDNA Bhaagt and Meli Jordan MT-BC      Education Therapy   [id number] Martha Peralta actively shared in morning assessment and goal review  Presented as Receptive related to readiness to learn  Martha Peralta did not complete goal from last treatment day identifying wanting to gain support  did not present with any barriers to learning  [id number] Martha Peralta engaged throughout the treatment day  Was engaged in learning related to Illness, Medication, Aftercare and Wellness Tools  Staff utilized Verbal, Written, A/V and Demonstration teaching methods    Martha Peralta shared area of learning and set a goal for outside of program to wash her floors        Tx Objective: 1 1, 1 2, 1 4 Therapist: EDNA Mcintyre

## 2023-02-14 NOTE — PSYCH
Subjective:    Patient ID: Chaka Blake is a 37 y o  female      [facility] Clinical Progress Notes      Specialized Services Documentation  Therapist must complete separate progress note for each specific clinical activity in which the individual participated during the day  Group Psychotherapy    2023-5298 Chaka Blake participated actively in a psychotherapy focused on problem solving  The topic of "solvable" problems was introduced and the group instructed to (anonymously) write an appropriate problem they are currently facing on the top of a provided paper; one that they felt comfortable with group members seeing  The papers were passed in a [place] to the left and during each pass, time was provided to write a potential solution or step towards a solution  Participants were also given the option to write a coping skill that might be helpful for the individual with the specific problem, if unable to think of a solution or idea  This went on until everyone had their personal paper back  Members were given time to read over their paper with group input on it, while being provided with a worksheet  On one side of the worksheet, there was a grid to write in four possible solutions and strengths / weaknesses (or pros/cons, advantages/disadvantages) for each  On the other side, there was a prompt to select on solution and create manageable action steps towards executing that solution  Time for sharing insights, ideas, questions, or other thoughts was provided  Chaka Blake missed a significant portion of the group due to being excused for CM, but Liliane Wolf actively engaged for all parts of the group she was present during  Continue to note progress towards goals  Continue with psychotherapy to encourage continued effort with problem solving       Tx Plan Objective 1 1, 1 2, 1 4 Therapist Alicia Stearns [place]

## 2023-02-14 NOTE — PSYCH
Subjective:     Patient ID: Marylin Edmondson is a 37 y o  female  [facility] Clinical Progress Notes      Specialized Services Documentation  Therapist must complete separate progress note for each specific clinical activity in which the individual participated during the day  Case Management Note  [id number]- Cat Robledo requested to meet with this  today to discuss the difference between impulsive behaviors and compulsive behaviors  Cat Robledo shared with this  she began to do research as she noticed she has an issue with spending money  After looking at the definitions and learning the difference, Cat Robledo identified it as impulsive spending  Esther and BRANDI discussed combative strategies to impulsive spending  Ashok reviewed pros and cons DBT skill  Cat Robledo was also given a reflection assignment to write down the times she impulsively spent this month and thinking about the functioning of the behavior/when it occurs (I e  occurred when elevated mood, felt bad about my day, bored, etc)  BRNADI and Cat Robledo also reviewed when in the reflective process to use the HOW skill non-judgmental by focusing on the facts of the situation (I e  I can't believe I did this-> I did this behavior because I thought it was the best option at the time  I know better now and can do better etc ) BRANDI and Cat Robledo will review her personal insight about the impulsive spending in next case management meeting  STANTON Snider    Current suicide risk : Low     Medications changes/added/denied? No    Treatment session number: 5    Individual Case Management Visit provided today? Yes     [facility] follow up physician's orders: Continue to follow orders

## 2023-02-15 ENCOUNTER — OFFICE VISIT (OUTPATIENT)
Dept: PSYCHOLOGY | Facility: CLINIC | Age: 43
End: 2023-02-15

## 2023-02-15 DIAGNOSIS — F33.1 MAJOR DEPRESSIVE DISORDER, RECURRENT EPISODE, MODERATE (HCC): Primary | ICD-10-CM

## 2023-02-15 DIAGNOSIS — F41.1 GENERALIZED ANXIETY DISORDER: ICD-10-CM

## 2023-02-15 DIAGNOSIS — M79.7 FIBROMYALGIA: ICD-10-CM

## 2023-02-15 DIAGNOSIS — G43.109 MIGRAINE WITH AURA, NOT INTRACTABLE, WITHOUT STATUS MIGRAINOSUS: ICD-10-CM

## 2023-02-15 NOTE — PSYCH
Visit Time    Visit Start Time: 7094  Visit Stop Time: 4169  Total Visit Duration: 60 minutes    Subjective:     Patient ID: Jose Raul Smoker is a 37 y o  y o  female  [facility] Clinical Progress Notes      Specialized Services Documentation  Therapist must complete separate progress note for each specific clinical activity in which the individual participated during the day  Allied Therapy   Jose Raul Riggs actively shared in [facility] group focused on DBT skill moseley mind  Roberto Foster engaged in tasks exploring differences between reasonable and emotion mind and ways to get to wise mind  Group explored the benefits of mindfulness and practiced ways to slow down to begin to develop wise mind  She identified gains from a better understanding of the 3 states of mind  Group reinforced role of “participating with wise mind” in order to prevent getting stuck in the past or fears of the future  Some effort toward treatment goal noted  Continue AT to encourage healthy skill development and practice of explored strategies    Tx Plan Objective: 1 1Therapist:  Jessika Hodges MT-BC

## 2023-02-15 NOTE — PSYCH
Subjective:     Patient ID: Jerry Wright 37 y o  Female    [facility] Clinical Progress Notes      Specialized Services Documentation  Therapist must complete separate progress note for each specific clinical activity in which the individual participated during the day  Education Therapy     3308-3727 Jerry Wright  actively shared in morning assessment and goal review  Presented as Receptive related to readiness to learn  Jerry Wright did not complete goal from last treatment day as she completed other items first and became too tired, but did feel good about the things she did accomplish  She hoped to have gained: Hope  did not present with any barriers to learning  [id number] Jerry Wright engaged throughout the treatment day  Was engaged in learning related to Illness, Medication, Aftercare and Wellness Tools  Staff utilized Verbal, Written and Demonstration teaching methods  Jerry Wright shared area of learning and set a goal for outside of program to call her friend who was recently diagnosed with cancer  Group Psychotherapy     8905-4352 Jerry Wright actively participated in a part psychoeducational group and part open processing focused on Forgiveness   first discussed what forgiveness is and what forgiveness is not   then facilitated discussion on:   • What makes it difficult to forgive others and self? • What were you told about forgiveness in the context of how you were raised and the belief systems you were taught? After, went over the three levels of forgiveness and discussed each one:   • Forgiveness of others  • Self-Forgiveness and   • There is nothing to forgive    Next,  posed the question: Adam Mon should you forgive?” This was to facilitate conversation amongst the group to discuss not only the mental impacts on not forgiveness but how it can be physically debilitating   Lastly, group participants were provided with information on the four stages of forgiveness and a worksheet to use as a guide/complete as they work on forgiveness  The stages are:  • Uncovering Phase  • Decision Phase   • Work Phase   • Deepening Phase   Lastly,  asked the group to join her in a Meditation for forgiveness of others and held space for thoughts, comments, and feedback afterwards   used the following structure to support and lead the group: worksheet on the steps of forgiveness with prompts, open discussion/processing, peer support, and psychoeducation  good  progress as evidenced by shared about their experience through allowing self to be vulnerable and willingness to share  3 Continue with psychoeducation and open processing to further strengthen development of skills necessary for healthy relationships and increasing empowerment and

## 2023-02-15 NOTE — PSYCH
Subjective:     Patient ID: Erica Mcadams is a 37 y o  female  [facility] Clinical Progress Notes      Specialized Services Documentation  Therapist must complete separate progress note for each specific clinical activity in which the individual participated during the day  Case Management Note  CM did not meet with Erica Mcadams today as it was not a scheduled meeting day and Erica Mcadams  did not request to meet with CM  CM will meet with Erica Mcadams  on next treatment day  STANTON Langston    Current suicide risk : Low     Medications changes/added/denied? No    Treatment session number: 5    Individual Case Management Visit provided today? No    [facility] follow up physician's orders: Continue to follow orders

## 2023-02-15 NOTE — PSYCH
Subjective:    Patient ID: Nina Livingston is a 37 y o  female      [facility] Clinical Progress Notes      Specialized Services Documentation  Therapist must complete separate progress note for each specific clinical activity in which the individual participated during the day  Group Psychotherapy    1647-2447 Nina Livingston participated actively in a psychotherapy group focused on emotional awareness  Two stacks of cards were placed faced down on a table in the middle of the group; one pile was various emotions and the other contained emotional reflections questions  Participants took turns drawing a card from each pile and responding to the reflection question using their emotion card in combination  Emotional reflection cards had prompts such as: "Tell us about the last time you felt this emotion  What did you learn from it?" "How do you cope with this emotion? If it isn't healthy, how could you adjust?" "How / where does this emotion show up in your body?" "How is your relationship with this emotion? Do you want it to change?" and other similar prompts as well  Open discussion occurred and further prompts were posed to the group to build on the topics at hand  Nina Livingston shared vulnerable when responding to all personal prompts and offered thoughtful peer support throughout the group  Rajesh Carlos shared that from helping a friend experiencing a mental health crisis she was able to feel effective, and how the ways in which her mother expresses care for her inspire her to care for others  Rajesh Carlos also shared on the emotion of withdrawn and overwhelmed; able to identify unhelpful thought patterns to look for in herself and how she would speak to a friend who is overwhelmed differently than she does herself (Rajesh Breanna expressed she is open to trying to speak to herself like she would a friend the next time she feels overwhelmed)  Continue to note progress towards goals   Continue with psychotherapy to further expand emotional awareness       Tx Plan Objective: 1 1, 1 2, 1 4  Therapist: ZOHREH Oliver

## 2023-02-16 ENCOUNTER — TELEPHONE (OUTPATIENT)
Dept: PAIN MEDICINE | Facility: CLINIC | Age: 43
End: 2023-02-16

## 2023-02-16 ENCOUNTER — OFFICE VISIT (OUTPATIENT)
Dept: PSYCHOLOGY | Facility: CLINIC | Age: 43
End: 2023-02-16

## 2023-02-16 DIAGNOSIS — G43.109 MIGRAINE WITH AURA, NOT INTRACTABLE, WITHOUT STATUS MIGRAINOSUS: ICD-10-CM

## 2023-02-16 DIAGNOSIS — F41.1 GENERALIZED ANXIETY DISORDER: ICD-10-CM

## 2023-02-16 DIAGNOSIS — F33.1 MAJOR DEPRESSIVE DISORDER, RECURRENT EPISODE, MODERATE (HCC): Primary | ICD-10-CM

## 2023-02-16 DIAGNOSIS — M79.7 FIBROMYALGIA: ICD-10-CM

## 2023-02-16 NOTE — PSYCH
Subjective:     Patient ID: Jaimie Wade is a 37 y o  female  [facility] Clinical Progress Notes      Specialized Services Documentation  Therapist must complete separate progress note for each specific clinical activity in which the individual participated during the day  Case Management Note  [phone]- CM met with Esther Santana and CM discussed how a friend recently has been reaching out to [facility] for emotional support  PATITOBY identifies that the dynamic is unhealthy for her  CM and ORLÉANS discussed the importance of enforcing boundaries and creating a SAJAN to use to approach the friend on the topic  When asked about what skills BRAD wants to gain, ORNORA shared about how she feels like she needs some coping skills to address unresolved grief and loss related to a shift in support that her friend is unable to provide currently as well as accept the loss of a childhood friend that she has difficulty accepting and moving forward from  STANTON Larios    Current suicide risk : Low     Medications changes/added/denied? No    Treatment session number: 6    Individual Case Management Visit provided today? Yes     [facility] follow up physician's orders: Continue to follow orders

## 2023-02-16 NOTE — TELEPHONE ENCOUNTER
Caller: Esther   Doctor/Office: Dr Woody Taveras     Call regarding :  Procedure      Call was transferred to: Nurse

## 2023-02-16 NOTE — PSYCH
Subjective:     Patient ID: Jose Bejarano is a 37 y o  female  [facility] Clinical Progress Notes      Specialized Services Documentation  Therapist must complete separate progress note for each specific clinical activity in which the individual participated during the day  Allied Therapy   5860-0786 Group was divided into small groups and given a story about character who experienced someone talking down to them  Each group was prompted to discuss their own immediate reactions and emotions if they were treated that way  Next, leader gave each group a collection of coping skills and prompted groups to add two more skills to their list  Groups were prompted to divide the skills into two categories based on the time and space they have available in the moment  Groups were prompted to pick a realistic coping skill for the character in their story using these qualifiers  Next, groups created a list of emotions they would feel when triggered in that situation as well as a list of target emotions after using a coping skill  Based on these lists, group members improvised on their instruments as one group member read the story  Jose Bejarano participated actively in group discussion and music-making  She volunteered to answer prompting about how her group categorized their list of coping skills    Continue AT for development and practice of choosing realistic coping skills    Tx Objectives: 1 1, 1 2, 1 4  Therapists: Darya Carreno MTI and Jackie Chopra MT-BC      Education Therapy   6017-5576 Jose Bejarano actively shared in morning assessment and goal review  Presented as Receptive related to readiness to learn  Jose Bejarano did complete goal from last treatment day identifying gaining hope  did not present with any barriers to learning  [phone] Jose Bejarano engaged throughout the treatment day  Was engaged in learning related to Illness, Medication, Aftercare and Wellness Tools   Staff utilized Verbal, Written, A/V and Demonstration teaching methods  Alexandro Vanegas shared area of learning and set a goal for outside of program to take a walk sometime before going to bed        Tx Objective: 1 1, 1 2, 1 4 Therapist: EDNA Hou

## 2023-02-16 NOTE — PSYCH
Visit Time    Visit Start Time: 9613  Visit Stop Time: 7270  Total Visit Duration:  60 minutes    Subjective:     Patient ID: Marylin Edmondson is a 37 y o  female  [facility] Clinical Progress Notes      Specialized Services Documentation  Therapist must complete separate progress note for each specific clinical activity in which the individual participated during the day  Group Psychotherapy  Marylin Edmondson engaged in a group focusing on practicing mindfulness, creating a more cohesive group environment, and allowing members to become more familiar with one another (to promote a more comfortable environment for sharing  Each group member was given a paper to write down four different unique traits or obstacles they have accomplished in life  After writing down the four things then each group member tore them into four different pieces of paper and then crumbled them up and threw in one big basket   then picked one out of the basket at a time with the group having to guess who it belonged too  Group members were encouraged to relate to similarities of other group members while also being mindful and engaging during the group  An example of the personal fact shared by Marylin Edmondson was that she was in the studio audience for an episode of the [employer] show which was airing today @ 0900  Cat Robledo  will continue with life skills and psychotherapy groups  Good progress made towards treatment  Continue psychotherapy groups to encourage further exploration of needs, personal awareness, and skills      Tx Plan Objective: 1 1,1 2, 1 4   Therapist: Guzman Schmitt MS

## 2023-02-16 NOTE — TELEPHONE ENCOUNTER
LMOM to CB, CB# provided  Pt last had  bilateral L5 TFESI  At [facility], [address] stated can repeat with return symptoms  Calling to verify pain is in the same area

## 2023-02-16 NOTE — TELEPHONE ENCOUNTER
S/W pt who states pain prior to last injection in November has returned  States pain is in both sides of LB and is a 5/10  Per LOV pt was to call for repeat    She would like a repeat of the [place] L5 TFESI   Please advise

## 2023-02-16 NOTE — TELEPHONE ENCOUNTER
Caller: Roberto Foster    Doctor: Aleks Davenport    Reason for call: patient is ready to schedule next procedure     Call back#: [phone]

## 2023-02-16 NOTE — PSYCH
Subjective:    Patient ID: Nirmala Stovall is a 37 y o  female      [facility] Clinical Progress Notes      Specialized Services Documentation  Therapist must complete separate progress note for each specific clinical activity in which the individual participated during the day  Group Psychotherapy    0930-1030 Nirmala Stovall participated actively in a psychotherapy group focused on setting boundaries  The group focused on the interpersonal effectiveness pillar of DBT; specifically, looking at the [place] [address]  Participants followed a step by step breakdown of the [facility] process and were encouraged to engage in open discussion throughout  Group members were given a [facility] practice worksheet to independently complete; space was also given for members to ask questions or to ask for feedback on their practice worksheet responses  Nirmala Stovall volunteered to read aloud and engaged thoughtfully throughout the group  Continue to note progress towards goals  Continue with psychotherapy to encourage further development of interpersonal effectiveness skills      Tx Plan Objective 1 1, 1 2, 1 4 Therapist: Sid Sever, BA

## 2023-02-17 ENCOUNTER — OFFICE VISIT (OUTPATIENT)
Dept: PSYCHOLOGY | Facility: CLINIC | Age: 43
End: 2023-02-17

## 2023-02-17 DIAGNOSIS — F33.1 MAJOR DEPRESSIVE DISORDER, RECURRENT EPISODE, MODERATE (HCC): Primary | ICD-10-CM

## 2023-02-17 DIAGNOSIS — M54.16 LUMBAR RADICULOPATHY: Primary | ICD-10-CM

## 2023-02-17 DIAGNOSIS — M79.7 FIBROMYALGIA: ICD-10-CM

## 2023-02-17 DIAGNOSIS — G43.109 MIGRAINE WITH AURA, NOT INTRACTABLE, WITHOUT STATUS MIGRAINOSUS: ICD-10-CM

## 2023-02-17 DIAGNOSIS — F41.1 GENERALIZED ANXIETY DISORDER: ICD-10-CM

## 2023-02-17 NOTE — PSYCH
Subjective:     Patient ID: Catie Gonzalez is a 37 y o  female  [facility] Clinical Progress Notes      Specialized Services Documentation  Therapist must complete separate progress note for each specific clinical activity in which the individual participated during the day  Allied Therapy   3089-0746- Catie Gonzalez actively shared in [facility] group focused on learning what a vision board is and creating their own  Group explored the importance of vision boards, how to create a SMART goal, and determining their first action step to accomplishing something on their vision board  Group explored vision boards through group discussion, group interaction, visual arts, and goal setting  Caite Gonzalez presented as a support to other peers as observed by her asking questions to other group members when making their boards about their interests and selections on their boards  Some effort noted toward treatment goal   Continue AT to encourage the development and practice of creating vision boards to decrease symptoms and support wellness  Tx Plan Objective: 1 1, 1 2, 1 4   Therapist:  STANTON Dailey     Education Therapy   9905-8821 Catie Gonzalez actively shared in morning assessment and goal review  Presented as Receptive related to readiness to learn  Catie Gonzalez did not complete goal from last treatment day identifying the barrier to completing the goal was weather  Annie Anchors shared that she did accomplish other tasks from a to do list as well as took a break from completing these experiences she felt like she gained responsibility  did not present with any barriers to learning  9382-2427 Catie Gonzalez engaged throughout the treatment day  Was engaged in learning related to Illness, Medication, Aftercare and Wellness Tools  Staff utilized Verbal, Written, A/V and Demonstration teaching methods  Catie Gonzalez shared area of learning and set a goal for outside of program to clean her refrigerator        Tx Plan Objective: 1 1, 1 2, 1 4   Therapist:  STANTON Naranjo      Case Management Note  5496-2811- CM met with Zahng Baltazar shared that for the past 2 to 3 weeks she has been struggling with remaining asleep throughout the night  She finds herself waking up in the middle of the night and unable to fall asleep again  Zhang Baltazar was provided a sleep hygiene protocol packet she can use to begin packing out strategies to assist with improving quality of sleep  She has a medication review on Monday with JONNY Mobley where she is encouraged to discuss these concerns  STANTON Naranjo    Current suicide risk : Low    Medications changes/added/denied? No    Treatment session number: 7    Individual Case Management Visit provided today? Yes    [facility] follow up physician's orders: Continue to follow orders

## 2023-02-17 NOTE — BH TREATMENT PLAN
Assessment/Plan:      Diagnoses and all orders for this visit:     Major depressive disorder, recurrent episode, moderate (HCC)     Generalized anxiety disorder     Fibromyalgia     Migraine with aura, not intractable, without status migrainosus           Subjective:     Patient ID: Jaimie Wade is a 37 y o  female      [facility] Treatment Plan   AREAS OF NEED: Jaimie Wade has experienced worsening depression symptoms as evidenced by passive suicidal ideation (with a plan but no intent), anxious at times,  feelings of loneliness, discontentment, and hopelessness, low self- esteem, isolating from others, feels exhausted or tired all of the time, difficulty concentrating/focusing, difficulty completing tasks or beginning tasks,  with no completion, limited motivation to complete tasks due to recent shifts in her support system, her inability to set emotional boundaries with others, her father's ongoing health issues, and her brother recently moving back home     Date Initiated: 02/08/23     Strengths: Sense of humor, intelligence, creativity, empathetic           LONG TERM GOAL:   Date Initiated: 02/08/23  1 0 By the end of program, I will identify 3 ways my mental has improved and 3 coping skills or strategies I can use to improve and/or maintain my mental health wellness  Target Date: 03/08/23  Completion Date:         SHORT TERM OBJECTIVES:      Date Initiated: 02/08/23  1 1 By the end of program, I will identify 3 specific needs that I need to set boundaries for with others and clearly define what boundary needs to be set     Revision Date: 02/17/23- to begin outlining the boundaries in more detail now that Abdoulaye Vergara has received  boundary education and [facility]  Target Date: 02/17/23  Completion Date:      Date Initiated: 02/08/23  1 2 By the end of program, I will learn 3 ways I can advocate for my needs, wants, and emotions and independently advocate with others at least 3 times by the completion of my program   Revision Date: 02/17/23- after completing obj 1 1 it is recommended Esther implement 1 boundary with a loved one  Target Date: 02/17/23  Completion Date:     Date Initiated: 02/08/23  1 3 I will take medications as prescribed and share questions and concerns if arise  Revision Date: 02/17/23  Target Date: 02/17/23  Completion Date:      Date Initiated: 02/08/23  1 4 I will identify 3 ways my supports can assist in my recovery and agree to staff/support contact as indicated  Revision Date: 02/17/23- on 02/19/23 Rajesh Carlos will receive a list of [facility] outpatient support groups this is a potential support Rajesh Carlos can use   Target Date: 02/17/23  Completion Date:            7 DAY REVISION:  1 5: To decrease the intensity of my  unresolved feelings of grief and loss, I will spend at least 15 minutes daily working and/or reviewing my grief and loss journal as well as completing the letter activities  Date Initiated: 02/17/23  Revision Date:   Target Date: 02/28/23  Completion Date:        PSYCHIATRY:  Date Initiated:  02/08/23  Medication Management and Education       Revision Date: 02/17/23        1 3 Continue medication management   The person(s) responsible for carrying out the plan is Tushar Grove MD     NURSING:   Date Initiated: 02/08/23  1 1,1 2,1 3,1 4 This therapist will provide wellness/symptoms and skill education groups three to five days weekly to educate Nina Livingston on signs and symptoms of diagnoses, skills to manage, and medication questions that will be addressed by the treatment team     Revision Date: 02/17/23        1 1,1 2,1 3,1 4,1 5 Continue to encourage Nina Livingston to participate in wellness groups daily to learn about symptoms, coping strategies and warning signs to promote relapse prevention     The person(s) responsible for carrying out the plan is Eleno Castillo MS & Jaja Hsu      PSYCHOLOGY:   Date Initiated: 02/08/23       1 1, 1 2, 1 4 Provide psychotherapy group 5 times per week to allow opportunity for Blair Cox  to explore stressors and ways of coping  Revision Date: 02/17/23   1 1,1 2,1 4,1 5  Continue to provide psychotherapy group daily to Blair Cox and encourage sharing of stressors, skills and positive change  The person(s) responsible for carrying out the plan is Radha Rader       ALLIED THERAPY:   Date Initiated: 02/08/23  1 1,1 2 Engage Estherher Griffith in AT group 5 times per week to encourage development and use of wellness tools to decrease symptoms and promote recovery through meaningful activity  Revision Date: 02/17/23   1 1,1 2,1 5 Continue to engage Blair Cox to participate in AT group to practice wellness tools within program and transfer to home sharing successes and barriers through healthy task involvement  The person(s) responsible for carrying out the plan is STANTON Porras and STANTON Matthews     CASE MANAGEMENT:   Date Initiated: 02/08/23      1 0 This  will meet with Blair Cox  3-4 times weekly to assess treatment progress, discharge planning, connection to community supports and UR as indicated  Revision Date: 02/17/23   1 0 Continue to meet with Erma Griffith 3-4 times weekly to assess growth, work toward goals, continued treatment needs, dc planning and use of supports  The person(s) responsible for carrying out the plan is STANTON Matthews     TREATMENT REVIEW/COMMENTS:      DISCHARGE CRITERIA: Identify 3 signs of progress and complete relapse prevention plan  DISCHARGE PLAN: Connect with outpatient providers  Estimated Length of Stay: 10 treatment days         Diagnosis and Treatment Plan explained to Nelson Left relates understanding diagnosis and is agreeable to Treatment Plan          CLIENT COMMENTS / Please share your thoughts, feelings, need and/or experiences regarding your treatment plan with Staff    Please see follow up note with comments         Signatures can be found on [facility] Treatment plan consent form

## 2023-02-17 NOTE — PSYCH
Subjective:     Patient ID: Chace More 37 y o  Female    [facility] Clinical Progress Notes      Specialized Services Documentation  Therapist must complete separate progress note for each specific clinical activity in which the individual participated during the day  Group Psychotherapy     [id number] Chace More actively participated in an open-ended processing group about weekend planning and preparation and how it relates to their wellness assessment  Group members were encouraged to share their routines, experiences, and/or challenges to enjoying their weekend when feeling stressed, overwhelmed, anxious, etc  that cause them to stay stuck  Chace More shared a word they associated with the weekend  Alisha Griffith's  word was 'rejoice " Members continued to participate and engage in meaningful discussions through participation and/or actively listening  Chace More shared that they wanted to focus on the emotional aspect from the wellness assessment this weekend, and felt they could use strategies and put forth effort in practicing self-care weekend planning as she wants to start using affirmations  Good progress noted towards goals  Continue with providing space for open processing for group members to engage and learn from one another  Tx Plan Objective: 1 1, 1 2, 1 4, Therapist: Cary Escobar

## 2023-02-17 NOTE — TELEPHONE ENCOUNTER
Patient was contacted and scheduled for Damion L5 TFESI  All pre procedure instructions were given to patient   Nothing to eat or drink for 1 hour prior  Loose fitting clothing   Denies Anti Coag's   NSAIDS okay, ASA okay  Denies Antibx   Needs    Patient instructed to continue all routine medications   Patient instructed to contact our office if becomes sick   Refrain from any vaccines 2 weeks before & 2 weeks after  Insurance auth received but is not a guarantee of payment per your insurance company's authorization disclaimer and it is your responsibility to verify your benefits   COVID -19 screening complete

## 2023-02-17 NOTE — PSYCH
Subjective:    Patient ID: Ty Tracy is a 37 y o  female      [facility] Clinical Progress Notes      Specialized Services Documentation  Therapist must complete separate progress note for each specific clinical activity in which the individual participated during the day  Group Psychotherapy    [id number] Ty Tracy actively participated in an educational group about fair fighting  The group reflected on associations and experiences they have with verbal conflict and explored the benefits of healthy conflict resolution  The group members were given a handout of nine fair fighting rules; As volunteers read aloud, the group went through a list of the nine rules of fair fighting; example scenarios were provided for each  Participants were also prompted and encouraged to share connections to the rules as they were individually covered  Open discussion occurred throughout the group; volunteers shared personal experiences, insights, reflections, and questions  Ty Tracy openly shared personal reflections on how she has improved her conflict style tendencies over time, and how she identifies being able to take time-outs before things get to heated as a strength she has in verbal conflict  Continue to note progress towards treatment goals  Continue with psychotherapy to encourage further exploration conflict resolution       Tx Plan Objective 1 1, 1 2, 1 4 Therapist: ZOHREH Keyes

## 2023-02-20 ENCOUNTER — OFFICE VISIT (OUTPATIENT)
Dept: PSYCHIATRY | Facility: CLINIC | Age: 43
End: 2023-02-20

## 2023-02-20 ENCOUNTER — OFFICE VISIT (OUTPATIENT)
Dept: PSYCHOLOGY | Facility: CLINIC | Age: 43
End: 2023-02-20

## 2023-02-20 DIAGNOSIS — F41.1 GENERALIZED ANXIETY DISORDER: ICD-10-CM

## 2023-02-20 DIAGNOSIS — G43.109 MIGRAINE WITH AURA, NOT INTRACTABLE, WITHOUT STATUS MIGRAINOSUS: ICD-10-CM

## 2023-02-20 DIAGNOSIS — M79.7 FIBROMYALGIA: ICD-10-CM

## 2023-02-20 DIAGNOSIS — F51.04 PSYCHOPHYSIOLOGICAL INSOMNIA: ICD-10-CM

## 2023-02-20 DIAGNOSIS — F33.1 MAJOR DEPRESSIVE DISORDER, RECURRENT EPISODE, MODERATE (HCC): Primary | ICD-10-CM

## 2023-02-20 DIAGNOSIS — F33.2 SEVERE RECURRENT MAJOR DEPRESSION WITHOUT PSYCHOTIC FEATURES (HCC): Primary | ICD-10-CM

## 2023-02-20 DIAGNOSIS — G47.09 OTHER INSOMNIA: ICD-10-CM

## 2023-02-20 RX ORDER — HYDROXYZINE 50 MG/1
50 TABLET, FILM COATED ORAL
Qty: 30 TABLET | Refills: 2 | Status: SHIPPED | OUTPATIENT
Start: 2023-02-20

## 2023-02-20 RX ORDER — HYDROXYZINE HYDROCHLORIDE 25 MG/1
25 TABLET, FILM COATED ORAL EVERY 6 HOURS PRN
Qty: 30 TABLET | Refills: 1 | Status: SHIPPED | OUTPATIENT
Start: 2023-02-20

## 2023-02-20 NOTE — PSYCH
PHP MEDICATION MANAGEMENT NOTE        [facility]    Name and Date of Birth:  Ayanna Jett 37 y o  1980 MRN: [id number]    Date of Visit: February 20, 2023    Allergies   Allergen Reactions   • Cannabidiol Anxiety, Confusion, Hypertension, Itching, Palpitations, Shortness Of Breath, Swelling, Throat Swelling and Tongue Swelling   • Amoxicillin-Pot Clavulanate    • Decadrol [Dexamethasone] Other (See Comments)     Category: Adverse Reaction;   psychosis   • Dexamethasone Sodium Phosphate Other (See Comments)     psychosis   • Other Throat Swelling     CBD oil    • Penicillins Hives and Other (See Comments)     Category: Allergy; Hives/Uticaria   • Tetracycline Hives and Other (See Comments)     Hives/Uticaria   • Tetracyclines & Related Hives     Category: Allergy; Visit Time    Visit Start Time: 0930  Visit Stop Time: 3915  Total Visit Duration: 20 minutes    SUBJECTIVE:    Pallavi Moreau is seen today for a follow up for Major Depressive Disorder, anxiety and insomnia  She continues to improve slowly since beginning PHP  Has had "ups and downs" in mood/depressive symptoms in past week  Currently rates her depression a 6/10 (10 being worse)  She has not had any fleeting suicidal thoughts since last week, and no plan or intent  She continues to struggle with sleep, may fall asleep without difficulty - but sleep is interrupted and not able to return to sleep  She has previously tried melatonin and trazodone  She does no feel daytime tiredness with current dose of hydroxyzine 50 mg and will add 25 mg, will take 50-75 mg at bedtime as needed  She continues to report improvement in her depression and able to use coping skills since starting program   States she has limited support outside of the program right now, and plans on reaching out to her Worship to let them know what she is going through, but has been overwhelmed by this thought as well        She denies any side effects from current psychiatric medications  PLAN:  Increase Hydroxyzine, may take 50 or 75 mg at night as needed for sleep  Aware of 24 hour and weekend coverage for urgent situations accessed by calling [facility] main practice number  Continue partial hospitalization program    Diagnoses and all orders for this visit:    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 psychotic features ([facility] [address] )    Other insomnia  -     hydrOXYzine HCL (ATARAX) 25 mg tablet; Take 1 tablet (25 mg total) by mouth every 6 (six) hours as needed (sleep) In addition to 50 mg dose, may take 50 -75 mg PO HS PRN for sleep    Psychophysiological insomnia  -     hydrOXYzine HCL (ATARAX) 50 mg tablet;  Take 1 tablet (50 mg total) by mouth daily at bedtime as needed (insomnia) In addition to 25 mg dose, may take 50 mg-75 mg PO HS PRN for insomnia    Generalized anxiety disorder        Current Outpatient Medications on File Prior to Visit   Medication Sig Dispense Refill   • Blood Glucose Monitoring Suppl (Contour Blood Glucose System) w/Device KIT Use 1 kit 2 (two) times a day before meals 1 kit 0   • Blood Glucose Monitoring Suppl (OneTouch Verio Flex System) w/Device KIT Use 2 (two) times a day 1 kit 0   • celecoxib (CeleBREX) 200 mg capsule TAKE 1 CAPSULE BY MOUTH TWICE A DAY 60 capsule 6   • Cholecalciferol (VITAMIN D-3) 1000 units CAPS Take 1 capsule by mouth daily     • cyclobenzaprine (FLEXERIL) 10 mg tablet TAKE 2 TABLETS (20 MG TOTAL) BY MOUTH DAILY AT BEDTIME AS NEEDED FOR MUSCLE SPASMS 60 tablet 6   • desvenlafaxine (PRISTIQ) 100 mg 24 hr tablet TAKE 1 TABLET BY MOUTH EVERY DAY 30 tablet 2   • desvenlafaxine succinate (PRISTIQ) 50 mg 24 hr tablet TAKE 1 TABLET (50 MG TOTAL) BY MOUTH DAILY TOTAL DAILY DOSE 150 MG DAILY 30 tablet 2   • EPINEPHrine (EPIPEN) 0 3 mg/0 3 mL SOAJ Inject 0 3 mL (0 3 mg total) into a muscle once for 1 dose 0 6 mL 0   • glucose blood (Contour Test) test strip Check blood sugar once daily 100 strip 3   • hydrOXYzine HCL (ATARAX) 50 mg tablet TAKE 1 TABLET (50 MG TOTAL) BY MOUTH DAILY AT BEDTIME AS NEEDED (INSOMNIA) 30 tablet 2   • IRON PO Take by mouth     • lidocaine (LIDODERM) 5 % APPLY 1 PATCH TOPICALLY IN THE MORNING  REMOVE & DISCARD PATCH WITHIN 12 HOURS OR AS DIRECTED BY MD  30 patch 1   • MAGNESIUM OXIDE PO Take by mouth     • megestrol (MEGACE) 40 mg tablet Take 1 tablet (40 mg total) by mouth 3 (three) times a day for 5 days 15 tablet 0   • metFORMIN (GLUCOPHAGE) 500 mg tablet Take 1 tablet (500 mg total) by mouth 2 (two) times a day with meals 60 tablet 5   • nortriptyline (PAMELOR) 50 mg capsule Take 1 capsule (50 mg total) by mouth 2 (two) times a day 60 capsule 0   • propranolol (INDERAL) 20 mg tablet TAKE 1 TABLET BY MOUTH EVERY DAY AT NIGHT 30 tablet 11     No current facility-administered medications on file prior to visit  Psychotherapy Provided:     Individual psychotherapy provided: Supportive counseling provided  Medications, treatment progress and treatment plan reviewed with Irene Catherine  Reassurance and supportive therapy provided  HPI ROS Appetite Changes and Sleep:     She reports frequent awakenings, fluctuating appetite, fluctuating energy levels   Denies homicidal ideation, denies suicidal ideation    Review Of Systems:      General decreased functioning   Personality no change in personality   Constitutional negative   ENT negative   Cardiovascular negative   Respiratory negative   Gastrointestinal negative   Genitourinary negative   Musculoskeletal negative   Integumentary negative   Neurological negative   Endocrine negative   Other Symptoms none, all other systems are negative     Mental Status Evaluation:    Appearance Adequate hygiene and grooming   Behavior calm and cooperative   Mood depressed   Speech Normal rate and volume   Affect mood-congruent   Thought Processes Goal directed and coherent   Thought Content Does not verbalize delusional material   Associations Tightly connected   Perceptual Disturbances Denies hallucinations and does not appear to be responding to internal stimuli   Risk Potential Suicidal/Homicidal Ideation - No evidence of suicidal or homicidal ideation and patient does not verbalize suicidal or homicidal ideation  Risk of Violence - No evidence of risk for violence found on assessment  Risk of Self Mutilation - No evidence of risk for self mutilation found on assessment   Orientation oriented to person, place, time/date and situation   Memory recent and remote memory grossly intact   Consciousness alert and awake   Attention/Concentration attention span and concentration are age appropriate   Insight improving   Judgement improving   Muscle Strength and Gait normal muscle strength and normal muscle tone, normal gait/station and normal balance   Motor Activity no abnormal movements   Language no difficulty naming common objects, no difficulty repeating a phrase, no difficulty writing a sentence   Fund of Knowledge adequate knowledge of current events  adequate fund of knowledge regarding past history  adequate fund of knowledge regarding vocabulary      Past Psychiatric History Update:     Inpatient Psychiatric Admission Since Last Encounter:   no  Suicide Attempt Or Self Mutilation Since Last Encounter:   no  Incidence of Violent Behavior Since Last Encounter:   no    Traumatic History Update:     New Onset of Abuse Since Last Encounter:   no  Traumatic Events Since Last Encounter:   no    Past Medical History:    Past Medical History:   Diagnosis Date   • Anxiety    • Chronic pain disorder    • Chronic sinusitis    • Depression    • Diabetes ([facility] [address] )    • Fibromyalgia    • Migraine    • Obesity    • Polyarthritis     Last assessed 9/21/2015   • Psychiatric disorder    • Psychiatric illness    • Sleep difficulties         Past Surgical History:   Procedure Laterality Date   • COLONOSCOPY  06/2019   • DENTAL SURGERY     • HYSTEROSCOPY      Endometrial Biopsy By Hysteroscopy   • REMOVAL OF INTRAUTERINE DEVICE (IUD)     • US GUIDED BREAST BIOPSY RIGHT COMPLETE Right 6/17/2019     Allergies   Allergen Reactions   • Cannabidiol Anxiety, Confusion, Hypertension, Itching, Palpitations, Shortness Of Breath, Swelling, Throat Swelling and Tongue Swelling   • Amoxicillin-Pot Clavulanate    • Decadrol [Dexamethasone] Other (See Comments)     Category: Adverse Reaction;   psychosis   • Dexamethasone Sodium Phosphate Other (See Comments)     psychosis   • Other Throat Swelling     CBD oil    • Penicillins Hives and Other (See Comments)     Category: Allergy; Hives/Uticaria   • Tetracycline Hives and Other (See Comments)     Hives/Uticaria   • Tetracyclines & Related Hives     Category:  Allergy;      Substance Abuse History:    Social History     Substance and Sexual Activity   Alcohol Use No    Comment: 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      Social History     Substance and Sexual Activity   Drug Use Not Currently     Social History:    Social History     Socioeconomic History   • Marital status: Single     Spouse name: Not on file   • Number of children: 0   • Years of education: 12 years    • Highest education level: GED or equivalent   Occupational History   • Occupation: unemployed   Tobacco Use   • Smoking status: Never   • Smokeless tobacco: Never   Vaping Use   • Vaping Use: Never used   Substance and Sexual Activity   • Alcohol use: No     Comment: 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    • Drug use: Not Currently   • Sexual activity: Not Currently   Other Topics Concern   • Not on file   Social History Narrative    Caffeine use        What type of home do you live in: Single house    Age of your home: 50 yrs    How long have you been living there: 44 yrs    Type of heat: Baseboard    Type of fuel: Electric    What type of samir is in your bedroom: Carpet    Do you have the following in or near your home:    Air products: Window air conditioning and Ionic air purifier    Pests: Mice    Pets: Cat    Basement: None     Social Determinants of Health     Financial Resource Strain: Not on file   Food Insecurity: Not on file   Transportation Needs: Not on file   Physical Activity: Insufficiently Active   • Days of Exercise per Week: 2 days   • Minutes of Exercise per Session: 60 min   Stress: Not on file   Social Connections: Not on file   Intimate Partner Violence: Not on file   Housing Stability: Not on file     Family Psychiatric History:     Family History   Problem Relation Age of Onset   • Irregular heart beat Mother    • Diabetes Father    • Kidney disease Father    • Diabetes Maternal Grandmother    • Rheum arthritis Maternal Grandmother    • Melanoma Maternal Grandmother 80   • No Known Problems Maternal Grandfather    • Kidney disease Paternal Grandmother    • Rheum arthritis Paternal Grandmother    • Depression Paternal Grandmother    • Diabetes Paternal Grandfather    • Lung cancer Paternal Grandfather 52   • Substance Abuse Brother    • No Known Problems Brother    • Substance Abuse Maternal Uncle    • Prostate cancer Maternal Uncle 61   • Depression Paternal Aunt    • Alcohol abuse Family    • Stomach cancer Family      History Review: The following portions of the patient's history were reviewed and updated as appropriate: allergies, current medications, past family history, past medical history, past social history, past surgical history and problem list     OBJECTIVE:     Vital signs in last 24 hours: There were no vitals filed for this visit  Laboratory Results: I have personally reviewed all pertinent laboratory/tests results      Medications Risks/Benefits:      Risks, Benefits And Possible Side Effects Of Medications:    Discussed risks and benefits of treatment with patient including risk of suicidality, serotonin syndrome, increased QTc interval and SIADH related to treatment with antidepressants;  Risk of induction of manic symptoms in certain patient populations     Controlled Medication Discussion:     Not applicable - controlled prescriptions are not prescribed by this practice    ADELAIDA Melendez 02/20/23    This note was not shared with the patient due to reasonable likelihood of causing patient harm

## 2023-02-20 NOTE — PSYCH
Subjective:    Patient ID: Kathlyn Leventhal is a 37 y o  female      [facility] Clinical Progress Notes      Specialized Services Documentation  Therapist must complete separate progress note for each specific clinical activity in which the individual participated during the day  Group Psychotherapy    [id number]  Kathlyn Leventhal participated actively in a psychotherapy group focused on values  The group  was provided with a large list of values, members identified which resonated with them, categorized those into 1-5 groups based on self-identified similarity, then selected one word within each group that encapsulated the entire group  Art supplies were provided, including: various beads of differing types/colors, colorful strings, as well as different elastic  The group was given creative freedom to make something (key chains, bracelets, etc ) using a self-decided symbolic system that was representative to their personal life as related to their identified values  Continue to note progress towards goals  Continue with psychotherapy to encourage further value-alignment based progress       Tx Plan Objective 1 1, 1 2, 1 4 Therapist: ZOHREH Villagran

## 2023-02-20 NOTE — PSYCH
Subjective:     Patient ID: Eliane Giordano 37 y o  Female    [facility] Clinical Progress Notes      Specialized Services Documentation  Therapist must complete separate progress note for each specific clinical activity in which the individual participated during the day  Group Psychotherapy    [id number] Eliane Giordano actively participated in a follow up open processing group about boundaries and effectively communicating one's boundaries   did a brief review of:  • What boundaries are  • Types of boundaries, and   • Ways in which they can set boundaries  Group members were encouraged to contribute to discussion about their struggles with boundaries with loved ones, work, friend, etc , and how it has impacted their sense of self and often leading to people pleasing behaviors   then had the group participate in an activity where they started to visualize their limits and identified areas of need where they may lack boundaries  The activity asked group members to reflect on areas that are causing them unnecessary stress or discomfort  Next, they were to draw a [place] on a piece of paper and write everything that makes them feel safe, then on the outside, they are to write the things they identified that causes them stress/discomfort on the outside so they can se how the [place] represents a visible manifestation of their limits and how they can definite a boundary to help them prevent or eliminate those issues  After, the group then completed a second activity regarding interpersonal effectiveness using the GIVE/FAST DBT skill  This activity was designed to create a plan for effectively communicating something they need or want when the situation is uncertain, emotionally arousing, and/or susceptible to conflict  The group was encouraged then to share parts of their script/plan to the group and be open to feedback/support when sharing    used the following structure to support and lead the group worksheets, open discussion/processing, and peer support   good  progress as evidenced by contributed to discussion by sharing about the topic discussed, relating to group members, and allowing self to be open/vulnerable  Pati Cohn shared she wants to work on her financial boundaries as she is preparing to lease a new car  Continue with psychoeducation to further strengthen development of internal and external boundaries and empowerment  Tx Plan Objective 1 1, 1 2, 1 4 Therapist: Arlene Galeana

## 2023-02-20 NOTE — PSYCH
Subjective:     Patient ID: Taty Orozco 37 y o  Female    [facility] Clinical Progress Notes      Specialized Services Documentation  Therapist must complete separate progress note for each specific clinical activity in which the individual participated during the day  Group Psychotherapy     5111-0379 Taty Orozco actively participated in a psychoeducational group about boundaries and boundary setting and interpersonal effectiveness  The group was led through a discussion about what boundaries are: the limits we place around, time, body, emotions, and mental health today to stay resilient and content in who we are   discussed the different types of boundaries:   o Physical   o Intellectual  o Emotional   o Sexual  o Financial/Material   o Time  The group was then led in a discussion about healthy vs  unhealthy boundaries in relation to rigid and porous boundaries after explanation and asked the group to contribute to discussion about their experiences with the stated boundaries above  Next, the group then engaged in learning about ways in which they can set healthy boundaries:   • Knowing your limits  • Knowing your values  • Listening to your emotions  • Having self-respect  • Having respect for others  • Bering assertive  • Considering the long view   Lastly,  went over the DBT skill of GIVE/FAST (Gentle, Interested, Validate, [address], Fair, Apology Free, Stick to Your Values, & Truthful) regarding interpersonal effectiveness and boundary setting   explained each letter and provided examples of communicating effectively  Group then received a worksheet to complete to start a plan for effectively communicating something they need or want when the situation is uncertain, emotionally arousing, and/or susceptible to conflict   used the following structure to support and lead the group worksheets, open discussion/processing, peer support, and psychoeducation  good  progress as evidenced by contributed to discussion by sharing about the topic discussed, relating to group members, and allowing self to be open/vulnerable  Reche Borrow related to a group member and offered support about communicating their needs in a respectful manner  Continue with psychoeducation to further strengthen development of internal and external boundaries and increasing empowerment  Tx Plan Objective 1 1, 1 2, 1 4 Therapist: Latonia Amaya

## 2023-02-20 NOTE — PSYCH
Subjective:     Patient ID: Ani Peres is a 37 y o  female  [facility] Clinical Progress Notes      Specialized Services Documentation  Therapist must complete separate progress note for each specific clinical activity in which the individual participated during the day  Education Therapy   9870-0161 Ani Peres actively shared in morning assessment and goal review  Presented as Receptive related to readiness to learn  Ani Peres did complete goal from last treatment day identifying gaining Hope, Responsibility, and Advocacy  did not present with any barriers to learning  3466-1379 Ani Peres engaged throughout the treatment day  Was engaged in learning related to Illness, Medication, Aftercare, and Wellness Tools  Staff utilized Verbal, Written, A/V, and Demonstration teaching methods  Ani Peres shared area of learning and set a goal for outside of program to clean out her fridge        Tx Plan Objective: 1 1,1 2, 1 4   Therapist: Fly Velasco MS

## 2023-02-20 NOTE — PSYCH
Subjective:     Patient ID: Joanne Duarte is a 37 y o  female  [facility] Clinical Progress Notes      Specialized Services Documentation  Therapist must complete separate progress note for each specific clinical activity in which the individual participated during the day  Allied Therapy   [id number]--   Joanne Duarte participated in [facility] group focused on the use of music mindfulness and grounding strategies to regulate thoughts and emotions  Joanne Duarte engaged in  music listening, practicing techniques, as well as group discussion  Group explored practice of various mindfulness strategies with active music listening to explore how to ground themselves when experiencing intense emotions  Group utilized sensory mindfulness techniques (rainbow, 5 countdown technique, draw your breath, etc ), rhythmic grounding, and listening to music to identify emotions and the body sensations that occur  Mani Waller presented as engaged throughout the group as observed by her taking notes and participating in group discussion  Some effort noted toward treatment goal  Continue AT to encourage the development and practice of utilizing mindfulness techniques to alleviate symptoms and support wellness  Tx Plan Objective: 1 1,  1 2, & 1 4        Therapist:  STANTON Novak           Case Management Note  [id number]-- BRANDI and Mani Waller met to discuss her updated tx plan  She had no questions and concerns  Ashok reviewed and upcoming DC date of 02/27/23  She reported she felt anxious today, but believes it had to do with lack of sleep and reported that her medication review addressed the concern today  She had no additional needs for resources today  STANTON Novak    Current suicide risk : Low     Medications changes/added/denied? No    Treatment session number: 8    Individual Case Management Visit provided today? Yes     [facility] follow up physician's orders: Continue to follow orders

## 2023-02-21 ENCOUNTER — OFFICE VISIT (OUTPATIENT)
Dept: PSYCHOLOGY | Facility: CLINIC | Age: 43
End: 2023-02-21

## 2023-02-21 DIAGNOSIS — F41.1 GENERALIZED ANXIETY DISORDER: ICD-10-CM

## 2023-02-21 DIAGNOSIS — F33.1 MAJOR DEPRESSIVE DISORDER, RECURRENT EPISODE, MODERATE (HCC): Primary | ICD-10-CM

## 2023-02-21 DIAGNOSIS — G43.109 MIGRAINE WITH AURA, NOT INTRACTABLE, WITHOUT STATUS MIGRAINOSUS: ICD-10-CM

## 2023-02-21 DIAGNOSIS — E11.65 TYPE 2 DIABETES MELLITUS WITH HYPERGLYCEMIA, WITHOUT LONG-TERM CURRENT USE OF INSULIN (HCC): ICD-10-CM

## 2023-02-21 DIAGNOSIS — M79.7 FIBROMYALGIA: ICD-10-CM

## 2023-02-21 DIAGNOSIS — F33.1 MAJOR DEPRESSIVE DISORDER, RECURRENT EPISODE, MODERATE (HCC): ICD-10-CM

## 2023-02-21 RX ORDER — DESVENLAFAXINE 50 MG/1
50 TABLET, EXTENDED RELEASE ORAL DAILY
Qty: 30 TABLET | Refills: 2 | Status: SHIPPED | OUTPATIENT
Start: 2023-02-21

## 2023-02-21 RX ORDER — DESVENLAFAXINE 100 MG/1
TABLET, EXTENDED RELEASE ORAL
Qty: 30 TABLET | Refills: 2 | Status: SHIPPED | OUTPATIENT
Start: 2023-02-21

## 2023-02-21 NOTE — PSYCH
Subjective:    Patient ID: Alexis Gosselin is a 37 y o  female      [facility] Clinical Progress Notes      Specialized Services Documentation  Therapist must complete separate progress note for each specific clinical activity in which the individual participated during the day  Group Psychotherapy     0930-1030 Alexis Gosselin actively participated in a psychotherapy group focused on personal rights and responsibilities  Group discussed what personal rights mean to them and reflected on their relationship and experiences with personal rights  Participants were given a handout containing a 27 point Personal Bill of Rights, adapted by Kamala Arguello  The backside of the handout contained a table with rights and their corresponding responsibilities  Members discussed their reflections on both provided materials  Alexis Gosselin shared that this week they can focus on "I have the right to feel scared and say 'I'm afraid,'" and "I have the right to say 'I don't know  '" Alexis Gosselin continues to make progress towards treatment goals  Continue with psychotherapy to encourage further exploration of the self       Tx Plan Objective 1 1, 1 2, 1 4 Therapist: ZOHREH Stearns

## 2023-02-21 NOTE — PSYCH
Subjective:     Patient ID: Alexis Gosselin is a 37 y o  female  [facility] Clinical Progress Notes      Specialized Services Documentation  Therapist must complete separate progress note for each specific clinical activity in which the individual participated during the day  Allied Therapy   6006-4429 Group practiced "What" skills of mindfulness (Observe, Describe, Participate) through interacting with music and art  Each client observed a picture while listening to music, deciding first if the music matched their picture  Next they described why the music did or did not match by discussing and identifying observed facts, as well as emotions and assumptions of both mediums  Group members then participated in the experience by changing the picture to match the music based on their discussions  Clients then applied the skills personally through music listening   played music and prompted group members to observe their body sensations, thoughts, emotions, and facts about the situation  Group then discussed and labeled these topics in pairs, and were lastly prompted to think of coping skills to move through the situation effectively  Alexis Gosselin participated actively in group discussion, music listening, and drawing  With further prompting, she was also able to identify her emotional response/assumption in her explanation of the picture and music experience  Continue AT for development and practice of "What" skills for mindfulness  Tx Objectives: 1 1, 1 2, 1 4  Therapists: EDNA Monteiro and BLOSSOM McqueenBC      Education Therapy   9508-0415 Alexis Gosselin actively shared in morning assessment and goal review  Presented as Receptive related to readiness to learn  Alexis Gosselin did complete goal from last treatment day identifying gaining responsibility  did not present with any barriers to learning  6920-3726 Alexis Gosselin engaged throughout the treatment day   Was engaged in learning related to Illness, Medication, Aftercare and Wellness Tools  Staff utilized Verbal, Written, A/V and Demonstration teaching methods  Lieutenant Scherer shared area of learning and set a goal for outside of program to reschedule her doctor appointment before 5pm today        Tx Objective: 1 1, 1 2, 1 4 Therapist: EDNA Cuevas

## 2023-02-21 NOTE — PSYCH
Subjective:     Patient ID: Claudene Papa is a 37 y o  female  [facility] Clinical Progress Notes      Specialized Services Documentation  Therapist must complete separate progress note for each specific clinical activity in which the individual participated during the day  Case Management Note  CM did not meet with Claudene Papa today as it was not a scheduled meeting day and Claudene Papa  did not request to meet with CM  CM will meet with Claudene Papa  on next treatment day  STANTON Rucker    Current suicide risk : Low     Medications changes/added/denied? No    Treatment session number: 9    Individual Case Management Visit provided today? No    [facility] follow up physician's orders: Continue to follow orders

## 2023-02-22 ENCOUNTER — APPOINTMENT (OUTPATIENT)
Dept: PSYCHOLOGY | Facility: CLINIC | Age: 43
End: 2023-02-22

## 2023-02-22 ENCOUNTER — DOCUMENTATION (OUTPATIENT)
Dept: PSYCHOLOGY | Facility: CLINIC | Age: 43
End: 2023-02-22

## 2023-02-22 NOTE — PROGRESS NOTES
Subjective:     Patient ID: Luisito Mccarthy is a 37 y o  female  [facility] Clinical Progress Notes      Specialized Services Documentation  Therapist must complete separate progress note for each specific clinical activity in which the individual participated during the day  Case Management Note  Cortes Bianca cancelled tx today due to experiencing intense back pain  She is to return to program tomorrow if pain resides  Usman Bo, MT-BC    Current suicide risk : Unable to assess due to absence    Medications changes/added/denied? No    Treatment session number: N/A    Individual Case Management Visit provided today? No    [facility] follow up physician's orders: Continue to follow orders

## 2023-02-22 NOTE — PSYCH
Subjective:     Patient ID: Nicky Cockayne 37 y o  {Gender Identity:67937}    [facility] Clinical Progress Notes      Specialized Services Documentation  Therapist must complete separate progress note for each specific clinical activity in which the individual participated during the day  GROUP PSYCHOTHERAPY     {LSTIMES:22467} Esther Nacho {Grp participation:65751::"actively participated in "} in a psychotherapy group about healthy communication and the different types of communication  First, Nicky Cockayne participated in a paired activity called Listener-Talker where they practiced active listening skills to learn ways in which it builds trust, minimizes conflict, and strengthens one's patience  After the activity,  asked the group to reflect and share their experience - what was hard about this activity, what was easy, were were actively listening, etc  The  then explained and discussed with the group: Assertive, Passive, Passive-Aggressive, and Aggressive communication   used animals as means of assimilating the information for group members and asked group members to help identify behaviors/characteristics that matched a type of communication:     • Aggressive - tiger  • Non-Assertive - turtle  • Assertive - owl      As a result the group began identifying components of healthy and unhealthy ways in which people communicate, were given the opportunity to share about their experiences, and how to be mindful about the ways in which we communicate our needs (demand vs  request)  provided information on The Four "T's" of communication:   • Timing  • Tone  • Technique  • Truth    In addition, the group completed the “5 Minute Personality Test” so participants can learn more about their dominant and subdominant traits that influence their communication styles  Esther Griffith {LSPARTICIPATIONUSE:29218} ***   {LSPROGRESS:68853}progress noted towards goals   Continue with psychotherapy/educational processing to promote furthercommunication development  TX Plan Objective: 1 1, 1 2, 1 4   Therapist: Melissa Martini

## 2023-02-23 ENCOUNTER — OFFICE VISIT (OUTPATIENT)
Dept: PSYCHOLOGY | Facility: CLINIC | Age: 43
End: 2023-02-23

## 2023-02-23 ENCOUNTER — TELEPHONE (OUTPATIENT)
Dept: PSYCHIATRY | Facility: CLINIC | Age: 43
End: 2023-02-23

## 2023-02-23 DIAGNOSIS — F33.1 MAJOR DEPRESSIVE DISORDER, RECURRENT EPISODE, MODERATE (HCC): Primary | ICD-10-CM

## 2023-02-23 DIAGNOSIS — F41.1 GENERALIZED ANXIETY DISORDER: ICD-10-CM

## 2023-02-23 DIAGNOSIS — M79.7 FIBROMYALGIA: ICD-10-CM

## 2023-02-23 DIAGNOSIS — G43.109 MIGRAINE WITH AURA, NOT INTRACTABLE, WITHOUT STATUS MIGRAINOSUS: ICD-10-CM

## 2023-02-23 NOTE — PSYCH
Subjective:     Patient ID: Christie Huynh is a 37 y o  female  [facility] Clinical Progress Notes      Specialized Services Documentation  Therapist must complete separate progress note for each specific clinical activity in which the individual participated during the day  Allied Therapy   9698-4018- To increase social connection and sense of belonging, the group engaged in playing a dice game that involved asking get to know you questions  Each group member was given 2 blank index cards and wrote 2 questions they would like to know about their peers  Once all of the questions were completed, the facilitator mixed them into her pile of created questions  If the individual rolled an odd number, they engaged in selecting one card from the pile and answering the question  If the individual rolled an even number, they read the question aloud and selected a peer to answer that question  Questions varied in topics from facts about the individual such as if they had children or any sibling, recognizing sources of hope, what they can implement into a routine, individual interests, etc  If they rolled a double, they were allowed to select a song for the group to listen to  Christie Huynh engaged in the group by creating questions and participating in each turn  She shared a song with the group and without prompting described why she selected and shared a pleasant memory that accompanies the song for her  Some effort noted toward treatment goal  Continue AT to encourage the development and practice of increasing social connection and sense of belonging to alleviate symptoms and support wellness  Tx Plan Objective: 1 1,  1 2, & 1 4    Therapist:  BLOSSOM WooBC        Case Management Note  BRANDI did not meet with Christie Huynh today as it was not a scheduled meeting day and Christie Huynh  did not request to meet with CM  CM will meet with Christie Huynh  on next treatment day   Prosperrashid Christiano will be discharged on Monday 02/27/23  STANTON Novak    Current suicide risk : Low     Medications changes/added/denied? No    Treatment session number: 11    Individual Case Management Visit provided today? Yes     [facility] follow up physician's orders: Continue to follow orders

## 2023-02-23 NOTE — PSYCH
Subjective:     Patient ID: Kaleigh Cates is a 37 y o  female  [facility] Clinical Progress Notes      Specialized Services Documentation  Therapist must complete separate progress note for each specific clinical activity in which the individual participated during the day  Case Management Note  1638-0264- Checked-in with Dallas Woodguillermina  She is still experiencing intense pain, however, she identified the importance of preparing physically with what she can as well as using self-care experiences  She shared that to honor her friend they lost a few of her friends are celebrating her life by singing a song at [employer] about her  Dallas Brito agreed that she is ready for DC on Monday 02/27/23  STANTON Burton    Current suicide risk : Low     Medications changes/added/denied? No    Treatment session number: 9    Individual Case Management Visit provided today? Yes     [facility] follow up physician's orders: Continue to follow orders

## 2023-02-23 NOTE — PSYCH
Subjective:     Patient ID: Pauly Vidal is a 37 y o  female  [facility] Clinical Progress Notes      Specialized Services Documentation  Therapist must complete separate progress note for each specific clinical activity in which the individual participated during the day  Group Psychotherapy (2244-7659)    The group focused on expressing their feelings through the mediums of creative writing and art  Group began with each person choosing a number from 1-100  Each number was assigned a different prompt (such as "I hope   ", "I am ", "Recovery is   ", "I forgive   ", etc) and then roughly 5-7 minutes was allotted for each member to write a poem, creative writing, paragraph, story, etc based on their assigned phrase  The group shared each of their writings and took a moment to explain/give each other positive feedback  The group was then opened up to allow any member to share their own creative writing from outside of program  The second exercise was as follows: the group was presented with a CBT Thought Record and the group was directed to work together to break down an example situation and determine if the automatic thought needed reframing  This allowed each member to gain a full understanding of how a CBT thought record can be used for journaling  Group members were also provided with multiple blank structured journal pages and time was set aside to explain each one  Pauly Vidal contributed to the group by participating in every exercise, sharing every writing, supporting peers/discussion, complimenting/commending a peer for opening up, and by taking notes  Yolande Escalera  is actively working towards goals  Continue psychotherapy groups to encourage further exploration of needs, personal awareness, and skills      Tx Plan Objective: 1 1,1 2, 1 4   Therapist: Stephanie Cline MS

## 2023-02-23 NOTE — TELEPHONE ENCOUNTER
Patient called to cancel appt 2/24/23 11am due to being in partial program  Confirmed follow up on 3/10/22

## 2023-02-23 NOTE — PSYCH
[facility] Discharge Instructions:   Disposition: home  Address: [address] [place] [id number]    Diagnosis:  1  Major depressive disorder, recurrent episode, moderate ([address] )        2  Generalized anxiety disorder        3  Fibromyalgia        4  Migraine with aura, not intractable, without status migrainosus           Allergies (Drug/Food): Allergies   Allergen Reactions   • Cannabidiol Anxiety, Confusion, Hypertension, Itching, Palpitations, Shortness Of Breath, Swelling, Throat Swelling and Tongue Swelling   • Amoxicillin-Pot Clavulanate    • Decadrol [Dexamethasone] Other (See Comments)     Category: Adverse Reaction;   psychosis   • Dexamethasone Sodium Phosphate Other (See Comments)     psychosis   • Other Throat Swelling     CBD oil    • Penicillins Hives and Other (See Comments)     Category: Allergy; Hives/Uticaria   • Tetracycline Hives and Other (See Comments)     Hives/Uticaria   • Tetracyclines & Related Hives     Category: Allergy; Activity: No activity restrictions  Diet:no recommendations  Smoking Cessation:not a smoker   Diagnostic/Laboratory Orders: No labs ordered at this time  Vaccines: If you received a vaccine, please notify your family physician on your next visit  For more information, please call [phone]       Follow-up appointments/Referrals:   Medication Management  Dr Johanna Wolff  [address] [place] [address], [address] [place] [address]  P: [phone]  F: [phone]  Appt Time: TBD     Outpatient Therapist  [address], [address], [address] [place] [address] Road: [phone]  F: [phone]  Appt Time: TBD       Primary Care Physician  Primary Care Physician:   Caryl France MD  [address] [place] [address]   (p):[phone]  (f): [phone]   Appt Time:       ICM/CTT: N/A    [facility]: [phone]  Your assigned  was Bebe STANTON Harman  Our  is Bree Izquierdo  For any billing concerns, please contact: [phone] or [phone]  Intake/Referral/Evaluation (Non-Emergency) *NON INSURED FOR FUNDING: [facility]: [phone], [facility]: [phone], [facility]: [phone] and [place]: [phone]  Crisis Intervention (Emergency) [place] Service: [facility]: [phone], [place]: [phone], [address] [place] [address] Ave: [phone], [facility]): [phone], Daniela Begin: [phone] and C/M/P: [phone]    _________________________________  [facility] Line: [phone]     HOURS: 6 AM to 2 AM DAILY      National Crisis Intervention Hotline: 0-830.574.8494  National Suicide Crisis Hotline: 4-848.504.7841  I, the undersigned, have received and understand the above instructions          Patient/Rep Signature: __________________________________       Date/Time: ______________         Relationship: __________________________________________       Date/Time: ______________         Physician Signature: ____________________________________      Date/Time: ______________               Signature: ________________________________       Date/Time: ______________

## 2023-02-23 NOTE — PSYCH
Subjective:    Patient ID: Jana Lino is a 37 y o  female      [facility] Clinical Progress Notes      Specialized Services Documentation  Therapist must complete separate progress note for each specific clinical activity in which the individual participated during the day  Group Psychotherapy    5590-2992 Jana Lino participated actively in a psychotherapy group focused on positive life journey reflection  The group engaged in creative expression and self-reflection using a tree template in which guided elements were freely individualized  The group was provided with detailed instructions, art supplies, and several different tree templates to pick from  The instructions were comprised of different prompts for the: roots, soil, trunk, branches, leaves, fruits, and seeds  Each of which had varying aspects of life to reflect positively upon  Jana Lino shared her artistic creation, which was thoughtfully colored and planned out, and included additional aspects such as a snake to represent a side of her that acts out in harmful ways sometimes  Continue with psychotherapy to promote further introspection and creative expression       Tx Plan Objective 1 1, 1 2, 1 4  Therapist: ZOHREH Escalante

## 2023-02-23 NOTE — PSYCH
Subjective:     Patient ID: Massimo Flaherty is a 37 y o  female  [facility] Clinical Progress Notes      Specialized Services Documentation  Therapist must complete separate progress note for each specific clinical activity in which the individual participated during the day  Allied Therapy  5157-7721 Clients reviewed "What" skills of mindfulness and were presented with the "How" skills (Non-judgmental, one mindfully, and effectively)  Group engaged in singing and music-making to popular music as a way of practicing "How" skills  Clients discussed how song titles represented their current mental health experience  Group was encouraged to participate by focusing mindfully on one element of each music experience as well as engaging without judgment of their actions  Discussion topics covered hope, grief, bravery, and acceptance  Massimo Flaherty participated actively by volunteering to pick the first song  Afterward she responded to prompting for how she related to it  She discussed the importance of nothing (both positive and negative aspects) lasting forever  Continue AT for development and practice of "What" and "How" skills for mindfulness  Tx Objectives: 1 1, 1 2, 1 4  Therapists: EDNA Gan and Carlos Cote MT-BC      Education Therapy   5923-1042 Massimo Flaherty actively shared in morning assessment and goal review  Presented as Receptive related to readiness to learn  Massimo Flaherty did complete goal from last treatment day identifying gaining responsibility  did not present with any barriers to learning  [id number] Massimo Flaherty engaged throughout the treatment day  Was engaged in learning related to Illness, Medication, Aftercare and Wellness Tools  Staff utilized Verbal, Written, A/V and Demonstration teaching methods  Massimo Flaherty shared area of learning and set a goal for outside of program to do stretching exercises        Tx Objective: 1 1, 1 2, 1 4 Therapist: EDNA Gan

## 2023-02-24 ENCOUNTER — OFFICE VISIT (OUTPATIENT)
Dept: PSYCHOLOGY | Facility: CLINIC | Age: 43
End: 2023-02-24

## 2023-02-24 DIAGNOSIS — F41.1 GENERALIZED ANXIETY DISORDER: ICD-10-CM

## 2023-02-24 DIAGNOSIS — G43.109 MIGRAINE WITH AURA, NOT INTRACTABLE, WITHOUT STATUS MIGRAINOSUS: ICD-10-CM

## 2023-02-24 DIAGNOSIS — M79.7 FIBROMYALGIA: ICD-10-CM

## 2023-02-24 DIAGNOSIS — F33.1 MAJOR DEPRESSIVE DISORDER, RECURRENT EPISODE, MODERATE (HCC): Primary | ICD-10-CM

## 2023-02-24 NOTE — PSYCH
Subjective:     Patient ID: Siena Tracy 37 y o  Female    [facility] Clinical Progress Notes      Specialized Services Documentation  Therapist must complete separate progress note for each specific clinical activity in which the individual participated during the day  Group Psychotherapy    [id number] Siena Tracy actively participated in a psychoeducation group discussing triggers and how to cope when triggered   provided the group with the definition of a trigger (events that tend to catapult us instantly into highly emotional reactions, often way out of proportion to the event itself) and used the term “hot button,” for relatability   opened the floor for discussion about what are things that trigger us, and do we know/recognize when we are triggered?  also discussed the brain's makeup regarding the mayuri-cortex, limbic system, and the amygdala - how it creates “triggering” or “emotional hijacking ” Next,  discussed where triggers come from and how they are very personal to us, arising out of our life history  To support the group in identifying and understanding their triggers, the group participated in several activities that were built upon each other to help identify, understand, and manage our triggers  This included:  • Getting to know your triggers (observable event vs  your interpretation)   • Mapping your triggers (tracing it back to core wounds)  • The 4 - step practice of State-Shifting (consciously shifting our energy out of our triggered state and helping our mayuri-cortex regain control)  o Name it   o Take Space Appropriately  o Shift your State  o Deal with the situation    used the following structure to support and lead the group worksheets, open discussion/processing, pysychoeducation, in-vivo exercise, and peer support     passed out a resource on “Six Ways to Practice Grounding,” and reviewed it with the group  good progress as evidenced by contributed to discussion by sharing about the topic discussed, relating to group members, and allowing self to be open/vulnerable  Erastoruth Madsen asked question also for clarification, and as she asked she was able to answer her question and be proud of her ability to engage in self-reflection  Continue with psychoeducation to further strengthen ability to recognize and manager their triggers through self-awareness, self-reflection/curiosity, changing your environment, deep breathing, self-soothing, and feeling your feelings  Tx Plan Objective 1 1, 1 2, 1 4 Therapist: Roula Alvarado

## 2023-02-24 NOTE — PSYCH
Subjective:    Patient ID: Marisol Garcia is a 37 y o  female      [facility] Clinical Progress Notes      Specialized Services Documentation  Therapist must complete separate progress note for each specific clinical activity in which the individual participated during the day  Education Therapy   [id number]  Marisol Garcia actively shared in check in and goal review  Presented as receptive related to readiness to learn  Marisol Garcia  did complete goal from last treatment day identifying gaining [address]  did not present with any barriers to learning  [id number]  Marisol Garcia engaged throughout the treatment day  Was engaged in learning related to Illness, Medication, Aftercare and Wellness Tools  Staff utilized Verbal, Written, A/V and Demonstration teaching methods  Marisol Garcia shared area of learning and set a goal for outside of program to visit her step-cats  Tx Plan Objective: 1 1, 1 2, 1 4, Therapist: Jaja Guerrero    Group Psychotherapy    0353-9975 The group engaged in the wellness assessment, which evaluates progress on several different areas of wellness/wellbeing: physical, emotional, cognitive, vocational, social and spiritual  Clients rated their progress and discussed areas that need work  By completing and discussing areas of progress and challenges, members are connected and reminded that, in their mental health struggle, they are not alone  Topics of discussion revolved around becoming aware and making changes and discussions on broadening views of vocational and spiritual wellness  Marisol Garcia shared that something she has done over the past week that she is proud of is that she was able to recognize when she needed self-care then actually took that time for herself, as well as being able to recognize her needs are just as important as the needs of others   Marisol Garcia continues to demonstrate progress towards goals through participation in group activity and personal disclosures  Continue with psychotherapy to encourage self-reflection on personal wellness       TX Plan Objectives: 1 1, 1 2, 1 4 Therapist: ZOHREH Cummins

## 2023-02-27 ENCOUNTER — OFFICE VISIT (OUTPATIENT)
Dept: PSYCHIATRY | Facility: CLINIC | Age: 43
End: 2023-02-27

## 2023-02-27 ENCOUNTER — PATIENT MESSAGE (OUTPATIENT)
Dept: GASTROENTEROLOGY | Facility: CLINIC | Age: 43
End: 2023-02-27

## 2023-02-27 ENCOUNTER — OFFICE VISIT (OUTPATIENT)
Dept: PSYCHOLOGY | Facility: CLINIC | Age: 43
End: 2023-02-27

## 2023-02-27 ENCOUNTER — TELEPHONE (OUTPATIENT)
Dept: PAIN MEDICINE | Facility: CLINIC | Age: 43
End: 2023-02-27

## 2023-02-27 DIAGNOSIS — F33.2 SEVERE RECURRENT MAJOR DEPRESSION WITHOUT PSYCHOTIC FEATURES (HCC): Primary | ICD-10-CM

## 2023-02-27 DIAGNOSIS — F41.1 GENERALIZED ANXIETY DISORDER: ICD-10-CM

## 2023-02-27 DIAGNOSIS — M79.7 FIBROMYALGIA: ICD-10-CM

## 2023-02-27 DIAGNOSIS — F33.1 MAJOR DEPRESSIVE DISORDER, RECURRENT EPISODE, MODERATE (HCC): Primary | ICD-10-CM

## 2023-02-27 DIAGNOSIS — G43.109 MIGRAINE WITH AURA, NOT INTRACTABLE, WITHOUT STATUS MIGRAINOSUS: ICD-10-CM

## 2023-02-27 NOTE — PSYCH
[facility] Clinical Progress Notes      Specialized Services Documentation  Therapist must complete separate progress note for each specific clinical activity in which the individual participated during the day         [facility] follow up physician's orders:   DATE 2/27/2023  TIME 9:59  DISCHARGE TODAY  Roger Barragan MD

## 2023-02-27 NOTE — TELEPHONE ENCOUNTER
lmom for pt to cb re: procedure tomorrow  Our C-arm went down that we do our procedures with  We are able to r/s to our [facility] office tomorrow or they can r/s in [place] when machine is back up  We do not have that date as of right now we are waiting to hear  Please reach out to Sunny Thayer or SELAM when patient calls back  Thank you

## 2023-02-27 NOTE — PSYCH
Subjective:    Patient ID: Juancarlos Mcclain is a 37 y o  female      [facility] Clinical Progress Notes      Specialized Services Documentation  Therapist must complete separate progress note for each specific clinical activity in which the individual participated during the day  Group Psychotherapy    0930-1030 Juancarlos Mcclain participated actively in a psychotherapy group focused on healthy communication  The group read aloud information on and examples of "I-Messages" vs "You-Messages," you-messages, being aggressive and inflamtory in nature, and I-messages being assertive and thoughtful  Sentence starters for I-messages, and ways that you-messages can be disguised as I-messages were also included  In pairs, group members worked through five different examples of you-messages, translating them into I-messages  Participants reviewed materials which elaborated on "I statements," including misconceptions, realistic expectations, and applicable context for usage  The group went through descriptive steps and examples for crafting I-statements  Members worked in pairs to practice a plan for using assertive communication outside of program  Additional techniques for assertive communication were reviewed and discussed  Members were encouraged to engage in discussion and ask questions throughout  Juancarlos Mcclain engaged actively throughout the group, both sharing personal examples as well as providing feedback and support to peers  Continue to note progress towards goals  Encouraged upon discharge to continue with the practice of healthy communication      Tx Plan Objective 1 1, 1 2, 1 4  Therapist: ZOHREH Zacarias

## 2023-02-27 NOTE — PSYCH
Subjective:     Patient ID: Guerrero Heredia is a 37 y o  female  [facility] Discharge Summary:   Admission Date: 02/08/23  Patient was referred by Cynthia Silva  Discharge Date: 02/27/23  Was this a routine discharge? yes   Diagnosis: Axis I:   1  Major depressive disorder, recurrent episode, moderate ([address] )        2  Generalized anxiety disorder        3  Fibromyalgia        4  Migraine with aura, not intractable, without status migrainosus           Treating Physician: Dr Kamar Veronica   Treatment Complications: There were no treatment complications  Presenting Need:   Medical Resident Dr Saul Moras Holter with attending Dr Kamar Veronica: Guerrero Heredia is a 37 y o , overtly appearing , single female, past psychiatric history of major depressive disorder, generalized anxiety disorder and chronic pain syndrome related to fibromyalgia, referred by [address] [place] [address] emergency department, subsequent to worsening dysphoric mood including depression and depressive signs/symptoms like: anhedonia, isolative behavior, hopelessness/helplessness, decreased sleep, diminished energy, decreased concentration, diminished motivation and suicidal ideation with particular plan to intentionally overdose on medication in addition to increased anxiety, citing worsening psychosocial stressors such as a previous breakup and a volatile relationship involving her brother who resides with her and her parents  Patient admits to a pervasive and prevalent psychiatric history, admitting to previous instances of voluntary inpatient behavioral health admission in addition to present outpatient psychiatric management including psychotropic medication management by Dr Sai Sykes on Pristiq 150 mg q d , Nortripyline 75 mg q d  and p r n   Vistaril 50 mg q h s  in the absence of any acute adverse effects in addition to involvement with psychotherapy through [facility], although states that she has been unable to see [facility] in approximately 3 months, because of cancelled appointments       Presently, patient admits to intermittent instances of suicidal ideation, although alludes to passive suicidal ideation at this time, denying homicidal ideation in addition to thoughts of self-injury, receptive to crisis planning provided by this writer, jackson for safety, admitting to sad/depressed and anxious mood, denying presence of panic attacks, denying signs/symptoms of nate/hypomania in addition to signs/symptoms of psychosis despite admitting to intermittent instances of auditory hallucinations that are inaudible at nighttime  At conclusion of evaluation, patient is agreeable to participation in [facility] including increase in Nortriptyline from 75 mg q d  to 50 mg b i d  for a total of 100 mg  This writer:  As per this writer-  Nav Arreguin is a 37 y o  female  using she/her pronouns referred to [facility] via [facility])  due to worsening depression symptoms  Nav Arreguin denied SI with this writer, however, within her eval with the doctor reported she is experiencing SI with a plan (no intent)  She denied HI or any current/past history of SIB  Nav Arreguin  denies prior suicide attempts  She reported that she had 3 inpatient hospitalizations for mental health in 2003, 2008, and 2012  All hospitalizations were related to depression, however, in 2003 she was diagnosed with alcohol dependence and recreational drug dependence  She attended AA and meetings for 3 years and has not engaged in recreational drug use since and engages in occasional social drinking with friends effectively with no binge drinking paterns  There are no past or pending legal issues or incarcerations    Nav Arreguin denies tobacco use, drinks alcohol socially and reported she had a past alcohol dependence issue in 2002 but has received treatment and continues to not abuse alcohol, denies marijuana use, past history of recreational substance abuse but has not engaged since 2003, and minimal/moderate caffeine use  Nirmala Stovall  reported that her stressors are related to recent shifts in her support system, her inability to set emotional boundaries with others, her father's ongoing health issues, and her brother recently moving back home  Elisewiley Hussein identified that she "feels like the secret-keeper  Everyone tells me everything " One of her supports began to struggle with their own mental health and recently has went to a residential treatment to receive additional services  With that being said, she last limited contact with this support and this effects her deeply due feeling like he was the only one that understood her mental health needs  In addition, she recently experienced a romantic rejection where someone she was pursuing romantically requested to take a break at this time as they are uncertain of their emotions and want to explore other options  Elisewiley Hussein identified that these 2 people were major supports for her and having limited or no contact with is a major trigger  In addition, her brother recently moved home after  his wife  She reported that he frequently vents to her about his mental health and this takes a toll on her emotionally  Her father has been struggling with kidney disease and is in stage 5   While he has diabetes and kidney disease, Esther and her mother have been noticing a major decline in his cognitive functioning and have began to worry about what the next steps may be for her father      Within the past 3 months, Nirmala Stovall reports the following mental health symptoms: passive suicidal ideation (with a plan but no intent), anxious at times,  feelings of loneliness, discontentment, and hopelessness, low self- esteem, isolating from others, feels exhausted or tired all of the time, difficulty concentrating/focusing, difficulty completing tasks or beginning tasks,  with no completion, limited motivation to complete tasks  Course of treatment includes:    group counseling, medication management, individual case management, allied therapy, psychoeducation and psychiatric evaluation     Treatment Progress:   Elver Lang participated in 15 PHP treatment days; in addition to the in person initial evaluation with the doctor and   Elver Lang engaged in group psychotherapy 9 x per week and Allied Therapy Group 6 x per week, along with case management sessions 3x per week  Elver Lang addressed the following treatment objectives in case management: finding appropriate distractions for her negative thoughts, acknowledging and accepting uncomfortable emotions, creating a better routine, addressing reckless spending money,  and processing responses and behaviors related to career and purpose  Response to treatment was positive evident by Elver Lang engaging in group discussions and activities/experiences, asking relevant questions, and learning new coping and distress tolerance skills  Elver Lang presented with an openness to learn, willingness to change, and continually provided insight to group  Elver Lang presented with no challenges while receiving treatment  Medication changes occurred while attending program  Elver Lang attended all medication reviews  No lab work was completed while attending [facility]  Billymarlene Lang denies any current SI, HI or SIB  Elver Lang identified  3 ways their mental has improved by attending PHP: no longer feels overwhelmed with negative thoughts and feels capable of handling them when they occur, not experiencing suicidal ideation, and felt like she is taking more responsibility and advocating for her needs to others      Takeaways from program include: responsibility pie chart (helpful with recognizing guilt and shame patterns), trigger awareness and analysis, journaling, STOP DBT skill, and thought distraction methods     Romi Frausto would like to continue to work on the following areas in OP therapy: Setting and maintaining boundaries with others and become better at recognizing when she is experiencing a trigger and learning how to react more effectively instead of emotionally           Aftercare recommendations include:   Medication Management  Dr Alice Sarmiento  [address], [place], [address]  P: [phone]  F: [phone]  Appt Time: 03/08/23     Outpatient Therapist  [address], [address]  P: [phone]  F: [phone]  Appt Time: 03/10/23 & 03/23/23         Primary Care Physician  Primary Care Physician:   Yris Gutiérrez MD  [address] [place] [address]   (p):[phone]  (f): [phone]   Appt Time:       Discharge Medications include:  Current Outpatient Medications:   •  Blood Glucose Monitoring Suppl (Contour Blood Glucose System) w/Device KIT, Use 1 kit 2 (two) times a day before meals, Disp: 1 kit, Rfl: 0  •  Blood Glucose Monitoring Suppl (OneTouch Verio Flex System) w/Device KIT, Use 2 (two) times a day, Disp: 1 kit, Rfl: 0  •  celecoxib (CeleBREX) 200 mg capsule, TAKE 1 CAPSULE BY MOUTH TWICE A DAY, Disp: 60 capsule, Rfl: 6  •  Cholecalciferol (VITAMIN D-3) 1000 units CAPS, Take 1 capsule by mouth daily, Disp: , Rfl:   •  cyclobenzaprine (FLEXERIL) 10 mg tablet, TAKE 2 TABLETS (20 MG TOTAL) BY MOUTH DAILY AT BEDTIME AS NEEDED FOR MUSCLE SPASMS, Disp: 60 tablet, Rfl: 6  •  desvenlafaxine (PRISTIQ) 100 mg 24 hr tablet, TAKE 1 TABLET BY MOUTH EVERY DAY, Disp: 30 tablet, Rfl: 2  •  desvenlafaxine succinate (PRISTIQ) 50 mg 24 hr tablet, TAKE 1 TABLET (50 MG TOTAL) BY MOUTH DAILY TOTAL DAILY DOSE 150 MG DAILY, Disp: 30 tablet, Rfl: 2  •  EPINEPHrine (EPIPEN) 0 3 mg/0 3 mL SOAJ, Inject 0 3 mL (0 3 mg total) into a muscle once for 1 dose, Disp: 0 6 mL, Rfl: 0  •  glucose blood (Contour Test) test strip, Check blood sugar once daily, Disp: 100 strip, Rfl: 3  •  hydrOXYzine HCL (ATARAX) 25 mg tablet, Take 1 tablet (25 mg total) by mouth every 6 (six) hours as needed (sleep) In addition to 50 mg dose, may take 50 -75 mg PO HS PRN for sleep, Disp: 30 tablet, Rfl: 1  •  hydrOXYzine HCL (ATARAX) 50 mg tablet, Take 1 tablet (50 mg total) by mouth daily at bedtime as needed (insomnia) In addition to 25 mg dose, may take 50 mg-75 mg PO HS PRN for insomnia, Disp: 30 tablet, Rfl: 2  •  IRON PO, Take by mouth, Disp: , Rfl:   •  lidocaine (LIDODERM) 5 %, APPLY 1 PATCH TOPICALLY IN THE MORNING  REMOVE & DISCARD PATCH WITHIN 12 HOURS OR AS DIRECTED BY MD , Disp: 30 patch, Rfl: 1  •  MAGNESIUM OXIDE PO, Take by mouth, Disp: , Rfl:   •  megestrol (MEGACE) 40 mg tablet, Take 1 tablet (40 mg total) by mouth 3 (three) times a day for 5 days, Disp: 15 tablet, Rfl: 0  •  metFORMIN (GLUCOPHAGE) 500 mg tablet, TAKE 1 TABLET BY MOUTH TWICE A DAY WITH MEALS, Disp: 60 tablet, Rfl: 5  •  nortriptyline (PAMELOR) 50 mg capsule, Take 1 capsule (50 mg total) by mouth 2 (two) times a day, Disp: 60 capsule, Rfl: 0  •  propranolol (INDERAL) 20 mg tablet, TAKE 1 TABLET BY MOUTH EVERY DAY AT NIGHT, Disp: 30 tablet, Rfl: 11

## 2023-02-27 NOTE — PSYCH
Subjective:     Patient ID: Ralph Baldwin 37 y o  Female    [facility] Clinical Progress Notes      Specialized Services Documentation  Therapist must complete separate progress note for each specific clinical activity in which the individual participated during the day  Group Psychotherapy    [id number] Ralph Baldwin quietly participated in a psychoeducational and therapeutic processing group on Attachment and Attachment Styles  The group learned about how attachment is formed early in life with their primary caregiver  They spent time discussing ways in which their primary caregiver demonstrated availability to them growing up and how support, protection, and times of distress were managed by their caregiver   discussed Internal Working Models (IWM) as a cornerstone of development in how one feels about themselves, especially in childhood developed by Mando Boswell, a psychoanalyst of Attachment Theory  Ibis Sanchez posited that an IWM comprised of mental representations for understanding the world, self and others   identified the three main features of IWM:     • A model of others as being trustworthy  • A model of the self as valuable  • A model of the self as effective when interacting with others    Next group members were provided the names of attachment styles in Children - (Anxious, Avoidant, Disorganized, and Secure), and went over each  Members then completed an Attachment Style Short Form Questionnaire to help determine their attachment styles  (It was noted  explained that this questionnaire was not an official diagnostic tool, but as a means to understand one's self)    then led a discussion on each Adult Attachment Style (Preoccupied, Dismissive, Fearful Avoidant, and Secure) and how they impact our relationships we presently engage in  Group members were also told that sometimes individuals interchange the names for attachment styles, so to not be alarmed if they here preoccupied interchanged with anxious  Group members were encouraged to share their experiences with their relationships with their primary caregivers, if comfortable and how it may have impacted their present relationships/ability to self regulate with their emotions  They were then provided ways in which they can change their attachment style though assertive communication, being your authentic self, identifying your triggers, learn how to self soothe, and learn how to stop react and to be proactive  Each group member was asked how they were going to try and implement adapting their attachment style   used the following structure to support and lead the group discussion for processing, handouts, active listening, peer support, and psychoeducation Marylin Edmondson engaged nonverbally as evidenced by taking notes, maintaining eye contact, open body language, and overall attentiveness Marylin Edmondson was meeting with her  during part of group  good  progress noted towards goal   Continue with [facility] Monday, 2/27/2023  Tx Plan Objective 1 1, 1 2, 1 4 Therapist: Shelby Hickman

## 2023-02-27 NOTE — PSYCH
Subjective:     Patient ID: Nirmala Stovall is a 37 y o  female  [facility] Clinical Progress Notes      Specialized Services Documentation  Therapist must complete separate progress note for each specific clinical activity in which the individual participated during the day  Allied Therapy   [id number]-  Nirmala Stovall actively shared in [facility] group focused on learning how to use breath-work and body somatic experiences to regulate uncomfortable body sensations and their nervous system  The group learned 9 body somatic experiences: evoking kindness visualizations, recalling being yourself visualization, the voo sound/ vocal toning, deep breathing techniques (abdominal, thoracic, and clavicular), shake it off technique, and letting of negative thoughts visualization  Group utilized guided imagery, body scanning, music listening, and group discussion to participate in each experience  Some effort noted toward treatment goal   Continue AT to encourage the development and practice of utilizing body somatic experiences to regulate the nervous system, decrease emotional intensity,  and support wellness  Tx Plan Objective: 1 1, 1 2, 1 4  Therapist:  STANTON Morton         Education Therapy   [id number] Nirmala Stovall actively shared in morning assessment and goal review  Presented as Receptive related to readiness to learn  Nirmala Stovall did complete goal from last treatment day identifying gaining hope and advocacy  did not present with any barriers to learning  [id number] Nirmala Stovall engaged throughout the treatment day  Was engaged in learning related to Illness, Medication, Aftercare and Wellness Tools  Staff utilized Verbal, Written, A/V and Demonstration teaching methods    Nirmala Stovall shared area of learning and set a goal for outside of program to attend her doctor's appointments this week      Tx Plan Objective: 1 1, 1 2, 1 4   Therapist:  STANTON Morton      Case Management Note  [id number]- Met with Ralf Snell  Reviewed relapse prevention plan, aftercare plan, crisis plan interventions, and medication list (copies provided)  Ralf Snell shared 3 ways their mental has improved: no longer feels overwhelmed with negative thoughts and feels capable of handling them when they occur, not experiencing suicidal ideation, and felt like she is taking more responsibility and advocating for her needs to others  Takeaways from program include: responsibility pie chart (helpful with recognizing guilt and shame patterns), trigger awareness and analysis, journaling, STOP DBT skill, and thought distraction methods  Cherelle Vides would like to continue to work on the following areas in OP therapy: Setting and maintaining boundaries with others and become better at recognizing when she is experiencing a trigger and learning how to react more effectively instead of emotionally  Denied SI, HI, and psychosis  Aftercare providers to receive summary  Michelle STANTON Ferrer    Current suicide risk : Low     Medications changes/added/denied? No    Treatment session number: 12    Individual Case Management Visit provided today? Yes     [facility] follow up physician's orders: Discharge from [facility] to OP providers

## 2023-02-27 NOTE — PSYCH
PHP MEDICATION MANAGEMENT NOTE        [address]    Name and Date of Birth:  Juancho Whitehead 37 y o  1980 MRN: [id number]    Date of Visit: February 27, 2023    Allergies   Allergen Reactions   • Cannabidiol Anxiety, Confusion, Hypertension, Itching, Palpitations, Shortness Of Breath, Swelling, Throat Swelling and Tongue Swelling   • Amoxicillin-Pot Clavulanate    • Decadrol [Dexamethasone] Other (See Comments)     Category: Adverse Reaction;   psychosis   • Dexamethasone Sodium Phosphate Other (See Comments)     psychosis   • Other Throat Swelling     CBD oil    • Penicillins Hives and Other (See Comments)     Category: Allergy; Hives/Uticaria   • Tetracycline Hives and Other (See Comments)     Hives/Uticaria   • Tetracyclines & Related Hives     Category: Allergy; Visit Time    Visit Start Time: 1000  Visit Stop Time: 1020  Total Visit Duration: 20 minutes    SUBJECTIVE:    Ronak Platt is seen today for a follow up for Major Depressive Disorder and anxiety  She continues to improve gradually since beginning PHP  She reports her mood has improved significantly since starting the program   She states she did not get as much done as she had hoped for this weekend, but not as hard on herself now when she does not meet some of her unrealistic expectations  She plans on continuing to use the skills she learned here and practice self compassion  She reports sleeping better with slight increase in her hydroxyzine at bedtime  She is completing the partial program tomorrow today  We reviewed her medications, she has refills from Dr Chase Alexander and follows up with her next month  She continues on the Pristiq at 150 mg and nortriptyline prescribed for pain  She denies any side effects from current psychiatric medications  PLAN:  Continue psychiatric medications the same as noted below      Aware of 24 hour and weekend coverage for urgent situations accessed by calling [facility] main practice number  Continue partial hospitalization program    Diagnoses and all orders for this visit:    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 psychotic features ([address] )    Generalized anxiety disorder        Current Outpatient Medications on File Prior to Visit   Medication Sig Dispense Refill   • Blood Glucose Monitoring Suppl (Contour Blood Glucose System) w/Device KIT Use 1 kit 2 (two) times a day before meals 1 kit 0   • Blood Glucose Monitoring Suppl (OneTouch Verio Flex System) w/Device KIT Use 2 (two) times a day 1 kit 0   • celecoxib (CeleBREX) 200 mg capsule TAKE 1 CAPSULE BY MOUTH TWICE A DAY 60 capsule 6   • Cholecalciferol (VITAMIN D-3) 1000 units CAPS Take 1 capsule by mouth daily     • cyclobenzaprine (FLEXERIL) 10 mg tablet TAKE 2 TABLETS (20 MG TOTAL) BY MOUTH DAILY AT BEDTIME AS NEEDED FOR MUSCLE SPASMS 60 tablet 6   • desvenlafaxine (PRISTIQ) 100 mg 24 hr tablet TAKE 1 TABLET BY MOUTH EVERY DAY 30 tablet 2   • desvenlafaxine succinate (PRISTIQ) 50 mg 24 hr tablet TAKE 1 TABLET (50 MG TOTAL) BY MOUTH DAILY TOTAL DAILY DOSE 150 MG DAILY 30 tablet 2   • EPINEPHrine (EPIPEN) 0 3 mg/0 3 mL SOAJ Inject 0 3 mL (0 3 mg total) into a muscle once for 1 dose 0 6 mL 0   • glucose blood (Contour Test) test strip Check blood sugar once daily 100 strip 3   • hydrOXYzine HCL (ATARAX) 25 mg tablet Take 1 tablet (25 mg total) by mouth every 6 (six) hours as needed (sleep) In addition to 50 mg dose, may take 50 -75 mg PO HS PRN for sleep 30 tablet 1   • hydrOXYzine HCL (ATARAX) 50 mg tablet Take 1 tablet (50 mg total) by mouth daily at bedtime as needed (insomnia) In addition to 25 mg dose, may take 50 mg-75 mg PO HS PRN for insomnia 30 tablet 2   • IRON PO Take by mouth     • lidocaine (LIDODERM) 5 % APPLY 1 PATCH TOPICALLY IN THE MORNING  REMOVE & DISCARD PATCH WITHIN 12 HOURS OR AS DIRECTED BY MD  30 patch 1   • MAGNESIUM OXIDE PO Take by mouth     • megestrol (MEGACE) 40 mg tablet Take 1 tablet (40 mg total) by mouth 3 (three) times a day for 5 days 15 tablet 0   • metFORMIN (GLUCOPHAGE) 500 mg tablet TAKE 1 TABLET BY MOUTH TWICE A DAY WITH MEALS 60 tablet 5   • nortriptyline (PAMELOR) 50 mg capsule Take 1 capsule (50 mg total) by mouth 2 (two) times a day 60 capsule 0   • propranolol (INDERAL) 20 mg tablet TAKE 1 TABLET BY MOUTH EVERY DAY AT NIGHT 30 tablet 11     No current facility-administered medications on file prior to visit  Psychotherapy Provided:     Individual psychotherapy provided: Supportive counseling provided  Medications, treatment progress and treatment plan reviewed with Liliane Wolf  Reassurance and supportive therapy provided  HPI ROS Appetite Changes and Sleep:     She reports fluctuating sleep pattern, fluctuating appetite, fluctuating energy levels   Denies homicidal ideation, denies suicidal ideation    Review Of Systems:      General Improved functioning   Personality no change in personality   Constitutional negative   ENT negative   Cardiovascular negative   Respiratory negative   Gastrointestinal negative   Genitourinary negative   Musculoskeletal negative   Integumentary negative   Neurological negative   Endocrine negative   Other Symptoms none, all other systems are negative     Mental Status Evaluation:    Appearance Adequate hygiene and grooming   Behavior calm and cooperative   Mood improving   Speech Normal rate and volume   Affect mood-congruent   Thought Processes Goal directed and coherent   Thought Content Does not verbalize delusional material   Associations Tightly connected   Perceptual Disturbances Denies hallucinations and does not appear to be responding to internal stimuli   Risk Potential Suicidal/Homicidal Ideation - No evidence of suicidal or homicidal ideation and patient does not verbalize suicidal or homicidal ideation  Risk of Violence - No evidence of risk for violence found on assessment  Risk of Self Mutilation - No evidence of risk for self mutilation found on assessment   Orientation oriented to person, place, time/date and situation   Memory recent and remote memory grossly intact   Consciousness alert and awake   Attention/Concentration attention span and concentration are age appropriate   Insight intact   Judgement intact   Muscle Strength and Gait normal muscle strength and normal muscle tone, normal gait/station and normal balance   Motor Activity no abnormal movements   Language no difficulty naming common objects, no difficulty repeating a phrase, no difficulty writing a sentence   Fund of Knowledge adequate knowledge of current events  adequate fund of knowledge regarding past history  adequate fund of knowledge regarding vocabulary      Past Psychiatric History Update:     Inpatient Psychiatric Admission Since Last Encounter:   no  Suicide Attempt Or Self Mutilation Since Last Encounter:   no  Incidence of Violent Behavior Since Last Encounter:   no    Traumatic History Update:     New Onset of Abuse Since Last Encounter:   no  Traumatic Events Since Last Encounter:   no    Past Medical History:    Past Medical History:   Diagnosis Date   • Anxiety    • Chronic pain disorder    • Chronic sinusitis    • Depression    • Diabetes ([address] )    • Fibromyalgia    • Migraine    • Obesity    • Polyarthritis     Last assessed 9/21/2015   • Psychiatric disorder    • Psychiatric illness    • Sleep difficulties         Past Surgical History:   Procedure Laterality Date   • COLONOSCOPY  06/2019   • DENTAL SURGERY     • HYSTEROSCOPY      Endometrial Biopsy By Hysteroscopy   • REMOVAL OF INTRAUTERINE DEVICE (IUD)     • US GUIDED BREAST BIOPSY RIGHT COMPLETE Right 6/17/2019     Allergies   Allergen Reactions   • Cannabidiol Anxiety, Confusion, Hypertension, Itching, Palpitations, Shortness Of Breath, Swelling, Throat Swelling and Tongue Swelling   • Amoxicillin-Pot Clavulanate    • Decadrol [Dexamethasone] Other (See Comments)     Category: Adverse Reaction;   psychosis   • Dexamethasone Sodium Phosphate Other (See Comments)     psychosis   • Other Throat Swelling     CBD oil    • Penicillins Hives and Other (See Comments)     Category: Allergy; Hives/Uticaria   • Tetracycline Hives and Other (See Comments)     Hives/Uticaria   • Tetracyclines & Related Hives     Category:  Allergy;      Substance Abuse History:    Social History     Substance and Sexual Activity   Alcohol Use No    Comment: 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      Social History     Substance and Sexual Activity   Drug Use Not Currently     Social History:    Social History     Socioeconomic History   • Marital status: Single     Spouse name: Not on file   • Number of children: 0   • Years of education: 12 years    • Highest education level: GED or equivalent   Occupational History   • Occupation: unemployed   Tobacco Use   • Smoking status: Never   • Smokeless tobacco: Never   Vaping Use   • Vaping Use: Never used   Substance and Sexual Activity   • Alcohol use: No     Comment: 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    • Drug use: Not Currently   • Sexual activity: Not Currently   Other Topics Concern   • Not on file   Social History Narrative    Caffeine use        What type of home do you live in: Single house    Age of your home: 50 yrs    How long have you been living there: 44 yrs    Type of heat: Baseboard    Type of fuel: Electric    What type of samir is in your bedroom: Carpet    Do you have the following in or near your home:    Air products: Window air conditioning and Ionic air purifier    Pests: Mice    Pets: Cat    Basement: None     Social Determinants of Health     Financial Resource Strain: Not on file   Food Insecurity: Not on file   Transportation Needs: Not on file   Physical Activity: Insufficiently Active   • Days of Exercise per Week: 2 days   • Minutes of Exercise per Session: 60 min Stress: Not on file   Social Connections: Not on file   Intimate Partner Violence: Not on file   Housing Stability: Not on file     Family Psychiatric History:     Family History   Problem Relation Age of Onset   • Irregular heart beat Mother    • Diabetes Father    • Kidney disease Father    • Diabetes Maternal Grandmother    • Rheum arthritis Maternal Grandmother    • Melanoma Maternal Grandmother 80   • No Known Problems Maternal Grandfather    • Kidney disease Paternal Grandmother    • Rheum arthritis Paternal Grandmother    • Depression Paternal Grandmother    • Diabetes Paternal Grandfather    • Lung cancer Paternal Grandfather 52   • Substance Abuse Brother    • No Known Problems Brother    • Substance Abuse Maternal Uncle    • Prostate cancer Maternal Uncle 61   • Depression Paternal Aunt    • Alcohol abuse Family    • Stomach cancer Family      History Review: The following portions of the patient's history were reviewed and updated as appropriate: allergies, current medications, past family history, past medical history, past social history, past surgical history and problem list     OBJECTIVE:     Vital signs in last 24 hours: There were no vitals filed for this visit  Laboratory Results: I have personally reviewed all pertinent laboratory/tests results  Medications Risks/Benefits:      Risks, Benefits And Possible Side Effects Of Medications:    Discussed risks and benefits of treatment with patient including risk of suicidality, serotonin syndrome, increased QTc interval and SIADH related to treatment with antidepressants;  Risk of induction of manic symptoms in certain patient populations     Controlled Medication Discussion:     Not applicable - controlled prescriptions are not prescribed by this practice    ADELAIDA Huang 02/27/23    This note was not shared with the patient due to reasonable likelihood of causing patient harm

## 2023-02-27 NOTE — PSYCH
Assessment/Plan:      Diagnoses and all orders for this visit:     Major depressive disorder, recurrent episode, moderate (HCC)     Generalized anxiety disorder     Fibromyalgia     Migraine with aura, not intractable, without status migrainosus           Subjective:     Patient ID: Esther Griffith is a 37 y  o  female      [facility]   AREAS OF NEED: Kathi Meier experienced worsening depression symptoms as evidenced by passive suicidal ideation (with a plan but no intent), anxious at times,  feelings of loneliness, discontentment, and hopelessness, low self- esteem, isolating from others, feels exhausted or tired all of the time, difficulty concentrating/focusing, difficulty completing tasks or beginning tasks,  with no completion, limited motivation to complete tasks due to recent shifts in her support system, her inability to set emotional boundaries with others, her father's ongoing health issues, and her brother recently moving back home     Date Initiated: 02/08/23     Strengths: Sense of humor, intelligence, creativity, empathetic           LONG TERM GOAL:   Date Initiated: 02/08/23  1 0 By the end of program, I will identify 3 ways my mental has improved and 3 coping skills or strategies I can use to improve and/or maintain my mental health wellness  Target Date: 03/08/23  Completion Date: 02/27/23        SHORT TERM OBJECTIVES:      Date Initiated: 02/08/23  1 1 By the end of program, I will identify 3 specific needs that I need to set boundaries for with others and clearly define what boundary needs to be set     Revision Date: 02/17/23- to begin outlining the boundaries in more detail now that Rajesh Carlos has received  boundary education and [facility]  Target Date: 02/17/23  Completion Date: 02/27/23-> To continue to review in OP therapy     Date Initiated: 02/08/23  1 2 By the end of program, I will learn 3 ways I can advocate for my needs, wants, and emotions and independently advocate with others at least 3 times by the completion of my program   Revision Date: 02/17/23- after completing obj 1 1 it is recommended Esther implement 1 boundary with a loved one  Target Date: 02/17/23  Completion Date: 02/27/23-> To continue to implement and practice     Date Initiated: 02/08/23  1 3 I will take medications as prescribed and share questions and concerns if arise     Revision Date: 02/17/23  Target Date: 02/17/23  Completion Date: 02/27/23     Date Initiated: 02/08/23  1 4 I will identify 3 ways my supports can assist in my recovery and agree to staff/support contact as indicated     Revision Date: 02/17/23- on 02/19/23 Janet Savage will receive a list of [facility] outpatient support groups this is a potential support Janet Savage can use   Target Date: 02/17/23  Completion Date: 02/27/23            7 DAY REVISION:  1 5: To decrease the intensity of my  unresolved feelings of grief and loss, I will spend at least 15 minutes daily working and/or reviewing my grief and loss journal as well as completing the letter activities  Date Initiated: 02/17/23  Revision Date:   Target Date: 02/28/23  Completion Date: 02/27/23-> to continue to process in OP therapy        PSYCHIATRY:  Date Initiated:  02/08/23  Medication Management and Education       Revision Date: 02/17/23        1 3 Continue medication management   The person(s) responsible for carrying out the plan is Maria Isabel Langford MD     NURSING:   Date Initiated: 02/08/23  1 1,1 2,1 3,1 4 This therapist will provide wellness/symptoms and skill education groups three to five days weekly to educate Janneth Darby signs and symptoms of diagnoses, skills to manage, and medication questions that will be addressed by the treatment team     Revision Date: 02/17/23        1 1,1 2,1 3,1 4,1 5 Continue to encourage Chryl Ort to participate in wellness groups daily to learn about symptoms, coping strategies and warning signs to promote relapse prevention     The person(s) responsible for carrying out the plan is Renee Antonio MS & Jaja David      PSYCHOLOGY:   Date Initiated: 02/08/23       1 1, 1 2, 1 4 Provide psychotherapy group 5 times per week to allow opportunity for Loreto Birch Tree explore stressors and ways of coping  Revision Date: 02/17/23   1 1,1 2,1 4,1 5  Continue to provide psychotherapy group daily to [facility] and encourage sharing of stressors, skills and positive change  The person(s) responsible for carrying out the plan is Geofm Raspberry       ALLIED THERAPY:   Date Initiated: 02/08/23  1 1,1 2 Engage Esther Griffith in AT group 5 times per week to encourage development and use of wellness tools to decrease symptoms and promote recovery through meaningful activity  Revision Date: 02/17/23   1 1,1 2,1 5 Continue to engage [facility] to participate in AT group to practice wellness tools within program and transfer to home sharing successes and barriers through healthy task involvement  The person(s) responsible for carrying out the plan is STANTON Baca and STANTON Green     CASE MANAGEMENT:   Date Initiated: 02/08/23      1 0 This  will meet with Janette Smith times weekly to assess treatment progress, discharge planning, connection to community supports and UR as indicated  Revision Date: 02/17/23   1 0 Continue to meet with Johanne Griffith 3-4 times weekly to assess growth, work toward goals, continued treatment needs, dc planning and use of supports     The person(s) responsible for carrying out the plan is STANTON Green     TREATMENT REVIEW/COMMENTS:      DISCHARGE CRITERIA: Identify 3 signs of progress and complete relapse prevention plan     DISCHARGE PLAN: Connect with outpatient providers  Estimated Length of Stay: 10 treatment days         Diagnosis and Treatment Plan explained to [facility] relates understanding diagnosis and is agreeable to Treatment Plan          CLIENT COMMENTS / Please share your thoughts, feelings, need and/or experiences regarding your treatment plan with Staff  Gilma Baptiste see follow up note with comments         Signatures can be found on [facility] Treatment plan consent form

## 2023-02-28 ENCOUNTER — HOSPITAL ENCOUNTER (OUTPATIENT)
Dept: RADIOLOGY | Facility: CLINIC | Age: 43
Discharge: HOME | End: 2023-02-28
Admitting: ANESTHESIOLOGY

## 2023-02-28 ENCOUNTER — APPOINTMENT (OUTPATIENT)
Dept: PSYCHOLOGY | Facility: CLINIC | Age: 43
End: 2023-02-28

## 2023-02-28 VITALS
HEART RATE: 95 BPM | TEMPERATURE: 96.5 F | OXYGEN SATURATION: 94 % | RESPIRATION RATE: 18 BRPM | SYSTOLIC BLOOD PRESSURE: 130 MMHG | DIASTOLIC BLOOD PRESSURE: 86 MMHG

## 2023-02-28 DIAGNOSIS — M54.16 LUMBAR RADICULOPATHY: ICD-10-CM

## 2023-02-28 RX ORDER — PAPAVERINE HCL 150 MG
15 CAPSULE, EXTENDED RELEASE ORAL ONCE
Status: COMPLETED | OUTPATIENT
Start: 2023-02-28 | End: 2023-02-28

## 2023-02-28 RX ADMIN — LIDOCAINE HYDROCHLORIDE 2 ML: 20 INJECTION, SOLUTION EPIDURAL; INFILTRATION; INTRACAUDAL at 09:25

## 2023-02-28 RX ADMIN — IOHEXOL 2 ML: 300 INJECTION, SOLUTION INTRAVENOUS at 09:28

## 2023-02-28 RX ADMIN — DEXAMETHASONE SODIUM PHOSPHATE 15 MG: 10 INJECTION, SOLUTION INTRAMUSCULAR; INTRAVENOUS at 09:29

## 2023-02-28 NOTE — DISCHARGE INSTRUCTIONS
Epidural Steroid Injection   WHAT YOU NEED TO KNOW:   An epidural steroid injection ([place]) is a procedure to inject steroid medicine into the epidural space  The epidural space is between your spinal cord and vertebrae  Steroids reduce inflammation and fluid buildup in your spine that may be causing pain  You may be given pain medicine along with the steroids  ACTIVITY  Do not drive or operate machinery today  No strenuous activity today - bending, lifting, etc   You may resume normal activites starting tomorrow - start slowly and as tolerated  You may shower today, but no tub baths or hot tubs  You may have numbness for several hours from the local anesthetic  Please use caution and common sense, especially with weight-bearing activities  CARE OF THE INJECTION SITE  If you have soreness or pain, apply ice to the area today (20 minutes on/20 minutes off)  Starting tomorrow, you may use warm, moist heat or ice if needed  You may have an increase or change in your discomfort for 36-48 hours after your treatment  Apply ice and continue with any pain medication you have been prescribed  Notify [facility] if you have any of the following: redness, drainage, swelling, headache, stiff neck or fever above 100°F     SPECIAL INSTRUCTIONS  Our office will contact you in approximately 7 days for a progress report  MEDICATIONS  Continue to take all routine medications  Our office may have instructed you to hold some medications  As no general anesthesia was used in today's procedure, you should not experience any side effects related to anesthesia  If you are diabetic, the steroids used in today's injection may temporarily increase your blood sugar levels after the first few days after your injection  Please keep a close eye on your sugars and alert the doctor who manages your diabetes if your sugars are significantly high from your baseline or you are symptomatic       If you have a problem specifically related to your procedure, please call our office at [phone]  Problems not related to your procedure should be directed to your primary care physician

## 2023-02-28 NOTE — H&P
History of Present Illness: The patient is a 37 y o  female who presents with complaints of low back and leg pain      Past Medical History:   Diagnosis Date   • Anxiety    • Chronic pain disorder    • Chronic sinusitis    • Depression    • Diabetes ([address] )    • Fibromyalgia    • Migraine    • Obesity    • Polyarthritis     Last assessed 9/21/2015   • Psychiatric disorder    • Psychiatric illness    • Sleep difficulties        Past Surgical History:   Procedure Laterality Date   • COLONOSCOPY  06/2019   • DENTAL SURGERY     • HYSTEROSCOPY      Endometrial Biopsy By Hysteroscopy   • REMOVAL OF INTRAUTERINE DEVICE (IUD)     • US GUIDED BREAST BIOPSY RIGHT COMPLETE Right 6/17/2019         Current Outpatient Medications:   •  Blood Glucose Monitoring Suppl (Contour Blood Glucose System) w/Device KIT, Use 1 kit 2 (two) times a day before meals, Disp: 1 kit, Rfl: 0  •  Blood Glucose Monitoring Suppl (OneTouch Verio Flex System) w/Device KIT, Use 2 (two) times a day, Disp: 1 kit, Rfl: 0  •  celecoxib (CeleBREX) 200 mg capsule, TAKE 1 CAPSULE BY MOUTH TWICE A DAY, Disp: 60 capsule, Rfl: 6  •  Cholecalciferol (VITAMIN D-3) 1000 units CAPS, Take 1 capsule by mouth daily, Disp: , Rfl:   •  cyclobenzaprine (FLEXERIL) 10 mg tablet, TAKE 2 TABLETS (20 MG TOTAL) BY MOUTH DAILY AT BEDTIME AS NEEDED FOR MUSCLE SPASMS, Disp: 60 tablet, Rfl: 6  •  desvenlafaxine (PRISTIQ) 100 mg 24 hr tablet, TAKE 1 TABLET BY MOUTH EVERY DAY, Disp: 30 tablet, Rfl: 2  •  desvenlafaxine succinate (PRISTIQ) 50 mg 24 hr tablet, TAKE 1 TABLET (50 MG TOTAL) BY MOUTH DAILY TOTAL DAILY DOSE 150 MG DAILY, Disp: 30 tablet, Rfl: 2  •  EPINEPHrine (EPIPEN) 0 3 mg/0 3 mL SOAJ, Inject 0 3 mL (0 3 mg total) into a muscle once for 1 dose, Disp: 0 6 mL, Rfl: 0  •  glucose blood (Contour Test) test strip, Check blood sugar once daily, Disp: 100 strip, Rfl: 3  •  hydrOXYzine HCL (ATARAX) 25 mg tablet, Take 1 tablet (25 mg total) by mouth every 6 (six) hours as needed (sleep) In addition to 50 mg dose, may take 50 -75 mg PO HS PRN for sleep, Disp: 30 tablet, Rfl: 1  •  hydrOXYzine HCL (ATARAX) 50 mg tablet, Take 1 tablet (50 mg total) by mouth daily at bedtime as needed (insomnia) In addition to 25 mg dose, may take 50 mg-75 mg PO HS PRN for insomnia, Disp: 30 tablet, Rfl: 2  •  IRON PO, Take by mouth, Disp: , Rfl:   •  lidocaine (LIDODERM) 5 %, APPLY 1 PATCH TOPICALLY IN THE MORNING  REMOVE & DISCARD PATCH WITHIN 12 HOURS OR AS DIRECTED BY MD , Disp: 30 patch, Rfl: 1  •  MAGNESIUM OXIDE PO, Take by mouth, Disp: , Rfl:   •  megestrol (MEGACE) 40 mg tablet, Take 1 tablet (40 mg total) by mouth 3 (three) times a day for 5 days, Disp: 15 tablet, Rfl: 0  •  metFORMIN (GLUCOPHAGE) 500 mg tablet, TAKE 1 TABLET BY MOUTH TWICE A DAY WITH MEALS, Disp: 60 tablet, Rfl: 5  •  nortriptyline (PAMELOR) 50 mg capsule, Take 1 capsule (50 mg total) by mouth 2 (two) times a day, Disp: 60 capsule, Rfl: 0  •  propranolol (INDERAL) 20 mg tablet, TAKE 1 TABLET BY MOUTH EVERY DAY AT NIGHT, Disp: 30 tablet, Rfl: 11    Allergies   Allergen Reactions   • Cannabidiol Anxiety, Confusion, Hypertension, Itching, Palpitations, Shortness Of Breath, Swelling, Throat Swelling and Tongue Swelling   • Amoxicillin-Pot Clavulanate    • Decadrol [Dexamethasone] Other (See Comments)     Category: Adverse Reaction;   psychosis   • Dexamethasone Sodium Phosphate Other (See Comments)     psychosis   • Other Throat Swelling     CBD oil    • Penicillins Hives and Other (See Comments)     Category: Allergy; Hives/Uticaria   • Tetracycline Hives and Other (See Comments)     Hives/Uticaria   • Tetracyclines & Related Hives     Category: Allergy;         Physical Exam:   Vitals:    02/28/23 0914   BP: 127/83   Pulse: 96   Resp: 20   Temp: (!) 96 5 °F (35 8 °C)   SpO2: 95%     General: Awake, Alert, Oriented x 3, Mood and affect appropriate  Respiratory: Respirations even and unlabored  Cardiovascular: Peripheral pulses intact; no edema  Musculoskeletal Exam: bilateral lumbar paraspinals TTP    ASA Score: 2         Assessment: Lumbar radiculopathy    Plan: Damion L5 TFESI

## 2023-03-01 NOTE — PATIENT COMMUNICATION
Spoke to patient - Informed calling to confirm colonoscopy - instructions sent via [employer]    Confirmed and no questions at this time

## 2023-03-06 ENCOUNTER — TELEPHONE (OUTPATIENT)
Dept: GASTROENTEROLOGY | Facility: MEDICAL CENTER | Age: 43
End: 2023-03-06

## 2023-03-07 ENCOUNTER — TELEPHONE (OUTPATIENT)
Dept: PSYCHIATRY | Facility: CLINIC | Age: 43
End: 2023-03-07

## 2023-03-07 ENCOUNTER — TELEPHONE (OUTPATIENT)
Dept: PAIN MEDICINE | Facility: CLINIC | Age: 43
End: 2023-03-07

## 2023-03-07 DIAGNOSIS — F33.1 MAJOR DEPRESSIVE DISORDER, RECURRENT EPISODE, MODERATE (HCC): ICD-10-CM

## 2023-03-07 DIAGNOSIS — M79.7 FIBROMYALGIA: ICD-10-CM

## 2023-03-07 DIAGNOSIS — F41.1 GENERALIZED ANXIETY DISORDER: ICD-10-CM

## 2023-03-07 RX ORDER — NORTRIPTYLINE HYDROCHLORIDE 75 MG/1
75 CAPSULE ORAL 2 TIMES DAILY
Qty: 60 CAPSULE | Refills: 2 | Status: SHIPPED | OUTPATIENT
Start: 2023-03-07 | End: 2023-06-12

## 2023-03-07 NOTE — TELEPHONE ENCOUNTER
Caller: Esther Griffith  Doctor/office: Gino hernandez  #:[phone]    % of improvement: 20  Pain Scale (1-10): 5

## 2023-03-07 NOTE — TELEPHONE ENCOUNTER
Patient contacted the office wishing to speak with her provider about her current medication dosages  The patient explained that while in the [facility] program, the doctor reduced the dosage of her nortriptyline from 75mg to 50mg  The patient is wishing to be prescribed the 75mg nortriptyline twice a day again  The patient stated that ever since she was prescribed the lower dosage, she has been experiencing neuropathy in her feet  Patient can be reached at [phone]  Thank you!

## 2023-03-09 RX ORDER — SODIUM CHLORIDE 9 MG/ML
125 INJECTION, SOLUTION INTRAVENOUS CONTINUOUS
Status: CANCELLED | OUTPATIENT
Start: 2023-03-09

## 2023-03-10 ENCOUNTER — TELEMEDICINE (OUTPATIENT)
Dept: BEHAVIORAL/MENTAL HEALTH CLINIC | Facility: CLINIC | Age: 43
End: 2023-03-10

## 2023-03-10 DIAGNOSIS — F33.2 SEVERE RECURRENT MAJOR DEPRESSION WITHOUT PSYCHOTIC FEATURES (HCC): Primary | ICD-10-CM

## 2023-03-10 DIAGNOSIS — M54.50 ACUTE LOW BACK PAIN: ICD-10-CM

## 2023-03-10 DIAGNOSIS — F41.1 GENERALIZED ANXIETY DISORDER: ICD-10-CM

## 2023-03-10 RX ORDER — LIDOCAINE 50 MG/G
PATCH TOPICAL
Qty: 30 PATCH | Refills: 1 | Status: SHIPPED | OUTPATIENT
Start: 2023-03-10

## 2023-03-10 NOTE — PSYCH
Virtual Regular Visit    Verification of patient location:    Patient is located in the following state in which I hold an active license [place]      Assessment/Plan:    Problem List Items Addressed This Visit        Other    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 psychotic features ([facility] [address] ) - Primary    Generalized anxiety disorder       Goals addressed in session: Goal 1          Reason for visit is   Chief Complaint   Patient presents with   • Virtual Regular Visit        Encounter provider HERMELINDA Best    Provider located at [address] [place] [id number] [phone]      Recent Visits  No visits were found meeting these conditions  Showing recent visits within past 7 days and meeting all other requirements  Today's Visits  Date Type Provider Dept   03/10/23 Telemedicine HERMELINDA [facility] Therapist [facility]   Showing today's visits and meeting all other requirements  Future Appointments  No visits were found meeting these conditions  Showing future appointments within next 150 days and meeting all other requirements       The patient was identified by name and date of birth  Erica Roll was informed that this is a telemedicine visit and that the visit is being conducted thr[employer] platform  She agrees to proceed     My office door was closed  No one else was in the room  She acknowledged consent and understanding of privacy and security of the video platform  The patient has agreed to participate and understands they can discontinue the visit at any time  Patient is aware this is a billable service  Behavioral Health Psychotherapy Progress Note    Psychotherapy Provided: Individual Psychotherapy     1  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 psychotic features ([facility] [address] )        2   Generalized anxiety disorder            Goals addressed in session: Goal 1     DATA: Juaquin Arauz presented for follow up, sharing that she wound up going to [facility] because she was feeling very hopeless, purposeless and had some fleeting SI  She felt like she was supporting many others in her life, with no one there to support her, leaving her feeling very isolated and overwhelmed  Since going to [facility], she has recognizes that she needs to find some kind of purpose for herself, to feel like she is not just existing, and has thought about possible options (would like to go into some type of therapy role to help others)  She also said that she is beginning to recognize that she does not have to follow the same path as everyone else, and if others do not like her choices, she does not have to put so much value on their opinions and can do what feels right for herself  She said that she feels more in control of things, more hopeful about the future, and feels that she is at a positive turning point and is more accepting of herself and her own journey  Provided positive feedback on Esther's progress and shift in perspective, used CBT and existential approaches to help Juaquin Arauz begin to accept her own interests, purpose and value  During this session, this clinician used the following therapeutic modalities: Client-centered Therapy, Cognitive Behavioral Therapy, Mindfulness-based Strategies and Supportive Psychotherapy    Substance Abuse was not addressed during this session  If the client is diagnosed with a co-occurring substance use disorder, please indicate any changes in the frequency or amount of use: n/a  Stage of change for addressing substance use diagnoses: No substance use/Not applicable    ASSESSMENT:  Jerry Wright presents with a Euthymic/ normal mood  her affect is Normal range and intensity, which is congruent, with her mood and the content of the session  The client has made progress on their goals       Jerry Wright presents with a low risk of suicide, low risk of self-harm, and low risk of harm to others  For any risk assessment that surpasses a "low" rating, a safety plan must be developed  A safety plan was indicated: no  If yes, describe in detail n/a    PLAN: Between sessions, Tony Raygoza will continue to work on building healthy support system, will consider options for future endeavors (classes, hobbies, volunteering) and will use thought reframing techniques to help build positive and hopeful thoughts  At the next session, the therapist will use Client-centered Therapy, Cognitive Behavioral Therapy, Mindfulness-based Strategies and Supportive Psychotherapy to address depression  Behavioral Health Treatment Plan and Discharge Planning: Tony Raygoza is aware of and agrees to continue to work on their treatment plan  They have identified and are working toward their discharge goals   yes    Visit start and stop times:    03/10/23  Start Time: 0908  Stop Time: 4139  Total Visit Time: 39 minutes

## 2023-03-13 ENCOUNTER — HOSPITAL ENCOUNTER (OUTPATIENT)
Dept: GASTROENTEROLOGY | Facility: MEDICAL CENTER | Age: 43
Setting detail: OUTPATIENT SURGERY
Discharge: HOME | End: 2023-03-13

## 2023-03-13 ENCOUNTER — ANESTHESIA (OUTPATIENT)
Dept: GASTROENTEROLOGY | Facility: MEDICAL CENTER | Age: 43
End: 2023-03-13

## 2023-03-13 ENCOUNTER — ANESTHESIA EVENT (OUTPATIENT)
Dept: GASTROENTEROLOGY | Facility: MEDICAL CENTER | Age: 43
End: 2023-03-13

## 2023-03-13 VITALS
SYSTOLIC BLOOD PRESSURE: 110 MMHG | BODY MASS INDEX: 36.32 KG/M2 | TEMPERATURE: 97.6 F | WEIGHT: 185 LBS | HEART RATE: 89 BPM | DIASTOLIC BLOOD PRESSURE: 66 MMHG | HEIGHT: 60 IN | OXYGEN SATURATION: 100 % | RESPIRATION RATE: 20 BRPM

## 2023-03-13 DIAGNOSIS — Z86.010 HISTORY OF COLON POLYPS: ICD-10-CM

## 2023-03-13 LAB
EXT PREGNANCY TEST URINE: NEGATIVE
EXT. CONTROL: NORMAL

## 2023-03-13 RX ORDER — PROPOFOL 10 MG/ML
INJECTION, EMULSION INTRAVENOUS AS NEEDED
Status: DISCONTINUED | OUTPATIENT
Start: 2023-03-13 | End: 2023-03-13

## 2023-03-13 RX ORDER — SODIUM CHLORIDE 9 MG/ML
125 INJECTION, SOLUTION INTRAVENOUS CONTINUOUS
Status: DISCONTINUED | OUTPATIENT
Start: 2023-03-13 | End: 2023-03-17 | Stop reason: HOSPADM

## 2023-03-13 RX ADMIN — PROPOFOL 100 MG: 10 INJECTION, EMULSION INTRAVENOUS at 08:19

## 2023-03-13 RX ADMIN — PROPOFOL 100 MG: 10 INJECTION, EMULSION INTRAVENOUS at 08:15

## 2023-03-13 RX ADMIN — SODIUM CHLORIDE 125 ML/HR: 0.9 INJECTION, SOLUTION INTRAVENOUS at 08:06

## 2023-03-13 RX ADMIN — PROPOFOL 100 MG: 10 INJECTION, EMULSION INTRAVENOUS at 08:17

## 2023-03-13 RX ADMIN — PROPOFOL 100 MG: 10 INJECTION, EMULSION INTRAVENOUS at 08:12

## 2023-03-13 RX ADMIN — Medication 40 MG: at 08:18

## 2023-03-13 NOTE — ANESTHESIA POSTPROCEDURE EVALUATION
Post-Op Assessment Note    CV Status:  Stable  Pain Score: 1    Pain management: adequate     Mental Status:  Alert and awake   Hydration Status:  Euvolemic   PONV Controlled:  Controlled   Airway Patency:  Patent      Post Op Vitals Reviewed: Yes      Staff: Anesthesiologist         No notable events documented      /62 (03/13/23 0833)    Temp      Pulse 88 (03/13/23 0833)   Resp 16 (03/13/23 0833)    SpO2 99 % (03/13/23 2334)

## 2023-03-13 NOTE — H&P
History and Physical - [facility]  Magan Landaverde 37 y o  female MRN: [id number]                  HPI: Magan Landaverde is a 37y o  year old female who presents for screening for colon cancer  REVIEW OF SYSTEMS: Per the HPI, and otherwise unremarkable      Historical Information   Past Medical History:   Diagnosis Date   • Anxiety    • Chronic pain disorder    • Chronic sinusitis    • Depression    • Diabetes ([address] )    • Fibromyalgia    • Migraine    • Obesity    • Polyarthritis     Last assessed 9/21/2015   • Psychiatric disorder    • Psychiatric illness    • Sleep difficulties      Past Surgical History:   Procedure Laterality Date   • COLONOSCOPY  06/2019   • DENTAL SURGERY     • HYSTEROSCOPY      Endometrial Biopsy By Hysteroscopy   • REMOVAL OF INTRAUTERINE DEVICE (IUD)     • US GUIDED BREAST BIOPSY RIGHT COMPLETE Right 6/17/2019     Social History   Social History     Substance and Sexual Activity   Alcohol Use No    Comment: 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      Social History     Substance and Sexual Activity   Drug Use Not Currently     Social History     Tobacco Use   Smoking Status Never   Smokeless Tobacco Never     Family History   Problem Relation Age of Onset   • Irregular heart beat Mother    • Diabetes Father    • Kidney disease Father    • Diabetes Maternal Grandmother    • Rheum arthritis Maternal Grandmother    • Melanoma Maternal Grandmother 80   • No Known Problems Maternal Grandfather    • Kidney disease Paternal Grandmother    • Rheum arthritis Paternal Grandmother    • Depression Paternal Grandmother    • Diabetes Paternal Grandfather    • Lung cancer Paternal Grandfather 52   • Substance Abuse Brother    • No Known Problems Brother    • Substance Abuse Maternal Uncle    • Prostate cancer Maternal Uncle 61   • Depression Paternal Aunt    • Alcohol abuse Family    • Stomach cancer Family        Meds/Allergies     (Not in a hospital admission)      Allergies   Allergen Reactions   • Cannabidiol Anxiety, Confusion, Hypertension, Itching, Palpitations, Shortness Of Breath, Swelling, Throat Swelling and Tongue Swelling   • Amoxicillin-Pot Clavulanate    • Decadrol [Dexamethasone] Other (See Comments)     Category: Adverse Reaction;   psychosis   • Dexamethasone Sodium Phosphate Other (See Comments)     psychosis   • Other Throat Swelling     CBD oil    • Penicillins Hives and Other (See Comments)     Category: Allergy; Hives/Uticaria   • Tetracycline Hives and Other (See Comments)     Hives/Uticaria   • Tetracyclines & Related Hives     Category: Allergy;        Objective     Last menstrual period 03/13/2023  PHYSICAL EXAMINATION:    General Appearance:   Alert, cooperative, no distress   HEENT:  Normocephalic, atraumatic, anicteric  Neck supple, symmetrical, trachea midline  Lungs:   Equal chest rise and unlabored breathing, normal effort, no coughing  Cardiovascular:   No visualized JVD  Abdomen:   No abdominal distension  Skin:   No jaundice, rashes, or lesions  Musculoskeletal:   Normal range of motion visualized  Psych:  Normal affect and normal insight  Neuro:  Alert and appropriate  ASSESSMENT/PLAN:  This is a 37y o  year old female here for colonoscopy, and she is stable and optimized for her procedure

## 2023-03-13 NOTE — ANESTHESIA PREPROCEDURE EVALUATION
Procedure:  COLONOSCOPY    Relevant Problems   CARDIO   (+) Chronic migraine without aura without status migrainosus, not intractable      ENDO   (+) Type 2 diabetes mellitus with hyperglycemia, without long-term current use of insulin (HCC)      MUSCULOSKELETAL   (+) Back pain   (+) DDD (degenerative disc disease), lumbar   (+) Fibromyalgia   (+) Lumbar back pain   (+) Sacroiliitis (HCC)      NEURO/PSYCH   (+) Chronic migraine without aura without status migrainosus, not intractable   (+) Chronic pain   (+) Diabetic neuropathy associated with type 2 diabetes mellitus (HCC)   (+) Fibromyalgia   (+) Generalized anxiety disorder   (+) Headache   (+) 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 psychotic features ([facility] [address] )      PULMONARY   (+) Sleep apnea        Physical Exam    Airway    Mallampati score: II         Dental       Cardiovascular  Rhythm: regular, Rate: normal,     Pulmonary  Breath sounds clear to auscultation,     Other Findings        Anesthesia Plan  ASA Score- 2     Anesthesia Type- IV sedation with anesthesia with ASA Monitors  Additional Monitors:   Airway Plan:           Plan Factors-Exercise tolerance (METS): >4 METS  Chart reviewed  Existing labs reviewed  Patient summary reviewed  Patient is not a current smoker  Patient not instructed to abstain from smoking on day of procedure  Patient did not smoke on day of surgery  Obstructive sleep apnea risk education given perioperatively  Induction- intravenous  Postoperative Plan-     Informed Consent- Anesthetic plan and risks discussed with patient

## 2023-03-21 ENCOUNTER — TELEMEDICINE (OUTPATIENT)
Dept: PSYCHIATRY | Facility: CLINIC | Age: 43
End: 2023-03-21

## 2023-03-21 DIAGNOSIS — F33.1 MAJOR DEPRESSIVE DISORDER, RECURRENT EPISODE, MODERATE (HCC): ICD-10-CM

## 2023-03-21 DIAGNOSIS — F33.2 SEVERE RECURRENT MAJOR DEPRESSION WITHOUT PSYCHOTIC FEATURES (HCC): Primary | ICD-10-CM

## 2023-03-21 DIAGNOSIS — F41.1 GENERALIZED ANXIETY DISORDER: ICD-10-CM

## 2023-03-21 RX ORDER — DESVENLAFAXINE 100 MG/1
100 TABLET, EXTENDED RELEASE ORAL DAILY
Qty: 30 TABLET | Refills: 2 | Status: SHIPPED | OUTPATIENT
Start: 2023-03-21

## 2023-03-21 RX ORDER — DESVENLAFAXINE SUCCINATE 50 MG/1
50 TABLET, EXTENDED RELEASE ORAL DAILY
Qty: 30 TABLET | Refills: 2 | Status: SHIPPED | OUTPATIENT
Start: 2023-03-21

## 2023-03-21 NOTE — PSYCH
Virtual Regular Visit    Verification of patient location:    Patient is located in the following state in which I hold an active license [place]      Assessment/Plan:    Problem List Items Addressed This Visit        Other    Generalized anxiety disorder    Relevant Medications    desvenlafaxine succinate (PRISTIQ) 50 mg 24 hr tablet    desvenlafaxine (PRISTIQ) 100 mg 24 hr tablet    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 psychotic features (HCC) - Primary    Relevant Medications    desvenlafaxine succinate (PRISTIQ) 50 mg 24 hr tablet    desvenlafaxine (PRISTIQ) 100 mg 24 hr tablet   Other Visit Diagnoses     Major depressive disorder, recurrent episode, moderate (HCC)        Relevant Medications    desvenlafaxine succinate (PRISTIQ) 50 mg 24 hr tablet    desvenlafaxine (PRISTIQ) 100 mg 24 hr tablet                   Reason for visit is   Chief Complaint   Patient presents with   • Virtual Regular Visit        Encounter provider Adilene Ortiz MD    Provider located at [address] [place] [id number] [phone]      Recent Visits  No visits were found meeting these conditions  Showing recent visits within past 7 days and meeting all other requirements  Today's Visits  Date Type Provider Dept   03/21/23 MD Koby Aguilar 18 today's visits and meeting all other requirements  Future Appointments  No visits were found meeting these conditions  Showing future appointments within next 150 days and meeting all other requirements       The patient was identified by name and date of birth  Jannie Alejo was informed that this is a telemedicine visit and [facility]  She agrees to proceed     My office door was closed  No one else was in the room    She acknowledged consent and understanding of privacy and security of the video platform  The patient has agreed to participate and understands they can discontinue the visit at any time  Patient is aware this is a billable service  Subjective  Jana Lino is a 37 y o  female with MDD    Patient remains compliant with medications and denies side effects  She continues to manage her diabetes with medication and diet  Her A1C is down to 6 6  Since last seen she stated she completed 3 weeks at [facility] and that was very helpful to work on her coping skills  The only change in medication that was done while in the program was dose reduction on Amitriptyline to 50 mg bid, but it cause her neuropathy flared up and she requested to have dose increase back to 75 mg bid  She agrees to continue current treatment as prescribed   Will schedule follow up in 3 months or sooner if needed    HPI     Past Medical History:   Diagnosis Date   • Anxiety    • Chronic pain disorder    • Chronic sinusitis    • Depression    • Diabetes ([address] )    • Fibromyalgia    • Migraine    • Obesity    • Polyarthritis     Last assessed 9/21/2015   • Psychiatric disorder    • Psychiatric illness    • Sleep difficulties        Past Surgical History:   Procedure Laterality Date   • COLONOSCOPY  06/2019   • DENTAL SURGERY     • HYSTEROSCOPY      Endometrial Biopsy By Hysteroscopy   • REMOVAL OF INTRAUTERINE DEVICE (IUD)     • US GUIDED BREAST BIOPSY RIGHT COMPLETE Right 6/17/2019       Current Outpatient Medications   Medication Sig Dispense Refill   • desvenlafaxine (PRISTIQ) 100 mg 24 hr tablet Take 1 tablet (100 mg total) by mouth daily 30 tablet 2   • desvenlafaxine succinate (PRISTIQ) 50 mg 24 hr tablet Take 1 tablet (50 mg total) by mouth daily Total daily dose 150 mg daily 30 tablet 2   • Blood Glucose Monitoring Suppl (Contour Blood Glucose System) w/Device KIT Use 1 kit 2 (two) times a day before meals 1 kit 0   • celecoxib (CeleBREX) 200 mg capsule TAKE 1 CAPSULE BY MOUTH TWICE A DAY 60 capsule 6 • Cholecalciferol (VITAMIN D-3) 1000 units CAPS Take 1 capsule by mouth daily     • glucose blood (Contour Test) test strip Check blood sugar once daily 100 strip 3   • hydrOXYzine HCL (ATARAX) 25 mg tablet Take 1 tablet (25 mg total) by mouth every 6 (six) hours as needed (sleep) In addition to 50 mg dose, may take 50 -75 mg PO HS PRN for sleep 30 tablet 1   • hydrOXYzine HCL (ATARAX) 50 mg tablet Take 1 tablet (50 mg total) by mouth daily at bedtime as needed (insomnia) In addition to 25 mg dose, may take 50 mg-75 mg PO HS PRN for insomnia 30 tablet 2   • IRON PO Take by mouth     • lidocaine (LIDODERM) 5 % APPLY 1 PATCH TOPICALLY IN THE MORNING  REMOVE & DISCARD PATCH WITHIN 12 HOURS OR AS DIRECTED BY MD  30 patch 1   • MAGNESIUM OXIDE PO Take by mouth     • metFORMIN (GLUCOPHAGE) 500 mg tablet TAKE 1 TABLET BY MOUTH TWICE A DAY WITH MEALS 60 tablet 5   • nortriptyline (PAMELOR) 75 MG capsule Take 1 capsule (75 mg total) by mouth 2 (two) times a day 60 capsule 2   • propranolol (INDERAL) 20 mg tablet TAKE 1 TABLET BY MOUTH EVERY DAY AT NIGHT 30 tablet 11     No current facility-administered medications for this visit  Allergies   Allergen Reactions   • Cannabidiol Anxiety, Confusion, Hypertension, Itching, Palpitations, Shortness Of Breath, Swelling, Throat Swelling and Tongue Swelling   • Amoxicillin-Pot Clavulanate    • Decadrol [Dexamethasone] Other (See Comments)     Category: Adverse Reaction;   psychosis   • Dexamethasone Sodium Phosphate Other (See Comments)     psychosis   • Other Throat Swelling     CBD oil    • Penicillins Hives and Other (See Comments)     Category: Allergy; Hives/Uticaria   • Tetracycline Hives and Other (See Comments)     Hives/Uticaria   • Tetracyclines & Related Hives     Category: Allergy;         Review of Systems     Mood Anxiety and Depression   Behavior Normal    Thought Content Disturbing Thoughts, Feelings   General Emotional Problems and Decreased Functioning Personality Normal   Other Psych Symptoms Normal   Constitutional Negative   ENT Negative   Cardiovascular Negative   Respiratory Negative   Gastrointestinal Negative   Genitourinary Negative   Musculoskeletal Negative   Integumentary Negative   Neurological Negative   Endocrine Normal    Other Symptoms Normal        Laboratory Results:   Recent Labs (last 12 months):   Hospital Outpatient Visit on 03/13/2023   Component Date Value   • EXT Preg Test, Ur 03/13/2023 Negative    • Control 03/13/2023 Valid    • Case Report 03/13/2023                      Value:Surgical Pathology Report                         Case: [id number]                                   Authorizing Provider:  Alessandro Burton MD         Collected:           03/13/2023 0827              Ordering Location:     [facility]        Received:            03/13/2023 [facility]                                                     Pathologist:           Serg Doherty MD                                                                 Specimen:    Polyp, Colorectal, rectal , cold bx                                                       • Final Diagnosis 03/13/2023                      Value: This result contains rich text formatting which cannot be displayed here  • Additional Information 03/13/2023                      Value: This result contains rich text formatting which cannot be displayed here  • Synoptic Checklist 03/13/2023                      Value:                            COLON/RECTUM POLYP FORM - GI - All Specimens                                                                                     :    Other     • Gross Description 03/13/2023                      Value: This result contains rich text formatting which cannot be displayed here     Orders Only on 12/01/2022   Component Date Value   • Total Cholesterol 12/01/2022 180    • HDL 12/01/2022 40 (L)    • Triglycerides 12/01/2022 127    • LDL Calculated 12/01/2022 116 (H)    • Chol HDLC Ratio 12/01/2022 4 5    • Non-HDL Cholesterol 12/01/2022 140 (H)    • Creatinine, Urine 12/01/2022 82    • Microalbum  ,U,Random 12/01/2022 5 3    • Microalb/Creat Ratio 12/01/2022 65 (H)    • Glucose, Random 12/01/2022 164 (H)    • BUN 12/01/2022 14    • Creatinine 12/01/2022 0 89    • eGFR 12/01/2022 82    • SL AMB BUN/CREATININE RA* 42/78/5953 NOT APPLICABLE    • Sodium 78/77/4136 136    • Potassium 12/01/2022 4 3    • Chloride 12/01/2022 101    • CO2 12/01/2022 25    • Calcium 12/01/2022 9 2    • Protein, Total 12/01/2022 6 3    • Albumin 12/01/2022 4 2    • Globulin 12/01/2022 2 1    • Albumin/Globulin Ratio 12/01/2022 2 0    • TOTAL BILIRUBIN 12/01/2022 0 5    • Alkaline Phosphatase 12/01/2022 55    • AST 12/01/2022 18    • ALT 12/01/2022 21    • Hemoglobin A1C 12/01/2022 6 6 (H)    Orders Only on 11/11/2022   Component Date Value   • White Blood Cell Count 11/11/2022 13 5 (H)    • Red Blood Cell Count 11/11/2022 4 48    • Hemoglobin 11/11/2022 13 1    • HCT 11/11/2022 38 9    • MCV 11/11/2022 86 8    • MCH 11/11/2022 29 2    • MCHC 11/11/2022 33 7    • RDW 11/11/2022 12 3    • Platelet Count 92/03/5681 304    • SL AMB MPV 11/11/2022 9 2    • Neutrophils (Absolute) 11/11/2022 8,141 (H)    • Lymphocytes (Absolute) 11/11/2022 4,415 (H)    • Monocytes (Absolute) 11/11/2022 824    • Eosinophils (Absolute) 11/11/2022 68    • Basophils ABS 11/11/2022 54    • Neutrophils 11/11/2022 60 3    • Lymphocytes 11/11/2022 32 7    • Monocytes 11/11/2022 6 1    • Eosinophils 11/11/2022 0 5    • Basophils PCT 11/11/2022 0 4    Office Visit on 11/09/2022   Component Date Value   • Case Report 11/09/2022                      Value:Surgical Pathology Report                         Case: [id number]                                   Authorizing Provider:  Bijan Aj Collected:           11/09/2022 1423              Ordering Location:     [facility]      Received:            11/09/2022 1423              Pathologist:           Katey Meneses MD                                                    Specimen:    Endometrium                                                                               • Final Diagnosis 11/09/2022                      Value: This result contains rich text formatting which cannot be displayed here  • Additional Information 11/09/2022                      Value: This result contains rich text formatting which cannot be displayed here  • Gross Description 11/09/2022                      Value: This result contains rich text formatting which cannot be displayed here     Orders Only on 10/28/2022   Component Date Value   • Vitamin D, 25-Hydroxy, S* 10/28/2022 45    Orders Only on 10/28/2022   Component Date Value   • TSH W/RFX TO FREE T4 10/28/2022 2 03    Lab on 08/24/2022   Component Date Value   • Sodium 08/24/2022 135    • Potassium 08/24/2022 4 3    • Chloride 08/24/2022 100    • CO2 08/24/2022 29    • ANION GAP 08/24/2022 6    • BUN 08/24/2022 12    • Creatinine 08/24/2022 1 04    • Glucose, Fasting 08/24/2022 224 (H)    • Calcium 08/24/2022 9 0    • AST 08/24/2022 43    • ALT 08/24/2022 59    • Alkaline Phosphatase 08/24/2022 72    • Total Protein 08/24/2022 7 6    • Albumin 08/24/2022 4 1    • Total Bilirubin 08/24/2022 0 68    • eGFR 08/24/2022 65    • Hemoglobin A1C 08/24/2022 7 7 (H)    • EAG 08/24/2022 174    • Cholesterol 08/24/2022 181    • Triglycerides 08/24/2022 195 (H)    • HDL, Direct 08/24/2022 34 (L)    • LDL Calculated 08/24/2022 108 (H)    • Non-HDL-Chol (CHOL-HDL) 08/24/2022 147        Substance Abuse History:  Social History     Substance and Sexual Activity   Drug Use Not Currently       Family Psychiatric History:   Family History   Problem Relation Age of Onset   • Irregular heart beat Mother    • Diabetes Father    • Kidney disease Father    • Diabetes Maternal Grandmother    • Rheum arthritis Maternal Grandmother    • Melanoma Maternal Grandmother 80   • No Known Problems Maternal Grandfather    • Kidney disease Paternal Grandmother    • Rheum arthritis Paternal Grandmother    • Depression Paternal Grandmother    • Diabetes Paternal Grandfather    • Lung cancer Paternal Grandfather 52   • Substance Abuse Brother    • No Known Problems Brother    • Substance Abuse Maternal Uncle    • Prostate cancer Maternal Uncle 61   • Depression Paternal Aunt    • Alcohol abuse Family    • Stomach cancer Family        The following portions of the patient's history were reviewed and updated as appropriate: allergies, current medications, past family history, past medical history, past social history, past surgical history and problem list     Social History     Socioeconomic History   • Marital status: Single     Spouse name: Not on file   • Number of children: 0   • Years of education: 12 years    • Highest education level: GED or equivalent   Occupational History   • Occupation: unemployed   Tobacco Use   • Smoking status: Never   • Smokeless tobacco: Never   Vaping Use   • Vaping Use: Never used   Substance and Sexual Activity   • Alcohol use: No     Comment: 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    • Drug use: Not Currently   • Sexual activity: Not Currently   Other Topics Concern   • Not on file   Social History Narrative    Caffeine use        What type of home do you live in: Single house    Age of your home: 48 yrs    How long have you been living there: 40 yrs    Type of heat: Baseboard    Type of fuel: Electric    What type of samir is in your bedroom: Carpet    Do you have the following in or near your home:    Air products: Window air conditioning and Ionic air purifier    Pests: Mice    Pets: Cat    Basement: None     Social Determinants of Health     Financial Resource Strain: Not on file Food Insecurity: Not on file   Transportation Needs: Not on file   Physical Activity: Insufficiently Active   • Days of Exercise per Week: 2 days   • Minutes of Exercise per Session: 60 min   Stress: Not on file   Social Connections: Not on file   Intimate Partner Violence: Not on file   Housing Stability: Not on file     Social History     Social History Narrative    Caffeine use        What type of home do you live in: Single house    Age of your home: 48 yrs    How long have you been living there: 44 yrs    Type of heat: Baseboard    Type of fuel: Electric    What type of samir is in your bedroom: Carpet    Do you have the following in or near your home:    Air products: Window air conditioning and Ionic air purifier    Pests: Mice    Pets: Cat    Basement: None       Objective:       Mental status:  Appearance calm and cooperative , adequate hygiene and grooming and good eye contact    Mood dysphoric   Affect affect was constricted   Speech a normal rate and fluent   Thought Processes coherent/organized and normal thought processes   Hallucinations no hallucinations present    Thought Content no delusions   Abnormal Thoughts no suicidal thoughts  and no homicidal thoughts    Orientation  oriented to person and place and time   Remote Memory short term memory intact and long term memory intact   Attention Span concentration intact   Intellect Appears to be of Average Intelligence   Insight Limited insight   Judgement judgment was limited   Muscle Strength n/a   Language no difficulty naming common objects and no difficulty repeating a phrase    Fund of Knowledge displays adequate knowledge of current events, adequate fund of knowledge regarding past history and adequate fund of knowledge regarding vocabulary                Assessment/Plan:       Diagnoses and all orders for this visit:    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 psychotic features ([facility] [address] )    Generalized anxiety disorder  -     desvenlafaxine (PRISTIQ) 100 mg 24 hr tablet; Take 1 tablet (100 mg total) by mouth daily    Major depressive disorder, recurrent episode, moderate (HCC)  -     desvenlafaxine succinate (PRISTIQ) 50 mg 24 hr tablet; Take 1 tablet (50 mg total) by mouth daily Total daily dose 150 mg daily  -     desvenlafaxine (PRISTIQ) 100 mg 24 hr tablet; Take 1 tablet (100 mg total) by mouth daily            Treatment Recommendations- Risks Benefits      Immediate Medical/Psychiatric/Psychotherapy Treatments and Any Precautions: continue current treatment     Risks, Benefits And Possible Side Effects Of Medications:  {PSYCH RISK, BENEFITS AND POSSIBLE SIDE EFFECTS (Optional):16473    Controlled Medication Discussion: Discussed with patient Black Box warning on concurrent use of benzodiazepines and opioid medications including sedation, respiratory depression, coma and death  Patient understands the risk of treatment with benzodiazepines in addition to opioids and wants to continue taking those medications  , Discussed with patient the risks of sedation, respiratory depression, impairment of ability to drive and potential for abuse and addiction related to treatment with benzodiazepine medications  The patient understands risk of treatment with benzodiazepine medications, agrees to not drive if feels impaired and agrees to take medications as prescribed  and The patient has been filling controlled prescriptions on time as prescribed to [place] [address] program       Psychotherapy Provided: Individual psychotherapy provided  Individual psychotherapy provided: Yes  Counseling was provided during the session today for 16 minutes  Medications, treatment progress and treatment plan reviewed with Tiffanie Garcai  Medication education provided to Tiffanie Garcia  Goals discussed during in session: continue improvement in depression     Coping strategies including compliance with medications, exercising, getting into a good routine, increasing energy, increasing interest in usual activities, increasing motivation, increasing social interaction, maintain healthy diet, maintain heathy sleeping hygiene and maintain positive attitude reviewed with Trudy Hernandez  Importance of medication and treatment compliance reviewed with Esther  Educated on importance of medication and treatment compliance  Supportive therapy provided                               Visit Time    Visit Start Time: 10:30  Visit Stop Time: 11:00  Total Visit Duration: 30 minutes

## 2023-03-23 ENCOUNTER — TELEMEDICINE (OUTPATIENT)
Dept: BEHAVIORAL/MENTAL HEALTH CLINIC | Facility: CLINIC | Age: 43
End: 2023-03-23

## 2023-03-23 DIAGNOSIS — F33.2 SEVERE RECURRENT MAJOR DEPRESSION WITHOUT PSYCHOTIC FEATURES (HCC): Primary | ICD-10-CM

## 2023-03-23 NOTE — PSYCH
Virtual Regular Visit    Verification of patient location:    Patient is located in the following state in which I hold an active license [place]      Assessment/Plan:    Problem List Items Addressed This Visit        Other    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 psychotic features ([facility] ) - Primary       Goals addressed in session: Goal 1          Reason for visit is   Chief Complaint   Patient presents with   • Virtual Regular Visit        Encounter provider HERMELINDA Vásquez    Provider located at [address] [place] [id number] [phone]      Recent Visits  No visits were found meeting these conditions  Showing recent visits within past 7 days and meeting all other requirements  Today's Visits  Date Type Provider Dept   03/23/23 Telemedicine HERMELINDA [facility] Therapist Mos[facility]   Showing today's visits and meeting all other requirements  Future Appointments  No visits were found meeting these conditions  Showing future appointments within next 150 days and meeting all other requirements       The patient was identified by name and date of birth  Joanne Duarte was informed that this is a telemedicine visit and [facility] the [facility]  She agrees to proceed     My office door was closed  No one else was in the room  She acknowledged consent and understanding of privacy and security of the video platform  The patient has agreed to participate and understands they can discontinue the visit at any time  Patient is aware this is a billable service  Behavioral Health Psychotherapy Progress Note    Psychotherapy Provided: Individual Psychotherapy     1   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 psychotic features ([facility] )            Goals addressed in session: Goal 1     DATA: Mani Waller presented for follow up, sharing that she has been struggling with being single lately, wondering why she has not yet been able to find someone that feels like they are "for me "  She noted that she goes out a lot socially, doing activities with others and meeting new people often, including fellow Jessica Bliss, so that is not the problem  She said that a lot of the people her age that she is meeting are either very active in their yecenia or are not at all, and she is somewhere in the middle  She would like to find someone who is spiritually on the same page as her, as she feels that otherwise she will be judged as not being "good enough "  She noted that she would try to keep up with someone more active than her, but fears that eventually putting in that much effort would lead her into a depression that would sabotage everything  Provided support and validation of Esther's concerns, used CBT strategies to help her reframe fears and worry of judgment, as well as to try to reframe self-critical and negative/hopeless thoughts to more positive/gratitude-focused  During this session, this clinician used the following therapeutic modalities: Client-centered Therapy, Cognitive Behavioral Therapy and Supportive Psychotherapy    Substance Abuse was not addressed during this session  If the client is diagnosed with a co-occurring substance use disorder, please indicate any changes in the frequency or amount of use: n/a  Stage of change for addressing substance use diagnoses: No substance use/Not applicable    ASSESSMENT:  Krzysztof Santos presents with a Depressed mood  her affect is Normal range and intensity, which is congruent, with her mood and the content of the session  The client has made progress on their goals  Krzysztof Santos presents with a low risk of suicide, low risk of self-harm, and low risk of harm to others  For any risk assessment that surpasses a "low" rating, a safety plan must be developed      A safety plan was indicated: no  If yes, describe in detail n/a    PLAN: Between sessions, Alina Wallace will work on journaling feelings, focusing on gratitude, and will use CBT strategies discussed today to reframe anxious and negative thoughts  At the next session, the therapist will use Client-centered Therapy, Cognitive Behavioral Therapy and Supportive Psychotherapy to address depression and anxiety  Behavioral Health Treatment Plan and Discharge Planning: Alina Wallace is aware of and agrees to continue to work on their treatment plan  They have identified and are working toward their discharge goals   yes    Visit start and stop times:    03/23/23  Start Time: 0907  Stop Time: 0945  Total Visit Time: 38 minutes

## 2023-04-07 ENCOUNTER — TELEMEDICINE (OUTPATIENT)
Dept: BEHAVIORAL/MENTAL HEALTH CLINIC | Facility: CLINIC | Age: 43
End: 2023-04-07

## 2023-04-07 DIAGNOSIS — F41.1 GENERALIZED ANXIETY DISORDER: ICD-10-CM

## 2023-04-07 DIAGNOSIS — F33.2 SEVERE RECURRENT MAJOR DEPRESSION WITHOUT PSYCHOTIC FEATURES (HCC): Primary | ICD-10-CM

## 2023-04-07 NOTE — PSYCH
Virtual Regular Visit    Verification of patient location:    Patient is located in the following state in which I hold an active license [place]      Assessment/Plan:    Problem List Items Addressed This Visit        Other    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 psychotic features ([facility] [address] ) - Primary    Generalized anxiety disorder       Goals addressed in session: Goal 1          Reason for visit is   Chief Complaint   Patient presents with   • Virtual Regular Visit        Encounter provider HERMELINDA Alvarenga    Provider located at [address] [place] [id number] [phone]      Recent Visits  No visits were found meeting these conditions  Showing recent visits within past 7 days and meeting all other requirements  Today's Visits  Date Type Provider Dept   04/07/23 Telemedicine HERMELINDA [facility] Therapist [facility]   Showing today's visits and meeting all other requirements  Future Appointments  No visits were found meeting these conditions  Showing future appointments within next 150 days and meeting all other requirements       The patient was identified by name and date of birth  Irma Hung was informed that this is a telemedicine visit and that the visit is being conducted throughthe [email or web address] platform  She agrees to proceed     My office door was closed  No one else was in the room  She acknowledged consent and understanding of privacy and security of the video platform  The patient has agreed to participate and understands they can discontinue the visit at any time  Patient is aware this is a billable service  Behavioral Health Psychotherapy Progress Note    Psychotherapy Provided: Individual Psychotherapy     1  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 psychotic features ([facility] [address] )        2   Generalized anxiety disorder            Goals addressed in session: Goal 1     DATA: Dawne Klinefelter presented for follow up, sharing that she has been doing a lot of thinking lately  She talked about having some difficulty with spending, when she knows that it is not necessary and she has been taking money out of savings in order to spend as she has  She is concerned about this spending, so discussed what might be driving it (pressure from mom to organize and keep her rooms clean, feeling the need to have her space decorated and feel like her own, etc)  Encouraged Esther to reflect on the driving factors behind need to spend, to see if she can find other ways to satisfy need or to alleviate anxiety behind it  Dawne Klinefelter shared that talking to her mom about how she is different than mom, how she cannot do everything all at once, and will do things in her own time could be helpful  She also talked about continuing to reach out socially, getting support from friends who have had similar life experiences, and having some contact with Agus Jurado who seems to be in a better place recently  Dawne Klinefelter said that these interactions have been very helpful for her  She said that her mood has been up and down, with some struggles with depression/lack of motivation at times, but said that she continues to try to utilize supports and coping mechanisms to help her manage and be productive  Provided support and validation of Esther's concerns/feelings, encouraged continued socialization and communication of her needs/boundaries  During this session, this clinician used the following therapeutic modalities: Client-centered Therapy, Cognitive Behavioral Therapy, Mindfulness-based Strategies and Supportive Psychotherapy    Substance Abuse was not addressed during this session  If the client is diagnosed with a co-occurring substance use disorder, please indicate any changes in the frequency or amount of use: n/a   Stage of change for addressing substance use diagnoses: No substance use/Not applicable    ASSESSMENT: "Allen Do presents with a Depressed mood  her affect is Normal range and intensity, which is congruent, with her mood and the content of the session  The client has made progress on their goals  Allen Do presents with a low risk of suicide, low risk of self-harm, and low risk of harm to others  For any risk assessment that surpasses a \"low\" rating, a safety plan must be developed  A safety plan was indicated: no  If yes, describe in detail n/a    PLAN: Between sessions, Allen Do will work on communicating needs/boundaries with mother, will continue to socialize with friends as she is able, and will use coping strategies as needed to manage mood and anxiety  At the next session, the therapist will use Client-centered Therapy, Cognitive Behavioral Therapy, Mindfulness-based Strategies and Supportive Psychotherapy to address anxiety and depression  Behavioral Health Treatment Plan and Discharge Planning: Allen Do is aware of and agrees to continue to work on their treatment plan  They have identified and are working toward their discharge goals   yes    Visit start and stop times:    04/07/23  Start Time: 0905  Stop Time: 0945  Total Visit Time: 40 minutes  "

## 2023-04-10 NOTE — H&P
Diagnoses and all orders for this visit:     Endometrial hyperplasia without atypia; discussed treatment options including medical management versus surgical management  Patient is opted to proceed with a hysterectomy  Discussed [facility] BSO versus [facility] BS  She has opted to proceed with an [address] BSO  The procedure along with risks were discussed including, but not limited to, infection, hemorrhage, bowel bladder or vascular injury, possible necessity for laparotomy      Endometrial polyp  -        Abnormal uterine bleeding (AUB)  -          Dysmenorrhea         Subjective:       Patient ID: Jody Garcia is a 38 y o  female      HPI  G 0 new patient referred by Holly Chua, secondary to chronic abnormal uterine bleeding/dysmenorrhea  Patient states that she has had an ongoing problem for several years with heavy and prolonged menses with increasing dysmenorrhea  This has been unresponsive to oral contraceptives, NuvaRing, Depo-Provera or IUDs  Patient desires more definitive treatment  She does not desire future childbearing  She did have an ultrasound done which revealed an endometrium of 10 mm  There was a left ovarian cyst measuring 3 4 cm with a left paraovarian cyst measuring 1 6 cm  She did have an endometrial biopsy done which was returned as showing endometrial hyperplasia without atypia involving fragments of an endometrial polyp     The following portions of the patient's history were reviewed and updated as appropriate:   She  has a past medical history of Anxiety, Chronic sinusitis, Depression, Diabetes ([address] ), Fibromyalgia, Migraine, Obesity, Polyarthritis, and Psychiatric disorder    She        Patient Active Problem List     Diagnosis Date Noted    Lumbar radiculopathy      Fall 10/03/2022    Lumbar back pain 10/03/2022    Type 2 diabetes mellitus with hyperglycemia, without long-term current use of insulin ([address] ) 09/01/2022    Dyslipidemia 09/01/2022    Diabetic neuropathy associated with type 2 diabetes mellitus ([facility] ) 09/01/2022    Sacroiliitis ([facility] ) 08/11/2022    DDD (degenerative disc disease), lumbar 08/11/2022    Back pain 06/14/2022    Polyp of colon 02/27/2020    Loose stools 04/11/2019    Hematochezia 04/11/2019    Elevated TSH 04/05/2019    Arthralgia of multiple joints 11/07/2018    Generalized body aches 11/07/2018    Chronic idiopathic constipation 11/07/2018    Dyspepsia 10/24/2018    Generalized anxiety disorder 08/21/2018    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 psychotic features ([facility] ) 08/08/2018    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/30/2018    Narcolepsy 12/02/2016    Chronic pain 10/05/2016    Menorrhagia 10/04/2016    Myalgia 08/04/2016    Sleep apnea 06/01/2016    Chronic fatigue 10/13/2015    Malaise and fatigue 09/21/2015    Fibromyalgia 08/06/2015    Muscle spasm 08/06/2015    Headache 07/30/2015    Sinus disease 06/24/2015    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, with unspecified complication ([facility] ) 06/55/5160    Chronic migraine without aura without status migrainosus, not intractable 06/04/2015    Lyme disease 06/03/2015    Hypokalemia 05/27/2015    Snoring 05/27/2015    Allergic rhinitis 03/04/2014    Class 2 obesity due to excess calories without serious comorbidity with body mass index (BMI) of 37 0 to 37 9 in adult 03/04/2014      She  has a past surgical history that includes Hysteroscopy; REMOVAL OF INTRAUTERINE DEVICE (IUD); Dental surgery; US guided breast biopsy right complete (Right, 6/17/2019); and Colonoscopy (06/2019)    Her family history includes Alcohol abuse in her family; Depression in her paternal aunt and paternal grandmother; Diabetes in her father, maternal grandmother, and paternal grandfather; Irregular heart beat in her mother; Kidney disease in her father and paternal grandmother; Lung cancer (age of onset: 52) in her paternal grandfather; Melanoma (age of onset: 80) in her maternal grandmother; No Known Problems in her brother and maternal grandfather; Prostate cancer (age of onset: 61) in her maternal uncle; Rheum arthritis in her maternal grandmother and paternal grandmother; Stomach cancer in her family; Substance Abuse in her brother and maternal uncle  She  reports that she has never smoked  She has never used smokeless tobacco  She reports that she does not currently use drugs  She reports that she does not drink alcohol    Current Outpatient Medications   Medication Sig Dispense Refill    Blood Glucose Monitoring Suppl (Contour Blood Glucose System) w/Device KIT Use 1 kit 2 (two) times a day before meals 1 kit 0    Blood Glucose Monitoring Suppl (OneTouch Verio Flex System) w/Device KIT Use 2 (two) times a day 1 kit 0    celecoxib (CeleBREX) 200 mg capsule TAKE 1 CAPSULE BY MOUTH TWICE A DAY 60 capsule 6    Cholecalciferol (VITAMIN D-3) 1000 units CAPS Take 1 capsule by mouth daily        cyclobenzaprine (FLEXERIL) 10 mg tablet TAKE 2 TABLETS (20 MG TOTAL) BY MOUTH DAILY AT BEDTIME AS NEEDED FOR MUSCLE SPASMS 60 tablet 6    desvenlafaxine (PRISTIQ) 100 mg 24 hr tablet TAKE 1 TABLET BY MOUTH EVERY DAY 30 tablet 2    desvenlafaxine succinate (PRISTIQ) 50 mg 24 hr tablet TAKE 1 TABLET (50 MG TOTAL) BY MOUTH DAILY TOTAL DAILY DOSE 150 MG DAILY 30 tablet 2    EPINEPHrine (EPIPEN) 0 3 mg/0 3 mL SOAJ Inject 0 3 mL (0 3 mg total) into a muscle once for 1 dose 0 6 mL 0    hydrOXYzine HCL (ATARAX) 50 mg tablet TAKE 1 TABLET (50 MG TOTAL) BY MOUTH DAILY AT BEDTIME AS NEEDED (INSOMNIA) 30 tablet 2    IRON PO Take by mouth        lidocaine (LIDODERM) 5 % APPLY 1 PATCH TOPICALLY IN THE MORNING  REMOVE & DISCARD PATCH WITHIN 12 HOURS OR AS DIRECTED BY MD  30 patch 1    MAGNESIUM OXIDE PO Take by mouth        megestrol (MEGACE) 40 mg tablet Take 1 tablet (40 mg total) by mouth 3 (three) times a day for 5 days 15 tablet 0    metFORMIN (GLUCOPHAGE) 500 mg tablet Take 1 tablet (500 mg total) by mouth 2 (two) times a day with meals 60 tablet 5    nortriptyline (PAMELOR) 75 MG capsule TAKE 1 CAPSULE BY MOUTH TWICE A  capsule 0    propranolol (INDERAL) 20 mg tablet TAKE 1 TABLET BY MOUTH EVERY DAY AT NIGHT 30 tablet 11      No current facility-administered medications for this visit            Current Outpatient Medications on File Prior to Visit   Medication Sig    Blood Glucose Monitoring Suppl (Contour Blood Glucose System) w/Device KIT Use 1 kit 2 (two) times a day before meals    Blood Glucose Monitoring Suppl (OneTouch Verio Flex System) w/Device KIT Use 2 (two) times a day    celecoxib (CeleBREX) 200 mg capsule TAKE 1 CAPSULE BY MOUTH TWICE A DAY    Cholecalciferol (VITAMIN D-3) 1000 units CAPS Take 1 capsule by mouth daily    cyclobenzaprine (FLEXERIL) 10 mg tablet TAKE 2 TABLETS (20 MG TOTAL) BY MOUTH DAILY AT BEDTIME AS NEEDED FOR MUSCLE SPASMS    desvenlafaxine (PRISTIQ) 100 mg 24 hr tablet TAKE 1 TABLET BY MOUTH EVERY DAY    desvenlafaxine succinate (PRISTIQ) 50 mg 24 hr tablet TAKE 1 TABLET (50 MG TOTAL) BY MOUTH DAILY TOTAL DAILY DOSE 150 MG DAILY    EPINEPHrine (EPIPEN) 0 3 mg/0 3 mL SOAJ Inject 0 3 mL (0 3 mg total) into a muscle once for 1 dose    hydrOXYzine HCL (ATARAX) 50 mg tablet TAKE 1 TABLET (50 MG TOTAL) BY MOUTH DAILY AT BEDTIME AS NEEDED (INSOMNIA)    IRON PO Take by mouth    lidocaine (LIDODERM) 5 % APPLY 1 PATCH TOPICALLY IN THE MORNING  REMOVE & DISCARD PATCH WITHIN 12 HOURS OR AS DIRECTED BY MD Colleen Reyes MAGNESIUM OXIDE PO Take by mouth    megestrol (MEGACE) 40 mg tablet Take 1 tablet (40 mg total) by mouth 3 (three) times a day for 5 days    metFORMIN (GLUCOPHAGE) 500 mg tablet Take 1 tablet (500 mg total) by mouth 2 (two) times a day with meals    nortriptyline (PAMELOR) 75 MG capsule TAKE 1 CAPSULE BY MOUTH TWICE A DAY    propranolol (INDERAL) 20 mg tablet TAKE 1 TABLET BY MOUTH EVERY DAY AT NIGHT      No current facility-administered medications on file prior to visit       She is allergic to cannabidiol, amoxicillin-pot clavulanate, decadrol [dexamethasone], dexamethasone sodium phosphate, other, penicillins, tetracycline, and tetracyclines & related        Review of Systems   Constitutional: Negative  Gastrointestinal: Negative  Genitourinary: Positive for menstrual problem and pelvic pain           Objective:        /76   Pulse 95   Ht 5' (1 524 m)   Wt 82 6 kg (182 lb)   BMI 35 54 kg/m²             Physical Exam  Vitals reviewed  Cardiovascular:      Rate and Rhythm: Normal rate and regular rhythm  Pulses: Normal pulses  Heart sounds: Normal heart sounds  No murmur heard  Pulmonary:      Effort: Pulmonary effort is normal  No respiratory distress  Breath sounds: Normal breath sounds  Abdominal:      General: There is no distension  Palpations: Abdomen is soft  There is no mass  Tenderness: There is no abdominal tenderness  There is no guarding or rebound  Hernia: No hernia is present  There is no hernia in the left inguinal area or right inguinal area  Genitourinary:     General: Normal vulva  Labia:         Right: No rash, tenderness or lesion  Left: No rash, tenderness or lesion  Vagina: Normal       Cervix: Normal       Uterus: Normal        Adnexa:         Right: No mass, tenderness or fullness  Left: No mass, tenderness or fullness  Musculoskeletal:      Cervical back: Normal range of motion and neck supple  No tenderness  Lymphadenopathy:      Cervical: No cervical adenopathy  Lower Body: No right inguinal adenopathy  No left inguinal adenopathy  Neurological:      Mental Status: She is alert

## 2023-04-24 ENCOUNTER — APPOINTMENT (OUTPATIENT)
Dept: LAB | Facility: MEDICAL CENTER | Age: 43
End: 2023-04-24

## 2023-04-24 DIAGNOSIS — N85.01 COMPLEX ENDOMETRIAL HYPERPLASIA WITHOUT ATYPIA: ICD-10-CM

## 2023-04-24 DIAGNOSIS — Z01.812 PRE-OPERATIVE LABORATORY EXAMINATION: ICD-10-CM

## 2023-04-24 DIAGNOSIS — N93.9 ABNORMAL UTERINE BLEEDING: ICD-10-CM

## 2023-04-24 DIAGNOSIS — N93.9 ABNORMAL UTERINE BLEEDING (AUB): ICD-10-CM

## 2023-04-25 ENCOUNTER — ANESTHESIA EVENT (OUTPATIENT)
Dept: PERIOP | Facility: HOSPITAL | Age: 43
End: 2023-04-25

## 2023-04-25 LAB
ABO GROUP BLD: NORMAL
BLD GP AB SCN SERPL QL: NEGATIVE
RH BLD: POSITIVE
SPECIMEN EXPIRATION DATE: NORMAL

## 2023-04-25 NOTE — PRE-PROCEDURE INSTRUCTIONS
Pre-Surgery Instructions:   Medication Instructions    celecoxib (CeleBREX) 200 mg capsule Stop taking 3 days prior to surgery   Cholecalciferol (VITAMIN D-3) 1000 units CAPS Stop taking 7 days prior to surgery   desvenlafaxine (PRISTIQ) 100 mg 24 hr tablet Take day of surgery   desvenlafaxine succinate (PRISTIQ) 50 mg 24 hr tablet Take day of surgery   hydrOXYzine HCL (ATARAX) 25 mg tablet Uses PRN- OK to take day of surgery    hydrOXYzine HCL (ATARAX) 50 mg tablet Uses PRN- OK to take day of surgery    IRON PO Hold day of surgery   lidocaine (LIDODERM) 5 % Hold day of surgery   MAGNESIUM OXIDE PO Stop taking 7 days prior to surgery   metFORMIN (GLUCOPHAGE) 500 mg tablet Hold day of surgery   nortriptyline (PAMELOR) 75 MG capsule Take day of surgery   propranolol (INDERAL) 20 mg tablet Take day of surgery  Has 3 preop carb drinks and understands how to use them  Medication instructions for day surgery reviewed  Please use only a sip of water to take your instructed medications  Avoid all over the counter vitamins, supplements and NSAIDS for one week prior to surgery per anesthesia guidelines  Tylenol is ok to take as needed  You will receive a call one business day prior to surgery with an arrival time and hospital directions  If your surgery is scheduled on a Monday, the hospital will be calling you on the Friday prior to your surgery  If you have not heard from anyone by 8pm, please call the hospital supervisor through the hospital  at [phone]  [place] [phone])  Do not eat or drink anything after midnight the night before your surgery, including candy, mints, lifesavers, or chewing gum  Do not drink alcohol 24hrs before your surgery  Try not to smoke at least 24hrs before your surgery  Follow the pre surgery showering instructions as listed in the [facility] Surgical Experience Booklet or otherwise provided by your surgeon's office   Do not shave the surgical area 24 hours before surgery  Do not apply any lotions, creams, including makeup, cologne, deodorant, or perfumes after showering on the day of your surgery  No contact lenses, eye make-up, or artificial eyelashes  Remove nail polish, including gel polish, and any artificial, gel, or acrylic nails if possible  Remove all jewelry including rings and body piercing jewelry  Wear causal clothing that is easy to take on and off  Consider your type of surgery  Keep any valuables, jewelry, piercings at home  Please bring any specially ordered equipment (sling, braces) if indicated  Arrange for a responsible person to drive you to and from the hospital on the day of your surgery  Visitor Guidelines discussed  Call the surgeon's office with any new illnesses, exposures, or additional questions prior to surgery  Please reference your [facility] Surgical Experience Booklet for additional information to prepare for your upcoming surgery

## 2023-04-26 ENCOUNTER — TELEPHONE (OUTPATIENT)
Dept: GYNECOLOGY | Facility: CLINIC | Age: 43
End: 2023-04-26

## 2023-04-26 ENCOUNTER — DOCUMENTATION (OUTPATIENT)
Dept: GYNECOLOGY | Facility: CLINIC | Age: 43
End: 2023-04-26

## 2023-04-26 DIAGNOSIS — N93.9 ABNORMAL UTERINE BLEEDING (AUB): ICD-10-CM

## 2023-04-26 DIAGNOSIS — Z01.812 PRE-OPERATIVE LABORATORY EXAMINATION: Primary | ICD-10-CM

## 2023-04-26 DIAGNOSIS — N85.01 COMPLEX ENDOMETRIAL HYPERPLASIA WITHOUT ATYPIA: ICD-10-CM

## 2023-04-26 NOTE — TELEPHONE ENCOUNTER
"Emailed \"no blood program\" and \"consent\" to patient as per Francy Elkins  Asked patient to email back ASAP  Signed copies needed done before surgery     "

## 2023-04-26 NOTE — PROGRESS NOTES
Pt needs to get Pre surgery labs done  CBC and Hga1c  Put orders in for [facility] she will get marce 4/27   Needs  No blood papers done  Will e-mail them to her she needs to fill out and return them ASAP

## 2023-04-27 ENCOUNTER — APPOINTMENT (OUTPATIENT)
Dept: LAB | Facility: CLINIC | Age: 43
End: 2023-04-27

## 2023-04-27 DIAGNOSIS — Z01.812 PRE-OPERATIVE LABORATORY EXAMINATION: ICD-10-CM

## 2023-04-27 DIAGNOSIS — N93.9 ABNORMAL UTERINE BLEEDING (AUB): ICD-10-CM

## 2023-04-27 DIAGNOSIS — R53.82 CHRONIC FATIGUE: ICD-10-CM

## 2023-04-27 DIAGNOSIS — E78.5 DYSLIPIDEMIA: ICD-10-CM

## 2023-04-27 DIAGNOSIS — E11.65 TYPE 2 DIABETES MELLITUS WITH HYPERGLYCEMIA, WITHOUT LONG-TERM CURRENT USE OF INSULIN (HCC): ICD-10-CM

## 2023-04-27 DIAGNOSIS — N85.01 COMPLEX ENDOMETRIAL HYPERPLASIA WITHOUT ATYPIA: ICD-10-CM

## 2023-04-27 LAB
BASOPHILS # BLD AUTO: 0.03 THOUSANDS/ΜL (ref 0–0.1)
BASOPHILS NFR BLD AUTO: 0 % (ref 0–1)
EOSINOPHIL # BLD AUTO: 0.07 THOUSAND/ΜL (ref 0–0.61)
EOSINOPHIL NFR BLD AUTO: 1 % (ref 0–6)
ERYTHROCYTE [DISTWIDTH] IN BLOOD BY AUTOMATED COUNT: 13.6 % (ref 11.6–15.1)
HCT VFR BLD AUTO: 44 % (ref 34.8–46.1)
HGB BLD-MCNC: 14.5 G/DL (ref 11.5–15.4)
IMM GRANULOCYTES # BLD AUTO: 0.03 THOUSAND/UL (ref 0–0.2)
IMM GRANULOCYTES NFR BLD AUTO: 0 % (ref 0–2)
LYMPHOCYTES # BLD AUTO: 3.02 THOUSANDS/ΜL (ref 0.6–4.47)
LYMPHOCYTES NFR BLD AUTO: 31 % (ref 14–44)
MCH RBC QN AUTO: 28.5 PG (ref 26.8–34.3)
MCHC RBC AUTO-ENTMCNC: 33 G/DL (ref 31.4–37.4)
MCV RBC AUTO: 86 FL (ref 82–98)
MONOCYTES # BLD AUTO: 0.51 THOUSAND/ΜL (ref 0.17–1.22)
MONOCYTES NFR BLD AUTO: 5 % (ref 4–12)
NEUTROPHILS # BLD AUTO: 6.12 THOUSANDS/ΜL (ref 1.85–7.62)
NEUTS SEG NFR BLD AUTO: 63 % (ref 43–75)
NRBC BLD AUTO-RTO: 0 /100 WBCS
PLATELET # BLD AUTO: 291 THOUSANDS/UL (ref 149–390)
PMV BLD AUTO: 8.8 FL (ref 8.9–12.7)
RBC # BLD AUTO: 5.09 MILLION/UL (ref 3.81–5.12)
WBC # BLD AUTO: 9.78 THOUSAND/UL (ref 4.31–10.16)

## 2023-04-30 PROBLEM — N84.0 ENDOMETRIAL POLYP: Status: ACTIVE | Noted: 2023-04-30

## 2023-05-01 ENCOUNTER — HOSPITAL ENCOUNTER (OUTPATIENT)
Facility: HOSPITAL | Age: 43
Setting detail: OUTPATIENT SURGERY
Discharge: HOME | End: 2023-05-01
Attending: OBSTETRICS & GYNECOLOGY | Admitting: OBSTETRICS & GYNECOLOGY

## 2023-05-01 ENCOUNTER — ANESTHESIA (OUTPATIENT)
Dept: PERIOP | Facility: HOSPITAL | Age: 43
End: 2023-05-01

## 2023-05-01 VITALS
TEMPERATURE: 97.7 F | BODY MASS INDEX: 35.78 KG/M2 | OXYGEN SATURATION: 97 % | WEIGHT: 183.2 LBS | SYSTOLIC BLOOD PRESSURE: 102 MMHG | HEART RATE: 79 BPM | RESPIRATION RATE: 16 BRPM | DIASTOLIC BLOOD PRESSURE: 62 MMHG

## 2023-05-01 DIAGNOSIS — G89.18 POST-OPERATIVE PAIN: Primary | ICD-10-CM

## 2023-05-01 DIAGNOSIS — N94.6 DYSMENORRHEA: ICD-10-CM

## 2023-05-01 DIAGNOSIS — N84.0 ENDOMETRIAL POLYP: ICD-10-CM

## 2023-05-01 DIAGNOSIS — N93.8 DYSFUNCTIONAL UTERINE BLEEDING: ICD-10-CM

## 2023-05-01 DIAGNOSIS — N85.00 ENDOMETRIAL HYPERPLASIA, UNSPECIFIED: ICD-10-CM

## 2023-05-01 PROBLEM — N93.9 ABNORMAL UTERINE BLEEDING: Status: ACTIVE | Noted: 2023-05-01

## 2023-05-01 PROBLEM — N85.02 EIN (ENDOMETRIAL INTRAEPITHELIAL NEOPLASIA): Status: RESOLVED | Noted: 2023-05-01 | Resolved: 2023-05-01

## 2023-05-01 PROBLEM — IMO0001 BLOOD TRANSFUSION DECLINED BECAUSE PATIENT IS JEHOVAH'S WITNESS: Chronic | Status: ACTIVE | Noted: 2023-05-01

## 2023-05-01 PROBLEM — N85.02 EIN (ENDOMETRIAL INTRAEPITHELIAL NEOPLASIA): Status: ACTIVE | Noted: 2023-05-01

## 2023-05-01 PROBLEM — Z53.1 BLOOD TRANSFUSION DECLINED BECAUSE PATIENT IS JEHOVAH'S WITNESS: Chronic | Status: ACTIVE | Noted: 2023-05-01

## 2023-05-01 LAB
ABO GROUP BLD: NORMAL
EST. AVERAGE GLUCOSE BLD GHB EST-MCNC: 128 MG/DL
EXT PREGNANCY TEST URINE: NEGATIVE
EXT. CONTROL: NORMAL
GLUCOSE SERPL-MCNC: 115 MG/DL (ref 65–140)
GLUCOSE SERPL-MCNC: 142 MG/DL (ref 65–140)
HBA1C MFR BLD: 6.1 %
RH BLD: POSITIVE

## 2023-05-01 RX ORDER — KETOROLAC TROMETHAMINE 30 MG/ML
INJECTION, SOLUTION INTRAMUSCULAR; INTRAVENOUS AS NEEDED
Status: DISCONTINUED | OUTPATIENT
Start: 2023-05-01 | End: 2023-05-01

## 2023-05-01 RX ORDER — ACETAMINOPHEN 325 MG/1
975 TABLET ORAL EVERY 6 HOURS PRN
Status: DISCONTINUED | OUTPATIENT
Start: 2023-05-01 | End: 2023-05-01 | Stop reason: HOSPADM

## 2023-05-01 RX ORDER — ONDANSETRON 2 MG/ML
INJECTION INTRAMUSCULAR; INTRAVENOUS AS NEEDED
Status: DISCONTINUED | OUTPATIENT
Start: 2023-05-01 | End: 2023-05-01

## 2023-05-01 RX ORDER — ONDANSETRON 2 MG/ML
4 INJECTION INTRAMUSCULAR; INTRAVENOUS ONCE AS NEEDED
Status: DISCONTINUED | OUTPATIENT
Start: 2023-05-01 | End: 2023-05-01 | Stop reason: HOSPADM

## 2023-05-01 RX ORDER — FENTANYL CITRATE 50 UG/ML
INJECTION, SOLUTION INTRAMUSCULAR; INTRAVENOUS AS NEEDED
Status: DISCONTINUED | OUTPATIENT
Start: 2023-05-01 | End: 2023-05-01

## 2023-05-01 RX ORDER — ONDANSETRON 2 MG/ML
4 INJECTION INTRAMUSCULAR; INTRAVENOUS EVERY 6 HOURS PRN
Status: DISCONTINUED | OUTPATIENT
Start: 2023-05-01 | End: 2023-05-01 | Stop reason: HOSPADM

## 2023-05-01 RX ORDER — CLINDAMYCIN PHOSPHATE 900 MG/50ML
INJECTION INTRAVENOUS AS NEEDED
Status: DISCONTINUED | OUTPATIENT
Start: 2023-05-01 | End: 2023-05-01

## 2023-05-01 RX ORDER — MAGNESIUM HYDROXIDE 1200 MG/15ML
LIQUID ORAL AS NEEDED
Status: DISCONTINUED | OUTPATIENT
Start: 2023-05-01 | End: 2023-05-01 | Stop reason: HOSPADM

## 2023-05-01 RX ORDER — SENNOSIDES 8.6 MG
1300 CAPSULE ORAL EVERY 8 HOURS PRN
Qty: 30 TABLET | Refills: 0
Start: 2023-05-01

## 2023-05-01 RX ORDER — PROPOFOL 10 MG/ML
INJECTION, EMULSION INTRAVENOUS AS NEEDED
Status: DISCONTINUED | OUTPATIENT
Start: 2023-05-01 | End: 2023-05-01

## 2023-05-01 RX ORDER — DOCUSATE SODIUM 100 MG/1
100 CAPSULE, LIQUID FILLED ORAL 2 TIMES DAILY PRN
Refills: 0
Start: 2023-05-01

## 2023-05-01 RX ORDER — IBUPROFEN 600 MG/1
600 TABLET ORAL EVERY 6 HOURS PRN
Status: DISCONTINUED | OUTPATIENT
Start: 2023-05-01 | End: 2023-05-01 | Stop reason: HOSPADM

## 2023-05-01 RX ORDER — EPHEDRINE SULFATE 50 MG/ML
INJECTION INTRAVENOUS AS NEEDED
Status: DISCONTINUED | OUTPATIENT
Start: 2023-05-01 | End: 2023-05-01

## 2023-05-01 RX ORDER — OXYCODONE HYDROCHLORIDE 5 MG/1
5 TABLET ORAL EVERY 4 HOURS PRN
Qty: 10 TABLET | Refills: 0 | Status: SHIPPED | OUTPATIENT
Start: 2023-05-01 | End: 2023-05-04 | Stop reason: SDUPTHER

## 2023-05-01 RX ORDER — BUPIVACAINE HYDROCHLORIDE 2.5 MG/ML
INJECTION, SOLUTION EPIDURAL; INFILTRATION; INTRACAUDAL AS NEEDED
Status: DISCONTINUED | OUTPATIENT
Start: 2023-05-01 | End: 2023-05-01 | Stop reason: HOSPADM

## 2023-05-01 RX ORDER — LIDOCAINE HYDROCHLORIDE 10 MG/ML
INJECTION, SOLUTION EPIDURAL; INFILTRATION; INTRACAUDAL; PERINEURAL AS NEEDED
Status: DISCONTINUED | OUTPATIENT
Start: 2023-05-01 | End: 2023-05-01

## 2023-05-01 RX ORDER — OXYCODONE HYDROCHLORIDE 5 MG/1
5 TABLET ORAL EVERY 4 HOURS PRN
Status: DISCONTINUED | OUTPATIENT
Start: 2023-05-01 | End: 2023-05-01 | Stop reason: HOSPADM

## 2023-05-01 RX ORDER — MIDAZOLAM HYDROCHLORIDE 2 MG/2ML
INJECTION, SOLUTION INTRAMUSCULAR; INTRAVENOUS AS NEEDED
Status: DISCONTINUED | OUTPATIENT
Start: 2023-05-01 | End: 2023-05-01

## 2023-05-01 RX ORDER — ROCURONIUM BROMIDE 10 MG/ML
INJECTION, SOLUTION INTRAVENOUS AS NEEDED
Status: DISCONTINUED | OUTPATIENT
Start: 2023-05-01 | End: 2023-05-01

## 2023-05-01 RX ORDER — SODIUM CHLORIDE 9 MG/ML
125 INJECTION, SOLUTION INTRAVENOUS CONTINUOUS
Status: DISCONTINUED | OUTPATIENT
Start: 2023-05-01 | End: 2023-05-01 | Stop reason: HOSPADM

## 2023-05-01 RX ORDER — FENTANYL CITRATE/PF 50 MCG/ML
25 SYRINGE (ML) INJECTION
Status: DISCONTINUED | OUTPATIENT
Start: 2023-05-01 | End: 2023-05-01 | Stop reason: HOSPADM

## 2023-05-01 RX ORDER — CLINDAMYCIN PHOSPHATE 900 MG/50ML
900 INJECTION INTRAVENOUS ONCE
Status: COMPLETED | OUTPATIENT
Start: 2023-05-01 | End: 2023-05-01

## 2023-05-01 RX ORDER — SODIUM CHLORIDE, SODIUM LACTATE, POTASSIUM CHLORIDE, CALCIUM CHLORIDE 600; 310; 30; 20 MG/100ML; MG/100ML; MG/100ML; MG/100ML
INJECTION, SOLUTION INTRAVENOUS CONTINUOUS PRN
Status: DISCONTINUED | OUTPATIENT
Start: 2023-05-01 | End: 2023-05-01

## 2023-05-01 RX ORDER — HYDROMORPHONE HCL/PF 1 MG/ML
0.5 SYRINGE (ML) INJECTION
OUTPATIENT
Start: 2023-05-01

## 2023-05-01 RX ADMIN — FENTANYL CITRATE 50 MCG: 50 INJECTION INTRAMUSCULAR; INTRAVENOUS at 08:43

## 2023-05-01 RX ADMIN — ONDANSETRON 4 MG: 2 INJECTION INTRAMUSCULAR; INTRAVENOUS at 08:30

## 2023-05-01 RX ADMIN — SODIUM CHLORIDE, SODIUM LACTATE, POTASSIUM CHLORIDE, AND CALCIUM CHLORIDE: .6; .31; .03; .02 INJECTION, SOLUTION INTRAVENOUS at 08:27

## 2023-05-01 RX ADMIN — CLINDAMYCIN PHOSPHATE 900 MG: 900 INJECTION, SOLUTION INTRAVENOUS at 07:13

## 2023-05-01 RX ADMIN — ROCURONIUM BROMIDE 50 MG: 10 INJECTION, SOLUTION INTRAVENOUS at 07:39

## 2023-05-01 RX ADMIN — EPHEDRINE SULFATE 5 MG: 50 INJECTION, SOLUTION INTRAVENOUS at 07:46

## 2023-05-01 RX ADMIN — PROPOFOL 150 MG: 10 INJECTION, EMULSION INTRAVENOUS at 07:39

## 2023-05-01 RX ADMIN — IBUPROFEN 600 MG: 600 TABLET, FILM COATED ORAL at 10:19

## 2023-05-01 RX ADMIN — SUGAMMADEX 200 MG: 100 INJECTION, SOLUTION INTRAVENOUS at 08:43

## 2023-05-01 RX ADMIN — SODIUM CHLORIDE 125 ML/HR: 0.9 INJECTION, SOLUTION INTRAVENOUS at 05:58

## 2023-05-01 RX ADMIN — KETOROLAC TROMETHAMINE 15 MG: 30 INJECTION, SOLUTION INTRAMUSCULAR; INTRAVENOUS at 08:36

## 2023-05-01 RX ADMIN — MIDAZOLAM 2 MG: 1 INJECTION INTRAMUSCULAR; INTRAVENOUS at 07:30

## 2023-05-01 RX ADMIN — LIDOCAINE HYDROCHLORIDE 100 MG: 10 INJECTION, SOLUTION EPIDURAL; INFILTRATION; INTRACAUDAL; PERINEURAL at 07:39

## 2023-05-01 RX ADMIN — EPHEDRINE SULFATE 5 MG: 50 INJECTION, SOLUTION INTRAVENOUS at 07:45

## 2023-05-01 RX ADMIN — FENTANYL CITRATE 100 MCG: 50 INJECTION INTRAMUSCULAR; INTRAVENOUS at 07:39

## 2023-05-01 RX ADMIN — CLINDAMYCIN PHOSPHATE 900 MG: 900 INJECTION, SOLUTION INTRAVENOUS at 07:44

## 2023-05-01 RX ADMIN — GENTAMICIN SULFATE 91.2 MG: 40 INJECTION, SOLUTION INTRAMUSCULAR; INTRAVENOUS at 07:49

## 2023-05-01 RX ADMIN — FENTANYL CITRATE 50 MCG: 50 INJECTION INTRAMUSCULAR; INTRAVENOUS at 08:56

## 2023-05-01 NOTE — DISCHARGE INSTRUCTIONS
Supracervical Hysterectomy w/ removal of both tubes  AMBULATORY CARE:   What you need to know about a hysterectomy:  A hysterectomy is surgery to remove your uterus  Your ovaries, fallopian tubes, cervix, or part of your vagina may also need to be removed  The organs and tissue that will be removed depends on your medical condition  How to prepare for a hysterectomy:  Your healthcare provider will talk to you about how to prepare for surgery  You will need to stop taking aspirin 7 to 10 days before your procedure  You will need to stop taking NSAIDs 3 days before you procedure  You will also need to stop taking certain herbal supplements 7 days before your procedure  These include garlic, gingko biloba, and ginseng  Your provider may tell you to shower the night before your surgery  He or she may tell you to use a certain soap to help prevent a surgical site infection  Your provider may tell you not to eat or drink anything after midnight on the day of your surgery  He or she will tell you what medicines to take or not take on the day of your surgery  You will be given an antibiotic through your IV to help prevent a bacterial infection  Arrange for someone to drive you home and stay with you after surgery  What will happen during a hysterectomy:   You may be given general anesthesia to keep you asleep and free from pain during surgery  You may instead be given regional anesthesia to numb the lower part of your body  Your uterus may be removed through your vagina (vaginal hysterectomy) or through a an incision in your abdomen (abdominal hysterectomy)  It may also be done through several small incisions in your abdomen (laparoscopic hysterectomy)  During a laparoscopic hysterectomy, a laparoscope and other tools will be put into your abdomen through the small incisions  The laparoscope is a long metal tube with a light and camera on the end  Your abdomen will be filled with a gas   This allows your surgeon to see inside your abdomen more clearly  Your surgeon will cut and tie the ligaments that hold your uterus in place  The blood vessels that go to your uterus will also be tied to help decrease bleeding  Your surgeon may also remove other organs or tissue such as your ovaries, fallopian tubes, cervix, lymph nodes, or part of your vagina  Your surgeon may instead use robotic arms to place a laparoscope and other tools inside your abdomen through the incisions  He or she will use the machine to look inside your abdomen and guide the robotic arms  Your surgeon will use the tools attached to the robotic arms to remove your uterus, cervix, or other tissues  Any incisions that were made during surgery will be closed with stitches, staples, surgical glue, or surgical tape  The incisions may be covered with a bandage  A vaginal pack or sanitary pad may be used to absorb the bleeding  A vaginal pack is a special gauze that is inserted into the vagina  It is removed before you go home or to a hospital room  What will happen after a hysterectomy:  You may have a catheter to help drain your bladder for up to 24 hours  You may also have pain in your shoulders or near your ribs if gas was put in your abdomen  You will have pain for the first few days after surgery  You will need to wear sanitary pads for vaginal bleeding that occurs after surgery  You will be asked to walk as soon as possible after surgery  This will help to prevent blood clots in your legs  You may need to stay in the hospital for 1 to 4 days after surgery  The length of time depends on the type of hysterectomy you had  Risks of a hysterectomy:   The surgeon may need to change the type of surgery he or she was planning to do  For example, your surgeon may need to change from a laparoscopic surgery to an open abdominal surgery  You will not be able to become pregnant after you have a hysterectomy  You will go through menopause if your ovaries are removed  You may bleed more than expected or get an infection  Your bladder, ureters, or bowels may be damaged during surgery  If your ureters were injured, you may need a catheter to drain your bladder for several days to weeks  You may get scar tissue in your abdomen that blocks your intestine or causes pelvic pain  If you have a hysterectomy to treat cancer, this surgery may not take it away completely  There is also a chance that the cancer may return  You may get a blood clot in your leg or arm  This may become life-threatening  Call 911 for any of the following: You feel lightheaded, short of breath, and have chest pain  You cough up blood  Seek care immediately:   Your arm or leg feels warm, tender, and painful  It may look swollen and red  You have increasing abdominal or pelvic pain  Contact your healthcare provider or gynecologist if:   You have heavy vaginal bleeding that fills 1 or more sanitary pads in 1 hour  You have a fever  You have nausea or are vomiting  You feel pain or burning when you urinate, or you have trouble urinating  You have pus or a foul-smelling odor coming from your vagina  Your wound is red, swollen, or draining pus  You feel pressure in your rectum  You have questions or concerns about your condition or care  Medicines:   Prescription pain medicine  may be given  Ask your healthcare provider how to take this medicine safely  Stool softeners  help treat or prevent constipation  Take your medicine as directed  Contact your healthcare provider if you think your medicine is not helping or if you have side effects  Tell your provider if you are allergic to any medicine  Keep a list of the medicines, vitamins, and herbs you take  Include the amounts, and when and why you take them  Bring the list or the pill bottles to follow-up visits  Carry your medicine list with you in case of an emergency      Activity:   Wear an abdominal binder as directed  An abdominal binder will decrease pain when you move or cough  Rest as needed  Get up and move around as directed to help prevent blood clots  Start with short walks and slowly increase the distance every day  Limit the number of times you climb stairs to 2 times each day  Plan most of your daily activities on one level of your home  Do not lift objects heavier than 10 pounds for 6 weeks  Avoid strenuous activity for 2 weeks  Do not strain during bowel movements  High-fiber foods and extra liquids can help you prevent constipation  Examples of high-fiber foods are fruit and bran  Prune juice and water are good liquids to drink  Do not have sex, use tampons, or douche for up to 8 weeks  Ask your healthcare provider if it is okay to take a tub bath  Do not go in pools or hot tubs for 6 weeks or as directed  Ask when it is safe for you to drive, return to work, and return to other regular activities  Wound care: If you have abdominal incisions, care for them as directed  Carefully wash around the wound with soap and water  It is okay to let the soap and water run over your incision  Do not  scrub your incision  Dry the area and put on new, clean bandages as directed  Change your bandages when they get wet or dirty  If you have strips of medical tape, let them fall off on their own  It may take 7 to 14 days for them to fall off  Check your incision every day for redness, swelling, or pus  Deep breathing:  Take deep breaths and cough 10 times each hour  This will decrease your risk for a lung infection  Take a deep breath and hold it for as long as you can  Let the air out and then cough strongly  Deep breaths help open your airway  You may be given an incentive spirometer to help you take deep breaths  Put the plastic piece in your mouth and take a slow, deep breath, then let the air out and cough  Repeat these steps 10 times every hour  Get support:   This surgery may be life-changing for you and your family  You will no longer be able to get pregnant  Sudden changes in the levels of your hormones may occur and cause mood swings and depression  You may feel angry, sad, or frightened, or cry frequently and unexpectedly  These feelings are normal  Talk to your healthcare provider about where you can get support  You can also ask if hormone replacement medicine is right for you  Follow up with your healthcare provider or gynecologist as directed: You may need to return to have stitches removed, and for other tests  Write down your questions so you remember to ask them during your visits  © Copyright [facility] 2022 Information is for End User's use only and may not be sold, redistributed or otherwise used for commercial purposes  The above information is an  only  It is not intended as medical advice for individual conditions or treatments  Talk to your doctor, nurse or pharmacist before following any medical regimen to see if it is safe and effective for you

## 2023-05-01 NOTE — ANESTHESIA PREPROCEDURE EVALUATION
Procedure:  [facility]) W/ BILATERAL SALPINGO-OOPHERECTOMY (Abdomen)    Relevant Problems   CARDIO   (+) Chronic migraine without aura without status migrainosus, not intractable      ENDO   (+) Type 2 diabetes mellitus with hyperglycemia, without long-term current use of insulin (HCC)      MUSCULOSKELETAL   (+) Back pain   (+) DDD (degenerative disc disease), lumbar   (+) Fibromyalgia   (+) Lumbar back pain   (+) Sacroiliitis (HCC)      NEURO/PSYCH   (+) Chronic migraine without aura without status migrainosus, not intractable   (+) Chronic pain   (+) Diabetic neuropathy associated with type 2 diabetes mellitus (HCC)   (+) Fibromyalgia   (+) Generalized anxiety disorder   (+) Headache   (+) 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 psychotic features ([facility] [address] )      PULMONARY   (+) Sleep apnea      Other   (+) Blood transfusion declined because patient is Orthodox        Physical Exam    Airway    Mallampati score: III  TM Distance: >3 FB  Neck ROM: full     Dental   No notable dental hx     Cardiovascular  Rhythm: regular, Rate: normal, Cardiovascular exam normal    Pulmonary  Pulmonary exam normal Breath sounds clear to auscultation,     Other Findings        Anesthesia Plan  ASA Score- 2     Anesthesia Type- general with ASA Monitors  Additional Monitors:   Airway Plan: ETT  Comment: No blood products  Pt is a [employer]  115 sugar, but TOOK METFORMIN this AM   Order written to RECHECK POCT glucose before induction          Plan Factors-    Chart reviewed  Existing labs reviewed  Patient summary reviewed  Induction- intravenous  Postoperative Plan- Plan for postoperative opioid use  Informed Consent- Anesthetic plan and risks discussed with patient

## 2023-05-01 NOTE — DISCHARGE INSTR - AVS FIRST PAGE
"Dear Chano Ann,    Your supracervical hysterectomy with removal of both tubes went well  You also had a small cyst on your left tube which we removed as well  You still have both of your ovaries and your cervix  We hope you have a speedy recovery!     - Dr Hillary Marin & Minimally Invasive GYN Surgery Team      Post-Gynecologic Surgery Discharge Instructions:  1  No heavy lifting more than one full gallon of milk (about 8 lbs) for 1 week  2  Nothing in the vagina for 6 weeks, or until cleared at your post-operative visit  3  You may take stairs one at a time, touching each step with both feet for the first few days, then as tolerated  4  Call the office for fever greater than 100  4'F, heavy vaginal bleeding, or increasing pain  5  Activity as tolerated  6  Avoid driving if taking narcotic pain medications (Roxicodone, Percocet, Vicodin)  Post Operative Pain Management:  For pain, take 650 mg of tylenol every 6 hours  For cramping, take your normal celebrex that is prescribed for fibromyalgia    You may alternate these and take them \"around the clock\" to stay ahead of pain  Post Operative Bowel Management:  Please take over the counter stool softener or laxative to ensure you do not strain, as the bowels are often the last thing to wake up from anesthesia  You can take whatever is preferred (colace, senna, or miralax)    If you have any questions regarding your prescriptions please call your doctor      "

## 2023-05-01 NOTE — ANESTHESIA POSTPROCEDURE EVALUATION
Post-Op Assessment Note    CV Status:  Stable    Pain management: adequate     Mental Status:  Alert and awake   Hydration Status:  Euvolemic   PONV Controlled:  Controlled   Airway Patency:  Patent      Post Op Vitals Reviewed: Yes      Staff: Anesthesiologist         No notable events documented      BP      Temp      Pulse     Resp      SpO2      /71   Pulse 80   Temp 97 5 °F (36 4 °C)   Resp 18   Wt 83 1 kg (183 lb 3 2 oz)   LMP 05/01/2023   SpO2 99%   BMI 35 78 kg/m²

## 2023-05-01 NOTE — INTERVAL H&P NOTE
H&P reviewed  After examining the patient I find no changes in the patients condition since the H&P had been written      Vitals:    05/01/23 0544   BP: 126/85   Pulse: 89   Resp: 18   Temp: 97 7 °F (36 5 °C)   SpO2: 100%

## 2023-05-01 NOTE — OP NOTE
OPERATIVE REPORT  PATIENT NAME: Guanaco Silva    :  1980  MRN: [id number]  Pt Location: [facility] OR ROOM 01    SURGERY DATE: 2023    Surgeon(s) and Role:     Debo Whelan DO - Primary     * Alyssa Hernández DO - Resident, Debo Coreas MD - Fellow, first assisting     * Menard Latia - PA student    Preop Diagnosis:  Abnormal uterine bleeding   Endometrial hyperplasia, unspecified [N85 00]  Endometrial polyp [N84 0]  Dysmenorrhea [N94 6]    Post-Op Diagnosis Codes:  Abnormal uterine bleeding   Endometrial hyperplasia, unspecified [N85 00]  Endometrial polyp [N84 0]  Dysmenorrhea [N94 6]    Procedure(s):  LAPAROSCOPIC SUPRACERVICAL HYSTERECTOMY   BILATERAL SALPINGECTOMY  REMOVAL PARAOVARIAN CYST    Specimen(s):  ID Type Source Tests Collected by Time Destination   1 : Uterus, bilateral fallopian tubes, and ovarian cyst  Tissue Uterus TISSUE Sydney Donis DO 2023 0819      Estimated Blood Loss:   20 mL    Drains:  Urethral Catheter (Active)   Number of days: 0     Anesthesia Type:   General    Operative Indications:  Patient is a 38 yo nulliparous female with chronic abnormal uterine bleedinga nd dysmenorrhea ongoing for a few years  She has failed medical management with multiple contraceptive agents and does not desire future fertility  She had an ultrasound noting a 7 cm uterus with a left ovarian cyst 3 4 cm with a paraovarian cyst measuring 1 6 cm  EMB was notable for endometrial hyperplasia without atypia with involvement of endometrial polyp  Operative Findings:  -Grossly normal external female genitalia, normal appearing nulliparous cervix, uterus sounded to 8 cm  Bimanual exam palpably normal without appreciable masses  -Laparoscopy with grossly normal bowel, liver edge, stomach  Normal cul de sacs  Uterus grossly normal  B/l ureteral course well seen  B/l fallopian tubes normal, small hemorrhagic < 1 cm left paratubal cyst attached by thin appendage   L ovary grossly normal  R ovary with normal appearing 2 cm physiologic cyst      Complications:   None    Procedure and Technique:  The patient was properly identified in the holding area by the operating room staff and attending physician  She was taken to operating room where general anesthesia was instituted without complication  She was placed in the dorsal lithotomy position with her legs in [address] [place] [address] with care taken to avoid excessive flexion or extension of her lower extremities  Once the patient was properly positioned, the patient was prepped and draped in normal sterile fashion  A time-out was performed  The patient was again properly identified by the operating room staff and attending physician  At this time, the patient was receiving antibiotics for prophylaxis  A Adan catheter was aseptically inserted  The uterine manipulator was placed without complications  Only the tip of the TOBY was used (size 8) after we sounded the uterus to 8cm with endometrial balloon inflated  Gloves were changed, and attention was directed to the abdomen  Two Allis clamps were used to kelby the umbilicus  A 10-mm incision was created at the level of the umbilicus in a vertical fashion inferiorly  A Veress needle was utilized to get into the peritoneal axis  Once we were intraperitoneal with low opening presure, we allowed CO2 gas to insufflate her abdomen until we reached a pressure of 15 mmHg  Once we reached that pressure, a 10-mm optical trocar was inserted under direct visualization at this site  Two additional 5 mm trocars was placed on bilateral lower quadrants approximately 2 fingerbreadths above the anterior superior iliac spine and 2 cm medial to that spot under direct visualization  The inferior epigastric vessels were visualized prior to trocar insertion in trying to avoid injury to this vessel  First, a survey of the cavity was performed, and we confirmed the above-mentioned findings    We started a salpingectomy on the left side  The salpingectomy was performed by transecting the left fallopian tube to the mesosalpinx all the way to the level of the cornu  This was resected and we used the Ligasure device for this  We also then removed the small paratubal cyst   Once it was detached it was removed out of the abdomen  The utero-ovarian ligament on the left side was transected and the broad ligament was taken down to the uterine vessels incorporating the round ligament into the transection  The round ligament was opened on the left side, and we developed the vesicouterine space without complications  We began cautery of the easily visible uterine arteries at the level of the internal cervical os on the left  These uterine arteries were double burned without complications  The same procedure was then performed on the right side  Once we secured the uterine arteries bilaterally, they were doubly burned and cut  We noticed blanching of the uterus, and we introduced the Olympus laparoscopic bipolar loop  Once we were able to get the loop at the level of the endocervical canal, the uterus was grasped using a Tenaculum  The manipulator was replaced with a sponge stick and the loop was activated and we transected the corpus of the uterus without complications under direct visualization using the tenaculum for stabilization  A laparoscopic J hook bovie tip were used to cauterize the cervical os and areas over the cervical stump that were oozing  The pelvis was hemostatic after irrigation  Laparoscopic scissors and a grasper were utilized to excise the deep endometriotic implant on the left uterosacral ligament  The area was made hemostatic with cautery  At this point, we introduced 10 port on the right with the 10/12 endocatch bag thorugh the right lower quadrant  The left lower quadrant trocar was replaced with the Claudio power morcellator   The morcellator was introduced through the same incision site after the trocar was removed under direct visualization  The blade was activated after a laparoscopic tenaculum was brought through the device and the specimen was placed into the endocatch bag  Once in the bag, the specimen was grasped with the tenaculum and the morcellator was activated to begin specimen morcellation  The entire morcellation process occurred under direct visualization and remained contained in the specimen bag  Once completed, the morcellator was placed on safe mode and removed from the abdomen  The endocatch bag was also removed out of the abdomen through the 10 mm trocar with small tissue pieces and residual small volume of blood remaining in the intact bag  At this point, we did a survey of the pelvic cavity and we confirmed good hemostasis  The left and right lower quadrant fascial defects were closed using the M close laparoscopic fascial closure device with 0-vicryl suture under visualization  Local was also infiltrated into these ports prior to closure  The camera port was also removed once CO2 gas was evacuated from the abdomen and after several Valsalva breaths were given  The skin incisions were closed with interrupted 3-0 plaingut suture and bandaids  Additional local was infilatrated into the skin sites  All instrument, sponge and sharps counts were correct x 2  The patient tolerated the procedure well and went to the recovery room in stable condition, and she is stable at the moment of dictation  Dr Ansley Martinez was present for the entirety of the procedure         Patient Disposition:  PACU         SIGNATURE: Juhi Hollingsworth MD  DATE: May 1, 2023  TIME: 8:36 AM

## 2023-05-02 ENCOUNTER — TELEPHONE (OUTPATIENT)
Dept: GYNECOLOGY | Facility: CLINIC | Age: 43
End: 2023-05-02

## 2023-05-02 DIAGNOSIS — G89.18 POSTOPERATIVE PAIN: ICD-10-CM

## 2023-05-02 DIAGNOSIS — G89.18 POSTOPERATIVE PAIN: Primary | ICD-10-CM

## 2023-05-02 RX ORDER — NAPROXEN SODIUM 550 MG/1
550 TABLET ORAL 2 TIMES DAILY WITH MEALS
Qty: 30 TABLET | Refills: 0 | Status: SHIPPED | OUTPATIENT
Start: 2023-05-02 | End: 2023-05-11

## 2023-05-02 NOTE — TELEPHONE ENCOUNTER
Patient had surgery yesterday and pain is unbearable  She is rotating oxycodone, tylenol and advil  She has only taken 2 oxycodone so far  She does say bottle says take every 4 hours for moderate pain  She is not taking every 4 hours  Can you give her something else or should she just take every 4 hours as directed?   Please advise

## 2023-05-03 ENCOUNTER — TELEPHONE (OUTPATIENT)
Dept: GYNECOLOGY | Facility: CLINIC | Age: 43
End: 2023-05-03

## 2023-05-03 NOTE — TELEPHONE ENCOUNTER
Patient called stating she needs prior auth for her naproxen  She just had surgery so need this ASAP

## 2023-05-04 ENCOUNTER — DOCUMENTATION (OUTPATIENT)
Dept: GYNECOLOGY | Facility: CLINIC | Age: 43
End: 2023-05-04

## 2023-05-04 DIAGNOSIS — G89.18 POST-OPERATIVE PAIN: ICD-10-CM

## 2023-05-04 DIAGNOSIS — R52 ACUTE PAIN: Primary | ICD-10-CM

## 2023-05-04 LAB — GLUCOSE SERPL-MCNC: 99 MG/DL (ref 65–140)

## 2023-05-04 RX ORDER — OXYCODONE HYDROCHLORIDE 5 MG/1
5 TABLET ORAL EVERY 4 HOURS PRN
Qty: 10 TABLET | Refills: 0 | Status: SHIPPED | OUTPATIENT
Start: 2023-05-04

## 2023-05-04 NOTE — PROGRESS NOTES
Pt called having severe pain since surgery not taking Percocet like instructed,  Will start doing that every 4 hours did take last pill today 1 pm will alternate with Motrin until  Naproxen is authorized   Needs refill request send to Dr for refill/   Pain is 8 5 out of 10  Incisions are fine no fever  Using ice will change to heat to see if that helps  Heating normally drinking fine , no BM yet did advise  M O M  to help with that  Will check on her Friday 5/5 or when pharmacy auth  her Naproxen       Relayed info to Dr Jenni Decker

## 2023-05-05 ENCOUNTER — DOCUMENTATION (OUTPATIENT)
Dept: GYNECOLOGY | Facility: CLINIC | Age: 43
End: 2023-05-05

## 2023-05-05 ENCOUNTER — TELEMEDICINE (OUTPATIENT)
Dept: BEHAVIORAL/MENTAL HEALTH CLINIC | Facility: CLINIC | Age: 43
End: 2023-05-05

## 2023-05-05 DIAGNOSIS — F41.1 GENERALIZED ANXIETY DISORDER: ICD-10-CM

## 2023-05-05 DIAGNOSIS — F33.2 SEVERE RECURRENT MAJOR DEPRESSION WITHOUT PSYCHOTIC FEATURES (HCC): Primary | ICD-10-CM

## 2023-05-05 NOTE — PROGRESS NOTES
Naporxen has been denied  She is on Celebrex   She is feeling slightly better today  Did get some sleep feel stomacdh rumbling hopeful a BM is in the works  Will take it easy this weekend rest and did advise if pain get really bad she need to go to ER

## 2023-05-05 NOTE — PSYCH
Virtual Regular Visit    Verification of patient location:    Patient is located at Home in the following state in which I hold an active license [place]      Assessment/Plan:    Problem List Items Addressed This Visit        Other    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 psychotic features ([address] ) - Primary    Generalized anxiety disorder          Reason for visit is   Chief Complaint   Patient presents with    Virtual Regular Visit        Encounter provider HERMELINDA Weeks    Provider located at [address] [place] [id number] [phone]      Recent Visits  No visits were found meeting these conditions  Showing recent visits within past 7 days and meeting all other requirements  Today's Visits  Date Type Provider Dept   23 Telemedicine Apolonia Barroso MSW [facility] Therapist [facility]   Showing today's visits and meeting all other requirements  Future Appointments  No visits were found meeting these conditions  Showing future appointments within next 150 days and meeting all other requirements       The patient was identified by name and date of birth  Chelo Liu was informed that this is a telemedicine visit and that the [facility]  She agrees to proceed     My office door was closed  No one else was in the room  She acknowledged consent and understanding of privacy and security of the video platform  The patient has agreed to participate and understands they can discontinue the visit at any time  Patient is aware this is a billable service       HPI     Past Medical History:   Diagnosis Date    Anxiety     Blood transfusion declined because patient is Jain 2023    Chronic pain disorder     Chronic sinusitis     Depression     Diabetes ([facility] [address] )     Fibromyalgia     Migraine     Obesity     Polyarthritis     Last assessed 9/21/2015    Psychiatric disorder     Psychiatric illness     Sleep difficulties        Past Surgical History:   Procedure Laterality Date    COLONOSCOPY  06/2019   Surinder Villeda DENTAL SURGERY      HYSTEROSCOPY      Endometrial Biopsy By Hysteroscopy    [place] LAPS SUPRACRV HYSTERECT 250 GM/< RMVL TUBE/OVAR N/A 5/1/2023    Procedure: [facility]) W/ BILATERAL SALPINGECTOMY, REMOVAL PARAOVARIAN CYST;  Surgeon: Vaishali Gutierrez DO;  Location[facility];  Service: Gynecology    REMOVAL OF INTRAUTERINE DEVICE (IUD)      US GUIDED BREAST BIOPSY RIGHT COMPLETE Right 6/17/2019       Current Outpatient Medications   Medication Sig Dispense Refill    naproxen sodium (ANAPROX) 550 mg tablet Take 1 tablet (550 mg total) by mouth 2 (two) times a day with meals for 30 doses 30 tablet 0    acetaminophen (TYLENOL) 650 mg CR tablet Take 2 tablets (1,300 mg total) by mouth every 8 (eight) hours as needed for mild pain 30 tablet 0    Blood Glucose Monitoring Suppl (Contour Blood Glucose System) w/Device KIT Use 1 kit 2 (two) times a day before meals 1 kit 0    celecoxib (CeleBREX) 200 mg capsule TAKE 1 CAPSULE BY MOUTH TWICE A DAY 60 capsule 6    Cholecalciferol (VITAMIN D-3) 1000 units CAPS Take 1 capsule by mouth daily      desvenlafaxine (PRISTIQ) 100 mg 24 hr tablet Take 1 tablet (100 mg total) by mouth daily 30 tablet 2    desvenlafaxine succinate (PRISTIQ) 50 mg 24 hr tablet Take 1 tablet (50 mg total) by mouth daily Total daily dose 150 mg daily 30 tablet 2    docusate sodium (COLACE) 100 mg capsule Take 1 capsule (100 mg total) by mouth 2 (two) times a day as needed for constipation  0    glucose blood (Contour Test) test strip Check blood sugar once daily 100 strip 3    hydrOXYzine HCL (ATARAX) 25 mg tablet Take 1 tablet (25 mg total) by mouth every 6 (six) hours as needed (sleep) In addition to 50 mg dose, may take 50 -75 mg PO HS PRN for sleep 30 tablet 1    hydrOXYzine HCL (ATARAX) 50 mg tablet Take 1 tablet (50 "mg total) by mouth daily at bedtime as needed (insomnia) In addition to 25 mg dose, may take 50 mg-75 mg PO HS PRN for insomnia 30 tablet 2    lidocaine (LIDODERM) 5 % APPLY 1 PATCH TOPICALLY IN THE MORNING  REMOVE & DISCARD PATCH WITHIN 12 HOURS OR AS DIRECTED BY MD  30 patch 1    MAGNESIUM OXIDE PO Take by mouth      metFORMIN (GLUCOPHAGE) 500 mg tablet TAKE 1 TABLET BY MOUTH TWICE A DAY WITH MEALS 60 tablet 5    nortriptyline (PAMELOR) 75 MG capsule Take 1 capsule (75 mg total) by mouth 2 (two) times a day 60 capsule 2    oxyCODONE (Roxicodone) 5 immediate release tablet Take 1 tablet (5 mg total) by mouth every 4 (four) hours as needed for moderate pain for up to 10 doses Max Daily Amount: 30 mg 10 tablet 0    propranolol (INDERAL) 20 mg tablet TAKE 1 TABLET BY MOUTH EVERY DAY AT NIGHT 30 tablet 11     No current facility-administered medications for this visit  Allergies   Allergen Reactions    Cannabidiol Shortness Of Breath, Itching, Swelling, Anxiety, Palpitations, Confusion, Hypertension, Throat Swelling and Tongue Swelling    Amoxicillin-Pot Clavulanate Hives    Decadrol [Dexamethasone] Other (See Comments)     psychosis    Penicillins Hives     Hives/Uticaria    Tetracyclines & Related Hives      Allergy; Review of Systems    Video Exam    There were no vitals filed for this visit  Behavioral Health Psychotherapy Progress Note    Psychotherapy Provided: Individual Psychotherapy     1  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 psychotic features ([facility] [address] )        2  Generalized anxiety disorder            Goals addressed in session: Goal 1     DATA: Bhupendra Lund presented for follow up, sharing that she had her hysterectomy surgery recently and is in the process of recovering  She said that her family and friends have been very supportive and she is \"feeling loved  \"  She talked about setting healthy boundaries with some negative people in her life, and recognizing that she has enough on " "her plate and does not need others' problems to deal with on top of it  She shared that she feels that her mood has been good lately, that she is moving forward and coping well at the moment  Provided support and validation of Esther's efforts at prioritizing her needs and boundaries, and encouraged continued focus on self-care and positive movement forward  During this session, this clinician used the following therapeutic modalities: Client-centered Therapy, Cognitive Behavioral Therapy and Supportive Psychotherapy    Substance Abuse was not addressed during this session  If the client is diagnosed with a co-occurring substance use disorder, please indicate any changes in the frequency or amount of use: n/a  Stage of change for addressing substance use diagnoses: No substance use/Not applicable    ASSESSMENT:  Mansi Copeland presents with a Euthymic/ normal mood  her affect is Normal range and intensity, which is congruent, with her mood and the content of the session  The client has made progress on their goals  Mansi Copeland presents with a low risk of suicide, low risk of self-harm, and minimal risk of harm to others  For any risk assessment that surpasses a \"low\" rating, a safety plan must be developed  A safety plan was indicated: no  If yes, describe in detail n/a    PLAN: Between sessions, Mansi Copeland will continue to focus on recovery from surgery, setting healthy boundaries with people around her and prioritizing her needs/self-care  At the next session, the therapist will use Client-centered Therapy, Cognitive Behavioral Therapy and Supportive Psychotherapy to address depression  Behavioral Health Treatment Plan and Discharge Planning: Mansi Copeland is aware of and agrees to continue to work on their treatment plan  They have identified and are working toward their discharge goals   yes    Visit start and stop times:    05/05/23  Start Time: 1003  Stop Time: 1028  Total Visit Time: 25 " minutes

## 2023-05-11 RX ORDER — NAPROXEN SODIUM 550 MG/1
550 TABLET ORAL 2 TIMES DAILY WITH MEALS
Qty: 30 TABLET | Refills: 0 | Status: SHIPPED | OUTPATIENT
Start: 2023-05-11 | End: 2023-05-26

## 2023-05-15 DIAGNOSIS — F51.04 PSYCHOPHYSIOLOGICAL INSOMNIA: ICD-10-CM

## 2023-05-15 DIAGNOSIS — G47.09 OTHER INSOMNIA: ICD-10-CM

## 2023-05-15 RX ORDER — HYDROXYZINE 50 MG/1
50 TABLET, FILM COATED ORAL
Qty: 30 TABLET | Refills: 2 | Status: SHIPPED | OUTPATIENT
Start: 2023-05-15

## 2023-05-15 RX ORDER — HYDROXYZINE HYDROCHLORIDE 25 MG/1
25 TABLET, FILM COATED ORAL EVERY 6 HOURS PRN
Qty: 30 TABLET | Refills: 1 | Status: SHIPPED | OUTPATIENT
Start: 2023-05-15

## 2023-05-15 NOTE — TELEPHONE ENCOUNTER
Medication Refill Request     Name of Medication Hydroxyzine   Dose/Frequency 50mg once a day   Quantity 30 tablet  Verified pharmacy   [x]  Verified ordering Provider   [x]  Does patient have enough for the next 3 days? Yes [] No [x]  Does patient have a follow-up appointment scheduled? Yes [x] No []   If so when is appointment: 6/21    Medication Refill Request     Name of Medication Hydroxyzine  Dose/Frequency 25mg once a day   Quantity 30 tablet  Verified pharmacy   [x]  Verified ordering Provider   [x]  Does patient have enough for the next 3 days? Yes [] No [x]  Does patient have a follow-up appointment scheduled?  Yes [x] No []   If so when is appointment: 6/21

## 2023-05-16 ENCOUNTER — OFFICE VISIT (OUTPATIENT)
Dept: GYNECOLOGY | Facility: CLINIC | Age: 43
End: 2023-05-16

## 2023-05-16 VITALS
BODY MASS INDEX: 35.35 KG/M2 | DIASTOLIC BLOOD PRESSURE: 78 MMHG | WEIGHT: 181 LBS | SYSTOLIC BLOOD PRESSURE: 120 MMHG | HEART RATE: 100 BPM

## 2023-05-16 DIAGNOSIS — N76.0 VAGINITIS AND VULVOVAGINITIS: ICD-10-CM

## 2023-05-16 DIAGNOSIS — Z48.89 POSTOPERATIVE VISIT: Primary | ICD-10-CM

## 2023-05-16 RX ORDER — FLUCONAZOLE 150 MG/1
TABLET ORAL
Qty: 2 TABLET | Refills: 0 | Status: SHIPPED | OUTPATIENT
Start: 2023-05-16 | End: 2023-05-18

## 2023-05-16 NOTE — LETTER
May 16, 2023     Elsa Moreno, [address] [place] [address]    Patient: Aubrie Gaines   YOB: 1980   Date of Visit: 5/16/2023       Dear Consuelo Jalloh,    Thank you for referring Aubrie Gaines to me for evaluation  Below are my notes for this consultation  If you have questions, please do not hesitate to call me  I look forward to following your patient along with you  Sincerely,        Oris Smoke, DO        CC: No Recipients  Oris Smoke, DO  5/16/2023  1:40 PM  Incomplete  Patient presents for postoperative check  She is status post [facility] BS on May 1  She is doing well since the surgery other than experiencing some vaginal itching  Physical exam: Port sites are healing well with no erythema exudate or induration  On pelvic exam cervix appears normal   Vagina with a discharge consistent with candidiasis  Impression: Postoperative check    Plan: Diflucan tablet now and repeat in 48 hours    Return to [address] [place] [address] for ongoing GYN care

## 2023-05-16 NOTE — PROGRESS NOTES
Patient presents for postoperative check  She is status post [facility] BS on May 1  She is doing well since the surgery other than experiencing some vaginal itching  Physical exam: Port sites are healing well with no erythema exudate or induration  On pelvic exam cervix appears normal   Vagina with a discharge consistent with candidiasis  Impression: Postoperative check    Plan: Diflucan tablet now and repeat in 48 hours    Return to [address] [place] [address] for ongoing GYN care

## 2023-05-17 DIAGNOSIS — E11.65 TYPE 2 DIABETES MELLITUS WITH HYPERGLYCEMIA, WITHOUT LONG-TERM CURRENT USE OF INSULIN (HCC): ICD-10-CM

## 2023-05-18 RX ORDER — CARVEDILOL 25 MG/1
TABLET, FILM COATED ORAL
Qty: 100 STRIP | Refills: 3 | Status: SHIPPED | OUTPATIENT
Start: 2023-05-18

## 2023-05-19 ENCOUNTER — TELEMEDICINE (OUTPATIENT)
Dept: BEHAVIORAL/MENTAL HEALTH CLINIC | Facility: CLINIC | Age: 43
End: 2023-05-19

## 2023-05-19 DIAGNOSIS — F41.1 GENERALIZED ANXIETY DISORDER: ICD-10-CM

## 2023-05-19 DIAGNOSIS — F33.2 SEVERE RECURRENT MAJOR DEPRESSION WITHOUT PSYCHOTIC FEATURES (HCC): Primary | ICD-10-CM

## 2023-05-19 NOTE — PSYCH
Virtual Regular Visit    Verification of patient location:    Patient is located at Home in the following state in which I hold an active license [place]      Assessment/Plan:    Problem List Items Addressed This Visit        Other    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 psychotic features ([facility] [address] ) - Primary    Generalized anxiety disorder       Goals addressed in session: Goal 1          Reason for visit is   Chief Complaint   Patient presents with   • Virtual Regular Visit        Encounter provider HERMELINDA Verdugo    Provider located at [address] [place] [id number] [phone]      Recent Visits  No visits were found meeting these conditions  Showing recent visits within past 7 days and meeting all other requirements  Today's Visits  Date Type Provider Dept   05/19/23 Telemedicine HERMELINDA Friedman [facility] Therapist [facility]   Showing today's visits and meeting all other requirements  Future Appointments  No visits were found meeting these conditions  Showing future appointments within next 150 days and meeting all other requirements       The patient was identified by name and date of birth  Moe Grant was informed that this is a telemedicine visit and that [facility]  She agrees to proceed     My office door was closed  No one else was in the room  She acknowledged consent and understanding of privacy and security of the video platform  The patient has agreed to participate and understands they can discontinue the visit at any time  Patient is aware this is a billable service       HPI     Past Medical History:   Diagnosis Date   • Anxiety    • Blood transfusion declined because patient is Sabianism 5/1/2023   • Chronic pain disorder    • Chronic sinusitis    • Depression    • Diabetes ([facility] [address] )    • Fibromyalgia    • Migraine • Obesity    • Polyarthritis     Last assessed 9/21/2015   • Psychiatric disorder    • Psychiatric illness    • Sleep difficulties        Past Surgical History:   Procedure Laterality Date   • COLONOSCOPY  06/2019   • DENTAL SURGERY     • HYSTEROSCOPY      Endometrial Biopsy By Hysteroscopy   • [place] LAPS SUPRACRV HYSTERECT 250 GM/< RMVL TUBE/OVAR N/A 5/1/2023    Procedure: [facility]) W/ BILATERAL SALPINGECTOMY, REMOVAL PARAOVARIAN CYST;  Surgeon: Lucy Verma DO;  [facility];  Service: Gynecology   • REMOVAL OF INTRAUTERINE DEVICE (IUD)     • US GUIDED BREAST BIOPSY RIGHT COMPLETE Right 6/17/2019       Current Outpatient Medications   Medication Sig Dispense Refill   • acetaminophen (TYLENOL) 650 mg CR tablet Take 2 tablets (1,300 mg total) by mouth every 8 (eight) hours as needed for mild pain 30 tablet 0   • Blood Glucose Monitoring Suppl (Contour Blood Glucose System) w/Device KIT Use 1 kit 2 (two) times a day before meals 1 kit 0   • celecoxib (CeleBREX) 200 mg capsule TAKE 1 CAPSULE BY MOUTH TWICE A DAY 60 capsule 6   • Cholecalciferol (VITAMIN D-3) 1000 units CAPS Take 1 capsule by mouth daily     • desvenlafaxine (PRISTIQ) 100 mg 24 hr tablet Take 1 tablet (100 mg total) by mouth daily 30 tablet 2   • desvenlafaxine succinate (PRISTIQ) 50 mg 24 hr tablet Take 1 tablet (50 mg total) by mouth daily Total daily dose 150 mg daily 30 tablet 2   • docusate sodium (COLACE) 100 mg capsule Take 1 capsule (100 mg total) by mouth 2 (two) times a day as needed for constipation  0   • glucose blood (Contour Test) test strip USE TO CHECK BLOOD SUGAR ONCE DAILY 100 strip 3   • hydrOXYzine HCL (ATARAX) 25 mg tablet Take 1 tablet (25 mg total) by mouth every 6 (six) hours as needed (sleep) In addition to 50 mg dose, may take 50 -75 mg PO HS PRN for sleep 30 tablet 1   • hydrOXYzine HCL (ATARAX) 50 mg tablet Take 1 tablet (50 mg total) by mouth daily at bedtime as needed (insomnia) In addition to 25 mg dose, may take 50 mg-75 mg PO HS PRN for insomnia 30 tablet 2   • lidocaine (LIDODERM) 5 % APPLY 1 PATCH TOPICALLY IN THE MORNING  REMOVE & DISCARD PATCH WITHIN 12 HOURS OR AS DIRECTED BY MD  30 patch 1   • MAGNESIUM OXIDE PO Take by mouth     • metFORMIN (GLUCOPHAGE) 500 mg tablet TAKE 1 TABLET BY MOUTH TWICE A DAY WITH MEALS 60 tablet 5   • naproxen sodium (ANAPROX) 550 mg tablet TAKE 1 TABLET (550 MG TOTAL) BY MOUTH 2 (TWO) TIMES A DAY WITH MEALS FOR 30 DOSES 30 tablet 0   • nortriptyline (PAMELOR) 75 MG capsule Take 1 capsule (75 mg total) by mouth 2 (two) times a day 60 capsule 2   • oxyCODONE (Roxicodone) 5 immediate release tablet Take 1 tablet (5 mg total) by mouth every 4 (four) hours as needed for moderate pain for up to 10 doses Max Daily Amount: 30 mg 10 tablet 0   • propranolol (INDERAL) 20 mg tablet TAKE 1 TABLET BY MOUTH EVERY DAY AT NIGHT 30 tablet 11     No current facility-administered medications for this visit  Allergies   Allergen Reactions   • Cannabidiol Shortness Of Breath, Itching, Swelling, Anxiety, Palpitations, Confusion, Hypertension, Throat Swelling and Tongue Swelling   • Amoxicillin-Pot Clavulanate Hives   • Decadrol [Dexamethasone] Other (See Comments)     psychosis   • Penicillins Hives     Hives/Uticaria   • Tetracyclines & Related Hives      Allergy; Review of Systems    Video Exam    There were no vitals filed for this visit  Physical Exam     Behavioral Health Psychotherapy Progress Note    Psychotherapy Provided: Individual Psychotherapy     1  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 psychotic features ([facility] [address] )        2  Generalized anxiety disorder            Goals addressed in session: Goal 1     DATA: Jenelle Prather presented for follow up, sharing that she has been struggling with some interpersonal issues recently that have had her feeling more down    She talked about letting go of a friendship with someone who is not making healthy choices in her life right now, and "Shea Kraus said that she feels badly about doing so because she has been on the other side of that in the past   She also talked about setting a boundary with her friend Dennis Membreno because he is backtracking with his ex wife and wanting to take her back, when Shea Kraus can see that this is probably not healthy  She said that she has decided to just let things go and not worry so much about other people's choices, that she is going to let them follow their own path and she will step back to protect herself  Shea Kraus also shared that she is still recovering from her surgery but is doing much better as each day passes  She continues to try to get out and socialize to keep active and boost mood  She is also working on eating healthier to help with physical pain and energy levels, and said that she is more aware that she needs to make better choices to take care of her health  Provided support and validation of Esther's efforts, used CBT strategies to help her frame thoughts in a realistic, positive way, and encouraged efforts at focusing on prioritizing healthy habits and self-care  During this session, this clinician used the following therapeutic modalities: Client-centered Therapy, Cognitive Behavioral Therapy and Supportive Psychotherapy    Substance Abuse was not addressed during this session  If the client is diagnosed with a co-occurring substance use disorder, please indicate any changes in the frequency or amount of use: n/a  Stage of change for addressing substance use diagnoses: No substance use/Not applicable    ASSESSMENT:  Fidel Fair presents with a Depressed mood  her affect is Normal range and intensity, which is congruent, with her mood and the content of the session  The client has made progress on their goals  Fidel Fair presents with a low risk of suicide, low risk of self-harm, and minimal risk of harm to others      For any risk assessment that surpasses a \"low\" rating, a safety plan must be " developed  A safety plan was indicated: no  If yes, describe in detail n/a    PLAN: Between sessions, Moe Grant will work on reframing thoughts about relationships and her level of involvement in others' decisions, will focus on healthy habits to improve health, and will take time for rest and self-care as needed  At the next session, the therapist will use Client-centered Therapy, Cognitive Behavioral Therapy and Supportive Psychotherapy to address depression and anxiety  Behavioral Health Treatment Plan and Discharge Planning: Moe Grant is aware of and agrees to continue to work on their treatment plan  They have identified and are working toward their discharge goals   yes    Visit start and stop times:    05/19/23  Start Time: 1001  Stop Time: 1039  Total Visit Time: 38 minutes

## 2023-06-02 ENCOUNTER — TELEPHONE (OUTPATIENT)
Dept: PSYCHIATRY | Facility: CLINIC | Age: 43
End: 2023-06-02

## 2023-06-02 NOTE — TELEPHONE ENCOUNTER
Patient called in because her virtual appt became disconnected, writer contacted provider and she was having trouble connecting, patient will keep her appt for the 16th and call in for a sooner appt if she feels she needs one

## 2023-06-12 DIAGNOSIS — F33.1 MAJOR DEPRESSIVE DISORDER, RECURRENT EPISODE, MODERATE (HCC): ICD-10-CM

## 2023-06-12 DIAGNOSIS — M79.7 FIBROMYALGIA: ICD-10-CM

## 2023-06-12 DIAGNOSIS — F41.1 GENERALIZED ANXIETY DISORDER: ICD-10-CM

## 2023-06-12 RX ORDER — DESVENLAFAXINE SUCCINATE 50 MG/1
50 TABLET, EXTENDED RELEASE ORAL DAILY
Qty: 30 TABLET | Refills: 2 | Status: SHIPPED | OUTPATIENT
Start: 2023-06-12

## 2023-06-12 RX ORDER — NORTRIPTYLINE HYDROCHLORIDE 75 MG/1
CAPSULE ORAL
Qty: 60 CAPSULE | Refills: 2 | Status: SHIPPED | OUTPATIENT
Start: 2023-06-12

## 2023-06-12 RX ORDER — DESVENLAFAXINE 100 MG/1
TABLET, EXTENDED RELEASE ORAL
Qty: 30 TABLET | Refills: 2 | Status: SHIPPED | OUTPATIENT
Start: 2023-06-12

## 2023-06-13 ENCOUNTER — TELEPHONE (OUTPATIENT)
Dept: PSYCHIATRY | Facility: CLINIC | Age: 43
End: 2023-06-13

## 2023-06-13 NOTE — TELEPHONE ENCOUNTER
Patient called to reschedule 6/16 99PB due to conflicting appts   Rescheduled for 6/19 10am virtual

## 2023-06-19 ENCOUNTER — TELEMEDICINE (OUTPATIENT)
Dept: BEHAVIORAL/MENTAL HEALTH CLINIC | Facility: CLINIC | Age: 43
End: 2023-06-19
Payer: COMMERCIAL

## 2023-06-19 DIAGNOSIS — F41.1 GENERALIZED ANXIETY DISORDER: ICD-10-CM

## 2023-06-19 DIAGNOSIS — F33.2 SEVERE RECURRENT MAJOR DEPRESSION WITHOUT PSYCHOTIC FEATURES (HCC): Primary | ICD-10-CM

## 2023-06-19 PROCEDURE — 90834 PSYTX W PT 45 MINUTES: CPT | Performed by: PSYCHIATRY & NEUROLOGY

## 2023-06-19 NOTE — PSYCH
Virtual Regular Visit    Verification of patient location:    Patient is located at Home in the following state in which I hold an active license [place]      Assessment/Plan:    Problem List Items Addressed This Visit        Other    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 psychotic features ([facility] [address] ) - Primary    Generalized anxiety disorder         Reason for visit is   Chief Complaint   Patient presents with   • Virtual Regular Visit        Encounter provider HERMELINDA Barakat    Provider located at [address] [place] [id number] [phone]      Recent Visits  Date Type Provider Dept   06/13/23 Telephone HERMELINDA Lopez 18 recent visits within past 7 days and meeting all other requirements  Today's Visits  Date Type Provider Dept   [facility]   Showing today's visits and meeting all other requirements  Future Appointments  No visits were found meeting these conditions  Showing future appointments within next 150 days and meeting all other requirements       The patient was identified by name and date of birth  Manasjose luis Greene was informed that this is a telemedicine visit and [facility]  She agrees to proceed     My office door was closed  No one else was in the room  She acknowledged consent and understanding of privacy and security of the video platform  The patient has agreed to participate and understands they can discontinue the visit at any time  Patient is aware this is a billable service     HPI     Past Medical History:   Diagnosis Date   • Anxiety    • Blood transfusion declined because patient is [employer] 5/1/2023   • Chronic pain disorder    • Chronic sinusitis    • Depression    • Diabetes ([facility] [address] )    • Fibromyalgia    • Migraine    • Obesity    • Polyarthritis     Last assessed 9/21/2015   • Psychiatric disorder    • Psychiatric illness    • Sleep difficulties        Past Surgical History:   Procedure Laterality Date   • COLONOSCOPY  06/2019   • DENTAL SURGERY     • HYSTEROSCOPY      Endometrial Biopsy By Hysteroscopy   • MT LAPS SUPRACRV HYSTERECT 250 GM/< RMVL TUBE/OVAR N/A 5/1/2023    Procedure: [facility]) W/ BILATERAL SALPINGECTOMY, REMOVAL PARAOVARIAN CYST;  Surgeon: Kathy Verma DO;  Location: [facility];  Service: Gynecology   • REMOVAL OF INTRAUTERINE DEVICE (IUD)     • US GUIDED BREAST BIOPSY RIGHT COMPLETE Right 6/17/2019       Current Outpatient Medications   Medication Sig Dispense Refill   • acetaminophen (TYLENOL) 650 mg CR tablet Take 2 tablets (1,300 mg total) by mouth every 8 (eight) hours as needed for mild pain 30 tablet 0   • Blood Glucose Monitoring Suppl (Contour Blood Glucose System) w/Device KIT Use 1 kit 2 (two) times a day before meals 1 kit 0   • celecoxib (CeleBREX) 200 mg capsule TAKE 1 CAPSULE BY MOUTH TWICE A DAY 60 capsule 6   • Cholecalciferol (VITAMIN D-3) 1000 units CAPS Take 1 capsule by mouth daily     • desvenlafaxine (PRISTIQ) 100 mg 24 hr tablet TAKE 1 TABLET BY MOUTH EVERY DAY 30 tablet 2   • desvenlafaxine succinate (PRISTIQ) 50 mg 24 hr tablet TAKE 1 TABLET (50 MG TOTAL) BY MOUTH DAILY TOTAL DAILY DOSE 150 MG DAILY 30 tablet 2   • docusate sodium (COLACE) 100 mg capsule Take 1 capsule (100 mg total) by mouth 2 (two) times a day as needed for constipation  0   • glucose blood (Contour Test) test strip USE TO CHECK BLOOD SUGAR ONCE DAILY 100 strip 3   • hydrOXYzine HCL (ATARAX) 25 mg tablet Take 1 tablet (25 mg total) by mouth every 6 (six) hours as needed (sleep) In addition to 50 mg dose, may take 50 -75 mg PO HS PRN for sleep 30 tablet 1   • hydrOXYzine HCL (ATARAX) 50 mg tablet Take 1 tablet (50 mg total) by mouth daily at bedtime as needed (insomnia) In addition to 25 mg dose, may take 50 mg-75 mg PO HS PRN for insomnia 30 tablet 2   • lidocaine (LIDODERM) 5 % APPLY 1 PATCH TOPICALLY IN THE MORNING  REMOVE & DISCARD PATCH WITHIN 12 HOURS OR AS DIRECTED BY MD  30 patch 1   • MAGNESIUM OXIDE PO Take by mouth     • metFORMIN (GLUCOPHAGE) 500 mg tablet TAKE 1 TABLET BY MOUTH TWICE A DAY WITH MEALS 60 tablet 5   • naproxen sodium (ANAPROX) 550 mg tablet TAKE 1 TABLET (550 MG TOTAL) BY MOUTH 2 (TWO) TIMES A DAY WITH MEALS FOR 30 DOSES 30 tablet 0   • nortriptyline (PAMELOR) 75 MG capsule TAKE 1 CAPSULE BY MOUTH TWICE A DAY 60 capsule 2   • oxyCODONE (Roxicodone) 5 immediate release tablet Take 1 tablet (5 mg total) by mouth every 4 (four) hours as needed for moderate pain for up to 10 doses Max Daily Amount: 30 mg 10 tablet 0   • propranolol (INDERAL) 20 mg tablet TAKE 1 TABLET BY MOUTH EVERY DAY AT NIGHT 30 tablet 11     No current facility-administered medications for this visit  Allergies   Allergen Reactions   • Cannabidiol Shortness Of Breath, Itching, Swelling, Anxiety, Palpitations, Confusion, Hypertension, Throat Swelling and Tongue Swelling   • Amoxicillin-Pot Clavulanate Hives   • Decadrol [Dexamethasone] Other (See Comments)     psychosis   • Penicillins Hives     Hives/Uticaria   • Tetracyclines & Related Hives      Allergy; Review of Systems    Video Exam    There were no vitals filed for this visit  Physical Exam     Behavioral Health Psychotherapy Progress Note    Psychotherapy Provided: Individual Psychotherapy     1  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 psychotic features ([facility] [address] )        2  Generalized anxiety disorder            Goals addressed in session: Goal 1 and Goal 2     DATA: Cyndy Mcfarland presented for follow up, sharing that she has had a lot going on recently  She said that her dad is in the hospital right now with some kind of infection, and she is very worried about him    There is also a lot going on at home, with carpet being replaced, a leak in plumbing, "and her car having mechanical problems, so she said that she has been \"stressed\" lately  She has been trying to remind herself that all of the stressful events are only temporary and that things will eventually calm down  She has been prioritizing time with friends and getting out and socializing when she can, which she said has been very helpful  She also just got a new kitten, which she notes has been \"wonderful\" as he is very friendly and cuddly, and she has been enjoying taking care of him  Provided support, validation of Esther's feelings and progress in her ability to cope, and used CBT strategies to help Michell Whiteside continue to reframe negative/stressful thoughts to more positive  During this session, this clinician used the following therapeutic modalities: Client-centered Therapy, Cognitive Behavioral Therapy and Supportive Psychotherapy    Substance Abuse was not addressed during this session  If the client is diagnosed with a co-occurring substance use disorder, please indicate any changes in the frequency or amount of use: n/a  Stage of change for addressing substance use diagnoses: No substance use/Not applicable    ASSESSMENT:  Braydon Alfonso presents with a Anxious mood  her affect is Normal range and intensity, which is congruent, with her mood and the content of the session  The client has made progress on their goals  Braydon Alfonso presents with a low risk of suicide, low risk of self-harm, and minimal risk of harm to others  For any risk assessment that surpasses a \"low\" rating, a safety plan must be developed  A safety plan was indicated: no  If yes, describe in detail n/a    PLAN: Between sessions, Braydon Alfonso will continue to work on stress reduction and self-care as well as socializing when able to help boost mood   At the next session, the therapist will use Client-centered Therapy, Cognitive Behavioral Therapy and Supportive Psychotherapy to address depression and " anxiety  Behavioral Health Treatment Plan and Discharge Planning: Prem Thrasher is aware of and agrees to continue to work on their treatment plan  They have identified and are working toward their discharge goals   yes    Visit start and stop times:    06/19/23  Start Time: 1003  Stop Time: 1041  Total Visit Time: 38 minutes

## 2023-06-21 ENCOUNTER — TELEPHONE (OUTPATIENT)
Dept: PAIN MEDICINE | Facility: MEDICAL CENTER | Age: 43
End: 2023-06-21

## 2023-06-21 ENCOUNTER — TELEMEDICINE (OUTPATIENT)
Dept: PSYCHIATRY | Facility: CLINIC | Age: 43
End: 2023-06-21
Payer: COMMERCIAL

## 2023-06-21 DIAGNOSIS — E11.42 DIABETIC POLYNEUROPATHY ASSOCIATED WITH TYPE 2 DIABETES MELLITUS (HCC): ICD-10-CM

## 2023-06-21 DIAGNOSIS — F41.1 GENERALIZED ANXIETY DISORDER: ICD-10-CM

## 2023-06-21 DIAGNOSIS — M54.16 LUMBAR RADICULOPATHY: Primary | ICD-10-CM

## 2023-06-21 DIAGNOSIS — F33.2 SEVERE RECURRENT MAJOR DEPRESSION WITHOUT PSYCHOTIC FEATURES (HCC): Primary | ICD-10-CM

## 2023-06-21 PROCEDURE — 99213 OFFICE O/P EST LOW 20 MIN: CPT | Performed by: PSYCHIATRY & NEUROLOGY

## 2023-06-21 RX ORDER — GABAPENTIN 300 MG/1
300 CAPSULE ORAL
Qty: 30 CAPSULE | Refills: 2 | Status: SHIPPED | OUTPATIENT
Start: 2023-06-21 | End: 2023-06-21 | Stop reason: SDUPTHER

## 2023-06-21 RX ORDER — GABAPENTIN 300 MG/1
300 CAPSULE ORAL
Qty: 30 CAPSULE | Refills: 2 | Status: SHIPPED | OUTPATIENT
Start: 2023-06-21

## 2023-06-21 NOTE — PSYCH
Virtual Regular Visit    Verification of patient location:    Patient is located at Home in the following state in which I hold an active license [place]      Assessment/Plan:    Problem List Items Addressed This Visit        Endocrine    Diabetic neuropathy associated with type 2 diabetes mellitus ([facility] [address] )    Relevant Medications    gabapentin (Neurontin) 300 mg capsule       Other    Generalized anxiety disorder    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 psychotic features ([facility] [address] ) - Primary                 Reason for visit is   Chief Complaint   Patient presents with   • Virtual Regular Visit        Encounter provider Jeffrey Shore MD    Provider located at [address] [address] [phone]      Recent Visits  No visits were found meeting these conditions  Showing recent visits within past 7 days and meeting all other requirements  Today's Visits  Date Type Provider Dept   06/21/23 MD Koby Aguilar 18 today's visits and meeting all other requirements  Future Appointments  No visits were found meeting these conditions  Showing future appointments within next 150 days and meeting all other requirements       The patient was identified by name and date of birth  Raj Rudd was informed that this is a telemedicine visit and [facility] the [facility]  She agrees to proceed     My office door was closed  No one else was in the room  She acknowledged consent and understanding of privacy and security of the video platform  The patient has agreed to participate and understands they can discontinue the visit at any time  Patient is aware this is a billable service  Subjective  Raj Rudd is a 37 y o  female with MDD and CAROL   Raj Rudd is a 37 y o  female with MDD    Patient remains compliant with medications and denies side effects  She continues to manage her diabetes with medication and diet      She stated that her father is currently at the hospital with acute diverticulitis and due to compromised kidney function he was started on dialysis  She stated she has been under stress and so has been her mother  She stated her mood has been stable, but she has been noticing increased neuropathy pain, worse at night with tingling and burning  She agrees to start trial of Gabapentin 300 mg po qhs and will f/u with PCP and podiatrist   Will continue other medications as prescribed and will schedule follow up in 3 months      HPI     Past Medical History:   Diagnosis Date   • Anxiety    • Blood transfusion declined because patient is Synagogue 5/1/2023   • Chronic pain disorder    • Chronic sinusitis    • Depression    • Diabetes ([address] )    • Fibromyalgia    • Migraine    • Obesity    • Polyarthritis     Last assessed 9/21/2015   • Psychiatric disorder    • Psychiatric illness    • Sleep difficulties        Past Surgical History:   Procedure Laterality Date   • COLONOSCOPY  06/2019   • DENTAL SURGERY     • HYSTEROSCOPY      Endometrial Biopsy By Hysteroscopy   • CT LAPS SUPRACRV HYSTERECT 250 GM/< RMVL TUBE/OVAR N/A 5/1/2023    Procedure: [facility]) W/ BILATERAL SALPINGECTOMY, REMOVAL PARAOVARIAN CYST;  Surgeon: Marian Garcia DO;  Location: [facility];  Service: Gynecology   • REMOVAL OF INTRAUTERINE DEVICE (IUD)     • US GUIDED BREAST BIOPSY RIGHT COMPLETE Right 6/17/2019       Current Outpatient Medications   Medication Sig Dispense Refill   • gabapentin (Neurontin) 300 mg capsule Take 1 capsule (300 mg total) by mouth daily at bedtime 30 capsule 2   • acetaminophen (TYLENOL) 650 mg CR tablet Take 2 tablets (1,300 mg total) by mouth every 8 (eight) hours as needed for mild pain 30 tablet 0   • Blood Glucose Monitoring Suppl (Contour Blood Glucose System) w/Device KIT Use 1 kit 2 (two) times a day before meals 1 kit 0   • celecoxib (CeleBREX) 200 mg capsule TAKE 1 CAPSULE BY MOUTH TWICE A DAY 60 capsule 6   • Cholecalciferol (VITAMIN D-3) 1000 units CAPS Take 1 capsule by mouth daily     • desvenlafaxine (PRISTIQ) 100 mg 24 hr tablet TAKE 1 TABLET BY MOUTH EVERY DAY 30 tablet 2   • desvenlafaxine succinate (PRISTIQ) 50 mg 24 hr tablet TAKE 1 TABLET (50 MG TOTAL) BY MOUTH DAILY TOTAL DAILY DOSE 150 MG DAILY 30 tablet 2   • glucose blood (Contour Test) test strip USE TO CHECK BLOOD SUGAR ONCE DAILY 100 strip 3   • hydrOXYzine HCL (ATARAX) 25 mg tablet Take 1 tablet (25 mg total) by mouth every 6 (six) hours as needed (sleep) In addition to 50 mg dose, may take 50 -75 mg PO HS PRN for sleep 30 tablet 1   • hydrOXYzine HCL (ATARAX) 50 mg tablet Take 1 tablet (50 mg total) by mouth daily at bedtime as needed (insomnia) In addition to 25 mg dose, may take 50 mg-75 mg PO HS PRN for insomnia 30 tablet 2   • lidocaine (LIDODERM) 5 % APPLY 1 PATCH TOPICALLY IN THE MORNING  REMOVE & DISCARD PATCH WITHIN 12 HOURS OR AS DIRECTED BY MD  30 patch 1   • MAGNESIUM OXIDE PO Take by mouth     • metFORMIN (GLUCOPHAGE) 500 mg tablet TAKE 1 TABLET BY MOUTH TWICE A DAY WITH MEALS 60 tablet 5   • nortriptyline (PAMELOR) 75 MG capsule TAKE 1 CAPSULE BY MOUTH TWICE A DAY 60 capsule 2   • propranolol (INDERAL) 20 mg tablet TAKE 1 TABLET BY MOUTH EVERY DAY AT NIGHT 30 tablet 11     No current facility-administered medications for this visit  Allergies   Allergen Reactions   • Cannabidiol Shortness Of Breath, Itching, Swelling, Anxiety, Palpitations, Confusion, Hypertension, Throat Swelling and Tongue Swelling   • Amoxicillin-Pot Clavulanate Hives   • Decadrol [Dexamethasone] Other (See Comments)     psychosis   • Penicillins Hives     Hives/Uticaria   • Tetracyclines & Related Hives      Allergy;         Review of Systems     Mood Anxiety and Depression   Behavior Normal    Thought Content Disturbing Thoughts, Feelings   General Emotional Problems and Decreased Functioning   Personality Normal   Other Psych Symptoms Normal   Constitutional Negative   ENT Negative   Cardiovascular Negative   Respiratory Negative   Gastrointestinal Negative   Genitourinary Negative   Musculoskeletal Negative   Integumentary Negative   Neurological Negative   Endocrine Normal    Other Symptoms Normal        Laboratory Results:   Recent Labs (last 12 months): Admission on 05/01/2023, Discharged on 05/01/2023   Component Date Value   • ABO Grouping 05/01/2023 O    • Rh Factor 05/01/2023 Positive    • EXT Preg Test, Ur 05/01/2023 Negative    • Control 05/01/2023 Valid    • POC Glucose 05/01/2023 115    • Case Report 05/01/2023                      Value:Surgical Pathology Report                         Case: [id number]                                   Authorizing Provider:  Leonie Mclean DO      Collected:           05/01/2023 [id number]              Ordering Location:     [facility]        Received:            05/01/2023 [address] Operating Room                                                     Pathologist:           Mell Thompson MD                                                                           Specimen:    Uterus, Uterus, bilateral fallopian tubes, and paraovarian cyst                           • Final Diagnosis 05/01/2023                      Value: This result contains rich text formatting which cannot be displayed here  • Additional Information 05/01/2023                      Value: This result contains rich text formatting which cannot be displayed here  • Gross Description 05/01/2023                      Value: This result contains rich text formatting which cannot be displayed here     • Clinical Information 05/01/2023                      Value:37year old female with endometrial hyperplasia without atypia ([id number])     • POC Glucose 05/01/2023 142 (H)    • POC Glucose 05/01/2023 99    Appointment on 04/27/2023   Component Date Value   • WBC 04/27/2023 9 78    • RBC 04/27/2023 5 09    • Hemoglobin 04/27/2023 14 5    • Hematocrit 04/27/2023 44 0    • MCV 04/27/2023 86    • MCH 04/27/2023 28 5    • MCHC 04/27/2023 33 0    • RDW 04/27/2023 13 6    • MPV 04/27/2023 8 8 (L)    • Platelets 76/61/2092 291    • nRBC 04/27/2023 0    • Neutrophils Relative 04/27/2023 63    • Immat GRANS % 04/27/2023 0    • Lymphocytes Relative 04/27/2023 31    • Monocytes Relative 04/27/2023 5    • Eosinophils Relative 04/27/2023 1    • Basophils Relative 04/27/2023 0    • Neutrophils Absolute 04/27/2023 6 12    • Immature Grans Absolute 04/27/2023 0 03    • Lymphocytes Absolute 04/27/2023 3 02    • Monocytes Absolute 04/27/2023 0 51    • Eosinophils Absolute 04/27/2023 0 07    • Basophils Absolute 04/27/2023 0 03    • Hemoglobin A1C 04/27/2023 6 1 (H)    • EAG 04/27/2023 128    Appointment on 04/24/2023   Component Date Value   • ABO Grouping 04/24/2023 O    • Rh Factor 04/24/2023 Positive    • Antibody Screen 04/24/2023 Negative    • Specimen Expiration Date 04/24/2023 [id number]    Hospital Outpatient Visit on 03/13/2023   Component Date Value   • EXT Preg Test, Ur 03/13/2023 Negative    • Control 03/13/2023 Valid    • Case Report 03/13/2023                      Value:Surgical Pathology Report                         Case: [id number]                                   Authorizing Provider:  Niurka Ballesteros MD         Collected:           03/13/2023 0827              Ordering Location:     [facility]        Received:            03/13/2023 [facility]                                                     Pathologist:           Juliette Verma MD Specimen:    Polyp, Colorectal, rectal , cold bx                                                       • Final Diagnosis 03/13/2023                      Value: This result contains rich text formatting which cannot be displayed here  • Additional Information 03/13/2023                      Value: This result contains rich text formatting which cannot be displayed here  • Synoptic Checklist 03/13/2023                      Value:                            COLON/RECTUM POLYP FORM - GI - All Specimens                                                                                     :    Other     • Gross Description 03/13/2023                      Value: This result contains rich text formatting which cannot be displayed here     Orders Only on 12/01/2022   Component Date Value   • Total Cholesterol 12/01/2022 180    • HDL 12/01/2022 40 (L)    • Triglycerides 12/01/2022 127    • LDL Calculated 12/01/2022 116 (H)    • Chol HDLC Ratio 12/01/2022 4 5    • Non-HDL Cholesterol 12/01/2022 140 (H)    • Creatinine, Urine 12/01/2022 82    • Albumin,U,Random 12/01/2022 5 3    • Microalb/Creat Ratio 12/01/2022 65 (H)    • Glucose, Random 12/01/2022 164 (H)    • BUN 12/01/2022 14    • Creatinine 12/01/2022 0 89    • eGFR 12/01/2022 82    • SL AMB BUN/CREATININE RA* 45/69/6467 NOT APPLICABLE    • Sodium 19/68/5760 136    • Potassium 12/01/2022 4 3    • Chloride 12/01/2022 101    • CO2 12/01/2022 25    • Calcium 12/01/2022 9 2    • Protein, Total 12/01/2022 6 3    • Albumin 12/01/2022 4 2    • Globulin 12/01/2022 2 1    • Albumin/Globulin Ratio 12/01/2022 2 0    • TOTAL BILIRUBIN 12/01/2022 0 5    • Alkaline Phosphatase 12/01/2022 55    • AST 12/01/2022 18    • ALT 12/01/2022 21    • Hemoglobin A1C 12/01/2022 6 6 (H)    Orders Only on 11/11/2022   Component Date Value   • White Blood Cell Count 11/11/2022 13 5 (H)    • Red Blood Cell Count 11/11/2022 4 48    • Hemoglobin 11/11/2022 13 1    • HCT 11/11/2022 38 9    • MCV 11/11/2022 86 8    • MCH 11/11/2022 29 2    • MCHC 11/11/2022 33 7    • RDW 11/11/2022 12 3    • Platelet Count 97/31/7691 304    • SL AMB MPV 11/11/2022 9 2    • Neutrophils (Absolute) 11/11/2022 8,141 (H)    • Lymphocytes (Absolute) 11/11/2022 4,415 (H)    • Monocytes (Absolute) 11/11/2022 824    • Eosinophils (Absolute) 11/11/2022 68    • Basophils ABS 11/11/2022 54    • Neutrophils 11/11/2022 60 3    • Lymphocytes 11/11/2022 32 7    • Monocytes 11/11/2022 6 1    • Eosinophils 11/11/2022 0 5    • Basophils PCT 11/11/2022 0 4    Office Visit on 11/09/2022   Component Date Value   • Case Report 11/09/2022                      Value:Surgical Pathology Report                         Case: [id number]                                   Authorizing Provider:  ADELAIDA De Guzman         Collected:           11/09/2022 1423              Ordering Location:     [facility]      Received:            11/09/2022 1423              Pathologist:           Jeannette Resendiz MD                                                    Specimen:    Endometrium                                                                               • Final Diagnosis 11/09/2022                      Value: This result contains rich text formatting which cannot be displayed here  • Additional Information 11/09/2022                      Value: This result contains rich text formatting which cannot be displayed here  • Gross Description 11/09/2022                      Value: This result contains rich text formatting which cannot be displayed here     Orders Only on 10/28/2022   Component Date Value   • Vitamin D, 25-Hydroxy, S* 10/28/2022 45    Orders Only on 10/28/2022   Component Date Value   • TSH W/RFX TO FREE T4 10/28/2022 2 03    Lab on 08/24/2022   Component Date Value   • Sodium 08/24/2022 135    • Potassium 08/24/2022 4 3    • Chloride 08/24/2022 100    • CO2 08/24/2022 29    • ANION GAP 08/24/2022 6    • BUN 08/24/2022 12    • Creatinine 08/24/2022 1 04    • Glucose, Fasting 08/24/2022 224 (H)    • Calcium 08/24/2022 9 0    • AST 08/24/2022 43    • ALT 08/24/2022 59    • Alkaline Phosphatase 08/24/2022 72    • Total Protein 08/24/2022 7 6    • Albumin 08/24/2022 4 1    • Total Bilirubin 08/24/2022 0 68    • eGFR 08/24/2022 65    • Hemoglobin A1C 08/24/2022 7 7 (H)    • EAG 08/24/2022 174    • Cholesterol 08/24/2022 181    • Triglycerides 08/24/2022 195 (H)    • HDL, Direct 08/24/2022 34 (L)    • LDL Calculated 08/24/2022 108 (H)    • Non-HDL-Chol (CHOL-HDL) 08/24/2022 147    There may be more visits with results that are not included         Substance Abuse History:  Social History     Substance and Sexual Activity   Drug Use Not Currently       Family Psychiatric History:   Family History   Problem Relation Age of Onset   • Irregular heart beat Mother    • Diabetes Father    • Kidney disease Father    • Diabetes Maternal Grandmother    • Rheum arthritis Maternal Grandmother    • Melanoma Maternal Grandmother 80   • No Known Problems Maternal Grandfather    • Kidney disease Paternal Grandmother    • Rheum arthritis Paternal Grandmother    • Depression Paternal Grandmother    • Diabetes Paternal Grandfather    • Lung cancer Paternal Grandfather 52   • Substance Abuse Brother    • No Known Problems Brother    • Substance Abuse Maternal Uncle    • Prostate cancer Maternal Uncle 61   • Depression Paternal Aunt    • Alcohol abuse Family    • Stomach cancer Family        The following portions of the patient's history were reviewed and updated as appropriate: allergies, current medications, past family history, past medical history, past social history, past surgical history and problem list     Social History     Socioeconomic History   • Marital status: Single     Spouse name: Not on file   • Number of children: 0   • Years of education: 12 years    • Highest education level: GED or equivalent   Occupational History   • Occupation: unemployed Tobacco Use   • Smoking status: Never   • Smokeless tobacco: Never   Vaping Use   • Vaping Use: Never used   Substance and Sexual Activity   • Alcohol use: Yes     Comment: 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; sober since 2010   • Drug use: Not Currently   • Sexual activity: Not Currently   Other Topics Concern   • Not on file   Social History Narrative    Caffeine use        What type of home do you live in: Single house    Age of your home: 50 yrs    How long have you been living there: 42 yrs    Type of heat: Baseboard    Type of fuel: Electric    What type of samir is in your bedroom: Carpet    Do you have the following in or near your home:    Air products: Window air conditioning and Ionic air purifier    Pests: Mice    Pets: Cat    Basement: None     Social Determinants of Health     Financial Resource Strain: High Risk (12/2/2020)    Overall Financial Resource Strain (CARDIA)    • Difficulty of Paying Living Expenses: Hard   Food Insecurity: No Food Insecurity (12/2/2020)    Hunger Vital Sign    • Worried About Running Out of Food in the Last Year: Never true    • Ran Out of Food in the Last Year: Never true   Transportation Needs: Unmet Transportation Needs (12/2/2020)    PRAPARE - Transportation    • Lack of Transportation (Medical): Yes    • Lack of Transportation (Non-Medical): Yes   Physical Activity: Insufficiently Active (10/12/2022)    Exercise Vital Sign    • Days of Exercise per Week: 2 days    • Minutes of Exercise per Session: 60 min   Stress: Stress Concern Present (4/3/2019)    [address]    • Feeling of Stress : To some extent   Social Connections:  Moderately Integrated (4/3/2019)    Social Connection and Isolation Panel [NHANES]    • Frequency of Communication with Friends and Family: More than three times a week    • Frequency of Social Gatherings with Friends and Family: More than three times a week    • Attends Latter day Services: More than 4 times per year    • Active Member of Clubs or Organizations:  Yes    • Attends Club or Organization Meetings: More than 4 times per year    • Marital Status: Never    Intimate Partner Violence: Not At Risk (4/3/2019)    Humiliation, Afraid, Rape, and Kick questionnaire    • Fear of Current or Ex-Partner: No    • Emotionally Abused: No    • Physically Abused: No    • Sexually Abused: No   Housing Stability: Not on file     Social History     Social History Narrative    Caffeine use        What type of home do you live in: Single house    Age of your home: 48 yrs    How long have you been living there: 42 yrs    Type of heat: Baseboard    Type of fuel: Electric    What type of samir is in your bedroom: Carpet    Do you have the following in or near your home:    Air products: Window air conditioning and Ionic air purifier    Pests: Mice    Pets: Cat    Basement: None       Objective:       Mental status:  Appearance calm and cooperative , adequate hygiene and grooming and good eye contact    Mood dysphoric   Affect affect was constricted   Speech a normal rate and fluent   Thought Processes coherent/organized and normal thought processes   Hallucinations no hallucinations present    Thought Content no delusions   Abnormal Thoughts no suicidal thoughts  and no homicidal thoughts    Orientation  oriented to person and place and time   Remote Memory short term memory intact and long term memory intact   Attention Span concentration intact   Intellect Appears to be of Average Intelligence   Insight Limited insight   Judgement judgment was limited   Muscle Strength n/a   Language no difficulty naming common objects and no difficulty repeating a phrase    Fund of Knowledge displays adequate knowledge of current events, adequate fund of knowledge regarding past history and adequate fund of knowledge regarding vocabulary                Assessment/Plan:       Diagnoses and all orders for this visit:    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 psychotic features ([facility] [address] )  -       Generalized anxiety disorder    Diabetic polyneuropathy associated with type 2 diabetes mellitus (HCC)  -     gabapentin (Neurontin) 300 mg capsule; Take 1 capsule (300 mg total) by mouth daily at bedtime            Treatment Recommendations- Risks Benefits      Immediate Medical/Psychiatric/Psychotherapy Treatments and Any Precautions: continue current medications , start Gabapentin 300 mg po qhs for neuropathy    Risks, Benefits And Possible Side Effects Of Medications:  {PSYCH RISK, BENEFITS AND POSSIBLE SIDE EFFECTS (Optional):31451    Controlled Medication Discussion: Discussed with patient Black Box warning on concurrent use of benzodiazepines and opioid medications including sedation, respiratory depression, coma and death  Patient understands the risk of treatment with benzodiazepines in addition to opioids and wants to continue taking those medications  , Discussed with patient the risks of sedation, respiratory depression, impairment of ability to drive and potential for abuse and addiction related to treatment with benzodiazepine medications  The patient understands risk of treatment with benzodiazepine medications, agrees to not drive if feels impaired and agrees to take medications as prescribed  and The patient has been filling controlled prescriptions on time as prescribed to [facility]  program       Psychotherapy Provided: Individual psychotherapy provided  Individual psychotherapy provided: Yes  Counseling was provided during the session today for 16 minutes  Medications, treatment progress and treatment plan reviewed with Karen Armstrong  Medication education provided to Karen Armstrong  Goals discussed during in session: continue improvement in depression     Coping strategies including compliance with medications, exercising, getting into a good routine, increasing energy, increasing interest in usual activities, increasing motivation, increasing social interaction, maintain healthy diet, maintain heathy sleeping hygiene and maintain positive attitude reviewed with Lalit Raphael  Importance of medication and treatment compliance reviewed with Esther  Educated on importance of medication and treatment compliance  Supportive therapy provided                           Visit Time    Visit Start Time: 10:30  Visit Stop Time: 11:00  Total Visit Duration: 30 minutes

## 2023-06-21 NOTE — TELEPHONE ENCOUNTER
Called patient for clarification-  Patient had a B/L L5 TFESI done in Feb    She has had 80% relief up until recently  Patient states she is having he same pain she did prior to this injection and would like to schedule a repeat injection  She stated today her pain level is about a 5 out of 10  Please advise  Thank you

## 2023-06-22 DIAGNOSIS — E11.65 TYPE 2 DIABETES MELLITUS WITH HYPERGLYCEMIA, WITHOUT LONG-TERM CURRENT USE OF INSULIN (HCC): ICD-10-CM

## 2023-06-22 NOTE — TELEPHONE ENCOUNTER
Called and spoke with patient- procedure scheduled  All instructions reviewed with patient by phone upon scheduling    Mailed copy to home

## 2023-06-23 ENCOUNTER — TELEPHONE (OUTPATIENT)
Dept: PSYCHIATRY | Facility: CLINIC | Age: 43
End: 2023-06-23

## 2023-06-23 NOTE — TELEPHONE ENCOUNTER
Writer spoke with patient to schedule their 3 month follow up with provider    Patient is now scheduled for 9/19 @10am

## 2023-06-30 ENCOUNTER — TELEMEDICINE (OUTPATIENT)
Dept: BEHAVIORAL/MENTAL HEALTH CLINIC | Facility: CLINIC | Age: 43
End: 2023-06-30
Payer: COMMERCIAL

## 2023-06-30 DIAGNOSIS — F41.1 GENERALIZED ANXIETY DISORDER: ICD-10-CM

## 2023-06-30 DIAGNOSIS — F33.2 SEVERE RECURRENT MAJOR DEPRESSION WITHOUT PSYCHOTIC FEATURES (HCC): Primary | ICD-10-CM

## 2023-06-30 PROCEDURE — 90834 PSYTX W PT 45 MINUTES: CPT | Performed by: PSYCHIATRY & NEUROLOGY

## 2023-06-30 NOTE — PSYCH
Virtual Regular Visit    Verification of patient location:    Patient is located at Home in the following state in which I hold an active license [place]      Assessment/Plan:    Problem List Items Addressed This Visit        Other    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 psychotic features ([facility] [address] ) - Primary    Generalized anxiety disorder            Reason for visit is   Chief Complaint   Patient presents with   • Virtual Regular Visit        Encounter provider HERMELINDA Bateman    Provider located at [address] [place] [id number] [phone]      Recent Visits  No visits were found meeting these conditions  Showing recent visits within past 7 days and meeting all other requirements  Today's Visits  Date Type Provider Dept   06/30/23 Telemedicine HERMELINDA [facility] Therapist [facility]   Showing today's visits and meeting all other requirements  Future Appointments  No visits were found meeting these conditions  Showing future appointments within next 150 days and meeting all other requirements       The patient was identified by name and date of birth  Reyna Landry was informed that this is a telemedicine visit and that [facility]  She agrees to proceed     My office door was closed  No one else was in the room  She acknowledged consent and understanding of privacy and security of the video platform  The patient has agreed to participate and understands they can discontinue the visit at any time  Patient is aware this is a billable service       HPI     Past Medical History:   Diagnosis Date   • Anxiety    • Blood transfusion declined because patient is [employer] 5/1/2023   • Chronic pain disorder    • Chronic sinusitis    • Depression    • Diabetes ([facility] [address] )    • Fibromyalgia    • Migraine    • Obesity    • Polyarthritis     Last assessed 9/21/2015   • Psychiatric disorder    • Psychiatric illness    • Sleep difficulties        Past Surgical History:   Procedure Laterality Date   • COLONOSCOPY  06/2019   • DENTAL SURGERY     • HYSTEROSCOPY      Endometrial Biopsy By Hysteroscopy   • IA LAPS SUPRACRV HYSTERECT 250 GM/< RMVL TUBE/OVAR N/A 5/1/2023    Procedure: [facility]) W/ BILATERAL SALPINGECTOMY, REMOVAL PARAOVARIAN CYST;  Surgeon: Larisa Amezquita DO;  Location: [facility];  Service: Gynecology   • REMOVAL OF INTRAUTERINE DEVICE (IUD)     • US GUIDED BREAST BIOPSY RIGHT COMPLETE Right 6/17/2019       Current Outpatient Medications   Medication Sig Dispense Refill   • acetaminophen (TYLENOL) 650 mg CR tablet Take 2 tablets (1,300 mg total) by mouth every 8 (eight) hours as needed for mild pain 30 tablet 0   • Blood Glucose Monitoring Suppl (Contour Blood Glucose System) w/Device KIT Use 1 kit 2 (two) times a day before meals 1 kit 0   • celecoxib (CeleBREX) 200 mg capsule TAKE 1 CAPSULE BY MOUTH TWICE A DAY 60 capsule 6   • Cholecalciferol (VITAMIN D-3) 1000 units CAPS Take 1 capsule by mouth daily     • desvenlafaxine (PRISTIQ) 100 mg 24 hr tablet TAKE 1 TABLET BY MOUTH EVERY DAY 30 tablet 2   • desvenlafaxine succinate (PRISTIQ) 50 mg 24 hr tablet TAKE 1 TABLET (50 MG TOTAL) BY MOUTH DAILY TOTAL DAILY DOSE 150 MG DAILY 30 tablet 2   • gabapentin (Neurontin) 300 mg capsule Take 1 capsule (300 mg total) by mouth daily at bedtime 30 capsule 2   • glucose blood (Contour Test) test strip USE TO CHECK BLOOD SUGAR ONCE DAILY 100 strip 3   • hydrOXYzine HCL (ATARAX) 25 mg tablet Take 1 tablet (25 mg total) by mouth every 6 (six) hours as needed (sleep) In addition to 50 mg dose, may take 50 -75 mg PO HS PRN for sleep 30 tablet 1   • hydrOXYzine HCL (ATARAX) 50 mg tablet Take 1 tablet (50 mg total) by mouth daily at bedtime as needed (insomnia) In addition to 25 mg dose, may take 50 mg-75 mg PO HS PRN for insomnia 30 tablet 2   • lidocaine (LIDODERM) "5 % APPLY 1 PATCH TOPICALLY IN THE MORNING  REMOVE & DISCARD PATCH WITHIN 12 HOURS OR AS DIRECTED BY MD  30 patch 1   • MAGNESIUM OXIDE PO Take by mouth     • metFORMIN (GLUCOPHAGE) 500 mg tablet TAKE 1 TABLET BY MOUTH TWICE A DAY WITH MEALS 60 tablet 5   • nortriptyline (PAMELOR) 75 MG capsule TAKE 1 CAPSULE BY MOUTH TWICE A DAY 60 capsule 2   • propranolol (INDERAL) 20 mg tablet TAKE 1 TABLET BY MOUTH EVERY DAY AT NIGHT 30 tablet 11     No current facility-administered medications for this visit  Allergies   Allergen Reactions   • Cannabidiol Shortness Of Breath, Itching, Swelling, Anxiety, Palpitations, Confusion, Hypertension, Throat Swelling and Tongue Swelling   • Amoxicillin-Pot Clavulanate Hives   • Decadrol [Dexamethasone] Other (See Comments)     psychosis   • Penicillins Hives     Hives/Uticaria   • Tetracyclines & Related Hives      Allergy; Review of Systems    Video Exam    There were no vitals filed for this visit  Physical Exam     Behavioral Health Psychotherapy Progress Note    Psychotherapy Provided: Individual Psychotherapy     1  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 psychotic features ([facility] [address] )        2  Generalized anxiety disorder            Goals addressed in session: Goal 1     DATA: Isis Jackson presented for follow up, sharing that she has been under a lot of stress because her father was recently in the hospital for a bacterial infection for 10 days, and has been very ill  He is now at home, but has to have dialysis 4 days a week and is weak and needs a lot of support  She shared that she is trying to help her dad as well as mom as much as she can, while still trying to clean and keep up with her own things (mom continues to pressure her to clean)    Isis Jackson said that her anxiety has been quite high and her mood is \"all over the place,\" but she is reaching out to friends, getting out of the house to do things for herself (went to a concert, spends time with group of friends, " "got lash extensions), which has been helping to manage her mood fairly well  She noted that she is planning to journal more but has not sat down to do it just yet because she has been so busy, but knows that this will also help her cope  Provided support, validation of Esther's feelings/stress, and provided positive feedback about how well she is coping presently considering just how much stress she is under  Emphasized importance of self-care and rest, as well as reaching out to positive supports and taking time away from home for a break  During this session, this clinician used the following therapeutic modalities: Client-centered Therapy, Cognitive Behavioral Therapy and Supportive Psychotherapy    Substance Abuse was not addressed during this session  If the client is diagnosed with a co-occurring substance use disorder, please indicate any changes in the frequency or amount of use: n/a  Stage of change for addressing substance use diagnoses: No substance use/Not applicable    ASSESSMENT:  Noni Coates presents with a Euthymic/ normal and Anxious mood  her affect is Normal range and intensity, which is congruent, with her mood and the content of the session  The client has made progress on their goals  Noni Coates presents with a low risk of suicide, low risk of self-harm, and minimal risk of harm to others  For any risk assessment that surpasses a \"low\" rating, a safety plan must be developed  A safety plan was indicated: no  If yes, describe in detail n/a    PLAN: Between sessions, Noni Coates will continue to focus on self-care and anxiety reduction strategies as discussed  At the next session, the therapist will use Client-centered Therapy, Cognitive Behavioral Therapy and Supportive Psychotherapy to address anxiety and depression  Behavioral Health Treatment Plan and Discharge Planning: Noni Coates is aware of and agrees to continue to work on their treatment plan   They have " identified and are working toward their discharge goals   yes    Visit start and stop times:    06/30/23  Start Time: 1008  Stop Time: 2122  Total Visit Time: 39 minutes

## 2023-07-05 ENCOUNTER — OFFICE VISIT (OUTPATIENT)
Dept: FAMILY MEDICINE CLINIC | Facility: CLINIC | Age: 43
End: 2023-07-05
Payer: COMMERCIAL

## 2023-07-05 VITALS
HEART RATE: 91 BPM | OXYGEN SATURATION: 99 % | SYSTOLIC BLOOD PRESSURE: 124 MMHG | HEIGHT: 60 IN | RESPIRATION RATE: 16 BRPM | DIASTOLIC BLOOD PRESSURE: 64 MMHG | TEMPERATURE: 97.5 F | WEIGHT: 180.4 LBS | BODY MASS INDEX: 35.42 KG/M2

## 2023-07-05 DIAGNOSIS — F33.2 SEVERE RECURRENT MAJOR DEPRESSION WITHOUT PSYCHOTIC FEATURES (HCC): ICD-10-CM

## 2023-07-05 DIAGNOSIS — E66.01 MORBID OBESITY (HCC): ICD-10-CM

## 2023-07-05 DIAGNOSIS — E11.42 DIABETIC POLYNEUROPATHY ASSOCIATED WITH TYPE 2 DIABETES MELLITUS (HCC): ICD-10-CM

## 2023-07-05 DIAGNOSIS — E78.5 DYSLIPIDEMIA: ICD-10-CM

## 2023-07-05 DIAGNOSIS — E11.41 DIABETIC MONONEUROPATHY ASSOCIATED WITH TYPE 2 DIABETES MELLITUS (HCC): ICD-10-CM

## 2023-07-05 DIAGNOSIS — R20.2 PARESTHESIA OF BOTH FEET: ICD-10-CM

## 2023-07-05 DIAGNOSIS — F41.1 GENERALIZED ANXIETY DISORDER: ICD-10-CM

## 2023-07-05 DIAGNOSIS — E11.65 TYPE 2 DIABETES MELLITUS WITH HYPERGLYCEMIA, WITHOUT LONG-TERM CURRENT USE OF INSULIN (HCC): Primary | ICD-10-CM

## 2023-07-05 LAB
CREAT UR-MCNC: 221 MG/DL
MICROALBUMIN UR-MCNC: 13.7 MG/L (ref 0–20)
MICROALBUMIN/CREAT 24H UR: 6 MG/G CREATININE (ref 0–30)

## 2023-07-05 PROCEDURE — 82043 UR ALBUMIN QUANTITATIVE: CPT | Performed by: FAMILY MEDICINE

## 2023-07-05 PROCEDURE — 99214 OFFICE O/P EST MOD 30 MIN: CPT | Performed by: FAMILY MEDICINE

## 2023-07-05 PROCEDURE — 82570 ASSAY OF URINE CREATININE: CPT | Performed by: FAMILY MEDICINE

## 2023-07-05 NOTE — ASSESSMENT & PLAN NOTE
Lab Results   Component Value Date    HGBA1C 6.1 (H) 04/27/2023     Continue Metformin 500 mg 1 tablet twice daily with meals. Recommended to check blood sugar at home before breakfast and before dinner and send in blood sugar log for review in 2 weeks. Schedule eye exam with ophthalmology.

## 2023-07-05 NOTE — ASSESSMENT & PLAN NOTE
Continue current medical therapy as per psychiatry Dr. Matt Resendiz. Continue psychotherapy every 2 weeks.

## 2023-07-05 NOTE — ASSESSMENT & PLAN NOTE
Lab Results   Component Value Date    HGBA1C 6.1 (H) 04/27/2023     Patient c/o numbness, tingling, burning sensation in feet. Psychiatrist Dr. Brian Tavarez prescribed last month Gabapentin 300 mg to take at bedtime for neuropathy. Continue current dose. Stressed the importance of keeping blood sugar under control to prevent worsening neurological complications. Check Vit B12, Folate.

## 2023-07-05 NOTE — PROGRESS NOTES
Name: Katie Cartagena      : 1980      MRN: [id number]  Encounter Provider: Michael Harper MD  Encounter Date: 2023   Encounter department: [address] [place] [address]    Chief Complaint   Patient presents with   • Follow-up     6 month    • heat flashes      Possible side effect form medication   • Peripheral Neuropathy     Feet     Health Maintenance   Topic Date Due   • Pneumococcal Vaccine: Pediatrics (0 to 5 Years) and At-Risk Patients (6 to 59 Years) (1 - PCV) Never done   • DM Eye Exam  Never done   • HIV Screening  Never done   • Cervical Cancer Screening  Never done   • COVID-19 Vaccine (4 - Moderna series) 2022   • PT PLAN OF CARE  10/30/2022   • Annual Physical  2023   • Depression Remission PHQ  2023   • Influenza Vaccine (1) 2023   • BMI: Followup Plan  2024   • Breast Cancer Screening: Mammogram  10/10/2023   • HEMOGLOBIN A1C  10/27/2023   • Kidney Health Evaluation: GFR  2023   • Diabetic Foot Exam  2024   • BMI: Adult  2024   • Kidney Health Evaluation: Albumin/Creatinine Ratio  2024   • DTaP,Tdap,and Td Vaccines (3 - Td or Tdap) 2032   • Colorectal Cancer Screening  03/10/2033   • Hepatitis C Screening  Completed   • HIB Vaccine  Aged Out   • IPV Vaccine  Aged Out   • Hepatitis A Vaccine  Aged Out   • Meningococcal ACWY Vaccine  Aged Out   • HPV Vaccine  Aged Out       Assessment & Plan     1. Type 2 diabetes mellitus with hyperglycemia, without long-term current use of insulin (HCC)  Assessment & Plan:    Lab Results   Component Value Date    HGBA1C 6.1 (H) 2023     Continue Metformin 500 mg 1 tablet twice daily with meals. Recommended to check blood sugar at home before breakfast and before dinner and send in blood sugar log for review in 2 weeks. Schedule eye exam with ophthalmology. Orders:  -     Comprehensive metabolic panel;  Future; Expected date: 2023  -     Albumin / creatinine urine ratio; Future; Expected date: 07/05/2023  -     Albumin / creatinine urine ratio    2. Diabetic mononeuropathy associated with type 2 diabetes mellitus ([address])  Assessment & Plan:    Lab Results   Component Value Date    HGBA1C 6.1 (H) 04/27/2023     Patient c/o numbness, tingling, burning sensation in feet. Psychiatrist Dr. Katherin Capellan prescribed last month Gabapentin 300 mg to take at bedtime for neuropathy. Continue current dose. Stressed the importance of keeping blood sugar under control to prevent worsening neurological complications. Check Vit B12, Folate. Orders:  -     Comprehensive metabolic panel; Future; Expected date: 07/06/2023    3. Dyslipidemia  Assessment & Plan:  Recommended to follow a low-cholesterol, low-fat diet, regular exercise. Check CMP, lipid panel. Orders:  -     Comprehensive metabolic panel; Future; Expected date: 07/06/2023  -     Lipid Panel with Direct LDL reflex; Future; Expected date: 07/06/2023    4. Morbid obesity [facility])  Assessment & Plan:  Patient lost 7 pounds since January 2023. Encouraged to continue working on dietary and lifestyle modifications, losing weight. 5. Generalized anxiety disorder  Assessment & Plan:  Continue current medical therapy as per psychiatry Dr. Katherin Capellan. Continue psychotherapy every 2 weeks. 6. 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 psychotic features [facility])  Assessment & Plan:  Mood has been stable. Continue medical management a per psychiatry Dr. Katherin Capellan. 7. Diabetic polyneuropathy associated with type 2 diabetes mellitus ([address])  Assessment & Plan:    Lab Results   Component Value Date    HGBA1C 6.1 (H) 04/27/2023     Patient c/o numbness, tingling, burning sensation in feet. Psychiatrist Dr. Katherin Capellan prescribed last month Gabapentin 300 mg to take at bedtime for neuropathy. Continue current dose.       Stressed the importance of keeping blood sugar under control to prevent worsening neurological complications. Check Vit B12, Folate. Orders:  -     Vitamin B12; Future  -     Folate; Future    8. Paresthesia of both feet  Assessment & Plan:  Check Vit B12, Folate. Continue Gabapentin 300 mg at bedtime for diabetic neuropathy. Orders:  -     Vitamin B12; Future  -     Folate; Future      Depression Screening and Follow-up Plan: Continue regular follow-up with their mental health provider who is managing their mental health condition(s). HM: recommended Prevnar 20 vaccination. Patient will call to schedule nurse visit in 4 weeks. She would like to wait till next month due to scheduled epidural steriod injection next week. Schedule follow-up visit/ physical exam in 3 months. Subjective      HPI     Patient presents for 6 month follow-up visit. PMHx: Type 2 DM, Dyslipidemia, Obesity, migraine headaches, Fibromyalgia, CAROL, Depression, chronic low back pain,  degenerative spondylosis, OA,  Vit D deficiency. Reviewed current medications. Type 2 DM - currently taking Metformin 500 mg 1 tablet twice daily with meals. Patient checks blood sugar in the afternoon, blood sugar ranges in 140's. Reports no hypoglycemic episodes. Hb A1C checked in April 2023 was 6.1. Patient did not schedule appointment with ophthalmology yet. C/o numbness, tingling, some burning sensation in both feet. Psychiatrist Dr. Alan Hartman prescribed Gabapentin 300 mg to take at bedtime for neuropathy. Chronic low back pain -  followed by pain management Dr. Natalie Villafana, will get epidural steroid injection in 1 week. CAROL/ Depression - management per psychiatry Dr. Alan Hartman. Patient attends psychotherapy every 2 weeks. Mood has been stable. Patient had normal mammogram in October 2022. Colonoscopy performed by Dr. Lety Littlejohn in March 2023, 1 polyp was removed. Patient was recommended to repeat colonoscopy in 10 years. Denies family history of colon cancer, breast cancer.     Patient is S/P hysterectomy for endometrial hyperplasia performed by gynecology and Dr. Ahmet Plascencia in May 2023. No post-op complications. Patoelogy was negative for malignancy. Review of Systems   Constitutional: Positive for fatigue. Negative for activity change, appetite change, chills and fever. HENT: Negative for congestion, ear pain, nosebleeds, sore throat and trouble swallowing. Eyes: Negative. Respiratory: Negative for cough, chest tightness, shortness of breath and wheezing. Cardiovascular: Negative for chest pain, palpitations and leg swelling. Gastrointestinal: Negative for abdominal pain, blood in stool, constipation, diarrhea, nausea and vomiting. Genitourinary: Negative for difficulty urinating, dysuria, hematuria and vaginal discharge. Musculoskeletal: Positive for arthralgias and back pain (chronic). Negative for gait problem and joint swelling. Skin: Negative for rash. Neurological: Positive for numbness and headaches (occasional). Negative for dizziness and syncope. Seizures: in feet. Hematological: Negative. Psychiatric/Behavioral: Positive for sleep disturbance.         Anxiety / Depression - mood has been stable       Current Outpatient Medications on File Prior to Visit   Medication Sig   • acetaminophen (TYLENOL) 650 mg CR tablet Take 2 tablets (1,300 mg total) by mouth every 8 (eight) hours as needed for mild pain   • Blood Glucose Monitoring Suppl (Contour Blood Glucose System) w/Device KIT Use 1 kit 2 (two) times a day before meals   • celecoxib (CeleBREX) 200 mg capsule TAKE 1 CAPSULE BY MOUTH TWICE A DAY   • Cholecalciferol (VITAMIN D-3) 1000 units CAPS Take 1 capsule by mouth daily Take 2000 IU daily   • desvenlafaxine (PRISTIQ) 100 mg 24 hr tablet TAKE 1 TABLET BY MOUTH EVERY DAY   • desvenlafaxine succinate (PRISTIQ) 50 mg 24 hr tablet TAKE 1 TABLET (50 MG TOTAL) BY MOUTH DAILY TOTAL DAILY DOSE 150 MG DAILY   • gabapentin (Neurontin) 300 mg capsule Take 1 capsule (300 mg total) by mouth daily at bedtime   • glucose blood (Contour Test) test strip USE TO CHECK BLOOD SUGAR ONCE DAILY   • hydrOXYzine HCL (ATARAX) 25 mg tablet Take 1 tablet (25 mg total) by mouth every 6 (six) hours as needed (sleep) In addition to 50 mg dose, may take 50 -75 mg PO HS PRN for sleep   • hydrOXYzine HCL (ATARAX) 50 mg tablet Take 1 tablet (50 mg total) by mouth daily at bedtime as needed (insomnia) In addition to 25 mg dose, may take 50 mg-75 mg PO HS PRN for insomnia   • lidocaine (LIDODERM) 5 % APPLY 1 PATCH TOPICALLY IN THE MORNING. REMOVE & DISCARD PATCH WITHIN 12 HOURS OR AS DIRECTED BY MD.   • MAGNESIUM OXIDE PO Take by mouth   • metFORMIN (GLUCOPHAGE) 500 mg tablet TAKE 1 TABLET BY MOUTH TWICE A DAY WITH MEALS   • nortriptyline (PAMELOR) 75 MG capsule TAKE 1 CAPSULE BY MOUTH TWICE A DAY   • propranolol (INDERAL) 20 mg tablet TAKE 1 TABLET BY MOUTH EVERY DAY AT NIGHT       Objective     /64   Pulse 91   Temp 97.5 °F (36.4 °C) (Tympanic)   Resp 16   Ht 5' (1.524 m)   Wt 81.8 kg (180 lb 6.4 oz)   SpO2 99%   BMI 35.23 kg/m²     Physical Exam  Vitals and nursing note reviewed. Constitutional:       Appearance: Normal appearance. She is obese. HENT:      Head: Normocephalic and atraumatic. Eyes:      Conjunctiva/sclera: Conjunctivae normal.      Pupils: Pupils are equal, round, and reactive to light. Neck:      Vascular: No carotid bruit. Cardiovascular:      Rate and Rhythm: Regular rhythm. Tachycardia present. Pulses: no weak pulses          Dorsalis pedis pulses are 2+ on the right side and 2+ on the left side. Heart sounds: No murmur heard. Pulmonary:      Effort: Pulmonary effort is normal.      Breath sounds: Normal breath sounds. Abdominal:      General: Bowel sounds are normal. There is no distension. Palpations: Abdomen is soft. Tenderness: There is no abdominal tenderness. Musculoskeletal:         General: No swelling. Normal range of motion. Cervical back: Normal range of motion and neck supple. Right lower leg: No edema. Left lower leg: No edema. Feet:      Right foot:      Skin integrity: No ulcer, skin breakdown, erythema, warmth, callus or dry skin. Left foot:      Skin integrity: No ulcer, skin breakdown, erythema, warmth, callus or dry skin. Skin:     General: Skin is warm and dry. Findings: No rash. Neurological:      General: No focal deficit present. Mental Status: She is alert. Psychiatric:         Mood and Affect: Mood normal.       Patient's shoes and socks removed. Right Foot/Ankle   Right Foot Inspection  Skin Exam: skin normal and skin intact. No dry skin, no warmth, no callus, no erythema, no maceration, no abnormal color, no pre-ulcer, no ulcer and no callus. Toe Exam: No swelling, no tenderness, erythema and  no right toe deformity    Sensory   Monofilament testing: diminished    Vascular  The right DP pulse is 2+. Left Foot/Ankle  Left Foot Inspection  Skin Exam: skin normal and skin intact. No dry skin, no warmth, no erythema, no maceration, normal color, no pre-ulcer, no ulcer and no callus. Toe Exam: No swelling, no tenderness, no erythema and no left toe deformity. Sensory   Monofilament testing: diminished    Vascular  The left DP pulse is 2+.      Assign Risk Category  No deformity present  Loss of protective sensation  No weak pulses  Risk: 1        Abhijeet Gutierrez MD

## 2023-07-05 NOTE — ASSESSMENT & PLAN NOTE
Patient lost 7 pounds since January 2023. Encouraged to continue working on dietary and lifestyle modifications, losing weight.

## 2023-07-10 DIAGNOSIS — E11.65 TYPE 2 DIABETES MELLITUS WITH HYPERGLYCEMIA, WITHOUT LONG-TERM CURRENT USE OF INSULIN (HCC): ICD-10-CM

## 2023-07-11 ENCOUNTER — APPOINTMENT (OUTPATIENT)
Dept: LAB | Facility: CLINIC | Age: 43
End: 2023-07-11
Payer: COMMERCIAL

## 2023-07-11 DIAGNOSIS — E66.09 CLASS 2 OBESITY DUE TO EXCESS CALORIES WITHOUT SERIOUS COMORBIDITY WITH BODY MASS INDEX (BMI) OF 37.0 TO 37.9 IN ADULT: ICD-10-CM

## 2023-07-11 DIAGNOSIS — E11.65 TYPE 2 DIABETES MELLITUS WITH HYPERGLYCEMIA, WITHOUT LONG-TERM CURRENT USE OF INSULIN (HCC): ICD-10-CM

## 2023-07-11 DIAGNOSIS — E78.5 DYSLIPIDEMIA: ICD-10-CM

## 2023-07-11 DIAGNOSIS — E11.41 DIABETIC MONONEUROPATHY ASSOCIATED WITH TYPE 2 DIABETES MELLITUS (HCC): ICD-10-CM

## 2023-07-11 DIAGNOSIS — E11.42 DIABETIC POLYNEUROPATHY ASSOCIATED WITH TYPE 2 DIABETES MELLITUS (HCC): ICD-10-CM

## 2023-07-11 DIAGNOSIS — R20.2 PARESTHESIA OF BOTH FEET: ICD-10-CM

## 2023-07-11 LAB
25(OH)D3 SERPL-MCNC: 52.7 NG/ML (ref 30–100)
ALBUMIN SERPL BCP-MCNC: 4 G/DL (ref 3.5–5)
ALP SERPL-CCNC: 52 U/L (ref 46–116)
ALT SERPL W P-5'-P-CCNC: 42 U/L (ref 12–78)
ANION GAP SERPL CALCULATED.3IONS-SCNC: 5 MMOL/L
AST SERPL W P-5'-P-CCNC: 26 U/L (ref 5–45)
BILIRUB SERPL-MCNC: 0.34 MG/DL (ref 0.2–1)
BUN SERPL-MCNC: 13 MG/DL (ref 5–25)
CALCIUM SERPL-MCNC: 9.2 MG/DL (ref 8.3–10.1)
CHLORIDE SERPL-SCNC: 105 MMOL/L (ref 96–108)
CHOLEST SERPL-MCNC: 202 MG/DL
CO2 SERPL-SCNC: 26 MMOL/L (ref 21–32)
CREAT SERPL-MCNC: 0.9 MG/DL (ref 0.6–1.3)
CREAT UR-MCNC: 21.2 MG/DL
FOLATE SERPL-MCNC: >22.3 NG/ML
GFR SERPL CREATININE-BSD FRML MDRD: 77 ML/MIN/1.73SQ M
GLUCOSE P FAST SERPL-MCNC: 129 MG/DL (ref 65–99)
HDLC SERPL-MCNC: 54 MG/DL
LDLC SERPL CALC-MCNC: 117 MG/DL (ref 0–100)
MICROALBUMIN UR-MCNC: <5 MG/L (ref 0–20)
MICROALBUMIN/CREAT 24H UR: <24 MG/G CREATININE (ref 0–30)
POTASSIUM SERPL-SCNC: 3.9 MMOL/L (ref 3.5–5.3)
PROT SERPL-MCNC: 7.4 G/DL (ref 6.4–8.4)
SODIUM SERPL-SCNC: 136 MMOL/L (ref 135–147)
TRIGL SERPL-MCNC: 153 MG/DL
TSH SERPL DL<=0.05 MIU/L-ACNC: 3.74 UIU/ML (ref 0.45–4.5)
VIT B12 SERPL-MCNC: 762 PG/ML (ref 180–914)

## 2023-07-11 PROCEDURE — 82607 VITAMIN B-12: CPT

## 2023-07-11 PROCEDURE — 80061 LIPID PANEL: CPT

## 2023-07-11 PROCEDURE — 82746 ASSAY OF FOLIC ACID SERUM: CPT

## 2023-07-11 PROCEDURE — 82306 VITAMIN D 25 HYDROXY: CPT

## 2023-07-11 PROCEDURE — 83036 HEMOGLOBIN GLYCOSYLATED A1C: CPT

## 2023-07-11 PROCEDURE — 82043 UR ALBUMIN QUANTITATIVE: CPT

## 2023-07-11 PROCEDURE — 82570 ASSAY OF URINE CREATININE: CPT

## 2023-07-11 PROCEDURE — 80053 COMPREHEN METABOLIC PANEL: CPT

## 2023-07-11 PROCEDURE — 84443 ASSAY THYROID STIM HORMONE: CPT

## 2023-07-12 ENCOUNTER — HOSPITAL ENCOUNTER (OUTPATIENT)
Dept: RADIOLOGY | Facility: CLINIC | Age: 43
Discharge: HOME | End: 2023-07-12
Admitting: ANESTHESIOLOGY
Payer: COMMERCIAL

## 2023-07-12 VITALS
SYSTOLIC BLOOD PRESSURE: 104 MMHG | OXYGEN SATURATION: 99 % | DIASTOLIC BLOOD PRESSURE: 72 MMHG | TEMPERATURE: 97.3 F | RESPIRATION RATE: 20 BRPM | HEART RATE: 92 BPM

## 2023-07-12 DIAGNOSIS — M54.16 LUMBAR RADICULOPATHY: ICD-10-CM

## 2023-07-12 LAB
EST. AVERAGE GLUCOSE BLD GHB EST-MCNC: 126 MG/DL
HBA1C MFR BLD: 6 %

## 2023-07-12 PROCEDURE — 64483 NJX AA&/STRD TFRM EPI L/S 1: CPT | Performed by: ANESTHESIOLOGY

## 2023-07-12 RX ORDER — PAPAVERINE HCL 150 MG
15 CAPSULE, EXTENDED RELEASE ORAL ONCE
Status: COMPLETED | OUTPATIENT
Start: 2023-07-12 | End: 2023-07-12

## 2023-07-12 RX ADMIN — LIDOCAINE HYDROCHLORIDE 2 ML: 20 INJECTION, SOLUTION EPIDURAL; INFILTRATION; INTRACAUDAL; PERINEURAL at 10:38

## 2023-07-12 RX ADMIN — IOHEXOL 2 ML: 300 INJECTION, SOLUTION INTRAVENOUS at 10:37

## 2023-07-12 RX ADMIN — DEXAMETHASONE SODIUM PHOSPHATE 15 MG: 10 INJECTION, SOLUTION INTRAMUSCULAR; INTRAVENOUS at 10:38

## 2023-07-12 NOTE — H&P
History of Present Illness: The patient is a 37 y.o. female who presents with complaints of low back and leg pain.     Past Medical History:   Diagnosis Date   • Anxiety    • Blood transfusion declined because patient is [employer] 5/1/2023   • Chronic pain disorder    • Chronic sinusitis    • Depression    • Diabetes ([address])    • Fibromyalgia    • Migraine    • Obesity    • Polyarthritis     Last assessed 9/21/2015   • Psychiatric disorder    • Psychiatric illness    • Sleep difficulties        Past Surgical History:   Procedure Laterality Date   • COLONOSCOPY  06/2019   • DENTAL SURGERY     • HYSTEROSCOPY      Endometrial Biopsy By Hysteroscopy   • [place] LAPS SUPRACRV HYSTERECT 250 GM/< RMVL TUBE/OVAR N/A 5/1/2023    Procedure: [facility]) W/ BILATERAL SALPINGECTOMY, REMOVAL PARAOVARIAN CYST;  Surgeon: Christina Heard DO;  Location: [facility];  Service: Gynecology   • REMOVAL OF INTRAUTERINE DEVICE (IUD)     • US GUIDED BREAST BIOPSY RIGHT COMPLETE Right 6/17/2019         Current Outpatient Medications:   •  acetaminophen (TYLENOL) 650 mg CR tablet, Take 2 tablets (1,300 mg total) by mouth every 8 (eight) hours as needed for mild pain, Disp: 30 tablet, Rfl: 0  •  Blood Glucose Monitoring Suppl (Contour Blood Glucose System) w/Device KIT, Use 1 kit 2 (two) times a day before meals, Disp: 1 kit, Rfl: 0  •  celecoxib (CeleBREX) 200 mg capsule, TAKE 1 CAPSULE BY MOUTH TWICE A DAY, Disp: 60 capsule, Rfl: 6  •  Cholecalciferol (VITAMIN D-3) 1000 units CAPS, Take 1 capsule by mouth daily Take 2000 IU daily, Disp: , Rfl:   •  desvenlafaxine (PRISTIQ) 100 mg 24 hr tablet, TAKE 1 TABLET BY MOUTH EVERY DAY, Disp: 30 tablet, Rfl: 2  •  desvenlafaxine succinate (PRISTIQ) 50 mg 24 hr tablet, TAKE 1 TABLET (50 MG TOTAL) BY MOUTH DAILY TOTAL DAILY DOSE 150 MG DAILY, Disp: 30 tablet, Rfl: 2  •  gabapentin (Neurontin) 300 mg capsule, Take 1 capsule (300 mg total) by mouth daily at bedtime, Disp: 30 capsule, Rfl: 2  •  glucose blood (Contour Test) test strip, USE TO CHECK BLOOD SUGAR ONCE DAILY, Disp: 100 strip, Rfl: 3  •  hydrOXYzine HCL (ATARAX) 25 mg tablet, Take 1 tablet (25 mg total) by mouth every 6 (six) hours as needed (sleep) In addition to 50 mg dose, may take 50 -75 mg PO HS PRN for sleep, Disp: 30 tablet, Rfl: 1  •  hydrOXYzine HCL (ATARAX) 50 mg tablet, Take 1 tablet (50 mg total) by mouth daily at bedtime as needed (insomnia) In addition to 25 mg dose, may take 50 mg-75 mg PO HS PRN for insomnia, Disp: 30 tablet, Rfl: 2  •  lidocaine (LIDODERM) 5 %, APPLY 1 PATCH TOPICALLY IN THE MORNING. REMOVE & DISCARD PATCH WITHIN 12 HOURS OR AS DIRECTED BY MD., Disp: 30 patch, Rfl: 1  •  MAGNESIUM OXIDE PO, Take by mouth, Disp: , Rfl:   •  metFORMIN (GLUCOPHAGE) 500 mg tablet, TAKE 1 TABLET BY MOUTH TWICE A DAY WITH MEALS, Disp: 180 tablet, Rfl: 2  •  nortriptyline (PAMELOR) 75 MG capsule, TAKE 1 CAPSULE BY MOUTH TWICE A DAY, Disp: 60 capsule, Rfl: 2  •  propranolol (INDERAL) 20 mg tablet, TAKE 1 TABLET BY MOUTH EVERY DAY AT NIGHT, Disp: 30 tablet, Rfl: 11    Allergies   Allergen Reactions   • Cannabidiol Shortness Of Breath, Itching, Swelling, Anxiety, Palpitations, Confusion, Hypertension, Throat Swelling and Tongue Swelling   • Amoxicillin-Pot Clavulanate Hives   • Decadrol [Dexamethasone] Other (See Comments)     psychosis   • Penicillins Hives     Hives/Uticaria   • Tetracyclines & Related Hives      Allergy; Physical Exam:   Vitals:    07/12/23 0959   BP: 104/72   Pulse: 92   Resp: 18   Temp: (!) 97.3 °F (36.3 °C)   SpO2: 98%     General: Awake, Alert, Oriented x 3, Mood and affect appropriate  Respiratory: Respirations even and unlabored  Cardiovascular: Peripheral pulses intact; no edema  Musculoskeletal Exam: Bilateral lumbar paraspinals tender to palpation    ASA Score: 2    Patient/Chart Verification  Patient ID Verified: Verbal  ID Band Applied: No  Consents Confirmed: Procedural  H&P( within 30 days) Verified:  To be obtained in the Pre-Procedure area  Interval H&P(within 24 hr) Complete (required for Outpatients and Surgery Admit only): To be obtained in the Pre-Procedure area  Allergies Reviewed: Yes  Anticoag/NSAID held?: No  Currently on antibiotics?: No  Pregnancy denied?: Yes  Pre-op Lab/Test Results Available: N/A  Pregnancy Lab Collected: N/A comment    Assessment:   1.  Lumbar radiculopathy        Plan: bilateral L5 TFESI

## 2023-07-12 NOTE — DISCHARGE INSTRUCTIONS
Epidural Steroid Injection   WHAT YOU NEED TO KNOW:   An epidural steroid injection ([place]) is a procedure to inject steroid medicine into the epidural space. The epidural space is between your spinal cord and vertebrae. Steroids reduce inflammation and fluid buildup in your spine that may be causing pain. You may be given pain medicine along with the steroids. ACTIVITY  Do not drive or operate machinery today. No strenuous activity today - bending, lifting, etc.  You may resume normal activites starting tomorrow - start slowly and as tolerated. You may shower today, but no tub baths or hot tubs. You may have numbness for several hours from the local anesthetic. Please use caution and common sense, especially with weight-bearing activities. CARE OF THE INJECTION SITE  If you have soreness or pain, apply ice to the area today (20 minutes on/20 minutes off). Starting tomorrow, you may use warm, moist heat or ice if needed. You may have an increase or change in your discomfort for 36-48 hours after your treatment. Apply ice and continue with any pain medication you have been prescribed. Notify the [facility] if you have any of the following: redness, drainage, swelling, headache, stiff neck or fever above 100°F.    SPECIAL INSTRUCTIONS  Our office will contact you in approximately 7 days for a progress report. MEDICATIONS  Continue to take all routine medications. Our office may have instructed you to hold some medications. As no general anesthesia was used in today's procedure, you should not experience any side effects related to anesthesia. If you are diabetic, the steroids used in today's injection may temporarily increase your blood sugar levels after the first few days after your injection. Please keep a close eye on your sugars and alert the doctor who manages your diabetes if your sugars are significantly high from your baseline or you are symptomatic.      If you have a problem specifically related to your procedure, please call our office at [phone]. Problems not related to your procedure should be directed to your primary care physician.

## 2023-07-14 ENCOUNTER — TELEPHONE (OUTPATIENT)
Dept: PSYCHIATRY | Facility: CLINIC | Age: 43
End: 2023-07-14

## 2023-07-14 NOTE — TELEPHONE ENCOUNTER
Patient was calling because she was having a hard time connecting for her virtual visit, provider resent a text to patient to connect for appt

## 2023-07-18 DIAGNOSIS — M25.50 ARTHRALGIA OF MULTIPLE JOINTS: ICD-10-CM

## 2023-07-18 RX ORDER — CELECOXIB 200 MG/1
CAPSULE ORAL
Qty: 60 CAPSULE | Refills: 3 | Status: SHIPPED | OUTPATIENT
Start: 2023-07-18

## 2023-07-19 ENCOUNTER — TELEPHONE (OUTPATIENT)
Dept: PAIN MEDICINE | Facility: CLINIC | Age: 43
End: 2023-07-19

## 2023-07-19 DIAGNOSIS — G47.09 OTHER INSOMNIA: ICD-10-CM

## 2023-07-19 RX ORDER — HYDROXYZINE HYDROCHLORIDE 25 MG/1
TABLET, FILM COATED ORAL
Qty: 30 TABLET | Refills: 1 | Status: SHIPPED | OUTPATIENT
Start: 2023-07-19

## 2023-07-19 NOTE — TELEPHONE ENCOUNTER
Patient Reports    50     %     improvement post injection    Pain Level  4   /10   Dale dayna in feet has not improved.

## 2023-07-21 ENCOUNTER — TELEMEDICINE (OUTPATIENT)
Dept: BEHAVIORAL/MENTAL HEALTH CLINIC | Facility: CLINIC | Age: 43
End: 2023-07-21
Payer: COMMERCIAL

## 2023-07-21 DIAGNOSIS — F33.2 SEVERE RECURRENT MAJOR DEPRESSION WITHOUT PSYCHOTIC FEATURES (HCC): Primary | ICD-10-CM

## 2023-07-21 DIAGNOSIS — F41.1 GENERALIZED ANXIETY DISORDER: ICD-10-CM

## 2023-07-21 PROCEDURE — 90832 PSYTX W PT 30 MINUTES: CPT | Performed by: PSYCHIATRY & NEUROLOGY

## 2023-07-21 NOTE — PSYCH
Virtual Regular Visit    Verification of patient location:    Patient is located at Home in the following state in which I hold an active license [place]      Assessment/Plan:    Problem List Items Addressed This Visit        Other    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 psychotic features ([address]) - Primary    Generalized anxiety disorder        Reason for visit is   Chief Complaint   Patient presents with   • Virtual Regular Visit        Encounter provider HERMELINDA Arceo    Provider located at [address] [place] [id number] [phone]      Recent Visits  Date Type Provider Dept   07/14/23 Telephone HERMELINDA Arceo [facility] recent visits within past 7 days and meeting all other requirements  Today's Visits  Date Type Provider Dept   07/21/23 [address] [facility], [facility]ELINDA  [facility] Robert Stokes   Showing today's visits and meeting all other requirements  Future Appointments  No visits were found meeting these conditions. Showing future appointments within next 150 days and meeting all other requirements       The patient was identified by name and date of birth. Heydi Holt was informed that this is a telemedicine visit and that [facility] [employer]. She agrees to proceed. .  My office door was closed. No one else was in the room. She acknowledged consent and understanding of privacy and security of the video platform. The patient has agreed to participate and understands they can discontinue the visit at any time. Patient is aware this is a billable service.      HPI     Past Medical History:   Diagnosis Date   • Anxiety    • Blood transfusion declined because patient is [employer] 5/1/2023   • Chronic pain disorder    • Chronic sinusitis    • Depression    • Diabetes ([address])    • Fibromyalgia    • Migraine    • Obesity    • Polyarthritis     Last assessed 9/21/2015   • Psychiatric disorder    • Psychiatric illness    • Sleep difficulties        Past Surgical History:   Procedure Laterality Date   • COLONOSCOPY  06/2019   • DENTAL SURGERY     • HYSTEROSCOPY      Endometrial Biopsy By Hysteroscopy   • [place] LAPS SUPRACRV HYSTERECT 250 GM/< RMVL TUBE/OVAR N/A 5/1/2023    Procedure: [facility]) W/ BILATERAL SALPINGECTOMY, REMOVAL PARAOVARIAN CYST;  Surgeon: Yovany Magallon DO;  Location: [facility];  Service: Gynecology   • REMOVAL OF INTRAUTERINE DEVICE (IUD)     • US GUIDED BREAST BIOPSY RIGHT COMPLETE Right 6/17/2019       Current Outpatient Medications   Medication Sig Dispense Refill   • acetaminophen (TYLENOL) 650 mg CR tablet Take 2 tablets (1,300 mg total) by mouth every 8 (eight) hours as needed for mild pain 30 tablet 0   • Blood Glucose Monitoring Suppl (Contour Blood Glucose System) w/Device KIT Use 1 kit 2 (two) times a day before meals 1 kit 0   • celecoxib (CeleBREX) 200 mg capsule TAKE 1 CAPSULE BY MOUTH TWICE A DAY 60 capsule 3   • Cholecalciferol (VITAMIN D-3) 1000 units CAPS Take 1 capsule by mouth daily Take 2000 IU daily     • desvenlafaxine (PRISTIQ) 100 mg 24 hr tablet TAKE 1 TABLET BY MOUTH EVERY DAY 30 tablet 2   • desvenlafaxine succinate (PRISTIQ) 50 mg 24 hr tablet TAKE 1 TABLET (50 MG TOTAL) BY MOUTH DAILY TOTAL DAILY DOSE 150 MG DAILY 30 tablet 2   • gabapentin (Neurontin) 300 mg capsule Take 1 capsule (300 mg total) by mouth daily at bedtime 30 capsule 2   • glucose blood (Contour Test) test strip USE TO CHECK BLOOD SUGAR ONCE DAILY 100 strip 3   • hydrOXYzine HCL (ATARAX) 25 mg tablet TAKE 1 TAB BY MOUTH EVERY 6 HOURS AS NEEDED (SLEEP) IN ADDITION TO 50 MG DOSE, MAY TAKE 50-75MG AT BEDTIME AS NEEDED FOR SLEEP 30 tablet 1   • hydrOXYzine HCL (ATARAX) 50 mg tablet Take 1 tablet (50 mg total) by mouth daily at bedtime as needed (insomnia) In addition to 25 mg dose, may take 50 mg-75 mg PO HS PRN for insomnia 30 tablet 2   • lidocaine (LIDODERM) 5 % APPLY 1 PATCH TOPICALLY IN THE MORNING. REMOVE & DISCARD PATCH WITHIN 12 HOURS OR AS DIRECTED BY MD. 30 patch 1   • MAGNESIUM OXIDE PO Take by mouth     • metFORMIN (GLUCOPHAGE) 500 mg tablet TAKE 1 TABLET BY MOUTH TWICE A DAY WITH MEALS 180 tablet 2   • nortriptyline (PAMELOR) 75 MG capsule TAKE 1 CAPSULE BY MOUTH TWICE A DAY 60 capsule 2   • propranolol (INDERAL) 20 mg tablet TAKE 1 TABLET BY MOUTH EVERY DAY AT NIGHT 30 tablet 11     No current facility-administered medications for this visit. Allergies   Allergen Reactions   • Cannabidiol Shortness Of Breath, Itching, Swelling, Anxiety, Palpitations, Confusion, Hypertension, Throat Swelling and Tongue Swelling   • Amoxicillin-Pot Clavulanate Hives   • Decadrol [Dexamethasone] Other (See Comments)     psychosis   • Penicillins Hives     Hives/Uticaria   • Tetracyclines & Related Hives      Allergy; Review of Systems    Video Exam    There were no vitals filed for this visit. Physical Exam   Behavioral Health Psychotherapy Progress Note    Psychotherapy Provided: Individual Psychotherapy     1. 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 psychotic features ([address])        2. Generalized anxiety disorder            Goals addressed in session: Goal 1     DATA: Hafsa Montejo presented for follow up, sharing that she has been "all over the place" recently. She said that she has been very worried about her father, who has recently spent two 10-day stretches in the hospital and is on home dialysis. She said that she does not like to see him so vulnerable, and she fears that his health will decline. She noted that she has been having more trouble sleeping at night because of her worry about her dad, and she feels that she has not been doing as much as she could to help her parents (depression, exhaustion, "my brain is not processing it all").   She also noted that she was talking to [facility] yesterday and directly asked him about their relationship. He told her he is not interested in any romantic relationship right now, which left her feeling hurt, sad, and somewhat frustrated with their friendship. She said that she does not think she can even remain friends with him right now, and has decided, after some crying and a lot of thinking, that she is going to focus more on her friendships with her female friends, and socializing with people that make her feel good. She said that making this decision has helped her feel a little lighter and more determined to make choices that feel more positive for herself. She said that she is finally feeling stronger, able to overcome difficult times and more confident that she can handle difficult things that will come her way, when even a couple of months ago she would not have had that same confidence. Provided support, validation and normalization of Esther's feelings and experience, and used CBT strategies to help Rajni Santos focus on her strengths and positive progress/growth. During this session, this clinician used the following therapeutic modalities: Client-centered Therapy, Cognitive Behavioral Therapy and Supportive Psychotherapy    Substance Abuse was not addressed during this session. If the client is diagnosed with a co-occurring substance use disorder, please indicate any changes in the frequency or amount of use: n/a. Stage of change for addressing substance use diagnoses: No substance use/Not applicable    ASSESSMENT:  Kleber Chavez presents with a Depressed mood. her affect is Normal range and intensity, which is congruent, with her mood and the content of the session. The client has made progress on their goals. Kleber Chavez presents with a low risk of suicide, minimal risk of self-harm, and minimal risk of harm to others. For any risk assessment that surpasses a "low" rating, a safety plan must be developed.     A safety plan was indicated: no  If yes, describe in detail n/a    PLAN: Between sessions, Heydi Holt will continue to focus on her strengths, will stay connected with positive supports, and will use anxiety reduction strategies and self-care to help manage anxiety and depression. At the next session, the therapist will use Client-centered Therapy, Cognitive Behavioral Therapy and Supportive Psychotherapy to address anxiety and depression. Behavioral Health Treatment Plan and Discharge Planning: Heydi Holt is aware of and agrees to continue to work on their treatment plan. They have identified and are working toward their discharge goals.  yes    Visit start and stop times:    07/21/23  Start Time: 0905  Stop Time: 0930  Total Visit Time: 25 minutes

## 2023-07-27 ENCOUNTER — TELEPHONE (OUTPATIENT)
Dept: PSYCHIATRY | Facility: CLINIC | Age: 43
End: 2023-07-27

## 2023-07-27 ENCOUNTER — TELEMEDICINE (OUTPATIENT)
Dept: BEHAVIORAL/MENTAL HEALTH CLINIC | Facility: CLINIC | Age: 43
End: 2023-07-27

## 2023-07-27 DIAGNOSIS — F41.1 GENERALIZED ANXIETY DISORDER: ICD-10-CM

## 2023-07-27 DIAGNOSIS — F33.2 SEVERE RECURRENT MAJOR DEPRESSION WITHOUT PSYCHOTIC FEATURES (HCC): Primary | ICD-10-CM

## 2023-07-27 DIAGNOSIS — Z91.199 NO-SHOW FOR APPOINTMENT: ICD-10-CM

## 2023-07-27 NOTE — PSYCH
No Call. No Show. No Charge    Esther Griffith no showed 07/27/23 appointment , writer called Farrukh Mann at [phone], left message on vm regarding today's appointment and advised that writer would wait for Farrukh Mann to join until [phone]. Advised of next appointment. Writer waited until [phone] with no response from Farrukh Mann. Treatment Plan not due at this session.

## 2023-07-27 NOTE — TELEPHONE ENCOUNTER
Patient called to apologize to provider as she overslept and missed her appt this morning 7/27/2023 at 8am and wanted to let provider know she is ok

## 2023-08-10 ENCOUNTER — TELEPHONE (OUTPATIENT)
Dept: PSYCHIATRY | Facility: CLINIC | Age: 43
End: 2023-08-10

## 2023-08-10 NOTE — TELEPHONE ENCOUNTER
Writer PATRICIA to inform them their 8/11 appointment has been cancelled due to provider being out of office and to call the office should they require a sooner appointment than their next scheduled one on 8/25

## 2023-08-10 NOTE — TELEPHONE ENCOUNTER
Patient contacted the office to schedule a follow up visit with provider. Patient is now scheduled for 8/18/2023  at [facility].

## 2023-08-11 DIAGNOSIS — F51.04 PSYCHOPHYSIOLOGICAL INSOMNIA: ICD-10-CM

## 2023-08-11 RX ORDER — HYDROXYZINE 50 MG/1
TABLET, FILM COATED ORAL
Qty: 30 TABLET | Refills: 2 | Status: SHIPPED | OUTPATIENT
Start: 2023-08-11

## 2023-08-18 ENCOUNTER — TELEMEDICINE (OUTPATIENT)
Dept: BEHAVIORAL/MENTAL HEALTH CLINIC | Facility: CLINIC | Age: 43
End: 2023-08-18
Payer: COMMERCIAL

## 2023-08-18 DIAGNOSIS — F41.1 GENERALIZED ANXIETY DISORDER: ICD-10-CM

## 2023-08-18 DIAGNOSIS — F33.2 SEVERE RECURRENT MAJOR DEPRESSION WITHOUT PSYCHOTIC FEATURES (HCC): Primary | ICD-10-CM

## 2023-08-18 PROCEDURE — 90834 PSYTX W PT 45 MINUTES: CPT | Performed by: PSYCHIATRY & NEUROLOGY

## 2023-08-18 NOTE — PSYCH
Virtual Regular Visit    Verification of patient location:    Patient is located at Home in the following state in which I hold an active license [place]      Assessment/Plan:    Problem List Items Addressed This Visit        Other    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 psychotic features ([address]) - Primary    Generalized anxiety disorder          Reason for visit is   Chief Complaint   Patient presents with   • Virtual Regular Visit        Encounter provider HERMELINDA Obrien    Provider located at [address] [place] [address] [id number] [phone]      Recent Visits  No visits were found meeting these conditions. Showing recent visits within past 7 days and meeting all other requirements  Today's Visits  Date Type Provider Dept   08/18/23 Telemedicine HERMELINDA Obrien [facility] Therapist [facility]   Showing today's visits and meeting all other requirements  Future Appointments  No visits were found meeting these conditions. Showing future appointments within next 150 days and meeting all other requirements       The patient was identified by name and date of birth. Kristine Elder was informed that this is a telemedicine visit and [facility] agrees to proceed. .  My office door was closed. No one else was in the room. She acknowledged consent and understanding of privacy and security of the video platform. The patient has agreed to participate and understands they can discontinue the visit at any time. Patient is aware this is a billable service.      Subjective  HPI     Past Medical History:   Diagnosis Date   • Anxiety    • Blood transfusion declined because patient is [facility] 5/1/2023   • Chronic pain disorder    • Chronic sinusitis    • Depression    • Diabetes ([address])    • Fibromyalgia    • Migraine    • Obesity    • Polyarthritis Last assessed 9/21/2015   • Psychiatric disorder    • Psychiatric illness    • Sleep difficulties        Past Surgical History:   Procedure Laterality Date   • COLONOSCOPY  06/2019   • DENTAL SURGERY     • HYSTEROSCOPY      Endometrial Biopsy By Hysteroscopy   • OH LAPS SUPRACRV HYSTERECT 250 GM/< RMVL TUBE/OVAR N/A 5/1/2023    Procedure: [facility]) W/ BILATERAL SALPINGECTOMY, REMOVAL PARAOVARIAN CYST;  Surgeon: Isaura Montalvo DO;  Location: [facility];  Service: Gynecology   • REMOVAL OF INTRAUTERINE DEVICE (IUD)     • US GUIDED BREAST BIOPSY RIGHT COMPLETE Right 6/17/2019       Current Outpatient Medications   Medication Sig Dispense Refill   • acetaminophen (TYLENOL) 650 mg CR tablet Take 2 tablets (1,300 mg total) by mouth every 8 (eight) hours as needed for mild pain 30 tablet 0   • Blood Glucose Monitoring Suppl (Contour Blood Glucose System) w/Device KIT Use 1 kit 2 (two) times a day before meals 1 kit 0   • celecoxib (CeleBREX) 200 mg capsule TAKE 1 CAPSULE BY MOUTH TWICE A DAY 60 capsule 3   • Cholecalciferol (VITAMIN D-3) 1000 units CAPS Take 1 capsule by mouth daily Take 2000 IU daily     • desvenlafaxine (PRISTIQ) 100 mg 24 hr tablet TAKE 1 TABLET BY MOUTH EVERY DAY 30 tablet 2   • desvenlafaxine succinate (PRISTIQ) 50 mg 24 hr tablet TAKE 1 TABLET (50 MG TOTAL) BY MOUTH DAILY TOTAL DAILY DOSE 150 MG DAILY 30 tablet 2   • gabapentin (Neurontin) 300 mg capsule Take 1 capsule (300 mg total) by mouth daily at bedtime 30 capsule 2   • glucose blood (Contour Test) test strip USE TO CHECK BLOOD SUGAR ONCE DAILY 100 strip 3   • hydrOXYzine HCL (ATARAX) 25 mg tablet TAKE 1 TAB BY MOUTH EVERY 6 HOURS AS NEEDED (SLEEP) IN ADDITION TO 50 MG DOSE, MAY TAKE 50-75MG AT BEDTIME AS NEEDED FOR SLEEP 30 tablet 1   • hydrOXYzine HCL (ATARAX) 50 mg tablet TAKE 1 TAB BY MOUTH DAILY AT BEDTIME AS NEEDED IN ADDITION TO 25 MG DOSE, MAY TAKE 50-75MG MG AT BEDTIME AS NEEDED FOR INSOMNIA 30 tablet 2   • lidocaine (LIDODERM) 5 % APPLY 1 PATCH TOPICALLY IN THE MORNING. REMOVE & DISCARD PATCH WITHIN 12 HOURS OR AS DIRECTED BY MD. 30 patch 1   • MAGNESIUM OXIDE PO Take by mouth     • metFORMIN (GLUCOPHAGE) 500 mg tablet TAKE 1 TABLET BY MOUTH TWICE A DAY WITH MEALS 180 tablet 2   • nortriptyline (PAMELOR) 75 MG capsule TAKE 1 CAPSULE BY MOUTH TWICE A DAY 60 capsule 2   • propranolol (INDERAL) 20 mg tablet TAKE 1 TABLET BY MOUTH EVERY DAY AT NIGHT 30 tablet 11     No current facility-administered medications for this visit. Allergies   Allergen Reactions   • Cannabidiol Shortness Of Breath, Itching, Swelling, Anxiety, Palpitations, Confusion, Hypertension, Throat Swelling and Tongue Swelling   • Amoxicillin-Pot Clavulanate Hives   • Decadrol [Dexamethasone] Other (See Comments)     psychosis   • Penicillins Hives     Hives/Uticaria   • Tetracyclines & Related Hives      Allergy; Review of Systems    Video Exam    There were no vitals filed for this visit. Physical Exam     Behavioral Health Psychotherapy Progress Note    Psychotherapy Provided: Individual Psychotherapy     1. 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 psychotic features ([address])        2. Generalized anxiety disorder            Goals addressed in session: Goal 1     DATA: Guerita Kraus presented for follow up sharing that she has been struggling with higher depression and anxiety because of multiple stressors recently. She said that her main stressor is her dad doing home dialysis and her mom trying to manage it on her own, which has been off to a rough start. Guerita Kraus said that she is trying to be helpful as she is able, but it has been very difficult to watch her parents struggle through this initial phase of this treatment. She also has had to "let go" of some people in her life recently, which has been difficult because she cares about those people.   However, she said that she recognizes that it is in her best interest to do so, and is now trying to focus on healthy friends and activities such as karaoke that help her feel good. She reports not sleeping well, and having less of an appetite because of her anxiety levels, but was able to get better sleep last night. Provided support, validation of Esther's experience and coping strategies, and encouraged her to focus on healthy boundaries, self-care and positive relationships. During this session, this clinician used the following therapeutic modalities: Client-centered Therapy, Cognitive Behavioral Therapy and Supportive Psychotherapy    Substance Abuse was not addressed during this session. If the client is diagnosed with a co-occurring substance use disorder, please indicate any changes in the frequency or amount of use: n/a. Stage of change for addressing substance use diagnoses: No substance use/Not applicable    ASSESSMENT:  Kristine Elder presents with a Anxious mood. her affect is Normal range and intensity, which is congruent, with her mood and the content of the session. The client has made progress on their goals. Kristine Elder presents with a low risk of suicide, minimal risk of self-harm, and minimal risk of harm to others. For any risk assessment that surpasses a "low" rating, a safety plan must be developed. A safety plan was indicated: no  If yes, describe in detail n/a    PLAN: Between sessions, Kristine Elder will focus on self-care/rest and reaching out to positive social connections to help boost mood and ease stress. At the next session, the therapist will use Client-centered Therapy, Cognitive Behavioral Therapy and Supportive Psychotherapy to address depression and anxiety. Behavioral Health Treatment Plan and Discharge Planning: Kristine Elder is aware of and agrees to continue to work on their treatment plan. They have identified and are working toward their discharge goals.  yes    Visit start and stop times:    08/18/23  Start Time: Charmayne Joe  Stop Time: 1115  Total Visit Time: 39 minutes

## 2023-08-18 NOTE — BH TREATMENT PLAN
Outpatient Behavioral Health Psychotherapy Treatment Plan    Charolet Boeck  1980     Date of Initial Psychotherapy Assessment:  8/21/2018   Date of Current Treatment Plan: 08/18/23  Treatment Plan Target Date: 2/18/2024  Treatment Plan Expiration Date: 2/18/2024    Diagnosis:   1. 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 psychotic features ([address])        2. Generalized anxiety disorder            Area(s) of Need: depression/anxiety/chronic pain    Long Term Goal 1 (in the client's own words): I want to continue to effectively manage the depression and the anxiety. Stage of Change: Action    Target Date for completion: 2/18/2024     Anticipated therapeutic modalities: CBT, mindfulness     People identified to complete this goal: Talia Simental      Objective 1: (identify the means of measuring success in meeting the objective): A I will continue to be aware of the depression/anxiety and negative self talk messages and redirect to positive and realistic messages (lower depression/anxiety). Objective 2: I will continue to work with Dr. Alex Stanton on medication management. Objective 3: I will continue to make sure I am taking care of myself. Objective 4: I will continue to reach out to friends to stay socially connected      Objective 5: (identify the means of measuring success in meeting the objective): I will create a daily schedule of household chores to help build motivation and help me feel accomplished    Long Term Goal 2 (in the client's own words): I will continue to work on strengthening my self esteem. Stage of Change: Action    Target Date for completion: 2/18/2024     Anticipated therapeutic modalities: CBT     People identified to complete this goal: Talia Simental      Objective 1: (identify the means of measuring success in meeting the objective): I will remain aware of those who have impacted me positively and disregard the messages from those who were negative influences. \    Objective 2: I will review and use my qualities/strengths/assets as reminders of my worth. Objective 3: I will continue with self care (including using my effective communication skills such as assertiveness). Objective 4: I will continue to explore options to improve my quality of life. Long Term Goal 3 (in the client's own words): I will continue to learn and implement effective pain management strategies. Stage of Change: Action    Target Date for completion: 2/18/2024     Anticipated therapeutic modalities: CBT, mindfulness     People identified to complete this goal: Ashly Douglas      Objective 1: (identify the means of measuring success in meeting the objective): I will learn and practice effective pain management strategies. Objective 2: (identify the means of measuring success in meeting the objective): I will explore yoga as a possible pain management strategy    Objective 3: I will walk and be more physically active as much as I am able. I am currently under the care of a [facility] psychiatric provider: yes    My [facility] psychiatric provider is: Dr. Berry Lees    I am currently taking psychiatric medications: Yes, as prescribed    I feel that I will be ready for discharge from mental health care when I reach the following (measurable goal/objective): Once I have progressed to the point of being able to positively maintain my focus and drive to accomplish the things I want to accomplish in my life. For children and adults who have a legal guardian:   Has there been any change to custody orders and/or guardianship status? NA. If yes, attach updated documentation. I have created my Crisis Plan and have been offered a copy of this plan    [address]: Diagnosis and Treatment Plan explained to [facility] acknowledges an understanding of their diagnosis. Richy Wood agrees to this treatment plan.     I have been offered a copy of this Treatment Plan. yes      Diallo Nayak, 1980, actively participated in the review and update of this treatment plan during a virtual session, using the [employer]. Diallo Nayak  provided verbal consent on 8/18/2023 at 950 AM. The treatment plan was transcribed into the [employer] Record at a later time. Treatment plan also sent to [facility] for signature.

## 2023-08-25 ENCOUNTER — OFFICE VISIT (OUTPATIENT)
Dept: PAIN MEDICINE | Facility: CLINIC | Age: 43
End: 2023-08-25
Payer: COMMERCIAL

## 2023-08-25 ENCOUNTER — TELEPHONE (OUTPATIENT)
Dept: PSYCHIATRY | Facility: CLINIC | Age: 43
End: 2023-08-25

## 2023-08-25 ENCOUNTER — TELEMEDICINE (OUTPATIENT)
Dept: BEHAVIORAL/MENTAL HEALTH CLINIC | Facility: CLINIC | Age: 43
End: 2023-08-25
Payer: COMMERCIAL

## 2023-08-25 VITALS
WEIGHT: 185 LBS | HEIGHT: 60 IN | DIASTOLIC BLOOD PRESSURE: 94 MMHG | SYSTOLIC BLOOD PRESSURE: 146 MMHG | BODY MASS INDEX: 36.32 KG/M2

## 2023-08-25 DIAGNOSIS — F33.2 SEVERE RECURRENT MAJOR DEPRESSION WITHOUT PSYCHOTIC FEATURES (HCC): Primary | ICD-10-CM

## 2023-08-25 DIAGNOSIS — F41.1 GENERALIZED ANXIETY DISORDER: ICD-10-CM

## 2023-08-25 DIAGNOSIS — E11.42 DIABETIC POLYNEUROPATHY ASSOCIATED WITH TYPE 2 DIABETES MELLITUS (HCC): ICD-10-CM

## 2023-08-25 DIAGNOSIS — M51.36 DDD (DEGENERATIVE DISC DISEASE), LUMBAR: ICD-10-CM

## 2023-08-25 DIAGNOSIS — M54.50 ACUTE LOW BACK PAIN: ICD-10-CM

## 2023-08-25 DIAGNOSIS — M54.16 LUMBAR RADICULOPATHY: Primary | ICD-10-CM

## 2023-08-25 DIAGNOSIS — G47.09 OTHER INSOMNIA: ICD-10-CM

## 2023-08-25 PROCEDURE — 90834 PSYTX W PT 45 MINUTES: CPT | Performed by: PSYCHIATRY & NEUROLOGY

## 2023-08-25 PROCEDURE — 99214 OFFICE O/P EST MOD 30 MIN: CPT | Performed by: ANESTHESIOLOGY

## 2023-08-25 RX ORDER — HYDROXYZINE HYDROCHLORIDE 25 MG/1
TABLET, FILM COATED ORAL
Qty: 30 TABLET | Refills: 1 | Status: SHIPPED | OUTPATIENT
Start: 2023-08-25

## 2023-08-25 RX ORDER — GABAPENTIN 300 MG/1
300 CAPSULE ORAL 3 TIMES DAILY
Qty: 90 CAPSULE | Refills: 2 | Status: SHIPPED | OUTPATIENT
Start: 2023-08-25

## 2023-08-25 RX ORDER — LIDOCAINE 50 MG/G
PATCH TOPICAL
Qty: 30 PATCH | Refills: 1 | Status: SHIPPED | OUTPATIENT
Start: 2023-08-25

## 2023-08-25 NOTE — TELEPHONE ENCOUNTER
Thank you for the communication. I will go ahead and increase her gabapentin and have the office contact her with instructions on how to increase. RN: Please contact the patient to let her know I have sent a new prescription of gabapentin for 300 mg 3 times daily to her pharmacy. I would like the patient to increase her gabapentin to 300 mg twice daily x3 days.   If she tolerates that she may increase to 300 mg 3 times daily and stay on this dose until reevaluated at next office visit

## 2023-08-25 NOTE — PROGRESS NOTES
Assessment  1. Lumbar radiculopathy    2. DDD (degenerative disc disease), lumbar        Plan  25-year-old female with history of fibromyalgia, diabetes, lumbar degenerative disc disease and lumbar radiculopathy returning for interval follow-up. The patient continues to have low back pain that radiates into bilateral lower extremities to the feet with associated numbness and paresthesias. The patient did have bilateral L5 TFESI July 12, 2023 which gave her about 50% of relief which is still ongoing. She continues to take gabapentin 300 mg nightly Celebrex 200 mg twice daily nortriptyline 75 mg twice daily and Pristiq 100 mg daily with mild relief. 1. I will order an EMG of bilateral lower extremities  2. Patient may benefit from an increase of gabapentin for her neuropathic complaints  3. Patient will continue with Celebrex, nortriptyline, and Pristiq as prescribed  4. I will follow-up with the patient in 8 weeks or sooner if needed      My impressions and treatment recommendations were discussed in detail with the patient who verbalized understanding and had no further questions. Discharge instructions were provided. I personally saw and examined the patient and I agree with the above discussed plan of care. Orders Placed This Encounter   Procedures   • EMG 2 limb lower extremity     Standing Status:   Future     Standing Expiration Date:   8/25/2024     Order Specific Question:   Possible Diagnosis: Answer:   lumbar radiculopathy     No orders of the defined types were placed in this encounter. History of Present Illness    Nathan Morris is a 37 y.o. female with history of fibromyalgia, diabetes, lumbar degenerative disc disease and lumbar radiculopathy returning for interval follow-up. The patient continues to have low back pain that radiates into bilateral lower extremities to the feet with associated numbness and paresthesias.   Denies any bladder or bowel incontinence or saddle anesthesia. The patient did have bilateral L5 TFESI July 12, 2023 which gave her about 50% of relief which is still ongoing. She continues to take gabapentin 300 mg nightly Celebrex 200 mg twice daily nortriptyline 75 mg twice daily and Pristiq 100 mg daily with mild relief. The patient rates her pain a 4 out of 10 and the pain is worse at night. The pain is constant and described as burning, throbbing, numbness, pins-and-needles, and shooting. The pain is increased with standing, walking, bending, and exercise. The pain is alleviated with relaxation, injections, and medication. Other than as stated above, the patient denies any interval changes in medications, medical condition, mental condition, symptoms, or allergies since the last office visit. I have personally reviewed and/or updated the patient's past medical history, past surgical history, family history, social history, current medications, allergies, and vital signs today. Review of Systems   Neurological: Positive for weakness. All other systems reviewed and are negative.       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, with unspecified complication (HCC)   • Fibromyalgia   • Headache   • Lyme disease   • Menorrhagia   • Muscle spasm   • Myalgia   • Narcolepsy   • Narcolepsy cataplexy syndrome   • Morbid obesity (HCC)   • Sinus disease   • Sleep apnea   • Snoring   • 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 psychotic features (HCC)   • Generalized anxiety disorder   • Dyspepsia   • Arthralgia of multiple joints   • Generalized body aches   • Chronic idiopathic constipation   • Elevated TSH   • Loose stools   • Hematochezia   • Polyp of colon   • Back pain   • Sacroiliitis (HCC)   • DDD (degenerative disc disease), lumbar   • Type 2 diabetes mellitus with hyperglycemia, without long-term current use of insulin (HCC)   • Dyslipidemia   • Diabetic polyneuropathy associated with type 2 diabetes mellitus ([address])   • Lumbar back pain   • Lumbar radiculopathy   • Endometrial polyp   • Blood transfusion declined because patient is [facility]   • Endometrial hyperplasia, unspecified   • Abnormal uterine bleeding   • Dysmenorrhea   • Paresthesia of both feet       Past Medical History:   Diagnosis Date   • Anxiety    • Blood transfusion declined because patient is [facility] 5/1/2023   • Chronic pain disorder    • Chronic sinusitis    • Depression    • Diabetes ([address])    • Fibromyalgia    • Migraine    • Obesity    • Polyarthritis     Last assessed 9/21/2015   • Psychiatric disorder    • Psychiatric illness    • Sleep difficulties        Past Surgical History:   Procedure Laterality Date   • COLONOSCOPY  06/2019   • DENTAL SURGERY     • HYSTEROSCOPY      Endometrial Biopsy By Hysteroscopy   • [place] LAPS SUPRACRV HYSTERECT 250 GM/< RMVL TUBE/OVAR N/A 5/1/2023    Procedure: ([address] [place] [address]) W/ BILATERAL SALPINGECTOMY, REMOVAL PARAOVARIAN CYST;  Surgeon: Maria Elena Barber DO;  Location: [facility];  Service: Gynecology   • REMOVAL OF INTRAUTERINE DEVICE (IUD)     • US GUIDED BREAST BIOPSY RIGHT COMPLETE Right 6/17/2019       Family History   Problem Relation Age of Onset   • Irregular heart beat Mother    • Diabetes Father    • Kidney disease Father    • Diabetes Maternal Grandmother    • Rheum arthritis Maternal Grandmother    • Melanoma Maternal Grandmother 80   • No Known Problems Maternal Grandfather    • Kidney disease Paternal Grandmother    • Rheum arthritis Paternal Grandmother    • Depression Paternal Grandmother    • Diabetes Paternal Grandfather    • Lung cancer Paternal Grandfather 52   • Substance Abuse Brother    • No Known Problems Brother    • Substance Abuse Maternal Uncle    • Prostate cancer Maternal Uncle 61   • Depression Paternal Aunt    • Alcohol abuse Family    • Stomach cancer Family        Social History     Occupational History   • Occupation: unemployed   Tobacco Use   • Smoking status: Never     Passive exposure: Never   • Smokeless tobacco: Never   Vaping Use   • Vaping Use: Never used   Substance and Sexual Activity   • Alcohol use: Yes     Comment: 3 x year; sober since 2010   • Drug use: Not Currently   • Sexual activity: Not Currently       Current Outpatient Medications on File Prior to Visit   Medication Sig   • acetaminophen (TYLENOL) 650 mg CR tablet Take 2 tablets (1,300 mg total) by mouth every 8 (eight) hours as needed for mild pain   • Blood Glucose Monitoring Suppl (Contour Blood Glucose System) w/Device KIT Use 1 kit 2 (two) times a day before meals   • celecoxib (CeleBREX) 200 mg capsule TAKE 1 CAPSULE BY MOUTH TWICE A DAY   • Cholecalciferol (VITAMIN D-3) 1000 units CAPS Take 1 capsule by mouth daily Take 2000 IU daily   • desvenlafaxine (PRISTIQ) 100 mg 24 hr tablet TAKE 1 TABLET BY MOUTH EVERY DAY   • desvenlafaxine succinate (PRISTIQ) 50 mg 24 hr tablet TAKE 1 TABLET (50 MG TOTAL) BY MOUTH DAILY TOTAL DAILY DOSE 150 MG DAILY   • gabapentin (Neurontin) 300 mg capsule Take 1 capsule (300 mg total) by mouth daily at bedtime   • glucose blood (Contour Test) test strip USE TO CHECK BLOOD SUGAR ONCE DAILY   • hydrOXYzine HCL (ATARAX) 25 mg tablet TAKE 1 TAB BY MOUTH EVERY 6 HOURS AS NEEDED (SLEEP) IN ADDITION TO 50 MG DOSE, MAY TAKE 50-75MG AT BEDTIME AS NEEDED FOR SLEEP   • hydrOXYzine HCL (ATARAX) 50 mg tablet TAKE 1 TAB BY MOUTH DAILY AT BEDTIME AS NEEDED IN ADDITION TO 25 MG DOSE, MAY TAKE 50-75MG MG AT BEDTIME AS NEEDED FOR INSOMNIA   • lidocaine (LIDODERM) 5 % APPLY 1 PATCH TOPICALLY IN THE MORNING. REMOVE & DISCARD PATCH WITHIN 12 HOURS OR AS DIRECTED BY MD.   • MAGNESIUM OXIDE PO Take by mouth   • metFORMIN (GLUCOPHAGE) 500 mg tablet TAKE 1 TABLET BY MOUTH TWICE A DAY WITH MEALS   • nortriptyline (PAMELOR) 75 MG capsule TAKE 1 CAPSULE BY MOUTH TWICE A DAY   • propranolol (INDERAL) 20 mg tablet TAKE 1 TABLET BY MOUTH EVERY DAY AT NIGHT     No current facility-administered medications on file prior to visit. Allergies   Allergen Reactions   • Cannabidiol Shortness Of Breath, Itching, Swelling, Anxiety, Palpitations, Confusion, Hypertension, Throat Swelling and Tongue Swelling   • Amoxicillin-Pot Clavulanate Hives   • Decadrol [Dexamethasone] Other (See Comments)     psychosis   • Penicillins Hives     Hives/Uticaria   • Tetracyclines & Related Hives      Allergy; Physical Exam    /94   Ht 5' (1.524 m)   Wt 83.9 kg (185 lb)   BMI 36.13 kg/m²     Constitutional: normal, well developed, well nourished, alert, in no distress and non-toxic and no overt pain behavior. Eyes: anicteric  HEENT: grossly intact  Neck: supple, symmetric, trachea midline and no masses   Pulmonary:even and unlabored  Cardiovascular:No edema or pitting edema present  Skin:Normal without rashes or lesions and well hydrated  Psychiatric:Mood and affect appropriate  Neurologic:Cranial Nerves II-XII grossly intact  Musculoskeletal:normal gait. Bilateral lumbar paraspinals mildly tender to palpation. Bilateral lower extremity strength 5 out of 5 in all muscle groups. Sensation intact to light touch in L3-S1 dermatomes bilaterally. Negative seated straight leg raise bilaterally. Imaging  MRI LUMBAR SPINE WITHOUT CONTRAST     INDICATION: M54.40: Lumbago with sciatica, unspecified side.     COMPARISON:  Plain films 7/1/2022.     TECHNIQUE:  Sagittal T1, sagittal T2, sagittal inversion recovery, axial T1 and axial T2, coronal T2.    IMAGE QUALITY:  Diagnostic     FINDINGS:     VERTEBRAL BODIES:  There are 5 lumbar type vertebral bodies. Normal alignment of the lumbar spine. No spondylolysis or spondylolisthesis. No scoliosis. No compression fracture. Normal marrow signal is identified within the visualized bony   structures.   No discrete marrow lesion.     SACRUM:  Normal signal within the sacrum. No evidence of insufficiency or stress fracture.     DISTAL CORD AND CONUS:  Normal size and signal within the distal cord and conus.     PARASPINAL SOFT TISSUES:  Paraspinal soft tissues are unremarkable.     LOWER THORACIC DISC SPACES:  Normal disc height and signal.  No disc herniation, canal stenosis or foraminal narrowing.     LUMBAR DISC SPACES:     L1-L2:  Normal.     L2-L3:  Normal.     L3-L4:  Normal.     L4-L5:  Normal.     L5-S1:  Mild Modic type II endplate change. Disc desiccation with slight loss of disc height. Diffuse disc bulge with right greater than left facet hypertrophy. Central canal remains patent. There is mild right foraminal stenosis. Correlate   clinically for exiting right L5 radiculitis.     IMPRESSION:  Mild L5-S1 degenerative change resulting in mild right foraminal stenosis and possible impingement of exiting right L5 nerve root. No focal disc herniation throughout the lumbar spine. No evidence of critical stenosis.

## 2023-08-25 NOTE — BH CRISIS PLAN
Client Name: Rock Jansen       Client YOB: 1980  : 1980    Treatment Team (include name and contact information):     Psychotherapist: Kenna Brice LCSW    Psychiatrist: Dr. Spears Post of information completed: yes    " n/a   Release of information completed: no    Other (Specify Role): n/a    Release of information completed: no    Other (Specify Role): n/a   Release of information completed: no    Healthcare Provider  Larry Gowers, [address] [facility]  [phone]    Type of Plan   * Child plans (children 15 yo and younger) must be completed and signed by the child's legal guardian   * Plans for all individuals 15 yo and above must be signed by the client. Plan Type: adolescent/adult (15 and over) Initial      My Personal Strengths are (in the client's own words):  "smart, funny, love animals, pretty resourceful"    The stressors and triggers that may put me at risk are:  when I get busy, I get overwheled, dad's illness, chronic mental illness, chronic pain and medical illness in family    Coping skills I can use to keep myself calm and safe:  Listen to music, Journal and Other (describe) deep breathing, art, ice/heat to distract    Coping skills/supports I can use to maintain abstinence from substance use:   Not Applicable    The people that provide me with help and support: (Include name, contact, and how they can help)   Support person #1: Edward    * Phone number: in cell phone     *How can they help me? Comic relief, recognizes severity of my stressors and is very sweet and supportive     Support person #2: Paco     * Phone number: in cell phone    * How can they help me? Comic relief, offers help and support     Support person #3: brother    * Phone number: in cell phone    * How can they help me?  Level-headed; good at getting to the root of what I am thinking and feeling and then offering suggestions    In the past, the following has helped me in times of crisis:    Talking to a professional on the telephone, Calling a friend, Calling a family member, Breathing exercises (or other mindfulness-based activities) and Listening to music      If it is an emergency and you need immediate help, call     If there is a possibility of danger to yourself or others, call the following crisis hotline resources:     Adult Crisis Numbers  Suicide Prevention Hotline - Dial   [facility]: [address]: [address]: [address]: [address]: [address]: [address]: [address]: [phone] (daytime). [phone] (after hours, weekends, holidays)     Child/Adolescent Crisis Numbers   [facility] [place]: [address]: [phone]   [facility]: [phone]   [facility]: [phone]    Please note: Some [facility] do not have a separate number for Child/Adolescent specific crisis. If your county is not listed under Child/Adolescent, please call the adult number for your county     National Talk to Text Line   [facility] - [phone]    In the event your feelings become unmanageable, and you cannot reach your support system, you will call 911 immediately or go to the nearest hospital emergency room.

## 2023-08-25 NOTE — TELEPHONE ENCOUNTER
Spoke to Yelitza Beavers she was informed that Dr. Hollie Mendez and Dr. Apolonia Kim have communicated regarding the Gabapentin and Dr. Apolonia Kim is going to have the nurse from his office contact her with the new directions for gabapentin.

## 2023-08-25 NOTE — PSYCH
Virtual Regular Visit    Verification of patient location:    Patient is located at Home in the following state in which I hold an active license [place]      Assessment/Plan:    Problem List Items Addressed This Visit        Other    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 psychotic features ([address]) - Primary    Generalized anxiety disorder          Reason for visit is   Chief Complaint   Patient presents with   • Virtual Regular Visit        Encounter provider HERMELINDA Rich    Provider located at [address] [place] [id number] [phone]      Recent Visits  Date Type Provider Dept   08/18/23 [address] [facility], [facility] Therapist [facility]   Showing recent visits within past 7 days and meeting all other requirements  Today's Visits  Date Type Provider Dept   08/25/23 Telemedicine [facility]ELINDA [facility] Therapist [facility]   Showing today's visits and meeting all other requirements  Future Appointments  No visits were found meeting these conditions. Showing future appointments within next 150 days and meeting all other requirements       The patient was identified by name and date of birth. Ileana Perkins was informed that this is a telemedicine visit and that [facility]. She agrees to proceed. .  My office door was closed. No one else was in the room. She acknowledged consent and understanding of privacy and security of the video platform. The patient has agreed to participate and understands they can discontinue the visit at any time. Patient is aware this is a billable service.      HPI     Past Medical History:   Diagnosis Date   • Anxiety    • Blood transfusion declined because patient is Buddhist 5/1/2023   • Chronic pain disorder    • Chronic sinusitis    • Depression    • Diabetes ([address])    • Fibromyalgia    • Migraine    • Obesity    • Polyarthritis     Last assessed 9/21/2015   • Psychiatric disorder    • Psychiatric illness    • Sleep difficulties        Past Surgical History:   Procedure Laterality Date   • COLONOSCOPY  06/2019   • DENTAL SURGERY     • HYSTEROSCOPY      Endometrial Biopsy By Hysteroscopy   • UT LAPS SUPRACRV HYSTERECT 250 GM/< RMVL TUBE/OVAR N/A 5/1/2023    Procedure: [facility]) W/ BILATERAL SALPINGECTOMY, REMOVAL PARAOVARIAN CYST;  Surgeon: Surjit Carpenter DO;  Location[facility];  Service: Gynecology   • REMOVAL OF INTRAUTERINE DEVICE (IUD)     • US GUIDED BREAST BIOPSY RIGHT COMPLETE Right 6/17/2019       Current Outpatient Medications   Medication Sig Dispense Refill   • acetaminophen (TYLENOL) 650 mg CR tablet Take 2 tablets (1,300 mg total) by mouth every 8 (eight) hours as needed for mild pain 30 tablet 0   • Blood Glucose Monitoring Suppl (Contour Blood Glucose System) w/Device KIT Use 1 kit 2 (two) times a day before meals 1 kit 0   • celecoxib (CeleBREX) 200 mg capsule TAKE 1 CAPSULE BY MOUTH TWICE A DAY 60 capsule 3   • Cholecalciferol (VITAMIN D-3) 1000 units CAPS Take 1 capsule by mouth daily Take 2000 IU daily     • desvenlafaxine (PRISTIQ) 100 mg 24 hr tablet TAKE 1 TABLET BY MOUTH EVERY DAY 30 tablet 2   • desvenlafaxine succinate (PRISTIQ) 50 mg 24 hr tablet TAKE 1 TABLET (50 MG TOTAL) BY MOUTH DAILY TOTAL DAILY DOSE 150 MG DAILY 30 tablet 2   • gabapentin (Neurontin) 300 mg capsule Take 1 capsule (300 mg total) by mouth 3 (three) times a day 90 capsule 2   • glucose blood (Contour Test) test strip USE TO CHECK BLOOD SUGAR ONCE DAILY 100 strip 3   • hydrOXYzine HCL (ATARAX) 25 mg tablet TAKE 1 TAB BY MOUTH EVERY 6 HOURS AS NEEDED (SLEEP) IN ADDITION TO 50 MG DOSE, MAY TAKE 50-75MG AT BEDTIME AS NEEDED FOR SLEEP 30 tablet 1   • hydrOXYzine HCL (ATARAX) 50 mg tablet TAKE 1 TAB BY MOUTH DAILY AT BEDTIME AS NEEDED IN ADDITION TO 25 MG DOSE, MAY TAKE 50-75MG MG AT BEDTIME AS NEEDED FOR INSOMNIA 30 tablet 2   • lidocaine (LIDODERM) 5 % APPLY 1 PATCH TOPICALLY IN THE MORNING. REMOVE & DISCARD PATCH WITHIN 12 HOURS OR AS DIRECTED BY MD. 30 patch 1   • MAGNESIUM OXIDE PO Take by mouth     • metFORMIN (GLUCOPHAGE) 500 mg tablet TAKE 1 TABLET BY MOUTH TWICE A DAY WITH MEALS 180 tablet 2   • nortriptyline (PAMELOR) 75 MG capsule TAKE 1 CAPSULE BY MOUTH TWICE A DAY 60 capsule 2   • propranolol (INDERAL) 20 mg tablet TAKE 1 TABLET BY MOUTH EVERY DAY AT NIGHT 30 tablet 11     No current facility-administered medications for this visit. Allergies   Allergen Reactions   • Cannabidiol Shortness Of Breath, Itching, Swelling, Anxiety, Palpitations, Confusion, Hypertension, Throat Swelling and Tongue Swelling   • Amoxicillin-Pot Clavulanate Hives   • Decadrol [Dexamethasone] Other (See Comments)     psychosis   • Penicillins Hives     Hives/Uticaria   • Tetracyclines & Related Hives      Allergy; Review of Systems    Video Exam    There were no vitals filed for this visit. Physical Exam     Behavioral Health Psychotherapy Progress Note    Psychotherapy Provided: Individual Psychotherapy     1. 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 psychotic features ([address])        2. Generalized anxiety disorder            Goals addressed in session: Goal 1     DATA: Jerry Carroll presented for follow up, sharing that she has been feeling very anxious lately, with multiple stressors adding to how she feels. Her main stressor is her father's health, and the lack of sleep that her family is getting because of his in-home dialysis treatment. She said that she is concerned for her parents because they seem exhausted and aging rapidly, and there is not much more she can do to help. She also said that her brother is having some trouble with his wife, which adds to her concern, but said that he seems to be doing ok, so she is trying to let him have his space.   She said that while she is anxious, she has been trying to get out socially to be with friends who are very supportive. When she is at home, she has so much to do that she feels overwhelmed and often will sit and stare at a wall, not doing anything, because she feels like she cannot push herself to do anything and "spaces out."  Provided support, validation of Esther's feelings, and used CBT and mindfulness strategies to help Larissa Houston find ways to manage anxiety, focus on self-care and rest, and focus on positive steps she can take to ease stress. During this session, this clinician used the following therapeutic modalities: Client-centered Therapy, Cognitive Behavioral Therapy and Supportive Psychotherapy    Substance Abuse was not addressed during this session. If the client is diagnosed with a co-occurring substance use disorder, please indicate any changes in the frequency or amount of use: n/a. Stage of change for addressing substance use diagnoses: No substance use/Not applicable    ASSESSMENT:  Fernando Herrera presents with a Euthymic/ normal mood. her affect is Normal range and intensity, which is congruent, with her mood and the content of the session. The client has made progress on their goals. Fernando Herrera presents with a low risk of suicide, low risk of self-harm, and minimal risk of harm to others. For any risk assessment that surpasses a "low" rating, a safety plan must be developed. A safety plan was indicated: no  If yes, describe in detail n/a    PLAN: Between sessions, Fernando Herrera will continue to engage in social activities outside of her home, will incorporate more time for self-care and rest, and will use anxiety reduction strategies as needed. At the next session, the therapist will use Client-centered Therapy, Cognitive Behavioral Therapy and Supportive Psychotherapy to address anxiety and depression.     Behavioral Health Treatment Plan and Discharge Planning: Fernando Herrera is aware of and agrees to continue to work on their treatment plan. They have identified and are working toward their discharge goals.  yes    Visit start and stop times:    08/25/23  Start Time: 1306  Stop Time: 1349  Total Visit Time: 43 minutes

## 2023-08-25 NOTE — TELEPHONE ENCOUNTER
S/W pt and advised of JW medication titration instructions. Pt verbalized understanding and appreciative of call.

## 2023-08-25 NOTE — TELEPHONE ENCOUNTER
Received VM from Sade Kaur, she stated that she is taking Gabapentin 300 mg. She saw Dr. [facility] today and he would like to increase the Gabapentin but would like to speak to [facility] provider before making any adjustments. He did not tell Sade Kaur how much he wanted to increase it to. She asked if her provider can reach out to Dr. Soha Collins to discuss. Called Esther and left VM that her message was received and that a message will be sent to her provider.

## 2023-09-10 DIAGNOSIS — F41.1 GENERALIZED ANXIETY DISORDER: ICD-10-CM

## 2023-09-10 DIAGNOSIS — M79.7 FIBROMYALGIA: ICD-10-CM

## 2023-09-10 DIAGNOSIS — F33.1 MAJOR DEPRESSIVE DISORDER, RECURRENT EPISODE, MODERATE (HCC): ICD-10-CM

## 2023-09-10 RX ORDER — NORTRIPTYLINE HYDROCHLORIDE 75 MG/1
CAPSULE ORAL
Qty: 60 CAPSULE | Refills: 2 | Status: SHIPPED | OUTPATIENT
Start: 2023-09-10

## 2023-09-10 RX ORDER — DESVENLAFAXINE SUCCINATE 50 MG/1
50 TABLET, EXTENDED RELEASE ORAL DAILY
Qty: 30 TABLET | Refills: 2 | Status: SHIPPED | OUTPATIENT
Start: 2023-09-10 | End: 2023-09-19

## 2023-09-10 RX ORDER — DESVENLAFAXINE 100 MG/1
TABLET, EXTENDED RELEASE ORAL
Qty: 30 TABLET | Refills: 2 | Status: SHIPPED | OUTPATIENT
Start: 2023-09-10

## 2023-09-12 ENCOUNTER — TELEMEDICINE (OUTPATIENT)
Dept: BEHAVIORAL/MENTAL HEALTH CLINIC | Facility: CLINIC | Age: 43
End: 2023-09-12
Payer: COMMERCIAL

## 2023-09-12 DIAGNOSIS — F33.2 SEVERE RECURRENT MAJOR DEPRESSION WITHOUT PSYCHOTIC FEATURES (HCC): Primary | ICD-10-CM

## 2023-09-12 DIAGNOSIS — F41.1 GENERALIZED ANXIETY DISORDER: ICD-10-CM

## 2023-09-12 PROCEDURE — 90834 PSYTX W PT 45 MINUTES: CPT | Performed by: PSYCHIATRY & NEUROLOGY

## 2023-09-12 NOTE — PSYCH
Virtual Regular Visit    Verification of patient location:    Patient is located at Home in the following state in which I hold an active license [place]      Assessment/Plan:    Problem List Items Addressed This Visit        Other    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 psychotic features ([address]) - Primary    Generalized anxiety disorder         Reason for visit is   Chief Complaint   Patient presents with   • Virtual Regular Visit        Encounter provider HERMELINDA Jain    Provider located at [address] [place] [id number] [phone]      Recent Visits  No visits were found meeting these conditions. Showing recent visits within past 7 days and meeting all other requirements  Today's Visits  Date Type Provider Dept   09/12/23 Telemedicine HER[facility]ELINDA [facility]ain [facility] Therapist [facility]   Showing today's visits and meeting all other requirements  Future Appointments  No visits were found meeting these conditions. Showing future appointments within next 150 days and meeting all other requirements       The patient was identified by name and date of birth. Mag Fish was informed that this is a telemedicine visit and [facility]. She agrees to proceed. .  My office door was closed. No one else was in the room. She acknowledged consent and understanding of privacy and security of the video platform. The patient has agreed to participate and understands they can discontinue the visit at any time. Patient is aware this is a billable service.      HPI     Past Medical History:   Diagnosis Date   • Anxiety    • Blood transfusion declined because patient is Orthodox 5/1/2023   • Chronic pain disorder    • Chronic sinusitis    • Depression    • Diabetes ([address])    • Fibromyalgia    • Migraine    • Obesity    • Polyarthritis     Last assessed 9/21/2015   • Psychiatric disorder    • Psychiatric illness    • Sleep difficulties        Past Surgical History:   Procedure Laterality Date   • COLONOSCOPY  06/2019   • DENTAL SURGERY     • HYSTEROSCOPY      Endometrial Biopsy By Hysteroscopy   • HI LAPS SUPRACRV HYSTERECT 250 GM/< RMVL TUBE/OVAR N/A 5/1/2023    Procedure: [facility]) W/ BILATERAL SALPINGECTOMY, REMOVAL PARAOVARIAN CYST;  Surgeon: Raford Baumgarten, DO;  Location[facility];  Service: Gynecology   • REMOVAL OF INTRAUTERINE DEVICE (IUD)     • US GUIDED BREAST BIOPSY RIGHT COMPLETE Right 6/17/2019       Current Outpatient Medications   Medication Sig Dispense Refill   • acetaminophen (TYLENOL) 650 mg CR tablet Take 2 tablets (1,300 mg total) by mouth every 8 (eight) hours as needed for mild pain 30 tablet 0   • Blood Glucose Monitoring Suppl (Contour Blood Glucose System) w/Device KIT Use 1 kit 2 (two) times a day before meals 1 kit 0   • celecoxib (CeleBREX) 200 mg capsule TAKE 1 CAPSULE BY MOUTH TWICE A DAY 60 capsule 3   • Cholecalciferol (VITAMIN D-3) 1000 units CAPS Take 1 capsule by mouth daily Take 2000 IU daily     • desvenlafaxine (PRISTIQ) 100 mg 24 hr tablet TAKE 1 TABLET BY MOUTH EVERY DAY 30 tablet 2   • desvenlafaxine succinate (PRISTIQ) 50 mg 24 hr tablet TAKE 1 TABLET (50 MG TOTAL) BY MOUTH DAILY TOTAL DAILY DOSE 150 MG DAILY 30 tablet 2   • gabapentin (Neurontin) 300 mg capsule Take 1 capsule (300 mg total) by mouth 3 (three) times a day 90 capsule 2   • glucose blood (Contour Test) test strip USE TO CHECK BLOOD SUGAR ONCE DAILY 100 strip 3   • hydrOXYzine HCL (ATARAX) 25 mg tablet TAKE 1 TAB BY MOUTH EVERY 6 HOURS AS NEEDED (SLEEP) IN ADDITION TO 50 MG DOSE, MAY TAKE 50-75MG AT BEDTIME AS NEEDED FOR SLEEP 30 tablet 1   • hydrOXYzine HCL (ATARAX) 50 mg tablet TAKE 1 TAB BY MOUTH DAILY AT BEDTIME AS NEEDED IN ADDITION TO 25 MG DOSE, MAY TAKE 50-75MG MG AT BEDTIME AS NEEDED FOR INSOMNIA 30 tablet 2   • lidocaine (LIDODERM) 5 % APPLY 1 PATCH IN THE MORNING REMOVE AND DISARD PATCH WITHIN 12 HOURS OR AS DIRECTED 30 patch 1   • MAGNESIUM OXIDE PO Take by mouth     • metFORMIN (GLUCOPHAGE) 500 mg tablet TAKE 1 TABLET BY MOUTH TWICE A DAY WITH MEALS 180 tablet 2   • nortriptyline (PAMELOR) 75 MG capsule TAKE 1 CAPSULE BY MOUTH TWICE A DAY 60 capsule 2   • propranolol (INDERAL) 20 mg tablet TAKE 1 TABLET BY MOUTH EVERY DAY AT NIGHT 30 tablet 11     No current facility-administered medications for this visit. Allergies   Allergen Reactions   • Cannabidiol Shortness Of Breath, Itching, Swelling, Anxiety, Palpitations, Confusion, Hypertension, Throat Swelling and Tongue Swelling   • Amoxicillin-Pot Clavulanate Hives   • Decadrol [Dexamethasone] Other (See Comments)     psychosis   • Penicillins Hives     Hives/Uticaria   • Tetracyclines & Related Hives      Allergy; Review of Systems    Video Exam    There were no vitals filed for this visit. Physical Exam     Behavioral Health Psychotherapy Progress Note    Psychotherapy Provided: Individual Psychotherapy     1. 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 psychotic features ([address])        2. Generalized anxiety disorder            Goals addressed in session: Goal 1     DATA: Merlin Side presented for follow up, sharing that she has been busy lately cleaning her portion of the house (decluttering, cleaning, etc) and has found that she has to pace herself because it has caused a flare of her physical pain. She noted, however, that she is feeling better emotionally in her space because it is more organized and less cluttered, and she has come across items that bring back good memories that had been packed away. She talked about continued social activities that help to boost her mood, although noted that her mood has been somewhat "down" lately.   She talked about some new relationships, some that she wants to keep superficial, and her decision to express her boundaries about those relationships up front so that no one has any expectations that she cannot meet. Provided support, validation of Esther's feelings and progress, and encouraged her to continue to assert her needs and boundaries to protect her emotional wellness. During this session, this clinician used the following therapeutic modalities: Client-centered Therapy, Cognitive Behavioral Therapy and Supportive Psychotherapy    Substance Abuse was not addressed during this session. If the client is diagnosed with a co-occurring substance use disorder, please indicate any changes in the frequency or amount of use: n/a. Stage of change for addressing substance use diagnoses: No substance use/Not applicable    ASSESSMENT:  Halle Zazueta presents with a Euthymic/ normal mood. her affect is Normal range and intensity, which is congruent, with her mood and the content of the session. The client has made progress on their goals. Halle Zazueta presents with a minimal risk of suicide, minimal risk of self-harm, and minimal risk of harm to others. For any risk assessment that surpasses a "low" rating, a safety plan must be developed. A safety plan was indicated: no  If yes, describe in detail n/a    PLAN: Between sessions, Halle Zazueta will continue to assert boundaries with friends and family, will focus on self-care and rest, and will try to plan activities that she can look forward to. At the next session, the therapist will use Client-centered Therapy, Cognitive Behavioral Therapy and Supportive Psychotherapy to address depression, anxiety. Behavioral Health Treatment Plan and Discharge Planning: Halle Zazueta is aware of and agrees to continue to work on their treatment plan. They have identified and are working toward their discharge goals.  yes    Visit start and stop times:    09/12/23  Start Time: 0802  Stop Time: 3482  Total Visit Time: 42 minutes

## 2023-09-15 ENCOUNTER — TELEPHONE (OUTPATIENT)
Age: 43
End: 2023-09-15

## 2023-09-15 DIAGNOSIS — M54.16 LUMBAR RADICULOPATHY: Primary | ICD-10-CM

## 2023-09-15 NOTE — TELEPHONE ENCOUNTER
S/w pt about her pain and request for procedure. Pt last had BL L5 TFESI 7/12/23. Pt has pain down buttocks and thighs, she reports it feels a little different but mainly the same pain. She said pain came back a few days ago and is doing some stretching to help with pain. Pain is 6/10. For the first month after injection in July she had 20% relief and over the last month 75% improvement. Nurse advised pt injections are performed 3 months apart but nurse to send Duc Henderson a message advising of previous. Pt verbalized understanding and appreciative of call.     JW schedule for Oct?

## 2023-09-15 NOTE — TELEPHONE ENCOUNTER
We can try repeating bilateral L5 TFESI. Order has been placed.   I am not sure if her insurance will approve or if we have to wait the full 3 months

## 2023-09-15 NOTE — TELEPHONE ENCOUNTER
Caller: Yahaira Luis     Doctor: Dr Prince Soto     Reason for call: Patient calling asking to schedule procedure please advise     Call back#: [phone]

## 2023-09-18 ENCOUNTER — OFFICE VISIT (OUTPATIENT)
Dept: FAMILY MEDICINE CLINIC | Facility: CLINIC | Age: 43
End: 2023-09-18
Payer: COMMERCIAL

## 2023-09-18 ENCOUNTER — TELEPHONE (OUTPATIENT)
Dept: ADMINISTRATIVE | Facility: OTHER | Age: 43
End: 2023-09-18

## 2023-09-18 VITALS
TEMPERATURE: 97.8 F | RESPIRATION RATE: 16 BRPM | OXYGEN SATURATION: 98 % | WEIGHT: 189 LBS | HEIGHT: 60 IN | SYSTOLIC BLOOD PRESSURE: 136 MMHG | BODY MASS INDEX: 37.11 KG/M2 | HEART RATE: 98 BPM | DIASTOLIC BLOOD PRESSURE: 86 MMHG

## 2023-09-18 DIAGNOSIS — M54.50 LUMBAR BACK PAIN: Primary | ICD-10-CM

## 2023-09-18 DIAGNOSIS — M54.16 LUMBAR RADICULOPATHY: Primary | ICD-10-CM

## 2023-09-18 PROCEDURE — 99213 OFFICE O/P EST LOW 20 MIN: CPT | Performed by: NURSE PRACTITIONER

## 2023-09-18 RX ORDER — CYCLOBENZAPRINE HCL 10 MG
10 TABLET ORAL 3 TIMES DAILY PRN
Qty: 30 TABLET | Refills: 1 | Status: SHIPPED | OUTPATIENT
Start: 2023-09-18

## 2023-09-18 NOTE — PATIENT INSTRUCTIONS
Cyclobenzaprine (By mouth)   Cyclobenzaprine (rwn-[id number]-ETY-za-preen)  Treats pain and stiffness caused by muscle spasms. Brand Name(s): Amrix, CycloTENS Refill Boni, CycloTENS Starter Boni, Fexmid, FusePaq Jirice u Miroslavi, RapidPaq Tabradol   There may be other brand names for this medicine. When This Medicine Should Not Be Used: This medicine is not right for everyone. Do not use it if you had an allergic reaction to cyclobenzaprine. How to Use This Medicine:   Long Acting Capsule, Liquid, Tablet  Your doctor will tell you how much medicine to use. Do not use more than directed. Take this medicine at the same time each day. Swallow the extended-release capsule whole. Do not crush, break, or chew it. If you cannot swallow the capsule whole, you may open the capsule and sprinkle the contents over one tablespoon of applesauce. Swallow the mixture right away without chewing. Rinse the mouth to make sure all of the medicine have been swallowed. Do not save any of the mixture to use later. This medicine is not for long-term use. Missed dose: Take a dose as soon as you remember. If it is almost time for your next dose, wait until then and take a regular dose. Do not take extra medicine to make up for a missed dose. Store the medicine in a closed container at room temperature, away from heat, moisture, and direct light. Drugs and Foods to Avoid:   Ask your doctor or pharmacist before using any other medicine, including over-the-counter medicines, vitamins, and herbal products. Do not use this medicine if you have used an MAO inhibitor (MAOI) within 14 days of each other. Some foods and medicines can affect how this medicine works. Tell your doctor if you are using any of the following:   Bupropion, guanethidine, meperidine, tramadol, verapamil  Medicine to treat depression (including amitriptyline, imipramine)  Do not drink alcohol while you are using this medicine.   Tell your doctor if you use anything else that makes you sleepy. Some examples are allergy medicine, narcotic pain medicine, and alcohol. Warnings While Using This Medicine:   Tell your doctor if you are pregnant or breastfeeding, or if you have liver problems, congestive heart failure, heart rhythm problems, a recent heart attack, overactive thyroid, or a history of glaucoma or trouble urinating. This medicine may cause the following problems:  Serotonin syndrome, when taken with certain medicines  This medicine may make you dizzy or drowsy. Do not drive or doing anything that could be dangerous until you know how this medicine affects you. Call your doctor if your symptoms do not improve or if they get worse. Keep all medicine out of the reach of children. Never share your medicine with anyone. Possible Side Effects While Using This Medicine:   Call your doctor right away if you notice any of these side effects: Allergic reaction: Itching or hives, swelling in your face or hands, swelling or tingling in your mouth or throat, chest tightness, trouble breathing  Anxiety, restlessness, fever, sweating, twitching, nausea, vomiting, diarrhea, seeing or hearing things that are not there  Fast, pounding, or uneven heartbeat  Severe drowsiness, fainting, or confusion  If you notice these less serious side effects, talk with your doctor:   Dizziness  Dry mouth  If you notice other side effects that you think are caused by this medicine, tell your doctor. Call your doctor for medical advice about side effects. You may report side effects to FDA at 5-438-FDA-9597  © Copyright Lia Ranks 2022 Information is for End User's use only and may not be sold, redistributed or otherwise used for commercial purposes. The above information is an  only. It is not intended as medical advice for individual conditions or treatments. Talk to your doctor, nurse or pharmacist before following any medical regimen to see if it is safe and effective for you.

## 2023-09-18 NOTE — TELEPHONE ENCOUNTER
----- Message from Ju [place]aleana, [address] [facility] sent at 9/18/2023  9:58 AM EDT -----  09/18/23 9:58 AM    Hello, our patient Bearl Letters has had HIV completed/performed. Please assist in updating the patient chart by pulling the Care Everywhere (CE) document. The date of service is 6/5/2009.      Thank you,  ADELAIDA Hastings  [facility]

## 2023-09-18 NOTE — ASSESSMENT & PLAN NOTE
Patient presents today with a 1 week history of increasing lumbar back pain and back spasms. .  She does see Dr. David Dodd and pain management for this issue. She did contact his office and has an appointment in 1 month for an epidural injection. Patient's exam was normal.  She does have a negative leg lift bilaterally. She does not appear to be in pain today in the office. Cyclobenzaprine was sent to the pharmacy for her back spasms. She can take this up to 3 times a day. If her pain continues to worsen she should contact pain management.

## 2023-09-18 NOTE — TELEPHONE ENCOUNTER
----- Message from Haile Rizo, [address] [facility] sent at 9/18/2023  9:59 AM EDT -----  09/18/23 9:59 AM    Hello, our patient Quita Villanueva has had Pap Smear (HPV) aka Cervical Cancer Screening completed/performed. Please assist in updating the patient chart by pulling the document from labs Tab within Chart Review. The date of service is 5/1/2023 tissue exam report.   Patient had total hysterectomy with removal of cervix     Thank you,  ADELAIDA Estrada  [facility]

## 2023-09-18 NOTE — PROGRESS NOTES
[facility]    NAME: Halle Zazueta  AGE: 37 y.o. SEX: female  : 1980     DATE: 2023     Assessment and Plan:     Problem List Items Addressed This Visit        Other    Lumbar back pain - Primary     Patient presents today with a 1 week history of increasing lumbar back pain and back spasms. .  She does see Dr. Ranulfo Smalls and pain management for this issue. She did contact his office and has an appointment in 1 month for an epidural injection. Patient's exam was normal.  She does have a negative leg lift bilaterally. She does not appear to be in pain today in the office. Cyclobenzaprine was sent to the pharmacy for her back spasms. She can take this up to 3 times a day. If her pain continues to worsen she should contact pain management. Relevant Medications    cyclobenzaprine (FLEXERIL) 10 mg tablet           No follow-ups on file. Chief Complaint:     Chief Complaint   Patient presents with   • Back Pain     Muscle spasms on lower back pain. This flareup has been about a week          History of Present Illness:     Delio Go presents to the office today for increasing lower back pain. She does follow with pain management. Exam was negative. She does not appear to be in a great amount of pain in the office today. She states the spasms come and go. Cyclobenzaprine sent to the patient's pharmacy. Side effects discussed. She is scheduled for an epidural injection in 4 weeks. I did recommend she contact her pain management provider if her pain worsens. I encouraged the patient to schedule her diabetic eye exam.     Review of Systems:     Review of Systems   Constitutional: Negative for activity change, fatigue and fever. HENT: Negative for congestion, hearing loss, rhinorrhea, trouble swallowing and voice change. Eyes: Negative for photophobia, pain, discharge and visual disturbance.    Respiratory: Negative for cough, chest tightness and shortness of breath. Cardiovascular: Negative for chest pain, palpitations and leg swelling. Gastrointestinal: Negative for abdominal pain, blood in stool, constipation, nausea and vomiting. Endocrine: Negative for cold intolerance and heat intolerance. Genitourinary: Negative for difficulty urinating, frequency, hematuria, urgency, vaginal bleeding and vaginal discharge. Musculoskeletal: Positive for back pain. Negative for arthralgias and myalgias. Skin: Negative. Neurological: Negative for dizziness, weakness, numbness and headaches. Psychiatric/Behavioral: Negative for decreased concentration. The patient is not nervous/anxious.          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, with unspecified complication ([facility])   • Fibromyalgia   • Headache   • Lyme disease   • Menorrhagia   • Muscle spasm   • Myalgia   • Narcolepsy   • Narcolepsy cataplexy syndrome   • Morbid obesity ([facility])   • Sinus disease   • Sleep apnea   • Snoring   • 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 psychotic features ([facility])   • Generalized anxiety disorder   • Dyspepsia   • Arthralgia of multiple joints   • Generalized body aches   • Chronic idiopathic constipation   • Elevated TSH   • Loose stools   • Hematochezia   • Polyp of colon   • Back pain   • Sacroiliitis ([facility])   • DDD (degenerative disc disease), lumbar   • Type 2 diabetes mellitus with hyperglycemia, without long-term current use of insulin ([facility])   • Dyslipidemia   • Diabetic polyneuropathy associated with type 2 diabetes mellitus ([facility])   • Lumbar back pain   • Lumbar radiculopathy   • Endometrial polyp   • Blood transfusion declined because patient is Anabaptist   • Endometrial hyperplasia, unspecified   • Abnormal uterine bleeding   • Dysmenorrhea   • Paresthesia of both feet        Objective:     /86   Pulse 98   Temp 97.8 °F (36.6 °C) (Temporal)   Resp 16   Ht 5' (1.524 m)   Wt 85.7 kg (189 lb)   SpO2 98%   BMI 36.91 kg/m²     Current Outpatient Medications   Medication Sig Dispense Refill   • acetaminophen (TYLENOL) 650 mg CR tablet Take 2 tablets (1,300 mg total) by mouth every 8 (eight) hours as needed for mild pain 30 tablet 0   • Blood Glucose Monitoring Suppl (Contour Blood Glucose System) w/Device KIT Use 1 kit 2 (two) times a day before meals 1 kit 0   • celecoxib (CeleBREX) 200 mg capsule TAKE 1 CAPSULE BY MOUTH TWICE A DAY 60 capsule 3   • Cholecalciferol (VITAMIN D-3) 1000 units CAPS Take 1 capsule by mouth daily Take 2000 IU daily     • cyclobenzaprine (FLEXERIL) 10 mg tablet Take 1 tablet (10 mg total) by mouth 3 (three) times a day as needed for muscle spasms 30 tablet 1   • desvenlafaxine (PRISTIQ) 100 mg 24 hr tablet TAKE 1 TABLET BY MOUTH EVERY DAY 30 tablet 2   • desvenlafaxine succinate (PRISTIQ) 50 mg 24 hr tablet TAKE 1 TABLET (50 MG TOTAL) BY MOUTH DAILY TOTAL DAILY DOSE 150 MG DAILY 30 tablet 2   • gabapentin (Neurontin) 300 mg capsule Take 1 capsule (300 mg total) by mouth 3 (three) times a day 90 capsule 2   • glucose blood (Contour Test) test strip USE TO CHECK BLOOD SUGAR ONCE DAILY 100 strip 3   • hydrOXYzine HCL (ATARAX) 25 mg tablet TAKE 1 TAB BY MOUTH EVERY 6 HOURS AS NEEDED (SLEEP) IN ADDITION TO 50 MG DOSE, MAY TAKE 50-75MG AT BEDTIME AS NEEDED FOR SLEEP 30 tablet 1   • hydrOXYzine HCL (ATARAX) 50 mg tablet TAKE 1 TAB BY MOUTH DAILY AT BEDTIME AS NEEDED IN ADDITION TO 25 MG DOSE, MAY TAKE 50-75MG MG AT BEDTIME AS NEEDED FOR INSOMNIA 30 tablet 2   • lidocaine (LIDODERM) 5 % APPLY 1 PATCH IN THE MORNING REMOVE AND DISARD PATCH WITHIN 12 HOURS OR AS DIRECTED 30 patch 1   • MAGNESIUM OXIDE PO Take by mouth     • metFORMIN (GLUCOPHAGE) 500 mg tablet TAKE 1 TABLET BY MOUTH TWICE A DAY WITH MEALS 180 tablet 2   • nortriptyline (PAMELOR) 75 MG capsule TAKE 1 CAPSULE BY MOUTH TWICE A DAY 60 capsule 2   • propranolol (INDERAL) 20 mg tablet TAKE 1 TABLET BY MOUTH EVERY DAY AT NIGHT 30 tablet 11     No current facility-administered medications for this visit. Physical Exam  Vitals reviewed. Constitutional:       Appearance: Normal appearance. She is obese. HENT:      Head: Normocephalic. Nose: Nose normal.      Mouth/Throat:      Mouth: Mucous membranes are moist.      Pharynx: Oropharynx is clear. Eyes:      Extraocular Movements: Extraocular movements intact. Pupils: Pupils are equal, round, and reactive to light. Cardiovascular:      Rate and Rhythm: Normal rate and regular rhythm. Pulmonary:      Effort: Pulmonary effort is normal.      Breath sounds: Normal breath sounds. Musculoskeletal:      Lumbar back: Spasms and tenderness present. Decreased range of motion. Negative right straight leg raise test and negative left straight leg raise test.   Skin:     General: Skin is warm and dry. Neurological:      General: No focal deficit present. Mental Status: She is alert and oriented to person, place, and time. Psychiatric:         Mood and Affect: Mood normal.         Behavior: Behavior normal.         Thought Content:  Thought content normal.         Judgment: Judgment normal.         Villa Schlatter, [address]

## 2023-09-18 NOTE — TELEPHONE ENCOUNTER
Called patient scheduled procedure. Reviewed all instructions by phone upon scheduling.  Mailed copy to home

## 2023-09-19 ENCOUNTER — OFFICE VISIT (OUTPATIENT)
Dept: PSYCHIATRY | Facility: CLINIC | Age: 43
End: 2023-09-19
Payer: COMMERCIAL

## 2023-09-19 DIAGNOSIS — F33.1 MAJOR DEPRESSIVE DISORDER, RECURRENT EPISODE, MODERATE (HCC): ICD-10-CM

## 2023-09-19 DIAGNOSIS — E66.9 OBESITY (BMI 30-39.9): ICD-10-CM

## 2023-09-19 DIAGNOSIS — F33.2 SEVERE RECURRENT MAJOR DEPRESSION WITHOUT PSYCHOTIC FEATURES (HCC): Primary | ICD-10-CM

## 2023-09-19 DIAGNOSIS — E66.01 MORBID OBESITY (HCC): ICD-10-CM

## 2023-09-19 DIAGNOSIS — F41.1 GENERALIZED ANXIETY DISORDER: ICD-10-CM

## 2023-09-19 PROCEDURE — 99213 OFFICE O/P EST LOW 20 MIN: CPT | Performed by: PSYCHIATRY & NEUROLOGY

## 2023-09-19 RX ORDER — PHENTERMINE HYDROCHLORIDE 37.5 MG/1
37.5 TABLET ORAL EVERY MORNING
Qty: 30 TABLET | Refills: 2 | Status: SHIPPED | OUTPATIENT
Start: 2023-09-19

## 2023-09-19 RX ORDER — DESVENLAFAXINE SUCCINATE 50 MG/1
50 TABLET, EXTENDED RELEASE ORAL DAILY
Qty: 30 TABLET | Refills: 2
Start: 2023-09-19

## 2023-09-19 NOTE — TELEPHONE ENCOUNTER
Upon review of the In Basket request we were able to locate, review, and update the patient chart as requested for HIV. Any additional questions or concerns should be emailed to the Practice Liaisons via the appropriate education email address, please do not reply via In Basket.     Thank you  Shahzad Whiteside MA

## 2023-09-19 NOTE — PSYCH
Visit Time    Visit Start Time: 10:00  Visit Stop Time: 10:30  Total Visit Duration: 30 minutes    Subjective:Medication Management      Patient ID: Cristina Jamison is a 37 y.o. female with MDD and CAROL    HPI ROS Appetite Changes and Sleep: normal appetite, normal energy level, no weight change and normal number of sleep hours   Patient remains compliant with medications and denies side effects. She continues to manage her diabetes with medication and diet.    Since last seen she stated she has been dealing with fatigue, increase appetite and weight gain of 15 pounds, trouble sleeping and worsening physical pain. She stated she had increased dose of Gabapentin which could account for sedation. She stated at home stressors at high because her father was started on home dyalisis 6 weeks ago and he will likely need kidney transplant but her mother and brother are being worked up for potential donors. She has a new muscle relaxant for back pain and is waiting to get her next epidural.   She agrees to consult PCP about changing DM2 medications for newer options that promote satiety and weight loss. She agrees to start phentermine to help with appetite supression d weight loss until she sees her PCP. Agrees to schedule follow up in 6 weeks.  Current weight is 189 BMI 36.9        Review Of Systems:     Mood Anxiety and Depression   Behavior Normal    Thought Content Disturbing Thoughts, Feelings   General Emotional Problems and Decreased Functioning   Personality Normal   Other Psych Symptoms Normal   Constitutional Negative   ENT Negative   Cardiovascular Negative   Respiratory Negative   Gastrointestinal Negative   Genitourinary Negative   Musculoskeletal Negative   Integumentary Negative   Neurological Negative   Endocrine Normal    Other Symptoms Normal        Laboratory Results:  Recent Labs (last 12 months):   Telephone on 09/18/2023   Component Date Value   • HIV Screen 06/05/2009 Non-Reactive    • HIV Confirmation 06/05/2009 Non-Reactive    Appointment on 07/11/2023   Component Date Value   • Cholesterol 07/11/2023 202 (H)    • Triglycerides 07/11/2023 153 (H)    • HDL, Direct 07/11/2023 54    • LDL Calculated 07/11/2023 117 (H)    • Vitamin B-12 07/11/2023 762    • Folate 07/11/2023 >22.3    Office Visit on 07/05/2023   Component Date Value   • Creatinine, Ur 07/05/2023 221.0    • Albumin,U,Random 07/05/2023 13.7    • Albumin Creat Ratio 07/05/2023 6    Admission on 05/01/2023, Discharged on 05/01/2023   Component Date Value   • ABO Grouping 05/01/2023 O    • Rh Factor 05/01/2023 Positive    • EXT Preg Test, Ur 05/01/2023 Negative    • Control 05/01/2023 Valid    • POC Glucose 05/01/2023 115    • Case Report 05/01/2023                      Value:Surgical Pathology Report                         Case: [id number]                                   Authorizing Provider:  Lynn Roper DO      Collected:           05/01/2023 3429              Ordering Location:     [facility]        Received:            05/01/2023 [facility]                                                     Pathologist:           Kwadwo Mann MD                                                                           Specimen:    Uterus, Uterus, bilateral fallopian tubes, and paraovarian cyst                           • Final Diagnosis 05/01/2023                      Value: This result contains rich text formatting which cannot be displayed here. • Additional Information 05/01/2023                      Value: This result contains rich text formatting which cannot be displayed here. • Gross Description 05/01/2023                      Value: This result contains rich text formatting which cannot be displayed here.    • Clinical Information 05/01/2023                      Value:37year old female with endometrial hyperplasia without atypia ([id number]).    • POC Glucose 05/01/2023 142 (H)    • POC Glucose 05/01/2023 99    Appointment on 04/27/2023   Component Date Value   • WBC 04/27/2023 9.78    • RBC 04/27/2023 5.09    • Hemoglobin 04/27/2023 14.5    • Hematocrit 04/27/2023 44.0    • MCV 04/27/2023 86    • MCH 04/27/2023 28.5    • MCHC 04/27/2023 33.0    • RDW 04/27/2023 13.6    • MPV 04/27/2023 8.8 (L)    • Platelets 16/89/5042 291    • nRBC 04/27/2023 0    • Neutrophils Relative 04/27/2023 63    • Immat GRANS % 04/27/2023 0    • Lymphocytes Relative 04/27/2023 31    • Monocytes Relative 04/27/2023 5    • Eosinophils Relative 04/27/2023 1    • Basophils Relative 04/27/2023 0    • Neutrophils Absolute 04/27/2023 6.12    • Immature Grans Absolute 04/27/2023 0.03    • Lymphocytes Absolute 04/27/2023 3.02    • Monocytes Absolute 04/27/2023 0.51    • Eosinophils Absolute 04/27/2023 0.07    • Basophils Absolute 04/27/2023 0.03    • Hemoglobin A1C 04/27/2023 6.1 (H)    • EAG 04/27/2023 128    • Sodium 07/11/2023 136    • Potassium 07/11/2023 3.9    • Chloride 07/11/2023 105    • CO2 07/11/2023 26    • ANION GAP 07/11/2023 5    • BUN 07/11/2023 13    • Creatinine 07/11/2023 0.90    • Glucose, Fasting 07/11/2023 129 (H)    • Calcium 07/11/2023 9.2    • AST 07/11/2023 26    • ALT 07/11/2023 42    • Alkaline Phosphatase 07/11/2023 52    • Total Protein 07/11/2023 7.4    • Albumin 07/11/2023 4.0    • Total Bilirubin 07/11/2023 0.34    • eGFR 07/11/2023 77    • Hemoglobin A1C 07/11/2023 6.0 (H)    • EAG 07/11/2023 126    • Creatinine, Ur 07/11/2023 21.2    • Albumin,U,Random 07/11/2023 <5.0    • Albumin Creat Ratio 07/11/2023 <24    • TSH 3RD GENERATON 07/11/2023 3.741    • Vit D, 25-Hydroxy 07/11/2023 52.7    Appointment on 04/24/2023   Component Date Value   • ABO Grouping 04/24/2023 O    • Rh Factor 04/24/2023 Positive    • Antibody Screen 04/24/2023 Negative    • Specimen Expiration Date 04/24/2023 [id number] Hospital Outpatient Visit on 03/13/2023   Component Date Value   • EXT Preg Test, Ur 03/13/2023 Negative    • Control 03/13/2023 Valid    • Case Report 03/13/2023                      Value:Surgical Pathology Report                         Case: [id number]                                   Authorizing Provider:  Lisette Morel MD         Collected:           03/13/2023 0827              Ordering Location:     [facility]        Received:            03/13/2023 [address] [facility]                                                     Pathologist:           Bairon Enciso MD                                                                 Specimen:    Polyp, Colorectal, rectal , cold bx                                                       • Final Diagnosis 03/13/2023                      Value: This result contains rich text formatting which cannot be displayed here. • Additional Information 03/13/2023                      Value: This result contains rich text formatting which cannot be displayed here. • Synoptic Checklist 03/13/2023                      Value:                            COLON/RECTUM POLYP FORM - GI - All Specimens                                                                                     :    Other     • Gross Description 03/13/2023                      Value: This result contains rich text formatting which cannot be displayed here.    Orders Only on 12/01/2022   Component Date Value   • Total Cholesterol 12/01/2022 180    • HDL 12/01/2022 40 (L)    • Triglycerides 12/01/2022 127    • LDL Calculated 12/01/2022 116 (H)    • Chol HDLC Ratio 12/01/2022 4.5    • Non-HDL Cholesterol 12/01/2022 140 (H)    • Creatinine, Urine 12/01/2022 82    • Albumin,U,Random 12/01/2022 5.3    • Microalb/Creat Ratio 12/01/2022 65 (H)    • Glucose, Random 12/01/2022 164 (H)    • BUN 12/01/2022 14    • Creatinine 12/01/2022 0.89    • eGFR 12/01/2022 82    • SL AMB BUN/CREATININE RA* 69/08/3276 NOT APPLICABLE    • Sodium 13/40/2507 136    • Potassium 12/01/2022 4.3    • Chloride 12/01/2022 101    • CO2 12/01/2022 25    • Calcium 12/01/2022 9.2    • Protein, Total 12/01/2022 6.3    • Albumin 12/01/2022 4.2    • Globulin 12/01/2022 2.1    • Albumin/Globulin Ratio 12/01/2022 2.0    • TOTAL BILIRUBIN 12/01/2022 0.5    • Alkaline Phosphatase 12/01/2022 55    • AST 12/01/2022 18    • ALT 12/01/2022 21    • Hemoglobin A1C 12/01/2022 6.6 (H)    Orders Only on 11/11/2022   Component Date Value   • White Blood Cell Count 11/11/2022 13.5 (H)    • Red Blood Cell Count 11/11/2022 4.48    • Hemoglobin 11/11/2022 13.1    • HCT 11/11/2022 38.9    • MCV 11/11/2022 86.8    • MCH 11/11/2022 29.2    • MCHC 11/11/2022 33.7    • RDW 11/11/2022 12.3    • Platelet Count 34/84/0241 304    • SL AMB MPV 11/11/2022 9.2    • Neutrophils (Absolute) 11/11/2022 8,141 (H)    • Lymphocytes (Absolute) 11/11/2022 4,415 (H)    • Monocytes (Absolute) 11/11/2022 824    • Eosinophils (Absolute) 11/11/2022 68    • Basophils ABS 11/11/2022 54    • Neutrophils 11/11/2022 60.3    • Lymphocytes 11/11/2022 32.7    • Monocytes 11/11/2022 6.1    • Eosinophils 11/11/2022 0.5    • Basophils PCT 11/11/2022 0.4    Office Visit on 11/09/2022   Component Date Value   • Case Report 11/09/2022                      Value:Surgical Pathology Report                         Case: [id number]                                   Authorizing Provider:  ADELAIDA Dash         Collected:           11/09/2022 1423              Ordering Location:     [facility]      Received:            11/09/2022 1420              Pathologist:           [address], [place]                                                    Specimen:    Endometrium                                                                               • Final Diagnosis 11/09/2022                      Value: This result contains rich text formatting which cannot be displayed here. • Additional Information 11/09/2022                      Value: This result contains rich text formatting which cannot be displayed here. • Gross Description 11/09/2022                      Value: This result contains rich text formatting which cannot be displayed here. There may be more visits with results that are not included.        Substance Abuse History:  Social History     Substance and Sexual Activity   Drug Use Not Currently       Family Psychiatric History:   Family History   Problem Relation Age of Onset   • Irregular heart beat Mother    • Diabetes Father    • Kidney disease Father    • Diabetes Maternal Grandmother    • Rheum arthritis Maternal Grandmother    • Melanoma Maternal Grandmother 80   • No Known Problems Maternal Grandfather    • Kidney disease Paternal Grandmother    • Rheum arthritis Paternal Grandmother    • Depression Paternal Grandmother    • Diabetes Paternal Grandfather    • Lung cancer Paternal Grandfather 52   • Substance Abuse Brother    • No Known Problems Brother    • Substance Abuse Maternal Uncle    • Prostate cancer Maternal Uncle 61   • Depression Paternal Aunt    • Alcohol abuse Family    • Stomach cancer Family        The following portions of the patient's history were reviewed and updated as appropriate: allergies, current medications, past family history, past medical history, past social history, past surgical history and problem list.    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; sober since 2010   • Drug use: Not Currently   • Sexual activity: Not Currently   Other Topics Concern   • Not on file   Social History Narrative    Caffeine use        What type of home do you live in: Single house    Age of your home: 48 yrs    How long have you been living there: 42 yrs    Type of heat: Baseboard    Type of fuel: Electric    What type of samir is in your bedroom: Carpet    Do you have the following in or near your home:    Air products: Window air conditioning and Ionic air purifier    Pests: Mice    Pets: Cat    Basement: None     Social Determinants of Health     Financial Resource Strain: High Risk (12/2/2020)    Overall Financial Resource Strain (CARDIA)    • Difficulty of Paying Living Expenses: Hard   Food Insecurity: No Food Insecurity (12/2/2020)    Hunger Vital Sign    • Worried About Running Out of Food in the Last Year: Never true    • Ran Out of Food in the Last Year: Never true   Transportation Needs: Unmet Transportation Needs (12/2/2020)    PRAPARE - Transportation    • Lack of Transportation (Medical): Yes    • Lack of Transportation (Non-Medical): Yes   Physical Activity: Insufficiently Active (10/12/2022)    Exercise Vital Sign    • Days of Exercise per Week: 2 days    • Minutes of Exercise per Session: 60 min   Stress: Stress Concern Present (4/3/2019)    [facility]    • Feeling of Stress : To some extent   Social Connections: Moderately Integrated (4/3/2019)    Social Connection and Isolation Panel [NHANES]    • Frequency of Communication with Friends and Family: More than three times a week    • Frequency of Social Gatherings with Friends and Family: More than three times a week    • Attends Jewish Services: More than 4 times per year    • Active Member of Clubs or Organizations:  Yes    • Attends Club or Organization Meetings: More than 4 times per year    • Marital Status: Never    Intimate Partner Violence: Not At Risk (4/3/2019)    Humiliation, Afraid, Rape, and Kick questionnaire    • Fear of Current or Ex-Partner: No    • Emotionally Abused: No    • Physically Abused: No    • Sexually Abused: No   Housing Stability: Not on file     Social History     Social History Narrative    Caffeine use        What type of home do you live in: Single house    Age of your home: 48 yrs    How long have you been living there: 42 yrs    Type of heat: Baseboard    Type of fuel: Electric    What type of samir is in your bedroom: Carpet    Do you have the following in or near your home:    Air products: Window air conditioning and Ionic air purifier    Pests: Mice    Pets: Cat    Basement: None       Objective:       Mental status:  Appearance calm and cooperative , adequate hygiene and grooming and good eye contact    Mood dysphoric   Affect affect was constricted   Speech a normal rate and fluent   Thought Processes coherent/organized and normal thought processes   Hallucinations no hallucinations present    Thought Content no delusions   Abnormal Thoughts no suicidal thoughts  and no homicidal thoughts    Orientation  oriented to person and place and time   Remote Memory short term memory intact and long term memory intact   Attention Span concentration intact   Intellect Appears to be of Average Intelligence   Insight Limited insight   Judgement judgment was limited   Muscle Strength Muscle strength and tone were normal and Normal gait    Language no difficulty naming common objects and no difficulty repeating a phrase    Fund of Knowledge displays adequate knowledge of current events, adequate fund of knowledge regarding past history and adequate fund of knowledge regarding vocabulary                Assessment/Plan:       Diagnoses and all orders for this visit:    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 psychotic features ([address])    Major depressive disorder, recurrent episode, moderate (HCC)  -     desvenlafaxine succinate (PRISTIQ) 50 mg 24 hr tablet; Take 1 tablet (50 mg total) by mouth daily Total daily dose 150 mg daily    Generalized anxiety disorder    Morbid obesity (HCC)    Obesity (BMI 30-39.9)  -     phentermine (ADIPEX-P) 37.5 MG tablet; Take 1 tablet (37.5 mg total) by mouth every morning            Treatment Recommendations- Risks Benefits      Immediate Medical/Psychiatric/Psychotherapy Treatments and Any Precautions: continue current treatment , start phentermine for obesity management    Risks, Benefits And Possible Side Effects Of Medications:  {PSYCH RISK, BENEFITS AND POSSIBLE SIDE EFFECTS (Optional):14935    Controlled Medication Discussion: Discussed with patient Black Box warning on concurrent use of benzodiazepines and opioid medications including sedation, respiratory depression, coma and death. Patient understands the risk of treatment with benzodiazepines in addition to opioids and wants to continue taking those medications. , Discussed with patient the risks of sedation, respiratory depression, impairment of ability to drive and potential for abuse and addiction related to treatment with benzodiazepine medications. The patient understands risk of treatment with benzodiazepine medications, agrees to not drive if feels impaired and agrees to take medications as prescribed. and The patient has been filling controlled prescriptions on time as prescribed to [address] [facility] [address] program.      Psychotherapy Provided: Individual psychotherapy provided. Individual psychotherapy provided: Yes  Counseling was provided during the session today for 16 minutes. Medications, treatment progress and treatment plan reviewed with Esther. Medication education provided to Esther. Goals discussed during in session: continue improvement in depression.    Coping strategies including compliance with medications, exercising, getting into a good routine, increasing energy, increasing interest in usual activities, increasing motivation, increasing social interaction, maintain healthy diet, maintain heathy sleeping hygiene and maintain positive attitude reviewed with Jerrica John. Importance of medication and treatment compliance reviewed with Esther. Educated on importance of medication and treatment compliance.   Supportive therapy provided.

## 2023-09-19 NOTE — TELEPHONE ENCOUNTER
Upon review of the In Basket request we were able to note that a Health Maintenance (HM) modifier was needed to update HM. HM Modifiers should be adjusted by the office staff. If you require additional support on adjusting modifiers, please email our Practice Liaisons. Any additional questions or concerns should be emailed to the Practice Liaisons via the appropriate education email address, please do not reply via In Basket.     Thank you  Zee Albright MA

## 2023-09-22 ENCOUNTER — TELEPHONE (OUTPATIENT)
Dept: PSYCHIATRY | Facility: CLINIC | Age: 43
End: 2023-09-22

## 2023-09-22 NOTE — TELEPHONE ENCOUNTER
Patient called requesting provider call back stating she would like a call back from provider there is something she needs to discuss

## 2023-09-22 NOTE — TELEPHONE ENCOUNTER
1425-return call to Yelitza Beavers. Yelitza Beavers shared that she reached out for a call because her friend Alisson Fraga  suddenly last night and she is really struggling with feeling grief and loss. Provided support, validated Esther's feelings, and encouraged her to focus on self-care and letting go of any unnecessary obligations right now. She noted that she is  for this week through next Saturday, and said that she feels like that is a good thing, as it is keeping her busy and not at home where there are so many reminders of their friendship. Will speak more at her visit next week.

## 2023-09-25 ENCOUNTER — TELEPHONE (OUTPATIENT)
Dept: FAMILY MEDICINE CLINIC | Facility: CLINIC | Age: 43
End: 2023-09-25

## 2023-09-25 ENCOUNTER — TELEPHONE (OUTPATIENT)
Dept: PSYCHIATRY | Facility: CLINIC | Age: 43
End: 2023-09-25

## 2023-09-25 NOTE — TELEPHONE ENCOUNTER
Esther MORRIS stating the Phentermine 37.5 mg needs a PA through [employer]      Initiated and sent PA request for Phentermine 37.5 mg via [employer] and faxed to [facility] along with office notes dated 9/19/23          Spoke with Ysabel Small- advised of PA submission and will call when determination received.

## 2023-09-25 NOTE — TELEPHONE ENCOUNTER
LMOM: Hi, my name is Fay Ortiz. Last Monday I had seen Titi Jean and she put me on a muscle relaxer - Flexeril 10 milligrams three times a day and for my lower back pain and it has only been helping a tiny bit. So she told me to get back to her if I felt like maybe something in addition would help. So she also asked me to get in touch with doctor work at the [facility], which I will do right now. Please advise. Thank you so much.

## 2023-09-26 NOTE — TELEPHONE ENCOUNTER
[facility] denying Phentermine 37.5 mg    Request does not meet rules for obesity treatment agents. Please see letter scanned into media.

## 2023-09-28 ENCOUNTER — TELEPHONE (OUTPATIENT)
Dept: PSYCHIATRY | Facility: CLINIC | Age: 43
End: 2023-09-28

## 2023-09-29 ENCOUNTER — TELEPHONE (OUTPATIENT)
Dept: PSYCHIATRY | Facility: CLINIC | Age: 43
End: 2023-09-29

## 2023-09-29 NOTE — TELEPHONE ENCOUNTER
Esther MORRIS stating pharmacy told her the Hydroxyzine 50 mg was cancelled by provider. Kelsey Griffin states Dr Chris Carlson prescribed her both Atarax 25 mg and 50 mg to total 75 mg. Please re-send the 50 mg.        Kelsey Griffin- [phone]

## 2023-10-01 DIAGNOSIS — F51.04 PSYCHOPHYSIOLOGICAL INSOMNIA: ICD-10-CM

## 2023-10-01 RX ORDER — HYDROXYZINE 50 MG/1
TABLET, FILM COATED ORAL
Qty: 30 TABLET | Refills: 2 | Status: SHIPPED | OUTPATIENT
Start: 2023-10-01 | End: 2023-10-23

## 2023-10-02 ENCOUNTER — TELEPHONE (OUTPATIENT)
Dept: NEUROLOGY | Facility: CLINIC | Age: 43
End: 2023-10-02

## 2023-10-04 ENCOUNTER — PROCEDURE VISIT (OUTPATIENT)
Dept: NEUROLOGY | Facility: CLINIC | Age: 43
End: 2023-10-04
Payer: COMMERCIAL

## 2023-10-04 DIAGNOSIS — M54.16 LUMBAR RADICULOPATHY: ICD-10-CM

## 2023-10-04 PROCEDURE — 95912 NRV CNDJ TEST 11-12 STUDIES: CPT | Performed by: PHYSICAL MEDICINE & REHABILITATION

## 2023-10-04 PROCEDURE — 95886 MUSC TEST DONE W/N TEST COMP: CPT | Performed by: PHYSICAL MEDICINE & REHABILITATION

## 2023-10-04 NOTE — PROGRESS NOTES
EMG 2 limb lower extremity     Date/Time 10/4/2023 9:45 AM     Performed by  Annelise Barbour MD   Authorized by Ayden Arroyo, DO           EMG bilateral lower extremity completed today.

## 2023-10-05 ENCOUNTER — HOSPITAL ENCOUNTER (EMERGENCY)
Facility: HOSPITAL | Age: 43
Discharge: HOME | End: 2023-10-05
Attending: EMERGENCY MEDICINE
Payer: COMMERCIAL

## 2023-10-05 VITALS
SYSTOLIC BLOOD PRESSURE: 121 MMHG | RESPIRATION RATE: 18 BRPM | HEART RATE: 106 BPM | TEMPERATURE: 97.1 F | DIASTOLIC BLOOD PRESSURE: 76 MMHG | OXYGEN SATURATION: 98 %

## 2023-10-05 DIAGNOSIS — M54.50 ACUTE EXACERBATION OF CHRONIC LOW BACK PAIN: Primary | ICD-10-CM

## 2023-10-05 DIAGNOSIS — G89.29 ACUTE EXACERBATION OF CHRONIC LOW BACK PAIN: Primary | ICD-10-CM

## 2023-10-05 PROCEDURE — 99284 EMERGENCY DEPT VISIT MOD MDM: CPT | Performed by: EMERGENCY MEDICINE

## 2023-10-05 PROCEDURE — 96372 THER/PROPH/DIAG INJ SC/IM: CPT

## 2023-10-05 PROCEDURE — 99284 EMERGENCY DEPT VISIT MOD MDM: CPT

## 2023-10-05 RX ORDER — KETOROLAC TROMETHAMINE 30 MG/ML
15 INJECTION, SOLUTION INTRAMUSCULAR; INTRAVENOUS ONCE
Status: COMPLETED | OUTPATIENT
Start: 2023-10-05 | End: 2023-10-05

## 2023-10-05 RX ORDER — ACETAMINOPHEN 325 MG/1
650 TABLET ORAL ONCE
Status: COMPLETED | OUTPATIENT
Start: 2023-10-05 | End: 2023-10-05

## 2023-10-05 RX ORDER — LIDOCAINE 50 MG/G
1 PATCH TOPICAL ONCE
Status: DISCONTINUED | OUTPATIENT
Start: 2023-10-05 | End: 2023-10-05 | Stop reason: HOSPADM

## 2023-10-05 RX ADMIN — KETOROLAC TROMETHAMINE 15 MG: 30 INJECTION, SOLUTION INTRAMUSCULAR; INTRAVENOUS at 14:51

## 2023-10-05 RX ADMIN — LIDOCAINE 1 PATCH: 50 PATCH CUTANEOUS at 14:51

## 2023-10-05 RX ADMIN — ACETAMINOPHEN 650 MG: 325 TABLET, FILM COATED ORAL at 14:51

## 2023-10-05 NOTE — DISCHARGE INSTRUCTIONS
You were seen in the Emergency Department today for low back pain. Continue to take Tylenol/Ibuprofen and using lidocaine patches as needed for pain. Referral to [facility] placed, can follow up with them for further management of your back pain. Please follow up with your primary care doctor within 1 week for re-evaluation. Also follow up with pain management for further management of chronic pain. Please return to the Emergency Department if you experience worsening of your current symptoms or any other concerning symptoms including fever, new numbness/tingling/weakness in your legs, bowel/bladder incontinence.

## 2023-10-05 NOTE — ED ATTENDING ATTESTATION
10/5/2023  IAndreas DO, saw and evaluated the patient. I have discussed the patient with the resident/non-physician practitioner and agree with the resident's/non-physician practitioner's findings, Plan of Care, and MDM as documented in the resident's/non-physician practitioner's note, except where noted. All available labs and Radiology studies were reviewed. I was present for key portions of any procedure(s) performed by the resident/non-physician practitioner and I was immediately available to provide assistance. At this point I agree with the current assessment done in the Emergency Department. I have conducted an independent evaluation of this patient a history and physical is as follows:    A 47-year-old female with a history of fibromyalgia, narcolepsy, chronic back pain, type 2 diabetes, says over the last day or 2 she has had exacerbation of her chronic left-sided low back discomfort. Worse with moving, better remaining still. No bladder or bowel incontinence, no saddle anesthesia, no leg weakness, no IV drug use. No falls or trauma, no back surgery. He has had 3 previous steroid injections which gave her mild relief, has another one scheduled next week. Yesterday she had an outpatient EMG which showed chronic L5 right-sided radiculopathy. General:  Patient is well-appearing  Head:  Atraumatic  Eyes:  Conjunctiva pink  ENT:  Mucous membranes are moist  Neck:  Supple  Cardiac:  S1-S2, without murmurs  Lungs:  Clear to auscultation bilaterally  Abdomen:  Soft, nontender, normal bowel sounds, no CVA tenderness  Extremities:  Normal range of motion   Back: No warmth or redness to the back. There is no CVA tenderness, no spinal tenderness, no warmth or fluctuance. Neurologic:  Awake, fluent speech, normal comprehension, sensation intact and symmetric in the b/l  legs. Strength is 5/5 at the bilateral hips, knees, ankles, reflexes are 2/4 at the bilateral knees and ankles.   Can dorsiflex & plantarflex great toes bilaterally without difficulty, no saddle anesthesia, negative straight leg raise on the right but positive on the left. Awake and oriented x3  Skin:  Pink warm and dry, no rash  Psychiatric:  Alert, pleasant, cooperative      ED Course        After symptomatic management, patient was feeling better. No change in above findings. Based on the patient's history and physical exam findings, I do not find any evidence of neurosurgical emergency or need for emergent imaging studies as there is no bladder or bowel incontinence, no saddle anesthesia, and no significant neurologic abnormalities. There is no evidence of cauda equina syndrome, The patient is afebrile, has no significant vertebral tenderness or fluctuance, and has no risk factors for spinal abscess such as IV drug use, significant trauma, or recent spinal injections. I believe it is appropriate for the patient to be discharged and managed conservatively as an outpatient. It was discussed with the patient that this may be the early presentation of a more significant problem, and if they notice any of the signs/symptoms in the discharge instruction sheets, or they are otherwise concerned about their medical condition, they should return to the nearest emergency department. DIAGNOSIS:  Acute exacerbation of chronic low back pain    MEDICAL DECISION MAKING CODING      Chronic conditions affecting care: As per HPI    COLLECTION AND INTERPRETATION OF DATA  I reviewed prior external notes, including outpatient EMG as noted above      Tests considered but not ordered: See above    RISK  Drugs (OTC, Rx, Controlled substances): Prescription management  All of the patient's current prescription medications should be continued.       Social Determinants of Health:  Presentation to ED outside of business hours or on night shift        Critical Care Time  Procedures

## 2023-10-05 NOTE — ED PROVIDER NOTES
History  Chief Complaint   Patient presents with   • Back Pain     Left lower back pain, had EMG done yesterday and showed pinched nerve     HPI     ED Course as of 10/07/23 1451   Thu Oct 05, 2023   1340 Blood Pressure: 121/76   1340 Temperature(!): 97.1 °F (36.2 °C)   1340 Temp Source: Temporal   1340 Pulse(!): 106   1340 Respirations: 18   1340 SpO2: 98 %   1405 38 yo F here with L lower back pain x 2 weeks. Worsening last night and today. No inciting trauma. Pain radiates down left leg, achy type of pain. No new numbness/tingling. No bowel/bladder dysfunction. PMH DJD and fibromyalgia. Gets epidural injections 4 times a year for chronic low back pain. EMG yesterday showed "pinched nerve". Reports taking Tylenol yesterday without improvement. Already takes flexeril daily for fibromyalgia. Has tried other muscle relaxants in the past, doesn't work as well as flexeril. Denies fever, CP, SOB, abdominal pain, dysuria, hematuria, bowel/bladder dysfunction. Denies any chance of pregnancy, reports hx of hysterectomy. HD stable. Afebrile. +straight leg test. Mild tenderness to deep palpation over BL lumbar paraspinals, left SI joint. Sensations to light touch intact and motor strength 5/5 intact in BL LE. Ddx: MSK. Doubt cauda equina, kidney pathology. Will tx symptomatically. Referral to [facility] spine placed. 1406 EMG report reviewed - "chronic L5 radiculopathy on the right"   1449 Stable for discharge. Strict return to ED precautions provided. Advised patient to follow-up with her PCP as needed. Also follow-up with comprehensive spine and pain management for further management of her chronic back pain. Patient verbalized understanding and agrees with plan of care. Prior to Admission Medications   Prescriptions Last Dose Informant Patient Reported? Taking?    Blood Glucose Monitoring Suppl (Contour Blood Glucose System) w/Device KIT   No No   Sig: Use 1 kit 2 (two) times a day before meals   Cholecalciferol (VITAMIN D-3) 1000 units CAPS  Self Yes No   Sig: Take 1 capsule by mouth daily Take 2000 IU daily   MAGNESIUM OXIDE PO  Self Yes No   Sig: Take by mouth   acetaminophen (TYLENOL) 650 mg CR tablet   No No   Sig: Take 2 tablets (1,300 mg total) by mouth every 8 (eight) hours as needed for mild pain   celecoxib (CeleBREX) 200 mg capsule   No No   Sig: TAKE 1 CAPSULE BY MOUTH TWICE A DAY   cyclobenzaprine (FLEXERIL) 10 mg tablet   No No   Sig: Take 1 tablet (10 mg total) by mouth 3 (three) times a day as needed for muscle spasms   desvenlafaxine (PRISTIQ) 100 mg 24 hr tablet   No No   Sig: TAKE 1 TABLET BY MOUTH EVERY DAY   desvenlafaxine succinate (PRISTIQ) 50 mg 24 hr tablet   No No   Sig: Take 1 tablet (50 mg total) by mouth daily Total daily dose 150 mg daily   gabapentin (Neurontin) 300 mg capsule   No No   Sig: Take 1 capsule (300 mg total) by mouth 3 (three) times a day   glucose blood (Contour Test) test strip   No No   Sig: USE TO CHECK BLOOD SUGAR ONCE DAILY   hydrOXYzine HCL (ATARAX) 25 mg tablet   No No   Sig: TAKE 1 TAB BY MOUTH EVERY 6 HOURS AS NEEDED (SLEEP) IN ADDITION TO 50 MG DOSE, MAY TAKE 50-75MG AT BEDTIME AS NEEDED FOR SLEEP   hydrOXYzine HCL (ATARAX) 50 mg tablet   No No   Sig: Take by mouth daily at bedtime in addition to 25 mg dose,may take 50-75 mg po qhs prn for insomnia   lidocaine (LIDODERM) 5 %   No No   Sig: APPLY 1 PATCH IN THE MORNING REMOVE AND DISARD PATCH WITHIN 12 HOURS OR AS DIRECTED   metFORMIN (GLUCOPHAGE) 500 mg tablet   No No   Sig: TAKE 1 TABLET BY MOUTH TWICE A DAY WITH MEALS   nortriptyline (PAMELOR) 75 MG capsule   No No   Sig: TAKE 1 CAPSULE BY MOUTH TWICE A DAY   phentermine (ADIPEX-P) 37.5 MG tablet   No No   Sig: Take 1 tablet (37.5 mg total) by mouth every morning   propranolol (INDERAL) 20 mg tablet   No No   Sig: TAKE 1 TABLET BY MOUTH EVERY DAY AT NIGHT      Facility-Administered Medications: None       Past Medical History:   Diagnosis Date   • Anxiety    • Blood transfusion declined because patient is Yarsanism 5/1/2023   • Chronic pain disorder    • Chronic sinusitis    • Depression    • Diabetes ([address])    • Fibromyalgia    • Migraine    • Obesity    • Polyarthritis     Last assessed 9/21/2015   • Psychiatric disorder    • Psychiatric illness    • Sleep difficulties        Past Surgical History:   Procedure Laterality Date   • COLONOSCOPY  06/2019   • DENTAL SURGERY     • HYSTEROSCOPY      Endometrial Biopsy By Hysteroscopy   • [place] LAPS SUPRACRV HYSTERECT 250 GM/< RMVL TUBE/OVAR N/A 5/1/2023    Procedure: ([address]) W/ BILATERAL SALPINGECTOMY, REMOVAL PARAOVARIAN CYST;  Surgeon: Rodney Castillo DO;  Location: [facility];  Service: Gynecology   • REMOVAL OF INTRAUTERINE DEVICE (IUD)     • US GUIDED BREAST BIOPSY RIGHT COMPLETE Right 6/17/2019       Family History   Problem Relation Age of Onset   • Irregular heart beat Mother    • Diabetes Father    • Kidney disease Father    • Diabetes Maternal Grandmother    • Rheum arthritis Maternal Grandmother    • Melanoma Maternal Grandmother 80   • No Known Problems Maternal Grandfather    • Kidney disease Paternal Grandmother    • Rheum arthritis Paternal Grandmother    • Depression Paternal Grandmother    • Diabetes Paternal Grandfather    • Lung cancer Paternal Grandfather 52   • Substance Abuse Brother    • No Known Problems Brother    • Substance Abuse Maternal Uncle    • Prostate cancer Maternal Uncle 61   • Depression Paternal Aunt    • Alcohol abuse Family    • Stomach cancer Family      I have reviewed and agree with the history as documented.     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; sober since 2010   • Drug use: Not Currently        Review of Systems   Constitutional: Negative for chills and fever. HENT: Negative for congestion, ear pain, rhinorrhea and sore throat. Eyes: Negative for pain and visual disturbance. Respiratory: Negative for cough and shortness of breath. Cardiovascular: Negative for chest pain and palpitations. Gastrointestinal: Negative for abdominal pain, diarrhea, nausea and vomiting. Genitourinary: Negative for dysuria and hematuria. Musculoskeletal: Positive for back pain. Negative for arthralgias. Skin: Negative for color change and rash. Neurological: Negative for dizziness, seizures, syncope, weakness, light-headedness and headaches. All other systems reviewed and are negative. Physical Exam  ED Triage Vitals   Temperature Pulse Respirations Blood Pressure SpO2   10/05/23 1317 10/05/23 1317 10/05/23 1319 10/05/23 1319 10/05/23 1319   (!) 97.1 °F (36.2 °C) (!) 106 18 121/76 98 %      Temp Source Heart Rate Source Patient Position - Orthostatic VS BP Location FiO2 (%)   10/05/23 1317 -- -- 10/05/23 1319 --   Temporal   Right arm       Pain Score       10/05/23 1319       7             Orthostatic Vital Signs  Vitals:    10/05/23 1317 10/05/23 1319   BP:  121/76   Pulse: (!) 106        Physical Exam  Vitals and nursing note reviewed. Constitutional:       General: She is not in acute distress. Appearance: Normal appearance. She is well-developed. She is not ill-appearing, toxic-appearing or diaphoretic. HENT:      Head: Normocephalic and atraumatic. Eyes:      Conjunctiva/sclera: Conjunctivae normal.   Cardiovascular:      Rate and Rhythm: Normal rate and regular rhythm. Heart sounds: No murmur heard. Pulmonary:      Effort: Pulmonary effort is normal. No respiratory distress. Breath sounds: Normal breath sounds. No stridor. No wheezing, rhonchi or rales. Abdominal:      Palpations: Abdomen is soft. Tenderness: There is no abdominal tenderness. Musculoskeletal:         General: No swelling. Cervical back: Neck supple. Comments: Mild TTP bilateral lumbar paraspinal musculatures. No overlying skin changes. Mild TTP over left SI joint. Sensation to light touch and motor strength 5/5 intact in BL LEs. No midline spinal tenderness   Skin:     General: Skin is warm and dry. Capillary Refill: Capillary refill takes less than 2 seconds. Neurological:      General: No focal deficit present. Mental Status: She is alert and oriented to person, place, and time. Sensory: No sensory deficit. Motor: No weakness. Psychiatric:         Mood and Affect: Mood normal.         ED Medications  Medications   acetaminophen (TYLENOL) tablet 650 mg (650 mg Oral Given 10/5/23 1451)   ketorolac (TORADOL) injection 15 mg (15 mg Intramuscular Given 10/5/23 1451)       Diagnostic Studies  Results Reviewed     None                 No orders to display         Procedures  Procedures                                        Medical Decision Making  Risk  OTC drugs. Prescription drug management. See ED course above for additional details including HPI, MDM including PLAN, and disposition.         Disposition  Final diagnoses:   Acute exacerbation of chronic low back pain     Time reflects when diagnosis was documented in both MDM as applicable and the Disposition within this note     Time User Action Codes Description Comment    10/5/2023  2:46 PM Gonzalez Delay T Add [M54.9] Back pain     10/5/2023  2:46 PM Gonzalez Delay T Add [M54.9,  G89.29] Chronic back pain     10/5/2023  2:46 PM Gerold Veteran Modify [M54.9,  G89.29] Chronic back pain     10/5/2023  2:46 PM Gonzalez Delay T Remove [M54.9] Back pain     10/5/2023  2:46 PM Gonzalez Delay T Add [M54.50,  G89.29] Acute exacerbation of chronic low back pain     10/5/2023  2:46 PM Gerold Veteran Modify [M54.50,  G89.29] Acute exacerbation of chronic low back pain     10/5/2023  2:46 PM Gonzalez Delay T Remove [M54.9,  G89.29] Chronic back pain       ED Disposition     ED Disposition   Discharge    Condition   Stable    Date/Time   Thu Oct 5, 2023  2:46 PM    Comment   Quita Villanueva discharge to home/self care.                Follow-up Information     Follow up With Specialties Details Why Contact Info Additional Information    Erick Rivera MD Family Medicine   [address]  [phone] [address] [place] [address] Emergency Department Emergency Medicine  If symptoms worsen [address] [address]  [facility] Emergency Department, [address], [facility]          Discharge Medication List as of 10/5/2023  2:49 PM      CONTINUE these medications which have NOT CHANGED    Details   acetaminophen (TYLENOL) 650 mg CR tablet Take 2 tablets (1,300 mg total) by mouth every 8 (eight) hours as needed for mild pain, Starting Mon 5/1/2023, No Print      Blood Glucose Monitoring Suppl (Contour Blood Glucose System) w/Device KIT Use 1 kit 2 (two) times a day before meals, Starting Tue 10/4/2022, Normal      celecoxib (CeleBREX) 200 mg capsule TAKE 1 CAPSULE BY MOUTH TWICE A DAY, Normal      Cholecalciferol (VITAMIN D-3) 1000 units CAPS Take 1 capsule by mouth daily Take 2000 IU daily, Historical Med      cyclobenzaprine (FLEXERIL) 10 mg tablet Take 1 tablet (10 mg total) by mouth 3 (three) times a day as needed for muscle spasms, Starting Mon 9/18/2023, Normal      !! desvenlafaxine (PRISTIQ) 100 mg 24 hr tablet TAKE 1 TABLET BY MOUTH EVERY DAY, Normal      !! desvenlafaxine succinate (PRISTIQ) 50 mg 24 hr tablet Take 1 tablet (50 mg total) by mouth daily Total daily dose 150 mg daily, Starting Tue 9/19/2023, No Print      gabapentin (Neurontin) 300 mg capsule Take 1 capsule (300 mg total) by mouth 3 (three) times a day, Starting Fri 8/25/2023, Normal      glucose blood (Contour Test) test strip USE TO CHECK BLOOD SUGAR ONCE DAILY, Normal      !! hydrOXYzine HCL (ATARAX) 25 mg tablet TAKE 1 TAB BY MOUTH EVERY 6 HOURS AS NEEDED (SLEEP) IN ADDITION TO 50 MG DOSE, MAY TAKE 50-75MG AT BEDTIME AS NEEDED FOR SLEEP, Normal      !! hydrOXYzine HCL (ATARAX) 50 mg tablet Take by mouth daily at bedtime in addition to 25 mg dose,may take 50-75 mg po qhs prn for insomnia, Normal      lidocaine (LIDODERM) 5 % APPLY 1 PATCH IN THE MORNING REMOVE AND DISARD PATCH WITHIN 12 HOURS OR AS DIRECTED, Normal      MAGNESIUM OXIDE PO Take by mouth, Historical Med      metFORMIN (GLUCOPHAGE) 500 mg tablet TAKE 1 TABLET BY MOUTH TWICE A DAY WITH MEALS, Normal      nortriptyline (PAMELOR) 75 MG capsule TAKE 1 CAPSULE BY MOUTH TWICE A DAY, Normal      phentermine (ADIPEX-P) 37.5 MG tablet Take 1 tablet (37.5 mg total) by mouth every morning, Starting Tue 9/19/2023, Normal      propranolol (INDERAL) 20 mg tablet TAKE 1 TABLET BY MOUTH EVERY DAY AT NIGHT, Normal       !! - Potential duplicate medications found. Please discuss with provider. PDMP Review       Value Time User    PDMP Reviewed  Yes 9/19/2023 10:26 AM Carlos Eduardo Benitez MD           ED Provider  Attending physically available and evaluated Jimbo Ng. I managed the patient along with the ED Attending.     Electronically Signed by         Petra Roberts MD  10/07/23 4803

## 2023-10-06 ENCOUNTER — TELEPHONE (OUTPATIENT)
Dept: PHYSICAL THERAPY | Facility: OTHER | Age: 43
End: 2023-10-06

## 2023-10-06 ENCOUNTER — HOSPITAL ENCOUNTER (EMERGENCY)
Facility: HOSPITAL | Age: 43
Discharge: HOME | End: 2023-10-06
Attending: EMERGENCY MEDICINE
Payer: COMMERCIAL

## 2023-10-06 VITALS
TEMPERATURE: 96.5 F | HEART RATE: 82 BPM | OXYGEN SATURATION: 100 % | RESPIRATION RATE: 16 BRPM | DIASTOLIC BLOOD PRESSURE: 70 MMHG | SYSTOLIC BLOOD PRESSURE: 120 MMHG

## 2023-10-06 DIAGNOSIS — M54.30 SCIATICA: ICD-10-CM

## 2023-10-06 DIAGNOSIS — M54.16 LUMBAR RADICULOPATHY: Primary | ICD-10-CM

## 2023-10-06 PROCEDURE — 99283 EMERGENCY DEPT VISIT LOW MDM: CPT

## 2023-10-06 PROCEDURE — 99284 EMERGENCY DEPT VISIT MOD MDM: CPT | Performed by: EMERGENCY MEDICINE

## 2023-10-06 RX ORDER — DIAZEPAM 5 MG/1
5 TABLET ORAL EVERY 8 HOURS PRN
Qty: 15 TABLET | Refills: 0 | Status: SHIPPED | OUTPATIENT
Start: 2023-10-06

## 2023-10-06 RX ORDER — PREDNISONE 20 MG/1
40 TABLET ORAL ONCE
Status: COMPLETED | OUTPATIENT
Start: 2023-10-06 | End: 2023-10-06

## 2023-10-06 RX ORDER — DIAZEPAM 5 MG/1
5 TABLET ORAL ONCE
Status: COMPLETED | OUTPATIENT
Start: 2023-10-06 | End: 2023-10-06

## 2023-10-06 RX ORDER — PREDNISONE 20 MG/1
40 TABLET ORAL DAILY
Qty: 10 TABLET | Refills: 0 | Status: SHIPPED | OUTPATIENT
Start: 2023-10-06 | End: 2023-10-11

## 2023-10-06 RX ADMIN — DIAZEPAM 5 MG: 5 TABLET ORAL at 13:55

## 2023-10-06 RX ADMIN — PREDNISONE 40 MG: 20 TABLET ORAL at 13:55

## 2023-10-06 NOTE — DISCHARGE INSTRUCTIONS
Please take Valium and prednisone for 5 days for the pain. Follow-up with outpatient pain management for your scheduled appointment. Heat pads and rest.  If you have weakness on 1 side over the other come back to the ED.

## 2023-10-06 NOTE — ED PROVIDER NOTES
History  Chief Complaint   Patient presents with   • Back Pain   • Numbness     lower back pain and left leg pain and numbness, was seen yesterday when it started and returns today due to increased pain . HPI   Pt is a 56-year-old female, history of chronic lower back pain, is seen by pain management for injections, presents with worsening left-sided pain radiating to the posterior thigh and anterior thigh, and has some numbness around the lateral thigh and leg area. Denies any falls, no fever, no history of cancer or osteoporosis, no history of IV drug use, denies bowel or bladder dysfunction, no urinary symptoms. Was evaluated in the ED yesterday for the same. On exam   General: VSS, NAD, awake, alert. Talking normally. Head: Normocephalic, atraumatic, nontender. Eyes: EOM-No subconjunctival hemorrhages. ENT: Nose atraumatic. MMM  No malocclusion. No stridor. Normal phonation. No drooling. Normal swallowing. Neck: Trachea midline. No JVD. CV: RRR. Lungs: CTAB No tachypnea. No paradoxical motion. Abd: +BS, soft, NT/ND. No guarding/rigidity. MSK: Full ROM throughout. No lower extremity edema. Mild paraspinal tenderness around the left lower lumbar area, sensory deficit in the lateral thigh and leg. Motor strength intact. Patient has tenderness to palpation around the piriformis area as well. Skin: Dry, intact. Neuro: AAOx3, GCS 15, CN II-XII grossly intact. Motor/sensory grossly intact. Psychiatric/Behavioral: mood/affect normal; behavior normal; thought content normal; judgement normal   Exam: deferred    Ddx: Sciatica, piriformis spasm, acute on chronic lower back pain. Plan: Patient does not have any indications for imaging at this point. Patient is symptomatically treated with Valium and prednisone. Discharged with 5 days of Valium and prednisone. Last A1c is 6. Discussed outpatient follow-up with pain management.   Patient agreeable with the plan    Final Dispo: Pt is hemodynamically stable and clear for discharge with outpatient f/u with their PCP. Return precautions given pt verbalized understanding      Prior to Admission Medications   Prescriptions Last Dose Informant Patient Reported? Taking?    Blood Glucose Monitoring Suppl (Contour Blood Glucose System) w/Device KIT   No No   Sig: Use 1 kit 2 (two) times a day before meals   Cholecalciferol (VITAMIN D-3) 1000 units CAPS  Self Yes No   Sig: Take 1 capsule by mouth daily Take 2000 IU daily   MAGNESIUM OXIDE PO  Self Yes No   Sig: Take by mouth   acetaminophen (TYLENOL) 650 mg CR tablet   No No   Sig: Take 2 tablets (1,300 mg total) by mouth every 8 (eight) hours as needed for mild pain   celecoxib (CeleBREX) 200 mg capsule   No No   Sig: TAKE 1 CAPSULE BY MOUTH TWICE A DAY   cyclobenzaprine (FLEXERIL) 10 mg tablet   No No   Sig: Take 1 tablet (10 mg total) by mouth 3 (three) times a day as needed for muscle spasms   desvenlafaxine (PRISTIQ) 100 mg 24 hr tablet   No No   Sig: TAKE 1 TABLET BY MOUTH EVERY DAY   desvenlafaxine succinate (PRISTIQ) 50 mg 24 hr tablet   No No   Sig: Take 1 tablet (50 mg total) by mouth daily Total daily dose 150 mg daily   gabapentin (Neurontin) 300 mg capsule   No No   Sig: Take 1 capsule (300 mg total) by mouth 3 (three) times a day   glucose blood (Contour Test) test strip   No No   Sig: USE TO CHECK BLOOD SUGAR ONCE DAILY   hydrOXYzine HCL (ATARAX) 25 mg tablet   No No   Sig: TAKE 1 TAB BY MOUTH EVERY 6 HOURS AS NEEDED (SLEEP) IN ADDITION TO 50 MG DOSE, MAY TAKE 50-75MG AT BEDTIME AS NEEDED FOR SLEEP   hydrOXYzine HCL (ATARAX) 50 mg tablet   No No   Sig: Take by mouth daily at bedtime in addition to 25 mg dose,may take 50-75 mg po qhs prn for insomnia   lidocaine (LIDODERM) 5 %   No No   Sig: APPLY 1 PATCH IN THE MORNING REMOVE AND DISARD PATCH WITHIN 12 HOURS OR AS DIRECTED   metFORMIN (GLUCOPHAGE) 500 mg tablet   No No   Sig: TAKE 1 TABLET BY MOUTH TWICE A DAY WITH MEALS   nortriptyline (PAMELOR) 75 MG capsule   No No   Sig: TAKE 1 CAPSULE BY MOUTH TWICE A DAY   phentermine (ADIPEX-P) 37.5 MG tablet   No No   Sig: Take 1 tablet (37.5 mg total) by mouth every morning   propranolol (INDERAL) 20 mg tablet   No No   Sig: TAKE 1 TABLET BY MOUTH EVERY DAY AT NIGHT      Facility-Administered Medications: None       Past Medical History:   Diagnosis Date   • Anxiety    • Blood transfusion declined because patient is [employer] 5/1/2023   • Chronic pain disorder    • Chronic sinusitis    • Depression    • Diabetes ([address])    • Fibromyalgia    • Migraine    • Obesity    • Polyarthritis     Last assessed 9/21/2015   • Psychiatric disorder    • Psychiatric illness    • Sleep difficulties        Past Surgical History:   Procedure Laterality Date   • COLONOSCOPY  06/2019   • DENTAL SURGERY     • HYSTEROSCOPY      Endometrial Biopsy By Hysteroscopy   • PA LAPS SUPRACRV HYSTERECT 250 GM/< RMVL TUBE/OVAR N/A 5/1/2023    Procedure: ([address]) W/ BILATERAL SALPINGECTOMY, REMOVAL PARAOVARIAN CYST;  Surgeon: Johnson Clay DO;  Location: [facility];  Service: Gynecology   • REMOVAL OF INTRAUTERINE DEVICE (IUD)     • US GUIDED BREAST BIOPSY RIGHT COMPLETE Right 6/17/2019       Family History   Problem Relation Age of Onset   • Irregular heart beat Mother    • Diabetes Father    • Kidney disease Father    • Diabetes Maternal Grandmother    • Rheum arthritis Maternal Grandmother    • Melanoma Maternal Grandmother 80   • No Known Problems Maternal Grandfather    • Kidney disease Paternal Grandmother    • Rheum arthritis Paternal Grandmother    • Depression Paternal Grandmother    • Diabetes Paternal Grandfather    • Lung cancer Paternal Grandfather 52   • Substance Abuse Brother    • No Known Problems Brother    • Substance Abuse Maternal Uncle    • Prostate cancer Maternal Uncle 61   • Depression Paternal Aunt    • Alcohol abuse Family    • Stomach cancer Family      I have reviewed and agree with the history as documented. 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; sober since 2010   • Drug use: Not Currently        Review of Systems    Physical Exam  ED Triage Vitals [10/06/23 1240]   Temperature Pulse Respirations Blood Pressure SpO2   (!) 96.5 °F (35.8 °C) 100 20 125/74 100 %      Temp Source Heart Rate Source Patient Position - Orthostatic VS BP Location FiO2 (%)   Temporal -- -- -- --      Pain Score       9             Orthostatic Vital Signs  Vitals:    10/06/23 1240 10/06/23 1527   BP: 125/74 120/70   Pulse: 100 82       Physical Exam    ED Medications  Medications   diazepam (VALIUM) tablet 5 mg (5 mg Oral Given 10/6/23 1355)   predniSONE tablet 40 mg (40 mg Oral Given 10/6/23 1355)       Diagnostic Studies  Results Reviewed     None                 No orders to display         Procedures  Procedures      ED Course                                       MDM      Disposition  Final diagnoses:   Lumbar radiculopathy   Sciatica     Time reflects when diagnosis was documented in both MDM as applicable and the Disposition within this note     Time User Action Codes Description Comment    10/6/2023  2:18 PM Giovanni Adrian Add [J41.53] Lumbar radiculopathy     10/6/2023  2:31 PM Kaitlynn Wills Add [M54.30] Sciatica       ED Disposition     ED Disposition   Discharge    Condition   Stable    Date/Time   Fri Oct 6, 2023  2:30 PM    Comment   Mag Fish discharge to home/self care.                Follow-up Information     Follow up With Specialties Details Why Contact Info Additional Information    Lanice Curling, MD Family Medicine In 1 week  [facility] [address]  [phone]       [facility] Emergency Department Emergency Medicine  If symptoms worsen [address] [id number]  [facility] Emergency Department, [address], [place], [place], [facility]          Discharge Medication List as of 10/6/2023  2:54 PM      START taking these medications    Details   diazepam (VALIUM) 5 mg tablet Take 1 tablet (5 mg total) by mouth every 8 (eight) hours as needed for muscle spasms for up to 15 doses, Starting Fri 10/6/2023, Normal      predniSONE 20 mg tablet Take 2 tablets (40 mg total) by mouth daily for 5 days, Starting Fri 10/6/2023, Until Wed 10/11/2023, Normal         CONTINUE these medications which have NOT CHANGED    Details   acetaminophen (TYLENOL) 650 mg CR tablet Take 2 tablets (1,300 mg total) by mouth every 8 (eight) hours as needed for mild pain, Starting Mon 5/1/2023, No Print      Blood Glucose Monitoring Suppl (Contour Blood Glucose System) w/Device KIT Use 1 kit 2 (two) times a day before meals, Starting Tue 10/4/2022, Normal      celecoxib (CeleBREX) 200 mg capsule TAKE 1 CAPSULE BY MOUTH TWICE A DAY, Normal      Cholecalciferol (VITAMIN D-3) 1000 units CAPS Take 1 capsule by mouth daily Take 2000 IU daily, Historical Med      cyclobenzaprine (FLEXERIL) 10 mg tablet Take 1 tablet (10 mg total) by mouth 3 (three) times a day as needed for muscle spasms, Starting Mon 9/18/2023, Normal      !! desvenlafaxine (PRISTIQ) 100 mg 24 hr tablet TAKE 1 TABLET BY MOUTH EVERY DAY, Normal      !! desvenlafaxine succinate (PRISTIQ) 50 mg 24 hr tablet Take 1 tablet (50 mg total) by mouth daily Total daily dose 150 mg daily, Starting Tue 9/19/2023, No Print      gabapentin (Neurontin) 300 mg capsule Take 1 capsule (300 mg total) by mouth 3 (three) times a day, Starting Fri 8/25/2023, Normal      glucose blood (Contour Test) test strip USE TO CHECK BLOOD SUGAR ONCE DAILY, Normal      !! hydrOXYzine HCL (ATARAX) 25 mg tablet TAKE 1 TAB BY MOUTH EVERY 6 HOURS AS NEEDED (SLEEP) IN ADDITION TO 50 MG DOSE, MAY TAKE 50-75MG AT BEDTIME AS NEEDED FOR SLEEP, Normal      !! hydrOXYzine HCL (ATARAX) 50 mg tablet Take by mouth daily at bedtime in addition to 25 mg dose,may take 50-75 mg po qhs prn for insomnia, Normal      lidocaine (LIDODERM) 5 % APPLY 1 PATCH IN THE MORNING REMOVE AND DISARD PATCH WITHIN 12 HOURS OR AS DIRECTED, Normal      MAGNESIUM OXIDE PO Take by mouth, Historical Med      metFORMIN (GLUCOPHAGE) 500 mg tablet TAKE 1 TABLET BY MOUTH TWICE A DAY WITH MEALS, Normal      nortriptyline (PAMELOR) 75 MG capsule TAKE 1 CAPSULE BY MOUTH TWICE A DAY, Normal      phentermine (ADIPEX-P) 37.5 MG tablet Take 1 tablet (37.5 mg total) by mouth every morning, Starting Tue 9/19/2023, Normal      propranolol (INDERAL) 20 mg tablet TAKE 1 TABLET BY MOUTH EVERY DAY AT NIGHT, Normal       !! - Potential duplicate medications found. Please discuss with provider. No discharge procedures on file. PDMP Review       Value Time User    PDMP Reviewed  Yes 9/19/2023 10:26 AM Zabrina Lacey MD           ED Provider  Attending physically available and evaluated Estrella Vidal. I managed the patient along with the ED Attending.     Electronically Signed by         Royal Deanna DO  10/06/23 2155

## 2023-10-06 NOTE — TELEPHONE ENCOUNTER
Caller: pt    Doctor: cristiana    Reason for call: the ER put the pt on a 5 day course of prednisone and valium. She wanted to let us know that.     Call back#: [phone]

## 2023-10-06 NOTE — TELEPHONE ENCOUNTER
Call placed to the patient per [facility] referral.    Voice message left for patient to call back. Phone number and hours of business provided. Patient is already established with PM, Dr Federico Edmond and has an injection scheduled for 10/11/23. Will close csp referral. If patient is interested in PT eval, I provided phone number for her to call back.  Will assist patient at that time

## 2023-10-11 ENCOUNTER — HOSPITAL ENCOUNTER (OUTPATIENT)
Dept: RADIOLOGY | Facility: CLINIC | Age: 43
Discharge: HOME | End: 2023-10-11
Payer: COMMERCIAL

## 2023-10-11 VITALS
DIASTOLIC BLOOD PRESSURE: 80 MMHG | RESPIRATION RATE: 20 BRPM | SYSTOLIC BLOOD PRESSURE: 117 MMHG | OXYGEN SATURATION: 99 % | HEART RATE: 94 BPM | TEMPERATURE: 96.9 F

## 2023-10-11 DIAGNOSIS — M54.16 LUMBAR RADICULOPATHY: ICD-10-CM

## 2023-10-11 PROCEDURE — 3E0S33Z INTRODUCTION OF ANTI-INFLAMMATORY INTO EPIDURAL SPACE, PERCUTANEOUS APPROACH: ICD-10-PCS | Performed by: ANESTHESIOLOGY

## 2023-10-11 PROCEDURE — 64483 NJX AA&/STRD TFRM EPI L/S 1: CPT | Performed by: ANESTHESIOLOGY

## 2023-10-11 PROCEDURE — 3E0S3BZ INTRODUCTION OF ANESTHETIC AGENT INTO EPIDURAL SPACE, PERCUTANEOUS APPROACH: ICD-10-PCS | Performed by: ANESTHESIOLOGY

## 2023-10-11 RX ORDER — PAPAVERINE HCL 150 MG
20 CAPSULE, EXTENDED RELEASE ORAL ONCE
Status: COMPLETED | OUTPATIENT
Start: 2023-10-11 | End: 2023-10-11

## 2023-10-11 RX ADMIN — IOHEXOL 2 ML: 300 INJECTION, SOLUTION INTRAVENOUS at 10:33

## 2023-10-11 RX ADMIN — LIDOCAINE HYDROCHLORIDE 2 ML: 20 INJECTION, SOLUTION EPIDURAL; INFILTRATION; INTRACAUDAL; PERINEURAL at 10:33

## 2023-10-11 RX ADMIN — DEXAMETHASONE SODIUM PHOSPHATE 15 MG: 10 INJECTION, SOLUTION INTRAMUSCULAR; INTRAVENOUS at 10:33

## 2023-10-11 NOTE — DISCHARGE INSTRUCTIONS
Epidural Steroid Injection   WHAT YOU NEED TO KNOW:   An epidural steroid injection ([place]) is a procedure to inject steroid medicine into the epidural space. The epidural space is between your spinal cord and vertebrae. Steroids reduce inflammation and fluid buildup in your spine that may be causing pain. You may be given pain medicine along with the steroids. ACTIVITY  Do not drive or operate machinery today. No strenuous activity today - bending, lifting, etc.  You may resume normal activites starting tomorrow - start slowly and as tolerated. You may shower today, but no tub baths or hot tubs. You may have numbness for several hours from the local anesthetic. Please use caution and common sense, especially with weight-bearing activities. CARE OF THE INJECTION SITE  If you have soreness or pain, apply ice to the area today (20 minutes on/20 minutes off). Starting tomorrow, you may use warm, moist heat or ice if needed. You may have an increase or change in your discomfort for 36-48 hours after your treatment. Apply ice and continue with any pain medication you have been prescribed. Notify [facility] if you have any of the following: redness, drainage, swelling, headache, stiff neck or fever above 100°F.    SPECIAL INSTRUCTIONS  Our office will contact you in approximately 7 days for a progress report. MEDICATIONS  Continue to take all routine medications. Our office may have instructed you to hold some medications. As no general anesthesia was used in today's procedure, you should not experience any side effects related to anesthesia. If you are diabetic, the steroids used in today's injection may temporarily increase your blood sugar levels after the first few days after your injection. Please keep a close eye on your sugars and alert the doctor who manages your diabetes if your sugars are significantly high from your baseline or you are symptomatic.      If you have a problem specifically related to your procedure, please call our office at [phone]. Problems not related to your procedure should be directed to your primary care physician.

## 2023-10-11 NOTE — H&P
History of Present Illness: The patient is a 37 y.o. female who presents with complaints of low back and leg pain.     Past Medical History:   Diagnosis Date    Anxiety     Blood transfusion declined because patient is [employer] 5/1/2023    Chronic pain disorder     Chronic sinusitis     Depression     Diabetes ([address])     Fibromyalgia     Migraine     Obesity     Polyarthritis     Last assessed 9/21/2015    Psychiatric disorder     Psychiatric illness     Sleep difficulties        Past Surgical History:   Procedure Laterality Date    COLONOSCOPY  06/2019    DENTAL SURGERY      HYSTEROSCOPY      Endometrial Biopsy By Hysteroscopy    IA LAPS SUPRACRV HYSTERECT 250 GM/< RMVL TUBE/OVAR N/A 5/1/2023    Procedure: [facility]) W/ BILATERAL SALPINGECTOMY, REMOVAL PARAOVARIAN CYST;  Surgeon: Raford Baumgarten, DO;  Location: [facility];  Service: Gynecology    REMOVAL OF INTRAUTERINE DEVICE (IUD)      US GUIDED BREAST BIOPSY RIGHT COMPLETE Right 6/17/2019         Current Outpatient Medications:     acetaminophen (TYLENOL) 650 mg CR tablet, Take 2 tablets (1,300 mg total) by mouth every 8 (eight) hours as needed for mild pain, Disp: 30 tablet, Rfl: 0    Blood Glucose Monitoring Suppl (Contour Blood Glucose System) w/Device KIT, Use 1 kit 2 (two) times a day before meals, Disp: 1 kit, Rfl: 0    celecoxib (CeleBREX) 200 mg capsule, TAKE 1 CAPSULE BY MOUTH TWICE A DAY, Disp: 60 capsule, Rfl: 3    Cholecalciferol (VITAMIN D-3) 1000 units CAPS, Take 1 capsule by mouth daily Take 2000 IU daily, Disp: , Rfl:     cyclobenzaprine (FLEXERIL) 10 mg tablet, Take 1 tablet (10 mg total) by mouth 3 (three) times a day as needed for muscle spasms, Disp: 30 tablet, Rfl: 1    desvenlafaxine (PRISTIQ) 100 mg 24 hr tablet, TAKE 1 TABLET BY MOUTH EVERY DAY, Disp: 30 tablet, Rfl: 2    desvenlafaxine succinate (PRISTIQ) 50 mg 24 hr tablet, Take 1 tablet (50 mg total) by mouth daily Total daily dose 150 mg daily, Disp: 30 tablet, Rfl: 2    diazepam (VALIUM) 5 mg tablet, Take 1 tablet (5 mg total) by mouth every 8 (eight) hours as needed for muscle spasms for up to 15 doses, Disp: 15 tablet, Rfl: 0    gabapentin (Neurontin) 300 mg capsule, Take 1 capsule (300 mg total) by mouth 3 (three) times a day, Disp: 90 capsule, Rfl: 2    glucose blood (Contour Test) test strip, USE TO CHECK BLOOD SUGAR ONCE DAILY, Disp: 100 strip, Rfl: 3    hydrOXYzine HCL (ATARAX) 25 mg tablet, TAKE 1 TAB BY MOUTH EVERY 6 HOURS AS NEEDED (SLEEP) IN ADDITION TO 50 MG DOSE, MAY TAKE 50-75MG AT BEDTIME AS NEEDED FOR SLEEP, Disp: 30 tablet, Rfl: 1    hydrOXYzine HCL (ATARAX) 50 mg tablet, Take by mouth daily at bedtime in addition to 25 mg dose,may take 50-75 mg po qhs prn for insomnia, Disp: 30 tablet, Rfl: 2    lidocaine (LIDODERM) 5 %, APPLY 1 PATCH IN THE MORNING REMOVE AND DISARD PATCH WITHIN 12 HOURS OR AS DIRECTED, Disp: 30 patch, Rfl: 1    MAGNESIUM OXIDE PO, Take by mouth, Disp: , Rfl:     metFORMIN (GLUCOPHAGE) 500 mg tablet, TAKE 1 TABLET BY MOUTH TWICE A DAY WITH MEALS, Disp: 180 tablet, Rfl: 2    nortriptyline (PAMELOR) 75 MG capsule, TAKE 1 CAPSULE BY MOUTH TWICE A DAY, Disp: 60 capsule, Rfl: 2    phentermine (ADIPEX-P) 37.5 MG tablet, Take 1 tablet (37.5 mg total) by mouth every morning, Disp: 30 tablet, Rfl: 2    predniSONE 20 mg tablet, Take 2 tablets (40 mg total) by mouth daily for 5 days, Disp: 10 tablet, Rfl: 0    propranolol (INDERAL) 20 mg tablet, TAKE 1 TABLET BY MOUTH EVERY DAY AT NIGHT, Disp: 30 tablet, Rfl: 11    Allergies   Allergen Reactions    Cannabidiol Shortness Of Breath, Itching, Swelling, Anxiety, Palpitations, Confusion, Hypertension, Throat Swelling and Tongue Swelling    Amoxicillin-Pot Clavulanate Hives    Decadrol [Dexamethasone] Other (See Comments)     psychosis    Penicillins Hives     Hives/Uticaria    Tetracyclines & Related Hives      Allergy;         Physical Exam:   Vitals:    10/11/23 1015   BP: 113/78   Pulse: 99   Resp: 20   Temp: (!) 96.9 °F (36.1 °C)   SpO2: 99%     General: Awake, Alert, Oriented x 3, Mood and affect appropriate  Respiratory: Respirations even and unlabored  Cardiovascular: Peripheral pulses intact; no edema  Musculoskeletal Exam: Bilateral lumbar paraspinals tender to palpation. ASA Score: 2         Assessment:   1.  Lumbar radiculopathy        Plan: bilateral L5 TFESI

## 2023-10-12 ENCOUNTER — TELEPHONE (OUTPATIENT)
Dept: PSYCHIATRY | Facility: CLINIC | Age: 43
End: 2023-10-12

## 2023-10-12 DIAGNOSIS — F33.1 MAJOR DEPRESSIVE DISORDER, RECURRENT EPISODE, MODERATE (HCC): ICD-10-CM

## 2023-10-12 RX ORDER — DESVENLAFAXINE SUCCINATE 50 MG/1
50 TABLET, EXTENDED RELEASE ORAL DAILY
Qty: 30 TABLET | Refills: 2 | Status: SHIPPED | OUTPATIENT
Start: 2023-10-12 | End: 2023-10-23

## 2023-10-12 NOTE — TELEPHONE ENCOUNTER
Araceli December left a VM regarding desvenlafaxine. The prescription for the 50 mg to be taken with 100 mg was cancelled. She needs a refill or a call if it was changed. Prescription sent 9/19/13, notes class as No Print. Please review/resend.

## 2023-10-14 ENCOUNTER — HOSPITAL ENCOUNTER (INPATIENT)
Facility: HOSPITAL | Age: 43
LOS: 2 days | DRG: 817 | End: 2023-10-16
Attending: EMERGENCY MEDICINE | Admitting: ANESTHESIOLOGY
Payer: COMMERCIAL

## 2023-10-14 ENCOUNTER — APPOINTMENT (EMERGENCY)
Dept: RADIOLOGY | Facility: HOSPITAL | Age: 43
DRG: 817 | End: 2023-10-14
Payer: COMMERCIAL

## 2023-10-14 DIAGNOSIS — T50.901A OVERDOSE: Primary | ICD-10-CM

## 2023-10-14 DIAGNOSIS — T39.1X1A TYLENOL OVERDOSE: ICD-10-CM

## 2023-10-14 DIAGNOSIS — M79.7 FIBROMYALGIA: ICD-10-CM

## 2023-10-14 DIAGNOSIS — G93.40 ENCEPHALOPATHY ACUTE: ICD-10-CM

## 2023-10-14 DIAGNOSIS — E11.65 TYPE 2 DIABETES MELLITUS WITH HYPERGLYCEMIA, WITHOUT LONG-TERM CURRENT USE OF INSULIN (HCC): ICD-10-CM

## 2023-10-14 DIAGNOSIS — T44.7X2A: ICD-10-CM

## 2023-10-14 DIAGNOSIS — G47.30 SLEEP APNEA: ICD-10-CM

## 2023-10-14 DIAGNOSIS — Z00.8 MEDICAL CLEARANCE FOR PSYCHIATRIC ADMISSION: ICD-10-CM

## 2023-10-14 DIAGNOSIS — E78.5 DYSLIPIDEMIA: ICD-10-CM

## 2023-10-14 LAB
ALBUMIN SERPL BCP-MCNC: 3.7 G/DL (ref 3.5–5)
ALP SERPL-CCNC: 46 U/L (ref 34–104)
ALT SERPL W P-5'-P-CCNC: 37 U/L (ref 7–52)
AMMONIA PLAS-SCNC: 36 UMOL/L (ref 18–72)
ANION GAP SERPL CALCULATED.3IONS-SCNC: 10 MMOL/L
APAP SERPL-MCNC: 56 UG/ML (ref 10–20)
AST SERPL W P-5'-P-CCNC: 30 U/L (ref 13–39)
BASOPHILS # BLD AUTO: 0.02 THOUSANDS/ÂΜL (ref 0–0.1)
BASOPHILS # BLD AUTO: 0.03 THOUSANDS/ÂΜL (ref 0–0.1)
BASOPHILS NFR BLD AUTO: 0 % (ref 0–1)
BASOPHILS NFR BLD AUTO: 0 % (ref 0–1)
BILIRUB SERPL-MCNC: 0.28 MG/DL (ref 0.2–1)
BUN SERPL-MCNC: 21 MG/DL (ref 5–25)
CALCIUM SERPL-MCNC: 8.8 MG/DL (ref 8.4–10.2)
CHLORIDE SERPL-SCNC: 98 MMOL/L (ref 96–108)
CO2 SERPL-SCNC: 25 MMOL/L (ref 21–32)
CREAT SERPL-MCNC: 1.41 MG/DL (ref 0.6–1.3)
EOSINOPHIL # BLD AUTO: 0.03 THOUSAND/ÂΜL (ref 0–0.61)
EOSINOPHIL # BLD AUTO: 0.03 THOUSAND/ÂΜL (ref 0–0.61)
EOSINOPHIL NFR BLD AUTO: 0 % (ref 0–6)
EOSINOPHIL NFR BLD AUTO: 0 % (ref 0–6)
ERYTHROCYTE [DISTWIDTH] IN BLOOD BY AUTOMATED COUNT: 14.6 % (ref 11.6–15.1)
ERYTHROCYTE [DISTWIDTH] IN BLOOD BY AUTOMATED COUNT: 14.7 % (ref 11.6–15.1)
ETHANOL SERPL-MCNC: <10 MG/DL
GFR SERPL CREATININE-BSD FRML MDRD: 45 ML/MIN/1.73SQ M
GLUCOSE SERPL-MCNC: 236 MG/DL (ref 65–140)
GLUCOSE SERPL-MCNC: 249 MG/DL (ref 65–140)
HCT VFR BLD AUTO: 37.9 % (ref 34.8–46.1)
HCT VFR BLD AUTO: 38.8 % (ref 34.8–46.1)
HGB BLD-MCNC: 12 G/DL (ref 11.5–15.4)
HGB BLD-MCNC: 12.7 G/DL (ref 11.5–15.4)
IMM GRANULOCYTES # BLD AUTO: 0.17 THOUSAND/UL (ref 0–0.2)
IMM GRANULOCYTES # BLD AUTO: 0.17 THOUSAND/UL (ref 0–0.2)
IMM GRANULOCYTES NFR BLD AUTO: 1 % (ref 0–2)
IMM GRANULOCYTES NFR BLD AUTO: 1 % (ref 0–2)
LYMPHOCYTES # BLD AUTO: 2.73 THOUSANDS/ÂΜL (ref 0.6–4.47)
LYMPHOCYTES # BLD AUTO: 3.58 THOUSANDS/ÂΜL (ref 0.6–4.47)
LYMPHOCYTES NFR BLD AUTO: 19 % (ref 14–44)
LYMPHOCYTES NFR BLD AUTO: 21 % (ref 14–44)
MAGNESIUM SERPL-MCNC: 2.2 MG/DL (ref 1.9–2.7)
MCH RBC QN AUTO: 28 PG (ref 26.8–34.3)
MCH RBC QN AUTO: 28.6 PG (ref 26.8–34.3)
MCHC RBC AUTO-ENTMCNC: 31.7 G/DL (ref 31.4–37.4)
MCHC RBC AUTO-ENTMCNC: 32.7 G/DL (ref 31.4–37.4)
MCV RBC AUTO: 87 FL (ref 82–98)
MCV RBC AUTO: 88 FL (ref 82–98)
MONOCYTES # BLD AUTO: 0.54 THOUSAND/ÂΜL (ref 0.17–1.22)
MONOCYTES # BLD AUTO: 0.55 THOUSAND/ÂΜL (ref 0.17–1.22)
MONOCYTES NFR BLD AUTO: 3 % (ref 4–12)
MONOCYTES NFR BLD AUTO: 4 % (ref 4–12)
NEUTROPHILS # BLD AUTO: 10.57 THOUSANDS/ÂΜL (ref 1.85–7.62)
NEUTROPHILS # BLD AUTO: 12.9 THOUSANDS/ÂΜL (ref 1.85–7.62)
NEUTS SEG NFR BLD AUTO: 75 % (ref 43–75)
NEUTS SEG NFR BLD AUTO: 76 % (ref 43–75)
NRBC BLD AUTO-RTO: 0 /100 WBCS
NRBC BLD AUTO-RTO: 0 /100 WBCS
PHOSPHATE SERPL-MCNC: 5 MG/DL (ref 2.7–4.5)
PLATELET # BLD AUTO: 217 THOUSANDS/UL (ref 149–390)
PLATELET # BLD AUTO: 226 THOUSANDS/UL (ref 149–390)
PMV BLD AUTO: 8.8 FL (ref 8.9–12.7)
PMV BLD AUTO: 8.8 FL (ref 8.9–12.7)
POTASSIUM SERPL-SCNC: 4.3 MMOL/L (ref 3.5–5.3)
PROT SERPL-MCNC: 5.8 G/DL (ref 6.4–8.4)
RBC # BLD AUTO: 4.29 MILLION/UL (ref 3.81–5.12)
RBC # BLD AUTO: 4.44 MILLION/UL (ref 3.81–5.12)
SALICYLATES SERPL-MCNC: <5 MG/DL (ref 3–20)
SODIUM SERPL-SCNC: 133 MMOL/L (ref 135–147)
TSH SERPL DL<=0.05 MIU/L-ACNC: 3.41 UIU/ML (ref 0.45–4.5)
WBC # BLD AUTO: 14.07 THOUSAND/UL (ref 4.31–10.16)
WBC # BLD AUTO: 17.25 THOUSAND/UL (ref 4.31–10.16)

## 2023-10-14 PROCEDURE — 80053 COMPREHEN METABOLIC PANEL: CPT

## 2023-10-14 PROCEDURE — 96365 THER/PROPH/DIAG IV INF INIT: CPT

## 2023-10-14 PROCEDURE — 84100 ASSAY OF PHOSPHORUS: CPT

## 2023-10-14 PROCEDURE — 82805 BLOOD GASES W/O2 SATURATION: CPT

## 2023-10-14 PROCEDURE — 82948 REAGENT STRIP/BLOOD GLUCOSE: CPT

## 2023-10-14 PROCEDURE — 82077 ASSAY SPEC XCP UR&BREATH IA: CPT

## 2023-10-14 PROCEDURE — 70450 CT HEAD/BRAIN W/O DYE: CPT

## 2023-10-14 PROCEDURE — 83735 ASSAY OF MAGNESIUM: CPT

## 2023-10-14 PROCEDURE — 99448 NTRPROF PH1/NTRNET/EHR 21-30: CPT | Performed by: EMERGENCY MEDICINE

## 2023-10-14 PROCEDURE — 36415 COLL VENOUS BLD VENIPUNCTURE: CPT

## 2023-10-14 PROCEDURE — 80143 DRUG ASSAY ACETAMINOPHEN: CPT

## 2023-10-14 PROCEDURE — 82550 ASSAY OF CK (CPK): CPT

## 2023-10-14 PROCEDURE — 85025 COMPLETE CBC W/AUTO DIFF WBC: CPT

## 2023-10-14 PROCEDURE — 93005 ELECTROCARDIOGRAM TRACING: CPT

## 2023-10-14 PROCEDURE — 96367 TX/PROPH/DG ADDL SEQ IV INF: CPT

## 2023-10-14 PROCEDURE — 96375 TX/PRO/DX INJ NEW DRUG ADDON: CPT

## 2023-10-14 PROCEDURE — 99285 EMERGENCY DEPT VISIT HI MDM: CPT

## 2023-10-14 PROCEDURE — 80179 DRUG ASSAY SALICYLATE: CPT

## 2023-10-14 PROCEDURE — 84443 ASSAY THYROID STIM HORMONE: CPT

## 2023-10-14 PROCEDURE — 82140 ASSAY OF AMMONIA: CPT

## 2023-10-14 PROCEDURE — 82330 ASSAY OF CALCIUM: CPT

## 2023-10-14 PROCEDURE — G1004 CDSM NDSC: HCPCS

## 2023-10-14 RX ORDER — ENOXAPARIN SODIUM 100 MG/ML
40 INJECTION SUBCUTANEOUS DAILY
Status: DISCONTINUED | OUTPATIENT
Start: 2023-10-15 | End: 2023-10-16 | Stop reason: HOSPADM

## 2023-10-14 RX ORDER — CALCIUM GLUCONATE 20 MG/ML
2 INJECTION, SOLUTION INTRAVENOUS ONCE
Status: COMPLETED | OUTPATIENT
Start: 2023-10-14 | End: 2023-10-14

## 2023-10-14 RX ORDER — ATROPINE SULFATE 0.1 MG/ML
INJECTION INTRAVENOUS
Status: COMPLETED
Start: 2023-10-14 | End: 2023-10-14

## 2023-10-14 RX ORDER — ATROPINE SULFATE 1 MG/ML
0.4 INJECTION, SOLUTION INTRAVENOUS ONCE
Status: DISCONTINUED | OUTPATIENT
Start: 2023-10-14 | End: 2023-10-16 | Stop reason: HOSPADM

## 2023-10-14 RX ORDER — CHLORHEXIDINE GLUCONATE ORAL RINSE 1.2 MG/ML
15 SOLUTION DENTAL EVERY 12 HOURS SCHEDULED
Status: DISCONTINUED | OUTPATIENT
Start: 2023-10-14 | End: 2023-10-16 | Stop reason: HOSPADM

## 2023-10-14 RX ADMIN — ATROPINE SULFATE 0.4 MG: 0.1 INJECTION INTRAVENOUS at 21:11

## 2023-10-14 RX ADMIN — ACETYLCYSTEINE 12855 MG: 200 INJECTION, SOLUTION INTRAVENOUS at 22:40

## 2023-10-14 RX ADMIN — CALCIUM GLUCONATE 2 G: 20 INJECTION, SOLUTION INTRAVENOUS at 21:04

## 2023-10-14 RX ADMIN — GLUCAGON 1 MG: KIT at 20:20

## 2023-10-14 RX ADMIN — GLUCAGON HYDROCHLORIDE 1 MG: KIT at 20:42

## 2023-10-15 PROBLEM — I95.9 HYPOTENSION: Status: ACTIVE | Noted: 2023-10-15

## 2023-10-15 PROBLEM — R00.1 BRADYCARDIA: Status: ACTIVE | Noted: 2023-10-15

## 2023-10-15 PROBLEM — T50.904A OVERDOSE OF UNDETERMINED INTENT: Status: ACTIVE | Noted: 2023-10-15

## 2023-10-15 LAB
ALBUMIN SERPL BCP-MCNC: 3.4 G/DL (ref 3.5–5)
ALBUMIN SERPL BCP-MCNC: 3.5 G/DL (ref 3.5–5)
ALBUMIN SERPL BCP-MCNC: 3.6 G/DL (ref 3.5–5)
ALP SERPL-CCNC: 33 U/L (ref 34–104)
ALP SERPL-CCNC: 34 U/L (ref 34–104)
ALP SERPL-CCNC: 38 U/L (ref 34–104)
ALT SERPL W P-5'-P-CCNC: 42 U/L (ref 7–52)
ALT SERPL W P-5'-P-CCNC: 58 U/L (ref 7–52)
ALT SERPL W P-5'-P-CCNC: 62 U/L (ref 7–52)
ANION GAP SERPL CALCULATED.3IONS-SCNC: 13 MMOL/L
ANION GAP SERPL CALCULATED.3IONS-SCNC: 15 MMOL/L
APAP SERPL-MCNC: <10 UG/ML (ref 10–20)
AST SERPL W P-5'-P-CCNC: 39 U/L (ref 13–39)
AST SERPL W P-5'-P-CCNC: 48 U/L (ref 13–39)
AST SERPL W P-5'-P-CCNC: 61 U/L (ref 13–39)
ATRIAL RATE: 45 BPM
ATRIAL RATE: 48 BPM
ATRIAL RATE: 49 BPM
ATRIAL RATE: 50 BPM
ATRIAL RATE: 51 BPM
ATRIAL RATE: 51 BPM
ATRIAL RATE: 52 BPM
ATRIAL RATE: 54 BPM
ATRIAL RATE: 56 BPM
ATRIAL RATE: 58 BPM
ATRIAL RATE: 58 BPM
ATRIAL RATE: 60 BPM
ATRIAL RATE: 62 BPM
ATRIAL RATE: 69 BPM
BASE EX.OXY STD BLDV CALC-SCNC: 47.7 % (ref 60–80)
BASE EXCESS BLDV CALC-SCNC: -3.2 MMOL/L
BASOPHILS # BLD AUTO: 0.03 THOUSANDS/ÂΜL (ref 0–0.1)
BASOPHILS NFR BLD AUTO: 0 % (ref 0–1)
BILIRUB DIRECT SERPL-MCNC: 0.06 MG/DL (ref 0–0.2)
BILIRUB DIRECT SERPL-MCNC: 0.09 MG/DL (ref 0–0.2)
BILIRUB SERPL-MCNC: 0.27 MG/DL (ref 0.2–1)
BILIRUB SERPL-MCNC: 0.32 MG/DL (ref 0.2–1)
BILIRUB SERPL-MCNC: 0.33 MG/DL (ref 0.2–1)
BUN SERPL-MCNC: 20 MG/DL (ref 5–25)
BUN SERPL-MCNC: 21 MG/DL (ref 5–25)
CA-I BLD-SCNC: 1.21 MMOL/L (ref 1.12–1.32)
CA-I BLD-SCNC: 1.23 MMOL/L (ref 1.12–1.32)
CALCIUM SERPL-MCNC: 8.7 MG/DL (ref 8.4–10.2)
CALCIUM SERPL-MCNC: 9 MG/DL (ref 8.4–10.2)
CHLORIDE SERPL-SCNC: 100 MMOL/L (ref 96–108)
CHLORIDE SERPL-SCNC: 96 MMOL/L (ref 96–108)
CK SERPL-CCNC: 258 U/L (ref 26–192)
CO2 SERPL-SCNC: 21 MMOL/L (ref 21–32)
CO2 SERPL-SCNC: 22 MMOL/L (ref 21–32)
CREAT SERPL-MCNC: 1.1 MG/DL (ref 0.6–1.3)
CREAT SERPL-MCNC: 1.27 MG/DL (ref 0.6–1.3)
EOSINOPHIL # BLD AUTO: 0.03 THOUSAND/ÂΜL (ref 0–0.61)
EOSINOPHIL NFR BLD AUTO: 0 % (ref 0–6)
ERYTHROCYTE [DISTWIDTH] IN BLOOD BY AUTOMATED COUNT: 14.4 % (ref 11.6–15.1)
GFR SERPL CREATININE-BSD FRML MDRD: 51 ML/MIN/1.73SQ M
GFR SERPL CREATININE-BSD FRML MDRD: 60 ML/MIN/1.73SQ M
GLUCOSE SERPL-MCNC: 141 MG/DL (ref 65–140)
GLUCOSE SERPL-MCNC: 169 MG/DL (ref 65–140)
GLUCOSE SERPL-MCNC: 206 MG/DL (ref 65–140)
GLUCOSE SERPL-MCNC: 221 MG/DL (ref 65–140)
GLUCOSE SERPL-MCNC: 230 MG/DL (ref 65–140)
GLUCOSE SERPL-MCNC: 247 MG/DL (ref 65–140)
HCO3 BLDV-SCNC: 23.3 MMOL/L (ref 24–30)
HCT VFR BLD AUTO: 36.6 % (ref 34.8–46.1)
HGB BLD-MCNC: 12.1 G/DL (ref 11.5–15.4)
IMM GRANULOCYTES # BLD AUTO: 0.12 THOUSAND/UL (ref 0–0.2)
IMM GRANULOCYTES NFR BLD AUTO: 1 % (ref 0–2)
LACTATE SERPL-SCNC: 0.9 MMOL/L (ref 0.5–2)
LACTATE SERPL-SCNC: 1 MMOL/L (ref 0.5–2)
LACTATE SERPL-SCNC: 1.2 MMOL/L (ref 0.5–2)
LYMPHOCYTES # BLD AUTO: 2.39 THOUSANDS/ÂΜL (ref 0.6–4.47)
LYMPHOCYTES NFR BLD AUTO: 14 % (ref 14–44)
MAGNESIUM SERPL-MCNC: 1.9 MG/DL (ref 1.9–2.7)
MAGNESIUM SERPL-MCNC: 2.1 MG/DL (ref 1.9–2.7)
MCH RBC QN AUTO: 28.1 PG (ref 26.8–34.3)
MCHC RBC AUTO-ENTMCNC: 33.1 G/DL (ref 31.4–37.4)
MCV RBC AUTO: 85 FL (ref 82–98)
MONOCYTES # BLD AUTO: 0.96 THOUSAND/ÂΜL (ref 0.17–1.22)
MONOCYTES NFR BLD AUTO: 6 % (ref 4–12)
NEUTROPHILS # BLD AUTO: 13.81 THOUSANDS/ÂΜL (ref 1.85–7.62)
NEUTS SEG NFR BLD AUTO: 79 % (ref 43–75)
NRBC BLD AUTO-RTO: 0 /100 WBCS
O2 CT BLDV-SCNC: 8.6 ML/DL
P AXIS: 41 DEGREES
P AXIS: 41 DEGREES
P AXIS: 42 DEGREES
P AXIS: 43 DEGREES
P AXIS: 43 DEGREES
P AXIS: 44 DEGREES
P AXIS: 45 DEGREES
P AXIS: 46 DEGREES
P AXIS: 46 DEGREES
P AXIS: 48 DEGREES
P AXIS: 49 DEGREES
P AXIS: 50 DEGREES
P AXIS: 54 DEGREES
P AXIS: 55 DEGREES
P AXIS: 55 DEGREES
P AXIS: 57 DEGREES
P AXIS: 67 DEGREES
P AXIS: 70 DEGREES
PCO2 BLDV: 47.7 MM HG (ref 42–50)
PH BLDV: 7.31 [PH] (ref 7.3–7.4)
PHOSPHATE SERPL-MCNC: 4.1 MG/DL (ref 2.7–4.5)
PHOSPHATE SERPL-MCNC: 5.1 MG/DL (ref 2.7–4.5)
PLATELET # BLD AUTO: 186 THOUSANDS/UL (ref 149–390)
PMV BLD AUTO: 8.6 FL (ref 8.9–12.7)
PO2 BLDV: 27.1 MM HG (ref 35–45)
POTASSIUM SERPL-SCNC: 3.9 MMOL/L (ref 3.5–5.3)
POTASSIUM SERPL-SCNC: 4.1 MMOL/L (ref 3.5–5.3)
PR INTERVAL: 172 MS
PR INTERVAL: 175 MS
PR INTERVAL: 176 MS
PR INTERVAL: 179 MS
PR INTERVAL: 183 MS
PR INTERVAL: 188 MS
PR INTERVAL: 188 MS
PR INTERVAL: 192 MS
PROT SERPL-MCNC: 5.6 G/DL (ref 6.4–8.4)
PROT SERPL-MCNC: 5.6 G/DL (ref 6.4–8.4)
PROT SERPL-MCNC: 5.8 G/DL (ref 6.4–8.4)
QRS AXIS: 40 DEGREES
QRS AXIS: 44 DEGREES
QRS AXIS: 53 DEGREES
QRS AXIS: 55 DEGREES
QRS AXIS: 56 DEGREES
QRS AXIS: 57 DEGREES
QRS AXIS: 59 DEGREES
QRS AXIS: 59 DEGREES
QRS AXIS: 60 DEGREES
QRS AXIS: 61 DEGREES
QRS AXIS: 61 DEGREES
QRS AXIS: 62 DEGREES
QRS AXIS: 63 DEGREES
QRS AXIS: 64 DEGREES
QRS AXIS: 65 DEGREES
QRS AXIS: 67 DEGREES
QRSD INTERVAL: 100 MS
QRSD INTERVAL: 104 MS
QRSD INTERVAL: 108 MS
QRSD INTERVAL: 86 MS
QRSD INTERVAL: 92 MS
QRSD INTERVAL: 96 MS
QT INTERVAL: 421 MS
QT INTERVAL: 421 MS
QT INTERVAL: 425 MS
QT INTERVAL: 426 MS
QT INTERVAL: 429 MS
QT INTERVAL: 438 MS
QT INTERVAL: 446 MS
QT INTERVAL: 450 MS
QT INTERVAL: 454 MS
QT INTERVAL: 458 MS
QT INTERVAL: 463 MS
QT INTERVAL: 463 MS
QT INTERVAL: 467 MS
QT INTERVAL: 467 MS
QT INTERVAL: 475 MS
QT INTERVAL: 486 MS
QTC INTERVAL: 418 MS
QTC INTERVAL: 418 MS
QTC INTERVAL: 419 MS
QTC INTERVAL: 420 MS
QTC INTERVAL: 421 MS
QTC INTERVAL: 421 MS
QTC INTERVAL: 422 MS
QTC INTERVAL: 423 MS
QTC INTERVAL: 423 MS
QTC INTERVAL: 425 MS
QTC INTERVAL: 426 MS
QTC INTERVAL: 427 MS
QTC INTERVAL: 427 MS
QTC INTERVAL: 431 MS
QTC INTERVAL: 431 MS
QTC INTERVAL: 436 MS
QTC INTERVAL: 454 MS
QTC INTERVAL: 456 MS
RBC # BLD AUTO: 4.3 MILLION/UL (ref 3.81–5.12)
SODIUM SERPL-SCNC: 133 MMOL/L (ref 135–147)
SODIUM SERPL-SCNC: 134 MMOL/L (ref 135–147)
T WAVE AXIS: -21 DEGREES
T WAVE AXIS: 20 DEGREES
T WAVE AXIS: 23 DEGREES
T WAVE AXIS: 25 DEGREES
T WAVE AXIS: 3 DEGREES
T WAVE AXIS: 30 DEGREES
T WAVE AXIS: 31 DEGREES
T WAVE AXIS: 32 DEGREES
T WAVE AXIS: 32 DEGREES
T WAVE AXIS: 33 DEGREES
T WAVE AXIS: 34 DEGREES
T WAVE AXIS: 35 DEGREES
T WAVE AXIS: 36 DEGREES
T WAVE AXIS: 38 DEGREES
T WAVE AXIS: 43 DEGREES
T WAVE AXIS: 44 DEGREES
T WAVE AXIS: 51 DEGREES
T WAVE AXIS: 57 DEGREES
T WAVE AXIS: 66 DEGREES
T WAVE AXIS: 7 DEGREES
VENTRICULAR RATE: 45 BPM
VENTRICULAR RATE: 48 BPM
VENTRICULAR RATE: 49 BPM
VENTRICULAR RATE: 50 BPM
VENTRICULAR RATE: 51 BPM
VENTRICULAR RATE: 51 BPM
VENTRICULAR RATE: 52 BPM
VENTRICULAR RATE: 54 BPM
VENTRICULAR RATE: 56 BPM
VENTRICULAR RATE: 58 BPM
VENTRICULAR RATE: 58 BPM
VENTRICULAR RATE: 60 BPM
VENTRICULAR RATE: 62 BPM
VENTRICULAR RATE: 69 BPM
WBC # BLD AUTO: 17.34 THOUSAND/UL (ref 4.31–10.16)

## 2023-10-15 PROCEDURE — 83735 ASSAY OF MAGNESIUM: CPT

## 2023-10-15 PROCEDURE — 82948 REAGENT STRIP/BLOOD GLUCOSE: CPT

## 2023-10-15 PROCEDURE — 93010 ELECTROCARDIOGRAM REPORT: CPT | Performed by: INTERNAL MEDICINE

## 2023-10-15 PROCEDURE — 93005 ELECTROCARDIOGRAM TRACING: CPT

## 2023-10-15 PROCEDURE — 85025 COMPLETE CBC W/AUTO DIFF WBC: CPT

## 2023-10-15 PROCEDURE — 80143 DRUG ASSAY ACETAMINOPHEN: CPT

## 2023-10-15 PROCEDURE — 99223 1ST HOSP IP/OBS HIGH 75: CPT | Performed by: ANESTHESIOLOGY

## 2023-10-15 PROCEDURE — 80076 HEPATIC FUNCTION PANEL: CPT

## 2023-10-15 PROCEDURE — 82330 ASSAY OF CALCIUM: CPT

## 2023-10-15 PROCEDURE — 83605 ASSAY OF LACTIC ACID: CPT

## 2023-10-15 PROCEDURE — 80048 BASIC METABOLIC PNL TOTAL CA: CPT

## 2023-10-15 PROCEDURE — 84100 ASSAY OF PHOSPHORUS: CPT

## 2023-10-15 PROCEDURE — NC001 PR NO CHARGE: Performed by: INTERNAL MEDICINE

## 2023-10-15 RX ORDER — MAGNESIUM SULFATE HEPTAHYDRATE 40 MG/ML
2 INJECTION, SOLUTION INTRAVENOUS ONCE
Status: COMPLETED | OUTPATIENT
Start: 2023-10-15 | End: 2023-10-15

## 2023-10-15 RX ORDER — POTASSIUM CHLORIDE 14.9 MG/ML
20 INJECTION INTRAVENOUS
Status: COMPLETED | OUTPATIENT
Start: 2023-10-15 | End: 2023-10-15

## 2023-10-15 RX ORDER — INSULIN LISPRO 100 [IU]/ML
1-6 INJECTION, SOLUTION INTRAVENOUS; SUBCUTANEOUS EVERY 6 HOURS SCHEDULED
Status: DISCONTINUED | OUTPATIENT
Start: 2023-10-15 | End: 2023-10-15

## 2023-10-15 RX ORDER — INSULIN LISPRO 100 [IU]/ML
1-6 INJECTION, SOLUTION INTRAVENOUS; SUBCUTANEOUS
Status: DISCONTINUED | OUTPATIENT
Start: 2023-10-15 | End: 2023-10-16 | Stop reason: HOSPADM

## 2023-10-15 RX ORDER — SODIUM CHLORIDE, SODIUM GLUCONATE, SODIUM ACETATE, POTASSIUM CHLORIDE, MAGNESIUM CHLORIDE, SODIUM PHOSPHATE, DIBASIC, AND POTASSIUM PHOSPHATE .53; .5; .37; .037; .03; .012; .00082 G/100ML; G/100ML; G/100ML; G/100ML; G/100ML; G/100ML; G/100ML
75 INJECTION, SOLUTION INTRAVENOUS CONTINUOUS
Status: DISCONTINUED | OUTPATIENT
Start: 2023-10-15 | End: 2023-10-15

## 2023-10-15 RX ADMIN — SODIUM CHLORIDE, SODIUM GLUCONATE, SODIUM ACETATE, POTASSIUM CHLORIDE, MAGNESIUM CHLORIDE, SODIUM PHOSPHATE, DIBASIC, AND POTASSIUM PHOSPHATE 75 ML/HR: .53; .5; .37; .037; .03; .012; .00082 INJECTION, SOLUTION INTRAVENOUS at 14:15

## 2023-10-15 RX ADMIN — INSULIN LISPRO 1 UNITS: 100 INJECTION, SOLUTION INTRAVENOUS; SUBCUTANEOUS at 12:04

## 2023-10-15 RX ADMIN — MAGNESIUM SULFATE HEPTAHYDRATE 2 G: 40 INJECTION, SOLUTION INTRAVENOUS at 08:41

## 2023-10-15 RX ADMIN — POTASSIUM CHLORIDE 20 MEQ: 14.9 INJECTION, SOLUTION INTRAVENOUS at 11:30

## 2023-10-15 RX ADMIN — INSULIN LISPRO 2 UNITS: 100 INJECTION, SOLUTION INTRAVENOUS; SUBCUTANEOUS at 05:55

## 2023-10-15 RX ADMIN — ACETYLCYSTEINE 8710 MG: 200 INJECTION, SOLUTION INTRAVENOUS at 08:41

## 2023-10-15 RX ADMIN — CHLORHEXIDINE GLUCONATE 15 ML: 1.2 SOLUTION ORAL at 08:49

## 2023-10-15 RX ADMIN — INSULIN LISPRO 2 UNITS: 100 INJECTION, SOLUTION INTRAVENOUS; SUBCUTANEOUS at 23:06

## 2023-10-15 RX ADMIN — SODIUM CHLORIDE, SODIUM GLUCONATE, SODIUM ACETATE, POTASSIUM CHLORIDE, MAGNESIUM CHLORIDE, SODIUM PHOSPHATE, DIBASIC, AND POTASSIUM PHOSPHATE 75 ML/HR: .53; .5; .37; .037; .03; .012; .00082 INJECTION, SOLUTION INTRAVENOUS at 00:41

## 2023-10-15 RX ADMIN — INSULIN LISPRO 3 UNITS: 100 INJECTION, SOLUTION INTRAVENOUS; SUBCUTANEOUS at 00:57

## 2023-10-15 RX ADMIN — ACETYLCYSTEINE 4355 MG: 200 INJECTION, SOLUTION INTRAVENOUS at 01:47

## 2023-10-15 RX ADMIN — POTASSIUM CHLORIDE 20 MEQ: 14.9 INJECTION, SOLUTION INTRAVENOUS at 08:40

## 2023-10-15 RX ADMIN — ENOXAPARIN SODIUM 40 MG: 40 INJECTION SUBCUTANEOUS at 08:40

## 2023-10-15 NOTE — PLAN OF CARE
Problem: Prexisting or High Potential for Compromised Skin Integrity  Goal: Skin integrity is maintained or improved  Description: INTERVENTIONS:  - Identify patients at risk for skin breakdown  - Assess and monitor skin integrity  - Assess and monitor nutrition and hydration status  - Monitor labs   - Assess for incontinence   - Turn and reposition patient  - Assist with mobility/ambulation  - Relieve pressure over bony prominences  - Avoid friction and shearing  - Provide appropriate hygiene as needed including keeping skin clean and dry  - Evaluate need for skin moisturizer/barrier cream  - Collaborate with interdisciplinary team   - Patient/family teaching  - Consider wound care consult   Outcome: Progressing     Problem: MOBILITY - ADULT  Goal: Maintain or return to baseline ADL function  Description: INTERVENTIONS:  -  Assess patient's ability to carry out ADLs; assess patient's baseline for ADL function and identify physical deficits which impact ability to perform ADLs (bathing, care of mouth/teeth, toileting, grooming, dressing, etc.)  - Assess/evaluate cause of self-care deficits   - Assess range of motion  - Assess patient's mobility; develop plan if impaired  - Assess patient's need for assistive devices and provide as appropriate  - Encourage maximum independence but intervene and supervise when necessary  - Involve family in performance of ADLs  - Assess for home care needs following discharge   - Consider OT consult to assist with ADL evaluation and planning for discharge  - Provide patient education as appropriate  Outcome: Progressing  Goal: Maintains/Returns to pre admission functional level  Description: INTERVENTIONS:  - Perform BMAT or MOVE assessment daily.   - Set and communicate daily mobility goal to care team and patient/family/caregiver. - Collaborate with rehabilitation services on mobility goals if consulted  - Perform Range of Motion   - Reposition patient every 2 hours.   - Record patient progress and toleration of activity level   Outcome: Progressing     Problem: PAIN - ADULT  Goal: Verbalizes/displays adequate comfort level or baseline comfort level  Description: Interventions:  - Encourage patient to monitor pain and request assistance  - Assess pain using appropriate pain scale  - Administer analgesics based on type and severity of pain and evaluate response  - Implement non-pharmacological measures as appropriate and evaluate response  - Consider cultural and social influences on pain and pain management  - Notify physician/advanced practitioner if interventions unsuccessful or patient reports new pain  Outcome: Progressing     Problem: INFECTION - ADULT  Goal: Absence or prevention of progression during hospitalization  Description: INTERVENTIONS:  - Assess and monitor for signs and symptoms of infection  - Monitor lab/diagnostic results  - Monitor all insertion sites, i.e. indwelling lines, tubes, and drains  - Monitor endotracheal if appropriate and nasal secretions for changes in amount and color  - Edmond[place] appropriate cooling/warming therapies per order  - Administer medications as ordered  - Instruct and encourage patient and family to use good hand hygiene technique  - Identify and instruct in appropriate isolation precautions for identified infection/condition  Outcome: Progressing  Goal: Absence of fever/infection during neutropenic period  Description: INTERVENTIONS:  - Monitor WBC    Outcome: Progressing     Problem: DISCHARGE PLANNING  Goal: Discharge to home or other facility with appropriate resources  Description: INTERVENTIONS:  - Identify barriers to discharge w/patient and caregiver  - Arrange for needed discharge resources and transportation as appropriate  - Identify discharge learning needs (meds, wound care, etc.)  - Arrange for interpretive services to assist at discharge as needed  - Refer to Case Management Department for coordinating discharge planning if the patient needs post-hospital services based on physician/advanced practitioner order or complex needs related to functional status, cognitive ability, or social support system  Outcome: Progressing     Problem: Knowledge Deficit  Goal: Patient/family/caregiver demonstrates understanding of disease process, treatment plan, medications, and discharge instructions  Description: Complete learning assessment and assess knowledge base.   Interventions:  - Provide teaching at level of understanding  - Provide teaching via preferred learning methods  Outcome: Progressing

## 2023-10-15 NOTE — PROGRESS NOTES
Critical Care Interval Transfer Note:    Please refer to progress note from earlier today for full details. Barriers to discharge:   Monitoring  Psychiatry evaluation     Consults: IP CONSULT TO TOXICOLOGY  IP CONSULT TO CASE MANAGEMENT  IP CONSULT TO PSYCHIATRY    Discharge Plan: Anticipate discharge in 48 hrs to home vs inpatient psych depending on [facility] evaluation  Adan Plan: Adan to be removed. Order has been placed      Patient seen and evaluated by Critical Care today and deemed to be appropriate for transfer to [facility] with Telemetry. Spoke to Dr Corie Montemayor from [facility] to accept transfer. Critical care can be contacted via [facility] with any questions or concerns.     Heather Marion MD  PGY-2, Internal Medicine  [address] [facility]

## 2023-10-15 NOTE — QUICK NOTE
Discussed with MICU team.    Patient is awake and alert this morning without acute complaints. Still bradycardic but normotensive and did not require any other interventions overnight. Patient admitted to intentional ingestion of propanolol, ibuprofen, and gabapentin in addition to other unknown medications. EKGs have shown stable QRS; can space out monitoring and obtain EKGs with any clinical change (worsening bradycardia, hypotension, altered mental status). Patient is still on NAC; will require repeat LFTs and acetaminophen level 4 hours prior to the end of the 3rd NAC dose (around 8 pm tonight). If patient's LFTs have down-trended and acetaminophen is undetectable, ok to stop NAC after the 21 hour protocol. If LFTs continue to rise or acetaminophen is greater than 10, continue NAC at the third dose rate (re-order the 100 mg/kg dose to run continuously) until LFTs clearly peak and down-trend with monitoring every 12 hours. Please contact the medical  on call via [employer] with questions or concerns.

## 2023-10-15 NOTE — QUICK NOTE
Called to bedside as patient is now more alert and conversant. She states that she intentionally overdosed on "everything" and called it a suicide attempt. She specifically remembers taking Propanalol, advil and gabapentin and believes there were more pills that she can't remember. She is tearful but appropriate and oriented on exam.  She was unable to identify a specific trigger for her suicidality but states that it was "all overwhelming".       PE:   Somnolent but arousable  AAOx3  Opens eyes to voice, follows commands  No clonus, no rigidity, no tremor    ECG: normal intervals, sinus bradycardia  BP: 140's/100's  HR: 50's

## 2023-10-15 NOTE — PROGRESS NOTES
[facility]  Interval Progress Note: Critical Care  Name: Maciel Godwin  MRN: [id number]  Unit/Bed#: MICU 03 I Date of Admission: 10/14/2023   Date of Service: 10/15/2023 I Hospital Day: 1    Interval Events:  Patient seen and examined at bedside today morning. Patient ANO*4 and mentions no complaints at this time. Pertinent New Data:   Vitals:    10/15/23 0600 10/15/23 0800 10/15/23 0900 10/15/23 1000   BP: 140/66 136/72 101/61 114/72   BP Location:       Pulse: (!) 48 (!) 48 (!) 50 (!) 50   Resp: 12 22 15 12   Temp:  97.9 °F (36.6 °C)     TempSrc:       SpO2: 97% 98% 98% 97%   Weight:       Height:           Physical Exam  Eyes:      Conjunctiva/sclera: Conjunctivae normal.      Pupils: Pupils are equal, round, and reactive to light. Skin:     General: Skin is warm. Coloration: Skin is not jaundiced or pale. Findings: No rash or wound. HENT:      Head: Normocephalic and atraumatic. Mouth/Throat:      Mouth: Mucous membranes are moist.   Cardiovascular:      Rate and Rhythm: Normal rate and regular rhythm. Abdominal:      General: Bowel sounds are normal.      Palpations: Abdomen is soft. Constitutional:       General: She is not in acute distress. Appearance: She is well-developed and well-nourished. She is not ill-appearing or toxic-appearing. Interventions: She is not sedated, not intubated and not restrained. Pulmonary:      Effort: Pulmonary effort is normal. She is not intubated. Breath sounds: Normal breath sounds. Neurological:      General: No focal deficit present. Mental Status: She is alert and oriented to person, place and time. Mental status is at baseline. Motor: Strength full and intact in all extremities. Vitals and nursing note reviewed.          CBC:   Lab Results   Component Value Date    WBC 17.34 (H) 10/15/2023    HGB 12.1 10/15/2023    HCT 36.6 10/15/2023    MCV 85 10/15/2023     10/15/2023 RBC 4.30 10/15/2023    MCH 28.1 10/15/2023    MCHC 33.1 10/15/2023    RDW 14.4 10/15/2023    MPV 8.6 (L) 10/15/2023    NRBC 0 10/15/2023   , CMP:   Lab Results   Component Value Date    SODIUM 134 (L) 10/15/2023    K 3.9 10/15/2023     10/15/2023    CO2 21 10/15/2023    BUN 20 10/15/2023    CREATININE 1.10 10/15/2023    CALCIUM 8.7 10/15/2023    AST 48 (H) 10/15/2023    ALT 58 (H) 10/15/2023    ALKPHOS 33 (L) 10/15/2023    EGFR 60 10/15/2023     CT head - I have personally reviewed pertinent reports.         Assessment and Plan  Diagnosis: Propranolol overdose, intentional                          HR has been stable in 50s; Blood pressure stable                          QRS duration normal and stable on serial EKGs                    Plan:   Discontinue q1hour EKGs   Obtain EKG for any clinical change   Continue NAC for presumed tylenol toxicity  Check hepatic function panel and tylenol level 4 hour before end of the current NAC dose  Toxicology following  Transfer to floors if stable during the day      Miles Aguilar MD  PGY-2, Internal Medicine  [facility]

## 2023-10-15 NOTE — H&P
[address] [place] [address]  H&P: Critical Care  Name: Nathan Morris 37 y.o. female I MRN: [id number]  Unit/Bed#: MICU 03 I Date of Admission: 10/14/2023   Date of Service: 10/15/2023 I Hospital Day: 1      Assessment/Plan   Neuro:   Diagnosis: Acute encephalopathy likely secondary to acute drug ingestion  Patient missing propanolol from her pill bottles, is also prescribed recent course of valium for lower back pain, has history of acute encephalopathy from decardron and was recently prescribed course of prednisone and had joint injections of decadron  Acetaminophen level elevated on arrival but patient takes PRN tylenol, unknown last ingestion  According to family, has had psychiatric admissions in the past  Plan: Needs 1:1 continual observation  Continuous telemetry  NAC   Avoid hepatotoxic agents for analgesia if needed, trend LFT's  Frequent neuro checks  Hold home psych meds (pristiq) until able to confirm intake and until stable  Likely psych consult when appropriate    CV:   Diagnosis: Bradycardia, hypotension  High suspicion for beta blocker toxicity, patient also has Nortrypylline on med list  S/p glucagon x2 in ED  ECG from admission: Sinus bradycardia, normal intervals, no QRS prolongation  Plan: Continuous cardiac monitoring  Serial ECG's q1h- Bicarb gtt bolus and gtt if widening QRS d/t Nortrypylline concern and propanalol Calcium channel blockade  Glucagon for severe bradycardia and hypotension, can attempt calcium as well  High dose insulin only if cardiac failure  Q2h Lactate  Monitor BG's  Vasopressors as needed for MAP >65  Per [employer]: "Higher than usual doses of vasopressors may be required to maintain adequate MAPs"    Pulm:  Diagnosis: Altered mental status  Currently no signs of airway compromise, needs close airway watch  Plan: Supplemental O2 as necessary for SpO2>92%    GI:   Diagnosis: No known acute issues  Plan: NPO  Per toxicology, charcoal not indicated at this time  No indication for stress ulcer ppx    :   Diagnosis: GENESIS  Baseline Cr: 0.80-1. 10. On admission: 1.41  Plan: Trend BUN/Cr  Monitor closely I/O, look for any signs of retention    F/E/N:    F: Isolyte @75/hr  E: Monitor and replete for goal K>4.0, Phos>3.0, Mg>2.0, iCal>1.12  N: NPO    Heme/Onc:   Diagnosis: At risk for DVT/PE due to acute illness and immobilization  Plan: Lovenox for DVT ppx    Endo:   Diagnosis: Type 2 DM, Morbid obesity with Phentermine for weight loss  Plan: SSI + q6h BGs    ID:   Diagnosis: No indication of acute infection at this time  Plan: Monitor off antibiotics  Trend fever curve and WBC    MSK/Skin:   Diagnosis: At risk for deconditioning, DTI's  Plan: PT/OT when able  Frequent turning and repositioning until then  OOBTC  qShift skin examination    Disposition: Critical care       History of Present Illness     HPI: Nathan Morris is a 37 y.o. who presents with altered mental status and bradycardia with hypotension with concern for beta-blocker overdose/toxicity. Patient reportedly was seen in her normal state of health by her mother around noon and went upstairs to go to sleep (not abnormal to sleep in the afternoon). However in the late evening, mother found the patient in her room unarousable and called EMS. EMS brought the patient to the ED bradycardic into the low 40's with hypotension MAP's in the 50's which responded to fluids in the ED. She was noted to be very somnolent and minimally responsive but protecting her airway and not in respiratory distress. On initial examination she has no clonus, no rigidity, normal tone, no hyperreflexia, and normal temperature. History obtained from chart review and family (parents and brother). Review of Systems   Unable to perform ROS: Mental status change      Historical Information   Past Medical History:  No date:  Anxiety  5/1/2023: Blood transfusion declined because patient is Jehovah's   Witness  No date: Chronic pain disorder  No date: Chronic sinusitis  No date: Depression  No date: Diabetes ([address])  No date: Fibromyalgia  No date: Migraine  No date: Obesity  No date: Polyarthritis      Comment:  Last assessed 9/21/2015  No date: Psychiatric disorder  No date: Psychiatric illness  No date: Sleep difficulties Past Surgical History:  06/2019: COLONOSCOPY  No date: DENTAL SURGERY  No date: HYSTEROSCOPY      Comment:  Endometrial Biopsy By Hysteroscopy  5/1/2023: KS LAPS SUPRACRV HYSTERECT 250 GM/< RMVL TUBE/OVAR; N/A      Comment:  Procedure: ([address]) W/ BILATERAL SALPINGECTOMY, REMOVAL                PARAOVARIAN CYST;  Surgeon: Brooks Medel DO;                 Location: [facility];  Service: Gynecology  No date: REMOVAL OF INTRAUTERINE DEVICE (IUD)  6/17/2019: US GUIDED BREAST BIOPSY RIGHT COMPLETE; Right   Current Outpatient Medications   Medication Instructions    acetaminophen (TYLENOL) 1,300 mg, Oral, Every 8 hours PRN    Blood Glucose Monitoring Suppl (Contour Blood Glucose System) w/Device KIT 1 kit, Does not apply, 2 times daily before meals    celecoxib (CeleBREX) 200 mg capsule TAKE 1 CAPSULE BY MOUTH TWICE A DAY    Cholecalciferol (VITAMIN D-3) 1000 units CAPS 1 capsule, Oral, Daily, Take 2000 IU daily    cyclobenzaprine (FLEXERIL) 10 mg, Oral, 3 times daily PRN    desvenlafaxine (PRISTIQ) 100 mg 24 hr tablet TAKE 1 TABLET BY MOUTH EVERY DAY    desvenlafaxine succinate (PRISTIQ) 50 mg, Oral, Daily, Total daily dose 150 mg daily    diazepam (VALIUM) 5 mg, Oral, Every 8 hours PRN    gabapentin (NEURONTIN) 300 mg, Oral, 3 times daily    glucose blood (Contour Test) test strip USE TO CHECK BLOOD SUGAR ONCE DAILY    hydrOXYzine HCL (ATARAX) 25 mg tablet TAKE 1 TAB BY MOUTH EVERY 6 HOURS AS NEEDED (SLEEP) IN ADDITION TO 50 MG DOSE, MAY TAKE 50-75MG AT BEDTIME AS NEEDED FOR SLEEP    hydrOXYzine HCL (ATARAX) 50 mg tablet Take by mouth daily at bedtime in addition to 25 mg dose,may take 50-75 mg po qhs prn for insomnia lidocaine (LIDODERM) 5 % APPLY 1 PATCH IN THE MORNING REMOVE AND DISARD PATCH WITHIN 12 HOURS OR AS DIRECTED    MAGNESIUM OXIDE PO Oral    metFORMIN (GLUCOPHAGE) 500 mg tablet TAKE 1 TABLET BY MOUTH TWICE A DAY WITH MEALS    nortriptyline (PAMELOR) 75 MG capsule TAKE 1 CAPSULE BY MOUTH TWICE A DAY    phentermine (ADIPEX-P) 37.5 mg, Oral, Every morning    propranolol (INDERAL) 20 mg tablet TAKE 1 TABLET BY MOUTH EVERY DAY AT NIGHT    Allergies   Allergen Reactions    Cannabidiol Shortness Of Breath, Itching, Swelling, Anxiety, Palpitations, Confusion, Hypertension, Throat Swelling and Tongue Swelling    Amoxicillin-Pot Clavulanate Hives    Decadrol [Dexamethasone] Other (See Comments)     psychosis    Penicillins Hives     Hives/Uticaria    Tetracyclines & Related Hives      Allergy;       Social History     Tobacco Use    Smoking status: Never     Passive exposure: Never    Smokeless tobacco: Never   Vaping Use    Vaping Use: Never used   Substance Use Topics    Alcohol use: Yes     Comment: 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; sober since 2010    Drug use: Not Currently    Family History   Problem Relation Age of Onset    Irregular heart beat Mother     Diabetes Father     Kidney disease Father     Diabetes Maternal Grandmother     Rheum arthritis Maternal Grandmother     Melanoma Maternal Grandmother 80    No Known Problems Maternal Grandfather     Kidney disease Paternal Grandmother     Rheum arthritis Paternal Grandmother     Depression Paternal Grandmother     Diabetes Paternal Grandfather     Lung cancer Paternal Grandfather 52    Substance Abuse Brother     No Known Problems Brother     Substance Abuse Maternal Uncle     Prostate cancer Maternal Uncle 61    Depression Paternal Aunt     Alcohol abuse Family     Stomach cancer Family           Objective                            Vitals I/O      Most Recent Min/Max in 24hrs   Temp 99 °F (37.2 °C) Temp  Min: 98.3 °F (36.8 °C)  Max: 99 °F (37.2 °C)   Pulse (!) 52 Pulse  Min: 45  Max: 52 Resp 20 Resp  Min: 17  Max: 20   /86 BP  Min: 67/47  Max: 136/86   O2 Sat 93 % SpO2  Min: 93 %  Max: 100 %      Intake/Output Summary (Last 24 hours) at 10/15/2023 0125  Last data filed at 10/14/2023 2357  Gross per 24 hour   Intake 327.63 ml   Output --   Net 327.63 ml         Diet NPO     Invasive Monitoring Physical exam   N/a Physical Exam  Vitals and nursing note reviewed. Constitutional:       Appearance: She is well-developed. She is obese. She is toxic-appearing. HENT:      Head: Normocephalic and atraumatic. Right Ear: External ear normal.      Left Ear: External ear normal.      Nose: Nose normal. No congestion or rhinorrhea. Mouth/Throat:      Mouth: Mucous membranes are dry. Pharynx: Oropharynx is clear. No oropharyngeal exudate or posterior oropharyngeal erythema. Eyes:      General: No scleral icterus. Extraocular Movements: Extraocular movements intact. Conjunctiva/sclera: Conjunctivae normal.      Pupils: Pupils are equal, round, and reactive to light. Cardiovascular:      Rate and Rhythm: Regular rhythm. Bradycardia present. Pulses: Normal pulses. Heart sounds: Normal heart sounds. No murmur heard. Pulmonary:      Effort: Pulmonary effort is normal. No respiratory distress. Breath sounds: Normal breath sounds. No wheezing or rhonchi. Abdominal:      General: Abdomen is flat. There is no distension. Palpations: Abdomen is soft. Tenderness: There is no abdominal tenderness. There is no guarding. Musculoskeletal:         General: No swelling. Cervical back: Neck supple. No rigidity. Right lower leg: No edema. Left lower leg: No edema. Lymphadenopathy:      Cervical: No cervical adenopathy. Skin:     General: Skin is warm and dry. Capillary Refill: Capillary refill takes less than 2 seconds. Coloration: Skin is not jaundiced. Findings: No rash. Neurological:      Mental Status: She is lethargic. GCS: GCS eye subscore is 3. GCS verbal subscore is 3. GCS motor subscore is 6. Comments: Minimally responsive, will open eyes to voice, occasionally follow commands  No clonus  No rigidity, normal tone  No hyperreflexia  Normal temperature  Pupils are 3mm reactive, equal           Diagnostic Studies      EKG: Normal QRS, normal Qtc, sinus bradycardia, no sign of heart block. Imaging: CT Head reviewed I have personally reviewed pertinent reports.        Medications:  Scheduled PRN   atropine, 0.4 mg, Once  chlorhexidine, 15 mL, Q12H OLIVIER  enoxaparin, 40 mg, Daily  insulin lispro, 1-6 Units, Q6H [address]          Continuous    multi-electrolyte, 75 mL/hr, Last Rate: 75 mL/hr (10/15/23 0041)         Labs:    CBC    Recent Labs     10/14/23  2102 10/14/23  2348   WBC 14.07* 17.25*   HGB 12.7 12.0   HCT 38.8 37.9    217     BMP    Recent Labs     10/14/23  2102 10/14/23  2348   SODIUM 133* 133*   K 4.3 4.1   CL 98 96   CO2 25 22   AGAP 10 15   BUN 21 21   CREATININE 1.41* 1.27   CALCIUM 8.8 9.0       Coags    No recent results     Additional Electrolytes  Recent Labs     10/14/23  2102 10/14/23  2348   MG 2.2 2.1   PHOS 5.0* 5.1*   CAIONIZED  --  1.23          Blood Gas    No recent results  Recent Labs     10/14/23  2348   PHVEN 7.307   [id number] 47.7   PO2VEN 27.1*   [id number] 23.3*   BEVEN -3.2    LFTs  Recent Labs     10/14/23  2102 10/14/23  2348   ALT 37 62*   AST 30 61*   ALKPHOS 46 34   ALB 3.7 3.6   TBILI 0.28 0.27       Infectious  No recent results  Glucose  Recent Labs     10/14/23  2102 10/14/23  2348   GLUC 236* 230*            Martín Mcintosh MD

## 2023-10-15 NOTE — PLAN OF CARE
Problem: Prexisting or High Potential for Compromised Skin Integrity  Goal: Skin integrity is maintained or improved  Description: INTERVENTIONS:  - Identify patients at risk for skin breakdown  - Assess and monitor skin integrity  - Assess and monitor nutrition and hydration status  - Monitor labs   - Assess for incontinence   - Turn and reposition patient  - Assist with mobility/ambulation  - Relieve pressure over bony prominences  - Avoid friction and shearing  - Provide appropriate hygiene as needed including keeping skin clean and dry  - Evaluate need for skin moisturizer/barrier cream  - Collaborate with interdisciplinary team   - Patient/family teaching  - Consider wound care consult   Outcome: Progressing     Problem: MOBILITY - ADULT  Goal: Maintain or return to baseline ADL function  Description: INTERVENTIONS:  -  Assess patient's ability to carry out ADLs; assess patient's baseline for ADL function and identify physical deficits which impact ability to perform ADLs (bathing, care of mouth/teeth, toileting, grooming, dressing, etc.)  - Assess/evaluate cause of self-care deficits   - Assess range of motion  - Assess patient's mobility; develop plan if impaired  - Assess patient's need for assistive devices and provide as appropriate  - Encourage maximum independence but intervene and supervise when necessary  - Involve family in performance of ADLs  - Assess for home care needs following discharge   - Consider OT consult to assist with ADL evaluation and planning for discharge  - Provide patient education as appropriate  Outcome: Progressing  Goal: Maintains/Returns to pre admission functional level  Description: INTERVENTIONS:  - Perform BMAT or MOVE assessment daily.   - Set and communicate daily mobility goal to care team and patient/family/caregiver. - Collaborate with rehabilitation services on mobility goals if consulted  - Perform Range of Motion  times a day.   - Reposition patient every hours.  - Dangle patient  times a day  - Stand patient  times a day  - Ambulate patient  times a day  - Out of bed to chair  times a day   - Out of bed for meals times a day  - Out of bed for toileting  - Record patient progress and toleration of activity level   Outcome: Progressing     Problem: PAIN - ADULT  Goal: Verbalizes/displays adequate comfort level or baseline comfort level  Description: Interventions:  - Encourage patient to monitor pain and request assistance  - Assess pain using appropriate pain scale  - Administer analgesics based on type and severity of pain and evaluate response  - Implement non-pharmacological measures as appropriate and evaluate response  - Consider cultural and social influences on pain and pain management  - Notify physician/advanced practitioner if interventions unsuccessful or patient reports new pain  Outcome: Progressing     Problem: INFECTION - ADULT  Goal: Absence or prevention of progression during hospitalization  Description: INTERVENTIONS:  - Assess and monitor for signs and symptoms of infection  - Monitor lab/diagnostic results  - Monitor all insertion sites, i.e. indwelling lines, tubes, and drains  - Monitor endotracheal if appropriate and nasal secretions for changes in amount and color  - [place] appropriate cooling/warming therapies per order  - Administer medications as ordered  - Instruct and encourage patient and family to use good hand hygiene technique  - Identify and instruct in appropriate isolation precautions for identified infection/condition  Outcome: Progressing  Goal: Absence of fever/infection during neutropenic period  Description: INTERVENTIONS:  - Monitor WBC    Outcome: Progressing     Problem: DISCHARGE PLANNING  Goal: Discharge to home or other facility with appropriate resources  Description: INTERVENTIONS:  - Identify barriers to discharge w/patient and caregiver  - Arrange for needed discharge resources and transportation as appropriate  - Identify discharge learning needs (meds, wound care, etc.)  - Arrange for interpretive services to assist at discharge as needed  - Refer to Case Management Department for coordinating discharge planning if the patient needs post-hospital services based on physician/advanced practitioner order or complex needs related to functional status, cognitive ability, or social support system  Outcome: Progressing     Problem: Knowledge Deficit  Goal: Patient/family/caregiver demonstrates understanding of disease process, treatment plan, medications, and discharge instructions  Description: Complete learning assessment and assess knowledge base.   Interventions:  - Provide teaching at level of understanding  - Provide teaching via preferred learning methods  Outcome: Progressing

## 2023-10-15 NOTE — CONSULTS
INTERPROFESSIONAL (PHONE) [id number] [facility] Toxicology  Milana Veronica 37 y.o. female MRN: [id number]  Unit/Bed#: ED 06 Encounter: [id number]      Reason for Consult / Principal Problem: Concern for propanolol toxicity    Inpatient consult to Toxicology  Consult performed by: Vidhi Serrano MD  Consult ordered by: Verito Rich MD        10/14/23    ASSESSMENT:  Toxic encephalopathy  Bradycardia  Hypotension--fluid responsive  Concern for propanolol toxicity    RECOMMENDATIONS:  Continue supportive care measures, including airway monitoring, head of bed elevation, aspiration precautions, cardiac telemetry, and continuous pulse oximetry. Patient's presentation with bradycardia, hypotension, and CNS depression are consistent with propanolol ingestion. Propanolol, as a beta blocker, can cause bradycardia, hypotension, hypoglycemia, and CNS depression. Additionally, propanolol has cardiac membrane stabilizing effects and can cause sodium channel blockade, resulting in QRS prolongation. EKG with initial  ms; will need to monitor EKG every hour or with clinical change to determine if QRS prolongation will occur. If QRS is greater than 120 ms or patient experiences seizures (can occur when QRS is greater than 100 ms), recommend sodium bicarbonate bolus (enough ampules to decrease QRS) followed by continuous sodium bicarbonate infusion at 1.5-2 times maintenance rate. Recommend IVF hydration; patient has responded to fluids thus far with regard to blood pressure. If patient develops hypotension, can utilize vasopressors such as norepinephrine, epinephrine, phenylephrine, and vasopressin. Higher than usual doses of vasopressors may be required to maintain adequate MAPs. Atropine can be attempted for unstable bradycardia but effects are usually transient.     Glucagon can also be utilized for severe bradycardia and hypotension, but its therapy is often limited by vomiting (requiring pretreatment with antiemetics) and pharmacy supply. If patient shows response to glucagon, can consider continuous infusion. Calcium can be attempted, as well, for unstable bradycardia and hypotension, but effects may not be as great as those seen with calcium channel blocker toxicity. Optimization of electrolytes (potassium, magnesium, and calcium) is ideal.    If patient develops evidence of cardiac failure, high dose insulin euglycemia therapy can be utilized (does not work as well for pure vasoplegic shock). Dosing is one unit/kg bolus followed by continuous infusion of 1-10 units/kg/hr with titration every 30-45 minutes until stabilization of blood pressure and vasopressor requirements. If high dose insulin is utilized, recommend concentrating drip (for example, 16 units/mL), as this therapy will deliver a large amount of volume to the patient. Patient will require close blood glucose monitoring and continuous dextrose infusion. Recommend monitoring lactate every 2 hours as a marker of perfusion if patient is requiring vasopressors. Additionally, may need to monitor blood glucose if toxicity worsens, though hypoglycemia is more common in pediatric patients. If patient's acetaminophen level is greater than 10 or AST or ALT are greater than 50, recommend empiric treatment with NAC to protect against life-threatening liver injury. Please see additional details in teaching note below. For further questions, please contact the medical  on call via [employer] or throughl the [employer] or Patient [facility]. Please see additional teaching note below:    Medical Toxicology   [facility]  Discussion: ?-Adrenergic Antagonist Toxicity    Uses ?-Adrenergic antagonists are used to treat hypertension, coronary artery disease, congestive heart failure, and tachydysrhythmias.  Other indications include hyperthyroidism, migraine headache, tremor, and panic attacks. B-Adrenergic Receptors There are 4 ?-Adrenergic receptor subunits: ?1, ?2, ?3, and ?4. The ?1-adrenergic subtype is mainly found on cardiac myocytes. Their activation increases chronotropy, automaticity, and inotropy. ?1-adrenergic receptors are also in the kidney, where their stimulation leads to increased renin. In the eye, they increase production of aqueous humor. ?2-adrenergic receptors are found in various organs. They are present in the heart but at decreased density compared to ?1-adrenergic receptors. They are abundant in arteries and veins, where they mediate dilation. In the airways, they cause bronchodilation and decreased respiratory secretions. Glucose regulation is also affected. ?2-adrenergic receptors on the pancreas, when stimulated, increase insulin secretion. In the liver they increase gluconeogenesis, while in skeletal muscle they stimulate glycogenolysis and take up potassium. ?3-adrenergic receptors are found on adipocytes where they stimulate lipolysis. The function of ?4-adrenergic receptors is unknown. Pathophysiology of B-Adrenergic Receptors When stimulated by catecholamines, a second messenger cascade is activated which ultimately causes calcium to enter the cell. (see figure below, from [facility] Toxicologic Emergencies, 7th edition, p. 744 ) The receptor is coupled with a Gs protein that activates adenyl cylase (AC), converting ATP to cAMP. Protein Kinase A is then activated which phosphorylates the slow calcium channels on the cell, causing them to open, allowing calcium to flow into the cell. This influx of calcium binds to troponin and also stimulates the sarcoplasmic reticulum to release more calcium. The calcium binds to troponin, changing its confirmation, thereby allowing the actin and myosin fibers to slide across each other. The end result is a myocyte contraction and increased inotropy.                                           B-Adrenergic Receptor Antagonists These drugs work by competitively blocking catecholamines at ?-adrenergic receptors. In general, they cause decreased heart rate, blood pressure, and bronchoconstriction. Several characteristics help us understand their effects in therapeutic and overdose ingestions. Cardioselectivity- some ?-blockers are intended to affect both ?1 and ?2 receptors (eg. labetalol). Others favor the ?1 receptor (eg. metoprolol, atenolol) or the ?2 receptor (eg. albuterol). In overdose these drugs may lose their receptor specificity. Alpha activity- Labetalol is one of the ?-blocking agents that also blocks alpha- adrenergic receptors. Lipid solubility- These drugs easily cross into the brain. Propranolol is an example. Overdose may lead to altered mental status, seizures, and coma in addition to their cardiovascular effects. Membrane stabilizing effect- Certain ?-blockers inhibit the fast sodium channels on cardiac myocytes, similar to Class IA antidysrhythmics. This may cause slowed action potential, wide QRS, and prolonged QTc. Overdose may lead to dysrhythmias. Intrinsic sympathomimetic activity (RAGINI) or partial agonists- Some ?-blockers are also partial agonists, causing sympathomimetic effects in therapeutic or overdose exposures. Examples include acebutolol and pindolol. Some B-Adrenergic Antagonists In-Depth  Atenolol (Tenormin) It is a long-acting selective ? 1-antagonist. It comes in 25-, 50-, and 100mg regular release tablets. The adult therapeutic dose is 25 to 200mg orally once a day. It is highly water soluble and is excreted unchanged in the urine, accumulating in renal failure. It is the least lipophilic, therefore mental status is not affected unless hypotension is affecting cerebral blood flow, and it has no sodium channel blocking effects. Its elimination half-life is 5-8 hours in therapeutic doses. It appears to be minimally toxic as a single substance overdose.      Labetalol (Normodyne) is a non-selective ?-antagonist and alpha-adrenergic antagonist. It is 3x more potent at the ?-receptors (vs. alpha) when taken orally, and 7x more so when given parenterally. Vasodilation may occur, compounding hypotension. It is moderately lipid soluble, and has little sodium channel blockade effects. It is available in 100-, 200-, and 300 mg tablets. The adult therapeutic dose is 200-2400mg a day in two divided doses. Metoprolol (Toprol) is a selective ? 1-antagonist. It is moderately lipophilic and seizures have been reported in overdose. It has little sodium channel blockade. It comes in 50-mg and 100-mg tablets and 50-mg, 100-mg, and 200-mg modified-release tablets. The adult therapeutic dose is 50 to 400mg/day orally once or divided into two doses per day. Propranolol (Inderal) Several characteristics make this drug the most serious of the class in overdose. It is lipophilic, crossing the blood-brain barrier. Delirium, coma, and seizures may result. It also inhibits fast sodium channels on the myocardium, similar to Class IA antidysrhythmics. This may lead to conduction abnormalities, widened QRS, and ventricular tachydysrhythmias. It comes in regular-release form with doses of 10-, 20-, 40-, 60-, and 80-mg tablets. The sustained release preparations are 120- and 160-mg capsules. The adult therapeutic dose is  mg/day in two to four divided doses for the regular release, and 80 to 160 mg/day in one dose for the extended release. Sotalol (Betapace) It is a nonselective ?-adrenergic antagonist not commonly used, but is noteworthy for its toxicity. It has low lipophilicity, and no sodium channel blockade, but it does block the delayed rectifier potassium current on the myocyte, slowing repolarization. In therapeutic and overdose, the action potential and QTc are prolonged, and there is an increased risk for ventricular dysrhythmias such as torsade de pointes. Toxicity may be delayed in overdose.  There are reports of tachydysrhythmias developing 9 hours after ingestion and persisting for 48 hours. Extended Release Preparation The toxicity in overdose is increased due to delayed onset of effects and prolonged duration of toxicity. Clinical Manifestations of Overdose In overdose the therapeutic effects are exaggerated, often causing bradycardia and hypotension. While these agents have the potential for serious sequelae, most overdoses are benign. Those at higher risk for serious consequences include those with cardiovascular disease who depend on increased catecholamines at baseline, those who have ingested other cardiosuppressive drugs, large ingestions, and overdoses with sustained release preparations. Cardioselective drugs in overdose may lose their receptor specificity, affecting ? 2 receptors. Sinoatrial node function is decreased resulting in bradycardia to sinus arrest. Hypotension is caused by decreased contractility. Congestive heart failure may occur, especially in those with underlying cardiovascular disease. Altered mental status and seizures may be the first symptoms of overdose with the lipophilic agents, such as propranolol. Hypoglycemia is a well-known complication in children after ?-adrenergic overdose but is unusual in adults. Bronchospasm and hyperkalemia are also rare. Most persons become symptomatic after overdose within 6 hours. The exception is sotalol, which has delayed onset of action. Management of Overdose  Patients who have overdosed may require a range of supportive measures depending on the ?-adrenergic antagonist ingested, the formulation, the amount ingested, and the patient’s co-morbidities. The structural integrity of the heart is not affected in overdose, unless hypoperfusion leads to ischemia and permanent damage. In general, the toxicity of ?-blockers in overdose is low. Good supportive care is essential to bridge the patient while the drug effect wears off.     Address the A, B, Cs    Treat seizures in the customary manner with benzodiazepines. Gastrointestinal decontamination  Charcoal is indicated if the patient can protect their airway and presents within 1 hour of ingestion, or later if sustained release preparations have been ingested. Orogastric lavage should be considered if the patient presents early after a large exposure especially sustained release products, ingests more toxic drugs such as propranolol or sotalol, or co-ingests other cardiosuppressant medications like calcium channel blockers. Oropharyngeal manipulation may cause bradycardia, so have atropine at the bedside. Whole bowel irrigation may have a role when treating a large overdose of sustained release preparations. Specific Treatments  Atropine. By antagonizing the muscarinic receptors of the heart, the heart rate is increased. Atropine should be given if the patient is bradycardic, but may not be sufficient to counteract the drug effect of the ?-adrenergic antagonist taken. The dose is 0.5 mg to 3.0 mg IV for total dose of 3 mg in adults. Intravenous fluids. Start with judicious use of . 9NS to support blood pressure. In general, avoid giving more than 2L because these patients are at risk for congestive heart failure. If their blood pressure is not adequately supported with fluids, move to additional therapeutic options. Positive inotropes  Catecholamines. Patients who are not stabilized after atropine and intravenous fluids should be started on a pressor. The most effective pressor is one that is readily available and which providers feel comfortable using. Dopamine or epinephrine is usually reasonable choices. Isoproterenol, a pure ?-agonist, seems like a logical choice, but in the higher doses needed to counteract ?-antagonism, it may cause dysrhythmias and vasodilation. Dopamine 1-20 mcg/kg/min. Epinephrine 1-20 mcg/min. Has combined alpha and beta-agonist properties.  Large doses have been successful at treating ?-blocker overdoses. Phosphodiesterase inhibitors (PDIs), such as amrinone and milrinone inhibit the action of phosphodiesterase, thereby reducing the breakdown of cAMP. This leads to more activated protein kinase A which causes more calcium channels to open. Their use is limited by their vasodilatory effect. Glucagon. There are glucagon receptors on the heart. Like the ?-adrenergic receptors, they are coupled to Gs proteins. When stimulated, they trigger a cascade of second messengers which result in slow calcium channels opening on the myocyte, causing calcium to enter the cell. Glucagon also inhibits phosphodiesterase, thereby slowing the breakdown of cAMP. This leads to myocyte contraction and increased inotropy. The dose of glucagon in this setting is 5-10mg IV over 1-2 minutes. If the patient’s blood pressure responds, start a drip at the effective dose. Glucagon also relaxes the lower esophageal sphincter, predisposing patients to vomit. Pretreat with an antiemetic and make sure the patient’s airway is protected. Bicarbonate. It should be considered when treating overdoses of ?-adrenergic drugs showing sodium channel blockade effect, such as propranolol. The dose is 1-2 amps IV bolus, repeat as needed. Calcium  Increased extracellular calcium may help stimulate more calcium channels to open. The suggested dose of calcium chloride is 1g of 10% solution IV through a central line. Calcium gluconate may be given peripherally but has 1/3 the amount of calcium. High dose insulin and glucose  The heart prefers free fatty acids for energy. When ?-blockers are used therapeutically or in overdose, the heart shifts to carbohydrates for nutrition. However, the heart appears to become insulin resistant, more so in overdose with ?-blockers.   The use of high dose insulin with glucose to maintain euglycemia has been shown to overcome this insulin resistance, allowing the heart to function better. This therapy was first used for calcium channel blocker overdoses, but has been effective in an animal model of ?-blocker overdose and in several human case reports. The positive response to high dose insulin is usually delayed 15-60 minutes. The recommended dose for insulin is 0.5-1.0 U/kg/hr with glucose infusion of 1g/kg/hr, titrated to maintain euglycemia. Ventricular pacing  Pacer pads should be placed on the patient at the beginning of the resuscitation. Transvenous pacing may be tried if standard supportive measures are unsuccessful. Mechanical Life Support  Intra-aortic balloon pump may be considered if cardiogenic shock is unresponsive to above measures. Extracorporeal circulation has been used with success in severe, refractory cases. Hemodialysis  Some ?-adrenergic antagonists are theoretically dialyzable because they are water soluble, have low volumes of distribution, and low protein binding. Examples include atenolol and acebutolol. Hemodialysis is usually not practical because of hemodynamic instability. References    Jeannine Horton “?-Receptor Antagonists” in Critical Care Toxicology: Diagnosis and Management of the Critically Poisoned Patient, 1st edition ,pp. 993-869. Shelby Petty and SAKINA Olson. “Type II Antidysrhythmics Agents (?-Receptor Blocking Agents) in Medical Toxicology, 3rd edition, PE.712-549. Aneta Valdivia “?-Adrenergic Antagonists” in [facility] Toxicologic Emergencies, 7th edition, GE.617-332. OBED Lopez and [facility] Cardiovascular Medications Become Toxins: Managing ?-Blocker, CCB, and Digoxin Overdoses” in Emergency Medicine Practice, vol. 7, #5, September 2005. Hx and PE limited by the dynamics of a phone consultation. I have not personally interviewed or evaluated the patient, but only advised based on the information provided to me. Primary provider is responsible for all clinical decisions.      Pertinent history, physical exam and clinical findings and course discussed: Sina Cornell is a 37y.o. year old female who presents with altered mental status and bradycardia with concern for propanolol ingestion. Received atropine and glucagon with minimal response. Hypotension responded to IVFs. No evidence of specific toxidrome reported on exam.    Review of systems and physical exam not performed by me.     Historical Information   Past Medical History:   Diagnosis Date    Anxiety     Blood transfusion declined because patient is Hinduism 5/1/2023    Chronic pain disorder     Chronic sinusitis     Depression     Diabetes ([address])     Fibromyalgia     Migraine     Obesity     Polyarthritis     Last assessed 9/21/2015    Psychiatric disorder     Psychiatric illness     Sleep difficulties      Past Surgical History:   Procedure Laterality Date    COLONOSCOPY  06/2019    DENTAL SURGERY      HYSTEROSCOPY      Endometrial Biopsy By Hysteroscopy    [place] LAPS SUPRACRV HYSTERECT 250 GM/< RMVL TUBE/OVAR N/A 5/1/2023    Procedure: ([address]) W/ BILATERAL SALPINGECTOMY, REMOVAL PARAOVARIAN CYST;  Surgeon: Mai Mcdonnell DO;  Location: [facility];  Service: Gynecology    REMOVAL OF INTRAUTERINE DEVICE (IUD)      US GUIDED BREAST BIOPSY RIGHT COMPLETE Right 6/17/2019     Social History   Social History     Substance and Sexual Activity   Alcohol Use Yes    Comment: 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; sober since 2010     Social History     Substance and Sexual Activity   Drug Use Not Currently     Social History     Tobacco Use   Smoking Status Never    Passive exposure: Never   Smokeless Tobacco Never     Family History   Problem Relation Age of Onset    Irregular heart beat Mother     Diabetes Father     Kidney disease Father     Diabetes Maternal Grandmother     Rheum arthritis Maternal Grandmother     Melanoma Maternal Grandmother 80    No Known Problems Maternal Grandfather     Kidney disease Paternal Grandmother     Rheum arthritis Paternal Grandmother     Depression Paternal Grandmother     Diabetes Paternal Grandfather     Lung cancer Paternal Grandfather 52    Substance Abuse Brother     No Known Problems Brother     Substance Abuse Maternal Uncle     Prostate cancer Maternal Uncle 61    Depression Paternal Aunt     Alcohol abuse Family     Stomach cancer Family         Prior to Admission medications    Medication Sig Start Date End Date Taking?  Authorizing Provider   desvenlafaxine succinate (PRISTIQ) 50 mg 24 hr tablet Take 1 tablet (50 mg total) by mouth daily Total daily dose 150 mg daily 10/12/23   Lisa Juarez MD   acetaminophen (TYLENOL) 650 mg CR tablet Take 2 tablets (1,300 mg total) by mouth every 8 (eight) hours as needed for mild pain 5/1/23   Obed Chamberlain MD   Blood Glucose Monitoring Suppl (Contour Blood Glucose System) w/Device KIT Use 1 kit 2 (two) times a day before meals 10/4/22   Alma Aguilar MD   celecoxib (CeleBREX) 200 mg capsule TAKE 1 CAPSULE BY MOUTH TWICE A DAY 7/18/23   Marie Del Rosario MD   Cholecalciferol (VITAMIN D-3) 1000 units CAPS Take 1 capsule by mouth daily Take 2000 IU daily    Historical Provider, MD   cyclobenzaprine (FLEXERIL) 10 mg tablet Take 1 tablet (10 mg total) by mouth 3 (three) times a day as needed for muscle spasms 9/18/23   ADELAIDA Mccauley   desvenlafaxine (PRISTIQ) 100 mg 24 hr tablet TAKE 1 TABLET BY MOUTH EVERY DAY 9/10/23   Lisa Juarez MD   diazepam (VALIUM) 5 mg tablet Take 1 tablet (5 mg total) by mouth every 8 (eight) hours as needed for muscle spasms for up to 15 doses 10/6/23   Lis Castillo MD   gabapentin (Neurontin) 300 mg capsule Take 1 capsule (300 mg total) by mouth 3 (three) times a day 8/25/23   Prasad Bain DO   glucose blood (Contour Test) test strip USE TO CHECK BLOOD SUGAR ONCE DAILY 5/18/23   Alma Aguilar MD   hydrOXYzine HCL (ATARAX) 25 mg tablet TAKE 1 TAB BY MOUTH EVERY 6 HOURS AS NEEDED (SLEEP) IN ADDITION TO 50 MG DOSE, MAY TAKE 50-75MG AT BEDTIME AS NEEDED FOR SLEEP 8/25/23   Bridget Mora MD   hydrOXYzine HCL (ATARAX) 50 mg tablet Take by mouth daily at bedtime in addition to 25 mg dose,may take 50-75 mg po qhs prn for insomnia 10/1/23   Bridget Mora MD   lidocaine (LIDODERM) 5 % APPLY 1 PATCH IN THE MORNING REMOVE AND DISARD PATCH WITHIN 12 HOURS OR AS DIRECTED 8/25/23   Kenisha Ernandez MD   MAGNESIUM OXIDE PO Take by mouth    Historical Provider, MD   metFORMIN (GLUCOPHAGE) 500 mg tablet TAKE 1 TABLET BY MOUTH TWICE A DAY WITH MEALS 7/10/23   Kenisha Ernandez MD   nortriptyline (PAMELOR) 75 MG capsule TAKE 1 CAPSULE BY MOUTH TWICE A DAY 9/10/23   Bridget Mora MD   phentermine (ADIPEX-P) 37.5 MG tablet Take 1 tablet (37.5 mg total) by mouth every morning 9/19/23   Bridget Mora MD   propranolol (INDERAL) 20 mg tablet TAKE 1 TABLET BY MOUTH EVERY DAY AT NIGHT 12/7/22   Helga Gutiérrez MD       Current Facility-Administered Medications   Medication Dose Route Frequency    Atropine Sulfate injection 0.4 mg  0.4 mg Intravenous Once    calcium gluconate 2 g in sodium chloride 0.9% 100 mL (premix)  2 g Intravenous Once       Allergies   Allergen Reactions    Cannabidiol Shortness Of Breath, Itching, Swelling, Anxiety, Palpitations, Confusion, Hypertension, Throat Swelling and Tongue Swelling    Amoxicillin-Pot Clavulanate Hives    Decadrol [Dexamethasone] Other (See Comments)     psychosis    Penicillins Hives     Hives/Uticaria    Tetracyclines & Related Hives      Allergy;        Objective     No intake or output data in the 24 hours ending 10/14/23 2135    Invasive Devices:   Peripheral IV 10/14/23 Left;Proximal;Ventral (anterior) Antecubital (Active)   Site Assessment WDL; Clean;Dry 10/14/23 2016       Vitals   Vitals:    10/14/23 2017 10/14/23 2030 10/14/23 2115 10/14/23 2130   BP: 108/71 105/67 115/68 103/71   TempSrc:       Pulse: (!) 45 (!) 46 (!) 45 (!) 46   Resp: 20 20 20 18   Patient Position - Orthostatic VS: Lying Lying Lying    Temp:             EKG, Pathology, and/or Other Studies: I have personally reviewed pertinent reports. Lab Results: I have personally reviewed pertinent reports. Labs:    Results from last 7 days   Lab Units 10/14/23  2102   WBC Thousand/uL 14.07*   HEMOGLOBIN g/dL 12.7   HEMATOCRIT % 38.8   PLATELETS Thousands/uL 226   NEUTROS PCT % 76*   LYMPHS PCT % 19   MONOS PCT % 4   EOS PCT % 0          Invalid input(s): "LABALBU"           0   Lab Value Date/Time    TROPONINI <0.04 12/24/2014 1020    TROPONINI <0.04 12/24/2014 0237             Invalid input(s): "EXTPREGUR"      Imaging Studies:  not yet available    Counseling / Coordination of Care  Total time spent today 22 minutes. This was a phone consultation; greater than 50% of time spent in discussion with primary provider and coordination of care.

## 2023-10-15 NOTE — ED PROVIDER NOTES
History  Chief Complaint   Patient presents with    Overdose - Accidental     Per EMS pt was found unresponsive by family. EMS found a 90 day supply bottle of gabapentin and propanolol half empty. EMS is unaware of how many pills she took. 15-year-old female with history of depression, presents emergency department obtunded and with family concerns for overdose. Family notes that when they checked on her around 1 hour ago, she appeared to be sleepy so they left her in her room. However, about 30 minutes ago they attempted to wake her and were unable to so they called EMS. They have her medicines and aren't sure which one she may have overdosed on but thinks it may have been the gabapentin or propranolol as these seem like there are less than they would expect. No known history of overdose. Patient is obtunded and unable to provide any history. Prior to Admission Medications   Prescriptions Last Dose Informant Patient Reported? Taking?    Blood Glucose Monitoring Suppl (Contour Blood Glucose System) w/Device KIT   No No   Sig: Use 1 kit 2 (two) times a day before meals   Cholecalciferol (VITAMIN D-3) 1000 units CAPS  Self Yes No   Sig: Take 1 capsule by mouth daily Take 2000 IU daily   MAGNESIUM OXIDE PO Unknown Self Yes No   Sig: Take by mouth   acetaminophen (TYLENOL) 650 mg CR tablet   No No   Sig: Take 2 tablets (1,300 mg total) by mouth every 8 (eight) hours as needed for mild pain   celecoxib (CeleBREX) 200 mg capsule   No No   Sig: TAKE 1 CAPSULE BY MOUTH TWICE A DAY   cyclobenzaprine (FLEXERIL) 10 mg tablet   No No   Sig: Take 1 tablet (10 mg total) by mouth 3 (three) times a day as needed for muscle spasms   desvenlafaxine (PRISTIQ) 100 mg 24 hr tablet Unknown  No No   Sig: TAKE 1 TABLET BY MOUTH EVERY DAY   desvenlafaxine succinate (PRISTIQ) 50 mg 24 hr tablet Unknown  No No   Sig: Take 1 tablet (50 mg total) by mouth daily Total daily dose 150 mg daily   diazepam (VALIUM) 5 mg tablet Unknown  No No   Sig: Take 1 tablet (5 mg total) by mouth every 8 (eight) hours as needed for muscle spasms for up to 15 doses   gabapentin (Neurontin) 300 mg capsule   No No   Sig: Take 1 capsule (300 mg total) by mouth 3 (three) times a day   glucose blood (Contour Test) test strip Unknown  No No   Sig: USE TO CHECK BLOOD SUGAR ONCE DAILY   hydrOXYzine HCL (ATARAX) 25 mg tablet Unknown  No No   Sig: TAKE 1 TAB BY MOUTH EVERY 6 HOURS AS NEEDED (SLEEP) IN ADDITION TO 50 MG DOSE, MAY TAKE 50-75MG AT BEDTIME AS NEEDED FOR SLEEP   hydrOXYzine HCL (ATARAX) 50 mg tablet Unknown  No No   Sig: Take by mouth daily at bedtime in addition to 25 mg dose,may take 50-75 mg po qhs prn for insomnia   lidocaine (LIDODERM) 5 % Unknown  No No   Sig: APPLY 1 PATCH IN THE MORNING REMOVE AND DISARD PATCH WITHIN 12 HOURS OR AS DIRECTED   metFORMIN (GLUCOPHAGE) 500 mg tablet Unknown  No No   Sig: TAKE 1 TABLET BY MOUTH TWICE A DAY WITH MEALS   nortriptyline (PAMELOR) 75 MG capsule Unknown  No No   Sig: TAKE 1 CAPSULE BY MOUTH TWICE A DAY   phentermine (ADIPEX-P) 37.5 MG tablet Unknown  No No   Sig: Take 1 tablet (37.5 mg total) by mouth every morning   propranolol (INDERAL) 20 mg tablet Unknown  No No   Sig: TAKE 1 TABLET BY MOUTH EVERY DAY AT NIGHT      Facility-Administered Medications: None       Past Medical History:   Diagnosis Date    Anxiety     Blood transfusion declined because patient is Tenriism 05/01/2023    Chronic pain disorder     Chronic sinusitis     Depression     Depression     Diabetes (HCC)     Fibromyalgia     Migraine     Obesity     Polyarthritis     Last assessed 9/21/2015    Psychiatric disorder     Psychiatric illness     Sleep difficulties        Past Surgical History:   Procedure Laterality Date    COLONOSCOPY  06/2019    DENTAL SURGERY      HYSTEROSCOPY      Endometrial Biopsy By Hysteroscopy    ID LAPS SUPRACRV HYSTERECT 250 GM/< RMVL TUBE/OVAR N/A 5/1/2023    Procedure: [facility]) W/ BILATERAL SALPINGECTOMY, REMOVAL PARAOVARIAN CYST;  Surgeon: Caren Newberry DO;  Location: [facility];  Service: Gynecology    REMOVAL OF INTRAUTERINE DEVICE (IUD)      US GUIDED BREAST BIOPSY RIGHT COMPLETE Right 6/17/2019       Family History   Problem Relation Age of Onset    Irregular heart beat Mother     Diabetes Father     Kidney disease Father     Diabetes Maternal Grandmother     Rheum arthritis Maternal Grandmother     Melanoma Maternal Grandmother 80    No Known Problems Maternal Grandfather     Kidney disease Paternal Grandmother     Rheum arthritis Paternal Grandmother     Depression Paternal Grandmother     Diabetes Paternal Grandfather     Lung cancer Paternal Grandfather 52    Substance Abuse Brother     No Known Problems Brother     Substance Abuse Maternal Uncle     Prostate cancer Maternal Uncle 61    Depression Paternal Aunt     Alcohol abuse Family     Stomach cancer Family      I have reviewed and agree with the history as documented. E-Cigarette/Vaping    E-Cigarette Use Never User      E-Cigarette/Vaping Substances    Nicotine No     THC No     CBD No      Social History     Tobacco Use    Smoking status: Never     Passive exposure: Never    Smokeless tobacco: Never    Tobacco comments:     N/A, non-smoker. Vaping Use    Vaping Use: Never used   Substance Use Topics    Alcohol use: Yes     Comment: 3 x year; sober since 2010    Drug use: Not Currently        Review of Systems   All other systems reviewed and are negative.       Physical Exam  ED Triage Vitals   Temperature Pulse Respirations Blood Pressure SpO2   10/14/23 2007 10/14/23 2005 10/14/23 2005 10/14/23 2006 10/14/23 2005   98.3 °F (36.8 °C) (!) 45 20 90/51 98 %      Temp Source Heart Rate Source Patient Position - Orthostatic VS BP Location FiO2 (%)   10/14/23 2007 10/14/23 2005 10/14/23 2005 10/14/23 2005 --   Oral Monitor Lying Right arm       Pain Score       10/15/23 0800       No Pain             Orthostatic Vital Signs  Vitals:    10/15/23 1837 10/15/23 2208 10/16/23 0717 10/16/23 1517   BP: 116/73 103/57 103/58 120/67   Pulse: 69 64 60 69   Patient Position - Orthostatic VS:           Physical Exam  Vitals and nursing note reviewed. Constitutional:       Appearance: She is well-developed. She is ill-appearing. Comments: Lethargic   HENT:      Head: Normocephalic and atraumatic. Eyes:      Pupils: Pupils are equal, round, and reactive to light. Cardiovascular:      Rate and Rhythm: Normal rate and regular rhythm. Heart sounds: No murmur heard. Pulmonary:      Effort: Pulmonary effort is normal. No respiratory distress. Breath sounds: Normal breath sounds. Abdominal:      Palpations: Abdomen is soft. Tenderness: There is no abdominal tenderness. Musculoskeletal:         General: No swelling. Cervical back: Neck supple. Skin:     General: Skin is warm and dry. Capillary Refill: Capillary refill takes less than 2 seconds.    Psychiatric:      Comments: JUAN FRANCISCO         ED Medications  Medications   glucagon (GLUCAGEN) injection 1 mg (1 mg Intravenous Given 10/14/23 2020)   glucagon (GLUCAGEN) injection 1 mg (1 mg Intravenous Given 10/14/23 2042)   calcium gluconate 2 g in sodium chloride 0.9% 100 mL (premix) (0 g Intravenous Stopped 10/14/23 2142)   atropine 1 mg/10 mL injection **ADS Override Pull** (0.4 mg  Given 10/14/23 2111)   acetylcysteine (ACETADOTE) 12,855 mg in dextrose 5 % 200 mL IVPB (0 mg Intravenous Stopped 10/14/23 2357)   acetylcysteine (ACETADOTE) 4,355 mg in dextrose 5 % 500 mL IVPB (0 mg Intravenous Stopped 10/15/23 0635)   acetylcysteine (ACETADOTE) 8,710 mg in dextrose 5 % 1,000 mL IVPB (0 mg Intravenous Stopped 10/16/23 0047)   magnesium sulfate 2 g/50 mL IVPB (premix) 2 g (0 g Intravenous Stopped 10/15/23 1040)   potassium chloride 20 mEq IVPB (premix) (0 mEq Intravenous Stopped 10/15/23 1425)       Diagnostic Studies  Results Reviewed       Procedure Component Value Units Date/Time    Lactic acid, plasma (w/reflex if result > 2.0) [[id number]]  (Normal) Collected: 10/15/23 0555    Lab Status: Final result Specimen: Blood from Arm, Right Updated: 10/15/23 0630     LACTIC ACID 1.2 mmol/L     Narrative:      Result may be elevated if tourniquet was used during collection.     Basic metabolic panel [[id number]]  (Abnormal) Collected: 10/15/23    Lab Status: Final result Specimen: Blood Updated: 10/15/23 0431     Sodium 134 mmol/L      Potassium 3.9 mmol/L      Chloride 100 mmol/L      CO2 21 mmol/L      ANION GAP 13 mmol/L      BUN 20 mg/dL      Creatinine 1.10 mg/dL      Glucose 206 mg/dL      Calcium 8.7 mg/dL      eGFR 60 ml/min/1.73sq m     Narrative:      [place] guidelines for Chronic Kidney Disease (CKD):     Stage 1 with normal or high GFR (GFR > 90 mL/min/1.73 square meters)    Stage 2 Mild CKD (GFR = 60-89 mL/min/1.73 square meters)    Stage 3A Moderate CKD (GFR = 45-59 mL/min/1.73 square meters)    Stage 3B Moderate CKD (GFR = 30-44 mL/min/1.73 square meters)    Stage 4 Severe CKD (GFR = 15-29 mL/min/1.73 square meters)    Stage 5 End Stage CKD (GFR <15 mL/min/1.73 square meters)  Note: GFR calculation is accurate only with a steady state creatinine    Hepatic function panel [[id number]]  (Abnormal) Collected: 10/15/23    Lab Status: Final result Specimen: Blood Updated: 10/15/23 0431     Total Bilirubin 0.33 mg/dL      Bilirubin, Direct 0.09 mg/dL      Alkaline Phosphatase 33 U/L      AST 48 U/L      ALT 58 U/L      Total Protein 5.6 g/dL      Albumin 3.5 g/dL     Phosphorus [[id number]]  (Normal) Collected: 10/15/23    Lab Status: Final result Specimen: Blood Updated: 10/15/23 0431     Phosphorus 4.1 mg/dL     Magnesium [[id number]]  (Normal) Collected: 10/15/23    Lab Status: Final result Specimen: Blood Updated: 10/15/23 0431     Magnesium 1.9 mg/dL     Lactic acid, plasma (w/reflex if result > 2.0) [[id number]]  (Normal) Collected: 10/15/23 0408    Lab Status: Final result Specimen: Blood from Arm, Right Updated: 10/15/23 0430     LACTIC ACID 0.9 mmol/L     Narrative:      Result may be elevated if tourniquet was used during collection. CBC and differential [[id number]]  (Abnormal) Collected: 10/15/23    Lab Status: Final result Specimen: Blood Updated: 10/15/23 0414     WBC 17.34 Thousand/uL      RBC 4.30 Million/uL      Hemoglobin 12.1 g/dL      Hematocrit 36.6 %      MCV 85 fL      MCH 28.1 pg      MCHC 33.1 g/dL      RDW 14.4 %      MPV 8.6 fL      Platelets 029 Thousands/uL      nRBC 0 /100 WBCs      Neutrophils Relative 79 %      Immat GRANS % 1 %      Lymphocytes Relative 14 %      Monocytes Relative 6 %      Eosinophils Relative 0 %      Basophils Relative 0 %      Neutrophils Absolute 13.81 Thousands/µL      Immature Grans Absolute 0.12 Thousand/uL      Lymphocytes Absolute 2.39 Thousands/µL      Monocytes Absolute 0.96 Thousand/µL      Eosinophils Absolute 0.03 Thousand/µL      Basophils Absolute 0.03 Thousands/µL     Calcium, ionized [[id number]]  (Normal) Collected: 10/15/23    Lab Status: Final result Specimen: Blood Updated: 10/15/23 0414     Calcium, Ionized 1.21 mmol/L     Lactic acid, plasma (w/reflex if result > 2.0) [[id number]]  (Normal) Collected: 10/15/23 0153    Lab Status: Final result Specimen: Blood from Arm, Right Updated: 10/15/23 0221     LACTIC ACID 1.0 mmol/L     Narrative:      Result may be elevated if tourniquet was used during collection.     Comprehensive metabolic panel [[id number]]  (Abnormal) Collected: 10/14/23 8563    Lab Status: Final result Specimen: Blood from Arm, Right Updated: 10/15/23 0023     Sodium 133 mmol/L      Potassium 4.1 mmol/L      Chloride 96 mmol/L      CO2 22 mmol/L      ANION GAP 15 mmol/L      BUN 21 mg/dL      Creatinine 1.27 mg/dL      Glucose 230 mg/dL      Calcium 9.0 mg/dL      AST 61 U/L      ALT 62 U/L      Alkaline Phosphatase 34 U/L      Total Protein 5.8 g/dL      Albumin 3.6 g/dL      Total Bilirubin 0.27 mg/dL      eGFR 51 ml/min/1.73sq m     Narrative:      [facility] guidelines for [facility] Kid[facility] Disease (CKD):     Stage 1 with normal or high GFR (GFR > 90 mL/min/1.73 square meters)    Stage 2 Mild CKD (GFR = 60-89 mL/min/1.73 square meters)    Stage 3A Moderate CKD (GFR = 45-59 mL/min/1.73 square meters)    Stage 3B Moderate CKD (GFR = 30-44 mL/min/1.73 square meters)    Stage 4 Severe CKD (GFR = 15-29 mL/min/1.73 square meters)    Stage 5 End Stage CKD (GFR <15 mL/min/1.73 square meters)  Note: GFR calculation is accurate only with a steady state creatinine    Magnesium [[id number]]  (Normal) Collected: 10/14/23 2348    Lab Status: Final result Specimen: Blood from Arm, Right Updated: 10/15/23 0023     Magnesium 2.1 mg/dL     Phosphorus [[id number]]  (Abnormal) Collected: 10/14/23 2348    Lab Status: Final result Specimen: Blood from Arm, Right Updated: 10/15/23 0023     Phosphorus 5.1 mg/dL     CK [[id number]]  (Abnormal) Collected: 10/14/23 2348    Lab Status: Final result Specimen: Blood from Arm, Right Updated: 10/15/23 0023     Total  U/L     Calcium, ionized [[id number]]  (Normal) Collected: 10/14/23 2348    Lab Status: Final result Specimen: Blood from Arm, Right Updated: 10/15/23 0003     Calcium, Ionized 1.23 mmol/L     Blood gas, venous [[id number]]  (Abnormal) Collected: 10/14/23 2348    Lab Status: Final result Specimen: Blood from Arm, Right Updated: 10/15/23 0002     pH, Eduardo 7.307     pCO2, Eduardo 47.7 mm Hg      pO2, Eduardo 27.1 mm Hg      HCO3, Eduardo 23.3 mmol/L      Base Excess, Eduardo -3.2 mmol/L      O2 Content, Eduardo 8.6 ml/dL      O2 HGB, VENOUS 47.7 %     CBC and differential [[id number]]  (Abnormal) Collected: 10/14/23 2348    Lab Status: Final result Specimen: Blood from Arm, Right Updated: 10/14/23 2356     WBC 17.25 Thousand/uL      RBC 4.29 Million/uL      Hemoglobin 12.0 g/dL      Hematocrit 37.9 %      MCV 88 fL      MCH 28.0 pg      MCHC 31.7 g/dL      RDW 14.7 %      MPV 8.8 fL      Platelets 293 Thousands/uL      nRBC 0 /100 WBCs      Neutrophils Relative 75 %      Immat GRANS % 1 %      Lymphocytes Relative 21 %      Monocytes Relative 3 %      Eosinophils Relative 0 %      Basophils Relative 0 %      Neutrophils Absolute 12.90 Thousands/µL      Immature Grans Absolute 0.17 Thousand/uL      Lymphocytes Absolute 3.58 Thousands/µL      Monocytes Absolute 0.55 Thousand/µL      Eosinophils Absolute 0.03 Thousand/µL      Basophils Absolute 0.02 Thousands/µL     Acetaminophen level-If concentration is detectable, please discuss with medical  on call.  [[id number]]  (Abnormal) Collected: 10/14/23 2102    Lab Status: Final result Specimen: Blood from Arm, Left Updated: 10/14/23 2202     Acetaminophen Level 56 ug/mL     TSH, 3rd generation with Free T4 reflex [[id number]]  (Normal) Collected: 10/14/23 2102    Lab Status: Final result Specimen: Blood from Arm, Left Updated: 10/14/23 2159     TSH 3RD GENERATON 2.234 uIU/mL     Salicylate level [[id number]]  (Normal) Collected: 10/14/23 2102    Lab Status: Final result Specimen: Blood from Arm, Left Updated: 03/14/18 1367     Salicylate Lvl <5 mg/dL     Comprehensive metabolic panel [[id number]]  (Abnormal) Collected: 10/14/23 2102    Lab Status: Final result Specimen: Blood from Arm, Left Updated: 10/14/23 2153     Sodium 133 mmol/L      Potassium 4.3 mmol/L      Chloride 98 mmol/L      CO2 25 mmol/L      ANION GAP 10 mmol/L      BUN 21 mg/dL      Creatinine 1.41 mg/dL      Glucose 236 mg/dL      Calcium 8.8 mg/dL      AST 30 U/L      ALT 37 U/L      Alkaline Phosphatase 46 U/L      Total Protein 5.8 g/dL      Albumin 3.7 g/dL      Total Bilirubin 0.28 mg/dL      eGFR 45 ml/min/1.73sq m     Narrative:      [place] guidelines for Chronic Kidney Disease (CKD):     Stage 1 with normal or high GFR (GFR > 90 mL/min/1.73 square meters)    Stage 2 Mild CKD (GFR = 60-89 mL/min/1.73 square meters)    Stage 3A Moderate CKD (GFR = 45-59 mL/min/1.73 square meters)    Stage 3B Moderate CKD (GFR = 30-44 mL/min/1.73 square meters)    Stage 4 Severe CKD (GFR = 15-29 mL/min/1.73 square meters)    Stage 5 End Stage CKD (GFR <15 mL/min/1.73 square meters)  Note: GFR calculation is accurate only with a steady state creatinine    Magnesium [[id number]]  (Normal) Collected: 10/14/23 2102    Lab Status: Final result Specimen: Blood from Arm, Left Updated: 10/14/23 2153     Magnesium 2.2 mg/dL     Phosphorus [[id number]]  (Abnormal) Collected: 10/14/23 2102    Lab Status: Final result Specimen: Blood from Arm, Left Updated: 10/14/23 2153     Phosphorus 5.0 mg/dL     Ethanol [[id number]]  (Normal) Collected: 10/14/23 2102    Lab Status: Final result Specimen: Blood from Arm, Left Updated: 10/14/23 2141     Ethanol Lvl <10 mg/dL     Ammonia [[id number]]  (Normal) Collected: 10/14/23 2102    Lab Status: Final result Specimen: Blood from Arm, Left Updated: 10/14/23 2141     Ammonia 36 umol/L     CBC and differential [[id number]]  (Abnormal) Collected: 10/14/23 2102    Lab Status: Final result Specimen: Blood from Arm, Left Updated: 10/14/23 2112     WBC 14.07 Thousand/uL      RBC 4.44 Million/uL      Hemoglobin 12.7 g/dL      Hematocrit 38.8 %      MCV 87 fL      MCH 28.6 pg      MCHC 32.7 g/dL      RDW 14.6 %      MPV 8.8 fL      Platelets 484 Thousands/uL      nRBC 0 /100 WBCs      Neutrophils Relative 76 %      Immat GRANS % 1 %      Lymphocytes Relative 19 %      Monocytes Relative 4 %      Eosinophils Relative 0 %      Basophils Relative 0 %      Neutrophils Absolute 10.57 Thousands/µL      Immature Grans Absolute 0.17 Thousand/uL      Lymphocytes Absolute 2.73 Thousands/µL      Monocytes Absolute 0.54 Thousand/µL      Eosinophils Absolute 0.03 Thousand/µL      Basophils Absolute 0.03 Thousands/µL     Fingerstick Glucose (POCT) [[id number]]  (Abnormal) Collected: 10/14/23 2023    Lab Status: Final result Updated: 10/14/23 2024     POC Glucose 249 mg/dl                    CT head without contrast   Final Result by Yaa Talley MD (10/14 2240)      No acute intracranial abnormality. Workstation performed: [id number]               Procedures  Procedures      ED Course  ED Course as of 10/19/23 0824   Sat Oct 14, 2023   2204 ACETAMINOPHEN LEVEL(!!): 56  Giving NAC loading dose                                       Medical Decision Making  37 y.o F presenting to the ED with likely multidrug overdose. Unclear if intetnional at this time. Patient initially hypotensive so resuscitated with IVF and given glucagon due to concern for beta blocker involvement. Patient BP improved, but had minimal response to initial glucagon so she was given a second dose. Will trial atropine to evaluate for alternative etiology. Patient did not have any change in HR with this medication, making beta blocker OD likely. Labs concerning for tylenol overdose as well so NAC was given. Discussed case with toxicology who agree with current management. At this time, patient is obtunded but does not require intubation. Discussed with family possibility that this would need to be done and they are in agreement with performing if necessary. Discussed with ICU who will admit for further close monitoring. Amount and/or Complexity of Data Reviewed  Labs: ordered. Decision-making details documented in ED Course. Radiology: ordered. Risk  Prescription drug management. Decision regarding hospitalization.           Disposition  Final diagnoses:   Overdose   Tylenol overdose     Time reflects when diagnosis was documented in both MDM as applicable and the Disposition within this note       Time User Action Codes Description Comment    10/14/2023  8:57 PM Hong He Add [T50.901A] Overdose     10/14/2023 11:03 PM Nii Wiggins Add [T39.1X1A] Tylenol overdose     10/14/2023 11:26 PM Aston Hawk Add [G93.40] Encephalopathy acute     10/15/2023 12:25 PM Jas Wei Add [Y42.5G3B] Propranolol overdose, intentional self-harm, initial encounter ([address])     10/16/2023 11:34 AM Lucrecia Herb Add [Z00.8] Medical clearance for psychiatric admission     10/16/2023 11:34 AM Lucrecia Herb Add [E78.5] Dyslipidemia     10/16/2023 11:34 AM Lucrecia Herb Add [M79.7] Fibromyalgia     10/16/2023 11:34 AM Lucrecia Herb Add [G47.30] Sleep apnea     10/16/2023 11:35 AM Lucrecia Herb Add [E11.65] Type 2 diabetes mellitus with hyperglycemia, without long-term current use of insulin [facility])           ED Disposition       ED Disposition   Admit    Condition   Stable    Date/Time   Sat Oct 14, 2023 11:03 PM    Comment   Case was discussed with Dr. Roseline Ibarra and the patient's admission status was agreed to be Admission Status: inpatient status to the service of Dr. Roseline Ibarra .                MD Documentation      [address] [place] [address] Name, [address] [facility]          RN Documentation      [address] [place] [address] Name, [address]    Transport Mode Car   Level of Care Other (Comment)  [Secure psych transport]   Transfer Date 10/16/23   Transfer Time 1730          Follow-up Information    None         Discharge Medication List as of 10/16/2023  5:15 PM        CONTINUE these medications which have NOT CHANGED    Details   acetaminophen (TYLENOL) 650 mg CR tablet Take 2 tablets (1,300 mg total) by mouth every 8 (eight) hours as needed for mild pain, Starting Mon 5/1/2023, No Print      Blood Glucose Monitoring Suppl (Contour Blood Glucose System) w/Device KIT Use 1 kit 2 (two) times a day before meals, Starting Tue 10/4/2022, Normal      celecoxib (CeleBREX) 200 mg capsule TAKE 1 CAPSULE BY MOUTH TWICE A DAY, Normal      Cholecalciferol (VITAMIN D-3) 1000 units CAPS Take 1 capsule by mouth daily Take 2000 IU daily, Historical Med      cyclobenzaprine (FLEXERIL) 10 mg tablet Take 1 tablet (10 mg total) by mouth 3 (three) times a day as needed for muscle spasms, Starting Mon 9/18/2023, Normal      !! desvenlafaxine (PRISTIQ) 100 mg 24 hr tablet TAKE 1 TABLET BY MOUTH EVERY DAY, Normal      !! desvenlafaxine succinate (PRISTIQ) 50 mg 24 hr tablet Take 1 tablet (50 mg total) by mouth daily Total daily dose 150 mg daily, Starting Thu 10/12/2023, Normal      diazepam (VALIUM) 5 mg tablet Take 1 tablet (5 mg total) by mouth every 8 (eight) hours as needed for muscle spasms for up to 15 doses, Starting Fri 10/6/2023, Normal      gabapentin (Neurontin) 300 mg capsule Take 1 capsule (300 mg total) by mouth 3 (three) times a day, Starting Fri 8/25/2023, Normal      glucose blood (Contour Test) test strip USE TO CHECK BLOOD SUGAR ONCE DAILY, Normal      !! hydrOXYzine HCL (ATARAX) 25 mg tablet TAKE 1 TAB BY MOUTH EVERY 6 HOURS AS NEEDED (SLEEP) IN ADDITION TO 50 MG DOSE, MAY TAKE 50-75MG AT BEDTIME AS NEEDED FOR SLEEP, Normal      !! hydrOXYzine HCL (ATARAX) 50 mg tablet Take by mouth daily at bedtime in addition to 25 mg dose,may take 50-75 mg po qhs prn for insomnia, Normal      lidocaine (LIDODERM) 5 % APPLY 1 PATCH IN THE MORNING REMOVE AND DISARD PATCH WITHIN 12 HOURS OR AS DIRECTED, Normal      MAGNESIUM OXIDE PO Take by mouth, Historical Med      metFORMIN (GLUCOPHAGE) 500 mg tablet TAKE 1 TABLET BY MOUTH TWICE A DAY WITH MEALS, Normal      nortriptyline (PAMELOR) 75 MG capsule TAKE 1 CAPSULE BY MOUTH TWICE A DAY, Normal      phentermine (ADIPEX-P) 37.5 MG tablet Take 1 tablet (37.5 mg total) by mouth every morning, Starting Tue 9/19/2023, Normal      propranolol (INDERAL) 20 mg tablet TAKE 1 TABLET BY MOUTH EVERY DAY AT NIGHT, Normal       !! - Potential duplicate medications found. Please discuss with provider. No discharge procedures on file.     PDMP Review         Value Time User    PDMP Reviewed  Yes 9/19/2023 10:26 AM Yevgeniy Peterson MD             ED Provider  Attending physically available and evaluated Esther Griffith. I managed the patient along with the ED Attending.     Electronically Signed by           Radha Reyna MD  10/19/23 6396

## 2023-10-16 ENCOUNTER — TELEPHONE (OUTPATIENT)
Dept: PSYCHIATRY | Facility: CLINIC | Age: 43
End: 2023-10-16

## 2023-10-16 ENCOUNTER — HOSPITAL ENCOUNTER (INPATIENT)
Facility: HOSPITAL | Age: 43
LOS: 7 days | Discharge: HOME | DRG: 751 | End: 2023-10-23
Attending: STUDENT IN AN ORGANIZED HEALTH CARE EDUCATION/TRAINING PROGRAM | Admitting: PSYCHIATRY & NEUROLOGY
Payer: COMMERCIAL

## 2023-10-16 VITALS
BODY MASS INDEX: 37.7 KG/M2 | TEMPERATURE: 98.1 F | OXYGEN SATURATION: 95 % | WEIGHT: 192.02 LBS | SYSTOLIC BLOOD PRESSURE: 120 MMHG | RESPIRATION RATE: 16 BRPM | DIASTOLIC BLOOD PRESSURE: 67 MMHG | HEIGHT: 60 IN | HEART RATE: 69 BPM

## 2023-10-16 DIAGNOSIS — E11.65 TYPE 2 DIABETES MELLITUS WITH HYPERGLYCEMIA, WITHOUT LONG-TERM CURRENT USE OF INSULIN (HCC): ICD-10-CM

## 2023-10-16 DIAGNOSIS — E78.5 DYSLIPIDEMIA: ICD-10-CM

## 2023-10-16 DIAGNOSIS — Z00.8 MEDICAL CLEARANCE FOR PSYCHIATRIC ADMISSION: ICD-10-CM

## 2023-10-16 DIAGNOSIS — F32.A DEPRESSION, UNSPECIFIED DEPRESSION TYPE: Primary | ICD-10-CM

## 2023-10-16 DIAGNOSIS — T44.7X2A: ICD-10-CM

## 2023-10-16 DIAGNOSIS — M79.7 FIBROMYALGIA: ICD-10-CM

## 2023-10-16 DIAGNOSIS — G47.30 SLEEP APNEA: ICD-10-CM

## 2023-10-16 LAB
ATRIAL RATE: 61 BPM
GLUCOSE SERPL-MCNC: 131 MG/DL (ref 65–140)
GLUCOSE SERPL-MCNC: 136 MG/DL (ref 65–140)
GLUCOSE SERPL-MCNC: 142 MG/DL (ref 65–140)
GLUCOSE SERPL-MCNC: 194 MG/DL (ref 65–140)
HCG SERPL QL: NEGATIVE
P AXIS: 20 DEGREES
PR INTERVAL: 164 MS
QRS AXIS: 9 DEGREES
QRSD INTERVAL: 90 MS
QT INTERVAL: 416 MS
QTC INTERVAL: 418 MS
T WAVE AXIS: -20 DEGREES
VENTRICULAR RATE: 61 BPM

## 2023-10-16 PROCEDURE — 82948 REAGENT STRIP/BLOOD GLUCOSE: CPT

## 2023-10-16 PROCEDURE — 84703 CHORIONIC GONADOTROPIN ASSAY: CPT | Performed by: HOSPITALIST

## 2023-10-16 PROCEDURE — 99255 IP/OBS CONSLTJ NEW/EST HI 80: CPT | Performed by: PSYCHIATRY & NEUROLOGY

## 2023-10-16 PROCEDURE — 99232 SBSQ HOSP IP/OBS MODERATE 35: CPT | Performed by: NURSE PRACTITIONER

## 2023-10-16 PROCEDURE — 93005 ELECTROCARDIOGRAM TRACING: CPT

## 2023-10-16 PROCEDURE — 93010 ELECTROCARDIOGRAM REPORT: CPT | Performed by: INTERNAL MEDICINE

## 2023-10-16 RX ORDER — OLANZAPINE 2.5 MG/1
2.5 TABLET ORAL
Status: CANCELLED | OUTPATIENT
Start: 2023-10-16

## 2023-10-16 RX ORDER — LORAZEPAM 1 MG/1
1 TABLET ORAL
Status: CANCELLED | OUTPATIENT
Start: 2023-10-16

## 2023-10-16 RX ORDER — OLANZAPINE 2.5 MG/1
2.5 TABLET ORAL
Status: DISCONTINUED | OUTPATIENT
Start: 2023-10-16 | End: 2023-10-23 | Stop reason: HOSPADM

## 2023-10-16 RX ORDER — LORAZEPAM 2 MG/ML
1 INJECTION INTRAMUSCULAR EVERY 4 HOURS PRN
Status: CANCELLED | OUTPATIENT
Start: 2023-10-16

## 2023-10-16 RX ORDER — OLANZAPINE 10 MG/1
10 TABLET ORAL
Status: DISCONTINUED | OUTPATIENT
Start: 2023-10-16 | End: 2023-10-23 | Stop reason: HOSPADM

## 2023-10-16 RX ORDER — LORAZEPAM 2 MG/ML
2 INJECTION INTRAMUSCULAR
Status: CANCELLED | OUTPATIENT
Start: 2023-10-16

## 2023-10-16 RX ORDER — MINERAL OIL AND PETROLATUM 150; 830 MG/G; MG/G
OINTMENT OPHTHALMIC 4 TIMES DAILY PRN
Status: DISCONTINUED | OUTPATIENT
Start: 2023-10-16 | End: 2023-10-23 | Stop reason: HOSPADM

## 2023-10-16 RX ORDER — MINERAL OIL AND PETROLATUM 150; 830 MG/G; MG/G
OINTMENT OPHTHALMIC 4 TIMES DAILY PRN
Status: CANCELLED | OUTPATIENT
Start: 2023-10-16

## 2023-10-16 RX ORDER — BENZTROPINE MESYLATE 1 MG/ML
1 INJECTION INTRAMUSCULAR; INTRAVENOUS 2 TIMES DAILY PRN
Status: DISCONTINUED | OUTPATIENT
Start: 2023-10-16 | End: 2023-10-23 | Stop reason: HOSPADM

## 2023-10-16 RX ORDER — OLANZAPINE 5 MG/1
5 TABLET ORAL
Status: CANCELLED | OUTPATIENT
Start: 2023-10-16

## 2023-10-16 RX ORDER — TRAZODONE HYDROCHLORIDE 50 MG/1
50 TABLET ORAL
Status: DISCONTINUED | OUTPATIENT
Start: 2023-10-16 | End: 2023-10-17

## 2023-10-16 RX ORDER — OLANZAPINE 10 MG/2ML
5 INJECTION, POWDER, FOR SOLUTION INTRAMUSCULAR
Status: CANCELLED | OUTPATIENT
Start: 2023-10-16

## 2023-10-16 RX ORDER — LORAZEPAM 1 MG/1
1 TABLET ORAL
Status: DISCONTINUED | OUTPATIENT
Start: 2023-10-16 | End: 2023-10-23 | Stop reason: HOSPADM

## 2023-10-16 RX ORDER — AMOXICILLIN 250 MG
1 CAPSULE ORAL DAILY PRN
Status: CANCELLED | OUTPATIENT
Start: 2023-10-16

## 2023-10-16 RX ORDER — BENZTROPINE MESYLATE 1 MG/ML
1 INJECTION INTRAMUSCULAR; INTRAVENOUS 2 TIMES DAILY PRN
Status: CANCELLED | OUTPATIENT
Start: 2023-10-16

## 2023-10-16 RX ORDER — OLANZAPINE 10 MG/2ML
5 INJECTION, POWDER, FOR SOLUTION INTRAMUSCULAR
Status: DISCONTINUED | OUTPATIENT
Start: 2023-10-16 | End: 2023-10-23 | Stop reason: HOSPADM

## 2023-10-16 RX ORDER — BENZTROPINE MESYLATE 1 MG/1
1 TABLET ORAL 2 TIMES DAILY PRN
Status: CANCELLED | OUTPATIENT
Start: 2023-10-16

## 2023-10-16 RX ORDER — LIDOCAINE 50 MG/G
1 PATCH TOPICAL DAILY
Status: DISCONTINUED | OUTPATIENT
Start: 2023-10-16 | End: 2023-10-16 | Stop reason: HOSPADM

## 2023-10-16 RX ORDER — HYDROXYZINE HYDROCHLORIDE 25 MG/1
25 TABLET, FILM COATED ORAL
Status: CANCELLED | OUTPATIENT
Start: 2023-10-16

## 2023-10-16 RX ORDER — HYDROXYZINE HYDROCHLORIDE 25 MG/1
25 TABLET, FILM COATED ORAL
Status: DISCONTINUED | OUTPATIENT
Start: 2023-10-16 | End: 2023-10-23 | Stop reason: HOSPADM

## 2023-10-16 RX ORDER — BISACODYL 10 MG
10 SUPPOSITORY, RECTAL RECTAL DAILY PRN
Status: CANCELLED | OUTPATIENT
Start: 2023-10-16

## 2023-10-16 RX ORDER — BISACODYL 10 MG
10 SUPPOSITORY, RECTAL RECTAL DAILY PRN
Status: DISCONTINUED | OUTPATIENT
Start: 2023-10-16 | End: 2023-10-23 | Stop reason: HOSPADM

## 2023-10-16 RX ORDER — ACETAMINOPHEN 325 MG/1
650 TABLET ORAL EVERY 4 HOURS PRN
Status: DISCONTINUED | OUTPATIENT
Start: 2023-10-16 | End: 2023-10-17

## 2023-10-16 RX ORDER — TRAZODONE HYDROCHLORIDE 50 MG/1
50 TABLET ORAL
Status: CANCELLED | OUTPATIENT
Start: 2023-10-16

## 2023-10-16 RX ORDER — INSULIN LISPRO 100 [IU]/ML
1-6 INJECTION, SOLUTION INTRAVENOUS; SUBCUTANEOUS
Status: CANCELLED | OUTPATIENT
Start: 2023-10-16

## 2023-10-16 RX ORDER — AMOXICILLIN 250 MG
1 CAPSULE ORAL DAILY PRN
Status: DISCONTINUED | OUTPATIENT
Start: 2023-10-16 | End: 2023-10-23 | Stop reason: HOSPADM

## 2023-10-16 RX ORDER — HYDROXYZINE 50 MG/1
50 TABLET, FILM COATED ORAL
Status: CANCELLED | OUTPATIENT
Start: 2023-10-16

## 2023-10-16 RX ORDER — LORAZEPAM 2 MG/ML
1 INJECTION INTRAMUSCULAR EVERY 4 HOURS PRN
Status: DISCONTINUED | OUTPATIENT
Start: 2023-10-16 | End: 2023-10-23 | Stop reason: HOSPADM

## 2023-10-16 RX ORDER — MAGNESIUM HYDROXIDE/ALUMINUM HYDROXICE/SIMETHICONE 120; 1200; 1200 MG/30ML; MG/30ML; MG/30ML
30 SUSPENSION ORAL EVERY 4 HOURS PRN
Status: DISCONTINUED | OUTPATIENT
Start: 2023-10-16 | End: 2023-10-23 | Stop reason: HOSPADM

## 2023-10-16 RX ORDER — LORAZEPAM 2 MG/ML
2 INJECTION INTRAMUSCULAR
Status: DISCONTINUED | OUTPATIENT
Start: 2023-10-16 | End: 2023-10-23 | Stop reason: HOSPADM

## 2023-10-16 RX ORDER — INSULIN LISPRO 100 [IU]/ML
1-6 INJECTION, SOLUTION INTRAVENOUS; SUBCUTANEOUS
Status: DISCONTINUED | OUTPATIENT
Start: 2023-10-16 | End: 2023-10-23 | Stop reason: HOSPADM

## 2023-10-16 RX ORDER — OLANZAPINE 5 MG/1
5 TABLET ORAL
Status: DISCONTINUED | OUTPATIENT
Start: 2023-10-16 | End: 2023-10-23 | Stop reason: HOSPADM

## 2023-10-16 RX ORDER — ACETAMINOPHEN 325 MG/1
650 TABLET ORAL EVERY 6 HOURS PRN
Status: DISCONTINUED | OUTPATIENT
Start: 2023-10-16 | End: 2023-10-16

## 2023-10-16 RX ORDER — ACETAMINOPHEN 325 MG/1
650 TABLET ORAL EVERY 6 HOURS PRN
Status: DISCONTINUED | OUTPATIENT
Start: 2023-10-16 | End: 2023-10-17

## 2023-10-16 RX ORDER — ACETAMINOPHEN 325 MG/1
650 TABLET ORAL EVERY 4 HOURS PRN
Status: CANCELLED | OUTPATIENT
Start: 2023-10-16

## 2023-10-16 RX ORDER — ACETAMINOPHEN 325 MG/1
650 TABLET ORAL EVERY 6 HOURS PRN
Status: CANCELLED | OUTPATIENT
Start: 2023-10-16

## 2023-10-16 RX ORDER — MAGNESIUM HYDROXIDE/ALUMINUM HYDROXICE/SIMETHICONE 120; 1200; 1200 MG/30ML; MG/30ML; MG/30ML
30 SUSPENSION ORAL EVERY 4 HOURS PRN
Status: CANCELLED | OUTPATIENT
Start: 2023-10-16

## 2023-10-16 RX ORDER — OLANZAPINE 10 MG/2ML
10 INJECTION, POWDER, FOR SOLUTION INTRAMUSCULAR
Status: DISCONTINUED | OUTPATIENT
Start: 2023-10-16 | End: 2023-10-23 | Stop reason: HOSPADM

## 2023-10-16 RX ORDER — ACETAMINOPHEN 325 MG/1
975 TABLET ORAL EVERY 6 HOURS PRN
Status: CANCELLED | OUTPATIENT
Start: 2023-10-16

## 2023-10-16 RX ORDER — BISACODYL 10 MG
10 SUPPOSITORY, RECTAL RECTAL DAILY PRN
Status: DISCONTINUED | OUTPATIENT
Start: 2023-10-16 | End: 2023-10-16

## 2023-10-16 RX ORDER — HYDROXYZINE 50 MG/1
50 TABLET, FILM COATED ORAL
Status: DISCONTINUED | OUTPATIENT
Start: 2023-10-16 | End: 2023-10-23 | Stop reason: HOSPADM

## 2023-10-16 RX ORDER — ACETAMINOPHEN 325 MG/1
975 TABLET ORAL EVERY 6 HOURS PRN
Status: DISCONTINUED | OUTPATIENT
Start: 2023-10-16 | End: 2023-10-17

## 2023-10-16 RX ORDER — OLANZAPINE 10 MG/1
10 TABLET ORAL
Status: CANCELLED | OUTPATIENT
Start: 2023-10-16

## 2023-10-16 RX ORDER — OLANZAPINE 10 MG/2ML
10 INJECTION, POWDER, FOR SOLUTION INTRAMUSCULAR
Status: CANCELLED | OUTPATIENT
Start: 2023-10-16

## 2023-10-16 RX ORDER — BENZTROPINE MESYLATE 1 MG/1
1 TABLET ORAL 2 TIMES DAILY PRN
Status: DISCONTINUED | OUTPATIENT
Start: 2023-10-16 | End: 2023-10-23 | Stop reason: HOSPADM

## 2023-10-16 RX ADMIN — LIDOCAINE 1 PATCH: 700 PATCH TOPICAL at 10:25

## 2023-10-16 RX ADMIN — CHLORHEXIDINE GLUCONATE 15 ML: 1.2 SOLUTION ORAL at 08:53

## 2023-10-16 RX ADMIN — ENOXAPARIN SODIUM 40 MG: 40 INJECTION SUBCUTANEOUS at 08:53

## 2023-10-16 NOTE — PLAN OF CARE
Problem: Prexisting or High Potential for Compromised Skin Integrity  Goal: Skin integrity is maintained or improved  Description: INTERVENTIONS:  - Identify patients at risk for skin breakdown  - Assess and monitor skin integrity  - Assess and monitor nutrition and hydration status  - Monitor labs   - Assess for incontinence   - Turn and reposition patient  - Assist with mobility/ambulation  - Relieve pressure over bony prominences  - Avoid friction and shearing  - Provide appropriate hygiene as needed including keeping skin clean and dry  - Evaluate need for skin moisturizer/barrier cream  - Collaborate with interdisciplinary team   - Patient/family teaching  - Consider wound care consult   Outcome: Progressing     Problem: MOBILITY - ADULT  Goal: Maintain or return to baseline ADL function  Description: INTERVENTIONS:  -  Assess patient's ability to carry out ADLs; assess patient's baseline for ADL function and identify physical deficits which impact ability to perform ADLs (bathing, care of mouth/teeth, toileting, grooming, dressing, etc.)  - Assess/evaluate cause of self-care deficits   - Assess range of motion  - Assess patient's mobility; develop plan if impaired  - Assess patient's need for assistive devices and provide as appropriate  - Encourage maximum independence but intervene and supervise when necessary  - Involve family in performance of ADLs  - Assess for home care needs following discharge   - Consider OT consult to assist with ADL evaluation and planning for discharge  - Provide patient education as appropriate  Outcome: Progressing  Goal: Maintains/Returns to pre admission functional level  Description: INTERVENTIONS:  - Perform BMAT or MOVE assessment daily.   - Set and communicate daily mobility goal to care team and patient/family/caregiver. - Collaborate with rehabilitation services on mobility goals if consulted  - Perform Range of Motion  times a day.   - Reposition patient every hours.  - Dangle patient  times a day  - Stand patient  times a day  - Ambulate patient  times a day  - Out of bed to chair times a day   - Out of bed for meals times a day  - Out of bed for toileting  - Record patient progress and toleration of activity level   Outcome: Progressing     Problem: PAIN - ADULT  Goal: Verbalizes/displays adequate comfort level or baseline comfort level  Description: Interventions:  - Encourage patient to monitor pain and request assistance  - Assess pain using appropriate pain scale  - Administer analgesics based on type and severity of pain and evaluate response  - Implement non-pharmacological measures as appropriate and evaluate response  - Consider cultural and social influences on pain and pain management  - Notify physician/advanced practitioner if interventions unsuccessful or patient reports new pain  Outcome: Progressing     Problem: INFECTION - ADULT  Goal: Absence or prevention of progression during hospitalization  Description: INTERVENTIONS:  - Assess and monitor for signs and symptoms of infection  - Monitor lab/diagnostic results  - Monitor all insertion sites, i.e. indwelling lines, tubes, and drains  - Monitor endotracheal if appropriate and nasal secretions for changes in amount and color  - [place] appropriate cooling/warming therapies per order  - Administer medications as ordered  - Instruct and encourage patient and family to use good hand hygiene technique  - Identify and instruct in appropriate isolation precautions for identified infection/condition  Outcome: Progressing  Goal: Absence of fever/infection during neutropenic period  Description: INTERVENTIONS:  - Monitor WBC    Outcome: Progressing     Problem: DISCHARGE PLANNING  Goal: Discharge to home or other facility with appropriate resources  Description: INTERVENTIONS:  - Identify barriers to discharge w/patient and caregiver  - Arrange for needed discharge resources and transportation as appropriate  - Identify discharge learning needs (meds, wound care, etc.)  - Arrange for interpretive services to assist at discharge as needed  - Refer to Case Management Department for coordinating discharge planning if the patient needs post-hospital services based on physician/advanced practitioner order or complex needs related to functional status, cognitive ability, or social support system  Outcome: Progressing     Problem: Knowledge Deficit  Goal: Patient/family/caregiver demonstrates understanding of disease process, treatment plan, medications, and discharge instructions  Description: Complete learning assessment and assess knowledge base.   Interventions:  - Provide teaching at level of understanding  - Provide teaching via preferred learning methods  Outcome: Progressing

## 2023-10-16 NOTE — QUICK NOTE
Med Tox Follow Up    Chart reviewed. Vitals improved. Pt is not requiring additional glucagon or pressors. Given the timeframe, she is stable from a tox standpoint at this point. Med tox will remain available as needed.

## 2023-10-16 NOTE — ED ATTENDING ATTESTATION
10/6/2023  INiru MD, saw and evaluated the patient. I have discussed the patient with the resident/non-physician practitioner and agree with the resident's/non-physician practitioner's findings, Plan of Care, and MDM as documented in the resident's/non-physician practitioner's note, except where noted. All available labs and Radiology studies were reviewed. I was present for key portions of any procedure(s) performed by the resident/non-physician practitioner and I was immediately available to provide assistance. At this point I agree with the current assessment done in the Emergency Department. I have conducted an independent evaluation of this patient a history and physical is as follows:    ED Course     Patient presents for evaluation due to acute on chronic low back pain. Patient sees pain management as an outpatient and is scheduled for an injection on Wednesday. She states that since yesterday, she has developed left-sided pain that radiates down her left leg. She also does report having some numbness around the lateral aspect of her leg. Patient denies any weakness or trauma. Of note, patient had a recent EMG which showed chronic L5 radiculopathy on the right but no abnormality on the left. No complaints. Exam: AAOx3, NAD, RRR, CTA, S/NT/ND, reproducible tenderness in left piriformis, reports decreased sensation on lateral aspect of left leg. A/P: Left-sided sciatica. Discussed with patient no imaging would likely not help. Will give course of steroids and Valium to help with acute symptoms until patient has follow-up with pain management.   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    Critical Care Time  Procedures

## 2023-10-16 NOTE — CASE MANAGEMENT
Case Management Progress Note    Patient name Cristina Jamison  Location [address] [place] [address]/[facility] 270-95 MRN [id number]  : 1980 Date 10/16/2023       LOS (days): 2  Geometric Mean LOS (GMLOS) (days):   Days to GMLOS:        OBJECTIVE:        Current admission status: Inpatient  [facility] [address], [address] [place] [address]  Phone: [phone] Fax: [phone]    Primary Care Provider: Palmer Cotter MD    Primary Insurance: [employer]  Secondary Insurance:     PROGRESS NOTE:    CM met with pt at bedside after CM was informed that pt will need a 201 completed. At bedside CM explained the process of 201 and it was signed by both pt and Dr. Tino Hernandez. CM initiated a bedsearch with [facility].

## 2023-10-16 NOTE — TELEPHONE ENCOUNTER
NO-SHOW LETTER MAILED TO Huseyin Oro.   ADDRESS: [facility] [address]

## 2023-10-16 NOTE — CASE MANAGEMENT
Case Management Assessment & Discharge Planning Note    Patient name Sina Cornell  Location [address] [place] [address] [address]/[facility] 722-90 MRN [id number]  : 1980 Date 10/16/2023       Current Admission Date: 10/14/2023  Current Admission Diagnosis:Overdose of undetermined intent   Patient Active Problem List    Diagnosis Date Noted    Hypotension 10/15/2023    Bradycardia 10/15/2023    Overdose of undetermined intent 10/15/2023    Paresthesia of both feet 2023    Blood transfusion declined because patient is Buddhist 2023    Endometrial hyperplasia, unspecified 2023    Abnormal uterine bleeding 2023    Dysmenorrhea 2023    Endometrial polyp 2023    Lumbar radiculopathy     Lumbar back pain 10/03/2022    Type 2 diabetes mellitus with hyperglycemia, without long-term current use of insulin ([address]) 2022    Dyslipidemia 2022    Diabetic polyneuropathy associated with type 2 diabetes mellitus ([address]) 2022    Well adult exam 2022    Sacroiliitis ([address]) 2022    DDD (degenerative disc disease), lumbar 2022    Back pain 2022    Polyp of colon 2020    Loose stools 2019    Hematochezia 2019    Elevated TSH 2019    Arthralgia of multiple joints 2018    Generalized body aches 2018    Chronic idiopathic constipation 2018    Dyspepsia 10/24/2018    Generalized anxiety disorder 2018    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 psychotic features ([address]) 2018    Narcolepsy cataplexy syndrome 2018    Narcolepsy 2016    Chronic pain 10/05/2016    Myalgia 2016    Sleep apnea 2016    Chronic fatigue 10/13/2015    Fibromyalgia 2015    Muscle spasm 2015    Headache 2015    Sinus disease 2015    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, with unspecified complication ([address])     Chronic migraine without aura without status migrainosus, not intractable 06/04/2015    Lyme disease 06/03/2015    Snoring 05/27/2015    Allergic rhinitis 03/04/2014    Morbid obesity ([address]) 03/04/2014      LOS (days): 2  Geometric Mean LOS (GMLOS) (days):   Days to GMLOS:     OBJECTIVE:    Risk of Unplanned Readmission Score: 11.45         Current admission status: Inpatient  Referral Reason: Psych    [facility]acy[facility] [address], [address] [place] [address] [address]  Phone: [phone] Fax: [phone]    Primary Care Provider: Inge Luis MD    Primary Insurance: [facility]  Secondary Insurance:     ASSESSMENT:  [facility] Proxies    There are no active Health Care Proxies on file. Readmission Root Cause  30 Day Readmission: No    Patient Information  Admitted from[de-identified] Home  Mental Status: Alert  During Assessment patient was accompanied by: Not accompanied during assessment  Assessment information provided by[de-identified] Patient  Primary Caregiver: Self  Support Systems: Family members  [place] of Residence: [address] [place] [address] do you live in?: [address] [facility] entry access options.  Select all that apply.: Stairs  Number of steps to enter home.: 6  Do the steps have railings?: No  Type of Current Residence: 2 story home  Upon entering residence, is there a bedroom on the main floor (no further steps)?: No  A bedroom is located on the following floor levels of residence (select all that apply):: 2nd Floor  Upon entering residence, is there a bathroom on the main floor (no further steps)?: No  Indicate which floors of current residence have a bathroom (select all the apply):: 2nd Floor  Number of steps to 2nd floor from main floor: One Flight  In the last 12 months, was there a time when you were not able to pay the mortgage or rent on time?: No  In the last 12 months, how many places have you lived?: 1  In the last 12 months, was there a time when you did not have a steady place to sleep or slept in a shelter (including now)?: No  Living Arrangements: Lives w/ Parent(s)  Is patient a ?: No    Activities of Daily Living Prior to Admission  Functional Status: Independent  Completes ADLs independently?: Yes  Ambulates independently?: Yes  Does patient use assisted devices?: No  Does patient currently own DME?: No  Does patient have a history of Outpatient Therapy (PT/OT)?: No  Does the patient have a history of Short-Term Rehab?: No  Does patient have a history of HHC?: No  Does patient currently have [address]?: No         Patient Information Continued  Income Source: SSI/SSD  Does patient have prescription coverage?: Yes  Within the past 12 months, you worried that your food would run out before you got the money to buy more.: Never true  Within the past 12 months, the food you bought just didn't last and you didn't have money to get more.: Never true  Does patient receive dialysis treatments?: No  Does patient have a history of substance abuse?: Yes  Historical substance use preference: Alcohol/ETOH  History of Withdrawal Symptoms: Denies past symptoms  Is patient currently in treatment for substance abuse?: N/A - sober (Pt reports she has not drank alcohol in the last 10 years)  Does patient have a history of Mental Health Diagnosis?: Yes  Is patient receiving treatment for mental health?: Yes  Has patient received inpatient treatment related to mental health in the last 2 years?: No (Pt reports last inpatient admission was 5 years ago)         Means of Transportation  Means of Transport to Appts[de-identified] Drives Self  In the past 12 months, has lack of transportation kept you from medical appointments or from getting medications?: No  In the past 12 months, has lack of transportation kept you from meetings, work, or from getting things needed for daily living?: No        DISCHARGE DETAILS:    Discharge planning discussed with[de-identified] Patient  Freedom of Choice: Yes     CM contacted family/caregiver?: No- see comments  Were Treatment Team discharge recommendations reviewed with patient/caregiver?: Yes  Did patient/caregiver verbalize understanding of patient care needs?: Yes  Were patient/caregiver advised of the risks associated with not following Treatment Team discharge recommendations?: Yes    Contacts  Patient Contacts: Helen Pratt  Relationship to Patient[de-identified] Family  Contact Method: Phone  Phone Number: [phone]  Reason/Outcome: Emergency Contact              CM met with pt to complete assessment. Pt was alert and did recall taking medications prior to admission. Pt denies any previous suicide attempts. Pt currently goes to [facility] for outpatient psychiatry, has both psychiatrist and therapist.  Pt reports last inpatient psychiatric hospitalization over 5 years ago at [facility]. Pt reports she was feeling overwhelmed recently and reports a close friend of hers  about 1 month ago. Pt appeared flat and vague during discussion. Pt uncertain if she wants inpatient psych admission, but will speak with psychiatrist regarding such. Psych consult placed, will wait on recommendation if pt will require inpatient admission. CM will continue to follow-up.

## 2023-10-16 NOTE — UTILIZATION REVIEW
Initial Clinical Review    Admission: Date/Time/Statement:   Admission Orders (From admission, onward)       Ordered        10/14/23 2326  Inpatient Admission  Once                          Orders Placed This Encounter   Procedures    Inpatient Admission     Standing Status:   Standing     Number of Occurrences:   1     Order Specific Question:   Level of Care     Answer:   Critical Care [15]     Order Specific Question:   Estimated length of stay     Answer:   More than 2 Midnights     Order Specific Question:   Certification     Answer:   I certify that inpatient services are medically necessary for this patient for a duration of greater than two midnights. See H&P and MD Progress Notes for additional information about the patient's course of treatment. ED Arrival Information       Expected   -    Arrival   10/14/2023 19:58    Acuity   Emergent              Means of arrival   Ambulance    Escorted by   DORIS Cho    Service   Hospitalist    Admission type   Emergency              Arrival complaint   OD             Chief Complaint   Patient presents with    Overdose - Accidental     Per EMS pt was found unresponsive by family. EMS found a 90 day supply bottle of gabapentin and propanolol half empty. EMS is unaware of how many pills she took. Initial Presentation: 37 y.o. female who presented to [address] [place] [address] ED via EMS due to altered mental status and bradycardia with hypotension with concern for beta-blocker overdose/toxicity. In the late evening, mother found the patient in her room unarousable and called EMS. EMS brought the patient to the ED bradycardic into the low 40's with hypotension MAP's in the 50's which responded to fluids in the ED. Also given glucagon x2 and calcium. She was noted to be very somnolent and minimally responsive but protecting her airway and not in respiratory distress.  PMHx:  anxiety, depression, SI.   Plan:  Admit to Inpatient status dt Acute encephalopathy, critical care unit, pt admitted to suicidal attempt and taking unknown amt of propranolol, advil and gabapentin. Continue neuro checks and monitor BP, serial EKGs, avoid sedatives, 1:12 observation, psych consult on 10/16, fu on toxicology consult, monitor electrolytes and replete prn, insert Adan catheter. Date: 10/15   Day 2:   A&O x3, continue above tx plan including 1:1, labs, psych consult, monitor neuro status and BP, DC Adan. 10/16 Per Behavioral Health: Major depressive disorder recurrent severe without psychotic features. Generalized anxiety disorder: Continue medical management, continue one-to-one observation, Inpatient psych admission when medically clear and bed available patient is a 201, psychiatry following.      ED Triage Vitals   Temperature Pulse Respirations Blood Pressure SpO2   10/14/23 2007 10/14/23 2005 10/14/23 2005 10/14/23 2006 10/14/23 2005   98.3 °F (36.8 °C) (!) 45 20 90/51 98 %      Temp Source Heart Rate Source Patient Position - Orthostatic VS BP Location FiO2 (%)   10/14/23 2007 10/14/23 2005 10/14/23 2005 10/14/23 2005 --   Oral Monitor Lying Right arm       Pain Score       10/15/23 0800       No Pain          Wt Readings from Last 1 Encounters:   10/15/23 87.1 kg (192 lb 0.3 oz)     Additional Vital Signs:   Date/Time Temp Pulse Resp BP MAP (mmHg) SpO2 O2 Device Patient Position - Orthostatic VS   10/16/23 07:17:46 97.8 °F (36.6 °C) 60 17 103/58 73 96 % None (Room air) --   10/15/23 22:08:17 -- 64 16 103/57 72 94 % -- --   10/15/23 18:37:42 98.2 °F (36.8 °C) 69 18 116/73 87 95 % -- --   10/15/23 1700 -- 66 25 Abnormal  89/70 Abnormal  78 97 % -- --   10/15/23 1600 -- 60 20 -- -- 98 % -- --   10/15/23 1500 -- 60 17 -- -- 97 % -- --   10/15/23 1400 -- 58 16 -- -- 95 % -- --   10/15/23 1300 -- 60 10 Abnormal  133/62 89 97 % -- --   10/15/23 1200 -- 58 18 91/63 73 95 % -- --   10/15/23 1100 -- 52 Abnormal  15 127/64 88 96 % -- --   10/15/23 1000 -- 50 Abnormal  12 114/72 90 97 % -- --   10/15/23 0900 -- 50 Abnormal  15 101/61 88 98 % -- --   10/15/23 0800 97.9 °F (36.6 °C) 48 Abnormal  22 136/72 92 98 % -- --   10/15/23 0600 -- 48 Abnormal  12 140/66 94 97 % -- --   10/15/23 0500 -- 50 Abnormal  13 149/69 102 96 % -- --   10/15/23 0400 97.5 °F (36.4 °C) 52 Abnormal  13 120/61 80 96 % None (Room air) Lying   10/15/23 0300 98.2 °F (36.8 °C) 54 Abnormal  12 122/66 86 95 % -- --   10/15/23 0200 -- 58 14 135/75 111 95 % -- --   10/15/23 0100 -- 54 Abnormal  16 156/84 115 95 % -- --   10/15/23 0043 99 °F (37.2 °C) 52 Abnormal  20 136/86 120 93 % None (Room air) Lying   10/15/23 0000 -- 47 Abnormal  17 109/68 84 93 % -- --   10/14/23 2245 -- 46 Abnormal  20 99/63 77 97 % None (Room air) Lying   10/14/23 2144 -- 46 Abnormal  20 110/64 -- 96 % None (Room air) Lying   10/14/23 2130 -- 46 Abnormal  18 103/71 82 96 % -- --   10/14/23 2115 -- 45 Abnormal  20 115/68 87 94 % None (Room air) Lying   10/14/23 2047 -- -- -- -- -- -- Nasal cannula --   10/14/23 2030 -- 46 Abnormal  20 105/67 81 100 % None (Room air) Lying   10/14/23 2017 -- 45 Abnormal  20 108/71 -- 99 % Nasal cannula Lying   10/14/23 2014 -- 45 Abnormal  -- 67/47 Abnormal  -- 99 % -- Lying   10/14/23 2007 98.3 °F (36.8 °C) -- -- -- -- -- -- --   10/14/23 2006 -- -- -- 90/51 -- -- -- --   10/14/23 2005 -- 45 Abnormal  20 -- -- 98 % None (Room air) Lyin     Pertinent Labs/Diagnostic Test Results:   10/14 EKG: SB  10/15 EKG: NSR w/ PVCs    CT head without contrast   Final Result by Diann Contreras MD (10/14 2240)      No acute intracranial abnormality.                   Workstation performed: [id number]               Results from last 7 days   Lab Units 10/15/23  0000 10/14/23  2348 10/14/23  2102   WBC Thousand/uL 17.34* 17.25* 14.07*   HEMOGLOBIN g/dL 12.1 12.0 12.7   HEMATOCRIT % 36.6 37.9 38.8   PLATELETS Thousands/uL 186 217 226   NEUTROS ABS Thousands/µL 13.81* 12.90* 10.57*         Results from last 7 days   Lab Units 10/15/23  0000 10/14/23  2348 10/14/23  2102   SODIUM mmol/L 134* 133* 133*   POTASSIUM mmol/L 3.9 4.1 4.3   CHLORIDE mmol/L 100 96 98   CO2 mmol/L 21 22 25   ANION GAP mmol/L 13 15 10   BUN mg/dL 20 21 21   CREATININE mg/dL 1.10 1.27 1.41*   EGFR ml/min/1.73sq m 60 51 45   CALCIUM mg/dL 8.7 9.0 8.8   CALCIUM, IONIZED mmol/L 1.21 1.23  --    MAGNESIUM mg/dL 1.9 2.1 2.2   PHOSPHORUS mg/dL 4.1 5.1* 5.0*     Results from last 7 days   Lab Units 10/15/23  2053 10/15/23  0000 10/14/23  2348 10/14/23  2102   AST U/L 39 48* 61* 30   ALT U/L 42 58* 62* 37   ALK PHOS U/L 38 33* 34 46   TOTAL PROTEIN g/dL 5.6* 5.6* 5.8* 5.8*   ALBUMIN g/dL 3.4* 3.5 3.6 3.7   TOTAL BILIRUBIN mg/dL 0.32 0.33 0.27 0.28   BILIRUBIN DIRECT mg/dL 0.06 0.09  --   --    AMMONIA umol/L  --   --   --  36     Results from last 7 days   Lab Units 10/16/23  0719 10/15/23  2053 10/15/23  1704 10/15/23  1139 10/15/23  0555 10/15/23  0036 10/14/23  2023   POC GLUCOSE mg/dl 131 221* 141* 169* 194* 247* 249*     Results from last 7 days   Lab Units 10/15/23  0000 10/14/23  2348 10/14/23  2102   GLUCOSE RANDOM mg/dL 206* 230* 236*     Results from last 7 days   Lab Units 10/14/23  2348   PH YANE  7.307   PCO2 YANE mm Hg 47.7   PO2 YANE mm Hg 27.1*   HCO3 YANE mmol/L 23.3*   BASE EXC YANE mmol/L -3.2   O2 CONTENT YANE ml/dL 8.6   O2 HGB, VENOUS % 47.7*         Results from last 7 days   Lab Units 10/14/23  2348   CK TOTAL U/L 258*     Results from last 7 days   Lab Units 10/14/23  2102   TSH 3RD GENERATON uIU/mL 3.406         Results from last 7 days   Lab Units 10/15/23  0555 10/15/23  0408 10/15/23  0153   LACTIC ACID mmol/L 1.2 0.9 1.0     Results from last 7 days   Lab Units 10/15/23  2053 10/14/23  2102   ETHANOL LVL mg/dL  --  <10   ACETAMINOPHEN LVL ug/mL <92* 56*   SALICYLATE LVL mg/dL  --  <5     ED Treatment:   Medication Administration from 10/14/2023 1958 to 10/15/2023 0036         Date/Time Order Dose Route Action     10/14/2023 2020 EDT glucagon [facility]) injection 1 mg 1 mg Intravenous Given     10/14/2023 2042 EDT glucagon (GLUCAGEN) injection 1 mg 1 mg Intravenous Given     10/14/2023 2104 EDT calcium gluconate 2 g in sodium chloride 0.9% 100 mL (premix) 2 g Intravenous New Bag     10/14/2023 2111 EDT atropine 1 mg/10 mL injection **ADS Override Pull** 0.4 mg  Given     10/14/2023 2240 EDT acetylcysteine (ACETADOTE) 12,855 mg in dextrose 5 % 200 mL IVPB 12,855 mg Intravenous New Bag          Past Medical History:   Diagnosis Date    Anxiety     Blood transfusion declined because patient is Jehovah's witness 05/01/2023    Chronic pain disorder     Chronic sinusitis     Depression     Depression     Diabetes (HCC)     Fibromyalgia     Migraine     Obesity     Polyarthritis     Last assessed 9/21/2015    Psychiatric disorder     Psychiatric illness     Sleep difficulties      Present on Admission:   Lumbar radiculopathy      Admitting Diagnosis: Overdose [T50.901A]  Encephalopathy acute [G93.40]  Tylenol overdose [T39.1X1A]  Age/Sex: 37 y.o. female  Admission Orders:  Scheduled Medications:  atropine, 0.4 mg, Intravenous, Once  chlorhexidine, 15 mL, Mouth/Throat, Q12H OLIVIER  enoxaparin, 40 mg, Subcutaneous, Daily  insulin lispro, 1-6 Units, Subcutaneous, 4x Daily (AC & HS)  lidocaine, 1 patch, Topical, Daily      Continuous IV Infusions:   multi-electrolyte (PLASMALYTE-A/ISOLYTE-S PH 7.4) IV solution  Rate: 75 mL/hr Dose: 75 mL/hr  Freq: Continuous Route: IV  Last Dose: Stopped (10/15/23 1643)  Start: 10/15/23 0015 End: 10/15/23 1535      PRN Meds: none    scd       IP CONSULT TO TOXICOLOGY  IP CONSULT TO CASE MANAGEMENT  IP CONSULT TO PSYCHIATRY    Network Utilization Review Department  ATTENTION: Please call with any questions or concerns to [phone] and carefully listen to the prompts so that you are directed to the right person. All voicemails are confidential.   For Discharge needs, contact Care Management DC Support Team at [phone] opt. 2  Send all requests for admission clinical reviews, approved or denied determinations and any other requests to dedicated fax number below belonging to the campus where the patient is receiving treatment.  List of dedicated fax numbers for the Facilities:  [place] DENIALS (Administrative/Medical Necessity) [phone]   DISCHARGE SUPPORT TEAM (NETWORK) [address] [facility] (Maternity/NICU/Pediatrics) [phone]   [address] [place] [address] Drive [address] [place] [address] [address] [facility] [phone]   [address] [place] [address] Street [address] [facility] [address] [phone]
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
## 2023-10-16 NOTE — CASE MANAGEMENT
Case Management Discharge Planning Note    Patient name Quita Villanueva  Location [address] [place] [address] [address]/[facility] [place] [facility] 833-55 MRN [id number]  : 1980 Date 10/16/2023       Current Admission Date: 10/14/2023  Current Admission Diagnosis:Overdose of undetermined intent   Patient Active Problem List    Diagnosis Date Noted    Hypotension 10/15/2023    Bradycardia 10/15/2023    Overdose of undetermined intent 10/15/2023    Paresthesia of both feet 2023    Blood transfusion declined because patient is Worship 2023    Endometrial hyperplasia, unspecified 2023    Abnormal uterine bleeding 2023    Dysmenorrhea 2023    Endometrial polyp 2023    Lumbar radiculopathy     Lumbar back pain 10/03/2022    Type 2 diabetes mellitus with hyperglycemia, without long-term current use of insulin ([address]) 2022    Dyslipidemia 2022    Diabetic polyneuropathy associated with type 2 diabetes mellitus ([address]) 2022    Well adult exam 2022    Sacroiliitis ([address]) 2022    DDD (degenerative disc disease), lumbar 2022    Back pain 2022    Polyp of colon 2020    Loose stools 2019    Hematochezia 2019    Elevated TSH 2019    Arthralgia of multiple joints 2018    Generalized body aches 2018    Chronic idiopathic constipation 2018    Dyspepsia 10/24/2018    Generalized anxiety disorder 2018    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 psychotic features ([address]) 2018    Narcolepsy cataplexy syndrome 2018    Narcolepsy 2016    Chronic pain 10/05/2016    Myalgia 2016    Sleep apnea 2016    Chronic fatigue 10/13/2015    Fibromyalgia 2015    Muscle spasm 2015    Headache 2015    Sinus disease 2015    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, with unspecified complication ([address])     Chronic migraine without aura without status migrainosus, not intractable 2015    Lyme disease 06/03/2015    Snoring 05/27/2015    Allergic rhinitis 03/04/2014    Morbid obesity ([address]) 03/04/2014      LOS (days): 2  Geometric Mean LOS (GMLOS) (days):   Days to GMLOS:     OBJECTIVE:  Risk of Unplanned Readmission Score: 11.48         Current admission status: Inpatient   Preferred Pharmacy:   [facility] [address], [address]  [address]  Phone: [phone] Fax: [phone]    Primary Care Provider: Jeny Wong MD    Primary Insurance: [address] [place] [address]  Secondary Insurance:     DISCHARGE DETAILS:    Discharge planning discussed with[de-identified] Patient  Freedom of Choice: Yes     CM contacted family/caregiver?: No- see comments (Pt alert and oriented)  Were Treatment Team discharge recommendations reviewed with patient/caregiver?: Yes  Did patient/caregiver verbalize understanding of patient care needs?: N/A- going to facility  Were patient/caregiver advised of the risks associated with not following Treatment Team discharge recommendations?: Yes                   Other Referral/Resources/Interventions Provided:  Interventions: Inpatient Behavioral Health         Treatment Team Recommendation: Inpatient [address] [place] [address]  Discharge Destination Plan[de-identified] Inpatient [address] [place] [address]                                         Additional Comments: Pt signed 201, SL-Q accepted for admission, pt to be transferred later this afternoon    Accepting Facility Name, [address] [place] [address] : [facility]  Receiving Facility/Agency Phone Number: [phone]              BRANDI completed pre-cert with Nayan White at [facility], pended 5 days starting on 10/16-10/20/2023.

## 2023-10-16 NOTE — PROGRESS NOTES
[facility]  Progress Note  Name: Laurie Cavazos  MRN: [id number]  Unit/Bed#: [facility] 081-10 I Date of Admission: 10/14/2023   Date of Service: 10/16/2023 I Hospital Day: 2    Assessment/Plan   * Overdose of undetermined intent  Assessment & Plan  Patient found unresponsive at home by her mother. In the ED, patient was bradycardic into the 40s, hypotensive and lethargic. After obtaining further history, concern for overdose on propanolol. While in ICU, mental status gradually improved and patient admitted to suicide attempt and reports taking an unclear amount of propanolol, Advil and gabapentin. Encephalopathy in setting of overmedication, now improved. CT head negative. Patient was transferred out of ICU 10/15. Evaluated by medical toxicology, stable from their standpoint. Completed NAC. 1-1 observation  Psychiatry evaluation, anticipate need for inpatient psychiatric stay, patient is agreeable to this. Patient is medically clear for inpatient psychiatric unit. Hypotension  Assessment & Plan  Overall improved  Monitor     Bradycardia  Assessment & Plan  In setting of propanolol overdose. Improved. Can d/c telemetry   Monitor     Lumbar radiculopathy  Assessment & Plan  Avoid Ibuprofen/tylenol for now  Lido patch           VTE Pharmacologic Prophylaxis:   Moderate Risk (Score 3-4) - Pharmacological DVT Prophylaxis Ordered: enoxaparin (Lovenox). Patient Centered Rounds: I performed bedside rounds with nursing staff today. Discussions with Specialists or Other Care Team Provider: nursing, case management     Education and Discussions with Family / Patient: Patient declined call to . Total Time Spent on Date of Encounter in care of patient: 35 mins.  This time was spent on one or more of the following: performing physical exam; counseling and coordination of care; obtaining or reviewing history; documenting in the medical record; reviewing/ordering tests, medications or procedures; communicating with other healthcare professionals and discussing with patient's family/caregivers. Current Length of Stay: 2 day(s)  Current Patient Status: Inpatient   Certification Statement: The patient will continue to require additional inpatient hospital stay due to psych consult, monitor BP/HR  Discharge Plan: Anticipate discharge tomorrow to inpatient psych. Code Status: Level 1 - Full Code    Subjective:   No complaints other than some low back pain which is chronic. Agreeable for inpatient psychiatric admission. Objective:     Vitals:   Temp (24hrs), Av °F (36.7 °C), Min:97.8 °F (36.6 °C), Max:98.2 °F (36.8 °C)    Temp:  [97.8 °F (36.6 °C)-98.2 °F (36.8 °C)] 97.8 °F (36.6 °C)  HR:  [50-69] 60  Resp:  [10-25] 17  BP: ()/(57-73) 103/58  SpO2:  [94 %-98 %] 96 %  Body mass index is 37.5 kg/m². Input and Output Summary (last 24 hours): Intake/Output Summary (Last 24 hours) at 10/16/2023 0958  Last data filed at 10/15/2023 1600  Gross per 24 hour   Intake 1260.39 ml   Output 2450 ml   Net -1189.61 ml       Physical Exam:   Physical Exam  Vitals and nursing note reviewed. Constitutional:       General: She is not in acute distress. Appearance: She is obese. Cardiovascular:      Rate and Rhythm: Normal rate. Pulmonary:      Effort: No respiratory distress. Breath sounds: Normal breath sounds. Abdominal:      Tenderness: There is no abdominal tenderness. Musculoskeletal:         General: No swelling. Skin:     General: Skin is warm. Neurological:      Mental Status: She is alert and oriented to person, place, and time. Mental status is at baseline.    Psychiatric:         Mood and Affect: Mood normal.          Additional Data:     Labs:  Results from last 7 days   Lab Units 10/15/23  0000   WBC Thousand/uL 17.34*   HEMOGLOBIN g/dL 12.1   HEMATOCRIT % 36.6   PLATELETS Thousands/uL 186   NEUTROS PCT % 79*   LYMPHS PCT % 14   MONOS PCT % 6   EOS PCT % 0     Results from last 7 days   Lab Units 10/15/23  2053 10/15/23  0000   SODIUM mmol/L  --  134*   POTASSIUM mmol/L  --  3.9   CHLORIDE mmol/L  --  100   CO2 mmol/L  --  21   BUN mg/dL  --  20   CREATININE mg/dL  --  1.10   ANION GAP mmol/L  --  13   CALCIUM mg/dL  --  8.7   ALBUMIN g/dL 3.4* 3.5   TOTAL BILIRUBIN mg/dL 0.32 0.33   ALK PHOS U/L 38 33*   ALT U/L 42 58*   AST U/L 39 48*   GLUCOSE RANDOM mg/dL  --  206*         Results from last 7 days   Lab Units 10/16/23  0719 10/15/23  2053 10/15/23  1704 10/15/23  1139 10/15/23  0555 10/15/23  0036 10/14/23  2023   POC GLUCOSE mg/dl 131 221* 141* 169* 194* 247* 249*         Results from last 7 days   Lab Units 10/15/23  0555 10/15/23  0408 10/15/23  0153   LACTIC ACID mmol/L 1.2 0.9 1.0       Lines/Drains:  Invasive Devices       Peripheral Intravenous Line  Duration             Peripheral IV 10/14/23 Left;Proximal;Ventral (anterior) Antecubital 1 day    Peripheral IV 10/15/23 Right Antecubital 1 day    Peripheral IV 10/15/23 Right Forearm 1 day                      Telemetry:  Telemetry Orders (From admission, onward)               24 Hour Telemetry Monitoring  Continuous x 24 Hours (Telem)        Question:  Reason for 24 Hour Telemetry  Answer:  Drug overdose known to cause cardiac arrhythmias (e.g. - Cocaine, TCA, Amphetamines, Phenothiazines, Digoxin, etc.) Meds known to need cardiac monitoring: Amiodarone, Dopamine, Dobutamine, Phenytoin                     Telemetry Reviewed: Normal Sinus Rhythm  Indication for Continued Telemetry Use: No indication for continued use. Will discontinue. Imaging: No pertinent imaging reviewed.     Recent Cultures (last 7 days):         Last 24 Hours Medication List:   Current Facility-Administered Medications   Medication Dose Route Frequency Provider Last Rate    atropine  0.4 mg Intravenous Once Daily Salvador MD      chlorhexidine  15 mL Mouth/Throat Q12H [address] [place] [address] MD      enoxaparin  40 mg Subcutaneous Daily Ricci Goode MD      insulin lispro  1-6 Units Subcutaneous 4x Daily (AC & HS) Nicky Chew PA-C      lidocaine  1 patch Topical Daily ADELAIDA Stovall          Today, Patient Was Seen By: ADELAIDA Eason    **Please Note: This note may have been constructed using a voice recognition system. **

## 2023-10-16 NOTE — NURSING NOTE
Bedside  - Pink crewneck  - Pink Sweatpants  - 2 Bras  - Written card  - Darra Spring  - Hair brush  - 5 Pairs of socks  - 3 Shirts  - Black sweatpants  - 4 Pairs of underwear  - Extra glasses  -     Bin  - heavy cleaning towels  - Disposable blood pressure cuff  - Arm band  - Stuffed Animal in bag  - [employer] bag

## 2023-10-16 NOTE — UTILIZATION REVIEW
NOTIFICATION OF INPATIENT ADMISSION   AUTHORIZATION REQUEST   SERVICING FACILITY:   [address] [place] [address]  Address: [address] [place] [address], [address] [place] [address]  Tax ID: [id number]  NPI: [id number] ATTENDING PROVIDER:  Attending Name and NPI#: Tequila Gongora Md [[id number]]  Address: [address] [place] [address]  Phone: [phone]   ADMISSION INFORMATION:  Place of Service: [address] [place] [address]  Place of Service Code: 21  Inpatient Admission Date/Time: 10/14/23 11:26 PM  Discharge Date/Time: No discharge date for patient encounter. Admitting Diagnosis Code/Description:  Overdose [T50.901A]  Encephalopathy acute [G93.40]  Tylenol overdose [T39.1X1A]     UTILIZATION REVIEW CONTACT:  Citlaly Carlson, Utilization   Network Utilization Review Department  Phone: [phone]  Fax: [phone]  Email: [email or web address]  Contact for approvals/pending authorizations, clinical reviews, and discharge. PHYSICIAN ADVISORY SERVICES:  Medical Necessity Denial & Ckpm-ny-Tkgd Review  Phone: [phone]  Fax: [phone]  Email: [email or web address]     DISCHARGE SUPPORT TEAM:  For Patients Discharge Needs & Updates  Phone: [phone] opt. 2 Fax: [phone]  Email: [email or web address]

## 2023-10-16 NOTE — ASSESSMENT & PLAN NOTE
Patient found unresponsive at home by her mother. In the ED, patient was bradycardic into the 40s, hypotensive and lethargic. After obtaining further history, concern for overdose on propanolol. While in ICU, mental status gradually improved and patient admitted to suicide attempt and reports taking an unclear amount of propanolol, Advil and gabapentin. Encephalopathy in setting of overmedication, now improved. CT head negative. Patient was transferred out of ICU 10/15. Evaluated by medical toxicology, stable from their standpoint. Completed NAC. 1-1 observation  Psychiatry evaluation, anticipate need for inpatient psychiatric stay, patient is agreeable to this. Patient is medically clear for inpatient psychiatric unit.

## 2023-10-17 ENCOUNTER — TRANSITIONAL CARE MANAGEMENT (OUTPATIENT)
Dept: FAMILY MEDICINE CLINIC | Facility: CLINIC | Age: 43
End: 2023-10-17

## 2023-10-17 PROBLEM — Z00.8 MEDICAL CLEARANCE FOR PSYCHIATRIC ADMISSION: Status: ACTIVE | Noted: 2023-10-17

## 2023-10-17 PROBLEM — T50.902A INTENTIONAL OVERDOSE (HCC): Status: ACTIVE | Noted: 2023-10-17

## 2023-10-17 LAB
GLUCOSE SERPL-MCNC: 146 MG/DL (ref 65–140)
GLUCOSE SERPL-MCNC: 159 MG/DL (ref 65–140)
GLUCOSE SERPL-MCNC: 215 MG/DL (ref 65–140)
HCG SERPL QL: NEGATIVE

## 2023-10-17 PROCEDURE — 82948 REAGENT STRIP/BLOOD GLUCOSE: CPT

## 2023-10-17 PROCEDURE — 99223 1ST HOSP IP/OBS HIGH 75: CPT | Performed by: PSYCHIATRY & NEUROLOGY

## 2023-10-17 PROCEDURE — 84703 CHORIONIC GONADOTROPIN ASSAY: CPT | Performed by: PHYSICIAN ASSISTANT

## 2023-10-17 PROCEDURE — 99253 IP/OBS CNSLTJ NEW/EST LOW 45: CPT

## 2023-10-17 RX ORDER — MIRTAZAPINE 15 MG/1
7.5 TABLET, FILM COATED ORAL
Status: DISCONTINUED | OUTPATIENT
Start: 2023-10-17 | End: 2023-10-18

## 2023-10-17 RX ORDER — NORTRIPTYLINE HYDROCHLORIDE 25 MG/1
75 CAPSULE ORAL 2 TIMES DAILY
Status: DISCONTINUED | OUTPATIENT
Start: 2023-10-17 | End: 2023-10-23 | Stop reason: HOSPADM

## 2023-10-17 RX ADMIN — NORTRIPTYLINE HYDROCHLORIDE 75 MG: 25 CAPSULE ORAL at 17:45

## 2023-10-17 RX ADMIN — INSULIN LISPRO 1 UNITS: 100 INJECTION, SOLUTION INTRAVENOUS; SUBCUTANEOUS at 08:38

## 2023-10-17 RX ADMIN — HYDROXYZINE HYDROCHLORIDE 25 MG: 25 TABLET ORAL at 21:19

## 2023-10-17 RX ADMIN — NORTRIPTYLINE HYDROCHLORIDE 75 MG: 25 CAPSULE ORAL at 12:19

## 2023-10-17 RX ADMIN — HYDROXYZINE HYDROCHLORIDE 50 MG: 50 TABLET, FILM COATED ORAL at 01:26

## 2023-10-17 RX ADMIN — METFORMIN HYDROCHLORIDE 500 MG: 500 TABLET, FILM COATED ORAL at 16:38

## 2023-10-17 RX ADMIN — INSULIN LISPRO 2 UNITS: 100 INJECTION, SOLUTION INTRAVENOUS; SUBCUTANEOUS at 12:19

## 2023-10-17 RX ADMIN — MIRTAZAPINE 7.5 MG: 15 TABLET, FILM COATED ORAL at 21:14

## 2023-10-17 NOTE — ASSESSMENT & PLAN NOTE
Initially admitted to [facility] following intentional overdose on uncertain number of advil, gabapentin, and propranolol  Patient was seen in consult by med tox given acute encephalopathy, lethargy, transaminitis, bradycardia  Hypotension and transanimates resolved with supportive NAC x 3, atropine, and glucagon   Patient remains hemodynamically stable upon admission to [facility]  Follow up admission EKG and CMP

## 2023-10-17 NOTE — CMS CERTIFICATION NOTE
Recertification: Based upon physical, mental and social evaluations, I certify that inpatient psychiatric services continue to be medically necessary for this patient for a duration of 7 midnights for the treatment of  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 psychotic features [facility]) Available alternative community resources still do not meet the patient's mental health care needs. I further attest that an established written individualized plan of care has been updated and is outlined in the patient's medical records.

## 2023-10-17 NOTE — PROGRESS NOTES
Reviewed Diagnosis of: Principal Problem:    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 psychotic features ([address])  Active Problems:    Chronic migraine without aura without status migrainosus, not intractable    Fibromyalgia    Headache    Generalized anxiety disorder       Reviewed Short Term Goals of: decrease in depressive symptoms, decrease in anxiety symptoms, decrease in suicidal thoughts     All parties in agreement.        10/17/23 1216   Team Meeting   Meeting Type Tx Team Meeting   Team Members Present   Team Members Present Physician;Nurse;   Physician Team Member Dr. Martha Khan Team Member [address] [employer] Team Member Jamarcus Vazquez   Patient/Family Present   Patient Present No  (declined to participate)   Patient's Family Present No

## 2023-10-17 NOTE — CASE MANAGEMENT
CM contacted patient's mother, Liliane Iqbal [phone], to confirm that firearms are secured in the home. Liliane Iqbal stated that the guns are her 's and they are secured in a safe in the garage. Patient does not have access to them. CM provided update to patient's mother, regarding patient's mood and present affect.

## 2023-10-17 NOTE — TREATMENT PLAN
TREATMENT PLAN REVIEW - [address] 37 y.o. 1980 female MRN: [id number]    Gautam Cordero Room / Bed: [place] [address]/[place] [address] Encounter: [id number]          Admit Date/Time:  10/16/2023  6:19 PM    Treatment Team: Attending Provider: Glo Ashley DO; Charge Nurse: Ezio Albright, RN; Patient Care Technician: Sueellen Ganser; Registered Nurse: Christina Cisneros RN; Charge Nurse: Charly Cordon, RN; Registered Nurse: Sofi Canseco, RN; Patient Care Assistant: Winter Capellan; Registered Nurse: Ary Gregory RN    Diagnosis: Principal Problem:    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 psychotic features [facility])  Active Problems:    Chronic migraine without aura without status migrainosus, not intractable    Fibromyalgia    Headache    Generalized anxiety disorder      Patient Strengths/Assets: ability for insight    Patient Barriers/Limitations: poor past treatment response    Short Term Goals: decrease in depressive symptoms, decrease in anxiety symptoms, decrease in suicidal thoughts    Long Term Goals: improvement in depression, improvement in anxiety, free of suicidal thoughts    Progress Towards Goals: starting psychiatric medications as prescribed, continue psychiatric medications as prescribed, improving gradually    Recommended Treatment: medication management, patient medication education, group therapy, milieu therapy, continued Behavioral Health psychiatric evaluation/assessment process    Treatment Frequency: daily medication monitoring, group and milieu therapy daily, monitoring through interdisciplinary rounds, monitoring through weekly patient care conferences    Expected Discharge Date:  7 days    Discharge Plan: discharge to home    Treatment Plan Created/Updated By: Glo Ashley DO

## 2023-10-17 NOTE — DISCHARGE INSTR - APPOINTMENTS
You are being discharged to your confirmed address of [address] [place] [address]. You will be contacted at your confirmed phone number of [phone]. Arlette Shultz or Marely, billie Cordero and Anna, will be calling you after your discharge, on the phone number that you provided. They will be available as an additional support, if needed. If you wish to speak with one of them, you may contact Arlette Shultz at [phone] or Saloni Fung at [phone].

## 2023-10-17 NOTE — NURSING NOTE
Patient resting most of morning, reports difficulty sleeping last night after being admitted. Pt hopeful to catch up on some rest today and has been napping. RN encouraged after resting today, that pt try to begin programming tomorrow, pt agreeable. Denies SI, reports feeling safe here and is compliant with medications and assessments.

## 2023-10-17 NOTE — H&P
Psychiatric Evaluation - [address] [facility] 37 y.o. female MRN: [id number]  Unit/Bed#: [facility] 202-01 Encounter: [id number]    Assessment/Plan   Principal Problem:    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 psychotic features ([address])  Active Problems:    Chronic migraine without aura without status migrainosus, not intractable    Fibromyalgia    Headache    Generalized anxiety disorder      Plan:   Continue home Nortriptyline 75mg PO BID  Start Remeron 7.5mg PO HS; we do not have Pristiq on formulary but patient is agreeable with needing a medication change and wants to come off. She has tried and failed many other psychotropics, but has never tried Remeron. We exhaustively discussed risks benefits and alternatives, including possibly stimulated appetite and Serotonin Syndrome given she is on a TCA, and patient agreed with trial of Remeron. We will monitor for SSRI discontinuation syndrome. Admit to 0 [facility] inpatient psychiatry. Medical management per SLIM. Check admission labs: CMP, CBC, TSH, RPR, UDS, Lipid Panel, A1c. Collaborate with case management for baseline assessment and disposition planning.   Chart reviewed and case discussed with treatment team.  Start/continue the following medications:  Current Facility-Administered Medications   Medication Dose Route Frequency Provider Last Rate    aluminum-magnesium hydroxide-simethicone  30 mL Oral Q4H PRN Krystal Vuong, PA-C      artificial tear   Both Eyes 4x Daily PRN Krystal Vuong, PA-C      benztropine  1 mg Intramuscular BID PRN Krystal Vuong, PA-C      benztropine  1 mg Oral BID PRN Krystal Vuong, PA-C      bisacodyl  10 mg Rectal Daily PRN Stephanie Anthony MD      hydrOXYzine HCL  25 mg Oral Q6H PRN Max 4/day Krystal Vuong, PA-C      hydrOXYzine HCL  50 mg Oral Q4H PRN Max 4/day Krystal Vuong, PA-C      Or    LORazepam  1 mg Intramuscular Q4H PRN Krystal Vuong, PA-C      insulin lispro  1-6 Units Subcutaneous TID AC Scottie Reyes PA-C      LORazepam  1 mg Oral Q4H PRN Max 6/day Scottie Reyes PA-C      Or    LORazepam  2 mg Intramuscular Q6H PRN Max 3/day Scottie Reyes PA-C      magnesium hydroxide  30 mL Oral Daily PRN West English MD      mirtazapine  7.5 mg Oral HS Anusha Sargent, DO      nortriptyline  75 mg Oral BID Unique Olvera, DO      OLANZapine  10 mg Oral Q3H PRN Max 3/day Scottie Reyes PA-C      Or    OLANZapine  10 mg Intramuscular Q3H PRN Max 3/day Scottie Reyes PA-C      OLANZapine  5 mg Oral Q3H PRN Max 6/day Scottie Reyes PA-C      Or    OLANZapine  5 mg Intramuscular Q3H PRN Max 6/day Scottie Reyes PA-C      OLANZapine  2.5 mg Oral Q3H PRN Max 8/day Scottie Reyes PA-C      senna-docusate sodium  1 tablet Oral Daily PRN Scottie Reyes PA-C         Treatment options and alternatives were reviewed with the patient, who concurs with the above plan. Risks, benefits, and possible side effects of medications were explained to the patient, and she verbalizes understanding.      -----------------------------------    Chief Complaint: OD attempt    History of Present Illness     Per Psych CL note:  Karely Lees is a 37 y.o. female with a history of depression, anxiety , narcolepsy, fibromyalgia, migraine, chronic pain, type 2 diabetes mellitus, and polyneuropathy associated with presented with changes in mental status. She was found unresponsive at home by her mother, she overdosed on multiple medication including propanolol, gabapentin, Motrin in a suicidal attempt. She stated that she has been feeling very depressed, she had multiple losses, she feels that her  support system has decreased. She  stated that she has been seeing a therapist but her therapist canceled several appointments. She has been on multiple psychotropic medication.   She states that she feels like a burden, her father is in dialysis at home, also has multiple appointments and she had no one to talk to. She has been feeling overwhelmed, she has anhedonia, she feels anxious, and having sleep disturbances. She claimed that she take her medication as prescribed. On admission to Inpatient Psychiatric Unit:  Patient is a 42-year-old white female with a past psychiatric history of major depressive disorder without psychotic features as well as generalized anxiety disorder who presents voluntarily to inpatient U following a suicide attempt via overdose on "all of my pills."    Symptoms prior to hospitalization include: Progressively worsening severe depressed mood, decreased energy, severe insomnia, hopelessness, and suicidal ideation with the aforementioned suicide attempt. Mitigating factors are none. Exacerbating stressors are the death of her friend 3 weeks ago via heart attack. Timeline is acute on chronic and worsening approximately over the past 3 weeks. Symptom severity is rated as severe. On initial psychiatric evaluation today, the patient is tearful and states that she has been feeling suicidal because her good friend passed away 3 weeks ago from a heart attack. The aforementioned overdose was on propanolol, Advil, and gabapentin. She states that she has been depressed and that her medications are not working. She usually takes Pristiq, nortriptyline 75 mg twice daily, and was recently started on phentermine 37.5 mg daily. She is no longer feeling suicidal at the time of my evaluation but states that she wants to change her medications and acknowledges that the Pristiq has not been working for her.   She states that she previously underwent genetic testing and Pristiq was "the only thing that might work for me."  She has been tried on a long list of psychotropics but she describes as " they work for a little bit, then stop."  She has never been tried on Remeron and we exhaustively discussed the risks, benefits, and alternatives of Remeron, and we discussed the possibility as well that her appetite may be stimulated, and that there is a theoretical risk of serotonin syndrome given that she is on a TCA. The patient was agreeable to a trial of Remeron and coming off the Pristiq. We discussed SSRI discontinuation syndrome and that we will monitor for the symptoms. The patient states that her sleep has been extremely poor. She feels anxious and has some generalized worrying. This is her first suicide attempt but she has been hospitalized 4 times for depression. Psychiatric Review Of Systems:  Medication side effects: none  Sleep: Poor  Appetite: no change  Hygiene: able to tend to instrumental and basic ADLs  Anxiety Symptoms: Per HPI  Psychotic Symptoms: denies  Depression Symptoms: Per HPI  Manic Symptoms: denies  PTSD Symptoms: denies  Suicidal Thoughts: Per HPI  Homicidal Thoughts: denies    Medical Review Of Systems:   Patient denies headache or dizziness. Patient denies chest pain or palpitations. Patient denies difficulty breathing or wheezing. Patient denies nausea, vomiting, or diarrhea. Patient denies polyuria or polydipsia. Patient denies weakness or numbness. Pertinent positives as per HPI. Historical Information     Psychiatric History:   Diagnoses: MDD without psychotic features, CAROL  Inpatient Hx: 4  Outpatient Hx: Dr. Yana Almazan ([facility])  Medications/Trials: Has tried a long list of meds including but not limited to: Zoloft, Prozac, Celexa, Lexapro, Effexor, Cymbalta, Pristiq, Abilify    Substance Abuse History:  Social History     Substance and Sexual Activity   Alcohol Use Yes    Comment: 3 x year; sober since 2010     Social History     Substance and Sexual Activity   Drug Use Not Currently       I spent time with Sharon Dory in counseling and education on risk of substance abuse. I assessed motivation and encouraged her for treatment as appropriate.      Family History:   Family History   Problem Relation Age of Onset Irregular heart beat Mother     Diabetes Father     Kidney disease Father     Diabetes Maternal Grandmother     Rheum arthritis Maternal Grandmother     Melanoma Maternal Grandmother 80    No Known Problems Maternal Grandfather     Kidney disease Paternal Grandmother     Rheum arthritis Paternal Grandmother     Depression Paternal Grandmother     Diabetes Paternal Grandfather     Lung cancer Paternal Grandfather 52    Substance Abuse Brother     No Known Problems Brother     Substance Abuse Maternal Uncle     Prostate cancer Maternal Uncle 61    Depression Paternal Aunt     Alcohol abuse Family     Stomach cancer Family        Social History:  Highest education: GED  Relationships: Single  Children: None  Occupation: Unemployed on disability  No legal or  history    Rest of social history as per below:    Social History     Socioeconomic History    Marital status: Single     Spouse name: Not on file    Number of children: 0    Years of education: 12 years     Highest education level: GED or equivalent   Occupational History    Occupation: unemployed   Tobacco Use    Smoking status: Never     Passive exposure: Never    Smokeless tobacco: Never    Tobacco comments:     N/A, non-smoker.    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; sober since 2010    Drug use: Not Currently    Sexual activity: Not Currently   Other Topics Concern    Not on file   Social History Narrative    Caffeine use        What type of home do you live in: Single house    Age of your home: 50 yrs    How long have you been living there: 42 yrs    Type of heat: Baseboard    Type of fuel: Electric    What type of samir is in your bedroom: Carpet    Do you have the following in or near your home:    Air products: Window air conditioning and Ionic air purifier    Pests: Mice    Pets: Cat    Basement: None     Social Determinants of Health     Financial Resource Strain: High Risk (12/2/2020)    Overall Financial Resource Strain (CARDIA)     Difficulty of Paying Living Expenses: Hard   Food Insecurity: No Food Insecurity (10/16/2023)    Hunger Vital Sign     Worried About Running Out of Food in the Last Year: Never true     Ran Out of Food in the Last Year: Never true   Transportation Needs: No Transportation Needs (10/16/2023)    PRAPARE - Transportation     Lack of Transportation (Medical): No     Lack of Transportation (Non-Medical): No   Physical Activity: Insufficiently Active (10/12/2022)    Exercise Vital Sign     Days of Exercise per Week: 2 days     Minutes of Exercise per Session: 60 min   Stress: Stress Concern Present (4/3/2019)    [facility]     Feeling of Stress : To some extent   Social Connections:  Moderately Integrated (4/3/2019)    Social Connection and Isolation Panel [NHANES]     Frequency of Communication with Friends and Family: More than three times a week     Frequency of Social Gatherings with Friends and Family: More than three times a week     Attends Christianity Services: More than 4 times per year     Active Member of [employer] or Organizations: Yes     Attends Club or Organization Meetings: More than 4 times per year     Marital Status: Never    Intimate Partner Violence: Not At Risk (4/3/2019)    Humiliation, Afraid, Rape, and Kick questionnaire     Fear of Current or Ex-Partner: No     Emotionally Abused: No     Physically Abused: No     Sexually Abused: No   Housing Stability: Low Risk  (10/16/2023)    Housing Stability Vital Sign     Unable to Pay for Housing in the Last Year: No     Number of Places Lived in the Last Year: 1     Unstable Housing in the Last Year: No       Past Medical History:   Past Medical History:   Diagnosis Date    Anxiety     Blood transfusion declined because patient is Faith 05/01/2023    Chronic pain disorder     Chronic sinusitis     Depression Depression     Diabetes ([address])     Fibromyalgia     Migraine     Obesity     Polyarthritis     Last assessed 9/21/2015    Psychiatric disorder     Psychiatric illness     Sleep difficulties         -----------------------------------  Objective    Temp:  [97.2 °F (36.2 °C)-98.3 °F (36.8 °C)] 97.2 °F (36.2 °C)  HR:  [69-81] 81  Resp:  [16-18] 18  BP: (120-139)/(67-92) 139/92    Mental Status Evaluation:  Appearance: casually dressed, consistent with stated age  Motor: +psychomotor retardation, no gait abnormalities  Behavior: cooperative, tearful at times  Speech: soft, normal rhythm  Mood: "not great"  Affect: constricted, depressed-appearing  Thought Process: linear and goal-oriented  Thought Content: denies auditory hallucinations, denies visual hallucinations, denies delusions  Risk Potential: denies suicidal ideation, plan, or intent. Denies homicidal ideation  Sensorium: Oriented to person, place, time, and situation  Cognition: cognitive ability appears intact but was not quantitatively tested  Consciousness: alert and awake  Attention: intact, able to focus without difficulty  Insight: fair  Judgement: limited      Meds/Allergies   Allergies   Allergen Reactions    Cannabidiol Shortness Of Breath, Itching, Swelling, Anxiety, Palpitations, Confusion, Hypertension, Throat Swelling and Tongue Swelling    Amoxicillin-Pot Clavulanate Hives    Decadrol [Dexamethasone] Other (See Comments)     psychosis    Penicillins Hives     Hives/Uticaria    Tetracyclines & Related Hives      Allergy; Behavioral Health Medications: all current active meds have been reviewed as per above. Changes as per above. Risks, benefits, indications, and alternatives to treatment were discussed with patient. Laboratory results:  I have personally reviewed all pertinent laboratory/tests results.   Recent Results (from the past 48 hour(s))   ECG 12 lead    Collection Time: 10/15/23  9:53 AM   Result Value Ref Range    Ventricular Rate 51 BPM    Atrial Rate 51 BPM    AZ Interval 179 ms    QRSD Interval 96 ms    QT Interval 454 ms    QTC Interval 419 ms    P Axis 49 degrees    QRS Axis 59 degrees    T Wave Axis 32 degrees   ECG 12 lead    Collection Time: 10/15/23 10:56 AM   Result Value Ref Range    Ventricular Rate 52 BPM    Atrial Rate 52 BPM    AZ Interval 183 ms    QRSD Interval 96 ms    QT Interval 450 ms    QTC Interval 419 ms    P Axis 43 degrees    QRS Axis 64 degrees    T Wave Axis 31 degrees   ECG 12 lead    Collection Time: 10/15/23 11:05 AM   Result Value Ref Range    Ventricular Rate 52 BPM    Atrial Rate 52 BPM    AZ Interval 175 ms    QRSD Interval 96 ms    QT Interval 450 ms    QTC Interval 419 ms    P Axis 42 degrees    QRS Axis 62 degrees    T Wave Axis 25 degrees   Fingerstick Glucose (POCT)    Collection Time: 10/15/23 11:39 AM   Result Value Ref Range    POC Glucose 169 (H) 65 - 140 mg/dl   ECG 12 lead    Collection Time: 10/15/23 12:01 PM   Result Value Ref Range    Ventricular Rate 58 BPM    Atrial Rate 58 BPM    AZ Interval 179 ms    QRSD Interval 100 ms    QT Interval 438 ms    QTC Interval 431 ms    P Axis 50 degrees    QRS Axis 61 degrees    T Wave Axis 36 degrees   ECG 12 lead    Collection Time: 10/15/23  1:11 PM   Result Value Ref Range    Ventricular Rate 62 BPM    Atrial Rate 62 BPM    AZ Interval 179 ms    QRSD Interval 100 ms    QT Interval 429 ms    QTC Interval 436 ms    P Axis 54 degrees    QRS Axis 65 degrees    T Wave Axis 43 degrees   ECG 12 lead    Collection Time: 10/15/23  2:12 PM   Result Value Ref Range    Ventricular Rate 60 BPM    Atrial Rate 60 BPM    AZ Interval 183 ms    QRSD Interval 96 ms    QT Interval 421 ms    QTC Interval 421 ms    P Axis 55 degrees    QRS Axis 67 degrees    T Wave Axis 7 degrees   ECG 12 lead    Collection Time: 10/15/23  3:56 PM   Result Value Ref Range    Ventricular Rate 60 BPM    Atrial Rate 60 BPM    AZ Interval 192 ms    QRSD Interval 92 ms    QT Interval 421 ms QTC Interval 421 ms    P Axis 41 degrees    QRS Axis 62 degrees    T Wave Axis 3 degrees   Fingerstick Glucose (POCT)    Collection Time: 10/15/23  5:04 PM   Result Value Ref Range    POC Glucose 141 (H) 65 - 140 mg/dl   ECG 12 lead    Collection Time: 10/15/23  7:23 PM   Result Value Ref Range    Ventricular Rate 69 BPM    Atrial Rate 69 BPM    NC Interval 176 ms    QRSD Interval 86 ms    QT Interval 426 ms    QTC Interval 456 ms    P Axis 48 degrees    QRS Axis 44 degrees    T Wave Axis -21 degrees   Acetaminophen level-"If concentration is detectable, please discuss with medical  on call."    Collection Time: 10/15/23  8:53 PM   Result Value Ref Range    Acetaminophen Level <10 (L) 10 - 20 ug/mL   Hepatic function panel    Collection Time: 10/15/23  8:53 PM   Result Value Ref Range    Total Bilirubin 0.32 0.20 - 1.00 mg/dL    Bilirubin, Direct 0.06 0.00 - 0.20 mg/dL    Alkaline Phosphatase 38 34 - 104 U/L    AST 39 13 - 39 U/L    ALT 42 7 - 52 U/L    Total Protein 5.6 (L) 6.4 - 8.4 g/dL    Albumin 3.4 (L) 3.5 - 5.0 g/dL   Fingerstick Glucose (POCT)    Collection Time: 10/15/23  8:53 PM   Result Value Ref Range    POC Glucose 221 (H) 65 - 140 mg/dl   Fingerstick Glucose (POCT)    Collection Time: 10/16/23  7:19 AM   Result Value Ref Range    POC Glucose 131 65 - 140 mg/dl   ECG 12 lead    Collection Time: 10/16/23  8:11 AM   Result Value Ref Range    Ventricular Rate 61 BPM    Atrial Rate 61 BPM    NC Interval 164 ms    QRSD Interval 90 ms    QT Interval 416 ms    QTC Interval 418 ms    P Axis 20 degrees    QRS Axis 9 degrees    T Wave Axis -20 degrees   Fingerstick Glucose (POCT)    Collection Time: 10/16/23 11:02 AM   Result Value Ref Range    POC Glucose 142 (H) 65 - 140 mg/dl   hCG, serum, qualitative    Collection Time: 10/16/23 12:21 PM   Result Value Ref Range    Preg, Serum Negative Negative   Fingerstick Glucose (POCT)    Collection Time: 10/16/23  4:19 PM   Result Value Ref Range    POC Glucose 136 65 - 140 mg/dl   Fingerstick Glucose (POCT)    Collection Time: 10/17/23  8:34 AM   Result Value Ref Range    POC Glucose 159 (H) 65 - 140 mg/dl        Progress Toward Goals & Illness Status: Patient is not at goal. They are psychiatrically unstable and are not yet ready for discharge. The patient's condition currently requires active psychopharmacological medication management, interdisciplinary coordination with case management, and the utilization of adjunctive milieu and group therapy to augment psychopharmacological efficacy. The patient's risk of morbidity, and progression or decompensation of psychiatric disease, is high without this current treatment.           -----------------------------------    Risks / Benefits of Treatment:     Risks, benefits, and possible side effects of medications explained to patient. The patient verbalizes understanding and agreement for treatment. Counseling / Coordination of Care:     Patient's presentation on admission and proposed treatment plan were discussed with the treatment team.  Diagnosis, medication changes and treatment plan were reviewed with the patient. Recent stressors were discussed with the patient. Events leading to admission were reviewed with the patient. Importance of medication and treatment compliance was reviewed with the patient. Inpatient Psychiatric Certification:     Certification: Based upon physical, mental and social evaluations, I certify that inpatient psychiatric services are medically necessary for this patient for a duration of 5-7 midnights (exact duration TBD pending patient's response to psychiatric treatment) for the diagnosis listed above. Available alternative community resources do not meet the patient's mental health care needs. I further attest that an established written individualized plan of care has been implemented and is outlined in the patient's medical records.         This note has been constructed using a voice recognition system. There may be translation, syntax, or grammatical errors. If you have any questions, please contact the dictating provider.

## 2023-10-17 NOTE — DISCHARGE INSTR - OTHER ORDERS
Chadwick Kehr is a confidential 7 days/week telephone support service manned by trained mental health consumers. ? ? [facility] operates daily but?is not able to accept?calls between? 2AM-6AM. ? [facility] provides support, a listening ear and can provide information about available services. [facility] specializes in the concerns of mental health consumers, their families and friends. ? However, we are also here for anyone who has a mental health concern, is confused about or just doesn't know anything about mental health or where to get information. To reach Chadwick Kehr, call [phone] accepts calls between 6:00 AM to 10:00 AM and from 4:00 PM to 12:00 AM.    Text CONNECT to 093688 from anywhere in the [facility], anytime, about any type of crisis. A live, trained Crisis Counselor receives the text and lets you know that they are here to listen. The volunteer Crisis Counselor will help you move from a hot moment to a cool moment. [facility] ([facility]) AT [place] [facility] - licensed telephone and mobile crisis services that provide mental health assessments to all age groups regardless of income or insurance. ? Crisis Intervention operates 24-hour/7 days a week. [address] assists consumer who are experiencing a mental health emergency and lack the resources to assist themselves. ? Immediate intervention for suicidal and depressed individuals with home visits/outreach being top priority. Crisis can be contacted at [phone]. The [facility] Mental [facility]ness ([facility] [place]) offers various education & support groups for you & your family. For more information visit their website at    http://www.[employer]/.   Dial 2-1-1 to get connected/get help. Free, confidential information & referral available 24/7: Aging Services, Child & Youth Services, Counseling, Education/Training, Food/Shelter/Clothing, Health Services, Parenting, Substance Abuse, Support Groups, Volunteer Opportunities, & much more.    Phone: 2-1-1 or [phone], Web: TPSUMI.IW391CKRZ.UEJ, Email: Terell@[employer]      [facility]   The [facility] ([address] [place] [address]) offers persons with a mental illness a safe, healing environment to explore their personal and vocational potential and receive support in achieving their goals. Instead of traditional therapy, the work of the house is the rehabilitation. As members contribute meaningful work to the house, they build confidence and a sense of purpose. Be a Member - It’s Free   Membership in the [facility] is open to any [facility] resident who is 25 or older and has a history of mental illness. Membership is free for life. [address], [address] [place] [address]   Hours: Monday - Friday: 8 a.m. - 4 p.m. With varying hours on holidays    ([address] [place] [address] ([address] [place] [address]) provides a stress-free atmosphere for persons 18 and older who have experienced mental health issues. What The [address] [place] [address] [employer]   [facility]   Holiday gatherings   Recovery   Employment   Education   Community Resources   Empowerment   Helps participants achieve their goals   Enhances quality of life   Encourages leadership      The [address]   Hours: Monday through Friday: 4 p.m. - 9 p.m. Saturday: 2 p.m. - 8 p.m. Closed Sundays   Varying hours on holidays             Contact [phone]      Support & Referral Helpline   Support and Referral Helpline is a 24-hour, 7 days-a-week, listening and referral service provided to all of [place]. Please call [phone] or tty [phone] to speak with one of our specialists. The helpline is possible through partnerships with the [address] [place] [address] [employer].          The  [employer] [facility] (formerly known as the [employer]) provides free and confidential emotional support to people in suicidal crisis or emotional distress 24 hours a day, 7 days a week, across the [facility]. The [facility] is comprised of a national network of over 200 local crisis centers, combining custom local care and resources with national standards and best practices.

## 2023-10-17 NOTE — ASSESSMENT & PLAN NOTE
Previously maintained on propranolol daily prior to acute overdose in setting of SI  Can consider restarting if migraines occur as patient is now hemodynamically stable, otherwise restart as outpatient

## 2023-10-17 NOTE — NURSING NOTE
Linda Izquierdo is a 38 y/o female, here on a 201 from [facility]/S. Patient had an intentional O/D on Gabapentin, Propanolol and Advil with alcohol on 10/14. Linda Izquierdo has had SI in the past, as well as previous inpatient stays, but reports that this is her first attempt. Patient lives with her parents, who found her unresponsive and called 46. Patient reports feeling overwhelmed, experiencing increased depression and decreased sleep lately, no known trigger. Linda Izquierdo endorses history of MDD, degenerative disc disease with a bulging disc, as well as type 2 diabetes. Patient reports that she receives epidural injections for back pain, had last injection on 10/11. Linda Izquierdo is a [facility] and asked staff to be aware that she will not use blood products. No UDS was obtained prior to patient transfer to [facility].

## 2023-10-17 NOTE — ASSESSMENT & PLAN NOTE
Lab Results   Component Value Date    HGBA1C 6.0 (H) 07/11/2023       Recent Labs     10/16/23  1102 10/16/23  1619 10/17/23  0834 10/17/23  1128   POCGLU 142* 136 159* 215*     Continue home metformin 500 mg BID  ISS + accucheks while on U  A1C pending

## 2023-10-17 NOTE — PROGRESS NOTES
Patient is a 201. Patient is a 37year old female who reported to the ED due suicide attempt via pills. Patient had not attempted this prior to this time. Family members contacted EMS and took patient to the ED. Patient has a history of depression. UDS, PAWSS, AUDIT, and BAT reviewed. Discharge Date: TBD        10/17/23 0800   Team Meeting   Meeting Type Daily Rounds   Team Members Present   Team Members Present Physician;Nurse;; Other (Discipline and Name)   Physician Team Member Dr. Verna Campuzano [place]/ [address] [place] [address] Team Member Tiny/ [facility] Management Team Member Tomas/ Alessandro/ Geri/ [facility]   Other (Discipline and Name) Group Facilitator, Evangelina/ Pharm.  Technician   Patient/Family Present   Patient Present No   Patient's Family Present No

## 2023-10-17 NOTE — CONSULTS
[address]  Consult  Name: Tarah Beatty 37 y.o. female I MRN: [id number]  Unit/Bed#: [facility] 202-01 I Date of Admission: 10/16/2023   Date of Service: 10/17/2023 I Hospital Day: 1    Inpatient consult for Medical Clearance for [address] patient  Consult performed by: Burak Ford PA-C  Consult ordered by: Theresa Luna PA-C          Assessment/Plan   Medical clearance for psychiatric admission  Assessment & Plan  Admission labs pending: CBC, CMP, lipid panel, TSH, UA, HbA1C, RPR  Vitals stable   UDS pending   EKG pending  Medically stable for continued inpatient psychiatric treatment based on available results    Type 2 diabetes mellitus with hyperglycemia, without long-term current use of insulin [facility])  Assessment & Plan  Lab Results   Component Value Date    HGBA1C 6.0 (H) 07/11/2023       Recent Labs     10/16/23  1102 10/16/23  1619 10/17/23  0834 10/17/23  1128   POCGLU 142* 136 159* 215*     Continue home metformin 500 mg BID  ISS + accucheks while on BHU  A1C pending    Intentional overdose ([address])  Assessment & Plan  Initially admitted to hospitals following intentional overdose on uncertain number of advil, gabapentin, and propranolol  Patient was seen in consult by med tox given acute encephalopathy, lethargy, transaminitis, bradycardia  Hypotension and transanimates resolved with supportive NAC x 3, atropine, and glucagon   Patient remains hemodynamically stable upon admission to [facility]  Follow up admission EKG and CMP    Chronic migraine without aura without status migrainosus, not intractable  Assessment & Plan  Previously maintained on propranolol daily prior to acute overdose in setting of SI  Can consider restarting if migraines occur as patient is now hemodynamically stable, otherwise restart as outpatient              Counseling / Coordination of Care Time: 30 minutes. Greater than 50% of total time spent on patient counseling and coordination of care.     Collaboration of Care: Were Recommendations Directly Discussed with Primary Treatment Team? - No     History of Present Illness:    Jame Brush is a 37 y.o. female with PMH of migraines, mood disorder, fibromyalgia who is originally admitted to the psychiatry service due to intentional overdose with SI. Patient was admitted to [facility] 10/14 and discharged to [facility] 10/16. We are consulted for medical clearance for admission to [address] [place] [address] and treatment of underlying psychiatric illness. Patient confirms medications/medical hx as above. Patient denies substance use prior to admission. Patient reports she is feeling somewhat tired following her hospitalization however otherwise denies physical complaints including chest pain, SOB, fevers, chills, cough, wheeze, N/V/D, dysuria, etc. Labs are pending, vitals are stable. Based on all available data patient appears medically stable to continue inpatient psychiatric care. Review of Systems:    Review of Systems   Constitutional:  Positive for fatigue. Negative for chills and fever. HENT:  Negative for congestion, rhinorrhea and sore throat. Eyes:  Negative for visual disturbance. Respiratory:  Negative for cough, chest tightness, shortness of breath and wheezing. Cardiovascular:  Negative for chest pain, palpitations and leg swelling. Gastrointestinal:  Negative for abdominal pain, constipation, diarrhea, nausea and vomiting. Genitourinary:  Negative for difficulty urinating, dysuria, frequency and urgency. Musculoskeletal:  Negative for arthralgias, back pain and myalgias. Skin:  Negative for rash and wound. Neurological:  Negative for dizziness, light-headedness and headaches. Psychiatric/Behavioral:  Positive for dysphoric mood and suicidal ideas. All other systems reviewed and are negative.       Past Medical and Surgical History:     Past Medical History:   Diagnosis Date    Anxiety     Blood transfusion declined because patient is [employer] 05/01/2023    Chronic pain disorder     Chronic sinusitis     Depression     Depression     Diabetes ([address])     Fibromyalgia     Migraine     Obesity     Polyarthritis     Last assessed 9/21/2015    Psychiatric disorder     Psychiatric illness     Sleep difficulties        Past Surgical History:   Procedure Laterality Date    COLONOSCOPY  06/2019    DENTAL SURGERY      HYSTEROSCOPY      Endometrial Biopsy By Hysteroscopy    AR LAPS SUPRACRV HYSTERECT 250 GM/< RMVL TUBE/OVAR N/A 5/1/2023    Procedure: [facility]) W/ BILATERAL SALPINGECTOMY, REMOVAL PARAOVARIAN CYST;  Surgeon: Paige Brewer DO;  Location: [facility];  Service: Gynecology    REMOVAL OF INTRAUTERINE DEVICE (IUD)      US GUIDED BREAST BIOPSY RIGHT COMPLETE Right 6/17/2019       Meds/Allergies:    PTA meds:   Prior to Admission Medications   Prescriptions Last Dose Informant Patient Reported? Taking?    Blood Glucose Monitoring Suppl (Contour Blood Glucose System) w/Device KIT   No No   Sig: Use 1 kit 2 (two) times a day before meals   Cholecalciferol (VITAMIN D-3) 1000 units CAPS  Self Yes No   Sig: Take 1 capsule by mouth daily Take 2000 IU daily   MAGNESIUM OXIDE PO  Self Yes No   Sig: Take by mouth   acetaminophen (TYLENOL) 650 mg CR tablet   No No   Sig: Take 2 tablets (1,300 mg total) by mouth every 8 (eight) hours as needed for mild pain   celecoxib (CeleBREX) 200 mg capsule   No No   Sig: TAKE 1 CAPSULE BY MOUTH TWICE A DAY   cyclobenzaprine (FLEXERIL) 10 mg tablet   No No   Sig: Take 1 tablet (10 mg total) by mouth 3 (three) times a day as needed for muscle spasms   desvenlafaxine (PRISTIQ) 100 mg 24 hr tablet   No No   Sig: TAKE 1 TABLET BY MOUTH EVERY DAY   desvenlafaxine succinate (PRISTIQ) 50 mg 24 hr tablet   No No   Sig: Take 1 tablet (50 mg total) by mouth daily Total daily dose 150 mg daily   diazepam (VALIUM) 5 mg tablet   No No   Sig: Take 1 tablet (5 mg total) by mouth every 8 (eight) hours as needed for muscle spasms for up to 15 doses   gabapentin (Neurontin) 300 mg capsule   No No   Sig: Take 1 capsule (300 mg total) by mouth 3 (three) times a day   glucose blood (Contour Test) test strip   No No   Sig: USE TO CHECK BLOOD SUGAR ONCE DAILY   hydrOXYzine HCL (ATARAX) 25 mg tablet   No No   Sig: TAKE 1 TAB BY MOUTH EVERY 6 HOURS AS NEEDED (SLEEP) IN ADDITION TO 50 MG DOSE, MAY TAKE 50-75MG AT BEDTIME AS NEEDED FOR SLEEP   hydrOXYzine HCL (ATARAX) 50 mg tablet   No No   Sig: Take by mouth daily at bedtime in addition to 25 mg dose,may take 50-75 mg po qhs prn for insomnia   lidocaine (LIDODERM) 5 %   No No   Sig: APPLY 1 PATCH IN THE MORNING REMOVE AND DISARD PATCH WITHIN 12 HOURS OR AS DIRECTED   metFORMIN (GLUCOPHAGE) 500 mg tablet   No No   Sig: TAKE 1 TABLET BY MOUTH TWICE A DAY WITH MEALS   nortriptyline (PAMELOR) 75 MG capsule   No No   Sig: TAKE 1 CAPSULE BY MOUTH TWICE A DAY   phentermine (ADIPEX-P) 37.5 MG tablet   No No   Sig: Take 1 tablet (37.5 mg total) by mouth every morning   propranolol (INDERAL) 20 mg tablet   No No   Sig: TAKE 1 TABLET BY MOUTH EVERY DAY AT NIGHT      Facility-Administered Medications: None       Allergies: Allergies   Allergen Reactions    Cannabidiol Shortness Of Breath, Itching, Swelling, Anxiety, Palpitations, Confusion, Hypertension, Throat Swelling and Tongue Swelling    Amoxicillin-Pot Clavulanate Hives    Decadrol [Dexamethasone] Other (See Comments)     psychosis    Penicillins Hives     Hives/Uticaria    Tetracyclines & Related Hives      Allergy;        Social History:     Marital Status: Single    Substance Use History:   Social History     Substance and Sexual Activity   Alcohol Use Yes    Comment: 3 x year; sober since 2010     Social History     Tobacco Use   Smoking Status Never    Passive exposure: Never   Smokeless Tobacco Never   Tobacco Comments    N/A, non-smoker.      Social History     Substance and Sexual Activity   Drug Use Not Currently       Family History:    Family History Problem Relation Age of Onset    Irregular heart beat Mother     Diabetes Father     Kidney disease Father     Diabetes Maternal Grandmother     Rheum arthritis Maternal Grandmother     Melanoma Maternal Grandmother 80    No Known Problems Maternal Grandfather     Kidney disease Paternal Grandmother     Rheum arthritis Paternal Grandmother     Depression Paternal Grandmother     Diabetes Paternal Grandfather     Lung cancer Paternal Grandfather 52    Substance Abuse Brother     No Known Problems Brother     Substance Abuse Maternal Uncle     Prostate cancer Maternal Uncle 61    Depression Paternal Aunt     Alcohol abuse Family     Stomach cancer Family        Physical Exam:     Vitals:   Blood Pressure: 125/89 (10/17/23 1100)  Pulse: 74 (10/17/23 1100)  Temperature: (!) 97.3 °F (36.3 °C) (10/17/23 1100)  Temp Source: Tympanic (10/17/23 1100)  Respirations: 15 (10/17/23 1100)  Height: 5' (152.4 cm) (10/16/23 1839)  Weight - Scale: 84.8 kg (187 lb) (10/16/23 1839)  SpO2: 98 % (10/16/23 1839)    Physical Exam  Vitals and nursing note reviewed. Constitutional:       General: She is not in acute distress. Appearance: She is not ill-appearing. HENT:      Head: Normocephalic and atraumatic. Nose: Nose normal.   Eyes:      General:         Right eye: No discharge. Left eye: No discharge. Extraocular Movements: Extraocular movements intact. Conjunctiva/sclera: Conjunctivae normal.   Cardiovascular:      Rate and Rhythm: Normal rate and regular rhythm. Heart sounds: Normal heart sounds. No murmur heard. Pulmonary:      Effort: Pulmonary effort is normal. No respiratory distress. Breath sounds: Normal breath sounds. No wheezing, rhonchi or rales. Abdominal:      General: Bowel sounds are normal.      Palpations: Abdomen is soft. Tenderness: There is no abdominal tenderness. There is no guarding. Musculoskeletal:      Right lower leg: No edema. Left lower leg: No edema. Skin:     General: Skin is warm and dry. Neurological:      General: No focal deficit present. Mental Status: She is alert and oriented to person, place, and time. Mental status is at baseline. Cranial Nerves: No cranial nerve deficit. Psychiatric:         Mood and Affect: Mood normal.         Behavior: Behavior normal.         Additional Data:     Lab Results: I have personally reviewed pertinent reports. Results from last 7 days   Lab Units 10/15/23  0000   WBC Thousand/uL 17.34*   HEMOGLOBIN g/dL 12.1   HEMATOCRIT % 36.6   PLATELETS Thousands/uL 186   NEUTROS PCT % 79*   LYMPHS PCT % 14   MONOS PCT % 6   EOS PCT % 0     Results from last 7 days   Lab Units 10/15/23  2053 10/15/23  0000   SODIUM mmol/L  --  134*   POTASSIUM mmol/L  --  3.9   CHLORIDE mmol/L  --  100   CO2 mmol/L  --  21   BUN mg/dL  --  20   CREATININE mg/dL  --  1.10   ANION GAP mmol/L  --  13   CALCIUM mg/dL  --  8.7   ALBUMIN g/dL 3.4* 3.5   TOTAL BILIRUBIN mg/dL 0.32 0.33   ALK PHOS U/L 38 33*   ALT U/L 42 58*   AST U/L 39 48*   GLUCOSE RANDOM mg/dL  --  206*             Lab Results   Component Value Date/Time    HGBA1C 6.0 (H) 07/11/2023 10:59 AM    HGBA1C 6.1 (H) 04/27/2023 10:54 AM    HGBA1C 6.6 (H) 12/01/2022 08:15 AM    HGBA1C 7.7 (H) 08/24/2022 11:49 AM    HGBA1C 5.3 03/07/2014 10:55 AM     Results from last 7 days   Lab Units 10/17/23  1128 10/17/23  0834 10/16/23  1619 10/16/23  1102 10/16/23  0719 10/15/23  2053 10/15/23  1704 10/15/23  1139 10/15/23  0555 10/15/23  0036 10/14/23  2023   POC GLUCOSE mg/dl 215* 159* 136 142* 131 221* 141* 169* 194* 247* 249*       EKG, Pathology, and Other Studies Reviewed on Admission:   EKG pending    ** Please Note: This note has been constructed using a voice recognition system.  **

## 2023-10-17 NOTE — CASE MANAGEMENT
Intake     Patient Name:    Fay Ortiz :  1980   Admit Date/Time:   10/16/23 6:19 pm am Discharge Date:  TBD   Treatment Plan:   Reviewed and signed    Confirmed Address:    [facility]: [facility]      Yaa Clark    Resides in the home with:   Parents and brother    Will Return Home at Discharge: To address above    Confirmed Phone Number:   [phone]   Confirmed Email Address:   n/a     Marital Status:  Children: Single  No kids    Family History:            Parents:                       Siblings: Patient reports that her grandmother and aunt have mental illness. Aunt had a suicide attempt at least once. 1 brother   Commitment Status:    Status Changes:  201    n/a   Admitted from:   [facility]   Presenting C/O:      Patient reported feeling hopeless and emotional pain that was overwhelming. This led to patient attempting to take her life. Patient also reported that she lost a close friend 3 weeks ago. As Per ED Note:       53-year-old female with history of depression, presents emergency department obtunded and with family concerns for overdose. Family notes that when they checked on her around 1 hour ago, she appeared to be sleepy so they left her in her room. However, about 30 minutes ago they attempted to wake her and were unable to so they called EMS. They have her medicines and aren't sure which one she may have overdosed on but thinks it may have been the gabapentin or propranolol as these seem like there are less than they would expect. No known history of overdose. Patient is obtunded and unable to provide any history. Past Inpatient Tx: At least 3 previous times   Past Suicide Attempts:   no (this was the 1st time)       Psychiatrist:   Dr. Hollie Mendez- [facility]    Therapist:   [facility]    ACT/ICM/CPS/WRT/SC:   Declined    PCP:      Med Hx/Concern:   Degenerative disease - back pain    Medications:   Remeron 7.5 mg   Pharmacy:   [facility] [address]    Spirituality/Jewish:   Christianity    Education:   11th grade    Work/Income:    Preferred time for appts: SSI $660   Food [place] $200    Open    Legal:     Probation/[place] [facility]: no    Access to Firearms:   no (but there is a firearm in the home)   Transportation:   Drives    Strength: Motivated to get well    Coping Skills:   Journaling, talking to friends    Goal:   Stabilize mental health    Referrals Needed:     no    Transport at Discharge:   Patient will arrange    IMM: n/a    Emergency Contact:    Zohra Chung [facility]   [phone]   ROIs obtained:     [facility], [address] [place] [address] [phone]   Insurance:    [facility]    Audit:  0     PAWSS:  0 BAT:  0 UDS: negative    Substance Abuse: Freq.  Amount Last Use Notes:   Heroin Denies       Amp/Meth Denies       Alcohol Denies       Cocaine Denies       Cannabis Denies       Benzodiazepine Denies       Barbituartes Denies       Other Denies       Tobacco Denies

## 2023-10-17 NOTE — UTILIZATION REVIEW
NOTIFICATION OF ADMISSION DISCHARGE   This is a Notification of Discharge from [address]. Please be advised that this patient has been discharge from our facility. Below you will find the admission and discharge date and time including the patient’s disposition. UTILIZATION REVIEW CONTACT:  Alejandra Smith  Utilization   Network Utilization Review Department  Phone: [phone] x carefully listen to the prompts. All voicemails are confidential.  Email: [email or web address]     ADMISSION INFORMATION  PRESENTATION DATE: 10/14/2023  7:58 PM  OBERVATION ADMISSION DATE:   INPATIENT ADMISSION DATE: 10/14/23 11:26 PM   DISCHARGE DATE: 10/16/2023  5:47 PM   [address] Utilization Review Department  ATTENTION: Please call with any questions or concerns to [phone] and carefully listen to the prompts so that you are directed to the right person. All voicemails are confidential.   For Discharge needs, contact Care Management DC Support Team at [phone] opt. 2  Send all requests for admission clinical reviews, approved or denied determinations and any other requests to dedicated fax number below belonging to the campus where the patient is receiving treatment.  List of dedicated fax numbers for the Facilities:  [place] DENIALS (Administrative/Medical Necessity) [phone]   DISCHARGE SUPPORT TEAM (Network) [phone] [address] [facility] (Maternity/NICU/Pediatrics) [phone]   [address] [facility] [address] [place] [address] [address]  [place] [facility] [phone]

## 2023-10-17 NOTE — PLAN OF CARE
Problem: DISCHARGE PLANNING  Goal: Discharge to home or other facility with appropriate resources  Description: INTERVENTIONS:  - Identify barriers to discharge w/patient and caregiver  - Arrange for needed discharge resources and transportation as appropriate  - Identify discharge learning needs (meds, wound care, etc.)  - Arrange for interpretive services to assist at discharge as needed  - Refer to Case Management Department for coordinating discharge planning if the patient needs post-hospital services based on physician/advanced practitioner order or complex needs related to functional status, cognitive ability, or social support system  Outcome: Progressing  - CM met with patient and completed Intake and LOKESH's.

## 2023-10-17 NOTE — NURSING NOTE
Esther scored "High Risk" on Lifetime C-SSRS. Dr. Herminia Carver of [address] [place] [address] notified via [facility] Text, no new orders obtained at this time. Patient is currently "Low Risk," denies active SI. Observational rounds enacted for patient safety.

## 2023-10-17 NOTE — ASSESSMENT & PLAN NOTE
Admission labs pending: CBC, CMP, lipid panel, TSH, UA, HbA1C, RPR  Vitals stable   UDS pending   EKG pending  Medically stable for continued inpatient psychiatric treatment based on available results

## 2023-10-18 ENCOUNTER — TELEPHONE (OUTPATIENT)
Dept: PAIN MEDICINE | Facility: CLINIC | Age: 43
End: 2023-10-18

## 2023-10-18 LAB
BASOPHILS # BLD AUTO: 0.03 THOUSANDS/ÂΜL (ref 0–0.1)
BASOPHILS NFR BLD AUTO: 0 % (ref 0–1)
EOSINOPHIL # BLD AUTO: 0.09 THOUSAND/ÂΜL (ref 0–0.61)
EOSINOPHIL NFR BLD AUTO: 1 % (ref 0–6)
ERYTHROCYTE [DISTWIDTH] IN BLOOD BY AUTOMATED COUNT: 13.8 % (ref 11.6–15.1)
GLUCOSE SERPL-MCNC: 122 MG/DL (ref 65–140)
GLUCOSE SERPL-MCNC: 155 MG/DL (ref 65–140)
GLUCOSE SERPL-MCNC: 190 MG/DL (ref 65–140)
HCT VFR BLD AUTO: 36.4 % (ref 34.8–46.1)
HGB BLD-MCNC: 11.8 G/DL (ref 11.5–15.4)
IMM GRANULOCYTES # BLD AUTO: 0.04 THOUSAND/UL (ref 0–0.2)
IMM GRANULOCYTES NFR BLD AUTO: 1 % (ref 0–2)
LYMPHOCYTES # BLD AUTO: 2.58 THOUSANDS/ÂΜL (ref 0.6–4.47)
LYMPHOCYTES NFR BLD AUTO: 36 % (ref 14–44)
MCH RBC QN AUTO: 28 PG (ref 26.8–34.3)
MCHC RBC AUTO-ENTMCNC: 32.4 G/DL (ref 31.4–37.4)
MCV RBC AUTO: 86 FL (ref 82–98)
MONOCYTES # BLD AUTO: 0.39 THOUSAND/ÂΜL (ref 0.17–1.22)
MONOCYTES NFR BLD AUTO: 6 % (ref 4–12)
NEUTROPHILS # BLD AUTO: 4.02 THOUSANDS/ÂΜL (ref 1.85–7.62)
NEUTS SEG NFR BLD AUTO: 56 % (ref 43–75)
NRBC BLD AUTO-RTO: 0 /100 WBCS
PLATELET # BLD AUTO: 201 THOUSANDS/UL (ref 149–390)
PMV BLD AUTO: 9 FL (ref 8.9–12.7)
RBC # BLD AUTO: 4.22 MILLION/UL (ref 3.81–5.12)
WBC # BLD AUTO: 7.15 THOUSAND/UL (ref 4.31–10.16)

## 2023-10-18 PROCEDURE — 99232 SBSQ HOSP IP/OBS MODERATE 35: CPT | Performed by: PSYCHIATRY & NEUROLOGY

## 2023-10-18 PROCEDURE — 82948 REAGENT STRIP/BLOOD GLUCOSE: CPT

## 2023-10-18 PROCEDURE — 85025 COMPLETE CBC W/AUTO DIFF WBC: CPT | Performed by: PHYSICIAN ASSISTANT

## 2023-10-18 RX ORDER — IBUPROFEN 400 MG/1
400 TABLET ORAL EVERY 6 HOURS PRN
Status: DISCONTINUED | OUTPATIENT
Start: 2023-10-18 | End: 2023-10-23 | Stop reason: HOSPADM

## 2023-10-18 RX ORDER — IBUPROFEN 600 MG/1
600 TABLET ORAL EVERY 6 HOURS PRN
Status: DISCONTINUED | OUTPATIENT
Start: 2023-10-18 | End: 2023-10-23 | Stop reason: HOSPADM

## 2023-10-18 RX ORDER — MIRTAZAPINE 15 MG/1
15 TABLET, FILM COATED ORAL
Status: DISCONTINUED | OUTPATIENT
Start: 2023-10-18 | End: 2023-10-23 | Stop reason: HOSPADM

## 2023-10-18 RX ORDER — IBUPROFEN 800 MG/1
800 TABLET ORAL EVERY 8 HOURS PRN
Status: DISCONTINUED | OUTPATIENT
Start: 2023-10-18 | End: 2023-10-23 | Stop reason: HOSPADM

## 2023-10-18 RX ADMIN — NORTRIPTYLINE HYDROCHLORIDE 75 MG: 25 CAPSULE ORAL at 08:11

## 2023-10-18 RX ADMIN — METFORMIN HYDROCHLORIDE 500 MG: 500 TABLET, FILM COATED ORAL at 17:10

## 2023-10-18 RX ADMIN — METFORMIN HYDROCHLORIDE 500 MG: 500 TABLET, FILM COATED ORAL at 08:12

## 2023-10-18 RX ADMIN — MIRTAZAPINE 15 MG: 15 TABLET, FILM COATED ORAL at 21:18

## 2023-10-18 RX ADMIN — NORTRIPTYLINE HYDROCHLORIDE 75 MG: 25 CAPSULE ORAL at 17:10

## 2023-10-18 RX ADMIN — INSULIN LISPRO 2 UNITS: 100 INJECTION, SOLUTION INTRAVENOUS; SUBCUTANEOUS at 12:08

## 2023-10-18 NOTE — PROGRESS NOTES
10/18/23 3210   Team Meeting   Meeting Type Daily Rounds   Team Members Present   Team Members Present Physician;Nurse;; Other (Discipline and Name)   Physician Team Member Dr. Laura Martino / Dr. Samina Armando / Allen Mai / Ilan Mosher Team Member Jaclyn Britt / Jolynn Jean Management Team Member Telly Roy / Shannon Villeda / Courtney Andrade   Other (Discipline and Name) Larry Inch (Group Facilitator)   Patient/Family Present   Patient Present No   Patient's Family Present No       Status: Pt is reported to be in bed and denying SI, HI, and AVH. Pt is reported to be not attending groups. Pt is reported to have slept through the night. Medication: No medication changes. Pt received PRN Atarax 25 mg for anxiety. D/C: No discharge date set at this time.

## 2023-10-18 NOTE — NURSING NOTE
This writer attempted venipuncture x2 for a.m. lab work. Extensive bruising noted at all potential access points. Access was initially successful with each attempt but only one order successfully obtained. Pt tolerated well. Encouraged pt to increase PO fluids throughout the day in preparation for repeated attempts, however may be beneficial to allow healing prior to reattempt.

## 2023-10-18 NOTE — NURSING NOTE
Pt is observed in bed throughout the evening. Staff encouraged her to attend group, though she declined. She reports not feeling any better yet. She is aware medications take time to be effective. Pt denies SI, HI, and A/VH's. She rates her anxiety 4/10 and requested prn medication before bedtime. Atarax 25 mg administered at 2119. Staff provides therapeutic support, positive encouragement, and a structured routine. Pt will work on treatment goals throughout her admission. Q 7 min safety checks maintained.

## 2023-10-18 NOTE — NURSING NOTE
Patient OOB following her shower, reports feeling good to be up and out in unit today. Denies SI, still reporting discomfort to Left AC area where there is an observed hematoma. Pt informed by medical provider to use ice packs on and off today and heat on and off tomorrow. Pt in agreement with this plan. Reports restless sleep last night due to noise and the kim lights, requesting to change her bed to the bed near window following her roommates departure.  RN acknowledged pt request.

## 2023-10-18 NOTE — NURSING NOTE
Patient requesting tylenol for back pain, no order seen spoke to DR ALVAREZ, Dr Lelia Claros ordered motrin.  Patient aware

## 2023-10-18 NOTE — NURSING NOTE
In patient storage room:    1 canvas tote bag with hearts  1 stick of deodorant  2 [employer] chapsticks  1 bra  1 pr black shoes with no laces  1 book, "Imitate Their Leeann"

## 2023-10-18 NOTE — PLAN OF CARE
Problem: SELF HARM/SUICIDALITY  Goal: Will have no self-injury during hospital stay  Description: INTERVENTIONS:  - Q 15 MINUTES: Routine safety checks  - Q WAKING SHIFT & PRN: Assess risk to determine if routine checks are adequate to maintain patient safety  - Encourage patient to participate actively in care by formulating a plan to combat response to suicidal ideation, identify supports and resources  Outcome: Progressing     Problem: DEPRESSION  Goal: Will be euthymic at discharge  Description: INTERVENTIONS:  - Administer medication as ordered  - Provide emotional support via 1:1 interaction with staff  - Encourage involvement in milieu/groups/activities  - Monitor for social isolation  Outcome: Progressing     Problem: ANXIETY  Goal: Will report anxiety at manageable levels  Description: INTERVENTIONS:  - Administer medication as ordered  - Teach and encourage coping skills  - Provide emotional support  - Assess patient/family for anxiety and ability to cope  Outcome: Progressing  Goal: By discharge: Patient will verbalize 2 strategies to deal with anxiety  Description: Interventions:  - Identify any obvious source/trigger to anxiety  - Staff will assist patient in applying identified coping technique/skills  - Encourage attendance of scheduled groups and activities  Outcome: Progressing     Problem: DISCHARGE PLANNING  Goal: Discharge to home or other facility with appropriate resources  Description: INTERVENTIONS:  - Identify barriers to discharge w/patient and caregiver  - Arrange for needed discharge resources and transportation as appropriate  - Identify discharge learning needs (meds, wound care, etc.)  - Arrange for interpretive services to assist at discharge as needed  - Refer to Case Management Department for coordinating discharge planning if the patient needs post-hospital services based on physician/advanced practitioner order or complex needs related to functional status, cognitive ability, or social support system  Outcome: Progressing     Problem: Ineffective Coping  Goal: Participates in unit activities  Description: Interventions:  - Provide therapeutic environment   - Provide required programming   - Redirect inappropriate behaviors   Outcome: Progressing

## 2023-10-18 NOTE — PLAN OF CARE
Patient not attending therapeutic groups or other unit activities at this time    Problem: Ineffective Coping  Goal: Participates in unit activities  Description: Interventions:  - Provide therapeutic environment   - Provide required programming   - Redirect inappropriate behaviors   Outcome: Not Progressing

## 2023-10-18 NOTE — PROGRESS NOTES
Progress Note - Behavioral Health   Nathan Proper 37 y.o. female MRN: [id number]  Unit/Bed#: [facility] 202-01 Encounter: [id number]    Assessment:  Principal Problem:    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 psychotic features ([address])  Active Problems:    Chronic migraine without aura without status migrainosus, not intractable    Fibromyalgia    Generalized anxiety disorder    Type 2 diabetes mellitus with hyperglycemia, without long-term current use of insulin ([facility])    Medical clearance for psychiatric admission    Intentional overdose ([address])      Plan:  --Increase Remeron to 15mg PO HS  for tonight  --Continue with psychiatric hospitalization  --Continue with individual, group, and milieu therapy  --Continue the following medications:  Current Facility-Administered Medications   Medication Dose Route Frequency    aluminum-magnesium hydroxide-simethicone (MAALOX) oral suspension 30 mL  30 mL Oral Q4H PRN    artificial tear (LUBRIFRESH P.M.) ophthalmic ointment   Both Eyes 4x Daily PRN    benztropine (COGENTIN) injection 1 mg  1 mg Intramuscular BID PRN    benztropine (COGENTIN) tablet 1 mg  1 mg Oral BID PRN    bisacodyl (DULCOLAX) rectal suppository 10 mg  10 mg Rectal Daily PRN    hydrOXYzine HCL (ATARAX) tablet 25 mg  25 mg Oral Q6H PRN Max 4/day    hydrOXYzine HCL (ATARAX) tablet 50 mg  50 mg Oral Q4H PRN Max 4/day    Or    LORazepam (ATIVAN) injection 1 mg  1 mg Intramuscular Q4H PRN    insulin lispro (HumaLOG) 100 units/mL subcutaneous injection 1-6 Units  1-6 Units Subcutaneous TID AC    LORazepam (ATIVAN) tablet 1 mg  1 mg Oral Q4H PRN Max 6/day    Or    LORazepam (ATIVAN) injection 2 mg  2 mg Intramuscular Q6H PRN Max 3/day    magnesium hydroxide (MILK OF MAGNESIA) oral suspension 30 mL  30 mL Oral Daily PRN    metFORMIN (GLUCOPHAGE) tablet 500 mg  500 mg Oral BID With Meals    mirtazapine (REMERON) tablet 15 mg  15 mg Oral HS    nortriptyline (PAMELOR) capsule 75 mg  75 mg Oral BID    OLANZapine (ZyPREXA) tablet 10 mg  10 mg Oral Q3H PRN Max 3/day    Or    OLANZapine (ZyPREXA) IM injection 10 mg  10 mg Intramuscular Q3H PRN Max 3/day    OLANZapine (ZyPREXA) tablet 5 mg  5 mg Oral Q3H PRN Max 6/day    Or    OLANZapine (ZyPREXA) IM injection 5 mg  5 mg Intramuscular Q3H PRN Max 6/day    OLANZapine (ZyPREXA) tablet 2.5 mg  2.5 mg Oral Q3H PRN Max 8/day    senna-docusate sodium (SENOKOT S) 8.6-50 mg per tablet 1 tablet  1 tablet Oral Daily PRN       Subjective: Patient was seen for continuation of care. Chart was reviewed and discussed with treatment team.     No acute behavioral events over the past 24 hours. Today, patient was seen and examined at bedside for continuation of care. Patient is not noticing any improvement in her mood just yet. She is tolerating the starting dose of Remeron well which she received last evening. She did have difficulty sleeping. She is denying suicidal thoughts or behaviors at this time. She has no evidence of withdrawal symptoms from her previous antidepressants. We discussed plan to increase Remeron to 15mg tonight. Patient agreeable after discussing risks benefits side effects and alternatives. Patient denied adverse effects to their psychiatric medication regimen. Patient denied other new or worsening psychiatric symptoms/complaints at this time. Discussed the importance of continuing to take medications as prescribed, as well as the importance of continuing to attend groups on the unit.      Psychiatric Review of Systems:  Medication adverse effects: none  Sleep: poor  Appetite: unchanged  Behavior over the past 24 hours: as per above    Vitals:  Vitals:    10/18/23 0745   BP: 142/98   Pulse: 91   Resp: 18   Temp: 98.5 °F (36.9 °C)   SpO2:        Laboratory results:    I have personally reviewed all pertinent laboratory/tests results  Recent Results (from the past 48 hour(s))   Fingerstick Glucose (POCT)    Collection Time: 10/16/23 11:02 AM   Result Value Ref Range    POC Glucose 142 (H) 65 - 140 mg/dl   hCG, serum, qualitative    Collection Time: 10/16/23 12:21 PM   Result Value Ref Range    Preg, Serum Negative Negative   Fingerstick Glucose (POCT)    Collection Time: 10/16/23  4:19 PM   Result Value Ref Range    POC Glucose 136 65 - 140 mg/dl   hCG, serum, qualitative    Collection Time: 10/17/23  6:50 AM   Result Value Ref Range    Preg, Serum Negative Negative   Fingerstick Glucose (POCT)    Collection Time: 10/17/23  8:34 AM   Result Value Ref Range    POC Glucose 159 (H) 65 - 140 mg/dl   Fingerstick Glucose (POCT)    Collection Time: 10/17/23 11:28 AM   Result Value Ref Range    POC Glucose 215 (H) 65 - 140 mg/dl   Fingerstick Glucose (POCT)    Collection Time: 10/17/23  4:34 PM   Result Value Ref Range    POC Glucose 146 (H) 65 - 140 mg/dl   CBC and differential    Collection Time: 10/18/23  6:22 AM   Result Value Ref Range    WBC 7.15 4.31 - 10.16 Thousand/uL    RBC 4.22 3.81 - 5.12 Million/uL    Hemoglobin 11.8 11.5 - 15.4 g/dL    Hematocrit 36.4 34.8 - 46.1 %    MCV 86 82 - 98 fL    MCH 28.0 26.8 - 34.3 pg    MCHC 32.4 31.4 - 37.4 g/dL    RDW 13.8 11.6 - 15.1 %    MPV 9.0 8.9 - 12.7 fL    Platelets 652 398 - 277 Thousands/uL    nRBC 0 /100 WBCs    Neutrophils Relative 56 43 - 75 %    Immat GRANS % 1 0 - 2 %    Lymphocytes Relative 36 14 - 44 %    Monocytes Relative 6 4 - 12 %    Eosinophils Relative 1 0 - 6 %    Basophils Relative 0 0 - 1 %    Neutrophils Absolute 4.02 1.85 - 7.62 Thousands/µL    Immature Grans Absolute 0.04 0.00 - 0.20 Thousand/uL    Lymphocytes Absolute 2.58 0.60 - 4.47 Thousands/µL    Monocytes Absolute 0.39 0.17 - 1.22 Thousand/µL    Eosinophils Absolute 0.09 0.00 - 0.61 Thousand/µL    Basophils Absolute 0.03 0.00 - 0.10 Thousands/µL   Fingerstick Glucose (POCT)    Collection Time: 10/18/23  8:06 AM   Result Value Ref Range    POC Glucose 155 (H) 65 - 140 mg/dl        Current Medications:  Current medications as per above.  All medications have been reviewed. Risks, benefits, alternatives, and possible side effects of patient's psychiatric medications were discussed with patient. Mental Status Evaluation:  Appearance: casually dressed, consistent with stated age  Motor: +psychomotor retardation, no gait abnormalities  Behavior: cooperative, answers questions appropriately  Speech: soft, normal rhythm  Mood: "a little tired"  Affect: constricted, depressed-appearing  Thought Process: linear and goal-oriented  Thought Content: denies auditory hallucinations, denies visual hallucinations, denies delusions  Risk Potential: denies suicidal ideation, plan, or intent. Denies homicidal ideation  Sensorium: Oriented to person, place, time, and situation  Cognition: cognitive ability appears intact but was not quantitatively tested  Consciousness: alert and awake  Attention: intact, able to focus without difficulty  Insight: fair  Judgement: limited      Progress Toward Goals & Illness Status: Patient is not at goal. They are not yet ready for discharge. The patient's condition currently requires active psychopharmacological medication management, interdisciplinary coordination with case management, and the utilization of adjunctive milieu and group therapy to augment psychopharmacological efficacy. The patient's risk of morbidity, and progression or decompensation of psychiatric disease, is higher without this current treatment. This note has been constructed using a voice recognition system. There may be translation, syntax, or grammatical errors. If you have any questions, please contact the dictating provider.

## 2023-10-18 NOTE — NURSING NOTE
Patient is pleasant, reports no change in psych symptoms from this AM, however does report feeling dizzy when getting up from seated or laying position. Pt was encouraged to change positions slowly and increase fluids. RN also educated pt on s/e of anti depressant medications.

## 2023-10-19 LAB
25(OH)D3 SERPL-MCNC: 37.3 NG/ML (ref 30–100)
ALBUMIN SERPL BCP-MCNC: 4.1 G/DL (ref 3.5–5)
ALP SERPL-CCNC: 56 U/L (ref 34–104)
ALT SERPL W P-5'-P-CCNC: 30 U/L (ref 7–52)
ANION GAP SERPL CALCULATED.3IONS-SCNC: 11 MMOL/L
AST SERPL W P-5'-P-CCNC: 33 U/L (ref 13–39)
BILIRUB SERPL-MCNC: 0.31 MG/DL (ref 0.2–1)
BUN SERPL-MCNC: 17 MG/DL (ref 5–25)
CALCIUM SERPL-MCNC: 9.6 MG/DL (ref 8.4–10.2)
CHLORIDE SERPL-SCNC: 102 MMOL/L (ref 96–108)
CHOLEST SERPL-MCNC: 194 MG/DL
CO2 SERPL-SCNC: 23 MMOL/L (ref 21–32)
CREAT SERPL-MCNC: 0.8 MG/DL (ref 0.6–1.3)
GFR SERPL CREATININE-BSD FRML MDRD: 89 ML/MIN/1.73SQ M
GLUCOSE P FAST SERPL-MCNC: 158 MG/DL (ref 65–99)
GLUCOSE SERPL-MCNC: 158 MG/DL (ref 65–140)
GLUCOSE SERPL-MCNC: 168 MG/DL (ref 65–140)
GLUCOSE SERPL-MCNC: 177 MG/DL (ref 65–140)
GLUCOSE SERPL-MCNC: 192 MG/DL (ref 65–140)
HDLC SERPL-MCNC: 38 MG/DL
LDLC SERPL CALC-MCNC: 105 MG/DL (ref 0–100)
NONHDLC SERPL-MCNC: 156 MG/DL
POTASSIUM SERPL-SCNC: 4 MMOL/L (ref 3.5–5.3)
PROT SERPL-MCNC: 6.4 G/DL (ref 6.4–8.4)
SODIUM SERPL-SCNC: 136 MMOL/L (ref 135–147)
T4 FREE SERPL-MCNC: 0.68 NG/DL (ref 0.61–1.12)
TREPONEMA PALLIDUM IGG+IGM AB [PRESENCE] IN SERUM OR PLASMA BY IMMUNOASSAY: NORMAL
TRIGL SERPL-MCNC: 254 MG/DL
TSH SERPL DL<=0.05 MIU/L-ACNC: 5.98 UIU/ML (ref 0.45–4.5)

## 2023-10-19 PROCEDURE — 80061 LIPID PANEL: CPT | Performed by: PHYSICIAN ASSISTANT

## 2023-10-19 PROCEDURE — 99232 SBSQ HOSP IP/OBS MODERATE 35: CPT | Performed by: PSYCHIATRY & NEUROLOGY

## 2023-10-19 PROCEDURE — 86780 TREPONEMA PALLIDUM: CPT | Performed by: PHYSICIAN ASSISTANT

## 2023-10-19 PROCEDURE — 80053 COMPREHEN METABOLIC PANEL: CPT | Performed by: PHYSICIAN ASSISTANT

## 2023-10-19 PROCEDURE — 84443 ASSAY THYROID STIM HORMONE: CPT | Performed by: PHYSICIAN ASSISTANT

## 2023-10-19 PROCEDURE — 82306 VITAMIN D 25 HYDROXY: CPT | Performed by: PHYSICIAN ASSISTANT

## 2023-10-19 PROCEDURE — 84439 ASSAY OF FREE THYROXINE: CPT | Performed by: PHYSICIAN ASSISTANT

## 2023-10-19 PROCEDURE — 82948 REAGENT STRIP/BLOOD GLUCOSE: CPT

## 2023-10-19 RX ORDER — LANOLIN ALCOHOL/MO/W.PET/CERES
3 CREAM (GRAM) TOPICAL
Status: ACTIVE | OUTPATIENT
Start: 2023-10-19 | End: 2023-10-19

## 2023-10-19 RX ADMIN — LORAZEPAM 1 MG: 1 TABLET ORAL at 00:16

## 2023-10-19 RX ADMIN — INSULIN LISPRO 1 UNITS: 100 INJECTION, SOLUTION INTRAVENOUS; SUBCUTANEOUS at 08:30

## 2023-10-19 RX ADMIN — INSULIN LISPRO 2 UNITS: 100 INJECTION, SOLUTION INTRAVENOUS; SUBCUTANEOUS at 11:24

## 2023-10-19 RX ADMIN — METFORMIN HYDROCHLORIDE 500 MG: 500 TABLET, FILM COATED ORAL at 09:00

## 2023-10-19 RX ADMIN — NORTRIPTYLINE HYDROCHLORIDE 75 MG: 25 CAPSULE ORAL at 09:00

## 2023-10-19 RX ADMIN — HYDROXYZINE HYDROCHLORIDE 50 MG: 50 TABLET, FILM COATED ORAL at 23:19

## 2023-10-19 RX ADMIN — INSULIN LISPRO 1 UNITS: 100 INJECTION, SOLUTION INTRAVENOUS; SUBCUTANEOUS at 17:00

## 2023-10-19 RX ADMIN — METFORMIN HYDROCHLORIDE 500 MG: 500 TABLET, FILM COATED ORAL at 17:30

## 2023-10-19 RX ADMIN — MIRTAZAPINE 15 MG: 15 TABLET, FILM COATED ORAL at 21:49

## 2023-10-19 RX ADMIN — HYDROXYZINE HYDROCHLORIDE 50 MG: 50 TABLET, FILM COATED ORAL at 01:06

## 2023-10-19 RX ADMIN — NORTRIPTYLINE HYDROCHLORIDE 75 MG: 25 CAPSULE ORAL at 17:36

## 2023-10-19 NOTE — NURSING NOTE
Patient had difficulty falling asleep; observed sleeping by approximately 0200, continues to rest at this time. No signs of distress, respirations regular.

## 2023-10-19 NOTE — PROGRESS NOTES
10/19/23 5644   Team Meeting   Meeting Type Daily Rounds   Team Members Present   Team Members Present Physician;Nurse;; Other (Discipline and Name)   Physician Team Member Dr. Sandra Powers / Dr. Waqar Robb / Zechariah Isidro / Abena Portillo Team Member Ariadna Michele / Astrid Greenberg / [facility] Management Team Member Roldan Martinez / Lizabeth Mahoney   Other (Discipline and Name) Evangelina (Group Facilitator)   Patient/Family Present   Patient Present No   Patient's Family Present No

## 2023-10-19 NOTE — NURSING NOTE
PRN ativan 1 mg po given for anxiety and restlessness. Pt also having difficulty sleeping. PRN given at 0016      PRN was not effective. Pt is still up reporting and reporting poor sleep and some anxiety.  PRN [place]ax 50 mg [place] [facility] [place]

## 2023-10-19 NOTE — PROGRESS NOTES
Progress Note - [address] [facility] 37 y.o. female MRN: [id number]  Unit/Bed#: [facility] 202-02 Encounter: [id number]    Assessment:  Principal Problem:    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 psychotic features ([address]  Active Problems:    Chronic migraine without aura without status migrainosus, not intractable    Fibromyalgia    Generalized anxiety disorder    Type 2 diabetes mellitus with hyperglycemia, without long-term current use of insulin ([facility])    Medical clearance for psychiatric admission    Intentional overdose ([address] [facility])      Plan:  --Continue with psychiatric hospitalization  --Continue with individual, group, and milieu therapy  --Continue the following medications:  Current Facility-Administered Medications   Medication Dose Route Frequency    aluminum-magnesium hydroxide-simethicone (MAALOX) oral suspension 30 mL  30 mL Oral Q4H PRN    artificial tear (LUBRIFRESH P.M.) ophthalmic ointment   Both Eyes 4x Daily PRN    benztropine (COGENTIN) injection 1 mg  1 mg Intramuscular BID PRN    benztropine (COGENTIN) tablet 1 mg  1 mg Oral BID PRN    bisacodyl (DULCOLAX) rectal suppository 10 mg  10 mg Rectal Daily PRN    hydrOXYzine HCL (ATARAX) tablet 25 mg  25 mg Oral Q6H PRN Max 4/day    hydrOXYzine HCL (ATARAX) tablet 50 mg  50 mg Oral Q4H PRN Max 4/day    Or    LORazepam (ATIVAN) injection 1 mg  1 mg Intramuscular Q4H PRN    ibuprofen (MOTRIN) tablet 400 mg  400 mg Oral Q6H PRN    ibuprofen (MOTRIN) tablet 600 mg  600 mg Oral Q6H PRN    ibuprofen (MOTRIN) tablet 800 mg  800 mg Oral Q8H PRN    insulin lispro (HumaLOG) 100 units/mL subcutaneous injection 1-6 Units  1-6 Units Subcutaneous TID AC    LORazepam (ATIVAN) tablet 1 mg  1 mg Oral Q4H PRN Max 6/day    Or    LORazepam (ATIVAN) injection 2 mg  2 mg Intramuscular Q6H PRN Max 3/day    magnesium hydroxide (MILK OF MAGNESIA) oral suspension 30 mL  30 mL Oral Daily PRN    melatonin tablet 3 mg  3 mg Oral Once HS    metFORMIN (GLUCOPHAGE) tablet 500 mg  500 mg Oral BID With Meals    mirtazapine (REMERON) tablet 15 mg  15 mg Oral HS    nortriptyline (PAMELOR) capsule 75 mg  75 mg Oral BID    OLANZapine (ZyPREXA) tablet 10 mg  10 mg Oral Q3H PRN Max 3/day    Or    OLANZapine (ZyPREXA) IM injection 10 mg  10 mg Intramuscular Q3H PRN Max 3/day    OLANZapine (ZyPREXA) tablet 5 mg  5 mg Oral Q3H PRN Max 6/day    Or    OLANZapine (ZyPREXA) IM injection 5 mg  5 mg Intramuscular Q3H PRN Max 6/day    OLANZapine (ZyPREXA) tablet 2.5 mg  2.5 mg Oral Q3H PRN Max 8/day    senna-docusate sodium (SENOKOT S) 8.6-50 mg per tablet 1 tablet  1 tablet Oral Daily PRN       Subjective: Patient was seen for continuation of care. Chart was reviewed and discussed with treatment team.     No acute behavioral events over the past 24 hours. Today, patient was seen and examined at bedside for continuation of care. Patient is feeling better today. She notes that Remeron is a good medication for her. She has no adverse effects to coming off Pristiq. She is not yet back to baseline but notices the improvement in her mood. She denies SI. We discussed plan to observe behaviors over next 24 hours and determine tomorrow if another dose increase of Remeron is needed. Patient agreeable. Patient is c/o some positional vertigo when going from lying to standing. Otherwise w/o complaint. She received PRN Atarax and Ativan overnight. Patient denied adverse effects to their psychiatric medication regimen. Patient denied other new or worsening psychiatric symptoms/complaints at this time. Discussed the importance of continuing to take medications as prescribed, as well as the importance of continuing to attend groups on the unit.      Psychiatric Review of Systems:  Medication adverse effects: none  Sleep: unchanged  Appetite: unchanged  Behavior over the past 24 hours: as per above    Vitals:  Vitals:    10/19/23 0815   BP: 156/88   Pulse: 93   Resp: 18   Temp: 98.5 °F (36.9 °C)   SpO2: 95% Laboratory results:    I have personally reviewed all pertinent laboratory/tests results  Recent Results (from the past 48 hour(s))   Fingerstick Glucose (POCT)    Collection Time: 10/17/23 11:28 AM   Result Value Ref Range    POC Glucose 215 (H) 65 - 140 mg/dl   Fingerstick Glucose (POCT)    Collection Time: 10/17/23  4:34 PM   Result Value Ref Range    POC Glucose 146 (H) 65 - 140 mg/dl   CBC and differential    Collection Time: 10/18/23  6:22 AM   Result Value Ref Range    WBC 7.15 4.31 - 10.16 Thousand/uL    RBC 4.22 3.81 - 5.12 Million/uL    Hemoglobin 11.8 11.5 - 15.4 g/dL    Hematocrit 36.4 34.8 - 46.1 %    MCV 86 82 - 98 fL    MCH 28.0 26.8 - 34.3 pg    MCHC 32.4 31.4 - 37.4 g/dL    RDW 13.8 11.6 - 15.1 %    MPV 9.0 8.9 - 12.7 fL    Platelets 449 624 - 836 Thousands/uL    nRBC 0 /100 WBCs    Neutrophils Relative 56 43 - 75 %    Immat GRANS % 1 0 - 2 %    Lymphocytes Relative 36 14 - 44 %    Monocytes Relative 6 4 - 12 %    Eosinophils Relative 1 0 - 6 %    Basophils Relative 0 0 - 1 %    Neutrophils Absolute 4.02 1.85 - 7.62 Thousands/µL    Immature Grans Absolute 0.04 0.00 - 0.20 Thousand/uL    Lymphocytes Absolute 2.58 0.60 - 4.47 Thousands/µL    Monocytes Absolute 0.39 0.17 - 1.22 Thousand/µL    Eosinophils Absolute 0.09 0.00 - 0.61 Thousand/µL    Basophils Absolute 0.03 0.00 - 0.10 Thousands/µL   Fingerstick Glucose (POCT)    Collection Time: 10/18/23  8:06 AM   Result Value Ref Range    POC Glucose 155 (H) 65 - 140 mg/dl   Fingerstick Glucose (POCT)    Collection Time: 10/18/23 11:13 AM   Result Value Ref Range    POC Glucose 190 (H) 65 - 140 mg/dl   Fingerstick Glucose (POCT)    Collection Time: 10/18/23  4:43 PM   Result Value Ref Range    POC Glucose 122 65 - 140 mg/dl   TSH, 3rd generation with Free T4 reflex    Collection Time: 10/19/23  6:14 AM   Result Value Ref Range    TSH 3RD GENERATON 5.982 (H) 0.450 - 4.500 uIU/mL   Fingerstick Glucose (POCT)    Collection Time: 10/19/23  8:17 AM Result Value Ref Range    POC Glucose 177 (H) 65 - 140 mg/dl        Current Medications:  Current medications as per above. All medications have been reviewed. Risks, benefits, alternatives, and possible side effects of patient's psychiatric medications were discussed with patient. Mental Status Evaluation:  Appearance: casually dressed, consistent with stated age  Motor: +psychomotor retardation, no gait abnormalities  Behavior: cooperative, answers questions appropriately  Speech: soft, normal rhythm  Mood: improving  Affect: constricted, more euthymic appearing  Thought Process: linear and goal-oriented  Thought Content: denies auditory hallucinations, denies visual hallucinations, denies delusions  Risk Potential: denies suicidal ideation, plan, or intent. Denies homicidal ideation  Sensorium: Oriented to person, place, time, and situation  Cognition: cognitive ability appears intact but was not quantitatively tested  Consciousness: alert and awake  Attention: intact, able to focus without difficulty  Insight: fair  Judgement: fair      Progress Toward Goals & Illness Status: Patient is not at goal. They are not yet ready for discharge. The patient's condition currently requires active psychopharmacological medication management, interdisciplinary coordination with case management, and the utilization of adjunctive milieu and group therapy to augment psychopharmacological efficacy. The patient's risk of morbidity, and progression or decompensation of psychiatric disease, is higher without this current treatment. This note has been constructed using a voice recognition system. There may be translation, syntax, or grammatical errors. If you have any questions, please contact the dictating provider.

## 2023-10-19 NOTE — NURSING NOTE
Pt remains pleasant in conversation. Denies SI/HI, fluctuating anxiety however does feel improvement in overall mood. Pt social with peers and attending all groups throughout the day. Reviewed scheduled medications, pt denies any current questions. Discussed availability of prn medications and use of healthy coping skills to help decrease anxiety symptoms.

## 2023-10-20 ENCOUNTER — TELEPHONE (OUTPATIENT)
Dept: PSYCHIATRY | Facility: CLINIC | Age: 43
End: 2023-10-20

## 2023-10-20 LAB
GLUCOSE SERPL-MCNC: 137 MG/DL (ref 65–140)
GLUCOSE SERPL-MCNC: 169 MG/DL (ref 65–140)
GLUCOSE SERPL-MCNC: 241 MG/DL (ref 65–140)

## 2023-10-20 PROCEDURE — 82948 REAGENT STRIP/BLOOD GLUCOSE: CPT

## 2023-10-20 PROCEDURE — 99232 SBSQ HOSP IP/OBS MODERATE 35: CPT | Performed by: PSYCHIATRY & NEUROLOGY

## 2023-10-20 RX ADMIN — HYDROXYZINE HYDROCHLORIDE 50 MG: 50 TABLET, FILM COATED ORAL at 21:22

## 2023-10-20 RX ADMIN — NORTRIPTYLINE HYDROCHLORIDE 75 MG: 25 CAPSULE ORAL at 09:11

## 2023-10-20 RX ADMIN — METFORMIN HYDROCHLORIDE 500 MG: 500 TABLET, FILM COATED ORAL at 17:07

## 2023-10-20 RX ADMIN — INSULIN LISPRO 3 UNITS: 100 INJECTION, SOLUTION INTRAVENOUS; SUBCUTANEOUS at 12:05

## 2023-10-20 RX ADMIN — LORAZEPAM 1 MG: 1 TABLET ORAL at 01:21

## 2023-10-20 RX ADMIN — INSULIN LISPRO 1 UNITS: 100 INJECTION, SOLUTION INTRAVENOUS; SUBCUTANEOUS at 09:11

## 2023-10-20 RX ADMIN — METFORMIN HYDROCHLORIDE 500 MG: 500 TABLET, FILM COATED ORAL at 09:11

## 2023-10-20 RX ADMIN — MIRTAZAPINE 15 MG: 15 TABLET, FILM COATED ORAL at 21:22

## 2023-10-20 RX ADMIN — NORTRIPTYLINE HYDROCHLORIDE 75 MG: 25 CAPSULE ORAL at 17:07

## 2023-10-20 NOTE — PROGRESS NOTES
10/20/23 4810   Team Meeting   Meeting Type Daily Rounds   Team Members Present   Team Members Present Physician;Nurse;; Other (Discipline and Name)   Physician Team Member Dr. Bob Cooper / Dr. Lilli Causey / Carmelo Sanchez / Kendal Beltran Team Member Richard Flores / [facility] Management Team Member Aurelio Castaneda / Mechelle Hahn / Merlinda Right   Other (Discipline and Name) Evangelina (Group Facilitator)   Patient/Family Present   Patient Present No   Patient's Family Present No       Status: Pt is reported to be visible and social on the unit. Pt is reported to be denying SI and HI but endorsing fluctuating anxiety. Pt is reported to be attending groups. Pt is reported to be unable to sleep well. Medication: No medication changes. Pt received PRN Atarax for anxiety and then PRN Atarax again for anxiety that was effective. D/C: No discharge date set at this time.

## 2023-10-20 NOTE — NURSING NOTE
Pt remains pleasant in conversation. Denies SI/HI, reports improvement with depressive symptoms however continues to struggle with anxiety that fluctuates throughout the day. Pt reports anxiety caused pt to have a poor night sleep and states having racing thoughts typically at bedtime. Reviewed scheduled medications with patient, encouraged pt to notify staff with any questions or concerns. Pt agreeable to this.

## 2023-10-20 NOTE — NURSING NOTE
Pt reports feeling good today with improved mood and anxiety, denies SI/HI. She reports some dizziness after starting Remeron and is changing positions slowly. She reports difficulty falling asleep due to neuropathy and had been taking gabapentin 300 mg at HS prior to admission. Dr. Junior Garcia notified, no new orders at this time. Pleasant in conversation, out in milieu and social with peers, cooperative.

## 2023-10-20 NOTE — NURSING NOTE
After admin o  PRN Atarax for c/o anxiety at 2319, Pt noted to still be awake, in bed; states slightly calmer but unable to fall asleep.

## 2023-10-20 NOTE — PROGRESS NOTES
Progress Note - 304 [facility] 37 y.o. female MRN: [id number]  Unit/Bed#: [facility] 202-02 Encounter: [id number]    Assessment:  Principal Problem:    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 psychotic features ([address])  Active Problems:    Chronic migraine without aura without status migrainosus, not intractable    Fibromyalgia    Generalized anxiety disorder    Type 2 diabetes mellitus with hyperglycemia, without long-term current use of insulin ([facility])    Medical clearance for psychiatric admission    Intentional overdose [facility])      Plan:  --Discharge planned for Monday  --Continue with psychiatric hospitalization  --Continue with individual, group, and milieu therapy  --Continue the following medications:  Current Facility-Administered Medications   Medication Dose Route Frequency    aluminum-magnesium hydroxide-simethicone (MAALOX) oral suspension 30 mL  30 mL Oral Q4H PRN    artificial tear (LUBRIFRESH P.M.) ophthalmic ointment   Both Eyes 4x Daily PRN    benztropine (COGENTIN) injection 1 mg  1 mg Intramuscular BID PRN    benztropine (COGENTIN) tablet 1 mg  1 mg Oral BID PRN    bisacodyl (DULCOLAX) rectal suppository 10 mg  10 mg Rectal Daily PRN    hydrOXYzine HCL (ATARAX) tablet 25 mg  25 mg Oral Q6H PRN Max 4/day    hydrOXYzine HCL (ATARAX) tablet 50 mg  50 mg Oral Q4H PRN Max 4/day    Or    LORazepam (ATIVAN) injection 1 mg  1 mg Intramuscular Q4H PRN    ibuprofen (MOTRIN) tablet 400 mg  400 mg Oral Q6H PRN    ibuprofen (MOTRIN) tablet 600 mg  600 mg Oral Q6H PRN    ibuprofen (MOTRIN) tablet 800 mg  800 mg Oral Q8H PRN    insulin lispro (HumaLOG) 100 units/mL subcutaneous injection 1-6 Units  1-6 Units Subcutaneous TID AC    LORazepam (ATIVAN) tablet 1 mg  1 mg Oral Q4H PRN Max 6/day    Or    LORazepam (ATIVAN) injection 2 mg  2 mg Intramuscular Q6H PRN Max 3/day    magnesium hydroxide (MILK OF MAGNESIA) oral suspension 30 mL  30 mL Oral Daily PRN    metFORMIN (GLUCOPHAGE) tablet 500 mg  500 mg Oral BID With Meals    mirtazapine (REMERON) tablet 15 mg  15 mg Oral HS    nortriptyline (PAMELOR) capsule 75 mg  75 mg Oral BID    OLANZapine (ZyPREXA) tablet 10 mg  10 mg Oral Q3H PRN Max 3/day    Or    OLANZapine (ZyPREXA) IM injection 10 mg  10 mg Intramuscular Q3H PRN Max 3/day    OLANZapine (ZyPREXA) tablet 5 mg  5 mg Oral Q3H PRN Max 6/day    Or    OLANZapine (ZyPREXA) IM injection 5 mg  5 mg Intramuscular Q3H PRN Max 6/day    OLANZapine (ZyPREXA) tablet 2.5 mg  2.5 mg Oral Q3H PRN Max 8/day    senna-docusate sodium (SENOKOT S) 8.6-50 mg per tablet 1 tablet  1 tablet Oral Daily PRN       Subjective: Patient was seen for continuation of care. Chart was reviewed and discussed with treatment team.     No acute behavioral events over the past 24 hours. Today, patient was seen and examined at bedside for continuation of care. Patient continues to state that her mood is improving. She is very grateful to be on her new regimen of Remeron and nortriptyline. She is having no withdrawal effects from the Pristiq. She did sleep poorly overnight and required Ativan, but after receiving Ativan, she slept well. Discussed plan for discharge on Monday and the patient is in agreement with this plan. She is future oriented and able to articulate a safety plan upon her return home. Patient denied adverse effects to their psychiatric medication regimen. Patient denied other new or worsening psychiatric symptoms/complaints at this time. Discussed the importance of continuing to take medications as prescribed, as well as the importance of continuing to attend groups on the unit.      Psychiatric Review of Systems:  Medication adverse effects: none  Sleep: Poor  Appetite: unchanged  Behavior over the past 24 hours: as per above    Vitals:  Vitals:    10/20/23 0735   BP: 141/88   Pulse: 93   Resp: 18   Temp: (!) 97.2 °F (36.2 °C)   SpO2:        Laboratory results:    I have personally reviewed all pertinent laboratory/tests results  Recent Results (from the past 48 hour(s))   Fingerstick Glucose (POCT)    Collection Time: 10/18/23 11:13 AM   Result Value Ref Range    POC Glucose 190 (H) 65 - 140 mg/dl   Fingerstick Glucose (POCT)    Collection Time: 10/18/23  4:43 PM   Result Value Ref Range    POC Glucose 122 65 - 140 mg/dl   Comprehensive metabolic panel    Collection Time: 10/19/23  6:14 AM   Result Value Ref Range    Sodium 136 135 - 147 mmol/L    Potassium 4.0 3.5 - 5.3 mmol/L    Chloride 102 96 - 108 mmol/L    CO2 23 21 - 32 mmol/L    ANION GAP 11 mmol/L    BUN 17 5 - 25 mg/dL    Creatinine 0.80 0.60 - 1.30 mg/dL    Glucose 158 (H) 65 - 140 mg/dL    Glucose, Fasting 158 (H) 65 - 99 mg/dL    Calcium 9.6 8.4 - 10.2 mg/dL    AST 33 13 - 39 U/L    ALT 30 7 - 52 U/L    Alkaline Phosphatase 56 34 - 104 U/L    Total Protein 6.4 6.4 - 8.4 g/dL    Albumin 4.1 3.5 - 5.0 g/dL    Total Bilirubin 0.31 0.20 - 1.00 mg/dL    eGFR 89 ml/min/1.73sq m   Lipid panel    Collection Time: 10/19/23  6:14 AM   Result Value Ref Range    Cholesterol 194 See Comment mg/dL    Triglycerides 254 (H) See Comment mg/dL    HDL, Direct 38 (L) >=50 mg/dL    LDL Calculated 105 (H) 0 - 100 mg/dL    Non-HDL-Chol (CHOL-HDL) 156 mg/dl   TSH, 3rd generation with Free T4 reflex    Collection Time: 10/19/23  6:14 AM   Result Value Ref Range    TSH 3RD GENERATON 5.982 (H) 0.450 - 4.500 uIU/mL   RPR-Syphilis Screening (Total Syphilis IGG/IGM)    Collection Time: 10/19/23  6:14 AM   Result Value Ref Range    Syphilis Total Antibody Non-reactive Non-Reactive   Vitamin D 25 hydroxy    Collection Time: 10/19/23  6:14 AM   Result Value Ref Range    Vit D, 25-Hydroxy 37.3 30.0 - 100.0 ng/mL   T4, free    Collection Time: 10/19/23  6:14 AM   Result Value Ref Range    Free T4 0.68 0.61 - 1.12 ng/dL   Fingerstick Glucose (POCT)    Collection Time: 10/19/23  8:17 AM   Result Value Ref Range    POC Glucose 177 (H) 65 - 140 mg/dl   Fingerstick Glucose (POCT) Collection Time: 10/19/23 11:18 AM   Result Value Ref Range    POC Glucose 192 (H) 65 - 140 mg/dl   Fingerstick Glucose (POCT)    Collection Time: 10/19/23  5:13 PM   Result Value Ref Range    POC Glucose 168 (H) 65 - 140 mg/dl   Fingerstick Glucose (POCT)    Collection Time: 10/20/23  8:46 AM   Result Value Ref Range    POC Glucose 169 (H) 65 - 140 mg/dl        Current Medications:  Current medications as per above. All medications have been reviewed. Risks, benefits, alternatives, and possible side effects of patient's psychiatric medications were discussed with patient. Mental Status Evaluation:  Appearance: casually dressed, consistent with stated age  Motor: WNL  Behavior: cooperative, answers questions appropriately  Speech: soft, normal rhythm  Mood: "I feel good"  Affect: euthymic  Thought Process: linear and goal-oriented  Thought Content: denies auditory hallucinations, denies visual hallucinations, denies delusions  Risk Potential: denies suicidal ideation, plan, or intent. Denies homicidal ideation  Sensorium: Oriented to person, place, time, and situation  Cognition: cognitive ability appears intact but was not quantitatively tested  Consciousness: alert and awake  Attention: intact, able to focus without difficulty  Insight: good  Judgement: good      Progress Toward Goals & Illness Status: Patient is not at goal. They are not yet ready for discharge. The patient's condition currently requires active psychopharmacological medication management, interdisciplinary coordination with case management, and the utilization of adjunctive milieu and group therapy to augment psychopharmacological efficacy. The patient's risk of morbidity, and progression or decompensation of psychiatric disease, is higher without this current treatment. This note has been constructed using a voice recognition system. There may be translation, syntax, or grammatical errors.  If you have any questions, please contact the dictating provider.

## 2023-10-20 NOTE — NURSING NOTE
After admin of second PRN med for c/o anxiety, along with ear plugs and sound machine, pt is observed to be sleeping in bed. No restless movements noted.

## 2023-10-20 NOTE — PLAN OF CARE
Problem: SELF HARM/SUICIDALITY  Goal: Will have no self-injury during hospital stay  Description: INTERVENTIONS:  - Q 15 MINUTES: Routine safety checks  - Q WAKING SHIFT & PRN: Assess risk to determine if routine checks are adequate to maintain patient safety  - Encourage patient to participate actively in care by formulating a plan to combat response to suicidal ideation, identify supports and resources  Outcome: Progressing     Problem: DEPRESSION  Goal: Will be euthymic at discharge  Description: INTERVENTIONS:  - Administer medication as ordered  - Provide emotional support via 1:1 interaction with staff  - Encourage involvement in milieu/groups/activities  - Monitor for social isolation  Outcome: Progressing     Problem: ANXIETY  Goal: By discharge: Patient will verbalize 2 strategies to deal with anxiety  Description: Interventions:  - Identify any obvious source/trigger to anxiety  - Staff will assist patient in applying identified coping technique/skills  - Encourage attendance of scheduled groups and activities  Outcome: Progressing     Problem: Ineffective Coping  Goal: Participates in unit activities  Description: Interventions:  - Provide therapeutic environment   - Provide required programming   - Redirect inappropriate behaviors   Outcome: Progressing

## 2023-10-20 NOTE — NURSING NOTE
Pt in bed and appeared to be sleeping only after second PRN admin for anxiety, along with ear plugs and a sound machine. Pt was with even non-labored respirations as observed during continuous rounding.

## 2023-10-20 NOTE — PLAN OF CARE
Problem: DISCHARGE PLANNING  Goal: Discharge to home or other facility with appropriate resources  Description: INTERVENTIONS:  - Identify barriers to discharge w/patient and caregiver  - Arrange for needed discharge resources and transportation as appropriate  - Identify discharge learning needs (meds, wound care, etc.)  - Arrange for interpretive services to assist at discharge as needed  - Refer to Case Management Department for coordinating discharge planning if the patient needs post-hospital services based on physician/advanced practitioner order or complex needs related to functional status, cognitive ability, or social support system  Outcome: Adequate for Discharge  Note: Pt is scheduled to be discharged on Monday 10/23/2023 to return to her parents house. Pt is set up with [facility] through [address]   [address].

## 2023-10-20 NOTE — TELEPHONE ENCOUNTER
Patient has been added to the Talk Therapy wait list without a referral.    [facility] Type:    Commercial []   Medicaid [x]   [place] (if applicable)   Medicare []  Location Preference: [employer]  Provider Preference: any  Virtual: Yes [x] No []  Were outside resources sent: Yes [] No [x]      Pt stated to staff at [facility] that she was interested in finding a new therapist. Ruchi Clay added pt to the waitlist for a MY

## 2023-10-20 NOTE — TELEPHONE ENCOUNTER
Skye Monahan from [employer] [facility] called needing to schedule an appt for the pt with Dr Lady Archer. Pt was scheduled for 12/19/2023 at 10:30 am. Pt was rescheduled to 11/8/2023 1:30 pm virtually for an ear;ier appt as she is discharging from the hospital 10/23/2023.

## 2023-10-20 NOTE — CASE MANAGEMENT
CM met with Pt to discuss discharge planning. CM stated that they are scheduled to be discharged on 10/23/2023. CM stated that they will be calling [facility] to have her scheduled with an appointment. Pt stated that they are planning on asking to be switched with to a new therapist. Pt stated that her old therapist has cancelled on her too many times at the last minute. CM stated that they can ask if they can have her switched. CM asked if the Pt will need a ride home. Pt stated that her mom Nhung Maloney will be able to come and get her. Pt stated that she will give her a call and let her know. CM stated that they discharge between 10am and 11am.     CM called [facility] to schedule Pt with Dr. Lilli Lee on 11/8/2023 @1:30pm for a virtual appointment. CM stated that she would like to switch therapists as well. [facility] stated that they can put her on a transfer therapy wait list and she can continue with the same therapist until she is contacted to switch. CM confirmed and stated they will let the Pt know. CM informed Pt about her follow up with Dr. Lilli Lee. CM stated that she was put on the wait list for a new therapist. Pt stated that her mom will be coming on Monday to pick her up.

## 2023-10-20 NOTE — NURSING NOTE
Pt continues to be visible/social on the unit. Denies SI/HI, continues to have fluctuating anxiety. Pt says it gets worse at night and causes poor sleep. Reinforced education on prn Atarax at pt's request. Encouraged to approach staff overnight if unable to sleep, verbalized understanding.

## 2023-10-21 LAB
GLUCOSE SERPL-MCNC: 141 MG/DL (ref 65–140)
GLUCOSE SERPL-MCNC: 178 MG/DL (ref 65–140)
GLUCOSE SERPL-MCNC: 264 MG/DL (ref 65–140)

## 2023-10-21 PROCEDURE — 82948 REAGENT STRIP/BLOOD GLUCOSE: CPT

## 2023-10-21 PROCEDURE — 99232 SBSQ HOSP IP/OBS MODERATE 35: CPT | Performed by: PSYCHIATRY & NEUROLOGY

## 2023-10-21 RX ADMIN — MIRTAZAPINE 15 MG: 15 TABLET, FILM COATED ORAL at 21:51

## 2023-10-21 RX ADMIN — LORAZEPAM 1 MG: 1 TABLET ORAL at 21:54

## 2023-10-21 RX ADMIN — LORAZEPAM 1 MG: 1 TABLET ORAL at 00:01

## 2023-10-21 RX ADMIN — INSULIN LISPRO 3 UNITS: 100 INJECTION, SOLUTION INTRAVENOUS; SUBCUTANEOUS at 12:06

## 2023-10-21 RX ADMIN — INSULIN LISPRO 1 UNITS: 100 INJECTION, SOLUTION INTRAVENOUS; SUBCUTANEOUS at 09:16

## 2023-10-21 RX ADMIN — METFORMIN HYDROCHLORIDE 500 MG: 500 TABLET, FILM COATED ORAL at 17:08

## 2023-10-21 RX ADMIN — NORTRIPTYLINE HYDROCHLORIDE 75 MG: 25 CAPSULE ORAL at 08:10

## 2023-10-21 RX ADMIN — METFORMIN HYDROCHLORIDE 500 MG: 500 TABLET, FILM COATED ORAL at 08:10

## 2023-10-21 RX ADMIN — NORTRIPTYLINE HYDROCHLORIDE 75 MG: 25 CAPSULE ORAL at 17:08

## 2023-10-21 NOTE — TREATMENT TEAM
10/21/23 0900   Team Meeting   Meeting Type Daily Rounds   Team Members Present   Team Members Present Physician;Nurse   Physician Team Member Ester/Angie   Nursing Team Member Dimitrios   Patient/Family Present   Patient Present No   Patient's Family Present No       AM rounds- denies SI/HI, social with peers. Some anxiety which worsened at hs. Received prn medications. Slept well.

## 2023-10-21 NOTE — NURSING NOTE
Patient continues to experience high levels of anxiety. Received prn Ativan 1mg for morrison score of 26 at 0001. On reassessment patient is sleeping, respirations regular, no signs of distress.

## 2023-10-21 NOTE — NURSING NOTE
Georgina Baum reports improved mood and anxiety. Currently denies SI/HI and hallucinations. She is pleasant, smiles when approached and social with peers. Still reports fluctuating anxiety throughout the day.

## 2023-10-21 NOTE — NURSING NOTE
Pt requested and received Atarax 50 mg PO at 2122 for moderate anxiety. PRN partially effective, pt remains restless while trying to fall asleep, pt was provided with a sound machine at this time.

## 2023-10-21 NOTE — PROGRESS NOTES
Progress Note - 304 [facility] 37 y.o. female MRN: [id number]  Unit/Bed#: -02 Encounter: [id number]    Assessment/Plan   Principal Problem:    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 psychotic features ([address])  Active Problems:    Chronic migraine without aura without status migrainosus, not intractable    Fibromyalgia    Generalized anxiety disorder    Type 2 diabetes mellitus with hyperglycemia, without long-term current use of insulin ([facility])    Medical clearance for psychiatric admission    Intentional overdose ([address])    Continue medications as noted below. Continue to promote patient participation in group therapy, milieu therapy, occupational therapy  Continue medical management by primary team.  Discharge disposition:  tentative discharge Monday     Subjective: The patient was evaluated this morning for continuity of care and no acute distress noted throughout the evaluation. Over the past 24 hours staff noted that patient has been pleasant, cooperative, with intermittently increased anxiety levels. Received Atarax and Ativan. Patient has been medication adherent. Today on evaluation, Yelitza Beavers states she has been feeling well since hospitalization. Reports improvement in her depression. Reports that she has some difficulty with sleep, mainly difficulty falling asleep, and felt a bit restless. However, was eventually able to fall asleep and after night sleep feels rested. Patient initially stated to staff that she has been feeling dizzy, but reports improvement in dizziness, and at this time does not feel dizzy. She reports some anxiety regarding discharge on Monday, but overall looks forward to getting back to her life. In terms of safety, Yelitza Beavers Denies SI/HI. Yelitza Beavers Denies perceptual disturbances such as auditory and visual hallucinations. Denies symptomology consistent with delusional thought content. Denies symptomology consistent with nate/hypomania.     Psychiatric Review of Systems:  Behavior over the last 24 hours:  unchanged  Sleep: insomnia  Appetite: improved  Medication side effects: No   ROS: no complaints, all other systems are negative    Current Medications:  Current Facility-Administered Medications   Medication Dose Route Frequency Provider Last Rate    aluminum-magnesium hydroxide-simethicone  30 mL Oral Q4H PRN MARIEL Celestin-GLENDA      artificial tear   Both Eyes 4x Daily PRN Kami Sicks, PA-GLENDA      benztropine  1 mg Intramuscular BID PRN Kami Sicks, PA-GLENDA      benztropine  1 mg Oral BID PRN Kami Sicks, SHANNON      bisacodyl  10 mg Rectal Daily PRN Marylee Hem, MD      hydrOXYzine HCL  25 mg Oral Q6H PRN Max 4/day Kami SicksSHANNON      hydrOXYzine HCL  50 mg Oral Q4H PRN Max 4/day Kami SickSHANNON cedeno      Or    LORazepam  1 mg Intramuscular Q4H PRN Kami SicksSHANNON      ibuprofen  400 mg Oral Q6H PRN Marylee Hem, MD      ibuprofen  600 mg Oral Q6H PRN Marylee Hem, MD      ibuprofen  800 mg Oral Q8H PRN Marylee Hem, MD      insulin lispro  1-6 Units Subcutaneous TID AC Kami Mirza PA-C      LORazepam  1 mg Oral Q4H PRN Max 6/day Kami Mirza PA-C      Or    LORazepam  2 mg Intramuscular Q6H PRN Max 3/day Kami SicksSHANNON      magnesium hydroxide  30 mL Oral Daily PRN Marylee Hem, MD      metFORMIN  500 mg Oral BID With Meals [facility] SHANNON Joel      mirtazapine  15 mg Oral HS Gerald Beavers,       nortriptyline  75 mg Oral BID Jayshree Olvera,       OLANZapine  10 mg Oral Q3H PRN Max 3/day Kami SicksSHANNON      Or    OLANZapine  10 mg Intramuscular Q3H PRN Max 3/day Kami SicksSHANNON      OLANZapine  5 mg Oral Q3H PRN Max 6/day Kami SicksSHANNON      Or    OLANZapine  5 mg Intramuscular Q3H PRN Max 6/day Kami Sicks, PA-C      OLANZapine  2.5 mg Oral Q3H PRN Max 8/day Kami SickSHANNON cedeno senna-docusate sodium  1 tablet Oral Daily PRN Judith Cross PA-C         Behavioral Health Medications: all current active meds have been reviewed and continue current psychiatric medications. Vital signs in last 24 hours:  Temp:  [95.3 °F (35.2 °C)-97.7 °F (36.5 °C)] 97.7 °F (36.5 °C)  HR:  [107-124] 107  Resp:  [18-20] 18  BP: (121-139)/() 139/95    Laboratory results:  I have personally reviewed all pertinent laboratory/tests results.   Most Recent Labs:   Lab Results   Component Value Date    WBC 7.15 10/18/2023    RBC 4.22 10/18/2023    HGB 11.8 10/18/2023    HCT 36.4 10/18/2023     10/18/2023    RDW 13.8 10/18/2023    NEUTROABS 4.02 10/18/2023    SODIUM 136 10/19/2023    K 4.0 10/19/2023     10/19/2023    CO2 23 10/19/2023    BUN 17 10/19/2023    CREATININE 0.80 10/19/2023    GLUC 158 (H) 10/19/2023    GLUF 158 (H) 10/19/2023    CALCIUM 9.6 10/19/2023    AST 33 10/19/2023    ALT 30 10/19/2023    ALKPHOS 56 10/19/2023    TP 6.4 10/19/2023    ALB 4.1 10/19/2023    TBILI 0.31 10/19/2023    CHOLESTEROL 194 10/19/2023    HDL 38 (L) 10/19/2023    TRIG 254 (H) 10/19/2023    LDLCALC 105 (H) 10/19/2023    [address] 156 10/19/2023    AMMONIA 36 10/14/2023    [id number] 5.982 (H) 10/19/2023    FREET4 0.68 10/19/2023    PREGUR negative 06/30/2020    PREGSERUM Negative 10/17/2023    RPR Non-Reactive 03/26/2019    HGBA1C 6.0 (H) 07/11/2023     07/11/2023         Mental Status Evaluation:    Appearance:  age appropriate, casually dressed   Behavior:  pleasant, cooperative, calm   Speech:  normal rate and volume   Mood:  "Better"   Affect:  constricted   Thought Process:  goal directed   Associations: intact associations   Thought Content:  no overt delusions   Perceptual Disturbances: no auditory hallucinations, no visual hallucinations, does not appear responding to internal stimuli   Risk Potential: Suicidal ideation - None  Homicidal ideation - None  Potential for aggression - No Sensorium:  oriented to person, place, and time/date   Memory:  recent and remote memory grossly intact   Consciousness:  alert and awake   Attention/Concentration: attention span and concentration are age appropriate   Insight:  Limtied but improving   Judgment: improving   Gait/Station: normal gait/station   Motor Activity: no abnormal movements     Progress Toward Goals: progressing    Recommended Treatment:   See above for assessment and plan. Risks, benefits and possible side effects of Medications:   Risks, benefits, and possible side effects of medications explained to patient and patient verbalizes understanding. Treatment discussed with nursing staff. This note has been constructed using a voice recognition system. There may be translation, syntax, or grammatical errors. If you have any questions, please contact the dictating provider.     Kandace Rock MD

## 2023-10-22 LAB
GLUCOSE SERPL-MCNC: 108 MG/DL (ref 65–140)
GLUCOSE SERPL-MCNC: 159 MG/DL (ref 65–140)
GLUCOSE SERPL-MCNC: 202 MG/DL (ref 65–140)

## 2023-10-22 PROCEDURE — 99232 SBSQ HOSP IP/OBS MODERATE 35: CPT | Performed by: PSYCHIATRY & NEUROLOGY

## 2023-10-22 PROCEDURE — 82948 REAGENT STRIP/BLOOD GLUCOSE: CPT

## 2023-10-22 RX ORDER — TRAZODONE HYDROCHLORIDE 50 MG/1
50 TABLET ORAL
Status: DISCONTINUED | OUTPATIENT
Start: 2023-10-22 | End: 2023-10-22

## 2023-10-22 RX ORDER — DIPHENHYDRAMINE HCL 25 MG
25 TABLET ORAL
Status: DISCONTINUED | OUTPATIENT
Start: 2023-10-22 | End: 2023-10-23 | Stop reason: HOSPADM

## 2023-10-22 RX ADMIN — HYDROXYZINE HYDROCHLORIDE 50 MG: 50 TABLET, FILM COATED ORAL at 00:49

## 2023-10-22 RX ADMIN — INSULIN LISPRO 1 UNITS: 100 INJECTION, SOLUTION INTRAVENOUS; SUBCUTANEOUS at 09:17

## 2023-10-22 RX ADMIN — METFORMIN HYDROCHLORIDE 500 MG: 500 TABLET, FILM COATED ORAL at 17:25

## 2023-10-22 RX ADMIN — DIPHENHYDRAMINE HYDROCHLORIDE 25 MG: 25 TABLET ORAL at 21:04

## 2023-10-22 RX ADMIN — NORTRIPTYLINE HYDROCHLORIDE 75 MG: 25 CAPSULE ORAL at 09:17

## 2023-10-22 RX ADMIN — METFORMIN HYDROCHLORIDE 500 MG: 500 TABLET, FILM COATED ORAL at 09:17

## 2023-10-22 RX ADMIN — INSULIN LISPRO 2 UNITS: 100 INJECTION, SOLUTION INTRAVENOUS; SUBCUTANEOUS at 12:07

## 2023-10-22 RX ADMIN — NORTRIPTYLINE HYDROCHLORIDE 75 MG: 25 CAPSULE ORAL at 17:25

## 2023-10-22 RX ADMIN — MIRTAZAPINE 15 MG: 15 TABLET, FILM COATED ORAL at 21:04

## 2023-10-22 NOTE — PROGRESS NOTES
Progress Note - 304 [facility] 37 y.o. female MRN: [id number]  Unit/Bed#: -02 Encounter: [id number]    Assessment/Plan   Principal Problem:    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 psychotic features ([address])  Active Problems:    Chronic migraine without aura without status migrainosus, not intractable    Fibromyalgia    Generalized anxiety disorder    Type 2 diabetes mellitus with hyperglycemia, without long-term current use of insulin ([facility])    Medical clearance for psychiatric admission    Intentional overdose ([address])    Continue medications as noted below. Continue to promote patient participation in group therapy, milieu therapy, occupational therapy  Continue medical management by primary team.  Discharge disposition:   tentative discharge tomorrow    Subjective: The patient was evaluated this morning for continuity of care and no acute distress noted throughout the evaluation. Over the past 24 hours staff noted that patient has been experiencing fluctuating anxiety for which she received as needed medications. Reports difficulty with sleep, but slept half of the night. Patient has been medication adherent. Today on evaluation, Francy Alvarado patient states that overall her mood has been improving, though notes some anxiety symptoms with regard to discharge. She notes that she has benefited from inpatient psychiatric treatment, and ready to move forward on an outpatient basis. She says she had some difficulty with falling asleep, but does not feel tired at this time. Requesting addition of trazodone as needed as it has helped her in the past.    In terms of safety, Francy Alvarado Denies SI/HI. Francy Alvarado Denies perceptual disturbances such as auditory and visual hallucinations. Denies symptoms consistent with delusional thought content. Denies symptomology consistent with nate/hypomania.     Psychiatric Review of Systems:  Behavior over the last 24 hours:  unchanged  Sleep: insomnia  Appetite: improved  Medication side effects: No   ROS: no complaints, all other systems are negative    Current Medications:  Current Facility-Administered Medications   Medication Dose Route Frequency Provider Last Rate    aluminum-magnesium hydroxide-simethicone  30 mL Oral Q4H PRN Emily Binder, PA-GLENDA      artificial tear   Both Eyes 4x Daily PRN Emily Binder, PA-GLENDA      benztropine  1 mg Intramuscular BID PRN Emily Binder, PA-GLENDA      benztropine  1 mg Oral BID PRN Emily Binder, SHANNON      bisacodyl  10 mg Rectal Daily PRN Barbara Rothman MD      hydrOXYzine HCL  25 mg Oral Q6H PRN Max 4/day Emily Binder, PA-GLENDA      hydrOXYzine HCL  50 mg Oral Q4H PRN Max 4/day Emily Binder, SHANNON      Or    LORazepam  1 mg Intramuscular Q4H PRN Emily Binder, SHANNON      ibuprofen  400 mg Oral Q6H PRN Barbara PoplMD bhavna      ibuprofen  600 mg Oral Q6H PRN Barbara Rothman MD      ibuprofen  800 mg Oral Q8H PRN Barbara Rothman MD      insulin lispro  1-6 Units Subcutaneous TID AC Emily Binder, PA-GLENDA      LORazepam  1 mg Oral Q4H PRN Max 6/day Emily Binder, PA-GLENDA      Or    LORazepam  2 mg Intramuscular Q6H PRN Max 3/day Emily Binder, SHANNON      magnesium hydroxide  30 mL Oral Daily PRN Brabara Rothman MD      metFORMIN  500 mg Oral BID With Meals [facility] SHANNON Joel      mirtazapine  15 mg Oral HS Janay Gill, DO      nortriptyline  75 mg Oral BID Laura Olvera,       OLANZapine  10 mg Oral Q3H PRN Max 3/day Emily Binder, PA-C      Or    OLANZapine  10 mg Intramuscular Q3H PRN Max 3/day Emily Binder, PA-C      OLANZapine  5 mg Oral Q3H PRN Max 6/day Emily Binder, PA-C      Or    OLANZapine  5 mg Intramuscular Q3H PRN Max 6/day Emily Binder, PA-C      OLANZapine  2.5 mg Oral Q3H PRN Max 8/day Emily Binder, PA-C      senna-docusate sodium  1 tablet Oral Daily PRN Emily Binder, PA-C      traZODone  50 mg Oral HS PRN Roel Davidson MD         Behavioral Health Medications: all current active meds have been reviewed and continue current psychiatric medications. Vital signs in last 24 hours:  Temp:  [96.2 °F (35.7 °C)-98 °F (36.7 °C)] 98 °F (36.7 °C)  HR:  [102-122] 102  Resp:  [16-18] 16  BP: (112-116)/(84-86) 112/86    Laboratory results:  I have personally reviewed all pertinent laboratory/tests results.   Most Recent Labs:   Lab Results   Component Value Date    WBC 7.15 10/18/2023    RBC 4.22 10/18/2023    HGB 11.8 10/18/2023    HCT 36.4 10/18/2023     10/18/2023    RDW 13.8 10/18/2023    NEUTROABS 4.02 10/18/2023    SODIUM 136 10/19/2023    K 4.0 10/19/2023     10/19/2023    CO2 23 10/19/2023    BUN 17 10/19/2023    CREATININE 0.80 10/19/2023    GLUC 158 (H) 10/19/2023    GLUF 158 (H) 10/19/2023    CALCIUM 9.6 10/19/2023    AST 33 10/19/2023    ALT 30 10/19/2023    ALKPHOS 56 10/19/2023    TP 6.4 10/19/2023    ALB 4.1 10/19/2023    TBILI 0.31 10/19/2023    CHOLESTEROL 194 10/19/2023    HDL 38 (L) 10/19/2023    TRIG 254 (H) 10/19/2023    LDLCALC 105 (H) 10/19/2023    [address] 156 10/19/2023    AMMONIA 36 10/14/2023    UDE6GZKNWVJA 5.982 (H) 10/19/2023    FREET4 0.68 10/19/2023    PREGUR negative 06/30/2020    PREGSERUM Negative 10/17/2023    RPR Non-Reactive 03/26/2019    HGBA1C 6.0 (H) 07/11/2023     07/11/2023         Mental Status Evaluation:    Appearance:  age appropriate, casually dressed   Behavior:  pleasant, cooperative, calm   Speech:  normal rate and volume   Mood:  "Feeling ok"   Affect:  reactive   Thought Process:  organized   Associations: intact associations   Thought Content:  no overt delusions   Perceptual Disturbances: no auditory hallucinations, no visual hallucinations, does not appear responding to internal stimuli   Risk Potential: Suicidal ideation - None  Homicidal ideation - None  Potential for aggression - No   Sensorium:  oriented to person, place, and time/date Memory:  recent and remote memory grossly intact   Consciousness:  alert and awake   Attention/Concentration: attention span and concentration are age appropriate   Insight:  fair   Judgment: fair   Gait/Station: normal gait/station   Motor Activity: no abnormal movements     Progress Toward Goals: progressing    Recommended Treatment:   See above for assessment and plan. Risks, benefits and possible side effects of Medications:   Risks, benefits, and possible side effects of medications explained to patient and patient verbalizes understanding. Risk of utilizing multiple serotonergic medications explained to patient. Treatment discussed with nursing staff. This note has been constructed using a voice recognition system. There may be translation, syntax, or grammatical errors. If you have any questions, please contact the dictating provider.     Asa Reddy MD

## 2023-10-22 NOTE — NURSING NOTE
Pt was found lying in her bed tearful after dinner, discussed her grief for her friend who recently passed away and reports she had just written a letter to him, reminisced about his good qualities and the ways in which she finds them inspiring. Pt reports improved mood and feels like she has "done a 180" since admission, reports benefiting from the treatment on the unit and states that she is "glad to still be here" and that she has some wonderful people in her life. Pt denies SI and discussed plans to reach out for support after discharge. She is also looking forward to seeing her cats and brightened in conversation. Pt soon rejoined the milieu, has been social with her peers, pleasant and cooperative.

## 2023-10-22 NOTE — TREATMENT TEAM
10/22/23 0800   Team Meeting   Meeting Type Daily Rounds   Team Members Present   Team Members Present Physician;Nurse   Physician Team Member Ester/Angie   Nursing Team Member Dimitrios   Patient/Family Present   Patient Present No   Patient's Family Present No       AM rounds- fluctuating anxiety. Denies SI/HI. Prn provided for anxiety. Difficulty with sleeping. Slept half the night.

## 2023-10-22 NOTE — NURSING NOTE
Georgina Baum reports "not feeling this good in years." She had difficulty falling asleep and staying asleep last night and is interested in PRN trazadone at . She reports readiness for discharge tomorrow. Denies SI/HI and hallucinations.

## 2023-10-22 NOTE — TREATMENT TEAM
10/22/23 0500   Mental Status Exam   Sleep Pattern Disturbed/interrupted sleep; Restlessness     Patient had difficulty maintaining sleep on shift. Utilized PRN atarax for increased anxiety. Came back to nursing station around 0300 stating tossing and turning. Requesting another dose of atarax. Reviewed parameters for atarax administration. Patient returned to bed and was observed to be laying in bed with eyes closed around 0345, respirations even and unlabored. No s/s of distress. Continuous rounding ongoing for patient safety.

## 2023-10-22 NOTE — NURSING NOTE
F/U to Atarax administration at 0049 hrs for anxiety, pt in bed, calm and quiet, no distress observed, no further complaint voiced.

## 2023-10-22 NOTE — NURSING NOTE
Pt reports improved mood and anxiety, denies SI/HI. She reports that she has had difficulty falling asleep and has anxiety related to not sleeping. Pleasant in conversation, out in milieu and social with peers, cooperative.

## 2023-10-23 ENCOUNTER — VBI (OUTPATIENT)
Dept: ADMINISTRATIVE | Facility: OTHER | Age: 43
End: 2023-10-23

## 2023-10-23 VITALS
RESPIRATION RATE: 19 BRPM | BODY MASS INDEX: 36.71 KG/M2 | WEIGHT: 187 LBS | TEMPERATURE: 96.7 F | OXYGEN SATURATION: 99 % | DIASTOLIC BLOOD PRESSURE: 87 MMHG | HEART RATE: 116 BPM | HEIGHT: 60 IN | SYSTOLIC BLOOD PRESSURE: 126 MMHG

## 2023-10-23 LAB — GLUCOSE SERPL-MCNC: 143 MG/DL (ref 65–140)

## 2023-10-23 PROCEDURE — 99239 HOSP IP/OBS DSCHRG MGMT >30: CPT | Performed by: PSYCHIATRY & NEUROLOGY

## 2023-10-23 PROCEDURE — 82948 REAGENT STRIP/BLOOD GLUCOSE: CPT

## 2023-10-23 RX ORDER — MIRTAZAPINE 15 MG/1
15 TABLET, FILM COATED ORAL
Qty: 30 TABLET | Refills: 0 | Status: SHIPPED | OUTPATIENT
Start: 2023-10-23 | End: 2023-11-22

## 2023-10-23 RX ADMIN — NORTRIPTYLINE HYDROCHLORIDE 75 MG: 25 CAPSULE ORAL at 08:13

## 2023-10-23 RX ADMIN — METFORMIN HYDROCHLORIDE 500 MG: 500 TABLET, FILM COATED ORAL at 08:13

## 2023-10-23 NOTE — NURSING NOTE
Patient was up pacing kim briefly after midnight, but appeared have slept mostly in the later part of the shift.

## 2023-10-23 NOTE — BH TRANSITION RECORD
Contact Information: If you have any questions, concerns, pended studies, tests and/or procedures, or emergencies regarding your inpatient behavioral health visit. Please contact [place] [facility] [phone] and ask to speak to a , nurse or physician. A contact is available 24 hours/ 7 days a week at this number. Summary of Procedures Performed During your Stay:  Below is a list of major procedures performed during your hospital stay and a summary of results:  - No major procedures performed. Pending Studies (From admission, onward)       Start     Ordered    10/17/23 0600  Hemoglobin A1C  (Order Panel)  Morning draw         10/16/23 1823                  Please follow up on the above pending studies with your PCP and/or referring provider.

## 2023-10-23 NOTE — NURSING NOTE
Pt expressed readiness for discharge. AVS reviewed with pt. Denies any question or concerns. Pt discharge from unit via Mother picking pt up.

## 2023-10-23 NOTE — DISCHARGE SUMMARY
Discharge Summary - [address] [facility] 37 y.o. female MRN: [id number]  Unit/Bed#: [facility] [place] 578-26 Encounter: [id number]     Admission Date: 10/16/2023         Discharge Date: 10/23/23    Attending Psychiatrist: Ez Rodriguez DO    Reason for Admission/HPI (as per admission documentation):     Per Psych CL note:  Peter Aguilar is a 37 y.o. female with a history of depression, anxiety , narcolepsy, fibromyalgia, migraine, chronic pain, type 2 diabetes mellitus, and polyneuropathy associated with presented with changes in mental status. She was found unresponsive at home by her mother, she overdosed on multiple medication including propanolol, gabapentin, Motrin in a suicidal attempt. She stated that she has been feeling very depressed, she had multiple losses, she feels that her  support system has decreased. She  stated that she has been seeing a therapist but her therapist canceled several appointments. She has been on multiple psychotropic medication. She states that she feels like a burden, her father is in dialysis at home, also has multiple appointments and she had no one to talk to. She has been feeling overwhelmed, she has anhedonia, she feels anxious, and having sleep disturbances. She claimed that she take her medication as prescribed. On admission to Inpatient Psychiatric Unit:  Patient is a 64-year-old white female with a past psychiatric history of major depressive disorder without psychotic features as well as generalized anxiety disorder who presents voluntarily to inpatient BHU following a suicide attempt via overdose on "all of my pills."     Symptoms prior to hospitalization include: Progressively worsening severe depressed mood, decreased energy, severe insomnia, hopelessness, and suicidal ideation with the aforementioned suicide attempt. Mitigating factors are none. Exacerbating stressors are the death of her friend 3 weeks ago via heart attack.   Timeline is acute on chronic and worsening approximately over the past 3 weeks. Symptom severity is rated as severe. On initial psychiatric evaluation today, the patient is tearful and states that she has been feeling suicidal because her good friend passed away 3 weeks ago from a heart attack. The aforementioned overdose was on propanolol, Advil, and gabapentin. She states that she has been depressed and that her medications are not working. She usually takes Pristiq, nortriptyline 75 mg twice daily, and was recently started on phentermine 37.5 mg daily. She is no longer feeling suicidal at the time of my evaluation but states that she wants to change her medications and acknowledges that the Pristiq has not been working for her. She states that she previously underwent genetic testing and Pristiq was "the only thing that might work for me."  She has been tried on a long list of psychotropics but she describes as " they work for a little bit, then stop."  She has never been tried on Remeron and we exhaustively discussed the risks, benefits, and alternatives of Remeron, and we discussed the possibility as well that her appetite may be stimulated, and that there is a theoretical risk of serotonin syndrome given that she is on a TCA. The patient was agreeable to a trial of Remeron and coming off the Pristiq. We discussed SSRI discontinuation syndrome and that we will monitor for the symptoms. The patient states that her sleep has been extremely poor. She feels anxious and has some generalized worrying. This is her first suicide attempt but she has been hospitalized 4 times for depression.       Past Medical History:   Diagnosis Date    Anxiety     Blood transfusion declined because patient is [employer] 05/01/2023    Chronic pain disorder     Chronic sinusitis     Depression     Depression     Diabetes (HCC)     Fibromyalgia     Migraine     Obesity     Polyarthritis     Last assessed 9/21/2015    Psychiatric disorder Psychiatric illness     Sleep difficulties      Past Surgical History:   Procedure Laterality Date    COLONOSCOPY  06/2019    DENTAL SURGERY      HYSTEROSCOPY      Endometrial Biopsy By Hysteroscopy    MI LAPS SUPRACRV HYSTERECT 250 GM/< RMVL TUBE/OVAR N/A 5/1/2023    Procedure: [facility]) W/ BILATERAL SALPINGECTOMY, REMOVAL PARAOVARIAN CYST;  Surgeon: Casa Ferrell DO;  Location: [facility];  Service: Gynecology    REMOVAL OF INTRAUTERINE DEVICE (IUD)      US GUIDED BREAST BIOPSY RIGHT COMPLETE Right 6/17/2019       Medications:    Current Facility-Administered Medications   Medication Dose Route Frequency Provider Last Rate    aluminum-magnesium hydroxide-simethicone  30 mL Oral Q4H PRN Earna Bowman, PA-C      artificial tear   Both Eyes 4x Daily PRN Earna Bowman, PA-C      benztropine  1 mg Intramuscular BID PRN Earna Bowman, PA-C      benztropine  1 mg Oral BID PRN Earna Bowman, PA-C      bisacodyl  10 mg Rectal Daily PRN Massiel aNva MD      diphenhydrAMINE  25 mg Oral HS PRN Iemlda Adams MD      hydrOXYzine HCL  25 mg Oral Q6H PRN Max 4/day Earna Bowman, PA-C      hydrOXYzine HCL  50 mg Oral Q4H PRN Max 4/day Earna Bowman, PA-C      Or    LORazepam  1 mg Intramuscular Q4H PRN Earna Bowman, PA-C      ibuprofen  400 mg Oral Q6H PRN Massiel Nava MD      ibuprofen  600 mg Oral Q6H PRN Massiel Nava MD      ibuprofen  800 mg Oral Q8H PRN Massiel Nava MD      insulin lispro  1-6 Units Subcutaneous TID AC Earna Bowman, PA-C      LORazepam  1 mg Oral Q4H PRN Max 6/day Earna Bowman, PA-C      Or    LORazepam  2 mg Intramuscular Q6H PRN Max 3/day Earna Bowman, PA-C      magnesium hydroxide  30 mL Oral Daily PRN Massiel Nava MD      metFORMIN  500 mg Oral BID With Meals [facility] ADOLFO JoelC      mirtazapine  15 mg Oral HS Angie Thompson DO      nortriptyline  75 mg Oral BID Aubrey Venegas,       OLANZapine  10 mg Oral Q3H PRN Max 3/day Judith Cross PA-C      Or    OLANZapine  10 mg Intramuscular Q3H PRN Max 3/day Judith Cross PA-C      OLANZapine  5 mg Oral Q3H PRN Max 6/day Judith Cross PA-C      Or    OLANZapine  5 mg Intramuscular Q3H PRN Max 6/day Judith Cross PA-C      OLANZapine  2.5 mg Oral Q3H PRN Max 8/day Judith Cross PA-C      senna-docusate sodium  1 tablet Oral Daily PRN Judith Cross PA-C         Allergies: Allergies   Allergen Reactions    Cannabidiol Shortness Of Breath, Itching, Swelling, Anxiety, Palpitations, Confusion, Hypertension, Throat Swelling and Tongue Swelling    Amoxicillin-Pot Clavulanate Hives    Decadrol [Dexamethasone] Other (See Comments)     psychosis    Penicillins Hives     Hives/Uticaria    Tetracyclines & Related Hives      Allergy;        Objective     Vital signs in last 24 hours:    Temp:  [96.7 °F (35.9 °C)-97.6 °F (36.4 °C)] 96.7 °F (35.9 °C)  HR:  [110-116] 116  Resp:  [17-19] 19  BP: (123-126)/(86-87) 126/87    No intake or output data in the 24 hours ending 10/23/23 [facility].    Hospital Course:     Marli Castaneda was admitted to the inpatient psychiatric unit on 10/16/2023. During their hospitalization, Marli Castaneda was encouraged to attend groups and interact appropriately and positively with others in the milieu. Marli Castaneda was started on the following psychiatric medications: Remeron 15mg PO HS and (continued) on Nortriptyline 75mg PO BID. She was on Pristiqu prior to admission, but this was discontinued. These medications were titrated up to a therapeutic range as evidenced by a reduction in Esther's reported psychiatric symptomatology. There were no side effects noted or reported throughout Esther's psychiatric hospitalization. At the time of discharge, there were no criteria present through which Marli Castaneda could be kept involuntarily for further psychiatric hospitalization.  Patient was able to articulate a safety plan upon discharge home, including going to any ED if their symptoms return or worsen, or calling 911. An outpatient discharge/follow up plan was discussed and coordinated between [facility], this writer, and case management team.    Specific discharge disposition: Home with outpatient f/u. Mental Status at Time of Discharge:     Appearance: casually dressed, appears consistent with stated age  Motor: no psychomotor disturbances, no gait abnormalities  Behavior: cooperative, interacts with this writer appropriately  Speech: normal rate, rhythm, and volume  Mood: "good"  Affect: euthymic, normal range and intensity  Thought Process: organized, linear, and goal-oriented  Thought Content: denies auditory or visual hallucinations  Perception: denies delusions or other perceptual disturbances  Risk Potential: denies suicidal ideation, plan, or intent. Denies homicidal ideation  Sensorium: Oriented to person, place, time, and situation  Cognition: cognitive ability appears intact but was not quantitatively tested  Consciousness: alert and awake  Attention: able to focus without difficulty  Insight: improved  Judgement: improved       Suicide/Homicide Risk Assessment:    Risk of Harm to Self:   Patient denied suicidal thoughts, intent, plan, or acts of furtherance at the time of discharge.     Risk of Harm to Others:  Patient denied homicidal thoughts or intent at the time of discharge      Admission Diagnosis:    Principal Problem:    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 psychotic features ([address])  Active Problems:    Chronic migraine without aura without status migrainosus, not intractable    Fibromyalgia    Generalized anxiety disorder    Type 2 diabetes mellitus with hyperglycemia, without long-term current use of insulin (HCC)    Medical clearance for psychiatric admission    Intentional overdose [facility])      Discharge Diagnosis:     Principal Problem:    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 psychotic features ([address])  Active Problems:    Chronic migraine without aura without status migrainosus, not intractable    Fibromyalgia    Generalized anxiety disorder    Type 2 diabetes mellitus with hyperglycemia, without long-term current use of insulin ([facility])    Medical clearance for psychiatric admission    Intentional overdose ([address])  Resolved Problems:    * No resolved hospital problems. *      Laboratory Results: I have personally reviewed all pertinent lab results. Recent Results (from the past 48 hour(s))   Fingerstick Glucose (POCT)    Collection Time: 10/21/23 11:04 AM   Result Value Ref Range    POC Glucose 264 (H) 65 - 140 mg/dl   Fingerstick Glucose (POCT)    Collection Time: 10/21/23  4:33 PM   Result Value Ref Range    POC Glucose 141 (H) 65 - 140 mg/dl   Fingerstick Glucose (POCT)    Collection Time: 10/22/23  8:35 AM   Result Value Ref Range    POC Glucose 159 (H) 65 - 140 mg/dl   Fingerstick Glucose (POCT)    Collection Time: 10/22/23 11:12 AM   Result Value Ref Range    POC Glucose 202 (H) 65 - 140 mg/dl   Fingerstick Glucose (POCT)    Collection Time: 10/22/23  4:25 PM   Result Value Ref Range    POC Glucose 108 65 - 140 mg/dl        Discharge Medications:    See after visit summary for all reconciled discharge medications provided to patient and family.       Current Discharge Medication List        START taking these medications    Details   mirtazapine (REMERON) 15 mg tablet Take 1 tablet (15 mg total) by mouth daily at bedtime  Qty: 30 tablet, Refills: 0    Associated Diagnoses: Depression, unspecified depression type              Current Discharge Medication List        STOP taking these medications       acetaminophen (TYLENOL) 650 mg CR tablet Comments:   Reason for Stopping:         celecoxib (CeleBREX) 200 mg capsule Comments:   Reason for Stopping:         Cholecalciferol (VITAMIN D-3) 1000 units CAPS Comments:   Reason for Stopping:         cyclobenzaprine (FLEXERIL) 10 mg tablet Comments: Reason for Stopping:         desvenlafaxine (PRISTIQ) 100 mg 24 hr tablet Comments:   Reason for Stopping:         desvenlafaxine succinate (PRISTIQ) 50 mg 24 hr tablet Comments:   Reason for Stopping:         gabapentin (Neurontin) 300 mg capsule Comments:   Reason for Stopping:         hydrOXYzine HCL (ATARAX) 25 mg tablet Comments:   Reason for Stopping:         hydrOXYzine HCL (ATARAX) 50 mg tablet Comments:   Reason for Stopping:         lidocaine (LIDODERM) 5 % Comments:   Reason for Stopping:         MAGNESIUM OXIDE PO Comments:   Reason for Stopping:         propranolol (INDERAL) 20 mg tablet Comments:   Reason for Stopping:                Current Discharge Medication List           Current Discharge Medication List        CONTINUE these medications which have NOT CHANGED    Details   Blood Glucose Monitoring Suppl (Contour Blood Glucose System) w/Device KIT Use 1 kit 2 (two) times a day before meals  Qty: 1 kit, Refills: 0    Associated Diagnoses: Type 2 diabetes mellitus with hyperglycemia, without long-term current use of insulin ([facility])      diazepam (VALIUM) 5 mg tablet Take 1 tablet (5 mg total) by mouth every 8 (eight) hours as needed for muscle spasms for up to 15 doses  Qty: 15 tablet, Refills: 0    Associated Diagnoses: Lumbar radiculopathy      glucose blood (Contour Test) test strip USE TO CHECK BLOOD SUGAR ONCE DAILY  Qty: 100 strip, Refills: 3    Associated Diagnoses: Type 2 diabetes mellitus with hyperglycemia, without long-term current use of insulin (formerly [facility])      metFORMIN (GLUCOPHAGE) 500 mg tablet TAKE 1 TABLET BY MOUTH TWICE A DAY WITH MEALS  Qty: 180 tablet, Refills: 2    Associated Diagnoses: Type 2 diabetes mellitus with hyperglycemia, without long-term current use of insulin (formerly [facility])      nortriptyline (PAMELOR) 75 MG capsule TAKE 1 CAPSULE BY MOUTH TWICE A DAY  Qty: 60 capsule, Refills: 2    Associated Diagnoses: Major depressive disorder, recurrent episode, moderate ([address]);  Generalized anxiety disorder; Fibromyalgia      phentermine (ADIPEX-P) 37.5 MG tablet Take 1 tablet (37.5 mg total) by mouth every morning  Qty: 30 tablet, Refills: 2    Associated Diagnoses: Obesity (BMI 30-39. 9)              Discharge instructions/Information to patient and family:     See after visit summary for information provided to patient and family. Provisions for Follow-Up Care:    See after visit summary for information related to follow-up care and any pertinent home health orders. Discharge Statement:    I spent 45 minutes discharging the patient. This time was spent on the day of discharge. I had direct contact with the patient on the day of discharge. Additional documentation is required if more than 30 minutes were spent on discharge:    I had face-to-face contact with the patient and discussed discharge treatment plan  I reviewed the importance of compliance with medications and outpatient treatment after discharge with the patient. I discussed discharge and treatment plan with the patient, nursing, and case management/social work. I discussed the medication regimen and possible side effects of the medications with the patient prior to discharge. At the time of discharge they were tolerating psychiatric medications. I discussed outpatient follow up with the patient as per treatment plan / aftercare summary. I reviewed elements of the aftercare plan with the patient. I discussed that if symptoms worsen or reoccur, that the patient can return to the emergency room or dial 911 in an emergency. I reviewed the patient's hospital course and current psychiatric disease status. As of today, they are now at goal. They are psychiatrically stable for discharge home. The combination of active psychopharmacological medication management, interdisciplinary coordination with case management, and adjunctive milieu and group therapy to augment psychopharmacological efficacy appears to have been successful. The patient's risk of morbidity, and progression or decompensation of psychiatric disease, is lower today as compared to the day of admission.       Tayler Hanna DO 10/23/23

## 2023-10-23 NOTE — PROGRESS NOTES
10/23/23 0750   Team Meeting   Meeting Type Daily Rounds   Team Members Present   Team Members Present Physician;Nurse;; Other (Discipline and Name)   Physician Team Member Dr. Bob Cooper / Dr. Lilli Causey / Carmelo Sanchez / Kendal Beltran Team Member Richard Flores / Chance Garvey / [facility] Management Team Member Aurelio Castaneda / Mechelle Hahn / Merlinda Right / Derek Celestin   Other (Discipline and Name) Evangelina (Group Facilitator)   Patient/Family Present   Patient Present No   Patient's Family Present No       Status: Pt is reported to be tearful due to a friend recently pass. Pt is reported to be denying SI, HI, and AVH. Pt is reported to be endorsing improved mood. Pt is reported to be ready for discharge and slept through most of the night. Medication: No medication changes. Pt received PRN Benadryl 25 mg for sleep. D/C: Pt is being discharged today via her mom to return to her mom's residence. Pt is being discharged with [facility] through [address]

## 2023-10-23 NOTE — NURSING NOTE
Pt looking forward to discharge today. States mother is picking pt up around 10:30 am. Denies SI/HI or hallucination. States "I can't wait to get a good nights rest". Pt bright and social with peers. Denies any question or concern at this time.

## 2023-10-23 NOTE — CASE MANAGEMENT
CM met with Pt to confirm discharge planning. Pt stated that her mom will be coming to pick her up today. CM reminded Pt of her follow up appointments. Pt stated that they are ready for discharge. CM stated that their information will be on the AVS if they have any questions or need any assistance.

## 2023-10-23 NOTE — PLAN OF CARE
Problem: SELF HARM/SUICIDALITY  Goal: Will have no self-injury during hospital stay  Description: INTERVENTIONS:  - Q 15 MINUTES: Routine safety checks  - Q WAKING SHIFT & PRN: Assess risk to determine if routine checks are adequate to maintain patient safety  - Encourage patient to participate actively in care by formulating a plan to combat response to suicidal ideation, identify supports and resources  Outcome: Adequate for Discharge     Problem: DEPRESSION  Goal: Will be euthymic at discharge  Description: INTERVENTIONS:  - Administer medication as ordered  - Provide emotional support via 1:1 interaction with staff  - Encourage involvement in milieu/groups/activities  - Monitor for social isolation  Outcome: Adequate for Discharge     Problem: ANXIETY  Goal: Will report anxiety at manageable levels  Description: INTERVENTIONS:  - Administer medication as ordered  - Teach and encourage coping skills  - Provide emotional support  - Assess patient/family for anxiety and ability to cope  Outcome: Adequate for Discharge  Goal: By discharge: Patient will verbalize 2 strategies to deal with anxiety  Description: Interventions:  - Identify any obvious source/trigger to anxiety  - Staff will assist patient in applying identified coping technique/skills  - Encourage attendance of scheduled groups and activities  Outcome: Adequate for Discharge     Problem: DISCHARGE PLANNING  Goal: Discharge to home or other facility with appropriate resources  Description: INTERVENTIONS:  - Identify barriers to discharge w/patient and caregiver  - Arrange for needed discharge resources and transportation as appropriate  - Identify discharge learning needs (meds, wound care, etc.)  - Arrange for interpretive services to assist at discharge as needed  - Refer to Case Management Department for coordinating discharge planning if the patient needs post-hospital services based on physician/advanced practitioner order or complex needs related to functional status, cognitive ability, or social support system  Outcome: Adequate for Discharge     Problem: Ineffective Coping  Goal: Participates in unit activities  Description: Interventions:  - Provide therapeutic environment   - Provide required programming   - Redirect inappropriate behaviors   Outcome: Adequate for Discharge

## 2023-10-24 ENCOUNTER — TELEPHONE (OUTPATIENT)
Age: 43
End: 2023-10-24

## 2023-10-24 DIAGNOSIS — M25.50 ARTHRALGIA OF MULTIPLE JOINTS: Primary | ICD-10-CM

## 2023-10-24 RX ORDER — CELECOXIB 200 MG/1
200 CAPSULE ORAL 2 TIMES DAILY
Qty: 60 CAPSULE | Refills: 6 | Status: SHIPPED | OUTPATIENT
Start: 2023-10-24

## 2023-10-24 NOTE — TELEPHONE ENCOUNTER
Caller: Esther    Doctor: Jovon Espino    Reason for call: Is calling to request a refill for her Celecoxib 200 mg, Scheduled for next available f/u appt 4/9    [facility] Phone [phone]  Fax # [phone]    Call back#: [phone]

## 2023-10-24 NOTE — TELEPHONE ENCOUNTER
Goal Outcome Evaluation:    A/O x4. VSS. Pain controlled with oral meds. Stand by assist with ADLs. Discharged to TCU at 1520 with family transport. Took her personal belongings with her.                         sent

## 2023-10-26 NOTE — RESULT NOTES
Message   Please let the patient know the Xray of her cervical and lumbar spine were normal   No fractures or abnormalities were noted  Thank you  Verified Results  * XR SPINE CERVICAL COMPLETE 4 OR 5 VIEW [id number] 05:30PM Moni Antunez Order Number: [id number]     Test Name Result Flag Reference   XR SPINE CERVICAL COMPLETE 4 OR 5 VW (Report)     CERVICAL SPINE     INDICATION: Neck pain  COMPARISON: None     VIEWS: AP, lateral and obliques and open-mouth AP ; 5 images     FINDINGS:     No evidence of fracture or subluxation  The intervertebral disc spaces are preserved  The neural foramina are patent  The prevertebral soft tissues are within normal limits  The lung apices are intact  IMPRESSION:     Unremarkable cervical spine  Workstation performed: [id number]     Signed by:   Stacie Muñoz MD   5/10/16     * XR SPINE LUMBAR MINIMUM 4 VIEWS [id number] 05:30PM Moni Antunez Order Number: [id number]     Test Name Result Flag Reference   XR SPINE LUMBAR MINIMUM 4 VIEWS (Report)     LUMBAR SPINE     INDICATION: Lower back pain  COMPARISON: None     VIEWS: AP, lateral and bilateral oblique projections; 4 images     FINDINGS:     Alignment is unremarkable  There is no radiographic evidence of acute fracture or destructive osseous lesion  No significant lumbar degenerative change noted  Visualized soft tissues appear unremarkable  IMPRESSION:     Normal lumbar spine  Workstation performed: [id number]     Signed by:   Stacie Muñoz MD   5/10/16 3

## 2023-10-30 ENCOUNTER — NURSE TRIAGE (OUTPATIENT)
Dept: OTHER | Facility: OTHER | Age: 43
End: 2023-10-30

## 2023-10-30 NOTE — TELEPHONE ENCOUNTER
Regarding: pain managment/diabetic/previous tylenol over dose  ----- Message from Cande Kraus sent at 10/30/2023  7:46 PM EDT -----  Pt stated, "I would like to talk to a nurse to see what over the counter medication I can take for pain with my current medications. I am a diabetic so I cannot take advil.  A few weeks ago I over dosed on tylenol so I cannot take that."

## 2023-10-30 NOTE — TELEPHONE ENCOUNTER
Reason for Disposition   Body pains are a chronic symptom (recurrent or ongoing AND present > 4 weeks)    Answer Assessment - Initial Assessment Questions  1. ONSET: "When did the muscle aches or body pains start?"       Chronic problem but increased pain since Friday  2. LOCATION: "What part of your body is hurting?" (e.g., entire body, arms, legs)       "Entire body"  3. SEVERITY: "How bad is the pain?" (Scale 1-10; or mild, moderate, severe)    - MILD (1-3): doesn't interfere with normal activities     - MODERATE (4-7): interferes with normal activities or awakens from sleep     - SEVERE (8-10):  excruciating pain, unable to do any normal activities       8/10. Intermittent pain. Worse with activity  4. CAUSE: "What do you think is causing the pains?"      Fibromyalgia  5. FEVER: "Have you been having fever?"      NA  6. OTHER SYMPTOMS: "Do you have any other symptoms?" (e.g., chest pain, weakness, rash, cold or flu symptoms, weight loss)      Notes some generalized weakness associated with pain. Protocols used: Muscle Aches and Body Pain-ADULT-    Pt calling c/o increased fibromyalgia pain recently. She is asking for recommendations regarding OTC pain medications. States she is unable to take Acetaminophen due to recent overdose and Nsaids due to DM. Recommended f/u with S/P tomorrow. Also advised to go to ED for worsening pain or additional symptoms. Please f/u with pt tomorrow regarding further recommendations.

## 2023-10-31 ENCOUNTER — TELEMEDICINE (OUTPATIENT)
Dept: BEHAVIORAL/MENTAL HEALTH CLINIC | Facility: CLINIC | Age: 43
End: 2023-10-31
Payer: COMMERCIAL

## 2023-10-31 ENCOUNTER — TELEPHONE (OUTPATIENT)
Dept: FAMILY MEDICINE CLINIC | Facility: CLINIC | Age: 43
End: 2023-10-31

## 2023-10-31 ENCOUNTER — TELEPHONE (OUTPATIENT)
Dept: PAIN MEDICINE | Facility: MEDICAL CENTER | Age: 43
End: 2023-10-31

## 2023-10-31 DIAGNOSIS — F33.2 SEVERE RECURRENT MAJOR DEPRESSION WITHOUT PSYCHOTIC FEATURES (HCC): Primary | ICD-10-CM

## 2023-10-31 DIAGNOSIS — F41.1 GENERALIZED ANXIETY DISORDER: ICD-10-CM

## 2023-10-31 PROCEDURE — 90834 PSYTX W PT 45 MINUTES: CPT | Performed by: PSYCHIATRY & NEUROLOGY

## 2023-10-31 NOTE — TELEPHONE ENCOUNTER
Please review admission note. She has no OVS scheduled with us. She does have one with her PCP. Please advise.  thanks

## 2023-10-31 NOTE — TELEPHONE ENCOUNTER
Pt called stating that she would like a new script for gabapentin 300 mg TID.  Pt states that this was discontinued because of an attempted suicide     Pt can be reached at [phone]

## 2023-10-31 NOTE — PROGRESS NOTES
Assessment:  1. Chronic pain syndrome    2. Lumbar radiculopathy    3. Diabetic polyneuropathy associated with type 2 diabetes mellitus ([address])    4. DDD (degenerative disc disease), lumbar        Plan:  Patient will discuss reinitiation of gabapentin versus pregabalin/duloxetine with psychiatry. Gabapentin was discontinued during her hospital stay for overdose October 14, 2023 therefore I will defer med management to psychiatry. The patient was agreeable and verbalized an understanding. Patient was given information regarding medical marijuana  Patient interested in a neurosurgical evaluation. MRI lumbar spine without contrast was ordered for her to have completed prior to neurosurgical evaluation and referral was provided  Continue with home exercise program  Patient is non opioid therapy from our practice secondary to history of suicide attempt  Follow-up after neurosurgical evaluation or sooner if needed    History of Present Illness: The patient is a 37 y.o. female with a history of fibromyalgia, diabetes with diabetic neuropathy, lumbar degenerative disc disease, and a recent hospital admission for suicide attempt last seen on 08/25/2023  who presents for a follow up office visit in regards to chronic pain in the bilateral feet. She also complains of some low back pain however states today that her foot pain is the most bothersome. She is status post bilateral L5 TFESI October 11, 2023 which provided 50% relief of her low back pain but did not improve her bilateral foot pain. She did feel like she was getting some relief with gabapentin however during her hospital stay for overdose, gabapentin was discontinued. She does not want any sedating medications such as muscle relaxants in the house after her suicide attempt. She does not find much relief with NSAIDs or Tylenol. She is currently being prescribed nortriptyline 75 mg twice a day by psychiatry.   EMG does confirm a chronic L5 radiculopathy on the right and peripheral neuropathy related to diabetes. She has completed physical therapy in the past without any significant benefit. She does continue with a home exercise program    The patient rates her pain a 7 out of 10 on the numeric pain rating scale. Constant has pain in the evening and at night which is described as burning, sharp, throbbing, shooting and numbness    I have personally reviewed and/or updated the patient's past medical history, past surgical history, family history, social history, current medications, allergies, and vital signs today. Review of Systems:    Review of Systems   Respiratory:  Negative for shortness of breath. Cardiovascular:  Negative for chest pain. Gastrointestinal:  Negative for constipation, diarrhea, nausea and vomiting. Musculoskeletal:  Positive for back pain. Negative for arthralgias, gait problem, joint swelling and myalgias. Skin:  Negative for rash. Neurological:  Negative for dizziness, seizures and weakness. All other systems reviewed and are negative.         Past Medical History:   Diagnosis Date    Anxiety     Blood transfusion declined because patient is Buddhist 05/01/2023    Chronic pain disorder     Chronic sinusitis     Depression     Depression     Diabetes ([facility])     Fibromyalgia     Migraine     Obesity     Polyarthritis     Last assessed 9/21/2015    Psychiatric disorder     Psychiatric illness     Sleep difficulties        Past Surgical History:   Procedure Laterality Date    COLONOSCOPY  06/2019    DENTAL SURGERY      HYSTEROSCOPY      Endometrial Biopsy By Hysteroscopy    MO LAPS SUPRACRV HYSTERECT 250 GM/< RMVL TUBE/OVAR N/A 5/1/2023    Procedure: [facility]) W/ BILATERAL SALPINGECTOMY, REMOVAL PARAOVARIAN CYST;  Surgeon: Kev Salmeron DO;  Location: [facility];  Service: Gynecology    REMOVAL OF INTRAUTERINE DEVICE (IUD)      US GUIDED BREAST BIOPSY RIGHT COMPLETE Right 6/17/2019       Family History   Problem Relation Age of Onset    Irregular heart beat Mother     Diabetes Father     Kidney disease Father     Diabetes Maternal Grandmother     Rheum arthritis Maternal Grandmother     Melanoma Maternal Grandmother 80    No Known Problems Maternal Grandfather     Kidney disease Paternal Grandmother     Rheum arthritis Paternal Grandmother     Depression Paternal Grandmother     Diabetes Paternal Grandfather     Lung cancer Paternal Grandfather 52    Substance Abuse Brother     No Known Problems Brother     Substance Abuse Maternal Uncle     Prostate cancer Maternal Uncle 61    Depression Paternal Aunt     Alcohol abuse Family     Stomach cancer Family        Social History     Occupational History    Occupation: unemployed   Tobacco Use    Smoking status: Never     Passive exposure: Never    Smokeless tobacco: Never    Tobacco comments:     N/A, non-smoker.    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; sober since 2010    Drug use: Not Currently    Sexual activity: Not Currently         Current Outpatient Medications:     Blood Glucose Monitoring Suppl (Contour Blood Glucose System) w/Device KIT, Use 1 kit 2 (two) times a day before meals, Disp: 1 kit, Rfl: 0    celecoxib (CeleBREX) 200 mg capsule, Take 1 capsule (200 mg total) by mouth 2 (two) times a day, Disp: 60 capsule, Rfl: 6    glucose blood (Contour Test) test strip, USE TO CHECK BLOOD SUGAR ONCE DAILY, Disp: 100 strip, Rfl: 3    metFORMIN (GLUCOPHAGE) 500 mg tablet, TAKE 1 TABLET BY MOUTH TWICE A DAY WITH MEALS, Disp: 180 tablet, Rfl: 2    mirtazapine (REMERON) 15 mg tablet, Take 1 tablet (15 mg total) by mouth daily at bedtime, Disp: 30 tablet, Rfl: 0    nortriptyline (PAMELOR) 75 MG capsule, TAKE 1 CAPSULE BY MOUTH TWICE A DAY, Disp: 60 capsule, Rfl: 2    phentermine (ADIPEX-P) 37.5 MG tablet, Take 1 tablet (37.5 mg total) by mouth every morning, Disp: 30 tablet, Rfl: 2    diazepam (VALIUM) 5 mg tablet, Take 1 tablet (5 mg total) by mouth every 8 (eight) hours as needed for muscle spasms for up to 15 doses (Patient not taking: Reported on 11/1/2023), Disp: 15 tablet, Rfl: 0    Allergies   Allergen Reactions    Cannabidiol Shortness Of Breath, Itching, Swelling, Anxiety, Palpitations, Confusion, Hypertension, Throat Swelling and Tongue Swelling    Amoxicillin-Pot Clavulanate Hives    Decadrol [Dexamethasone] Other (See Comments)     psychosis    Penicillins Hives     Hives/Uticaria    Tetracyclines & Related Hives      Allergy; Physical Exam:    /91   Pulse (!) 118   Ht 5' (1.524 m)   Wt 82.1 kg (181 lb)   BMI 35.35 kg/m²     Constitutional:normal, well developed, well nourished, alert, in no distress and non-toxic and no overt pain behavior.   Eyes:anicteric  HEENT:grossly intact  Neck:supple, symmetric, trachea midline and no masses   Pulmonary:even and unlabored  Cardiovascular:No edema or pitting edema present  Skin:Normal without rashes or lesions and well hydrated  Psychiatric:Mood and affect appropriate  Neurologic:Cranial Nerves II-XII grossly intact  Musculoskeletal:normal gait      Imaging  MRI lumbar spine without contrast    (Results Pending)         Orders Placed This Encounter   Procedures    MRI lumbar spine without contrast    Ambulatory referral to Neurosurgery

## 2023-10-31 NOTE — TELEPHONE ENCOUNTER
Patient called and stated that she has an upcoming appointment and would like to know if she needs any labs done prior?   please call to advise

## 2023-10-31 NOTE — TELEPHONE ENCOUNTER
Admission note reviewed.   Please schedule follow-up office visit for reevaluation to determine next steps in treatment if the patient desires home

## 2023-10-31 NOTE — PSYCH
Virtual Regular Visit    Verification of patient location:    Patient is located at Home in the following state in which I hold an active license [place]      Assessment/Plan:    Problem List Items Addressed This Visit          Other    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 psychotic features ([address]) - Primary    Generalized anxiety disorder          Reason for visit is   Chief Complaint   Patient presents with    Virtual Regular Visit          Encounter provider HERMELINDA Gallagher    Provider located at [address] [place] [id number] [phone]      Recent Visits  No visits were found meeting these conditions. Showing recent visits within past 7 days and meeting all other requirements  Today's Visits  Date Type Provider Dept   10/31/23 Telemedicine HERMELINDA Gallagher [facility] Therapist [facility]   Showing today's visits and meeting all other requirements  Future Appointments  No visits were found meeting these conditions. Showing future appointments within next 150 days and meeting all other requirements       The patient was identified by name and date of birth. Karely Lees was informed that this is a telemedicine visit and [facility] She agrees to proceed. .  My office door was closed. No one else was in the room. She acknowledged consent and understanding of privacy and security of the video platform. The patient has agreed to participate and understands they can discontinue the visit at any time. Patient is aware this is a billable service.      HPI     Past Medical History:   Diagnosis Date    Anxiety     Blood transfusion declined because patient is Voodoo 05/01/2023    Chronic pain disorder     Chronic sinusitis     Depression     Depression     Diabetes ([facility])     Fibromyalgia     Migraine     Obesity     Polyarthritis Last assessed 9/21/2015    Psychiatric disorder     Psychiatric illness     Sleep difficulties        Past Surgical History:   Procedure Laterality Date    COLONOSCOPY  06/2019    DENTAL SURGERY      HYSTEROSCOPY      Endometrial Biopsy By Hysteroscopy    OR LAPS SUPRACRV HYSTERECT 250 GM/< RMVL TUBE/OVAR N/A 5/1/2023    Procedure: [facility]) W/ BILATERAL SALPINGECTOMY, REMOVAL PARAOVARIAN CYST;  Surgeon: Casa Ferrell DO;  Location: [facility];  Service: Gynecology    REMOVAL OF INTRAUTERINE DEVICE (IUD)      US GUIDED BREAST BIOPSY RIGHT COMPLETE Right 6/17/2019       Current Outpatient Medications   Medication Sig Dispense Refill    Blood Glucose Monitoring Suppl (Contour Blood Glucose System) w/Device KIT Use 1 kit 2 (two) times a day before meals 1 kit 0    celecoxib (CeleBREX) 200 mg capsule Take 1 capsule (200 mg total) by mouth 2 (two) times a day 60 capsule 6    diazepam (VALIUM) 5 mg tablet Take 1 tablet (5 mg total) by mouth every 8 (eight) hours as needed for muscle spasms for up to 15 doses 15 tablet 0    glucose blood (Contour Test) test strip USE TO CHECK BLOOD SUGAR ONCE DAILY 100 strip 3    metFORMIN (GLUCOPHAGE) 500 mg tablet TAKE 1 TABLET BY MOUTH TWICE A DAY WITH MEALS 180 tablet 2    mirtazapine (REMERON) 15 mg tablet Take 1 tablet (15 mg total) by mouth daily at bedtime 30 tablet 0    nortriptyline (PAMELOR) 75 MG capsule TAKE 1 CAPSULE BY MOUTH TWICE A DAY 60 capsule 2    phentermine (ADIPEX-P) 37.5 MG tablet Take 1 tablet (37.5 mg total) by mouth every morning 30 tablet 2     No current facility-administered medications for this visit.         Allergies   Allergen Reactions    Cannabidiol Shortness Of Breath, Itching, Swelling, Anxiety, Palpitations, Confusion, Hypertension, Throat Swelling and Tongue Swelling    Amoxicillin-Pot Clavulanate Hives    Decadrol [Dexamethasone] Other (See Comments)     psychosis    Penicillins Hives     Hives/Uticaria    Tetracyclines & Related Hives Allergy; Review of Systems    Video Exam    There were no vitals filed for this visit. Physical Exam     Behavioral Health Psychotherapy Progress Note    Psychotherapy Provided: Individual Psychotherapy     1. 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 psychotic features ([address])        2. Generalized anxiety disorder            Goals addressed in session: Goal 1     DATA: Met with [facility] for follow up after inpatient stay for suicide attempt. [facility] shared that she has been feeling very emotionally fragile since the death of her friend Rekha Lamar, and that she was not allowing herself to feel her grief fully, which built to the point of not being able to cope with any stress at all. She overdosed on several medications, and had her mother not found her unresponsive at home when she did, [facility] said that she likely would not be alive. [facility] said that the time she spent in [facility] IP unit was the best experience, and she did a lot of reflecting on her life, her relationships and what life means to her. She talked about reconnecting with important people to her, communicating more with her parents to tell them how appreciative she is of them, and focusing on how she can move forward with her life and help people like herself. She said that she now is realizing that she will never try to take her life again because "it isn't mine to take" and she is working on trying to find ways to find meaning and happiness for herself. Provided support, validation of Esther's feelings and the progress she has made since her hospitalization (new attitude toward life), and encouraged Esther to reach out to supports including this writer whenever she starts to feel more negative    During this session, this clinician used the following therapeutic modalities: Client-centered Therapy, Cognitive Behavioral Therapy, and Supportive Psychotherapy    Substance Abuse was not addressed during this session.  If the client is diagnosed with a co-occurring substance use disorder, please indicate any changes in the frequency or amount of use: n/a. Stage of change for addressing substance use diagnoses: No substance use/Not applicable    ASSESSMENT:  Laura Templeton presents with a Dysthymic mood. her affect is Normal range and intensity and Tearful, which is congruent, with her mood and the content of the session. The client has not made progress on their goals. Laura Templeton presents with a low risk of suicide, low risk of self-harm, and minimal risk of harm to others. For any risk assessment that surpasses a "low" rating, a safety plan must be developed. A safety plan was indicated: no  If yes, describe in detail n/a    PLAN: Between sessions, Laura Templeton will focus on self-care/rest, will let go of unnecessary obligations for now, and will communicate needs and feelings with loved ones. At the next session, the therapist will use Client-centered Therapy, Cognitive Behavioral Therapy, and Supportive Psychotherapy to address depression. Behavioral Health Treatment Plan and Discharge Planning: Laura Templeton is aware of and agrees to continue to work on their treatment plan. They have identified and are working toward their discharge goals.  yes    Visit start and stop times:    10/31/23  Start Time: 0804  Stop Time: 2562  Total Visit Time: 50 minutes

## 2023-11-01 ENCOUNTER — TELEPHONE (OUTPATIENT)
Dept: NEUROLOGY | Facility: CLINIC | Age: 43
End: 2023-11-01

## 2023-11-01 ENCOUNTER — OFFICE VISIT (OUTPATIENT)
Dept: PAIN MEDICINE | Facility: CLINIC | Age: 43
End: 2023-11-01
Payer: COMMERCIAL

## 2023-11-01 VITALS
HEIGHT: 60 IN | HEART RATE: 118 BPM | BODY MASS INDEX: 35.53 KG/M2 | WEIGHT: 181 LBS | DIASTOLIC BLOOD PRESSURE: 91 MMHG | SYSTOLIC BLOOD PRESSURE: 144 MMHG

## 2023-11-01 DIAGNOSIS — M54.16 LUMBAR RADICULOPATHY: ICD-10-CM

## 2023-11-01 DIAGNOSIS — G89.4 CHRONIC PAIN SYNDROME: Primary | ICD-10-CM

## 2023-11-01 DIAGNOSIS — M51.36 DDD (DEGENERATIVE DISC DISEASE), LUMBAR: ICD-10-CM

## 2023-11-01 DIAGNOSIS — E11.42 DIABETIC POLYNEUROPATHY ASSOCIATED WITH TYPE 2 DIABETES MELLITUS (HCC): ICD-10-CM

## 2023-11-01 PROCEDURE — 99214 OFFICE O/P EST MOD 30 MIN: CPT | Performed by: NURSE PRACTITIONER

## 2023-11-01 NOTE — TELEPHONE ENCOUNTER
Patient called and stated she needed help with her neuropathy and radiculopathy as well. Please call.

## 2023-11-01 NOTE — TELEPHONE ENCOUNTER
Received VM transcription from 10/31/23, 11:23 AM:    Hi, my name is Yvonne. My call back number is [phone], date of birth is January, 6, 1980. And I am calling about my prescription for propranolol 20 mg. Dr. Nick Morse had prescribed that for me last year. It expires on December 7th, which is totally fine. I am actually calling to see if she would be willing to renew that prescription for me completely. This is due to a suicide attempt. I either poured all of them that I had into a glass and drank it, which I kind of don't think I did. I did a little research on propranolol and, you know, whatever. That's a detail you didn't need to know, but everything else I flushed down the toilet so I do not have those pills. And they are the only thing that prevents migraines for me. And boy, do I have migraine after migraine every night? So please let me know what can be done about this. Thank you so much bye.

## 2023-11-03 ENCOUNTER — TELEPHONE (OUTPATIENT)
Dept: PAIN MEDICINE | Facility: CLINIC | Age: 43
End: 2023-11-03

## 2023-11-03 NOTE — TELEPHONE ENCOUNTER
Caller: Moe Merritt     Doctor: Fred Townsend    Reason for call: patient missed a phone call from our number today and she is calling to find out who needs her?     Call back#: [phone]

## 2023-11-03 NOTE — TELEPHONE ENCOUNTER
Spoke with pt. Pt is overdue for an office visit. She will need to be seen. Pt stated that she has burning, redness, and shooting pains in her feet at night. Also needs medication for her headaches. Pt has used propranolol in the past, and it did provide relief. Advised pt that a message will be sent to clerical staff to assist with scheduling her an OV as soon as possible to address her needs.

## 2023-11-03 NOTE — TELEPHONE ENCOUNTER
Pt called today stating was returning nurse call from [facility]. Pt stated that she needs to discuss prescription for propranolol 20 mg as well some other issues. Pt is requesting a call back [phone] - fiei -if she does not answer please [facility] home # [phone] . Pt will be home all day and will be able to answer.

## 2023-11-05 PROBLEM — N93.9 ABNORMAL UTERINE BLEEDING: Status: RESOLVED | Noted: 2023-05-01 | Resolved: 2023-11-05

## 2023-11-05 PROBLEM — N94.6 DYSMENORRHEA: Status: RESOLVED | Noted: 2023-05-01 | Resolved: 2023-11-05

## 2023-11-05 NOTE — ED ATTENDING ATTESTATION
10/14/2023  I, Wayne Chisholm MD, saw and evaluated the patient. I have discussed the patient with the resident/non-physician practitioner and agree with the resident's/non-physician practitioner's findings, Plan of Care, and MDM as documented in the resident's/non-physician practitioner's note, except where noted. All available labs and Radiology studies were reviewed. I was present for key portions of any procedure(s) performed by the resident/non-physician practitioner and I was immediately available to provide assistance. At this point I agree with the current assessment done in the Emergency Department. I have conducted an independent evaluation of this patient a history and physical is as follows:    ED Course     Patient presents for evaluation after being found unresponsive at home. Parents state that she has been depressed lately due to chronic pain and other issues but did not have any indication that she was going to overdose today. They state that they saw her this morning and she went up to her room. Mother went to check on her later and thought she was sleeping. When she still did not come down stairs, mother tried to wake er but could not. Patient is obtunded and unable to add any additional history. Exam: Obtunded but arouses with painful stimuli, seemingly protecting airway, bradycardic, no clonus, no hyperreflexia. A/P: Overdose. Based on presentation and medication list, presumed to be BBlocker overdose. Will give atropine to see if there is a response. Will give glucagon to see if there is improvement. Will start epinephrine if hypotensive and consider TV pacer. PAtient has elevated Tylenol level. While this may be due to her taking tylenol for pain, will start NAC. Tox consulted. Critical Care Time  CriticalCare Time    Date/Time: 10/14/2023 4:04 PM    Performed by: Wayne Chisholm MD  Authorized by:  Wayne Chisholm MD    Critical care provider statement: Critical care time (minutes):  42    Critical care time was exclusive of:  Separately billable procedures and treating other patients and teaching time    Critical care was necessary to treat or prevent imminent or life-threatening deterioration of the following conditions:  Toxidrome and CNS failure or compromise    Critical care was time spent personally by me on the following activities:  Obtaining history from patient or surrogate, development of treatment plan with patient or surrogate, discussions with consultants, evaluation of patient's response to treatment, examination of patient, review of old charts, re-evaluation of patient's condition, ordering and review of radiographic studies, ordering and review of laboratory studies and ordering and performing treatments and interventions

## 2023-11-06 ENCOUNTER — TELEPHONE (OUTPATIENT)
Dept: PAIN MEDICINE | Facility: CLINIC | Age: 43
End: 2023-11-06

## 2023-11-06 ENCOUNTER — OFFICE VISIT (OUTPATIENT)
Dept: PAIN MEDICINE | Facility: CLINIC | Age: 43
End: 2023-11-06
Payer: COMMERCIAL

## 2023-11-06 VITALS
SYSTOLIC BLOOD PRESSURE: 126 MMHG | WEIGHT: 180 LBS | DIASTOLIC BLOOD PRESSURE: 80 MMHG | HEIGHT: 60 IN | HEART RATE: 116 BPM | BODY MASS INDEX: 35.34 KG/M2

## 2023-11-06 DIAGNOSIS — G89.29 CHRONIC BILATERAL LOW BACK PAIN, UNSPECIFIED WHETHER SCIATICA PRESENT: Primary | ICD-10-CM

## 2023-11-06 DIAGNOSIS — M54.16 LUMBAR RADICULOPATHY: Primary | ICD-10-CM

## 2023-11-06 DIAGNOSIS — M54.50 CHRONIC BILATERAL LOW BACK PAIN, UNSPECIFIED WHETHER SCIATICA PRESENT: Primary | ICD-10-CM

## 2023-11-06 PROCEDURE — 99213 OFFICE O/P EST LOW 20 MIN: CPT | Performed by: NURSE PRACTITIONER

## 2023-11-06 NOTE — PROGRESS NOTES
Assessment:  1. Chronic bilateral low back pain, unspecified whether sciatica present        Plan:  Move forward with physical therapy. A referral to aqua therapy was provided today  Patient will trial over-the-counter topical analgesic patches  Patient maytrial topical heat application 20 minutes/h to avoid thermal injury to the skin  Patient may continue Tylenol as needed and should not exceed more than 3000 mg in 24 hours  Follow-up  as needed at this time    History of Present Illness: The patient is a 37 y.o. female with a history of fibromyalgia, diabetes with diabetic neuropathy and history of suicide attempt last seen on 11/01/2023  who presents for a follow up office visit. Patient was just seen but states that 2 days after her last appointment with me, she was chasing her cat and tripped and fell onto her left side, low back and knees. She has not tried any topical patches, or Tylenol. She was scheduled for an MRI lumbar spine tomorrow as requested by neurosurgery, however her insurance denied the MRI until she completes 6 weeks of therapy. She rates her pain a 5 out of 10 on the numeric pain rating scale. She constantly has pain in the evening and at night which is described as dull aching and pressure-like    I have personally reviewed and/or updated the patient's past medical history, past surgical history, family history, social history, current medications, allergies, and vital signs today.        Review of Systems:    Review of Systems      Past Medical History:   Diagnosis Date    Anxiety     Blood transfusion declined because patient is Jehovah's witness 05/01/2023    Chronic pain disorder     Chronic sinusitis     Depression     Depression     Diabetes ([address])     Fibromyalgia     Migraine     Obesity     Polyarthritis     Last assessed 9/21/2015    Psychiatric disorder     Psychiatric illness     Sleep difficulties        Past Surgical History:   Procedure Laterality Date    COLONOSCOPY 06/2019    DENTAL SURGERY      HYSTEROSCOPY      Endometrial Biopsy By Hysteroscopy    VT LAPS SUPRACRV HYSTERECT 250 GM/< RMVL TUBE/OVAR N/A 5/1/2023    Procedure: [facility] [place] W/ BILATERAL SALPINGECTOMY, REMOVAL PARAOVARIAN CYST;  Surgeon: Casa Ferrell DO;  Location: [facility];  Service: Gynecology    REMOVAL OF INTRAUTERINE DEVICE (IUD)      US GUIDED BREAST BIOPSY RIGHT COMPLETE Right 6/17/2019       Family History   Problem Relation Age of Onset    Irregular heart beat Mother     Diabetes Father     Kidney disease Father     Diabetes Maternal Grandmother     Rheum arthritis Maternal Grandmother     Melanoma Maternal Grandmother 80    No Known Problems Maternal Grandfather     Kidney disease Paternal Grandmother     Rheum arthritis Paternal Grandmother     Depression Paternal Grandmother     Diabetes Paternal Grandfather     Lung cancer Paternal Grandfather 52    Substance Abuse Brother     No Known Problems Brother     Substance Abuse Maternal Uncle     Prostate cancer Maternal Uncle 61    Depression Paternal Aunt     Alcohol abuse Family     Stomach cancer Family        Social History     Occupational History    Occupation: unemployed   Tobacco Use    Smoking status: Never     Passive exposure: Never    Smokeless tobacco: Never    Tobacco comments:     N/A, non-smoker.    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; sober since 2010    Drug use: Not Currently    Sexual activity: Not Currently         Current Outpatient Medications:     Blood Glucose Monitoring Suppl (Contour Blood Glucose System) w/Device KIT, Use 1 kit 2 (two) times a day before meals, Disp: 1 kit, Rfl: 0    celecoxib (CeleBREX) 200 mg capsule, Take 1 capsule (200 mg total) by mouth 2 (two) times a day, Disp: 60 capsule, Rfl: 6    glucose blood (Contour Test) test strip, USE TO CHECK BLOOD SUGAR ONCE DAILY, Disp: 100 strip, Rfl: 3    metFORMIN (GLUCOPHAGE) 500 mg tablet, TAKE 1 TABLET BY MOUTH TWICE A DAY WITH MEALS, Disp: 180 tablet, Rfl: 2    mirtazapine (REMERON) 15 mg tablet, Take 1 tablet (15 mg total) by mouth daily at bedtime, Disp: 30 tablet, Rfl: 0    nortriptyline (PAMELOR) 75 MG capsule, TAKE 1 CAPSULE BY MOUTH TWICE A DAY, Disp: 60 capsule, Rfl: 2    phentermine (ADIPEX-P) 37.5 MG tablet, Take 1 tablet (37.5 mg total) by mouth every morning, Disp: 30 tablet, Rfl: 2    diazepam (VALIUM) 5 mg tablet, Take 1 tablet (5 mg total) by mouth every 8 (eight) hours as needed for muscle spasms for up to 15 doses (Patient not taking: Reported on 11/1/2023), Disp: 15 tablet, Rfl: 0    Allergies   Allergen Reactions    Cannabidiol Shortness Of Breath, Itching, Swelling, Anxiety, Palpitations, Confusion, Hypertension, Throat Swelling and Tongue Swelling    Amoxicillin-Pot Clavulanate Hives    Decadrol [Dexamethasone] Other (See Comments)     psychosis    Penicillins Hives     Hives/Uticaria    Tetracyclines & Related Hives      Allergy; Physical Exam:    /80   Pulse (!) 116   Ht 5' (1.524 m)   Wt 81.6 kg (180 lb)   BMI 35.15 kg/m²     Constitutional:normal, well developed, well nourished, alert, in no distress and non-toxic and no overt pain behavior. Eyes:anicteric  HEENT:grossly intact  Neck:supple, symmetric, trachea midline and no masses   Pulmonary:even and unlabored  Cardiovascular:No edema or pitting edema present  Skin:Normal without rashes or lesions and well hydrated no ecchymosis, abrasions, or edema noted  Psychiatric:Mood and affect appropriate  Neurologic:Cranial Nerves II-XII grossly intact  Musculoskeletal:normal gait. Bilateral lumbar paraspinal musculature mildly tender to palpation.   Normal range of motion of the lumbar spine      Imaging  No orders to display         Orders Placed This Encounter   Procedures    Ambulatory referral to Physical Therapy

## 2023-11-06 NOTE — ASSESSMENT & PLAN NOTE
New evaluation of LBP with BLE pain  Constant pain across low back with intermittent posterior thigh and calf pain to the dorsum and sole of the feet. Has peripheral neuropathy. Did PT summer-fall 2022  Bilateral SI joint injections, bilateral L5 TFESI x4  Exam: Diffuse midline spinal TTP, pain with ROM especially with left rotation and bilateral lateral flexion, BLE 5/5, LT intact, decreased vibratory sense from her toes down and decreased pinprick in bilateral feet, 2+ DTRs, no clonus    Imaging:  EMG 10/4/23: chronic L5 radiculopathy on the right as evidenced by the decreased recruitment and chronic denervation changes in the above-mentioned muscles. Coexistent underlying sensory neuropathy with axonal changes likely secondary to the diabetes. Plan:  Discussed current symptoms with patient. She is ongoing back pain that is constant with intermittent bilateral leg pain. She has tried multiple injections with minimal relief. MRI has been ordered by pain management, but without recent PT, she cannot have this scheduled yet. PT referral has been placed for her already. She is ready had an EMG, that shows chronic right-sided L5 radiculopathy. Discussed how this would not correlate with having symptoms in bilateral lower extremities. Advised her to complete PT and MRI as ordered by pain management. We will obtain baseline upright lumbar spine XR and call with result. Reviewed red flag signs and symptoms. Follow-up with neurosurgery office if needed once MRI spine completed and if there is surgical pathology as joint appointment with spine MD.  Call with any questions or concerns.

## 2023-11-07 ENCOUNTER — HOSPITAL ENCOUNTER (OUTPATIENT)
Dept: RADIOLOGY | Facility: HOSPITAL | Age: 43
Discharge: HOME | End: 2023-11-07
Payer: COMMERCIAL

## 2023-11-07 ENCOUNTER — OFFICE VISIT (OUTPATIENT)
Dept: FAMILY MEDICINE CLINIC | Facility: CLINIC | Age: 43
End: 2023-11-07
Payer: COMMERCIAL

## 2023-11-07 ENCOUNTER — CONSULT (OUTPATIENT)
Dept: NEUROSURGERY | Facility: CLINIC | Age: 43
End: 2023-11-07
Payer: COMMERCIAL

## 2023-11-07 VITALS
HEIGHT: 60 IN | HEART RATE: 94 BPM | RESPIRATION RATE: 18 BRPM | BODY MASS INDEX: 35.02 KG/M2 | WEIGHT: 178.4 LBS | OXYGEN SATURATION: 98 % | SYSTOLIC BLOOD PRESSURE: 124 MMHG | TEMPERATURE: 98 F | DIASTOLIC BLOOD PRESSURE: 70 MMHG

## 2023-11-07 VITALS
SYSTOLIC BLOOD PRESSURE: 128 MMHG | TEMPERATURE: 96.5 F | OXYGEN SATURATION: 99 % | BODY MASS INDEX: 35.42 KG/M2 | WEIGHT: 180.4 LBS | HEART RATE: 105 BPM | DIASTOLIC BLOOD PRESSURE: 82 MMHG | HEIGHT: 60 IN

## 2023-11-07 DIAGNOSIS — F41.1 GENERALIZED ANXIETY DISORDER: ICD-10-CM

## 2023-11-07 DIAGNOSIS — G43.709 CHRONIC MIGRAINE WITHOUT AURA WITHOUT STATUS MIGRAINOSUS, NOT INTRACTABLE: ICD-10-CM

## 2023-11-07 DIAGNOSIS — R79.89 ELEVATED LFTS: ICD-10-CM

## 2023-11-07 DIAGNOSIS — Z23 ENCOUNTER FOR IMMUNIZATION: ICD-10-CM

## 2023-11-07 DIAGNOSIS — R79.89 ELEVATED TSH: ICD-10-CM

## 2023-11-07 DIAGNOSIS — Z00.00 WELL ADULT EXAM: ICD-10-CM

## 2023-11-07 DIAGNOSIS — E11.42 DIABETIC POLYNEUROPATHY ASSOCIATED WITH TYPE 2 DIABETES MELLITUS (HCC): ICD-10-CM

## 2023-11-07 DIAGNOSIS — E11.65 TYPE 2 DIABETES MELLITUS WITH HYPERGLYCEMIA, WITHOUT LONG-TERM CURRENT USE OF INSULIN (HCC): Primary | ICD-10-CM

## 2023-11-07 DIAGNOSIS — F33.3 SEVERE EPISODE OF RECURRENT MAJOR DEPRESSIVE DISORDER, WITH PSYCHOTIC FEATURES (HCC): ICD-10-CM

## 2023-11-07 DIAGNOSIS — M51.36 DDD (DEGENERATIVE DISC DISEASE), LUMBAR: ICD-10-CM

## 2023-11-07 DIAGNOSIS — E78.5 DYSLIPIDEMIA: ICD-10-CM

## 2023-11-07 DIAGNOSIS — M54.16 LUMBAR RADICULOPATHY: ICD-10-CM

## 2023-11-07 DIAGNOSIS — Z12.31 ENCOUNTER FOR SCREENING MAMMOGRAM FOR MALIGNANT NEOPLASM OF BREAST: ICD-10-CM

## 2023-11-07 PROBLEM — R00.1 BRADYCARDIA: Status: RESOLVED | Noted: 2023-10-15 | Resolved: 2023-11-07

## 2023-11-07 PROBLEM — I95.9 HYPOTENSION: Status: RESOLVED | Noted: 2023-10-15 | Resolved: 2023-11-07

## 2023-11-07 LAB — SL AMB POCT HEMOGLOBIN AIC: 6.3 (ref ?–6.5)

## 2023-11-07 PROCEDURE — 99214 OFFICE O/P EST MOD 30 MIN: CPT | Performed by: FAMILY MEDICINE

## 2023-11-07 PROCEDURE — 90471 IMMUNIZATION ADMIN: CPT

## 2023-11-07 PROCEDURE — 99396 PREV VISIT EST AGE 40-64: CPT | Performed by: FAMILY MEDICINE

## 2023-11-07 PROCEDURE — 72100 X-RAY EXAM L-S SPINE 2/3 VWS: CPT

## 2023-11-07 PROCEDURE — 90686 IIV4 VACC NO PRSV 0.5 ML IM: CPT

## 2023-11-07 PROCEDURE — 83036 HEMOGLOBIN GLYCOSYLATED A1C: CPT | Performed by: FAMILY MEDICINE

## 2023-11-07 PROCEDURE — 99244 OFF/OP CNSLTJ NEW/EST MOD 40: CPT | Performed by: PHYSICIAN ASSISTANT

## 2023-11-07 NOTE — PROGRESS NOTES
Neurosurgery Office Note  Juni Easton 37 y.o. female MRN: [id number]      Assessment/Plan     DDD (degenerative disc disease), lumbar  New evaluation of LBP with BLE pain  Constant pain across low back with intermittent posterior thigh and calf pain to the dorsum and sole of the feet. Has peripheral neuropathy. Did PT summer-fall 2022  Bilateral SI joint injections, bilateral L5 TFESI x4  Exam: Diffuse midline spinal TTP, pain with ROM especially with left rotation and bilateral lateral flexion, BLE 5/5, LT intact, decreased vibratory sense from her toes down and decreased pinprick in bilateral feet, 2+ DTRs, no clonus    Imaging:  EMG 10/4/23: chronic L5 radiculopathy on the right as evidenced by the decreased recruitment and chronic denervation changes in the above-mentioned muscles. Coexistent underlying sensory neuropathy with axonal changes likely secondary to the diabetes. Plan:  Discussed current symptoms with patient. She is ongoing back pain that is constant with intermittent bilateral leg pain. She has tried multiple injections with minimal relief. MRI has been ordered by pain management, but without recent PT, she cannot have this scheduled yet. PT referral has been placed for her already. She is ready had an EMG, that shows chronic right-sided L5 radiculopathy. Discussed how this would not correlate with having symptoms in bilateral lower extremities. Advised her to complete PT and MRI as ordered by pain management. We will obtain baseline upright lumbar spine XR and call with result. Reviewed red flag signs and symptoms. Follow-up with neurosurgery office if needed once MRI spine completed and if there is surgical pathology as joint appointment with spine MD.  Call with any questions or concerns. Diagnoses and all orders for this visit:    Lumbar radiculopathy  -     Ambulatory referral to Neurosurgery  -     XR spine lumbar 2 or 3 views injury;  Future          I have spent a total time of 40 minutes on 11/07/23 in caring for this patient including Diagnostic results, Instructions for management, Patient and family education, Impressions, Counseling / Coordination of care, Documenting in the medical record, Reviewing / ordering tests, medicine, procedures  , and Obtaining or reviewing history  . CHIEF COMPLAINT    Chief Complaint   Patient presents with    Consult       HISTORY    History of Present Illness     37y.o. year old female     37year old female seen for evaluation of back pain with bilateral lower extremity pain. She complains of recent flares and back pain. Back pain is located centrally and across her low back that at times radiates up the sides of her back. The pain is constant independent of activity. She intermittently has shooting pain down the posterior thighs and calves into the tops and bottoms of both feet. No triggers for this that she is aware of, and both legs affect her equally. Mentions that she did fall recently. She ambulates independently, no bowel or bladder incontinence. Patient has known peripheral neuropathy and history of diabetes, A1c 6.0. History of fibromyalgia. She has been trialing injections, in the past underwent bilateral SI joint injections as well as bilateral L5 TFESI x4 that gave her a month or so relief at a time. The last time she did formal PT was June through October 2022, though has been doing home exercises almost daily for 10 to 15 minutes at a time including stretching and core work. She has been seen by pain management recently who ordered an MRI. See Discussion    REVIEW OF SYSTEMS    Review of Systems   Constitutional:  Positive for fatigue. Excessive perspiration   HENT:  Positive for tinnitus (both ears, comes and goes) and trouble swallowing (occ, due to dry mouth). Eyes: Negative. Respiratory: Negative. Cardiovascular: Negative. Gastrointestinal: Negative. Endocrine: Negative. Genitourinary: Negative. Musculoskeletal:  Positive for back pain (lbp, radiates upward "midlower" area). Skin: Negative. Allergic/Immunologic: Negative. Neurological:  Positive for dizziness (when she gets up too fast), tremors (minor/irratic hands), speech difficulty (forgets words) and numbness (neuropathy ft). Hematological: Negative. Psychiatric/Behavioral:  Positive for confusion and decreased concentration. ROS obtained by MA. Reviewed. See HPI. Meds/Allergies     Current Outpatient Medications   Medication Sig Dispense Refill    Blood Glucose Monitoring Suppl (Contour Blood Glucose System) w/Device KIT Use 1 kit 2 (two) times a day before meals 1 kit 0    celecoxib (CeleBREX) 200 mg capsule Take 1 capsule (200 mg total) by mouth 2 (two) times a day 60 capsule 6    glucose blood (Contour Test) test strip USE TO CHECK BLOOD SUGAR ONCE DAILY 100 strip 3    metFORMIN (GLUCOPHAGE) 500 mg tablet TAKE 1 TABLET BY MOUTH TWICE A DAY WITH MEALS 180 tablet 2    mirtazapine (REMERON) 15 mg tablet Take 1 tablet (15 mg total) by mouth daily at bedtime 30 tablet 0    nortriptyline (PAMELOR) 75 MG capsule TAKE 1 CAPSULE BY MOUTH TWICE A DAY 60 capsule 2    phentermine (ADIPEX-P) 37.5 MG tablet Take 1 tablet (37.5 mg total) by mouth every morning 30 tablet 2     No current facility-administered medications for this visit.        Allergies   Allergen Reactions    Cannabidiol Shortness Of Breath, Itching, Swelling, Anxiety, Palpitations, Confusion, Hypertension, Throat Swelling and Tongue Swelling    Amoxicillin-Pot Clavulanate Hives    Decadrol [Dexamethasone] Other (See Comments)     psychosis    Penicillins Hives     Hives/Uticaria    Tetracyclines & Related Hives      Allergy;        PAST HISTORY    Past Medical History:   Diagnosis Date    Anxiety     Blood transfusion declined because patient is Spiritism 05/01/2023    Chronic pain disorder     Chronic sinusitis     Depression Depression     Diabetes ([address])     Fibromyalgia     Migraine     Obesity     Polyarthritis     Last assessed 9/21/2015    Psychiatric disorder     Psychiatric illness     Sleep difficulties        Past Surgical History:   Procedure Laterality Date    COLONOSCOPY  06/2019    DENTAL SURGERY      HYSTEROSCOPY      Endometrial Biopsy By Hysteroscopy    VA LAPS SUPRACRV HYSTERECT 250 GM/< RMVL TUBE/OVAR N/A 5/1/2023    Procedure: [facility]) W/ BILATERAL SALPINGECTOMY, REMOVAL PARAOVARIAN CYST;  Surgeon: Janeen Mcclellan, DO;  Location: [facility];  Service: Gynecology    REMOVAL OF INTRAUTERINE DEVICE (IUD)      US GUIDED BREAST BIOPSY RIGHT COMPLETE Right 6/17/2019       Social History     Tobacco Use    Smoking status: Never     Passive exposure: Never    Smokeless tobacco: Never    Tobacco comments:     N/A, non-smoker. 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; sober since 2010    Drug use: Not Currently       Family History   Problem Relation Age of Onset    Irregular heart beat Mother     Diabetes Father     Kidney disease Father     Diabetes Maternal Grandmother     Rheum arthritis Maternal Grandmother     Melanoma Maternal Grandmother 80    No Known Problems Maternal Grandfather     Kidney disease Paternal Grandmother     Rheum arthritis Paternal Grandmother     Depression Paternal Grandmother     Diabetes Paternal Grandfather     Lung cancer Paternal Grandfather 52    Substance Abuse Brother     No Known Problems Brother     Substance Abuse Maternal Uncle     Prostate cancer Maternal Uncle 61    Depression Paternal Aunt     Alcohol abuse Family     Stomach cancer Family          Above history personally reviewed. EXAM    Vitals:Blood pressure 128/82, pulse 105, temperature (!) 96.5 °F (35.8 °C), temperature source Temporal, height 5' (1.524 m), weight 81.8 kg (180 lb 6.4 oz), SpO2 99 %. ,Body mass index is 35.23 kg/m².      Physical Exam  Vitals reviewed. Constitutional:       General: She is awake. Appearance: Normal appearance. HENT:      Head: Normocephalic and atraumatic. Eyes:      Conjunctiva/sclera: Conjunctivae normal.   Cardiovascular:      Rate and Rhythm: Normal rate. Pulmonary:      Effort: Pulmonary effort is normal.   Musculoskeletal:      Comments: Diffuse midline spinal TTP  Pain with ROM, worse with left rotation and bilateral lateral flexion   Skin:     General: Skin is warm and dry. Neurological:      Mental Status: She is alert and oriented to person, place, and time. Gait: Gait is intact. Deep Tendon Reflexes:      Reflex Scores:       Patellar reflexes are 2+ on the right side and 2+ on the left side. Achilles reflexes are 2+ on the right side and 2+ on the left side. Psychiatric:         Attention and Perception: Attention and perception normal.         Mood and Affect: Mood and affect normal.         Speech: Speech normal.         Behavior: Behavior normal. Behavior is cooperative. Thought Content: Thought content normal.         Cognition and Memory: Cognition and memory normal.         Judgment: Judgment normal.         Neurologic Exam     Mental Status   Oriented to person, place, and time. Follows 2 step commands. Attention: normal. Concentration: normal.   Speech: speech is normal   Level of consciousness: alert  Knowledge: good. Normal comprehension.      Motor Exam   Muscle bulk: normal  Overall muscle tone: normal  BLE - 5/5       Sensory Exam   Right leg light touch: normal  Left leg light touch: normal  Right leg vibration: decreased from toes  Left leg vibration: decreased from toes  Right leg pinprick: decreased from ankle  Left leg pinprick: decreased from ankle    Gait, Coordination, and Reflexes     Gait  Gait: normal    Tremor   Resting tremor: absent  Intention tremor: absent  Action tremor: absent    Reflexes   Right patellar: 2+  Left patellar: 2+  Right achilles: 2+  Left achilles: 2+  Right ankle clonus: absent  Left ankle clonus: absent        MEDICAL DECISION MAKING    Imaging Studies:     CT head without contrast    Result Date: 10/14/2023  Narrative: CT BRAIN - WITHOUT CONTRAST INDICATION:   concern for possible head trauma, obtunded and bradycardic. COMPARISON:  None. TECHNIQUE:  CT examination of the brain was performed. Multiplanar 2D reformatted images were created from the source data. Radiation dose length product (DLP) for this visit:  852 mGy-cm . This examination, like all CT scans performed in the [facility], was performed utilizing techniques to minimize radiation dose exposure, including the use of iterative reconstruction and automated exposure control. IMAGE QUALITY:  Diagnostic. FINDINGS: PARENCHYMA:  No intracranial mass, mass effect or midline shift. No CT signs of acute infarction. No acute parenchymal hemorrhage. VENTRICLES AND EXTRA-AXIAL SPACES:  Normal for the patient's age. VISUALIZED ORBITS: Normal visualized orbits. PARANASAL SINUSES: Normal visualized paranasal sinuses. CALVARIUM AND EXTRACRANIAL SOFT TISSUES:  Normal.     Impression: No acute intracranial abnormality. Workstation performed: [id number]     FL spine and pain procedure    Result Date: 10/11/2023  Narrative: Bilateral L5 transforaminal Epidural Steroid Injection Indication: Low back and leg pain Preoperative diagnosis: Lumbar radiculitis Postoperative diagnosis: Lumbar radiculitis Procedure: Fluoroscopically-guided bilateral L5 transforaminal epidural steroid injection under fluoroscopy After discussing the risks, benefits, and alternatives to the procedure, the patient expressed understanding and wished to proceed. The patient was brought to the fluoroscopy suite and placed in the prone position.  A procedural pause was conducted to verify: correct patient identity, procedure to be performed and as applicable, correct side and site, correct patient position, and availability of implants, special equipment and special requirements. After identifying the bilateral L5 pedicles fluoroscopically with an oblique view, the skin was sterilely prepped and draped in the usual fashion using Chloraprep skin prep. The skin and subcutaneous tissue were anesthetized with 1% lidocaine. A 5 inch 22 gauge spinal needle was then advanced under fluoroscopic guidance to the posterior aspect of the bilateral L5 neural foramens. Appropriate foraminal depth was determined with a lateral fluoroscopic view, and AP visualization confirmed needle positioning at approximately the 6 o’clock position relative to the pedicles. After negative aspiration, 2 mL of Omnipaque 300 contrast was injected using live fluoroscopy/digital subtraction angiography, confirming appropriate transforaminal spread without evidence of intravascular or intrathecal uptake. Next, a local anesthetic test dose consisting of 1 mL of 2% lidocaine was injected through the needle at each level. After an appropriate period of observation, a directed neurological exam was performed which revealed no new neurologic deficits. Next, a 1.5 ml solution consisting of 7.5 mg of dexamethasone in sterile saline was injected slowly and incrementally into the epidural space at each level. Following the injection the needles were withdrawn slightly and flushed with lidocaine as they were fully extracted. The patient tolerated the procedure well and there were no apparent complications. The patient did not develop any new neurologic deficits. After appropriate observation, the patient was dismissed from the clinic in good condition under their own power. COMMENTS The patient received a total steroid dose of 15 mg of dexamethasone. EBL: Minimal Specimen: None       I have personally reviewed pertinent reports.    and I have personally reviewed pertinent films in PACS

## 2023-11-07 NOTE — PROGRESS NOTES
[address] [facility],7[address] [facility] PRACT[facility]    NAME: Tarah Beatty  AGE: 37 y.o.  SEX: female  : 1980     DATE: 2023    Chief Complaint   Patient presents with    Physical Exam     Annual check up      Health Maintenance   Topic Date Due    Pneumococcal Vaccine: Pediatrics (0 to 5 Years) and At-Risk Patients (6 to 59 Years) (1 - PCV) Never done    DM Eye Exam  Never done    Cervical Cancer Screening  Never done    COVID-19 Vaccine (4 - Moderna series) 2022    PT PLAN OF CARE  10/30/2022    Annual Physical  2023    Breast Cancer Screening: Mammogram  10/10/2023    Depression Remission PHQ  2024    HEMOGLOBIN A1C  2024    Kidney Health Evaluation: Albumin/Creatinine Ratio  2024    Diabetic Foot Exam  2024    BMI: Followup Plan  2024    Kidney Health Evaluation: GFR  10/19/2024    BMI: Adult  2024    DTaP,Tdap,and Td Vaccines (3 - Td or Tdap) 2032    Colorectal Cancer Screening  03/10/2033    HIV Screening  Completed    Hepatitis C Screening  Completed    Influenza Vaccine  Completed    HIB Vaccine  Aged Out    IPV Vaccine  Aged Out    Hepatitis A Vaccine  Aged Out    Meningococcal ACWY Vaccine  Aged Out    HPV Vaccine  Aged Out        Assessment and Plan:     Problem List Items Addressed This Visit          Endocrine    Type 2 diabetes mellitus with hyperglycemia, without long-term current use of insulin ([address]) - Primary    Relevant Orders    POCT hemoglobin A1c (Completed)    Comprehensive metabolic panel    Diabetic polyneuropathy associated with type 2 diabetes mellitus ([address])       Respiratory    Sleep apnea       Cardiovascular and Mediastinum    Chronic migraine without aura without status migrainosus, not intractable       Musculoskeletal and Integument    DDD (degenerative disc disease), lumbar       Other    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 psychotic features ([address])    Generalized anxiety disorder    Elevated LFTs    Well adult exam    Dyslipidemia    Relevant Orders    Comprehensive metabolic panel    Lipid panel     Other Visit Diagnoses       Encounter for immunization        Relevant Orders    influenza vaccine, quadrivalent, 0.5 mL, preservative-free, for adult and pediatric patients 6 mos+ (AFLURIA, FLUARIX, FLULAVAL, FLUZONE) (Completed)    Encounter for screening mammogram for malignant neoplasm of breast        Relevant Orders    Mammo screening bilateral w 3d & cad            Immunizations and preventive care screenings were discussed with patient today. Appropriate education was printed on patient's after visit summary. Counseling:  Alcohol/drug use: discussed moderation in alcohol intake, the recommendations for healthy alcohol use, and avoidance of illicit drug use. Dental Health: discussed importance of regular tooth brushing, flossing, and dental visits. Injury prevention: discussed safety/seat belts, safety helmets, smoke detectors, carbon dioxide detectors, and smoking near bedding or upholstery. Sexual health: discussed sexually transmitted diseases, partner selection, use of condoms, avoidance of unintended pregnancy, and contraceptive alternatives. Exercise: the importance of regular exercise/physical activity was discussed. Recommend exercise 3-5 times per week for at least 30 minutes. Return in about 6 months (around 5/7/2024). Chief Complaint:     Chief Complaint   Patient presents with    Physical Exam     Annual check up       History of Present Illness:     Adult Annual Physical     Patient here for a comprehensive physical exam, 3 month follow-up visit for chronic medical conditions. PMHx: Type 2 DM, peripheral neuropathy, Dyslipidemia, Obesity, migraine  headaches, Fibromyalgia, CAROL, Depression, chronic low back pain, degenerative spondylosis, OA, Vit D deficiency. Reviewed current medications, blood test results from October 23, 2023.       TSH 5.981, Cholesterol 194, triglycerides 254, HDL 38, . Type 2 DM - currently taking Metformin 500 mg 1 tablet twice daily with meals. Blood sugar in the afternoon averaging 130-135. Denies hypoglycemic episodes. C/o tingling, numbness, burning sensation in feet. Patient was previously prescribed Gabapentin 300 mg at bedtime by psychiatry [id number]. Depression/ CAROL - patient was hospitalized last month for severe depression with psychotic features, overdosed on multiple medication in suicidal attempt. Patient has follow-up visit with psychiatry Dr. Yana Almazan tomorrow to review medical therapy. Reports no SI, HI. Patient was seen by neurosurgical PA-C today for evaluation of chronic low back pain. EMG done on October 4, 2023 showed chronic L5 radiculopathy on the right. Coexistent underlying sensory neuropathy with axonal changes likely secondary to the diabetes. Patient was recommended to start physical therapy for 6 weeks, then schedule MRI of the lumbar spine as ordered by pain management. Patient had normal mammogram in October 2022. Colonoscopy performed by Dr. Raúl Villegas in March 2023, 1 polyp was removed. Patient was recommended to repeat colonoscopy in 10 years. Reports no family history of colon cancer, breast cancer. S/P hysterectomy for endometrial hyperplasia performed by gynecology Dr. Sandhya Ragsdale in May 2023. Pathology was negative for malignancy. Diet and Physical Activity  Diet/Nutrition: diabetic diet, low fat diet, consuming 3-5 servings of fruits/vegetables daily, and adequate fiber intake. Exercise: walking and 3-4 times a week on average.       Depression Screening  PHQ-2/9 Depression Screening    Little interest or pleasure in doing things: 1 - several days  Feeling down, depressed, or hopeless: 1 - several days  Trouble falling or staying asleep, or sleeping too much: 3 - nearly every day  Feeling tired or having little energy: 3 - nearly every day  Poor appetite or overeatin - not at all  Feeling bad about yourself - or that you are a failure or have let yourself or your family down: 0 - not at all  Trouble concentrating on things, such as reading the newspaper or watching television: 2 - more than half the days  Moving or speaking so slowly that other people could have noticed. Or the opposite - being so fidgety or restless that you have been moving around a lot more than usual: 0 - not at all  Thoughts that you would be better off dead, or of hurting yourself in some way: 0 - not at all  PHQ-9 Score: 10   PHQ-9 Interpretation: Moderate depression          General Health  Sleep: sleeps poorly. Hearing: normal - bilateral.  Vision: no vision problems. Dental: regular dental visits. /GYN Health  Patient is: S/P hysterectomy in 2023. Last menstrual period: 2023. Contraceptive method: S/P hysterectomy. Advanced Care Planning  Do you have an advanced directive? no  Do you have a durable medical power of ? no     Review of Systems:     Review of Systems   Constitutional:  Positive for fatigue. Negative for activity change, appetite change, chills and fever. HENT:  Negative for congestion, ear pain, sore throat and trouble swallowing. Eyes:  Negative for pain, discharge, redness, itching and visual disturbance. Respiratory:  Negative for cough, chest tightness, shortness of breath and wheezing. Cardiovascular:  Negative for chest pain, palpitations and leg swelling. Gastrointestinal:  Positive for constipation. Negative for abdominal pain, blood in stool, diarrhea, nausea and vomiting. Genitourinary:  Negative for difficulty urinating, dysuria and hematuria. Musculoskeletal:  Positive for arthralgias and back pain (chronic). Negative for gait problem and joint swelling. Skin:  Negative for rash. Neurological:  Positive for dizziness (with rapid positional changes) and headaches (occasional). Negative for syncope. Numbness, tingling, burning sensation in feet   Psychiatric/Behavioral:  Positive for decreased concentration, dysphoric mood and sleep disturbance. Negative for suicidal ideas. Anxiety - reports no anxiety attacks. To stay well-hydrated. Avoid migraine triggers. Past Medical History:     Past Medical History:   Diagnosis Date    Anxiety     Blood transfusion declined because patient is Quaker 05/01/2023    Chronic pain disorder     Chronic sinusitis     Depression     Depression     Diabetes ([address])     Fibromyalgia     Migraine     Obesity     Polyarthritis     Last assessed 9/21/2015    Psychiatric disorder     Psychiatric illness     Sleep difficulties       Past Surgical History:     Past Surgical History:   Procedure Laterality Date    COLONOSCOPY  06/2019    DENTAL SURGERY      HYSTEROSCOPY      Endometrial Biopsy By Hysteroscopy    NC LAPS SUPRACRV HYSTERECT 250 GM/< RMVL TUBE/OVAR N/A 5/1/2023    Procedure: ([address]) W/ BILATERAL SALPINGECTOMY, REMOVAL PARAOVARIAN CYST;  Surgeon: Sonia Welsh DO;  Location: [facility];  Service: Gynecology    REMOVAL OF INTRAUTERINE DEVICE (IUD)      US GUIDED BREAST BIOPSY RIGHT COMPLETE Right 6/17/2019      Social History:     Social History     Socioeconomic History    Marital status: Single     Spouse name: None    Number of children: 0    Years of education: 12 years     Highest education level: GED or equivalent   Occupational History    Occupation: unemployed   Tobacco Use    Smoking status: Never     Passive exposure: Never    Smokeless tobacco: Never    Tobacco comments:     N/A, non-smoker.    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; sober since 2010    Drug use: Not Currently    Sexual activity: Not Currently   Other Topics Concern    None   Social History Narrative    Caffeine use        What type of home do you live in: Single house    Age of your home: 48 yrs    How long have you been living there: 42 yrs    Type of heat: Baseboard    Type of fuel: Electric    What type of samir is in your bedroom: Carpet    Do you have the following in or near your home:    Air products: Window air conditioning and Ionic air purifier    Pests: Mice    Pets: Cat    Basement: None     Social Determinants of Health     Financial Resource Strain: High Risk (12/2/2020)    Overall Financial Resource Strain (CARDIA)     Difficulty of Paying Living Expenses: Hard   Food Insecurity: No Food Insecurity (10/16/2023)    Hunger Vital Sign     Worried About Running Out of Food in the Last Year: Never true     Ran Out of Food in the Last Year: Never true   Transportation Needs: No Transportation Needs (10/16/2023)    PRAPARE - Transportation     Lack of Transportation (Medical): No     Lack of Transportation (Non-Medical): No   Physical Activity: Insufficiently Active (10/12/2022)    Exercise Vital Sign     Days of Exercise per Week: 2 days     Minutes of Exercise per Session: 60 min   Stress: Stress Concern Present (4/3/2019)    [facility]     Feeling of Stress : To some extent   Social Connections:  Moderately Integrated (4/3/2019)    Social Connection and Isolation Panel [NHANES]     Frequency of Communication with Friends and Family: More than three times a week     Frequency of Social Gatherings with Friends and Family: More than three times a week     Attends Sikh Services: More than 4 times per year     Active Member of [employer] Group or Organizations: Yes     Attends Club or Organization Meetings: More than 4 times per year     Marital Status: Never    Intimate Partner Violence: Not At Risk (4/3/2019)    Humiliation, Afraid, Rape, and Kick questionnaire     Fear of Current or Ex-Partner: No     Emotionally Abused: No     Physically Abused: No     Sexually Abused: No   Housing Stability: Low Risk  (10/16/2023)    Housing Stability Vital Sign     Unable to Pay for Housing in the Last Year: No     Number of Places Lived in the Last Year: 1     Unstable Housing in the Last Year: No      Family History:     Family History   Problem Relation Age of Onset    Irregular heart beat Mother     Diabetes Father     Kidney disease Father     Diabetes Maternal Grandmother     Rheum arthritis Maternal Grandmother     Melanoma Maternal Grandmother 80    No Known Problems Maternal Grandfather     Kidney disease Paternal Grandmother     Rheum arthritis Paternal Grandmother     Depression Paternal Grandmother     Diabetes Paternal Grandfather     Lung cancer Paternal Grandfather 52    Substance Abuse Brother     No Known Problems Brother     Substance Abuse Maternal Uncle     Prostate cancer Maternal Uncle 61    Depression Paternal Aunt     Alcohol abuse Family     Stomach cancer Family       Current Medications:     Current Outpatient Medications   Medication Sig Dispense Refill    Blood Glucose Monitoring Suppl (Contour Blood Glucose System) w/Device KIT Use 1 kit 2 (two) times a day before meals 1 kit 0    celecoxib (CeleBREX) 200 mg capsule Take 1 capsule (200 mg total) by mouth 2 (two) times a day 60 capsule 6    glucose blood (Contour Test) test strip USE TO CHECK BLOOD SUGAR ONCE DAILY 100 strip 3    metFORMIN (GLUCOPHAGE) 500 mg tablet TAKE 1 TABLET BY MOUTH TWICE A DAY WITH MEALS 180 tablet 2    mirtazapine (REMERON) 15 mg tablet Take 1 tablet (15 mg total) by mouth daily at bedtime 30 tablet 0    nortriptyline (PAMELOR) 75 MG capsule TAKE 1 CAPSULE BY MOUTH TWICE A DAY 60 capsule 2    phentermine (ADIPEX-P) 37.5 MG tablet Take 1 tablet (37.5 mg total) by mouth every morning 30 tablet 2     No current facility-administered medications for this visit. Allergies:      Allergies   Allergen Reactions    Cannabidiol Shortness Of Breath, Itching, Swelling, Anxiety, Palpitations, Confusion, Hypertension, Throat Swelling and Tongue Swelling    Amoxicillin-Pot Clavulanate Hives    Decadrol [Dexamethasone] Other (See Comments)     psychosis    Penicillins Hives     Hives/Uticaria    Tetracyclines & Related Hives      Allergy; Physical Exam:     /70 (BP Location: Left arm, Patient Position: Sitting, Cuff Size: Large)   Pulse 94   Temp 98 °F (36.7 °C) (Tympanic)   Resp 18   Ht 5' (1.524 m)   Wt 80.9 kg (178 lb 6.4 oz)   SpO2 98%   BMI 34.84 kg/m²     Physical Exam  Vitals and nursing note reviewed. Constitutional:       Appearance: Normal appearance. She is obese. HENT:      Head: Normocephalic and atraumatic. Eyes:      Conjunctiva/sclera: Conjunctivae normal.      Pupils: Pupils are equal, round, and reactive to light. Neck:      Vascular: No carotid bruit. Cardiovascular:      Rate and Rhythm: Regular rhythm. Tachycardia present. Heart sounds: No murmur heard. Pulmonary:      Effort: Pulmonary effort is normal.      Breath sounds: Normal breath sounds. Abdominal:      General: Bowel sounds are normal. There is no distension. Palpations: Abdomen is soft. Tenderness: There is no abdominal tenderness. Musculoskeletal:      Cervical back: Normal range of motion and neck supple. Right lower leg: No edema. Left lower leg: No edema. Comments: Low back: midline spinal tenderness. Skin:     General: Skin is warm and dry. Findings: No rash. Neurological:      General: No focal deficit present. Mental Status: She is alert and oriented to person, place, and time. Motor: No weakness.       Gait: Gait normal.   Psychiatric:      Comments: Reports some improvement in mood since psychiatric admission in October            Kristy Tabares MD  [address] [place] [address]

## 2023-11-08 ENCOUNTER — TELEMEDICINE (OUTPATIENT)
Dept: PSYCHIATRY | Facility: CLINIC | Age: 43
End: 2023-11-08
Payer: COMMERCIAL

## 2023-11-08 DIAGNOSIS — F33.3 SEVERE EPISODE OF RECURRENT MAJOR DEPRESSIVE DISORDER, WITH PSYCHOTIC FEATURES (HCC): Primary | ICD-10-CM

## 2023-11-08 DIAGNOSIS — F41.1 GENERALIZED ANXIETY DISORDER: ICD-10-CM

## 2023-11-08 DIAGNOSIS — F32.A DEPRESSION, UNSPECIFIED DEPRESSION TYPE: ICD-10-CM

## 2023-11-08 PROCEDURE — 99213 OFFICE O/P EST LOW 20 MIN: CPT | Performed by: PSYCHIATRY & NEUROLOGY

## 2023-11-08 RX ORDER — GABAPENTIN 300 MG/1
300 CAPSULE ORAL 3 TIMES DAILY
Qty: 30 CAPSULE | Refills: 2 | Status: SHIPPED | OUTPATIENT
Start: 2023-11-08

## 2023-11-08 RX ORDER — MIRTAZAPINE 30 MG/1
30 TABLET, FILM COATED ORAL
Qty: 30 TABLET | Refills: 2 | Status: SHIPPED | OUTPATIENT
Start: 2023-11-08 | End: 2024-02-06

## 2023-11-08 NOTE — ASSESSMENT & PLAN NOTE
Patient continues with chronic low back pain, was seen today by neurosurgical PA-C today who recommended to start physical therapy for 6 weeks, then schedule MRI of the lumbar spine as ordered by pain management Dr. Prince Soto.

## 2023-11-08 NOTE — ASSESSMENT & PLAN NOTE
Recommended to follow a low-cholesterol, low-fat diet. Encouraged regular exercise as tolerated. Recheck lipid panel in 3 months.

## 2023-11-08 NOTE — ASSESSMENT & PLAN NOTE
Patient was hospitalized last month for severe depression with psychotic features, overdosed on multiple medication in suicidal attempt. Currently taking Nortriptyline 75 mg twice daily, Remeron 15 mg at bedtime. PHQ 9 score today is 10. Follow-up with psychiatry Dr. Shant Hammond tomorrow to discuss medical therapy.

## 2023-11-08 NOTE — ASSESSMENT & PLAN NOTE
Lab Results   Component Value Date    HGBA1C 6.3 11/07/2023     Stressed the importance of keeping blood sugar under control to prevent worsening neurological complications. Consider restarting Gabapentin 300 mg at bedtime as prescribed previously by psychiatry Dr. Otto Romo. Patient will discuss tomorrow with Dr. Otto Romo if appropriate to restart Gabapentin.

## 2023-11-08 NOTE — ASSESSMENT & PLAN NOTE
Recommended to stay well hydrated. Avoid migraine triggers. Follow-up with Marianela. Ismael's neurology.

## 2023-11-08 NOTE — PSYCH
Virtual Regular Visit    Verification of patient location:    Patient is located at Home in the following state in which I hold an active license [place]      Assessment/Plan:    Problem List Items Addressed This Visit          Other    Generalized anxiety disorder    Relevant Medications    mirtazapine (REMERON) 30 mg tablet    gabapentin (Neurontin) 300 mg capsule    Severe episode of recurrent major depressive disorder, with psychotic features (HCC) - Primary    Relevant Medications    mirtazapine (REMERON) 30 mg tablet     Other Visit Diagnoses       Depression, unspecified depression type        Relevant Medications    mirtazapine (REMERON) 30 mg tablet                      Reason for visit is   Chief Complaint   Patient presents with    Virtual Regular Visit          Encounter provider Moraima Villa MD    Provider located at [address] [place] [id number] [phone]      Recent Visits  Date Type Provider Dept   11/07/23 Office Visit Colin Fan MD [facility] recent visits within past 7 days and meeting all other requirements  Today's Visits  Date Type Provider Dept   11/08/23 4564 MD Rosemarie Saleh today's visits and meeting all other requirements  Future Appointments  No visits were found meeting these conditions. Showing future appointments within next 150 days and meeting all other requirements       The patient was identified by name and date of birth. Mathew Escalera was informed that this is a telemedicine visit and [facility] She agrees to proceed. .  My office door was closed. No one else was in the room. She acknowledged consent and understanding of privacy and security of the video platform.  The patient has agreed to participate and understands they can discontinue the visit at any time. Patient is aware this is a billable service. Subjective  Brandy Collins is a 37 y.o. female with MDD and CAROL . Patient remains compliant with medications and denies side effects. She continues to manage her diabetes with medication and diet. She reports ongoing issues  with fatigue, increase appetite and weight gain of 15 pounds, trouble sleeping and worsening physical pain. She stated that dose  increase on  Gabapentin to 600 mg po tid had been helpful for neuropathy but the medication was discontinued after recent [facility] admission. .   She stated at home stressors at high because her father was started on home dyalisis and he will likely need kidney transplant. She agrees to consult PCP about changing DM2 medications for newer options that promote satiety and weight loss. She agrees to continue phentermine to help with appetite supression d weight loss until she sees her PCP. She stated that due to her severe pain she ended up taking OD on medications and was hospitalized at [facility] . She mentioned she lost a close friend recently of sudden heart attack and she was feeling hopeless and helpless. She is now back on scheduled weekly therapy   She continues to meet with pain management  She is consulting neurosurgery and had recent MRI  She is reporting poor sleep. While at the hospital her Pristiq was discontinued and she was started on Mirtazapine. She will like to try dose increase on Mirtazapine since she continues to report depressed mood. She is also requesting to restart Gabapentin for neuropathy and agree to start 300 mg po qhs to help with sleep as well. Will schedule follow in 4-6 weeks.   Denies current SI.         HPI     Past Medical History:   Diagnosis Date    Anxiety     Blood transfusion declined because patient is Orthodoxy 05/01/2023    Chronic pain disorder     Chronic sinusitis     Depression     Depression     Diabetes ([address]) Fibromyalgia     Migraine     Obesity     Polyarthritis     Last assessed 9/21/2015    Psychiatric disorder     Psychiatric illness     Sleep difficulties        Past Surgical History:   Procedure Laterality Date    COLONOSCOPY  06/2019    DENTAL SURGERY      HYSTEROSCOPY      Endometrial Biopsy By Hysteroscopy    SD LAPS SUPRACRV HYSTERECT 250 GM/< RMVL TUBE/OVAR N/A 5/1/2023    Procedure: [facility]) W/ BILATERAL SALPINGECTOMY, REMOVAL PARAOVARIAN CYST;  Surgeon: Farzana Roy DO;  Location: [facility];  Service: Gynecology    REMOVAL OF INTRAUTERINE DEVICE (IUD)      US GUIDED BREAST BIOPSY RIGHT COMPLETE Right 6/17/2019       Current Outpatient Medications   Medication Sig Dispense Refill    gabapentin (Neurontin) 300 mg capsule Take 1 capsule (300 mg total) by mouth 3 (three) times a day 30 capsule 2    mirtazapine (REMERON) 30 mg tablet Take 1 tablet (30 mg total) by mouth daily at bedtime 30 tablet 2    Blood Glucose Monitoring Suppl (Contour Blood Glucose System) w/Device KIT Use 1 kit 2 (two) times a day before meals 1 kit 0    celecoxib (CeleBREX) 200 mg capsule Take 1 capsule (200 mg total) by mouth 2 (two) times a day 60 capsule 6    glucose blood (Contour Test) test strip USE TO CHECK BLOOD SUGAR ONCE DAILY 100 strip 3    metFORMIN (GLUCOPHAGE) 500 mg tablet TAKE 1 TABLET BY MOUTH TWICE A DAY WITH MEALS 180 tablet 2    nortriptyline (PAMELOR) 75 MG capsule TAKE 1 CAPSULE BY MOUTH TWICE A DAY 60 capsule 2    phentermine (ADIPEX-P) 37.5 MG tablet Take 1 tablet (37.5 mg total) by mouth every morning 30 tablet 2     No current facility-administered medications for this visit.         Allergies   Allergen Reactions    Cannabidiol Shortness Of Breath, Itching, Swelling, Anxiety, Palpitations, Confusion, Hypertension, Throat Swelling and Tongue Swelling    Amoxicillin-Pot Clavulanate Hives    Decadrol [Dexamethasone] Other (See Comments)     psychosis    Penicillins Hives     Hives/Uticaria    Tetracyclines & Related Hives      Allergy;         Review of Systems       Mood Anxiety and Depression   Behavior Normal    Thought Content Disturbing Thoughts, Feelings   General Emotional Problems and Decreased Functioning   Personality Normal   Other Psych Symptoms Normal   Constitutional Negative   ENT Negative   Cardiovascular Negative   Respiratory Negative   Gastrointestinal Negative   Genitourinary Negative   Musculoskeletal Negative   Integumentary Negative   Neurological Negative   Endocrine Normal    Other Symptoms Normal        Laboratory Results: Recent Labs (last 12 months):   Office Visit on 11/07/2023   Component Date Value    Hemoglobin A1C 11/07/2023 6.3    Admission on 10/16/2023, Discharged on 10/23/2023   Component Date Value    Sodium 10/19/2023 136     Potassium 10/19/2023 4.0     Chloride 10/19/2023 102     CO2 10/19/2023 23     ANION GAP 10/19/2023 11     BUN 10/19/2023 17     Creatinine 10/19/2023 0.80     Glucose 10/19/2023 158 (H)     Glucose, Fasting 10/19/2023 158 (H)     Calcium 10/19/2023 9.6     AST 10/19/2023 33     ALT 10/19/2023 30     Alkaline Phosphatase 10/19/2023 56     Total Protein 10/19/2023 6.4     Albumin 10/19/2023 4.1     Total Bilirubin 10/19/2023 0.31     eGFR 10/19/2023 89     WBC 10/18/2023 7.15     RBC 10/18/2023 4.22     Hemoglobin 10/18/2023 11.8     Hematocrit 10/18/2023 36.4     MCV 10/18/2023 86     MCH 10/18/2023 28.0     MCHC 10/18/2023 32.4     RDW 10/18/2023 13.8     MPV 10/18/2023 9.0     Platelets 54/46/9734 201     nRBC 10/18/2023 0     Neutrophils Relative 10/18/2023 56     Immat GRANS % 10/18/2023 1     Lymphocytes Relative 10/18/2023 36     Monocytes Relative 10/18/2023 6     Eosinophils Relative 10/18/2023 1     Basophils Relative 10/18/2023 0     Neutrophils Absolute 10/18/2023 4.02     Immature Grans Absolute 10/18/2023 0.04     Lymphocytes Absolute 10/18/2023 2.58     Monocytes Absolute 10/18/2023 0.39     Eosinophils Absolute 10/18/2023 0.09     Basophils Absolute 10/18/2023 0.03     Cholesterol 10/19/2023 194     Triglycerides 10/19/2023 254 (H)     HDL, Direct 10/19/2023 38 (L)     LDL Calculated 10/19/2023 105 (H)     Non-HDL-Chol (CHOL-HDL) 10/19/2023 156     Preg, Serum 10/17/2023 Negative     TSH 3RD GENERATON 10/19/2023 5.982 (H)     Syphilis Total Antibody 10/19/2023 Non-reactive     Vit D, 25-Hydroxy 10/19/2023 37.3     POC Glucose 10/17/2023 159 (H)     POC Glucose 10/17/2023 215 (H)     POC Glucose 10/17/2023 146 (H)     POC Glucose 10/18/2023 155 (H)     POC Glucose 10/18/2023 190 (H)     POC Glucose 10/18/2023 122     POC Glucose 10/19/2023 177 (H)     Free T4 10/19/2023 0.68     POC Glucose 10/19/2023 192 (H)     POC Glucose 10/19/2023 168 (H)     POC Glucose 10/20/2023 169 (H)     POC Glucose 10/20/2023 241 (H)     POC Glucose 10/20/2023 137     POC Glucose 10/21/2023 178 (H)     POC Glucose 10/21/2023 264 (H)     POC Glucose 10/21/2023 141 (H)     POC Glucose 10/22/2023 159 (H)     POC Glucose 10/22/2023 202 (H)     POC Glucose 10/22/2023 108     POC Glucose 10/23/2023 143 (H)    No results displayed because visit has over 200 results.       Telephone on 09/18/2023   Component Date Value    HIV Screen 06/05/2009 Non-Reactive     HIV Confirmation 06/05/2009 Non-Reactive    Appointment on 07/11/2023   Component Date Value    Cholesterol 07/11/2023 202 (H)     Triglycerides 07/11/2023 153 (H)     HDL, Direct 07/11/2023 54     LDL Calculated 07/11/2023 117 (H)     Vitamin B-12 07/11/2023 762     Folate 07/11/2023 >22.3    Office Visit on 07/05/2023   Component Date Value    Creatinine, Ur 07/05/2023 221.0     Albumin,U,Random 07/05/2023 13.7     Albumin Creat Ratio 07/05/2023 6    Admission on 05/01/2023, Discharged on 05/01/2023   Component Date Value    ABO Grouping 05/01/2023 O     Rh Factor 05/01/2023 Positive     EXT Preg Test, Ur 05/01/2023 Negative     Control 05/01/2023 Valid     POC Glucose 05/01/2023 115     Case Report 05/01/2023 Value:Surgical Pathology Report                         Case: [id number]                                   Authorizing Provider:  Sonia Welsh DO      Collected:           05/01/2023 [id number]              Ordering Location:     [address] [place] [address]        Received:            05/01/2023 [facility] Operating Room                                                     Pathologist:           Kaylee Botello MD                                                                           Specimen:    Uterus, Uterus, bilateral fallopian tubes, and paraovarian cyst                            Final Diagnosis 05/01/2023                      Value: This result contains rich text formatting which cannot be displayed here. Additional Information 05/01/2023                      Value: This result contains rich text formatting which cannot be displayed here. Gross Description 05/01/2023                      Value: This result contains rich text formatting which cannot be displayed here. Clinical Information 05/01/2023                      Value:37year old female with endometrial hyperplasia without atypia ([id number]).     POC Glucose 05/01/2023 142 (H)     POC Glucose 05/01/2023 99    Appointment on 04/27/2023   Component Date Value    WBC 04/27/2023 9.78     RBC 04/27/2023 5.09     Hemoglobin 04/27/2023 14.5     Hematocrit 04/27/2023 44.0     MCV 04/27/2023 86     MCH 04/27/2023 28.5     MCHC 04/27/2023 33.0     RDW 04/27/2023 13.6     MPV 04/27/2023 8.8 (L)     Platelets 50/62/4934 291     nRBC 04/27/2023 0     Neutrophils Relative 04/27/2023 63     Immat GRANS % 04/27/2023 0     Lymphocytes Relative 04/27/2023 31     Monocytes Relative 04/27/2023 5     Eosinophils Relative 04/27/2023 1     Basophils Relative 04/27/2023 0     Neutrophils Absolute 04/27/2023 6.12     Immature Grans Absolute 04/27/2023 0.03     Lymphocytes Absolute 04/27/2023 3.02     Monocytes Absolute 04/27/2023 0.51     Eosinophils Absolute 04/27/2023 0.07     Basophils Absolute 04/27/2023 0.03     Hemoglobin A1C 04/27/2023 6.1 (H)     EAG 04/27/2023 128     Sodium 07/11/2023 136     Potassium 07/11/2023 3.9     Chloride 07/11/2023 105     CO2 07/11/2023 26     ANION GAP 07/11/2023 5     BUN 07/11/2023 13     Creatinine 07/11/2023 0.90     Glucose, Fasting 07/11/2023 129 (H)     Calcium 07/11/2023 9.2     AST 07/11/2023 26     ALT 07/11/2023 42     Alkaline Phosphatase 07/11/2023 52     Total Protein 07/11/2023 7.4     Albumin 07/11/2023 4.0     Total Bilirubin 07/11/2023 0.34     eGFR 07/11/2023 77     Hemoglobin A1C 07/11/2023 6.0 (H)     EAG 07/11/2023 126     Creatinine, Ur 07/11/2023 21.2     Albumin,U,Random 07/11/2023 <5.0     Albumin Creat Ratio 07/11/2023 <24     TSH 3RD GENERATON 07/11/2023 3. 741     Vit D, 25-Hydroxy 07/11/2023 52.7    Appointment on 04/24/2023   Component Date Value    ABO Grouping 04/24/2023 O     Rh Factor 04/24/2023 Positive     Antibody Screen 04/24/2023 Negative     Specimen Expiration Date 04/24/2023 [id number]    Hospital Outpatient Visit on 03/13/2023   Component Date Value    EXT Preg Test, Ur 03/13/2023 Negative     Control 03/13/2023 Valid     Case Report 03/13/2023                      Value:Surgical Pathology Report                         Case: [id number]                                   Authorizing Provider:  Gwen Falcon MD         Collected:           03/13/2023 0827              Ordering Location:     [facility]        Received:            03/13/2023 [id number] [facility] Endoscopy                                                     Pathologist:           Ramon Kwong MD                                                                 Specimen:    Polyp, Colorectal, rectal , cold bx                                                        Final Diagnosis 03/13/2023                      Value: This result contains rich text formatting which cannot be displayed here. Additional Information 03/13/2023                      Value: This result contains rich text formatting which cannot be displayed here. Synoptic Checklist 03/13/2023                      Value:                            COLON/RECTUM POLYP FORM - GI - All Specimens                                                                                     :    Other      Gross Description 03/13/2023                      Value: This result contains rich text formatting which cannot be displayed here. There may be more visits with results that are not included.          Substance Abuse History:  Social History     Substance and Sexual Activity   Drug Use Not Currently       Family Psychiatric History:   Family History   Problem Relation Age of Onset    Irregular heart beat Mother     Diabetes Father     Kidney disease Father     Diabetes Maternal Grandmother     Rheum arthritis Maternal Grandmother     Melanoma Maternal Grandmother 80    No Known Problems Maternal Grandfather     Kidney disease Paternal Grandmother     Rheum arthritis Paternal Grandmother     Depression Paternal Grandmother     Diabetes Paternal Grandfather     Lung cancer Paternal Grandfather 52    Substance Abuse Brother     No Known Problems Brother     Substance Abuse Maternal Uncle     Prostate cancer Maternal Uncle 61    Depression Paternal Aunt     Alcohol abuse Family     Stomach cancer Family        The following portions of the patient's history were reviewed and updated as appropriate: allergies, current medications, past family history, past medical history, past social history, past surgical history, and problem list.    Social History     Socioeconomic History    Marital status: Single     Spouse name: Not on file    Number of children: 0    Years of education: 12 years     Highest education level: GED or equivalent   Occupational History    Occupation: unemployed   Tobacco Use    Smoking status: Never     Passive exposure: Never    Smokeless tobacco: Never    Tobacco comments:     N/A, non-smoker. 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; sober since 2010    Drug use: Not Currently    Sexual activity: Not Currently   Other Topics Concern    Not on file   Social History Narrative    Caffeine use        What type of home do you live in: Single house    Age of your home: 50 yrs    How long have you been living there: 42 yrs    Type of heat: Baseboard    Type of fuel: Electric    What type of samir is in your bedroom: Carpet    Do you have the following in or near your home:    Air products: Window air conditioning and Ionic air purifier    Pests: Mice    Pets: Cat    Basement: None     Social Determinants of Health     Financial Resource Strain: High Risk (12/2/2020)    Overall Financial Resource Strain (CARDIA)     Difficulty of Paying Living Expenses: Hard   Food Insecurity: No Food Insecurity (10/16/2023)    Hunger Vital Sign     Worried About Running Out of Food in the Last Year: Never true     [address] in the Last Year: Never true   Transportation Needs: No Transportation Needs (10/16/2023)    PRAPARE - Transportation     Lack of Transportation (Medical): No     Lack of Transportation (Non-Medical): No   Physical Activity: Insufficiently Active (10/12/2022)    Exercise Vital Sign     Days of Exercise per Week: 2 days     Minutes of Exercise per Session: 60 min   Stress: Stress Concern Present (4/3/2019)    [facility]     Feeling of Stress : To some extent   Social Connections:  Moderately Integrated (4/3/2019)    Social Connection and Isolation Panel [NHANES]     Frequency of Communication with Friends and Family: More than three times a week     Frequency of Social Gatherings with Friends and Family: More than three times a week     Attends [employer] Services: More than 4 times per year     Active Member of [employer] or Organizations: Yes     Attends Club or Organization Meetings: More than 4 times per year     Marital Status: Never    Intimate Partner Violence: Not At Risk (4/3/2019)    Humiliation, Afraid, Rape, and Kick questionnaire     Fear of Current or Ex-Partner: No     Emotionally Abused: No     Physically Abused: No     Sexually Abused: No   Housing Stability: [address]  (10/16/2023)    Housing Stability Vital Sign     Unable to Pay for Housing in the Last Year: No     Number of [address] in the Last Year: 1     Unstable Housing in the Last Year: No     Social History     Social History Narrative    Caffeine use        What type of home do you live in: Single house    Age of your home: 48 yrs    How long have you been living there: 44 yrs    Type of heat: Baseboard    Type of fuel: Electric    What type of samir is in your bedroom: Carpet    Do you have the following in or near your home:    Air products: Window air conditioning and Ionic air purifier    Pests: Mice    Pets: Cat    Basement: None       Objective:       Mental status:  Appearance calm and cooperative , adequate hygiene and grooming, and good eye contact    Mood dysphoric   Affect affect was constricted   Speech a normal rate and fluent   Thought Processes coherent/organized and normal thought processes   Hallucinations no hallucinations present    Thought Content no delusions   Abnormal Thoughts no suicidal thoughts  and no homicidal thoughts    Orientation  oriented to person and place and time   Remote Memory short term memory intact and long term memory intact   Attention Span concentration intact   Intellect Appears to be of Average Intelligence   Insight Limited insight   Judgement judgment was limited   Muscle Strength N/a   Language no difficulty naming common objects and no difficulty repeating a phrase    Fund of Knowledge displays adequate knowledge of current events, adequate fund of knowledge regarding past history, and adequate fund of knowledge regarding vocabulary                Assessment/Plan:       Diagnoses and all orders for this visit:    Severe episode of recurrent major depressive disorder, with psychotic features (HCC)    Generalized anxiety disorder  -     gabapentin (Neurontin) 300 mg capsule; Take 1 capsule (300 mg total) by mouth 3 (three) times a day    Depression, unspecified depression type  -     mirtazapine (REMERON) 30 mg tablet; Take 1 tablet (30 mg total) by mouth daily at bedtime            Treatment Recommendations- Risks Benefits      Immediate Medical/Psychiatric/Psychotherapy Treatments and Any Precautions: as stated on HPI    Risks, Benefits And Possible Side Effects Of Medications:  {PSYCH RISK, BENEFITS AND POSSIBLE SIDE EFFECTS (Optional):95097    Controlled Medication Discussion: Discussed with patient Black Box warning on concurrent use of benzodiazepines and opioid medications including sedation, respiratory depression, coma and death. Patient understands the risk of treatment with benzodiazepines in addition to opioids and wants to continue taking those medications. , Discussed with patient the risks of sedation, respiratory depression, impairment of ability to drive and potential for abuse and addiction related to treatment with benzodiazepine medications. The patient understands risk of treatment with benzodiazepine medications, agrees to not drive if feels impaired and agrees to take medications as prescribed. , and The patient has been filling controlled prescriptions on time as prescribed to [address] [facility] [address] program.      Psychotherapy Provided: Individual psychotherapy provided.     Individual psychotherapy provided: Yes  Counseling was provided during the session today for 16 minutes. Medications, treatment progress and treatment plan reviewed with Desiree Alejandro. Medication education provided to Desiree Alejandro. Goals discussed during in session: continue improvement in depression. Coping strategies including compliance with medications, exercising, getting into a good routine, increasing energy, increasing interest in usual activities, increasing motivation, increasing social interaction, maintain healthy diet, maintain heathy sleeping hygiene and maintain positive attitude reviewed with Esther. Importance of medication and treatment compliance reviewed with Esther. Educated on importance of medication and treatment compliance. Supportive therapy provided.                                        Visit Time    Visit Start Time: 1;30  Visit Stop Time: 2;00  Total Visit Duration:  30 minutes

## 2023-11-08 NOTE — ASSESSMENT & PLAN NOTE
Lab Results   Component Value Date    HGBA1C 6.3 11/07/2023     Hb A1C at goal.  Continue Metformin 500 mg one tab. twice daily with meals. Schedule eye exam with ophthalmology.

## 2023-11-10 ENCOUNTER — TELEMEDICINE (OUTPATIENT)
Dept: BEHAVIORAL/MENTAL HEALTH CLINIC | Facility: CLINIC | Age: 43
End: 2023-11-10

## 2023-11-10 DIAGNOSIS — F41.1 GENERALIZED ANXIETY DISORDER: ICD-10-CM

## 2023-11-10 DIAGNOSIS — Z91.199 NO-SHOW FOR APPOINTMENT: ICD-10-CM

## 2023-11-10 DIAGNOSIS — F33.3 SEVERE EPISODE OF RECURRENT MAJOR DEPRESSIVE DISORDER, WITH PSYCHOTIC FEATURES (HCC): Primary | ICD-10-CM

## 2023-11-10 NOTE — PSYCH
No Call. No Show. No Charge    Esther Griffith no showed 11/10/23 appointment. This writer sent email link to visit at 26, texted and called Esther's cell phone at [phone] leaving message about today's visit as well as next scheduled appointment. No response from [facility] by 918. Treatment Plan not due at this session.

## 2023-11-13 ENCOUNTER — TELEPHONE (OUTPATIENT)
Dept: PSYCHIATRY | Facility: CLINIC | Age: 43
End: 2023-11-13

## 2023-11-13 ENCOUNTER — TELEPHONE (OUTPATIENT)
Dept: NEUROSURGERY | Facility: CLINIC | Age: 43
End: 2023-11-13

## 2023-11-13 NOTE — TELEPHONE ENCOUNTER
Reviewed XR. She has degenerative changes in her lumbar spine especially at L5-S1, no fracture or malalignment seen. Continue conservative measures as planned. Please let her know.

## 2023-11-13 NOTE — TELEPHONE ENCOUNTER
NO-SHOW LETTER MAILED TO Karely Lees.   ADDRESS: [address] [place] [id number]

## 2023-11-15 ENCOUNTER — TELEMEDICINE (OUTPATIENT)
Dept: BEHAVIORAL/MENTAL HEALTH CLINIC | Facility: CLINIC | Age: 43
End: 2023-11-15
Payer: COMMERCIAL

## 2023-11-15 DIAGNOSIS — F33.3 SEVERE EPISODE OF RECURRENT MAJOR DEPRESSIVE DISORDER, WITH PSYCHOTIC FEATURES (HCC): Primary | ICD-10-CM

## 2023-11-15 DIAGNOSIS — F41.1 GENERALIZED ANXIETY DISORDER: ICD-10-CM

## 2023-11-15 PROCEDURE — 90834 PSYTX W PT 45 MINUTES: CPT | Performed by: PSYCHIATRY & NEUROLOGY

## 2023-11-15 NOTE — PSYCH
Virtual Regular Visit    Verification of patient location:    Patient is located at Home in the following state in which I hold an active license [place]      Assessment/Plan:    Problem List Items Addressed This Visit          Other    Severe episode of recurrent major depressive disorder, with psychotic features ([address]) - Primary    Generalized anxiety disorder        Reason for visit is   Chief Complaint   Patient presents with    Virtual Regular Visit          Encounter provider HERMELINDA Garber    Provider located at [address] [place] [id number] [phone]      Recent Visits  Date Type Provider Dept   11/13/23 Telephone HERMELINDA Garber [facility] recent visits within past 7 days and meeting all other requirements  Today's Visits  Date Type Provider Dept   11/15/23 [address] [facility], HER[facility]ELINDA [facility] Therapist [facility]   Showing today's visits and meeting all other requirements  Future Appointments  No visits were found meeting these conditions. Showing future appointments within next 150 days and meeting all other requirements       The patient was identified by name and date of birth. Leonidas Sun was informed that this is a telemedicine visit and [facility] She agrees to proceed. .  My office door was closed. No one else was in the room. She acknowledged consent and understanding of privacy and security of the video platform. The patient has agreed to participate and understands they can discontinue the visit at any time. Patient is aware this is a billable service.      HPI     Past Medical History:   Diagnosis Date    Anxiety     Blood transfusion declined because patient is [employer] 05/01/2023    Chronic pain disorder     Chronic sinusitis     Depression     Depression     Diabetes ([address] )     Fibromyalgia     Migraine     Obesity     Polyarthritis     Last assessed 9/21/2015    Psychiatric disorder     Psychiatric illness     Sleep difficulties        Past Surgical History:   Procedure Laterality Date    COLONOSCOPY  06/2019    DENTAL SURGERY      HYSTEROSCOPY      Endometrial Biopsy By Hysteroscopy    TN LAPS SUPRACRV HYSTERECT 250 GM/< RMVL TUBE/OVAR N/A 5/1/2023    Procedure: [facility]) W/ BILATERAL SALPINGECTOMY, REMOVAL PARAOVARIAN CYST;  Surgeon: Zenaida العراقي DO;  Location: [facility];  Service: Gynecology    REMOVAL OF INTRAUTERINE DEVICE (IUD)      US GUIDED BREAST BIOPSY RIGHT COMPLETE Right 6/17/2019       Current Outpatient Medications   Medication Sig Dispense Refill    Blood Glucose Monitoring Suppl (Contour Blood Glucose System) w/Device KIT Use 1 kit 2 (two) times a day before meals 1 kit 0    celecoxib (CeleBREX) 200 mg capsule Take 1 capsule (200 mg total) by mouth 2 (two) times a day 60 capsule 6    gabapentin (Neurontin) 300 mg capsule Take 1 capsule (300 mg total) by mouth 3 (three) times a day 30 capsule 2    glucose blood (Contour Test) test strip USE TO CHECK BLOOD SUGAR ONCE DAILY 100 strip 3    metFORMIN (GLUCOPHAGE) 500 mg tablet TAKE 1 TABLET BY MOUTH TWICE A DAY WITH MEALS 180 tablet 2    mirtazapine (REMERON) 30 mg tablet Take 1 tablet (30 mg total) by mouth daily at bedtime 30 tablet 2    nortriptyline (PAMELOR) 75 MG capsule TAKE 1 CAPSULE BY MOUTH TWICE A DAY 60 capsule 2    phentermine (ADIPEX-P) 37.5 MG tablet Take 1 tablet (37.5 mg total) by mouth every morning 30 tablet 2     No current facility-administered medications for this visit.         Allergies   Allergen Reactions    Cannabidiol Shortness Of Breath, Itching, Swelling, Anxiety, Palpitations, Confusion, Hypertension, Throat Swelling and Tongue Swelling    Amoxicillin-Pot Clavulanate Hives    Decadrol [Dexamethasone] Other (See Comments)     psychosis    Penicillins Hives     Hives/Uticaria Tetracyclines & Related Hives      Allergy; Review of Systems    Video Exam    There were no vitals filed for this visit. Physical Exam     Behavioral Health Psychotherapy Progress Note    Psychotherapy Provided: Individual Psychotherapy     1. Severe episode of recurrent major depressive disorder, with psychotic features ([address])        2. Generalized anxiety disorder            Goals addressed in session: Goal 1, Goal 2, and Goal 3      DATA: Met with Moe Merritt for follow up. Moe Merritt shared that while she has been in a lot of physical pain lately, she continues to be able to focus on the positive supports that she has in her life and feels more "mental clarity" about her appreciation for life now. She talked about recent connections with friends, sharing her hospitalization and what led up to it, and said that everyone has been extremely supportive of her. She has made an action plan with many of them where if she feels like she is starting to feel depressed or negative in any way, she will pray and then reach out to one of her supports. She said that she has many phone numbers in her phone to reach out to, including the [facility] crisis hotline, 65, and this writer's contact info in case she needs support. She also noted that she has promised her family and friends that she will not attempt to take her life again, and said that she has become so much more aware of the positive things in her life that she no longer has the desire to do so. Moe Merritt said that she continues to get out and socialize, has been dog sitting for friends, and has been journaling to get her thoughts out, as ways to cope with her grief and depression. Provided support, validation of Esther's feelings and her significant progress, and used CBT strategies to help her continue to focus on positives, progress and her strengths.     During this session, this clinician used the following therapeutic modalities: Client-centered Therapy, Cognitive Behavioral Therapy, and Supportive Psychotherapy    Substance Abuse was not addressed during this session. If the client is diagnosed with a co-occurring substance use disorder, please indicate any changes in the frequency or amount of use: n/a. Stage of change for addressing substance use diagnoses: No substance use/Not applicable    ASSESSMENT:  Cally Ge presents with a Depressed mood. her affect is Normal range and intensity, which is congruent, with her mood and the content of the session. The client has made progress on their goals. Cally Ge presents with a low risk of suicide, low risk of self-harm, and minimal risk of harm to others. For any risk assessment that surpasses a "low" rating, a safety plan must be developed. A safety plan was indicated: no  If yes, describe in detail n/a    PLAN: Between sessions, Cally Ge will socialize and journal to continue to share her thoughts and feelings, and will prioritize self-care and rest. At the next session, the therapist will use Client-centered Therapy, Cognitive Behavioral Therapy, and Supportive Psychotherapy to address depression and anxiety. Behavioral Health Treatment Plan and Discharge Planning: Cally Ge is aware of and agrees to continue to work on their treatment plan. They have identified and are working toward their discharge goals.  yes    Visit start and stop times:    11/15/23  Start Time: 0803  Stop Time: 5397  Total Visit Time: 46 minutes

## 2023-11-26 ENCOUNTER — HOSPITAL ENCOUNTER (EMERGENCY)
Facility: HOSPITAL | Age: 43
Discharge: HOME | End: 2023-11-26
Attending: EMERGENCY MEDICINE
Payer: COMMERCIAL

## 2023-11-26 VITALS
TEMPERATURE: 98.4 F | HEART RATE: 100 BPM | RESPIRATION RATE: 18 BRPM | SYSTOLIC BLOOD PRESSURE: 156 MMHG | WEIGHT: 180 LBS | DIASTOLIC BLOOD PRESSURE: 98 MMHG | BODY MASS INDEX: 35.15 KG/M2 | OXYGEN SATURATION: 100 %

## 2023-11-26 DIAGNOSIS — M54.9 BACK PAIN: Primary | ICD-10-CM

## 2023-11-26 LAB
BILIRUB UR QL STRIP: NEGATIVE
CLARITY UR: CLEAR
COLOR UR: YELLOW
GLUCOSE UR STRIP-MCNC: NEGATIVE MG/DL
HGB UR QL STRIP.AUTO: NEGATIVE
KETONES UR STRIP-MCNC: NEGATIVE MG/DL
LEUKOCYTE ESTERASE UR QL STRIP: NEGATIVE
NITRITE UR QL STRIP: NEGATIVE
PH UR STRIP.AUTO: 5.5 [PH] (ref 4.5–8)
PROT UR STRIP-MCNC: NEGATIVE MG/DL
SP GR UR STRIP.AUTO: <=1.005 (ref 1–1.03)
UROBILINOGEN UR QL STRIP.AUTO: 0.2 E.U./DL

## 2023-11-26 PROCEDURE — 99282 EMERGENCY DEPT VISIT SF MDM: CPT

## 2023-11-26 PROCEDURE — 96372 THER/PROPH/DIAG INJ SC/IM: CPT

## 2023-11-26 PROCEDURE — 99284 EMERGENCY DEPT VISIT MOD MDM: CPT | Performed by: EMERGENCY MEDICINE

## 2023-11-26 PROCEDURE — 81003 URINALYSIS AUTO W/O SCOPE: CPT

## 2023-11-26 RX ORDER — METHOCARBAMOL 500 MG/1
500 TABLET, FILM COATED ORAL 2 TIMES DAILY
Qty: 20 TABLET | Refills: 0 | Status: SHIPPED | OUTPATIENT
Start: 2023-11-26

## 2023-11-26 RX ORDER — KETOROLAC TROMETHAMINE 30 MG/ML
15 INJECTION, SOLUTION INTRAMUSCULAR; INTRAVENOUS ONCE
Status: COMPLETED | OUTPATIENT
Start: 2023-11-26 | End: 2023-11-26

## 2023-11-26 RX ORDER — METHOCARBAMOL 500 MG/1
500 TABLET, FILM COATED ORAL ONCE
Status: COMPLETED | OUTPATIENT
Start: 2023-11-26 | End: 2023-11-26

## 2023-11-26 RX ORDER — ACETAMINOPHEN 325 MG/1
975 TABLET ORAL ONCE
Status: COMPLETED | OUTPATIENT
Start: 2023-11-26 | End: 2023-11-26

## 2023-11-26 RX ORDER — LIDOCAINE 50 MG/G
1 PATCH TOPICAL ONCE
Status: DISCONTINUED | OUTPATIENT
Start: 2023-11-26 | End: 2023-11-26 | Stop reason: HOSPADM

## 2023-11-26 RX ADMIN — KETOROLAC TROMETHAMINE 15 MG: 30 INJECTION, SOLUTION INTRAMUSCULAR; INTRAVENOUS at 14:00

## 2023-11-26 RX ADMIN — LIDOCAINE 5% 1 PATCH: 700 PATCH TOPICAL at 14:00

## 2023-11-26 RX ADMIN — ACETAMINOPHEN 975 MG: 325 TABLET, FILM COATED ORAL at 14:01

## 2023-11-26 RX ADMIN — METHOCARBAMOL 500 MG: 500 TABLET ORAL at 14:00

## 2023-11-26 NOTE — ED ATTENDING ATTESTATION
11/26/2023  I, Lennie Dick MD, saw and evaluated the patient. I have discussed the patient with the resident/non-physician practitioner and agree with the resident's/non-physician practitioner's findings, Plan of Care, and MDM as documented in the resident's/non-physician practitioner's note, except where noted. All available labs and Radiology studies were reviewed. I was present for key portions of any procedure(s) performed by the resident/non-physician practitioner and I was immediately available to provide assistance. At this point I agree with the current assessment done in the Emergency Department.   I have conducted an independent evaluation of this patient a history and physical is as follows:  Pt has back pain radiating into buttocks bilaterally  Pt chronically has symptoms but worse recently no weakness no numbness Felt she had to strain to urinate X2 Recently back pain has worsened Pt has no dysuria no fevers no abd pain no nv PE: alert heart reg lungs clear abd soft nontender back mild tenderness neuro motor 5/5 sensation wnl dtrs 2+ bilat MDM: will do post void residual check ua  ED Course         Critical Care Time  Procedures

## 2023-11-26 NOTE — DISCHARGE INSTRUCTIONS
You were seen in the emergency department today for back pain. Your testing showed no evidence of urinary tract infection. Follow up with your primary care provider. Follow up with [facility] if your pain does not improve. Take Tylenol / Ibuprofen as needed following the instructions on the bottle as needed for back pain. You can also use lidocaine patches. Return to the emergency department for any new or concerning symptoms including worsening pain, urinary retention / incontinence. Thank you for choosing [facility] for your care today.

## 2023-11-26 NOTE — ED PROVIDER NOTES
History  Chief Complaint   Patient presents with    Back Pain     Pt has a c/o increased pain with her lower back pain. Pt states she normally has numbness in her feet but now has c/o numbness in her fingers. Pt also has c/o urinary retention. Pt states she has to push a lot to go. HPI  Patient is a 37 y.o. female with history of diabetes presenting to the emergency department for lower back pain. Patient states that she has had back pain for several weeks. Patient also complains of having tingling in her feet and fingers. She states that this has been present for a while. Patient states that she came to the emergency department today because she had urinary retention and she was worried this was related to her back pain. Patient denies have any fevers, chills, dysuria, hematuria. Patient also denies any recent falls or injuries. Prior to Admission Medications   Prescriptions Last Dose Informant Patient Reported? Taking?    Blood Glucose Monitoring Suppl (Contour Blood Glucose System) w/Device KIT   No No   Sig: Use 1 kit 2 (two) times a day before meals   celecoxib (CeleBREX) 200 mg capsule   No No   Sig: Take 1 capsule (200 mg total) by mouth 2 (two) times a day   gabapentin (Neurontin) 300 mg capsule   No No   Sig: Take 1 capsule (300 mg total) by mouth 3 (three) times a day   glucose blood (Contour Test) test strip   No No   Sig: USE TO CHECK BLOOD SUGAR ONCE DAILY   metFORMIN (GLUCOPHAGE) 500 mg tablet   No No   Sig: TAKE 1 TABLET BY MOUTH TWICE A DAY WITH MEALS   mirtazapine (REMERON) 30 mg tablet   No No   Sig: Take 1 tablet (30 mg total) by mouth daily at bedtime   nortriptyline (PAMELOR) 75 MG capsule   No No   Sig: TAKE 1 CAPSULE BY MOUTH TWICE A DAY   phentermine (ADIPEX-P) 37.5 MG tablet   No No   Sig: Take 1 tablet (37.5 mg total) by mouth every morning      Facility-Administered Medications: None       Past Medical History:   Diagnosis Date    Anxiety     Blood transfusion declined because patient is [employer] 05/01/2023    Chronic pain disorder     Chronic sinusitis     Depression     Depression     Diabetes ([address])     Fibromyalgia     Migraine     Obesity     Polyarthritis     Last assessed 9/21/2015    Psychiatric disorder     Psychiatric illness     Sleep difficulties        Past Surgical History:   Procedure Laterality Date    COLONOSCOPY  06/2019    DENTAL SURGERY      HYSTEROSCOPY      Endometrial Biopsy By Hysteroscopy    CO LAPS SUPRACRV HYSTERECT 250 GM/< RMVL TUBE/OVAR N/A 5/1/2023    Procedure: ([address]) W/ BILATERAL SALPINGECTOMY, REMOVAL PARAOVARIAN CYST;  Surgeon: Rodney Castillo DO;  Location: [facility];  Service: Gynecology    REMOVAL OF INTRAUTERINE DEVICE (IUD)      US GUIDED BREAST BIOPSY RIGHT COMPLETE Right 6/17/2019       Family History   Problem Relation Age of Onset    Irregular heart beat Mother     Diabetes Father     Kidney disease Father     Diabetes Maternal Grandmother     Rheum arthritis Maternal Grandmother     Melanoma Maternal Grandmother 80    No Known Problems Maternal Grandfather     Kidney disease Paternal Grandmother     Rheum arthritis Paternal Grandmother     Depression Paternal Grandmother     Diabetes Paternal Grandfather     Lung cancer Paternal Grandfather 52    Substance Abuse Brother     No Known Problems Brother     Substance Abuse Maternal Uncle     Prostate cancer Maternal Uncle 61    Depression Paternal Aunt     Alcohol abuse Family     Stomach cancer Family      I have reviewed and agree with the history as documented. E-Cigarette/Vaping    E-Cigarette Use Never User      E-Cigarette/Vaping Substances    Nicotine No     THC No     CBD No      Social History     Tobacco Use    Smoking status: Never     Passive exposure: Never    Smokeless tobacco: Never    Tobacco comments:     N/A, non-smoker.    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 HENT:  Negative for congestion. Eyes:  Negative for redness. Respiratory:  Negative for cough and shortness of breath. Cardiovascular:  Negative for chest pain and palpitations. Gastrointestinal:  Negative for abdominal pain, diarrhea and vomiting. Endocrine: Negative for polyuria. Genitourinary:  Negative for dysuria and hematuria. Musculoskeletal:  Positive for back pain. Skin:  Negative for rash. Neurological:  Negative for light-headedness and headaches. All other systems reviewed and are negative. Physical Exam  ED Triage Vitals [11/26/23 1304]   Temperature Pulse Respirations Blood Pressure SpO2   98.4 °F (36.9 °C) 100 18 156/98 100 %      Temp Source Heart Rate Source Patient Position - Orthostatic VS BP Location FiO2 (%)   Temporal Monitor Sitting Left arm --      Pain Score       8             Orthostatic Vital Signs  Vitals:    11/26/23 1304   BP: 156/98   Pulse: 100   Patient Position - Orthostatic VS: Sitting       Physical Exam  Vitals and nursing note reviewed. Constitutional:       Appearance: Normal appearance. HENT:      Head: Normocephalic and atraumatic. Mouth/Throat:      Mouth: Mucous membranes are moist.   Eyes:      Conjunctiva/sclera: Conjunctivae normal.   Cardiovascular:      Rate and Rhythm: Normal rate and regular rhythm. Pulses: Normal pulses. Heart sounds: Normal heart sounds. Pulmonary:      Effort: Pulmonary effort is normal.      Breath sounds: Normal breath sounds. Abdominal:      Palpations: Abdomen is soft. Tenderness: There is no abdominal tenderness. There is no right CVA tenderness or left CVA tenderness. Musculoskeletal:      Cervical back: Neck supple. Comments: Paraspinal tenderness palpation bilaterally, no midline bony tenderness palpation. Negative straight leg raise bilaterally.   5 out of 5 strength bilateral lower extremities. Reported decreased sensation in bilateral feet. Skin:     General: Skin is warm and dry. Capillary Refill: Capillary refill takes less than 2 seconds. Neurological:      General: No focal deficit present. Mental Status: She is alert. Mental status is at baseline. Psychiatric:         Mood and Affect: Mood normal.         ED Medications  Medications   acetaminophen (TYLENOL) tablet 975 mg (975 mg Oral Given 11/26/23 1401)   ketorolac (TORADOL) injection 15 mg (15 mg Intramuscular Given 11/26/23 1400)   methocarbamol (ROBAXIN) tablet 500 mg (500 mg Oral Given 11/26/23 1400)       Diagnostic Studies  Results Reviewed       Procedure Component Value Units Date/Time    Urine Macroscopic, POC [[id number]] Collected: 11/26/23 1518    Lab Status: Final result Specimen: Urine Updated: 11/26/23 1519     Color, UA Yellow     Clarity, UA Clear     pH, UA 5.5     Leukocytes, UA Negative     Nitrite, UA Negative     Protein, UA Negative mg/dl      Glucose, UA Negative mg/dl      Ketones, UA Negative mg/dl      Urobilinogen, UA 0.2 E.U./dl      Bilirubin, UA Negative     Occult Blood, UA Negative     Specific Gravity, UA <=1.005    Narrative:      CLINITEK RESULT                   No orders to display         Procedures  Procedures      ED Course  ED Course as of 11/30/23 1308   Sun Nov 26, 2023   1547 No evidence of urinary tract infection                             SBIRT 22yo+      Flowsheet Row Most Recent Value   Initial Alcohol Screen: US AUDIT-C     1. How often do you have a drink containing alcohol? 0 Filed at: 11/26/2023 1306   2. How many drinks containing alcohol do you have on a typical day you are drinking? 0 Filed at: 11/26/2023 1306   3a. Male UNDER 65: How often do you have five or more drinks on one occasion? 0 Filed at: 11/26/2023 1306   3b. FEMALE Any Age, or MALE 65+: How often do you have 4 or more drinks on one occassion?  0 Filed at: 11/26/2023 1306   Audit-C Score 0 Filed at: 11/26/2023 1306   NISHANT: How many times in the past year have you. .. Used an illegal drug or used a prescription medication for non-medical reasons? Never Filed at: 11/26/2023 1306                  Medical Decision Making  Risk  OTC drugs. Prescription drug management. Patient is a 37 y.o. female with PMH of diabetes who presents to the ED with back pain with concerns for urinary retention. Vital signs stable, afebrile. On exam patient well-appearing, no focal neurological deficits. History and physical exam most consistent with musculoskeletal back pain. However, differential diagnosis included but not limited to urinary tract infection. If patient does have urinary retention, concern for possible spinal cord injury. Plan: Will obtain postvoid residual volume to see if patient has any urinary retention. UA to evaluate for urinary tract infection    View ED course above for further discussion on patient workup. On review of previous records x-ray lumbar spine completed on 11/7/2023 showed degenerative disc disease L5-S1. All labs reviewed and utilized in the medical decision making process  All radiology studies independently viewed by me and interpreted by the radiologist.  I reviewed all testing with the patient. Upon re-evaluation patient's pain improved. Postvoid residual volume without evidence of urinary retention. Disposition: I have reviewed the patient's vital signs, nursing notes, and other relevant tests/information. I had a detailed discussion with the patient regarding the history, exam findings, and any diagnostic results. Plan to discharge home in stable condition, follow up with [facility]. Discussed with patient who is agreeable to plan.   I discussed discharge instructions, need for follow-up, and oral return precautions for what to return for in addition to the written return precautions and discharge instructions, specifically highlighting areas of special concern. The patient verbalized understanding of the discharge instructions and warnings that would necessitate return to the Emergency Department including worsening pain, urinary retention, fever. All questions the patient had were answered prior to discharge to the best of my ability. Portions of the record may have been created with voice recognition software. Occasional wrong word or "sound a like" substitutions may have occurred due to the inherent limitations of voice recognition software. Read the chart carefully and recognize, using context, where substitutions have occurred. Disposition  Final diagnoses:   Back pain     Time reflects when diagnosis was documented in both MDM as applicable and the Disposition within this note       Time User Action Codes Description Comment    11/26/2023  3:48 PM Chiqui Bolanos Add [M54.9] Back pain           ED Disposition       ED Disposition   Discharge    Condition   Stable    Date/Time   Sun Nov 26, 2023 8918 [id number] [facility] discharge to home/self care.                    Follow-up Information       Follow up With Specialties Details Why Contact Info    Pierre Luke MD Family Medicine Call  As needed [address]  [phone]              Discharge Medication List as of 11/26/2023  3:49 PM        START taking these medications    Details   methocarbamol (ROBAXIN) 500 mg tablet Take 1 tablet (500 mg total) by mouth 2 (two) times a day, Starting Sun 11/26/2023, Normal           CONTINUE these medications which have NOT CHANGED    Details   Blood Glucose Monitoring Suppl (Contour Blood Glucose System) w/Device KIT Use 1 kit 2 (two) times a day before meals, Starting Tue 10/4/2022, Normal      celecoxib (CeleBREX) 200 mg capsule Take 1 capsule (200 mg total) by mouth 2 (two) times a day, Starting Tue 10/24/2023, Normal      gabapentin (Neurontin) 300 mg capsule Take 1 capsule (300 mg total) by mouth 3 (three) times a day, Starting Wed 11/8/2023, Normal      glucose blood (Contour Test) test strip USE TO CHECK BLOOD SUGAR ONCE DAILY, Normal      metFORMIN (GLUCOPHAGE) 500 mg tablet TAKE 1 TABLET BY MOUTH TWICE A DAY WITH MEALS, Normal      mirtazapine (REMERON) 30 mg tablet Take 1 tablet (30 mg total) by mouth daily at bedtime, Starting Wed 11/8/2023, Until Tue 2/6/2024, Normal      nortriptyline (PAMELOR) 75 MG capsule TAKE 1 CAPSULE BY MOUTH TWICE A DAY, Normal      phentermine (ADIPEX-P) 37.5 MG tablet Take 1 tablet (37.5 mg total) by mouth every morning, Starting Tue 9/19/2023, Normal               PDMP Review         Value Time User    PDMP Reviewed  Yes 9/19/2023 10:26 AM Saba Dow MD             ED Provider  Attending physically available and evaluated Huseyin Oro. I managed the patient along with the ED Attending.     Electronically Signed by           Henny Rudolph DO  11/30/23 [id number]

## 2023-11-28 ENCOUNTER — NURSE TRIAGE (OUTPATIENT)
Dept: PHYSICAL THERAPY | Facility: OTHER | Age: 43
End: 2023-11-28

## 2023-11-28 DIAGNOSIS — M54.50 ACUTE EXACERBATION OF CHRONIC LOW BACK PAIN: Primary | ICD-10-CM

## 2023-11-28 DIAGNOSIS — G89.29 ACUTE EXACERBATION OF CHRONIC LOW BACK PAIN: Primary | ICD-10-CM

## 2023-11-28 NOTE — TELEPHONE ENCOUNTER
Additional Information   Negative: Has the patient had unexplained weight loss? Negative: Does the patient have a fever? Negative: Is the patient experiencing urine retention? Negative: Is the patient experiencing acute drop foot or paralysis? Negative: Has the patient experienced major trauma? (fall from height, high speed collision, direct blow to spine) and is also experiencing nausea, light-headedness, or loss of consciousness? Negative: Is the patient experiencing blood in sputum? Affirmative: Is this a chronic condition? Protocols used: [address] [place] [address] Protocol    This RN did review in detail the [facility] and what we can provide for their back pain. Patient is agreeable to being triaged by this RN and would like to proceed with Physical Therapy. Referral was placed for Physical Therapy at the [facility] site. Patients information was sent to the  to make evaluation appointment. Patient made aware that the PT office  will be calling to schedule the appointment. Patient was provided with the phone number to the PT office. No further questions and/or concerns were voiced by the patient at this time. Patient states understanding of the referral that was placed. Referral Closed.

## 2023-11-28 NOTE — TELEPHONE ENCOUNTER
Additional Information   Negative: Is this related to a work injury? Negative: Is this related to an MVA? Negative: Are you currently recieving homecare services? Background - Initial Assessment  Clinical complaint: Pain is bilat low back, radiates down bilat buttocks. At times will have pain radiating down to bilat thighs. Has pre-existing foot numbness from neuropathy. But did have an onset of bilat finger numbness during this back pain. The finger numbness has subsided. HX of this pain for 2.5 years. This flare started 2 weeks ago. No new injury but patient feels it might have been due to her falling 1 month ago while chasing to catch her kitten. Unsure since no immediate pain. Patient is established with PM, Dr Mrack Burnett, has seen Neurosurgery 11/7/23, and has been in PT in 2022. Pt is interested in additional PT services.  Was seen in ED 11/26/23  Date of onset: 2 weeks for this flare  Frequency of pain: constant  Quality of pain: burning, sharp, and stabbing    Protocols used: [facility] Protocol

## 2023-11-29 ENCOUNTER — TELEMEDICINE (OUTPATIENT)
Dept: BEHAVIORAL/MENTAL HEALTH CLINIC | Facility: CLINIC | Age: 43
End: 2023-11-29
Payer: COMMERCIAL

## 2023-11-29 DIAGNOSIS — F41.1 GENERALIZED ANXIETY DISORDER: ICD-10-CM

## 2023-11-29 DIAGNOSIS — F33.3 SEVERE EPISODE OF RECURRENT MAJOR DEPRESSIVE DISORDER, WITH PSYCHOTIC FEATURES (HCC): Primary | ICD-10-CM

## 2023-11-29 PROCEDURE — 90834 PSYTX W PT 45 MINUTES: CPT | Performed by: PSYCHIATRY & NEUROLOGY

## 2023-11-29 NOTE — PSYCH
Virtual Regular Visit    Verification of patient location:    Patient is located at Home in the following state in which I hold an active license [place]      Assessment/Plan:    Problem List Items Addressed This Visit          Other    Severe episode of recurrent major depressive disorder, with psychotic features ([address]) - Primary    Generalized anxiety disorder        Reason for visit is   Chief Complaint   Patient presents with    Virtual Regular Visit          Encounter provider HERMELINDA Stovall    Provider located at [address] [place] [id number] [phone]      Recent Visits  No visits were found meeting these conditions. Showing recent visits within past 7 days and meeting all other requirements  Today's Visits  Date Type Provider Dept   11/29/23 Telemedicine HERMELINDA Stovall [facility] Therapist [facility]   Showing today's visits and meeting all other requirements  Future Appointments  No visits were found meeting these conditions. Showing future appointments within next 150 days and meeting all other requirements       The patient was identified by name and date of birth. Patti Morris was informed that this is a telemedicine visit and that the visit is being conducted throughthe [employer] platform. She agrees to proceed. .  My office door was closed. No one else was in the room. She acknowledged consent and understanding of privacy and security of the video platform. The patient has agreed to participate and understands they can discontinue the visit at any time. Patient is aware this is a billable service.      HPI     Past Medical History:   Diagnosis Date    Anxiety     Blood transfusion declined because patient is Lutheran 05/01/2023    Chronic pain disorder     Chronic sinusitis     Depression     Depression     Diabetes (HCC)     Fibromyalgia     Migraine     Obesity Polyarthritis     Last assessed 9/21/2015    Psychiatric disorder     Psychiatric illness     Sleep difficulties        Past Surgical History:   Procedure Laterality Date    COLONOSCOPY  06/2019    DENTAL SURGERY      HYSTEROSCOPY      Endometrial Biopsy By Hysteroscopy    FL LAPS SUPRACRV HYSTERECT 250 GM/< RMVL TUBE/OVAR N/A 5/1/2023    Procedure: [facility]) W/ BILATERAL SALPINGECTOMY, REMOVAL PARAOVARIAN CYST;  Surgeon: Becca Small DO;  Location: [facility];  Service: Gynecology    REMOVAL OF INTRAUTERINE DEVICE (IUD)      US GUIDED BREAST BIOPSY RIGHT COMPLETE Right 6/17/2019       Current Outpatient Medications   Medication Sig Dispense Refill    Blood Glucose Monitoring Suppl (Contour Blood Glucose System) w/Device KIT Use 1 kit 2 (two) times a day before meals 1 kit 0    celecoxib (CeleBREX) 200 mg capsule Take 1 capsule (200 mg total) by mouth 2 (two) times a day 60 capsule 6    gabapentin (Neurontin) 300 mg capsule Take 1 capsule (300 mg total) by mouth 3 (three) times a day 30 capsule 2    glucose blood (Contour Test) test strip USE TO CHECK BLOOD SUGAR ONCE DAILY 100 strip 3    metFORMIN (GLUCOPHAGE) 500 mg tablet TAKE 1 TABLET BY MOUTH TWICE A DAY WITH MEALS 180 tablet 2    methocarbamol (ROBAXIN) 500 mg tablet Take 1 tablet (500 mg total) by mouth 2 (two) times a day 20 tablet 0    mirtazapine (REMERON) 30 mg tablet Take 1 tablet (30 mg total) by mouth daily at bedtime 30 tablet 2    nortriptyline (PAMELOR) 75 MG capsule TAKE 1 CAPSULE BY MOUTH TWICE A DAY 60 capsule 2    phentermine (ADIPEX-P) 37.5 MG tablet Take 1 tablet (37.5 mg total) by mouth every morning 30 tablet 2     No current facility-administered medications for this visit.         Allergies   Allergen Reactions    Cannabidiol Shortness Of Breath, Itching, Swelling, Anxiety, Palpitations, Confusion, Hypertension, Throat Swelling and Tongue Swelling    Amoxicillin-Pot Clavulanate Hives    Decadrol [Dexamethasone] Other (See Comments) psychosis    Penicillins Hives     Hives/Uticaria    Tetracyclines & Related Hives      Allergy; Review of Systems    Video Exam    There were no vitals filed for this visit. Physical Exam     Behavioral Health Psychotherapy Progress Note    Psychotherapy Provided: Individual Psychotherapy     1. Severe episode of recurrent major depressive disorder, with psychotic features ([address])        2. Generalized anxiety disorder            Goals addressed in session: Goal 1     DATA: Met with Jose Edwards for follow up. Jose Edwards shared that she has been feeling a bit more down than usual the past couple of weeks, as she has had a cold that has made her more tired, her back has been very painful, and she has not been sleeping well. She said that she is also still trying to process her grief over Balbina's death, and has been talking to a group of friends who all knew him. She shared that she has been communicating with an ex-boyfriend who was very close to Elijah, and this has brought up issues from her past  (break up with that boyfriend, mistakes she feels she made, wanting to apologize to him, etc). She noted that the guilt that she feels has been difficult to deal with, and is trying to figure out how to express that to her ex. Discussed her concerns, thought process and feelings about this issue, and used assertive communication skills training to help Jose Edwards identify ways that she could reach out to her ex and express her thoughts. Jose Edwards talked about trying to keep up with household responsibilities, feeling that some are "monumental tasks," so used CBT strategies to help Jose Edwards organize a plan for building motivation to deal with these tasks. During this session, this clinician used the following therapeutic modalities: Client-centered Therapy, Cognitive Behavioral Therapy, and Supportive Psychotherapy    Substance Abuse was not addressed during this session.  If the client is diagnosed with a co-occurring substance use disorder, please indicate any changes in the frequency or amount of use: n/a. Stage of change for addressing substance use diagnoses: No substance use/Not applicable    ASSESSMENT:  Jeffy Xiao presents with a Depressed mood. her affect is Normal range and intensity, which is congruent, with her mood and the content of the session. The client has made progress on their goals. Jeffy Xiao presents with a low risk of suicide, low risk of self-harm, and minimal risk of harm to others. For any risk assessment that surpasses a "low" rating, a safety plan must be developed. A safety plan was indicated: no  If yes, describe in detail n/a    PLAN: Between sessions, Jeffy Xiao will work on daily household tasks (partializing, time limits, etc), will try to communicate her feelings/regrets to ex boyfriend, and will continue to stay engaged socially when she feels able in order to help boost mood. At the next session, the therapist will use Client-centered Therapy, Cognitive Behavioral Therapy, and Supportive Psychotherapy to address depression. Behavioral Health Treatment Plan and Discharge Planning: Jeffy Xiao is aware of and agrees to continue to work on their treatment plan. They have identified and are working toward their discharge goals.  yes    Visit start and stop times:    11/29/23  Start Time: 0805  Stop Time: 8312  Total Visit Time: 42 minutes

## 2023-12-05 ENCOUNTER — TELEMEDICINE (OUTPATIENT)
Dept: BEHAVIORAL/MENTAL HEALTH CLINIC | Facility: CLINIC | Age: 43
End: 2023-12-05
Payer: COMMERCIAL

## 2023-12-05 DIAGNOSIS — F33.3 SEVERE EPISODE OF RECURRENT MAJOR DEPRESSIVE DISORDER, WITH PSYCHOTIC FEATURES (HCC): Primary | ICD-10-CM

## 2023-12-05 DIAGNOSIS — F41.1 GENERALIZED ANXIETY DISORDER: ICD-10-CM

## 2023-12-05 PROCEDURE — 90834 PSYTX W PT 45 MINUTES: CPT | Performed by: PSYCHIATRY & NEUROLOGY

## 2023-12-05 NOTE — PSYCH
Virtual Regular Visit    Verification of patient location:    Patient is located at Home in the following state in which I hold an active license [place]      Assessment/Plan:    Problem List Items Addressed This Visit          Other    Severe episode of recurrent major depressive disorder, with psychotic features ([address]) - Primary    Generalized anxiety disorder        Reason for visit is   Chief Complaint   Patient presents with    Virtual Regular Visit        Encounter provider HERMELINDA Garber    Provider located at [address] [place] [id number] [phone]      Recent Visits  Date Type Provider Dept   11/29/23 [address] [facility], HER[facility]ELINDA [facility] Therapist [facility]   Showing recent visits within past 7 days and meeting all other requirements  Today's Visits  Date Type Provider Dept   12/05/23 [address] [facility], [facility]DA [facility] Therapist [facility]   Showing today's visits and meeting all other requirements  Future Appointments  No visits were found meeting these conditions. Showing future appointments within next 150 days and meeting all other requirements       The patient was identified by name and date of birth. Leonidas Sun was informed that this is a telemedicine visit and [facility] She agrees to proceed. .  My office door was closed. No one else was in the room. She acknowledged consent and understanding of privacy and security of the video platform. The patient has agreed to participate and understands they can discontinue the visit at any time. Patient is aware this is a billable service.      HPI     Past Medical History:   Diagnosis Date    Anxiety     Blood transfusion declined because patient is Religious 05/01/2023    Chronic pain disorder     Chronic sinusitis     Depression     Depression Diabetes ([address])     Fibromyalgia     Migraine     Obesity     Polyarthritis     Last assessed 9/21/2015    Psychiatric disorder     Psychiatric illness     Sleep difficulties        Past Surgical History:   Procedure Laterality Date    COLONOSCOPY  06/2019    DENTAL SURGERY      HYSTEROSCOPY      Endometrial Biopsy By Hysteroscopy    PA LAPS SUPRACRV HYSTERECT 250 GM/< RMVL TUBE/OVAR N/A 5/1/2023    Procedure: [facility]) W/ BILATERAL SALPINGECTOMY, REMOVAL PARAOVARIAN CYST;  Surgeon: Waqas Corona DO;  Location: [facility];  Service: Gynecology    REMOVAL OF INTRAUTERINE DEVICE (IUD)      US GUIDED BREAST BIOPSY RIGHT COMPLETE Right 6/17/2019       Current Outpatient Medications   Medication Sig Dispense Refill    Blood Glucose Monitoring Suppl (Contour Blood Glucose System) w/Device KIT Use 1 kit 2 (two) times a day before meals 1 kit 0    celecoxib (CeleBREX) 200 mg capsule Take 1 capsule (200 mg total) by mouth 2 (two) times a day 60 capsule 6    gabapentin (Neurontin) 300 mg capsule Take 1 capsule (300 mg total) by mouth 3 (three) times a day 30 capsule 2    glucose blood (Contour Test) test strip USE TO CHECK BLOOD SUGAR ONCE DAILY 100 strip 3    metFORMIN (GLUCOPHAGE) 500 mg tablet TAKE 1 TABLET BY MOUTH TWICE A DAY WITH MEALS 180 tablet 2    methocarbamol (ROBAXIN) 500 mg tablet Take 1 tablet (500 mg total) by mouth 2 (two) times a day 20 tablet 0    mirtazapine (REMERON) 30 mg tablet Take 1 tablet (30 mg total) by mouth daily at bedtime 30 tablet 2    nortriptyline (PAMELOR) 75 MG capsule TAKE 1 CAPSULE BY MOUTH TWICE A DAY 60 capsule 2    phentermine (ADIPEX-P) 37.5 MG tablet Take 1 tablet (37.5 mg total) by mouth every morning 30 tablet 2     No current facility-administered medications for this visit.         Allergies   Allergen Reactions    Cannabidiol Shortness Of Breath, Itching, Swelling, Anxiety, Palpitations, Confusion, Hypertension, Throat Swelling and Tongue Swelling    Amoxicillin-Pot Clavulanate Hives    Decadrol [Dexamethasone] Other (See Comments)     psychosis    Penicillins Hives     Hives/Uticaria    Tetracyclines & Related Hives      Allergy; Review of Systems    Video Exam    There were no vitals filed for this visit. Physical Exam     Behavioral Health Psychotherapy Progress Note    Psychotherapy Provided: Individual Psychotherapy     1. Severe episode of recurrent major depressive disorder, with psychotic features ([address])        2. Generalized anxiety disorder            Goals addressed in session: Goal 1     DATA: Met with Jerry Carroll for follow up. Jerry Carroll shared that she continues to feel somewhat depressed because she is in significant pain lately, but noted that she has been trying to keep socially active as well as busy with keeping her apartment clean. She said that she makes a short to-do list daily, which helps her to stay on track and feel productive without overwhelming herself. She also shared that she has been trying to be as helpful as possible with her parents, as they have significant issues to deal with right now, and it helps her to contribute to the household for them. Jerry Carroll said that she has been trying to stay connected to good friends and do what she can with them when she physically feels up to it, and hopes to have more  jobs coming up soon. She talked about missing her friend Kerri Fox, but she said that her grief feels like it is moving forward, in that she is not so deeply sad all the time and now she can remember their good times together and smile about them. She shared that she is trying to reflect on her life, the good things/blessings that she has, and is working on reframing thoughts to be more positive and hopeful, as she does not want to let herself feel so negative and hopeless again.   Provided support, validation of Esther's progress and hard work on her recovery, and used supportive and CBT strategies to help Jerry Carroll continue her work toward her goals. During this session, this clinician used the following therapeutic modalities: Client-centered Therapy, Cognitive Behavioral Therapy, and Supportive Psychotherapy    Substance Abuse was not addressed during this session. If the client is diagnosed with a co-occurring substance use disorder, please indicate any changes in the frequency or amount of use: n/a. Stage of change for addressing substance use diagnoses: No substance use/Not applicable    ASSESSMENT:  Sunni Diez presents with a Euthymic/ normal mood. her affect is Normal range and intensity, which is congruent, with her mood and the content of the session. The client has made progress on their goals. Sunni Diez presents with a low risk of suicide, low risk of self-harm, and minimal risk of harm to others. For any risk assessment that surpasses a "low" rating, a safety plan must be developed. A safety plan was indicated: no  If yes, describe in detail n/a    PLAN: Between sessions, Sunni Diez will continue with positive affirmations and thought reframing, and will continue to socialize with friends to continue to improve mood. At the next session, the therapist will use Client-centered Therapy, Cognitive Behavioral Therapy, and Supportive Psychotherapy to address depression. Behavioral Health Treatment Plan and Discharge Planning: Sunni Diez is aware of and agrees to continue to work on their treatment plan. They have identified and are working toward their discharge goals.  yes    Visit start and stop times:    12/05/23  Start Time: 0901  Stop Time: 4139  Total Visit Time: 40 minutes

## 2023-12-07 ENCOUNTER — TELEPHONE (OUTPATIENT)
Dept: PSYCHIATRY | Facility: CLINIC | Age: 43
End: 2023-12-07

## 2023-12-07 NOTE — TELEPHONE ENCOUNTER
Left voicemail informing patient of the Virtual Psych Encounter form needing to be signed as a requirement from [employer] for billing purposes. Patient can access form via [employer] and sign electronically. Please make patient aware this form must be signed for each virtual visit as a requirement to continue virtual visits with provider.

## 2023-12-08 PROBLEM — Z00.00 WELL ADULT EXAM: Status: RESOLVED | Noted: 2022-08-13 | Resolved: 2023-12-08

## 2023-12-09 DIAGNOSIS — F41.1 GENERALIZED ANXIETY DISORDER: ICD-10-CM

## 2023-12-11 RX ORDER — GABAPENTIN 300 MG/1
300 CAPSULE ORAL 3 TIMES DAILY
Qty: 30 CAPSULE | Refills: 2 | Status: SHIPPED | OUTPATIENT
Start: 2023-12-11

## 2023-12-12 ENCOUNTER — TELEMEDICINE (OUTPATIENT)
Dept: BEHAVIORAL/MENTAL HEALTH CLINIC | Facility: CLINIC | Age: 43
End: 2023-12-12
Payer: COMMERCIAL

## 2023-12-12 DIAGNOSIS — F41.1 GENERALIZED ANXIETY DISORDER: ICD-10-CM

## 2023-12-12 DIAGNOSIS — F33.3 SEVERE EPISODE OF RECURRENT MAJOR DEPRESSIVE DISORDER, WITH PSYCHOTIC FEATURES (HCC): Primary | ICD-10-CM

## 2023-12-12 PROCEDURE — 90834 PSYTX W PT 45 MINUTES: CPT | Performed by: PSYCHIATRY & NEUROLOGY

## 2023-12-12 NOTE — PSYCH
Behavioral Health Psychotherapy Progress Note    Psychotherapy Provided: Individual Psychotherapy     1. Severe episode of recurrent major depressive disorder, with psychotic features ([address])        2. Generalized anxiety disorder            Goals addressed in session: Goal 1     DATA: Met with Raynn Falcon for follow up. Ryann Falcon shared that she has been feeling "shaky" lately, feeling more grief over the death of her friend Dannie Walters, which has brought up memories of the death of their good friend Lenore Kramer who  21 years ago. Ryann Falcon talked about her friendships and the sense of loss she had after Lenore Kramer , the drinking and "bad habits" that her grief led to, and the subsequent punishment by her elders at [employer] and isolation from her friend group. She said that she now has reconnected with Ralf's sister after all these years and has found some peace and self-forgiveness in that, and said that she has learned a lot about herself. She noted that she has a new-found sense of purpose and appreciation for her Jewish, and is more accepting of who she is and how past events have led her to the person she is today. Discussed her feelings about Balbina's death, validated and normalized her grief, and encouraged Esther to continue to reach out to friends, to engage in activities that bring her perfecto, and to have patience with herself as she goes through the grieving process. During this session, this clinician used the following therapeutic modalities: Bereavement Therapy, Client-centered Therapy, Cognitive Behavioral Therapy, and Supportive Psychotherapy    Substance Abuse was not addressed during this session. If the client is diagnosed with a co-occurring substance use disorder, please indicate any changes in the frequency or amount of use: n/a. Stage of change for addressing substance use diagnoses: No substance use/Not applicable    ASSESSMENT:  Tarah Beatty presents with a Labile mood.      her affect is Normal range and intensity and Tearful, which is congruent, with her mood and the content of the session. The client has made progress on their goals. Huseyin Oro presents with a low risk of suicide, low risk of self-harm, and minimal risk of harm to others. For any risk assessment that surpasses a "low" rating, a safety plan must be developed. A safety plan was indicated: no  If yes, describe in detail n/a    PLAN: Between sessions, Huseyin Oro will focus on self-care and socialization with friends to continue to boost mood. At the next session, the therapist will use Client-centered Therapy, Cognitive Behavioral Therapy, and Supportive Psychotherapy to address depression, grief. Behavioral Health Treatment Plan and Discharge Planning: Huseyin Oro is aware of and agrees to continue to work on their treatment plan. They have identified and are working toward their discharge goals.  yes    Visit start and stop times:    12/12/23  Start Time: 0912  Stop Time: 0896  Total Visit Time: 40 minutes

## 2023-12-13 ENCOUNTER — OFFICE VISIT (OUTPATIENT)
Dept: FAMILY MEDICINE CLINIC | Facility: CLINIC | Age: 43
End: 2023-12-13
Payer: COMMERCIAL

## 2023-12-13 VITALS
HEIGHT: 60 IN | TEMPERATURE: 99.3 F | DIASTOLIC BLOOD PRESSURE: 74 MMHG | RESPIRATION RATE: 17 BRPM | WEIGHT: 186.6 LBS | SYSTOLIC BLOOD PRESSURE: 120 MMHG | BODY MASS INDEX: 36.63 KG/M2 | OXYGEN SATURATION: 99 % | HEART RATE: 105 BPM

## 2023-12-13 DIAGNOSIS — B34.9 ACUTE VIRAL SYNDROME: Primary | ICD-10-CM

## 2023-12-13 PROCEDURE — 87636 SARSCOV2 & INF A&B AMP PRB: CPT | Performed by: FAMILY MEDICINE

## 2023-12-13 PROCEDURE — 99213 OFFICE O/P EST LOW 20 MIN: CPT | Performed by: FAMILY MEDICINE

## 2023-12-13 RX ORDER — ALBUTEROL SULFATE 90 UG/1
2 AEROSOL, METERED RESPIRATORY (INHALATION) EVERY 4 HOURS PRN
Qty: 18 G | Refills: 0 | Status: SHIPPED | OUTPATIENT
Start: 2023-12-13

## 2023-12-13 RX ORDER — GUAIFENESIN 600 MG/1
600 TABLET, EXTENDED RELEASE ORAL EVERY 12 HOURS SCHEDULED
Qty: 14 TABLET | Refills: 0 | Status: SHIPPED | OUTPATIENT
Start: 2023-12-13

## 2023-12-13 NOTE — ASSESSMENT & PLAN NOTE
Will check for COVID and Flu by PCR. Recommended to increase fluid intake. Take Tylenol as needed for headache, body aches. Use saline nasal spray, Mucinex 600 mg twice daily for congestion and cough. Prescription sent to the pharmacy for Ventolin HFA inhaler to use 2 puffs every 4-6 hours as needed for chest tightness or wheezing. Call office if symptoms persist or worsen.

## 2023-12-13 NOTE — PROGRESS NOTES
Name: Jame Done      : 1980      MRN: [id number]  Encounter Provider: Thomas Phillips MD  Encounter Date: 2023   Encounter department: [address] [place] [address]    Chief Complaint   Patient presents with    Cold Like Symptoms     Since yesterday      Health Maintenance   Topic Date Due    Pneumococcal Vaccine: Pediatrics (0 to 5 Years) and At-Risk Patients (6 to 59 Years) (1 - PCV) Never done    DM Eye Exam  Never done    PT PLAN OF CARE  10/30/2022    COVID-19 Vaccine ( -  season) 2023    Breast Cancer Screening: Mammogram  10/10/2023    HEMOGLOBIN A1C  2024    Depression Remission PHQ  2024    Kidney Health Evaluation: Albumin/Creatinine Ratio  2024    Diabetic Foot Exam  2024    BMI: Followup Plan  2024    Kidney Health Evaluation: GFR  10/19/2024    Annual Physical  2024    BMI: Adult  2024    DTaP,Tdap,and Td Vaccines (3 - Td or Tdap) 2032    Colorectal Cancer Screening  03/10/2033    HIV Screening  Completed    Hepatitis C Screening  Completed    Influenza Vaccine  Completed    HIB Vaccine  Aged Out    IPV Vaccine  Aged Out    Hepatitis A Vaccine  Aged Out    Meningococcal ACWY Vaccine  Aged Out    HPV Vaccine  Aged Out       Assessment & Plan     1. Acute viral syndrome  Assessment & Plan: Will check for COVID and Flu by PCR. Recommended to increase fluid intake. Take Tylenol as needed for headache, body aches. Use saline nasal spray, Mucinex 600 mg twice daily for congestion and cough. Prescription sent to the pharmacy for Ventolin HFA inhaler to use 2 puffs every 4-6 hours as needed for chest tightness or wheezing. Call office if symptoms persist or worsen. Orders:  -     Covid/Flu- Office Collect  -     albuterol (Ventolin HFA) 90 mcg/act inhaler; Inhale 2 puffs every 4 (four) hours as needed for wheezing  -     guaiFENesin (MUCINEX) 600 mg 12 hr tablet;  Take 1 tablet (600 mg total) by mouth every 12 (twelve) hours           Subjective      Sore Throat   This is a new problem. The current episode started yesterday. The problem has been unchanged. Neither side of throat is experiencing more pain than the other. There has been no fever. The fever has been present for Less than 1 day. The pain is at a severity of 6/10. Associated symptoms include coughing, headaches, a hoarse voice, shortness of breath and swollen glands. Pertinent negatives include no abdominal pain, diarrhea, ear pain, plugged ear sensation or vomiting. She has had no exposure to strep or mono. Patient presents to the office c/o nasal congestion, scratchy throat, headache, body aches, chills, cough, some chest tightness, occasional wheezing, SOB, feeling tired since yesterday. She did rapid antigen COVID test at home this morning which was negative. Denies abdominal pain, nausea, vomiting, diarrhea. Took generic Allegra which made her feel very tired. Review of Systems   Constitutional:  Positive for chills, fatigue and fever (low grade). Negative for activity change and appetite change. HENT:  Positive for hoarse voice and sore throat. Negative for ear pain. Respiratory:  Positive for cough, chest tightness (mild), shortness of breath and wheezing. Gastrointestinal:  Negative for abdominal pain, diarrhea, nausea and vomiting. Neurological:  Positive for headaches. Negative for dizziness. Hematological: Negative.         Current Outpatient Medications on File Prior to Visit   Medication Sig    Blood Glucose Monitoring Suppl (Contour Blood Glucose System) w/Device KIT Use 1 kit 2 (two) times a day before meals    celecoxib (CeleBREX) 200 mg capsule Take 1 capsule (200 mg total) by mouth 2 (two) times a day    gabapentin (NEURONTIN) 300 mg capsule TAKE 1 CAPSULE BY MOUTH THREE TIMES A DAY    glucose blood (Contour Test) test strip USE TO CHECK BLOOD SUGAR ONCE DAILY    metFORMIN (GLUCOPHAGE) 500 mg tablet TAKE 1 TABLET BY MOUTH TWICE A DAY WITH MEALS    methocarbamol (ROBAXIN) 500 mg tablet Take 1 tablet (500 mg total) by mouth 2 (two) times a day    mirtazapine (REMERON) 30 mg tablet Take 1 tablet (30 mg total) by mouth daily at bedtime    nortriptyline (PAMELOR) 75 MG capsule TAKE 1 CAPSULE BY MOUTH TWICE A DAY    phentermine (ADIPEX-P) 37.5 MG tablet Take 1 tablet (37.5 mg total) by mouth every morning       Objective     /74   Pulse 105   Temp 99.3 °F (37.4 °C) (Tympanic)   Resp 17   Ht 5' (1.524 m)   Wt 84.6 kg (186 lb 9.6 oz)   LMP 03/13/2023 (Approximate)   SpO2 99%   BMI 36.44 kg/m²     Physical Exam  Vitals and nursing note reviewed. Constitutional:       Appearance: She is ill-appearing. HENT:      Head: Normocephalic and atraumatic. Right Ear: Tympanic membrane normal.      Left Ear: Tympanic membrane normal.   Eyes:      Conjunctiva/sclera: Conjunctivae normal.      Pupils: Pupils are equal, round, and reactive to light. Cardiovascular:      Rate and Rhythm: Regular rhythm. Tachycardia present. Heart sounds: No murmur heard. Pulmonary:      Effort: Pulmonary effort is normal.      Breath sounds: Normal breath sounds. Abdominal:      General: There is no distension. Palpations: Abdomen is soft. Tenderness: There is no abdominal tenderness. Musculoskeletal:         General: Normal range of motion. Cervical back: Normal range of motion and neck supple. No tenderness. Lymphadenopathy:      Cervical: No cervical adenopathy. Skin:     General: Skin is warm and dry. Findings: No rash. Neurological:      Mental Status: She is alert.    Psychiatric:         Mood and Affect: Mood normal.       Hailey Evangelista MD

## 2023-12-14 DIAGNOSIS — U07.1 COVID-19 VIRUS INFECTION: Primary | ICD-10-CM

## 2023-12-14 DIAGNOSIS — U07.1 COVID-19 VIRUS INFECTION: ICD-10-CM

## 2023-12-14 LAB
FLUAV RNA RESP QL NAA+PROBE: NEGATIVE
FLUBV RNA RESP QL NAA+PROBE: NEGATIVE
SARS-COV-2 RNA RESP QL NAA+PROBE: POSITIVE

## 2023-12-14 RX ORDER — NIRMATRELVIR AND RITONAVIR 300-100 MG
3 KIT ORAL 2 TIMES DAILY
Qty: 30 TABLET | Refills: 0 | Status: SHIPPED | OUTPATIENT
Start: 2023-12-14 | End: 2023-12-14 | Stop reason: SDUPTHER

## 2023-12-14 RX ORDER — NIRMATRELVIR AND RITONAVIR 300-100 MG
3 KIT ORAL 2 TIMES DAILY
Qty: 30 TABLET | Refills: 0 | Status: SHIPPED | OUTPATIENT
Start: 2023-12-14 | End: 2023-12-19

## 2023-12-18 ENCOUNTER — TELEPHONE (OUTPATIENT)
Dept: PSYCHIATRY | Facility: CLINIC | Age: 43
End: 2023-12-18

## 2023-12-18 NOTE — TELEPHONE ENCOUNTER
Patient contacted the office to schedule a follow up visit with provider. Patient is now scheduled for 1/4/2024  at 2 pm virtually.

## 2023-12-18 NOTE — TELEPHONE ENCOUNTER
Called and left message for patient to inform 12/18 230PM was cancelled due to provider being out of the office. Requested return call to reschedule. Please schedule upon return call. Thank you. Previous appt was virtual.

## 2023-12-19 ENCOUNTER — TELEMEDICINE (OUTPATIENT)
Dept: BEHAVIORAL/MENTAL HEALTH CLINIC | Facility: CLINIC | Age: 43
End: 2023-12-19
Payer: COMMERCIAL

## 2023-12-19 DIAGNOSIS — F33.3 SEVERE EPISODE OF RECURRENT MAJOR DEPRESSIVE DISORDER, WITH PSYCHOTIC FEATURES (HCC): ICD-10-CM

## 2023-12-19 DIAGNOSIS — F41.1 GENERALIZED ANXIETY DISORDER: Primary | ICD-10-CM

## 2023-12-19 PROCEDURE — 90832 PSYTX W PT 30 MINUTES: CPT | Performed by: PSYCHIATRY & NEUROLOGY

## 2023-12-19 NOTE — PSYCH
Virtual Regular Visit    Verification of patient location:    Patient is located at Home in the following state in which I hold an active license [place]      Assessment/Plan:    Problem List Items Addressed This Visit          Other    Severe episode of recurrent major depressive disorder, with psychotic features (HCC)    Generalized anxiety disorder - Primary        Reason for visit is   Chief Complaint   Patient presents with    Virtual Regular Visit      Encounter provider HERMELINDA ALBA    Provider located at [facility]  [facility] [place] [id number] [phone]      Recent Visits  Date Type Provider Dept   12/13/23 Office Visit Rosa Lutz MD [facility]   12/12/23 Telemedicine HERMELINDA Alba [facility] [place]   Showing recent visits within past 7 days and meeting all other requirements  Today's Visits  Date Type Provider Dept   12/19/23 Telemedicine HERMELINDA Alba [facility] [place]   Showing today's visits and meeting all other requirements  Future Appointments  No visits were found meeting these conditions.  Showing future appointments within next 150 days and meeting all other requirements       The patient was identified by name and date of birth. Esther Griffith was informed that this is a telemedicine visit and that the visit is being conducted throughthe Epic Embedded platform. She agrees to proceed..  My office door was closed. No one else was in the room.  She acknowledged consent and understanding of privacy and security of the video platform. The patient has agreed to participate and understands they can discontinue the visit at any time.    Patient is aware this is a billable service.       HPI     Past Medical History:   Diagnosis Date    Anxiety     Blood transfusion declined because patient is Shinto 05/01/2023    Chronic  pain disorder     Chronic sinusitis     Depression     Depression     Diabetes (HCC)     Fibromyalgia     Migraine     Obesity     Polyarthritis     Last assessed 9/21/2015    Psychiatric disorder     Psychiatric illness     Sleep difficulties        Past Surgical History:   Procedure Laterality Date    COLONOSCOPY  06/2019    DENTAL SURGERY      HYSTEROSCOPY      Endometrial Biopsy By Hysteroscopy    CO LAPS SUPRACRV HYSTERECT 250 GM/< RMVL TUBE/OVAR N/A 5/1/2023    Procedure: (LSH) W/ BILATERAL SALPINGECTOMY, REMOVAL PARAOVARIAN CYST;  Surgeon: Sai Lopez DO;  Location: [facility];  Service: Gynecology    REMOVAL OF INTRAUTERINE DEVICE (IUD)      US GUIDED BREAST BIOPSY RIGHT COMPLETE Right 6/17/2019       Current Outpatient Medications   Medication Sig Dispense Refill    nirmatrelvir & ritonavir (Paxlovid, 300/100,) tablet therapy pack Take 3 tablets by mouth 2 (two) times a day for 5 days Take 2 nirmatrelvir tablets + 1 ritonavir tablet together per dose 30 tablet 0    albuterol (Ventolin HFA) 90 mcg/act inhaler Inhale 2 puffs every 4 (four) hours as needed for wheezing 18 g 0    Blood Glucose Monitoring Suppl (Contour Blood Glucose System) w/Device KIT Use 1 kit 2 (two) times a day before meals 1 kit 0    celecoxib (CeleBREX) 200 mg capsule Take 1 capsule (200 mg total) by mouth 2 (two) times a day 60 capsule 6    gabapentin (NEURONTIN) 300 mg capsule TAKE 1 CAPSULE BY MOUTH THREE TIMES A DAY 30 capsule 2    glucose blood (Contour Test) test strip USE TO CHECK BLOOD SUGAR ONCE DAILY 100 strip 3    guaiFENesin (MUCINEX) 600 mg 12 hr tablet Take 1 tablet (600 mg total) by mouth every 12 (twelve) hours 14 tablet 0    metFORMIN (GLUCOPHAGE) 500 mg tablet TAKE 1 TABLET BY MOUTH TWICE A DAY WITH MEALS 180 tablet 2    methocarbamol (ROBAXIN) 500 mg tablet Take 1 tablet (500 mg total) by mouth 2 (two) times a day 20 tablet 0    mirtazapine (REMERON) 30 mg tablet Take 1 tablet (30 mg total) by mouth daily at  "bedtime 30 tablet 2    nortriptyline (PAMELOR) 75 MG capsule TAKE 1 CAPSULE BY MOUTH TWICE A DAY 60 capsule 2    phentermine (ADIPEX-P) 37.5 MG tablet Take 1 tablet (37.5 mg total) by mouth every morning 30 tablet 2     No current facility-administered medications for this visit.        Allergies   Allergen Reactions    Cannabidiol Shortness Of Breath, Itching, Swelling, Anxiety, Palpitations, Confusion, Hypertension, Throat Swelling and Tongue Swelling    Amoxicillin-Pot Clavulanate Hives    Decadrol [Dexamethasone] Other (See Comments)     psychosis    Penicillins Hives     Hives/Uticaria    Tetracyclines & Related Hives      Allergy;        Review of Systems    Video Exam    There were no vitals filed for this visit.    Physical Exam     Behavioral Health Psychotherapy Progress Note    Psychotherapy Provided: Individual Psychotherapy     1. Generalized anxiety disorder        2. Severe episode of recurrent major depressive disorder, with psychotic features (HCC)            Goals addressed in session: Goal 1     DATA: Met with Esther for follow up. Esther shared that she has been sick with COVID for the past week, so she has been home resting and isolating, and has not done any cleaning or work because she has been too tired. She said that her mother has been coming up to her apartment daily and will \"sigh\" when she sees the state of the place, which is annoying to Esther. However, she said that right now she cannot care about it enough to do anything about it.  She said that she has been having some trouble sleeping because she has to keep waking up to take medication, but otherwise said that her mood has been relatively stable.  She said that she has been thinking about [place], and came to some realizations about him that have given her some clarity and peace of mind.  She said that while the situation with him at the end of his life was sad, it was his road that he chose, and she could not have done anything " "to change it.  Provided support, validation of Esther's thoughts and ways of working through her grief, and encouraged her to continue to process grief and find ways to comfort herself.  Esther asked to end session early due to fatigue/illness, and not having much else to talk through today.    During this session, this clinician used the following therapeutic modalities: Client-centered Therapy, Cognitive Behavioral Therapy, and Supportive Psychotherapy    Substance Abuse was not addressed during this session. If the client is diagnosed with a co-occurring substance use disorder, please indicate any changes in the frequency or amount of use: n/a. Stage of change for addressing substance use diagnoses: No substance use/Not applicable    ASSESSMENT:  Esther Griffith presents with a Euthymic/ normal mood.     her affect is Normal range and intensity, which is congruent, with her mood and the content of the session. The client has made progress on their goals.     Esther Griffith presents with a low risk of suicide, low risk of self-harm, and minimal risk of harm to others.    For any risk assessment that surpasses a \"low\" rating, a safety plan must be developed.    A safety plan was indicated: no  If yes, describe in detail n/a    PLAN: Between sessions, Esther Griffith will work on self-care and processing grief. At the next session, the therapist will use Client-centered Therapy, Cognitive Behavioral Therapy, and Supportive Psychotherapy to address depression and anxiety.    Behavioral Health Treatment Plan and Discharge Planning: Esther Griffith is aware of and agrees to continue to work on their treatment plan. They have identified and are working toward their discharge goals. yes    Visit start and stop times:    12/19/23  Start Time: 0904  Stop Time: 0939  Total Visit Time: 35 minutes  "

## 2023-12-22 DIAGNOSIS — M79.7 FIBROMYALGIA: ICD-10-CM

## 2023-12-22 DIAGNOSIS — F41.1 GENERALIZED ANXIETY DISORDER: ICD-10-CM

## 2023-12-22 DIAGNOSIS — F33.1 MAJOR DEPRESSIVE DISORDER, RECURRENT EPISODE, MODERATE (HCC): ICD-10-CM

## 2023-12-23 RX ORDER — NORTRIPTYLINE HYDROCHLORIDE 75 MG/1
CAPSULE ORAL
Qty: 60 CAPSULE | Refills: 2 | Status: SHIPPED | OUTPATIENT
Start: 2023-12-23

## 2023-12-26 ENCOUNTER — EVALUATION (OUTPATIENT)
Dept: PHYSICAL THERAPY | Age: 43
End: 2023-12-26
Payer: COMMERCIAL

## 2023-12-26 DIAGNOSIS — G89.29 ACUTE EXACERBATION OF CHRONIC LOW BACK PAIN: ICD-10-CM

## 2023-12-26 DIAGNOSIS — G89.29 CHRONIC BILATERAL LOW BACK PAIN, UNSPECIFIED WHETHER SCIATICA PRESENT: Primary | ICD-10-CM

## 2023-12-26 DIAGNOSIS — M54.50 CHRONIC BILATERAL LOW BACK PAIN, UNSPECIFIED WHETHER SCIATICA PRESENT: Primary | ICD-10-CM

## 2023-12-26 DIAGNOSIS — M54.50 ACUTE EXACERBATION OF CHRONIC LOW BACK PAIN: ICD-10-CM

## 2023-12-26 PROCEDURE — 97161 PT EVAL LOW COMPLEX 20 MIN: CPT

## 2023-12-26 PROCEDURE — 97110 THERAPEUTIC EXERCISES: CPT

## 2023-12-26 NOTE — PROGRESS NOTES
PT Evaluation     Today's date: 2023  Patient name: Esther Griffith  : 1980  MRN: [id number]  Referring provider: Laura Ambrose CRNP  Dx:   Encounter Diagnosis     ICD-10-CM    1. Chronic bilateral low back pain, unspecified whether sciatica present  M54.50 Ambulatory referral to Physical Therapy    G89.29       2. Acute exacerbation of chronic low back pain  M54.50 Ambulatory referral to PT spine    G89.29                      Assessment  Assessment details: Patient is a 43 year old female presenting for PT evaluation with c/c of low back pain, chronic in nature, with recent flare up of symptoms following fall ~2 months ago. Patient reports initial radicular symptoms B/L, but this has since cleared. Patient reports LBP across entire lumbar spine, increased with sitting and standing >15min, bending forward, lifting, etc. Evaluation reveals deficits in lumbar spine ROM and LE strength. Patient has script for Aquatic Therapy, which she states she has previously had and found benefit with this in comparison with land based therapy. Patient is good candidate for aquatic therapy to address deficits and improve abilities with all functional tasks.   Impairments: abnormal or restricted ROM, impaired physical strength, lacks appropriate home exercise program and pain with function  Functional limitations: Difficulties with sitting and standing tolerance, ambulation, bending forward/crouching, squatting, lifting objects, etcUnderstanding of Dx/Px/POC: good   Prognosis: fair    Goals  STG (4 weeks):  1) education: Patient to demonstrate compliance with attendance of therapy sessions and performance of introductory HEP.  2) Pain: Patient to report day to day pain levels <5/10, allowing for improved standing and sitting tolerance >1 hour, as well as sleeping throughout the night.    LTG (8 weeks):  1) Education: Patient to demonstrate compliance with attendance of therapy sessions and performance of progressive HEP,  allowing for transition to self-management of symptoms.  2) Pain: Patient to report <2/10 pain during her day to day activities, alliowing for improved standing and sitting tolerance >2 hours, as well as sleeping throughout the night.   3) ROM: Patient to demonstrate full lumbar ROM in all directions, allowing for improvements with bending/squatting to  objects.  4) Strength: Patient to demonstrate 5/5 LE strength bilaterally, allowing for improvements with lifting tasks.     Plan  Patient would benefit from: skilled physical therapy  Planned therapy interventions: aquatic therapy, joint mobilization, IASTM, manual therapy, patient education, therapeutic activities, therapeutic exercise, stretching, strengthening, flexibility, functional ROM exercises, graded exercise and home exercise program  Frequency: 1x week  Duration in visits: 8  Treatment plan discussed with: patient        Subjective Evaluation    History of Present Illness  Mechanism of injury: Patient presents to therapy for evaluation with c/c of LBP, ongoing for extended period of time, with a recent flare-up following fall ~2 months ago in yard in which she landed directly onto her low back. X-ray after this incident was (-) for any acute findings. Patient reports pain begins in B/L lumbar spine, with occasional pain into B/L LE distally to feet - notes that this will occur randomly, but this has not been too bad over the past 6 weeks. Patient notes that pain gets progressively worse as the day goes on - reports pain will interrupt her sleep. Patient reports she does have diabetic neuropathy which will effect her feet.  Patient has received previous injections, ~3 months ago was most recent. Notes most recent injection took ~one month to take effect, but did provide relief. Recalls prior course of PT last year - notes hard to tell if there were improvements. Patient reports she also has fibromyalgia, which she takes medication for. Patient did  receive aquatic therapy a few years ago, and patient reports this was beneficial for her. Patient reports increase in pain with bending/crouching, lifting heavy objects, sleeping throughout the night, sitting >15 minutes at a time (long car rides), standing and ambulating.   Patient Goals  Patient goals for therapy: increased strength, return to sport/leisure activities, decreased pain and increased motion      Diagnostic Tests  MRI studies: abnormal (mild right foraminal stenosis and possible impingement of exiting right L5 nerve root.)  Treatments  Previous treatment: injection treatment and physical therapy        Objective     Tenderness     Additional Tenderness Details  TTP L4 and L5 spinous processes, B/L PSIS    Neurological Testing     Sensation     Lumbar   Left   Intact: light touch    Right   Intact: light touch    Active Range of Motion     Lumbar   Flexion:  with pain Restriction level: minimal  Extension:  Restriction level: minimal  Left lateral flexion:  WFL  Right lateral flexion:  WFL and with pain  Left rotation:  WFL  Right rotation:  WFL    Passive Range of Motion   Left Hip   Normal passive range of motion  Flexion: with pain  External rotation (90/90): WFL  Internal rotation (90/90): WFL and with pain    Right Hip   Normal passive range of motion  Flexion: with pain  External rotation (90/90): WFL  Internal rotation (90/90): WFL    Additional Passive Range of Motion Details  Pain with end range flexion and IR, felt in lumbar spine region    Strength/Myotome Testing     Left Hip   Planes of Motion   Flexion: 4  Abduction: 4-    Right Hip   Planes of Motion   Flexion: 5  Abduction: 4+    Left Knee   Extension: 5    Right Knee   Extension: 5    Left Ankle/Foot   Dorsiflexion: 5  Great toe extension: 5    Right Ankle/Foot   Dorsiflexion: 5  Great toe extension: 5    Tests     Lumbar   Negative SIJ compression and sacroiliac distraction.     Left   Positive passive SLR.     Right   Positive  passive SLR.     Left Hip   Positive TWAN.     Right Hip   Negative TWAN.              Precautions: Fibromyalgia      Manuals 12/26                                                                Neuro Re-Ed                                                                                                        Ther Ex             LTR 20x            Piriformis stretch 3 x 30sec B/L            Cat-camel 20x                                                                             Ther Activity                                       Gait Training                                       Modalities

## 2023-12-31 ENCOUNTER — OFFICE VISIT (OUTPATIENT)
Dept: URGENT CARE | Facility: CLINIC | Age: 43
End: 2023-12-31
Payer: COMMERCIAL

## 2023-12-31 VITALS
TEMPERATURE: 98 F | OXYGEN SATURATION: 97 % | DIASTOLIC BLOOD PRESSURE: 89 MMHG | SYSTOLIC BLOOD PRESSURE: 141 MMHG | HEART RATE: 105 BPM | RESPIRATION RATE: 18 BRPM

## 2023-12-31 DIAGNOSIS — M54.41 ACUTE BILATERAL LOW BACK PAIN WITH BILATERAL SCIATICA: Primary | ICD-10-CM

## 2023-12-31 DIAGNOSIS — M54.42 ACUTE BILATERAL LOW BACK PAIN WITH BILATERAL SCIATICA: Primary | ICD-10-CM

## 2023-12-31 PROCEDURE — 99213 OFFICE O/P EST LOW 20 MIN: CPT | Performed by: NURSE PRACTITIONER

## 2023-12-31 RX ORDER — PREDNISONE 10 MG/1
TABLET ORAL
Qty: 33 TABLET | Refills: 0 | Status: SHIPPED | OUTPATIENT
Start: 2023-12-31

## 2023-12-31 RX ORDER — METHOCARBAMOL 500 MG/1
500 TABLET, FILM COATED ORAL 2 TIMES DAILY PRN
Qty: 20 TABLET | Refills: 0 | Status: SHIPPED | OUTPATIENT
Start: 2023-12-31 | End: 2024-01-10

## 2023-12-31 NOTE — PROGRESS NOTES
[facility]        NAME: Esther Griffith is a 43 y.o. female  : 1980    MRN: [id number]  DATE: 2023  TIME: 10:38 AM    Assessment and Plan   Acute bilateral low back pain with bilateral sciatica [M54.42, M54.41]  1. Acute bilateral low back pain with bilateral sciatica  predniSONE 10 mg tablet    methocarbamol (ROBAXIN) 500 mg tablet        Chronic with acute exacerbation will start prednisone taper and Robaxin 500 mg twice daily.  Educated on side effects proper use of medication follow-up with primary care with worsening symptoms no improvement    Patient Instructions       Follow up with PCP in 3-5 days.  Proceed to  ER if symptoms worsen.    Chief Complaint     Chief Complaint   Patient presents with   • Back Pain     Pt states she started with radiating lower back pain yesterday after being more active than usual. Has been seen recently for this and has plans to start aqua therapy for it.          History of Present Illness       Patient is a 43-year-old female does have a strong past medical history of back issues.  She reports having worsening of her back pain starts in mid to lower back radiates down bilateral legs.  Denies numbness tingling changes of bowel bladder habits.  Does have history of fibromyalgia.  The worsening of symptoms started yesterday.  She is starting aqua therapy in the next week.  Denies injury or trauma to the area no fall        Review of Systems   Review of Systems   Constitutional:  Negative for activity change, appetite change, chills, fatigue and fever.   HENT:  Negative for congestion, postnasal drip, rhinorrhea, sneezing and sore throat.    Respiratory:  Negative for cough, chest tightness, shortness of breath and wheezing.    Cardiovascular:  Negative for chest pain and palpitations.   Gastrointestinal:  Negative for abdominal pain, constipation, diarrhea, nausea and vomiting.   Musculoskeletal:  Positive for back pain and myalgias. Negative for  arthralgias.   Skin:  Negative for color change, pallor and rash.   Neurological:  Negative for dizziness, weakness, light-headedness and headaches.   Hematological:  Negative for adenopathy.   Psychiatric/Behavioral:  Negative for agitation and confusion.          Current Medications       Current Outpatient Medications:   •  Blood Glucose Monitoring Suppl (Contour Blood Glucose System) w/Device KIT, Use 1 kit 2 (two) times a day before meals, Disp: 1 kit, Rfl: 0  •  celecoxib (CeleBREX) 200 mg capsule, Take 1 capsule (200 mg total) by mouth 2 (two) times a day, Disp: 60 capsule, Rfl: 6  •  gabapentin (NEURONTIN) 300 mg capsule, TAKE 1 CAPSULE BY MOUTH THREE TIMES A DAY, Disp: 30 capsule, Rfl: 2  •  glucose blood (Contour Test) test strip, USE TO CHECK BLOOD SUGAR ONCE DAILY, Disp: 100 strip, Rfl: 3  •  metFORMIN (GLUCOPHAGE) 500 mg tablet, TAKE 1 TABLET BY MOUTH TWICE A DAY WITH MEALS, Disp: 180 tablet, Rfl: 2  •  methocarbamol (ROBAXIN) 500 mg tablet, Take 1 tablet (500 mg total) by mouth 2 (two) times a day as needed for muscle spasms for up to 10 days, Disp: 20 tablet, Rfl: 0  •  mirtazapine (REMERON) 30 mg tablet, Take 1 tablet (30 mg total) by mouth daily at bedtime, Disp: 30 tablet, Rfl: 2  •  nortriptyline (PAMELOR) 75 MG capsule, TAKE 1 CAPSULE BY MOUTH TWICE A DAY, Disp: 60 capsule, Rfl: 2  •  phentermine (ADIPEX-P) 37.5 MG tablet, Take 1 tablet (37.5 mg total) by mouth every morning, Disp: 30 tablet, Rfl: 2  •  predniSONE 10 mg tablet, Take 6 tablets with food day one-three, then day 4 decrease by one tablet every day until medicine completed., Disp: 33 tablet, Rfl: 0  •  albuterol (Ventolin HFA) 90 mcg/act inhaler, Inhale 2 puffs every 4 (four) hours as needed for wheezing, Disp: 18 g, Rfl: 0  •  guaiFENesin (MUCINEX) 600 mg 12 hr tablet, Take 1 tablet (600 mg total) by mouth every 12 (twelve) hours, Disp: 14 tablet, Rfl: 0  •  methocarbamol (ROBAXIN) 500 mg tablet, Take 1 tablet (500 mg total) by mouth  2 (two) times a day, Disp: 20 tablet, Rfl: 0    Current Allergies     Allergies as of 12/31/2023 - Reviewed 12/31/2023   Allergen Reaction Noted   • Cannabidiol Shortness Of Breath, Itching, Swelling, Anxiety, Palpitations, Confusion, Hypertension, Throat Swelling, and Tongue Swelling 08/18/2022   • Amoxicillin-pot clavulanate Hives 10/08/2020   • Decadrol [dexamethasone] Other (See Comments) 08/06/2015   • Penicillins Hives 04/24/2013   • Tetracyclines & related Hives 04/24/2013            The following portions of the patient's history were reviewed and updated as appropriate: allergies, current medications, past family history, past medical history, past social history, past surgical history and problem list.     Past Medical History:   Diagnosis Date   • Anxiety    • Blood transfusion declined because patient is [employer]an 05/01/2023   • Chronic pain disorder    • Chronic sinusitis    • Depression    • Depression    • Diabetes (HCC)    • Fibromyalgia    • Migraine    • Obesity    • Polyarthritis     Last assessed 9/21/2015   • Psychiatric disorder    • Psychiatric illness    • Sleep difficulties        Past Surgical History:   Procedure Laterality Date   • COLONOSCOPY  06/2019   • DENTAL SURGERY     • HYSTEROSCOPY      Endometrial Biopsy By Hysteroscopy   • [place] LAPS SUPRACRV HYSTERECT 250 GM/< RMVL TUBE/OVAR N/A 5/1/2023    Procedure: (LSH) W/ BILATERAL SALPINGECTOMY, REMOVAL PARAOVARIAN CYST;  Surgeon: Sai Lopez DO;  Location: [facility];  Service: Gynecology   • REMOVAL OF INTRAUTERINE DEVICE (IUD)     • US GUIDED BREAST BIOPSY RIGHT COMPLETE Right 6/17/2019       Family History   Problem Relation Age of Onset   • Irregular heart beat Mother    • Diabetes Father    • Kidney disease Father    • Diabetes Maternal Grandmother    • Rheum arthritis Maternal Grandmother    • Melanoma Maternal Grandmother 84   • No Known Problems Maternal Grandfather    • Kidney disease Paternal Grandmother    • Rheum  arthritis Paternal Grandmother    • Depression Paternal Grandmother    • Diabetes Paternal Grandfather    • Lung cancer Paternal Grandfather 47   • Substance Abuse Brother    • No Known Problems Brother    • Substance Abuse Maternal Uncle    • Prostate cancer Maternal Uncle 60   • Depression Paternal Aunt    • Alcohol abuse Family    • Stomach cancer Family          Medications have been verified.        Objective   /89   Pulse 105   Temp 98 °F (36.7 °C)   Resp 18   LMP 03/13/2023 (Approximate)   SpO2 97%   Patient's last menstrual period was 03/13/2023 (approximate).       Physical Exam     Physical Exam  Vitals and nursing note reviewed.   Constitutional:       General: She is not in acute distress.     Appearance: Normal appearance. She is not ill-appearing or diaphoretic.   HENT:      Head: Normocephalic and atraumatic.      Nose: No congestion or rhinorrhea.   Eyes:      General: No scleral icterus.        Right eye: No discharge.         Left eye: No discharge.   Pulmonary:      Effort: Pulmonary effort is normal. No respiratory distress.   Musculoskeletal:         General: Tenderness present. No swelling or signs of injury.      Cervical back: Normal range of motion.      Lumbar back: Spasms and tenderness present. No signs of trauma or bony tenderness. Decreased range of motion. Positive right straight leg raise test and positive left straight leg raise test.   Skin:     Coloration: Skin is not jaundiced or pale.      Findings: No bruising, erythema or rash.   Neurological:      General: No focal deficit present.      Mental Status: She is alert and oriented to person, place, and time.   Psychiatric:         Mood and Affect: Mood normal.         Behavior: Behavior normal.         Thought Content: Thought content normal.         Judgment: Judgment normal.

## 2024-01-03 ENCOUNTER — OFFICE VISIT (OUTPATIENT)
Dept: PHYSICAL THERAPY | Age: 44
End: 2024-01-03
Payer: COMMERCIAL

## 2024-01-03 DIAGNOSIS — G89.29 ACUTE EXACERBATION OF CHRONIC LOW BACK PAIN: ICD-10-CM

## 2024-01-03 DIAGNOSIS — M54.50 CHRONIC BILATERAL LOW BACK PAIN, UNSPECIFIED WHETHER SCIATICA PRESENT: Primary | ICD-10-CM

## 2024-01-03 DIAGNOSIS — M54.50 ACUTE EXACERBATION OF CHRONIC LOW BACK PAIN: ICD-10-CM

## 2024-01-03 DIAGNOSIS — G89.29 CHRONIC BILATERAL LOW BACK PAIN, UNSPECIFIED WHETHER SCIATICA PRESENT: Primary | ICD-10-CM

## 2024-01-03 PROCEDURE — 97113 AQUATIC THERAPY/EXERCISES: CPT

## 2024-01-03 NOTE — PROGRESS NOTES
Daily Note     Today's date: 1/3/2024  Patient name: Esther Griffith  : 1980  MRN: [id number]  Referring provider: Laura Ambrose CRNP  Dx:   Encounter Diagnosis     ICD-10-CM    1. Chronic bilateral low back pain, unspecified whether sciatica present  M54.50     G89.29       2. Acute exacerbation of chronic low back pain  M54.50     G89.29                      Subjective: Patient presents to therapy reporting that she is OK. Patient reports she went to [facility] on , and day prior she did a lot of housework, and woke up next day in really bad pain. She was prescribed muscle relaxers and a course of steroids, which she states have helped her symptoms. Patient will be seeing her provider tomorrow.       Objective: See treatment diary below      Assessment: Tolerated treatment fair. Patient would benefit from continued PT. Aquatic TE performed focusing on gentle core stabilization, LE strength, and lumbar mobility, which patient tolerated well. Noted some discomfort in lumbar spine with performance of pool buoy hangs to apply gentle traction to lumbar spine after ~8 min, so terminated this at this point. No other increase in symptoms with any TE today. Will continue to progress patient as able to tolerate to improve abilities with all functional tasks.       Plan: Progress treatment as tolerated.       Precautions: Fibromyalgia      Manuals                                                                Neuro Re-Ed                                                                                                        Ther Ex             LTR 20x            Piriformis stretch 3 x 30sec B/L            Cat-camel 20x            POOL             Laps  5x forward, 5x lateral           Standing hip abduction and extension  3 x 10 B/L           Standing marching  3 x 10 B/L           Core pressdown w/ ball  2 x 10, 5 sec holds           Rollouts  2 x 10 each direction           Paddles ext, add  20x            Seated hamstring stretch  3 x 30sec B/L           Pool buoy hangs  8min           1 lap cool down  1x                        Ther Activity                                       Gait Training                                       Modalities

## 2024-01-04 ENCOUNTER — TELEMEDICINE (OUTPATIENT)
Dept: PSYCHIATRY | Facility: CLINIC | Age: 44
End: 2024-01-04
Payer: COMMERCIAL

## 2024-01-04 DIAGNOSIS — F33.3 SEVERE EPISODE OF RECURRENT MAJOR DEPRESSIVE DISORDER, WITH PSYCHOTIC FEATURES (HCC): Primary | ICD-10-CM

## 2024-01-04 DIAGNOSIS — E66.9 OBESITY (BMI 30-39.9): ICD-10-CM

## 2024-01-04 DIAGNOSIS — F32.A DEPRESSION, UNSPECIFIED DEPRESSION TYPE: ICD-10-CM

## 2024-01-04 DIAGNOSIS — F41.1 GENERALIZED ANXIETY DISORDER: ICD-10-CM

## 2024-01-04 PROCEDURE — 90833 PSYTX W PT W E/M 30 MIN: CPT | Performed by: PSYCHIATRY & NEUROLOGY

## 2024-01-04 PROCEDURE — 99213 OFFICE O/P EST LOW 20 MIN: CPT | Performed by: PSYCHIATRY & NEUROLOGY

## 2024-01-04 RX ORDER — GABAPENTIN 300 MG/1
300 CAPSULE ORAL
Qty: 30 CAPSULE | Refills: 2 | Status: SHIPPED | OUTPATIENT
Start: 2024-01-04

## 2024-01-04 RX ORDER — PHENTERMINE HYDROCHLORIDE 37.5 MG/1
37.5 TABLET ORAL EVERY MORNING
Qty: 30 TABLET | Refills: 2 | Status: SHIPPED | OUTPATIENT
Start: 2024-01-04

## 2024-01-04 RX ORDER — MIRTAZAPINE 45 MG/1
45 TABLET, FILM COATED ORAL
Qty: 30 TABLET | Refills: 2 | Status: SHIPPED | OUTPATIENT
Start: 2024-01-04 | End: 2024-04-03

## 2024-01-04 NOTE — PSYCH
Virtual Regular Visit    Verification of patient location:    Patient is located at Home in the following state in which I hold an active license [place]      Assessment/Plan:    Problem List Items Addressed This Visit          Other    Generalized anxiety disorder    Relevant Medications    gabapentin (NEURONTIN) 300 mg capsule    phentermine (ADIPEX-P) 37.5 MG tablet    mirtazapine (REMERON) 45 MG tablet    Severe episode of recurrent major depressive disorder, with psychotic features (HCC) - Primary    Relevant Medications    phentermine (ADIPEX-P) 37.5 MG tablet    mirtazapine (REMERON) 45 MG tablet     Other Visit Diagnoses       Obesity (BMI 30-39.9)        Relevant Medications    phentermine (ADIPEX-P) 37.5 MG tablet    Depression, unspecified depression type        Relevant Medications    phentermine (ADIPEX-P) 37.5 MG tablet    mirtazapine (REMERON) 45 MG tablet                        Reason for visit is   No chief complaint on file.         Encounter provider Cristina Bray MD    Provider located at [address] [address] [phone]      Recent Visits  No visits were found meeting these conditions.  Showing recent visits within past 7 days and meeting all other requirements  Today's Visits  Date Type Provider Dept   01/04/24 Telemedicine [facility]   Showing today's visits and meeting all other requirements  Future Appointments  No visits were found meeting these conditions.  Showing future appointments within next 150 days and meeting all other requirements       The patient was identified by name and date of birth. Esther Griffith was informed that this is a telemedicine visit and that the visit is being conducted throughthe Epic Embedded platform. She agrees to proceed..  My office door was closed. No one else was in the room.  She acknowledged consent and  understanding of privacy and security of the video platform. The patient has agreed to participate and understands they can discontinue the visit at any time.    Patient is aware this is a billable service.     Subjective  Esther Griffith is a 43 y.o. female with MDD and CAROL .Patient remains compliant with medications and denies side effects. She continues to manage her diabetes with medication and diet.    She stated that due to her severe pain she ended up taking OD on medications and was hospitalized at [facility] .  She mentioned she lost a close friend recently of sudden heart attack and she was feeling hopeless and helpless.  She is now back on scheduled weekly therapy   She continues to meet with pain management  She is reporting poor sleep. While at the hospital her Pristiq was discontinued and she was started on Mirtazapine. Last visit had dose  increase on Mirtazapine since she continues to report depressed mood is considering a dose increase.  She restarted  Gabapentin for neuropathy  300 mg po qhs to help with sleep as well.   Since last seen she stated she had COVID last December and she stated she has gradually feeling better. She stopped the Phentermine while on tx for COVID and recently started back on it ( Kj).   She has tolerated dose increase on Mirtazapine but she is still depressed and has some difficulties falling and staying asleep. Since she is still depressed she agrees to try dose increase on Mirtazapine to 45 mg po qhs.   She is also looking to consult neuropsychologist to explore possible ASD diagnosis.  Agrees to schedule follow up in 6-8 weeks       HPI     Past Medical History:   Diagnosis Date    Anxiety     Blood transfusion declined because patient is Anglican 05/01/2023    Chronic pain disorder     Chronic sinusitis     Depression     Depression     Diabetes (HCC)     Fibromyalgia     Migraine     Obesity     Polyarthritis     Last assessed 9/21/2015    Psychiatric  disorder     Psychiatric illness     Sleep difficulties        Past Surgical History:   Procedure Laterality Date    COLONOSCOPY  06/2019    DENTAL SURGERY      HYSTEROSCOPY      Endometrial Biopsy By Hysteroscopy    DC LAPS SUPRACRV HYSTERECT 250 GM/< RMVL TUBE/OVAR N/A 5/1/2023    Procedure: (LSH) W/ BILATERAL SALPINGECTOMY, REMOVAL PARAOVARIAN CYST;  Surgeon: Sai Lopez DO;  Location: [facility];  Service: Gynecology    REMOVAL OF INTRAUTERINE DEVICE (IUD)      US GUIDED BREAST BIOPSY RIGHT COMPLETE Right 6/17/2019       Current Outpatient Medications   Medication Sig Dispense Refill    gabapentin (NEURONTIN) 300 mg capsule Take 1 capsule (300 mg total) by mouth daily at bedtime 30 capsule 2    mirtazapine (REMERON) 45 MG tablet Take 1 tablet (45 mg total) by mouth daily at bedtime 30 tablet 2    phentermine (ADIPEX-P) 37.5 MG tablet Take 1 tablet (37.5 mg total) by mouth every morning 30 tablet 2    Blood Glucose Monitoring Suppl (Contour Blood Glucose System) w/Device KIT Use 1 kit 2 (two) times a day before meals 1 kit 0    celecoxib (CeleBREX) 200 mg capsule Take 1 capsule (200 mg total) by mouth 2 (two) times a day 60 capsule 6    glucose blood (Contour Test) test strip USE TO CHECK BLOOD SUGAR ONCE DAILY 100 strip 3    metFORMIN (GLUCOPHAGE) 500 mg tablet TAKE 1 TABLET BY MOUTH TWICE A DAY WITH MEALS 180 tablet 2    methocarbamol (ROBAXIN) 500 mg tablet Take 1 tablet (500 mg total) by mouth 2 (two) times a day as needed for muscle spasms for up to 10 days 20 tablet 0    nortriptyline (PAMELOR) 75 MG capsule TAKE 1 CAPSULE BY MOUTH TWICE A DAY 60 capsule 2    predniSONE 10 mg tablet Take 6 tablets with food day one-three, then day 4 decrease by one tablet every day until medicine completed. 33 tablet 0     No current facility-administered medications for this visit.        Allergies   Allergen Reactions    Cannabidiol Shortness Of Breath, Itching, Swelling, Anxiety, Palpitations, Confusion,  Hypertension, Throat Swelling and Tongue Swelling    Amoxicillin-Pot Clavulanate Hives    Decadrol [Dexamethasone] Other (See Comments)     psychosis    Penicillins Hives     Hives/Uticaria    Tetracyclines & Related Hives      Allergy;        Review of Systems       Mood Anxiety and Depression   Behavior Normal    Thought Content Disturbing Thoughts, Feelings   General Emotional Problems and Decreased Functioning   Personality Normal   Other Psych Symptoms Normal   Constitutional Negative   ENT Negative   Cardiovascular Negative   Respiratory Negative   Gastrointestinal Negative   Genitourinary Negative   Musculoskeletal Negative   Integumentary Negative   Neurological Negative   Endocrine Normal    Other Symptoms Normal        Laboratory Results: Recent Labs (last 12 months):   Office Visit on 12/13/2023   Component Date Value    SARS-CoV-2 12/13/2023 Positive (A)     INFLUENZA A PCR 12/13/2023 Negative     INFLUENZA B PCR 12/13/2023 Negative    Admission on 11/26/2023, Discharged on 11/26/2023   Component Date Value    Color, UA 11/26/2023 Yellow     Clarity, UA 11/26/2023 Clear     pH, UA 11/26/2023 5.5     Leukocytes, UA 11/26/2023 Negative     Nitrite, UA 11/26/2023 Negative     Protein, UA 11/26/2023 Negative     Glucose, UA 11/26/2023 Negative     Ketones, UA 11/26/2023 Negative     Urobilinogen, UA 11/26/2023 0.2     Bilirubin, UA 11/26/2023 Negative     Occult Blood, UA 11/26/2023 Negative     Specific Gravity, UA 11/26/2023 <=1.005    Office Visit on 11/07/2023   Component Date Value    Hemoglobin A1C 11/07/2023 6.3    Admission on 10/16/2023, Discharged on 10/23/2023   Component Date Value    Sodium 10/19/2023 136     Potassium 10/19/2023 4.0     Chloride 10/19/2023 102     CO2 10/19/2023 23     ANION GAP 10/19/2023 11     BUN 10/19/2023 17     Creatinine 10/19/2023 0.80     Glucose 10/19/2023 158 (H)     Glucose, Fasting 10/19/2023 158 (H)     Calcium 10/19/2023 9.6     AST 10/19/2023 33     ALT  10/19/2023 30     Alkaline Phosphatase 10/19/2023 56     Total Protein 10/19/2023 6.4     Albumin 10/19/2023 4.1     Total Bilirubin 10/19/2023 0.31     eGFR 10/19/2023 89     WBC 10/18/2023 7.15     RBC 10/18/2023 4.22     Hemoglobin 10/18/2023 11.8     Hematocrit 10/18/2023 36.4     MCV 10/18/2023 86     MCH 10/18/2023 28.0     MCHC 10/18/2023 32.4     RDW 10/18/2023 13.8     MPV 10/18/2023 9.0     Platelets 10/18/2023 201     nRBC 10/18/2023 0     Neutrophils Relative 10/18/2023 56     Immat GRANS % 10/18/2023 1     Lymphocytes Relative 10/18/2023 36     Monocytes Relative 10/18/2023 6     Eosinophils Relative 10/18/2023 1     Basophils Relative 10/18/2023 0     Neutrophils Absolute 10/18/2023 4.02     Immature Grans Absolute 10/18/2023 0.04     Lymphocytes Absolute 10/18/2023 2.58     Monocytes Absolute 10/18/2023 0.39     Eosinophils Absolute 10/18/2023 0.09     Basophils Absolute 10/18/2023 0.03     Cholesterol 10/19/2023 194     Triglycerides 10/19/2023 254 (H)     HDL, Direct 10/19/2023 38 (L)     LDL Calculated 10/19/2023 105 (H)     Non-HDL-Chol (CHOL-HDL) 10/19/2023 156     Preg, Serum 10/17/2023 Negative     TSH 3RD GENERATON 10/19/2023 5.982 (H)     Syphilis Total Antibody 10/19/2023 Non-reactive     Vit D, 25-Hydroxy 10/19/2023 37.3     POC Glucose 10/17/2023 159 (H)     POC Glucose 10/17/2023 215 (H)     POC Glucose 10/17/2023 146 (H)     POC Glucose 10/18/2023 155 (H)     POC Glucose 10/18/2023 190 (H)     POC Glucose 10/18/2023 122     POC Glucose 10/19/2023 177 (H)     Free T4 10/19/2023 0.68     POC Glucose 10/19/2023 192 (H)     POC Glucose 10/19/2023 168 (H)     POC Glucose 10/20/2023 169 (H)     POC Glucose 10/20/2023 241 (H)     POC Glucose 10/20/2023 137     POC Glucose 10/21/2023 178 (H)     POC Glucose 10/21/2023 264 (H)     POC Glucose 10/21/2023 141 (H)     POC Glucose 10/22/2023 159 (H)     POC Glucose 10/22/2023 202 (H)     POC Glucose 10/22/2023 108     POC Glucose 10/23/2023 143 (H)     No results displayed because visit has over 200 results.      Telephone on 09/18/2023   Component Date Value    HIV Screen 06/05/2009 Non-Reactive     HIV Confirmation 06/05/2009 Non-Reactive    Appointment on 07/11/2023   Component Date Value    Cholesterol 07/11/2023 202 (H)     Triglycerides 07/11/2023 153 (H)     HDL, Direct 07/11/2023 54     LDL Calculated 07/11/2023 117 (H)     Vitamin B-12 07/11/2023 762     Folate 07/11/2023 >22.3    Office Visit on 07/05/2023   Component Date Value    Creatinine, Ur 07/05/2023 221.0     Albumin,U,Random 07/05/2023 13.7     Albumin Creat Ratio 07/05/2023 6    Admission on 05/01/2023, Discharged on 05/01/2023   Component Date Value    ABO Grouping 05/01/2023 O     Rh Factor 05/01/2023 Positive     EXT Preg Test, Ur 05/01/2023 Negative     Control 05/01/2023 Valid     POC Glucose 05/01/2023 115     Case Report 05/01/2023                      Value:Surgical Pathology Report                         Case: [id number]                                   Authorizing Provider:  Sai Lopez DO      Collected:           05/01/2023 0819              Ordering Location:     [facility]        Received:            05/01/2023 [address] [place] [address] Operating Room                                                     Pathologist:           Corby Bellamy MD                                                                           Specimen:    Uterus, Uterus, bilateral fallopian tubes, and paraovarian cyst                            Final Diagnosis 05/01/2023                      Value:This result contains rich text formatting which cannot be displayed here.    Additional Information 05/01/2023                      Value:This result contains rich text formatting which cannot be displayed here.    Gross Description 05/01/2023                      Value:This result  contains rich text formatting which cannot be displayed here.    Clinical Information 05/01/2023                      Value:43 year old female with endometrial hyperplasia without atypia ([id number]).    POC Glucose 05/01/2023 142 (H)     POC Glucose 05/01/2023 99    Appointment on 04/27/2023   Component Date Value    WBC 04/27/2023 9.78     RBC 04/27/2023 5.09     Hemoglobin 04/27/2023 14.5     Hematocrit 04/27/2023 44.0     MCV 04/27/2023 86     MCH 04/27/2023 28.5     MCHC 04/27/2023 33.0     RDW 04/27/2023 13.6     MPV 04/27/2023 8.8 (L)     Platelets 04/27/2023 291     nRBC 04/27/2023 0     Neutrophils Relative 04/27/2023 63     Immat GRANS % 04/27/2023 0     Lymphocytes Relative 04/27/2023 31     Monocytes Relative 04/27/2023 5     Eosinophils Relative 04/27/2023 1     Basophils Relative 04/27/2023 0     Neutrophils Absolute 04/27/2023 6.12     Immature Grans Absolute 04/27/2023 0.03     Lymphocytes Absolute 04/27/2023 3.02     Monocytes Absolute 04/27/2023 0.51     Eosinophils Absolute 04/27/2023 0.07     Basophils Absolute 04/27/2023 0.03     Hemoglobin A1C 04/27/2023 6.1 (H)     EAG 04/27/2023 128     Sodium 07/11/2023 136     Potassium 07/11/2023 3.9     Chloride 07/11/2023 105     CO2 07/11/2023 26     ANION GAP 07/11/2023 5     BUN 07/11/2023 13     Creatinine 07/11/2023 0.90     Glucose, Fasting 07/11/2023 129 (H)     Calcium 07/11/2023 9.2     AST 07/11/2023 26     ALT 07/11/2023 42     Alkaline Phosphatase 07/11/2023 52     Total Protein 07/11/2023 7.4     Albumin 07/11/2023 4.0     Total Bilirubin 07/11/2023 0.34     eGFR 07/11/2023 77     Hemoglobin A1C 07/11/2023 6.0 (H)     EAG 07/11/2023 126     Creatinine, Ur 07/11/2023 21.2     Albumin,U,Random 07/11/2023 <5.0     Albumin Creat Ratio 07/11/2023 <24     TSH 3RD GENERATON 07/11/2023 3.741     Vit D, 25-Hydroxy 07/11/2023 52.7    There may be more visits with results that are not included.         Substance Abuse History:  Social History      Substance and Sexual Activity   Drug Use Not Currently       Family Psychiatric History:   Family History   Problem Relation Age of Onset    Irregular heart beat Mother     Diabetes Father     Kidney disease Father     Diabetes Maternal Grandmother     Rheum arthritis Maternal Grandmother     Melanoma Maternal Grandmother 84    No Known Problems Maternal Grandfather     Kidney disease Paternal Grandmother     Rheum arthritis Paternal Grandmother     Depression Paternal Grandmother     Diabetes Paternal Grandfather     Lung cancer Paternal Grandfather 47    Substance Abuse Brother     No Known Problems Brother     Substance Abuse Maternal Uncle     Prostate cancer Maternal Uncle 60    Depression Paternal Aunt     Alcohol abuse Family     Stomach cancer Family        The following portions of the patient's history were reviewed and updated as appropriate: allergies, current medications, past family history, past medical history, past social history, past surgical history, and problem list.    Social History     Socioeconomic History    Marital status: Single     Spouse name: Not on file    Number of children: 0    Years of education: 12 years     Highest education level: GED or equivalent   Occupational History    Occupation: unemployed   Tobacco Use    Smoking status: Never     Passive exposure: Never    Smokeless tobacco: Never    Tobacco comments:     N/A, non-smoker.   Vaping Use    Vaping status: Never Used   Substance and Sexual Activity    Alcohol use: Not Currently     Comment: 3 x year; sober since 2010    Drug use: Not Currently    Sexual activity: Not Currently   Other Topics Concern    Not on file   Social History Narrative    Caffeine use        What type of home do you live in: Single house    Age of your home: 48 yrs    How long have you been living there: 42 yrs    Type of heat: Baseboard    Type of fuel: Electric    What type of samir is in your bedroom: Carpet    Do you have the following in  or near your home:    Air products: Window air conditioning and Ionic air purifier    Pests: Mice    Pets: Cat    Basement: None     Social Determinants of Health     Financial Resource Strain: High Risk (12/2/2020)    Overall Financial Resource Strain (CARDIA)     Difficulty of Paying Living Expenses: Hard   Food Insecurity: No Food Insecurity (10/16/2023)    Hunger Vital Sign     Worried About Running Out of Food in the Last Year: Never true     Ran Out of Food in the Last Year: Never true   Transportation Needs: No Transportation Needs (10/16/2023)    PRAPARE - Transportation     Lack of Transportation (Medical): No     Lack of Transportation (Non-Medical): No   Physical Activity: Insufficiently Active (10/12/2022)    Exercise Vital Sign     Days of Exercise per Week: 2 days     Minutes of Exercise per Session: 60 min   Stress: Stress Concern Present (4/3/2019)    [place] [employer] - Occupational Stress Questionnaire     Feeling of Stress : To some extent   Social Connections: Moderately Integrated (4/3/2019)    Social Connection and Isolation Panel [NHANES]     Frequency of Communication with Friends and Family: More than three times a week     Frequency of Social Gatherings with Friends and Family: More than three times a week     Attends Latter day Services: More than 4 times per year     Active Member of Clubs or Organizations: Yes     Attends Club or Organization Meetings: More than 4 times per year     Marital Status: Never    Intimate Partner Violence: Not At Risk (4/3/2019)    Humiliation, Afraid, Rape, and Kick questionnaire     Fear of Current or Ex-Partner: No     Emotionally Abused: No     Physically Abused: No     Sexually Abused: No   Housing Stability: Low Risk  (10/16/2023)    Housing Stability Vital Sign     Unable to Pay for Housing in the Last Year: No     Number of Places Lived in the Last Year: 1     Unstable Housing in the Last Year: No     Social History      Social History Narrative    Caffeine use        What type of home do you live in: Single house    Age of your home: 46 yrs    How long have you been living there: 42 yrs    Type of heat: Baseboard    Type of fuel: Electric    What type of samir is in your bedroom: Carpet    Do you have the following in or near your home:    Air products: Window air conditioning and Ionic air purifier    Pests: Mice    Pets: Cat    Basement: None       Objective:       Mental status:  Appearance calm and cooperative , adequate hygiene and grooming, and good eye contact    Mood dysphoric   Affect affect was constricted   Speech a normal rate and fluent   Thought Processes coherent/organized and normal thought processes   Hallucinations no hallucinations present    Thought Content no delusions   Abnormal Thoughts no suicidal thoughts  and no homicidal thoughts    Orientation  oriented to person and place and time   Remote Memory short term memory intact and long term memory intact   Attention Span concentration intact   Intellect Appears to be of Average Intelligence   Insight Limited insight   Judgement judgment was limited   Muscle Strength N/a   Language no difficulty naming common objects and no difficulty repeating a phrase    Fund of Knowledge displays adequate knowledge of current events, adequate fund of knowledge regarding past history, and adequate fund of knowledge regarding vocabulary                Assessment/Plan:       Diagnoses and all orders for this visit:    Severe episode of recurrent major depressive disorder, with psychotic features (HCC)    Generalized anxiety disorder  -     gabapentin (NEURONTIN) 300 mg capsule; Take 1 capsule (300 mg total) by mouth daily at bedtime    Obesity (BMI 30-39.9)  -     phentermine (ADIPEX-P) 37.5 MG tablet; Take 1 tablet (37.5 mg total) by mouth every morning    Depression, unspecified depression type  -     mirtazapine (REMERON) 45 MG tablet; Take 1 tablet (45 mg total) by  mouth daily at bedtime              Treatment Recommendations- Risks Benefits      Immediate Medical/Psychiatric/Psychotherapy Treatments and Any Precautions: as stated on HPI    Risks, Benefits And Possible Side Effects Of Medications:  {PSYCH RISK, BENEFITS AND POSSIBLE SIDE EFFECTS (Optional):34860    Controlled Medication Discussion: Discussed with patient Black Box warning on concurrent use of benzodiazepines and opioid medications including sedation, respiratory depression, coma and death. Patient understands the risk of treatment with benzodiazepines in addition to opioids and wants to continue taking those medications. , Discussed with patient the risks of sedation, respiratory depression, impairment of ability to drive and potential for abuse and addiction related to treatment with benzodiazepine medications. The patient understands risk of treatment with benzodiazepine medications, agrees to not drive if feels impaired and agrees to take medications as prescribed., and The patient has been filling controlled prescriptions on time as prescribed to [place] Prescription Drug Monitoring program.      Psychotherapy Provided: Individual psychotherapy provided.    Individual psychotherapy provided: Yes  Counseling was provided during the session today for 16 minutes.  Medications, treatment progress and treatment plan reviewed with Esther.  Medication education provided to Esther.  Goals discussed during in session: continue improvement in depression.   Coping strategies including compliance with medications, exercising, getting into a good routine, increasing energy, increasing interest in usual activities, increasing motivation, increasing social interaction, maintain healthy diet, maintain heathy sleeping hygiene and maintain positive attitude reviewed with Esther.   Importance of medication and treatment compliance reviewed with Esther.  Educated on importance of medication and treatment  compliance.  Supportive therapy provided.                                       Visit Time    Visit Start Time: 2:00  Visit Stop Time: 2:30  Total Visit Duration:  30 minutes

## 2024-01-05 ENCOUNTER — TELEMEDICINE (OUTPATIENT)
Dept: BEHAVIORAL/MENTAL HEALTH CLINIC | Facility: CLINIC | Age: 44
End: 2024-01-05
Payer: COMMERCIAL

## 2024-01-05 DIAGNOSIS — F41.1 GENERALIZED ANXIETY DISORDER: ICD-10-CM

## 2024-01-05 DIAGNOSIS — F33.3 SEVERE EPISODE OF RECURRENT MAJOR DEPRESSIVE DISORDER, WITH PSYCHOTIC FEATURES (HCC): Primary | ICD-10-CM

## 2024-01-05 PROCEDURE — 90834 PSYTX W PT 45 MINUTES: CPT | Performed by: PSYCHIATRY & NEUROLOGY

## 2024-01-05 NOTE — PSYCH
Virtual Regular Visit    Verification of patient location:    Patient is located at Home in the following state in which I hold an active license [place]      Assessment/Plan:    Problem List Items Addressed This Visit          Other    Severe episode of recurrent major depressive disorder, with psychotic features (HCC) - Primary    Generalized anxiety disorder        Reason for visit is   Chief Complaint   Patient presents with    Virtual Regular Visit       Encounter provider HERMELINDA ALBA    Provider located at [facility]  [facility] [place]  [address] [place] [address] [phone]      Recent Visits  No visits were found meeting these conditions.  Showing recent visits within past 7 days and meeting all other requirements  Today's Visits  Date Type Provider Dept   01/05/24 Telemedicine HERMELINDA Alba [facility] [place]   Showing today's visits and meeting all other requirements  Future Appointments  No visits were found meeting these conditions.  Showing future appointments within next 150 days and meeting all other requirements       The patient was identified by name and date of birth. Esther Griffith was informed that this is a telemedicine visit and that the visit is being conducted throughthe Epic Embedded platform. She agrees to proceed..  My office door was closed. No one else was in the room.  She acknowledged consent and understanding of privacy and security of the video platform. The patient has agreed to participate and understands they can discontinue the visit at any time.    Patient is aware this is a billable service.     HPI     Past Medical History:   Diagnosis Date    Anxiety     Blood transfusion declined because patient is [employer] 05/01/2023    Chronic pain disorder     Chronic sinusitis     Depression     Depression     Diabetes (HCC)     Fibromyalgia     Migraine     Obesity      Polyarthritis     Last assessed 9/21/2015    Psychiatric disorder     Psychiatric illness     Sleep difficulties        Past Surgical History:   Procedure Laterality Date    COLONOSCOPY  06/2019    DENTAL SURGERY      HYSTEROSCOPY      Endometrial Biopsy By Hysteroscopy    NC LAPS SUPRACRV HYSTERECT 250 GM/< RMVL TUBE/OVAR N/A 5/1/2023    Procedure: (LSH) W/ BILATERAL SALPINGECTOMY, REMOVAL PARAOVARIAN CYST;  Surgeon: Sai Lopez DO;  Location: [facility];  Service: Gynecology    REMOVAL OF INTRAUTERINE DEVICE (IUD)      US GUIDED BREAST BIOPSY RIGHT COMPLETE Right 6/17/2019       Current Outpatient Medications   Medication Sig Dispense Refill    Blood Glucose Monitoring Suppl (Contour Blood Glucose System) w/Device KIT Use 1 kit 2 (two) times a day before meals 1 kit 0    celecoxib (CeleBREX) 200 mg capsule Take 1 capsule (200 mg total) by mouth 2 (two) times a day 60 capsule 6    gabapentin (NEURONTIN) 300 mg capsule Take 1 capsule (300 mg total) by mouth daily at bedtime 30 capsule 2    glucose blood (Contour Test) test strip USE TO CHECK BLOOD SUGAR ONCE DAILY 100 strip 3    metFORMIN (GLUCOPHAGE) 500 mg tablet TAKE 1 TABLET BY MOUTH TWICE A DAY WITH MEALS 180 tablet 2    methocarbamol (ROBAXIN) 500 mg tablet Take 1 tablet (500 mg total) by mouth 2 (two) times a day as needed for muscle spasms for up to 10 days 20 tablet 0    mirtazapine (REMERON) 45 MG tablet Take 1 tablet (45 mg total) by mouth daily at bedtime 30 tablet 2    nortriptyline (PAMELOR) 75 MG capsule TAKE 1 CAPSULE BY MOUTH TWICE A DAY 60 capsule 2    phentermine (ADIPEX-P) 37.5 MG tablet Take 1 tablet (37.5 mg total) by mouth every morning 30 tablet 2    predniSONE 10 mg tablet Take 6 tablets with food day one-three, then day 4 decrease by one tablet every day until medicine completed. 33 tablet 0     No current facility-administered medications for this visit.        Allergies   Allergen Reactions    Cannabidiol Shortness Of Breath,  Itching, Swelling, Anxiety, Palpitations, Confusion, Hypertension, Throat Swelling and Tongue Swelling    Amoxicillin-Pot Clavulanate Hives    Decadrol [Dexamethasone] Other (See Comments)     psychosis    Penicillins Hives     Hives/Uticaria    Tetracyclines & Related Hives      Allergy;        Review of Systems    Video Exam    There were no vitals filed for this visit.    Physical Exam     Behavioral Health Psychotherapy Progress Note    Psychotherapy Provided: Individual Psychotherapy     1. Severe episode of recurrent major depressive disorder, with psychotic features (HCC)        2. Generalized anxiety disorder            Goals addressed in session: Goal 1     DATA: Met with Esther for follow up.  Esther shared that she is investigating the possibility of having autism, after reading some articles about it and feeling like it resonates with her very much.  She is working on getting a referral from neurology to pursue it, as she would like to find answers as to why her depression is so resistant to medications.  She also said that she has felt very disorganized lately and is now working on making to do lists to help her feel more structured and organized.  She shared that her mood has been very depressed on and off lately, partly due to physical pain and partly due to all the recent events that have been overwhelming for her.  She noted, however, that she has been able to reflect on what she needs in the moment and use coping skills to help her manage her symptoms fairly well. Provided support, validation of Esther's concerns and feelings, and used strengths-based, CBT and mindfulness strategies to reinforce Esther's efforts at improving her mood and quality of life.    During this session, this clinician used the following therapeutic modalities: Client-centered Therapy, Cognitive Behavioral Therapy, and Supportive Psychotherapy    Substance Abuse was not addressed during this session. If the client is  "diagnosed with a co-occurring substance use disorder, please indicate any changes in the frequency or amount of use: n/a. Stage of change for addressing substance use diagnoses: No substance use/Not applicable    ASSESSMENT:  Esther Griffith presents with a Depressed mood.     her affect is Normal range and intensity, which is congruent, with her mood and the content of the session. The client has made progress on their goals.     Esther Griffith presents with a low risk of suicide, low risk of self-harm, and minimal risk of harm to others.    For any risk assessment that surpasses a \"low\" rating, a safety plan must be developed.    A safety plan was indicated: no  If yes, describe in detail n/a    PLAN: Between sessions, Esther Griffith will continue to work on organization, socialization and investigating possible autism diagnosis.. At the next session, the therapist will use Client-centered Therapy, Cognitive Behavioral Therapy, and Supportive Psychotherapy to address depression and anxiety.    Behavioral Health Treatment Plan and Discharge Planning: Esther Griffith is aware of and agrees to continue to work on their treatment plan. They have identified and are working toward their discharge goals. yes    Visit start and stop times:    01/05/24  Start Time: 0903  Stop Time: 0944  Total Visit Time: 41 minutes  "

## 2024-01-11 ENCOUNTER — OFFICE VISIT (OUTPATIENT)
Dept: PHYSICAL THERAPY | Age: 44
End: 2024-01-11
Payer: COMMERCIAL

## 2024-01-11 ENCOUNTER — OFFICE VISIT (OUTPATIENT)
Dept: FAMILY MEDICINE CLINIC | Facility: CLINIC | Age: 44
End: 2024-01-11
Payer: COMMERCIAL

## 2024-01-11 VITALS
OXYGEN SATURATION: 99 % | HEIGHT: 60 IN | RESPIRATION RATE: 18 BRPM | TEMPERATURE: 98.3 F | WEIGHT: 184.2 LBS | HEART RATE: 103 BPM | BODY MASS INDEX: 36.16 KG/M2 | DIASTOLIC BLOOD PRESSURE: 100 MMHG | SYSTOLIC BLOOD PRESSURE: 128 MMHG

## 2024-01-11 DIAGNOSIS — M54.42 ACUTE BILATERAL LOW BACK PAIN WITH BILATERAL SCIATICA: ICD-10-CM

## 2024-01-11 DIAGNOSIS — R20.2 PARESTHESIA: Primary | ICD-10-CM

## 2024-01-11 DIAGNOSIS — G89.29 ACUTE EXACERBATION OF CHRONIC LOW BACK PAIN: ICD-10-CM

## 2024-01-11 DIAGNOSIS — M79.18 MYOFASCIAL PAIN: ICD-10-CM

## 2024-01-11 DIAGNOSIS — G89.29 CHRONIC BILATERAL LOW BACK PAIN, UNSPECIFIED WHETHER SCIATICA PRESENT: Primary | ICD-10-CM

## 2024-01-11 DIAGNOSIS — M54.50 CHRONIC BILATERAL LOW BACK PAIN, UNSPECIFIED WHETHER SCIATICA PRESENT: Primary | ICD-10-CM

## 2024-01-11 DIAGNOSIS — G89.4 CHRONIC PAIN SYNDROME: ICD-10-CM

## 2024-01-11 DIAGNOSIS — M25.50 ARTHRALGIA OF MULTIPLE JOINTS: ICD-10-CM

## 2024-01-11 DIAGNOSIS — M54.50 ACUTE EXACERBATION OF CHRONIC LOW BACK PAIN: ICD-10-CM

## 2024-01-11 DIAGNOSIS — F41.1 GENERALIZED ANXIETY DISORDER: ICD-10-CM

## 2024-01-11 DIAGNOSIS — M54.41 ACUTE BILATERAL LOW BACK PAIN WITH BILATERAL SCIATICA: ICD-10-CM

## 2024-01-11 PROBLEM — Z00.8 MEDICAL CLEARANCE FOR PSYCHIATRIC ADMISSION: Status: RESOLVED | Noted: 2023-10-17 | Resolved: 2024-01-11

## 2024-01-11 PROBLEM — B34.9 ACUTE VIRAL SYNDROME: Status: RESOLVED | Noted: 2023-12-13 | Resolved: 2024-01-11

## 2024-01-11 PROBLEM — K92.1 HEMATOCHEZIA: Status: RESOLVED | Noted: 2019-04-11 | Resolved: 2024-01-11

## 2024-01-11 PROCEDURE — 99214 OFFICE O/P EST MOD 30 MIN: CPT | Performed by: FAMILY MEDICINE

## 2024-01-11 PROCEDURE — 97113 AQUATIC THERAPY/EXERCISES: CPT

## 2024-01-11 RX ORDER — GABAPENTIN 300 MG/1
300 CAPSULE ORAL 2 TIMES DAILY
Qty: 60 CAPSULE | Refills: 2 | Status: SHIPPED | OUTPATIENT
Start: 2024-01-11

## 2024-01-11 RX ORDER — METHOCARBAMOL 500 MG/1
TABLET, FILM COATED ORAL
Qty: 30 TABLET | Refills: 1 | Status: SHIPPED | OUTPATIENT
Start: 2024-01-11

## 2024-01-11 NOTE — PROGRESS NOTES
Name: Esther Griffith      : 1980      MRN: [id number]  Encounter Provider: Rosa Lutz MD  Encounter Date: 2024   Encounter department: [facility]  Chief Complaint   Patient presents with    Pain     Back pain, from neck to tailbone. Feels like bee sting, but not just in spine area, all over     Fatigue     Sleep usually helps pain but not helping it has been three days now      Health Maintenance   Topic Date Due    Pneumococcal Vaccine: Pediatrics (0 to 5 Years) and At-Risk Patients (6 to 64 Years) (1 - PCV) Never done    DM Eye Exam  Never done    Depression Follow-up Plan  Never done    COVID-19 Vaccine ( season) 2023    Breast Cancer Screening: Mammogram  10/10/2023    PT PLAN OF CARE  2024    HEMOGLOBIN A1C  2024    Kidney Health Evaluation: Albumin/Creatinine Ratio  2024    Kidney Health Evaluation: GFR  10/19/2024    Diabetic Foot Exam  2024    Annual Physical  2024    Depression Screening  2025    Zoster Vaccine (1 of 2) 2028    DTaP,Tdap,and Td Vaccines (3 - Td or Tdap) 2032    Colorectal Cancer Screening  03/10/2033    HIV Screening  Completed    Hepatitis C Screening  Completed    Influenza Vaccine  Completed    HIB Vaccine  Aged Out    IPV Vaccine  Aged Out    Hepatitis A Vaccine  Aged Out    Meningococcal ACWY Vaccine  Aged Out    HPV Vaccine  Aged Out     Assessment & Plan     Myofascial pain - prescription sent to the pharmacy for Methocarbamol 500 mg to take 1 tablet every 8 hours as needed for muscle pain/ spasms.  Continue aqua therapy.    Paresthesia - recommended to increase dose of Gabapentin 300 mg to twice daily.    Arthralgias of multiple joints - continue Celebrex 200 mg twice daily with food.    Acute exacerbation of chronic back pain - continue Celebrex, Gabapentin.  Start Methocarbamol 500 mg every 8 hr. PRN for muscle spasms.  Follow-up with pain management.    Chronic pain syndrome  - schedule follow-up visit with pain management.    CAROL - continue medical therapy as per psychiatry Dr. Ovalle.  Continue psychotherapy weekly.         Subjective      HPI    Patient is a pleasant 44-year-old female with chronic low back pain, degenerative spondylosis ,osteoarthritis, fibromyalgia presents today to the office accompanied by her mother c/o severe pain 9/10 from her neck down to tailbone, tingling along her spine an upper and lower back for the past 3 days.    She was seen at urgent care center on December 31, 2023 for evaluation of acute low back pain, was prescribed Prednisone and Methocarbamol.      Patient has been followed by pain management, was seen in 11/2023 by neurosurgeon who recommended PT for 6 week, then schedule MRI of lumbar spine as ordered by pain management.    Patient started aqua therapy.    Currently taking Celebrex 200 mg twice daily, Gabapentin 300 mg at bedtime.    CAROL/ Depression - management per psychiatry Dr. Ovalle.      Review of Systems   Constitutional:  Positive for fatigue. Negative for activity change, appetite change, chills and fever.   Respiratory:  Negative for cough, chest tightness, shortness of breath and wheezing.    Cardiovascular:  Negative for chest pain, palpitations and leg swelling.   Gastrointestinal:  Negative for abdominal pain, diarrhea, nausea and vomiting.   Genitourinary:  Negative for difficulty urinating, dysuria and hematuria.   Musculoskeletal:  Positive for arthralgias, back pain and neck pain. Negative for joint swelling.   Skin:  Negative for rash.   Neurological:  Positive for numbness (in feet) and headaches. Negative for dizziness and weakness.   Hematological: Negative.    Psychiatric/Behavioral:  Positive for sleep disturbance. Negative for suicidal ideas. The patient is nervous/anxious.        Current Outpatient Medications on File Prior to Visit   Medication Sig    Blood Glucose Monitoring Suppl (Contour Blood Glucose System)  w/Device KIT Use 1 kit 2 (two) times a day before meals    celecoxib (CeleBREX) 200 mg capsule Take 1 capsule (200 mg total) by mouth 2 (two) times a day    glucose blood (Contour Test) test strip USE TO CHECK BLOOD SUGAR ONCE DAILY    metFORMIN (GLUCOPHAGE) 500 mg tablet TAKE 1 TABLET BY MOUTH TWICE A DAY WITH MEALS    mirtazapine (REMERON) 45 MG tablet Take 1 tablet (45 mg total) by mouth daily at bedtime    nortriptyline (PAMELOR) 75 MG capsule TAKE 1 CAPSULE BY MOUTH TWICE A DAY    phentermine (ADIPEX-P) 37.5 MG tablet Take 1 tablet (37.5 mg total) by mouth every morning    [DISCONTINUED] gabapentin (NEURONTIN) 300 mg capsule Take 1 capsule (300 mg total) by mouth daily at bedtime    predniSONE 10 mg tablet Take 6 tablets with food day one-three, then day 4 decrease by one tablet every day until medicine completed. (Patient not taking: Reported on 1/11/2024)    [DISCONTINUED] methocarbamol (ROBAXIN) 500 mg tablet Take 1 tablet (500 mg total) by mouth 2 (two) times a day as needed for muscle spasms for up to 10 days       Objective     /100 (BP Location: Right arm, Patient Position: Sitting, Cuff Size: Adult)   Pulse 103   Temp 98.3 °F (36.8 °C) (Tympanic)   Resp 18   Ht 5' (1.524 m)   Wt 83.6 kg (184 lb 3.2 oz)   LMP 03/13/2023 (Approximate)   SpO2 99%   BMI 35.97 kg/m²     Physical Exam  Vitals and nursing note reviewed.   Constitutional:       General: She is not in acute distress.     Appearance: Normal appearance. She is obese.   HENT:      Head: Normocephalic and atraumatic.   Eyes:      Conjunctiva/sclera: Conjunctivae normal.      Pupils: Pupils are equal, round, and reactive to light.   Cardiovascular:      Rate and Rhythm: Regular rhythm. Tachycardia present.      Heart sounds: No murmur heard.  Pulmonary:      Effort: Pulmonary effort is normal.      Breath sounds: Normal breath sounds.   Abdominal:      General: Bowel sounds are normal. There is no distension.      Palpations: Abdomen is  soft.      Tenderness: There is no abdominal tenderness.   Musculoskeletal:      Cervical back: Normal range of motion and neck supple.      Right lower leg: No edema.      Left lower leg: No edema.      Comments: Back exam: multiple trigger points in upper and lower back.  Tenderness paraspinally in lower cervical area, thoracic and lumbar areas.         Skin:     General: Skin is warm and dry.      Findings: No rash.   Neurological:      General: No focal deficit present.      Mental Status: She is alert.      Motor: No weakness.      Gait: Gait normal.   Psychiatric:         Behavior: Behavior normal.         Thought Content: Thought content normal.      Comments: Feels anxious       Rosa Lutz MD
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
## 2024-01-12 ENCOUNTER — TELEMEDICINE (OUTPATIENT)
Dept: BEHAVIORAL/MENTAL HEALTH CLINIC | Facility: CLINIC | Age: 44
End: 2024-01-12
Payer: COMMERCIAL

## 2024-01-12 ENCOUNTER — TELEPHONE (OUTPATIENT)
Dept: PSYCHIATRY | Facility: CLINIC | Age: 44
End: 2024-01-12

## 2024-01-12 DIAGNOSIS — F41.1 GENERALIZED ANXIETY DISORDER: ICD-10-CM

## 2024-01-12 DIAGNOSIS — F33.3 SEVERE EPISODE OF RECURRENT MAJOR DEPRESSIVE DISORDER, WITH PSYCHOTIC FEATURES (HCC): Primary | ICD-10-CM

## 2024-01-12 PROCEDURE — 90832 PSYTX W PT 30 MINUTES: CPT | Performed by: PSYCHIATRY & NEUROLOGY

## 2024-01-12 NOTE — PSYCH
Virtual Regular Visit    Verification of patient location:    Patient is located at Home in the following state in which I hold an active license [place]      Assessment/Plan:    Problem List Items Addressed This Visit          Other    Severe episode of recurrent major depressive disorder, with psychotic features (HCC) - Primary    Generalized anxiety disorder          Reason for visit is   Chief Complaint   Patient presents with    Virtual Regular Visit          Encounter provider HERMELINDA ALBA    Provider located at [facility]  [facility] [address] [place] [address] [phone]      Recent Visits  Date Type Provider Dept   01/11/24 Office Visit Rosa Lutz MD [facility]   01/05/24 Telemedicine HERMELINDA Alba [facility] [place]   Showing recent visits within past 7 days and meeting all other requirements  Today's Visits  Date Type Provider Dept   01/12/24 Telemedicine HERMELINDA Alba [facility] [place]   Showing today's visits and meeting all other requirements  Future Appointments  No visits were found meeting these conditions.  Showing future appointments within next 150 days and meeting all other requirements       The patient was identified by name and date of birth. Esther Griffith was informed that this is a telemedicine visit and that the visit is being conducted throughthe Epic Embedded platform. She agrees to proceed..  My office door was closed. No one else was in the room.  She acknowledged consent and understanding of privacy and security of the video platform. The patient has agreed to participate and understands they can discontinue the visit at any time.    Patient is aware this is a billable service.     HPI     Past Medical History:   Diagnosis Date    Anxiety     Blood transfusion declined because patient is [employer] 05/01/2023     Chronic pain disorder     Chronic sinusitis     Depression     Depression     Diabetes (HCC)     Fibromyalgia     Migraine     Obesity     Polyarthritis     Last assessed 9/21/2015    Psychiatric disorder     Psychiatric illness     Sleep difficulties        Past Surgical History:   Procedure Laterality Date    COLONOSCOPY  06/2019    DENTAL SURGERY      HYSTEROSCOPY      Endometrial Biopsy By Hysteroscopy    TN LAPS SUPRACRV HYSTERECT 250 GM/< RMVL TUBE/OVAR N/A 5/1/2023    Procedure: (LSH) W/ BILATERAL SALPINGECTOMY, REMOVAL PARAOVARIAN CYST;  Surgeon: Sai Lopez DO;  Location[facility];  Service: Gynecology    REMOVAL OF INTRAUTERINE DEVICE (IUD)      US GUIDED BREAST BIOPSY RIGHT COMPLETE Right 6/17/2019       Current Outpatient Medications   Medication Sig Dispense Refill    Blood Glucose Monitoring Suppl (Contour Blood Glucose System) w/Device KIT Use 1 kit 2 (two) times a day before meals 1 kit 0    celecoxib (CeleBREX) 200 mg capsule Take 1 capsule (200 mg total) by mouth 2 (two) times a day 60 capsule 6    gabapentin (NEURONTIN) 300 mg capsule Take 1 capsule (300 mg total) by mouth 2 (two) times a day 60 capsule 2    glucose blood (Contour Test) test strip USE TO CHECK BLOOD SUGAR ONCE DAILY 100 strip 3    metFORMIN (GLUCOPHAGE) 500 mg tablet TAKE 1 TABLET BY MOUTH TWICE A DAY WITH MEALS 180 tablet 2    methocarbamol (ROBAXIN) 500 mg tablet Take 1 tab. every 8 hr. PRN for muscle spasms 30 tablet 1    mirtazapine (REMERON) 45 MG tablet Take 1 tablet (45 mg total) by mouth daily at bedtime 30 tablet 2    nortriptyline (PAMELOR) 75 MG capsule TAKE 1 CAPSULE BY MOUTH TWICE A DAY 60 capsule 2    phentermine (ADIPEX-P) 37.5 MG tablet Take 1 tablet (37.5 mg total) by mouth every morning 30 tablet 2    predniSONE 10 mg tablet Take 6 tablets with food day one-three, then day 4 decrease by one tablet every day until medicine completed. (Patient not taking: Reported on 1/11/2024) 33 tablet 0     No current  facility-administered medications for this visit.        Allergies   Allergen Reactions    Cannabidiol Shortness Of Breath, Itching, Swelling, Anxiety, Palpitations, Confusion, Hypertension, Throat Swelling and Tongue Swelling    Amoxicillin-Pot Clavulanate Hives    Decadrol [Dexamethasone] Other (See Comments)     psychosis    Penicillins Hives     Hives/Uticaria    Tetracyclines & Related Hives      Allergy;        Review of Systems    Video Exam    There were no vitals filed for this visit.    Physical Exam     Behavioral Health Psychotherapy Progress Note    Psychotherapy Provided: Individual Psychotherapy     1. Severe episode of recurrent major depressive disorder, with psychotic features (HCC)        2. Generalized anxiety disorder            Goals addressed in session: Goal 1     DATA: Met with Esther for follow up. Esther shared that she has had a bad week due to significant physical pain. She said that her mom is going to help her clean her dishes and shampoo her couch, but Esther said that she is in so much pain that she is not sure she is up to that, but expects that her mom will be angry with her yet again for not having her place completely clean.  She noted that it makes her very upset to feel that she will be in this much pain for the rest of her life, and that her mom gets on her so much.  Discussed importance of reminding herself that her pain will ease as it has in the past and she can remind herself that she will get through this hard time, and that she can prioritize her self-care/rest and not have to please her mother right now. Esther was very tearful talking about her pain and relationship with mom, but said that trying to reframe her thoughts felt helpful.  She shared that she is having trouble sleeping due to her pain, and has had some fleeting SI, but now knows that she has to squash those thoughts immediately and recognize that they are just thoughts.  She said that she has six phone  "numbers in her phone that are various crisis lines and resources, and will reach out to someone should her thoughts begin to become more than just fleeting.    During this session, this clinician used the following therapeutic modalities: Client-centered Therapy, Cognitive Behavioral Therapy, and Supportive Psychotherapy    Substance Abuse was not addressed during this session. If the client is diagnosed with a co-occurring substance use disorder, please indicate any changes in the frequency or amount of use: n/a. Stage of change for addressing substance use diagnoses: No substance use/Not applicable    ASSESSMENT:  Esther Griffith presents with a Depressed mood.     her affect is Normal range and intensity, which is congruent, with her mood and the content of the session. The client has made progress on their goals.     Esther Griffith presents with a low risk of suicide, low risk of self-harm, and minimal risk of harm to others.    For any risk assessment that surpasses a \"low\" rating, a safety plan must be developed.    A safety plan was indicated: no  If yes, describe in detail n/a    PLAN: Between sessions, Esther Griffith will focus on self-care and relaxation to help ease pain, and will assert her needs to her mom and remind her that she is dealing with a long-term illness. At the next session, the therapist will use Client-centered Therapy, Cognitive Behavioral Therapy, and Supportive Psychotherapy to address depression, anxiety.    Behavioral Health Treatment Plan and Discharge Planning: Esther Griffith is aware of and agrees to continue to work on their treatment plan. They have identified and are working toward their discharge goals. yes    Visit start and stop times:    01/12/24  Start Time: 0910  Stop Time: 0945  Total Visit Time: 35 minutes      "

## 2024-01-12 NOTE — ASSESSMENT & PLAN NOTE
Prescription sent to the pharmacy for Methocarbamol 500 mg to take 1 tablet every 8 hours as needed for muscle pain/ spasms.  Continue aqua therapy.

## 2024-01-12 NOTE — ASSESSMENT & PLAN NOTE
Continue Celebrex, Gabapentin.  Start Methocarbamol 500 mg every 8 hr. PRN for muscle spasms.  Follow-up with pain management.

## 2024-01-12 NOTE — TELEPHONE ENCOUNTER
Patient was calling to inform provider that her pcp increased her gabapentin to 300mg twice a day for back pain and spasms

## 2024-01-16 ENCOUNTER — APPOINTMENT (OUTPATIENT)
Dept: PHYSICAL THERAPY | Age: 44
End: 2024-01-16
Payer: COMMERCIAL

## 2024-01-16 DIAGNOSIS — E11.65 TYPE 2 DIABETES MELLITUS WITH HYPERGLYCEMIA, WITHOUT LONG-TERM CURRENT USE OF INSULIN (HCC): ICD-10-CM

## 2024-01-17 ENCOUNTER — OFFICE VISIT (OUTPATIENT)
Dept: PHYSICAL THERAPY | Age: 44
End: 2024-01-17
Payer: COMMERCIAL

## 2024-01-17 DIAGNOSIS — G89.29 CHRONIC BILATERAL LOW BACK PAIN, UNSPECIFIED WHETHER SCIATICA PRESENT: Primary | ICD-10-CM

## 2024-01-17 DIAGNOSIS — M54.50 ACUTE EXACERBATION OF CHRONIC LOW BACK PAIN: ICD-10-CM

## 2024-01-17 DIAGNOSIS — M54.50 CHRONIC BILATERAL LOW BACK PAIN, UNSPECIFIED WHETHER SCIATICA PRESENT: Primary | ICD-10-CM

## 2024-01-17 DIAGNOSIS — G89.29 ACUTE EXACERBATION OF CHRONIC LOW BACK PAIN: ICD-10-CM

## 2024-01-17 PROCEDURE — 97113 AQUATIC THERAPY/EXERCISES: CPT

## 2024-01-17 NOTE — PROGRESS NOTES
"Daily Note     Today's date: 2024  Patient name: Esther Griffith  : 1980  MRN: [id number]  Referring provider: Laura Ambrose CRNP  Dx:   Encounter Diagnosis     ICD-10-CM    1. Chronic bilateral low back pain, unspecified whether sciatica present  M54.50     G89.29       2. Acute exacerbation of chronic low back pain  M54.50     G89.29                      Subjective: Patient presents to therapy reporting 2/10 pain at the moment in her lumbar spine. She reports she had a lot of pain after last session, and had to pull over on the way home. She states \"it was weird, not like my usual pain.\" Reports that she had her Gabapentin prescription increased at most recent doctor appt on  - has not noticed any significant change with this so far.       Objective: See treatment diary below      Assessment: Tolerated treatment fair. Patient would benefit from continued PT. Continued aquatic TE to improve gross lumbar and core strength, as well as ROM. Decreased reps with various TE secondary to patient reporting increased pain after previous session. Patient reported discomfort in lumbar spine with performance of standing hip extension, so terminated after ~15 reps. Also reported discomfort with pool buoy traction hang after ~5 min, so terminated at that time. Able to complete all other TE without reports of increase in lumbar symptoms. Patient will continue to benefit from skilled outpt PT to address deficits and improve abilities with all functional tasks.       Plan: Progress treatment as tolerated.       Precautions: Fibromyalgia      Manuals 12/26 01/03 01/10 01/17                                                             Neuro Re-Ed                                                                                                        Ther Ex             LTR 20x            Piriformis stretch 3 x 30sec B/L            Cat-camel 20x            POOL             Laps  5x forward, 5x lateral 5x forward and " lateral 4x each         Standing hip abduction and extension  3 x 10 B/L 3 x 10 B/L 3 x 10 abduction, 15x extension         Standing marching  3 x 10 B/L 3 x 10 B/L 3 x 10 B/L         Core pressdown w/ ball  2 x 10, 5 sec holds 2 x 10 2 x 10, 5 sec holds         Rollouts  2 x 10 each direction 3 x 10 3 x 10 each direction         Paddles ext, add  20x 20x 20x         Seated hamstring stretch  3 x 30sec B/L 3 x 30sec 3 x 30sec         Pool buoy hangs  8min 5min 5'         1 lap cool down  1x 1x 2x                      Ther Activity                                       Gait Training                                       Modalities

## 2024-01-22 ENCOUNTER — APPOINTMENT (OUTPATIENT)
Dept: PHYSICAL THERAPY | Age: 44
End: 2024-01-22
Payer: COMMERCIAL

## 2024-01-25 ENCOUNTER — OFFICE VISIT (OUTPATIENT)
Dept: PHYSICAL THERAPY | Age: 44
End: 2024-01-25
Payer: COMMERCIAL

## 2024-01-25 ENCOUNTER — TELEMEDICINE (OUTPATIENT)
Dept: BEHAVIORAL/MENTAL HEALTH CLINIC | Facility: CLINIC | Age: 44
End: 2024-01-25
Payer: COMMERCIAL

## 2024-01-25 DIAGNOSIS — M54.50 ACUTE EXACERBATION OF CHRONIC LOW BACK PAIN: ICD-10-CM

## 2024-01-25 DIAGNOSIS — M54.50 CHRONIC BILATERAL LOW BACK PAIN, UNSPECIFIED WHETHER SCIATICA PRESENT: Primary | ICD-10-CM

## 2024-01-25 DIAGNOSIS — G89.29 CHRONIC BILATERAL LOW BACK PAIN, UNSPECIFIED WHETHER SCIATICA PRESENT: Primary | ICD-10-CM

## 2024-01-25 DIAGNOSIS — G89.29 ACUTE EXACERBATION OF CHRONIC LOW BACK PAIN: ICD-10-CM

## 2024-01-25 DIAGNOSIS — F41.1 GENERALIZED ANXIETY DISORDER: ICD-10-CM

## 2024-01-25 DIAGNOSIS — F33.3 SEVERE EPISODE OF RECURRENT MAJOR DEPRESSIVE DISORDER, WITH PSYCHOTIC FEATURES (HCC): Primary | ICD-10-CM

## 2024-01-25 PROCEDURE — 90832 PSYTX W PT 30 MINUTES: CPT | Performed by: PSYCHIATRY & NEUROLOGY

## 2024-01-25 PROCEDURE — 97113 AQUATIC THERAPY/EXERCISES: CPT

## 2024-01-25 NOTE — PROGRESS NOTES
"Daily Note     Today's date: 2024  Patient name: Esther Griffith  : 1980  MRN: [id number]  Referring provider: Laura Ambrose CRNP  Dx:   Encounter Diagnosis     ICD-10-CM    1. Chronic bilateral low back pain, unspecified whether sciatica present  M54.50     G89.29       2. Acute exacerbation of chronic low back pain  M54.50     G89.29                      Subjective: Patient presents to therapy reporting that she had a fall over the weekend. She was going down stairs and slipped and fell backwards. Notes this caused an increase in her LBP, and she is still sore in this area. Rates pain as 6/10 at the moment. Notes she \"felt good\" after last session with no increase in symptoms.       Objective: See treatment diary below      Assessment: Tolerated treatment well. Patient would benefit from continued PT. Aquatic TE continued to address deficits in core strength, lumbar stabilization, and LE strength. Patient able to complete standing hip extension today without increase in pain, which she was unable to tolerate at last session. Patient noted she felt OK after TE with no exacerbation of symptoms, stating she was surprised she was able to complete everything today without increase pain. Patient will continue to benefit from skilled outpt PT to address deficits and improve abilities with all functional tasks.     Plan: Progress treatment as tolerated.       Precautions: Fibromyalgia      Manuals 12/26 01/03 01/10 01/17 01/25                                                            Neuro Re-Ed                                                                                                        Ther Ex             LTR 20x            Piriformis stretch 3 x 30sec B/L            Cat-camel 20x            POOL             Laps  5x forward, 5x lateral 5x forward and lateral 4x each 4x each        Standing hip abduction and extension  3 x 10 B/L 3 x 10 B/L 3 x 10 abduction, 15x extension 3 x 10 each        Standing " marching  3 x 10 B/L 3 x 10 B/L 3 x 10 B/L 3 x 10 B /L        Core pressdown w/ ball  2 x 10, 5 sec holds 2 x 10 2 x 10, 5 sec holds 2 x 10, 5 sec holds        Rollouts  2 x 10 each direction 3 x 10 3 x 10 each direction 2 x 10 each directions        Paddles ext, add  20x 20x 20x 20x        Seated hamstring stretch  3 x 30sec B/L 3 x 30sec 3 x 30sec 3 x 30sec        Pool buoy hangs  8min 5min 5' 5'        lap cool down  1x 1x 2x 2x                     Ther Activity                                       Gait Training                                       Modalities

## 2024-01-25 NOTE — PSYCH
Virtual Regular Visit    Verification of patient location:    Patient is located at Home in the following state in which I hold an active license [place]      Assessment/Plan:    Problem List Items Addressed This Visit          Other    Severe episode of recurrent major depressive disorder, with psychotic features (HCC) - Primary    Generalized anxiety disorder        Reason for visit is   Chief Complaint   Patient presents with    Virtual Regular Visit      Encounter provider HERMELINDA ALBA    Provider located at [facility]  [facility]  [address] [place] [address] [phone]      Recent Visits  No visits were found meeting these conditions.  Showing recent visits within past 7 days and meeting all other requirements  Today's Visits  Date Type Provider Dept   01/25/24 Telemedicine HERMELINDA Alba [facility] [place]   Showing today's visits and meeting all other requirements  Future Appointments  No visits were found meeting these conditions.  Showing future appointments within next 150 days and meeting all other requirements       The patient was identified by name and date of birth. Esther Griffith was informed that this is a telemedicine visit and that the visit is being conducted throughthe Epic Embedded platform. She agrees to proceed..  My office door was closed. No one else was in the room.  She acknowledged consent and understanding of privacy and security of the video platform. The patient has agreed to participate and understands they can discontinue the visit at any time.    Patient is aware this is a billable service.     HPI     Past Medical History:   Diagnosis Date    Anxiety     Blood transfusion declined because patient is [employer]ologist 05/01/2023    Chronic pain disorder     Chronic sinusitis     Depression     Depression     Diabetes (HCC)     Fibromyalgia     Migraine     Obesity      Polyarthritis     Last assessed 9/21/2015    Psychiatric disorder     Psychiatric illness     Sleep difficulties        Past Surgical History:   Procedure Laterality Date    COLONOSCOPY  06/2019    DENTAL SURGERY      HYSTEROSCOPY      Endometrial Biopsy By Hysteroscopy    HI LAPS SUPRACRV HYSTERECT 250 GM/< RMVL TUBE/OVAR N/A 5/1/2023    Procedure: (LSH) W/ BILATERAL SALPINGECTOMY, REMOVAL PARAOVARIAN CYST;  Surgeon: Sai Lopez DO;  Location: [facility];  Service: Gynecology    REMOVAL OF INTRAUTERINE DEVICE (IUD)      US GUIDED BREAST BIOPSY RIGHT COMPLETE Right 6/17/2019       Current Outpatient Medications   Medication Sig Dispense Refill    Blood Glucose Monitoring Suppl (Contour Blood Glucose System) w/Device KIT Use 1 kit 2 (two) times a day before meals 1 kit 0    celecoxib (CeleBREX) 200 mg capsule Take 1 capsule (200 mg total) by mouth 2 (two) times a day 60 capsule 6    gabapentin (NEURONTIN) 300 mg capsule Take 1 capsule (300 mg total) by mouth 2 (two) times a day 60 capsule 2    glucose blood (Contour Test) test strip USE TO CHECK BLOOD SUGAR ONCE DAILY 100 strip 3    metFORMIN (GLUCOPHAGE) 500 mg tablet TAKE 1 TABLET BY MOUTH TWICE A DAY WITH FOOD 60 tablet 5    methocarbamol (ROBAXIN) 500 mg tablet Take 1 tab. every 8 hr. PRN for muscle spasms 30 tablet 1    mirtazapine (REMERON) 45 MG tablet Take 1 tablet (45 mg total) by mouth daily at bedtime 30 tablet 2    nortriptyline (PAMELOR) 75 MG capsule TAKE 1 CAPSULE BY MOUTH TWICE A DAY 60 capsule 2    phentermine (ADIPEX-P) 37.5 MG tablet Take 1 tablet (37.5 mg total) by mouth every morning 30 tablet 2    predniSONE 10 mg tablet Take 6 tablets with food day one-three, then day 4 decrease by one tablet every day until medicine completed. (Patient not taking: Reported on 1/11/2024) 33 tablet 0     No current facility-administered medications for this visit.        Allergies   Allergen Reactions    Cannabidiol Shortness Of Breath, Itching, Swelling,  "Anxiety, Palpitations, Confusion, Hypertension, Throat Swelling and Tongue Swelling    Amoxicillin-Pot Clavulanate Hives    Decadrol [Dexamethasone] Other (See Comments)     psychosis    Penicillins Hives     Hives/Uticaria    Tetracyclines & Related Hives      Allergy;        Review of Systems    Video Exam    There were no vitals filed for this visit.    Physical Exam   Behavioral Health Psychotherapy Progress Note    Psychotherapy Provided: Individual Psychotherapy     1. Severe episode of recurrent major depressive disorder, with psychotic features (HCC)        2. Generalized anxiety disorder            Goals addressed in session: Goal 1     DATA: Met with Esther for follow up. Esther shared that she fell down her steps last week and has been in significant pain since then, which has led her to feel more depressed.  She said that she has not been able to do much and missed a long-awaited concert Friday night as well.  She said that she has been trying to make it through each day by resting, getting chores done in small chunks, and trying to distract herself from the pain by watching movies/tv, but still said that it is difficult to focus on anything because of the pain.  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 She noted that she occasionally has fleeting SI, but is aware that they are \"just thoughts\" and is able to let them go.  Esther said that she still occasionally thinks about [place] and reaching out to his family, but has not yet done it.  She said that she may work on the letter to [place] that she started and see if that helps her process her thoughts/feelings first, and then reach out to his family.  Provided support and encouraged Esther to focus on ways to manage pain, including distraction, movement as she is able, and breaking down days into small, manageable time chunks.    During this session, this clinician " "used the following therapeutic modalities: Client-centered Therapy, Cognitive Behavioral Therapy, and Supportive Psychotherapy    Substance Abuse was not addressed during this session. If the client is diagnosed with a co-occurring substance use disorder, please indicate any changes in the frequency or amount of use: n/a. Stage of change for addressing substance use diagnoses: No substance use/Not applicable    ASSESSMENT:  Esther Griffith presents with a Depressed mood.     her affect is Normal range and intensity, which is congruent, with her mood and the content of the session. The client has made progress on their goals.     Esther Griffith presents with a low risk of suicide, low risk of self-harm, and minimal risk of harm to others.    For any risk assessment that surpasses a \"low\" rating, a safety plan must be developed.    A safety plan was indicated: no  If yes, describe in detail n/a    PLAN: Between sessions, Esther Griffith will work on staying active as she is able, attending social events to help boost mood, and will write out letter to [place]. At the next session, the therapist will use Client-centered Therapy, Cognitive Behavioral Therapy, and Supportive Psychotherapy to address depression.    Behavioral Health Treatment Plan and Discharge Planning: Esther Griffith is aware of and agrees to continue to work on their treatment plan. They have identified and are working toward their discharge goals. yes    Visit start and stop times:    01/25/24  Start Time: 0903  Stop Time: 0932  Total Visit Time: 29 minutes    "

## 2024-01-26 ENCOUNTER — OFFICE VISIT (OUTPATIENT)
Dept: URGENT CARE | Facility: CLINIC | Age: 44
End: 2024-01-26
Payer: COMMERCIAL

## 2024-01-26 VITALS
RESPIRATION RATE: 18 BRPM | TEMPERATURE: 99.1 F | OXYGEN SATURATION: 98 % | BODY MASS INDEX: 36.12 KG/M2 | DIASTOLIC BLOOD PRESSURE: 72 MMHG | SYSTOLIC BLOOD PRESSURE: 118 MMHG | HEART RATE: 110 BPM | WEIGHT: 184 LBS | HEIGHT: 60 IN

## 2024-01-26 DIAGNOSIS — G89.29 CHRONIC BILATERAL LOW BACK PAIN WITH BILATERAL SCIATICA: Primary | ICD-10-CM

## 2024-01-26 DIAGNOSIS — M54.42 CHRONIC BILATERAL LOW BACK PAIN WITH BILATERAL SCIATICA: Primary | ICD-10-CM

## 2024-01-26 DIAGNOSIS — M54.41 CHRONIC BILATERAL LOW BACK PAIN WITH BILATERAL SCIATICA: Primary | ICD-10-CM

## 2024-01-26 PROCEDURE — 99213 OFFICE O/P EST LOW 20 MIN: CPT

## 2024-01-26 PROCEDURE — 96372 THER/PROPH/DIAG INJ SC/IM: CPT

## 2024-01-26 RX ORDER — KETOROLAC TROMETHAMINE 30 MG/ML
30 INJECTION, SOLUTION INTRAMUSCULAR; INTRAVENOUS ONCE
Status: COMPLETED | OUTPATIENT
Start: 2024-01-26 | End: 2024-01-26

## 2024-01-26 RX ADMIN — KETOROLAC TROMETHAMINE 30 MG: 30 INJECTION, SOLUTION INTRAMUSCULAR; INTRAVENOUS at 12:04

## 2024-01-26 NOTE — PROGRESS NOTES
[facility]        NAME: Esther Griffith is a 44 y.o. female  : 1980    MRN: [id number]  DATE: 2024  TIME: 11:52 AM    Assessment and Plan   Chronic bilateral low back pain with bilateral sciatica [M54.42, M54.41, G89.29]  1. Chronic bilateral low back pain with bilateral sciatica  ketorolac (TORADOL) injection 30 mg    Ambulatory Referral to Orthopedic Surgery      Patient already taking methocarbamol and had PT visit yesterday.   Referral to orthopedics provided.   Patient Instructions   Referral to orthopedic surgery provided.  Toradol injection done in office today.   Continue muscle relaxer and Tylenol as needed.   Follow up with PCP in 3-5 days.  Proceed to ER if symptoms worsen.  Chief Complaint     Chief Complaint   Patient presents with    Back Pain     Pt presents with back pain across the lumbar back and flank pain following a fall that occurred x 1 week ago.  She has hx of back issues for which she being followed by her primary care and is seen at [facility] for her back problems.  She took Tylenol this morning for her 10/10 pain that she states is now 9/10.  Took Methocarbomal last night.       History of Present Illness       Pt is a 44-year-old female presenting with lower back pain following a fall one week ago. She states she slipped down the steps and landed on her back which exacerbated the symptoms of her chronic back pain. She has been seen by her PCP and PT and states she is still in 9/10 pain after taking Tylenol this morning and Methocarbamol last night.   She has also tried icy-hot which has helped.   She states the pain radiates to the back of her knees on both sides, and she has numbness and tingling going down the legs. She states she has a prior history of back pain with numbness and tingling, and it has been amplified after she slipped and fell.    She states it is worsened by movement and sitting. She states standing helps the pain.   She states her next  follow up appointment is on Monday for PT. She would like a referral to see orthopedics as well.     Back Pain  Associated symptoms include numbness. Pertinent negatives include no fever or weakness.       Review of Systems   Review of Systems   Constitutional:  Negative for chills and fever.   HENT: Negative.     Respiratory: Negative.     Cardiovascular: Negative.    Gastrointestinal:  Negative for nausea and vomiting.   Genitourinary: Negative.  Negative for difficulty urinating.   Musculoskeletal:  Positive for back pain. Negative for myalgias, neck pain and neck stiffness.   Skin:  Negative for color change and rash.   Neurological:  Positive for numbness. Negative for seizures, syncope and weakness.     Current Medications       Current Outpatient Medications:     Blood Glucose Monitoring Suppl (Contour Blood Glucose System) w/Device KIT, Use 1 kit 2 (two) times a day before meals, Disp: 1 kit, Rfl: 0    celecoxib (CeleBREX) 200 mg capsule, Take 1 capsule (200 mg total) by mouth 2 (two) times a day, Disp: 60 capsule, Rfl: 6    gabapentin (NEURONTIN) 300 mg capsule, Take 1 capsule (300 mg total) by mouth 2 (two) times a day, Disp: 60 capsule, Rfl: 2    glucose blood (Contour Test) test strip, USE TO CHECK BLOOD SUGAR ONCE DAILY, Disp: 100 strip, Rfl: 3    metFORMIN (GLUCOPHAGE) 500 mg tablet, TAKE 1 TABLET BY MOUTH TWICE A DAY WITH FOOD, Disp: 60 tablet, Rfl: 5    methocarbamol (ROBAXIN) 500 mg tablet, Take 1 tab. every 8 hr. PRN for muscle spasms, Disp: 30 tablet, Rfl: 1    mirtazapine (REMERON) 45 MG tablet, Take 1 tablet (45 mg total) by mouth daily at bedtime, Disp: 30 tablet, Rfl: 2    nortriptyline (PAMELOR) 75 MG capsule, TAKE 1 CAPSULE BY MOUTH TWICE A DAY, Disp: 60 capsule, Rfl: 2    phentermine (ADIPEX-P) 37.5 MG tablet, Take 1 tablet (37.5 mg total) by mouth every morning, Disp: 30 tablet, Rfl: 2    predniSONE 10 mg tablet, Take 6 tablets with food day one-three, then day 4 decrease by one tablet  every day until medicine completed. (Patient not taking: Reported on 1/26/2024), Disp: 33 tablet, Rfl: 0    Current Facility-Administered Medications:     ketorolac (TORADOL) injection 30 mg, 30 mg, Intramuscular, Once, Kalyani Barnes PA-C    Current Allergies     Allergies as of 01/26/2024 - Reviewed 01/26/2024   Allergen Reaction Noted    Cannabidiol Shortness Of Breath, Itching, Swelling, Anxiety, Palpitations, Confusion, Hypertension, Throat Swelling, and Tongue Swelling 08/18/2022    Amoxicillin-pot clavulanate Hives 10/08/2020    Decadrol [dexamethasone] Other (See Comments) 08/06/2015    Penicillins Hives 04/24/2013    Tetracyclines & related Hives 04/24/2013            The following portions of the patient's history were reviewed and updated as appropriate: allergies, current medications, past family history, past medical history, past social history, past surgical history and problem list.     Past Medical History:   Diagnosis Date    Anxiety     Blood transfusion declined because patient is [employer] 05/01/2023    Chronic pain disorder     Chronic sinusitis     Depression     Depression     Diabetes (HCC)     Fibromyalgia     Migraine     Obesity     Polyarthritis     Last assessed 9/21/2015    Psychiatric disorder     Psychiatric illness     Sleep difficulties        Past Surgical History:   Procedure Laterality Date    COLONOSCOPY  06/2019    DENTAL SURGERY      HYSTEROSCOPY      Endometrial Biopsy By Hysteroscopy    [place] LAPS SUPRACRV HYSTERECT 250 GM/< RMVL TUBE/OVAR N/A 5/1/2023    Procedure: (LSH) W/ BILATERAL SALPINGECTOMY, REMOVAL PARAOVARIAN CYST;  Surgeon: Sai Lopez DO;  Location: [facility];  Service: Gynecology    REMOVAL OF INTRAUTERINE DEVICE (IUD)      US GUIDED BREAST BIOPSY RIGHT COMPLETE Right 6/17/2019       Family History   Problem Relation Age of Onset    Irregular heart beat Mother     Diabetes Father     Kidney disease Father     Diabetes Maternal Grandmother     Rheum  arthritis Maternal Grandmother     Melanoma Maternal Grandmother 84    No Known Problems Maternal Grandfather     Kidney disease Paternal Grandmother     Rheum arthritis Paternal Grandmother     Depression Paternal Grandmother     Diabetes Paternal Grandfather     Lung cancer Paternal Grandfather 47    Substance Abuse Brother     No Known Problems Brother     Substance Abuse Maternal Uncle     Prostate cancer Maternal Uncle 60    Depression Paternal Aunt     Alcohol abuse Family     Stomach cancer Family          Medications have been verified.        Objective   /72   Pulse (!) 110   Temp 99.1 °F (37.3 °C)   Resp 18   Ht 5' (1.524 m)   Wt 83.5 kg (184 lb)   LMP 03/13/2023 (Approximate)   SpO2 98%   BMI 35.94 kg/m²   Patient's last menstrual period was 03/13/2023 (approximate).       Physical Exam     Physical Exam  Vitals and nursing note reviewed.   Constitutional:       General: She is not in acute distress.     Appearance: Normal appearance. She is normal weight. She is not ill-appearing or toxic-appearing.   HENT:      Head: Normocephalic and atraumatic.      Right Ear: External ear normal.      Left Ear: External ear normal.      Nose: Nose normal. No congestion or rhinorrhea.      Mouth/Throat:      Mouth: Mucous membranes are moist.      Pharynx: Oropharynx is clear.   Eyes:      Conjunctiva/sclera: Conjunctivae normal.   Cardiovascular:      Rate and Rhythm: Regular rhythm.      Pulses: Normal pulses.      Heart sounds: Normal heart sounds. No murmur heard.     No friction rub. No gallop.   Pulmonary:      Effort: Pulmonary effort is normal. No respiratory distress.      Breath sounds: Normal breath sounds. No stridor. No wheezing, rhonchi or rales.   Chest:      Chest wall: No tenderness.   Musculoskeletal:         General: Tenderness present. No swelling or deformity.      Thoracic back: Tenderness present. No swelling, edema, deformity, lacerations, spasms or bony tenderness. Normal range  of motion.      Lumbar back: Tenderness present. No swelling, edema, deformity, signs of trauma, lacerations, spasms or bony tenderness. Normal range of motion. Positive right straight leg raise test and positive left straight leg raise test. No scoliosis.        Back:    Neurological:      Mental Status: She is alert.

## 2024-01-26 NOTE — PATIENT INSTRUCTIONS
Referral to orthopedic surgery provided.  Toradol injection done in office today.   Continue muscle relaxer and Tylenol as needed.   Follow up with PCP in 3-5 days.  Proceed to ER if symptoms worsen.  Back Pain   AMBULATORY CARE:   Back pain  is common. You may have back pain and muscle spasms. You may feel sore or stiff on one or both sides of your back. The pain may spread to your lower body. Conditions that affect the spine, joints, or muscles can cause back pain. These may include arthritis, spinal stenosis (narrowing of the spinal column), muscle tension, or breakdown of the spinal discs.  Call your local emergency number (911 in the [place]) if:   You have severe back pain with chest pain.    You cannot control your urine or bowel movements.    Your pain becomes so severe that you cannot walk.    Seek care immediately if:   You have pain, numbness, or weakness in one or both legs.    You have severe back pain, nausea, and vomiting.    You have severe back pain that spreads to your side or genital area.    Call your doctor if:   You have back pain that does not get better with rest and pain medicine.    You have a fever.    You have pain that worsens when you are on your back or when you rest.    You have pain that worsens when you cough or sneeze.    You lose weight without trying.    You have questions or concerns about your condition or care.    Medicines:  You may need any of the following:  NSAIDs  help decrease swelling and pain or fever. This medicine is available with or without a doctor's order. NSAIDs can cause stomach bleeding or kidney problems in certain people. If you take blood thinner medicine, always ask your healthcare provider if NSAIDs are safe for you. Always read the medicine label and follow directions.    Acetaminophen  decreases pain and fever. It is available without a doctor's order. Ask how much to take and how often to take it. Follow directions. Read the labels of all other medicines  you are using to see if they also contain acetaminophen, or ask your doctor or pharmacist. Acetaminophen can cause liver damage if not taken correctly.    Muscle relaxers  help decrease muscle spasms and back pain.    Take your medicine as directed.  Contact your healthcare provider if you think your medicine is not helping or if you have side effects. Tell your provider if you are allergic to any medicine. Keep a list of the medicines, vitamins, and herbs you take. Include the amounts, and when and why you take them. Bring the list or the pill bottles to follow-up visits. Carry your medicine list with you in case of an emergency.    Manage your back pain:   Apply ice  on your back for 15 to 20 minutes every hour or as directed. Use an ice pack, or put crushed ice in a plastic bag. Cover it with a towel before you apply it to your skin. Ice helps prevent tissue damage and decreases pain.    Apply heat  on your back for 20 to 30 minutes every 2 hours for as many days as directed. Heat helps decrease pain and muscle spasms.    Stay active  as much as you can without causing more pain. Bed rest could make your back pain worse. Avoid heavy lifting until your pain is gone.         Go to physical therapy  as directed. A physical therapist can teach you exercises to help improve movement and strength, and to decrease pain.    Follow up with your doctor in 2 weeks, or as directed:  You might need to see a specialist. Write down your questions so you remember to ask them during your visits.  © Copyright [employer] 2023 Information is for End User's use only and may not be sold, redistributed or otherwise used for commercial purposes.  The above information is an  only. It is not intended as medical advice for individual conditions or treatments. Talk to your doctor, nurse or pharmacist before following any medical regimen to see if it is safe and effective for you.

## 2024-01-30 ENCOUNTER — OFFICE VISIT (OUTPATIENT)
Dept: PHYSICAL THERAPY | Age: 44
End: 2024-01-30
Payer: COMMERCIAL

## 2024-01-30 DIAGNOSIS — G89.29 ACUTE EXACERBATION OF CHRONIC LOW BACK PAIN: ICD-10-CM

## 2024-01-30 DIAGNOSIS — M54.50 ACUTE EXACERBATION OF CHRONIC LOW BACK PAIN: ICD-10-CM

## 2024-01-30 DIAGNOSIS — M54.50 CHRONIC BILATERAL LOW BACK PAIN, UNSPECIFIED WHETHER SCIATICA PRESENT: Primary | ICD-10-CM

## 2024-01-30 DIAGNOSIS — G89.29 CHRONIC BILATERAL LOW BACK PAIN, UNSPECIFIED WHETHER SCIATICA PRESENT: Primary | ICD-10-CM

## 2024-01-30 PROCEDURE — 97113 AQUATIC THERAPY/EXERCISES: CPT

## 2024-01-30 NOTE — PROGRESS NOTES
Daily Note     Today's date: 2024  Patient name: Esther Griffith  : 1980  MRN: [id number]  Referring provider: Laura Ambrose CRNP  Dx:   Encounter Diagnosis     ICD-10-CM    1. Chronic bilateral low back pain, unspecified whether sciatica present  M54.50     G89.29       2. Acute exacerbation of chronic low back pain  M54.50     G89.29                      Subjective: Patient presents to therapy reporting she had increased pain a few hours after last session. Patient reports she had no pain during or immediately after last session. States she went home after therapy and did some light things around her house, took a nap, and woke up in immense pain. Presented to [facility], who recommended she F/U with her doctor. Patient will be seeing neuro on Thursday, and ortho next Monday.       Objective: See treatment diary below      Assessment: Tolerated treatment fair. Patient would benefit from continued PT. TE completed to improve LE strength and lumbar mobility, as well as core strength, which patient tolerated fair. She reported an increase in LBP with performance of standing hip abduction, so terminated at that time. Due to reports of increased pain after last session, reduced reps with various TE, as well as omitted other TE. Noted she felt OK post TE. Will assess response to today's session, and adjust as needed.       Plan: Progress treatment as tolerated.       Precautions: Fibromyalgia      Manuals 12/26 01/03 01/10 01/17 01/25 01/30                                                           Neuro Re-Ed                                                                                                        Ther Ex             LTR 20x            Piriformis stretch 3 x 30sec B/L            Cat-camel 20x            POOL             Laps  5x forward, 5x lateral 5x forward and lateral 4x each 4x each 4x each       Standing hip abduction and extension  3 x 10 B/L 3 x 10 B/L 3 x 10 abduction, 15x extension 3 x 10  each 2 x 10 abduction       Standing marching  3 x 10 B/L 3 x 10 B/L 3 x 10 B/L 3 x 10 B /L 3 x 10 B/L       Core pressdown w/ ball  2 x 10, 5 sec holds 2 x 10 2 x 10, 5 sec holds 2 x 10, 5 sec holds 2 x 10, 5 sec holds       Rollouts  2 x 10 each direction 3 x 10 3 x 10 each direction 2 x 10 each directions 2 x 10 each direction       Paddles ext, add  20x 20x 20x 20x 10x       Seated hamstring stretch  3 x 30sec B/L 3 x 30sec 3 x 30sec 3 x 30sec 3 x 30sec       Pool buoy hangs  8min 5min 5' 5' x       lap cool down  1x 1x 2x 2x 2x                    Ther Activity                                       Gait Training                                       Modalities

## 2024-01-31 ENCOUNTER — HOSPITAL ENCOUNTER (OUTPATIENT)
Dept: MAMMOGRAPHY | Facility: IMAGING CENTER | Age: 44
Discharge: HOME | End: 2024-01-31
Payer: COMMERCIAL

## 2024-01-31 ENCOUNTER — TELEPHONE (OUTPATIENT)
Dept: FAMILY MEDICINE CLINIC | Facility: CLINIC | Age: 44
End: 2024-01-31

## 2024-01-31 ENCOUNTER — TELEPHONE (OUTPATIENT)
Dept: NEUROLOGY | Facility: CLINIC | Age: 44
End: 2024-01-31

## 2024-01-31 VITALS — HEIGHT: 60 IN | BODY MASS INDEX: 36.12 KG/M2 | WEIGHT: 184 LBS

## 2024-01-31 DIAGNOSIS — G89.29 CHRONIC BILATERAL LOW BACK PAIN, UNSPECIFIED WHETHER SCIATICA PRESENT: ICD-10-CM

## 2024-01-31 DIAGNOSIS — Z12.31 ENCOUNTER FOR SCREENING MAMMOGRAM FOR MALIGNANT NEOPLASM OF BREAST: ICD-10-CM

## 2024-01-31 DIAGNOSIS — M54.50 CHRONIC BILATERAL LOW BACK PAIN, UNSPECIFIED WHETHER SCIATICA PRESENT: ICD-10-CM

## 2024-01-31 DIAGNOSIS — G43.709 CHRONIC MIGRAINE WITHOUT AURA WITHOUT STATUS MIGRAINOSUS, NOT INTRACTABLE: Primary | ICD-10-CM

## 2024-01-31 PROCEDURE — 77063 BREAST TOMOSYNTHESIS BI: CPT

## 2024-01-31 PROCEDURE — 77067 SCR MAMMO BI INCL CAD: CPT

## 2024-01-31 NOTE — TELEPHONE ENCOUNTER
Voicemail: Good morning. My name is Jonnathan- I'm calling from [facility]. Just calling to request an ambulatory referral to [facility] to be placed in [facility] on a mutual patient. The appointment date is scheduled for tomorrow, February 1st in our [place] office for the diagnosis codes of G43.709 and M54.9. If you have any questions, my direct telephone number is [phone] and again my name is Anaya. Thank you. Have a good day.

## 2024-01-31 NOTE — TELEPHONE ENCOUNTER
Called patient left voicemail to confirm appointment with provider on 2/1/24 at [facility].

## 2024-02-01 ENCOUNTER — OFFICE VISIT (OUTPATIENT)
Dept: NEUROLOGY | Facility: CLINIC | Age: 44
End: 2024-02-01
Payer: COMMERCIAL

## 2024-02-01 ENCOUNTER — TELEMEDICINE (OUTPATIENT)
Dept: BEHAVIORAL/MENTAL HEALTH CLINIC | Facility: CLINIC | Age: 44
End: 2024-02-01
Payer: COMMERCIAL

## 2024-02-01 VITALS
SYSTOLIC BLOOD PRESSURE: 130 MMHG | TEMPERATURE: 98.1 F | WEIGHT: 184 LBS | DIASTOLIC BLOOD PRESSURE: 88 MMHG | HEIGHT: 60 IN | OXYGEN SATURATION: 98 % | BODY MASS INDEX: 36.12 KG/M2 | HEART RATE: 115 BPM

## 2024-02-01 DIAGNOSIS — M54.50 CHRONIC BILATERAL LOW BACK PAIN, UNSPECIFIED WHETHER SCIATICA PRESENT: ICD-10-CM

## 2024-02-01 DIAGNOSIS — G43.709 CHRONIC MIGRAINE WITHOUT AURA WITHOUT STATUS MIGRAINOSUS, NOT INTRACTABLE: Chronic | ICD-10-CM

## 2024-02-01 DIAGNOSIS — F33.3 SEVERE EPISODE OF RECURRENT MAJOR DEPRESSIVE DISORDER, WITH PSYCHOTIC FEATURES (HCC): Primary | ICD-10-CM

## 2024-02-01 DIAGNOSIS — G89.29 CHRONIC BILATERAL LOW BACK PAIN, UNSPECIFIED WHETHER SCIATICA PRESENT: ICD-10-CM

## 2024-02-01 DIAGNOSIS — F41.1 GENERALIZED ANXIETY DISORDER: ICD-10-CM

## 2024-02-01 PROBLEM — K59.04 CHRONIC IDIOPATHIC CONSTIPATION: Chronic | Status: ACTIVE | Noted: 2018-11-07

## 2024-02-01 PROCEDURE — 90834 PSYTX W PT 45 MINUTES: CPT | Performed by: PSYCHIATRY & NEUROLOGY

## 2024-02-01 PROCEDURE — 99214 OFFICE O/P EST MOD 30 MIN: CPT | Performed by: STUDENT IN AN ORGANIZED HEALTH CARE EDUCATION/TRAINING PROGRAM

## 2024-02-01 RX ORDER — PROPRANOLOL HYDROCHLORIDE 40 MG/1
20 TABLET ORAL
Qty: 45 TABLET | Refills: 3 | Status: SHIPPED | OUTPATIENT
Start: 2024-02-01 | End: 2025-01-26

## 2024-02-01 NOTE — BH TREATMENT PLAN
Outpatient Behavioral Health Psychotherapy Treatment Plan    Esther Nacho  1980     Date of Initial Psychotherapy Assessment:  8/21/2018   Date of Current Treatment Plan: 02/01/24  Treatment Plan Target Date: 8/1/2024  Treatment Plan Expiration Date: 8/1/2024    Diagnosis:   1. Severe episode of recurrent major depressive disorder, with psychotic features (HCC)        2. Generalized anxiety disorder            Area(s) of Need: depression/anxiety/chronic pain    Long Term Goal 1 (in the client's own words): I want to continue to effectively manage the depression and the anxiety.    Stage of Change: Action    Target Date for completion: 8/1/2024     Anticipated therapeutic modalities: CBT, mindfulness     People identified to complete this goal: Vidhi Santana      Objective 1: (identify the means of measuring success in meeting the objective): I will continue to be aware of the depression/anxiety and negative self talk messages and redirect to positive and realistic messages (lower depression/anxiety).     Objective 2: I will continue to work with Dr. Ovalle on medication management.     Objective 3: I will continue to make sure I am taking care of myself.     Objective 4: I will continue to reach out to friends to stay socially connected     Objective 5: (identify the means of measuring success in meeting the objective): I will create a daily schedule of household chores to help build motivation and help me feel accomplished    Long Term Goal 2 (in the client's own words): I will continue to work on strengthening my self esteem.    Stage of Change: Action    Target Date for completion: 8/1/2024     Anticipated therapeutic modalities: CBT     People identified to complete this goal: Vidhi Santana      Objective 1: (identify the means of measuring success in meeting the objective): I will remain aware of those who have impacted me positively and disregard the messages from those who were negative influences.      Objective 2: I will review and use my qualities/strengths/assets as reminders of my worth.     Objective 3: I will continue with self care (including using my effective communication skills such as assertiveness).     Objective 4: I will continue to explore options to improve my quality of life (taking classes, finding a creative hobby)       Long Term Goal 3 (in the client's own words): I will continue to learn and implement effective pain management strategies.    Stage of Change: Action    Target Date for completion: 8/1/2024     Anticipated therapeutic modalities: CBT, mindfulness     People identified to complete this goal: Vidhi Santana      Objective 1: (identify the means of measuring success in meeting the objective): I will learn and practice effective pain management strategies.       Objective 2: (identify the means of measuring success in meeting the objective): I will explore yoga as a possible pain management strategy    Objective 3: I will walk and be more physically active as much as I am able.     I am currently under the care of a [facility] psychiatric provider: yes    My [facility] psychiatric provider is: Dr. Ovalle    I am currently taking psychiatric medications: Yes, as prescribed    I feel that I will be ready for discharge from mental health care when I reach the following (measurable goal/objective): Once I have progressed to the point of being able to positively maintain my focus and drive to accomplish the things I want to accomplish in my life.    For children and adults who have a legal guardian:   Has there been any change to custody orders and/or guardianship status? NA. If yes, attach updated documentation.    I have created my Crisis Plan and have been offered a copy of this plan    Behavioral Health Treatment Plan [facility]: Diagnosis and Treatment Plan explained to Esther Griffith acknowledges an understanding of their diagnosis. Esther Griffith agrees to this  treatment plan.    I have been offered a copy of this Treatment Plan. yes      Esther Griffith, 1980, actively participated in the review and update of this treatment plan during a virtual session, using the Epic Embedded platform.   Esther Griffith  provided verbal consent on 2/1/2024 at 944 AM. The treatment plan was transcribed into the Electronic Health Record at a later time. Treatment plan also sent to Esther's [employer] [employer] for signature.

## 2024-02-01 NOTE — PROGRESS NOTES
[unfilled]  NEUROLOGY RESIDENCY CLINIC    NAME:  Esther Griffith MRN: [id number] : 1980   ENCOUNTER DATE: 24  REFERRED BY: Rosa Lutz MD PCP: Rosa Lutz MD  ASSESSMENT & PLAN   #CHRONIC MIGRAINE WITHOUT AURA WITHOUT STATUS MIGRAINOSUS  43-year-old female here for follow-up for migraines.  Previously followed by Dr. Long and last seen in 2022.   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  Since her last visit she reports that she ran out of her prescription in 2023 and since her headaches have returned and now occurring 1-2 times a month.    Plan:   - Additional Workup: none  - Lifestyle Modifications and Headache Hygiene   Recommended to strive to attain adequate amount of sleep each night to prevent fatigue/sleep deprivation.   Exercise frequently. Eat balance diet with avoidance of fating or skipping meals.Maintain adequate hydration.   Avoid migraine triggers such as: red wine, age cheeses, and scuralose/artificial sweeteners.   Avoidance of tobacco use and limited EtOH and caffeine intake. Stress reduction, consider relaxation therapy, meditation, yoga.   Encouraged to keep a headache diary to help identify potential triggers and monitor treatment effectiveness and response to lifestyle interventions.   - Headache Preventative Rx:  propranolol (Inderal) 20 mg QHS  - Headache Abortives: Continue Naproxen PRN   - Patient was advised to limit OTC or prescription analgesics more than 3 days/wk to prevent Medication Overuse Headache / Rebound Headache  - Discussed the potential side effects of the current medications, patient was understanding and agreeable  - Follow-up visit in 6 months, or sooner as needed should symptoms worsen or fail to respond to treatment plan as outlined    #DEPRESSION AND ANXIETY  Patient reports worsening of history of depression and  anxiety. After doing research online concerned she has undiagnosed autism. Requesting neuropsych evaluation.   - Neuropsych evaluation placed     CHIEF COMPLAINT   Esther rGiffith is a 44 y.o. female who presents today at the neurology clinic as follow up patient for ongoing management of migraines.  HISTORY OF PRESENT ILLNESS   CC: Headaches  HPI: Esther is a 44-year-old female follow-up in regards to her headaches.  She was followed by Dr. Long and was last seen in June 2022.  In review patient has a long standing history of bitemporal periorbital migraines  for that past 20 years. She was previously on Topamax that was prescribed by her PCP without improvement.  Was started on Propanolol 20 mg QHS as a preventative with complete resolution of her headache. She ran out in October 2023 year and headaches returned now occurring 2 times a month no change in frequency or intensity.     He also has recently that she felt as if her anxiety and depression has worsened.  She states while doing some research online she stopped across an article which noted that there is a correlation between mental health and the autism spectrum disorder.  She stated that she stopped back on her youth she had noted characteristics such as lack of empathy, increased sensitivity to stimulation, and preference to be isolated from others.  She voices concern that she thinks she might be on the autism spectrum and would like to seek out a neuropsychological assessment.  Patient also endorses concern in regards to her    She voices no additional complaints at this time.  REVIEW OF SYSTEMS   Review of Systems   Constitutional:  Negative for chills, fatigue, fever and unexpected weight change.   HENT:  Negative for drooling, hearing loss, sinus pressure, sinus pain, tinnitus, trouble swallowing and voice change.    Eyes:  Negative for photophobia and visual disturbance.   Respiratory:  Negative for chest tightness and shortness of breath.     Cardiovascular:  Negative for chest pain, palpitations and leg swelling.   Gastrointestinal:  Negative for constipation, diarrhea and nausea.   Endocrine: Negative for cold intolerance.   Genitourinary:  Negative for difficulty urinating.   Musculoskeletal:  Negative for arthralgias, back pain, gait problem, myalgias, neck pain and neck stiffness.   Skin:  Negative for pallor and rash.   Neurological:  Positive for headaches. Negative for dizziness, tremors, seizures, syncope, facial asymmetry, speech difficulty, weakness, light-headedness and numbness.   Psychiatric/Behavioral:  Positive for decreased concentration and dysphoric mood. Negative for confusion and sleep disturbance. The patient is nervous/anxious.      PAST MEDICAL HISTORY   Past History of Headaches: migraine headaches diagnosed in the past.   PMHx:  has a past medical history of Anxiety, Blood transfusion declined because patient is [employer]ab[employer], Chronic pain disorder, Chronic sinusitis, Depression, Depression, Diabetes (HCC), Fibromyalgia, Migraine, Obesity, Polyarthritis, Psychiatric disorder, Psychiatric illness, and Sleep difficulties.   MEDICATIONS      Current Outpatient Medications   Medication Sig Dispense Refill   • Blood Glucose Monitoring Suppl (Contour Blood Glucose System) w/Device KIT Use 1 kit 2 (two) times a day before meals 1 kit 0   • celecoxib (CeleBREX) 200 mg capsule Take 1 capsule (200 mg total) by mouth 2 (two) times a day 60 capsule 6   • gabapentin (NEURONTIN) 300 mg capsule Take 1 capsule (300 mg total) by mouth 2 (two) times a day 60 capsule 2   • glucose blood (Contour Test) test strip USE TO CHECK BLOOD SUGAR ONCE DAILY 100 strip 3   • metFORMIN (GLUCOPHAGE) 500 mg tablet TAKE 1 TABLET BY MOUTH TWICE A DAY WITH FOOD 60 tablet 5   • methocarbamol (ROBAXIN) 500 mg tablet Take 1 tab. every 8 hr. PRN for muscle spasms 30 tablet 1   • mirtazapine (REMERON) 45 MG tablet Take 1 tablet (45 mg total) by mouth daily at  bedtime 30 tablet 2   • nortriptyline (PAMELOR) 75 MG capsule TAKE 1 CAPSULE BY MOUTH TWICE A DAY 60 capsule 2   • phentermine (ADIPEX-P) 37.5 MG tablet Take 1 tablet (37.5 mg total) by mouth every morning 30 tablet 2   • propranolol (INDERAL) 40 mg tablet Take 0.5 tablets (20 mg total) by mouth daily at bedtime 45 tablet 3   • predniSONE 10 mg tablet Take 6 tablets with food day one-three, then day 4 decrease by one tablet every day until medicine completed. (Patient not taking: Reported on 1/26/2024) 33 tablet 0     No current facility-administered medications for this visit.      ALLERGIES     Allergies   Allergen Reactions   • Cannabidiol Shortness Of Breath, Itching, Swelling, Anxiety, Palpitations, Confusion, Hypertension, Throat Swelling and Tongue Swelling   • Amoxicillin-Pot Clavulanate Hives   • Decadrol [Dexamethasone] Other (See Comments)     psychosis   • Penicillins Hives     Hives/Uticaria   • Tetracyclines & Related Hives      Allergy;      OBJECTIVE   PHYSICAL EXAM:   /88 (BP Location: Left arm, Patient Position: Sitting, Cuff Size: Adult)   Pulse (!) 115   Temp 98.1 °F (36.7 °C) (Temporal)   Ht 5' (1.524 m)   Wt 83.5 kg (184 lb)   LMP 03/13/2023 (Approximate)   SpO2 98%   BMI 35.94 kg/m²     NEUROLOGIC  EXAM:  Mental Status: AAOx3, memory intact, fund of knowledge appropriate  Cranial Nerves:  II: Pupils equal and reactive, no RAPD, no VF deficits, normal fundus  III, IV, VI: EOM intact, no gaze preference or deviation, no nystagmus.  V: normal sensation in V1, V2, and V3 segments bilaterally  VII: no asymmetry, no nasolabial fold flattening  VIII: normal hearing to speech  IX, X: normal palatal elevation, no uvular deviation  XI: 5/5 head turn and 5/5 shoulder shrug bilaterally  XII: midline tongue protrusion  Motor: Normal bulk, tone, no involuntary movements or tremors     DELTOID   BICEP   TRICEPS   WRIST  EXTENSION   WRIST  FLEXION   DORSAL  INTEROSSEI      RIGHT 5 5 5 5 5 5 5    LEFT 5 5 5 5 5 5 5        HIP  FLEXION   KNEE  EXTENSION DORSI   RIGHT 5 5 5   LEFT 5 5 5   Reflexes: No clonus, no Downs's, no cross abductors, toes down     BICEP   TRICEPS   BRACHIO   PATELLAR   ACHILLES   RIGHT 2+ 2+ 2+ 2+ 2+   LEFT 2+ 2+ 2+ 2+ 2+   Sensory: Normal to light touch  Coordination: Normal finger to nose and heel to shin, no tremor, no dysmetria  Station: Normal stance, no truncal ataxia  Gait: Normal regular gait  PREVIOUS WORKUP:   I personally reviewed these images.  Imaging REVIEWED:   - Normal MRI of the brain.   Author:  Daryl Chinchilla DO 2/1/2024 6:51 PM

## 2024-02-01 NOTE — PSYCH
Virtual Regular Visit    Verification of patient location:    Patient is located at Home in the following state in which I hold an active license [place]    Assessment/Plan:    Problem List Items Addressed This Visit          Other    Severe episode of recurrent major depressive disorder, with psychotic features (HCC) - Primary    Generalized anxiety disorder        Reason for visit is   Chief Complaint   Patient presents with    Virtual Regular Visit      Encounter provider HERMELINDA ALBA    Provider located at [facility]  [facility] [place]  [address] [place] [id number] [phone]      Recent Visits  Date Type Provider Dept   01/25/24 Telemedicine HERMELINDA Alba [facility] [place]   Showing recent visits within past 7 days and meeting all other requirements  Today's Visits  Date Type Provider Dept   02/01/24 Telemedicine HERMELINDA Alba [facility] [place]   Showing today's visits and meeting all other requirements  Future Appointments  No visits were found meeting these conditions.  Showing future appointments within next 150 days and meeting all other requirements       The patient was identified by name and date of birth. Esther Griffith was informed that this is a telemedicine visit and that the visit is being conducted throughthe Epic Embedded platform. She agrees to proceed..  My office door was closed. No one else was in the room.  She acknowledged consent and understanding of privacy and security of the video platform. The patient has agreed to participate and understands they can discontinue the visit at any time.    Patient is aware this is a billable service.     HPI     Past Medical History:   Diagnosis Date    Anxiety     Blood transfusion declined because patient is Caodaism 05/01/2023    Chronic pain disorder     Chronic sinusitis     Depression     Depression      Diabetes (HCC)     Fibromyalgia     Migraine     Obesity     Polyarthritis     Last assessed 9/21/2015    Psychiatric disorder     Psychiatric illness     Sleep difficulties        Past Surgical History:   Procedure Laterality Date    COLONOSCOPY  06/2019    DENTAL SURGERY      HYSTERECTOMY  05/01/2023    HYSTEROSCOPY      Endometrial Biopsy By Hysteroscopy    [place] LAPS SUPRACRV HYSTERECT 250 GM/< RMVL TUBE/OVAR N/A 05/01/2023    Procedure: (LSH) W/ BILATERAL SALPINGECTOMY, REMOVAL PARAOVARIAN CYST;  Surgeon: Sai Lopez DO;  Location: [facility];  Service: Gynecology    REMOVAL OF INTRAUTERINE DEVICE (IUD)      US GUIDED BREAST BIOPSY RIGHT COMPLETE Right 06/17/2019    Benign       Current Outpatient Medications   Medication Sig Dispense Refill    Blood Glucose Monitoring Suppl (Contour Blood Glucose System) w/Device KIT Use 1 kit 2 (two) times a day before meals 1 kit 0    celecoxib (CeleBREX) 200 mg capsule Take 1 capsule (200 mg total) by mouth 2 (two) times a day 60 capsule 6    gabapentin (NEURONTIN) 300 mg capsule Take 1 capsule (300 mg total) by mouth 2 (two) times a day 60 capsule 2    glucose blood (Contour Test) test strip USE TO CHECK BLOOD SUGAR ONCE DAILY 100 strip 3    metFORMIN (GLUCOPHAGE) 500 mg tablet TAKE 1 TABLET BY MOUTH TWICE A DAY WITH FOOD 60 tablet 5    methocarbamol (ROBAXIN) 500 mg tablet Take 1 tab. every 8 hr. PRN for muscle spasms 30 tablet 1    mirtazapine (REMERON) 45 MG tablet Take 1 tablet (45 mg total) by mouth daily at bedtime 30 tablet 2    nortriptyline (PAMELOR) 75 MG capsule TAKE 1 CAPSULE BY MOUTH TWICE A DAY 60 capsule 2    phentermine (ADIPEX-P) 37.5 MG tablet Take 1 tablet (37.5 mg total) by mouth every morning 30 tablet 2    predniSONE 10 mg tablet Take 6 tablets with food day one-three, then day 4 decrease by one tablet every day until medicine completed. (Patient not taking: Reported on 1/26/2024) 33 tablet 0     No current facility-administered medications for  this visit.        Allergies   Allergen Reactions    Cannabidiol Shortness Of Breath, Itching, Swelling, Anxiety, Palpitations, Confusion, Hypertension, Throat Swelling and Tongue Swelling    Amoxicillin-Pot Clavulanate Hives    Decadrol [Dexamethasone] Other (See Comments)     psychosis    Penicillins Hives     Hives/Uticaria    Tetracyclines & Related Hives      Allergy;        Review of Systems    Video Exam    There were no vitals filed for this visit.    Physical Exam   Behavioral Health Psychotherapy Progress Note    Psychotherapy Provided: Individual Psychotherapy     1. Severe episode of recurrent major depressive disorder, with psychotic features (HCC)        2. Generalized anxiety disorder            Goals addressed in session: Goal 1     DATA: Met with Esther for follow up. Esther shared that she continues to struggle with depression and poor sleep, mainly due to significant physical pain. She said that it has been hard for her to do a lot of her day to day activities because of her pain, and she is behind on household chores.  This leaves her feeling overwhelmed and upset that she is not able to do more, but she noted that she is trying to let go of some of the pressure to have things done all at once and allow herself to rest more.  She said that her mom is pretty critical of how she keeps her apartment, but Esther has been talking to her to let her know how she is feeling and mom seems to be starting to understand a bit more. Esther said that she has not been going out as much as she typically would, but has some plans this weekend that she will try to make, as she likes to get out and be with people because it helps decrease her isolation and boost her mood.  Provided support, validation of Esther's focus on self-care and being more patient with herself, and used CBT and mindfulness strategies to help Esther focus on self-care, positive thought process and ways to boost mood.    During this  "session, this clinician used the following therapeutic modalities: Client-centered Therapy, Cognitive Behavioral Therapy, and Supportive Psychotherapy    Substance Abuse was not addressed during this session. If the client is diagnosed with a co-occurring substance use disorder, please indicate any changes in the frequency or amount of use: n/a. Stage of change for addressing substance use diagnoses: No substance use/Not applicable    ASSESSMENT:  Esther Griffith presents with a Depressed mood.     her affect is Normal range and intensity, which is congruent, with her mood and the content of the session. The client has made progress on their goals.     Esther Griffith presents with a low risk of suicide, low risk of self-harm, and minimal risk of harm to others.    For any risk assessment that surpasses a \"low\" rating, a safety plan must be developed.    A safety plan was indicated: no  If yes, describe in detail n/a    PLAN: Between sessions, Esther Griffith will use positive thought reframing to help reduce negative self-talk and will try to get out and socialize to boost mood. At the next session, the therapist will use Client-centered Therapy, Cognitive Behavioral Therapy, and Supportive Psychotherapy to address depression.    Behavioral Health Treatment Plan and Discharge Planning: Esther Griffith is aware of and agrees to continue to work on their treatment plan. They have identified and are working toward their discharge goals. yes    Visit start and stop times:    02/01/24  Start Time: 0904  Stop Time: 0945  Total Visit Time: 41 minutes    "

## 2024-02-07 ENCOUNTER — HOSPITAL ENCOUNTER (OUTPATIENT)
Dept: RADIOLOGY | Facility: HOSPITAL | Age: 44
Discharge: HOME | End: 2024-02-07
Attending: ORTHOPAEDIC SURGERY
Payer: COMMERCIAL

## 2024-02-07 ENCOUNTER — OFFICE VISIT (OUTPATIENT)
Dept: OBGYN CLINIC | Facility: HOSPITAL | Age: 44
End: 2024-02-07
Payer: COMMERCIAL

## 2024-02-07 VITALS
HEIGHT: 60 IN | HEART RATE: 90 BPM | BODY MASS INDEX: 36.14 KG/M2 | SYSTOLIC BLOOD PRESSURE: 127 MMHG | DIASTOLIC BLOOD PRESSURE: 84 MMHG | WEIGHT: 184.08 LBS

## 2024-02-07 DIAGNOSIS — M54.41 CHRONIC BILATERAL LOW BACK PAIN WITH BILATERAL SCIATICA: ICD-10-CM

## 2024-02-07 DIAGNOSIS — R52 PAIN: ICD-10-CM

## 2024-02-07 DIAGNOSIS — R52 PAIN: Primary | ICD-10-CM

## 2024-02-07 DIAGNOSIS — M79.18 MYOFASCIAL PAIN: ICD-10-CM

## 2024-02-07 DIAGNOSIS — M54.42 CHRONIC BILATERAL LOW BACK PAIN WITH BILATERAL SCIATICA: ICD-10-CM

## 2024-02-07 DIAGNOSIS — G89.29 CHRONIC BILATERAL LOW BACK PAIN WITH BILATERAL SCIATICA: ICD-10-CM

## 2024-02-07 PROCEDURE — 72100 X-RAY EXAM L-S SPINE 2/3 VWS: CPT

## 2024-02-07 PROCEDURE — 99204 OFFICE O/P NEW MOD 45 MIN: CPT | Performed by: ORTHOPAEDIC SURGERY

## 2024-02-07 RX ORDER — METHOCARBAMOL 500 MG/1
TABLET, FILM COATED ORAL
Qty: 30 TABLET | Refills: 1 | Status: SHIPPED | OUTPATIENT
Start: 2024-02-07

## 2024-02-07 NOTE — PROGRESS NOTES
Assessment & Plan/Medical Decision Makin y.o. female with Back Pain and Bilateral Radicular Leg Pain and imaging findings most notable for L5-S1 disc degeneration        The clinical, physical and imaging findings were reviewed with the patient.  Esther  has a constellation of findings consistent with Lumbar Radiculopathy and Lumbar Disc Pain in the setting of lumbar degenerative disease. Ongoing low back, gluteal region and posterior lower extremity pain with numbness/tingling worsening over the past 2 months or so.  Fortunately patient remains neurologically intact and functional. Physical exam showing decreased lumbar ROM.  We discussed the treatment options including physical therapy, at home exercises, activity modifications, chiropractic medicine, oral medications, interventional spine procedures.  At this time recommend continued conservative treatments.  Patient has upcoming lumbar MRI scheduled for 24. Would recommend patient undergo lumbar MRI for further evaluation of symptoms and treatment guidance.  Referral to chiropractic care for evaluation and treatment. Discussed potential role of chiropractic care in targeting current pain and symptoms.  Would recommend patient continue to follow up with spine & pain management for repeat interventional spine procedures pending updated lumbar MRI.   Patient instructed to return to office/ER sooner if symptoms are not improving, getting worse, or new worrisome/neurologic symptoms arise.  Patient will follow up after lumbar MRI for review and treatment recommendations.     Subjective:      Chief Complaint: Back Pain    HPI:  Esther Griffith is a 44 y.o. female presenting for initial visit with chief complaint of back pain. Ongoing back and bilateral gluteal region pain x2.5 years after fall while roller skating. Underwent bilateral SI joint injections with some improvement in pain and symptoms at that time, noting the SI joint injections provided the  most relief. Also underwent multiple bilateral L5 TFESI with about 1 month of some improvement. She has been to both land-based therapy and aqua therapy, but notes her pain worsens after therapy. She had new onset of bilateral posterior lower extremity pain with numbness/tingling into her bilateral feet x2 months or so with worsening back pain. She was [facility] at [facility] for worsening back pain. Back pain 60%, leg pain 40%. Leg pain worse with prolonged sitting, back pain is constant and worse with increased activity, bending forward, prolonged sitting, standing and walking. She is able to walk >1 mile but notes increased pain that limits her. She report her daily activities and functioning are limited due to her pain and symptoms. Per patient, she has history of peripheral neuropathy due to diabetes. Denies weakness. Denies any hossein trauma. Denies fever or chills, no night sweats. Denies any bladder or bowel changes.      EMG 2 limb LE from 10/4/23 demonstrated chronic right L5 radiculopathy  PMH fibromyalgia, T2DM.    Conservative therapy includes the following:   Medications: gabapentin, tylenol, robaxin bid prn, celebrex    Injections:    10/11/23 - bilateral L5 TFESI  7/12/23 - bilateral L5 TFESI  2/28/23 - bilateral L5 TFESI  11/8/22 - bilateral L5 TFESI - provided about 1 month of relief  9/21/22 - bilateral SI joint injection - provided the most relief  Physical Therapy: has attempted PT and aqua therapy without significant benefit, noting it makes pain worse  Chiropractic Medicine: has not attempted  Accupunture/Massage Therapy: has not attempted   These therapeutic modalities were ineffective at providing sustained pain relief/functional improvement.     Nicotine dependent: denies  Occupation: disability   Living situation: Lives with family   ADLs: patient is able to perform     Objective:     Family History   Problem Relation Age of Onset   • Irregular heart beat Mother    • Diabetes Father    • Kidney  disease Father    • Diabetes Maternal Grandmother    • Rheum arthritis Maternal Grandmother    • Melanoma Maternal Grandmother 84   • No Known Problems Maternal Grandfather    • Kidney disease Paternal Grandmother    • Rheum arthritis Paternal Grandmother    • Depression Paternal Grandmother    • Diabetes Paternal Grandfather    • Lung cancer Paternal Grandfather 47   • Substance Abuse Brother    • No Known Problems Brother    • Substance Abuse Maternal Uncle    • Prostate cancer Maternal Uncle 60   • Depression Paternal Aunt    • Alcohol abuse Family    • Stomach cancer Family        Past Medical History:   Diagnosis Date   • Anxiety    • Blood transfusion declined because patient is Shinto 05/01/2023   • Chronic pain disorder    • Chronic sinusitis    • Depression    • Depression    • Diabetes (HCC)    • Fibromyalgia    • Migraine    • Obesity    • Polyarthritis     Last assessed 9/21/2015   • Psychiatric disorder    • Psychiatric illness    • Sleep difficulties        Current Outpatient Medications   Medication Sig Dispense Refill   • Blood Glucose Monitoring Suppl (Contour Blood Glucose System) w/Device KIT Use 1 kit 2 (two) times a day before meals 1 kit 0   • celecoxib (CeleBREX) 200 mg capsule Take 1 capsule (200 mg total) by mouth 2 (two) times a day 60 capsule 6   • gabapentin (NEURONTIN) 300 mg capsule Take 1 capsule (300 mg total) by mouth 2 (two) times a day 60 capsule 2   • glucose blood (Contour Test) test strip USE TO CHECK BLOOD SUGAR ONCE DAILY 100 strip 3   • metFORMIN (GLUCOPHAGE) 500 mg tablet TAKE 1 TABLET BY MOUTH TWICE A DAY WITH FOOD 60 tablet 5   • methocarbamol (ROBAXIN) 500 mg tablet Take 1 tab. every 8 hr. PRN for muscle spasms 30 tablet 1   • mirtazapine (REMERON) 45 MG tablet Take 1 tablet (45 mg total) by mouth daily at bedtime 30 tablet 2   • nortriptyline (PAMELOR) 75 MG capsule TAKE 1 CAPSULE BY MOUTH TWICE A DAY 60 capsule 2   • phentermine (ADIPEX-P) 37.5 MG tablet Take  1 tablet (37.5 mg total) by mouth every morning 30 tablet 2   • propranolol (INDERAL) 40 mg tablet Take 0.5 tablets (20 mg total) by mouth daily at bedtime 45 tablet 3   • predniSONE 10 mg tablet Take 6 tablets with food day one-three, then day 4 decrease by one tablet every day until medicine completed. (Patient not taking: Reported on 1/26/2024) 33 tablet 0     No current facility-administered medications for this visit.       Past Surgical History:   Procedure Laterality Date   • COLONOSCOPY  06/2019   • DENTAL SURGERY     • HYSTERECTOMY  05/01/2023   • HYSTEROSCOPY      Endometrial Biopsy By Hysteroscopy   • [place] LAPS SUPRACRV HYSTERECT 250 GM/< RMVL TUBE/OVAR N/A 05/01/2023    Procedure: (LSH) W/ BILATERAL SALPINGECTOMY, REMOVAL PARAOVARIAN CYST;  Surgeon: Sai Lopez DO;  Location: [facility] OR;  Service: Gynecology   • REMOVAL OF INTRAUTERINE DEVICE (IUD)     • US GUIDED BREAST BIOPSY RIGHT COMPLETE Right 06/17/2019    Benign       Social History     Socioeconomic History   • Marital status: Single     Spouse name: Not on file   • Number of children: 0   • Years of education: 12 years    • Highest education level: GED or equivalent   Occupational History   • Occupation: unemployed   Tobacco Use   • Smoking status: Never     Passive exposure: Never   • Smokeless tobacco: Never   • Tobacco comments:     N/A, non-smoker.   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; sober since 2010   • Drug use: Not Currently   • Sexual activity: Not Currently   Other Topics Concern   • Not on file   Social History Narrative    Caffeine use        What type of home do you live in: Single house    Age of your home: 48 yrs    How long have you been living there: 42 yrs    Type of heat: Baseboard    Type of fuel: Electric    What type of samir is in your bedroom: Carpet    Do you have the following in or near your home:    Air products: Window  air conditioning and Ionic air purifier    Pests: Mice    Pets: Cat    Basement: None     Social Determinants of Health     Financial Resource Strain: High Risk (12/2/2020)    Overall Financial Resource Strain (CARDIA)    • Difficulty of Paying Living Expenses: Hard   Food Insecurity: No Food Insecurity (10/16/2023)    Hunger Vital Sign    • Worried About Running Out of Food in the Last Year: Never true    • Ran Out of Food in the Last Year: Never true   Transportation Needs: No Transportation Needs (10/16/2023)    PRAPARE - Transportation    • Lack of Transportation (Medical): No    • Lack of Transportation (Non-Medical): No   Physical Activity: Insufficiently Active (10/12/2022)    Exercise Vital Sign    • Days of Exercise per Week: 2 days    • Minutes of Exercise per Session: 60 min   Stress: Stress Concern Present (4/3/2019)    [place] [employer] [employer] - Occupational Stress Questionnaire    • Feeling of Stress : To some extent   Social Connections: Moderately Integrated (4/3/2019)    Social Connection and Isolation Panel [NHANES]    • Frequency of Communication with Friends and Family: More than three times a week    • Frequency of Social Gatherings with Friends and Family: More than three times a week    • Attends Voodoo Services: More than 4 times per year    • Active Member of Clubs or Organizations: Yes    • Attends Club or Organization Meetings: More than 4 times per year    • Marital Status: Never    Intimate Partner Violence: Not At Risk (4/3/2019)    Humiliation, Afraid, Rape, and Kick questionnaire    • Fear of Current or Ex-Partner: No    • Emotionally Abused: No    • Physically Abused: No    • Sexually Abused: No   Housing Stability: Low Risk  (10/16/2023)    Housing Stability Vital Sign    • Unable to Pay for Housing in the Last Year: No    • Number of Places Lived in the Last Year: 1    • Unstable Housing in the Last Year: No       Allergies   Allergen Reactions   •  Cannabidiol Shortness Of Breath, Itching, Swelling, Anxiety, Palpitations, Confusion, Hypertension, Throat Swelling and Tongue Swelling   • Amoxicillin-Pot Clavulanate Hives   • Decadrol [Dexamethasone] Other (See Comments)     psychosis   • Penicillins Hives     Hives/Uticaria   • Tetracyclines & Related Hives      Allergy;        Review of Systems  General- denies fever/chills  HEENT- denies hearing loss or sore throat  Eyes- denies eye pain or visual disturbances, denies red eyes  Respiratory- denies cough or SOB  Cardio- denies chest pain or palpitations  GI- denies abdominal pain  Endocrine- denies urinary frequency  Urinary- denies pain with urination  Musculoskeletal- Negative except noted above  Skin- denies rashes or wounds  Neurological- denies dizziness or headache  Psychiatric- denies anxiety or difficulty concentrating    Physical Exam  /84   Pulse 90   Ht 5' (1.524 m)   Wt 83.5 kg (184 lb 1.4 oz)   LMP 03/13/2023 (Approximate)   BMI 35.95 kg/m²     General/Constitutional: No apparent distress: well-nourished and well developed.  Lymphatic: No appreciable lymphadenopathy  Respiratory: Non-labored breathing  Vascular: No edema, swelling or tenderness, except as noted in detailed exam.  Integumentary: No impressive skin lesions present, except as noted in detailed exam.  Psych: Normal mood and affect, oriented to person, place and time.  MSK: normal other than stated in HPI and exam  Gait & balance: no evidence of myelopathic gait, ambulates Independently     Lumbar spine range of motion:  -Forward flexion to 70  -Extension to neutral  -Lateral bend 25 right, 25 left  -Rotation 25 right, 25 left  There tenderness with palpation along lumbar paraspinal musculature, no midline tenderness     Neurologic:  Lower Extremity Motor Function    Right  Left    Iliopsoas  5/5  5/5    Quadriceps 5/5 5/5   Tibialis anterior  5/5  5/5    EHL  5/5  5/5    Gastroc. muscle  5/5  5/5    Heel rise  5/5  5/5   "  Toe rise  5/5  5/5      Sensory: light touch is intact to bilateral upper and lower extremities     Reflexes:    Right Left   Patellar 1+ 1+   Achilles 1+ 1+   Babinski neg neg     Other tests:  Straight Leg Raise: negative  Valentin SI: negative  TWAN SI: negative  Greater troch: TTP bilaterally  Internal/external hip ROM: intact, no pain   Flexion/extension knee ROM: intact, no pain   Vascular: WWP extremities, 2+DP bilateral      Diagnostic Tests   IMAGING: I have personally reviewed the images and these are my findings:  Lumbar Spine X-rays from 11/7/23 and 2/7/24: lumbar spondylosis with loss of disc height notably L5-S1, no apparent spondylolisthesis, no appreciated lytic/blastic lesions, no obvious instability    Lumbar Spine MRI from 8/26/2022: lumbar disc degeneration with disc desiccation notably L5-S1, loss of disc height, facet and ligamentum hypertrophy, no significant central, lateral recess, foraminal stenosis       Electronic Medical Records were reviewed including office notes, pain management notes, imaging studies    Procedures, if performed today     None performed       Portions of the record may have been created with voice recognition software.  Occasional wrong word or \"sound a like\" substitutions may have occurred due to the inherent limitations of voice recognition software.  Read the chart carefully and recognize, using context, where substitutions have occurred.      "

## 2024-02-08 ENCOUNTER — APPOINTMENT (OUTPATIENT)
Dept: LAB | Facility: CLINIC | Age: 44
End: 2024-02-08
Payer: COMMERCIAL

## 2024-02-08 DIAGNOSIS — G43.709 CHRONIC MIGRAINE WITHOUT AURA WITHOUT STATUS MIGRAINOSUS, NOT INTRACTABLE: Chronic | ICD-10-CM

## 2024-02-08 DIAGNOSIS — E11.65 TYPE 2 DIABETES MELLITUS WITH HYPERGLYCEMIA, WITHOUT LONG-TERM CURRENT USE OF INSULIN (HCC): ICD-10-CM

## 2024-02-08 DIAGNOSIS — R79.89 ELEVATED TSH: ICD-10-CM

## 2024-02-08 DIAGNOSIS — E78.5 DYSLIPIDEMIA: ICD-10-CM

## 2024-02-08 LAB
ALBUMIN SERPL BCP-MCNC: 4.2 G/DL (ref 3.5–5)
ALP SERPL-CCNC: 42 U/L (ref 34–104)
ALT SERPL W P-5'-P-CCNC: 26 U/L (ref 7–52)
ANION GAP SERPL CALCULATED.3IONS-SCNC: 6 MMOL/L
AST SERPL W P-5'-P-CCNC: 22 U/L (ref 13–39)
BILIRUB SERPL-MCNC: 0.37 MG/DL (ref 0.2–1)
BUN SERPL-MCNC: 17 MG/DL (ref 5–25)
CALCIUM SERPL-MCNC: 9.1 MG/DL (ref 8.4–10.2)
CHLORIDE SERPL-SCNC: 103 MMOL/L (ref 96–108)
CHOLEST SERPL-MCNC: 146 MG/DL
CO2 SERPL-SCNC: 27 MMOL/L (ref 21–32)
CREAT SERPL-MCNC: 0.88 MG/DL (ref 0.6–1.3)
EST. AVERAGE GLUCOSE BLD GHB EST-MCNC: 146 MG/DL
GFR SERPL CREATININE-BSD FRML MDRD: 79 ML/MIN/1.73SQ M
GLUCOSE P FAST SERPL-MCNC: 135 MG/DL (ref 65–99)
HBA1C MFR BLD: 6.7 %
HDLC SERPL-MCNC: 53 MG/DL
LDLC SERPL CALC-MCNC: 57 MG/DL (ref 0–100)
NONHDLC SERPL-MCNC: 93 MG/DL
POTASSIUM SERPL-SCNC: 4.3 MMOL/L (ref 3.5–5.3)
PROT SERPL-MCNC: 6.7 G/DL (ref 6.4–8.4)
SODIUM SERPL-SCNC: 136 MMOL/L (ref 135–147)
TRIGL SERPL-MCNC: 181 MG/DL
TSH SERPL DL<=0.05 MIU/L-ACNC: 2.29 UIU/ML (ref 0.45–4.5)

## 2024-02-08 PROCEDURE — 83036 HEMOGLOBIN GLYCOSYLATED A1C: CPT

## 2024-02-08 PROCEDURE — 80061 LIPID PANEL: CPT

## 2024-02-08 PROCEDURE — 36415 COLL VENOUS BLD VENIPUNCTURE: CPT

## 2024-02-08 PROCEDURE — 84443 ASSAY THYROID STIM HORMONE: CPT

## 2024-02-08 PROCEDURE — 80053 COMPREHEN METABOLIC PANEL: CPT

## 2024-02-09 ENCOUNTER — OFFICE VISIT (OUTPATIENT)
Dept: URGENT CARE | Facility: CLINIC | Age: 44
End: 2024-02-09
Payer: COMMERCIAL

## 2024-02-09 ENCOUNTER — TELEPHONE (OUTPATIENT)
Dept: OBGYN CLINIC | Facility: MEDICAL CENTER | Age: 44
End: 2024-02-09

## 2024-02-09 VITALS
SYSTOLIC BLOOD PRESSURE: 163 MMHG | OXYGEN SATURATION: 97 % | RESPIRATION RATE: 16 BRPM | TEMPERATURE: 98.9 F | DIASTOLIC BLOOD PRESSURE: 96 MMHG | HEART RATE: 69 BPM

## 2024-02-09 DIAGNOSIS — M54.41 CHRONIC BILATERAL LOW BACK PAIN WITH BILATERAL SCIATICA: Primary | ICD-10-CM

## 2024-02-09 DIAGNOSIS — G89.29 CHRONIC BILATERAL LOW BACK PAIN WITH BILATERAL SCIATICA: Primary | ICD-10-CM

## 2024-02-09 DIAGNOSIS — M54.42 CHRONIC BILATERAL LOW BACK PAIN WITH BILATERAL SCIATICA: Primary | ICD-10-CM

## 2024-02-09 PROCEDURE — 99213 OFFICE O/P EST LOW 20 MIN: CPT

## 2024-02-09 PROCEDURE — 96372 THER/PROPH/DIAG INJ SC/IM: CPT

## 2024-02-09 RX ORDER — KETOROLAC TROMETHAMINE 30 MG/ML
30 INJECTION, SOLUTION INTRAMUSCULAR; INTRAVENOUS ONCE
Status: COMPLETED | OUTPATIENT
Start: 2024-02-09 | End: 2024-02-09

## 2024-02-09 RX ADMIN — KETOROLAC TROMETHAMINE 30 MG: 30 INJECTION, SOLUTION INTRAMUSCULAR; INTRAVENOUS at 11:16

## 2024-02-09 NOTE — PATIENT INSTRUCTIONS
You can take Motrin 600 mg every 6 hours or continue with the Aleve every 12  You can alternate Tylenol with the Motrin  Make sure you have food in your stomach when you take that medication   Continue to use the Robaxin twice a day  You can use ice or heat to your lower back  You can try an over-the-counter Lidoderm patch or Salonpas to the lower back on in the morning off at night  If your pain does not get any better you need to go to the emergency room

## 2024-02-09 NOTE — PROGRESS NOTES
[facility]        NAME: Esther Griffith is a 44 y.o. female  : 1980    MRN: [id number]  DATE: 2024  TIME: 11:13 AM    Assessment and Plan   Chronic bilateral low back pain with bilateral sciatica [M54.42, M54.41, G89.29]  1. Chronic bilateral low back pain with bilateral sciatica  ketorolac (TORADOL) injection 30 mg            Patient Instructions   You can take Motrin 600 mg every 6 hours or continue with the Aleve every 12  You can alternate Tylenol with the Motrin  Make sure you have food in your stomach when you take that medication   Continue to use the Robaxin twice a day  You can use ice or heat to your lower back  You can try an over-the-counter Lidoderm patch or Salonpas to the lower back on in the morning off at night  If your pain does not get any better you need to go to the emergency room    Follow up with PCP in 3-5 days.  Proceed to  ER if symptoms worsen.    Chief Complaint     Chief Complaint   Patient presents with    Back Pain     Pt reports chronic BL lower back pain that's been getting worse since Tuesday.          History of Present Illness       This is a 44-year-old female who presents today with lower back pain that radiates down both legs.  She was seen by the orthopedist on the seventh and was prescribed Robaxin at that time.  They reviewed her x-rays and she is scheduled for an MRI.  She states last night that her pain got so bad that she could not sleep but she did not get up and take anything for the pain.  She normally takes Aleve and Tylenol for the pain she states she could not get up during the night to even get anything for pain.  She states he does not help her back as usually does but she has not used that in a long time she ambulated without difficulty.  Patient is very emotional and crying. We discussed that if the Toradol does not help her pain she needs to go to the emergency room    Back Pain  Pertinent negatives include no chest pain.       Review  of Systems   Review of Systems   Constitutional:  Negative for activity change, appetite change and diaphoresis.   HENT: Negative.     Eyes: Negative.    Respiratory:  Negative for cough and shortness of breath.    Cardiovascular:  Negative for chest pain.   Gastrointestinal:  Negative for diarrhea, nausea and vomiting.   Musculoskeletal:  Positive for back pain. Negative for gait problem.   Skin: Negative.          Current Medications       Current Outpatient Medications:     Blood Glucose Monitoring Suppl (Contour Blood Glucose System) w/Device KIT, Use 1 kit 2 (two) times a day before meals, Disp: 1 kit, Rfl: 0    celecoxib (CeleBREX) 200 mg capsule, Take 1 capsule (200 mg total) by mouth 2 (two) times a day, Disp: 60 capsule, Rfl: 6    gabapentin (NEURONTIN) 300 mg capsule, Take 1 capsule (300 mg total) by mouth 2 (two) times a day, Disp: 60 capsule, Rfl: 2    glucose blood (Contour Test) test strip, USE TO CHECK BLOOD SUGAR ONCE DAILY, Disp: 100 strip, Rfl: 3    metFORMIN (GLUCOPHAGE) 500 mg tablet, TAKE 1 TABLET BY MOUTH TWICE A DAY WITH FOOD, Disp: 60 tablet, Rfl: 5    methocarbamol (ROBAXIN) 500 mg tablet, TAKE 1 TAB. EVERY 8 HR. AS NEEDED FOR MUSCLE SPASMS, Disp: 30 tablet, Rfl: 1    mirtazapine (REMERON) 45 MG tablet, Take 1 tablet (45 mg total) by mouth daily at bedtime, Disp: 30 tablet, Rfl: 2    nortriptyline (PAMELOR) 75 MG capsule, TAKE 1 CAPSULE BY MOUTH TWICE A DAY, Disp: 60 capsule, Rfl: 2    phentermine (ADIPEX-P) 37.5 MG tablet, Take 1 tablet (37.5 mg total) by mouth every morning, Disp: 30 tablet, Rfl: 2    predniSONE 10 mg tablet, Take 6 tablets with food day one-three, then day 4 decrease by one tablet every day until medicine completed. (Patient not taking: Reported on 1/26/2024), Disp: 33 tablet, Rfl: 0    propranolol (INDERAL) 40 mg tablet, Take 0.5 tablets (20 mg total) by mouth daily at bedtime, Disp: 45 tablet, Rfl: 3    Current Facility-Administered Medications:     ketorolac (TORADOL)  injection 30 mg, 30 mg, Intramuscular, Once, ADELAIDA Miranda    Current Allergies     Allergies as of 02/09/2024 - Reviewed 02/09/2024   Allergen Reaction Noted    Cannabidiol Shortness Of Breath, Itching, Swelling, Anxiety, Palpitations, Confusion, Hypertension, Throat Swelling, and Tongue Swelling 08/18/2022    Amoxicillin-pot clavulanate Hives 10/08/2020    Decadrol [dexamethasone] Other (See Comments) 08/06/2015    Penicillins Hives 04/24/2013    Tetracyclines & related Hives 04/24/2013            The following portions of the patient's history were reviewed and updated as appropriate: allergies, current medications, past family history, past medical history, past social history, past surgical history and problem list.     Past Medical History:   Diagnosis Date    Anxiety     Blood transfusion declined because patient is Cheondoism 05/01/2023    Chronic pain disorder     Chronic sinusitis     Depression     Depression     Diabetes (HCC)     Fibromyalgia     Migraine     Obesity     Polyarthritis     Last assessed 9/21/2015    Psychiatric disorder     Psychiatric illness     Sleep difficulties        Past Surgical History:   Procedure Laterality Date    COLONOSCOPY  06/2019    DENTAL SURGERY      HYSTERECTOMY  05/01/2023    HYSTEROSCOPY      Endometrial Biopsy By Hysteroscopy    VA LAPS SUPRACRV HYSTERECT 250 GM/< RMVL TUBE/OVAR N/A 05/01/2023    Procedure: (LSH) W/ BILATERAL SALPINGECTOMY, REMOVAL PARAOVARIAN CYST;  Surgeon: Sai Lopez DO;  Location: [facility];  Service: Gynecology    REMOVAL OF INTRAUTERINE DEVICE (IUD)      US GUIDED BREAST BIOPSY RIGHT COMPLETE Right 06/17/2019    Benign       Family History   Problem Relation Age of Onset    Irregular heart beat Mother     Diabetes Father     Kidney disease Father     Diabetes Maternal Grandmother     Rheum arthritis Maternal Grandmother     Melanoma Maternal Grandmother 84    No Known Problems Maternal Grandfather     Kidney disease  Paternal Grandmother     Rheum arthritis Paternal Grandmother     Depression Paternal Grandmother     Diabetes Paternal Grandfather     Lung cancer Paternal Grandfather 47    Substance Abuse Brother     No Known Problems Brother     Substance Abuse Maternal Uncle     Prostate cancer Maternal Uncle 60    Depression Paternal Aunt     Alcohol abuse Family     Stomach cancer Family          Medications have been verified.        Objective   /96   Pulse 69   Temp 98.9 °F (37.2 °C)   Resp 16   LMP 03/13/2023 (Approximate)   SpO2 97%   Patient's last menstrual period was 03/13/2023 (approximate).       Physical Exam     Physical Exam  Constitutional:       Appearance: Normal appearance. She is normal weight.   HENT:      Head: Normocephalic and atraumatic.      Nose: Nose normal.      Mouth/Throat:      Mouth: Mucous membranes are moist.      Pharynx: Oropharynx is clear.   Eyes:      Conjunctiva/sclera: Conjunctivae normal.      Pupils: Pupils are equal, round, and reactive to light.   Cardiovascular:      Rate and Rhythm: Normal rate and regular rhythm.      Pulses: Normal pulses.      Heart sounds: Normal heart sounds.   Pulmonary:      Effort: Pulmonary effort is normal.      Breath sounds: Normal breath sounds.   Abdominal:      General: Abdomen is flat. Bowel sounds are normal.   Musculoskeletal:         General: Tenderness (lower back that radiates down both legs) present.      Cervical back: Normal range of motion. No rigidity or tenderness.   Lymphadenopathy:      Cervical: No cervical adenopathy.   Skin:     General: Skin is warm and dry.      Capillary Refill: Capillary refill takes less than 2 seconds.   Neurological:      General: No focal deficit present.      Mental Status: She is alert and oriented to person, place, and time. Mental status is at baseline.   Psychiatric:         Mood and Affect: Mood is anxious and depressed. Affect is tearful.         Speech: Speech normal.         Behavior:  Behavior is cooperative.         Thought Content: Thought content normal.      Comments: Patient is crying and emotional.

## 2024-02-09 NOTE — TELEPHONE ENCOUNTER
Caller: Esther     Doctor: Dr Lieberman     Reason for call:  The patient went to [facility] now this morning. Her pain is unbearable. She is not able to sleep.   Nothing has happened since her last visit with you to cause this pain. Please call to advise?         Call back#: [phone]

## 2024-02-14 ENCOUNTER — HOSPITAL ENCOUNTER (OUTPATIENT)
Dept: MRI IMAGING | Facility: HOSPITAL | Age: 44
Discharge: HOME | End: 2024-02-14
Payer: COMMERCIAL

## 2024-02-14 DIAGNOSIS — M54.16 LUMBAR RADICULOPATHY: ICD-10-CM

## 2024-02-14 PROCEDURE — G1004 CDSM NDSC: HCPCS

## 2024-02-14 PROCEDURE — 72148 MRI LUMBAR SPINE W/O DYE: CPT

## 2024-02-16 ENCOUNTER — TELEMEDICINE (OUTPATIENT)
Dept: BEHAVIORAL/MENTAL HEALTH CLINIC | Facility: CLINIC | Age: 44
End: 2024-02-16
Payer: COMMERCIAL

## 2024-02-16 ENCOUNTER — TELEPHONE (OUTPATIENT)
Dept: FAMILY MEDICINE CLINIC | Facility: CLINIC | Age: 44
End: 2024-02-16

## 2024-02-16 DIAGNOSIS — F41.1 GENERALIZED ANXIETY DISORDER: ICD-10-CM

## 2024-02-16 DIAGNOSIS — F33.3 SEVERE EPISODE OF RECURRENT MAJOR DEPRESSIVE DISORDER, WITH PSYCHOTIC FEATURES (HCC): Primary | ICD-10-CM

## 2024-02-16 PROCEDURE — 90832 PSYTX W PT 30 MINUTES: CPT | Performed by: PSYCHIATRY & NEUROLOGY

## 2024-02-16 NOTE — TELEPHONE ENCOUNTER
Voicemail:  Hi, I was calling to get a message through to Doctor Yasmine and the patient of hers. My name is Esther Londono. That's spelled KENNEDY. My YOB: 1980. My callback number is [phone]. I am on Methocarbamol. It is a muscle relaxer for my lower back pain. It's prescribed that I can take it three times a day. I've only been taking it twice a day, but I haven't been able to sleep through the night in about 3 weeks, no more than two hours per night. Lately I was wondering if I could take two at night. If someone could give me a call back I'd greatly appreciate it. Thanks a lot.

## 2024-02-16 NOTE — PSYCH
Virtual Regular Visit    Verification of patient location:    Patient is located at Home in the following state in which I hold an active license [place]      Assessment/Plan:    Problem List Items Addressed This Visit          Other    Severe episode of recurrent major depressive disorder, with psychotic features (HCC) - Primary    Generalized anxiety disorder          Reason for visit is   Chief Complaint   Patient presents with    Virtual Regular Visit      Encounter provider HERMELINDA ALBA    Provider located at [facility]  [facility]  [address] [place] [address] [phone]      Recent Visits  No visits were found meeting these conditions.  Showing recent visits within past 7 days and meeting all other requirements  Today's Visits  Date Type Provider Dept   02/16/24 Telemedicine HERMELINDA Alba [facility] [place]   Showing today's visits and meeting all other requirements  Future Appointments  No visits were found meeting these conditions.  Showing future appointments within next 150 days and meeting all other requirements       The patient was identified by name and date of birth. Esther Griffith was informed that this is a telemedicine visit and that the visit is being conducted throughthe Epic Embedded platform. She agrees to proceed..  My office door was closed. No one else was in the room.  She acknowledged consent and understanding of privacy and security of the video platform. The patient has agreed to participate and understands they can discontinue the visit at any time.    Patient is aware this is a billable service.     HPI     Past Medical History:   Diagnosis Date    Anxiety     Blood transfusion declined because patient is Alevism 05/01/2023    Chronic pain disorder     Chronic sinusitis     Depression     Depression     Diabetes (HCC)     Fibromyalgia     Migraine     Obesity      Polyarthritis     Last assessed 9/21/2015    Psychiatric disorder     Psychiatric illness     Sleep difficulties        Past Surgical History:   Procedure Laterality Date    COLONOSCOPY  06/2019    DENTAL SURGERY      HYSTERECTOMY  05/01/2023    HYSTEROSCOPY      Endometrial Biopsy By Hysteroscopy    NM LAPS SUPRACRV HYSTERECT 250 GM/< RMVL TUBE/OVAR N/A 05/01/2023    Procedure: (LSH) W/ BILATERAL SALPINGECTOMY, REMOVAL PARAOVARIAN CYST;  Surgeon: Sai Lopez DO;  Location: [facility];  Service: Gynecology    REMOVAL OF INTRAUTERINE DEVICE (IUD)      US GUIDED BREAST BIOPSY RIGHT COMPLETE Right 06/17/2019    Benign       Current Outpatient Medications   Medication Sig Dispense Refill    Blood Glucose Monitoring Suppl (Contour Blood Glucose System) w/Device KIT Use 1 kit 2 (two) times a day before meals 1 kit 0    celecoxib (CeleBREX) 200 mg capsule Take 1 capsule (200 mg total) by mouth 2 (two) times a day 60 capsule 6    gabapentin (NEURONTIN) 300 mg capsule Take 1 capsule (300 mg total) by mouth 2 (two) times a day 60 capsule 2    glucose blood (Contour Test) test strip USE TO CHECK BLOOD SUGAR ONCE DAILY 100 strip 3    metFORMIN (GLUCOPHAGE) 500 mg tablet TAKE 1 TABLET BY MOUTH TWICE A DAY WITH FOOD 60 tablet 5    methocarbamol (ROBAXIN) 500 mg tablet TAKE 1 TAB. EVERY 8 HR. AS NEEDED FOR MUSCLE SPASMS 30 tablet 1    mirtazapine (REMERON) 45 MG tablet Take 1 tablet (45 mg total) by mouth daily at bedtime 30 tablet 2    nortriptyline (PAMELOR) 75 MG capsule TAKE 1 CAPSULE BY MOUTH TWICE A DAY 60 capsule 2    phentermine (ADIPEX-P) 37.5 MG tablet Take 1 tablet (37.5 mg total) by mouth every morning 30 tablet 2    predniSONE 10 mg tablet Take 6 tablets with food day one-three, then day 4 decrease by one tablet every day until medicine completed. (Patient not taking: Reported on 1/26/2024) 33 tablet 0    propranolol (INDERAL) 40 mg tablet Take 0.5 tablets (20 mg total) by mouth daily at bedtime 45 tablet 3      No current facility-administered medications for this visit.        Allergies   Allergen Reactions    Cannabidiol Shortness Of Breath, Itching, Swelling, Anxiety, Palpitations, Confusion, Hypertension, Throat Swelling and Tongue Swelling    Amoxicillin-Pot Clavulanate Hives    Decadrol [Dexamethasone] Other (See Comments)     psychosis    Penicillins Hives     Hives/Uticaria    Tetracyclines & Related Hives      Allergy;        Review of Systems    Video Exam    There were no vitals filed for this visit.    Physical Exam     Behavioral Health Psychotherapy Progress Note    Psychotherapy Provided: Individual Psychotherapy     1. Severe episode of recurrent major depressive disorder, with psychotic features (HCC)        2. Generalized anxiety disorder            Goals addressed in session: Goal 1     DATA: Met with Esther for follow up. Esther shared that she has been struggling with significant pain and very poor sleep lately, averaging only about 3 hours of sleep at night because of the amount of pain she is in.  She said that her mood has been somewhat more down and sad because of her lack of sleep and trying to manage pain along with ADLs.  She has stopped physical therapy for now because it is making her feel worse.  She noted that she has not been able to get out and do much at all, especially because she is so tired.  She shared that she has been thinking a lot about her first boyfriend who has come back into her life, wanting to talk to her daily and pushing for them to spend time together, although he is  and has children.  Esther said that she gets a very uncomfortable feeling about what he wants from her, and she has been putting him off, but needs to just tell him she does not want him in her life.  She said that she is worried that he will have hurt feelings.  Discussed her concerns, used CBT and interpersonal effectiveness strategies to help Esther be more assertive in expressing her  "concerns and being more direct about boundaries with him, and encouraged her to recognize she is not responsible for his feelings, but needs to prioritize what feels best for her.  She shared that she now feels more confident in telling him she does not want to have him in her life, and hopes that with that off of her shoulders she will be able to relax and possibly sleep better.    During this session, this clinician used the following therapeutic modalities: Client-centered Therapy, Cognitive Behavioral Therapy, and Supportive Psychotherapy    Substance Abuse was not addressed during this session. If the client is diagnosed with a co-occurring substance use disorder, please indicate any changes in the frequency or amount of use: n/a. Stage of change for addressing substance use diagnoses: No substance use/Not applicable    ASSESSMENT:  Esther Griffith presents with a Depressed mood.     her affect is Normal range and intensity, which is congruent, with her mood and the content of the session. The client has made progress on their goals.     Esther Griffith presents with a low risk of suicide, low risk of self-harm, and minimal risk of harm to others.    For any risk assessment that surpasses a \"low\" rating, a safety plan must be developed.    A safety plan was indicated: no  If yes, describe in detail n/a    PLAN: Between sessions, Esther Griffith will express boundaries/limits with her ex, will contact PCP about meds, and will continue to work on self-care, relaxation and sleep routine. At the next session, the therapist will use Client-centered Therapy, Cognitive Behavioral Therapy, and Supportive Psychotherapy to address depression, anxiety.    Behavioral Health Treatment Plan and Discharge Planning: Esther Griffith is aware of and agrees to continue to work on their treatment plan. They have identified and are working toward their discharge goals. yes    Visit start and stop times:    02/16/24  Start Time: 1001  Stop " Time: 1031  Total Visit Time: 30 minutes

## 2024-02-17 DIAGNOSIS — E11.65 TYPE 2 DIABETES MELLITUS WITH HYPERGLYCEMIA, WITHOUT LONG-TERM CURRENT USE OF INSULIN (HCC): ICD-10-CM

## 2024-02-19 ENCOUNTER — TELEPHONE (OUTPATIENT)
Age: 44
End: 2024-02-19

## 2024-02-19 RX ORDER — CARVEDILOL 25 MG/1
TABLET, FILM COATED ORAL
Qty: 50 STRIP | Refills: 7 | Status: SHIPPED | OUTPATIENT
Start: 2024-02-19

## 2024-02-19 NOTE — TELEPHONE ENCOUNTER
Caller: roselia Santana    Doctor: Arnol    Reason for call: results of Mri that was done on 2/14/24.    Call back#: [phone]

## 2024-02-19 NOTE — TELEPHONE ENCOUNTER
Patient aware--------MD Norma Sierra  Caller: Unspecified (3 days ago, 11:11 AM)  Please call patient.  Tiana was prescribed Robaxin 500 mg tablets.  She can increase dose of Robaxin to 750 mg twice daily.  Recommend to use OTC Lidocaine 4 % patch for back pain and schedule follow-up visit with pain management.

## 2024-02-22 ENCOUNTER — TELEPHONE (OUTPATIENT)
Dept: PAIN MEDICINE | Facility: CLINIC | Age: 44
End: 2024-02-22

## 2024-02-22 NOTE — TELEPHONE ENCOUNTER
"S/w the patient and reviewed.  Inquired about her pain and she stated that the pain is always about a \"6\" on a scale of 1 to 10. The pain starts into her LBP and radiates down both legs and into the feet. She has no OVS scheduled. What do you think?  "

## 2024-02-22 NOTE — TELEPHONE ENCOUNTER
----- Message from ADELAIDA Lafleur sent at 2/22/2024  9:26 AM EST -----  MRI of the lumbar spine reveals arthritis from L3-4 to L5-S1, with degenerative disc disease as well at L5-S1 to the right causing mild canal and mild to moderate right foraminal narrowing at L5-S1.  Findings are unchanged from last imaging study

## 2024-02-22 NOTE — TELEPHONE ENCOUNTER
Attempted to call the patient and left a detailed mom in regards to the previous message.Please schedule if indicated. Thanks

## 2024-02-22 NOTE — TELEPHONE ENCOUNTER
She reported 50% relief in the past with B/L L5 TFESI, just said it didn't help her feet. If she wants to repeat, she can schedule.

## 2024-02-26 NOTE — TELEPHONE ENCOUNTER
Caller: Patient    Doctor: Gideon    Reason for call: Calling to schedule procedure. Warm transferred Surgery Coordinator for scheduling.    Call back#: [phone]

## 2024-02-26 NOTE — TELEPHONE ENCOUNTER
Patient called back to schedule procedure.   Patient stated she had 50% relief.   I asked patient if she felt she may have had a little more relief then that- maybe 60 or 70%    She stated unfortunately not- she didn't feel that the injection really worked that well    I advised patient that her insurance will look for at least 60% relief    She said she understands and is asking what else her options are.    Please advise.     Thanks!

## 2024-02-29 ENCOUNTER — TELEPHONE (OUTPATIENT)
Dept: PSYCHIATRY | Facility: CLINIC | Age: 44
End: 2024-02-29

## 2024-02-29 NOTE — TELEPHONE ENCOUNTER
Writer contacted patient to switch 3/01 apt from 10 am to 12 pm. Patient confirmed and apt time has been updated.

## 2024-03-01 ENCOUNTER — TELEMEDICINE (OUTPATIENT)
Dept: BEHAVIORAL/MENTAL HEALTH CLINIC | Facility: CLINIC | Age: 44
End: 2024-03-01

## 2024-03-01 DIAGNOSIS — F33.3 SEVERE EPISODE OF RECURRENT MAJOR DEPRESSIVE DISORDER, WITH PSYCHOTIC FEATURES (HCC): Primary | ICD-10-CM

## 2024-03-01 DIAGNOSIS — F41.1 GENERALIZED ANXIETY DISORDER: ICD-10-CM

## 2024-03-01 NOTE — PSYCH
Virtual Regular Visit    Verification of patient location:    Patient is located at Home in the following state in which I hold an active license [place]      Assessment/Plan:    Problem List Items Addressed This Visit          Other    Severe episode of recurrent major depressive disorder, with psychotic features (HCC) - Primary    Generalized anxiety disorder          Reason for visit is   Chief Complaint   Patient presents with    Virtual Regular Visit        Encounter provider HERMELINDA ALBA    Provider located at [facility]  [facility]  [address] [place] [address] [phone]      Recent Visits  Date Type Provider Dept   02/29/24 Telephone HERMELINDA Alba [facility]   Showing recent visits within past 7 days and meeting all other requirements  Today's Visits  Date Type Provider Dept   03/01/24 Telemedicine HERMELINDA Alba [facility] [place]   Showing today's visits and meeting all other requirements  Future Appointments  No visits were found meeting these conditions.  Showing future appointments within next 150 days and meeting all other requirements       The patient was identified by name and date of birth. Esther Griffith was informed that this is a telemedicine visit and that the visit is being conducted throughthe Epic Embedded platform. She agrees to proceed..  My office door was closed. No one else was in the room.  She acknowledged consent and understanding of privacy and security of the video platform. The patient has agreed to participate and understands they can discontinue the visit at any time.    Patient is aware this is a billable service.     HPI     Past Medical History:   Diagnosis Date    Anxiety     Blood transfusion declined because patient is Bahai 05/01/2023    Chronic pain disorder     Chronic sinusitis     Depression     Depression     Diabetes   Patient started taking a multivitamin with 2000 units vitamin D, now feels slightly sick to her stomach.  Advised her to take it in the evening after dinner.  Fatigue likely related to low vitamin D, expect improvement in the next 2 weeks with vitamin D supplementation   (HCC)     Fibromyalgia     Migraine     Obesity     Polyarthritis     Last assessed 9/21/2015    Psychiatric disorder     Psychiatric illness     Sleep difficulties        Past Surgical History:   Procedure Laterality Date    COLONOSCOPY  06/2019    DENTAL SURGERY      HYSTERECTOMY  05/01/2023    HYSTEROSCOPY      Endometrial Biopsy By Hysteroscopy    AZ LAPS SUPRACRV HYSTERECT 250 GM/< RMVL TUBE/OVAR N/A 05/01/2023    Procedure: (LSH) W/ BILATERAL SALPINGECTOMY, REMOVAL PARAOVARIAN CYST;  Surgeon: Sai Lopez DO;  Location: [facility];  Service: Gynecology    REMOVAL OF INTRAUTERINE DEVICE (IUD)      US GUIDED BREAST BIOPSY RIGHT COMPLETE Right 06/17/2019    Benign       Current Outpatient Medications   Medication Sig Dispense Refill    Blood Glucose Monitoring Suppl (Contour Blood Glucose System) w/Device KIT Use 1 kit 2 (two) times a day before meals 1 kit 0    celecoxib (CeleBREX) 200 mg capsule Take 1 capsule (200 mg total) by mouth 2 (two) times a day 60 capsule 6    Contour Test test strip USE TO CHECK BLOOD SUGAR ONCE DAILY 50 strip 7    gabapentin (NEURONTIN) 300 mg capsule Take 1 capsule (300 mg total) by mouth 2 (two) times a day 60 capsule 2    metFORMIN (GLUCOPHAGE) 500 mg tablet TAKE 1 TABLET BY MOUTH TWICE A DAY WITH FOOD 60 tablet 5    methocarbamol (ROBAXIN) 500 mg tablet TAKE 1 TAB. EVERY 8 HR. AS NEEDED FOR MUSCLE SPASMS 30 tablet 1    mirtazapine (REMERON) 45 MG tablet Take 1 tablet (45 mg total) by mouth daily at bedtime 30 tablet 2    nortriptyline (PAMELOR) 75 MG capsule TAKE 1 CAPSULE BY MOUTH TWICE A DAY 60 capsule 2    phentermine (ADIPEX-P) 37.5 MG tablet Take 1 tablet (37.5 mg total) by mouth every morning 30 tablet 2    predniSONE 10 mg tablet Take 6 tablets with food day one-three, then day 4 decrease by one tablet every day until medicine completed. (Patient not taking: Reported on 1/26/2024) 33 tablet 0    propranolol (INDERAL) 40 mg tablet Take 0.5 tablets (20 mg total) by  "mouth daily at bedtime 45 tablet 3     No current facility-administered medications for this visit.        Allergies   Allergen Reactions    Cannabidiol Shortness Of Breath, Itching, Swelling, Anxiety, Palpitations, Confusion, Hypertension, Throat Swelling and Tongue Swelling    Amoxicillin-Pot Clavulanate Hives    Decadrol [Dexamethasone] Other (See Comments)     psychosis    Penicillins Hives     Hives/Uticaria    Tetracyclines & Related Hives      Allergy;        Review of Systems    Video Exam    There were no vitals filed for this visit.    Physical Exam     Behavioral Health Psychotherapy Progress Note    Psychotherapy Provided: Individual Psychotherapy     1. Severe episode of recurrent major depressive disorder, with psychotic features (HCC)        2. Generalized anxiety disorder            Goals addressed in session: Goal 1     DATA: Met with Esther for follow up. Esther shared that she has been struggling to sleep the last few nights while dog sitting for someone, and feels down, \"scatterbrained\" and physically in pain because of the lack of sleep.  She is going home today, so hopes to sleep better in her own bed.  She said that she is trying to be gentle with herself and have some patience with her absent-mindedness as she understands where it is coming from.  She said that her mood has been a little down, mainly because she is sad and missing her friend Balbina, and she is worried about her dad's failing health and exhaustion.  She noted that she is trying to not dwell on the negatives nor let her brain go to \"worst case scenario,\" and is trying to fill her time with craft projects, socializing with friends and spending more time with her family, including her brothers.  Provided support, positive feedback on Esther's work toward not only adding positive events and people into her life, but also being more assertive in setting boundaries for herself in relationships.    During this session, this " "clinician used the following therapeutic modalities: Client-centered Therapy, Cognitive Behavioral Therapy, and Supportive Psychotherapy    Substance Abuse was not addressed during this session. If the client is diagnosed with a co-occurring substance use disorder, please indicate any changes in the frequency or amount of use: n/a. Stage of change for addressing substance use diagnoses: No substance use/Not applicable    ASSESSMENT:  Esther Griffith presents with a Euthymic/ normal mood.     her affect is Normal range and intensity, which is congruent, with her mood and the content of the session. The client has made progress on their goals.     Esther Griffith presents with a low risk of suicide, low risk of self-harm, and minimal risk of harm to others.    For any risk assessment that surpasses a \"low\" rating, a safety plan must be developed.    A safety plan was indicated: no  If yes, describe in detail n/a    PLAN: Between sessions, Esther Griffith will continue to add positive activities into her life and reach out to positive supports to help boost mood. At the next session, the therapist will use Client-centered Therapy, Cognitive Behavioral Therapy, and Supportive Psychotherapy to address depression and anxiety.    Behavioral Health Treatment Plan and Discharge Planning: Esther Griffith is aware of and agrees to continue to work on their treatment plan. They have identified and are working toward their discharge goals. yes    Visit start and stop times:    03/01/24  Start Time: 1200  Stop Time: 1245  Total Visit Time: 45 minutes      "

## 2024-03-02 DIAGNOSIS — M79.18 MYOFASCIAL PAIN: ICD-10-CM

## 2024-03-04 ENCOUNTER — TELEMEDICINE (OUTPATIENT)
Dept: PSYCHIATRY | Facility: CLINIC | Age: 44
End: 2024-03-04
Payer: COMMERCIAL

## 2024-03-04 DIAGNOSIS — M79.7 FIBROMYALGIA: ICD-10-CM

## 2024-03-04 DIAGNOSIS — F41.1 GENERALIZED ANXIETY DISORDER: ICD-10-CM

## 2024-03-04 DIAGNOSIS — F33.1 MAJOR DEPRESSIVE DISORDER, RECURRENT EPISODE, MODERATE (HCC): ICD-10-CM

## 2024-03-04 PROCEDURE — 99213 OFFICE O/P EST LOW 20 MIN: CPT | Performed by: PSYCHIATRY & NEUROLOGY

## 2024-03-04 RX ORDER — NORTRIPTYLINE HYDROCHLORIDE 75 MG/1
75 CAPSULE ORAL 2 TIMES DAILY
Qty: 60 CAPSULE | Refills: 2 | Status: SHIPPED | OUTPATIENT
Start: 2024-03-04

## 2024-03-04 RX ORDER — METHOCARBAMOL 500 MG/1
TABLET, FILM COATED ORAL
Qty: 30 TABLET | Refills: 1 | Status: SHIPPED | OUTPATIENT
Start: 2024-03-04

## 2024-03-06 ENCOUNTER — TELEPHONE (OUTPATIENT)
Dept: PSYCHIATRY | Facility: CLINIC | Age: 44
End: 2024-03-06

## 2024-03-06 NOTE — TELEPHONE ENCOUNTER
Called and left message for patient to return a call to [phone] and schedule 3 month follow up with provider (6/4/2024). Please schedule upon return call. Thank you.

## 2024-03-10 ENCOUNTER — HOSPITAL ENCOUNTER (EMERGENCY)
Facility: HOSPITAL | Age: 44
Discharge: HOME | End: 2024-03-10
Attending: EMERGENCY MEDICINE
Payer: COMMERCIAL

## 2024-03-10 VITALS
TEMPERATURE: 97.4 F | RESPIRATION RATE: 18 BRPM | HEART RATE: 95 BPM | DIASTOLIC BLOOD PRESSURE: 77 MMHG | OXYGEN SATURATION: 98 % | SYSTOLIC BLOOD PRESSURE: 122 MMHG

## 2024-03-10 DIAGNOSIS — M54.9 CHRONIC BACK PAIN: Primary | ICD-10-CM

## 2024-03-10 DIAGNOSIS — G89.29 CHRONIC BACK PAIN: Primary | ICD-10-CM

## 2024-03-10 PROCEDURE — 99283 EMERGENCY DEPT VISIT LOW MDM: CPT

## 2024-03-10 PROCEDURE — 96372 THER/PROPH/DIAG INJ SC/IM: CPT

## 2024-03-10 PROCEDURE — 99284 EMERGENCY DEPT VISIT MOD MDM: CPT | Performed by: EMERGENCY MEDICINE

## 2024-03-10 RX ORDER — ACETAMINOPHEN 325 MG/1
650 TABLET ORAL ONCE
Status: COMPLETED | OUTPATIENT
Start: 2024-03-10 | End: 2024-03-10

## 2024-03-10 RX ORDER — METHOCARBAMOL 500 MG/1
500 TABLET, FILM COATED ORAL ONCE
Status: COMPLETED | OUTPATIENT
Start: 2024-03-10 | End: 2024-03-10

## 2024-03-10 RX ORDER — KETOROLAC TROMETHAMINE 30 MG/ML
15 INJECTION, SOLUTION INTRAMUSCULAR; INTRAVENOUS ONCE
Status: COMPLETED | OUTPATIENT
Start: 2024-03-10 | End: 2024-03-10

## 2024-03-10 RX ADMIN — KETOROLAC TROMETHAMINE 15 MG: 30 INJECTION, SOLUTION INTRAMUSCULAR; INTRAVENOUS at 09:56

## 2024-03-10 RX ADMIN — METHOCARBAMOL 500 MG: 500 TABLET ORAL at 09:56

## 2024-03-10 RX ADMIN — ACETAMINOPHEN 650 MG: 325 TABLET, FILM COATED ORAL at 09:56

## 2024-03-10 NOTE — ED ATTENDING ATTESTATION
3/10/2024  I, Salas Davis DO, saw and evaluated the patient. I have discussed the patient with the resident/non-physician practitioner and agree with the resident's/non-physician practitioner's findings, Plan of Care, and MDM as documented in the resident's/non-physician practitioner's note, except where noted. All available labs and Radiology studies were reviewed.  I was present for key portions of any procedure(s) performed by the resident/non-physician practitioner and I was immediately available to provide assistance.       At this point I agree with the current assessment done in the Emergency Department.  I have conducted an independent evaluation of this patient a history and physical is as follows:      Patient is a 44-year-old female with a history of fibromyalgia, depression, migraines, diabetes, chronic low back pain, accompanied by her mother.  The patient has had chronic low back pain for over a year, worse when she twists or bends, better with remaining still.  She has been seen and evaluated by orthopedic surgery, felt not to be a surgical candidate, has never had back surgery or back injections for this, has an appointment on Wednesday, 3 days from now with her physician to discuss possible further interventions including spinal cord stimulators.    On February 14, 2024 she had an MRI of the lumbar spine which showed L3-L4 through L5-S1 arthritis, degenerative disc disease as well as L5-S1 disc disease showing mild to moderate right foraminal narrowing at L5-S1.  Findings were unchanged from prior MRI    Patient over the past several months has been occasionally taking Tylenol or ibuprofen or Aleve but not consistently, she says it is not very helpful, she has been using Robaxin which is sometimes helpful.  She says that 2 days ago she stopped using any sort of additional analgesics like Tylenol or ibuprofen or Aleve, and notes that her back pain got a bit worse.  There is no bladder or bowel  incontinence, no saddle anesthesia, no leg weakness, no IV drug use, no fever, no chills.  No dysuria, no hematuria.  No change from the characteristic nature of her back pain.    General:  Patient is well-appearing  Head:  Atraumatic  Eyes:  Conjunctiva pink  ENT:  Mucous membranes are moist  Neck:  Supple  Cardiac:  S1-S2, without murmurs  Lungs:  Clear to auscultation bilaterally  Abdomen:  Soft, nontender, normal bowel sounds, no CVA tenderness  Extremities:  Normal range of motion   Back: No warmth or redness to the back.  There is no CVA tenderness, no spinal tenderness, no warmth or fluctuance.  she has some bilateral paraspinal muscular hypertonicity  Neurologic:  Awake, fluent speech, normal comprehension, sensation intact and symmetric in the b/l  legs. Strength is 5/5 at the bilateral hips, knees, ankles, reflexes are 2/4 at the bilateral knees and ankles.  Can dorsiflex & plantarflex great toes bilaterally without difficulty, no saddle anesthesia, negative straight leg raise bilaterally. AAOx3  Skin:  Pink warm and dry, no rash  Psychiatric:  Alert, pleasant, cooperative        ED Course     On reassessment after symptomatic management, patient feeling more comfortable.  No change in the above findings.Based on the patient's history and physical exam findings, I do not find any evidence of neurosurgical emergency or need for emergent imaging studies as there is no bladder or bowel incontinence, no saddle anesthesia, and no significant neurologic abnormalities. There is no evidence of cauda equina syndrome, The patient is afebrile, has no significant vertebral tenderness or fluctuance, and has no risk factors for spinal abscess such as IV drug use, significant trauma, or recent spinal injections. I believe it is appropriate for the patient to be discharged and managed conservatively as an outpatient.It was discussed with the patient that this may be the early presentation of a more significant problem,  and if they notice any of the signs/symptoms in the discharge instruction sheets, or they are otherwise concerned about their medical condition, they should return to the nearest emergency department.   Supportive care, importance of follow-up and return precautions were discussed with patient and mother, who expressed understanding.    DIAGNOSIS:  Acute exacerbation of chronic low back pain    MEDICAL DECISION MAKING CODING      Chronic conditions affecting care: As per HPI    COLLECTION AND INTERPRETATION OF DATA  Additional history obtained from: Mother  I reviewed prior external notes, including outpatient MRI as noted above, previous orthopedic visits February 7, 2024      Tests considered but not ordered: See above    RISK  Drugs (OTC, Rx, Controlled substances): Prescription management  All of the patient's current prescription medications should be continued.      Social Determinants of Health:  Presentation to ED outside of business hours or on night shift        Critical Care Time  Procedures

## 2024-03-10 NOTE — ED PROVIDER NOTES
History  Chief Complaint   Patient presents with    Back Pain     Pt c/o lower back pain radiating into bilateral thighs x 2 days.      44-year-old female with a history of chronic lower back pain, degenerative disc disease, fibromyalgia, migraines, type 2 diabetes, anxiety, and depression who presents for evaluation of worsening of her chronic lower back pain over the past 2 days.  The patient reports that the pain is sharp, bilateral, and radiates to her thighs.  The patient states that the pain is similar in characteristic to prior exacerbations of her chronic back pain.  The patient has applied Bengay to her back and taking Robaxin without relief of her symptoms.  The patient has not taken any Tylenol or NSAIDs in the past 2 days.  The patient denies fever, saddle anesthesia, incontinence, urinary retention, nausea, vomiting, abdominal pain, dysuria.        Prior to Admission Medications   Prescriptions Last Dose Informant Patient Reported? Taking?   Blood Glucose Monitoring Suppl (Contour Blood Glucose System) w/Device KIT   No No   Sig: Use 1 kit 2 (two) times a day before meals   Contour Test test strip   No No   Sig: USE TO CHECK BLOOD SUGAR ONCE DAILY   celecoxib (CeleBREX) 200 mg capsule   No No   Sig: Take 1 capsule (200 mg total) by mouth 2 (two) times a day   gabapentin (NEURONTIN) 300 mg capsule   No No   Sig: Take 1 capsule (300 mg total) by mouth 2 (two) times a day   metFORMIN (GLUCOPHAGE) 500 mg tablet   No No   Sig: TAKE 1 TABLET BY MOUTH TWICE A DAY WITH FOOD   methocarbamol (ROBAXIN) 500 mg tablet   No No   Sig: TAKE 1 TAB. EVERY 8 HR. AS NEEDED FOR MUSCLE SPASMS   mirtazapine (REMERON) 45 MG tablet   No No   Sig: Take 1 tablet (45 mg total) by mouth daily at bedtime   nortriptyline (PAMELOR) 75 MG capsule   No No   Sig: Take 1 capsule (75 mg total) by mouth 2 (two) times a day   phentermine (ADIPEX-P) 37.5 MG tablet   No No   Sig: Take 1 tablet (37.5 mg total) by mouth every morning    propranolol (INDERAL) 40 mg tablet   No No   Sig: Take 0.5 tablets (20 mg total) by mouth daily at bedtime      Facility-Administered Medications: None       Past Medical History:   Diagnosis Date    Anxiety     Blood transfusion declined because patient is [employer] 05/01/2023    Chronic pain disorder     Chronic sinusitis     Depression     Depression     Diabetes (HCC)     Fibromyalgia     Migraine     Obesity     Polyarthritis     Last assessed 9/21/2015    Psychiatric disorder     Psychiatric illness     Sleep difficulties        Past Surgical History:   Procedure Laterality Date    COLONOSCOPY  06/2019    DENTAL SURGERY      HYSTERECTOMY  05/01/2023    HYSTEROSCOPY      Endometrial Biopsy By Hysteroscopy    SD LAPS SUPRACRV HYSTERECT 250 GM/< RMVL TUBE/OVAR N/A 05/01/2023    Procedure: (LSH) W/ BILATERAL SALPINGECTOMY, REMOVAL PARAOVARIAN CYST;  Surgeon: Sai Lopez DO;  Location: [facility];  Service: Gynecology    REMOVAL OF INTRAUTERINE DEVICE (IUD)      US GUIDED BREAST BIOPSY RIGHT COMPLETE Right 06/17/2019    Benign       Family History   Problem Relation Age of Onset    Irregular heart beat Mother     Diabetes Father     Kidney disease Father     Diabetes Maternal Grandmother     Rheum arthritis Maternal Grandmother     Melanoma Maternal Grandmother 84    No Known Problems Maternal Grandfather     Kidney disease Paternal Grandmother     Rheum arthritis Paternal Grandmother     Depression Paternal Grandmother     Diabetes Paternal Grandfather     Lung cancer Paternal Grandfather 47    Substance Abuse Brother     No Known Problems Brother     Substance Abuse Maternal Uncle     Prostate cancer Maternal Uncle 60    Depression Paternal Aunt     Alcohol abuse Family     Stomach cancer Family      I have reviewed and agree with the history as documented.    E-Cigarette/Vaping    E-Cigarette Use Never User      E-Cigarette/Vaping Substances    Nicotine No     THC No     CBD No      Social  History     Tobacco Use    Smoking status: Never     Passive exposure: Never    Smokeless tobacco: Never    Tobacco comments:     N/A, non-smoker.   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   HENT:  Negative for congestion and sore throat.    Eyes:  Negative for pain and redness.   Respiratory:  Negative for cough and shortness of breath.    Cardiovascular:  Negative for chest pain, palpitations and leg swelling.   Gastrointestinal:  Negative for abdominal pain, diarrhea, nausea and vomiting.   Genitourinary:  Negative for dysuria, flank pain and hematuria.   Musculoskeletal:  Positive for back pain. Negative for arthralgias and myalgias.   Skin:  Negative for color change, pallor and rash.   Neurological:  Negative for dizziness, syncope, weakness, light-headedness, numbness and headaches.   All other systems reviewed and are negative.      Physical Exam  ED Triage Vitals   Temperature Pulse Respirations Blood Pressure SpO2   03/10/24 0910 03/10/24 0910 03/10/24 0910 03/10/24 0910 03/10/24 0910   (!) 97.4 °F (36.3 °C) 95 18 122/77 98 %      Temp Source Heart Rate Source Patient Position - Orthostatic VS BP Location FiO2 (%)   03/10/24 0910 03/10/24 0910 03/10/24 0910 03/10/24 0910 --   Temporal Monitor Lying Right arm       Pain Score       03/10/24 0909       8             Orthostatic Vital Signs  Vitals:    03/10/24 0910   BP: 122/77   Pulse: 95   Patient Position - Orthostatic VS: Lying       Physical Exam  Vitals and nursing note reviewed.   Constitutional:       General: She is not in acute distress.     Appearance: Normal appearance. She is not ill-appearing, toxic-appearing or diaphoretic.   HENT:      Head: Normocephalic and atraumatic.      Nose: Nose normal. No congestion or rhinorrhea.       Mouth/Throat:      Mouth: Mucous membranes are moist.      Pharynx: Oropharynx is clear.   Eyes:      General: No scleral icterus.     Extraocular Movements: Extraocular movements intact.      Conjunctiva/sclera: Conjunctivae normal.      Pupils: Pupils are equal, round, and reactive to light.   Cardiovascular:      Rate and Rhythm: Normal rate and regular rhythm.      Pulses: Normal pulses.      Heart sounds: Normal heart sounds. No murmur heard.     No friction rub. No gallop.   Pulmonary:      Effort: Pulmonary effort is normal.      Breath sounds: Normal breath sounds. No wheezing, rhonchi or rales.   Abdominal:      General: Abdomen is flat.      Palpations: Abdomen is soft.      Tenderness: There is no abdominal tenderness. There is no right CVA tenderness, left CVA tenderness, guarding or rebound.   Musculoskeletal:         General: Tenderness present. No swelling, deformity or signs of injury. Normal range of motion.      Cervical back: Normal range of motion and neck supple. No rigidity or tenderness.      Right lower leg: No edema.      Left lower leg: No edema.      Comments: Paraspinal lumbar region tenderness bilaterally, no midline spinal tenderness.   Lymphadenopathy:      Cervical: No cervical adenopathy.   Skin:     General: Skin is warm and dry.      Capillary Refill: Capillary refill takes less than 2 seconds.      Coloration: Skin is not jaundiced or pale.      Findings: No bruising, erythema, lesion or rash.   Neurological:      General: No focal deficit present.      Mental Status: She is alert and oriented to person, place, and time.      Sensory: No sensory deficit.      Motor: No weakness.      Gait: Gait normal.      Deep Tendon Reflexes: Reflexes normal.         ED Medications  Medications   ketorolac (TORADOL) injection 15 mg (15 mg Intramuscular Given 3/10/24 0956)   acetaminophen (TYLENOL) tablet 650 mg (650 mg Oral Given 3/10/24 0956)   methocarbamol (ROBAXIN) tablet 500 mg (500 mg  Oral Given 3/10/24 0956)       Diagnostic Studies  Results Reviewed       None                   No orders to display         Procedures  Procedures      ED Course                                       Medical Decision Making  44-year-old female with a history of chronic lower back pain, degenerative disc disease, fibromyalgia, migraines, type 2 diabetes, anxiety, and depression who presents for evaluation of worsening of her chronic lower back pain over the past 2 days.  Vitals are within the normal limits.  On exam the patient is alert and oriented, no acute distress, heart is regular rate and rhythm, lungs are clear to auscultation bilaterally, abdomen is soft and nontender, bilateral lumbar region paraspinal tenderness, no midline spinal tenderness, 5/5 strength in bilateral lower extremities, sensation intact throughout bilateral lower extremities, 2+ patellar and Achilles reflexes, 2+ distal pulses.  The patient's pain is consistent with musculoskeletal back pain.  Will treat the patient with Tylenol, Toradol, and Robaxin.  The patient has follow-up with pain management this week.  A prescription for diclofenac gel was sent to the patient's pharmacy.  The patient is instructed to continue Tylenol and Robaxin.  Return precautions are given and the patient is discharged.    Risk  OTC drugs.  Prescription drug management.          Disposition  Final diagnoses:   Chronic back pain     Time reflects when diagnosis was documented in both MDM as applicable and the Disposition within this note       Time User Action Codes Description Comment    3/10/2024 10:41 AM Moshe David Add [M54.9,  G89.29] Chronic back pain           ED Disposition       ED Disposition   Discharge    Condition   Stable    Date/Time   Sun Mar 10, 2024 10:41 AM    Comment   Esther Griffith discharge to home/self care.                   Follow-up Information       Follow up With Specialties Details Why Contact Info Additional Information      [facility] Emergency Department Emergency Medicine Go to  If symptoms worsen [address] [facility] [id number] [phone] [facility], [address], [place], [id number] [phone]            Discharge Medication List as of 3/10/2024 10:42 AM        START taking these medications    Details   Diclofenac Sodium (VOLTAREN) 1 % Apply 2 g topically 4 (four) times a day, Starting Sun 3/10/2024, Normal           CONTINUE these medications which have NOT CHANGED    Details   Blood Glucose Monitoring Suppl (Contour Blood Glucose System) w/Device KIT Use 1 kit 2 (two) times a day before meals, Starting Tue 10/4/2022, Normal      celecoxib (CeleBREX) 200 mg capsule Take 1 capsule (200 mg total) by mouth 2 (two) times a day, Starting Tue 10/24/2023, Normal      Contour Test test strip USE TO CHECK BLOOD SUGAR ONCE DAILY, Normal      gabapentin (NEURONTIN) 300 mg capsule Take 1 capsule (300 mg total) by mouth 2 (two) times a day, Starting Thu 1/11/2024, Normal      metFORMIN (GLUCOPHAGE) 500 mg tablet TAKE 1 TABLET BY MOUTH TWICE A DAY WITH FOOD, Starting Tue 1/16/2024, Normal      methocarbamol (ROBAXIN) 500 mg tablet TAKE 1 TAB. EVERY 8 HR. AS NEEDED FOR MUSCLE SPASMS, Normal      mirtazapine (REMERON) 45 MG tablet Take 1 tablet (45 mg total) by mouth daily at bedtime, Starting Thu 1/4/2024, Until Wed 4/3/2024, Normal      nortriptyline (PAMELOR) 75 MG capsule Take 1 capsule (75 mg total) by mouth 2 (two) times a day, Starting Mon 3/4/2024, Normal      phentermine (ADIPEX-P) 37.5 MG tablet Take 1 tablet (37.5 mg total) by mouth every morning, Starting Thu 1/4/2024, Normal      propranolol (INDERAL) 40 mg tablet Take 0.5 tablets (20 mg total) by mouth daily at bedtime, Starting Thu 2/1/2024, Until Sun 1/26/2025, Normal           No discharge procedures on file.    PDMP Review         Value Time User    PDMP Reviewed  Yes 1/4/2024  2:12  PM Cristina Bray MD             ED Provider  Attending physically available and evaluated Esther Griffith. I managed the patient along with the ED Attending.    Electronically Signed by           Moshe David DO  03/10/24 9113

## 2024-03-10 NOTE — DISCHARGE INSTRUCTIONS
You were seen in the emergency department for worsening of your chronic lower back pain.  A prescription for diclofenac gel was sent to your pharmacy.  Continue taking Tylenol regularly for your pain.  If you have worsening pain or develop any new concerning symptoms please return to the emergency department.

## 2024-03-11 ENCOUNTER — TELEPHONE (OUTPATIENT)
Dept: PSYCHIATRY | Facility: CLINIC | Age: 44
End: 2024-03-11

## 2024-03-11 NOTE — TELEPHONE ENCOUNTER
Patient contacted the office to schedule a follow up visit with provider. Patient is now scheduled for 6/3/2024   at 10:30 am virtually.

## 2024-03-12 NOTE — PROGRESS NOTES
Assessment:  1. Lumbar spondylosis    2. Chronic bilateral low back pain without sciatica        Plan:  The patient has been experiencing moderate to severe axial spine pain that is causing functional deficit.  The pain has been present for at least 3 months and is not improving with conservative care.  Currently the patient is not experiencing any radicular features nor neurogenic claudication.  Non-facet pathology has been ruled out on clinical evaluation. We will schedule the patient for bilateral L3-5 medial branch blocks with intention of moving forward towards radiofrequency ablation if there is an appropriate diagnostic response. The initial blocks will be performed with 2% lidocaine and if an appropriate response is obtained upon review of the patient's pain diary, a confirmatory block will be scheduled.  Complete risks and benefits including bleeding, infection, tissue reaction, nerve injury and allergic reaction were discussed. The patient was agreeable and verbalized an understanding  Patient may continue diclofenac gel, gabapentin, methocarbamol, Celebrex, and nortriptyline as prescribed.  No changes were made to her medication regiment  Patient will be initiating chiropractic therapy next week.  Encouraged her to keep this appointment  Continue with home exercise program  Follow-up pending results of blocks or sooner if needed    History of Present Illness:    The patient is a 44 y.o. female with a history of fibromyalgia, diabetes, diabetic neuropathy, and history of suicide attempt last seen on 11/06/2023  who presents for a follow up office visit in regards to chronic lumbosacral back pain that is currently nonradiating into the lower extremities.  She denies bowel or bladder incontinence or saddle anesthesia.  Patient unfortunately has failed SI joint injections and lumbar epidural steroid injections.  She did present to the emergency room with exacerbation of low back pain March 10, 2024 and was  prescribed topical diclofenac gel for her low back.  She has been applying this for 4 days without any benefit.  She continues on methocarbamol 500 mg every 8 hours as needed, nortriptyline 75 mg twice a day, gabapentin 300 mg twice a day, and Celebrex 200 mg as needed without any relief of her low back pain.  She has tried physical therapy in the past without any benefit.    MRI of the lumbar spine from February 2024 reveals multilevel arthritis from L3-L5 with a small right disc protrusion at L5-S1 causing mild canal and mild to moderate right foraminal narrowing with disc material abutting the exiting nerve root without compression.  These findings were unchanged from prior exam    The patient rates her pain a 7 out of 10 on the numeric pain rating scale.  Pain is constant throughout the day and it is described as burning, sharp, throbbing, pressure-like and shooting    I have personally reviewed and/or updated the patient's past medical history, past surgical history, family history, social history, current medications, allergies, and vital signs today.       Review of Systems:    Review of Systems   Respiratory:  Positive for shortness of breath.    Cardiovascular:  Negative for chest pain.   Gastrointestinal:  Negative for constipation, diarrhea, nausea and vomiting.   Musculoskeletal:  Positive for back pain and gait problem. Negative for arthralgias, joint swelling and myalgias.   Skin:  Negative for rash.   Neurological:  Negative for dizziness, seizures and weakness.   All other systems reviewed and are negative.        Past Medical History:   Diagnosis Date    Anxiety     Blood transfusion declined because patient is [employer] 05/01/2023    Chronic pain disorder     Chronic sinusitis     Depression     Depression     Diabetes (HCC)     Fibromyalgia     Migraine     Obesity     Polyarthritis     Last assessed 9/21/2015    Psychiatric disorder     Psychiatric illness     Sleep difficulties         Past Surgical History:   Procedure Laterality Date    COLONOSCOPY  06/2019    DENTAL SURGERY      HYSTERECTOMY  05/01/2023    HYSTEROSCOPY      Endometrial Biopsy By Hysteroscopy    NV LAPS SUPRACRV HYSTERECT 250 GM/< RMVL TUBE/OVAR N/A 05/01/2023    Procedure: (LSH) W/ BILATERAL SALPINGECTOMY, REMOVAL PARAOVARIAN CYST;  Surgeon: Sai Lopez DO;  Location: [facility];  Service: Gynecology    REMOVAL OF INTRAUTERINE DEVICE (IUD)      US GUIDED BREAST BIOPSY RIGHT COMPLETE Right 06/17/2019    Benign       Family History   Problem Relation Age of Onset    Irregular heart beat Mother     Diabetes Father     Kidney disease Father     Diabetes Maternal Grandmother     Rheum arthritis Maternal Grandmother     Melanoma Maternal Grandmother 84    No Known Problems Maternal Grandfather     Kidney disease Paternal Grandmother     Rheum arthritis Paternal Grandmother     Depression Paternal Grandmother     Diabetes Paternal Grandfather     Lung cancer Paternal Grandfather 47    Substance Abuse Brother     No Known Problems Brother     Substance Abuse Maternal Uncle     Prostate cancer Maternal Uncle 60    Depression Paternal Aunt     Alcohol abuse Family     Stomach cancer Family        Social History     Occupational History    Occupation: unemployed   Tobacco Use    Smoking status: Never     Passive exposure: Never    Smokeless tobacco: Never    Tobacco comments:     N/A, non-smoker.   Vaping Use    Vaping status: Never Used   Substance and Sexual Activity    Alcohol use: Not Currently     Comment: 3 x year; sober since 2010    Drug use: Not Currently    Sexual activity: Not Currently         Current Outpatient Medications:     celecoxib (CeleBREX) 200 mg capsule, Take 1 capsule (200 mg total) by mouth 2 (two) times a day, Disp: 60 capsule, Rfl: 6    Contour Test test strip, USE TO CHECK BLOOD SUGAR ONCE DAILY, Disp: 50 strip, Rfl: 7    Diclofenac Sodium (VOLTAREN) 1 %, Apply 2 g topically 4 (four) times a day,  Disp: 100 g, Rfl: 0    gabapentin (NEURONTIN) 300 mg capsule, Take 1 capsule (300 mg total) by mouth 2 (two) times a day, Disp: 60 capsule, Rfl: 2    metFORMIN (GLUCOPHAGE) 500 mg tablet, TAKE 1 TABLET BY MOUTH TWICE A DAY WITH FOOD, Disp: 60 tablet, Rfl: 5    methocarbamol (ROBAXIN) 500 mg tablet, TAKE 1 TAB. EVERY 8 HR. AS NEEDED FOR MUSCLE SPASMS, Disp: 30 tablet, Rfl: 1    mirtazapine (REMERON) 45 MG tablet, Take 1 tablet (45 mg total) by mouth daily at bedtime, Disp: 30 tablet, Rfl: 2    nortriptyline (PAMELOR) 75 MG capsule, Take 1 capsule (75 mg total) by mouth 2 (two) times a day, Disp: 60 capsule, Rfl: 2    phentermine (ADIPEX-P) 37.5 MG tablet, Take 1 tablet (37.5 mg total) by mouth every morning, Disp: 30 tablet, Rfl: 2    propranolol (INDERAL) 40 mg tablet, Take 0.5 tablets (20 mg total) by mouth daily at bedtime, Disp: 45 tablet, Rfl: 3    Blood Glucose Monitoring Suppl (Contour Blood Glucose System) w/Device KIT, Use 1 kit 2 (two) times a day before meals, Disp: 1 kit, Rfl: 0    Allergies   Allergen Reactions    Cannabidiol Shortness Of Breath, Itching, Swelling, Anxiety, Palpitations, Confusion, Hypertension, Throat Swelling and Tongue Swelling    Amoxicillin-Pot Clavulanate Hives    Decadrol [Dexamethasone] Other (See Comments)     psychosis    Penicillins Hives     Hives/Uticaria    Tetracyclines & Related Hives      Allergy;        Physical Exam:    /76   Pulse (!) 117   Ht 5' (1.524 m)   Wt 83.5 kg (184 lb)   LMP 03/13/2023 (Approximate)   BMI 35.94 kg/m²     Constitutional:normal, well developed, well nourished, alert, in no distress and non-toxic and no overt pain behavior.  Eyes:anicteric  HEENT:grossly intact  Neck:supple, symmetric, trachea midline and no masses   Pulmonary:even and unlabored  Cardiovascular:No edema or pitting edema present  Skin:Normal without rashes or lesions and well hydrated  Psychiatric:Mood and affect appropriate  Neurologic:Cranial Nerves II-XII grossly  intact  Musculoskeletal:antalgic gait.  Bilateral lumbar paraspinal musculature tender to palpation.  Bilateral lower extremity strength 5 out of 5 in the muscle groups.  Negative seated straight leg raise.  Positive lumbar facet loading maneuvers      Imaging  No orders to display   MRI LUMBAR SPINE WITHOUT CONTRAST     INDICATION: M54.16: Radiculopathy, lumbar region.     COMPARISON: 8/26/2022     TECHNIQUE:  Multiplanar, multisequence imaging of the lumbar spine was performed. .        IMAGE QUALITY:  Diagnostic     FINDINGS:     VERTEBRAL BODIES:  There are 5 lumbar type vertebral bodies.  Normal alignment of the lumbar spine.  No spondylolysis or spondylolisthesis. No scoliosis.  No compression fracture.    Normal marrow signal is identified within the visualized bony   structures.  No discrete marrow lesion.     SACRUM:  Normal signal within the sacrum. No evidence of insufficiency or stress fracture.     DISTAL CORD AND CONUS:  Normal size and signal within the distal cord and conus.     PARASPINAL SOFT TISSUES:  Paraspinal soft tissues are unremarkable.     LOWER THORACIC DISC SPACES:  Normal disc height and signal.  No disc herniation, canal stenosis or foraminal narrowing.     LUMBAR DISC SPACES:     L1-L2:  Normal.     L2-L3: Small broad-based left foraminal disc protrusion without canal stenosis. No foraminal nerve impingement.     L3-L4: Minor annular bulging and mild left greater than right facet arthropathy. No disc herniation, canal stenosis or foraminal nerve impingement.     L4-L5: Mild annular bulging and facet arthropathy. No disc herniation, canal stenosis or foraminal nerve impingement.     L5-S1: Disc desiccation and loss of disc height with mild diffuse annular bulging. There is a small broad-based right foraminal disc protrusion. Mild canal stenosis. Mild to moderate right foraminal narrowing with disc material abutting the ventral   aspect of the exiting nerve without clear compression or  displacement, unchanged from the prior examination.     OTHER FINDINGS:  None.     IMPRESSION:     Stable L5-S1 degenerative disc disease with a small broad-based right foraminal disc protrusion abutting the ventral aspect of the exiting nerve. Correlate for right L5 radiculopathy.     Otherwise minor noncompressive lumbar degenerative changes present.         No orders of the defined types were placed in this encounter.

## 2024-03-13 ENCOUNTER — OFFICE VISIT (OUTPATIENT)
Dept: PAIN MEDICINE | Facility: CLINIC | Age: 44
End: 2024-03-13
Payer: COMMERCIAL

## 2024-03-13 VITALS
WEIGHT: 184 LBS | BODY MASS INDEX: 36.12 KG/M2 | DIASTOLIC BLOOD PRESSURE: 76 MMHG | HEART RATE: 117 BPM | HEIGHT: 60 IN | SYSTOLIC BLOOD PRESSURE: 116 MMHG

## 2024-03-13 DIAGNOSIS — G89.29 CHRONIC BILATERAL LOW BACK PAIN WITHOUT SCIATICA: ICD-10-CM

## 2024-03-13 DIAGNOSIS — M47.816 LUMBAR SPONDYLOSIS: Primary | ICD-10-CM

## 2024-03-13 DIAGNOSIS — M54.50 CHRONIC BILATERAL LOW BACK PAIN WITHOUT SCIATICA: ICD-10-CM

## 2024-03-13 PROCEDURE — 99214 OFFICE O/P EST MOD 30 MIN: CPT | Performed by: NURSE PRACTITIONER

## 2024-03-14 ENCOUNTER — TELEPHONE (OUTPATIENT)
Age: 44
End: 2024-03-14

## 2024-03-14 NOTE — TELEPHONE ENCOUNTER
Caller: Esther     Doctor: Gideon    Reason for call: Pt is calling to schedule a procedure.     Call back#: [phone]

## 2024-03-15 ENCOUNTER — TELEPHONE (OUTPATIENT)
Dept: PSYCHIATRY | Facility: CLINIC | Age: 44
End: 2024-03-15

## 2024-03-15 ENCOUNTER — TELEMEDICINE (OUTPATIENT)
Dept: BEHAVIORAL/MENTAL HEALTH CLINIC | Facility: CLINIC | Age: 44
End: 2024-03-15

## 2024-03-15 DIAGNOSIS — F33.3 SEVERE EPISODE OF RECURRENT MAJOR DEPRESSIVE DISORDER, WITH PSYCHOTIC FEATURES (HCC): Primary | ICD-10-CM

## 2024-03-15 DIAGNOSIS — F41.1 GENERALIZED ANXIETY DISORDER: ICD-10-CM

## 2024-03-15 NOTE — TELEPHONE ENCOUNTER
"S/w Pt, Per OV note with CRISPIN on 03/13/24 \"We will schedule the patient for bilateral L3-5 medial branch blocks with intention of moving forward towards radiofrequency ablation if there is an appropriate diagnostic response.\" Pt would like to proceed with scheduling L3-5 MBB. Please contact for scheduling. Thx.   "

## 2024-03-15 NOTE — TELEPHONE ENCOUNTER
Good morning! I do not see anything noted in Laura's last office note about scheduling a procedure.     Do you mind reaching out to the patient for more detail of her symptoms to see what they may be able to offer her?     Thanks!

## 2024-03-15 NOTE — PSYCH
Virtual Regular Visit    Verification of patient location:    Patient is located at Home in the following state in which I hold an active license [place]      Assessment/Plan:    Problem List Items Addressed This Visit          Behavioral Health    Severe episode of recurrent major depressive disorder, with psychotic features (HCC) - Primary    Generalized anxiety disorder       Reason for visit is   Chief Complaint   Patient presents with    Virtual Regular Visit      Encounter provider HERMELINDA ALBA    Provider located at [facility]  [facility] [place]  [address] [address] [phone]      Recent Visits  No visits were found meeting these conditions.  Showing recent visits within past 7 days and meeting all other requirements  Today's Visits  Date Type Provider Dept   03/15/24 Telemedicine HERMELINDA [facility] [place]   Showing today's visits and meeting all other requirements  Future Appointments  No visits were found meeting these conditions.  Showing future appointments within next 150 days and meeting all other requirements       The patient was identified by name and date of birth. Esther Griffith was informed that this is a telemedicine visit and that the visit is being conducted throughthe Epic Embedded platform. She agrees to proceed..  My office door was closed. No one else was in the room.  She acknowledged consent and understanding of privacy and security of the video platform. The patient has agreed to participate and understands they can discontinue the visit at any time.    Patient is aware this is a billable service.     HPI     Past Medical History:   Diagnosis Date    Anxiety     Blood transfusion declined because patient is [employer] 05/01/2023    Chronic pain disorder     Chronic sinusitis     Depression     Depression     Diabetes (HCC)     Fibromyalgia     Migraine      Obesity     Polyarthritis     Last assessed 9/21/2015    Psychiatric disorder     Psychiatric illness     Sleep difficulties        Past Surgical History:   Procedure Laterality Date    COLONOSCOPY  06/2019    DENTAL SURGERY      HYSTERECTOMY  05/01/2023    HYSTEROSCOPY      Endometrial Biopsy By Hysteroscopy    ME LAPS SUPRACRV HYSTERECT 250 GM/< RMVL TUBE/OVAR N/A 05/01/2023    Procedure: (LSH) W/ BILATERAL SALPINGECTOMY, REMOVAL PARAOVARIAN CYST;  Surgeon: Sai Lopez DO;  Location: [facility];  Service: Gynecology    REMOVAL OF INTRAUTERINE DEVICE (IUD)      US GUIDED BREAST BIOPSY RIGHT COMPLETE Right 06/17/2019    Benign       Current Outpatient Medications   Medication Sig Dispense Refill    Blood Glucose Monitoring Suppl (Contour Blood Glucose System) w/Device KIT Use 1 kit 2 (two) times a day before meals 1 kit 0    celecoxib (CeleBREX) 200 mg capsule Take 1 capsule (200 mg total) by mouth 2 (two) times a day 60 capsule 6    Contour Test test strip USE TO CHECK BLOOD SUGAR ONCE DAILY 50 strip 7    Diclofenac Sodium (VOLTAREN) 1 % Apply 2 g topically 4 (four) times a day 100 g 0    gabapentin (NEURONTIN) 300 mg capsule Take 1 capsule (300 mg total) by mouth 2 (two) times a day 60 capsule 2    metFORMIN (GLUCOPHAGE) 500 mg tablet TAKE 1 TABLET BY MOUTH TWICE A DAY WITH FOOD 60 tablet 5    methocarbamol (ROBAXIN) 500 mg tablet TAKE 1 TAB. EVERY 8 HR. AS NEEDED FOR MUSCLE SPASMS 30 tablet 1    mirtazapine (REMERON) 45 MG tablet Take 1 tablet (45 mg total) by mouth daily at bedtime 30 tablet 2    nortriptyline (PAMELOR) 75 MG capsule Take 1 capsule (75 mg total) by mouth 2 (two) times a day 60 capsule 2    phentermine (ADIPEX-P) 37.5 MG tablet Take 1 tablet (37.5 mg total) by mouth every morning 30 tablet 2    propranolol (INDERAL) 40 mg tablet Take 0.5 tablets (20 mg total) by mouth daily at bedtime 45 tablet 3     No current facility-administered medications for this visit.        Allergies   Allergen  "Reactions    Cannabidiol Shortness Of Breath, Itching, Swelling, Anxiety, Palpitations, Confusion, Hypertension, Throat Swelling and Tongue Swelling    Amoxicillin-Pot Clavulanate Hives    Decadrol [Dexamethasone] Other (See Comments)     psychosis    Penicillins Hives     Hives/Uticaria    Tetracyclines & Related Hives      Allergy;        Review of Systems    Video Exam    There were no vitals filed for this visit.    Physical Exam     Behavioral Health Psychotherapy Progress Note    Psychotherapy Provided: Individual Psychotherapy     1. Severe episode of recurrent major depressive disorder, with psychotic features (HCC)        2. Generalized anxiety disorder            Goals addressed in session: Goal 1     DATA: Met with Esther for follow up.  Esther shared that she has been in extreme pain, which has caused her to be very depressed.  She said that she is not thinking about suicide, but feels hopeless about managing her pain.  She has talked to pain management, and they are planning a nerve block, and if that works, she will have an ablation, which would be the last resort to helping her.  She noted that she is trying to focus on other things to distract her from her pain, such as her brother driving her car around with minimal insurance as well as dog sitting jobs.  She shared that she heard from a good friend that the friend is being disfellowshipped from their [employer], and also that the same friend had a suicide attempt.  Esther said that she did not ask for details as she could not handle the weight of that right now on top of feeling so badly herself.  She said that she has been having difficulty falling sleep, but sleeps well once she finally falls asleep.  She naps during the day because she is \"worn out\" from dealing with the pain. She noted that her mood has been down, but not as bad as she anticipated with all the pain, and said that she is trying to do things with her hands to keep herself " "busy and occupied. Provided support, psychoeducation on how stress affects the body and can increase pain, and encouraged Esther to use relaxation and anxiety reduction strategies to try to help alleviate pain and boost mood.    During this session, this clinician used the following therapeutic modalities: Client-centered Therapy, Cognitive Behavioral Therapy, and Supportive Psychotherapy    Substance Abuse was not addressed during this session. If the client is diagnosed with a co-occurring substance use disorder, please indicate any changes in the frequency or amount of use: n/a. Stage of change for addressing substance use diagnoses: No substance use/Not applicable    ASSESSMENT:  Esther Griffith presents with a Depressed mood.     her affect is Normal range and intensity, which is congruent, with her mood and the content of the session. The client has not made progress on their goals.     Esther Griffith presents with a low risk of suicide, low risk of self-harm, and minimal risk of harm to others.    For any risk assessment that surpasses a \"low\" rating, a safety plan must be developed.    A safety plan was indicated: no  If yes, describe in detail n/a    PLAN: Between sessions, Esther Griffith will continue to keep her mind occupied on pleasurable activities to help ease distress from pain. At the next session, the therapist will use Client-centered Therapy and Cognitive Behavioral Therapy to address depression, pain management.    Behavioral Health Treatment Plan and Discharge Planning: Esther Griffith is aware of and agrees to continue to work on their treatment plan. They have identified and are working toward their discharge goals. yes    Visit start and stop times:    03/15/24  Start Time: 1003  Stop Time: 1049  Total Visit Time: 46 minutes      "

## 2024-03-15 NOTE — TELEPHONE ENCOUNTER
Patient called in to make the office aware of the referral that has been placed in her chart from a [facility] Neurologist for Neuropsychology.    Writer stated the  for that department would be made aware.

## 2024-03-18 DIAGNOSIS — M47.816 LUMBAR SPONDYLOSIS: Primary | ICD-10-CM

## 2024-03-18 NOTE — TELEPHONE ENCOUNTER
Called patient schedule procedure. Reviewed all instructions by phone upon scheduling. Mailed copy to home.

## 2024-03-18 NOTE — TELEPHONE ENCOUNTER
Caller: Esther    Doctor: Gideon    Reason for call: Pt is returning schedulers call.     Call back#: [phone]

## 2024-03-19 ENCOUNTER — OFFICE VISIT (OUTPATIENT)
Age: 44
End: 2024-03-19
Payer: COMMERCIAL

## 2024-03-19 VITALS
WEIGHT: 184 LBS | BODY MASS INDEX: 36.12 KG/M2 | OXYGEN SATURATION: 99 % | SYSTOLIC BLOOD PRESSURE: 122 MMHG | HEIGHT: 60 IN | DIASTOLIC BLOOD PRESSURE: 76 MMHG | HEART RATE: 100 BPM

## 2024-03-19 DIAGNOSIS — M99.02 SEGMENTAL DYSFUNCTION OF THORACIC REGION: ICD-10-CM

## 2024-03-19 DIAGNOSIS — G89.29 CHRONIC BILATERAL LOW BACK PAIN WITH BILATERAL SCIATICA: ICD-10-CM

## 2024-03-19 DIAGNOSIS — M54.42 CHRONIC BILATERAL LOW BACK PAIN WITH BILATERAL SCIATICA: ICD-10-CM

## 2024-03-19 DIAGNOSIS — M54.41 CHRONIC BILATERAL LOW BACK PAIN WITH BILATERAL SCIATICA: ICD-10-CM

## 2024-03-19 DIAGNOSIS — M62.838 MUSCLE SPASM: ICD-10-CM

## 2024-03-19 DIAGNOSIS — M99.03 SEGMENTAL DYSFUNCTION OF LUMBAR REGION: ICD-10-CM

## 2024-03-19 DIAGNOSIS — M51.9 LUMBAR DISC DISORDER: Primary | ICD-10-CM

## 2024-03-19 DIAGNOSIS — M99.04 SEGMENTAL DYSFUNCTION OF SACRAL REGION: ICD-10-CM

## 2024-03-19 PROCEDURE — 99203 OFFICE O/P NEW LOW 30 MIN: CPT | Performed by: CHIROPRACTOR

## 2024-03-19 PROCEDURE — 98941 CHIROPRACT MANJ 3-4 REGIONS: CPT | Performed by: CHIROPRACTOR

## 2024-03-19 NOTE — PROGRESS NOTES
Initial date of service: 3/19/24    Diagnoses and all orders for this visit:    Lumbar disc disorder    Chronic bilateral low back pain with bilateral sciatica  -     Ambulatory referral to Chiropractic    Segmental dysfunction of sacral region    Muscle spasm    Segmental dysfunction of lumbar region    Segmental dysfunction of thoracic region    No red flags or neurologic deficit appreciated clinically. Pt's symptoms and exam findings consistent with mechanical lbp and sciatica secondary to repetitive st/sp injury of discogenic origin, exacerbated by core/glute deconditioning and postural/ergonomic stressors. Pt responded well to extension biased stretches and manual mobilization of the affected spinal and myofascial tissues with increased ROM; trial of conservative tx recommended consisting of Анна extension biased exercises, graded mobilization/manipulation of the affected myofascial jt dysfunction, postural/ergonomic education and take home stretches/exercises. If symptoms fail to centralize or neurologic deficit presents/progresses, appropriate imaging and referral will be coordinated.  Spent greater than 30 min c pt discussing hx, pe, ddx, tx options and reviewing intake notes/imaging    TREATMENT: 27202  Fear avoidance behavior discussion; encouraged and reassured pt that natural course of condition is to improve over time with adherence to tx plan and home care strategies. Home care recommendations: walk (but avoid hills/trails), gradual return to activity to tolerance (avoid anything that peripheralizes symptoms), call if symptoms peripheralize, worsen, or neurologic deficit progresses. Ther-ex: IASTM to affected mm hypertonicities (discussed soreness/ecchymosis up to 36 hrs post procedure); prone on elbows, prone push-ups at shoulders, standing lumbopelvic extension, transitional mvmt education, abdominal bracing; Thoracic mobilization/manipulation: prone P-A mob, supine A-P manip; Lumbar  mobilization/manipulation: extension-traction; SIJ Manipulation/Mobilization: R/L SIJ HVLA - long axis distraction    HPI  Esther Griffith is a 44 y.o. female  Chief Complaint   Patient presents with   • Back Pain     Lower back pain-9   • Sciatica     Pt presents for evala nd tx for chronic back pain with sciatica. Pt has undergone PT with modest benefit; undergoing injections with eventual progression to nerve block and ablation. MRI 2/2024 demonstrates diffuse disc issues, somewhat advanced for age    Back Pain  This is a chronic problem. The current episode started more than 1 year ago. The problem occurs constantly. The problem has been waxing and waning since onset. The pain is present in the gluteal, lumbar spine, sacro-iliac and thoracic spine. The quality of the pain is described as aching, burning, cramping, shooting and stabbing. Pain scale: 5-10/10. Worse during: wrose in am and evening. The symptoms are aggravated by sitting and lying down. Stiffness is present In the morning. Pertinent negatives include no bladder incontinence or bowel incontinence.     Past Medical History:   Diagnosis Date   • Anxiety    • Blood transfusion declined because patient is [employer] 05/01/2023   • Chronic pain disorder    • Chronic sinusitis    • Depression    • Depression    • Diabetes (HCC)    • Fibromyalgia    • Migraine    • Obesity    • Polyarthritis     Last assessed 9/21/2015   • Psychiatric disorder    • Psychiatric illness    • Sleep difficulties       Past Surgical History:   Procedure Laterality Date   • COLONOSCOPY  06/2019   • DENTAL SURGERY     • HYSTERECTOMY  05/01/2023   • HYSTEROSCOPY      Endometrial Biopsy By Hysteroscopy   • AL LAPS SUPRACRV HYSTERECT 250 GM/< RMVL TUBE/OVAR N/A 05/01/2023    Procedure: (LSH) W/ BILATERAL SALPINGECTOMY, REMOVAL PARAOVARIAN CYST;  Surgeon: Sai Lopez DO;  Location: [facility];  Service: Gynecology   • REMOVAL OF INTRAUTERINE DEVICE (IUD)     • US GUIDED  BREAST BIOPSY RIGHT COMPLETE Right 06/17/2019    Benign     The following portions of the patient's history were reviewed and updated as appropriate: allergies, past family history, past medical history, past social history, past surgical history, and problem list.  Review of Systems   Gastrointestinal:  Negative for bowel incontinence.   Genitourinary:  Negative for bladder incontinence.   Musculoskeletal:  Positive for back pain.     Physical Exam  Eyes:      Extraocular Movements: Extraocular movements intact.   Cardiovascular:      Pulses: Normal pulses.   Abdominal:      General: There is no distension.      Tenderness: There is no abdominal tenderness.   Musculoskeletal:      Thoracic back: Spasms and tenderness present. Decreased range of motion.      Lumbar back: Spasms and tenderness present. Decreased range of motion. Negative right straight leg raise test and negative left straight leg raise test.        Back:       Comments: Pnful and limited in [place], ext centralizes   Skin:     General: Skin is warm and dry.   Neurological:      Mental Status: She is alert and oriented to person, place, and time.      Cranial Nerves: Cranial nerves 2-12 are intact.      Sensory: Sensation is intact.      Motor: Motor function is intact.      Coordination: Coordination is intact.      Gait: Gait is intact.      Deep Tendon Reflexes: Babinski sign absent on the right side. Babinski sign absent on the left side.      Reflex Scores:       Patellar reflexes are 2+ on the right side and 2+ on the left side.       Achilles reflexes are 2+ on the right side and 2+ on the left side.  Psychiatric:         Mood and Affect: Mood normal.         Behavior: Behavior normal.     SOFT TISSUE ASSESSMENT: Hypertonicity and tenderness palpated B T10-S1 erector spinae, hip flexor, glute med/min JOINT RESTRICTIONS: T10-S1 and R/L SIJ ORTHO: SI jt point tenderness: -; Анна repeated flexion peripheralizes, extension centralizes; nerissa's,  iliac compression, thigh thrust elicit stiffness in R/L SIJ; prone femoral nerve stretch neg for upper lumbar neural tension, elicits R/L SIJ stiffness; sitting root elicits lbp on R/L; slump test elicits neural tension into RLE/LLE    Return in about 1 week (around 3/26/2024) for Next scheduled follow up.

## 2024-03-21 NOTE — TELEPHONE ENCOUNTER
Esther Griffith called the office and left a voicemail and  requested a call back to discuss the missing information in her chart for Neuropsych.    They can be reached at P# [phone].       Thank you.

## 2024-03-22 ENCOUNTER — TELEPHONE (OUTPATIENT)
Age: 44
End: 2024-03-22

## 2024-03-22 ENCOUNTER — PROCEDURE VISIT (OUTPATIENT)
Age: 44
End: 2024-03-22
Payer: COMMERCIAL

## 2024-03-22 VITALS — HEIGHT: 60 IN | BODY MASS INDEX: 36.12 KG/M2 | WEIGHT: 184 LBS

## 2024-03-22 DIAGNOSIS — M99.02 SEGMENTAL DYSFUNCTION OF THORACIC REGION: ICD-10-CM

## 2024-03-22 DIAGNOSIS — M99.03 SEGMENTAL DYSFUNCTION OF LUMBAR REGION: ICD-10-CM

## 2024-03-22 DIAGNOSIS — M51.9 LUMBAR DISC DISORDER: Primary | ICD-10-CM

## 2024-03-22 DIAGNOSIS — M62.838 MUSCLE SPASM: ICD-10-CM

## 2024-03-22 DIAGNOSIS — M99.04 SEGMENTAL DYSFUNCTION OF SACRAL REGION: ICD-10-CM

## 2024-03-22 PROCEDURE — 98941 CHIROPRACT MANJ 3-4 REGIONS: CPT | Performed by: CHIROPRACTOR

## 2024-03-22 NOTE — TELEPHONE ENCOUNTER
Esther called stating she saw us on 5/19 and now she is having a lot of pain in her entire head and straight down her back all the way down to her feet. Pain level is a 7-8/10. She ask for someone to please call her today. She will not be able to go the weekend with this.    Esther # [phone]       Appointment was made for esther for today 3/22/24 at the [facility] per Nathan to make it.

## 2024-03-22 NOTE — PROGRESS NOTES
Initial date of service: 3/19/24    Diagnoses and all orders for this visit:    Lumbar disc disorder    Segmental dysfunction of sacral region    Muscle spasm    Segmental dysfunction of lumbar region    Segmental dysfunction of thoracic region    No concerning ortho/neuro testing results on exam today; discussed post onset muscle soreness in erector spinae can be a typical side affect after first few visits. Pt tolerated tx well today and has f/up next week    TREATMENT: 01299  Ther-ex: IASTM to affected mm hypertonicities (discussed soreness/ecchymosis up to 36 hrs post procedure); prone on elbows, prone push-ups at shoulders, standing lumbopelvic extension, transitional mvmt education, abdominal bracing; Thoracic mobilization/manipulation: prone P-A mob, supine A-P manip; Lumbar mobilization/manipulation: extension-traction; SIJ Manipulation/Mobilization: R/L SIJ HVLA - long axis distraction    HPI  Esther Griffith is a 44 y.o. female  Chief Complaint   Patient presents with    Back Pain     Mid to lower lumbar pain is dull pain and tingling pain. Pain score 8     Pt presents for tx for chronic back pain with sciatica. Pt has undergone PT with modest benefit; undergoing injections with eventual progression to nerve block and ablation. MRI 2/2024 demonstrates diffuse disc issues, somewhat advanced for age  3/22: pt reports having a flare-up of neck to lower back pain the night after initial eval; was concerned and wanted appt before weekend    Back Pain  This is a chronic problem. The current episode started more than 1 year ago. The problem occurs constantly. The problem has been waxing and waning since onset. The pain is present in the gluteal, lumbar spine, sacro-iliac and thoracic spine. The quality of the pain is described as aching, burning, cramping, shooting and stabbing. Pain scale: 4-9/10. Worse during: wrose in am and evening. The symptoms are aggravated by sitting and lying down. Stiffness is present In  the morning. Pertinent negatives include no bladder incontinence or bowel incontinence.     Past Medical History:   Diagnosis Date    Anxiety     Blood transfusion declined because patient is [employer] 05/01/2023    Chronic pain disorder     Chronic sinusitis     Depression     Depression     Diabetes (HCC)     Fibromyalgia     Migraine     Obesity     Polyarthritis     Last assessed 9/21/2015    Psychiatric disorder     Psychiatric illness     Sleep difficulties       Past Surgical History:   Procedure Laterality Date    COLONOSCOPY  06/2019    DENTAL SURGERY      HYSTERECTOMY  05/01/2023    HYSTEROSCOPY      Endometrial Biopsy By Hysteroscopy    [place] LAPS SUPRACRV HYSTERECT 250 GM/< RMVL TUBE/OVAR N/A 05/01/2023    Procedure: (LSH) W/ BILATERAL SALPINGECTOMY, REMOVAL PARAOVARIAN CYST;  Surgeon: Sai Lopez DO;  Location: [facility];  Service: Gynecology    REMOVAL OF INTRAUTERINE DEVICE (IUD)      US GUIDED BREAST BIOPSY RIGHT COMPLETE Right 06/17/2019    Benign     The following portions of the patient's history were reviewed and updated as appropriate: allergies, past family history, past medical history, past social history, past surgical history, and problem list.  Review of Systems   Gastrointestinal:  Negative for bowel incontinence.   Genitourinary:  Negative for bladder incontinence.   Musculoskeletal:  Positive for back pain.     Physical Exam  Eyes:      Extraocular Movements: Extraocular movements intact.   Cardiovascular:      Pulses: Normal pulses.   Abdominal:      General: There is no distension.      Tenderness: There is no abdominal tenderness.   Musculoskeletal:      Thoracic back: Spasms and tenderness present. Decreased range of motion.      Lumbar back: Spasms and tenderness present. Decreased range of motion. Negative right straight leg raise test and negative left straight leg raise test.        Back:       Comments: Pnful and limited in [place], ext centralizes   Skin:     General:  Skin is warm and dry.   Neurological:      Mental Status: She is alert and oriented to person, place, and time.      Cranial Nerves: Cranial nerves 2-12 are intact.      Sensory: Sensation is intact.      Motor: Motor function is intact.      Coordination: Coordination is intact.      Gait: Gait is intact.      Deep Tendon Reflexes: Babinski sign absent on the right side. Babinski sign absent on the left side.      Reflex Scores:       Patellar reflexes are 2+ on the right side and 2+ on the left side.       Achilles reflexes are 2+ on the right side and 2+ on the left side.  Psychiatric:         Mood and Affect: Mood normal.         Behavior: Behavior normal.       SOFT TISSUE ASSESSMENT: Hypertonicity and tenderness palpated B T10-S1 erector spinae, hip flexor, glute med/min JOINT RESTRICTIONS: T10-S1 and R/L SIJ ORTHO: SI jt point tenderness: -; Анна repeated flexion peripheralizes, extension centralizes; nerissa's, iliac compression, thigh thrust elicit stiffness in R/L SIJ; prone femoral nerve stretch neg for upper lumbar neural tension, elicits R/L SIJ stiffness; sitting root elicits lbp on R/L; slump test elicits neural tension into RLE/LLE    Return in about 1 week (around 3/29/2024) for Next scheduled follow up.

## 2024-03-25 DIAGNOSIS — F41.1 GENERALIZED ANXIETY DISORDER: ICD-10-CM

## 2024-03-25 DIAGNOSIS — F32.A DEPRESSION, UNSPECIFIED DEPRESSION TYPE: ICD-10-CM

## 2024-03-25 DIAGNOSIS — R20.2 PARESTHESIA: ICD-10-CM

## 2024-03-25 DIAGNOSIS — M79.18 MYOFASCIAL PAIN: ICD-10-CM

## 2024-03-25 RX ORDER — MIRTAZAPINE 45 MG/1
45 TABLET, FILM COATED ORAL
Qty: 30 TABLET | Refills: 2 | Status: SHIPPED | OUTPATIENT
Start: 2024-03-25 | End: 2024-06-23

## 2024-03-25 RX ORDER — GABAPENTIN 300 MG/1
300 CAPSULE ORAL 2 TIMES DAILY
Qty: 60 CAPSULE | Refills: 2 | Status: SHIPPED | OUTPATIENT
Start: 2024-03-25

## 2024-03-25 RX ORDER — METHOCARBAMOL 500 MG/1
TABLET, FILM COATED ORAL
Qty: 30 TABLET | Refills: 1 | Status: SHIPPED | OUTPATIENT
Start: 2024-03-25

## 2024-03-26 ENCOUNTER — PROCEDURE VISIT (OUTPATIENT)
Age: 44
End: 2024-03-26
Payer: COMMERCIAL

## 2024-03-26 VITALS
HEIGHT: 60 IN | WEIGHT: 184 LBS | SYSTOLIC BLOOD PRESSURE: 112 MMHG | HEART RATE: 108 BPM | BODY MASS INDEX: 36.12 KG/M2 | OXYGEN SATURATION: 97 % | DIASTOLIC BLOOD PRESSURE: 70 MMHG

## 2024-03-26 DIAGNOSIS — M99.04 SEGMENTAL DYSFUNCTION OF SACRAL REGION: ICD-10-CM

## 2024-03-26 DIAGNOSIS — M99.03 SEGMENTAL DYSFUNCTION OF LUMBAR REGION: ICD-10-CM

## 2024-03-26 DIAGNOSIS — M51.9 LUMBAR DISC DISORDER: Primary | ICD-10-CM

## 2024-03-26 DIAGNOSIS — M62.838 MUSCLE SPASM: ICD-10-CM

## 2024-03-26 DIAGNOSIS — M99.02 SEGMENTAL DYSFUNCTION OF THORACIC REGION: ICD-10-CM

## 2024-03-26 PROCEDURE — 98941 CHIROPRACT MANJ 3-4 REGIONS: CPT | Performed by: CHIROPRACTOR

## 2024-03-26 NOTE — PROGRESS NOTES
Initial date of service: 3/19/24    Diagnoses and all orders for this visit:    Lumbar disc disorder    Segmental dysfunction of sacral region    Muscle spasm    Segmental dysfunction of lumbar region    Segmental dysfunction of thoracic region    Pt tolerated tx today; discussed changing from extension to flexion biased if progress remains limited    TREATMENT: 77574  Ther-ex: IASTM to affected mm hypertonicities (discussed soreness/ecchymosis up to 36 hrs post procedure); prone on elbows, prone push-ups at shoulders, standing lumbopelvic extension, transitional mvmt education, abdominal bracing; Thoracic mobilization/manipulation: prone P-A mob, supine A-P manip; Lumbar mobilization/manipulation: extension-traction; SIJ Manipulation/Mobilization: R/L SIJ HVLA - long axis distraction    HPI  Esther Griffith is a 44 y.o. female  Chief Complaint   Patient presents with    Back Pain     Middle back pain-6  Lower back pain-6    Sciatica     Pt presents for tx for chronic back pain with sciatica. Pt has undergone PT with modest benefit; undergoing injections with eventual progression to nerve block and ablation. MRI 2/2024 demonstrates diffuse disc issues, somewhat advanced for age  3/26: pt reports no significant improvement although not as sore or painful after this past tx than the tx prior    Back Pain  This is a chronic problem. The current episode started more than 1 year ago. The problem occurs constantly. The problem has been waxing and waning since onset. The pain is present in the gluteal, lumbar spine, sacro-iliac and thoracic spine. The quality of the pain is described as aching, burning, cramping, shooting and stabbing. Pain scale: 4-8/10. Worse during: wrose in am and evening. The symptoms are aggravated by sitting and lying down. Stiffness is present In the morning. Pertinent negatives include no bladder incontinence or bowel incontinence.     Past Medical History:   Diagnosis Date    Anxiety     Blood  transfusion declined because patient is [employer] 05/01/2023    Chronic pain disorder     Chronic sinusitis     Depression     Depression     Diabetes (HCC)     Fibromyalgia     Migraine     Obesity     Polyarthritis     Last assessed 9/21/2015    Psychiatric disorder     Psychiatric illness     Sleep difficulties       Past Surgical History:   Procedure Laterality Date    COLONOSCOPY  06/2019    DENTAL SURGERY      HYSTERECTOMY  05/01/2023    HYSTEROSCOPY      Endometrial Biopsy By Hysteroscopy    [place] LAPS SUPRACRV HYSTERECT 250 GM/< RMVL TUBE/OVAR N/A 05/01/2023    Procedure: (LSH) W/ BILATERAL SALPINGECTOMY, REMOVAL PARAOVARIAN CYST;  Surgeon: Sai Lopez DO;  Location: [facility];  Service: Gynecology    REMOVAL OF INTRAUTERINE DEVICE (IUD)      US GUIDED BREAST BIOPSY RIGHT COMPLETE Right 06/17/2019    Benign     The following portions of the patient's history were reviewed and updated as appropriate: allergies, past family history, past medical history, past social history, past surgical history, and problem list.  Review of Systems   Gastrointestinal:  Negative for bowel incontinence.   Genitourinary:  Negative for bladder incontinence.   Musculoskeletal:  Positive for back pain.     Physical Exam  Eyes:      Extraocular Movements: Extraocular movements intact.   Cardiovascular:      Pulses: Normal pulses.   Abdominal:      General: There is no distension.      Tenderness: There is no abdominal tenderness.   Musculoskeletal:      Thoracic back: Spasms and tenderness present. Decreased range of motion.      Lumbar back: Spasms and tenderness present. Decreased range of motion. Negative right straight leg raise test and negative left straight leg raise test.        Back:       Comments: Pnful and limited in [place], ext centralizes   Skin:     General: Skin is warm and dry.   Neurological:      Mental Status: She is alert and oriented to person, place, and time.      Cranial Nerves: Cranial nerves 2-12  are intact.      Sensory: Sensation is intact.      Motor: Motor function is intact.      Coordination: Coordination is intact.      Gait: Gait is intact.      Deep Tendon Reflexes: Babinski sign absent on the right side. Babinski sign absent on the left side.      Reflex Scores:       Patellar reflexes are 2+ on the right side and 2+ on the left side.       Achilles reflexes are 2+ on the right side and 2+ on the left side.  Psychiatric:         Mood and Affect: Mood normal.         Behavior: Behavior normal.     SOFT TISSUE ASSESSMENT: Hypertonicity and tenderness palpated B T10-S1 erector spinae, hip flexor, glute med/min JOINT RESTRICTIONS: T10-S1 and R/L SIJ ORTHO: SI jt point tenderness: -; Анна repeated flexion peripheralizes, extension centralizes; nerissa's, iliac compression, thigh thrust elicit stiffness in R/L SIJ; prone femoral nerve stretch neg for upper lumbar neural tension, elicits R/L SIJ stiffness; sitting root elicits lbp on R/L; slump test elicits neural tension into RLE/LLE    Return in about 1 week (around 4/2/2024) for Next scheduled follow up.

## 2024-04-04 ENCOUNTER — PROCEDURE VISIT (OUTPATIENT)
Age: 44
End: 2024-04-04
Payer: COMMERCIAL

## 2024-04-04 VITALS
OXYGEN SATURATION: 98 % | SYSTOLIC BLOOD PRESSURE: 120 MMHG | HEIGHT: 60 IN | DIASTOLIC BLOOD PRESSURE: 84 MMHG | HEART RATE: 115 BPM | BODY MASS INDEX: 36.12 KG/M2 | WEIGHT: 184 LBS

## 2024-04-04 DIAGNOSIS — M99.03 SEGMENTAL DYSFUNCTION OF LUMBAR REGION: ICD-10-CM

## 2024-04-04 DIAGNOSIS — M51.9 LUMBAR DISC DISORDER: Primary | ICD-10-CM

## 2024-04-04 DIAGNOSIS — M99.02 SEGMENTAL DYSFUNCTION OF THORACIC REGION: ICD-10-CM

## 2024-04-04 DIAGNOSIS — M54.41 CHRONIC BILATERAL LOW BACK PAIN WITH BILATERAL SCIATICA: ICD-10-CM

## 2024-04-04 DIAGNOSIS — G89.29 CHRONIC BILATERAL LOW BACK PAIN WITH BILATERAL SCIATICA: ICD-10-CM

## 2024-04-04 DIAGNOSIS — M99.04 SEGMENTAL DYSFUNCTION OF SACRAL REGION: ICD-10-CM

## 2024-04-04 DIAGNOSIS — M62.838 MUSCLE SPASM: ICD-10-CM

## 2024-04-04 DIAGNOSIS — M54.42 CHRONIC BILATERAL LOW BACK PAIN WITH BILATERAL SCIATICA: ICD-10-CM

## 2024-04-04 PROCEDURE — 98941 CHIROPRACT MANJ 3-4 REGIONS: CPT | Performed by: CHIROPRACTOR

## 2024-04-04 NOTE — PROGRESS NOTES
Initial date of service: 3/19/24    Diagnoses and all orders for this visit:    Lumbar disc disorder    Segmental dysfunction of sacral region    Muscle spasm    Segmental dysfunction of lumbar region    Segmental dysfunction of thoracic region    Chronic bilateral low back pain with bilateral sciatica    Pt improved; will maintain extension biased; injection next week     TREATMENT: 68878  Ther-ex: IASTM to affected mm hypertonicities (discussed soreness/ecchymosis up to 36 hrs post procedure); prone on elbows, prone push-ups at shoulders, standing lumbopelvic extension, transitional mvmt education, abdominal bracing; Thoracic mobilization/manipulation: prone P-A mob, supine A-P manip; Lumbar mobilization/manipulation: extension-traction; SIJ Manipulation/Mobilization: R/L SIJ HVLA - long axis distraction    HPI  Esther Griffith is a 44 y.o. female  Chief Complaint   Patient presents with    Back Pain     Middle back pain-5  Lower back pain-5    Sciatica     Pt presents for tx for chronic back pain with sciatica. Pt has undergone PT with modest benefit; undergoing injections with eventual progression to nerve block and ablation. MRI 2/2024 demonstrates diffuse disc issues, somewhat advanced for age  4/4: pt reports feeling and moving better since last tx    Back Pain  This is a chronic problem. The current episode started more than 1 year ago. The problem occurs constantly. The problem has been waxing and waning since onset. The pain is present in the gluteal, lumbar spine, sacro-iliac and thoracic spine. The quality of the pain is described as aching, burning, cramping, shooting and stabbing. Pain scale: 3-7/10. Worse during: wrose in am and evening. The symptoms are aggravated by sitting and lying down. Stiffness is present In the morning. Pertinent negatives include no bladder incontinence or bowel incontinence.     Past Medical History:   Diagnosis Date    Anxiety     Blood transfusion declined because patient is  [employer] 05/01/2023    Chronic pain disorder     Chronic sinusitis     Depression     Depression     Diabetes (HCC)     Fibromyalgia     Migraine     Obesity     Polyarthritis     Last assessed 9/21/2015    Psychiatric disorder     Psychiatric illness     Sleep difficulties       Past Surgical History:   Procedure Laterality Date    COLONOSCOPY  06/2019    DENTAL SURGERY      HYSTERECTOMY  05/01/2023    HYSTEROSCOPY      Endometrial Biopsy By Hysteroscopy    [place] LAPS SUPRACRV HYSTERECT 250 GM/< RMVL TUBE/OVAR N/A 05/01/2023    Procedure: (LSH) W/ BILATERAL SALPINGECTOMY, REMOVAL PARAOVARIAN CYST;  Surgeon: Sai Lopez DO;  Location: [facility];  Service: Gynecology    REMOVAL OF INTRAUTERINE DEVICE (IUD)      US GUIDED BREAST BIOPSY RIGHT COMPLETE Right 06/17/2019    Benign     The following portions of the patient's history were reviewed and updated as appropriate: allergies, past family history, past medical history, past social history, past surgical history, and problem list.  Review of Systems   Gastrointestinal:  Negative for bowel incontinence.   Genitourinary:  Negative for bladder incontinence.   Musculoskeletal:  Positive for back pain.     Physical Exam  Eyes:      Extraocular Movements: Extraocular movements intact.   Cardiovascular:      Pulses: Normal pulses.   Abdominal:      General: There is no distension.      Tenderness: There is no abdominal tenderness.   Musculoskeletal:      Thoracic back: Spasms and tenderness present. Decreased range of motion.      Lumbar back: Spasms and tenderness present. Decreased range of motion. Negative right straight leg raise test and negative left straight leg raise test.        Back:       Comments: Pnful and limited in [place], ext centralizes   Skin:     General: Skin is warm and dry.   Neurological:      Mental Status: She is alert and oriented to person, place, and time.      Cranial Nerves: Cranial nerves 2-12 are intact.      Sensory: Sensation is  intact.      Motor: Motor function is intact.      Coordination: Coordination is intact.      Gait: Gait is intact.      Deep Tendon Reflexes: Babinski sign absent on the right side. Babinski sign absent on the left side.      Reflex Scores:       Patellar reflexes are 2+ on the right side and 2+ on the left side.       Achilles reflexes are 2+ on the right side and 2+ on the left side.  Psychiatric:         Mood and Affect: Mood normal.         Behavior: Behavior normal.       SOFT TISSUE ASSESSMENT: Hypertonicity and tenderness palpated B T10-S1 erector spinae, hip flexor, glute med/min JOINT RESTRICTIONS: T10-S1 and R/L SIJ ORTHO: SI jt point tenderness: -; Анна repeated flexion peripheralizes, extension centralizes; nerissa's, iliac compression, thigh thrust elicit stiffness in R/L SIJ; prone femoral nerve stretch neg for upper lumbar neural tension, elicits R/L SIJ stiffness; sitting root elicits lbp on R/L; slump test elicits neural tension into RLE/LLE    Return in about 1 week (around 4/11/2024) for Next scheduled follow up.

## 2024-04-05 ENCOUNTER — TELEMEDICINE (OUTPATIENT)
Dept: BEHAVIORAL/MENTAL HEALTH CLINIC | Facility: CLINIC | Age: 44
End: 2024-04-05

## 2024-04-05 DIAGNOSIS — F33.3 SEVERE EPISODE OF RECURRENT MAJOR DEPRESSIVE DISORDER, WITH PSYCHOTIC FEATURES (HCC): Primary | ICD-10-CM

## 2024-04-05 DIAGNOSIS — F41.1 GENERALIZED ANXIETY DISORDER: ICD-10-CM

## 2024-04-05 NOTE — PSYCH
Virtual Regular Visit    Verification of patient location:    Patient is located at Home in the following state in which I hold an active license [place]      Assessment/Plan:    Problem List Items Addressed This Visit          Behavioral Health    Severe episode of recurrent major depressive disorder, with psychotic features (HCC) - Primary    Generalized anxiety disorder        Reason for visit is   Chief Complaint   Patient presents with    Virtual Regular Visit        Encounter provider HERMELINDA ALBA    Provider located at [facility]  [facility]  [address] [place] [address] [phone]      Recent Visits  No visits were found meeting these conditions.  Showing recent visits within past 7 days and meeting all other requirements  Today's Visits  Date Type Provider Dept   04/05/24 Telemedicine HERMELINDA [facility]ba [facility] [place]   Showing today's visits and meeting all other requirements  Future Appointments  No visits were found meeting these conditions.  Showing future appointments within next 150 days and meeting all other requirements       The patient was identified by name and date of birth. Esther Griffith was informed that this is a telemedicine visit and that the visit is being conducted throughthe Epic Embedded platform. She agrees to proceed..  My office door was closed. No one else was in the room.  She acknowledged consent and understanding of privacy and security of the video platform. The patient has agreed to participate and understands they can discontinue the visit at any time.    Patient is aware this is a billable service.     HPI     Past Medical History:   Diagnosis Date    Anxiety     Blood transfusion declined because patient is [employer] 05/01/2023    Chronic pain disorder     Chronic sinusitis     Depression     Depression     Diabetes (HCC)     Fibromyalgia     Migraine      Obesity     Polyarthritis     Last assessed 9/21/2015    Psychiatric disorder     Psychiatric illness     Sleep difficulties        Past Surgical History:   Procedure Laterality Date    COLONOSCOPY  06/2019    DENTAL SURGERY      HYSTERECTOMY  05/01/2023    HYSTEROSCOPY      Endometrial Biopsy By Hysteroscopy    [place] LAPS SUPRACRV HYSTERECT 250 GM/< RMVL TUBE/OVAR N/A 05/01/2023    Procedure: (LSH) W/ BILATERAL SALPINGECTOMY, REMOVAL PARAOVARIAN CYST;  Surgeon: Sia Lopez DO;  [facility];  Service: Gynecology    REMOVAL OF INTRAUTERINE DEVICE (IUD)      US GUIDED BREAST BIOPSY RIGHT COMPLETE Right 06/17/2019    Benign       Current Outpatient Medications   Medication Sig Dispense Refill    Blood Glucose Monitoring Suppl (Contour Blood Glucose System) w/Device KIT Use 1 kit 2 (two) times a day before meals 1 kit 0    celecoxib (CeleBREX) 200 mg capsule Take 1 capsule (200 mg total) by mouth 2 (two) times a day 60 capsule 6    Contour Test test strip USE TO CHECK BLOOD SUGAR ONCE DAILY 50 strip 7    Diclofenac Sodium (VOLTAREN) 1 % Apply 2 g topically 4 (four) times a day (Patient not taking: Reported on 3/19/2024) 100 g 0    gabapentin (NEURONTIN) 300 mg capsule TAKE 1 CAPSULE (300 MG TOTAL) BY MOUTH 2 TIMES A DAY. 60 capsule 2    metFORMIN (GLUCOPHAGE) 500 mg tablet TAKE 1 TABLET BY MOUTH TWICE A DAY WITH FOOD 60 tablet 5    methocarbamol (ROBAXIN) 500 mg tablet TAKE 1 TAB. EVERY 8 HR. AS NEEDED FOR MUSCLE SPASMS 30 tablet 1    mirtazapine (REMERON) 45 MG tablet TAKE 1 TABLET (45 MG TOTAL) BY MOUTH DAILY AT BEDTIME 30 tablet 2    nortriptyline (PAMELOR) 75 MG capsule Take 1 capsule (75 mg total) by mouth 2 (two) times a day 60 capsule 2    phentermine (ADIPEX-P) 37.5 MG tablet Take 1 tablet (37.5 mg total) by mouth every morning 30 tablet 2    propranolol (INDERAL) 40 mg tablet Take 0.5 tablets (20 mg total) by mouth daily at bedtime 45 tablet 3     No current facility-administered medications for  "this visit.        Allergies   Allergen Reactions    Cannabidiol Shortness Of Breath, Itching, Swelling, Anxiety, Palpitations, Confusion, Hypertension, Throat Swelling and Tongue Swelling    Amoxicillin-Pot Clavulanate Hives    Decadrol [Dexamethasone] Other (See Comments)     psychosis    Penicillins Hives     Hives/Uticaria    Tetracyclines & Related Hives      Allergy;        Review of Systems    Video Exam    There were no vitals filed for this visit.    Physical Exam     Behavioral Health Psychotherapy Progress Note    Psychotherapy Provided: Individual Psychotherapy     1. Severe episode of recurrent major depressive disorder, with psychotic features (HCC)        2. Generalized anxiety disorder            Goals addressed in session: Goal 1     DATA: Met with Esther for follow up.  Esther shared that she has been doing some dog/cat sitting, so she just got home from time away.  She said that she has been trying to stay connected with friends, getting out for karaoke and trivia nights, in order to avoid feeling isolated and lonely.  She has plans with friends this upcoming weekend, and continues to attend weekly gatherings with friends to have something to look forward to. She noted that her mood has been \"pretty good,\" not 100% but not too down either. She has been reading more lately, enjoying the break from her own thoughts, and finding it relatively easy to concentrate on it. She also continues to write to journal her thoughts as she needs to, to help get things out and process them.  She said that this is very helpful for her.  She shared concerns about her parents and her brother, what she does to try to help them, but also that she is limited in how much she can help due to significant pain. Esther expressed concern about what will happen to her once her parents are no longer around, sharing fears that no one will want to take on the \"risk\" of caring for her should she get sick again. Discussed those " "concerns, challenged worst-case scenario thinking, and encouraged Esther to try to not predict the future or assume what others are willing to do/not do. Provided support, validation of her feelings and concerns, and used CBT and mindfulness strategies to help Esther focus on balancing her own self-care with helping her family, and prioritizing her wellness to avoid decline into significant depression.    During this session, this clinician used the following therapeutic modalities: Client-centered Therapy, Cognitive Behavioral Therapy, and Supportive Psychotherapy    Substance Abuse was not addressed during this session. If the client is diagnosed with a co-occurring substance use disorder, please indicate any changes in the frequency or amount of use: n/a. Stage of change for addressing substance use diagnoses: No substance use/Not applicable    ASSESSMENT:  Esther Griffith presents with a Euthymic/ normal mood.     her affect is Normal range and intensity, which is congruent, with her mood and the content of the session. The client has made progress on their goals.     Esther Griffith presents with a low risk of suicide, low risk of self-harm, and minimal risk of harm to others.    For any risk assessment that surpasses a \"low\" rating, a safety plan must be developed.    A safety plan was indicated: no  If yes, describe in detail n/a    PLAN: Between sessions, Esther Griffith will continue to engage with friends and in pleasurable activities, and will challenge negative, worst case scenario thinking about her future to help avoid decline in mood. At the next session, the therapist will use Client-centered Therapy, Cognitive Behavioral Therapy, and Supportive Psychotherapy to address anxiety, depression.    Behavioral Health Treatment Plan and Discharge Planning: Esther Griffith is aware of and agrees to continue to work on their treatment plan. They have identified and are working toward their discharge goals. yes    Visit " start and stop times:    04/05/24  Start Time: 0903  Stop Time: 0944  Total Visit Time: 41 minutes

## 2024-04-10 ENCOUNTER — HOSPITAL ENCOUNTER (OUTPATIENT)
Dept: RADIOLOGY | Facility: CLINIC | Age: 44
Discharge: HOME | End: 2024-04-10
Payer: COMMERCIAL

## 2024-04-10 VITALS
RESPIRATION RATE: 18 BRPM | OXYGEN SATURATION: 99 % | TEMPERATURE: 97.2 F | HEART RATE: 98 BPM | DIASTOLIC BLOOD PRESSURE: 84 MMHG | SYSTOLIC BLOOD PRESSURE: 131 MMHG

## 2024-04-10 DIAGNOSIS — M47.816 LUMBAR SPONDYLOSIS: ICD-10-CM

## 2024-04-10 PROCEDURE — 64493 INJ PARAVERT F JNT L/S 1 LEV: CPT | Performed by: ANESTHESIOLOGY

## 2024-04-10 PROCEDURE — 64494 INJ PARAVERT F JNT L/S 2 LEV: CPT | Performed by: ANESTHESIOLOGY

## 2024-04-10 RX ORDER — LIDOCAINE HYDROCHLORIDE 10 MG/ML
4 INJECTION, SOLUTION EPIDURAL; INFILTRATION; INTRACAUDAL; PERINEURAL ONCE
Status: COMPLETED | OUTPATIENT
Start: 2024-04-10 | End: 2024-04-10

## 2024-04-10 RX ADMIN — LIDOCAINE HYDROCHLORIDE 3 ML: 20 INJECTION, SOLUTION EPIDURAL; INFILTRATION; INTRACAUDAL; PERINEURAL at 09:24

## 2024-04-10 RX ADMIN — LIDOCAINE HYDROCHLORIDE 4 ML: 10 INJECTION, SOLUTION EPIDURAL; INFILTRATION; INTRACAUDAL; PERINEURAL at 09:20

## 2024-04-10 NOTE — H&P
History of Present Illness: The patient is a 44 y.o. female who presents with complaints of low back pain.      Past Medical History:   Diagnosis Date    Anxiety     Blood transfusion declined because patient is [employer] 05/01/2023    Chronic pain disorder     Chronic sinusitis     Depression     Depression     Diabetes (HCC)     Fibromyalgia     Migraine     Obesity     Polyarthritis     Last assessed 9/21/2015    Psychiatric disorder     Psychiatric illness     Sleep difficulties        Past Surgical History:   Procedure Laterality Date    COLONOSCOPY  06/2019    DENTAL SURGERY      HYSTERECTOMY  05/01/2023    HYSTEROSCOPY      Endometrial Biopsy By Hysteroscopy    CT LAPS SUPRACRV HYSTERECT 250 GM/< RMVL TUBE/OVAR N/A 05/01/2023    Procedure: (LSH) W/ BILATERAL SALPINGECTOMY, REMOVAL PARAOVARIAN CYST;  Surgeon: Sai Lopez DO;  Location[facility];  Service: Gynecology    REMOVAL OF INTRAUTERINE DEVICE (IUD)      US GUIDED BREAST BIOPSY RIGHT COMPLETE Right 06/17/2019    Benign         Current Outpatient Medications:     Blood Glucose Monitoring Suppl (Contour Blood Glucose System) w/Device KIT, Use 1 kit 2 (two) times a day before meals, Disp: 1 kit, Rfl: 0    celecoxib (CeleBREX) 200 mg capsule, Take 1 capsule (200 mg total) by mouth 2 (two) times a day, Disp: 60 capsule, Rfl: 6    Contour Test test strip, USE TO CHECK BLOOD SUGAR ONCE DAILY, Disp: 50 strip, Rfl: 7    Diclofenac Sodium (VOLTAREN) 1 %, Apply 2 g topically 4 (four) times a day (Patient not taking: Reported on 3/19/2024), Disp: 100 g, Rfl: 0    gabapentin (NEURONTIN) 300 mg capsule, TAKE 1 CAPSULE (300 MG TOTAL) BY MOUTH 2 TIMES A DAY., Disp: 60 capsule, Rfl: 2    metFORMIN (GLUCOPHAGE) 500 mg tablet, TAKE 1 TABLET BY MOUTH TWICE A DAY WITH FOOD, Disp: 60 tablet, Rfl: 5    methocarbamol (ROBAXIN) 500 mg tablet, TAKE 1 TAB. EVERY 8 HR. AS NEEDED FOR MUSCLE SPASMS, Disp: 30 tablet, Rfl: 1    mirtazapine (REMERON) 45 MG tablet, TAKE  1 TABLET (45 MG TOTAL) BY MOUTH DAILY AT BEDTIME, Disp: 30 tablet, Rfl: 2    nortriptyline (PAMELOR) 75 MG capsule, Take 1 capsule (75 mg total) by mouth 2 (two) times a day, Disp: 60 capsule, Rfl: 2    phentermine (ADIPEX-P) 37.5 MG tablet, Take 1 tablet (37.5 mg total) by mouth every morning, Disp: 30 tablet, Rfl: 2    propranolol (INDERAL) 40 mg tablet, Take 0.5 tablets (20 mg total) by mouth daily at bedtime, Disp: 45 tablet, Rfl: 3    Allergies   Allergen Reactions    Cannabidiol Shortness Of Breath, Itching, Swelling, Anxiety, Palpitations, Confusion, Hypertension, Throat Swelling and Tongue Swelling    Amoxicillin-Pot Clavulanate Hives    Decadrol [Dexamethasone] Other (See Comments)     psychosis    Penicillins Hives     Hives/Uticaria    Tetracyclines & Related Hives      Allergy;        Physical Exam:   Vitals:    04/10/24 0855   BP: 122/81   Pulse: 101   Resp: 18   Temp: (!) 97.2 °F (36.2 °C)   SpO2: 100%     General: Awake, Alert, Oriented x 3, Mood and affect appropriate  Respiratory: Respirations even and unlabored  Cardiovascular: Peripheral pulses intact; no edema  Musculoskeletal Exam: Normal gait    ASA Score: 2    Patient/Chart Verification  Patient ID Verified: Verbal  ID Band Applied: No  Consents Confirmed: Procedural, To be obtained in the Pre-Procedure area  Interval H&P(within 24 hr) Complete (required for Outpatients and Surgery Admit only): To be obtained in the Pre-Procedure area  Allergies Reviewed: Yes  Anticoag/NSAID held?: NA  Currently on antibiotics?: No  Pregnancy denied?: Yes    Assessment:   1. Lumbar spondylosis        Plan: bilateral L3-5 medial branch block

## 2024-04-10 NOTE — DISCHARGE INSTRUCTIONS

## 2024-04-11 ENCOUNTER — PATIENT MESSAGE (OUTPATIENT)
Dept: PAIN MEDICINE | Facility: CLINIC | Age: 44
End: 2024-04-11

## 2024-04-11 NOTE — PATIENT COMMUNICATION
Caller: Patient    Doctor: Gideon    Reason for call: Pt calling to make aware that Pain Diary was uploaded via "[employer] for drs review.    Call back#: [phone]

## 2024-04-12 ENCOUNTER — TELEMEDICINE (OUTPATIENT)
Dept: BEHAVIORAL/MENTAL HEALTH CLINIC | Facility: CLINIC | Age: 44
End: 2024-04-12

## 2024-04-12 DIAGNOSIS — F41.1 GENERALIZED ANXIETY DISORDER: ICD-10-CM

## 2024-04-12 DIAGNOSIS — F33.3 SEVERE EPISODE OF RECURRENT MAJOR DEPRESSIVE DISORDER, WITH PSYCHOTIC FEATURES (HCC): Primary | ICD-10-CM

## 2024-04-12 NOTE — PSYCH
Virtual Regular Visit    Verification of patient location:    Patient is located at Home in the following state in which I hold an active license [place]      Assessment/Plan:    Problem List Items Addressed This Visit          Behavioral Health    Severe episode of recurrent major depressive disorder, with psychotic features (HCC) - Primary    Generalized anxiety disorder          Reason for visit is   Chief Complaint   Patient presents with    Virtual Regular Visit      Encounter provider HERMELINDA ALBA    Provider located at [facility] [place]  [facility] [place] [id number] [phone]      Recent Visits  Date Type Provider Dept   04/05/24 Telemedicine HERMELINDA Alba [facility] [place]   Showing recent visits within past 7 days and meeting all other requirements  Today's Visits  Date Type Provider Dept   04/12/24 Telemedicine HERMELINDA [facility]ba [facility] [place]   Showing today's visits and meeting all other requirements  Future Appointments  No visits were found meeting these conditions.  Showing future appointments within next 150 days and meeting all other requirements       The patient was identified by name and date of birth. Esther Griffith was informed that this is a telemedicine visit and that the visit is being conducted throughthe Epic Embedded platform. She agrees to proceed..  My office door was closed. No one else was in the room.  She acknowledged consent and understanding of privacy and security of the video platform. The patient has agreed to participate and understands they can discontinue the visit at any time.    Patient is aware this is a billable service.     HPI     Past Medical History:   Diagnosis Date    Anxiety     Blood transfusion declined because patient is [employer] 05/01/2023    Chronic pain disorder     Chronic sinusitis     Depression      Depression     Diabetes (HCC)     Fibromyalgia     Migraine     Obesity     Polyarthritis     Last assessed 9/21/2015    Psychiatric disorder     Psychiatric illness     Sleep difficulties        Past Surgical History:   Procedure Laterality Date    COLONOSCOPY  06/2019    DENTAL SURGERY      HYSTERECTOMY  05/01/2023    HYSTEROSCOPY      Endometrial Biopsy By Hysteroscopy    [place] LAPS SUPRACRV HYSTERECT 250 GM/< RMVL TUBE/OVAR N/A 05/01/2023    Procedure: (LSH) W/ BILATERAL SALPINGECTOMY, REMOVAL PARAOVARIAN CYST;  Surgeon: Sai Lopez DO;  Location: [facility];  Service: Gynecology    REMOVAL OF INTRAUTERINE DEVICE (IUD)      US GUIDED BREAST BIOPSY RIGHT COMPLETE Right 06/17/2019    Benign       Current Outpatient Medications   Medication Sig Dispense Refill    Blood Glucose Monitoring Suppl (Contour Blood Glucose System) w/Device KIT Use 1 kit 2 (two) times a day before meals 1 kit 0    celecoxib (CeleBREX) 200 mg capsule Take 1 capsule (200 mg total) by mouth 2 (two) times a day 60 capsule 6    Contour Test test strip USE TO CHECK BLOOD SUGAR ONCE DAILY 50 strip 7    Diclofenac Sodium (VOLTAREN) 1 % Apply 2 g topically 4 (four) times a day (Patient not taking: Reported on 3/19/2024) 100 g 0    gabapentin (NEURONTIN) 300 mg capsule TAKE 1 CAPSULE (300 MG TOTAL) BY MOUTH 2 TIMES A DAY. 60 capsule 2    metFORMIN (GLUCOPHAGE) 500 mg tablet TAKE 1 TABLET BY MOUTH TWICE A DAY WITH FOOD 60 tablet 5    methocarbamol (ROBAXIN) 500 mg tablet TAKE 1 TAB. EVERY 8 HR. AS NEEDED FOR MUSCLE SPASMS 30 tablet 1    mirtazapine (REMERON) 45 MG tablet TAKE 1 TABLET (45 MG TOTAL) BY MOUTH DAILY AT BEDTIME 30 tablet 2    nortriptyline (PAMELOR) 75 MG capsule Take 1 capsule (75 mg total) by mouth 2 (two) times a day 60 capsule 2    phentermine (ADIPEX-P) 37.5 MG tablet Take 1 tablet (37.5 mg total) by mouth every morning 30 tablet 2    propranolol (INDERAL) 40 mg tablet Take 0.5 tablets (20 mg total) by mouth daily at bedtime 45  tablet 3     No current facility-administered medications for this visit.        Allergies   Allergen Reactions    Cannabidiol Shortness Of Breath, Itching, Swelling, Anxiety, Palpitations, Confusion, Hypertension, Throat Swelling and Tongue Swelling    Amoxicillin-Pot Clavulanate Hives    Decadrol [Dexamethasone] Other (See Comments)     psychosis    Penicillins Hives     Hives/Uticaria    Tetracyclines & Related Hives      Allergy;        Review of Systems    Video Exam    There were no vitals filed for this visit.    Physical Exam     Behavioral Health Psychotherapy Progress Note    Psychotherapy Provided: Individual Psychotherapy     1. Severe episode of recurrent major depressive disorder, with psychotic features (HCC)        2. Generalized anxiety disorder            Goals addressed in session: Goal 1     DATA: Met with Esther for follow up. Esther shared that she went for a nerve block this past week that was successful, so is now waiting for a second one to confirm its success.  Following that if it works, she will have an ablation to try to help with her neck/back pain.  She said that she is feeling hopeful, since the first was helpful, and hopes that she will be able to do more things and not wind up in so much pain.  She said that in the meantime, her mood has been up and down, mainly depending on how much pain she is in and how little sleep she gets (has difficulty falling asleep due to pain, but once asleep, sleeps well typically).  She reports getting more down and depressed when in more pain, and lack of sleep makes stress tolerance much lower.  She said that she has been working on allowing herself to nap or just rest instead of pushing herself to do things on days that she does not feel well, and is trying to let go of guilt about that, in order to prioritize both physical and mental wellness.  Provided support, encouragement of Esther's efforts at putting her needs first, and used mindfulness  "and CBT strategies to help her continue to reframe thoughts about her physical limitations and ability to keep up with expectations.    During this session, this clinician used the following therapeutic modalities: Client-centered Therapy, Cognitive Behavioral Therapy, and Supportive Psychotherapy    Substance Abuse was not addressed during this session. If the client is diagnosed with a co-occurring substance use disorder, please indicate any changes in the frequency or amount of use: n/a. Stage of change for addressing substance use diagnoses: No substance use/Not applicable    ASSESSMENT:  Esther Griffith presents with a Depressed mood.     her affect is Normal range and intensity, which is congruent, with her mood and the content of the session. The client has made progress on their goals.     Esther Griffith presents with a low risk of suicide, low risk of self-harm, and minimal risk of harm to others.    For any risk assessment that surpasses a \"low\" rating, a safety plan must be developed.    A safety plan was indicated: no  If yes, describe in detail n/a    PLAN: Between sessions, Esther Griffith will allow herself time to rest and care for both her physical needs and mental needs, and will use cognitive reframing tools to help ease guilt and sadness about need for down time and rest. At the next session, the therapist will use Client-centered Therapy, Cognitive Behavioral Therapy, and Supportive Psychotherapy to address depression, anxiety.    Behavioral Health Treatment Plan and Discharge Planning: Esther Griffith is aware of and agrees to continue to work on their treatment plan. They have identified and are working toward their discharge goals. yes    Visit start and stop times:    04/12/24  Start Time: 1102  Stop Time: 1139  Total Visit Time: 37 minutes  "

## 2024-04-14 ENCOUNTER — NURSE TRIAGE (OUTPATIENT)
Dept: OTHER | Facility: OTHER | Age: 44
End: 2024-04-14

## 2024-04-14 DIAGNOSIS — G89.29 CHRONIC BILATERAL LOW BACK PAIN WITHOUT SCIATICA: Primary | ICD-10-CM

## 2024-04-14 DIAGNOSIS — M54.50 CHRONIC BILATERAL LOW BACK PAIN WITHOUT SCIATICA: Primary | ICD-10-CM

## 2024-04-14 RX ORDER — METHYLPREDNISOLONE 4 MG/1
TABLET ORAL
Qty: 1 EACH | Refills: 0 | Status: SHIPPED | OUTPATIENT
Start: 2024-04-14

## 2024-04-14 NOTE — TELEPHONE ENCOUNTER
"Regarding: severe pain after nerve block  ----- Message from Lisa Hernandez sent at 4/14/2024  9:20 AM EDT -----  \" I am in severe pain. I had a nerve block on Wednesday and I woke up today in the worst pain. I have been to care now and ED and they won't treat me because I have a nerve block scheduled. I get minor relief from advil and tylenol. \"    "

## 2024-04-14 NOTE — TELEPHONE ENCOUNTER
"Reason for Disposition  • [1] SEVERE back pain (e.g., excruciating, unable to do any normal activities) AND [2] not improved 2 hours after pain medicine    Answer Assessment - Initial Assessment Questions  1. ONSET: \"When did the pain begin?\"       This morning    2. LOCATION: \"Where does it hurt?\" (upper, mid or lower back)      Lower back on both sides    3. SEVERITY: \"How bad is the pain?\"  (e.g., Scale 1-10; mild, moderate, or severe)    - MILD (1-3): doesn't interfere with normal activities     - MODERATE (4-7): interferes with normal activities or awakens from sleep     - SEVERE (8-10): excruciating pain, unable to do any normal activities       Severe: 10/10 pressure     4. PATTERN: \"Is the pain constant?\" (e.g., yes, no; constant, intermittent)       Constant     5. RADIATION: \"Does the pain shoot into your legs or elsewhere?\"      Denies    6. CAUSE:  \"What do you think is causing the back pain?\"       Hx sciatica. Pt states this pain is different and localized in back.     7. BACK OVERUSE:  \"Any recent lifting of heavy objects, strenuous work or exercise?\"      Denies    8. MEDICATIONS: \"What have you taken so far for the pain?\" (e.g., nothing, acetaminophen, NSAIDS)      Advil and Tylenol     9. NEUROLOGIC SYMPTOMS: \"Do you have any weakness, numbness, or problems with bowel/bladder control?\"      Denies    10. OTHER SYMPTOMS: \"Do you have any other symptoms?\" (e.g., fever, abdominal pain, burning with urination, blood in urine)        Denies    Protocols used: Back Pain-ADULT-AH    "

## 2024-04-14 NOTE — TELEPHONE ENCOUNTER
On call provider paged. On call provider called patient to discuss symptoms and sent medrol dose pack to pharmacy.

## 2024-04-14 NOTE — PROGRESS NOTES
Called patient to discuss symptoms. Notes axial low back pain, denies radicular sx, neuro deficits, no red flag sx.  Denies recent trauma. Advised patient that I will send medrol dose pack to pharmacy for symptom management. Advised to avoid NSAIDs while on this. Allergy to dexamethasone noted on chart, discussed with patient, notes she has not had issues with prednisone tapers in the past, will monitor for any issues with medrol dose pack and call office with questions/concerns. Advised if pain is intractable, associated with any red flag sx, to seek urgent attention at ER. Patient expresses understanding and appreciative of call.

## 2024-04-15 ENCOUNTER — TELEPHONE (OUTPATIENT)
Dept: RADIOLOGY | Facility: CLINIC | Age: 44
End: 2024-04-15

## 2024-04-15 DIAGNOSIS — M47.816 LUMBAR SPONDYLOSIS: Primary | ICD-10-CM

## 2024-04-15 DIAGNOSIS — M79.18 MYOFASCIAL PAIN: ICD-10-CM

## 2024-04-15 RX ORDER — METHOCARBAMOL 500 MG/1
TABLET, FILM COATED ORAL
Qty: 30 TABLET | Refills: 1 | Status: SHIPPED | OUTPATIENT
Start: 2024-04-15

## 2024-04-15 NOTE — TELEPHONE ENCOUNTER
Late entry- called patient and scheduled MBB#2  Reviewed all instructions by phone upon scheduling  Mailed copy to home

## 2024-04-15 NOTE — TELEPHONE ENCOUNTER
Regarding: Post-Nerve Block Pain Diary 4/10/2024  Contact: [phone]  ----- Message from Jeremy Meneses DO sent at 4/11/2024  1:27 PM EDT -----       ----- Message sent from Talia Joy RN to Esther Griffith at 4/11/2024 10:00 AM -----   Good morning Esther,    We did receive your pain diary. It will be reviewed by Dr Meneses and you will be contacted with the next steps.    Thank you,  Reva WILLIS      ----- Message -----       From:Esther Griffith       Sent:4/11/2024  9:48 AM EDT         To:Jeremy Meneses DO    Subject:Post-Nerve Block Pain Diary 4/10/2024    Attached please find my pain diary from yesterday following my procedure. Thanks & have a great day.

## 2024-04-22 ENCOUNTER — TELEPHONE (OUTPATIENT)
Age: 44
End: 2024-04-22

## 2024-04-22 ENCOUNTER — TELEPHONE (OUTPATIENT)
Dept: PSYCHIATRY | Facility: CLINIC | Age: 44
End: 2024-04-22

## 2024-04-22 DIAGNOSIS — F41.1 GENERALIZED ANXIETY DISORDER: ICD-10-CM

## 2024-04-22 DIAGNOSIS — R20.2 PARESTHESIA: ICD-10-CM

## 2024-04-22 NOTE — TELEPHONE ENCOUNTER
S/w pt, pt inquiring if anything can be prescribed other than tylenol or ibuprofen, as they do not help.  Pt was given a medrol dose pack on 4/14/24 with some relief.  Pt reports robaxin is not helping much, pt takes celebrex, gabapentin 300 at lunch and 300HS, nurse asked pt if she has though about increasing dose, pt is not opposed to doing so.  Nurse did advise pt nurse to discuss with JW and CB with recommendations.  Nurse did advise pt to alternate with ice/heat, lidocaine patches, along with OTC pain creams, and if pain gets too unbearable to report to nearest ED.  Pt verbalized understanding and appreciative of call.      DENA Please advise, TY.

## 2024-04-22 NOTE — TELEPHONE ENCOUNTER
"Nurse spoke with Esther - stated depression has been increasing, low interest in doing anything, decreased ability for ADL's. Esther has recently been on steroids from pain management but \"my depression was becoming worse before taking the steroids\".   Esther states she has been taking her medications as prescribed.    Please advise.          "

## 2024-04-22 NOTE — TELEPHONE ENCOUNTER
Writer spoke to patient in regards to the wait list for Neuropsychological testing. Patient under stood about the wait list and was added to the wait list for

## 2024-04-22 NOTE — TELEPHONE ENCOUNTER
Caller: Esther    Doctor: Gideon    Reason for call: Pt is calling to see if she can get a script for pain she is having a 4/10 level and the advil and tylenol dont seem to help p with the pain reddy     Please advise     Call back#: [phone]

## 2024-04-23 RX ORDER — GABAPENTIN 300 MG/1
300 CAPSULE ORAL 3 TIMES DAILY
Qty: 90 CAPSULE | Refills: 1 | Status: SHIPPED | OUTPATIENT
Start: 2024-04-23

## 2024-04-23 NOTE — TELEPHONE ENCOUNTER
Caller: Patient     Doctor/Office: Gideon     Call regarding :  Returning nurse call     Call was transferred to: Triage nurse

## 2024-04-23 NOTE — TELEPHONE ENCOUNTER
Nurse spoke with Esther - she thank nurse for the call and would like to do some research on Lithium and will call back.

## 2024-04-23 NOTE — TELEPHONE ENCOUNTER
S/w the patient and reviewed. She stated she has enough tablets until next week.   She will need a refill.

## 2024-04-23 NOTE — TELEPHONE ENCOUNTER
Nurse spoke with Esther  - Esther states the only side effect she has on her current medication regime is dry mouth,and would be interested in possibly adding a medication, rather than changing medications. Esther asks what Dr. Ovalle would recommend as an addition to her current meds?

## 2024-04-24 ENCOUNTER — TELEPHONE (OUTPATIENT)
Dept: RHEUMATOLOGY | Facility: CLINIC | Age: 44
End: 2024-04-24

## 2024-04-24 DIAGNOSIS — F33.9 RECURRENT MAJOR DEPRESSION RESISTANT TO TREATMENT (HCC): Primary | ICD-10-CM

## 2024-04-24 RX ORDER — LITHIUM CARBONATE 150 MG/1
150 CAPSULE ORAL 2 TIMES DAILY WITH MEALS
Qty: 60 CAPSULE | Refills: 2 | Status: SHIPPED | OUTPATIENT
Start: 2024-04-24 | End: 2024-05-08 | Stop reason: SDUPTHER

## 2024-04-24 NOTE — TELEPHONE ENCOUNTER
Dr. Sanon    Pt states does notice a difference while on the Celebrex would like to stop. Does she need to titrate off?    Next 07/02/24    Please adviseEsther [phone].

## 2024-04-24 NOTE — TELEPHONE ENCOUNTER
"Per Dr. Ovalle:  \"Rx sent and level due after first week.\"     Nurse spoke with Esther who verbalized understanding of same.   "

## 2024-04-24 NOTE — TELEPHONE ENCOUNTER
"Confirmed with Esther, she takes Celebrex daily but feels it does not help and is willing to discontinue Celebrex or switch to another medication. Per Esther, \"I don't feel the Celebrex helps me anyway.\"      "

## 2024-04-25 ENCOUNTER — PROCEDURE VISIT (OUTPATIENT)
Age: 44
End: 2024-04-25
Payer: COMMERCIAL

## 2024-04-25 ENCOUNTER — TELEMEDICINE (OUTPATIENT)
Dept: BEHAVIORAL/MENTAL HEALTH CLINIC | Facility: CLINIC | Age: 44
End: 2024-04-25
Payer: COMMERCIAL

## 2024-04-25 VITALS
HEART RATE: 109 BPM | WEIGHT: 184 LBS | BODY MASS INDEX: 30.66 KG/M2 | HEIGHT: 65 IN | OXYGEN SATURATION: 99 % | DIASTOLIC BLOOD PRESSURE: 86 MMHG | SYSTOLIC BLOOD PRESSURE: 132 MMHG

## 2024-04-25 DIAGNOSIS — M51.9 LUMBAR DISC DISORDER: Primary | ICD-10-CM

## 2024-04-25 DIAGNOSIS — M99.02 SEGMENTAL DYSFUNCTION OF THORACIC REGION: ICD-10-CM

## 2024-04-25 DIAGNOSIS — M99.03 SEGMENTAL DYSFUNCTION OF LUMBAR REGION: ICD-10-CM

## 2024-04-25 DIAGNOSIS — F41.1 GENERALIZED ANXIETY DISORDER: ICD-10-CM

## 2024-04-25 DIAGNOSIS — M62.838 MUSCLE SPASM: ICD-10-CM

## 2024-04-25 DIAGNOSIS — M54.42 CHRONIC BILATERAL LOW BACK PAIN WITH BILATERAL SCIATICA: ICD-10-CM

## 2024-04-25 DIAGNOSIS — M99.04 SEGMENTAL DYSFUNCTION OF SACRAL REGION: ICD-10-CM

## 2024-04-25 DIAGNOSIS — M54.41 CHRONIC BILATERAL LOW BACK PAIN WITH BILATERAL SCIATICA: ICD-10-CM

## 2024-04-25 DIAGNOSIS — G89.29 CHRONIC BILATERAL LOW BACK PAIN WITH BILATERAL SCIATICA: ICD-10-CM

## 2024-04-25 DIAGNOSIS — F33.3 SEVERE EPISODE OF RECURRENT MAJOR DEPRESSIVE DISORDER, WITH PSYCHOTIC FEATURES (HCC): Primary | ICD-10-CM

## 2024-04-25 PROCEDURE — 90832 PSYTX W PT 30 MINUTES: CPT | Performed by: PSYCHIATRY & NEUROLOGY

## 2024-04-25 PROCEDURE — 98941 CHIROPRACT MANJ 3-4 REGIONS: CPT | Performed by: CHIROPRACTOR

## 2024-04-25 NOTE — TELEPHONE ENCOUNTER
Left voicemail for patient stating she can just stop the medication and any questions or concerns to contact the office by calling or sending [employer] message.

## 2024-04-25 NOTE — PROGRESS NOTES
Initial date of service: 3/19/24    Diagnoses and all orders for this visit:    Lumbar disc disorder    Segmental dysfunction of sacral region    Muscle spasm    Segmental dysfunction of lumbar region    Segmental dysfunction of thoracic region    Chronic bilateral low back pain with bilateral sciatica    Pt did well with MBB, but suffered flare-up    TREATMENT: 11163  Ther-ex: IASTM to affected mm hypertonicities (discussed soreness/ecchymosis up to 36 hrs post procedure); prone on elbows, prone push-ups at shoulders, standing lumbopelvic extension, transitional mvmt education, abdominal bracing; Thoracic mobilization/manipulation: prone P-A mob, supine A-P manip; Lumbar mobilization/manipulation: extension-traction; SIJ Manipulation/Mobilization: R/L SIJ HVLA - long axis distraction    HPI  Esther Griffith is a 44 y.o. female  Chief Complaint   Patient presents with    Back Pain     Middle back pain-6  Lower back pain-6    Sciatica     Pt presents for tx for chronic back pain with sciatica. Pt has undergone PT with modest benefit; undergoing injections with eventual progression to nerve block and ablation. MRI 2/2024 demonstrates diffuse disc issues, somewhat advanced for age  4/25: pt reports she was feeling better; had responded well to mbb but suffered flare-up and was put on 6 day oral steroid taper    Back Pain  This is a chronic problem. The current episode started more than 1 year ago. The problem occurs constantly. The problem has been waxing and waning since onset. The pain is present in the gluteal, lumbar spine, sacro-iliac and thoracic spine. The quality of the pain is described as aching, burning, cramping, shooting and stabbing. Pain scale: 3-7/10. Worse during: wrose in am and evening. The symptoms are aggravated by sitting and lying down. Stiffness is present In the morning. Pertinent negatives include no bladder incontinence or bowel incontinence.     Past Medical History:   Diagnosis Date    Anxiety      Blood transfusion declined because patient is [employer] 05/01/2023    Chronic pain disorder     Chronic sinusitis     Depression     Depression     Diabetes (HCC)     Fibromyalgia     Migraine     Obesity     Polyarthritis     Last assessed 9/21/2015    Psychiatric disorder     Psychiatric illness     Sleep difficulties       Past Surgical History:   Procedure Laterality Date    COLONOSCOPY  06/2019    DENTAL SURGERY      HYSTERECTOMY  05/01/2023    HYSTEROSCOPY      Endometrial Biopsy By Hysteroscopy    VA LAPS SUPRACRV HYSTERECT 250 GM/< RMVL TUBE/OVAR N/A 05/01/2023    Procedure: (LSH) W/ BILATERAL SALPINGECTOMY, REMOVAL PARAOVARIAN CYST;  Surgeon: Sai Lopez DO;  Location: [facility];  Service: Gynecology    REMOVAL OF INTRAUTERINE DEVICE (IUD)      US GUIDED BREAST BIOPSY RIGHT COMPLETE Right 06/17/2019    Benign     The following portions of the patient's history were reviewed and updated as appropriate: allergies, past family history, past medical history, past social history, past surgical history, and problem list.  Review of Systems   Gastrointestinal:  Negative for bowel incontinence.   Genitourinary:  Negative for bladder incontinence.   Musculoskeletal:  Positive for back pain.     Physical Exam  Eyes:      Extraocular Movements: Extraocular movements intact.   Cardiovascular:      Pulses: Normal pulses.   Abdominal:      General: There is no distension.      Tenderness: There is no abdominal tenderness.   Musculoskeletal:      Thoracic back: Spasms and tenderness present. Decreased range of motion.      Lumbar back: Spasms and tenderness present. Decreased range of motion. Negative right straight leg raise test and negative left straight leg raise test.        Back:       Comments: Pnful and limited in [place], ext centralizes   Skin:     General: Skin is warm and dry.   Neurological:      Mental Status: She is alert and oriented to person, place, and time.      Cranial Nerves: Cranial  nerves 2-12 are intact.      Sensory: Sensation is intact.      Motor: Motor function is intact.      Coordination: Coordination is intact.      Gait: Gait is intact.      Deep Tendon Reflexes: Babinski sign absent on the right side. Babinski sign absent on the left side.      Reflex Scores:       Patellar reflexes are 2+ on the right side and 2+ on the left side.       Achilles reflexes are 2+ on the right side and 2+ on the left side.  Psychiatric:         Mood and Affect: Mood normal.         Behavior: Behavior normal.     SOFT TISSUE ASSESSMENT: Hypertonicity and tenderness palpated B T10-S1 erector spinae, hip flexor, glute med/min JOINT RESTRICTIONS: T10-S1 and R/L SIJ ORTHO: SI jt point tenderness: -; Анна repeated flexion peripheralizes, extension centralizes; nerissa's, iliac compression, thigh thrust elicit stiffness in R/L SIJ; prone femoral nerve stretch neg for upper lumbar neural tension, elicits R/L SIJ stiffness; sitting root elicits lbp on R/L; slump test elicits neural tension into RLE/LLE    Return in about 1 week (around 5/2/2024) for Next scheduled follow up.

## 2024-04-25 NOTE — PSYCH
Virtual Regular Visit    Verification of patient location:    Patient is located at Home in the following state in which I hold an active license [place]      Assessment/Plan:    Problem List Items Addressed This Visit          Behavioral Health    Severe episode of recurrent major depressive disorder, with psychotic features (HCC) - Primary    Generalized anxiety disorder          Reason for visit is   Chief Complaint   Patient presents with    Virtual Regular Visit       Encounter provider HERMELINDA ALBA    Provider located at [facility]  [facility]  [address] [id number] [phone]      Recent Visits  No visits were found meeting these conditions.  Showing recent visits within past 7 days and meeting all other requirements  Today's Visits  Date Type Provider Dept   04/25/24 Telemedicine HERMELINDA [facility]ba [facility] [place]   Showing today's visits and meeting all other requirements  Future Appointments  No visits were found meeting these conditions.  Showing future appointments within next 150 days and meeting all other requirements       The patient was identified by name and date of birth. Esther Griffith was informed that this is a telemedicine visit and that the visit is being conducted throughthe Epic Embedded platform. She agrees to proceed..  My office door was closed. No one else was in the room.  She acknowledged consent and understanding of privacy and security of the video platform. The patient has agreed to participate and understands they can discontinue the visit at any time.    Patient is aware this is a billable service.     HPI     Past Medical History:   Diagnosis Date    Anxiety     Blood transfusion declined because patient is Taoist 05/01/2023    Chronic pain disorder     Chronic sinusitis     Depression     Depression     Diabetes (HCC)     Fibromyalgia     Migraine      Obesity     Polyarthritis     Last assessed 9/21/2015    Psychiatric disorder     Psychiatric illness     Sleep difficulties        Past Surgical History:   Procedure Laterality Date    COLONOSCOPY  06/2019    DENTAL SURGERY      HYSTERECTOMY  05/01/2023    HYSTEROSCOPY      Endometrial Biopsy By Hysteroscopy    WA LAPS SUPRACRV HYSTERECT 250 GM/< RMVL TUBE/OVAR N/A 05/01/2023    Procedure: (LSH) W/ BILATERAL SALPINGECTOMY, REMOVAL PARAOVARIAN CYST;  Surgeon: Sai Lopez DO;  Location: [facility];  Service: Gynecology    REMOVAL OF INTRAUTERINE DEVICE (IUD)      US GUIDED BREAST BIOPSY RIGHT COMPLETE Right 06/17/2019    Benign       Current Outpatient Medications   Medication Sig Dispense Refill    Blood Glucose Monitoring Suppl (Contour Blood Glucose System) w/Device KIT Use 1 kit 2 (two) times a day before meals 1 kit 0    celecoxib (CeleBREX) 200 mg capsule Take 1 capsule (200 mg total) by mouth 2 (two) times a day 60 capsule 6    Contour Test test strip USE TO CHECK BLOOD SUGAR ONCE DAILY 50 strip 7    Diclofenac Sodium (VOLTAREN) 1 % Apply 2 g topically 4 (four) times a day (Patient not taking: Reported on 3/19/2024) 100 g 0    gabapentin (NEURONTIN) 300 mg capsule Take 1 capsule (300 mg total) by mouth 3 (three) times a day 90 capsule 1    lithium carbonate 150 mg capsule Take 1 capsule (150 mg total) by mouth 2 (two) times a day with meals 60 capsule 2    metFORMIN (GLUCOPHAGE) 500 mg tablet TAKE 1 TABLET BY MOUTH TWICE A DAY WITH FOOD 60 tablet 5    methocarbamol (ROBAXIN) 500 mg tablet TAKE 1 TAB. EVERY 8 HR. AS NEEDED FOR MUSCLE SPASMS 30 tablet 1    methylPREDNISolone 4 MG tablet therapy pack Use as directed on package 1 each 0    mirtazapine (REMERON) 45 MG tablet TAKE 1 TABLET (45 MG TOTAL) BY MOUTH DAILY AT BEDTIME 30 tablet 2    nortriptyline (PAMELOR) 75 MG capsule Take 1 capsule (75 mg total) by mouth 2 (two) times a day 60 capsule 2    phentermine (ADIPEX-P) 37.5 MG tablet Take 1 tablet  "(37.5 mg total) by mouth every morning 30 tablet 2    propranolol (INDERAL) 40 mg tablet Take 0.5 tablets (20 mg total) by mouth daily at bedtime 45 tablet 3     No current facility-administered medications for this visit.        Allergies   Allergen Reactions    Cannabidiol Shortness Of Breath, Itching, Swelling, Anxiety, Palpitations, Confusion, Hypertension, Throat Swelling and Tongue Swelling    Amoxicillin-Pot Clavulanate Hives    Decadrol [Dexamethasone] Other (See Comments)     psychosis    Penicillins Hives     Hives/Uticaria    Tetracyclines & Related Hives      Allergy;        Review of Systems    Video Exam    There were no vitals filed for this visit.    Physical Exam     Behavioral Health Psychotherapy Progress Note    Psychotherapy Provided: Individual Psychotherapy     1. Severe episode of recurrent major depressive disorder, with psychotic features (HCC)        2. Generalized anxiety disorder            Goals addressed in session: Goal 1 and Goal 3      DATA: Met with Esther for follow up. Esther shared that she has been very depressed lately, struggling with sleep, motivation, and \"dark\" thoughts at times.  She said that she has been in significant physical pain, which is adding to her depression, especially because she has not really gotten much help or relief from medical providers.  She shared that she just took a course of steroids which helped the \"agonizing\" pain she was feeling, but they are wearing off again, and she has to wait until May 7th for an epidural to help with the pain.  In the meantime, she is not able to do much at all without making the pain even worse, so she spends most of her day lying down watching tv or movies.  She noted that she did go to [employer] this past week, and was able to enjoy it, and has plans to go to a museum with some friends this coming weekend, and hopes to be able to enjoy that despite the pain.  She also shared that she is very worried about her dad who " "is not doing well physically at all, and her brother who is working a lot and helping his ex wife and not getting enough rest. She also said that she has been missing [place] and thinks about him often.  She said that she is trying to not let her thoughts get too dark, and hopes that the addition of lithium helps with her mood.  Discussed importance of self-care, allowing for rest and reaching out to positive supports, including this writer, and discussed option of referral to PHP if she feels that additional support would be beneficial.  Esther said that she would seriously consider PHP, and will let this writer know if she would like a referral.  Esther asked to end session early today due to pain and fatigue.    During this session, this clinician used the following therapeutic modalities: Client-centered Therapy, Cognitive Behavioral Therapy, and Supportive Psychotherapy    Substance Abuse was not addressed during this session. If the client is diagnosed with a co-occurring substance use disorder, please indicate any changes in the frequency or amount of use: n/a. Stage of change for addressing substance use diagnoses: No substance use/Not applicable    ASSESSMENT:  Esther Griffith presents with a Depressed mood.     her affect is Normal range and intensity, which is congruent, with her mood and the content of the session. The client has made progress on their goals.     Esther Griffith presents with a low risk of suicide, minimal risk of self-harm, and minimal risk of harm to others.    For any risk assessment that surpasses a \"low\" rating, a safety plan must be developed.    A safety plan was indicated: no  If yes, describe in detail n/a    PLAN: Between sessions, Esther Griffith will focus on self-care and rest, and will engage with positive supports to help boost mood and keep her more active. At the next session, the therapist will use Client-centered Therapy, Cognitive Behavioral Therapy, and Supportive " Psychotherapy to address depression.    Behavioral Health Treatment Plan and Discharge Planning: Esther Griffith is aware of and agrees to continue to work on their treatment plan. They have identified and are working toward their discharge goals. yes    Visit start and stop times:    04/25/24  Start Time: 1001  Stop Time: 1026  Total Visit Time: 25 minutes

## 2024-05-02 ENCOUNTER — APPOINTMENT (OUTPATIENT)
Dept: LAB | Facility: CLINIC | Age: 44
End: 2024-05-02
Payer: COMMERCIAL

## 2024-05-02 DIAGNOSIS — F33.9 RECURRENT MAJOR DEPRESSION RESISTANT TO TREATMENT (HCC): ICD-10-CM

## 2024-05-02 LAB — LITHIUM SERPL-SCNC: 0.3 MMOL/L (ref 0.6–1.2)

## 2024-05-02 PROCEDURE — 80178 ASSAY OF LITHIUM: CPT

## 2024-05-02 PROCEDURE — 36415 COLL VENOUS BLD VENIPUNCTURE: CPT

## 2024-05-04 DIAGNOSIS — M79.18 MYOFASCIAL PAIN: ICD-10-CM

## 2024-05-06 PROBLEM — T50.902A INTENTIONAL OVERDOSE (HCC): Status: RESOLVED | Noted: 2023-10-17 | Resolved: 2024-05-06

## 2024-05-06 RX ORDER — METHOCARBAMOL 500 MG/1
TABLET, FILM COATED ORAL
Qty: 30 TABLET | Refills: 1 | Status: SHIPPED | OUTPATIENT
Start: 2024-05-06

## 2024-05-07 ENCOUNTER — HOSPITAL ENCOUNTER (OUTPATIENT)
Dept: RADIOLOGY | Facility: CLINIC | Age: 44
Discharge: HOME | End: 2024-05-07
Payer: COMMERCIAL

## 2024-05-07 VITALS
HEART RATE: 90 BPM | TEMPERATURE: 97.8 F | OXYGEN SATURATION: 100 % | DIASTOLIC BLOOD PRESSURE: 71 MMHG | RESPIRATION RATE: 18 BRPM | SYSTOLIC BLOOD PRESSURE: 106 MMHG

## 2024-05-07 DIAGNOSIS — M47.816 LUMBAR SPONDYLOSIS: ICD-10-CM

## 2024-05-07 PROCEDURE — 64494 INJ PARAVERT F JNT L/S 2 LEV: CPT | Performed by: ANESTHESIOLOGY

## 2024-05-07 PROCEDURE — 64493 INJ PARAVERT F JNT L/S 1 LEV: CPT | Performed by: ANESTHESIOLOGY

## 2024-05-07 RX ORDER — BUPIVACAINE HYDROCHLORIDE 5 MG/ML
3 INJECTION, SOLUTION EPIDURAL; INTRACAUDAL ONCE
Status: COMPLETED | OUTPATIENT
Start: 2024-05-07 | End: 2024-05-07

## 2024-05-07 RX ORDER — LIDOCAINE HYDROCHLORIDE 10 MG/ML
4 INJECTION, SOLUTION EPIDURAL; INFILTRATION; INTRACAUDAL; PERINEURAL ONCE
Status: COMPLETED | OUTPATIENT
Start: 2024-05-07 | End: 2024-05-07

## 2024-05-07 RX ADMIN — LIDOCAINE HYDROCHLORIDE 4 ML: 10 INJECTION, SOLUTION EPIDURAL; INFILTRATION; INTRACAUDAL; PERINEURAL at 10:12

## 2024-05-07 RX ADMIN — BUPIVACAINE HYDROCHLORIDE 3 ML: 5 INJECTION, SOLUTION EPIDURAL; INTRACAUDAL; PERINEURAL at 10:17

## 2024-05-07 NOTE — H&P
History of Present Illness: The patient is a 44 y.o. female who presents with complaints of low back pain.    Past Medical History:   Diagnosis Date    Anxiety     Blood transfusion declined because patient is Spiritism 05/01/2023    Chronic pain disorder     Chronic sinusitis     Depression     Depression     Diabetes (HCC)     Fibromyalgia     Migraine     Obesity     Polyarthritis     Last assessed 9/21/2015    Psychiatric disorder     Psychiatric illness     Sleep difficulties        Past Surgical History:   Procedure Laterality Date    COLONOSCOPY  06/2019    DENTAL SURGERY      HYSTERECTOMY  05/01/2023    HYSTEROSCOPY      Endometrial Biopsy By Hysteroscopy    SD LAPS SUPRACRV HYSTERECT 250 GM/< RMVL TUBE/OVAR N/A 05/01/2023    Procedure: (LSH) W/ BILATERAL SALPINGECTOMY, REMOVAL PARAOVARIAN CYST;  Surgeon: Sai Lopez DO;  Location: [facility];  Service: Gynecology    REMOVAL OF INTRAUTERINE DEVICE (IUD)      US GUIDED BREAST BIOPSY RIGHT COMPLETE Right 06/17/2019    Benign         Current Outpatient Medications:     Blood Glucose Monitoring Suppl (Contour Blood Glucose System) w/Device KIT, Use 1 kit 2 (two) times a day before meals, Disp: 1 kit, Rfl: 0    celecoxib (CeleBREX) 200 mg capsule, Take 1 capsule (200 mg total) by mouth 2 (two) times a day (Patient not taking: Reported on 4/25/2024), Disp: 60 capsule, Rfl: 6    Contour Test test strip, USE TO CHECK BLOOD SUGAR ONCE DAILY, Disp: 50 strip, Rfl: 7    Diclofenac Sodium (VOLTAREN) 1 %, Apply 2 g topically 4 (four) times a day (Patient not taking: Reported on 3/19/2024), Disp: 100 g, Rfl: 0    gabapentin (NEURONTIN) 300 mg capsule, Take 1 capsule (300 mg total) by mouth 3 (three) times a day, Disp: 90 capsule, Rfl: 1    lithium carbonate 150 mg capsule, Take 1 capsule (150 mg total) by mouth 2 (two) times a day with meals, Disp: 60 capsule, Rfl: 2    metFORMIN (GLUCOPHAGE) 500 mg tablet, TAKE 1 TABLET BY MOUTH TWICE A DAY WITH FOOD, Disp:  60 tablet, Rfl: 5    methocarbamol (ROBAXIN) 500 mg tablet, TAKE 1 TAB. EVERY 8 HR. AS NEEDED FOR MUSCLE SPASMS, Disp: 30 tablet, Rfl: 1    methylPREDNISolone 4 MG tablet therapy pack, Use as directed on package (Patient not taking: Reported on 4/25/2024), Disp: 1 each, Rfl: 0    mirtazapine (REMERON) 45 MG tablet, TAKE 1 TABLET (45 MG TOTAL) BY MOUTH DAILY AT BEDTIME, Disp: 30 tablet, Rfl: 2    nortriptyline (PAMELOR) 75 MG capsule, Take 1 capsule (75 mg total) by mouth 2 (two) times a day, Disp: 60 capsule, Rfl: 2    phentermine (ADIPEX-P) 37.5 MG tablet, Take 1 tablet (37.5 mg total) by mouth every morning, Disp: 30 tablet, Rfl: 2    propranolol (INDERAL) 40 mg tablet, Take 0.5 tablets (20 mg total) by mouth daily at bedtime, Disp: 45 tablet, Rfl: 3    Allergies   Allergen Reactions    Cannabidiol Shortness Of Breath, Itching, Swelling, Anxiety, Palpitations, Confusion, Hypertension, Throat Swelling and Tongue Swelling    Amoxicillin-Pot Clavulanate Hives    Decadrol [Dexamethasone] Other (See Comments)     psychosis    Penicillins Hives     Hives/Uticaria    Tetracyclines & Related Hives      Allergy;        Physical Exam:   Vitals:    05/07/24 1000   Resp: 18   Temp: 97.8 °F (36.6 °C)     General: Awake, Alert, Oriented x 3, Mood and affect appropriate  Respiratory: Respirations even and unlabored  Cardiovascular: Peripheral pulses intact; no edema  Musculoskeletal Exam: Normal gait    ASA Score: 2         Assessment:   1. Lumbar spondylosis        Plan: bilateral L3-5 medial branch block #2

## 2024-05-07 NOTE — DISCHARGE INSTRUCTIONS

## 2024-05-08 ENCOUNTER — PATIENT MESSAGE (OUTPATIENT)
Dept: PAIN MEDICINE | Facility: CLINIC | Age: 44
End: 2024-05-08

## 2024-05-08 ENCOUNTER — TELEPHONE (OUTPATIENT)
Dept: RADIOLOGY | Facility: CLINIC | Age: 44
End: 2024-05-08

## 2024-05-08 ENCOUNTER — TELEPHONE (OUTPATIENT)
Dept: PSYCHIATRY | Facility: CLINIC | Age: 44
End: 2024-05-08

## 2024-05-08 DIAGNOSIS — F33.9 RECURRENT MAJOR DEPRESSION RESISTANT TO TREATMENT (HCC): ICD-10-CM

## 2024-05-08 DIAGNOSIS — M47.816 LUMBAR SPONDYLOSIS: Primary | ICD-10-CM

## 2024-05-08 RX ORDER — LITHIUM CARBONATE 300 MG/1
300 CAPSULE ORAL 2 TIMES DAILY WITH MEALS
Qty: 60 CAPSULE | Refills: 2 | Status: SHIPPED | OUTPATIENT
Start: 2024-05-08

## 2024-05-08 NOTE — TELEPHONE ENCOUNTER
"Nurse spoke with Esther # [phone]    Per Esther she is \"doing better this week than last week.\"     Requesting a refill of Lithium - with the dose increase, \"I will be out of [place] on Sunday\".    Asking when she should have a Lithium level done.      Please advise.   "

## 2024-05-08 NOTE — TELEPHONE ENCOUNTER
Per Dr. Ovalle:  Rx sent, labs once again a week after dose increase.     Spoke with Esther Griffith - patient is aware the prescription was refilled as requested and lab order is in My Chart.

## 2024-05-08 NOTE — TELEPHONE ENCOUNTER
Regarding: Post-Nerve Block Pain Diary 05/07/2024  Contact: [phone]  ----- Message from Jeremy Meneses DO sent at 5/8/2024 12:34 PM EDT -----       ----- Message from Esther Griffith to Jeremy Meneses DO sent at 5/8/2024  8:52 AM -----   Attached please find my pain diary from yesterday following my procedure. Thanks & have a great day.   Patient c/o right-sided abdominal pain and N/V since 5PM. Denies chest pain, SOB and fevers/chills. Hx. HTN and gastric sleeve.

## 2024-05-08 NOTE — TELEPHONE ENCOUNTER
Called spoke with patient. Scheduled procedure. Reviewed all instructions by phone upon scheduling  Mailed copy to home

## 2024-05-09 ENCOUNTER — VBI (OUTPATIENT)
Dept: ADMINISTRATIVE | Facility: OTHER | Age: 44
End: 2024-05-09

## 2024-05-10 ENCOUNTER — TELEPHONE (OUTPATIENT)
Dept: PSYCHIATRY | Facility: CLINIC | Age: 44
End: 2024-05-10

## 2024-05-10 NOTE — TELEPHONE ENCOUNTER
Patient is calling regarding cancelling an appointment.    Date/Time: 5/10/2024 10am    Reason: not feeling well    Patient was rescheduled: YES [] NO [x]  If yes, when was Patient reschedule for:     Patient requesting call back to reschedule: YES [] NO [x]

## 2024-05-13 ENCOUNTER — TELEPHONE (OUTPATIENT)
Age: 44
End: 2024-05-13

## 2024-05-13 DIAGNOSIS — G43.709 CHRONIC MIGRAINE WITHOUT AURA WITHOUT STATUS MIGRAINOSUS, NOT INTRACTABLE: ICD-10-CM

## 2024-05-13 DIAGNOSIS — E78.5 DYSLIPIDEMIA: ICD-10-CM

## 2024-05-13 DIAGNOSIS — E11.65 TYPE 2 DIABETES MELLITUS WITH HYPERGLYCEMIA, WITHOUT LONG-TERM CURRENT USE OF INSULIN (HCC): Primary | ICD-10-CM

## 2024-05-13 DIAGNOSIS — M47.816 LUMBAR SPONDYLOSIS: Primary | ICD-10-CM

## 2024-05-13 RX ORDER — METHYLPREDNISOLONE 4 MG/1
TABLET ORAL
Qty: 1 EACH | Refills: 0 | Status: SHIPPED | OUTPATIENT
Start: 2024-05-13 | End: 2024-05-21 | Stop reason: ALTCHOICE

## 2024-05-13 NOTE — TELEPHONE ENCOUNTER

## 2024-05-13 NOTE — TELEPHONE ENCOUNTER
Patient called in to see if PCP would order yearly blood work. She has an appointment 5/21/24 for annual exam. She is going to have another lab drawn 5/15/24 and would like all done at same time. Please advise.

## 2024-05-15 ENCOUNTER — APPOINTMENT (OUTPATIENT)
Dept: LAB | Facility: CLINIC | Age: 44
End: 2024-05-15
Payer: COMMERCIAL

## 2024-05-15 DIAGNOSIS — F33.9 RECURRENT MAJOR DEPRESSION RESISTANT TO TREATMENT (HCC): ICD-10-CM

## 2024-05-15 DIAGNOSIS — E11.65 TYPE 2 DIABETES MELLITUS WITH HYPERGLYCEMIA, WITHOUT LONG-TERM CURRENT USE OF INSULIN (HCC): ICD-10-CM

## 2024-05-15 DIAGNOSIS — G43.709 CHRONIC MIGRAINE WITHOUT AURA WITHOUT STATUS MIGRAINOSUS, NOT INTRACTABLE: ICD-10-CM

## 2024-05-15 DIAGNOSIS — E78.5 DYSLIPIDEMIA: ICD-10-CM

## 2024-05-15 LAB
ALBUMIN SERPL BCP-MCNC: 4.7 G/DL (ref 3.5–5)
ALP SERPL-CCNC: 49 U/L (ref 34–104)
ALT SERPL W P-5'-P-CCNC: 43 U/L (ref 7–52)
ANION GAP SERPL CALCULATED.3IONS-SCNC: 10 MMOL/L (ref 4–13)
AST SERPL W P-5'-P-CCNC: 31 U/L (ref 13–39)
BASOPHILS # BLD AUTO: 0.06 THOUSANDS/ÂΜL (ref 0–0.1)
BASOPHILS NFR BLD AUTO: 0 % (ref 0–1)
BILIRUB SERPL-MCNC: 0.37 MG/DL (ref 0.2–1)
BUN SERPL-MCNC: 14 MG/DL (ref 5–25)
CALCIUM SERPL-MCNC: 9.9 MG/DL (ref 8.4–10.2)
CHLORIDE SERPL-SCNC: 100 MMOL/L (ref 96–108)
CHOLEST SERPL-MCNC: 156 MG/DL
CO2 SERPL-SCNC: 30 MMOL/L (ref 21–32)
CREAT SERPL-MCNC: 0.88 MG/DL (ref 0.6–1.3)
CREAT UR-MCNC: 196.9 MG/DL
EOSINOPHIL # BLD AUTO: 0.04 THOUSAND/ÂΜL (ref 0–0.61)
EOSINOPHIL NFR BLD AUTO: 0 % (ref 0–6)
ERYTHROCYTE [DISTWIDTH] IN BLOOD BY AUTOMATED COUNT: 14 % (ref 11.6–15.1)
EST. AVERAGE GLUCOSE BLD GHB EST-MCNC: 126 MG/DL
GFR SERPL CREATININE-BSD FRML MDRD: 79 ML/MIN/1.73SQ M
GLUCOSE P FAST SERPL-MCNC: 124 MG/DL (ref 65–99)
HBA1C MFR BLD: 6 %
HCT VFR BLD AUTO: 41.5 % (ref 34.8–46.1)
HDLC SERPL-MCNC: 39 MG/DL
HGB BLD-MCNC: 13.8 G/DL (ref 11.5–15.4)
IMM GRANULOCYTES # BLD AUTO: 0.12 THOUSAND/UL (ref 0–0.2)
IMM GRANULOCYTES NFR BLD AUTO: 1 % (ref 0–2)
LDLC SERPL CALC-MCNC: 89 MG/DL (ref 0–100)
LITHIUM SERPL-SCNC: 0.62 MMOL/L (ref 0.6–1.2)
LYMPHOCYTES # BLD AUTO: 4.67 THOUSANDS/ÂΜL (ref 0.6–4.47)
LYMPHOCYTES NFR BLD AUTO: 29 % (ref 14–44)
MCH RBC QN AUTO: 31.8 PG (ref 26.8–34.3)
MCHC RBC AUTO-ENTMCNC: 33.3 G/DL (ref 31.4–37.4)
MCV RBC AUTO: 96 FL (ref 82–98)
MICROALBUMIN UR-MCNC: 9.4 MG/L
MICROALBUMIN/CREAT 24H UR: 5 MG/G CREATININE (ref 0–30)
MONOCYTES # BLD AUTO: 0.67 THOUSAND/ÂΜL (ref 0.17–1.22)
MONOCYTES NFR BLD AUTO: 4 % (ref 4–12)
NEUTROPHILS # BLD AUTO: 10.5 THOUSANDS/ÂΜL (ref 1.85–7.62)
NEUTS SEG NFR BLD AUTO: 66 % (ref 43–75)
NONHDLC SERPL-MCNC: 117 MG/DL
NRBC BLD AUTO-RTO: 0 /100 WBCS
PLATELET # BLD AUTO: 296 THOUSANDS/UL (ref 149–390)
PMV BLD AUTO: 9.1 FL (ref 8.9–12.7)
POTASSIUM SERPL-SCNC: 3.7 MMOL/L (ref 3.5–5.3)
PROT SERPL-MCNC: 7 G/DL (ref 6.4–8.4)
RBC # BLD AUTO: 4.34 MILLION/UL (ref 3.81–5.12)
SODIUM SERPL-SCNC: 140 MMOL/L (ref 135–147)
TRIGL SERPL-MCNC: 142 MG/DL
WBC # BLD AUTO: 16.06 THOUSAND/UL (ref 4.31–10.16)

## 2024-05-15 PROCEDURE — 83036 HEMOGLOBIN GLYCOSYLATED A1C: CPT

## 2024-05-15 PROCEDURE — 36415 COLL VENOUS BLD VENIPUNCTURE: CPT

## 2024-05-15 PROCEDURE — 80053 COMPREHEN METABOLIC PANEL: CPT

## 2024-05-15 PROCEDURE — 82570 ASSAY OF URINE CREATININE: CPT

## 2024-05-15 PROCEDURE — 85025 COMPLETE CBC W/AUTO DIFF WBC: CPT

## 2024-05-15 PROCEDURE — 80178 ASSAY OF LITHIUM: CPT

## 2024-05-15 PROCEDURE — 80061 LIPID PANEL: CPT

## 2024-05-15 PROCEDURE — 82043 UR ALBUMIN QUANTITATIVE: CPT

## 2024-05-21 ENCOUNTER — OFFICE VISIT (OUTPATIENT)
Dept: FAMILY MEDICINE CLINIC | Facility: CLINIC | Age: 44
End: 2024-05-21
Payer: COMMERCIAL

## 2024-05-21 VITALS
HEIGHT: 61 IN | BODY MASS INDEX: 35.61 KG/M2 | TEMPERATURE: 98 F | DIASTOLIC BLOOD PRESSURE: 80 MMHG | RESPIRATION RATE: 18 BRPM | OXYGEN SATURATION: 99 % | WEIGHT: 188.6 LBS | HEART RATE: 97 BPM | SYSTOLIC BLOOD PRESSURE: 128 MMHG

## 2024-05-21 DIAGNOSIS — E78.5 DYSLIPIDEMIA: ICD-10-CM

## 2024-05-21 DIAGNOSIS — M54.50 CHRONIC BILATERAL LOW BACK PAIN, UNSPECIFIED WHETHER SCIATICA PRESENT: ICD-10-CM

## 2024-05-21 DIAGNOSIS — F33.3 SEVERE EPISODE OF RECURRENT MAJOR DEPRESSIVE DISORDER, WITH PSYCHOTIC FEATURES (HCC): ICD-10-CM

## 2024-05-21 DIAGNOSIS — G89.29 CHRONIC BILATERAL LOW BACK PAIN, UNSPECIFIED WHETHER SCIATICA PRESENT: ICD-10-CM

## 2024-05-21 DIAGNOSIS — E11.65 TYPE 2 DIABETES MELLITUS WITH HYPERGLYCEMIA, WITHOUT LONG-TERM CURRENT USE OF INSULIN (HCC): Primary | ICD-10-CM

## 2024-05-21 DIAGNOSIS — F41.1 GENERALIZED ANXIETY DISORDER: ICD-10-CM

## 2024-05-21 DIAGNOSIS — G43.709 CHRONIC MIGRAINE WITHOUT AURA WITHOUT STATUS MIGRAINOSUS, NOT INTRACTABLE: ICD-10-CM

## 2024-05-21 DIAGNOSIS — E11.42 DIABETIC POLYNEUROPATHY ASSOCIATED WITH TYPE 2 DIABETES MELLITUS (HCC): ICD-10-CM

## 2024-05-21 DIAGNOSIS — D72.829 LEUKOCYTOSIS, UNSPECIFIED TYPE: ICD-10-CM

## 2024-05-21 PROBLEM — R79.89 ELEVATED TSH: Status: RESOLVED | Noted: 2019-04-05 | Resolved: 2024-05-21

## 2024-05-21 PROBLEM — R19.5 LOOSE STOOLS: Status: RESOLVED | Noted: 2019-04-11 | Resolved: 2024-05-21

## 2024-05-21 PROCEDURE — 99214 OFFICE O/P EST MOD 30 MIN: CPT | Performed by: FAMILY MEDICINE

## 2024-05-21 RX ORDER — VITAMIN B COMPLEX
1000 TABLET ORAL DAILY
COMMUNITY

## 2024-05-21 RX ORDER — FERROUS SULFATE 325(65) MG
325 TABLET ORAL
COMMUNITY

## 2024-05-21 RX ORDER — VITAMIN B COMPLEX
1 CAPSULE ORAL DAILY
COMMUNITY

## 2024-05-21 NOTE — PROGRESS NOTES
Ambulatory Visit  Name: Esther Griffith      : 1980      MRN: [id number]  Encounter Provider: Rosa Lutz MD  Encounter Date: 2024   Encounter department: [facility]    Assessment & Plan   1. Type 2 diabetes mellitus with hyperglycemia, without long-term current use of insulin (HCC)  Assessment & Plan:    Lab Results   Component Value Date    HGBA1C 6.0 (H) 05/15/2024     Blood sugar control improved.    Continue Metformin 500 mg 1 tablet twice daily with meals.    Schedule follow-up visit with ophthalmology.    Recommended to establish a new podiatrist.  Orders:  -     Ambulatory Referral to Ophthalmology; Future  2. Diabetic polyneuropathy associated with type 2 diabetes mellitus (HCC)  Assessment & Plan:    Lab Results   Component Value Date    HGBA1C 6.0 (H) 05/15/2024     Patient takes Gabapentin 300 mg 3 times daily.    Referred to podiatry Dr. Werner.    Orders:  -     Ambulatory Referral to Podiatry; Future  3. Dyslipidemia  Assessment & Plan:  Lipid panel improved.  LDL 89 - at goal.    Patient does not take statin.  Continue to follow a low-cholesterol, low-fat diet .  Increase exercise.  4. Generalized anxiety disorder  Assessment & Plan:  Patient is in good spirits today.    Continue medical therapy as per psychiatry Dr. Ovalle.    Continue psychotherapy.  5. Severe episode of recurrent major depressive disorder, with psychotic features ([facility])  Assessment & Plan:  Patient takes  Nortriptyline 75 mg twice daily, Remeron 45 mg at bedtime.    Started on  Lithium 3 weeks ago as per psychiatry Dr. Ovalle.  Reports improvement in mood.   6. Chronic bilateral low back pain, unspecified whether sciatica present  Assessment & Plan:  Continue Gabapentin, Methocarbamol PRN.  Follow-up with pain management Dr. Meneses.  7. Chronic migraine without aura without status migrainosus, not intractable  Assessment & Plan:  Patient reports improvement in migraine headaches.     Followed by [facility] neurology.    Continue Inderal 20 mg daily.  Continue magnesium and Vit B supplements.    Stay well-hydrated.  Avoid migraine triggers.  8. Leukocytosis, unspecified type  -     CBC and differential; Future; Expected date: 07/18/2024      Depression Screening and Follow-up Plan: Patient's depression screening was positive with a PHQ-9 score of 16. Continue regular follow-up with their mental health provider who is managing their mental health condition(s).       Schedule follow-up visit, physical exam in 6 months.      History of Present Illness     HPI    Patient presents for 6-month follow-up visit.    PMHx:  Type 2 DM, peripheral neuropathy, Dyslipidemia, Obesity, migraine  headaches, Fibromyalgia, CAROL, Depression, chronic low back pain, degenerative spondylosis, OA, Vit D deficiency.     Reviewed current medications, blood test results from 5/15/2024.    WBC 16.06 thousand.  Blood sugar 124, creatinine 0.88, potassium 3.7, LFTs-within normal range.  Cholesterol 156, triglycerides 142, HDL 39, LDL 89.    Hb A1C 6.0    Type 2 DM with diabetic neuropathy - patient takes Metformin 500 mg 1 tablet twice daily with meals.    Denies hypoglycemic episodes.      She is due for diabetic eye exam.      Patient states that podiatrist that she has been following previously closed his practice.    C/o numbness, burning pain in both feet.      Chronic low back pain - patient takes Gabapentin 300 mg 3 times daily as per pain management Dr. Meneses.      CAROL/ Depression - patient is in good spirits today.    Followed by psychiatry Dr. Ovalle.  Started on Lithium 3 weeks ago with improvement in symptoms.    Migraine headaches - followed by [facility] neurology.    Reports no frequent migraine headaches.    Patient had normal mammogram in January 2024.      Colonoscopy performed by Dr. Garcia in March 2023, 1 polyp was removed.    Dr. Garcia recommended to repeat colonoscopy in 10 years.      Denies family  history of colon cancer, breast cancer.      Patient is S/P hysterectomy for endometrial hyperplasia performed by gynecology Dr. Lopez in May 2023.    Pathology was negative for malignancy.      C/o hot flashes, feeling hot.  Follow-up visit scheduled with gynecology on 5/23/24.      Review of Systems   Constitutional:  Positive for fatigue. Negative for activity change, appetite change, chills and fever.   HENT:  Negative for congestion, ear pain, sore throat and trouble swallowing.    Eyes:  Negative for pain, discharge, redness, itching and visual disturbance.   Respiratory:  Negative for cough, chest tightness, shortness of breath and wheezing.    Cardiovascular:  Negative for chest pain, palpitations and leg swelling.   Gastrointestinal:  Positive for constipation. Negative for abdominal pain, blood in stool, diarrhea, nausea and vomiting.   Genitourinary:  Negative for difficulty urinating, dysuria and hematuria.   Musculoskeletal:  Positive for arthralgias and back pain (chronic). Negative for gait problem and joint swelling.   Skin:  Negative for rash.   Neurological:  Negative for dizziness, syncope and headaches.        Numbness, burning pain in feet   Hematological: Negative.    Psychiatric/Behavioral:  Positive for sleep disturbance. Negative for suicidal ideas.         Anxiety / Depression - patient reports improvement in symptoms     Past Medical History:   Diagnosis Date    Anxiety     Blood transfusion declined because patient is [employer] 05/01/2023    Chronic pain disorder     Chronic sinusitis     Depression     Depression     Diabetes (HCC)     Fibromyalgia     Migraine     Obesity     Polyarthritis     Last assessed 9/21/2015    Psychiatric disorder     Psychiatric illness     Sleep difficulties      Past Surgical History:   Procedure Laterality Date    COLONOSCOPY  06/2019    DENTAL SURGERY      HYSTERECTOMY  05/01/2023    HYSTEROSCOPY      Endometrial Biopsy By Hysteroscopy    BARBIE  LAPS SUPRACRV HYSTERECT 250 GM/< RMVL TUBE/OVAR N/A 05/01/2023    Procedure: (LSH) W/ BILATERAL SALPINGECTOMY, REMOVAL PARAOVARIAN CYST;  Surgeon: Sai Lopez DO;  Location: [facility];  Service: Gynecology    REMOVAL OF INTRAUTERINE DEVICE (IUD)      US GUIDED BREAST BIOPSY RIGHT COMPLETE Right 06/17/2019    Benign     Family History   Problem Relation Age of Onset    Irregular heart beat Mother     Diabetes Father     Kidney disease Father     Diabetes Maternal Grandmother     Rheum arthritis Maternal Grandmother     Melanoma Maternal Grandmother 84    No Known Problems Maternal Grandfather     Kidney disease Paternal Grandmother     Rheum arthritis Paternal Grandmother     Depression Paternal Grandmother     Diabetes Paternal Grandfather     Lung cancer Paternal Grandfather 47    Substance Abuse Brother     No Known Problems Brother     Substance Abuse Maternal Uncle     Prostate cancer Maternal Uncle 60    Depression Paternal Aunt     Alcohol abuse Family     Stomach cancer Family      Social History     Tobacco Use    Smoking status: Never     Passive exposure: Never    Smokeless tobacco: Never    Tobacco comments:     N/A, non-smoker.   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 daily    Contour Test test strip USE TO CHECK BLOOD SUGAR ONCE DAILY    ferrous sulfate 325 (65 Fe) mg tablet Take 325 mg by mouth Take 1 tab. 3 times per week    gabapentin (NEURONTIN) 300 mg capsule Take 1 capsule  (300 mg total) by mouth 3 (three) times a day    lithium carbonate 300 mg capsule Take 1 capsule (300 mg total) by mouth 2 (two) times a day with meals    MAGNESIUM MALATE PO Take by mouth Take 1 tab. daily    metFORMIN (GLUCOPHAGE) 500 mg tablet TAKE 1 TABLET BY MOUTH TWICE A DAY WITH FOOD    methocarbamol (ROBAXIN) 500 mg tablet TAKE 1 TAB. EVERY 8 HR. AS NEEDED FOR MUSCLE SPASMS    mirtazapine (REMERON) 45 MG tablet TAKE 1 TABLET (45 MG TOTAL) BY MOUTH DAILY AT BEDTIME    nortriptyline (PAMELOR) 75 MG capsule Take 1 capsule (75 mg total) by mouth 2 (two) times a day    propranolol (INDERAL) 40 mg tablet Take 0.5 tablets (20 mg total) by mouth daily at bedtime    [DISCONTINUED] methylPREDNISolone 4 MG tablet therapy pack Use as directed on package (Patient not taking: Reported on 4/25/2024)    [DISCONTINUED] methylPREDNISolone 4 MG tablet therapy pack Use as directed on package    [DISCONTINUED] phentermine (ADIPEX-P) 37.5 MG tablet Take 1 tablet (37.5 mg total) by mouth every morning     Allergies   Allergen Reactions    Cannabidiol Shortness Of Breath, Itching, Swelling, Anxiety, Palpitations, Confusion, Hypertension, Throat Swelling and Tongue Swelling    Amoxicillin-Pot Clavulanate Hives    Decadrol [Dexamethasone] Other (See Comments)     psychosis    Penicillins Hives     Hives/Uticaria    Tetracyclines & Related Hives      Allergy;      Immunization History   Administered Date(s) Administered    COVID-19 MODERNA VACC 0.5 ML IM 03/31/2021, 04/28/2021, 01/19/2022    COVID-19 Pfizer Vac BIVALENT Grady-sucrose 12 Yr+ IM 11/26/2022    INFLUENZA 09/28/2009    Influenza Quadrivalent, 6-35 Months IM 10/13/2015, 10/04/2017    Influenza, injectable, quadrivalent, preservative free 0.5 mL 03/26/2019, 12/11/2019, 10/23/2020, 11/07/2023    Influenza, seasonal, injectable 03/04/2014    Tdap 09/29/2009, 08/16/2022     Objective     /80 (BP Location: Left arm, Patient Position: Sitting, Cuff Size: Standard)   Pulse  "97   Temp 98 °F (36.7 °C) (Tympanic)   Resp 18   Ht 5' 1\" (1.549 m)   Wt 85.5 kg (188 lb 9.6 oz)   LMP 03/13/2023 (Approximate)   SpO2 99%   BMI 35.64 kg/m²     Physical Exam  Vitals and nursing note reviewed.   Constitutional:       Appearance: Normal appearance. She is obese.   HENT:      Head: Normocephalic and atraumatic.   Eyes:      Conjunctiva/sclera: Conjunctivae normal.      Pupils: Pupils are equal, round, and reactive to light.   Neck:      Vascular: No carotid bruit.   Cardiovascular:      Rate and Rhythm: Regular rhythm. Tachycardia present.      Heart sounds: No murmur heard.  Pulmonary:      Effort: Pulmonary effort is normal.      Breath sounds: Normal breath sounds.   Abdominal:      General: Bowel sounds are normal. There is no distension.      Palpations: Abdomen is soft.      Tenderness: There is no abdominal tenderness.   Musculoskeletal:         General: No swelling.      Cervical back: Normal range of motion and neck supple.      Right lower leg: No edema.      Left lower leg: No edema.   Skin:     General: Skin is warm and dry.      Findings: No rash.   Neurological:      General: No focal deficit present.      Mental Status: She is alert.   Psychiatric:         Mood and Affect: Mood normal.       Administrative Statements         "

## 2024-05-22 NOTE — ASSESSMENT & PLAN NOTE
Patient reports improvement in migraine headaches.    Followed by [facility] neurology.    Continue Inderal 20 mg daily.  Continue magnesium and Vit B supplements.    Stay well-hydrated.  Avoid migraine triggers.

## 2024-05-22 NOTE — ASSESSMENT & PLAN NOTE
Lab Results   Component Value Date    HGBA1C 6.0 (H) 05/15/2024     Blood sugar control improved.    Continue Metformin 500 mg 1 tablet twice daily with meals.    Schedule follow-up visit with ophthalmology.    Recommended to establish a new podiatrist.

## 2024-05-22 NOTE — ASSESSMENT & PLAN NOTE
Patient is in good spirits today.    Continue medical therapy as per psychiatry Dr. Ovalle.    Continue psychotherapy.

## 2024-05-22 NOTE — ASSESSMENT & PLAN NOTE
Lipid panel improved.  LDL 89 - at goal.    Patient does not take statin.  Continue to follow a low-cholesterol, low-fat diet .  Increase exercise.

## 2024-05-22 NOTE — ASSESSMENT & PLAN NOTE
Patient takes  Nortriptyline 75 mg twice daily, Remeron 45 mg at bedtime.    Started on  Lithium 3 weeks ago as per psychiatry Dr. Ovalle.  Reports improvement in mood.

## 2024-05-22 NOTE — ASSESSMENT & PLAN NOTE
Lab Results   Component Value Date    HGBA1C 6.0 (H) 05/15/2024     Patient takes Gabapentin 300 mg 3 times daily.    Referred to podiatry Dr. Werner.

## 2024-05-24 ENCOUNTER — TELEMEDICINE (OUTPATIENT)
Dept: BEHAVIORAL/MENTAL HEALTH CLINIC | Facility: CLINIC | Age: 44
End: 2024-05-24

## 2024-05-24 DIAGNOSIS — F41.1 GENERALIZED ANXIETY DISORDER: ICD-10-CM

## 2024-05-24 DIAGNOSIS — F33.3 SEVERE EPISODE OF RECURRENT MAJOR DEPRESSIVE DISORDER, WITH PSYCHOTIC FEATURES (HCC): Primary | ICD-10-CM

## 2024-05-24 NOTE — PSYCH
Virtual Regular Visit    Verification of patient location:    Patient is located at Home in the following state in which I hold an active license [place]      Assessment/Plan:    Problem List Items Addressed This Visit          Behavioral Health    Severe episode of recurrent major depressive disorder, with psychotic features (HCC) - Primary    Generalized anxiety disorder          Reason for visit is   Chief Complaint   Patient presents with    Virtual Regular Visit       Encounter provider HERMELINDA ALBA      Recent Visits  Date Type Provider Dept   05/21/24 Office Visit Rosa Lutz MD [facility]   Showing recent visits within past 7 days and meeting all other requirements  Today's Visits  Date Type Provider Dept   05/24/24 Telemedicine [facility]ELINDA Al[facility] [place]   Showing today's visits and meeting all other requirements  Future Appointments  No visits were found meeting these conditions.  Showing future appointments within next 150 days and meeting all other requirements       The patient was identified by name and date of birth. Esther Griffith was informed that this is a telemedicine visit and that the visit is being conducted throughthe Epic Embedded platform. She agrees to proceed..  My office door was closed. No one else was in the room.  She acknowledged consent and understanding of privacy and security of the video platform. The patient has agreed to participate and understands they can discontinue the visit at any time.    Patient is aware this is a billable service.     HPI     Past Medical History:   Diagnosis Date    Anxiety     Blood transfusion declined because patient is [employer] 05/01/2023    Chronic pain disorder     Chronic sinusitis     Depression     Depression     Diabetes (HCC)     Fibromyalgia     Migraine     Obesity     Polyarthritis     Last assessed 9/21/2015    Psychiatric disorder     Psychiatric illness     Sleep  difficulties        Past Surgical History:   Procedure Laterality Date    COLONOSCOPY  06/2019    DENTAL SURGERY      HYSTERECTOMY  05/01/2023    HYSTEROSCOPY      Endometrial Biopsy By Hysteroscopy    MI LAPS SUPRACRV HYSTERECT 250 GM/< RMVL TUBE/OVAR N/A 05/01/2023    Procedure: (LSH) W/ BILATERAL SALPINGECTOMY, REMOVAL PARAOVARIAN CYST;  Surgeon: Sai Lopez DO;  Location: [facility];  Service: Gynecology    REMOVAL OF INTRAUTERINE DEVICE (IUD)      US GUIDED BREAST BIOPSY RIGHT COMPLETE Right 06/17/2019    Benign       Current Outpatient Medications   Medication Sig Dispense Refill    b complex vitamins capsule Take 1 capsule by mouth daily      Blood Glucose Monitoring Suppl (Contour Blood Glucose System) w/Device KIT Use 1 kit 2 (two) times a day before meals 1 kit 0    cholecalciferol (VITAMIN D3) 25 mcg (1,000 units) tablet Take 1,000 Units by mouth daily Take 1 tab. daily      Contour Test test strip USE TO CHECK BLOOD SUGAR ONCE DAILY 50 strip 7    ferrous sulfate 325 (65 Fe) mg tablet Take 325 mg by mouth Take 1 tab. 3 times per week      gabapentin (NEURONTIN) 300 mg capsule Take 1 capsule (300 mg total) by mouth 3 (three) times a day 90 capsule 1    lithium carbonate 300 mg capsule Take 1 capsule (300 mg total) by mouth 2 (two) times a day with meals 60 capsule 2    MAGNESIUM MALATE PO Take by mouth Take 1 tab. daily      metFORMIN (GLUCOPHAGE) 500 mg tablet TAKE 1 TABLET BY MOUTH TWICE A DAY WITH FOOD 60 tablet 5    methocarbamol (ROBAXIN) 500 mg tablet TAKE 1 TAB. EVERY 8 HR. AS NEEDED FOR MUSCLE SPASMS 30 tablet 1    mirtazapine (REMERON) 45 MG tablet TAKE 1 TABLET (45 MG TOTAL) BY MOUTH DAILY AT BEDTIME 30 tablet 2    nortriptyline (PAMELOR) 75 MG capsule Take 1 capsule (75 mg total) by mouth 2 (two) times a day 60 capsule 2    propranolol (INDERAL) 40 mg tablet Take 0.5 tablets (20 mg total) by mouth daily at bedtime 45 tablet 3     No current facility-administered medications for this  "visit.        Allergies   Allergen Reactions    Cannabidiol Shortness Of Breath, Itching, Swelling, Anxiety, Palpitations, Confusion, Hypertension, Throat Swelling and Tongue Swelling    Amoxicillin-Pot Clavulanate Hives    Decadrol [Dexamethasone] Other (See Comments)     psychosis    Penicillins Hives     Hives/Uticaria    Tetracyclines & Related Hives      Allergy;        Review of Systems    Video Exam    There were no vitals filed for this visit.    Physical Exam     Behavioral Health Psychotherapy Progress Note    Psychotherapy Provided: Individual Psychotherapy     1. Severe episode of recurrent major depressive disorder, with psychotic features (HCC)        2. Generalized anxiety disorder            Goals addressed in session: Goal 1     DATA: Met with Esther for follow up.  Esther shared that she has been feeling better lately when she is doing something fun or purposeful, so she has decided that she is going to fill her days with those types of activities (concerts, karaoke, support group of friends that knew [place]).  She noted that her mood has been \"iffy,\" with some down days but also some moments of feeling good, particularly when she is out with friends.  She said that she got a second nerve block that helps relieve her pain, so she is moving forward with two ablations, which she now has hope that she will have more quality of life and better mobility.  She said that she has been missing [place] a lot lately and feels guilty about not reaching out to his mother and sister before now. She said she plans to send them both cards to let them know she is thinking of them before next Friday, giving herself a goal/timeline to challenge herself to achieve that task that she has wanted to do for a long time. She also shared that her attitude lately has been \"it is what it is,\" with more acceptance of circumstances Provided support, validation of Esther's progress, and used supportive and CBT strategies to " "help Esther focus on positive progress, strengths and goals to boost mood.  Esther requested to end session early due to just arriving at [facility].    During this session, this clinician used the following therapeutic modalities: Client-centered Therapy, Cognitive Behavioral Therapy, and Supportive Psychotherapy    Substance Abuse was not addressed during this session. If the client is diagnosed with a co-occurring substance use disorder, please indicate any changes in the frequency or amount of use: n/a. Stage of change for addressing substance use diagnoses: No substance use/Not applicable    ASSESSMENT:  Esther Griffith presents with a Euthymic/ normal mood.     her affect is Normal range and intensity, which is congruent, with her mood and the content of the session. The client has made progress on their goals.     Esther Griffith presents with a low risk of suicide, minimal risk of self-harm, and minimal risk of harm to others.    For any risk assessment that surpasses a \"low\" rating, a safety plan must be developed.    A safety plan was indicated: no  If yes, describe in detail n/a    PLAN: Between sessions, Esther Griffith will focus on strengths, positive goals and coping strategies that help her feel happier and more satisfied with her life. At the next session, the therapist will use Client-centered Therapy, Cognitive Behavioral Therapy, and Supportive Psychotherapy to address depression, anxiety.    Behavioral Health Treatment Plan and Discharge Planning: Esther Griffith is aware of and agrees to continue to work on their treatment plan. They have identified and are working toward their discharge goals. yes    Visit start and stop times:    05/24/24  Start Time: 1013  Stop Time: 1037  Total Visit Time: 24 minutes    "

## 2024-05-28 ENCOUNTER — HOSPITAL ENCOUNTER (OUTPATIENT)
Dept: RADIOLOGY | Facility: CLINIC | Age: 44
Discharge: HOME | End: 2024-05-28
Payer: COMMERCIAL

## 2024-05-28 ENCOUNTER — TELEPHONE (OUTPATIENT)
Dept: PAIN MEDICINE | Facility: CLINIC | Age: 44
End: 2024-05-28

## 2024-05-28 VITALS
OXYGEN SATURATION: 97 % | HEART RATE: 88 BPM | RESPIRATION RATE: 18 BRPM | DIASTOLIC BLOOD PRESSURE: 70 MMHG | SYSTOLIC BLOOD PRESSURE: 114 MMHG | TEMPERATURE: 96.9 F

## 2024-05-28 DIAGNOSIS — M47.816 LUMBAR SPONDYLOSIS: ICD-10-CM

## 2024-05-28 PROCEDURE — 64635 DESTROY LUMB/SAC FACET JNT: CPT | Performed by: ANESTHESIOLOGY

## 2024-05-28 PROCEDURE — 64636 DESTROY L/S FACET JNT ADDL: CPT | Performed by: ANESTHESIOLOGY

## 2024-05-28 RX ORDER — BUPIVACAINE HYDROCHLORIDE 5 MG/ML
3 INJECTION, SOLUTION EPIDURAL; INTRACAUDAL ONCE
Status: COMPLETED | OUTPATIENT
Start: 2024-05-28 | End: 2024-05-28

## 2024-05-28 RX ORDER — LIDOCAINE HYDROCHLORIDE 10 MG/ML
8 INJECTION, SOLUTION EPIDURAL; INFILTRATION; INTRACAUDAL; PERINEURAL ONCE
Status: COMPLETED | OUTPATIENT
Start: 2024-05-28 | End: 2024-05-28

## 2024-05-28 RX ADMIN — LIDOCAINE HYDROCHLORIDE 8 ML: 10 INJECTION, SOLUTION EPIDURAL; INFILTRATION; INTRACAUDAL; PERINEURAL at 08:18

## 2024-05-28 RX ADMIN — BUPIVACAINE HYDROCHLORIDE 3 ML: 5 INJECTION, SOLUTION EPIDURAL; INTRACAUDAL; PERINEURAL at 08:23

## 2024-05-28 RX ADMIN — LIDOCAINE HYDROCHLORIDE 3 ML: 20 INJECTION, SOLUTION EPIDURAL; INFILTRATION; INTRACAUDAL; PERINEURAL at 08:23

## 2024-05-28 NOTE — DISCHARGE INSTRUCTIONS

## 2024-05-28 NOTE — TELEPHONE ENCOUNTER
Patient is S/P a Left L3-L5 RFA c/ JW on 5/28/24.  Next RFA scheduled for 6/12/24.  Please call on 5/29/24 post RFA. Thanks

## 2024-05-29 NOTE — TELEPHONE ENCOUNTER
Caller: roselia Santana    Doctor: Gideon    Reason for call: pt returning nurses call    Call back#: [phone]

## 2024-05-29 NOTE — TELEPHONE ENCOUNTER
S/W pt. Pt stated current pain level is 3/10.  Pt stated needle sites look good, denies S&S of infection, denies fevers, denies soreness and denies sun burn like sensation.  Advised pt if she does get pain to take her prescribed or OTC pain medications and/or use ice/heat and that it takes 4 to 6 weeks to see the full effect.  Confirmed next appt w/ pt.  Pt verbalized understanding.

## 2024-06-03 ENCOUNTER — TELEMEDICINE (OUTPATIENT)
Dept: PSYCHIATRY | Facility: CLINIC | Age: 44
End: 2024-06-03
Payer: COMMERCIAL

## 2024-06-03 ENCOUNTER — TELEPHONE (OUTPATIENT)
Dept: PSYCHIATRY | Facility: CLINIC | Age: 44
End: 2024-06-03

## 2024-06-03 DIAGNOSIS — F33.3 SEVERE EPISODE OF RECURRENT MAJOR DEPRESSIVE DISORDER, WITH PSYCHOTIC FEATURES (HCC): Primary | ICD-10-CM

## 2024-06-03 DIAGNOSIS — M79.7 FIBROMYALGIA: ICD-10-CM

## 2024-06-03 DIAGNOSIS — F41.1 GENERALIZED ANXIETY DISORDER: ICD-10-CM

## 2024-06-03 DIAGNOSIS — F33.1 MAJOR DEPRESSIVE DISORDER, RECURRENT EPISODE, MODERATE (HCC): ICD-10-CM

## 2024-06-03 DIAGNOSIS — F32.A DEPRESSION, UNSPECIFIED DEPRESSION TYPE: ICD-10-CM

## 2024-06-03 PROCEDURE — 90833 PSYTX W PT W E/M 30 MIN: CPT | Performed by: PSYCHIATRY & NEUROLOGY

## 2024-06-03 PROCEDURE — 99213 OFFICE O/P EST LOW 20 MIN: CPT | Performed by: PSYCHIATRY & NEUROLOGY

## 2024-06-03 RX ORDER — NORTRIPTYLINE HYDROCHLORIDE 75 MG/1
75 CAPSULE ORAL 2 TIMES DAILY
Qty: 60 CAPSULE | Refills: 2 | Status: SHIPPED | OUTPATIENT
Start: 2024-06-03

## 2024-06-03 RX ORDER — MIRTAZAPINE 30 MG/1
TABLET, FILM COATED ORAL
Qty: 12 TABLET | Refills: 0 | Status: SHIPPED | OUTPATIENT
Start: 2024-06-03 | End: 2024-06-04 | Stop reason: SDUPTHER

## 2024-06-03 RX ORDER — MIRTAZAPINE 15 MG/1
TABLET, FILM COATED ORAL
Qty: 42 TABLET | Refills: 0 | Status: SHIPPED | OUTPATIENT
Start: 2024-06-03 | End: 2024-06-03

## 2024-06-03 NOTE — TELEPHONE ENCOUNTER
Called and left message for patient to return a call to [phone] and schedule 6 week follow up with provider (around 7/15/2024). Please schedule upon return call. Thank you.

## 2024-06-03 NOTE — PSYCH
Virtual Regular Visit    Verification of patient location:    Patient is located at Home in the following state in which I hold an active license [place]      Assessment/Plan:    Problem List Items Addressed This Visit          Behavioral Health    Generalized anxiety disorder    Severe episode of recurrent major depressive disorder, with psychotic features (HCC) - Primary              Reason for visit is   Chief Complaint   Patient presents with    Virtual Regular Visit          Encounter provider Cristina Bray MD      Recent Visits  No visits were found meeting these conditions.  Showing recent visits within past 7 days and meeting all other requirements  Today's Visits  Date Type Provider Dept   06/03/24 Telemedicine Cristina Bray MD [facility]   Showing today's visits and meeting all other requirements  Future Appointments  No visits were found meeting these conditions.  Showing future appointments within next 150 days and meeting all other requirements       The patient was identified by name and date of birth. Esther Griffith was informed that this is a telemedicine visit and that the visit is being conducted throughthe Epic Embedded platform. She agrees to proceed..  My office door was closed. No one else was in the room.  She acknowledged consent and understanding of privacy and security of the video platform. The patient has agreed to participate and understands they can discontinue the visit at any time.    Patient is aware this is a billable service.     Subjective  Esther Griffith is a 44 y.o. female with MDD and CAROL .Patient remains compliant with medications and denies side effects. She continues to manage her diabetes with medication and diet.    She continues to meet with pain management and has limited benefit from epidural injections.   She restarted  Gabapentin for neuropathy  300 mg po qhs to help with sleep as well.   She has tolerated dose increase on  Mirtazapine to 45 mg qhs but did not experienced much improvement in her depression. Started lithium 150 mg po bid for depression and since it was tolerated her dose was increased to 300 mg po bid. Blood level after dose increase is therapeutic at 0.6.  She stated that mood wise she has been feeling better. She stated that she stopped phentermine.   She stated she has been having diarrhea and she is wondering it could be drug drug interaction. Agrees to gradually taper off Mirtazapine and discontinue it. Will schedule follow up in 6 weeks or sooner if needed.           HPI     Past Medical History:   Diagnosis Date    Anxiety     Blood transfusion declined because patient is Taoism 05/01/2023    Chronic pain disorder     Chronic sinusitis     Depression     Depression     Diabetes (HCC)     Fibromyalgia     Migraine     Obesity     Polyarthritis     Last assessed 9/21/2015    Psychiatric disorder     Psychiatric illness     Sleep difficulties        Past Surgical History:   Procedure Laterality Date    COLONOSCOPY  06/2019    DENTAL SURGERY      HYSTERECTOMY  05/01/2023    HYSTEROSCOPY      Endometrial Biopsy By Hysteroscopy    RI LAPS SUPRACRV HYSTERECT 250 GM/< RMVL TUBE/OVAR N/A 05/01/2023    Procedure: (LSH) W/ BILATERAL SALPINGECTOMY, REMOVAL PARAOVARIAN CYST;  Surgeon: Sai Lopez DO;  Location: [facility];  Service: Gynecology    REMOVAL OF INTRAUTERINE DEVICE (IUD)      US GUIDED BREAST BIOPSY RIGHT COMPLETE Right 06/17/2019    Benign       Current Outpatient Medications   Medication Sig Dispense Refill    b complex vitamins capsule Take 1 capsule by mouth daily      Blood Glucose Monitoring Suppl (Contour Blood Glucose System) w/Device KIT Use 1 kit 2 (two) times a day before meals 1 kit 0    cholecalciferol (VITAMIN D3) 25 mcg (1,000 units) tablet Take 1,000 Units by mouth daily Take 1 tab. daily      Contour Test test strip USE TO CHECK BLOOD SUGAR ONCE DAILY 50 strip 7    ferrous  sulfate 325 (65 Fe) mg tablet Take 325 mg by mouth Take 1 tab. 3 times per week      gabapentin (NEURONTIN) 300 mg capsule Take 1 capsule (300 mg total) by mouth 3 (three) times a day 90 capsule 1    lithium carbonate 300 mg capsule Take 1 capsule (300 mg total) by mouth 2 (two) times a day with meals 60 capsule 2    MAGNESIUM MALATE PO Take by mouth Take 1 tab. daily      metFORMIN (GLUCOPHAGE) 500 mg tablet TAKE 1 TABLET BY MOUTH TWICE A DAY WITH FOOD 60 tablet 5    methocarbamol (ROBAXIN) 500 mg tablet TAKE 1 TAB. EVERY 8 HR. AS NEEDED FOR MUSCLE SPASMS 30 tablet 1    mirtazapine (REMERON) 45 MG tablet TAKE 1 TABLET (45 MG TOTAL) BY MOUTH DAILY AT BEDTIME 30 tablet 2    nortriptyline (PAMELOR) 75 MG capsule Take 1 capsule (75 mg total) by mouth 2 (two) times a day 60 capsule 2    propranolol (INDERAL) 40 mg tablet Take 0.5 tablets (20 mg total) by mouth daily at bedtime 45 tablet 3     No current facility-administered medications for this visit.        Allergies   Allergen Reactions    Cannabidiol Shortness Of Breath, Itching, Swelling, Anxiety, Palpitations, Confusion, Hypertension, Throat Swelling and Tongue Swelling    Amoxicillin-Pot Clavulanate Hives    Decadrol [Dexamethasone] Other (See Comments)     psychosis    Penicillins Hives     Hives/Uticaria    Tetracyclines & Related Hives      Allergy;        Review of Systems     Mood Anxiety and Depression   Behavior Normal    Thought Content Disturbing Thoughts, Feelings   General Emotional Problems and Decreased Functioning   Personality Normal   Other Psych Symptoms Normal   Constitutional Negative   ENT Negative   Cardiovascular Negative   Respiratory Negative   Gastrointestinal Negative   Genitourinary Negative   Musculoskeletal Negative   Integumentary Negative   Neurological Negative   Endocrine Normal    Other Symptoms Normal        Laboratory Results: Recent Labs (last 12 months):   Appointment on 05/15/2024   Component Date Value    Lithium Lvl  05/15/2024 0.62     Sodium 05/15/2024 140     Potassium 05/15/2024 3.7     Chloride 05/15/2024 100     CO2 05/15/2024 30     ANION GAP 05/15/2024 10     BUN 05/15/2024 14     Creatinine 05/15/2024 0.88     Glucose, Fasting 05/15/2024 124 (H)     Calcium 05/15/2024 9.9     AST 05/15/2024 31     ALT 05/15/2024 43     Alkaline Phosphatase 05/15/2024 49     Total Protein 05/15/2024 7.0     Albumin 05/15/2024 4.7     Total Bilirubin 05/15/2024 0.37     eGFR 05/15/2024 79     Cholesterol 05/15/2024 156     Triglycerides 05/15/2024 142     HDL, Direct 05/15/2024 39 (L)     LDL Calculated 05/15/2024 89     Non-HDL-Chol (CHOL-HDL) 05/15/2024 117     Hemoglobin A1C 05/15/2024 6.0 (H)     EAG 05/15/2024 126     WBC 05/15/2024 16.06 (H)     RBC 05/15/2024 4.34     Hemoglobin 05/15/2024 13.8     Hematocrit 05/15/2024 41.5     MCV 05/15/2024 96     MCH 05/15/2024 31.8     MCHC 05/15/2024 33.3     RDW 05/15/2024 14.0     MPV 05/15/2024 9.1     Platelets 05/15/2024 296     nRBC 05/15/2024 0     Segmented % 05/15/2024 66     Immature Grans % 05/15/2024 1     Lymphocytes % 05/15/2024 29     Monocytes % 05/15/2024 4     Eosinophils Relative 05/15/2024 0     Basophils Relative 05/15/2024 0     Absolute Neutrophils 05/15/2024 10.50 (H)     Absolute Immature Grans 05/15/2024 0.12     Absolute Lymphocytes 05/15/2024 4.67 (H)     Absolute Monocytes 05/15/2024 0.67     Eosinophils Absolute 05/15/2024 0.04     Basophils Absolute 05/15/2024 0.06     Creatinine, Ur 05/15/2024 196.9     Albumin,U,Random 05/15/2024 9.4     Albumin Creat Ratio 05/15/2024 5    Appointment on 05/02/2024   Component Date Value    Lithium Lvl 05/02/2024 0.30 (L)    Appointment on 02/08/2024   Component Date Value    Sodium 02/08/2024 136     Potassium 02/08/2024 4.3     Chloride 02/08/2024 103     CO2 02/08/2024 27     ANION GAP 02/08/2024 6     BUN 02/08/2024 17     Creatinine 02/08/2024 0.88     Glucose, Fasting 02/08/2024 135 (H)     Calcium 02/08/2024 9.1     AST  02/08/2024 22     ALT 02/08/2024 26     Alkaline Phosphatase 02/08/2024 42     Total Protein 02/08/2024 6.7     Albumin 02/08/2024 4.2     Total Bilirubin 02/08/2024 0.37     eGFR 02/08/2024 79     Cholesterol 02/08/2024 146     Triglycerides 02/08/2024 181 (H)     HDL, Direct 02/08/2024 53     LDL Calculated 02/08/2024 57     Non-HDL-Chol (CHOL-HDL) 02/08/2024 93     TSH 3RD GENERATON 02/08/2024 2.295     Hemoglobin A1C 02/08/2024 6.7 (H)     EAG 02/08/2024 146    Office Visit on 12/13/2023   Component Date Value    SARS-CoV-2 12/13/2023 Positive (A)     INFLUENZA A PCR 12/13/2023 Negative     INFLUENZA B PCR 12/13/2023 Negative    Admission on 11/26/2023, Discharged on 11/26/2023   Component Date Value    Color, UA 11/26/2023 Yellow     Clarity, UA 11/26/2023 Clear     pH, UA 11/26/2023 5.5     Leukocytes, UA 11/26/2023 Negative     Nitrite, UA 11/26/2023 Negative     Protein, UA 11/26/2023 Negative     Glucose, UA 11/26/2023 Negative     Ketones, UA 11/26/2023 Negative     Urobilinogen, UA 11/26/2023 0.2     Bilirubin, UA 11/26/2023 Negative     Occult Blood, UA 11/26/2023 Negative     Specific Gravity, UA 11/26/2023 <=1.005    Office Visit on 11/07/2023   Component Date Value    Hemoglobin A1C 11/07/2023 6.3    Admission on 10/16/2023, Discharged on 10/23/2023   Component Date Value    Sodium 10/19/2023 136     Potassium 10/19/2023 4.0     Chloride 10/19/2023 102     CO2 10/19/2023 23     ANION GAP 10/19/2023 11     BUN 10/19/2023 17     Creatinine 10/19/2023 0.80     Glucose 10/19/2023 158 (H)     Glucose, Fasting 10/19/2023 158 (H)     Calcium 10/19/2023 9.6     AST 10/19/2023 33     ALT 10/19/2023 30     Alkaline Phosphatase 10/19/2023 56     Total Protein 10/19/2023 6.4     Albumin 10/19/2023 4.1     Total Bilirubin 10/19/2023 0.31     eGFR 10/19/2023 89     WBC 10/18/2023 7.15     RBC 10/18/2023 4.22     Hemoglobin 10/18/2023 11.8     Hematocrit 10/18/2023 36.4     MCV 10/18/2023 86     MCH 10/18/2023 28.0      MCHC 10/18/2023 32.4     RDW 10/18/2023 13.8     MPV 10/18/2023 9.0     Platelets 10/18/2023 201     nRBC 10/18/2023 0     Segmented % 10/18/2023 56     Immature Grans % 10/18/2023 1     Lymphocytes % 10/18/2023 36     Monocytes % 10/18/2023 6     Eosinophils Relative 10/18/2023 1     Basophils Relative 10/18/2023 0     Absolute Neutrophils 10/18/2023 4.02     Absolute Immature Grans 10/18/2023 0.04     Absolute Lymphocytes 10/18/2023 2.58     Absolute Monocytes 10/18/2023 0.39     Eosinophils Absolute 10/18/2023 0.09     Basophils Absolute 10/18/2023 0.03     Cholesterol 10/19/2023 194     Triglycerides 10/19/2023 254 (H)     HDL, Direct 10/19/2023 38 (L)     LDL Calculated 10/19/2023 105 (H)     Non-HDL-Chol (CHOL-HDL) 10/19/2023 156     Preg, Serum 10/17/2023 Negative     TSH 3RD GENERATON 10/19/2023 5.982 (H)     Syphilis Total Antibody 10/19/2023 Non-reactive     Vit D, 25-Hydroxy 10/19/2023 37.3     POC Glucose 10/17/2023 159 (H)     POC Glucose 10/17/2023 215 (H)     POC Glucose 10/17/2023 146 (H)     POC Glucose 10/18/2023 155 (H)     POC Glucose 10/18/2023 190 (H)     POC Glucose 10/18/2023 122     POC Glucose 10/19/2023 177 (H)     Free T4 10/19/2023 0.68     POC Glucose 10/19/2023 192 (H)     POC Glucose 10/19/2023 168 (H)     POC Glucose 10/20/2023 169 (H)     POC Glucose 10/20/2023 241 (H)     POC Glucose 10/20/2023 137     POC Glucose 10/21/2023 178 (H)     POC Glucose 10/21/2023 264 (H)     POC Glucose 10/21/2023 141 (H)     POC Glucose 10/22/2023 159 (H)     POC Glucose 10/22/2023 202 (H)     POC Glucose 10/22/2023 108     POC Glucose 10/23/2023 143 (H)    No results displayed because visit has over 200 results.      Telephone on 09/18/2023   Component Date Value    HIV Screen 06/05/2009 Non-Reactive     HIV Confirmation 06/05/2009 Non-Reactive    Appointment on 07/11/2023   Component Date Value    Cholesterol 07/11/2023 202 (H)     Triglycerides 07/11/2023 153 (H)     HDL, Direct 07/11/2023 54      LDL Calculated 07/11/2023 117 (H)     Vitamin B-12 07/11/2023 762     Folate 07/11/2023 >22.3    There may be more visits with results that are not included.         Substance Abuse History:  Social History     Substance and Sexual Activity   Drug Use Not Currently       Family Psychiatric History:   Family History   Problem Relation Age of Onset    Irregular heart beat Mother     Diabetes Father     Kidney disease Father     Diabetes Maternal Grandmother     Rheum arthritis Maternal Grandmother     Melanoma Maternal Grandmother 84    No Known Problems Maternal Grandfather     Kidney disease Paternal Grandmother     Rheum arthritis Paternal Grandmother     Depression Paternal Grandmother     Diabetes Paternal Grandfather     Lung cancer Paternal Grandfather 47    Substance Abuse Brother     No Known Problems Brother     Substance Abuse Maternal Uncle     Prostate cancer Maternal Uncle 60    Depression Paternal Aunt     Alcohol abuse Family     Stomach cancer Family        The following portions of the patient's history were reviewed and updated as appropriate: allergies, current medications, past family history, past medical history, past social history, past surgical history, and problem list.    Social History     Socioeconomic History    Marital status: Single     Spouse name: Not on file    Number of children: 0    Years of education: 12 years     Highest education level: GED or equivalent   Occupational History    Occupation: unemployed   Tobacco Use    Smoking status: Never     Passive exposure: Never    Smokeless tobacco: Never    Tobacco comments:     N/A, non-smoker.   Vaping Use    Vaping status: Never Used   Substance and Sexual Activity    Alcohol use: Not Currently     Comment: 3 x year; sober since 2010    Drug use: Not Currently    Sexual activity: Not Currently   Other Topics Concern    Not on file   Social History Narrative    Caffeine use        What type of home do you live in: Single house     Age of your home: 48 yrs    How long have you been living there: 42 yrs    Type of heat: Baseboard    Type of fuel: Electric    What type of samir is in your bedroom: Carpet    Do you have the following in or near your home:    Air products: Window air conditioning and Ionic air purifier    Pests: Mice    Pets: Cat    Basement: None     Social Determinants of Health     Financial Resource Strain: High Risk (12/2/2020)    Overall Financial Resource Strain (CARDIA)     Difficulty of Paying Living Expenses: Hard   Food Insecurity: No Food Insecurity (10/16/2023)    Hunger Vital Sign     Worried About Running Out of Food in the Last Year: Never true     Ran Out of Food in the Last Year: Never true   Transportation Needs: No Transportation Needs (10/16/2023)    PRAPARE - Transportation     Lack of Transportation (Medical): No     Lack of Transportation (Non-Medical): No   Physical Activity: Insufficiently Active (10/12/2022)    Exercise Vital Sign     Days of Exercise per Week: 2 days     Minutes of Exercise per Session: 60 min   Stress: Stress Concern Present (4/3/2019)    [employer] [place] [employer] - Occupational Stress Questionnaire     Feeling of Stress : To some extent   Social Connections: Moderately Integrated (4/3/2019)    Social Connection and Isolation Panel [NHANES]     Frequency of Communication with Friends and Family: More than three times a week     Frequency of Social Gatherings with Friends and Family: More than three times a week     Attends Latter day Services: More than 4 times per year     Active Member of Clubs or Organizations: Yes     Attends Club or Organization Meetings: More than 4 times per year     Marital Status: Never    Intimate Partner Violence: Not At Risk (4/3/2019)    Humiliation, Afraid, Rape, and Kick questionnaire     Fear of Current or Ex-Partner: No     Emotionally Abused: No     Physically Abused: No     Sexually Abused: No   Housing Stability: Low Risk   (10/16/2023)    Housing Stability Vital Sign     Unable to Pay for Housing in the Last Year: No     Number of Places Lived in the Last Year: 1     Unstable Housing in the Last Year: No     Social History     Social History Narrative    Caffeine use        What type of home do you live in: Single house    Age of your home: 46 yrs    How long have you been living there: 42 yrs    Type of heat: Baseboard    Type of fuel: Electric    What type of samir is in your bedroom: Carpet    Do you have the following in or near your home:    Air products: Window air conditioning and Ionic air purifier    Pests: Mice    Pets: Cat    Basement: None       Objective:       Mental status:  Appearance calm and cooperative , adequate hygiene and grooming, and good eye contact    Mood dysphoric   Affect affect was constricted   Speech a normal rate and fluent   Thought Processes coherent/organized and normal thought processes   Hallucinations no hallucinations present    Thought Content no delusions   Abnormal Thoughts no suicidal thoughts  and no homicidal thoughts    Orientation  oriented to person and place and time   Remote Memory short term memory intact and long term memory intact   Attention Span concentration intact   Intellect Appears to be of Average Intelligence   Insight Limited insight   Judgement judgment was limited   Muscle Strength N/a   Language no difficulty naming common objects and no difficulty repeating a phrase    Fund of Knowledge displays adequate knowledge of current events, adequate fund of knowledge regarding past history, and adequate fund of knowledge regarding vocabulary                Assessment/Plan:       Diagnoses and all orders for this visit:    Severe episode of recurrent major depressive disorder, with psychotic features (HCC)    Generalized anxiety disorder            Treatment Recommendations- Risks Benefits      Immediate Medical/Psychiatric/Psychotherapy Treatments and Any Precautions:  continue current treatment     Risks, Benefits And Possible Side Effects Of Medications:  {PSYCH RISK, BENEFITS AND POSSIBLE SIDE EFFECTS (Optional):37338    Controlled Medication Discussion: Discussed with patient Black Box warning on concurrent use of benzodiazepines and opioid medications including sedation, respiratory depression, coma and death. Patient understands the risk of treatment with benzodiazepines in addition to opioids and wants to continue taking those medications. , Discussed with patient the risks of sedation, respiratory depression, impairment of ability to drive and potential for abuse and addiction related to treatment with benzodiazepine medications. The patient understands risk of treatment with benzodiazepine medications, agrees to not drive if feels impaired and agrees to take medications as prescribed., and The patient has been filling controlled prescriptions on time as prescribed to [place] Prescription Drug Monitoring program.      Psychotherapy Provided: Individual psychotherapy provided.       Individual psychotherapy provided: Yes  Counseling was provided during the session today for 16 minutes.  Medications, treatment progress and treatment plan reviewed with Esther.  Medication changes discussed with Esther.  Medication education provided to Esther.  Coping strategies including compliance with medications, contacting a therapist, exercising, increasing motivation, maintain healthy diet, maintain heathy sleeping hygiene, and maintain positive attitude reviewed with Esther.                                 Visit Time    Visit Start Time: 10:30  Visit Stop Time: 11:00  Total Visit Duration:  30 minutes

## 2024-06-04 DIAGNOSIS — F32.A DEPRESSION, UNSPECIFIED DEPRESSION TYPE: ICD-10-CM

## 2024-06-04 RX ORDER — MIRTAZAPINE 30 MG/1
TABLET, FILM COATED ORAL
Qty: 12 TABLET | Refills: 0 | Status: SHIPPED | OUTPATIENT
Start: 2024-06-04

## 2024-06-04 NOTE — TELEPHONE ENCOUNTER
Patient contacted the office to schedule a follow up visit with provider. Patient is now scheduled for 7/18/24  at 2pm virtually.

## 2024-06-05 ENCOUNTER — ANNUAL EXAM (OUTPATIENT)
Dept: GYNECOLOGY | Facility: CLINIC | Age: 44
End: 2024-06-05
Payer: COMMERCIAL

## 2024-06-05 ENCOUNTER — APPOINTMENT (OUTPATIENT)
Dept: LAB | Facility: MEDICAL CENTER | Age: 44
End: 2024-06-05
Payer: COMMERCIAL

## 2024-06-05 VITALS
WEIGHT: 186 LBS | BODY MASS INDEX: 35.12 KG/M2 | HEIGHT: 61 IN | HEART RATE: 100 BPM | SYSTOLIC BLOOD PRESSURE: 118 MMHG | DIASTOLIC BLOOD PRESSURE: 72 MMHG

## 2024-06-05 DIAGNOSIS — R61 EXCESSIVE SWEATING: Primary | ICD-10-CM

## 2024-06-05 DIAGNOSIS — M79.18 MYOFASCIAL PAIN: ICD-10-CM

## 2024-06-05 DIAGNOSIS — Z01.419 ENCOUNTER FOR GYNECOLOGICAL EXAMINATION WITH PAPANICOLAOU SMEAR OF CERVIX: ICD-10-CM

## 2024-06-05 DIAGNOSIS — Z01.419 ENCOUNTER FOR GYNECOLOGICAL EXAMINATION WITHOUT ABNORMAL FINDING: ICD-10-CM

## 2024-06-05 DIAGNOSIS — R61 EXCESSIVE SWEATING: ICD-10-CM

## 2024-06-05 LAB
ESTRADIOL SERPL-MCNC: 66.5 PG/ML
FSH SERPL-ACNC: 10.2 MIU/ML
LH SERPL-ACNC: 5.4 MIU/ML
T4 FREE SERPL-MCNC: 0.6 NG/DL (ref 0.61–1.12)
TSH SERPL DL<=0.05 MIU/L-ACNC: 4.77 UIU/ML (ref 0.45–4.5)

## 2024-06-05 PROCEDURE — 83002 ASSAY OF GONADOTROPIN (LH): CPT

## 2024-06-05 PROCEDURE — 99396 PREV VISIT EST AGE 40-64: CPT | Performed by: OBSTETRICS & GYNECOLOGY

## 2024-06-05 PROCEDURE — 84439 ASSAY OF FREE THYROXINE: CPT

## 2024-06-05 PROCEDURE — G0145 SCR C/V CYTO,THINLAYER,RESCR: HCPCS | Performed by: OBSTETRICS & GYNECOLOGY

## 2024-06-05 PROCEDURE — 84443 ASSAY THYROID STIM HORMONE: CPT

## 2024-06-05 PROCEDURE — 82670 ASSAY OF TOTAL ESTRADIOL: CPT

## 2024-06-05 PROCEDURE — 36415 COLL VENOUS BLD VENIPUNCTURE: CPT

## 2024-06-05 PROCEDURE — 83001 ASSAY OF GONADOTROPIN (FSH): CPT

## 2024-06-05 NOTE — PROGRESS NOTES
"Ambulatory Visit  Name: Esther Griffith      : 1980      MRN: [id number]  Encounter Provider: Sai Lopez DO  Encounter Date: 2024   Encounter department: [facility]    Assessment & Plan   1. Excessive sweating  -     TSH, 3rd generation with Free T4 reflex; Future  -     Follicle stimulating hormone; Future  -     Luteinizing hormone; Future  -     Estradiol; Future  2. Encounter for gynecological examination without abnormal finding      History of Present Illness     Esther Griffith is a 44 y.o. female who presents for annual examination.  She status post [facility] BS in May 2023.  She presents the office complaining of excessive sweating.  She denies any vaginal irritation, burning, discharge or bleeding.  Denies any dysuria, hematuria urgency or urinary incontinence.  No GI complaints.    Colonoscopy 2023.  Repeat in 10 years    Review of Systems   Constitutional: Negative.    HENT:  Negative for sore throat and trouble swallowing.    Gastrointestinal: Negative.    Genitourinary: Negative.        Objective     /72   Pulse 100   Ht 5' 1\" (1.549 m)   Wt 84.4 kg (186 lb)   LMP 2023 (Approximate)   BMI 35.14 kg/m²     Physical Exam  Vitals reviewed.   Cardiovascular:      Rate and Rhythm: Normal rate and regular rhythm.      Pulses: Normal pulses.      Heart sounds: Normal heart sounds. No murmur heard.  Pulmonary:      Effort: Pulmonary effort is normal. No respiratory distress.      Breath sounds: Normal breath sounds.   Chest:   Breasts:     Right: No swelling, bleeding, inverted nipple, mass, nipple discharge, skin change or tenderness.      Left: No swelling, bleeding, inverted nipple, mass, nipple discharge, skin change or tenderness.   Abdominal:      General: There is no distension.      Palpations: Abdomen is soft. There is no mass.      Tenderness: There is no abdominal tenderness. There is no guarding or rebound.      Hernia: No hernia is " present. There is no hernia in the left inguinal area or right inguinal area.   Genitourinary:     General: Normal vulva.      Labia:         Right: No rash, tenderness or lesion.         Left: No rash, tenderness or lesion.       Vagina: Normal.      Cervix: Normal.      Uterus: Absent.       Adnexa:         Right: No mass, tenderness or fullness.          Left: No mass, tenderness or fullness.     Musculoskeletal:      Cervical back: Normal range of motion and neck supple. No tenderness.   Lymphadenopathy:      Cervical: No cervical adenopathy.      Upper Body:      Right upper body: No supraclavicular, axillary or pectoral adenopathy.      Left upper body: No supraclavicular, axillary or pectoral adenopathy.      Lower Body: No right inguinal adenopathy. No left inguinal adenopathy.   Neurological:      Mental Status: She is alert.       Administrative Statements

## 2024-06-06 ENCOUNTER — TELEPHONE (OUTPATIENT)
Dept: GYNECOLOGY | Facility: CLINIC | Age: 44
End: 2024-06-06

## 2024-06-06 RX ORDER — METHOCARBAMOL 500 MG/1
TABLET, FILM COATED ORAL
Qty: 30 TABLET | Refills: 1 | Status: SHIPPED | OUTPATIENT
Start: 2024-06-06

## 2024-06-06 NOTE — TELEPHONE ENCOUNTER
----- Message from Sai Lopez DO sent at 6/6/2024  7:16 AM EDT -----  Labs consistent with hypothyroidism.  Referred to PCP for treatment

## 2024-06-06 NOTE — TELEPHONE ENCOUNTER
Patient is calling back to review results. Discussed that her labs are consistent with hypothyroidism, she is to follow up with her PCP

## 2024-06-07 ENCOUNTER — TELEMEDICINE (OUTPATIENT)
Dept: BEHAVIORAL/MENTAL HEALTH CLINIC | Facility: CLINIC | Age: 44
End: 2024-06-07

## 2024-06-07 DIAGNOSIS — F41.1 GENERALIZED ANXIETY DISORDER: ICD-10-CM

## 2024-06-07 DIAGNOSIS — F33.3 SEVERE EPISODE OF RECURRENT MAJOR DEPRESSIVE DISORDER, WITH PSYCHOTIC FEATURES (HCC): Primary | ICD-10-CM

## 2024-06-07 NOTE — PSYCH
Virtual Regular Visit    Verification of patient location:    Patient is located at Home in the following state in which I hold an active license [place] ( in [place])      Assessment/Plan:    Problem List Items Addressed This Visit          Behavioral Health    Severe episode of recurrent major depressive disorder, with psychotic features (HCC) - Primary    Generalized anxiety disorder          Reason for visit is   Chief Complaint   Patient presents with    Virtual Regular Visit      Encounter provider HERMELINDA ALBA      Recent Visits  No visits were found meeting these conditions.  Showing recent visits within past 7 days and meeting all other requirements  Today's Visits  Date Type Provider Dept   06/07/24 Telemedicine HERMELINDA Alba [facility] Therapist [place]   Showing today's visits and meeting all other requirements  Future Appointments  No visits were found meeting these conditions.  Showing future appointments within next 150 days and meeting all other requirements       The patient was identified by name and date of birth. Esther Griffith was informed that this is a telemedicine visit and that the visit is being conducted throughthe Epic Embedded platform. She agrees to proceed..  My office door was closed. No one else was in the room.  She acknowledged consent and understanding of privacy and security of the video platform. The patient has agreed to participate and understands they can discontinue the visit at any time.    Patient is aware this is a billable service.     HPI     Past Medical History:   Diagnosis Date    Alcoholism (HCC) 03/15/2001    Anxiety     Blood transfusion declined because patient is [employer] 05/01/2023    Chronic pain disorder     Chronic sinusitis     Depression     Depression     Diabetes (HCC)     Diabetes mellitus (HCC) 09/01/2022    Fibromyalgia     Migraine     Obesity     Polyarthritis     Last assessed 9/21/2015    Psychiatric  disorder     Psychiatric illness     Sleep difficulties     Substance abuse (HCC) 03/15/1994       Past Surgical History:   Procedure Laterality Date    COLONOSCOPY  06/2019    DENTAL SURGERY      HYSTERECTOMY  05/01/2023    HYSTEROSCOPY      Endometrial Biopsy By Hysteroscopy    ND LAPS SUPRACRV HYSTERECT 250 GM/< RMVL TUBE/OVAR N/A 05/01/2023    Procedure: (LSH) W/ BILATERAL SALPINGECTOMY, REMOVAL PARAOVARIAN CYST;  Surgeon: Sai Lopez DO;  Location: [facility];  Service: Gynecology    REMOVAL OF INTRAUTERINE DEVICE (IUD)      US GUIDED BREAST BIOPSY RIGHT COMPLETE Right 06/17/2019    Benign       Current Outpatient Medications   Medication Sig Dispense Refill    b complex vitamins capsule Take 1 capsule by mouth daily      Blood Glucose Monitoring Suppl (Contour Blood Glucose System) w/Device KIT Use 1 kit 2 (two) times a day before meals 1 kit 0    cholecalciferol (VITAMIN D3) 25 mcg (1,000 units) tablet Take 1,000 Units by mouth daily Take 1 tab. daily      Contour Test test strip USE TO CHECK BLOOD SUGAR ONCE DAILY 50 strip 7    ferrous sulfate 325 (65 Fe) mg tablet Take 325 mg by mouth Take 1 tab. 3 times per week      gabapentin (NEURONTIN) 300 mg capsule Take 1 capsule (300 mg total) by mouth 3 (three) times a day 90 capsule 1    lithium carbonate 300 mg capsule Take 1 capsule (300 mg total) by mouth 2 (two) times a day with meals 60 capsule 2    MAGNESIUM MALATE PO Take by mouth Take 1 tab. daily      metFORMIN (GLUCOPHAGE) 500 mg tablet TAKE 1 TABLET BY MOUTH TWICE A DAY WITH FOOD 60 tablet 5    methocarbamol (ROBAXIN) 500 mg tablet TAKE 1 TAB. EVERY 8 HR. AS NEEDED FOR MUSCLE SPASMS 30 tablet 1    mirtazapine (REMERON) 30 mg tablet TAKE 1 tab po qhs for 1 week then take 1/2 tab po qhs for 1 week then stop 12 tablet 0    nortriptyline (PAMELOR) 75 MG capsule Take 1 capsule (75 mg total) by mouth 2 (two) times a day 60 capsule 2    propranolol (INDERAL) 40 mg tablet Take 0.5 tablets (20 mg total)  "by mouth daily at bedtime 45 tablet 3     No current facility-administered medications for this visit.        Allergies   Allergen Reactions    Cannabidiol Shortness Of Breath, Itching, Swelling, Anxiety, Palpitations, Confusion, Hypertension, Throat Swelling and Tongue Swelling    Amoxicillin-Pot Clavulanate Hives    Decadrol [Dexamethasone] Other (See Comments)     psychosis    Penicillins Hives     Hives/Uticaria    Tetracyclines & Related Hives      Allergy;        Review of Systems    Video Exam    There were no vitals filed for this visit.    Physical Exam     Behavioral Health Psychotherapy Progress Note    Psychotherapy Provided: Individual Psychotherapy     1. Severe episode of recurrent major depressive disorder, with psychotic features (HCC)        2. Generalized anxiety disorder            Goals addressed in session: Goal 1     DATA: Met with Esther for follow up.  Esther shared that she has been \"ok\" lately, finding some things out about her health that explain some of her fatigue and other symptoms (hypothyroidism). She said that she is trying to allow herself to rest, but needs people like her mom to understand that there is a reason that she is not up to doing as much work as they expect. She shared that she is worried about her dad who is also having some serious health problems, and they are working on testing to try to get answers. She said that she is trying to get out and do some social things like karaoke and trivia night. She noted that her mood has been so-so, feeling a lot of fatigue and low energy, some decline in interest in social activities and feeling overwhelmed at times.  She said that she often compares herself to others, feeling bad that she is not able to do as much/keep up with responsibilities, so processed those feelings and concerns, and used CBT and DBT strategies to help Esther accept her current limitations and find a way to make her life purposeful and " "meaningful.    During this session, this clinician used the following therapeutic modalities: Client-centered Therapy, Cognitive Behavioral Therapy, Dialectical Behavior Therapy, and Supportive Psychotherapy    Substance Abuse was not addressed during this session. If the client is diagnosed with a co-occurring substance use disorder, please indicate any changes in the frequency or amount of use: n/a. Stage of change for addressing substance use diagnoses: No substance use/Not applicable    ASSESSMENT:  Esther Griffith presents with a Depressed mood.     her affect is Normal range and intensity, which is congruent, with her mood and the content of the session. The client has made progress on their goals.     Esther Griffith presents with a low risk of suicide, low risk of self-harm, and minimal risk of harm to others.    For any risk assessment that surpasses a \"low\" rating, a safety plan must be developed.    A safety plan was indicated: no  If yes, describe in detail n/a    PLAN: Between sessions, Esther Griffith will work on rest and self-care, as well as concept of radical acceptance and making meaning out of her challenges. At the next session, the therapist will use Client-centered Therapy, Cognitive Behavioral Therapy, and Supportive Psychotherapy to address depression and anxiety.    Behavioral Health Treatment Plan and Discharge Planning: Esther Griffith is aware of and agrees to continue to work on their treatment plan. They have identified and are working toward their discharge goals. yes    Visit start and stop times:    06/07/24  Start Time: 1059  Stop Time: 1137  Total Visit Time: 38 minutes    "

## 2024-06-11 LAB
LAB AP GYN PRIMARY INTERPRETATION: NORMAL
Lab: NORMAL

## 2024-06-12 ENCOUNTER — HOSPITAL ENCOUNTER (OUTPATIENT)
Dept: RADIOLOGY | Facility: CLINIC | Age: 44
Discharge: HOME | End: 2024-06-12
Payer: COMMERCIAL

## 2024-06-12 ENCOUNTER — TELEPHONE (OUTPATIENT)
Dept: RADIOLOGY | Facility: CLINIC | Age: 44
End: 2024-06-12

## 2024-06-12 VITALS
OXYGEN SATURATION: 98 % | RESPIRATION RATE: 17 BRPM | TEMPERATURE: 97.7 F | SYSTOLIC BLOOD PRESSURE: 107 MMHG | DIASTOLIC BLOOD PRESSURE: 68 MMHG | HEART RATE: 105 BPM

## 2024-06-12 DIAGNOSIS — M47.816 LUMBAR SPONDYLOSIS: ICD-10-CM

## 2024-06-12 PROCEDURE — 64635 DESTROY LUMB/SAC FACET JNT: CPT | Performed by: ANESTHESIOLOGY

## 2024-06-12 PROCEDURE — 64636 DESTROY L/S FACET JNT ADDL: CPT | Performed by: ANESTHESIOLOGY

## 2024-06-12 RX ORDER — LIDOCAINE HYDROCHLORIDE 10 MG/ML
8 INJECTION, SOLUTION EPIDURAL; INFILTRATION; INTRACAUDAL; PERINEURAL ONCE
Status: COMPLETED | OUTPATIENT
Start: 2024-06-12 | End: 2024-06-12

## 2024-06-12 RX ORDER — BUPIVACAINE HYDROCHLORIDE 5 MG/ML
3 INJECTION, SOLUTION EPIDURAL; INTRACAUDAL ONCE
Status: COMPLETED | OUTPATIENT
Start: 2024-06-12 | End: 2024-06-12

## 2024-06-12 RX ADMIN — LIDOCAINE HYDROCHLORIDE 8 ML: 10 INJECTION, SOLUTION EPIDURAL; INFILTRATION; INTRACAUDAL; PERINEURAL at 13:12

## 2024-06-12 RX ADMIN — BUPIVACAINE HYDROCHLORIDE 3 ML: 5 INJECTION, SOLUTION EPIDURAL; INTRACAUDAL; PERINEURAL at 13:16

## 2024-06-12 RX ADMIN — LIDOCAINE HYDROCHLORIDE 3 ML: 20 INJECTION, SOLUTION EPIDURAL; INFILTRATION; INTRACAUDAL; PERINEURAL at 13:16

## 2024-06-12 NOTE — DISCHARGE INSTR - LAB

## 2024-06-12 NOTE — H&P
History of Present Illness: The patient is a 44 y.o. female who presents with complaints of low back pain.    Past Medical History:   Diagnosis Date    Alcoholism ([facility]) 03/15/2001    Anxiety     Blood transfusion declined because patient is S[employer] 05/01/2023    Chronic pain disorder     Chronic sinusitis     Depression     Depression     Diabetes ([facility])     Diabetes mellitus ([facility]) 09/01/2022    Fibromyalgia     Migraine     Obesity     Polyarthritis     Last assessed 9/21/2015    Psychiatric disorder     Psychiatric illness     Sleep difficulties     Substance abuse ([facility]) 03/15/1994       Past Surgical History:   Procedure Laterality Date    COLONOSCOPY  06/2019    DENTAL SURGERY      HYSTERECTOMY  05/01/2023    HYSTEROSCOPY      Endometrial Biopsy By Hysteroscopy    MD LAPS SUPRACRV HYSTERECT 250 GM/< RMVL TUBE/OVAR N/A 05/01/2023    Procedure: (LSH) W/ BILATERAL SALPINGECTOMY, REMOVAL PARAOVARIAN CYST;  Surgeon: Sai Lopez DO;  Location: [facility];  Service: Gynecology    REMOVAL OF INTRAUTERINE DEVICE (IUD)      US GUIDED BREAST BIOPSY RIGHT COMPLETE Right 06/17/2019    Benign         Current Outpatient Medications:     b complex vitamins capsule, Take 1 capsule by mouth daily, Disp: , Rfl:     Blood Glucose Monitoring Suppl (Contour Blood Glucose System) w/Device KIT, Use 1 kit 2 (two) times a day before meals, Disp: 1 kit, Rfl: 0    cholecalciferol (VITAMIN D3) 25 mcg (1,000 units) tablet, Take 1,000 Units by mouth daily Take 1 tab. daily, Disp: , Rfl:     Contour Test test strip, USE TO CHECK BLOOD SUGAR ONCE DAILY, Disp: 50 strip, Rfl: 7    ferrous sulfate 325 (65 Fe) mg tablet, Take 325 mg by mouth Take 1 tab. 3 times per week, Disp: , Rfl:     gabapentin (NEURONTIN) 300 mg capsule, Take 1 capsule (300 mg total) by mouth 3 (three) times a day, Disp: 90 capsule, Rfl: 1    lithium carbonate 300 mg capsule, Take 1 capsule (300 mg total) by mouth 2 (two) times a day with meals, Disp: 60  capsule, Rfl: 2    MAGNESIUM MALATE PO, Take by mouth Take 1 tab. daily, Disp: , Rfl:     metFORMIN (GLUCOPHAGE) 500 mg tablet, TAKE 1 TABLET BY MOUTH TWICE A DAY WITH FOOD, Disp: 60 tablet, Rfl: 5    methocarbamol (ROBAXIN) 500 mg tablet, TAKE 1 TAB. EVERY 8 HR. AS NEEDED FOR MUSCLE SPASMS, Disp: 30 tablet, Rfl: 1    mirtazapine (REMERON) 30 mg tablet, TAKE 1 tab po qhs for 1 week then take 1/2 tab po qhs for 1 week then stop, Disp: 12 tablet, Rfl: 0    nortriptyline (PAMELOR) 75 MG capsule, Take 1 capsule (75 mg total) by mouth 2 (two) times a day, Disp: 60 capsule, Rfl: 2    propranolol (INDERAL) 40 mg tablet, Take 0.5 tablets (20 mg total) by mouth daily at bedtime, Disp: 45 tablet, Rfl: 3    Current Facility-Administered Medications:     bupivacaine (PF) (MARCAINE) 0.5 % injection 3 mL, 3 mL, Injection, Once, Jeremy Meneses, DO    lidocaine (PF) (XYLOCAINE-MPF) 1 % injection 8 mL, 8 mL, Other, Once, Jeremy Meneses, DO    lidocaine (PF) (XYLOCAINE-MPF) 2 % injection 3 mL, 3 mL, Other, Once, Jeremy Meneses, DO    Allergies   Allergen Reactions    Cannabidiol Shortness Of Breath, Itching, Swelling, Anxiety, Palpitations, Confusion, Hypertension, Throat Swelling and Tongue Swelling    Amoxicillin-Pot Clavulanate Hives    Decadrol [Dexamethasone] Other (See Comments)     psychosis    Penicillins Hives     Hives/Uticaria    Tetracyclines & Related Hives      Allergy;        Physical Exam:   Vitals:    06/12/24 1257   BP: 126/84   Pulse: 102   Resp: 18   Temp: 97.7 °F (36.5 °C)   SpO2: 99%     General: Awake, Alert, Oriented x 3, Mood and affect appropriate  Respiratory: Respirations even and unlabored  Cardiovascular: Peripheral pulses intact; no edema  Musculoskeletal Exam: Normal gait    ASA Score: 2    Patient/Chart Verification  Patient ID Verified: Verbal  ID Band Applied: No  Consents Confirmed: Procedural  H&P( within 30 days) Verified: To be obtained in the Pre-Procedure area  Interval H&P(within 24 hr) Complete  (required for Outpatients and Surgery Admit only): To be obtained in the Pre-Procedure area  Allergies Reviewed: Yes  Anticoag/NSAID held?: NA  Currently on antibiotics?: No  Pregnancy denied?: Yes    Assessment:   1. Lumbar spondylosis        Plan: RIGHT L3-5 RFA

## 2024-06-13 NOTE — TELEPHONE ENCOUNTER
STORMY  S/w Pt. Pt stated current pain level is 2/10. Pt states needle sites look good, denies S&S of infection, denies fevers, denies soreness and denies sun burn like sensation. Advised Pt if does have pain to take prescribed or OTC pain medications and/or use ice/heat and that it takes 4 to 6 weeks to see the full effect. Scheduled F/u appt w/ Pt. Pt verbalized understanding.

## 2024-06-14 ENCOUNTER — TELEPHONE (OUTPATIENT)
Dept: PSYCHIATRY | Facility: CLINIC | Age: 44
End: 2024-06-14

## 2024-06-14 NOTE — TELEPHONE ENCOUNTER
Patient is calling regarding cancelling an appointment.    Date/Time: 6/21/2024 11am    Reason:     Patient was rescheduled: YES [x] NO []  If yes, when was Patient reschedule for: 6/26/2024 8am    Patient requesting call back to reschedule:  [] NO [x]

## 2024-06-26 ENCOUNTER — TELEPHONE (OUTPATIENT)
Dept: BEHAVIORAL/MENTAL HEALTH CLINIC | Facility: CLINIC | Age: 44
End: 2024-06-26

## 2024-06-26 LAB
LEFT EYE DIABETIC RETINOPATHY: NORMAL
RIGHT EYE DIABETIC RETINOPATHY: NORMAL

## 2024-06-26 NOTE — TELEPHONE ENCOUNTER
Called and left message for patient to inform 6/26/24 was cancelled due to provider being out of the office. Requested return call to reschedule. Please schedule upon return call. Thank you.

## 2024-06-28 DIAGNOSIS — M79.18 MYOFASCIAL PAIN: ICD-10-CM

## 2024-06-28 RX ORDER — METHOCARBAMOL 500 MG/1
TABLET, FILM COATED ORAL
Qty: 30 TABLET | Refills: 0 | Status: SHIPPED | OUTPATIENT
Start: 2024-06-28

## 2024-07-02 ENCOUNTER — OFFICE VISIT (OUTPATIENT)
Dept: RHEUMATOLOGY | Facility: CLINIC | Age: 44
End: 2024-07-02
Payer: COMMERCIAL

## 2024-07-02 ENCOUNTER — APPOINTMENT (OUTPATIENT)
Dept: LAB | Facility: CLINIC | Age: 44
End: 2024-07-02
Payer: COMMERCIAL

## 2024-07-02 VITALS
WEIGHT: 190 LBS | HEIGHT: 61 IN | SYSTOLIC BLOOD PRESSURE: 116 MMHG | DIASTOLIC BLOOD PRESSURE: 62 MMHG | BODY MASS INDEX: 35.87 KG/M2

## 2024-07-02 DIAGNOSIS — M54.50 CHRONIC BILATERAL LOW BACK PAIN, UNSPECIFIED WHETHER SCIATICA PRESENT: ICD-10-CM

## 2024-07-02 DIAGNOSIS — M25.50 ARTHRALGIA OF MULTIPLE JOINTS: Primary | ICD-10-CM

## 2024-07-02 DIAGNOSIS — G89.29 CHRONIC BILATERAL LOW BACK PAIN, UNSPECIFIED WHETHER SCIATICA PRESENT: ICD-10-CM

## 2024-07-02 DIAGNOSIS — M46.1 SACROILIITIS (HCC): ICD-10-CM

## 2024-07-02 LAB — ERYTHROCYTE [SEDIMENTATION RATE] IN BLOOD: 3 MM/HOUR (ref 0–19)

## 2024-07-02 PROCEDURE — 86140 C-REACTIVE PROTEIN: CPT | Performed by: INTERNAL MEDICINE

## 2024-07-02 PROCEDURE — 36415 COLL VENOUS BLD VENIPUNCTURE: CPT | Performed by: INTERNAL MEDICINE

## 2024-07-02 PROCEDURE — 85652 RBC SED RATE AUTOMATED: CPT | Performed by: INTERNAL MEDICINE

## 2024-07-02 PROCEDURE — 86235 NUCLEAR ANTIGEN ANTIBODY: CPT | Performed by: INTERNAL MEDICINE

## 2024-07-02 PROCEDURE — 99214 OFFICE O/P EST MOD 30 MIN: CPT | Performed by: INTERNAL MEDICINE

## 2024-07-02 NOTE — PROGRESS NOTES
RHEUMATOLOGY FOLLOW-UP NOTE    Assessment and Plan:   Esther Griffith is a 44 y.o.  female who presents for follow-up of possible ankylosing spondylitis, though this seems less likely. I last saw her almost 3 years ago for fibromyalgia, which I believe is still contributing to at least some of her pain.    Assessment & Plan    1. Fibromyalgia.  The patient's HLA-B27 and rheumatoid factor tests were negative in 2018. Inflammatory markers will be repeated. An MRI of the pelvis will be ordered.    2. Dry eyes.  The patient is advised to use preservative-free eye drops such as Refresh or Systane.    Follow-up  A follow-up appointment is scheduled for 6 months from now.    Do labs  Schedule pelvis MRI evaluate for bone marrow edema consistent with sacroiliitis that can be seen in ankylosing spondylitis.    Return to clinic in 6 months      Plan:  Diagnoses and all orders for this visit:    Arthralgia of multiple joints  -     Sedimentation rate, automated  -     C-reactive protein  -     Sjogren's Antibodies    Chronic bilateral low back pain, unspecified whether sciatica present  -     MRI pelvis bony wo contrast; Future    Sacroiliitis (HCC)  -     MRI pelvis bony wo contrast; Future        Follow-up plan: RTC in 6 months         Rheumatic Disease Summary      Chief Complaint  No chief complaint on file.      HPI  Esther Griffith is a 44 y.o.  female who presents for follow-up.    History of Present Illness  The patient is a 44-year-old female who presents for follow-up of her fibromyalgia.    The patient reports a significant improvement in her fibromyalgia symptoms over the past 2.5 years, however, she continues to experience persistent lower back pain. Despite undergoing MRIs, physical therapy, injections, and epidural injections, the pain persists. She recently underwent bilateral ablations, which she reports as highly effective. The pain, which she describes as muscle pain, is exacerbated by overexertion. She is  currently on a regimen of methocarbamol 500 mg, one tablet at lunch and two tablets at night, and nortriptyline twice daily. She also takes gabapentin thrice daily and Celebrex, which she reports as beneficial. She discontinued Celebrex due to concurrent interference with methocarbamol. Her hip pain has resolved, and she has attempted water therapy and physical therapy, both of which were ineffective. She denies experiencing any pain in her hands or knees. She has previously tried NSAIDs, but due to her diabetes, she was advised against Advil. Naproxen was ineffective for her back pain. She primarily uses Tylenol for pain management. She is currently not engaging in any back exercises. She denies any back injury.    The patient experiences fatigue, headaches, dry eyes, sore throat, dry mouth, shortness of breath, difficulty swallowing, joint swelling, back pain, and heat intolerance. She experienced swollen glands and throat pain over the weekend, which she attributes to poor sleep. She uses Visine for dry eyes and drinks water for dry mouth as needed.   She denies any family history of Ankylea. Her mother has arthritis.    The following portions of the patient's history were reviewed and updated as appropriate: allergies, current medications, past family history, past medical history, past social history, past surgical history and problem list.    Review of Systems:   See HPI    Home Medications:    Current Outpatient Medications:     b complex vitamins capsule, Take 1 capsule by mouth daily, Disp: , Rfl:     Blood Glucose Monitoring Suppl (Contour Blood Glucose System) w/Device KIT, Use 1 kit 2 (two) times a day before meals, Disp: 1 kit, Rfl: 0    cholecalciferol (VITAMIN D3) 25 mcg (1,000 units) tablet, Take 1,000 Units by mouth daily Take 1 tab. daily, Disp: , Rfl:     Contour Test test strip, USE TO CHECK BLOOD SUGAR ONCE DAILY, Disp: 50 strip, Rfl: 7    ferrous sulfate 325 (65 Fe) mg tablet, Take 325 mg by  "mouth Take 1 tab. 3 times per week, Disp: , Rfl:     lithium carbonate 300 mg capsule, Take 1 capsule (300 mg total) by mouth 2 (two) times a day with meals, Disp: 60 capsule, Rfl: 2    MAGNESIUM MALATE PO, Take by mouth Take 1 tab. daily, Disp: , Rfl:     metFORMIN (GLUCOPHAGE) 500 mg tablet, TAKE 1 TABLET BY MOUTH TWICE A DAY WITH FOOD, Disp: 60 tablet, Rfl: 5    nortriptyline (PAMELOR) 75 MG capsule, Take 1 capsule (75 mg total) by mouth 2 (two) times a day, Disp: 60 capsule, Rfl: 2    propranolol (INDERAL) 40 mg tablet, Take 0.5 tablets (20 mg total) by mouth daily at bedtime, Disp: 45 tablet, Rfl: 3    gabapentin (NEURONTIN) 300 mg capsule, Take 1 capsule (300 mg total) by mouth 3 (three) times a day, Disp: 90 capsule, Rfl: 5    levothyroxine 75 mcg tablet, Take 1 tablet (75 mcg total) by mouth daily in the early morning, Disp: 100 tablet, Rfl: 0    methocarbamol (ROBAXIN) 500 mg tablet, TAKE 1 TAB. EVERY 8 HR. AS NEEDED FOR MUSCLE SPASMS, Disp: 30 tablet, Rfl: 0    mirtazapine (REMERON) 45 MG tablet, Take 1 tablet (45 mg total) by mouth daily at bedtime, Disp: 30 tablet, Rfl: 2    Objective:    Vitals:    07/02/24 1218   BP: 116/62   Weight: 86.2 kg (190 lb)   Height: 5' 0.5\" (1.537 m)       Physical Exam  Constitutional:       General: She is not in acute distress.  HENT:      Head: Normocephalic and atraumatic.   Eyes:      Conjunctiva/sclera: Conjunctivae normal.   Cardiovascular:      Rate and Rhythm: Normal rate and regular rhythm.      Heart sounds: S1 normal and S2 normal.      No friction rub.   Pulmonary:      Effort: Pulmonary effort is normal. No respiratory distress.      Breath sounds: Normal breath sounds. No wheezing, rhonchi or rales.   Musculoskeletal:      Cervical back: Neck supple.   Skin:     Coloration: Skin is not pale.   Neurological:      Mental Status: She is alert. Mental status is at baseline.   Psychiatric:         Mood and Affect: Mood normal.         Behavior: Behavior normal. "       Physical Exam  Lumbar spine and bilateral SI joints are tender.    Reviewed labs and imaging.    Imaging:   FL spine and pain procedure    Result Date: 6/12/2024  Narrative: Radiofrequency Denervation of Lumbar Facet Joints Indication: Mechanical low back pain Preoperative diagnosis: 1. Lumbar spondylosis without myelopathy 2. Low back pain Postoperative diagnosis: 1. Lumbar spondylosis without myelopathy 2. Low back pain Procedure: Fluoroscopically-guided Radiofrequency denervation of the right L4-5 and L5-S1 facet joint(s) After discussing the risks, benefits, and alternatives to the procedure, the patient expressed understanding and wished to proceed. The patient was brought to the fluoroscopy suite and placed in the prone position. Procedural pause conducted to verify: correct patient identity, procedure to be performed and as applicable, correct side and site, correct patient position, and availability of implants, special equipment and special requirements. Using fluoroscopy, the junction of the transverse process and superior articulating process of the right L4, L5, and sacral ala levels were identified and marked. The skin was sterilely prepped and draped in the usual fashion using Chloraprep skin prep. The skin and subcutaneous tissue were anesthetized with 1 % lidocaine. Using fluoroscopic guidance, a 100 mm 18 gauge RFK RF cannula with a 10 mm active tip was advanced to each target. This was confirmed using PA, lateral and oblique fluoroscopic views. Motor testing was performed at 2Hz up to 2.5 volts, and the measured tissue impedances were satisfactory. With final needle positioning, there was no evidence of radicular stimulation. Prior to lesioning, 1cc of 2% lidocaine was injected at each site. After waiting 2 minutes for local anesthesia to take effect, lesioning was performed at 90 degrees Celsius for 90 seconds. A slight repositioning of the needles was performed an lesioning was again  performed at 90 degreees Celsius for 90 seconds. After RF treatment, each site received 1cc of 0.5% bupivacaine. The patient tolerated the procedure well and there were no apparent complications. After appropriate observation, the patient was dismissed from the clinic in good condition under their own power. EBL: Minimal Specimen: None       Labs:   Office Visit on 07/02/2024   Component Date Value Ref Range Status    Sed Rate 07/02/2024 3  0 - 19 mm/hour Final    CRP 07/02/2024 6.3 (H)  <3.0 mg/L Final    SS-A (RO) Ab 07/02/2024 <0.2  0.0 - 0.9 AI Final    SS-B (LA) Ab 07/02/2024 <0.2  0.0 - 0.9 AI Final   Appointment on 06/05/2024   Component Date Value Ref Range Status    TSH 3RD GENERATON 06/05/2024 4.770 (H)  0.450 - 4.500 uIU/mL Final    The recommended reference ranges for TSH during pregnancy are as follows:   First trimester 0.100 to 2.500 uIU/mL   Second trimester  0.200 to 3.000 uIU/mL   Third trimester 0.300 to 3.000 uIU/m    Note: Normal ranges may not apply to patients who are transgender, non-binary, or whose legal sex, sex at birth, and gender identity differ.  Adult TSH (3rd generation) reference range follows the recommended guidelines of the American Thyroid Association, January, 2020.    FSH 06/05/2024 10.2  See Comment mIU/mL Final    FSH:   Menstruating Females:     Mid-Follicular Phase  3.9-8.8 mIU/mL     Mid-Cycle Phase       4.5-22.5 mIU/mL     Mid-Luteal Phase      1.8-5.1  mIU/mL   Postmenopausal Females     Post Menopausal       16.7-113.6   mIU/mL         Note: Normal ranges may not apply to patients who are transgender, non-binary, or whose legal sex, sex at birth, and gender identity differ.    LH 06/05/2024 5.4  See Comment mIU/mL Final    LH:     Mid-Follicular Phase  2.1-10.9  mIU/mL     Mid-Cycle Peak        19.2-103.0 mIU/mL     Mid-Luteal Phase      1.2-12.9  mIU/mL     Post Menopausal       10.9-58.6 mIU/mL    Note: Normal ranges may not apply to patients who are transgender,  non-binary, or whose legal sex, sex at birth, and gender identity differ.    Estradiol 06/05/2024 66.5  See Comment pg/mL Final    ESTRADIOL:    Non preg Female:      Early Foll. (-10 to-14d from LH Peak)    20.0-157.0 pg/mL      Mid Foll (-9 to-4d from LH Peak)         20.7-139.0 pg/mL      Ovulatory Peak (LH Peak)                 19.3-622.0 pg/mL       Mid Luteal (+4 to +11 from LH Peak)      25.8-279.0 pg/mL      Post [place](NHT)                           0-30.0     pg/mL      Note: Normal ranges may not apply to patients who are transgender, non-binary, or whose legal sex, sex at birth, and gender identity differ.           Free T4 06/05/2024 0.60 (L)  0.61 - 1.12 ng/dL Final    Specimens with biotin concentrations > 10 ng/mL can lead to significant (> 10%) positive bias in result.   Annual Exam on 06/05/2024   Component Date Value Ref Range Status    Case Report 06/05/2024    Final                    Value:Gynecologic Cytology Report                       Case: [id number]                                  Authorizing Provider:  Sai Lopez DO      Collected:           06/05/2024 1315              Ordering Location:     [facility]        Received:            06/05/2024 131                                     [facility]                                                    First Screen:          Ale Ware, CT                                                       Specimen:    LIQUID-BASED PAP, SCREENING, Cervix, Endocervical                                          Primary Interpretation 06/05/2024 Negative for intraepithelial lesion or malignancy   Final    Specimen Adequacy 06/05/2024 Satisfactory for evaluation. Endocervical/transformation zone component present.   Final    Additional Information 06/05/2024    Final                    Value:[facility] FDA approved ,  and ThinPrep Imaging Duo System are utilized with strict adherence to the 's instruction  manual to prepare gynecologic and non-gynecologic cytology specimens for the production of ThinPrep slides as well as for gynecologic ThinPrep imaging. These processes have been validated by our laboratory and/or by the .  The Pap test is not a diagnostic procedure and should not be used as the sole means to detect cervical cancer. It is only a screening procedure to aid in the detection of cervical cancer and its precursors. Both false-negative and false-positive results have been experienced. Your patient's test result should be interpreted in this context together with the history and clinical findings.      Gross Description 06/05/2024    Final                    Value:20 ml , pale peach, cloudy received in a ThinPrep vial.     Appointment on 05/15/2024   Component Date Value Ref Range Status    Lithium Lvl 05/15/2024 0.62  0.60 - 1.20 mmol/L Final    Sodium 05/15/2024 140  135 - 147 mmol/L Final    Potassium 05/15/2024 3.7  3.5 - 5.3 mmol/L Final    Chloride 05/15/2024 100  96 - 108 mmol/L Final    CO2 05/15/2024 30  21 - 32 mmol/L Final    ANION GAP 05/15/2024 10  4 - 13 mmol/L Final    BUN 05/15/2024 14  5 - 25 mg/dL Final    Creatinine 05/15/2024 0.88  0.60 - 1.30 mg/dL Final    Standardized to IDMS reference method    Glucose, Fasting 05/15/2024 124 (H)  65 - 99 mg/dL Final    Calcium 05/15/2024 9.9  8.4 - 10.2 mg/dL Final    AST 05/15/2024 31  13 - 39 U/L Final    ALT 05/15/2024 43  7 - 52 U/L Final    Specimen collection should occur prior to Sulfasalazine administration due to the potential for falsely depressed results.     Alkaline Phosphatase 05/15/2024 49  34 - 104 U/L Final    Total Protein 05/15/2024 7.0  6.4 - 8.4 g/dL Final    Albumin 05/15/2024 4.7  3.5 - 5.0 g/dL Final    Total Bilirubin 05/15/2024 0.37  0.20 - 1.00 mg/dL Final    Use of this assay is not recommended for patients undergoing treatment with eltrombopag due to the potential for falsely elevated  results.  N-acetyl-p-benzoquinone imine (metabolite of Acetaminophen) will generate erroneously low results in samples for patients that have taken an overdose of Acetaminophen.    eGFR 05/15/2024 79  ml/min/1.73sq m Final    Cholesterol 05/15/2024 156  See Comment mg/dL Final    Cholesterol:         Pediatric <18 Years        Desirable          <170 mg/dL      Borderline High    170-199 mg/dL      High               >=200 mg/dL        Adult >=18 Years            Desirable         <200 mg/dL      Borderline High   200-239 mg/dL      High              >239 mg/dL      Triglycerides 05/15/2024 142  See Comment mg/dL Final    Triglyceride:     0-9Y            <75mg/dL     10Y-17Y         <90 mg/dL       >=18Y     Normal          <150 mg/dL     Borderline High 150-199 mg/dL     High            200-499 mg/dL        Very High       >499 mg/dL    Specimen collection should occur prior to Metamizole administration due to the potential for falsely depressed results.    HDL, Direct 05/15/2024 39 (L)  >=50 mg/dL Final    LDL Calculated 05/15/2024 89  0 - 100 mg/dL Final    LDL Cholesterol:     Optimal           <100 mg/dl     Near Optimal      100-129 mg/dl     Above Optimal       Borderline High 130-159 mg/dl       High            160-189 mg/dl       Very High       >189 mg/dl         This screening LDL is a calculated result.   It does not have the accuracy of the Direct Measured LDL in the monitoring of patients with hyperlipidemia and/or statin therapy.   Direct Measure LDL (EMT661) must be ordered separately in these patients.    Non-HDL-Chol (CHOL-HDL) 05/15/2024 117  mg/dl Final    Hemoglobin A1C 05/15/2024 6.0 (H)  Normal 4.0-5.6%; PreDiabetic 5.7-6.4%; Diabetic >=6.5%; Glycemic control for adults with diabetes <7.0% % Final    EAG 05/15/2024 126  mg/dl Final    WBC 05/15/2024 16.06 (H)  4.31 - 10.16 Thousand/uL Final    RBC 05/15/2024 4.34  3.81 - 5.12 Million/uL Final    Hemoglobin 05/15/2024 13.8  11.5 - 15.4 g/dL  Final    Hematocrit 05/15/2024 41.5  34.8 - 46.1 % Final    MCV 05/15/2024 96  82 - 98 fL Final    MCH 05/15/2024 31.8  26.8 - 34.3 pg Final    MCHC 05/15/2024 33.3  31.4 - 37.4 g/dL Final    RDW 05/15/2024 14.0  11.6 - 15.1 % Final    MPV 05/15/2024 9.1  8.9 - 12.7 fL Final    Platelets 05/15/2024 296  149 - 390 Thousands/uL Final    nRBC 05/15/2024 0  /100 WBCs Final    Segmented % 05/15/2024 66  43 - 75 % Final    Immature Grans % 05/15/2024 1  0 - 2 % Final    Lymphocytes % 05/15/2024 29  14 - 44 % Final    Monocytes % 05/15/2024 4  4 - 12 % Final    Eosinophils Relative 05/15/2024 0  0 - 6 % Final    Basophils Relative 05/15/2024 0  0 - 1 % Final    Absolute Neutrophils 05/15/2024 10.50 (H)  1.85 - 7.62 Thousands/µL Final    Absolute Immature Grans 05/15/2024 0.12  0.00 - 0.20 Thousand/uL Final    Absolute Lymphocytes 05/15/2024 4.67 (H)  0.60 - 4.47 Thousands/µL Final    Absolute Monocytes 05/15/2024 0.67  0.17 - 1.22 Thousand/µL Final    Eosinophils Absolute 05/15/2024 0.04  0.00 - 0.61 Thousand/µL Final    Basophils Absolute 05/15/2024 0.06  0.00 - 0.10 Thousands/µL Final    Creatinine, Ur 05/15/2024 196.9  Reference range not established. mg/dL Final    Albumin,U,Random 05/15/2024 9.4  <20.0 mg/L Final    Albumin Creat Ratio 05/15/2024 5  0 - 30 mg/g creatinine Final   Appointment on 05/02/2024   Component Date Value Ref Range Status    Lithium Lvl 05/02/2024 0.30 (L)  0.60 - 1.20 mmol/L Final   Appointment on 02/08/2024   Component Date Value Ref Range Status    Sodium 02/08/2024 136  135 - 147 mmol/L Final    Potassium 02/08/2024 4.3  3.5 - 5.3 mmol/L Final    Chloride 02/08/2024 103  96 - 108 mmol/L Final    CO2 02/08/2024 27  21 - 32 mmol/L Final    ANION GAP 02/08/2024 6  mmol/L Final    BUN 02/08/2024 17  5 - 25 mg/dL Final    Creatinine 02/08/2024 0.88  0.60 - 1.30 mg/dL Final    Standardized to IDMS reference method    Glucose, Fasting 02/08/2024 135 (H)  65 - 99 mg/dL Final    Calcium 02/08/2024  9.1  8.4 - 10.2 mg/dL Final    AST 02/08/2024 22  13 - 39 U/L Final    ALT 02/08/2024 26  7 - 52 U/L Final    Specimen collection should occur prior to Sulfasalazine administration due to the potential for falsely depressed results.     Alkaline Phosphatase 02/08/2024 42  34 - 104 U/L Final    Total Protein 02/08/2024 6.7  6.4 - 8.4 g/dL Final    Albumin 02/08/2024 4.2  3.5 - 5.0 g/dL Final    Total Bilirubin 02/08/2024 0.37  0.20 - 1.00 mg/dL Final    Use of this assay is not recommended for patients undergoing treatment with eltrombopag due to the potential for falsely elevated results.  N-acetyl-p-benzoquinone imine (metabolite of Acetaminophen) will generate erroneously low results in samples for patients that have taken an overdose of Acetaminophen.    eGFR 02/08/2024 79  ml/min/1.73sq m Final    Cholesterol 02/08/2024 146  See Comment mg/dL Final    Cholesterol:         Pediatric <18 Years        Desirable          <170 mg/dL      Borderline High    170-199 mg/dL      High               >=200 mg/dL        Adult >=18 Years            Desirable         <200 mg/dL      Borderline High   200-239 mg/dL      High              >239 mg/dL      Triglycerides 02/08/2024 181 (H)  See Comment mg/dL Final    Triglyceride:     0-9Y            <75mg/dL     10Y-17Y         <90 mg/dL       >=18Y     Normal          <150 mg/dL     Borderline High 150-199 mg/dL     High            200-499 mg/dL        Very High       >499 mg/dL    Specimen collection should occur prior to Metamizole administration due to the potential for falsely depressed results.    HDL, Direct 02/08/2024 53  >=50 mg/dL Final    LDL Calculated 02/08/2024 57  0 - 100 mg/dL Final    LDL Cholesterol:     Optimal           <100 mg/dl     Near Optimal      100-129 mg/dl     Above Optimal       Borderline High 130-159 mg/dl       High            160-189 mg/dl       Very High       >189 mg/dl         This screening LDL is a calculated result.   It does not have the  accuracy of the Direct Measured LDL in the monitoring of patients with hyperlipidemia and/or statin therapy.   Direct Measure LDL ([id number]) must be ordered separately in these patients.    Non-HDL-Chol (CHOL-HDL) 02/08/2024 93  mg/dl Final    TSH 3RD GENERATON 02/08/2024 2.295  0.450 - 4.500 uIU/mL Final    The recommended reference ranges for TSH during pregnancy are as follows:   First trimester 0.100 to 2.500 uIU/mL   Second trimester  0.200 to 3.000 uIU/mL   Third trimester 0.300 to 3.000 uIU/m    Note: Normal ranges may not apply to patients who are transgender, non-binary, or whose legal sex, sex at birth, and gender identity differ.  Adult TSH (3rd generation) reference range follows the recommended guidelines of the American Thyroid Association, January, 2020.    Hemoglobin A1C 02/08/2024 6.7 (H)  Normal 4.0-5.6%; PreDiabetic 5.7-6.4%; Diabetic >=6.5%; Glycemic control for adults with diabetes <7.0% % Final    EAG 02/08/2024 146  mg/dl Final

## 2024-07-03 ENCOUNTER — OFFICE VISIT (OUTPATIENT)
Dept: FAMILY MEDICINE CLINIC | Facility: CLINIC | Age: 44
End: 2024-07-03
Payer: COMMERCIAL

## 2024-07-03 ENCOUNTER — TELEPHONE (OUTPATIENT)
Dept: PSYCHIATRY | Facility: CLINIC | Age: 44
End: 2024-07-03

## 2024-07-03 VITALS
HEIGHT: 61 IN | TEMPERATURE: 98.4 F | DIASTOLIC BLOOD PRESSURE: 74 MMHG | WEIGHT: 189.2 LBS | HEART RATE: 96 BPM | BODY MASS INDEX: 35.72 KG/M2 | RESPIRATION RATE: 18 BRPM | OXYGEN SATURATION: 100 % | SYSTOLIC BLOOD PRESSURE: 128 MMHG

## 2024-07-03 DIAGNOSIS — E03.9 ACQUIRED HYPOTHYROIDISM: Primary | ICD-10-CM

## 2024-07-03 LAB
CRP SERPL QL: 6.3 MG/L
ENA SS-A AB SER-ACNC: <0.2 AI (ref 0–0.9)
ENA SS-B AB SER-ACNC: <0.2 AI (ref 0–0.9)

## 2024-07-03 PROCEDURE — 99214 OFFICE O/P EST MOD 30 MIN: CPT | Performed by: NURSE PRACTITIONER

## 2024-07-03 RX ORDER — LEVOTHYROXINE SODIUM 0.07 MG/1
75 TABLET ORAL
Qty: 100 TABLET | Refills: 0 | Status: SHIPPED | OUTPATIENT
Start: 2024-07-03

## 2024-07-03 NOTE — PATIENT INSTRUCTIONS
"Patient Education     Hypothyroidism (underactive thyroid)   The Basics   Written by the doctors and editors at [facility]   What is hypothyroidism? -- Hypothyroidism happens when a gland in your neck, called the thyroid gland, makes too little thyroid hormone. This hormone controls how the body uses and stores energy (figure 1).  With a different condition, hyperthyroidism, the thyroid gland makes too much thyroid hormone. With hypothyroidism, it does not make enough. Doctors sometimes also use the term \"underactive thyroid.\"  What causes hypothyroidism? -- Different things can cause the thyroid gland to be unable to make enough thyroid hormone. These include:   Problems with the immune system - The immune system is the body's infection-fighting system. Sometimes, a person's immune system attacks healthy cells, such as cells in the thyroid. This is called \"chronic autoimmune thyroiditis\" or \"Hashimoto's thyroiditis.\" It is the most common cause of hypothyroidism in the .   Thyroidectomy - This is surgery to remove the thyroid gland.   Radioiodine therapy - This is a treatment used for hyperthyroidism. It often causes hypothyroidism because it destroys part of the thyroid gland.   Radiation in the neck area - High doses of radiation (for example, to treat cancer) can damage the thyroid gland.   Certain medicines  The treatment of hypothyroidism is the same no matter what caused it.  What are the symptoms of hypothyroidism? -- Some people with hypothyroidism have no symptoms. But most people feel tired. That can make it hard to know if a person has it, because a lot of conditions can make you tired.  Other symptoms of hypothyroidism include:   Lack of energy   Getting cold easily   Developing coarse or thin hair   Getting constipated (having too few bowel movements)  If it is not treated, hypothyroidism can also weaken and slow your heart. This can make you feel out of breath or tired when you exercise. It can also " cause swelling (fluid buildup) in your ankles. Untreated hypothyroidism can also increase your blood pressure and raise your cholesterol. Both of these things increase the risk of heart problems.  Hypothyroidism can disrupt monthly periods. It can also make it hard to get pregnant. In people who do get pregnant, hypothyroidism can cause problems. For instance, it can increase the chances of having a miscarriage. (A miscarriage is when a pregnancy ends on its own before 20 weeks.)  Is there a test for hypothyroidism? -- Yes. Your doctor or nurse can check for hypothyroidism using a simple blood test.  How is hypothyroidism treated? -- Treatment involves taking thyroid hormone pills every day. After you take the pills for about 6 weeks, your doctor or nurse will test your blood again. This is to make sure that the levels are where they should be. They might adjust your dose depending on the results. Most people with hypothyroidism need to keep taking thyroid pills for the rest of their life. This gives your body the right level of the hormone that it cannot make on its own.  Thyroid hormone pills come in different brand name and generic forms. All of the pills work equally well. If possible, stick with the same generic or brand name. But switching between pills does not cause problems for most people. Talk to your doctor or nurse if you want to switch for some reason.  Never change your dose of thyroid hormone on your own. Taking too much thyroid hormone can cause heart rhythm problems and even damage your bones.  What if I want to get pregnant? -- You can try to get pregnant. Many people with hypothyroidism have healthy pregnancies. But your doctor or nurse will most likely need to change your dose of thyroid hormone once you are pregnant. That's because you need more thyroid hormone during pregnancy. They will also measure your levels of thyroid hormone 4 weeks after any change in your dose, and at least once during  each trimester of pregnancy.  All topics are updated as new evidence becomes available and our peer review process is complete.  This topic retrieved from [employer] on: Feb 26, 2024.  Topic 16269 Version 11.0  Release: 32.2.4 - C32.56  © 2024 UpToDate, Inc. and/or its affiliates. All rights reserved.  figure 1: Thyroid and parathyroid glands     The thyroid is a butterfly-shaped gland in the middle of the neck. It sits just below the larynx (voice box). The thyroid makes 2 hormones, called T3 and T4, which control how the body uses and stores energy. The parathyroid glands are 4 small glands behind the thyroid. They make a hormone called parathyroid hormone, which helps control the amount of calcium in the blood.  Graphic 95535 Version 10.0  Consumer Information Use and Disclaimer   Disclaimer: This generalized information is a limited summary of diagnosis, treatment, and/or medication information. It is not meant to be comprehensive and should be used as a tool to help the user understand and/or assess potential diagnostic and treatment options. It does NOT include all information about conditions, treatments, medications, side effects, or risks that may apply to a specific patient. It is not intended to be medical advice or a substitute for the medical advice, diagnosis, or treatment of a health care provider based on the health care provider's examination and assessment of a patient's specific and unique circumstances. Patients must speak with a health care provider for complete information about their health, medical questions, and treatment options, including any risks or benefits regarding use of medications. This information does not endorse any treatments or medications as safe, effective, or approved for treating a specific patient. UpToDate, Inc. and its affiliates disclaim any warranty or liability relating to this information or the use thereof.The use of this information is governed by the Terms of Use,  available at https://www.woltersWEIC Corporationuwer.com/en/know/clinical-effectiveness-terms. 2024© [employer] and its affiliates and/or licensors. All rights reserved.  Copyright   © 2024 [employer] and/or its affiliates. All rights reserved.

## 2024-07-03 NOTE — PROGRESS NOTES
Ambulatory Visit  Name: Esther Griffith      : 1980      MRN: [id number]  Encounter Provider: ADELAIDA Augustine  Encounter Date: 7/3/2024   Encounter department: [facility]    Assessment & Plan   1. Acquired hypothyroidism  Assessment & Plan:  Lab Results   Component Value Date    [id number] 4.770 (H) 2024         Component      Latest Ref Rng 2024   FREE T4      0.61 - 1.12 ng/dL 0.60 (L)       Legend:  (L) Low    Patient recently had lab work performed through her gynecologist which showed a high TSH and low T4.  Patient had had elevated TSH in the past with normal T4.  She has never been treated for hypothyroidism.  Information regarding hypothyroidism was explained to the patient and provided to her on the after visit summary.  Treatment discussed.  Levothyroxine 75 mcg daily sent to the pharmacy.  Patient will repeat a TSH and T4 level in 6 weeks.  At that time I will contact her with those results along with any recommendations for medication modifications.  Orders:  -     levothyroxine 75 mcg tablet; Take 1 tablet (75 mcg total) by mouth daily in the early morning  -     TSH, 3rd generation with Free T4 reflex; Future; Expected date: 2024    Depression Screening and Follow-up Plan: Patient's depression screening was positive with a PHQ-9 score of 9. Continue regular follow-up with their mental health provider who is managing their mental health condition(s).       History of Present Illness   {Disappearing Hyperlinks I Encounters * My Last Note * Since Last Visit * History :21116}  Esther presents to the office today for follow-up.  She recently had blood work ordered by her gynecologist which shows abnormal thyroid labs.  She currently does not have a new diagnosis of hypothyroidism.  This was discussed with the patient and information provided to her on the after visit summary.  Treatment discussed.  Levothyroxine 75 mcg daily sent to the pharmacy.  She will  "repeat a TSH level in 6 weeks.  I will contact her with those results.        Review of Systems   Constitutional:  Positive for diaphoresis and fatigue. Negative for activity change and fever.   HENT:  Negative for congestion, hearing loss, rhinorrhea, trouble swallowing and voice change.    Eyes:  Negative for photophobia, pain, discharge and visual disturbance.   Respiratory:  Negative for cough, chest tightness and shortness of breath.    Cardiovascular:  Negative for chest pain, palpitations and leg swelling.   Gastrointestinal:  Negative for abdominal pain, blood in stool, constipation, nausea and vomiting.   Endocrine: Negative for cold intolerance and heat intolerance.   Genitourinary:  Negative for difficulty urinating, frequency, hematuria, urgency, vaginal bleeding and vaginal discharge.   Musculoskeletal:  Negative for arthralgias and myalgias.   Skin: Negative.    Neurological:  Negative for dizziness, weakness, numbness and headaches.   Psychiatric/Behavioral:  Negative for decreased concentration. The patient is not nervous/anxious.        Objective   {Disappearing Hyperlinks   Review Vitals * Enter New Vitals * Results Review * Labs * Imaging * Cardiology * Procedures * Lung Cancer Screening :03256}  /74   Pulse 96   Temp 98.4 °F (36.9 °C) (Tympanic)   Resp 18   Ht 5' 0.5\" (1.537 m)   Wt 85.8 kg (189 lb 3.2 oz)   LMP 03/13/2023 (Approximate)   SpO2 100%   BMI 36.34 kg/m²     Physical Exam  Vitals reviewed.   Constitutional:       Appearance: Normal appearance. She is obese. She is diaphoretic.   HENT:      Head: Normocephalic.      Nose: Nose normal.      Mouth/Throat:      Mouth: Mucous membranes are moist.      Pharynx: Oropharynx is clear.   Eyes:      Extraocular Movements: Extraocular movements intact.      Pupils: Pupils are equal, round, and reactive to light.   Neck:      Thyroid: No thyroid mass, thyromegaly or thyroid tenderness.   Cardiovascular:      Rate and Rhythm: Normal " rate and regular rhythm.      Pulses: Normal pulses.      Heart sounds: Normal heart sounds.   Pulmonary:      Effort: Pulmonary effort is normal.      Breath sounds: Normal breath sounds.   Musculoskeletal:         General: Normal range of motion.      Cervical back: Full passive range of motion without pain and normal range of motion.   Lymphadenopathy:      Cervical: No cervical adenopathy.   Skin:     General: Skin is warm.   Neurological:      General: No focal deficit present.      Mental Status: She is alert and oriented to person, place, and time. Mental status is at baseline.   Psychiatric:         Mood and Affect: Mood normal.         Behavior: Behavior normal.         Thought Content: Thought content normal.         Judgment: Judgment normal.       Administrative Statements {Disappearing Hyperlinks I  [id number]}

## 2024-07-03 NOTE — TELEPHONE ENCOUNTER
"Patient was called re no insurance in the chart. Asked if she has any current insurance that she could send us. If not, mentioned \"self-pay\" will be issued. Self-pay self-pay sheet was put into patient's chart through [employer].  "

## 2024-07-03 NOTE — ASSESSMENT & PLAN NOTE
Lab Results   Component Value Date    [id number] 4.770 (H) 06/05/2024         Component      Latest Ref Rng 6/5/2024   FREE T4      0.61 - 1.12 ng/dL 0.60 (L)       Legend:  (L) Low    Patient recently had lab work performed through her gynecologist which showed a high TSH and low T4.  Patient had had elevated TSH in the past with normal T4.  She has never been treated for hypothyroidism.  Information regarding hypothyroidism was explained to the patient and provided to her on the after visit summary.  Treatment discussed.  Levothyroxine 75 mcg daily sent to the pharmacy.  Patient will repeat a TSH and T4 level in 6 weeks.  At that time I will contact her with those results along with any recommendations for medication modifications.

## 2024-07-05 ENCOUNTER — TELEMEDICINE (OUTPATIENT)
Dept: BEHAVIORAL/MENTAL HEALTH CLINIC | Facility: CLINIC | Age: 44
End: 2024-07-05

## 2024-07-05 DIAGNOSIS — F41.1 GENERALIZED ANXIETY DISORDER: ICD-10-CM

## 2024-07-05 DIAGNOSIS — F33.3 SEVERE EPISODE OF RECURRENT MAJOR DEPRESSIVE DISORDER, WITH PSYCHOTIC FEATURES (HCC): Primary | ICD-10-CM

## 2024-07-05 NOTE — BH TREATMENT PLAN
Outpatient Behavioral Health Psychotherapy Treatment Plan    Esther Nacho  1980     Date of Initial Psychotherapy Assessment:  8/21/2018   Date of Current Treatment Plan: 07/05/24  Treatment Plan Target Date: 1/5/2025  Treatment Plan Expiration Date: 1/5/2025    Diagnosis:   1. Severe episode of recurrent major depressive disorder, with psychotic features (HCC)        2. Generalized anxiety disorder            Area(s) of Need: depression/anxiety/chronic pain    Long Term Goal 1 (in the client's own words): I want to continue to effectively manage the depression and the anxiety (slow progress as of 7/5/24)    Stage of Change: Action    Target Date for completion: 1/5/2025     Anticipated therapeutic modalities: CBT, mindfulness     People identified to complete this goal: Vidhi Santana      Objective 1: (identify the means of measuring success in meeting the objective): I will continue to be aware of the depression/anxiety and negative self talk messages and redirect to positive and realistic messages (lower depression/anxiety).     Objective 2: I will continue to work with Dr. Ovalle on medication management.     Objective 3: I will continue to make sure I am taking care of myself.     Objective 4: I will continue to reach out to friends to stay socially connected     Objective 5: (identify the means of measuring success in meeting the objective): I will create a daily schedule of household chores to help build motivation and help me feel accomplished    Long Term Goal 2 (in the client's own words): I will continue to work on strengthening my self esteem.    Stage of Change: Action    Target Date for completion: 1/5/2025     Anticipated therapeutic modalities: CBT     People identified to complete this goal: Vidhi Santana      Objective 1: (identify the means of measuring success in meeting the objective): I will remain aware of those who have impacted me positively and disregard the messages from those who  were negative influences.     Objective 2: I will review and use my qualities/strengths/assets as reminders of my worth.     Objective 3: I will continue with self care (including using my effective communication skills such as assertiveness).     Objective 4: I will continue to explore options to improve my quality of life (taking classes, finding a creative hobby)       Long Term Goal 3 (in the client's own words): I will continue to learn and implement effective pain management strategies.    Stage of Change: Action    Target Date for completion: 1/5/2025     Anticipated therapeutic modalities: CBT, mindfulness     People identified to complete this goal: Vidhi Santana      Objective 1: (identify the means of measuring success in meeting the objective): I will learn and practice effective pain management strategies.       Objective 2: (identify the means of measuring success in meeting the objective): I will explore yoga as a possible pain management strategy    Objective 3: I will walk and be more physically active as much as I am able.     I am currently under the care of a [facility] psychiatric provider: yes    My [facility] psychiatric provider is: Dr. Ovalle    I am currently taking psychiatric medications: Yes, as prescribed    I feel that I will be ready for discharge from mental health care when I reach the following (measurable goal/objective): Once I have progressed to the point of being able to positively maintain my focus and drive to accomplish the things I want to accomplish in my life.    For children and adults who have a legal guardian:   Has there been any change to custody orders and/or guardianship status? NA. If yes, attach updated documentation.    I have created my Crisis Plan and have been offered a copy of this plan    Behavioral Health Treatment Plan [facility]: Diagnosis and Treatment Plan explained to Esther Griffith acknowledges an understanding of their diagnosis. Esther  Claudiachristina agrees to this treatment plan.    I have been offered a copy of this Treatment Plan. yes      Esther Griffith, 1980, actively participated in the review and update of this treatment plan during a virtual session, using the Epic Embedded platform.   Esther Griffith  provided verbal consent on 7/5/2024 at 1340 PM. The treatment plan was transcribed into the Electronic Health Record at a later time. Treatment plan also sent to Esther's MyChart for signature.

## 2024-07-05 NOTE — PSYCH
Virtual Regular Visit    Verification of patient location:    Patient is located at Home in the following state in which I hold an active license [place]      Assessment/Plan:    Problem List Items Addressed This Visit          Behavioral Health    Severe episode of recurrent major depressive disorder, with psychotic features (HCC) - Primary    Generalized anxiety disorder          Reason for visit is   Chief Complaint   Patient presents with    Virtual Regular Visit       Encounter provider HERMELINDA ALBA      Recent Visits  Date Type Provider Dept   07/03/24 Telephone HERMELINDA Alba [facility] [place]   Showing recent visits within past 7 days and meeting all other requirements  Today's Visits  Date Type Provider Dept   07/05/24 Telemedicine HERMELINDA Alba [facility] [place]   Showing today's visits and meeting all other requirements  Future Appointments  No visits were found meeting these conditions.  Showing future appointments within next 150 days and meeting all other requirements       The patient was identified by name and date of birth. Esther Griffith was informed that this is a telemedicine visit and that the visit is being conducted throughthe Epic Embedded platform. She agrees to proceed..  My office door was closed. No one else was in the room.  She acknowledged consent and understanding of privacy and security of the video platform. The patient has agreed to participate and understands they can discontinue the visit at any time.    Patient is aware this is a billable service.     HPI     Past Medical History:   Diagnosis Date    Alcoholism (HCC) 03/15/2001    Anxiety     Blood transfusion declined because patient is [employer] 05/01/2023    Chronic pain disorder     Chronic sinusitis     Depression     Depression     Diabetes (HCC)     Diabetes mellitus (HCC) 09/01/2022    Fibromyalgia     Migraine     Obesity     Polyarthritis     Last assessed  9/21/2015    Psychiatric disorder     Psychiatric illness     Sleep difficulties     Substance abuse (HCC) 03/15/1994       Past Surgical History:   Procedure Laterality Date    COLONOSCOPY  06/2019    DENTAL SURGERY      HYSTERECTOMY  05/01/2023    HYSTEROSCOPY      Endometrial Biopsy By Hysteroscopy    GA LAPS SUPRACRV HYSTERECT 250 GM/< RMVL TUBE/OVAR N/A 05/01/2023    Procedure: (LSH) W/ BILATERAL SALPINGECTOMY, REMOVAL PARAOVARIAN CYST;  Surgeon: Sai Lopez DO;  Location: [facility];  Service: Gynecology    REMOVAL OF INTRAUTERINE DEVICE (IUD)      US GUIDED BREAST BIOPSY RIGHT COMPLETE Right 06/17/2019    Benign       Current Outpatient Medications   Medication Sig Dispense Refill    b complex vitamins capsule Take 1 capsule by mouth daily      Blood Glucose Monitoring Suppl (Contour Blood Glucose System) w/Device KIT Use 1 kit 2 (two) times a day before meals 1 kit 0    cholecalciferol (VITAMIN D3) 25 mcg (1,000 units) tablet Take 1,000 Units by mouth daily Take 1 tab. daily      Contour Test test strip USE TO CHECK BLOOD SUGAR ONCE DAILY 50 strip 7    ferrous sulfate 325 (65 Fe) mg tablet Take 325 mg by mouth Take 1 tab. 3 times per week      gabapentin (NEURONTIN) 300 mg capsule Take 1 capsule (300 mg total) by mouth 3 (three) times a day 90 capsule 1    levothyroxine 75 mcg tablet Take 1 tablet (75 mcg total) by mouth daily in the early morning 100 tablet 0    lithium carbonate 300 mg capsule Take 1 capsule (300 mg total) by mouth 2 (two) times a day with meals 60 capsule 2    MAGNESIUM MALATE PO Take by mouth Take 1 tab. daily      metFORMIN (GLUCOPHAGE) 500 mg tablet TAKE 1 TABLET BY MOUTH TWICE A DAY WITH FOOD 60 tablet 5    methocarbamol (ROBAXIN) 500 mg tablet TAKE 1 TAB. EVERY 8 HR. AS NEEDED FOR MUSCLE SPASMS 30 tablet 0    nortriptyline (PAMELOR) 75 MG capsule Take 1 capsule (75 mg total) by mouth 2 (two) times a day 60 capsule 2    propranolol (INDERAL) 40 mg tablet Take 0.5 tablets (20 mg  "total) by mouth daily at bedtime 45 tablet 3     No current facility-administered medications for this visit.        Allergies   Allergen Reactions    Cannabidiol Shortness Of Breath, Itching, Swelling, Anxiety, Palpitations, Confusion, Hypertension, Throat Swelling and Tongue Swelling    Amoxicillin-Pot Clavulanate Hives    Decadrol [Dexamethasone] Other (See Comments)     psychosis    Penicillins Hives     Hives/Uticaria    Tetracyclines & Related Hives      Allergy;        Review of Systems    Video Exam    There were no vitals filed for this visit.    Physical Exam     Behavioral Health Psychotherapy Progress Note    Psychotherapy Provided: Individual Psychotherapy     1. Severe episode of recurrent major depressive disorder, with psychotic features (HCC)        2. Generalized anxiety disorder            Goals addressed in session: Goal 1     DATA: Met with Esther for follow up.  Esther shared that she has been in a lot of physical pain, and has had to \"baby\" her back and not do much. She did share that she has been doing some things with friends, but still finds that it has not really been enough to boost her mood, stating that she is \"pretty down.\" She mentioned that Dr. Ovalle reduced her mirtazapine dose, and she thinks that maybe she needs to go back up on it again, and will monitor her mood and reach out to Dr. Ovalle if that is what she decides.  Esther shared that she feels she is at a \"crossroads\" with some of her friends again, with some of them seeming to be very immature and dramatic, which she is unsure she wants to keep in her life.  She said that she is more flexible and can go with the flow of things fairly easily, and finds it difficult to deal with people who cannot, so she has thought about just leaving them all behind and starting over, with the exception of a couple of close friends who are very supportive.  Processed her concerns about her friends, discussed pros and cons of such a " "change in friend group,and encouraged Esther to consider what feels best for her at this stage of her life.      During this session, this clinician used the following therapeutic modalities: Client-centered Therapy, Cognitive Behavioral Therapy, and Supportive Psychotherapy    Substance Abuse was not addressed during this session. If the client is diagnosed with a co-occurring substance use disorder, please indicate any changes in the frequency or amount of use: n/a. Stage of change for addressing substance use diagnoses: No substance use/Not applicable    ASSESSMENT:  Esther Griffith presents with a Depressed mood.     her affect is Normal range and intensity, which is congruent, with her mood and the content of the session. The client has made progress on their goals.     Esther Griffith presents with a low risk of suicide, low risk of self-harm, and minimal risk of harm to others.    For any risk assessment that surpasses a \"low\" rating, a safety plan must be developed.    A safety plan was indicated: no  If yes, describe in detail n/a    PLAN: Between sessions, Esther Griffith will focus on self-care, healthy boundaries with people in her life that do not fit her needs and values, and will focus on engaging with positive people who feel supportive to her. At the next session, the therapist will use Client-centered Therapy, Cognitive Behavioral Therapy, and Supportive Psychotherapy to address depression and anxiety.    Behavioral Health Treatment Plan and Discharge Planning: Esther Griffith is aware of and agrees to continue to work on their treatment plan. They have identified and are working toward their discharge goals. yes    Visit start and stop times:    07/05/24  Start Time: 1307  Stop Time: 1342  Total Visit Time: 35 minutes  "

## 2024-07-07 DIAGNOSIS — F41.1 GENERALIZED ANXIETY DISORDER: ICD-10-CM

## 2024-07-07 DIAGNOSIS — R20.2 PARESTHESIA: ICD-10-CM

## 2024-07-07 RX ORDER — GABAPENTIN 300 MG/1
300 CAPSULE ORAL 3 TIMES DAILY
Qty: 90 CAPSULE | Refills: 5 | Status: SHIPPED | OUTPATIENT
Start: 2024-07-07

## 2024-07-09 ENCOUNTER — TELEPHONE (OUTPATIENT)
Dept: PSYCHIATRY | Facility: CLINIC | Age: 44
End: 2024-07-09

## 2024-07-09 ENCOUNTER — TELEPHONE (OUTPATIENT)
Age: 44
End: 2024-07-09

## 2024-07-09 NOTE — TELEPHONE ENCOUNTER
Spoke with Esther. She reports feeling extra sad all the time, see the glass as half empty and has less pleasure in activities. She also feels like her brain isn't working as fast:  it takes longer to figure things out. She would like to increase the dose of mirtazapine back to 45 mg.       Esther has a bottle of the 45 mg and can use her supplies. Reviewed she does not need to  a new prescription, but if sent she will know the increase was approved by Dr. Ovalle or she will get a [employer] message.

## 2024-07-09 NOTE — TELEPHONE ENCOUNTER
Ruba called in from [employer] and needs the approve MRI lumbar  report and pelvis xray report in order to approve the Prior Authorization for the patient MRI  ( pelvis )    Please advise [phone]  Tracking number [id number]

## 2024-07-09 NOTE — TELEPHONE ENCOUNTER
Esther left a  7/8/24:  she is experiencing a decrease in mood and cognitive functioning after decreasing mirtazapine to 30 mg. She's asking if the dose could be increased to where it was, 45 mg.

## 2024-07-10 NOTE — TELEPHONE ENCOUNTER
Mirtazapine is in medication history, sent 6/3/24 for a decrease/taper and the office note from 6/3 reflects the same. Esther would like to resume 45 mg.

## 2024-07-11 DIAGNOSIS — M79.18 MYOFASCIAL PAIN: ICD-10-CM

## 2024-07-11 DIAGNOSIS — F33.2 SEVERE EPISODE OF RECURRENT MAJOR DEPRESSIVE DISORDER, WITHOUT PSYCHOTIC FEATURES (HCC): Primary | ICD-10-CM

## 2024-07-11 RX ORDER — MIRTAZAPINE 45 MG/1
45 TABLET, FILM COATED ORAL
Qty: 30 TABLET | Refills: 2 | Status: SHIPPED | OUTPATIENT
Start: 2024-07-11

## 2024-07-11 RX ORDER — MIRTAZAPINE 45 MG/1
45 TABLET, ORALLY DISINTEGRATING ORAL
Start: 2024-07-11 | End: 2024-07-11

## 2024-07-11 RX ORDER — METHOCARBAMOL 500 MG/1
TABLET, FILM COATED ORAL
Qty: 30 TABLET | Refills: 0 | Status: SHIPPED | OUTPATIENT
Start: 2024-07-11

## 2024-07-11 NOTE — TELEPHONE ENCOUNTER
Spoke with Esther. She relates taking nortriptyline (in med record since fall 2020) and mirtazapine (October 2023) together and felt it was sorking really well. She related the reason for the decrease was a pharmacy concern about a different medication combination,but the decision was to decrease mirtazapine.

## 2024-07-12 NOTE — TELEPHONE ENCOUNTER
Spoke with Esther and reviewed Dr. Ovalle sent the prescription for 45 mg tabs. She appreciated same.

## 2024-07-15 DIAGNOSIS — M46.1 SACROILIITIS (HCC): Primary | ICD-10-CM

## 2024-07-16 ENCOUNTER — TELEPHONE (OUTPATIENT)
Dept: RHEUMATOLOGY | Facility: CLINIC | Age: 44
End: 2024-07-16

## 2024-07-16 NOTE — TELEPHONE ENCOUNTER
LM for pt to do SI joint x-rays so that we can re-submit for pelvis MRI approval. She can call central scheduling at [phone] to schedule an appt

## 2024-07-17 ENCOUNTER — OFFICE VISIT (OUTPATIENT)
Dept: PAIN MEDICINE | Facility: CLINIC | Age: 44
End: 2024-07-17
Payer: COMMERCIAL

## 2024-07-17 ENCOUNTER — HOSPITAL ENCOUNTER (OUTPATIENT)
Dept: RADIOLOGY | Facility: HOSPITAL | Age: 44
Discharge: HOME | End: 2024-07-17
Payer: COMMERCIAL

## 2024-07-17 ENCOUNTER — APPOINTMENT (OUTPATIENT)
Dept: LAB | Facility: CLINIC | Age: 44
End: 2024-07-17
Payer: COMMERCIAL

## 2024-07-17 VITALS
HEIGHT: 61 IN | HEART RATE: 98 BPM | BODY MASS INDEX: 35.68 KG/M2 | DIASTOLIC BLOOD PRESSURE: 77 MMHG | WEIGHT: 189 LBS | SYSTOLIC BLOOD PRESSURE: 120 MMHG

## 2024-07-17 DIAGNOSIS — E03.9 ACQUIRED HYPOTHYROIDISM: ICD-10-CM

## 2024-07-17 DIAGNOSIS — M46.1 SACROILIITIS (HCC): ICD-10-CM

## 2024-07-17 DIAGNOSIS — D72.829 LEUKOCYTOSIS, UNSPECIFIED TYPE: Primary | ICD-10-CM

## 2024-07-17 DIAGNOSIS — M51.36 DDD (DEGENERATIVE DISC DISEASE), LUMBAR: ICD-10-CM

## 2024-07-17 DIAGNOSIS — M47.816 LUMBAR SPONDYLOSIS: Primary | ICD-10-CM

## 2024-07-17 DIAGNOSIS — D72.829 LEUKOCYTOSIS, UNSPECIFIED TYPE: ICD-10-CM

## 2024-07-17 LAB
BASOPHILS # BLD AUTO: 0.04 THOUSANDS/ÂΜL (ref 0–0.1)
BASOPHILS NFR BLD AUTO: 0 % (ref 0–1)
EOSINOPHIL # BLD AUTO: 0.16 THOUSAND/ÂΜL (ref 0–0.61)
EOSINOPHIL NFR BLD AUTO: 1 % (ref 0–6)
ERYTHROCYTE [DISTWIDTH] IN BLOOD BY AUTOMATED COUNT: 14.3 % (ref 11.6–15.1)
HCT VFR BLD AUTO: 36.9 % (ref 34.8–46.1)
HGB BLD-MCNC: 12.3 G/DL (ref 11.5–15.4)
IMM GRANULOCYTES # BLD AUTO: 0.05 THOUSAND/UL (ref 0–0.2)
IMM GRANULOCYTES NFR BLD AUTO: 0 % (ref 0–2)
LYMPHOCYTES # BLD AUTO: 3.17 THOUSANDS/ÂΜL (ref 0.6–4.47)
LYMPHOCYTES NFR BLD AUTO: 27 % (ref 14–44)
MCH RBC QN AUTO: 32.3 PG (ref 26.8–34.3)
MCHC RBC AUTO-ENTMCNC: 33.3 G/DL (ref 31.4–37.4)
MCV RBC AUTO: 97 FL (ref 82–98)
MONOCYTES # BLD AUTO: 0.6 THOUSAND/ÂΜL (ref 0.17–1.22)
MONOCYTES NFR BLD AUTO: 5 % (ref 4–12)
NEUTROPHILS # BLD AUTO: 7.63 THOUSANDS/ÂΜL (ref 1.85–7.62)
NEUTS SEG NFR BLD AUTO: 67 % (ref 43–75)
NRBC BLD AUTO-RTO: 0 /100 WBCS
PLATELET # BLD AUTO: 259 THOUSANDS/UL (ref 149–390)
PMV BLD AUTO: 9.2 FL (ref 8.9–12.7)
RBC # BLD AUTO: 3.81 MILLION/UL (ref 3.81–5.12)
WBC # BLD AUTO: 11.65 THOUSAND/UL (ref 4.31–10.16)

## 2024-07-17 PROCEDURE — 72200 X-RAY EXAM SI JOINTS: CPT

## 2024-07-17 PROCEDURE — 99214 OFFICE O/P EST MOD 30 MIN: CPT | Performed by: NURSE PRACTITIONER

## 2024-07-17 PROCEDURE — 36415 COLL VENOUS BLD VENIPUNCTURE: CPT

## 2024-07-17 PROCEDURE — 85025 COMPLETE CBC W/AUTO DIFF WBC: CPT

## 2024-07-17 NOTE — PROGRESS NOTES
Assessment:  1. Lumbar spondylosis    2. DDD (degenerative disc disease), lumbar        Plan:  We can repeat lumbar radiofrequency ablation as needed  Patient may continue gabapentin as prescribed.  She does not require refills today  Patient may continue methocarbamol and nortriptyline as prescribed  Continue to follow with rheumatology as scheduled  Continue with home exercise program  Follow-up in 6 months or sooner if needed    History of Present Illness:    The patient is a 44 y.o. female with a history of fibromyalgia, diabetes, diabetic neuropathy and history of suicide attempt last seen on 3/13/2024 who presents for a follow up office visit in regards to chroniclow back pain that is nonradiating into the lower extremities.  She denies bowel or bladder incontinence or saddle anesthesia.  She is status post bilateral L3-5 RFA completed June 12, 2024 with 75% improvement of her pain which is ongoing.  She unfortunately has failed SI joint injections and lumbar epidural steroid injections.  She did establish with rheumatology.  She has to complete SI joint x-ray and MRI of the pelvis.  She also did complete chiropractic therapy.    The patient rates her pain a 3 out of 10 on the numeric pain rating scale.  Pain is intermittent in the morning and at night and is described as shooting.  She manages her pain with gabapentin 300 mg 3 times a day, methocarbamol 500 mg as needed, and nortriptyline 75 mg twice daily    I have personally reviewed and/or updated the patient's past medical history, past surgical history, family history, social history, current medications, allergies, and vital signs today.       Review of Systems:    Review of Systems      Past Medical History:   Diagnosis Date    Alcoholism ([facility]) 03/15/2001    Anxiety     Blood transfusion declined because patient is [employer] 05/01/2023    Chronic pain disorder     Chronic sinusitis     Depression     Depression     Diabetes (HCC)     Diabetes  mellitus (HCC) 09/01/2022    Fibromyalgia     Migraine     Obesity     Polyarthritis     Last assessed 9/21/2015    Psychiatric disorder     Psychiatric illness     Sleep difficulties     Substance abuse (HCC) 03/15/1994       Past Surgical History:   Procedure Laterality Date    COLONOSCOPY  06/2019    DENTAL SURGERY      HYSTERECTOMY  05/01/2023    HYSTEROSCOPY      Endometrial Biopsy By Hysteroscopy    ME LAPS SUPRACRV HYSTERECT 250 GM/< RMVL TUBE/OVAR N/A 05/01/2023    Procedure: (LSH) W/ BILATERAL SALPINGECTOMY, REMOVAL PARAOVARIAN CYST;  Surgeon: Sai Lopez DO;  Location: [facility];  Service: Gynecology    REMOVAL OF INTRAUTERINE DEVICE (IUD)      US GUIDED BREAST BIOPSY RIGHT COMPLETE Right 06/17/2019    Benign       Family History   Problem Relation Age of Onset    Irregular heart beat Mother     Diabetes Father     Kidney disease Father     Diabetes Maternal Grandmother     Rheum arthritis Maternal Grandmother     Melanoma Maternal Grandmother 84    Cancer Maternal Grandmother         Skin Cancer - successfully removed    Stroke Maternal Grandmother     Diabetes Maternal Grandfather     Migraines Maternal Grandfather     Stroke Maternal Grandfather     Kidney disease Paternal Grandmother     Rheum arthritis Paternal Grandmother     Depression Paternal Grandmother     Deep vein thrombosis Paternal Grandmother     Diabetes Paternal Grandmother     Diabetes Paternal Grandfather     Lung cancer Paternal Grandfather 47    Cancer Paternal Grandfather         Lung Cancer - cause of death    Substance Abuse Brother     No Known Problems Brother     Substance Abuse Maternal Uncle     Prostate cancer Maternal Uncle 60    Depression Paternal Aunt     Alcohol abuse Family     Stomach cancer Family        Social History     Occupational History    Occupation: unemployed   Tobacco Use    Smoking status: Never     Passive exposure: Never    Smokeless tobacco: Never    Tobacco comments:     N/A, non-smoker.    Vaping Use    Vaping status: Never Used   Substance and Sexual Activity    Alcohol use: Not Currently     Comment: 3 x year; sober since 2010    Drug use: Not Currently    Sexual activity: Not Currently     Birth control/protection: Abstinence         Current Outpatient Medications:     b complex vitamins capsule, Take 1 capsule by mouth daily, Disp: , Rfl:     Blood Glucose Monitoring Suppl (Contour Blood Glucose System) w/Device KIT, Use 1 kit 2 (two) times a day before meals, Disp: 1 kit, Rfl: 0    cholecalciferol (VITAMIN D3) 25 mcg (1,000 units) tablet, Take 1,000 Units by mouth daily Take 1 tab. daily, Disp: , Rfl:     Contour Test test strip, USE TO CHECK BLOOD SUGAR ONCE DAILY, Disp: 50 strip, Rfl: 7    ferrous sulfate 325 (65 Fe) mg tablet, Take 325 mg by mouth Take 1 tab. 3 times per week, Disp: , Rfl:     gabapentin (NEURONTIN) 300 mg capsule, Take 1 capsule (300 mg total) by mouth 3 (three) times a day, Disp: 90 capsule, Rfl: 5    levothyroxine 75 mcg tablet, Take 1 tablet (75 mcg total) by mouth daily in the early morning, Disp: 100 tablet, Rfl: 0    lithium carbonate 300 mg capsule, Take 1 capsule (300 mg total) by mouth 2 (two) times a day with meals, Disp: 60 capsule, Rfl: 2    MAGNESIUM MALATE PO, Take by mouth Take 1 tab. daily, Disp: , Rfl:     metFORMIN (GLUCOPHAGE) 500 mg tablet, TAKE 1 TABLET BY MOUTH TWICE A DAY WITH FOOD, Disp: 60 tablet, Rfl: 5    methocarbamol (ROBAXIN) 500 mg tablet, TAKE 1 TAB. EVERY 8 HR. AS NEEDED FOR MUSCLE SPASMS, Disp: 30 tablet, Rfl: 0    mirtazapine (REMERON) 45 MG tablet, Take 1 tablet (45 mg total) by mouth daily at bedtime, Disp: 30 tablet, Rfl: 2    nortriptyline (PAMELOR) 75 MG capsule, Take 1 capsule (75 mg total) by mouth 2 (two) times a day, Disp: 60 capsule, Rfl: 2    propranolol (INDERAL) 40 mg tablet, Take 0.5 tablets (20 mg total) by mouth daily at bedtime, Disp: 45 tablet, Rfl: 3    Allergies   Allergen Reactions    Cannabidiol Shortness Of Breath,  "Itching, Swelling, Anxiety, Palpitations, Confusion, Hypertension, Throat Swelling and Tongue Swelling    Amoxicillin-Pot Clavulanate Hives    Decadrol [Dexamethasone] Other (See Comments)     psychosis    Penicillins Hives     Hives/Uticaria    Tetracyclines & Related Hives      Allergy;        Physical Exam:    /77   Pulse 98   Ht 5' 0.5\" (1.537 m)   Wt 85.7 kg (189 lb)   LMP 03/13/2023 (Approximate)   BMI 36.30 kg/m²     Constitutional:normal, well developed, well nourished, alert, in no distress and non-toxic and no overt pain behavior.  Eyes:anicteric  HEENT:grossly intact  Neck:supple, symmetric, trachea midline and no masses   Pulmonary:even and unlabored  Cardiovascular:No edema or pitting edema present  Skin:Normal without rashes or lesions and well hydrated  Psychiatric:Mood and affect appropriate  Neurologic:Cranial Nerves II-XII grossly intact  Musculoskeletal:normal gait      Imaging  No orders to display         No orders of the defined types were placed in this encounter.      "

## 2024-07-18 ENCOUNTER — TELEMEDICINE (OUTPATIENT)
Dept: PSYCHIATRY | Facility: CLINIC | Age: 44
End: 2024-07-18
Payer: COMMERCIAL

## 2024-07-18 ENCOUNTER — TELEPHONE (OUTPATIENT)
Age: 44
End: 2024-07-18

## 2024-07-18 DIAGNOSIS — F33.3 SEVERE EPISODE OF RECURRENT MAJOR DEPRESSIVE DISORDER, WITH PSYCHOTIC FEATURES (HCC): Primary | ICD-10-CM

## 2024-07-18 DIAGNOSIS — F41.1 GENERALIZED ANXIETY DISORDER: ICD-10-CM

## 2024-07-18 DIAGNOSIS — F33.9 RECURRENT MAJOR DEPRESSION RESISTANT TO TREATMENT (HCC): ICD-10-CM

## 2024-07-18 PROBLEM — N85.00 ENDOMETRIAL HYPERPLASIA, UNSPECIFIED: Status: RESOLVED | Noted: 2023-05-01 | Resolved: 2024-07-18

## 2024-07-18 PROBLEM — N84.0 ENDOMETRIAL POLYP: Status: RESOLVED | Noted: 2023-04-30 | Resolved: 2024-07-18

## 2024-07-18 PROCEDURE — 99214 OFFICE O/P EST MOD 30 MIN: CPT | Performed by: PSYCHIATRY & NEUROLOGY

## 2024-07-18 RX ORDER — LITHIUM CARBONATE 300 MG/1
300 CAPSULE ORAL 2 TIMES DAILY WITH MEALS
Qty: 60 CAPSULE | Refills: 2 | Status: SHIPPED | OUTPATIENT
Start: 2024-07-18

## 2024-07-18 NOTE — TELEPHONE ENCOUNTER
Writer returned call to patient in regards to neuropsychological testing. Writer let her know that she's on our wait list. And we will call and schedule accordingly.

## 2024-07-18 NOTE — PSYCH
Virtual Regular Visit    Verification of patient location:    Patient is located at Home in the following state in which I hold an active license [place]      Assessment/Plan:    Problem List Items Addressed This Visit          Behavioral Health    Generalized anxiety disorder    Relevant Medications    lithium carbonate 300 mg capsule    Severe episode of recurrent major depressive disorder, with psychotic features (HCC) - Primary    Relevant Medications    lithium carbonate 300 mg capsule     Other Visit Diagnoses       Recurrent major depression resistant to treatment (HCC)        Relevant Medications    lithium carbonate 300 mg capsule                     Reason for visit is No chief complaint on file.           Encounter provider Cristina Bray MD      Recent Visits  No visits were found meeting these conditions.  Showing recent visits within past 7 days and meeting all other requirements  Today's Visits  Date Type Provider Dept   07/18/24 Telemedicine Cristina Bray MD [facility]   Showing today's visits and meeting all other requirements  Future Appointments  No visits were found meeting these conditions.  Showing future appointments within next 150 days and meeting all other requirements       The patient was identified by name and date of birth. Esther Griffith was informed that this is a telemedicine visit and that the visit is being conducted throughthe Epic Embedded platform. She agrees to proceed..  My office door was closed. No one else was in the room.  She acknowledged consent and understanding of privacy and security of the video platform. The patient has agreed to participate and understands they can discontinue the visit at any time.    Patient is aware this is a billable service.     Subjective  Esther Griffith is a 44 y.o. female with MDD and CAROL .Patient remains compliant with medications and denies side effects. She continues to manage her diabetes with  medication and diet.    She continues to meet with pain management and has limited benefit from epidural injections.   She restarted  Gabapentin for neuropathy  300 mg po qhs to help with sleep as well.   She has tolerated dose increase on Mirtazapine to 45 mg qhs but did not experienced much improvement in her depression. Started lithium 150 mg po bid for depression and since it was tolerated her dose was increased to 300 mg po bid. Blood level after dose increase is therapeutic at 0.6.  She stated that mood wise she has been feeling better. She stated that she stopped phentermine.   Due to concerns about drug interactions between lithium and Mirtazapine we attempted to taper off Mirtazapine but after lowering dose to 30 mg she felt more depressed and resumed her regular dose of 45 mg daily. No drug interactions or side effects reported.   Will schedule follow up in 3 months or sooner if needed.           HPI     Past Medical History:   Diagnosis Date    Alcoholism ([facility]) 03/15/2001    Anxiety     Blood transfusion declined because patient is [employer] 05/01/2023    Chronic pain disorder     Chronic sinusitis     Depression     Depression     Diabetes ([facility])     Diabetes mellitus ([facility]) 09/01/2022    Fibromyalgia     Migraine     Obesity     Polyarthritis     Last assessed 9/21/2015    Psychiatric disorder     Psychiatric illness     Sleep difficulties     Substance abuse ([facility]) 03/15/1994       Past Surgical History:   Procedure Laterality Date    COLONOSCOPY  06/2019    DENTAL SURGERY      HYSTERECTOMY  05/01/2023    HYSTEROSCOPY      Endometrial Biopsy By Hysteroscopy    IA LAPS SUPRACRV HYSTERECT 250 GM/< RMVL TUBE/OVAR N/A 05/01/2023    Procedure: ([facility]) W/ BILATERAL SALPINGECTOMY, REMOVAL PARAOVARIAN CYST;  Surgeon: Sai Lopez DO;  Location: [facility];  Service: Gynecology    REMOVAL OF INTRAUTERINE DEVICE (IUD)      US GUIDED BREAST BIOPSY RIGHT COMPLETE Right 06/17/2019    Benign       Current  Outpatient Medications   Medication Sig Dispense Refill    lithium carbonate 300 mg capsule Take 1 capsule (300 mg total) by mouth 2 (two) times a day with meals 60 capsule 2    b complex vitamins capsule Take 1 capsule by mouth daily      Blood Glucose Monitoring Suppl (Contour Blood Glucose System) w/Device KIT Use 1 kit 2 (two) times a day before meals 1 kit 0    cholecalciferol (VITAMIN D3) 25 mcg (1,000 units) tablet Take 1,000 Units by mouth daily Take 1 tab. daily      Contour Test test strip USE TO CHECK BLOOD SUGAR ONCE DAILY 50 strip 7    ferrous sulfate 325 (65 Fe) mg tablet Take 325 mg by mouth Take 1 tab. 3 times per week      gabapentin (NEURONTIN) 300 mg capsule Take 1 capsule (300 mg total) by mouth 3 (three) times a day 90 capsule 5    levothyroxine 75 mcg tablet Take 1 tablet (75 mcg total) by mouth daily in the early morning 100 tablet 0    MAGNESIUM MALATE PO Take by mouth Take 1 tab. daily      metFORMIN (GLUCOPHAGE) 500 mg tablet TAKE 1 TABLET BY MOUTH TWICE A DAY WITH FOOD 60 tablet 5    methocarbamol (ROBAXIN) 500 mg tablet TAKE 1 TAB. EVERY 8 HR. AS NEEDED FOR MUSCLE SPASMS 30 tablet 0    mirtazapine (REMERON) 45 MG tablet Take 1 tablet (45 mg total) by mouth daily at bedtime 30 tablet 2    nortriptyline (PAMELOR) 75 MG capsule Take 1 capsule (75 mg total) by mouth 2 (two) times a day 60 capsule 2    propranolol (INDERAL) 40 mg tablet Take 0.5 tablets (20 mg total) by mouth daily at bedtime 45 tablet 3     No current facility-administered medications for this visit.        Allergies   Allergen Reactions    Cannabidiol Shortness Of Breath, Itching, Swelling, Anxiety, Palpitations, Confusion, Hypertension, Throat Swelling and Tongue Swelling    Amoxicillin-Pot Clavulanate Hives    Decadrol [Dexamethasone] Other (See Comments)     psychosis    Penicillins Hives     Hives/Uticaria    Tetracyclines & Related Hives      Allergy;        Review of Systems     Mood Anxiety and Depression   Behavior  Normal    Thought Content Disturbing Thoughts, Feelings   General Emotional Problems and Decreased Functioning   Personality Normal   Other Psych Symptoms Normal   Constitutional Negative   ENT Negative   Cardiovascular Negative   Respiratory Negative   Gastrointestinal Negative   Genitourinary Negative   Musculoskeletal Negative   Integumentary Negative   Neurological Negative   Endocrine Normal    Other Symptoms Normal        Laboratory Results: Recent Labs (last 12 months):   Appointment on 07/17/2024   Component Date Value    WBC 07/17/2024 11.65 (H)     RBC 07/17/2024 3.81     Hemoglobin 07/17/2024 12.3     Hematocrit 07/17/2024 36.9     MCV 07/17/2024 97     MCH 07/17/2024 32.3     MCHC 07/17/2024 33.3     RDW 07/17/2024 14.3     MPV 07/17/2024 9.2     Platelets 07/17/2024 259     nRBC 07/17/2024 0     Segmented % 07/17/2024 67     Immature Grans % 07/17/2024 0     Lymphocytes % 07/17/2024 27     Monocytes % 07/17/2024 5     Eosinophils Relative 07/17/2024 1     Basophils Relative 07/17/2024 0     Absolute Neutrophils 07/17/2024 7.63 (H)     Absolute Immature Grans 07/17/2024 0.05     Absolute Lymphocytes 07/17/2024 3.17     Absolute Monocytes 07/17/2024 0.60     Eosinophils Absolute 07/17/2024 0.16     Basophils Absolute 07/17/2024 0.04    Office Visit on 07/02/2024   Component Date Value    Sed Rate 07/02/2024 3     CRP 07/02/2024 6.3 (H)     SS-A (RO) Ab 07/02/2024 <0.2     SS-B (LA) Ab 07/02/2024 <0.2    Appointment on 06/05/2024   Component Date Value    TSH 3RD GENERATON 06/05/2024 4.770 (H)     FSH 06/05/2024 10.2     LH 06/05/2024 5.4     Estradiol 06/05/2024 66.5     Free T4 06/05/2024 0.60 (L)    Annual Exam on 06/05/2024   Component Date Value    Case Report 06/05/2024                      Value:Gynecologic Cytology Report                       Case: [id number]                                  Authorizing Provider:  Sai Lopez DO      Collected:           06/05/2024 [id number]               Ordering Location:     [facility]        Received:            06/05/2024 1315                                     [facility]                                                    First Screen:          Ale Ware, CT                                                       Specimen:    LIQUID-BASED PAP, SCREENING, Cervix, Endocervical                                          Primary Interpretation 06/05/2024 Negative for intraepithelial lesion or malignancy     Specimen Adequacy 06/05/2024 Satisfactory for evaluation. Endocervical/transformation zone component present.     Additional Information 06/05/2024                      Value:[employer]'s FDA approved ,  and ThinPrep Imaging Duo System are utilized with strict adherence to the 's instruction manual to prepare gynecologic and non-gynecologic cytology specimens for the production of ThinPrep slides as well as for gynecologic ThinPrep imaging. These processes have been validated by our laboratory and/or by the .  The Pap test is not a diagnostic procedure and should not be used as the sole means to detect cervical cancer. It is only a screening procedure to aid in the detection of cervical cancer and its precursors. Both false-negative and false-positive results have been experienced. Your patient's test result should be interpreted in this context together with the history and clinical findings.      Gross Description 06/05/2024                      Value:20 ml , pale peach, cloudy received in a ThinPrep vial.     Appointment on 05/15/2024   Component Date Value    Lithium Lvl 05/15/2024 0.62     Sodium 05/15/2024 140     Potassium 05/15/2024 3.7     Chloride 05/15/2024 100     CO2 05/15/2024 30     ANION GAP 05/15/2024 10     BUN 05/15/2024 14     Creatinine 05/15/2024 0.88     Glucose, Fasting 05/15/2024 124 (H)     Calcium 05/15/2024 9.9     AST 05/15/2024 31     ALT 05/15/2024 43     Alkaline  Phosphatase 05/15/2024 49     Total Protein 05/15/2024 7.0     Albumin 05/15/2024 4.7     Total Bilirubin 05/15/2024 0.37     eGFR 05/15/2024 79     Cholesterol 05/15/2024 156     Triglycerides 05/15/2024 142     HDL, Direct 05/15/2024 39 (L)     LDL Calculated 05/15/2024 89     Non-HDL-Chol (CHOL-HDL) 05/15/2024 117     Hemoglobin A1C 05/15/2024 6.0 (H)     EAG 05/15/2024 126     WBC 05/15/2024 16.06 (H)     RBC 05/15/2024 4.34     Hemoglobin 05/15/2024 13.8     Hematocrit 05/15/2024 41.5     MCV 05/15/2024 96     MCH 05/15/2024 31.8     MCHC 05/15/2024 33.3     RDW 05/15/2024 14.0     MPV 05/15/2024 9.1     Platelets 05/15/2024 296     nRBC 05/15/2024 0     Segmented % 05/15/2024 66     Immature Grans % 05/15/2024 1     Lymphocytes % 05/15/2024 29     Monocytes % 05/15/2024 4     Eosinophils Relative 05/15/2024 0     Basophils Relative 05/15/2024 0     Absolute Neutrophils 05/15/2024 10.50 (H)     Absolute Immature Grans 05/15/2024 0.12     Absolute Lymphocytes 05/15/2024 4.67 (H)     Absolute Monocytes 05/15/2024 0.67     Eosinophils Absolute 05/15/2024 0.04     Basophils Absolute 05/15/2024 0.06     Creatinine, Ur 05/15/2024 196.9     Albumin,U,Random 05/15/2024 9.4     Albumin Creat Ratio 05/15/2024 5    Appointment on 05/02/2024   Component Date Value    Lithium Lvl 05/02/2024 0.30 (L)    Appointment on 02/08/2024   Component Date Value    Sodium 02/08/2024 136     Potassium 02/08/2024 4.3     Chloride 02/08/2024 103     CO2 02/08/2024 27     ANION GAP 02/08/2024 6     BUN 02/08/2024 17     Creatinine 02/08/2024 0.88     Glucose, Fasting 02/08/2024 135 (H)     Calcium 02/08/2024 9.1     AST 02/08/2024 22     ALT 02/08/2024 26     Alkaline Phosphatase 02/08/2024 42     Total Protein 02/08/2024 6.7     Albumin 02/08/2024 4.2     Total Bilirubin 02/08/2024 0.37     eGFR 02/08/2024 79     Cholesterol 02/08/2024 146     Triglycerides 02/08/2024 181 (H)     HDL, Direct 02/08/2024 53     LDL Calculated 02/08/2024 57      Non-HDL-Chol (CHOL-HDL) 02/08/2024 93     TSH 3RD GENERATON 02/08/2024 2.295     Hemoglobin A1C 02/08/2024 6.7 (H)     EAG 02/08/2024 146    Office Visit on 12/13/2023   Component Date Value    SARS-CoV-2 12/13/2023 Positive (A)     INFLUENZA A PCR 12/13/2023 Negative     INFLUENZA B PCR 12/13/2023 Negative    Admission on 11/26/2023, Discharged on 11/26/2023   Component Date Value    Color, UA 11/26/2023 Yellow     Clarity, UA 11/26/2023 Clear     pH, UA 11/26/2023 5.5     Leukocytes, UA 11/26/2023 Negative     Nitrite, UA 11/26/2023 Negative     Protein, UA 11/26/2023 Negative     Glucose, UA 11/26/2023 Negative     Ketones, UA 11/26/2023 Negative     Urobilinogen, UA 11/26/2023 0.2     Bilirubin, UA 11/26/2023 Negative     Occult Blood, UA 11/26/2023 Negative     Specific Gravity, UA 11/26/2023 <=1.005    Office Visit on 11/07/2023   Component Date Value    Hemoglobin A1C 11/07/2023 6.3    There may be more visits with results that are not included.         Substance Abuse History:  Social History     Substance and Sexual Activity   Drug Use Not Currently       Family Psychiatric History:   Family History   Problem Relation Age of Onset    Irregular heart beat Mother     Diabetes Father     Kidney disease Father     Diabetes Maternal Grandmother     Rheum arthritis Maternal Grandmother     Melanoma Maternal Grandmother 84    Cancer Maternal Grandmother         Skin Cancer - successfully removed    Stroke Maternal Grandmother     Diabetes Maternal Grandfather     Migraines Maternal Grandfather     Stroke Maternal Grandfather     Kidney disease Paternal Grandmother     Rheum arthritis Paternal Grandmother     Depression Paternal Grandmother     Deep vein thrombosis Paternal Grandmother     Diabetes Paternal Grandmother     Diabetes Paternal Grandfather     Lung cancer Paternal Grandfather 47    Cancer Paternal Grandfather         Lung Cancer - cause of death    Substance Abuse Brother     No Known Problems  Brother     Substance Abuse Maternal Uncle     Prostate cancer Maternal Uncle 60    Depression Paternal Aunt     Alcohol abuse Family     Stomach cancer Family        The following portions of the patient's history were reviewed and updated as appropriate: allergies, current medications, past family history, past medical history, past social history, past surgical history, and problem list.    Social History     Socioeconomic History    Marital status: Single     Spouse name: Not on file    Number of children: 0    Years of education: 12 years     Highest education level: GED or equivalent   Occupational History    Occupation: unemployed   Tobacco Use    Smoking status: Never     Passive exposure: Never    Smokeless tobacco: Never    Tobacco comments:     N/A, non-smoker.   Vaping Use    Vaping status: Never Used   Substance and Sexual Activity    Alcohol use: Not Currently     Comment: 3 x year; sober since 2010    Drug use: Not Currently    Sexual activity: Not Currently     Birth control/protection: Abstinence   Other Topics Concern    Not on file   Social History Narrative    Caffeine use        What type of home do you live in: Single house    Age of your home: 46 yrs    How long have you been living there: 42 yrs    Type of heat: Baseboard    Type of fuel: Electric    What type of samir is in your bedroom: Carpet    Do you have the following in or near your home:    Air products: Window air conditioning and Ionic air purifier    Pests: Mice    Pets: Cat    Basement: None     Social Determinants of Health     Financial Resource Strain: High Risk (12/2/2020)    Overall Financial Resource Strain (CARDIA)     Difficulty of Paying Living Expenses: Hard   Food Insecurity: No Food Insecurity (10/16/2023)    Hunger Vital Sign     Worried About Running Out of Food in the Last Year: Never true     Ran Out of Food in the Last Year: Never true   Transportation Needs: No Transportation Needs (10/16/2023)    PRAPARE -  Transportation     Lack of Transportation (Medical): No     Lack of Transportation (Non-Medical): No   Physical Activity: Insufficiently Active (10/12/2022)    Exercise Vital Sign     Days of Exercise per Week: 2 days     Minutes of Exercise per Session: 60 min   Stress: Stress Concern Present (4/3/2019)    [employer] - Occupational Stress Questionnaire     Feeling of Stress : To some extent   Social Connections: Moderately Integrated (4/3/2019)    Social Connection and Isolation Panel [NHANES]     Frequency of Communication with Friends and Family: More than three times a week     Frequency of Social Gatherings with Friends and Family: More than three times a week     Attends [employer] Services: More than 4 times per year     Active Member of Clubs or Organizations: Yes     Attends Club or Organization Meetings: More than 4 times per year     Marital Status: Never    Intimate Partner Violence: Not At Risk (4/3/2019)    Humiliation, Afraid, Rape, and Kick questionnaire     Fear of Current or Ex-Partner: No     Emotionally Abused: No     Physically Abused: No     Sexually Abused: No   Housing Stability: Low Risk  (10/16/2023)    Housing Stability Vital Sign     Unable to Pay for Housing in the Last Year: No     Number of Times Moved in the Last Year: 1     Homeless in the Last Year: No     Social History     Social History Narrative    Caffeine use        What type of home do you live in: Single house    Age of your home: 46 yrs    How long have you been living there: 42 yrs    Type of heat: Baseboard    Type of fuel: Electric    What type of samir is in your bedroom: Carpet    Do you have the following in or near your home:    Air products: Window air conditioning and Ionic air purifier    Pests: Mice    Pets: Cat    Basement: None       Objective:       Mental status:  Appearance calm and cooperative , adequate hygiene and grooming, and good eye contact    Mood dysphoric    Affect affect was constricted   Speech a normal rate and fluent   Thought Processes coherent/organized and normal thought processes   Hallucinations no hallucinations present    Thought Content no delusions   Abnormal Thoughts no suicidal thoughts  and no homicidal thoughts    Orientation  oriented to person and place and time   Remote Memory short term memory intact and long term memory intact   Attention Span concentration intact   Intellect Appears to be of Average Intelligence   Insight Limited insight   Judgement judgment was limited   Muscle Strength N/a   Language no difficulty naming common objects and no difficulty repeating a phrase    Fund of Knowledge displays adequate knowledge of current events, adequate fund of knowledge regarding past history, and adequate fund of knowledge regarding vocabulary                Assessment/Plan:       Diagnoses and all orders for this visit:    Severe episode of recurrent major depressive disorder, with psychotic features (HCC)    Generalized anxiety disorder    Recurrent major depression resistant to treatment (HCC)  -     lithium carbonate 300 mg capsule; Take 1 capsule (300 mg total) by mouth 2 (two) times a day with meals              Treatment Recommendations- Risks Benefits      Immediate Medical/Psychiatric/Psychotherapy Treatments and Any Precautions: continue current treatment     Risks, Benefits And Possible Side Effects Of Medications:  {PSYCH RISK, BENEFITS AND POSSIBLE SIDE EFFECTS (Optional):33763    Controlled Medication Discussion: Discussed with patient Black Box warning on concurrent use of benzodiazepines and opioid medications including sedation, respiratory depression, coma and death. Patient understands the risk of treatment with benzodiazepines in addition to opioids and wants to continue taking those medications. , Discussed with patient the risks of sedation, respiratory depression, impairment of ability to drive and potential for abuse and  addiction related to treatment with benzodiazepine medications. The patient understands risk of treatment with benzodiazepine medications, agrees to not drive if feels impaired and agrees to take medications as prescribed., and The patient has been filling controlled prescriptions on time as prescribed to [place] Prescription Drug Monitoring program.      Psychotherapy Provided: No                      Visit Time    Visit Start Time: 2:00  Visit Stop Time: 2:20  Total Visit Duration:  20 minutes

## 2024-07-21 DIAGNOSIS — M79.18 MYOFASCIAL PAIN: ICD-10-CM

## 2024-07-22 RX ORDER — METHOCARBAMOL 500 MG/1
TABLET, FILM COATED ORAL
Qty: 30 TABLET | Refills: 0 | Status: SHIPPED | OUTPATIENT
Start: 2024-07-22

## 2024-07-23 DIAGNOSIS — E11.65 TYPE 2 DIABETES MELLITUS WITH HYPERGLYCEMIA, WITHOUT LONG-TERM CURRENT USE OF INSULIN (HCC): ICD-10-CM

## 2024-07-24 ENCOUNTER — TELEPHONE (OUTPATIENT)
Dept: PSYCHIATRY | Facility: CLINIC | Age: 44
End: 2024-07-24

## 2024-07-24 NOTE — TELEPHONE ENCOUNTER
Called and left message for patient to return a call to [phone] and schedule 3 month follow up with provider (Cristina Bray). Please schedule upon return call. Thank you.

## 2024-07-26 ENCOUNTER — TELEMEDICINE (OUTPATIENT)
Dept: BEHAVIORAL/MENTAL HEALTH CLINIC | Facility: CLINIC | Age: 44
End: 2024-07-26
Payer: COMMERCIAL

## 2024-07-26 DIAGNOSIS — F41.1 GENERALIZED ANXIETY DISORDER: ICD-10-CM

## 2024-07-26 DIAGNOSIS — F33.3 SEVERE EPISODE OF RECURRENT MAJOR DEPRESSIVE DISORDER, WITH PSYCHOTIC FEATURES (HCC): Primary | ICD-10-CM

## 2024-07-26 PROCEDURE — 90834 PSYTX W PT 45 MINUTES: CPT | Performed by: PSYCHIATRY & NEUROLOGY
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
## 2024-08-05 DIAGNOSIS — M79.18 MYOFASCIAL PAIN: ICD-10-CM

## 2024-08-06 RX ORDER — METHOCARBAMOL 500 MG/1
TABLET, FILM COATED ORAL
Qty: 30 TABLET | Refills: 0 | Status: SHIPPED | OUTPATIENT
Start: 2024-08-06 | End: 2024-08-16 | Stop reason: SDUPTHER

## 2024-08-09 ENCOUNTER — TELEMEDICINE (OUTPATIENT)
Dept: BEHAVIORAL/MENTAL HEALTH CLINIC | Facility: CLINIC | Age: 44
End: 2024-08-09
Payer: COMMERCIAL

## 2024-08-09 DIAGNOSIS — M79.7 FIBROMYALGIA: ICD-10-CM

## 2024-08-09 DIAGNOSIS — F33.1 MAJOR DEPRESSIVE DISORDER, RECURRENT EPISODE, MODERATE (HCC): ICD-10-CM

## 2024-08-09 DIAGNOSIS — F33.3 SEVERE EPISODE OF RECURRENT MAJOR DEPRESSIVE DISORDER, WITH PSYCHOTIC FEATURES (HCC): Primary | ICD-10-CM

## 2024-08-09 DIAGNOSIS — F41.1 GENERALIZED ANXIETY DISORDER: ICD-10-CM

## 2024-08-09 PROCEDURE — 90832 PSYTX W PT 30 MINUTES: CPT | Performed by: PSYCHIATRY & NEUROLOGY

## 2024-08-09 RX ORDER — NORTRIPTYLINE HYDROCHLORIDE 25 MG/1
CAPSULE ORAL
Qty: 42 CAPSULE | Refills: 0 | Status: SHIPPED | OUTPATIENT
Start: 2024-08-09

## 2024-08-09 NOTE — PSYCH
Virtual Regular Visit    Verification of patient location:    Patient is located at Home in the following state in which I hold an active license [place]      Assessment/Plan:    Problem List Items Addressed This Visit          Behavioral Health    Severe episode of recurrent major depressive disorder, with psychotic features (HCC) - Primary    Generalized anxiety disorder          Reason for visit is   Chief Complaint   Patient presents with    Virtual Regular Visit        Encounter provider HERMELINDA ALBA      Recent Visits  No visits were found meeting these conditions.  Showing recent visits within past 7 days and meeting all other requirements  Today's Visits  Date Type Provider Dept   08/09/24 Telemedicine HERMELINDA Alba [facility] Therapist [place]   Showing today's visits and meeting all other requirements  Future Appointments  No visits were found meeting these conditions.  Showing future appointments within next 150 days and meeting all other requirements       The patient was identified by name and date of birth. Esther Griffith was informed that this is a telemedicine visit and that the visit is being conducted throughthe Epic Embedded platform. She agrees to proceed..  My office door was closed. No one else was in the room.  She acknowledged consent and understanding of privacy and security of the video platform. The patient has agreed to participate and understands they can discontinue the visit at any time.    Patient is aware this is a billable service.     HPI     Past Medical History:   Diagnosis Date    Alcoholism (HCC) 03/15/2001    Anxiety     Blood transfusion declined because patient is Oriental orthodox 05/01/2023    Chronic pain disorder     Chronic sinusitis     Depression     Depression     Diabetes (HCC)     Diabetes mellitus (HCC) 09/01/2022    Fibromyalgia     Migraine     Obesity     Polyarthritis     Last assessed 9/21/2015    Psychiatric disorder     Psychiatric  illness     Sleep difficulties     Substance abuse (HCC) 03/15/1994       Past Surgical History:   Procedure Laterality Date    COLONOSCOPY  06/2019    DENTAL SURGERY      HYSTERECTOMY  05/01/2023    HYSTEROSCOPY      Endometrial Biopsy By Hysteroscopy    [place] LAPS SUPRACRV HYSTERECT 250 GM/< RMVL TUBE/OVAR N/A 05/01/2023    Procedure: (LSH) W/ BILATERAL SALPINGECTOMY, REMOVAL PARAOVARIAN CYST;  Surgeon: Sai Lopez DO;  [facility];  Service: Gynecology    REMOVAL OF INTRAUTERINE DEVICE (IUD)      US GUIDED BREAST BIOPSY RIGHT COMPLETE Right 06/17/2019    Benign       Current Outpatient Medications   Medication Sig Dispense Refill    b complex vitamins capsule Take 1 capsule by mouth daily      Blood Glucose Monitoring Suppl (Contour Blood Glucose System) w/Device KIT Use 1 kit 2 (two) times a day before meals 1 kit 0    cholecalciferol (VITAMIN D3) 25 mcg (1,000 units) tablet Take 1,000 Units by mouth daily Take 1 tab. daily      Contour Test test strip USE TO CHECK BLOOD SUGAR ONCE DAILY 50 strip 7    ferrous sulfate 325 (65 Fe) mg tablet Take 325 mg by mouth Take 1 tab. 3 times per week      gabapentin (NEURONTIN) 300 mg capsule Take 1 capsule (300 mg total) by mouth 3 (three) times a day 90 capsule 5    levothyroxine 75 mcg tablet Take 1 tablet (75 mcg total) by mouth daily in the early morning 100 tablet 0    lithium carbonate 300 mg capsule Take 1 capsule (300 mg total) by mouth 2 (two) times a day with meals 60 capsule 2    MAGNESIUM MALATE PO Take by mouth Take 1 tab. daily      metFORMIN (GLUCOPHAGE) 500 mg tablet TAKE 1 TABLET BY MOUTH TWICE A DAY WITH FOOD 60 tablet 5    methocarbamol (ROBAXIN) 500 mg tablet TAKE 1 TAB. EVERY 8 HR. AS NEEDED FOR MUSCLE SPASMS 30 tablet 0    mirtazapine (REMERON) 45 MG tablet Take 1 tablet (45 mg total) by mouth daily at bedtime 30 tablet 2    nortriptyline (PAMELOR) 75 MG capsule Take 1 capsule (75 mg total) by mouth 2 (two) times a day 60 capsule 2     propranolol (INDERAL) 40 mg tablet Take 0.5 tablets (20 mg total) by mouth daily at bedtime 45 tablet 3     No current facility-administered medications for this visit.        Allergies   Allergen Reactions    Cannabidiol Shortness Of Breath, Itching, Swelling, Anxiety, Palpitations, Confusion, Hypertension, Throat Swelling and Tongue Swelling    Amoxicillin-Pot Clavulanate Hives    Decadrol [Dexamethasone] Other (See Comments)     psychosis    Penicillins Hives     Hives/Uticaria    Tetracyclines & Related Hives      Allergy;        Review of Systems    Video Exam    There were no vitals filed for this visit.    Physical Exam     Behavioral Health Psychotherapy Progress Note    Psychotherapy Provided: Individual Psychotherapy     1. Severe episode of recurrent major depressive disorder, with psychotic features (HCC)        2. Generalized anxiety disorder            Goals addressed in session: Goal 1     DATA: Met with Esther for follow up. Esther shared that she has been feeling very depressed for the past week, with extreme lack of motivation, disrupted sleep (last two nights has not slept at all), poor concentration, feeling hopeless, and inability to follow through on daily tasks such as washing dishes and engaging in typical coping strategies like coloring.  She noted that she has not had any SI, which she said has been nice.  She has made plans with friends and two weeks in a row was able to get out to do things with them, which she said she was able to enjoy.  She has plans to go to [place] this weekend, which she is unsure about because she is meeting people there instead of riding with them, and this is making her a little more anxious.  She also said that her father is not doing well medically, and she is worried about him, which adds to her stress and low mood.  Provided support, validation of Esther's concerns, and used supportive and CBT strategies to help Esther try to focus on engaging in small  "tasks to help her feel more productive and motivated, and encouraged focus on self-care and patience with herself.  Esther asked to end session early due to headache and fatigue from lack of sleep.    During this session, this clinician used the following therapeutic modalities: Client-centered Therapy, Cognitive Behavioral Therapy, and Supportive Psychotherapy    Substance Abuse was not addressed during this session. If the client is diagnosed with a co-occurring substance use disorder, please indicate any changes in the frequency or amount of use: n/a. Stage of change for addressing substance use diagnoses: No substance use/Not applicable    ASSESSMENT:  Esther Griffith presents with a Depressed mood.     her affect is Normal range and intensity, which is congruent, with her mood and the content of the session. The client has not made progress on their goals.     Esther Griffith presents with a low risk of suicide, minimal risk of self-harm, and minimal risk of harm to others.    For any risk assessment that surpasses a \"low\" rating, a safety plan must be developed.    A safety plan was indicated: no  If yes, describe in detail n/a    PLAN: Between sessions, Esther Griffith will work on prioritizing self-care, social connection and small daily tasks to help build momentum and motivation. At the next session, the therapist will use Client-centered Therapy, Cognitive Behavioral Therapy, and Supportive Psychotherapy to address depression and anxiety.    Behavioral Health Treatment Plan and Discharge Planning: Esther Griffith is aware of and agrees to continue to work on their treatment plan. They have identified and are working toward their discharge goals. yes    Visit start and stop times:    08/09/24  Start Time: 0920  Stop Time: 0943  Total Visit Time: 23 minutes      "

## 2024-08-14 ENCOUNTER — HOSPITAL ENCOUNTER (OUTPATIENT)
Facility: MEDICAL CENTER | Age: 44
Discharge: HOME | End: 2024-08-14
Payer: COMMERCIAL

## 2024-08-14 ENCOUNTER — TELEPHONE (OUTPATIENT)
Age: 44
End: 2024-08-14

## 2024-08-14 DIAGNOSIS — G89.29 CHRONIC BILATERAL LOW BACK PAIN, UNSPECIFIED WHETHER SCIATICA PRESENT: ICD-10-CM

## 2024-08-14 DIAGNOSIS — M46.1 SACROILIITIS (HCC): ICD-10-CM

## 2024-08-14 DIAGNOSIS — M54.50 CHRONIC BILATERAL LOW BACK PAIN, UNSPECIFIED WHETHER SCIATICA PRESENT: ICD-10-CM

## 2024-08-14 PROCEDURE — 72195 MRI PELVIS W/O DYE: CPT

## 2024-08-14 NOTE — TELEPHONE ENCOUNTER
PA for Levomilnacipran HCl ER (Fetzima) 20 MG extended release capsule SUBMITTED     via    [x]CMM-KEY: [id number]  []Surescripts-Case ID #   []Faxed to plan   []Other website   []Phone call Case ID #     Office notes sent, clinical questions answered. Awaiting determination    Turnaround time for your insurance to make a decision on your Prior Authorization can take 7-21 business days.

## 2024-08-14 NOTE — TELEPHONE ENCOUNTER
Patient said the pharmacy informed her this medication needed a PA     Reason for call:   [] Refill   [x] Prior Auth  [] Other:     Office:   [] PCP/Provider -   [x] Specialty/Provider - [facility]     Medication: Levomilnacipran HCl ER (Fetzima) 20 MG extended release capsule     Dose/Frequency: Take 1 capsule (20 mg total) by mouth daily     Quantity: 30    Pharmacy: [facility]/pharmacy #[id number] - [place] - [address]      Does the patient have enough for 3 days?   [] Yes   [] No - Send as HP to POD

## 2024-08-16 DIAGNOSIS — M79.18 MYOFASCIAL PAIN: ICD-10-CM

## 2024-08-16 RX ORDER — METHOCARBAMOL 500 MG/1
TABLET, FILM COATED ORAL
Qty: 30 TABLET | Refills: 1 | Status: SHIPPED | OUTPATIENT
Start: 2024-08-16

## 2024-08-16 NOTE — TELEPHONE ENCOUNTER
PA for (Fetzima) 20 MG extended release capsule  Approved     Date(s) approved 8/14/2024 to 8/14/2025    Case #    Patient advised by          [x] MyChart Message  [] Phone call   []LMOM  []L/M to call office as no active Communication consent on file  []Unable to leave detailed message as VM not approved on Communication consent       Pharmacy advised by    []Fax  [x]Phone call-Left message on provider line at [facility]/pharmacy-[address], [place]  with PA approval info.     Approval letter scanned into Media Yes

## 2024-08-22 ENCOUNTER — TELEMEDICINE (OUTPATIENT)
Dept: BEHAVIORAL/MENTAL HEALTH CLINIC | Facility: CLINIC | Age: 44
End: 2024-08-22

## 2024-08-22 DIAGNOSIS — Z91.199 NO-SHOW FOR APPOINTMENT: ICD-10-CM

## 2024-08-22 DIAGNOSIS — F33.3 SEVERE EPISODE OF RECURRENT MAJOR DEPRESSIVE DISORDER, WITH PSYCHOTIC FEATURES (HCC): Primary | ICD-10-CM

## 2024-08-22 DIAGNOSIS — F41.1 GENERALIZED ANXIETY DISORDER: ICD-10-CM

## 2024-08-22 NOTE — PSYCH
No Call. No Show. No Charge    Esther Em no showed 08/22/24 appointment , Epic Embedded link sent at 900.  Attempt to contact Esther at [phone] re joining session with no response by 918.  Visit ended at 918    Treatment Plan not due at this session.

## 2024-08-23 ENCOUNTER — TELEPHONE (OUTPATIENT)
Dept: PSYCHIATRY | Facility: CLINIC | Age: 44
End: 2024-08-23

## 2024-08-26 DIAGNOSIS — M79.18 MYOFASCIAL PAIN: ICD-10-CM

## 2024-08-27 RX ORDER — METHOCARBAMOL 500 MG/1
TABLET, FILM COATED ORAL
Qty: 60 TABLET | Refills: 1 | Status: SHIPPED | OUTPATIENT
Start: 2024-08-27

## 2024-09-06 ENCOUNTER — TELEMEDICINE (OUTPATIENT)
Dept: BEHAVIORAL/MENTAL HEALTH CLINIC | Facility: CLINIC | Age: 44
End: 2024-09-06
Payer: COMMERCIAL

## 2024-09-06 DIAGNOSIS — F33.3 SEVERE EPISODE OF RECURRENT MAJOR DEPRESSIVE DISORDER, WITH PSYCHOTIC FEATURES (HCC): Primary | ICD-10-CM

## 2024-09-06 DIAGNOSIS — F41.1 GENERALIZED ANXIETY DISORDER: ICD-10-CM

## 2024-09-06 PROCEDURE — 90834 PSYTX W PT 45 MINUTES: CPT | Performed by: PSYCHIATRY & NEUROLOGY

## 2024-09-06 NOTE — BH CRISIS PLAN
Client Name: Esther Griffith       Client YOB: 1980    [facility] Safety Plan      Creation Date: 9/6/24    Created By: HERMELINDA Alba       Step 1: Warning Signs:   Warning Signs   feeling of hopelessness   want to stop fighting and not go forward            Step 2: Internal Coping Strategies:   Internal Coping Strategies   journaling   getting out in nature   people watching            Step 3: People and social settings that provide distraction:   Name Contact Information   Aggie in phone    Places   karao           Step 4: People whom I can ask for help during a crisis:      Name Contact Information    mom in phone/at home    Aggie in phone    Jessy in phone      Step 5: Professionals or agencies I can contact during a crisis:      Clinican/Agency Name Phone Emergency Contact    Vidhi Manning, [facility] [phone] 911    Dr. Ovalle, [facility] [phone] 911      [facility] Emergency Department Emergency Department Phone Emergency Department Address    [facility] [phone] [address] [facility] [address]        Crisis Phone Numbers:   Suicide Prevention Lifeline: Call or Text  022 Crisis Text Line: Text HOME to 277-777   Please note: Some [facility] do not have a separate number for Child/Adolescent specific crisis. If your county is not listed under Child/Adolescent, please call the adult number for your county      Adult Crisis Numbers: Child/Adolescent Crisis Numbers   [facility]: [phone] [facility]: [phone]   [facility]: [phone] [facility]: [phone]   [facility]: [phone] [place]: [phone]   [facility]: [phone] [place]: [phone]   [place] [facility]: [phone]   [facility]: [phone]   [facility]: [phone]   [facility]: [phone] (daytime) [phone] (after hours, weekends, holidays)      Step 6: Making the environment safer (plan for lethal  means safety):   Patient did not identify any lethal methods: Yes     Optional: What is most important to me and worth living for?   Taking care of people's pets     [place] Safety Plan. Brii Novoa and Sacha Krishnamurthy. Used with permission of the authors.

## 2024-09-06 NOTE — PSYCH
Virtual Regular Visit    Verification of patient location:    Patient is located at Home in the following state in which I hold an active license [place]    Assessment/Plan:    Problem List Items Addressed This Visit          Behavioral Health    Severe episode of recurrent major depressive disorder, with psychotic features (HCC) - Primary    Generalized anxiety disorder        Reason for visit is   Chief Complaint   Patient presents with    Virtual Regular Visit      Encounter provider HERMELINDA ALBA      Recent Visits  No visits were found meeting these conditions.  Showing recent visits within past 7 days and meeting all other requirements  Today's Visits  Date Type Provider Dept   09/06/24 Telemedicine [facility]ELINDA [facility] Therapist [place]   Showing today's visits and meeting all other requirements  Future Appointments  No visits were found meeting these conditions.  Showing future appointments within next 150 days and meeting all other requirements       The patient was identified by name and date of birth. Esther Griffith was informed that this is a telemedicine visit and that the visit is being conducted throughthe Epic Embedded platform. She agrees to proceed..  My office door was closed. No one else was in the room.  She acknowledged consent and understanding of privacy and security of the video platform. The patient has agreed to participate and understands they can discontinue the visit at any time.    Patient is aware this is a billable service.     HPI     Past Medical History:   Diagnosis Date    Alcoholism ([facility]) 03/15/2001    Anxiety     Blood transfusion declined because patient is Yarsanism 05/01/2023    Chronic pain disorder     Chronic sinusitis     Depression     Depression     Diabetes (HCC)     Diabetes mellitus ([facility]) 09/01/2022    Fibromyalgia     Migraine     Obesity     Polyarthritis     Last assessed 9/21/2015    Psychiatric disorder     Psychiatric illness      Sleep difficulties     Substance abuse (HCC) 03/15/1994       Past Surgical History:   Procedure Laterality Date    COLONOSCOPY  06/2019    DENTAL SURGERY      HYSTERECTOMY  05/01/2023    HYSTEROSCOPY      Endometrial Biopsy By Hysteroscopy    ND LAPS SUPRACRV HYSTERECT 250 GM/< RMVL TUBE/OVAR N/A 05/01/2023    Procedure: (LSH) W/ BILATERAL SALPINGECTOMY, REMOVAL PARAOVARIAN CYST;  Surgeon: Sai Lopez DO;  Location: [facility];  Service: Gynecology    REMOVAL OF INTRAUTERINE DEVICE (IUD)      US GUIDED BREAST BIOPSY RIGHT COMPLETE Right 06/17/2019    Benign       Current Outpatient Medications   Medication Sig Dispense Refill    b complex vitamins capsule Take 1 capsule by mouth daily      Blood Glucose Monitoring Suppl (Contour Blood Glucose System) w/Device KIT Use 1 kit 2 (two) times a day before meals 1 kit 0    cholecalciferol (VITAMIN D3) 25 mcg (1,000 units) tablet Take 1,000 Units by mouth daily Take 1 tab. daily      Contour Test test strip USE TO CHECK BLOOD SUGAR ONCE DAILY 50 strip 7    ferrous sulfate 325 (65 Fe) mg tablet Take 325 mg by mouth Take 1 tab. 3 times per week      gabapentin (NEURONTIN) 300 mg capsule Take 1 capsule (300 mg total) by mouth 3 (three) times a day 90 capsule 5    Levomilnacipran HCl ER (Fetzima) 20 MG extended release capsule Take 1 capsule (20 mg total) by mouth daily 30 capsule 0    levothyroxine 75 mcg tablet Take 1 tablet (75 mcg total) by mouth daily in the early morning 100 tablet 0    lithium carbonate 300 mg capsule Take 1 capsule (300 mg total) by mouth 2 (two) times a day with meals 60 capsule 2    MAGNESIUM MALATE PO Take by mouth Take 1 tab. daily      metFORMIN (GLUCOPHAGE) 500 mg tablet TAKE 1 TABLET BY MOUTH TWICE A DAY WITH FOOD 60 tablet 5    methocarbamol (ROBAXIN) 500 mg tablet TAKE 1 TAB. EVERY 8 HR. AS NEEDED FOR MUSCLE SPASMS 60 tablet 1    mirtazapine (REMERON) 45 MG tablet Take 1 tablet (45 mg total) by mouth daily at bedtime 30 tablet 2     "nortriptyline (PAMELOR) 25 mg capsule Take 2 caps po bid for 1 week then 1 cap po bid for 1 week the stop 42 capsule 0    propranolol (INDERAL) 40 mg tablet Take 0.5 tablets (20 mg total) by mouth daily at bedtime 45 tablet 3     No current facility-administered medications for this visit.        Allergies   Allergen Reactions    Cannabidiol Shortness Of Breath, Itching, Swelling, Anxiety, Palpitations, Confusion, Hypertension, Throat Swelling and Tongue Swelling    Amoxicillin-Pot Clavulanate Hives    Decadrol [Dexamethasone] Other (See Comments)     psychosis    Penicillins Hives     Hives/Uticaria    Tetracyclines & Related Hives      Allergy;        Review of Systems    Video Exam    There were no vitals filed for this visit.    Physical Exam     Behavioral Health Psychotherapy Progress Note    Psychotherapy Provided: Individual Psychotherapy     1. Severe episode of recurrent major depressive disorder, with psychotic features (HCC)        2. Generalized anxiety disorder            Goals addressed in session: Goal 1     DATA: Met with Esther for follow up.  Esther shared that she has been struggling with significant worry and \"worst case scenario\" thinking about having to speak to the Elders of her [employer] in regard to some communication with her friend Balbina prior to his death.  She shared that she is fearful that her Elders will deem her unworthy to remain in the [employer] and will disfellow her. She also shared that if that happens, she fears that her parents will kick her out of the house, saying that she believes they will not want that energy in their home since they are already dealing with so much stress.  Esther said that she is trying not to think the worst, but is struggling with that. Processed her concerns, and used CBT strategies (worst/best case scenario; cognitive distortion reframing) to help ease Esther's anxiety.  She talked about cat sitting presently and some upcoming " "events/trips that she is looking forward to, and said that she is trying to focus on those things to take her mind off her worries as much as possible.  She reported not sleeping well lately, having low energy and some fleeting SI, but denied any plan or intent at this point in time.  Updated crisis plan today, and advised to contact this writer or crisis line should she need more immediate help prior to next visit. Esther said that she cannot speak to Elders for another six weeks, and is considering a partial hospital program to help her cope with the next several weeks.  Provided support, and encouraged Esther to work on coping strategies and to advise when she is ready for PHP referral.      During this session, this clinician used the following therapeutic modalities: Client-centered Therapy, Cognitive Behavioral Therapy, and Supportive Psychotherapy    Substance Abuse was not addressed during this session. If the client is diagnosed with a co-occurring substance use disorder, please indicate any changes in the frequency or amount of use: n/a. Stage of change for addressing substance use diagnoses: No substance use/Not applicable    ASSESSMENT:  Esther Griffith presents with a Depressed mood.     her affect is Normal range and intensity and Tearful, which is congruent, with her mood and the content of the session. The client has not made progress on their goals.     Esther Griffith presents with a low risk of suicide, minimal risk of self-harm, and minimal risk of harm to others.    For any risk assessment that surpasses a \"low\" rating, a safety plan must be developed.    A safety plan was indicated: no  If yes, describe in detail n/a    PLAN: Between sessions, Esther Griffith will focus on positive coping strategies and thought reframing to help her manage worst case scenario thinking and ease her anxiety. At the next session, the therapist will use Client-centered Therapy, Cognitive Behavioral Therapy, and Supportive " Psychotherapy to address depression.    Behavioral Health Treatment Plan and Discharge Planning: Esther Griffith is aware of and agrees to continue to work on their treatment plan. They have identified and are working toward their discharge goals. yes    Visit start and stop times:    09/06/24  Start Time: 1003  Stop Time: 1041  Total Visit Time: 38 minutes

## 2024-09-07 ENCOUNTER — APPOINTMENT (OUTPATIENT)
Dept: LAB | Facility: CLINIC | Age: 44
End: 2024-09-07
Payer: COMMERCIAL

## 2024-09-07 DIAGNOSIS — D72.829 LEUKOCYTOSIS, UNSPECIFIED TYPE: ICD-10-CM

## 2024-09-07 LAB
BASOPHILS # BLD AUTO: 0.03 THOUSANDS/ÂΜL (ref 0–0.1)
BASOPHILS NFR BLD AUTO: 0 % (ref 0–1)
EOSINOPHIL # BLD AUTO: 0.12 THOUSAND/ÂΜL (ref 0–0.61)
EOSINOPHIL NFR BLD AUTO: 1 % (ref 0–6)
ERYTHROCYTE [DISTWIDTH] IN BLOOD BY AUTOMATED COUNT: 14.3 % (ref 11.6–15.1)
HCT VFR BLD AUTO: 42.8 % (ref 34.8–46.1)
HGB BLD-MCNC: 13.8 G/DL (ref 11.5–15.4)
IMM GRANULOCYTES # BLD AUTO: 0.06 THOUSAND/UL (ref 0–0.2)
IMM GRANULOCYTES NFR BLD AUTO: 1 % (ref 0–2)
LYMPHOCYTES # BLD AUTO: 3.14 THOUSANDS/ÂΜL (ref 0.6–4.47)
LYMPHOCYTES NFR BLD AUTO: 25 % (ref 14–44)
MCH RBC QN AUTO: 31.5 PG (ref 26.8–34.3)
MCHC RBC AUTO-ENTMCNC: 32.2 G/DL (ref 31.4–37.4)
MCV RBC AUTO: 98 FL (ref 82–98)
MONOCYTES # BLD AUTO: 0.67 THOUSAND/ÂΜL (ref 0.17–1.22)
MONOCYTES NFR BLD AUTO: 5 % (ref 4–12)
NEUTROPHILS # BLD AUTO: 8.46 THOUSANDS/ÂΜL (ref 1.85–7.62)
NEUTS SEG NFR BLD AUTO: 68 % (ref 43–75)
NRBC BLD AUTO-RTO: 0 /100 WBCS
PLATELET # BLD AUTO: 278 THOUSANDS/UL (ref 149–390)
PMV BLD AUTO: 9.3 FL (ref 8.9–12.7)
RBC # BLD AUTO: 4.38 MILLION/UL (ref 3.81–5.12)
TSH SERPL DL<=0.05 MIU/L-ACNC: 3.29 UIU/ML (ref 0.45–4.5)
WBC # BLD AUTO: 12.48 THOUSAND/UL (ref 4.31–10.16)

## 2024-09-07 PROCEDURE — 84443 ASSAY THYROID STIM HORMONE: CPT

## 2024-09-07 PROCEDURE — 85025 COMPLETE CBC W/AUTO DIFF WBC: CPT

## 2024-09-13 DIAGNOSIS — M79.18 MYOFASCIAL PAIN: ICD-10-CM

## 2024-09-13 RX ORDER — METHOCARBAMOL 500 MG/1
TABLET, FILM COATED ORAL
Qty: 60 TABLET | Refills: 0 | Status: SHIPPED | OUTPATIENT
Start: 2024-09-13

## 2024-09-16 ENCOUNTER — TELEPHONE (OUTPATIENT)
Age: 44
End: 2024-09-16

## 2024-09-16 DIAGNOSIS — D72.829 LEUKOCYTOSIS, UNSPECIFIED TYPE: Primary | ICD-10-CM

## 2024-09-16 NOTE — TELEPHONE ENCOUNTER
Writer mailed letter to patient regarding no longer having a provider to do Neuropsychological testing. In this letter outside resources where sent. So, we are closing our wait list. And patient was removed at this time.

## 2024-09-16 NOTE — TELEPHONE ENCOUNTER
Patient called Rx refill line complaining of chest pains and feelings of overwhelmness x 1 mon, which has worsen within the last year    Patient stated she is grieving the loss of a friend and the anniversay was Saturday  And patient wasn't sure if that was the cause of or the change of medication from nortiptyline to Fetizma     Called brian gooden and transferred patient to [facility]

## 2024-09-16 NOTE — TELEPHONE ENCOUNTER
"Received a transferred call from Esther. She describes the chest pain as a \"jordana.\" She feels it's anxiety. The pain is not crushing, no dizziness and no SOB. The pain is not intense, lasts about 15 sec and may happen about 4x a day. She said it's happened for the last month, but has become more often the last 2 weeks. She does not have panic symptoms. She's been using deep breathing to get through the episodes.     Esther describes periods of extreme sadness and hopelessness. She denies SI. She feels her depression may be a little worse:  no improvement with medication change from nortriptyline to Fetzima. She's been taking Fetzima for about a month and doesn't notice any other side effects. She wonders if she should go back on Xanax. Esther describes this as a hard time of year. It's the anniversary of her good friends passing. She has support in her close friends.     Advised her message will be sent to Dr. Ovalle for review and clinical staff will follow up with her. She verbalized understanding. Next appointment with Dr. Ovalle is 10/22/24.   "

## 2024-09-17 DIAGNOSIS — F41.1 GAD (GENERALIZED ANXIETY DISORDER): Primary | ICD-10-CM

## 2024-09-17 DIAGNOSIS — F33.1 MAJOR DEPRESSIVE DISORDER, RECURRENT EPISODE, MODERATE (HCC): ICD-10-CM

## 2024-09-17 RX ORDER — ALPRAZOLAM 0.25 MG
0.25 TABLET ORAL 3 TIMES DAILY PRN
Qty: 90 TABLET | Refills: 0 | Status: SHIPPED | OUTPATIENT
Start: 2024-09-17

## 2024-09-17 NOTE — TELEPHONE ENCOUNTER
Spoke with Esther. Reviewed direction and new prescriptions sent to pharmacy. Esther verbalized understanding.

## 2024-09-20 ENCOUNTER — TELEPHONE (OUTPATIENT)
Dept: PSYCHOLOGY | Facility: CLINIC | Age: 44
End: 2024-09-20

## 2024-09-20 ENCOUNTER — TELEMEDICINE (OUTPATIENT)
Dept: BEHAVIORAL/MENTAL HEALTH CLINIC | Facility: CLINIC | Age: 44
End: 2024-09-20
Payer: COMMERCIAL

## 2024-09-20 DIAGNOSIS — F33.3 SEVERE EPISODE OF RECURRENT MAJOR DEPRESSIVE DISORDER, WITH PSYCHOTIC FEATURES (HCC): Primary | ICD-10-CM

## 2024-09-20 DIAGNOSIS — F41.1 GENERALIZED ANXIETY DISORDER: ICD-10-CM

## 2024-09-20 PROCEDURE — 90834 PSYTX W PT 45 MINUTES: CPT | Performed by: PSYCHIATRY & NEUROLOGY

## 2024-09-20 NOTE — PSYCH
Virtual Regular Visit    Verification of patient location:    Patient is located at Home in the following state in which I hold an active license [place]      Assessment/Plan:    Problem List Items Addressed This Visit          Behavioral Health    Severe episode of recurrent major depressive disorder, with psychotic features (HCC) - Primary    Generalized anxiety disorder        Reason for visit is   Chief Complaint   Patient presents with    Virtual Regular Visit      Encounter provider HERMELINDA ALBA      Recent Visits  No visits were found meeting these conditions.  Showing recent visits within past 7 days and meeting all other requirements  Today's Visits  Date Type Provider Dept   09/20/24 Telemedicine [facility]DA [facility] Therapist [place]   Showing today's visits and meeting all other requirements  Future Appointments  No visits were found meeting these conditions.  Showing future appointments within next 150 days and meeting all other requirements       The patient was identified by name and date of birth. Esther Griffith was informed that this is a telemedicine visit and that the visit is being conducted throughthe Epic Embedded platform. She agrees to proceed..  My office door was closed. No one else was in the room.  She acknowledged consent and understanding of privacy and security of the video platform. The patient has agreed to participate and understands they can discontinue the visit at any time.    Patient is aware this is a billable service.     SHPI     Past Medical History:   Diagnosis Date    Alcoholism ([facility]) 03/15/2001    Anxiety     Blood transfusion declined because patient is Cheondoism 05/01/2023    Chronic pain disorder     Chronic sinusitis     Depression     Depression     Diabetes (HCC)     Diabetes mellitus ([facility]) 09/01/2022    Fibromyalgia     Migraine     Obesity     Polyarthritis     Last assessed 9/21/2015    Psychiatric disorder     Psychiatric illness      Sleep difficulties     Substance abuse (HCC) 03/15/1994       Past Surgical History:   Procedure Laterality Date    COLONOSCOPY  06/2019    DENTAL SURGERY      HYSTERECTOMY  05/01/2023    HYSTEROSCOPY      Endometrial Biopsy By Hysteroscopy    MA LAPS SUPRACRV HYSTERECT 250 GM/< RMVL TUBE/OVAR N/A 05/01/2023    Procedure: (LSH) W/ BILATERAL SALPINGECTOMY, REMOVAL PARAOVARIAN CYST;  Surgeon: Sai Lopez DO;  Location: [facility];  Service: Gynecology    REMOVAL OF INTRAUTERINE DEVICE (IUD)      US GUIDED BREAST BIOPSY RIGHT COMPLETE Right 06/17/2019    Benign       Current Outpatient Medications   Medication Sig Dispense Refill    ALPRAZolam (XANAX) 0.25 mg tablet Take 1 tablet (0.25 mg total) by mouth 3 (three) times a day as needed for anxiety 90 tablet 0    b complex vitamins capsule Take 1 capsule by mouth daily      Blood Glucose Monitoring Suppl (Contour Blood Glucose System) w/Device KIT Use 1 kit 2 (two) times a day before meals 1 kit 0    cholecalciferol (VITAMIN D3) 25 mcg (1,000 units) tablet Take 1,000 Units by mouth daily Take 1 tab. daily      Contour Test test strip USE TO CHECK BLOOD SUGAR ONCE DAILY 50 strip 7    ferrous sulfate 325 (65 Fe) mg tablet Take 325 mg by mouth Take 1 tab. 3 times per week      gabapentin (NEURONTIN) 300 mg capsule Take 1 capsule (300 mg total) by mouth 3 (three) times a day 90 capsule 5    Levomilnacipran HCl ER (Fetzima) 40 MG extended release capsule Take 1 capsule (40 mg total) by mouth daily 30 capsule 0    levothyroxine 75 mcg tablet Take 1 tablet (75 mcg total) by mouth daily in the early morning 100 tablet 0    lithium carbonate 300 mg capsule Take 1 capsule (300 mg total) by mouth 2 (two) times a day with meals 60 capsule 2    MAGNESIUM MALATE PO Take by mouth Take 1 tab. daily      metFORMIN (GLUCOPHAGE) 500 mg tablet TAKE 1 TABLET BY MOUTH TWICE A DAY WITH FOOD 60 tablet 5    methocarbamol (ROBAXIN) 500 mg tablet TAKE 1 TAB. EVERY 8 HR. AS NEEDED FOR  MUSCLE SPASMS 60 tablet 0    mirtazapine (REMERON) 45 MG tablet Take 1 tablet (45 mg total) by mouth daily at bedtime 30 tablet 2    propranolol (INDERAL) 40 mg tablet Take 0.5 tablets (20 mg total) by mouth daily at bedtime 45 tablet 3     No current facility-administered medications for this visit.        Allergies   Allergen Reactions    Cannabidiol Shortness Of Breath, Itching, Swelling, Anxiety, Palpitations, Confusion, Hypertension, Throat Swelling and Tongue Swelling    Amoxicillin-Pot Clavulanate Hives    Decadrol [Dexamethasone] Other (See Comments)     psychosis    Penicillins Hives     Hives/Uticaria    Tetracyclines & Related Hives      Allergy;        Review of Systems    Video Exam    There were no vitals filed for this visit.    Physical Exam     Behavioral Health Psychotherapy Progress Note    Psychotherapy Provided: Individual Psychotherapy     1. Severe episode of recurrent major depressive disorder, with psychotic features (HCC)        2. Generalized anxiety disorder            Goals addressed in session: Goal 1     DATA: Met with Esther for follow up. Esther shared that the last couple of weeks have been anxiety-filled, as she is still waiting for the meeting with her [employer] elders in about a month, anticipating some consequences. She said that she is trying to distract herself from that anxiety by focusing on self-care, engaging with friends and planning self-care activities.  She also said that she has realized that she needs to start taking more time for herself and work on both her mental and physical health, and plans to start going to the gym to try to get back in shape to ease some of her physical pain and fatigue.  She has allowed herself to sleep in on occasion lately, and has recognized that she needs quiet days with nothing to do to rest and recharge, and to have alone time, as she tends to get overwhelmed with too many people around. She said that she feels like her hospital  "stay last year was a low point that has shaken her up and made her realize that she has to take control of her life and work to improve her health.  She has a  job right now, and one following, but then will be turning jobs down as she said that she needs time to focus on herself.  She also reached out to [facility] Patel to discuss her anxiety and depression, and Dr. Ovalle doubled her antidepressant dose and put her on Xanax to help.  Esther also requested a referral to [facility], as she feels she needs the structure and focus on coping skills to help her manage feeling so anxious and overwhelmed right now.  She shared that she does not have any SI right now, but said that her mood is up and down, her fatigue is limiting her ADLs, and motivation is lacking  Provided support and used CBT and mindfulness strategies to help Esther focus on self-care without guilt, and to prioritize healthy boundaries by saying no.    During this session, this clinician used the following therapeutic modalities: Client-centered Therapy, Cognitive Behavioral Therapy, and Supportive Psychotherapy    Substance Abuse was not addressed during this session. If the client is diagnosed with a co-occurring substance use disorder, please indicate any changes in the frequency or amount of use: n/a. Stage of change for addressing substance use diagnoses: No substance use/Not applicable    ASSESSMENT:  Esther Griffith presents with a Anxious and Depressed mood.     her affect is Normal range and intensity, which is congruent, with her mood and the content of the session. The client has made progress on their goals.     Esther Griffith presents with a low risk of suicide, minimal risk of self-harm, and minimal risk of harm to others.    For any risk assessment that surpasses a \"low\" rating, a safety plan must be developed.    A safety plan was indicated: no  If yes, describe in detail n/a    PLAN: Between sessions, Esther Griffith will focus on " self-care and time for herself while waiting acceptance into [employer]. At the next session, the therapist will use Client-centered Therapy, Cognitive Behavioral Therapy, and Supportive Psychotherapy to address anxiety and depression.    Behavioral Health Treatment Plan and Discharge Planning: Esther Griffith is aware of and agrees to continue to work on their treatment plan. They have identified and are working toward their discharge goals. yes    Visit start and stop times:    09/20/24  Start Time: 1402  Stop Time: 1440  Total Visit Time: 38 minutes

## 2024-09-29 DIAGNOSIS — E03.9 ACQUIRED HYPOTHYROIDISM: ICD-10-CM

## 2024-09-29 RX ORDER — LEVOTHYROXINE SODIUM 75 UG/1
75 TABLET ORAL
Qty: 90 TABLET | Refills: 1 | Status: SHIPPED | OUTPATIENT
Start: 2024-09-29

## 2024-10-04 ENCOUNTER — APPOINTMENT (OUTPATIENT)
Dept: PSYCHOLOGY | Facility: CLINIC | Age: 44
End: 2024-10-04
Payer: COMMERCIAL

## 2024-10-04 ENCOUNTER — TELEMEDICINE (OUTPATIENT)
Dept: BEHAVIORAL/MENTAL HEALTH CLINIC | Facility: CLINIC | Age: 44
End: 2024-10-04
Payer: COMMERCIAL

## 2024-10-04 DIAGNOSIS — F33.2 SEVERE EPISODE OF RECURRENT MAJOR DEPRESSIVE DISORDER, WITHOUT PSYCHOTIC FEATURES (HCC): ICD-10-CM

## 2024-10-04 DIAGNOSIS — F33.3 SEVERE EPISODE OF RECURRENT MAJOR DEPRESSIVE DISORDER, WITH PSYCHOTIC FEATURES (HCC): Primary | ICD-10-CM

## 2024-10-04 DIAGNOSIS — F41.1 GENERALIZED ANXIETY DISORDER: ICD-10-CM

## 2024-10-04 DIAGNOSIS — M79.18 MYOFASCIAL PAIN: ICD-10-CM

## 2024-10-04 PROCEDURE — 90832 PSYTX W PT 30 MINUTES: CPT | Performed by: PSYCHIATRY & NEUROLOGY

## 2024-10-04 RX ORDER — MIRTAZAPINE 45 MG/1
45 TABLET, FILM COATED ORAL
Qty: 30 TABLET | Refills: 2 | Status: SHIPPED | OUTPATIENT
Start: 2024-10-04

## 2024-10-04 NOTE — PSYCH
Virtual Regular Visit    Verification of patient location:    Patient is located at Home in the following state in which I hold an active license [place]      Assessment/Plan:    Problem List Items Addressed This Visit          Behavioral Health    Severe episode of recurrent major depressive disorder, with psychotic features (HCC) - Primary    Generalized anxiety disorder        Reason for visit is   Chief Complaint   Patient presents with    Virtual Regular Visit       Encounter provider HERMELINDA ALBA      Recent Visits  No visits were found meeting these conditions.  Showing recent visits within past 7 days and meeting all other requirements  Today's Visits  Date Type Provider Dept   10/04/24 Telemedicine HERMELINDA Alba [facility] Therapist [place]   Showing today's visits and meeting all other requirements  Future Appointments  No visits were found meeting these conditions.  Showing future appointments within next 150 days and meeting all other requirements       The patient was identified by name and date of birth. Esther Griffith was informed that this is a telemedicine visit and that the visit is being conducted throughthe Epic Embedded platform. She agrees to proceed..  My office door was closed. No one else was in the room.  She acknowledged consent and understanding of privacy and security of the video platform. The patient has agreed to participate and understands they can discontinue the visit at any time.    Patient is aware this is a billable service.     HPI     Past Medical History:   Diagnosis Date    Alcoholism (HCC) 03/15/2001    Anxiety     Blood transfusion declined because patient is [employer] 05/01/2023    Chronic pain disorder     Chronic sinusitis     Depression     Depression     Diabetes (HCC)     Diabetes mellitus (HCC) 09/01/2022    Fibromyalgia     Migraine     Obesity     Polyarthritis     Last assessed 9/21/2015    Psychiatric disorder     Psychiatric illness      Sleep difficulties     Substance abuse (HCC) 03/15/1994       Past Surgical History:   Procedure Laterality Date    COLONOSCOPY  06/2019    DENTAL SURGERY      HYSTERECTOMY  05/01/2023    HYSTEROSCOPY      Endometrial Biopsy By Hysteroscopy    AR LAPS SUPRACRV HYSTERECT 250 GM/< RMVL TUBE/OVAR N/A 05/01/2023    Procedure: (LSH) W/ BILATERAL SALPINGECTOMY, REMOVAL PARAOVARIAN CYST;  Surgeon: Sai Lopez DO;  Location: [facility];  Service: Gynecology    REMOVAL OF INTRAUTERINE DEVICE (IUD)      US GUIDED BREAST BIOPSY RIGHT COMPLETE Right 06/17/2019    Benign       Current Outpatient Medications   Medication Sig Dispense Refill    ALPRAZolam (XANAX) 0.25 mg tablet Take 1 tablet (0.25 mg total) by mouth 3 (three) times a day as needed for anxiety 90 tablet 0    b complex vitamins capsule Take 1 capsule by mouth daily      Blood Glucose Monitoring Suppl (Contour Blood Glucose System) w/Device KIT Use 1 kit 2 (two) times a day before meals 1 kit 0    cholecalciferol (VITAMIN D3) 25 mcg (1,000 units) tablet Take 1,000 Units by mouth daily Take 1 tab. daily      Contour Test test strip USE TO CHECK BLOOD SUGAR ONCE DAILY 50 strip 7    ferrous sulfate 325 (65 Fe) mg tablet Take 325 mg by mouth Take 1 tab. 3 times per week      gabapentin (NEURONTIN) 300 mg capsule Take 1 capsule (300 mg total) by mouth 3 (three) times a day 90 capsule 5    Levomilnacipran HCl ER (Fetzima) 40 MG extended release capsule Take 1 capsule (40 mg total) by mouth daily 30 capsule 0    levothyroxine 75 mcg tablet TAKE 1 TABLET (75 MCG TOTAL) BY MOUTH DAILY IN THE EARLY MORNING 90 tablet 1    lithium carbonate 300 mg capsule Take 1 capsule (300 mg total) by mouth 2 (two) times a day with meals 60 capsule 2    MAGNESIUM MALATE PO Take by mouth Take 1 tab. daily      metFORMIN (GLUCOPHAGE) 500 mg tablet TAKE 1 TABLET BY MOUTH TWICE A DAY WITH FOOD 60 tablet 5    methocarbamol (ROBAXIN) 500 mg tablet TAKE 1 TAB. EVERY 8 HR. AS NEEDED FOR  "MUSCLE SPASMS 60 tablet 0    mirtazapine (REMERON) 45 MG tablet TAKE 1 TABLET (45 MG TOTAL) BY MOUTH DAILY AT BEDTIME 30 tablet 2    propranolol (INDERAL) 40 mg tablet Take 0.5 tablets (20 mg total) by mouth daily at bedtime 45 tablet 3     No current facility-administered medications for this visit.        Allergies   Allergen Reactions    Cannabidiol Shortness Of Breath, Itching, Swelling, Anxiety, Palpitations, Confusion, Hypertension, Throat Swelling and Tongue Swelling    Amoxicillin-Pot Clavulanate Hives    Decadrol [Dexamethasone] Other (See Comments)     psychosis    Penicillins Hives     Hives/Uticaria    Tetracyclines & Related Hives      Allergy;        Review of Systems    Video Exam    There were no vitals filed for this visit.    Physical Exam     Behavioral Health Psychotherapy Progress Note    Psychotherapy Provided: Individual Psychotherapy     1. Severe episode of recurrent major depressive disorder, with psychotic features (HCC)        2. Generalized anxiety disorder            Goals addressed in session: Goal 1     DATA: Met with Esther for follow up. Esther shared that she has been feeling \"really bad\" lately, both emotionally and physically.  She noted that she is under the weather today and has been sleeping almost constantly since getting home from her  jobs of the past month  She shared that she has been very down and hopeless, with loss of interest in previously pleasurable activities, impaired concentration, excessive sleeping, and low motivation, with significant fatigue.  She denied any current SI or HI, though.  She also said that she has had significant anxiety with the unknown date of the meeting with her [employer] elders about her \"sexting\" with [place]bina in the past.  She said that she fears that they will excommunicate her, but said that she wants to give them her perspective and ask for the [employer]'s support to work through this mistake, especially when she has " "felt guilty enough on her own.  She said that she is at the point in her life where she will no longer put up with being dismissed or brushed off, and plans to be more assertive in advocating for herself and her needs.  Provided support, validation and normalization of Esther's feelings, used strengths-based and CBT strategies to help Esther focus on her ability to speak up for herself and recognize her success in being more proactive.    During this session, this clinician used the following therapeutic modalities: Client-centered Therapy, Cognitive Behavioral Therapy, and Supportive Psychotherapy    Substance Abuse was not addressed during this session. If the client is diagnosed with a co-occurring substance use disorder, please indicate any changes in the frequency or amount of use: n/a. Stage of change for addressing substance use diagnoses: No substance use/Not applicable    ASSESSMENT:  Esther Griffith presents with a Depressed mood.     her affect is Normal range and intensity, which is congruent, with her mood and the content of the session. The client has made progress on their goals.     Esther Griffith presents with a minimal risk of suicide, minimal risk of self-harm, and minimal risk of harm to others.    For any risk assessment that surpasses a \"low\" rating, a safety plan must be developed.    A safety plan was indicated: no  If yes, describe in detail n/a    PLAN: Between sessions, Esther Griffith will attend [facility] starting Monday, and will continue to advocate for her needs and goals. At the next session, the therapist will use Client-centered Therapy, Cognitive Behavioral Therapy, and Supportive Psychotherapy to address depression and anxiety.    Behavioral Health Treatment Plan and Discharge Planning: Esther Griffith is aware of and agrees to continue to work on their treatment plan. They have identified and are working toward their discharge goals. yes    Visit start and stop " times:    10/04/24  Start Time: 1402  Stop Time: 1431  Total Visit Time: 29 minutes

## 2024-10-04 NOTE — PSYCH
"Subjective:     Patient ID: Esther Griffith is a 44 y.o. female.    [facility] Clinical Progress Notes      Specialized Services Documentation  Therapist must complete separate progress note for each specific clinical activity in which the individual participated during the day.     Visit Time    Visit Start Time: 0830  Visit Stop Time: 0930  Total Visit Duration: 0 minutes      EDUCATION THERAPY    Esther Griffith was not present for morning assessment due to completing intake with .     Tx Plan Objective: 1.1, 1.2, 1.4 Therapist: KENNY James     Group Psychotherapy - Trauma     Visit Time    Visit Start Time: 930  Visit Stop Time: 1030  Total Visit Duration:  40 minutes    Esther Griffith was attentive throughout in a psychoeducational group about trauma and the body and brain's responses. Group members learned about what trauma is through discussion of “Big T” and “Little t” traumas, how it impacts our day-to-day lives, and how it can impact our brain development which impacts our coping and wellness. The group learned about how personal experiences shape our perceptions of trauma that is related to pre-disposing factors such as distress tolerance, environment, beliefs, morals, and values. This was introduced as the 3 E's:   Event  Experience and   Effects of understanding trauma.     The group then learned about the trauma tree and how understanding each part can help on the road to recovery. In addition,  shared some about the brain structure and its part in traumatic memories. This included the brain structure using Ronak Kemp's \" of the brain\" resource; discussing the brain stem, limbic system, and the cerebral cortex. Members were invited to share their experiences if they were willing, engage in meaningful discussions and engaged in reflective thinking. Lastly, group members were provided the worksheet: \"Growing Stronger from Trauma.\" This worksheet helps individuals " identify the strengths they used to cope with their past traumas and new one they've come out with as a result.  Esther Griffith engaged nonverbally as evidenced by taking notes, maintaining eye contact, open body language, and overall attentiveness. beginning progress noted towards goals. Continue with psychoeducation to promote further understanding of the influence of trauma in one's life and ability to process such trauma through use of skills learned while in PHP.      TX Plan Objectives: 1.1, 1.2, 1.4  Therapist: KENNY James       Visit Time    Visit Start Time: 1330  Visit Stop Time: 1430  Total Visit Duration:  50 minutes    GROUP PSYCHOTHERAPY    Esther Griffith participated actively in psychotherapy group.  This was observed Consistent throughout the treatment day. They were engaged in learning related to Illness, Medication, Aftercare, and Wellness Tools. Staff utilized Verbal, Written, and Demonstration teaching methods.  Esther Griffith shared area of learning and set a goal for outside of program to do laundry Esther Griffith was able to share items explored during the day and shared in adding to their WRAP.  Ended session with staff led exercise to practice mindfulness focused on the fall season and engaging the senses.  Esther Griffith demonstrated beginning progress toward goal.  Continue group psychotherapy to actively practice learned skills and encourage transfer of knowledge to unstructured time.      Tx Plan Objective: 1.1, 1.2, 1.4, Therapist:  KENNY James

## 2024-10-06 ENCOUNTER — HOSPITAL ENCOUNTER (EMERGENCY)
Facility: HOSPITAL | Age: 44
Discharge: HOME | End: 2024-10-06
Attending: EMERGENCY MEDICINE
Payer: COMMERCIAL

## 2024-10-06 ENCOUNTER — APPOINTMENT (EMERGENCY)
Dept: RADIOLOGY | Facility: HOSPITAL | Age: 44
End: 2024-10-06
Payer: COMMERCIAL

## 2024-10-06 VITALS
OXYGEN SATURATION: 100 % | HEART RATE: 94 BPM | TEMPERATURE: 97.5 F | WEIGHT: 190 LBS | BODY MASS INDEX: 36.5 KG/M2 | SYSTOLIC BLOOD PRESSURE: 127 MMHG | DIASTOLIC BLOOD PRESSURE: 83 MMHG | RESPIRATION RATE: 17 BRPM

## 2024-10-06 DIAGNOSIS — R07.9 CHEST PAIN, UNSPECIFIED: Primary | ICD-10-CM

## 2024-10-06 LAB
ALBUMIN SERPL BCG-MCNC: 4.8 G/DL (ref 3.5–5)
ALP SERPL-CCNC: 56 U/L (ref 34–104)
ALT SERPL W P-5'-P-CCNC: 36 U/L (ref 7–52)
ANION GAP SERPL CALCULATED.3IONS-SCNC: 9 MMOL/L (ref 4–13)
AST SERPL W P-5'-P-CCNC: 29 U/L (ref 13–39)
ATRIAL RATE: 104 BPM
BASOPHILS # BLD AUTO: 0.03 THOUSANDS/ΜL (ref 0–0.1)
BASOPHILS NFR BLD AUTO: 0 % (ref 0–1)
BILIRUB SERPL-MCNC: 0.5 MG/DL (ref 0.2–1)
BUN SERPL-MCNC: 10 MG/DL (ref 5–25)
CALCIUM SERPL-MCNC: 10.4 MG/DL (ref 8.4–10.2)
CARDIAC TROPONIN I PNL SERPL HS: <2 NG/L
CARDIAC TROPONIN I PNL SERPL HS: <2 NG/L
CHLORIDE SERPL-SCNC: 101 MMOL/L (ref 96–108)
CO2 SERPL-SCNC: 25 MMOL/L (ref 21–32)
CREAT SERPL-MCNC: 0.75 MG/DL (ref 0.6–1.3)
D DIMER PPP FEU-MCNC: <0.27 UG/ML FEU
EOSINOPHIL # BLD AUTO: 0.12 THOUSAND/ΜL (ref 0–0.61)
EOSINOPHIL NFR BLD AUTO: 1 % (ref 0–6)
ERYTHROCYTE [DISTWIDTH] IN BLOOD BY AUTOMATED COUNT: 13.6 % (ref 11.6–15.1)
EXT PREGNANCY TEST URINE: NEGATIVE
EXT. CONTROL: NORMAL
GFR SERPL CREATININE-BSD FRML MDRD: 95 ML/MIN/1.73SQ M
GLUCOSE SERPL-MCNC: 178 MG/DL (ref 65–140)
HCT VFR BLD AUTO: 44.3 % (ref 34.8–46.1)
HGB BLD-MCNC: 14.3 G/DL (ref 11.5–15.4)
IMM GRANULOCYTES # BLD AUTO: 0.04 THOUSAND/UL (ref 0–0.2)
IMM GRANULOCYTES NFR BLD AUTO: 0 % (ref 0–2)
LYMPHOCYTES # BLD AUTO: 3.29 THOUSANDS/ΜL (ref 0.6–4.47)
LYMPHOCYTES NFR BLD AUTO: 31 % (ref 14–44)
MCH RBC QN AUTO: 31.2 PG (ref 26.8–34.3)
MCHC RBC AUTO-ENTMCNC: 32.3 G/DL (ref 31.4–37.4)
MCV RBC AUTO: 97 FL (ref 82–98)
MONOCYTES # BLD AUTO: 0.6 THOUSAND/ΜL (ref 0.17–1.22)
MONOCYTES NFR BLD AUTO: 6 % (ref 4–12)
NEUTROPHILS # BLD AUTO: 6.54 THOUSANDS/ΜL (ref 1.85–7.62)
NEUTS SEG NFR BLD AUTO: 62 % (ref 43–75)
NRBC BLD AUTO-RTO: 0 /100 WBCS
P AXIS: 36 DEGREES
PLATELET # BLD AUTO: 254 THOUSANDS/UL (ref 149–390)
PMV BLD AUTO: 9.1 FL (ref 8.9–12.7)
POTASSIUM SERPL-SCNC: 3.8 MMOL/L (ref 3.5–5.3)
PR INTERVAL: 144 MS
PROT SERPL-MCNC: 7.3 G/DL (ref 6.4–8.4)
QRS AXIS: 75 DEGREES
QRSD INTERVAL: 76 MS
QT INTERVAL: 338 MS
QTC INTERVAL: 444 MS
RBC # BLD AUTO: 4.58 MILLION/UL (ref 3.81–5.12)
SODIUM SERPL-SCNC: 135 MMOL/L (ref 135–147)
T WAVE AXIS: -28 DEGREES
VENTRICULAR RATE: 104 BPM
WBC # BLD AUTO: 10.62 THOUSAND/UL (ref 4.31–10.16)

## 2024-10-06 PROCEDURE — 99285 EMERGENCY DEPT VISIT HI MDM: CPT

## 2024-10-06 PROCEDURE — 84484 ASSAY OF TROPONIN QUANT: CPT

## 2024-10-06 PROCEDURE — 99285 EMERGENCY DEPT VISIT HI MDM: CPT | Performed by: EMERGENCY MEDICINE

## 2024-10-06 PROCEDURE — 71046 X-RAY EXAM CHEST 2 VIEWS: CPT

## 2024-10-06 PROCEDURE — 80053 COMPREHEN METABOLIC PANEL: CPT

## 2024-10-06 PROCEDURE — 36415 COLL VENOUS BLD VENIPUNCTURE: CPT

## 2024-10-06 PROCEDURE — 81025 URINE PREGNANCY TEST: CPT

## 2024-10-06 PROCEDURE — 93010 ELECTROCARDIOGRAM REPORT: CPT | Performed by: INTERNAL MEDICINE

## 2024-10-06 PROCEDURE — 96374 THER/PROPH/DIAG INJ IV PUSH: CPT

## 2024-10-06 PROCEDURE — 85025 COMPLETE CBC W/AUTO DIFF WBC: CPT

## 2024-10-06 PROCEDURE — 93005 ELECTROCARDIOGRAM TRACING: CPT

## 2024-10-06 PROCEDURE — 85379 FIBRIN DEGRADATION QUANT: CPT

## 2024-10-06 RX ORDER — ASPIRIN 325 MG
325 TABLET ORAL ONCE
Status: COMPLETED | OUTPATIENT
Start: 2024-10-06 | End: 2024-10-06

## 2024-10-06 RX ORDER — ONDANSETRON 2 MG/ML
4 INJECTION INTRAMUSCULAR; INTRAVENOUS ONCE
Status: COMPLETED | OUTPATIENT
Start: 2024-10-06 | End: 2024-10-06

## 2024-10-06 RX ADMIN — ASPIRIN 325 MG ORAL TABLET 325 MG: 325 PILL ORAL at 14:45

## 2024-10-06 RX ADMIN — ONDANSETRON 4 MG: 2 INJECTION INTRAMUSCULAR; INTRAVENOUS at 14:46

## 2024-10-06 NOTE — ED PROVIDER NOTES
Final diagnoses:   Chest pain, unspecified     ED Disposition       ED Disposition   Discharge    Condition   Stable    Date/Time   Sun Oct 6, 2024  4:54 PM    Comment   Esther Taylorchristina discharge to home/self care.                   Assessment & Plan       Medical Decision Making  Patient is a 44-year-old female presenting for evaluation of chest pain    Differential: ACS versus PE versus nonspecific chest pain.  Doubt dissection or GI pathology    Plan: Cardiac labs including dimer as patient is low risk but cannot PERC out given tachycardia, chest x-ray, EKG, monitor and reassess.  Will give aspirin.  Monitor and reassess final dispo pending    Labs unremarkable dimer negative.  EKG as in ED course.  Chest x-ray normal.  Delta negative.  Patient is hemodynamically stable and cleared for discharge outpatient follow-up.  Return precautions given patient verbalized understanding    Amount and/or Complexity of Data Reviewed  Labs: ordered.  Radiology: ordered and independent interpretation performed.  ECG/medicine tests:  Decision-making details documented in ED Course.    Risk  OTC drugs.  Prescription drug management.        ED Course as of 10/08/24 1814   Sun Oct 06, 2024   1409 ECG 12 lead  Sinus tach 104 normal qtc new t wave inversions in III and aVF       Medications   aspirin tablet 325 mg (325 mg Oral Given 10/6/24 1445)   ondansetron (ZOFRAN) injection 4 mg (4 mg Intravenous Given 10/6/24 1446)       ED Risk Strat Scores                                               History of Present Illness       Chief Complaint   Patient presents with    Chest Pain     Pt reports 2 weeks of on and off CP, pt thought it was anxiety so called therapist and was prescribed xanax. Pt reports medication did not work and this morning woke up with chest pain and now in for eval       Past Medical History:   Diagnosis Date    Alcoholism ([facility]) 03/15/2001    Anxiety     Blood transfusion declined because patient is [employer]  05/01/2023    Chronic pain disorder     Chronic sinusitis     Depression     Depression     Diabetes ([facility])     Diabetes mellitus ([facility]) 09/01/2022    Fibromyalgia     Migraine     Obesity     Polyarthritis     Last assessed 9/21/2015    Psychiatric disorder     Psychiatric illness     Sleep difficulties     Substance abuse ([facility]) 03/15/1994      Past Surgical History:   Procedure Laterality Date    COLONOSCOPY  06/2019    DENTAL SURGERY      HYSTERECTOMY  05/01/2023    HYSTEROSCOPY      Endometrial Biopsy By Hysteroscopy    VT LAPS SUPRACRV HYSTERECT 250 GM/< RMVL TUBE/OVAR N/A 05/01/2023    Procedure: ([facility]) W/ BILATERAL SALPINGECTOMY, REMOVAL PARAOVARIAN CYST;  Surgeon: Sai Lopez DO;  Location: [facility];  Service: Gynecology    REMOVAL OF INTRAUTERINE DEVICE (IUD)      US GUIDED BREAST BIOPSY RIGHT COMPLETE Right 06/17/2019    Benign      Family History   Problem Relation Age of Onset    Irregular heart beat Mother     Diabetes Father     Kidney disease Father     Substance Abuse Brother     No Known Problems Brother     Depression Paternal Aunt     Substance Abuse Maternal Uncle     Prostate cancer Maternal Uncle 60    Diabetes Maternal Grandfather     Migraines Maternal Grandfather     Stroke Maternal Grandfather     Diabetes Maternal Grandmother     Rheum arthritis Maternal Grandmother     Melanoma Maternal Grandmother 84    Cancer Maternal Grandmother         Skin Cancer - successfully removed    Stroke Maternal Grandmother     Diabetes Paternal Grandfather     Lung cancer Paternal Grandfather 47    Cancer Paternal Grandfather         Lung Cancer - cause of death    Kidney disease Paternal Grandmother     Rheum arthritis Paternal Grandmother     Depression Paternal Grandmother     Deep vein thrombosis Paternal Grandmother     Diabetes Paternal Grandmother     Alcohol abuse Family     Stomach cancer Family     Completed Suicide  Neg Hx       Social History     Tobacco Use    Smoking status: Never      Passive exposure: Never    Smokeless tobacco: Never    Tobacco comments:     N/A, non-smoker.   Vaping Use    Vaping status: Never Used   Substance Use Topics    Alcohol use: Not Currently     Comment: 3 x year; sober since 2010    Drug use: Not Currently      E-Cigarette/Vaping    E-Cigarette Use Never User       E-Cigarette/Vaping Substances    Nicotine No     THC No     CBD No       I have reviewed and agree with the history as documented.     Patient is a 44-year-old female past medical history of diabetes that presents for evaluation of chest pain.  Patient reports that her symptoms been on and off x 2 weeks.  She initially thought that it was due to worsening anxiety so she spoke with her therapist who prescribed Xanax however this has not helped her pain.  States that her pain has been constant today left-sided radiating into the shoulder that is exertional in nature.  Not pleuritic or positional.  No OCP use prolonged hospitalizations immobilizations recent surgeries no extended travel or air travel no lower extremity swelling.  Patient denies associated fever chills shortness of breath palpitations abdominal pain nausea vomiting back pain neck pain.  Denies any other complaints at this time.          Review of Systems   Constitutional:  Negative for chills and fever.   HENT:  Negative for ear pain and sore throat.    Eyes:  Negative for pain and visual disturbance.   Respiratory:  Negative for cough and shortness of breath.    Cardiovascular:  Positive for chest pain. Negative for palpitations and leg swelling.   Gastrointestinal:  Negative for abdominal pain, nausea and vomiting.   Genitourinary:  Negative for dysuria, frequency, hematuria, vaginal bleeding and vaginal discharge.   Musculoskeletal:  Negative for arthralgias and back pain.   Skin:  Negative for color change and rash.   Neurological:  Negative for seizures, syncope and light-headedness.   All other systems reviewed and are  negative.          Objective       ED Triage Vitals [10/06/24 1338]   Temperature Pulse Blood Pressure Respirations SpO2 Patient Position - Orthostatic VS   97.5 °F (36.4 °C) 103 (!) 178/106 18 99 % Sitting      Temp Source Heart Rate Source BP Location FiO2 (%) Pain Score    Tympanic Monitor Right arm -- 6      Vitals      Date and Time Temp Pulse SpO2 Resp BP Pain Score FACES Pain Rating User   10/06/24 1700 -- 94 100 % 17 127/83 -- -- SM   10/06/24 1430 -- 96 98 % 16 140/77 -- -- SM   10/06/24 1338 97.5 °F (36.4 °C) 103 99 % 18 178/106 6 -- CS            Physical Exam  Vitals and nursing note reviewed.   Constitutional:       General: She is not in acute distress.     Appearance: She is well-developed. She is not ill-appearing or diaphoretic.   HENT:      Head: Normocephalic and atraumatic.      Mouth/Throat:      Mouth: Mucous membranes are moist.   Eyes:      Extraocular Movements: Extraocular movements intact.      Conjunctiva/sclera: Conjunctivae normal.   Neck:      Vascular: No JVD.   Cardiovascular:      Rate and Rhythm: Regular rhythm. Tachycardia present.      Pulses:           Radial pulses are 2+ on the right side and 2+ on the left side.      Heart sounds: No murmur heard.  Pulmonary:      Effort: Pulmonary effort is normal. No respiratory distress.      Breath sounds: Normal breath sounds.   Abdominal:      Palpations: Abdomen is soft.      Tenderness: There is no abdominal tenderness. There is no guarding or rebound.   Musculoskeletal:         General: Normal range of motion.      Cervical back: Normal range of motion and neck supple.      Right lower leg: No edema.      Left lower leg: No edema.   Skin:     General: Skin is warm and dry.      Capillary Refill: Capillary refill takes less than 2 seconds.   Neurological:      General: No focal deficit present.      Mental Status: She is alert.         Results Reviewed       Procedure Component Value Units Date/Time    HS Troponin I 2hr [[id number]]  Collected: 10/06/24 1618    Lab Status: Final result Specimen: Blood from Arm, Left Updated: 10/06/24 1650     hs TnI 2hr <2 ng/L      Delta 2hr hsTnI --    POCT pregnancy, urine [[id number]]  (Normal) Resulted: 10/06/24 1615    Lab Status: Final result Updated: 10/06/24 1615     EXT Preg Test, Ur Negative     Control Valid    D-Dimer [[id number]]  (Normal) Collected: 10/06/24 1422    Lab Status: Final result Specimen: Blood from Arm, Left Updated: 10/06/24 1456     D-Dimer, Quant <0.27 ug/ml FEU     HS Troponin 0hr (reflex protocol) [[id number]]  (Normal) Collected: 10/06/24 1422    Lab Status: Final result Specimen: Blood from Arm, Left Updated: 10/06/24 1454     hs TnI 0hr <2 ng/L     Comprehensive metabolic panel [[id number]]  (Abnormal) Collected: 10/06/24 1422    Lab Status: Final result Specimen: Blood from Arm, Left Updated: 10/06/24 1453     Sodium 135 mmol/L      Potassium 3.8 mmol/L      Chloride 101 mmol/L      CO2 25 mmol/L      ANION GAP 9 mmol/L      BUN 10 mg/dL      Creatinine 0.75 mg/dL      Glucose 178 mg/dL      Calcium 10.4 mg/dL      AST 29 U/L      ALT 36 U/L      Alkaline Phosphatase 56 U/L      Total Protein 7.3 g/dL      Albumin 4.8 g/dL      Total Bilirubin 0.50 mg/dL      eGFR 95 ml/min/1.73sq m     Narrative:      National Kid[employer] Disease Foundation guidelines for Chronic Kidney Disease (CKD):     Stage 1 with normal or high GFR (GFR > 90 mL/min/1.73 square meters)    Stage 2 Mild CKD (GFR = 60-89 mL/min/1.73 square meters)    Stage 3A Moderate CKD (GFR = 45-59 mL/min/1.73 square meters)    Stage 3B Moderate CKD (GFR = 30-44 mL/min/1.73 square meters)    Stage 4 Severe CKD (GFR = 15-29 mL/min/1.73 square meters)    Stage 5 End Stage CKD (GFR <15 mL/min/1.73 square meters)  Note: GFR calculation is accurate only with a steady state creatinine    CBC and differential [[id number]]  (Abnormal) Collected: 10/06/24 1422    Lab Status: Final result Specimen: Blood from Arm, Left Updated: 10/06/24  1431     WBC 10.62 Thousand/uL      RBC 4.58 Million/uL      Hemoglobin 14.3 g/dL      Hematocrit 44.3 %      MCV 97 fL      MCH 31.2 pg      MCHC 32.3 g/dL      RDW 13.6 %      MPV 9.1 fL      Platelets 254 Thousands/uL      nRBC 0 /100 WBCs      Segmented % 62 %      Immature Grans % 0 %      Lymphocytes % 31 %      Monocytes % 6 %      Eosinophils Relative 1 %      Basophils Relative 0 %      Absolute Neutrophils 6.54 Thousands/µL      Absolute Immature Grans 0.04 Thousand/uL      Absolute Lymphocytes 3.29 Thousands/µL      Absolute Monocytes 0.60 Thousand/µL      Eosinophils Absolute 0.12 Thousand/µL      Basophils Absolute 0.03 Thousands/µL             XR chest 2 views   ED Interpretation by Jose Rosas MD (10/06 1510)   No acute cardiopulmonary disease.      Final Interpretation by Bryan Mujica MD (10/06 1840)      No acute cardiopulmonary disease.            Workstation performed: [id number]             Procedures    ED Medication and Procedure Management   Prior to Admission Medications   Prescriptions Last Dose Informant Patient Reported? Taking?   ALPRAZolam (XANAX) 0.25 mg tablet   No No   Sig: Take 1 tablet (0.25 mg total) by mouth 3 (three) times a day as needed for anxiety   Blood Glucose Monitoring Suppl (Contour Blood Glucose System) w/Device KIT   No No   Sig: Use 1 kit 2 (two) times a day before meals   Contour Test test strip   No No   Sig: USE TO CHECK BLOOD SUGAR ONCE DAILY   Levomilnacipran HCl ER (Fetzima) 40 MG extended release capsule   No No   Sig: Take 1 capsule (40 mg total) by mouth daily   MAGNESIUM MALATE PO   Yes No   Sig: Take by mouth Take 1 tab. daily   b complex vitamins capsule   Yes No   Sig: Take 1 capsule by mouth daily   cholecalciferol (VITAMIN D3) 25 mcg (1,000 units) tablet   Yes No   Sig: Take 1,000 Units by mouth daily Take 1 tab. daily   ferrous sulfate 325 (65 Fe) mg tablet   Yes No   Sig: Take 325 mg by mouth Take 1 tab. 3 times per week   gabapentin  (NEURONTIN) 300 mg capsule   No No   Sig: Take 1 capsule (300 mg total) by mouth 3 (three) times a day   levothyroxine 75 mcg tablet   No No   Sig: TAKE 1 TABLET (75 MCG TOTAL) BY MOUTH DAILY IN THE EARLY MORNING   lithium carbonate 300 mg capsule   No No   Sig: Take 1 capsule (300 mg total) by mouth 2 (two) times a day with meals   metFORMIN (GLUCOPHAGE) 500 mg tablet   No No   Sig: TAKE 1 TABLET BY MOUTH TWICE A DAY WITH FOOD   mirtazapine (REMERON) 45 MG tablet   No No   Sig: TAKE 1 TABLET (45 MG TOTAL) BY MOUTH DAILY AT BEDTIME   propranolol (INDERAL) 40 mg tablet   No No   Sig: Take 0.5 tablets (20 mg total) by mouth daily at bedtime      Facility-Administered Medications: None     Discharge Medication List as of 10/6/2024  4:55 PM        CONTINUE these medications which have NOT CHANGED    Details   ALPRAZolam (XANAX) 0.25 mg tablet Take 1 tablet (0.25 mg total) by mouth 3 (three) times a day as needed for anxiety, Starting Tue 9/17/2024, Normal      b complex vitamins capsule Take 1 capsule by mouth daily, Historical Med      Blood Glucose Monitoring Suppl (Contour Blood Glucose System) w/Device KIT Use 1 kit 2 (two) times a day before meals, Starting Tue 10/4/2022, Normal      cholecalciferol (VITAMIN D3) 25 mcg (1,000 units) tablet Take 1,000 Units by mouth daily Take 1 tab. daily, Historical Med      Contour Test test strip USE TO CHECK BLOOD SUGAR ONCE DAILY, Normal      ferrous sulfate 325 (65 Fe) mg tablet Take 325 mg by mouth Take 1 tab. 3 times per week, Historical Med      gabapentin (NEURONTIN) 300 mg capsule Take 1 capsule (300 mg total) by mouth 3 (three) times a day, Starting Sun 7/7/2024, Normal      Levomilnacipran HCl ER (Fetzima) 40 MG extended release capsule Take 1 capsule (40 mg total) by mouth daily, Starting Tue 9/17/2024, Normal      levothyroxine 75 mcg tablet TAKE 1 TABLET (75 MCG TOTAL) BY MOUTH DAILY IN THE EARLY MORNING, Starting Sun 9/29/2024, Normal      lithium carbonate 300 mg  capsule Take 1 capsule (300 mg total) by mouth 2 (two) times a day with meals, Starting Thu 7/18/2024, Normal      MAGNESIUM MALATE PO Take by mouth Take 1 tab. daily, Historical Med      metFORMIN (GLUCOPHAGE) 500 mg tablet TAKE 1 TABLET BY MOUTH TWICE A DAY WITH FOOD, Starting Tue 7/23/2024, Normal      mirtazapine (REMERON) 45 MG tablet TAKE 1 TABLET (45 MG TOTAL) BY MOUTH DAILY AT BEDTIME, Starting Fri 10/4/2024, Normal      propranolol (INDERAL) 40 mg tablet Take 0.5 tablets (20 mg total) by mouth daily at bedtime, Starting Thu 2/1/2024, Until Sun 1/26/2025, Normal      methocarbamol (ROBAXIN) 500 mg tablet TAKE 1 TAB. EVERY 8 HR. AS NEEDED FOR MUSCLE SPASMS, Normal           No discharge procedures on file.  ED SEPSIS DOCUMENTATION   Time reflects when diagnosis was documented in both MDM as applicable and the Disposition within this note       Time User Action Codes Description Comment    10/6/2024  4:54 PM Kingston Torres Add [R07.9] Chest pain, unspecified                  Kingston Torres,   10/08/24 1819

## 2024-10-06 NOTE — DISCHARGE INSTRUCTIONS
You were seen in the ER for chest pain.  Normal workup no lab abnormalities EKG normal chest x-ray normal.  Follow-up with your PCP.  If your symptoms worsen or persist return to the ER

## 2024-10-06 NOTE — ED ATTENDING ATTESTATION
10/6/2024  I, Jose Rosas MD, saw and evaluated the patient. I have discussed the patient with the resident/non-physician practitioner and agree with the resident's/non-physician practitioner's findings, Plan of Care, and MDM as documented in the resident's/non-physician practitioner's note, except where noted. All available labs and Radiology studies were reviewed.  I was present for key portions of any procedure(s) performed by the resident/non-physician practitioner and I was immediately available to provide assistance.       At this point I agree with the current assessment done in the Emergency Department.  I have conducted an independent evaluation of this patient a history and physical is as follows:    ED Course     44-year-old female, cardiac risk factors of type 2 diabetes, presenting to the emergency department for evaluation of chest pain.  Patient states that over the past 2 weeks she has had multiple episodes of substernal chest pain radiating to the left shoulder/chest.  Patient states initially began approximately 2 weeks ago, lasted for about 5 days and then resolved.  Patient had seen her psychiatrist to add prescribed anxiety medications for her.  Patient states that over the past 5 days to a week she has had recurrent episodic chest pain that seems to be made worse with deep inspiration as well as with exertion.  No associated cough.  No new leg pain or swelling.  No personal or family history of DVT or PE.  No f first-degree family history of coronary artery disease.    The patient is resting comfortably on a stretcher in no acute respiratory distress. The patient appears nontoxic. HEENT reveals moist mucous membranes. Head is normocephalic and atraumatic. Conjunctiva and sclera are normal. Neck is nontender and supple with full range of motion to flexion, extension, lateral rotation. No meningismus appreciated. No masses are appreciated. Lungs are clear to auscultation bilaterally  without any wheezes, rales or rhonchi. Heart is regular rate and rhythm without any murmurs, rubs or gallops. Abdomen is soft and nontender without any rebound or guarding. Extremities appear grossly normal without any significant arthropathy. Patient is awake, alert, and oriented x3. The patient has normal interaction.  Cranial nerves grossly intact.  Motor is 5 out of 5 bilateral upper and lower extremities.    MEDICAL DECISION MAKING    Number and Complexity of Problems  Differential diagnosis: Acute coronary syndrome, pulmonary embolism, gastroesophageal reflux disease, anxiety.    Medical Decision Making Data  External documents reviewed: No pertinent outpatient visits available for review.  My EKG interpretation: Sinus tachycardia at 104 bpm.  Inverted T waves in 3 and aVF.  Nonspecific flattening in lead II and V5 and V6.  This is changed from an EKG performed in October 18, 2023, but is not significantly different from an EKG performed October 16, 2023.  My X-ray interpretation: No acute cardiopulmonary disease.      XR chest 2 views   ED Interpretation   No acute cardiopulmonary disease.      Final Result      No acute cardiopulmonary disease.            Workstation performed: [id number]             Labs Reviewed   CBC AND DIFFERENTIAL - Abnormal       Result Value Ref Range Status    WBC 10.62 (*) 4.31 - 10.16 Thousand/uL Final    RBC 4.58  3.81 - 5.12 Million/uL Final    Hemoglobin 14.3  11.5 - 15.4 g/dL Final    Hematocrit 44.3  34.8 - 46.1 % Final    MCV 97  82 - 98 fL Final    MCH 31.2  26.8 - 34.3 pg Final    MCHC 32.3  31.4 - 37.4 g/dL Final    RDW 13.6  11.6 - 15.1 % Final    MPV 9.1  8.9 - 12.7 fL Final    Platelets 254  149 - 390 Thousands/uL Final    nRBC 0  /100 WBCs Final    Segmented % 62  43 - 75 % Final    Immature Grans % 0  0 - 2 % Final    Lymphocytes % 31  14 - 44 % Final    Monocytes % 6  4 - 12 % Final    Eosinophils Relative 1  0 - 6 % Final    Basophils Relative 0  0 - 1 % Final     Absolute Neutrophils 6.54  1.85 - 7.62 Thousands/µL Final    Absolute Immature Grans 0.04  0.00 - 0.20 Thousand/uL Final    Absolute Lymphocytes 3.29  0.60 - 4.47 Thousands/µL Final    Absolute Monocytes 0.60  0.17 - 1.22 Thousand/µL Final    Eosinophils Absolute 0.12  0.00 - 0.61 Thousand/µL Final    Basophils Absolute 0.03  0.00 - 0.10 Thousands/µL Final   COMPREHENSIVE METABOLIC PANEL - Abnormal    Sodium 135  135 - 147 mmol/L Final    Potassium 3.8  3.5 - 5.3 mmol/L Final    Chloride 101  96 - 108 mmol/L Final    CO2 25  21 - 32 mmol/L Final    ANION GAP 9  4 - 13 mmol/L Final    BUN 10  5 - 25 mg/dL Final    Creatinine 0.75  0.60 - 1.30 mg/dL Final    Comment: Standardized to IDMS reference method    Glucose 178 (*) 65 - 140 mg/dL Final    Comment: If the patient is fasting, the ADA then defines impaired fasting glucose as > 100 mg/dL and diabetes as > or equal to 123 mg/dL.    Calcium 10.4 (*) 8.4 - 10.2 mg/dL Final    AST 29  13 - 39 U/L Final    ALT 36  7 - 52 U/L Final    Comment: Specimen collection should occur prior to Sulfasalazine administration due to the potential for falsely depressed results.     Alkaline Phosphatase 56  34 - 104 U/L Final    Total Protein 7.3  6.4 - 8.4 g/dL Final    Albumin 4.8  3.5 - 5.0 g/dL Final    Total Bilirubin 0.50  0.20 - 1.00 mg/dL Final    Comment: Use of this assay is not recommended for patients undergoing treatment with eltrombopag due to the potential for falsely elevated results.  N-acetyl-p-benzoquinone imine (metabolite of Acetaminophen) will generate erroneously low results in samples for patients that have taken an overdose of Acetaminophen.    eGFR 95  ml/min/1.73sq m Final    Narrative:     [employer] guidelines for Chronic Kidney Disease (CKD):     Stage 1 with normal or high GFR (GFR > 90 mL/min/1.73 square meters)    Stage 2 Mild CKD (GFR = 60-89 mL/min/1.73 square meters)    Stage 3A Moderate CKD (GFR = 45-59 mL/min/1.73 square  "meters)    Stage 3B Moderate CKD (GFR = 30-44 mL/min/1.73 square meters)    Stage 4 Severe CKD (GFR = 15-29 mL/min/1.73 square meters)    Stage 5 End Stage CKD (GFR <15 mL/min/1.73 square meters)  Note: GFR calculation is accurate only with a steady state creatinine   D-DIMER, QUANTITATIVE - Normal    D-Dimer, Quant <0.27  <0.50 ug/ml FEU Final    Comment: Reference and upper limits to exclude DVT and PE are the same.  Do not use to exclude if clinical symptoms are present.  Pregnant women:  1st trimester:  <0.22 - 1.06 ug/ml FEU  2nd trimester:  <0.22 - 1.88 ug/ml FEU  3rd trimester:   0.24 - 3.28 ug/ml FEU    Note: Normal ranges may not apply to patients who are transgender, non-binary, or whose legal sex, sex at birth, and gender identity differ.     HS TROPONIN I 0HR - Normal    hs TnI 0hr <2  \"Refer to ACS Flowchart\"- see link ng/L Final    Comment:                                              Initial (time 0) result  If >=50 ng/L, Myocardial injury suggested ;  Type of myocardial injury and treatment strategy  to be determined.  If 5-49 ng/L, a delta result at 2 hours and or 4 hours will be needed to further evaluate.  If <4 ng/L, and chest pain has been >3 hours since onset, patient may qualify for discharge based on the HEART score in the ED.  If <5 ng/L and <3hours since onset of chest pain, a delta result at 2 hours will be needed to further evaluate.    HS Troponin 99th Percentile URL of a Health Population=12 ng/L with a 95% Confidence Interval of 8-18 ng/L.    Second Troponin (time 2 hours)  If calculated delta >= 20 ng/L,  Myocardial injury suggested ; Type of myocardial injury and treatment strategy to be determined.  If 5-49 ng/L and the calculated delta is 5-19 ng/L, consult medical service for evaluation.  Continue evaluation for ischemia on ecg and other possible etiology and repeat hs troponin at 4 hours.  If delta is <5 ng/L at 2 hours, consider discharge based on risk stratification via the " "HEART score (if in ED), or PENELOPE risk score in IP/Observation.    HS Troponin 99th Percentile URL of a Health Population=12 ng/L with a 95% Confidence Interval of 8-18 ng/L.   POCT PREGNANCY, URINE - Normal    EXT Preg Test, Ur Negative   Final    Control Valid   Final   HS TROPONIN I 2HR    hs TnI 2hr <2  \"Refer to ACS Flowchart\"- see link ng/L Final    Comment:                                              Initial (time 0) result  If >=50 ng/L, Myocardial injury suggested ;  Type of myocardial injury and treatment strategy  to be determined.  If 5-49 ng/L, a delta result at 2 hours and or 4 hours will be needed to further evaluate.  If <4 ng/L, and chest pain has been >3 hours since onset, patient may qualify for discharge based on the HEART score in the ED.  If <5 ng/L and <3hours since onset of chest pain, a delta result at 2 hours will be needed to further evaluate.    HS Troponin 99th Percentile URL of a Health Population=12 ng/L with a 95% Confidence Interval of 8-18 ng/L.    Second Troponin (time 2 hours)  If calculated delta >= 20 ng/L,  Myocardial injury suggested ; Type of myocardial injury and treatment strategy to be determined.  If 5-49 ng/L and the calculated delta is 5-19 ng/L, consult medical service for evaluation.  Continue evaluation for ischemia on ecg and other possible etiology and repeat hs troponin at 4 hours.  If delta is <5 ng/L at 2 hours, consider discharge based on risk stratification via the HEART score (if in ED), or PENELOPE risk score in IP/Observation.    HS Troponin 99th Percentile URL of a Health Population=12 ng/L with a 95% Confidence Interval of 8-18 ng/L.    Delta 2hr hsTnI     Final    Comment: Unable to Calculate       Labs reviewed by me are significant for:  Low D-dimer, in the setting of low pretest probability excludes PE.    Clinical decision rules/scores are significant for: Low pretest probability for PE.    Treatment and Disposition  ED course: Patient remained " hemodynamically stable in the emergency department.  Initial troponin undetectable.  D-dimer less than 0.27.  Shared decision making: Patient agreeable with plan.  Code status: Full code.              Critical Care Time  Procedures

## 2024-10-07 ENCOUNTER — OFFICE VISIT (OUTPATIENT)
Dept: PSYCHOLOGY | Facility: CLINIC | Age: 44
End: 2024-10-07
Payer: COMMERCIAL

## 2024-10-07 ENCOUNTER — OFFICE VISIT (OUTPATIENT)
Dept: PSYCHIATRY | Facility: CLINIC | Age: 44
End: 2024-10-07
Payer: COMMERCIAL

## 2024-10-07 VITALS
RESPIRATION RATE: 18 BRPM | HEART RATE: 102 BPM | DIASTOLIC BLOOD PRESSURE: 84 MMHG | SYSTOLIC BLOOD PRESSURE: 124 MMHG | BODY MASS INDEX: 34.55 KG/M2 | HEIGHT: 61 IN | WEIGHT: 183 LBS

## 2024-10-07 DIAGNOSIS — F33.2 MAJOR DEPRESSIVE DISORDER, RECURRENT SEVERE WITHOUT PSYCHOTIC FEATURES (HCC): Primary | ICD-10-CM

## 2024-10-07 DIAGNOSIS — R52 GENERALIZED BODY ACHES: ICD-10-CM

## 2024-10-07 PROCEDURE — H0035 MH PARTIAL HOSP TX UNDER 24H: HCPCS

## 2024-10-07 PROCEDURE — 90792 PSYCH DIAG EVAL W/MED SRVCS: CPT | Performed by: PSYCHIATRY & NEUROLOGY

## 2024-10-07 RX ORDER — METHOCARBAMOL 500 MG/1
TABLET, FILM COATED ORAL
Qty: 60 TABLET | Refills: 0 | Status: SHIPPED | OUTPATIENT
Start: 2024-10-07
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
## 2024-10-07 NOTE — PSYCH
Subjective:    Patient ID: Esther Griffith is a 44 y.o. female    [facility] Clinical Progress Notes      Specialized Services Documentation  Therapist must complete separate progress note for each specific clinical activity in which the individual participated during the day.     Group Psychotherapy     Visit Time    Start Time: 1215  End Time: 1315  Total Duration: 60 minutes    Esther Griffith participated actively in a psychotherapy group focused on Roadblocks to Recovery. The goal of this session is for group members to be able to identify potential obstacles on their wellness journey and how to overcome them.  introduced the topic by engaging everyone in an open discussion about an upcoming important road trip and identifying possible roadblocks that can occur. This is to serve as a metaphor for their own wellness journey. Group members shared potential roadblocks and together, identified ways they would overcome them. Then  discussed the connection between the road trip to their wellness journey- the longer the journey, the more roadblocks one faces, and that the destination should always be kept in mind in order to maintain one's motivation. Esther identified denial as a roadblock and was receptive to the group member's solutions- identifying/addressing it, utilizing supports and cognitive reframing. Lastly,  shared a handout that walks through several ways to face roadblocks- acknowledge it, seek support, adjust your course, seek to control what you can and celebrate progress, not perfection. Esther Grififth made some efforts towards progress goals which were displayed through note taking, participation in discussion, and engagement in topic. Continue to run daily group psychotherapy to meet treatment needs and encourage Esther Griffith to practice skills outside of program.        Tx Plan Objective: 1.1,1.2,1.4,1.5  Therapist:  ZOHREH Israel

## 2024-10-07 NOTE — BH TREATMENT PLAN
Patient ID: Esther Griffith  is a 44 y.o. female .       Assessment/Plan:          Diagnoses and all orders for this visit:    Major depressive disorder, recurrent severe without psychotic features (HCC)    Generalized body aches                [facility] Treatment Plan   AREAS OF NEED: Esther has been struggling with symptoms related to MDD and body aches as evidenced by anhedonia, fleeting SI, passive death wishes, reduced concentration, decreased energy, recurring pains/aches, and reduced motivation due to multiple stressors, recent loss/grief, and past trauma.   Date Initiated: 10/07/24     Strengths: Creative, Organization, Problem Solver, Sense of humor, Sought out for advice     LONG TERM GOAL:   Date Initiated: 10/07/24  1.0 While at [facility], I will gain support/education/insights/coping skills/techniques which I will utilize daily to decrease my symptoms and improve my quality of life.  Target Date: 11/04/24  Completion Date:         SHORT TERM OBJECTIVES:      Date Initiated: 10/07/24  1.1 I will practice (daily) at least 3 effective techniques/coping skills to help reduce depressive/anxiety symptoms. (ie Anxiety: Emotional Freedom Tapping, breathing techniques, sensory countdown, mindfulness, etc. Depression: building mastery, opposite action, cognitive distortion work, positive affirmations, etc.)  Revision Date:   Target Date: 10/16/24  Completion Date:      Date Initiated: 10/07/24  1.2 I will set aside 30 minutes per day, at least 3 days per week, to do something that I enjoy. (ie writing, guitar, art, something creative, etc.)  Revision Date:   Target Date:  10/16/24  Completion Date:     Date Initiated: 10/07/24  1.3 I will take medications as prescribed and share questions and concerns if they arise.    Revision Date:   Target Date:  10/16/24  Completion Date:      Date Initiated: 10/07/24  1.4 I will identify 3 ways my supports can assist in my recovery and agree to use them when/if needed.     Revision Date:   Target Date:  10/16/24  Completion Date:            8 DAY REVISION:     Date Initiated:   1.5   Revision Date:   Target Date:  Completion Date:           PSYCHIATRY:  Date Initiated:  10/07/24  Medication Management and Education       Revision Date:       The person(s) responsible for carrying out the plan is Maria Isabel Veronica MD & ADELAIDA Su     NURSING/SYMPTOM EDUCATION:  Date Initiated:  10/07/24      1.1, 1.2. 1.3, 1.4 Provide wellness/symptoms and skill education groups three to five days weekly to educate Esther Griffith on signs and symptoms of diagnoses, skills to manage stressors, and medication questions that will be addressed by the treatment team.        Revision date:              The person(s) responsible for carrying out the plan is Sai Segundo MS & Aldair Tiwari     PSYCHOLOGY 10/07/24   Date Initiated: 02/19/24       1.1, 1.2, 1.4 Provide psychotherapy group 5 times per week to allow opportunity for Esther Griffith  to explore stressors and ways of coping.   Revision Date:      The person(s) responsible for carrying out the plan is KENNY Szymanski     ALLIED THERAPY:   Date Initiated:  10/07/24  1.1,1.2 Engage Esther Griffith in AT group 5 times daily to encourage development and use of wellness tools to decrease symptoms and promote recovery through meaningful activity.  Revision Date:          The person(s) responsible for carrying out the plan is STANTON Jauregui, STANTON Ambrosio, & Aggie PRESLEY     CASE MANAGEMENT:   Date Initiated:  10/07/24      1.0 This  will meet with Esther Griffith  3-4 times weekly to assess treatment progress, discharge planning, connection to community supports and UR as indicated.  Revision Date:      The person(s) responsible for carrying out the plan is Sai Segundo MS     TREATMENT REVIEW/COMMENTS:      DISCHARGE CRITERIA: Identify 3 signs of progress and complete relapse prevention plan.     DISCHARGE PLAN: Connect with identified outpatient providers.   Estimated Length of Stay: 10 treatment days          Diagnosis and Treatment Plan explained to   Esther Nacho   . Esther Griffith  relates understanding diagnosis and is agreeable to Treatment Plan.         CLIENT COMMENTS / Please share your thoughts, feelings, need and/or experiences regarding your treatment plan with Staff.  Please see follow up note with comments.      Signatures can be found on [facility] Treatment plan consent form.

## 2024-10-07 NOTE — PSYCH
[facility] Insurance Authorization for Treatment      Availity Start Time: 1140    Subjective:     Patient ID: Esther Griffith is a 44 y.o. female.    Availity MA precert  Tax ID and/or NPI used NPI [id number] ([place]ult [place]), MIS ending in 181  Location: [address] [place] [address]  Code Used for Authorization:    720 units/30 Days Requested 10/04 through 11/15/24  Level of Care PHP    Authorization # [id number]     Therapist: Sai Segundo MS

## 2024-10-07 NOTE — PSYCH
Visit Time    Visit Start Time: 0925  Visit Stop Time: 0950  Total Visit Duration:  25 minutes    Subjective:     Patient ID: Esther Griffith is a 44 y.o. y.o. female.    [facility] Clinical Progress Notes      Specialized Services Documentation  Therapist must complete separate progress note for each specific clinical activity in which the individual participated during the day.       Case Management Note    Current suicide risk : Low     Medications changes/added/denied? Yes     Treatment session number: 1    Individual Case Management Visit provided today? Yes     [facility] follow up physician's orders: Admit to [facility] today 10/07/24        INDIVIDUAL PSYCHOTHERAPY     Esther Griffith actively shared in individual therapy. This writer met with Esther Griffith to review program expectations/schedule, fill out and sign ROIs, and acquire the standard intake information. Please see this writer's initial evaluation note for more information. Continue individual psychotherapy in order to further explore symptoms and begin learning/reviewing coping skills. Tx plan reviewed and signed.    Treatment Plan Objectives addressed: tx plan developed based on this meeting      Sai Segundo

## 2024-10-07 NOTE — PSYCH
"Visit Time    Visit Start Time: 1045  Visit Stop Time: 1145  Total Visit Duration: 60 minutes  Date: 10/07/2024    Subjective:     Patient ID: Esther Griffith is a 44 y.o. y.o. female.    [facility] Clinical Progress Notes      Specialized Services Documentation  Therapist must complete separate progress note for each specific clinical activity in which the individual participated during the day.     Psychotherapy Group  Esther Griffith Somewhat actively (receptively and passively) participated in music therapy group regarding grief and loss. It should be noted that this was Esther's first full session following intake with . The group participated in a discussion about their own experiences with grief and loss. The group then participated in a vito discussion on the song \"[employer] Rubin\" by Russell Garcia. The group read and discussed handouts regarding the stages of grief and general facts about grief. The group participated in a vito discussion on \"Because of You\" by Lilli Duncan.  The stages of grief were discussed as a process rather than going through stages. The group discussed helpful vs unhelpful grieving and various phrases that can be seen as \"helpful\" or \"detrimental\" to grieving individuals. Grief myths were presented and debunked, providing validation and comfort to those grieving a loss. Esther identified that relying more on her supports and \"figuring out who they are\" as a coping skill they can use for managing grief outside of program.The group ended with a vito analysis of \"Pink Skies\" by Tucker Bustillos. Early positive effort noted towards treatment goal. At the end of the session, each group member was given a packet of Grief Journaling prompts to continue processing grief at home, and this writer encouraged Esther Griffith to pick 1-2 journaling prompts to try outside of program. Continue psychotherapy groups to encourage the development and proactive use of coping techniques.      Tx " Plan Objective: 1.1, 1.2, 1.4 Therapist: STANTON Ambrosio
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
## 2024-10-08 ENCOUNTER — OFFICE VISIT (OUTPATIENT)
Dept: PSYCHOLOGY | Facility: CLINIC | Age: 44
End: 2024-10-08
Payer: COMMERCIAL

## 2024-10-08 DIAGNOSIS — F33.2 MAJOR DEPRESSIVE DISORDER, RECURRENT SEVERE WITHOUT PSYCHOTIC FEATURES (HCC): Primary | ICD-10-CM

## 2024-10-08 DIAGNOSIS — R52 GENERALIZED BODY ACHES: ICD-10-CM

## 2024-10-08 PROCEDURE — H0035 MH PARTIAL HOSP TX UNDER 24H: HCPCS

## 2024-10-08 NOTE — PSYCH
"Subjective:    Patient ID: Esther Griffith is a 44 y.o. female      [facility] Clinical Progress Notes      Specialized Services Documentation  Therapist must complete separate progress note for each specific clinical activity in which the individual participated during the day.       ALLIED THERAPY   Visit Start Time: 0930  Visit Stop Time: 1030  Total Visit Duration:  60 minutes    Esther Griffith actively participated in group focused on recognizing accomplishments. During group we discussed the accomplishments that they achieved today. We explored the reasons why it is so difficult to acknowledge those accomplishments. Group members watched a video, \"To achieve success, start detecting your small wins.\" Reviewed the action steps discussed in the video. Encouraged group members to look at the accomplishments that they are proud of and the small steps they took to achieve their goal. Discussed creating smaller realistic achievable goals. We discussed why it is important to recognize the accomplishments no matter how big or small they seem. Positive initial effort displayed towards goals through note taking and verbal participation in group; to accomplish long term goals continue to utilize skills learned in programming. Continue with psychotherapy and AT to educate and encourage use of wellness tools.   Tx Plan Objective: 1.1, 1.2 ,1.4, Therapist:  ANA Chung      GROUP PSYCHOTHERAPY  Visit Start Time: 1330  Visit Stop Time: 1430  Total Visit Duration:  60 minutes      Esther Griffith participated actively in psychotherapy group.  This was observed Consistent throughout the treatment day. They were engaged in learning related to Illness, Aftercare, and Wellness Tools. Staff utilized Verbal, Written, A/V, and Demonstration teaching methods.  Esther Griffith shared area of learning and set a goal for outside of program to fill her pill organizer.  Esther Griffith was able to share items explored during the day and " shared in adding to their WRAP.  Esther Griffith demonstrated positive beginning work toward goal.  Continue group psychotherapy to actively practice learned skills and encourage transfer of knowledge to unstructured time.    Tx Plan Objective: 1.1, 1.2, 1.3, 1.4, Therapist: JOHNNY Chung/KRYS

## 2024-10-08 NOTE — PSYCH
Subjective:     Patient ID: Esther Griffith 44 y.o. Female    [facility] Clinical Progress Notes      Specialized Services Documentation  Therapist must complete separate progress note for each specific clinical activity in which the individual participated during the day.     Group Psychotherapy - Open Process    Visit Time    Visit Start Time: 1045  Visit Stop Time: 1145  Total Visit Duration:  60 minutes     Esther Griffith actively participated in an open processing group on increasing empowerment and having an opportunity to be heard when one might feel isolated in sharing their experiences.  spent time engaging group participants with open ended questions, reflective questions, and encouragement from other group members. In addition, it is important for the group to be able to receive multiple perspectives and feedback from other group members in a safe environment.  provided space for members to share as they felt comfortable, active listening, encouragement, and offered support to group when warranted. This structure helped support the group in feeling cathartic, gain some interpersonal learning, group cohesiveness, altruism, and instilling hope. Esther Griffith contributed to discussion by sharing about the topic discussed, relating to group members, and allowing self to be open/vulnerable. Esther Griffith was tearful as she talked about the loss of her friend and the impact it has had on her grief journey. She also talked about the fear of the unknown and anxiety in regards to her Taoist.  beginning progress noted towards goal. Continue with open processing therapy to provide space to support individuals in building trust, gain corrective emotional experiences, and cultivate healthier inter-dependency with others.    Tx Plan Objective 1.1, 1.2, 1.4 Therapist: KENNY James.

## 2024-10-08 NOTE — PSYCH
Subjective:     Patient ID: Esther Griffith is a 44 y.o. female.    [facility] Clinical Progress Notes      Specialized Services Documentation  Therapist must complete separate progress note for each specific clinical activity in which the individual participated during the day.     Case Management Note    Sai Segundo MS    Current suicide risk : Low     Not a case management day for Esther Griffith today. Did not reach out with any additional questions and concerns and aware of next scheduled 1:1.     Medications changes/added/denied? No    Treatment session number: 2    Individual Case Management Visit provided today? No    [facility] follow up physician's orders: None, next medication check will be next week with ADELAIDA Su.

## 2024-10-08 NOTE — PSYCH
Subjective:    Patient ID: Esther Griffith is a 44 y.o. female    [facility] Clinical Progress Notes      Specialized Services Documentation  Therapist must complete separate progress note for each specific clinical activity in which the individual participated during the day.     Visit Time    Start Time: 0830  End Time: 0930  Total Duration: 60 minutes     EDUCATION THERAPY    Esther Griffith actively shared in morning assessment and goal review. Presented as Receptive related to readiness to learn. Esther Griffith  did complete goal from last treatment day identifying gaining hope, advocacy, responsibility, and support. Esther Griffith did not present with any barriers to learning. She reported feeling cranky because of her parents distracting her as she was leaving to attend program but she participated well during this session. Esther Griffith made some progress toward goal. Continue education group to assess willingness to engage, assess transfer of knowledge, and participate in skill development.    Tx Plan Objective: 1.1,1.2,1.4,  Therapist: ZOHREH Israel    Group Psychotherapy     Visit Time    Start Time: 1215  End Time: 1315  Total Duration: 60 minutes    Esther Griffith participated quietly in a psychotherapy group focused on  loneliness. The group discussed what it feels like and the negative effects of it. The group engaged in a “Cup of Kahlil” activity in which each person pulled a question from a cup and answered them. The questions are geared to help the group reflect on loneliness and share their thoughts/experiences on it. Then the group discussed practical activities they can engage in when they are feeling lonely.  provided everyone with a Loneliness Plan Cheat Sheet (details what they will do, who will they reach out to) to refer to in times of loneliness. Esther stated they would practice self-compassion, capitalize on the present moment and rethink how to spend spare time as ways to cope  with loneliness. Esther Griffith made some efforts towards progress goals which were displayed through note taking, participation in discussion, and engagement in topic. Continue to run daily group psychotherapy to meet treatment needs and encourage Esther Griffith to practice skills outside of program.        Tx Plan Objective: 1.1,1.2,1.4,  Therapist:  ZOHREH Israel

## 2024-10-09 ENCOUNTER — OFFICE VISIT (OUTPATIENT)
Dept: PSYCHOLOGY | Facility: CLINIC | Age: 44
End: 2024-10-09
Payer: COMMERCIAL

## 2024-10-09 DIAGNOSIS — R52 GENERALIZED BODY ACHES: ICD-10-CM

## 2024-10-09 DIAGNOSIS — F33.2 MAJOR DEPRESSIVE DISORDER, RECURRENT SEVERE WITHOUT PSYCHOTIC FEATURES (HCC): Primary | ICD-10-CM

## 2024-10-09 PROCEDURE — H0035 MH PARTIAL HOSP TX UNDER 24H: HCPCS

## 2024-10-09 NOTE — PSYCH
Visit Time    Visit Start Time: 1315  Visit Stop Time: 1330  Total Visit Duration:  15 minutes    Subjective:     Patient ID: Esther Griffith is a 44 y.o. y.o. female.    [facility] Progress Notes      Specialized Services Documentation  Therapist must complete separate progress note for each specific clinical activity in which the individual participated during the day.       Case Management Note    Current suicide risk : Low     Medications changes/added/denied? No    Treatment session number: 3    Individual Case Management Visit provided today? Yes    [facility] follow up physician's orders: None, next medication check will be next week with ADELAIDA Su.      INDIVIDUAL PSYCHOTHERAPY     Esther Griffith actively shared in individual therapy.   Esther Griffith identified that program was going very well thus far and she's already had multiple groups and group feedback that applies directly to her. Esther reviewed the groups she's had on grief, the open processing group, and the wrap up following open processing. She believes that program is already helping her. Esther also remarked at the variety of ages and backgrounds of her peers and how helpful it's been to get feedback/insight from such a wide range of people. This writer concurred and expressed that [facility] always has a great group of clients and that this shared insight/support is one of the largest benefits of group therapy. Continue individual psychotherapy in order to begin exploring implementation outside of program.     Treatment Plan Objectives addressed: 1.1, 1.2,       Sai Segundo

## 2024-10-09 NOTE — PSYCH
"Subjective:    Patient ID: Esther Griffith is a 44 y.o. female      [facility] Progress Notes      Specialized Services Documentation  Therapist must complete separate progress note for each specific clinical activity in which the individual participated during the day.       ALLIED THERAPY   Visit Start Time: 0930  Visit Stop Time: 1030  Total Visit Duration:  60 minutes    Esther Griffith was actively involved through note taking and active listening in group focused on the importance of consistently practicing skills learned in program. The group opened with self-reflection by answering what skills have you been implementing? Together, the group reviewed benefits of self-practice before exploring distress tolerance skills STOP and TIPP. Group wrapped up with staff and peer led breathing exercises. Positive initial effort noted towards treatment plan goals. Esther actively engaged in practicing skills reviewed today. Continue to involve in AT to develop routines that support overall well-being.   Tx Plan Objective: 1.1, 1.2 ,1.4, Therapist:  ANA hCung      GROUP PSYCHOTHERAPY  Visit Start Time: 1330  Visit Stop Time: 1430  Total Visit Duration:  60 minutes      Esther Griffith participated quietly in psychotherapy group.  This was observed Inconsistent throughout the treatment day. Esther Griffith did check out feeling \"sleepy\" which could have contributed to the inconsistent participation. They were engaged in learning related to Illness and Wellness Tools. Staff utilized Verbal, Written, A/V, and Demonstration teaching methods.  Esther Griffith shared area of learning and set a goal for outside of program to journal.  Esther Griffith was able to share items explored during the day and shared in adding to their WRAP.  Ended session with staff led gratitude exercise.  Esther Griffith demonstrated some beginning progress toward goal.  Continue group psychotherapy to actively practice learned skills and encourage " transfer of knowledge to unstructured time.    Tx Plan Objective: 1.1, 1.2, 1.4, Therapist: ANA Chung

## 2024-10-09 NOTE — PSYCH
"Visit Time    Visit Start Time: 1045  Visit Stop Time: 1145   Total Visit Duration: 60 minutes  Date: 10/09/2024    Subjective:     Patient ID: Esther Griffith is a 44 y.o. y.o. female.    [facility] Progress Notes      Specialized Services Documentation  Therapist must complete separate progress note for each specific clinical activity in which the individual participated during the day.     Psychotherapy Group  Esther Griffith Actively (verbally, receptively, and passively)  participated in MTH group focused on self-esteem. The topic was introduced with a vito analysis of \"Unpretty\" by WALLY. Esther participated in a self-esteem quiz in order to gain perspective on their own self-esteem journey. This group started with analyzing the things they are doing in their lives that can lead to lowered self-esteem and discussed how to actively combat those things. The group discussed various different positive traits and Esther identified their own. The group then did a second vito analysis on \"Try\" by Robert Ca. The group discussed themes of self acceptance as it relates to self-esteem. Esther shared that she roots much of her self esteem in what others think of her and that she is working on becoming her own best friend.The group discussed 8 different ways to increase your self-esteem and participated in various skills (journaling, writing self-reflective statements, and a self-esteem weekly log.) This group ended with a final vito analysis of \"True Colors\" by Kimberly Powers, discussing themes of acceptance and self esteem. Early positive effort noted toward treatment goal. Continue psychotherapy to encourage development and practice of creating and setting boundaries.      Tx Plan Objective: 1.1, 1.2, 1.4  Therapist: STANTON Ambrosio    "

## 2024-10-09 NOTE — PSYCH
Subjective:    Patient ID: Esther Griffith is a 44 y.o. female    [facility] Progress Notes      Specialized Services Documentation  Therapist must complete separate progress note for each specific clinical activity in which the individual participated during the day.     Visit Time    Start Time: 0830  End Time: 0930  Total Duration: 60 minutes     EDUCATION THERAPY    Esther Griffith actively shared in morning assessment and goal review. Presented as Receptive related to readiness to learn. Esther Griffith  did complete goal from last treatment day identifying gaining hope, education, advocacy, responsibility, and support. Esther Griffith did present with a potential barrier to learning. Esther Griffith reported feeling exhausted from insufficient sleep. Throughout morning group, Esther Griffith engaged in an open discussion about advocating for oneself- the importance of it and also how uncomfortable it feels when doing it. Esther Griffith made some progress toward goal. Continue education group to assess willingness to engage, assess transfer of knowledge, and participate in skill development.    Tx Plan Objective: 1.1,1.2,1.4,  Therapist: ZOHREH Israel    Group Psychotherapy     Visit Time    Start Time: 1215  End Time: 1315  Total Duration: 60 minutes    Esther Griffith participated actively in a psychotherapy group focused on Automatic Negative Thoughts (ANTs).  led an open discussion on the meaning, consequences, and the group's experience with it. Group members discussed some of their Negative Thinking Traps- negative filter, all or nothing, overgeneralization, personalization, perfectionism, catastrophizing, mind reading, minimizing and blaming- with the use of a worksheet. Esther stated they identify with minimizing/discounting.  discussed steps to take to replace ANTs- challenge it, replace it and practice it. Group members were broken into smaller groups to answer Socratic Questions  (inner dialogue questions geared to challenge the negative thought). The goal of this activity is to get to the root of the negative thought and determine if there is or is not any evidence to justify it. Lastly, group members participated in a small group exercise- “Catching ANTs”, designed to implement the steps discussed. Esther actively participated/shared in their small group and was receptive to their group members suggestions as well. Esther Griffith made some efforts towards progress goals which were displayed through note taking, participation in discussion, and engagement in topic. Continue to run daily group psychotherapy to meet treatment needs and encourage Esther Griffith to practice skills outside of program.        Tx Plan Objective: 1.1,1.2,1.4,  Therapist:  ZOHREH Israel

## 2024-10-10 ENCOUNTER — OFFICE VISIT (OUTPATIENT)
Dept: PSYCHOLOGY | Facility: CLINIC | Age: 44
End: 2024-10-10
Payer: COMMERCIAL

## 2024-10-10 DIAGNOSIS — F33.2 MAJOR DEPRESSIVE DISORDER, RECURRENT SEVERE WITHOUT PSYCHOTIC FEATURES (HCC): Primary | ICD-10-CM

## 2024-10-10 DIAGNOSIS — R52 GENERALIZED BODY ACHES: ICD-10-CM

## 2024-10-10 PROCEDURE — H0035 MH PARTIAL HOSP TX UNDER 24H: HCPCS

## 2024-10-10 NOTE — PSYCH
Subjective:    Patient ID: Esther Griffith is a 44 y.o. female      [facility] Progress Notes      Specialized Services Documentation  Therapist must complete separate progress note for each specific clinical activity in which the individual participated during the day.     EDUCATION THERAPY  Visit Start Time: 0830  Visit Stop Time: 0930  Total Visit Duration:  60 minutes    Esther Griffith actively shared in morning assessment and goal review. Presented as Receptive related to readiness to learn. Esther Griffith  did complete goal from last treatment day identifying gaining hope, education, advocacy, responsibility, and support. Esther Griffith did present with potential barriers to learning. Esther Griffith reported she was feeling sleep and recognized her caffeine intake could have contributed to poor sleep last night. Throughout morning group, Esther Griffith participated in mindfulness exercise and education on World Mental Health Day . Esther Griffith made beginning progress toward goal. Continue education group to assess willingness to engage, assess transfer of knowledge, and participate in skill development.    Tx Plan Objective: 1.1, 1.2, 1.4, Therapist: ANA Chung

## 2024-10-10 NOTE — PSYCH
Subjective:    Patient ID: Esther Griffith is a 44 y.o. female    [facility] Progress Notes      Specialized Services Documentation  Therapist must complete separate progress note for each specific clinical activity in which the individual participated during the day.     Group Psychotherapy     Visit Time    Start Time: 0930  End Time: 1030  Total Duration: 60 minutes    Esther Griffith participated quietly in a psychotherapy group focused on Triggers- the meaning, the physical symptoms, and coping skills to use.  introduced the topic by asking everyone to share some situations/things that made them uncomfortable. Group members were able to identify some and share how they feel when triggered.  then discussed the four steps to cope with Triggers- name it, take space appropriately, shift your state and deal with the situation- and further elaborate on State Shift. This is the practice of learning to consciously shift one's energy out of a triggered state in order to re-establish control. The group discussed several tools they can use to do this- ie breathing, change physical environment, anchoring and feel your feelings. Esther stated they would engage in feeling their feelings to shift their state. Everyone was broken into smaller groups to work on a Triggers worksheet that required them to list their triggers, things they can do to avoid or reduce exposure to it and actions they can engage in that will make them feel better. Esther actively shared and welcomed a new member in their small group. Esther Griffith made some efforts towards progress goals which were displayed through note taking, participation in discussion, and engagement in topic. Continue to run daily group psychotherapy to meet treatment needs and encourage Esther Griffith to practice skills outside of program.        Tx Plan Objective: 1.1,1.2,1.4,  Therapist:  ZOHREH Israel        Group Psychotherapy     Visit  Time    Start Time: 1330  End Time: 1430  Total Duration: 60 minutes    Esther Griffith participated actively in psychotherapy group.  This was observed Consistent throughout the treatment day. They were engaged in learning related to Illness and Wellness Tools. Staff utilized Verbal, Written, and Demonstration teaching methods. Esther reported feeling grateful. She participated in an open discussion about the day's topics with a main focus on Triggers- is it best to completely avoid or work on facing them. Esther Griffith shared area of learning and set a goal for outside of program to listen to her friend's music, who had passed away. Esther Griffith demonstrated some progress toward goal. Continue group psychotherapy to actively practice learned skills and encourage transfer of knowledge to unstructured time.      Tx Plan Objective: 1.1,1.2,1.4,   Therapist: ZOHREH Israel

## 2024-10-10 NOTE — PSYCH
Subjective:      Patient ID:Esther Griffith 44 y.o. female     [facility] Progress Notes       Specialized Services Documentation  Therapist must complete separate progress note for each specific clinical activity in which the individual participated during the day.      Education Group     1215 to 1315   Total time 60 min.  - Esther Griffith attended the psychoeducation group on Medication Management . Group members were educated by this writer on the medication management, medication tips, strategies to help them remember to take their medications and developed ideas to make it easier in the future. Information was given on how to prepare for a doctor appointment and how to advocate for self and work as a team with their doctor to promote positive outcomes and better understanding of medications and uses.  Provided education on classes of  psychotropic medications, mechanisms of action, common side effects, and expectations of treatment with psychotropic medications.  Group was encouraged to ask questions in an open forum.    Esther Griffith displayed understanding through engagement in the topic with the group . For Esther Griffith,  good progress toward goals was noted, through their engagement in group. Continue psychotherapy to encourage self-awareness and home practice of skills to support wellness.    Treatment Plan Objective 1.1, 1.2, 1.3, 1.4  led by ADELAIDA Su

## 2024-10-10 NOTE — PSYCH
Subjective:     Patient ID: Esther Griffith 44 y.o. {Gender Identity:[id number]}    [facility] Progress Notes      Specialized Services Documentation  Therapist must complete separate progress note for each specific clinical activity in which the individual participated during the day.     WORRY TIME / ANXIETY     Visit Time    Visit Start Time: ***  Visit Stop Time: ***  Total Visit Duration: {Psych Total Visit Time:94473}    Esther Griffith was present for a psychoeducational group focused on scheduled worry time and anxiety. Group facilitator first discussed what anxiety is and encouraged group members to contribute to discussion and share their experiences. Esther Griffith did***did not*** choose to share. Esther Griffith ***.  Next, the group was introduced to the “3 C's” that fuel anxiety:   Control  Certainty   Comfort   And discussed how each can intensify anxieties. This then allowed for further engagement in “The 7 Inner Critics” and how they try and manipulate our behaviors. After, group facilitator transitioned to scheduling worry time and what the benefits are. Worry time consists of 3 parts:    Set up your worry time during an allotted time during the day in which you can worry (no more than 30 minutes)  Postponing your worries at all times other than your worry tie  Using your worry time effectively    Group facilitator engaged in discussion for each component and provided space to self-reflect and share. Esther Griffith *** participated. Esther Griffith ***.   used the following structure to support and lead the group: worksheet on how to set up worry time, a scheduled worry time plan, a summary script, inner critic handout, open discussion/processing, peer support, and psychoeducation. *** progress noted towards goals. Continue with psychoeducation to challenge anxieties, create space to allow for more productivity, and overall, an improved wellness through scheduled worry time.     Tx Plan Objective  1.1, 1.2, 1.4 Therapist: KENNY James

## 2024-10-10 NOTE — PSYCH
Subjective:     Patient ID: Esther Griffith 44 y.o. Female    [facility] Progress Notes      Specialized Services Documentation  Therapist must complete separate progress note for each specific clinical activity in which the individual participated during the day.     Group Psychotherapy - Motivation     Visit Time    Visit Start Time: 1045  Visit Stop Time: 1145  Total Visit Duration: 60 minutes     Esther Griffith passively participated in a part psychoeducation group and part open processing group focused on motivation.  first engaged the group in coming up with a working definition of motivation and discussed what we may have been taught about motivation and who do we see as motivated in our life? Next,  discussed three components of motivation:   Activation  Persistence   Intensity     After the discussing the definition, the group engaged in discussion about what or who motivates people then what hurts motivation? This was followed up with a worksheet packet each group member was encouraged to complete during group.     Guiding questions included:    When you think about people you know who are motivated and get the job done, what could you learn from them?   How do you help motivate other people you care about?   What can we do to not allow certain things to hurt our motivation?   What's the best way to stay motivated when we hit these kinds of roadblocks?    Lastly,  engaged the group in an activity about choosing their “target” or change goal to work on. This included identifying a broad goal that then can be whittled down to a more specific target.  used the example: “If I want to get along better with people,” what can be a workable target? Group facilitator explained that targets are like an objective from a broader goal.  used two guiding worksheets to help individuals visualize their targets and discussed maintenance of motivation through  monitoring self talk.  used the following structure to support and lead the group: open discussion/processing, psychoeducation, in-vivo exercise, peer support and a participant handout for group members to use and go through throughout group. some  progress as evidenced by engaged nonverbally as evidenced by taking notes, maintaining eye contact, open body language, and overall attentiveness. Esther did have at times moments of spontaneity and contributed when warranted however, did not personal share any of her experiences. Continue with psychoeducation and processing to further strengthen intrinsic and extrinsic motivation.     Tx Plan Objective 1.1, 1.2, 1.4 Therapist: KENNY James.      Case Management Note    KENNY James     Current suicide risk : Low     A case management session is not scheduled today with Esther Griffith ; additionally, they did not request a CM meeting. Esther Griffith is aware of next scheduled 1:1.    Medications changes/added/denied? N/a     Treatment session number: 4    Individual Case Management Visit provided today? No    [facility] follow up physician's orders: None at this time

## 2024-10-11 ENCOUNTER — APPOINTMENT (OUTPATIENT)
Dept: PSYCHOLOGY | Facility: CLINIC | Age: 44
End: 2024-10-11
Payer: COMMERCIAL

## 2024-10-11 ENCOUNTER — DOCUMENTATION (OUTPATIENT)
Dept: PSYCHOLOGY | Facility: CLINIC | Age: 44
End: 2024-10-11

## 2024-10-11 NOTE — PROGRESS NOTES
Subjective:     Patient ID: Esther Griffith is a 44 y.o. female.    [facility] Progress Notes      Specialized Services Documentation  Therapist must complete separate progress note for each specific clinical activity in which the individual participated during the day.     Case Management Note    Sai Segundo MS    Current suicide risk : Low     Esther called out sick today.    Medications changes/added/denied? No    Treatment session number: 5    Individual Case Management Visit provided today? No    [facility] follow up physician's orders: None, next medication check will be next week with ADELAIDA Su.

## 2024-10-14 ENCOUNTER — OFFICE VISIT (OUTPATIENT)
Dept: PSYCHIATRY | Facility: CLINIC | Age: 44
End: 2024-10-14
Payer: COMMERCIAL

## 2024-10-14 ENCOUNTER — OFFICE VISIT (OUTPATIENT)
Dept: PSYCHOLOGY | Facility: CLINIC | Age: 44
End: 2024-10-14
Payer: COMMERCIAL

## 2024-10-14 DIAGNOSIS — F33.2 MAJOR DEPRESSIVE DISORDER, RECURRENT SEVERE WITHOUT PSYCHOTIC FEATURES (HCC): Primary | ICD-10-CM

## 2024-10-14 DIAGNOSIS — F41.1 GENERALIZED ANXIETY DISORDER: ICD-10-CM

## 2024-10-14 DIAGNOSIS — R52 GENERALIZED BODY ACHES: ICD-10-CM

## 2024-10-14 PROCEDURE — H0035 MH PARTIAL HOSP TX UNDER 24H: HCPCS

## 2024-10-14 PROCEDURE — 99214 OFFICE O/P EST MOD 30 MIN: CPT | Performed by: NURSE PRACTITIONER

## 2024-10-14 NOTE — PSYCH
Visit Time    Visit Start Time: 1315  Visit Stop Time: 1335  Total Visit Duration:  20 minutes    Subjective:     Patient ID: Esther Griffith is a 44 y.o. y.o. female.    [facility] Progress Notes      Specialized Services Documentation  Therapist must complete separate progress note for each specific clinical activity in which the individual participated during the day.       Case Management Note    Current suicide risk : Low     Medications changes/added/denied? No    Treatment session number: 5    Individual Case Management Visit provided today? Yes     [facility] follow up physician's orders: Medication check completed today. Please see note from ADELAIDA Su for more details.        INDIVIDUAL PSYCHOTHERAPY     Esther Griffith actively shared in individual therapy.   Esther Griffith identified readiness for stepdown to IOP. Esther reported feeling extremely exhausted by program and felt that she would benefit far more from an IOP schedule. Esther reported that she's been learning new skills daily, however, she hasn't had the energy to review them and has only been implementing breathing techniques thus far. This writer agreed to the IOP schedule and suggested she come MWF so that she would have days in between to practice the skills taught. We also discussed potential tx plan goals for the revision on Wednesday.  Continue individual psychotherapy in order to complete tx plan revision.     Treatment Plan Objectives addressed: 1.1, 1.2, 1.4      Sai Segundo        Name band;

## 2024-10-14 NOTE — PSYCH
"Visit Time    Start time: 1045   End time: 1145  Total time: 60 minutes  Date: 10/14/2024    Subjective:     Patient ID: Esther Griffith 44 y.o. Female    [facility] Progress Notes      Specialized Services Documentation  Therapist must complete separate progress note for each specific clinical activity in which the individual participated during the day.     Allied Therapy  Esther Griffith Actively (verbally, receptively, and passively) participated in MTH group focused on effective vs. Ineffective communication. The group started by reviewing the 10 Basic Rules for Effective Communication (Don't Multitask, Don't expect people to always agree with you, Use open-ended questions, Go with the flow, Admit to not knowing something, Don't equate you experiences with theirs, Try not to repeat yourself, Leave out unimportant detains, Listen more than you speak, and Be interested in other people.) Esther Griffith  was attentive throughout  a therapist led discussion on the difference between effective and ineffective communication as well as ways to verbally and non-verbally communicate. The group discussed the 4 categories for Effective Communication (Physical, Linguistic, Cognitive, and Social/Emotional.)  Esther Griffith also participated in identifying the 3 C's of communication (Clear, Confident, and Controlled.) At the end the group participated in the \"Communication Blocks\" experience, where members were instructed to describe various shapes and group members had to draw what they heard. This was to practice the 3 C's of communication in order to accurately draw from a verbal description. Esther notes that they are typically passive in communication with others. Group discussed communication styles, goals they have, and ways to work on effective communication. The group ended with the song \"[place]\" by Yahaira Clayton, empowering group members to say what is on their mind in a confident, clear, and controlled way. " Overall, consistent effort noted toward treatment goals. Continue group allied therapy to develop and improve ways to communicate with others.     TX Objective: 1.1, 1.2, 1.4 Therapist: STANTON Syed

## 2024-10-14 NOTE — PSYCH
PHP MEDICATION MANAGEMENT NOTE        [facility]    Name and Date of Birth:  Esther Griffith 44 y.o. 1980 MRN: [id number]    Date of Visit: October 14, 2024    Reason for Visit: Psychiatric medication management follow-up visit as part of the partial program    Allergies   Allergen Reactions    Cannabidiol Shortness Of Breath, Itching, Swelling, Anxiety, Palpitations, Confusion, Hypertension, Throat Swelling and Tongue Swelling    Amoxicillin-Pot Clavulanate Hives    Decadrol [Dexamethasone] Other (See Comments)     psychosis    Penicillins Hives     Hives/Uticaria    Tetracyclines & Related Hives      Allergy;          Assessment & Plan  Major depressive disorder, recurrent severe without psychotic features (HCC)         Generalized anxiety disorder            Current Outpatient Medications on File Prior to Visit   Medication Sig Dispense Refill    ALPRAZolam (XANAX) 0.25 mg tablet Take 1 tablet (0.25 mg total) by mouth 3 (three) times a day as needed for anxiety 90 tablet 0    b complex vitamins capsule Take 1 capsule by mouth daily      Blood Glucose Monitoring Suppl (Contour Blood Glucose System) w/Device KIT Use 1 kit 2 (two) times a day before meals 1 kit 0    cholecalciferol (VITAMIN D3) 25 mcg (1,000 units) tablet Take 1,000 Units by mouth daily Take 1 tab. daily      Contour Test test strip USE TO CHECK BLOOD SUGAR ONCE DAILY 50 strip 7    ferrous sulfate 325 (65 Fe) mg tablet Take 325 mg by mouth Take 1 tab. 3 times per week      gabapentin (NEURONTIN) 300 mg capsule Take 1 capsule (300 mg total) by mouth 3 (three) times a day 90 capsule 5    Levomilnacipran HCl ER (Fetzima) 40 MG extended release capsule Take 1 capsule (40 mg total) by mouth daily 30 capsule 0    levothyroxine 75 mcg tablet TAKE 1 TABLET (75 MCG TOTAL) BY MOUTH DAILY IN THE EARLY MORNING 90 tablet 1    lithium carbonate 300 mg capsule Take 1 capsule (300 mg total) by mouth 2 (two) times  "a day with meals 60 capsule 2    MAGNESIUM MALATE PO Take by mouth Take 1 tab. daily      metFORMIN (GLUCOPHAGE) 500 mg tablet TAKE 1 TABLET BY MOUTH TWICE A DAY WITH FOOD 60 tablet 5    methocarbamol (ROBAXIN) 500 mg tablet TAKE 1 TAB. EVERY 8 HR. AS NEEDED FOR MUSCLE SPASMS 60 tablet 0    mirtazapine (REMERON) 45 MG tablet TAKE 1 TABLET (45 MG TOTAL) BY MOUTH DAILY AT BEDTIME 30 tablet 2    propranolol (INDERAL) 40 mg tablet Take 0.5 tablets (20 mg total) by mouth daily at bedtime 45 tablet 3     No current facility-administered medications on file prior to visit.        SUBJECTIVE:    Esther is seen today for a follow up for Major Depressive Disorder and Generalized Anxiety Disorder. She continues to improve gradually since beginning PHP. She reports she continues to struggle with daytime tiredness and low energy/motivation, but today rates her depression 3/10, 10 being worse.  She reports she \"always' struggles with passive death wish, but not everyday and not currently.  She states 75% of her depressive and anxiety symptoms are related to her issues at her [employer].  She is glad to be back at the partial program and feels she is gaining insights and applying skills learned.  Feels current regimen of medications \"are pretty good\".    She denies any side effects from current psychiatric medications.      HPI ROS Appetite Changes and Sleep:     She reports fluctuating sleep pattern, hypersomnia, fluctuating appetite, fluctuating energy levels. Denies homicidal ideation, denies suicidal ideation    Review Of Systems:   no complaints, all other systems are negative , except as noted above         Mental Status Evaluation:    Appearance:  age appropriate   Behavior:  pleasant, cooperative   Speech:  normal rate, normal volume   Mood:  depressed   Affect:  flat   Thought Process:  goal directed   Associations: intact associations   Thought Content:  no overt delusions   Perceptual Disturbances: none   Risk Potential: " Suicidal ideation - None  Homicidal ideation - None  Potential for aggression - No   Sensorium:  oriented to person, place, and time/date   Memory:  recent and remote memory grossly intact   Consciousness:  alert and awake   Attention: attention span and concentration are age appropriate   Insight:  improving   Judgment: improving   Gait/Station: normal gait/station, normal balance   Motor Activity: no abnormal movements       History Review:The following portions of the patient's history were reviewed and updated as appropriate: psychiatric history, trauma history allergies, current medications, past family history, past medical history, past social history, past surgical history, and problem list   OBJECTIVE:     Vital signs in last 24 hours:    There were no vitals filed for this visit.  Laboratory Results: I have personally reviewed all pertinent laboratory/tests results.      Treatment Recommendations/Precautions:    Continue all medications the same as noted below.  Aware of 24 hour and weekend coverage for urgent situations accessed by calling [facility] main practice number  Continue Partial Program  Patient advised to call 911 if feeling suicidal or homicidal before acting out on their thoughts and they expressed understanding.    Medications Risks/Benefits      Risks, Benefits And Possible Side Effects Of Medications:    Discussed risks and benefits of treatment with patient including risk of suicidality, serotonin syndrome, increased QTc interval and SIADH related to treatment with antidepressants; Risk of induction of manic symptoms in certain patient populations and risk of kidney impairment related to treatment with Lithium         Controlled Medication Discussion:     Esther has been filling controlled prescriptions on time as prescribed according to [place] Prescription Drug Monitoring Program    Psychotherapy Provided:     Individual psychotherapy provided: Medications,  treatment progress and treatment plan reviewed with Esther.  Medication changes discussed with Esther.  Medication education provided to Esther.  Reassurance and supportive therapy provided.      Total Visit Duration:  30 minutes     The total visit duration detailed above includes: patient engagement, medication management, psychotherapy/counseling, discussion regarding treatment goals, and coordination of care.      Note Share Disclaimer:     This note was not shared with the patient due to this is a psychotherapy note    ADELAIDA Su 10/14/24    This note was completed in part utilizing Dragon dictation Software. Grammatical, translation, syntax errors, random word insertions, spelling mistakes, and incomplete sentences may be an occasional consequence of this system secondary to software limitations with voice recognition, ambient noise, and hardware issues. If you have any questions or concerns about the content, text, or information contained within the body of this dictation, please contact the provider for clarification.

## 2024-10-14 NOTE — PSYCH
Subjective:     Patient ID: Esther Griffith 44 y.o. Female    [facility] Clinical Progress Notes      Specialized Services Documentation  Therapist must complete separate progress note for each specific clinical activity in which the individual participated during the day.     Group Psychotherapy - DBT House    Visit Time    Visit Start Time: 0930  Visit Stop Time: 1030  Total Visit Duration:  60 minutes    Esther Griffith passively participated in the creation of their own “DBT House” activity. The aim of this group is to give group members a visual tool to help Esther Griffith learn and practice skills their taught in DBT. The DBT House visually represent an emotional landscape and progress so they can better understand their emotions, thoughts, and behaviors.  discussed two important goals of the DBT House:   Enhanced Self-Awareness   Improved Emotional Regulation and Coping skills.    After discussion, the group engaged in a “Color of the [place]” guided meditation to center selves, guide them in giving them power to influence outcomes and build self-confidence. Next,  led them in the build of their DBT House. The DBT House consists of:  Foundation - core values, beliefs, traditions  The Walls - gives structure.  Basement (Level 1) - areas of one's life or behaviors they are trying to gain control over  Level 2 - what emotions and feelings would they like to cultivate more of?  Level 3 - things they feel happy about or want to feel happy about.   Level 4 - what a life worth living looks like  [place] - ways in which they blow off steam.   Billboard - accomplishments, skills or characteristics they want to share with the world/what they are most proud of.     Throughout each build, group facilitator asked group participants to share and discuss.  also discussed various aspects of the activity such as what was the purpose of this activity and what did they learn about themselves?  Esther Griffith spontaneously contributed to discussion and actively partook in completing her own DBT house. some progress noted towards goals. Continue with integration of skills into Esther Griffith's daily life to increase personal growth and mental well-being.     Therapist: KENNY James  Tx Plan Objective: 1.1, 1.2, 1.4

## 2024-10-14 NOTE — PSYCH
Subjective:    Patient ID: Esther Griffith is a 44 y.o. female    [facility] Clinical Progress Notes      Specialized Services Documentation  Therapist must complete separate progress note for each specific clinical activity in which the individual participated during the day.     Visit Time    Start Time: 0830  End Time: 0930  Total Duration: 60 minutes     EDUCATION THERAPY    Esther Griffith quietly shared in morning assessment and goal review. Presented as Receptive related to readiness to learn. Esther Griffith  did complete goal from last treatment day identifying gaining hope, education, advocacy, responsibility, and support. Esther Griffith did present with a potential barrier to learning. Esther Griffith reported feeling exhausted but participated well during this session. Esther Griffith made some progress toward goal. Continue education group to assess willingness to engage, assess transfer of knowledge, and participate in skill development.    Tx Plan Objective: 1.1,1.2,1.4,  Therapist: ZOHREH Israel    Group Psychotherapy     Visit Time    Start Time: 1215  End Time: 1315  Total Duration: 45 minutes    Esther Griffith met with  for a potion of this session. When present, she participated quietly in a psychotherapy group focused on Strengths Mapping. The goal of the session is to identify one's own strengths in order to increase self-awareness by focusing on positive attributes of oneself.  introduced the topic by asking everyone what are some things they struggle with or identify as a weakness then followed up by asking what are some strengths.  then asked everyone what were some differences they noticed from listing both. This is meant for group members to notice the difference in identifying weaknesses over strengths- it is easier to identify a weakness and a strength is identified with some hesitancy. Group members were then broken into smaller groups and given a worksheet  that listed strengths along with empty boxes to add any extra. Esther identified their strength as [place]. This led to an open discussion about realizing that they have more strengths than they had thought and how some strengths can borderline a weakness if not used properly. For example, forgiveness was identified as a strength but also as a possible weakness if used often to avoid conflict. Lastly, group members worked on a “Strength Exploration” activity- discuss a strength or two that one has, describe how one has used it in the past and then challenge that by identifying new ways they can use it- in their relationships, profession, and personal fulfillment. This is geared to challenge current strengths to see how they can be used in a new way. 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 Esther Griffith made some efforts towards progress goals which were displayed through note taking, participation in discussion, and engagement in topic. Continue to run daily group psychotherapy to meet treatment needs and encourage Esther Griffith to practice skills outside of program.        Tx Plan Objective: 1.1,1.2,1.4,  Therapist:  ZOHREH Israel

## 2024-10-14 NOTE — PSYCH
Visit Time    Visit Start Time: 1330  Visit Stop Time: 1430  Total Visit Duration: 60 minutes    Subjective:     Patient ID: Esther Griffith is a 44 y.o. female.    [facility] Clinical Progress Notes      Specialized Services Documentation  Therapist must complete separate progress note for each specific clinical activity in which the individual participated during the day.       GROUP PSYCHOTHERAPY    Esther Griffith participated actively in psychotherapy group.  This was observed consistently throughout the treatment day. They were engaged in learning related to Illness and Wellness Tools. Staff utilized Verbal, A/V, and Demonstration teaching methods.  Esther Griffith shared area of learning and set a goal for outside of program to do her pill organizer.  Esther Griffith was able to share items explored during the day and encouraged to add to their WRAP.  Ended session with staff led exercise of Emotional Freedom Technique.  Esther Griffith demonstrated very good progress toward goal.  Continue group psychotherapy to actively practice learned skills and encourage transfer of knowledge to unstructured time.    Tx Plan Objective: 1.1, 1.2, 1.3 Therapist: Sai Segundo

## 2024-10-15 ENCOUNTER — DOCUMENTATION (OUTPATIENT)
Dept: PSYCHOLOGY | Facility: CLINIC | Age: 44
End: 2024-10-15

## 2024-10-15 ENCOUNTER — APPOINTMENT (OUTPATIENT)
Dept: PSYCHOLOGY | Facility: CLINIC | Age: 44
End: 2024-10-15
Payer: COMMERCIAL

## 2024-10-15 DIAGNOSIS — F41.1 GAD (GENERALIZED ANXIETY DISORDER): ICD-10-CM

## 2024-10-15 DIAGNOSIS — F33.1 MAJOR DEPRESSIVE DISORDER, RECURRENT EPISODE, MODERATE (HCC): ICD-10-CM

## 2024-10-15 NOTE — PROGRESS NOTES
Subjective:     Patient ID: Esther Griffith is a 44 y.o. female.    [facility] Clinical Progress Notes      Specialized Services Documentation  Therapist must complete separate progress note for each specific clinical activity in which the individual participated during the day.     Case Management Note    Sai Segundo MS    Current suicide risk : Heladio Santana was scheduled off for PHP titration, will return tomorrow and revise tx plan    Medications changes/added/denied? No    Treatment session number: 6    Individual Case Management Visit provided today? No    [facility] follow up physician's orders: None, next medication check will be next week with ADELAIDA Su.

## 2024-10-16 ENCOUNTER — OFFICE VISIT (OUTPATIENT)
Dept: PSYCHOLOGY | Facility: CLINIC | Age: 44
End: 2024-10-16
Payer: COMMERCIAL

## 2024-10-16 DIAGNOSIS — F33.2 MAJOR DEPRESSIVE DISORDER, RECURRENT SEVERE WITHOUT PSYCHOTIC FEATURES (HCC): Primary | ICD-10-CM

## 2024-10-16 DIAGNOSIS — R52 GENERALIZED BODY ACHES: ICD-10-CM

## 2024-10-16 PROCEDURE — H0035 MH PARTIAL HOSP TX UNDER 24H: HCPCS

## 2024-10-16 RX ORDER — LEVOMILNACIPRAN HYDROCHLORIDE 40 MG/1
40 CAPSULE, EXTENDED RELEASE ORAL DAILY
Qty: 30 CAPSULE | Refills: 0 | Status: SHIPPED | OUTPATIENT
Start: 2024-10-16

## 2024-10-16 RX ORDER — ALPRAZOLAM 0.25 MG/1
0.25 TABLET ORAL 3 TIMES DAILY PRN
Qty: 90 TABLET | Refills: 0 | Status: SHIPPED | OUTPATIENT
Start: 2024-10-16

## 2024-10-16 NOTE — PSYCH
"Visit Time    Start time: 0930   End time: 1030  Total time: 60 minutes  Date: 10/16/2024    Subjective:     Patient ID: Esther Griffith 44 y.o. Female    [facility] Clinical Progress Notes      Specialized Services Documentation  Therapist must complete separate progress note for each specific clinical activity in which the individual participated during the day.     Group Psychotherapy   Esther Griffith Very actively (verbally, musically, receptively, and passively) participated in MTH group focused on self care techniques. Group engaged in conversation about what self-care looks like, when to use it, and how it helps with anxiety or depression symptoms. The group then participated in discussion of the \"Self-Care Wheel\" and how creating goals is beneficial when completing and prioritizing self care tasks. Esther actively participated in creating a \"self care menu\", prioritizing the skills that are important to them, and making them time bound. Group took time to create a self-care individual \"playlist.\" This playlist included songs that exist in categories like; emotions, strong sensations, diversions, memories/reminiscence, supports, and discharge. Esther Griffith took time to complete playlist and chose \"Spotless Mind\" by Phu Hernandez (a song from her favorite movie) as a song to share with the group. Group discussed other items and supports that they can rely on for the purpose of self care. Progress noted toward treatment goals. Esther shared that one way that they are going to practice self care is through reach out and rely on her supports more. Continue with psychotherapy groups to further develop a self-care routine and increase the implementation of coping skills.    Tx Plan Objective: 1.1, 1.2, 1.4 Therapist:  STANTON Ambrosio    "

## 2024-10-16 NOTE — PSYCH
Visit Time    Visit Start Time: 1305  Visit Stop Time: 1320  Total Visit Duration:  15 minutes    Subjective:     Patient ID: Esther Griffith is a 44 y.o. y.o. female.    [facility] Clinical Progress Notes      Specialized Services Documentation  Therapist must complete separate progress note for each specific clinical activity in which the individual participated during the day.       Case Management Note    Current suicide risk : Low     Medications changes/added/denied? No    Treatment session number: 6    Individual Case Management Visit provided today? Yes     [facility] follow up physician's orders: None, next medication check will be next week with ADELAIDA Su.          INDIVIDUAL PSYCHOTHERAPY     Esther Griffith actively shared in individual therapy.   Esther Griffith identified continued progress and implementation of new skills. Esther stated that, on her day off yesterday, she finally brought her materials inside with her and reviewed everything. She reviewed that she practiced and implemented Emotional Freedom Technique.  We revised and signed the treatment plan as well. Continue individual psychotherapy in order to further practice new techniques.     Treatment Plan Objectives addressed: 1.0, 1.1, 1.2, 1.3, 1.4, 1.5 , Tx plan crevised/signed      Sai Segundo

## 2024-10-16 NOTE — BH TREATMENT PLAN
Patient ID: Esther Griffith  is a 44 y.o. female .        Assessment/Plan:          Diagnoses and all orders for this visit:     Major depressive disorder, recurrent severe without psychotic features (HCC)     Generalized body aches             [facility] Treatment Plan   AREAS OF NEED: Esther has been struggling with symptoms related to MDD and body aches as evidenced by anhedonia, fleeting SI, passive death wishes, reduced concentration, decreased energy, recurring pains/aches, and reduced motivation due to multiple stressors, recent loss/grief, and past trauma.   Date Initiated: 10/07/24     Strengths: Creative, Organization, Problem Solver, Sense of humor, Sought out for advice     LONG TERM GOAL:   Date Initiated: 10/07/24  1.0 While at [facility], I will gain support/education/insights/coping skills/techniques which I will utilize daily to decrease my symptoms and improve my quality of life.  Target Date: 11/04/24  Completion Date:         SHORT TERM OBJECTIVES:      Date Initiated: 10/07/24  1.1 I will practice (daily) at least 3 effective techniques/coping skills to help reduce depressive/anxiety symptoms. (ie Anxiety: Emotional Freedom Tapping, breathing techniques, sensory countdown, mindfulness, etc. Depression: building mastery, opposite action, cognitive distortion work, positive affirmations, etc.)  Revision Date: 10/16/24  Target Date: 10/16/24  Completion Date:      Date Initiated: 10/07/24  1.2 I will set aside 30 minutes per day, at least 3 days per week, to do something that I enjoy. (ie writing, guitar, art, something creative, etc.)  Revision Date: 10/16/24  Target Date:  10/16/24  Completion Date:     Date Initiated: 10/07/24  1.3 I will take medications as prescribed and share questions and concerns if they arise.    Revision Date: 10/16/24  Target Date:  10/16/24  Completion Date:      Date Initiated: 10/07/24  1.4 I will identify 3 ways my supports can assist in my recovery and agree to  use them when/if needed.    Revision Date: 10/16/24  Target Date:  10/16/24  Completion Date:            8 DAY REVISION:     Date Initiated: 10/16/24  1.5 I will complete my Wellness Recovery Action Plan prior to discharge from [facility]  Revision Date:   Target Date: 10/25/24  Completion Date:           PSYCHIATRY:  Date Initiated:  10/07/24  Medication Management and Education       Revision Date: 10/16/24      The person(s) responsible for carrying out the plan is Maria Isabel Veronica MD & ADELAIDA Su     NURSING/SYMPTOM EDUCATION:  Date Initiated:  10/07/24      1.1, 1.2. 1.3, 1.4 Provide wellness/symptoms and skill education groups three to five days weekly to educate Esther Emes on signs and symptoms of diagnoses, skills to manage stressors, and medication questions that will be addressed by the treatment team.        Revision date: 10/16/24        1.1,1.2,1.3,1.4,1.5 Continue to encourage Esther Emes to participate in wellness groups daily to learn about symptoms, coping strategies and warning signs to promote relapse prevention.             The person(s) responsible for carrying out the plan is Sai Segundo MS & Aldair Tiwari      PSYCHOLOGY 10/07/24   Date Initiated: 02/19/24       1.1, 1.2, 1.4 Provide psychotherapy group 5 times per week to allow opportunity for Esther Emes  to explore stressors and ways of coping.   Revision Date: 10/16/24   1.1,1.2,1.4,1.5  Continue to provide psychotherapy group daily to Esther Emes and encourage sharing of stressors, skills and positive change.     The person(s) responsible for carrying out the plan is KENNY Szymanski     ALLIED THERAPY:   Date Initiated:  10/07/24  1.1,1.2 Engage Esther Emes in AT group 5 times daily to encourage development and use of wellness tools to decrease symptoms and promote recovery through meaningful activity.  Revision Date: 10/16/24   1.1,1.2,1.5 Continue to engage Esther Emes to participate in AT group to  practice wellness tools within program and transfer to home sharing successes and barriers through healthy task involvement.         The person(s) responsible for carrying out the plan is STANTON Jauregui, STANTON Ambrosio, & Aggie PRESLEY     CASE MANAGEMENT:   Date Initiated:  10/07/24      1.0 This  will meet with Esther Griffith  3-4 times weekly to assess treatment progress, discharge planning, connection to community supports and UR as indicated.  Revision Date:  10/16/24   1.0 Continue to meet with Esther Griffith 3-4 times weekly to assess growth, work toward goals, continued treatment needs, dc planning and use of supports.     The person(s) responsible for carrying out the plan is Sai Segundo MS     TREATMENT REVIEW/COMMENTS:      DISCHARGE CRITERIA: Identify 3 signs of progress and complete relapse prevention plan.    DISCHARGE PLAN: Connect with identified outpatient providers.   Estimated Length of Stay: 10 treatment days          Diagnosis and Treatment Plan explained to   Esther Griffith   . Esther Griffith  relates understanding diagnosis and is agreeable to Treatment Plan.         CLIENT COMMENTS / Please share your thoughts, feelings, need and/or experiences regarding your treatment plan with Staff.  Please see follow up note with comments.        Signatures can be found on [facility] Treatment plan consent form.

## 2024-10-16 NOTE — PSYCH
Subjective:    Patient ID: Esther Griffith is a 44 y.o. female    [facility] Clinical Progress Notes      Specialized Services Documentation  Therapist must complete separate progress note for each specific clinical activity in which the individual participated during the day.     Visit Time    Start Time: 0830  End Time: 0930  Total Duration: 60 minutes     EDUCATION THERAPY    Esther Griffith actively shared in morning assessment and goal review. Presented as Receptive related to readiness to learn. Esther Griffith  did complete goal from last treatment day identifying gaining hope, education, advocacy, responsibility, and support. Esther Griffith did not present with any barriers to learning. Esther Griffith reported feeling relaxed. Esther Griffith made some progress toward goal. Continue education group to assess willingness to engage, assess transfer of knowledge, and participate in skill development.    Tx Plan Objective: 1.1,1.2,1.4,  Therapist: ZOHREH Israel    Group Psychotherapy     Visit Time    Start Time: 1215  End Time: 1315  Total Duration: 60 minutes    Esther Griffith participated actively in a psychotherapy group focused on relationships, specifically co-dependence versus interdependence. Group members were taught that co-dependency is a learned behavior that is passed down from one generation to another and the effect it has on a person's ability to have a healthy, mutually satisfying relationship. Group members learned about co-dependent roles and were encouraged to share the one(s) they identified with. The roles included: martyr/victim, persecutor/, enabler, co-addict and burned-out codependent. Esther identified with all the co-dependent roles. The group was then led in a discussion about how co-dependent people behave and their characteristics. The group then learned about Interdependence as an antidote to co-dependence as it involves a balance of self and others within the relationship,  recognizing that both partners are working to be present and meet each other's physical and emotional needs in appropriate and meaningful ways. The group explored the characteristics of interdependent relationships and ways to build an interdependent relationship, along with discussing green flags in relationships. Esther stated they valued balance as a green flag in a relationship. Esther shared that she has been in this type of relationship before and has been given the necessary tools to deal with it, but that it means nothing if the tools are not implemented.  commended her for stating this. Esther Griffith made great efforts towards progress goals which were displayed through note taking, participation in discussion, and engagement in topic. Continue to run daily group psychotherapy to meet treatment needs and encourage Esther Griffith to practice skills outside of program.        Tx Plan Objective: 1.1,1.2,1.4,  Therapist:  ZOHREH Israel        Group Psychotherapy     Visit Time    Start Time: 1330  End Time: 1430  Total Duration: 60 minutes    Esther Griffith participated actively in psychotherapy group.  This was observed Consistent throughout the treatment day. They were engaged in learning related to Illness and Wellness Tools. Staff utilized Verbal, Written, A/V, and Demonstration teaching methods. Esther Griffith reported feeling relaxed. Esther Griffith shared area of learning and set a goal for outside of program to use her Wii Fit Board. Ended session with staff led exercise of gratitude practice. Esther Griffith demonstrated some progress toward goal. Continue group psychotherapy to actively practice learned skills and encourage transfer of knowledge to unstructured time.      Tx Plan Objective: 1.1,1.2,1.4,   Therapist: ZOHREH Israel

## 2024-10-16 NOTE — PSYCH
Subjective:    Patient ID: Esther Griffith is a 44 y.o. female      [facility] Progress Notes      Specialized Services Documentation  Therapist must complete separate progress note for each specific clinical activity in which the individual participated during the day.       ALLIED THERAPY   Visit Start Time: 1045  Visit Stop Time: 1145  Total Visit Duration:  60 minutes    Esther Griffith was quietly involved in skills group focused on building a coping toolbox. A coping toolbox is a collection of skills, techniques, items, and other suggestions that one can turn to as a soon as they start to feel anxious or distressed. Group members were asked to create a list identifying activities for the following categories: mood boosters, basic needs, process feelings, acts of kindness, problem solving, hobbies/stress relievers, relaxation exercises, and supports. With prompts, Esther shared journal is something that would be included in their coping toolbox. The  discussed some trial and error is necessary as no one thing works for everyone. Some work noted towards progress of treatment plan goals. Esther was a quieter participant however would participate when prompted and was observed writing down activities discussed. Continue to involve in AT to explore wellness tools.   Tx Plan Objective: 1.1, 1.2, 1.3, 1.4, Therapist:  ANA Chung

## 2024-10-17 ENCOUNTER — DOCUMENTATION (OUTPATIENT)
Dept: PSYCHOLOGY | Facility: CLINIC | Age: 44
End: 2024-10-17

## 2024-10-17 ENCOUNTER — APPOINTMENT (OUTPATIENT)
Dept: PSYCHOLOGY | Facility: CLINIC | Age: 44
End: 2024-10-17
Payer: COMMERCIAL

## 2024-10-17 NOTE — PROGRESS NOTES
Subjective:     Patient ID: Esther Griffith is a 44 y.o. female.    [facility] Progress Notes      Specialized Services Documentation  Therapist must complete separate progress note for each specific clinical activity in which the individual participated during the day.    Case Management Note    Sai Segundo MS    Current suicide risk : Heladio Santana is scheduled off for IOP today.    Medications changes/added/denied? No    Treatment session number: 7    Individual Case Management Visit provided today? No    [facility] follow up physician's orders: None, next medication check will be next week with ADELAIDA Su.

## 2024-10-18 ENCOUNTER — APPOINTMENT (OUTPATIENT)
Dept: LAB | Facility: HOSPITAL | Age: 44
End: 2024-10-18
Payer: COMMERCIAL

## 2024-10-18 ENCOUNTER — OFFICE VISIT (OUTPATIENT)
Dept: PSYCHOLOGY | Facility: CLINIC | Age: 44
End: 2024-10-18
Payer: COMMERCIAL

## 2024-10-18 DIAGNOSIS — D72.829 LEUKOCYTOSIS, UNSPECIFIED TYPE: Primary | ICD-10-CM

## 2024-10-18 DIAGNOSIS — F41.1 GENERALIZED ANXIETY DISORDER: ICD-10-CM

## 2024-10-18 DIAGNOSIS — D72.829 LEUKOCYTOSIS, UNSPECIFIED TYPE: ICD-10-CM

## 2024-10-18 DIAGNOSIS — F33.2 MAJOR DEPRESSIVE DISORDER, RECURRENT SEVERE WITHOUT PSYCHOTIC FEATURES (HCC): Primary | ICD-10-CM

## 2024-10-18 LAB
BASOPHILS # BLD AUTO: 0.05 THOUSANDS/ΜL (ref 0–0.1)
BASOPHILS NFR BLD AUTO: 0 % (ref 0–1)
EOSINOPHIL # BLD AUTO: 0.16 THOUSAND/ΜL (ref 0–0.61)
EOSINOPHIL NFR BLD AUTO: 1 % (ref 0–6)
ERYTHROCYTE [DISTWIDTH] IN BLOOD BY AUTOMATED COUNT: 14.4 % (ref 11.6–15.1)
HCT VFR BLD AUTO: 41.2 % (ref 34.8–46.1)
HGB BLD-MCNC: 13.4 G/DL (ref 11.5–15.4)
IMM GRANULOCYTES # BLD AUTO: 0.06 THOUSAND/UL (ref 0–0.2)
IMM GRANULOCYTES NFR BLD AUTO: 1 % (ref 0–2)
LYMPHOCYTES # BLD AUTO: 4.15 THOUSANDS/ΜL (ref 0.6–4.47)
LYMPHOCYTES NFR BLD AUTO: 35 % (ref 14–44)
MCH RBC QN AUTO: 31.7 PG (ref 26.8–34.3)
MCHC RBC AUTO-ENTMCNC: 32.5 G/DL (ref 31.4–37.4)
MCV RBC AUTO: 97 FL (ref 82–98)
MONOCYTES # BLD AUTO: 0.73 THOUSAND/ΜL (ref 0.17–1.22)
MONOCYTES NFR BLD AUTO: 6 % (ref 4–12)
NEUTROPHILS # BLD AUTO: 6.61 THOUSANDS/ΜL (ref 1.85–7.62)
NEUTS SEG NFR BLD AUTO: 57 % (ref 43–75)
NRBC BLD AUTO-RTO: 0 /100 WBCS
PLATELET # BLD AUTO: 297 THOUSANDS/UL (ref 149–390)
PMV BLD AUTO: 9.1 FL (ref 8.9–12.7)
RBC # BLD AUTO: 4.23 MILLION/UL (ref 3.81–5.12)
WBC # BLD AUTO: 11.76 THOUSAND/UL (ref 4.31–10.16)

## 2024-10-18 PROCEDURE — H0035 MH PARTIAL HOSP TX UNDER 24H: HCPCS

## 2024-10-18 PROCEDURE — 36415 COLL VENOUS BLD VENIPUNCTURE: CPT

## 2024-10-18 PROCEDURE — 85025 COMPLETE CBC W/AUTO DIFF WBC: CPT

## 2024-10-18 NOTE — PSYCH
"Visit Time    Start time: 1215   End time: 1315  Total time: 60 minutes   Date: 10/18/2024    Psychotherapy Group  Wellness Inventory  The group engaged in the wellness assessment, which evaluates progress on several different areas of wellness/wellbeing: physical, emotional, cognitive, vocational, social and spiritual. Clients rated their progress and discussed areas that need work. By completing and discussing areas of progress and challenges, members are connected and reminded that, in their mental health struggle, they are not alone. Topics of discussion revolved around positive experiences within each area of wellness as well as the challenging aspects to wellness within their past week. During this, Esther shared Emotional was a category they wanted to continue to work on, and Activities of Daily Living a category they were proud of their progress in. Esther Griffith continues to make progress towards goals through participation in group activity and personal disclosures. During this activity, group members were asked to write down a song that they find motivational/inspiring. Continue Psychotherapy groups to encourage further exploration of needs, personal awareness, and skills.  Physical, Emotional, Cognitive, Personal Development, Social, Greater Good, Activities of Daily Living Activity  During the final portion of this session, Esther Griffith Actively (verbally, receptively, and passively) participated in an activity for each category in the wellness assessment. Esther engaged in cognitive problem solving, answering questions about greater good, journaling about their personal development, ranking their activities of daily living, exploring emotions, stretching/movement for physical wellness, and sharing facts about group members for social development. Esther actively participated in writing an \"I am\" statement and introducing themself to the group in that way. Esther shared, \"I am working on it\" with the " "group. The group ended with a song singing of \"Fight Song\" by Shaniqua Voss, reflecting on how they implement all these categories into their everyday life as a strength in their wellness journey. Overall, Esther had a positive response to this group and made consistent progress towards 1.1, 1.2, 1.4 treatment plan goals. Continue psychotherapy groups encourage development and practice of coping skills.     Tx Plan Objective: 1.1,1.2, 1.4   Therapist: STANTON Ambrosio    "

## 2024-10-18 NOTE — PSYCH
Visit Time    Visit Start Time: 1035  Visit Stop Time: 1040  Total Visit Duration:  5 minutes    Subjective:     Patient ID: Esther Griffith is a 44 y.o. y.o. female.    [facility] Progress Notes      Specialized Services Documentation  Therapist must complete separate progress note for each specific clinical activity in which the individual participated during the day.       Case Management Note    Current suicide risk : Low     Medications changes/added/denied? No    Treatment session number: 7    Individual Case Management Visit provided today? Yes     [facility] follow up physician's orders: Esther met with Dr. Veronica briefly to ask a question related to her medications. None, next medication check will be next week with ADELAIDA Su.      INDIVIDUAL PSYCHOTHERAPY     Esther Griffith actively shared in individual therapy.   Esther Griffith identified.  Esther advocated for herself, requesting to speak to Dr. Veronica directly regarding her alprazolam. This writer did ensure this occurred, however, Esther did not have more to speak about at this time.    Treatment Plan Objectives addressed: 1.3      Sai Segundo

## 2024-10-18 NOTE — PSYCH
Group Psychotherapy      Visit Start Time: (930)  Visit Stop Time: (1030)  Total Visit Duration:  60 minutes    Subjective:     Patient ID: Esther Griffith 44 y.o.     This group was facilitated in a private office.  Esther Griffith actively engaged in psychoeducational group about the Cycle of Change. The skill helps to mange the cycle of recovery and behavior change focused toward a specified goal. Group explored the cycle of change that can help them to take care of physical and emotional well being on a short term and long term basis. The group talked about understanding the meaning of relapse in regards to physical, intellectual, and emotional expression, regulation , and recognition, and how it affects themselves and others. Teaching on the emphasis of self monitoring and relapse,  the group explored who they can go to for help was brought up as well. Group was encouraged to ask questions in an open forum at the end of group. Positive progress displayed through engagement in topic, Esther was consistently engaged in the group discussion and supportive of other group members in the discussion.Esther Griffith will continue to engage in psychotherapy to encourage positive self realization.  Treatment Plan Objective 1.1, 1.2, 1.3, 1.4 Therapist: Apollo LINK Ed.

## 2024-10-18 NOTE — PSYCH
Subjective:    Patient ID: Esther Griffith is a 44 y.o. female    [facility] Progress Notes      Specialized Services Documentation  Therapist must complete separate progress note for each specific clinical activity in which the individual participated during the day.     Visit Time    Start Time: 0830  End Time: 0930  Total Duration: 60 minutes     EDUCATION THERAPY    Esther Griffith actively shared in morning assessment and goal review. Presented as Receptive related to readiness to learn. Esther Griffith  did not complete goal from last treatment day identifying she would have gained hope and responsibility. Esther Griffith did present with a potential barrier to learning. Esther Griffith reported feeling tired due to insufficient sleep. Esther Griffith made some progress toward goal. Continue education group to assess willingness to engage, assess transfer of knowledge, and participate in skill development.    Tx Plan Objective: 1.1,1.2,1.4,1.5  Therapist: ZOHREH Israel    Group Psychotherapy     Visit Time    Start Time: 1045  End Time: 1145  Total Duration: 60 minutes    Esther Griffith participated quietly in a psychotherapy group focused on decreasing self- stigma and awareness of community supports. Esther attentively listened to Certified Peer Specialist Yahaira share her life story. Group encouraged power of learning about self, accepting illness and personal responsibility in recovery. Community resources reviewed in addition to personal resources like the WRAP. Esther Griffith made some efforts towards progress goals which were displayed through note taking, participation in discussion, and engagement in topic. Continue to run daily group psychotherapy to meet treatment needs and encourage Esther Griffith to practice skills outside of program.        Tx Plan Objective: 1.1,1.2,1.4,1.5  Therapist:  Jeff Quiroga; SILKE Perez      Group Psychotherapy     Visit Time    Start Time: 1330  End  Time: 1430  Total Duration: 60 minutes    Esther Griffith participated actively in psychotherapy group.  This was observed Consistent throughout the treatment day. They were engaged in learning related to Illness and Wellness Tools. Staff utilized Verbal, Written, A/V, and Demonstration teaching methods. Esther Griffith reported feeling trepidation about the weekend due to possibly having to take on a more active role in taking care of her father. Esther Griffith shared area of learning and set a goal for outside of program to get blood work done. Esther Griffith participated in an open discussion about the day's topics. Ended session with staff led exercise of mindfulness exercise. Esther Griffith demonstrated some progress toward goal. Continue group psychotherapy to actively practice learned skills and encourage transfer of knowledge to unstructured time.      Tx Plan Objective: 1.1,1.2,1.4,1.5   Therapist: ZOHREH Israel

## 2024-10-21 ENCOUNTER — TELEPHONE (OUTPATIENT)
Dept: PSYCHIATRY | Facility: CLINIC | Age: 44
End: 2024-10-21

## 2024-10-21 ENCOUNTER — DOCUMENTATION (OUTPATIENT)
Dept: PSYCHOLOGY | Facility: CLINIC | Age: 44
End: 2024-10-21

## 2024-10-21 ENCOUNTER — DOCUMENTATION (OUTPATIENT)
Dept: HEMATOLOGY ONCOLOGY | Facility: CLINIC | Age: 44
End: 2024-10-21

## 2024-10-21 ENCOUNTER — APPOINTMENT (OUTPATIENT)
Dept: PSYCHOLOGY | Facility: CLINIC | Age: 44
End: 2024-10-21
Payer: COMMERCIAL

## 2024-10-21 ENCOUNTER — E-CONSULT (OUTPATIENT)
Dept: HEMATOLOGY ONCOLOGY | Facility: CLINIC | Age: 44
End: 2024-10-21
Payer: COMMERCIAL

## 2024-10-21 DIAGNOSIS — D72.829 LEUKOCYTOSIS, UNSPECIFIED TYPE: Primary | ICD-10-CM

## 2024-10-21 PROCEDURE — 99446 NTRPROF PH1/NTRNET/EHR 5-10: CPT | Performed by: PHYSICIAN ASSISTANT

## 2024-10-21 NOTE — PROGRESS NOTES
Subjective:     Patient ID: Esther Griffith is a 44 y.o. female.    [facility] Progress Notes      Specialized Services Documentation  Therapist must complete separate progress note for each specific clinical activity in which the individual participated during the day.     Case Management Note    Sai Segundo, MS    Current suicide risk : Low     Esther called out sick today.    1100- This writer called Esther to confirm wellbeing and to establish if she'd be in program tomorrow. Esther stated that she had a very bad migraine but would let this writer know later.    Esther did call the main number later if left a voicemail confirming attendance tomorrow.    Medications changes/added/denied? No    Treatment session number: 8    Individual Case Management Visit provided today? No    [facility] follow up physician's orders: None, next medication check will be next week with ADELAIDA Su.

## 2024-10-21 NOTE — PROGRESS NOTES
E-Consult  Esther Griffith 44 y.o. female MRN: [id number]  Encounter Date: 10/21/24      Reason for Consult / Principal Problem: Leukocytosis     Consulting Provider: Opal Harkins PA-C    Requesting Provider: Rosa Lutz MD       ASSESSMENT:      RECOMMENDATIONS:  Most recent CBCs patient's differential is normal    A mild elevation of the total WBC within normal differential is not clinically significant and does not warrant any further workup.    To explain the previous elevated total WBC with also elevated ANC that has improved, this would favor a reactive process.    Secondary leukocytosis, a reactive processes, include:  -Normal variation (ie some patients are always above ref range)  -Infection   -Stress (physical or emotional stress, vigorous exercise)  -Inflammation   -Allergies   -Medication (steroids most commonly)  -Tobacco use   -Asplenia (known via hx or imaging)    None of the above are significant from a hematology perspective.    Continue CBCD monitoring on a routine basis.    No further workup.  No appointment necessary.    Total time spent 5-10 minutes, >50% of the total time devoted to medical consultative verbal/EMR discussion between providers. Written report will be generated in the EMR. .

## 2024-10-21 NOTE — PROGRESS NOTES
Chart has been clinically reviewed utilizing lab parameters set by hematology office. The parameters have not been met to signify necessity of consultation.  At this time the referral to hematology-oncology will be closed, as there is limited work-up for the specified referral diagnosis. Please place an order for “Amb e-consult to hematology“ and reason for referral in comment section to obtain recommendations for additional testing/work-up. Please notify the patient.    Thank you!

## 2024-10-22 ENCOUNTER — OFFICE VISIT (OUTPATIENT)
Dept: PSYCHOLOGY | Facility: CLINIC | Age: 44
End: 2024-10-22
Payer: COMMERCIAL

## 2024-10-22 DIAGNOSIS — R52 GENERALIZED BODY ACHES: ICD-10-CM

## 2024-10-22 DIAGNOSIS — F33.2 MAJOR DEPRESSIVE DISORDER, RECURRENT SEVERE WITHOUT PSYCHOTIC FEATURES (HCC): Primary | ICD-10-CM

## 2024-10-22 PROCEDURE — H0035 MH PARTIAL HOSP TX UNDER 24H: HCPCS

## 2024-10-22 NOTE — PSYCH
Subjective:    Patient ID: Esther Griffith is a 44 y.o. female    [facility] Progress Notes      Specialized Services Documentation  Therapist must complete separate progress note for each specific clinical activity in which the individual participated during the day.     Group Psychotherapy     Visit Time    Start Time: 1045  End Time: 1145  Total Duration: 60 minutes    Esther Griffith participated actively in a psychotherapy group focused on Anxiety- the meaning, symptoms and different types. The group engaged in a discussion about the role anxiety plays in their lives and how they feel in those moments.  explained the difference between several types of anxiety- Generalized Anxiety Disorder, Panic Disorder, Social Anxiety, Phobia, Post-Traumatic Stress Disorder, and Obsessive-Compulsive Disorder (OCD). Group members engaged in an open discussion about their experiences with the different types.  then focused on coping skills that could be used to combat anxiety: physical (walking, yoga, dancing) and emotional (journaling, meditation) ways to tackle anxiety. Group reviewed the “Reverse the Rabbit Hole” worksheet that entails describing the worst-case scenario of something that makes them anxious and then coming up with a plausible positive outcome to that scenario.  provided a “Worry Time” worksheet that encourages to schedule time to worry and stick to those specific times. This is useful because it allows the clients to designate two times throughout the day to only engage in worrying and outside of those times, they have to use a coping skill.  discussed some specific coping skills for OCD (pick stone and skin picking fidgets) and Panic Disorder (belly breathing and mantras). Lastly,  shared “Today I Managed my Anxiety by...” worksheet that tracks different coping skills and how effective it was for them. Esther stated they would engage in belly  breathing as coping skills when feeling anxious. Esther Griffith made some efforts towards progress goals which were displayed through note taking, participation in discussion, and engagement in topic. Continue to run daily group psychotherapy to meet treatment needs and encourage Esther Griffith to practice skills outside of program.        Tx Plan Objective: 1.1,1.2,1.4,1.5  Therapist:  ZOHREH Israel        Group Psychotherapy     Visit Time    Start Time: 1330  End Time: 1430  Total Duration: 45 minutes    Esther Griffith met with  during this session. When present, she participated quietly in psychotherapy group.  This was observed Inconsistent throughout the treatment day. They were engaged in learning related to Illness and Wellness Tools. Staff utilized Verbal, Written, A/V, and Demonstration teaching methods. Esther Griffith reported feeling relaxed. Esther Griffith shared area of learning and set a goal for outside of program to do the dishes. Esther Griffith participated in an open discussion about the day's topics. Esther Griffith was able to share items explored during the day and shared in adding to their WRAP. Ended session with staff led exercise of mindfulness exercise. Esther Griffith demonstrated some progress toward goal. Continue group psychotherapy to actively practice learned skills and encourage transfer of knowledge to unstructured time.      Tx Plan Objective: 1.1,1.2,1.4,1.5   Therapist: ZOHREH Israel

## 2024-10-22 NOTE — PSYCH
Visit Time    Visit Start Time: 1405  Visit Stop Time: 1420  Total Visit Duration:  15 minutes    Subjective:     Patient ID: Esther Griffith is a 44 y.o. y.o. female.    [facility] Progress Notes      Specialized Services Documentation  Therapist must complete separate progress note for each specific clinical activity in which the individual participated during the day.       Case Management Note    Current suicide risk : Low     Medications changes/added/denied? Yes     Treatment session number: 9    Individual Case Management Visit provided today? Yes     [facility] follow up physician's orders: None, next medication check will be Wednesday with ADELAIDA Su.          INDIVIDUAL PSYCHOTHERAPY     Esther Griffith actively shared in individual therapy.   Esther Griffith identified that her [employer] elders meeting went extremely well. Esther described the way having to wait 2 months for this meeting had led to extreme stress/pressure/catastrophizing. Esther appeared to be very relieved and reported the same. We discussed continuing program WF and then potentially MWF next week prior to discharge. We spoke at length about her [employer]ism and how many times she's been banished. Esther reviewed that being a [employer] member does prove her with great support, however, the thought of being kicked out  again is terrifying.  Very good progress toward goal identified. Next sess. Continue individual psychotherapy in order to continue working on consistent practice.     Treatment Plan Objectives addressed: 1.1, 1.2, 1.4, 1.5      Sai Segundo

## 2024-10-22 NOTE — PSYCH
Subjective:    Patient ID: Esther Griffith is a 44 y.o. female      [facility] Progress Notes      Specialized Services Documentation  Therapist must complete separate progress note for each specific clinical activity in which the individual participated during the day.     EDUCATION THERAPY  Visit Start Time: 0830  Visit Stop Time: 0930  Total Visit Duration:  60 minutes    Esther Griffith actively shared in morning assessment and goal review. Presented as Receptive related to readiness to learn. Esther Griffith  did complete goal from last treatment day identifying gaining responsibility and support. Esther Griffith did not present with any barriers to learning. Throughout morning group, Esther Griffith participated in mindfulness exercise and gratitude practice. Esther Griffith made steady progress toward goal. Continue education group to assess willingness to engage, assess transfer of knowledge, and participate in skill development.    Tx Plan Objective: 1.1, 1.2, 1.4, Therapist: ANA Chung       ALLIED THERAPY   Visit Start Time: 1215  Visit Stop Time: 1315  Total Visit Duration:  60 minutes    Esther Griffith was actively engaged in MH group focused on creating their own progress note.  provided examples of a progress note to include participation in program, concerns, behaviors, social skills, moods, activity tolerance, medication compliance, and transfer of skills. Group members were given 15 minutes to create their own progress note before coming up with an action plan to assist in their overall wellness journey. Esther reported signs of progress which included following a routine, journaling, and working through grief. Consistent effort voiced towards treatment plan. Continue to involve in psychotherapy and life skills groups.    Tx Plan Objective: 1.1, 1.2, 1.3, 1.4, 1.5, Therapist:  ANA Chung

## 2024-10-22 NOTE — PSYCH
"Visit Time    Visit Start Time: 0930  Visit Stop Time: 1030   Total Visit Duration: 60 minutes  Date: 10/22/2024    Subjective:     Patient ID: Esther Griffith is a 44 y.o. y.o. female.    [facility] Clinical Progress Notes      Specialized Services Documentation  Therapist must complete separate progress note for each specific clinical activity in which the individual participated during the day.     Psychotherapy Group  Esther Griffith Actively (verbally, receptively, and passively)  shared in MTH group focused on boundary setting. The topic was introduced with a vito analysis of \"Turning Tables\" by Leonila. The group discussed the various types of boundaries that were discussed in these two vastly different songs. Esther was observed to be verbally engaged in therapist led discussion on defining, exploring, and setting healthy boundaries. Porous Boundaries, Healthy Boundaries, and Rigid Boundaries were discussed and visuals were provided. Esther Griffith participated by taking the Boundaries Quiz, and learned that they have porous boundaries. Esther Griffith also participated in a \"Visualizing Your Boundaries\" activity by listing boundaries, drawing a [place], and deciding what they are going to let into their [place]. Consistent effort noted toward treatment goal. Continue psychotherapy to encourage development and practice of creating and setting boundaries.      Tx Plan Objective: 1.1, 1.2, 1.4  Therapist: STANTON Ambrosio    "

## 2024-10-23 ENCOUNTER — OFFICE VISIT (OUTPATIENT)
Dept: PSYCHOLOGY | Facility: CLINIC | Age: 44
End: 2024-10-23
Payer: COMMERCIAL

## 2024-10-23 DIAGNOSIS — R52 GENERALIZED BODY ACHES: ICD-10-CM

## 2024-10-23 DIAGNOSIS — F33.2 MAJOR DEPRESSIVE DISORDER, RECURRENT SEVERE WITHOUT PSYCHOTIC FEATURES (HCC): Primary | ICD-10-CM

## 2024-10-23 PROCEDURE — H0035 MH PARTIAL HOSP TX UNDER 24H: HCPCS

## 2024-10-23 NOTE — PSYCH
Subjective:    Patient ID: Esther Griffith is a 44 y.o. female    [facility] Clinical Progress Notes      Specialized Services Documentation  Therapist must complete separate progress note for each specific clinical activity in which the individual participated during the day.     Visit Time    Start Time: 0830  End Time: 0930  Total Duration: 55 minutes     EDUCATION THERAPY    Esther Griffith arrived late to program. When present, she actively shared in morning assessment and goal review. Presented as Receptive related to readiness to learn. Esther Griffith  did not complete goal from last treatment day identifying she would have gained hope, advocacy, responsibility, and support. Esther Griffith did present with a potential barrier to learning. Esther Griffith reported feeling exhausted. Esther Griffith made some progress toward goal. Continue education group to assess willingness to engage, assess transfer of knowledge, and participate in skill development.    Tx Plan Objective: 1.1,1.2,1.4,1.5  Therapist: ZOHREH Israel    Group Psychotherapy     Visit Time    Start Time: 1215  End Time: 1315  Total Duration: 60 minutes    Esther Griffith participated actively in a psychotherapy group focused on Self-Forgiveness. The group engaged in an open discussion about what self-forgiveness meant to them and the harm and benefits of it. The group also shared their individual experiences with self-forgiveness.  taught the steps to take towards it. Then group members worked on “Moving Toward Self-Forgiveness” sheet that reviews the steps in implementing it. One aspect of this exercise requires making a Pledge of Commitment to state regularly.  had each member write their commitment on a sticky note. Esther shared they will place this pledge on the fridge so she can see it when she open it.  shared self-forgiveness affirmations to aide in this process. Esther shared that they identified with the  “No one is defined by one mistake or one incident” affirmation. During this session, she participated more than in previous sessions by raising her hand to share and was observed becoming tearful at one point. Esther Griffith made some efforts towards progress goals which were displayed through note taking, participation in discussion, and engagement in topic. Continue to run daily group psychotherapy to meet treatment needs and encourage Esther Griffith to practice skills outside of program.        Tx Plan Objective: 1.1,1.2,1.4,1.5  Therapist:  ZOHREH Israel

## 2024-10-23 NOTE — PSYCH
Visit Time    Visit Start Time: 1430  Visit Stop Time: 1445  Total Visit Duration:  15 minutes    Subjective:     Patient ID: Esther Griffith is a 44 y.o. y.o. female.    [facility] Clinical Progress Notes      Specialized Services Documentation  Therapist must complete separate progress note for each specific clinical activity in which the individual participated during the day.       Case Management Note    Current suicide risk : Low     Medications changes/added/denied? No    Treatment session number: 9    Individual Case Management Visit provided today? Yes     [facility] follow up physician's orders: Discharge on Friday.        INDIVIDUAL PSYCHOTHERAPY     Esther Griffith actively shared in individual therapy.   Esther Griffith identified readiness for discharge on Friday, 10/25/24. We reviewed discharge protocols and revised/updated her safety plan. Continue individual psychotherapy in order to discharge.     Treatment Plan Objectives addressed: 1.1, 1.2 1.3, 1.4      Sai Segundo

## 2024-10-23 NOTE — PSYCH
Visit Time    Visit Start Time: 0930  Visit Stop Time: 1030  Total Visit Duration:  60 minutes    Subjective:     Patient ID: Esther Griffith is a 44 y.o. female.    [facility] Clinical Progress Notes      Specialized Services Documentation  Therapist must complete separate progress note for each specific clinical activity in which the individual participated during the day.     Group Psychotherapy     This group therapy session consisted of a custom made recovery based version of Rita. The purpose of this session is to educate participates in 5 categories: medications, DBT skills, cognitive distortions, coping skills for depression, and coping skills for anxiety while facilitating a higher level of group cohesion. Participants were split into 3 teams and each team took turns choosing a category and value. Each choice revealed a statement related to recovery to which the team would provide a response. If the correct response was provided, this writer would discuss the concept to ensure all participants learned the tool if they were unfamiliar with it. If an incorrect answer is provided, the next team could provide an answer until a team gets the correct answer. All group members were encouraged to take notes and the various concepts Esther Griffith participated in this group via providing correct answers, choosing topics, and taking notes. Esther is actively working towards treatment goals. Continue psychotherapy groups to encourage further exploration of needs, personal awareness, and skills.     Tx Plan Objective: 1.1,1.2, 1.4,   Therapist: Sai Segundo MS    Visit Time    Visit Start Time: 1330  Visit Stop Time: 1430  Total Visit Duration: 60 minutes    Subjective:     Patient ID: Esther Griffith is a 44 y.o. female.    [facility] Clinical Progress Notes      Specialized Services Documentation  Therapist must complete separate progress note for each specific clinical activity in which the individual  participated during the day.       GROUP PSYCHOTHERAPY    Esther Griffith participated actively in psychotherapy group.  This was observed consistently throughout the treatment day. They were engaged in learning related to Illness and Wellness Tools. Staff utilized Verbal, A/V, and Demonstration teaching methods.  Esther Griffith shared area of learning and set a goal for outside of program to journal.  Esther Griffith was able to share items explored during the day and encouraged to add to their WRAP.  Ended session with staff led exercise of breathing and discussion.  Esther Griffith demonstrated continued progress toward goal.  Continue group psychotherapy to actively practice learned skills and encourage transfer of knowledge to unstructured time.      Tx Plan Objective: 1.1, 1.2, 1.3Therapist: Sai Segundo

## 2024-10-23 NOTE — PSYCH
"Visit Time    Start time: 1045   End time: 1145  Total time:  60 minutes   Date: 10/23/2024     Subjective:     Patient ID: Esther Griffith 44 y.o. Female    [facility] Clinical Progress Notes      Specialized Services Documentation  Therapist must complete separate progress note for each specific clinical activity in which the individual participated during the day.     Psychotherapy Group  Esther Griffith Very actively (verbally, musically, receptively, and passively) participated in music therapy group regarding self-validation. The group started with an \"Emotional Wellness\" intake, where they had to score themselves 1-10 on various wellness skills. The group participated in a vito discussion on the song “Surface Pressure” from [employer]. Group members were asked to share examples of times where they felt invalidated by others. The group then read and discussed the handout on self validation and related the content to their experience, and filling out the self validation worksheet activity. After this, the group participated in a Validation Mindfulness-in-music relaxation experience, lead by the therapist. The group participated in an activity of turning negative statements into self validation sentences. The group participated in a vito rewrite of the song “I am Light” by Bogdan, identifying affirmations for self-validation. Esther shared \"I am a fighter\" as an affirmation of self-validation to use outside of program. Positive effort noted towards treatment goal. The group ended with a receptive music experience of listening to the song \"You Are Enough\" by [employer]ing At Last. Continue psychotherapy groups to encourage the development and proactive use of self-validating techniques.     Treatment Objective: 1.1, 1.2, 1.4 Therapist: STANTON Syed     "

## 2024-10-23 NOTE — BH CRISIS PLAN
Client Name: Esther Griffith       Client YOB: 1980    [facility] Safety Plan      Creation Date: 9/6/24 Update Date: 10/23/24   Created By: HERMELINDA Alba Last Updated By: Sia Segundo      Step 1: Warning Signs:   Warning Signs   feeling of hopelessness   want to stop fighting and not go forward   inability to concentrate   lack of personal care            Step 2: Internal Coping Strategies:   Internal Coping Strategies   journaling   getting out in nature   people watching   prayer   open the windows and let fresh air in   4-7-8            Step 3: People and social settings that provide distraction:   Name Contact Information   Aggie in phone    Places   karaoke           Step 4: People whom I can ask for help during a crisis:      Name Contact Information    mom in phone/at home    Aggie in phone    Jessy in phone      Step 5: Professionals or agencies I can contact during a crisis:      [facility]inican/Agency Name Phone Emergency Contact    Vidhi Manning, [facility] [phone] 911    Dr. Ovalle, [facility] [phone] 911    988      [facility]        Local Emergency Department Emergency Department Phone Emergency Department Address    [facility] [phone] [address] [facility] [address]        Crisis Phone Numbers:   Suicide Prevention Lifeline: Call or Text  988 Crisis Text Line: Text HOME to 506-904   Please note: Some [facility] do not have a separate number for Child/Adolescent specific crisis. If your county is not listed under Child/Adolescent, please call the adult number for your county      Adult Crisis Numbers: Child/Adolescent Crisis Numbers   [facility]: [phone] [facility]: [phone]   [facility]: [phone] [facility]: [phone]   [facility]: [phone] [place], [place]: [phone]   [facility]: [phone] [place]: [phone]   [place]: [phone]   [facility]: [phone]    [facility]: [phone]   [facility]: [phone] (daytime) [phone] (after hours, weekends, holidays)      Step 6: Making the environment safer (plan for lethal means safety):   Patient did not identify any lethal methods: Yes     Optional: What is most important to me and worth living for?   Taking care of people's pets  Spirituality     [facility] Safety Plan. Brii Novoa and Sacha Krishnamurthy. Used with permission of the authors.

## 2024-10-24 ENCOUNTER — DOCUMENTATION (OUTPATIENT)
Dept: PSYCHOLOGY | Facility: CLINIC | Age: 44
End: 2024-10-24

## 2024-10-24 ENCOUNTER — APPOINTMENT (OUTPATIENT)
Dept: PSYCHOLOGY | Facility: CLINIC | Age: 44
End: 2024-10-24
Payer: COMMERCIAL

## 2024-10-24 NOTE — PSYCH
Subjective:     Patient ID: Esther Griffith 44 y.o. Female    [facility] Clinical Progress Notes      Specialized Services Documentation  Therapist must complete separate progress note for each specific clinical activity in which the individual participated during the day.     Group Psychotherapy - Radical Acceptance       Visit Time     Visit Start Time: 930  Visit Stop Time: 1030  Total Visit Duration: 50 minutes - Esther was excused for a portion of this group due to med review.     Esther Griffith actively participated in a psychoeducational group about Radical Acceptance. Group facilitator engaged in discussion of what radical acceptance and discussed key points:    Acceptance doesn't mean resignation  Radical acceptance is letting go of the need to control, , and wish things were different than they are   Fighting against negative emotions leads do our suffering     The group engaged in the discussion of the differentiation between pain and suffering, myths about radical acceptance, willingness (doing what is just needed) vs. willfulness (refusing control/giving up) and turning the mind. The group was led in a discussion about when to use radical acceptance, when it is unhelpful, discussed examples of using radical acceptance. The group participated in an activity about realities they are refusing to accept and behaviors they display when they engage in non-acceptance. The group reviewed  “10 Steps for Practicing Radical Acceptance Using DBT,” and affirmations to shift their mindset to more acceptance. Lastly, group was provided with the DBT skill - ACCEPTS for radical acceptance (Activities, Contributing,Comparisons, Emotions, Pushing away, Thoughts, Sensations), and discussed how the strategies can help distract from distressing emotions, giving them time to lessen in intensity, or fade away.  used the following structure to support and lead the psychoeducation, group worksheets, open  discussion/processing, and peer support. Esther Griffith shared about the difficulty of change. Esther Griffith was attentive and observed taking notes throughout group. Positive progress noted towards goals. Continue with psychoeducation to further release self of emotional pain and suffering while increasing their tolerance to consciously acknowledge and honor difficult situation and emotions.      Tx Plan Objective 1.1, 1.2, 1.4 Therapist: KENNY James.

## 2024-10-25 ENCOUNTER — OFFICE VISIT (OUTPATIENT)
Dept: PSYCHIATRY | Facility: CLINIC | Age: 44
End: 2024-10-25
Payer: COMMERCIAL

## 2024-10-25 ENCOUNTER — OFFICE VISIT (OUTPATIENT)
Dept: PSYCHOLOGY | Facility: CLINIC | Age: 44
End: 2024-10-25
Payer: COMMERCIAL

## 2024-10-25 DIAGNOSIS — F33.2 MAJOR DEPRESSIVE DISORDER, RECURRENT SEVERE WITHOUT PSYCHOTIC FEATURES (HCC): Primary | ICD-10-CM

## 2024-10-25 DIAGNOSIS — F41.1 GENERALIZED ANXIETY DISORDER: ICD-10-CM

## 2024-10-25 DIAGNOSIS — R52 GENERALIZED BODY ACHES: ICD-10-CM

## 2024-10-25 PROCEDURE — 99214 OFFICE O/P EST MOD 30 MIN: CPT | Performed by: NURSE PRACTITIONER

## 2024-10-25 PROCEDURE — H0035 MH PARTIAL HOSP TX UNDER 24H: HCPCS

## 2024-10-25 NOTE — PSYCH
"  PHP MEDICATION MANAGEMENT NOTE        [facility]    Name and Date of Birth:  Esther Griffith 44 y.o. 1980 MRN: [id number]    Date of Visit: October 25, 2024    Reason for Visit: medication management follow up for partial program    Allergies   Allergen Reactions    Cannabidiol Shortness Of Breath, Itching, Swelling, Anxiety, Palpitations, Confusion, Hypertension, Throat Swelling and Tongue Swelling    Amoxicillin-Pot Clavulanate Hives    Decadrol [Dexamethasone] Other (See Comments)     psychosis    Penicillins Hives     Hives/Uticaria    Tetracyclines & Related Hives      Allergy;          Assessment & Plan  Generalized anxiety disorder         Major depressive disorder, recurrent severe without psychotic features (HCC)              SUBJECTIVE:    Esther is seen today for a follow up for Major Depressive Disorder and Generalized Anxiety Disorder. She continues to improve gradually since beginning PHP.  She presents noticeably brighter and more relaxed.  States the meeting with her Jain went \"very well\".  She was fearing she would be excommunicated from the Jain.  But states they were very supportive.  Reports she did not need any extra Xanax and continues to take it as previously.  Rates her depression today a 2 out of 10, 10 being worst.  Anxiety is a 4 out of 10.  Denying any suicidal thoughts or passive death wish.  She will be discharged from the partial program today.  She has follow-up scheduled with her outpatient provider Dr. Ovalle and does not require any refills.    She denies any side effects from current psychiatric medications.    PLAN:  Continue all psychiatric medications the same  She will discharge from the partial today and has follow-up with Dr. Ovalle next week  Aware of 24 hour and weekend coverage for urgent situations accessed by calling [facility] main practice number      Current Outpatient Medications " on File Prior to Visit   Medication Sig Dispense Refill    ALPRAZolam (XANAX) 0.25 mg tablet TAKE 1 TABLET (0.25 MG TOTAL) BY MOUTH 3 (THREE) TIMES A DAY AS NEEDED FOR ANXIETY. 90 tablet 0    b complex vitamins capsule Take 1 capsule by mouth daily      Blood Glucose Monitoring Suppl (Contour Blood Glucose System) w/Device KIT Use 1 kit 2 (two) times a day before meals 1 kit 0    cholecalciferol (VITAMIN D3) 25 mcg (1,000 units) tablet Take 1,000 Units by mouth daily Take 1 tab. daily      Contour Test test strip USE TO CHECK BLOOD SUGAR ONCE DAILY 50 strip 7    ferrous sulfate 325 (65 Fe) mg tablet Take 325 mg by mouth Take 1 tab. 3 times per week      Fetzima 40 MG extended release capsule TAKE 1 CAPSULE (40 MG TOTAL) BY MOUTH DAILY. 30 capsule 0    gabapentin (NEURONTIN) 300 mg capsule Take 1 capsule (300 mg total) by mouth 3 (three) times a day 90 capsule 5    levothyroxine 75 mcg tablet TAKE 1 TABLET (75 MCG TOTAL) BY MOUTH DAILY IN THE EARLY MORNING 90 tablet 1    lithium carbonate 300 mg capsule Take 1 capsule (300 mg total) by mouth 2 (two) times a day with meals 60 capsule 2    MAGNESIUM MALATE PO Take by mouth Take 1 tab. daily      metFORMIN (GLUCOPHAGE) 500 mg tablet TAKE 1 TABLET BY MOUTH TWICE A DAY WITH FOOD 60 tablet 5    methocarbamol (ROBAXIN) 500 mg tablet TAKE 1 TAB. EVERY 8 HR. AS NEEDED FOR MUSCLE SPASMS 60 tablet 0    mirtazapine (REMERON) 45 MG tablet TAKE 1 TABLET (45 MG TOTAL) BY MOUTH DAILY AT BEDTIME 30 tablet 2    propranolol (INDERAL) 40 mg tablet Take 0.5 tablets (20 mg total) by mouth daily at bedtime 45 tablet 3     No current facility-administered medications on file prior to visit.       HPI ROS Appetite Changes and Sleep:     She reports fluctuating sleep pattern, fluctuating appetite, fluctuating energy levels. Denies homicidal ideation, denies suicidal ideation    Review Of Systems:   no complaints other than as noted above         Mental Status Evaluation:    Appearance:  age  appropriate   Behavior:  pleasant, cooperative   Speech:  normal rate, normal volume   Mood:  improved   Affect:  normal range and intensity   Thought Process:  goal directed   Associations: intact associations   Thought Content:  no overt delusions   Perceptual Disturbances: none   Risk Potential: Suicidal ideation - None  Homicidal ideation - None  Potential for aggression - No   Sensorium:  oriented to person, place, and time/date   Memory:  recent and remote memory grossly intact   Consciousness:  alert and awake   Attention: decreased concentration and decreased attention span   Insight:  improving and limited   Judgment: improving   Gait/Station: normal gait/station, normal balance   Motor Activity: no abnormal movements       History Review:The following portions of the patient's history were reviewed and updated as appropriate: psychiatric history, trauma history allergies, current medications, past family history, past medical history, past social history, past surgical history, and problem list   OBJECTIVE:     Vital signs in last 24 hours:    There were no vitals filed for this visit.  Laboratory Results: I have personally reviewed all pertinent laboratory/tests results.    Medications Risks/Benefits:      Risks, Benefits And Possible Side Effects Of Medications:    Discussed risks and benefits of treatment with patient including risk of suicidality, serotonin syndrome, increased QTc interval and SIADH related to treatment with antidepressants; Risk of induction of manic symptoms in certain patient populations     Controlled Medication Discussion:     Esther has been filling controlled prescriptions on time as prescribed according to [place] Prescription Drug Monitoring Program    Note Share Disclaimer:     This note was not shared with the patient due to this is a psychotherapy note    ADELAIDA Su 10/25/24    This note was completed in part utilizing Dragon dictation Software. Grammatical,  translation, syntax errors, random word insertions, spelling mistakes, and incomplete sentences may be an occasional consequence of this system secondary to software limitations with voice recognition, ambient noise, and hardware issues. If you have any questions or concerns about the content, text, or information contained within the body of this dictation, please contact the provider for clarification.

## 2024-10-25 NOTE — PROGRESS NOTES
[facility] Discharge Instructions:   Disposition: home  Address: [address] [place] [address] [place] [address]    Diagnosis:Major depressive disorder, recurrent severe without psychotic features (HCC)     Generalized body aches.     Allergies (Drug/Food):   Allergies   Allergen Reactions    Cannabidiol Shortness Of Breath, Itching, Swelling, Anxiety, Palpitations, Confusion, Hypertension, Throat Swelling and Tongue Swelling    Amoxicillin-Pot Clavulanate Hives    Decadrol [Dexamethasone] Other (See Comments)     psychosis    Penicillins Hives     Hives/Uticaria    Tetracyclines & Related Hives      Allergy;      Activity:  No restrictions  Diet:no recommendations  Smoking Cessation:not a smoker   Diagnostic/Laboratory Orders: None  Vaccines: If you received a vaccine, please notify your family physician on your next visit. For more information, please call [phone].     Follow-up appointments/Referrals:     Continue outpatient psychiatry (10/29/24 @ 11am)  Dr. Bray  [address] [id number] [phone]    Continue outpatient therapy (11/01/24 @ 9am)  HERMELINDA Alba  Virtual  [address] [address]  [phone]    ICM/CTT: none    [facility] [phone].    Intake/Referral/Evaluation (Non-Emergency) *NON INSURED FOR FUNDING: [facility]: [phone], [facility]: [phone], [facility]: [phone] and [facility]: [phone].     Crisis Intervention (Emergency) County Service: [facility]: [phone], [place]: [phone], [place]: [phone], [facility]: [phone], [place]: [phone] and [facility]: [phone]. _________________________________  National Crisis Intervention Hotline: 350280 - text  National Suicide Crisis Hotline: 574     I, the undersigned, have received and understand the above instructions.        Patient/Rep Signature: __________________________________       Date/Time:  ______________           Physician Signature: ____________________________________      Date/Time: ______________               Signature: ________________________________       Date/Time: ______________

## 2024-10-25 NOTE — PSYCH
Subjective:    Patient ID: Esther Griffith is a 44 y.o. female      [facility] Clinical Progress Notes      Specialized Services Documentation  Therapist must complete separate progress note for each specific clinical activity in which the individual participated during the day.     ALLIED THERAPY   Visit Start Time: 1045  Visit Stop Time: 1145  Total Visit Duration:  60 minutes    Esther Griffith verbally engaged in group discussion related to body image. Topics included influences on body image, comments regarding appearance, unrealistic comparisons, and negative thoughts regarding body image. Despite Esther sharing that she has healthy body image during group activity, she remained attentive and engaged throughout group. Group discussed developing healthy habits, improving self-esteem, and participating in therapy as a course of action to promote healthy body image. Due to the sensitivity of the topic, the writer offered group members to express any concerns privately after group. Esther reported no concerns. Positive progress noted towards treatment goal. Continue with planned discharge at the end of treatment day.  Tx Plan Objective: 1.1, 1.2, 1.4, Therapist:  ANA Chung

## 2024-10-25 NOTE — PROGRESS NOTES
Subjective:     Patient ID: Esther Griffith is a 44 y.o. female.    [facility] Clinical Progress Notes      Specialized Services Documentation  Therapist must complete separate progress note for each specific clinical activity in which the individual participated during the day.     Case Management Note    Sai Segundo MS    Current suicide risk : Low     Off today for IOP    Medications changes/added/denied? No    Treatment session number: 10    Individual Case Management Visit provided today? No    [facility] follow up physician's orders: discharge tomorrow

## 2024-10-25 NOTE — PSYCH
Subjective:    Patient ID: Esther Griffith is a 44 y.o. female    [facility] Clinical Progress Notes      Specialized Services Documentation  Therapist must complete separate progress note for each specific clinical activity in which the individual participated during the day.     Visit Time    Start Time: 0830  End Time: 0930  Total Duration: 60 minutes     EDUCATION THERAPY    Esther Griffith actively shared in morning assessment and goal review. Presented as Receptive related to readiness to learn. Esther Griffith did complete goal from last treatment day identifying gaining hope, education, advocacy, responsibility, and support. Esther Griffith did not present with any barriers to learning. Esther Griffith reported feeling grateful. Esther Griffith made some progress toward goal. It should be noted that this is Esther's final day at the [facility] program and will be discharged at the end of the day.      Tx Plan Objective: 1.1,1.2,1.4,1.5  Therapist: ZOHREH Israel    Group Psychotherapy     Visit Time    Start Time: 1330  End Time: 1430  Total Duration: 60 minutes    Esther Griffith participated actively in psychotherapy group.  This was observed Consistent throughout the treatment day. They were engaged in learning related to Illness and Wellness Tools. Staff utilized Verbal, Written, A/V, and Demonstration teaching methods. Esther Griffith reported feeling excited. Esther Griffith shared area of learning and set a goal for outside of program to do some cleaning. Esther Griffith participated in an open discussion about the day's topics. Esther Griffith shared some words about her time in program and offered advice to those who were staying prior to leaving. Ended session with staff led exercise mindfulness exercise. Esther Griffith demonstrated some progress toward goal. Continue with plan to discharge at the end of program day.     Tx Plan Objective: 1.1,1.2,1.4,1.5  Therapist: ZOHREH Israel

## 2024-10-25 NOTE — PSYCH
[facility] Clinical Progress Notes      Specialized Services Documentation  Therapist must complete separate progress note for each specific clinical activity in which the individual participated during the day.       [facility] follow up physician's orders:   Date: 10/25/2024  Time: 9:30  DISCHARGE TODAY  Maria Isabel Veronica MD

## 2024-10-25 NOTE — PSYCH
"Visit Time    Start time: 1215   End time: 1315  Total time: 60 minutes   Date: 10/25/2024    Psychotherapy Group  Wellness Inventory  The group engaged in the wellness assessment, which evaluates progress on several different areas of wellness/wellbeing: physical, emotional, cognitive, vocational, social and spiritual. Clients rated their progress and discussed areas that need work. By completing and discussing areas of progress and challenges, members are connected and reminded that, in their mental health struggle, they are not alone. Topics of discussion revolved around positive experiences within each area of wellness as well as the challenging aspects to wellness within their past week. During this, Esther shared Emotional was a category they wanted to continue to work on, and Cognitive a category they were proud of their progress in. Esther Griffith continues to make progress towards goals through participation in group activity and personal disclosures. During this activity, group members were asked to write down a song that they find motivational/inspiring. Continue Psychotherapy groups to encourage further exploration of needs, personal awareness, and skills.  DICE activity  During the final portion of this session, Esther Griffith Very actively (verbally, musically, receptively, and passively) participated in throwing the dice, and answering a question corresponding with the number on the dice. The group answered questions about coping skills, mindfulness skills, gratitude, affirmations, distress tolerance skills, leisure activity, supports,  and movement. During this, Esther shared that she wants to continue to work on self-compassion. After everyone got a turn, the group rolled the dice for another round - odd numbers answer a question, even numbers share your motivational song with the group. Esther shared \"Don't Stop Believing\" by [place] and stated that this was a song that she finds empowering. Group " leader played the song, and group members sang along. Overall, positive progress noted towards treatment goals. It should be noted that this is Esther's final day at the [facility] program and will be discharged 10/25/2024 at 1430.    Tx Plan Objective: 1.1,1.2, 1.4   Therapist: STANTON Ambrosio

## 2024-10-25 NOTE — PSYCH
Patient ID: Esther Griffith  is a 44 y.o. female .        Assessment/Plan:          Diagnoses and all orders for this visit:     Major depressive disorder, recurrent severe without psychotic features (HCC)     Generalized body aches             [facility] Treatment Plan   AREAS OF NEED: Esther has been struggling with symptoms related to MDD and body aches as evidenced by anhedonia, fleeting SI, passive death wishes, reduced concentration, decreased energy, recurring pains/aches, and reduced motivation due to multiple stressors, recent loss/grief, and past trauma.   Date Initiated: 10/07/24     Strengths: Creative, Organization, Problem Solver, Sense of humor, Sought out for advice     LONG TERM GOAL:   Date Initiated: 10/07/24  1.0 While at [facility], I will gain support/education/insights/coping skills/techniques which I will utilize daily to decrease my symptoms and improve my quality of life.  Target Date: 11/04/24  Completion Date: 10/25/24        SHORT TERM OBJECTIVES:      Date Initiated: 10/07/24  1.1 I will practice (daily) at least 3 effective techniques/coping skills to help reduce depressive/anxiety symptoms. (ie Anxiety: Emotional Freedom Tapping, breathing techniques, sensory countdown, mindfulness, etc. Depression: building mastery, opposite action, cognitive distortion work, positive affirmations, etc.)  Revision Date: 10/16/24  Target Date: 10/16/24  Completion Date:  10/25/24     Date Initiated: 10/07/24  1.2 I will set aside 30 minutes per day, at least 3 days per week, to do something that I enjoy. (ie writing, guitar, art, something creative, etc.)  Revision Date: 10/16/24  Target Date:  10/16/24  Completion Date: 10/25/24     Date Initiated: 10/07/24  1.3 I will take medications as prescribed and share questions and concerns if they arise.    Revision Date: 10/16/24  Target Date:  10/16/24  Completion Date:  10/25/24     Date Initiated: 10/07/24  1.4 I will identify 3 ways my supports can  assist in my recovery and agree to use them when/if needed.    Revision Date: 10/16/24  Target Date:  10/16/24  Completion Date: 10/25/24            8 DAY REVISION:     Date Initiated: 10/16/24  1.5 I will complete my Wellness Recovery Action Plan prior to discharge from [facility].  Revision Date: 10/25/24- No revision due to discharge today  Target Date: 10/25/24  Completion Date: 10/25/24      Date Initiated: 10/25/24  1.6 No goals added due to discharge today.  Revision Date:   Target Date: n/a  Completion Date: 10/25/24     PSYCHIATRY:  Date Initiated:  10/07/24  Medication Management and Education       Revision Date: 10/16/24      The person(s) responsible for carrying out the plan is Maria Isabel Veronica MD & ADELAIDA Su     NURSING/SYMPTOM EDUCATION:  Date Initiated:  10/07/24      1.1, 1.2. 1.3, 1.4 Provide wellness/symptoms and skill education groups three to five days weekly to educate Esther Emes on signs and symptoms of diagnoses, skills to manage stressors, and medication questions that will be addressed by the treatment team.        Revision date: 10/16/24        1.1,1.2,1.3,1.4,1.5 Continue to encourage Esther Emes to participate in wellness groups daily to learn about symptoms, coping strategies and warning signs to promote relapse prevention.             The person(s) responsible for carrying out the plan is Sai Segundo MS & Aldair Tiwari      PSYCHOLOGY 10/07/24   Date Initiated: 02/19/24       1.1, 1.2, 1.4 Provide psychotherapy group 5 times per week to allow opportunity for Esther Emes  to explore stressors and ways of coping.   Revision Date: 10/16/24   1.1,1.2,1.4,1.5  Continue to provide psychotherapy group daily to Esther Emes and encourage sharing of stressors, skills and positive change.     The person(s) responsible for carrying out the plan is KENNY Szymanski     ALLIED THERAPY:   Date Initiated:  10/07/24  1.1,1.2 Engage Esther Emes in AT group 5 times daily to  encourage development and use of wellness tools to decrease symptoms and promote recovery through meaningful activity.  Revision Date: 10/16/24   1.1,1.2,1.5 Continue to engage Esther Griffith to participate in AT group to practice wellness tools within program and transfer to home sharing successes and barriers through healthy task involvement.         The person(s) responsible for carrying out the plan is STANTON Jauregui, STANTON Ambrosio, & Aggie PRESLEY     CASE MANAGEMENT:   Date Initiated:  10/07/24      1.0 This  will meet with Esther Griffith  3-4 times weekly to assess treatment progress, discharge planning, connection to community supports and UR as indicated.  Revision Date:  10/16/24   1.0 Continue to meet with Esther Griffith 3-4 times weekly to assess growth, work toward goals, continued treatment needs, dc planning and use of supports.     The person(s) responsible for carrying out the plan is Sai Segundo MS     TREATMENT REVIEW/COMMENTS:  10/25/24- Successful completion of treatment plan 10/25/24 due to successful discharge.     DISCHARGE CRITERIA: Identify 3 signs of progress and complete relapse prevention plan.    DISCHARGE PLAN: Connect with identified outpatient providers.   Estimated Length of Stay: 10 treatment days          Diagnosis and Treatment Plan explained to   Esther Griffith   . Esther Griffith  relates understanding diagnosis and is agreeable to Treatment Plan.         CLIENT COMMENTS / Please share your thoughts, feelings, need and/or experiences regarding your treatment plan with Staff.  Please see follow up note with comments.        Signatures can be found on [facility] Treatment plan consent form.

## 2024-10-28 NOTE — PSYCH
Subjective:     Patient ID: Esther Griffith is a 44 y.o. female.    [facility] Discharge Summary:   Admission Date: 10/07/24  Patient was referred by HERMELINDA Alba  Discharge Date: 10/25/24  Was this a routine discharge? yes   Diagnosis: Axis I:   1. Major depressive disorder, recurrent severe without psychotic features (HCC)        2. Generalized body aches           Treating Physician: Dr. Maria Isabel Veronica    Treatment Complications: None    Presenting Need:  As per Dr. Veronica: Esther Griffith is a 44 y.o. female with Major depressive disorder, generalized anxiety disorder, chronic migraine, allergic rhinitis, sleep apnea, acquired hypothyroidism, type 2 diabetes mellitus with hyperglycemia, diabetic polyneuropathy, fibromyalgia, narcolepsy, dyslipidemia and Lyme disease referred by her therapist because for the last month she has been feeling very down, hopeless, lost interest in previous pleassure activities,, decreased concentration, decreased motivation and increased sleep time was significant fatigue.  She also have increased anxiety. .Onset of symptoms was  a few months ago with gradually worsening course since that time. Psychosocial Stressors: Voodoo.  She stated that has been feeling depressed and anxious because she still missed her friend who die a year ago, they were getting very close but they did not see each other for 6 weeks prior to him passing. He was a good support system. She is having an issues with her Yazidi she is a Jehovah  witnessed due to this past relationship. On September 1, 2024 she was contacted and inform that they will be a meeting to decide what disciplinary action they will take.against her. She states the fear of not knowing made her has hard time to function. She can be expel from the Yazidi and will not be able to see her friends again. She had a visit to the ED due to chest pain and was inform that was anxiety. She also worries about her father's  "health that is deteriorating. She is compliance with her treatment. She has some support from her parents.  .TodayEsther Griffith feels depressed, anxious, has anhedonia, sleep disturbances, decreased energy, feeling tired and decreased concentration.  She has fleeting suicidal thoughts, no plan or intent. She denies any hallucinations or paranoid thinking. She denies any history of manic episode. Her PHQ-9 IS 10.      As per this writer: Esther Griffith is a 45 yo female, referred by Felisa SHEN, who has been struggling with symptoms related to MDD and generalized body aches. Esther reports experiencing fleeting SI, passive death wishes, anhedonia, decreased energy, decreased concentration, and pain/aches due to stress. Esther reports experiencing some loss and a suicide attempt since being discharged from [facility] in 2023. Esther states that her friend passed away, roughly 1 year ago, and this loss led her to attempt suicide via OD in 10/2023. Esther reports that she was admitted to the ICU and nearly . She does report having excessive guilt over this action. Felisa lives on a floor of her home alone with her cat, while her parents and 42 yo brother live on the floor below. Esther reports that her mother and her 2 friends are her primary supports. Esther is stressed about potential consequences with the Adventism and is also stressed about her father's health. Esther has been experiencing chest pain due to stress and went to the ED yesterday following another episode. She sees Dr. Bray and HERMELINDA Alba in outpatient.     As per Esther Griffith : \"I was so dissociated from life, I don't know how I could do that to my friends and family. I started coldly planning right after he .\"     Course of treatment includes:    group counseling, medication management, individual case management, allied therapy, psychoeducation, psychiatric evaluation, and individual therapy     Treatment " Progress: Esther made significant progress while at [facility]. While in groups, Esther verbally participated w/out prompting, took notes, shared insights, and engaged effectively. Esther reviewed, practiced, and implemented newly acquired skills. She made progress consistently and reported improved mood, reduced tearful episodes, an absence of SI, and significantly reduced anxiety. Esther reported that she gained hope and confidence prior to discharging from [facility]. She was able to reduce her PHQ9 from a 14 to a 7 after 10 days of treatment.    Aftercare recommendations include:     Continue outpatient psychiatry (10/29/24 @ 11am)  Dr. Bray  [address] [address]  [phone]     Continue outpatient therapy (11/01/24 @ 9am)  HERMELINDA Alba  [facility]  [address] [address]  [phone]    Discharge Medications include:  Current Outpatient Medications:     ALPRAZolam (XANAX) 0.25 mg tablet, TAKE 1 TABLET (0.25 MG TOTAL) BY MOUTH 3 (THREE) TIMES A DAY AS NEEDED FOR ANXIETY., Disp: 90 tablet, Rfl: 0    b complex vitamins capsule, Take 1 capsule by mouth daily, Disp: , Rfl:     Blood Glucose Monitoring Suppl (Contour Blood Glucose System) w/Device KIT, Use 1 kit 2 (two) times a day before meals, Disp: 1 kit, Rfl: 0    cholecalciferol (VITAMIN D3) 25 mcg (1,000 units) tablet, Take 1,000 Units by mouth daily Take 1 tab. daily, Disp: , Rfl:     Contour Test test strip, USE TO CHECK BLOOD SUGAR ONCE DAILY, Disp: 50 strip, Rfl: 7    ferrous sulfate 325 (65 Fe) mg tablet, Take 325 mg by mouth Take 1 tab. 3 times per week, Disp: , Rfl:     Fetzima 40 MG extended release capsule, TAKE 1 CAPSULE (40 MG TOTAL) BY MOUTH DAILY., Disp: 30 capsule, Rfl: 0    gabapentin (NEURONTIN) 300 mg capsule, Take 1 capsule (300 mg total) by mouth 3 (three) times a day, Disp: 90 capsule, Rfl: 5    levothyroxine 75 mcg tablet, TAKE 1 TABLET (75 MCG TOTAL) BY MOUTH DAILY IN THE EARLY MORNING,  Disp: 90 tablet, Rfl: 1    lithium carbonate 300 mg capsule, Take 1 capsule (300 mg total) by mouth 2 (two) times a day with meals, Disp: 60 capsule, Rfl: 2    MAGNESIUM MALATE PO, Take by mouth Take 1 tab. daily, Disp: , Rfl:     metFORMIN (GLUCOPHAGE) 500 mg tablet, TAKE 1 TABLET BY MOUTH TWICE A DAY WITH FOOD, Disp: 60 tablet, Rfl: 5    methocarbamol (ROBAXIN) 500 mg tablet, TAKE 1 TAB. EVERY 8 HR. AS NEEDED FOR MUSCLE SPASMS, Disp: 60 tablet, Rfl: 0    mirtazapine (REMERON) 45 MG tablet, TAKE 1 TABLET (45 MG TOTAL) BY MOUTH DAILY AT BEDTIME, Disp: 30 tablet, Rfl: 2    propranolol (INDERAL) 40 mg tablet, Take 0.5 tablets (20 mg total) by mouth daily at bedtime, Disp: 45 tablet, Rfl: 3

## 2024-10-28 NOTE — PSYCH
Subjective:     Patient ID: Esther Griffith is a 44 y.o. female.    [facility] Clinical Progress Notes      Specialized Services Documentation  Therapist must complete separate progress note for each specific clinical activity in which the individual participated during the day.     Other Successful discharge today 10/25/24    Case Management Note    Sai Segundo MS    Current suicide risk : Low      CM reviewed Relapse Prevention Plan, discharge instructions, medication list and crisis information with Esther Griffith.  See discharge summary for treatment details. Aftercare providers to receive discharge summary.      Medications changes/added/denied? No    Treatment session number: 10    Individual Case Management Visit provided today? Yes     [facility] follow up physician's orders: Successful discharge from [facility] today 10/25/24.

## 2024-10-29 DIAGNOSIS — M79.18 MYOFASCIAL PAIN: ICD-10-CM

## 2024-10-30 DIAGNOSIS — F33.9 RECURRENT MAJOR DEPRESSION RESISTANT TO TREATMENT (HCC): ICD-10-CM

## 2024-10-30 RX ORDER — METHOCARBAMOL 500 MG/1
TABLET, FILM COATED ORAL
Qty: 60 TABLET | Refills: 0 | Status: SHIPPED | OUTPATIENT
Start: 2024-10-30

## 2024-10-30 RX ORDER — LITHIUM CARBONATE 300 MG/1
CAPSULE ORAL
Qty: 60 CAPSULE | Refills: 2 | Status: SHIPPED | OUTPATIENT
Start: 2024-10-30

## 2024-11-01 ENCOUNTER — DOCUMENTATION (OUTPATIENT)
Dept: PSYCHOLOGY | Facility: CLINIC | Age: 44
End: 2024-11-01

## 2024-11-01 ENCOUNTER — TELEMEDICINE (OUTPATIENT)
Dept: BEHAVIORAL/MENTAL HEALTH CLINIC | Facility: CLINIC | Age: 44
End: 2024-11-01
Payer: COMMERCIAL

## 2024-11-01 ENCOUNTER — TELEPHONE (OUTPATIENT)
Age: 44
End: 2024-11-01

## 2024-11-01 DIAGNOSIS — F41.1 GENERALIZED ANXIETY DISORDER: ICD-10-CM

## 2024-11-01 DIAGNOSIS — F33.2 MAJOR DEPRESSIVE DISORDER, RECURRENT SEVERE WITHOUT PSYCHOTIC FEATURES (HCC): Primary | ICD-10-CM

## 2024-11-01 PROCEDURE — 90834 PSYTX W PT 45 MINUTES: CPT | Performed by: PSYCHIATRY & NEUROLOGY

## 2024-11-01 NOTE — TELEPHONE ENCOUNTER
"Nurse spoke with Esther.    Per Esther, CP started 5 weeks ago after starting alprazolam - gradually getting worse. Esther states she takes alprazolam TID and when she does not take it, no longer has CP, once another dose it taken, CP comes back - only one day was CP severe, otherwise CP is noticeable but not severe. Denies nausea, denies radiating pain in arm or jaw.     \"I have started taking a baby aspirin daily for the CP.\"     Please advise.       "

## 2024-11-01 NOTE — TELEPHONE ENCOUNTER
Nurse spoke with Esther Griffith - reviewed response from clinician. Esther Griffith verbalized understanding of same.     Esther asks if another medication can be prescribed to replace alprazolam once taper is complete.    [facility] [address]

## 2024-11-01 NOTE — PSYCH
Virtual Regular Visit    Verification of patient location:    Patient is located at Home in the following state in which I hold an active license [place]      Assessment/Plan:    Problem List Items Addressed This Visit          Behavioral Health    Generalized anxiety disorder    Major depressive disorder, recurrent severe without psychotic features (HCC) - Primary          Reason for visit is   Chief Complaint   Patient presents with    Virtual Regular Visit       Encounter provider HERMELINDA ALBA      Recent Visits  No visits were found meeting these conditions.  Showing recent visits within past 7 days and meeting all other requirements  Today's Visits  Date Type Provider Dept   11/01/24 Telemedicine HERMELINDA Alba [facility] Therapist [place]   Showing today's visits and meeting all other requirements  Future Appointments  No visits were found meeting these conditions.  Showing future appointments within next 150 days and meeting all other requirements       The patient was identified by name and date of birth. Esther Griffith was informed that this is a telemedicine visit and that the visit is being conducted throughthe Epic Embedded platform. She agrees to proceed..  My office door was closed. No one else was in the room.  She acknowledged consent and understanding of privacy and security of the video platform. The patient has agreed to participate and understands they can discontinue the visit at any time.    Patient is aware this is a billable service.     HPI     Past Medical History:   Diagnosis Date    Alcoholism (HCC) 03/15/2001    Anxiety     Blood transfusion declined because patient is Religion 05/01/2023    Chronic pain disorder     Chronic sinusitis     Depression     Depression     Diabetes (HCC)     Diabetes mellitus (HCC) 09/01/2022    Fibromyalgia     Migraine     Obesity     Polyarthritis     Last assessed 9/21/2015    Psychiatric disorder     Psychiatric illness      Sleep difficulties     Substance abuse (HCC) 03/15/1994       Past Surgical History:   Procedure Laterality Date    COLONOSCOPY  06/2019    DENTAL SURGERY      HYSTERECTOMY  05/01/2023    HYSTEROSCOPY      Endometrial Biopsy By Hysteroscopy    IN LAPS SUPRACRV HYSTERECT 250 GM/< RMVL TUBE/OVAR N/A 05/01/2023    Procedure: (LSH) W/ BILATERAL SALPINGECTOMY, REMOVAL PARAOVARIAN CYST;  Surgeon: Sai Lopez DO;  Location: [facility];  Service: Gynecology    REMOVAL OF INTRAUTERINE DEVICE (IUD)      US GUIDED BREAST BIOPSY RIGHT COMPLETE Right 06/17/2019    Benign       Current Outpatient Medications   Medication Sig Dispense Refill    ALPRAZolam (XANAX) 0.25 mg tablet TAKE 1 TABLET (0.25 MG TOTAL) BY MOUTH 3 (THREE) TIMES A DAY AS NEEDED FOR ANXIETY. 90 tablet 0    b complex vitamins capsule Take 1 capsule by mouth daily      Blood Glucose Monitoring Suppl (Contour Blood Glucose System) w/Device KIT Use 1 kit 2 (two) times a day before meals 1 kit 0    cholecalciferol (VITAMIN D3) 25 mcg (1,000 units) tablet Take 1,000 Units by mouth daily Take 1 tab. daily      Contour Test test strip USE TO CHECK BLOOD SUGAR ONCE DAILY 50 strip 7    ferrous sulfate 325 (65 Fe) mg tablet Take 325 mg by mouth Take 1 tab. 3 times per week      Fetzima 40 MG extended release capsule TAKE 1 CAPSULE (40 MG TOTAL) BY MOUTH DAILY. 30 capsule 0    gabapentin (NEURONTIN) 300 mg capsule Take 1 capsule (300 mg total) by mouth 3 (three) times a day 90 capsule 5    levothyroxine 75 mcg tablet TAKE 1 TABLET (75 MCG TOTAL) BY MOUTH DAILY IN THE EARLY MORNING 90 tablet 1    lithium carbonate 300 mg capsule TAKE 1 CAPSULE BY MOUTH 2 TIMES A DAY WITH MEALS. 60 capsule 2    MAGNESIUM MALATE PO Take by mouth Take 1 tab. daily      metFORMIN (GLUCOPHAGE) 500 mg tablet TAKE 1 TABLET BY MOUTH TWICE A DAY WITH FOOD 60 tablet 5    methocarbamol (ROBAXIN) 500 mg tablet TAKE 1 TAB. EVERY 8 HR. AS NEEDED FOR MUSCLE SPASMS 60 tablet 0    mirtazapine  (REMERON) 45 MG tablet TAKE 1 TABLET (45 MG TOTAL) BY MOUTH DAILY AT BEDTIME 30 tablet 2    propranolol (INDERAL) 40 mg tablet Take 0.5 tablets (20 mg total) by mouth daily at bedtime 45 tablet 3     No current facility-administered medications for this visit.        Allergies   Allergen Reactions    Cannabidiol Shortness Of Breath, Itching, Swelling, Anxiety, Palpitations, Confusion, Hypertension, Throat Swelling and Tongue Swelling    Amoxicillin-Pot Clavulanate Hives    Decadrol [Dexamethasone] Other (See Comments)     psychosis    Penicillins Hives     Hives/Uticaria    Tetracyclines & Related Hives      Allergy;        Review of Systems    Video Exam    There were no vitals filed for this visit.    Physical Exam     Behavioral Health Psychotherapy Progress Note    Psychotherapy Provided: Individual Psychotherapy     1. Major depressive disorder, recurrent severe without psychotic features (HCC)        2. Generalized anxiety disorder            Goals addressed in session: Goal 1     DATA: Met with Esther for follow up. Esther shared that she has finished the [facility] program, and said that she feels she benefited from the program, learning some new coping strategies along with good reminders of things that she has learned in the past.  She shared that she has been very tired following d/c from the program, and while she is trying to give herself time to rest, she feels guilty that she is not working on some of the projects that she needs to get done around the house. Discussed this feeling of pressure to be productive, and processed ways that she could try to work on small steps (5 minutes, one small step in a project, etc) to help her feel more productive yet not overtax herself to the point of flaring her medical conditions.  Esther also shared that she met with her [employer] elders to discuss the texting between herself and [place], and she said that it stayed a private conversation with no  "repercussions to her membership in the [employer], which she said was very  much a relief.  Provided support, validation and normalization of Esther's feelings and experience, and used mindfulness and CBT strategies to help Esther find balance between rest and productivity at home, and to help her let go of past issues that cause her distress.    During this session, this clinician used the following therapeutic modalities: Client-centered Therapy, Cognitive Behavioral Therapy, and Supportive Psychotherapy    Substance Abuse was not addressed during this session. If the client is diagnosed with a co-occurring substance use disorder, please indicate any changes in the frequency or amount of use: n/a. Stage of change for addressing substance use diagnoses: No substance use/Not applicable    ASSESSMENT:  Esther Griffith presents with a Depressed mood.     her affect is Normal range and intensity, which is congruent, with her mood and the content of the session. The client has made progress on their goals.     Esther Griffith presents with a minimal risk of suicide, minimal risk of self-harm, and minimal risk of harm to others.    For any risk assessment that surpasses a \"low\" rating, a safety plan must be developed.    A safety plan was indicated: no  If yes, describe in detail n/a    PLAN: Between sessions, Esther Griffith will focus on balance between rest and self-care and her need to accomplish things around the house. At the next session, the therapist will use Client-centered Therapy, Cognitive Behavioral Therapy, and Supportive Psychotherapy to address depression and anxiety.    Behavioral Health Treatment Plan and Discharge Planning: Esther Griffith is aware of and agrees to continue to work on their treatment plan. They have identified and are working toward their discharge goals. yes    Visit start and stop times:    11/01/24  Start Time: 0905  Stop Time: 0944  Total Visit Time: 39 minutes      "

## 2024-11-01 NOTE — PROGRESS NOTES
[facility] Follow Up Action    This writer spoke with Esther Griffith today - 7 days post discharge from [facility].   Reviewed crisis information.  Esther Griffith reports taking medication. Esther Griffith reports awareness of aftercare appointments.    Jeff Peña

## 2024-11-01 NOTE — TELEPHONE ENCOUNTER
Patient states is having Chest pain when takes Alprazolam. No Nausea, NO Heartburn, doesn't radiate down arm or into jaw.  But has been happening everyday.

## 2024-11-04 ENCOUNTER — TELEPHONE (OUTPATIENT)
Age: 44
End: 2024-11-04

## 2024-11-04 DIAGNOSIS — F33.1 MAJOR DEPRESSIVE DISORDER, RECURRENT EPISODE, MODERATE (HCC): ICD-10-CM

## 2024-11-04 NOTE — TELEPHONE ENCOUNTER
Nurse spoke with Esther Griffith - reviewed response from clinician. Esther Griffith verbalized understanding of same.    Esther states she has been on Buspar previously and it caused nightmares.   Esther would like to try a dose increase of Fetzima.    [facility] on [address] in [facility]

## 2024-11-04 NOTE — TELEPHONE ENCOUNTER
Nurse spoke with Esther Griffith - reviewed response from clinician. Esther Griffith verbalized understanding of same and will  the script.

## 2024-11-04 NOTE — TELEPHONE ENCOUNTER
This medication is to treat anxiety, and can cause chest pain. She can just stop right away since she is on lowest dose . If she is lethargic that is a side effects of a sedative medication. I will not replace xanax with another sedative BDZ. I can offer a dose increase on Fetzima instead or can add Buspirone which is not sedating and helps treat anxiety.

## 2024-11-04 NOTE — TELEPHONE ENCOUNTER
Nurse spoke with Esther # [phone] (ok to leave detailed message on voice mail)     Esther states she is following the taper for Xanax - past 4 days taking BID (decrease from TID), continues to have chest pain  which lasts 2- 3 hours after taking a dose, feeling lethargic, no desire to get out of bed (I only got up to fed my friends' cat), no desire to do anything, no desire talk to or see anyone, no desire to clean.       Asking about other medications to try and/or if current meds and doses are adequate.

## 2024-11-04 NOTE — TELEPHONE ENCOUNTER
Patient is feeling very lethargic and is wondering if she is on the right medications/dosages. Patient is requesting a call back as soon as possible at [phone]

## 2024-11-11 ENCOUNTER — TELEMEDICINE (OUTPATIENT)
Dept: PSYCHIATRY | Facility: CLINIC | Age: 44
End: 2024-11-11
Payer: COMMERCIAL

## 2024-11-11 DIAGNOSIS — Z79.899 HIGH RISK MEDICATION USE: ICD-10-CM

## 2024-11-11 DIAGNOSIS — F33.2 MAJOR DEPRESSIVE DISORDER, RECURRENT SEVERE WITHOUT PSYCHOTIC FEATURES (HCC): Primary | ICD-10-CM

## 2024-11-11 DIAGNOSIS — F41.1 GENERALIZED ANXIETY DISORDER: ICD-10-CM

## 2024-11-11 PROCEDURE — 99214 OFFICE O/P EST MOD 30 MIN: CPT | Performed by: PSYCHIATRY & NEUROLOGY

## 2024-11-11 NOTE — PSYCH
Virtual Regular Visit    Verification of patient location:    Patient is located at Home in the following state in which I hold an active license [place]      Assessment/Plan:    Problem List Items Addressed This Visit          Behavioral Health    Generalized anxiety disorder    Major depressive disorder, recurrent severe without psychotic features (HCC) - Primary     Other Visit Diagnoses       High risk medication use        Relevant Orders    Lithium level                       Reason for visit is No chief complaint on file.           Encounter provider Cristina Bray MD      Recent Visits  Date Type Provider Dept   11/04/24 Telephone Cristina Bray MD [facility]   Showing recent visits within past 7 days and meeting all other requirements  Today's Visits  Date Type Provider Dept   11/11/24 Telemedicine Cristina Bray MD [facility]   Showing today's visits and meeting all other requirements  Future Appointments  No visits were found meeting these conditions.  Showing future appointments within next 150 days and meeting all other requirements       The patient was identified by name and date of birth. Esther Griffith was informed that this is a telemedicine visit and that the visit is being conducted throughthe Epic Embedded platform. She agrees to proceed..  My office door was closed. No one else was in the room.  She acknowledged consent and understanding of privacy and security of the video platform. The patient has agreed to participate and understands they can discontinue the visit at any time.    Patient is aware this is a billable service.     Subjective  Esther Griffith is a 44 y.o. female with MDD and CAROL .Patient remains compliant with medications and denies side effects. She continues to manage her diabetes with medication and diet.    She continues to meet with pain management and has limited benefit from epidural injections.   She restarted   Gabapentin for neuropathy  300 mg po qhs to help with sleep as well.   Since last seen she completed PHP and found it helped her a lot. She stated she was experiencing chest pain after taking Xanax 0.25 mg and she was tapered off the medication and her chest pain stopped.   Currently on Fetzima and recently had dose increase to 80 mg.   Continue Mirtazapine 45 mg po qhs and lithium 300 mg po bid for depression.   Will continue to meet with counselor every 2 weeks.   Gradually feeling better and overall energy has improved.  Agrees to continue current treatment and will follow up in 6 weeks       HPI     Past Medical History:   Diagnosis Date    Alcoholism ([facility]) 03/15/2001    Anxiety     Blood transfusion declined because patient is Anglican 05/01/2023    Chronic pain disorder     Chronic sinusitis     Depression     Depression     Diabetes ([facility])     Diabetes mellitus ([facility]) 09/01/2022    Fibromyalgia     Migraine     Obesity     Polyarthritis     Last assessed 9/21/2015    Psychiatric disorder     Psychiatric illness     Sleep difficulties     Substance abuse ([facility]) 03/15/1994       Past Surgical History:   Procedure Laterality Date    COLONOSCOPY  06/2019    DENTAL SURGERY      HYSTERECTOMY  05/01/2023    HYSTEROSCOPY      Endometrial Biopsy By Hysteroscopy    RI LAPS SUPRACRV HYSTERECT 250 GM/< RMVL TUBE/OVAR N/A 05/01/2023    Procedure: ([facility]) W/ BILATERAL SALPINGECTOMY, REMOVAL PARAOVARIAN CYST;  Surgeon: Sai Lopez DO;  Location: [facility];  Service: Gynecology    REMOVAL OF INTRAUTERINE DEVICE (IUD)      US GUIDED BREAST BIOPSY RIGHT COMPLETE Right 06/17/2019    Benign       Current Outpatient Medications   Medication Sig Dispense Refill    b complex vitamins capsule Take 1 capsule by mouth daily      Blood Glucose Monitoring Suppl (Contour Blood Glucose System) w/Device KIT Use 1 kit 2 (two) times a day before meals 1 kit 0    cholecalciferol (VITAMIN D3) 25 mcg (1,000 units) tablet Take 1,000  Units by mouth daily Take 1 tab. daily      Contour Test test strip USE TO CHECK BLOOD SUGAR ONCE DAILY 50 strip 7    ferrous sulfate 325 (65 Fe) mg tablet Take 325 mg by mouth Take 1 tab. 3 times per week      gabapentin (NEURONTIN) 300 mg capsule Take 1 capsule (300 mg total) by mouth 3 (three) times a day 90 capsule 5    Levomilnacipran HCl ER (FETZIMA) 80 MG extended release capsule Take 1 capsule (80 mg total) by mouth daily 30 capsule 2    levothyroxine 75 mcg tablet TAKE 1 TABLET (75 MCG TOTAL) BY MOUTH DAILY IN THE EARLY MORNING 90 tablet 1    lithium carbonate 300 mg capsule TAKE 1 CAPSULE BY MOUTH 2 TIMES A DAY WITH MEALS. 60 capsule 2    MAGNESIUM MALATE PO Take by mouth Take 1 tab. daily      metFORMIN (GLUCOPHAGE) 500 mg tablet TAKE 1 TABLET BY MOUTH TWICE A DAY WITH FOOD 60 tablet 5    methocarbamol (ROBAXIN) 500 mg tablet TAKE 1 TAB. EVERY 8 HR. AS NEEDED FOR MUSCLE SPASMS 60 tablet 0    mirtazapine (REMERON) 45 MG tablet TAKE 1 TABLET (45 MG TOTAL) BY MOUTH DAILY AT BEDTIME 30 tablet 2    propranolol (INDERAL) 40 mg tablet Take 0.5 tablets (20 mg total) by mouth daily at bedtime 45 tablet 3     No current facility-administered medications for this visit.        Allergies   Allergen Reactions    Cannabidiol Shortness Of Breath, Itching, Swelling, Anxiety, Palpitations, Confusion, Hypertension, Throat Swelling and Tongue Swelling    Amoxicillin-Pot Clavulanate Hives    Decadrol [Dexamethasone] Other (See Comments)     psychosis    Penicillins Hives     Hives/Uticaria    Tetracyclines & Related Hives      Allergy;        Review of Systems     Mood Anxiety and Depression   Behavior Normal    Thought Content Disturbing Thoughts, Feelings   General Emotional Problems and Decreased Functioning   Personality Normal   Other Psych Symptoms Normal   Constitutional Negative   ENT Negative   Cardiovascular Negative   Respiratory Negative   Gastrointestinal Negative   Genitourinary Negative   Musculoskeletal  Negative   Integumentary Negative   Neurological Negative   Endocrine Normal    Other Symptoms Normal        Laboratory Results: Recent Labs (last 12 months):   Appointment on 10/18/2024   Component Date Value    WBC 10/18/2024 11.76 (H)     RBC 10/18/2024 4.23     Hemoglobin 10/18/2024 13.4     Hematocrit 10/18/2024 41.2     MCV 10/18/2024 97     MCH 10/18/2024 31.7     MCHC 10/18/2024 32.5     RDW 10/18/2024 14.4     MPV 10/18/2024 9.1     Platelets 10/18/2024 297     nRBC 10/18/2024 0     Segmented % 10/18/2024 57     Immature Grans % 10/18/2024 1     Lymphocytes % 10/18/2024 35     Monocytes % 10/18/2024 6     Eosinophils Relative 10/18/2024 1     Basophils Relative 10/18/2024 0     Absolute Neutrophils 10/18/2024 6.61     Absolute Immature Grans 10/18/2024 0.06     Absolute Lymphocytes 10/18/2024 4.15     Absolute Monocytes 10/18/2024 0.73     Eosinophils Absolute 10/18/2024 0.16     Basophils Absolute 10/18/2024 0.05    Admission on 10/06/2024, Discharged on 10/06/2024   Component Date Value    Ventricular Rate 10/06/2024 104     Atrial Rate 10/06/2024 104     MD Interval 10/06/2024 144     QRSD Interval 10/06/2024 76     QT Interval 10/06/2024 338     QTC Interval 10/06/2024 444     P Axis 10/06/2024 36     QRS [place] 10/06/2024 75     T Wave [place] 10/06/2024 -28     WBC 10/06/2024 10.62 (H)     RBC 10/06/2024 4.58     Hemoglobin 10/06/2024 14.3     Hematocrit 10/06/2024 44.3     MCV 10/06/2024 97     MCH 10/06/2024 31.2     MCHC 10/06/2024 32.3     RDW 10/06/2024 13.6     MPV 10/06/2024 9.1     Platelets 10/06/2024 254     nRBC 10/06/2024 0     Segmented % 10/06/2024 62     Immature Grans % 10/06/2024 0     Lymphocytes % 10/06/2024 31     Monocytes % 10/06/2024 6     Eosinophils Relative 10/06/2024 1     Basophils Relative 10/06/2024 0     Absolute Neutrophils 10/06/2024 6.54     Absolute Immature Grans 10/06/2024 0.04     Absolute Lymphocytes 10/06/2024 3.29     Absolute Monocytes 10/06/2024 0.60      Eosinophils Absolute 10/06/2024 0.12     Basophils Absolute 10/06/2024 0.03     Sodium 10/06/2024 135     Potassium 10/06/2024 3.8     Chloride 10/06/2024 101     CO2 10/06/2024 25     ANION GAP 10/06/2024 9     BUN 10/06/2024 10     Creatinine 10/06/2024 0.75     Glucose 10/06/2024 178 (H)     Calcium 10/06/2024 10.4 (H)     AST 10/06/2024 29     ALT 10/06/2024 36     Alkaline Phosphatase 10/06/2024 56     Total Protein 10/06/2024 7.3     Albumin 10/06/2024 4.8     Total Bilirubin 10/06/2024 0.50     eGFR 10/06/2024 95     D-Dimer, Quant 10/06/2024 <0.27     hs TnI 0hr 10/06/2024 <2     EXT Preg Test, Ur 10/06/2024 Negative     Control 10/06/2024 Valid     hs TnI 2hr 10/06/2024 <2     Delta 2hr hsTnI 10/06/2024     Appointment on 09/07/2024   Component Date Value    WBC 09/07/2024 12.48 (H)     RBC 09/07/2024 4.38     Hemoglobin 09/07/2024 13.8     Hematocrit 09/07/2024 42.8     MCV 09/07/2024 98     MCH 09/07/2024 31.5     MCHC 09/07/2024 32.2     RDW 09/07/2024 14.3     MPV 09/07/2024 9.3     Platelets 09/07/2024 278     nRBC 09/07/2024 0     Segmented % 09/07/2024 68     Immature Grans % 09/07/2024 1     Lymphocytes % 09/07/2024 25     Monocytes % 09/07/2024 5     Eosinophils Relative 09/07/2024 1     Basophils Relative 09/07/2024 0     Absolute Neutrophils 09/07/2024 8.46 (H)     Absolute Immature Grans 09/07/2024 0.06     Absolute Lymphocytes 09/07/2024 3.14     Absolute Monocytes 09/07/2024 0.67     Eosinophils Absolute 09/07/2024 0.12     Basophils Absolute 09/07/2024 0.03    Appointment on 07/17/2024   Component Date Value    WBC 07/17/2024 11.65 (H)     RBC 07/17/2024 3.81     Hemoglobin 07/17/2024 12.3     Hematocrit 07/17/2024 36.9     MCV 07/17/2024 97     MCH 07/17/2024 32.3     MCHC 07/17/2024 33.3     RDW 07/17/2024 14.3     MPV 07/17/2024 9.2     Platelets 07/17/2024 259     nRBC 07/17/2024 0     Segmented % 07/17/2024 67     Immature Grans % 07/17/2024 0     Lymphocytes % 07/17/2024 27      Monocytes % 07/17/2024 5     Eosinophils Relative 07/17/2024 1     Basophils Relative 07/17/2024 0     Absolute Neutrophils 07/17/2024 7.63 (H)     Absolute Immature Grans 07/17/2024 0.05     Absolute Lymphocytes 07/17/2024 3.17     Absolute Monocytes 07/17/2024 0.60     Eosinophils Absolute 07/17/2024 0.16     Basophils Absolute 07/17/2024 0.04     TSH 3RD GENERATON 09/07/2024 3.287    Office Visit on 07/02/2024   Component Date Value    Sed Rate 07/02/2024 3     CRP 07/02/2024 6.3 (H)     SS-A (RO) Ab 07/02/2024 <0.2     SS-B (LA) Ab 07/02/2024 <0.2    Orders Only on 06/26/2024   Component Date Value    Right Eye Diabetic Retin* 06/26/2024 None     Left Eye Diabetic Retino* 06/26/2024 None    Appointment on 06/05/2024   Component Date Value    TSH 3RD GENERATON 06/05/2024 4.770 (H)     FSH 06/05/2024 10.2     LH 06/05/2024 5.4     Estradiol 06/05/2024 66.5     Free T4 06/05/2024 0.60 (L)    Annual Exam on 06/05/2024   Component Date Value    Case Report 06/05/2024                      Value:Gynecologic Cytology Report                       Case: [id number]                                  Authorizing Provider:  Sai Lopez DO      Collected:           06/05/2024 1315              Ordering Location:     [facility]        Received:            06/05/2024 1315                                     [facility]                                                    First Screen:          Ale A Ware, CT                                                       Specimen:    LIQUID-BASED PAP, SCREENING, Cervix, Endocervical                                          Primary Interpretation 06/05/2024 Negative for intraepithelial lesion or malignancy     Specimen Adequacy 06/05/2024 Satisfactory for evaluation. Endocervical/transformation zone component present.     Additional Information 06/05/2024                      Value:[employer]'s FDA approved ,  and ThinPrep Imaging Duo System are  utilized with strict adherence to the 's instruction manual to prepare gynecologic and non-gynecologic cytology specimens for the production of ThinPrep slides as well as for gynecologic ThinPrep imaging. These processes have been validated by our laboratory and/or by the .  The Pap test is not a diagnostic procedure and should not be used as the sole means to detect cervical cancer. It is only a screening procedure to aid in the detection of cervical cancer and its precursors. Both false-negative and false-positive results have been experienced. Your patient's test result should be interpreted in this context together with the history and clinical findings.      Gross Description 06/05/2024                      Value:20 ml , pale peach, cloudy received in a ThinPrep vial.     Appointment on 05/15/2024   Component Date Value    Lithium Lvl 05/15/2024 0.62     Sodium 05/15/2024 140     Potassium 05/15/2024 3.7     Chloride 05/15/2024 100     CO2 05/15/2024 30     ANION GAP 05/15/2024 10     BUN 05/15/2024 14     Creatinine 05/15/2024 0.88     Glucose, Fasting 05/15/2024 124 (H)     Calcium 05/15/2024 9.9     AST 05/15/2024 31     ALT 05/15/2024 43     Alkaline Phosphatase 05/15/2024 49     Total Protein 05/15/2024 7.0     Albumin 05/15/2024 4.7     Total Bilirubin 05/15/2024 0.37     eGFR 05/15/2024 79     Cholesterol 05/15/2024 156     Triglycerides 05/15/2024 142     HDL, Direct 05/15/2024 39 (L)     LDL Calculated 05/15/2024 89     Non-HDL-Chol (CHOL-HDL) 05/15/2024 117     Hemoglobin A1C 05/15/2024 6.0 (H)     EAG 05/15/2024 126     WBC 05/15/2024 16.06 (H)     RBC 05/15/2024 4.34     Hemoglobin 05/15/2024 13.8     Hematocrit 05/15/2024 41.5     MCV 05/15/2024 96     MCH 05/15/2024 31.8     MCHC 05/15/2024 33.3     RDW 05/15/2024 14.0     MPV 05/15/2024 9.1     Platelets 05/15/2024 296     nRBC 05/15/2024 0     Segmented % 05/15/2024 66     Immature Grans % 05/15/2024 1     Lymphocytes %  05/15/2024 29     Monocytes % 05/15/2024 4     Eosinophils Relative 05/15/2024 0     Basophils Relative 05/15/2024 0     Absolute Neutrophils 05/15/2024 10.50 (H)     Absolute Immature Grans 05/15/2024 0.12     Absolute Lymphocytes 05/15/2024 4.67 (H)     Absolute Monocytes 05/15/2024 0.67     Eosinophils Absolute 05/15/2024 0.04     Basophils Absolute 05/15/2024 0.06     Creatinine, Ur 05/15/2024 196.9     Albumin,U,Random 05/15/2024 9.4     Albumin Creat Ratio 05/15/2024 5    Appointment on 05/02/2024   Component Date Value    Lithium Lvl 05/02/2024 0.30 (L)    There may be more visits with results that are not included.         Substance Abuse History:  Social History     Substance and Sexual Activity   Drug Use Not Currently       Family Psychiatric History:   Family History   Problem Relation Age of Onset    Irregular heart beat Mother     Diabetes Father     Kidney disease Father     Substance Abuse Brother     No Known Problems Brother     Depression Paternal Aunt     Substance Abuse Maternal Uncle     Prostate cancer Maternal Uncle 60    Diabetes Maternal Grandfather     Migraines Maternal Grandfather     Stroke Maternal Grandfather     Diabetes Maternal Grandmother     Rheum arthritis Maternal Grandmother     Melanoma Maternal Grandmother 84    Cancer Maternal Grandmother         Skin Cancer - successfully removed    Stroke Maternal Grandmother     Diabetes Paternal Grandfather     Lung cancer Paternal Grandfather 47    Cancer Paternal Grandfather         Lung Cancer - cause of death    Kidney disease Paternal Grandmother     Rheum arthritis Paternal Grandmother     Depression Paternal Grandmother     Deep vein thrombosis Paternal Grandmother     Diabetes Paternal Grandmother     Alcohol abuse Family     Stomach cancer Family     Completed Suicide  Neg Hx        The following portions of the patient's history were reviewed and updated as appropriate: allergies, current medications, past family history, past  medical history, past social history, past surgical history, and problem list.    Social History     Socioeconomic History    Marital status: Single     Spouse name: Not on file    Number of children: 0    Years of education: 12 years     Highest education level: GED or equivalent   Occupational History    Occupation: unemployed   Tobacco Use    Smoking status: Never     Passive exposure: Never    Smokeless tobacco: Never    Tobacco comments:     N/A, non-smoker.   Vaping Use    Vaping status: Never Used   Substance and Sexual Activity    Alcohol use: Not Currently     Comment: 3 x year; sober since 2010    Drug use: Not Currently    Sexual activity: Not Currently     Birth control/protection: Abstinence   Other Topics Concern    Not on file   Social History Narrative    Caffeine use        What type of home do you live in: Single house    Age of your home: 46 yrs    How long have you been living there: 42 yrs    Type of heat: Baseboard    Type of fuel: Electric    What type of samir is in your bedroom: Carpet    Do you have the following in or near your home:    Air products: Window air conditioning and Ionic air purifier    Pests: Mice    Pets: Cat    Basement: None     Social Determinants of Health     Financial Resource Strain: High Risk (12/2/2020)    Overall Financial Resource Strain (CARDIA)     Difficulty of Paying Living Expenses: Hard   Food Insecurity: No Food Insecurity (10/16/2023)    Hunger Vital Sign     Worried About Running Out of Food in the Last Year: Never true     Ran Out of Food in the Last Year: Never true   Transportation Needs: No Transportation Needs (10/16/2023)    PRAPARE - Transportation     Lack of Transportation (Medical): No     Lack of Transportation (Non-Medical): No   Physical Activity: Insufficiently Active (10/12/2022)    Exercise Vital Sign     Days of Exercise per Week: 2 days     Minutes of Exercise per Session: 60 min   Stress: Stress Concern Present (4/3/2019)    Citizen of [place]  [employer] of Occupational Health - Occupational Stress Questionnaire     Feeling of Stress : To some extent   Social Connections: Moderately Integrated (4/3/2019)    Social Connection and Isolation Panel [NHANES]     Frequency of Communication with Friends and Family: More than three times a week     Frequency of Social Gatherings with Friends and Family: More than three times a week     Attends Caodaism Services: More than 4 times per year     Active Member of Clubs or Organizations: Yes     Attends Club or Organization Meetings: More than 4 times per year     Marital Status: Never    Intimate Partner Violence: Not At Risk (4/3/2019)    Humiliation, Afraid, Rape, and Kick questionnaire     Fear of Current or Ex-Partner: No     Emotionally Abused: No     Physically Abused: No     Sexually Abused: No   Housing Stability: Low Risk  (10/16/2023)    Housing Stability Vital Sign     Unable to Pay for Housing in the Last Year: No     Number of Times Moved in the Last Year: 1     Homeless in the Last Year: No     Social History     Social History Narrative    Caffeine use        What type of home do you live in: Single house    Age of your home: 46 yrs    How long have you been living there: 42 yrs    Type of heat: Baseboard    Type of fuel: Electric    What type of samir is in your bedroom: Carpet    Do you have the following in or near your home:    Air products: Window air conditioning and Ionic air purifier    Pests: Mice    Pets: Cat    Basement: None       Objective:       Mental status:  Appearance calm and cooperative , adequate hygiene and grooming, and good eye contact    Mood dysphoric   Affect affect was constricted   Speech a normal rate and fluent   Thought Processes coherent/organized and normal thought processes   Hallucinations no hallucinations present    Thought Content no delusions   Abnormal Thoughts no suicidal thoughts  and no homicidal thoughts    Orientation  oriented to person and place  and time   Remote Memory short term memory intact and long term memory intact   Attention Span concentration intact   Intellect Appears to be of Average Intelligence   Insight Limited insight   Judgement judgment was limited   Muscle Strength N/a   Language no difficulty naming common objects and no difficulty repeating a phrase    Fund of Knowledge displays adequate knowledge of current events, adequate fund of knowledge regarding past history, and adequate fund of knowledge regarding vocabulary                Assessment/Plan:       Diagnoses and all orders for this visit:    Major depressive disorder, recurrent severe without psychotic features (HCC)    Generalized anxiety disorder    High risk medication use  -     Lithium level; Future            Assessment & Plan  Major depressive disorder, recurrent severe without psychotic features (HCC)         Generalized anxiety disorder         High risk medication use    Orders:    Lithium level; Future           Treatment Recommendations- Risks Benefits      Immediate Medical/Psychiatric/Psychotherapy Treatments and Any Precautions: continue current treatment     Risks, Benefits And Possible Side Effects Of Medications:  {PSYCH RISK, BENEFITS AND POSSIBLE SIDE EFFECTS (Optional):90814    Controlled Medication Discussion: Discussed with patient Black Box warning on concurrent use of benzodiazepines and opioid medications including sedation, respiratory depression, coma and death. Patient understands the risk of treatment with benzodiazepines in addition to opioids and wants to continue taking those medications. , Discussed with patient the risks of sedation, respiratory depression, impairment of ability to drive and potential for abuse and addiction related to treatment with benzodiazepine medications. The patient understands risk of treatment with benzodiazepine medications, agrees to not drive if feels impaired and agrees to take medications as prescribed., and The  patient has been filling controlled prescriptions on time as prescribed to [place] Prescription Drug Monitoring program.      Psychotherapy Provided: No                      Visit Time    Visit Start Time: 3:30  Visit Stop Time: 3:50  Total Visit Duration:  20 minutes

## 2024-11-12 ENCOUNTER — TELEPHONE (OUTPATIENT)
Age: 44
End: 2024-11-12

## 2024-11-12 ENCOUNTER — TELEPHONE (OUTPATIENT)
Dept: PSYCHIATRY | Facility: CLINIC | Age: 44
End: 2024-11-12

## 2024-11-12 NOTE — TELEPHONE ENCOUNTER
Called and left message for patient to return a call to [phone] and schedule 6 week follow up with provider (Cristina Ovalle). Please schedule upon return call. Thank you.

## 2024-11-12 NOTE — TELEPHONE ENCOUNTER
Patient contacted the office to schedule a follow up visit with provider. Patient is now scheduled for 12/16  at 2:30 virtually.

## 2024-11-15 ENCOUNTER — TELEMEDICINE (OUTPATIENT)
Dept: BEHAVIORAL/MENTAL HEALTH CLINIC | Facility: CLINIC | Age: 44
End: 2024-11-15
Payer: COMMERCIAL

## 2024-11-15 DIAGNOSIS — F41.1 GENERALIZED ANXIETY DISORDER: ICD-10-CM

## 2024-11-15 DIAGNOSIS — F33.2 MAJOR DEPRESSIVE DISORDER, RECURRENT SEVERE WITHOUT PSYCHOTIC FEATURES (HCC): Primary | ICD-10-CM

## 2024-11-15 PROCEDURE — 90832 PSYTX W PT 30 MINUTES: CPT | Performed by: PSYCHIATRY & NEUROLOGY

## 2024-11-15 NOTE — PSYCH
Virtual Regular Visit    Verification of patient location:    Patient is located at Home in the following state in which I hold an active license [place]      Assessment/Plan:    Problem List Items Addressed This Visit          Behavioral Health    Generalized anxiety disorder    Major depressive disorder, recurrent severe without psychotic features (HCC) - Primary        Reason for visit is   Chief Complaint   Patient presents with    Virtual Regular Visit      Encounter provider HERMELINDA ALBA      Recent Visits  No visits were found meeting these conditions.  Showing recent visits within past 7 days and meeting all other requirements  Today's Visits  Date Type Provider Dept   11/15/24 Telemedicine HERMELINDA [facility] Therapist [place]   Showing today's visits and meeting all other requirements  Future Appointments  No visits were found meeting these conditions.  Showing future appointments within next 150 days and meeting all other requirements       The patient was identified by name and date of birth. Esther Griffith was informed that this is a telemedicine visit and that the visit is being conducted throughthe Epic Embedded platform. She agrees to proceed..  My office door was closed. No one else was in the room.  She acknowledged consent and understanding of privacy and security of the video platform. The patient has agreed to participate and understands they can discontinue the visit at any time.    Patient is aware this is a billable service.       HPI     Past Medical History:   Diagnosis Date    Alcoholism (HCC) 03/15/2001    Anxiety     Blood transfusion declined because patient is Confucianist 05/01/2023    Chronic pain disorder     Chronic sinusitis     Depression     Depression     Diabetes (HCC)     Diabetes mellitus (HCC) 09/01/2022    Fibromyalgia     Migraine     Obesity     Polyarthritis     Last assessed 9/21/2015    Psychiatric disorder     Psychiatric illness      Sleep difficulties     Substance abuse (HCC) 03/15/1994       Past Surgical History:   Procedure Laterality Date    COLONOSCOPY  06/2019    DENTAL SURGERY      HYSTERECTOMY  05/01/2023    HYSTEROSCOPY      Endometrial Biopsy By Hysteroscopy    PA LAPS SUPRACRV HYSTERECT 250 GM/< RMVL TUBE/OVAR N/A 05/01/2023    Procedure: (LSH) W/ BILATERAL SALPINGECTOMY, REMOVAL PARAOVARIAN CYST;  Surgeon: Sai Lopez DO;  Location: [facility];  Service: Gynecology    REMOVAL OF INTRAUTERINE DEVICE (IUD)      US GUIDED BREAST BIOPSY RIGHT COMPLETE Right 06/17/2019    Benign       Current Outpatient Medications   Medication Sig Dispense Refill    b complex vitamins capsule Take 1 capsule by mouth daily      Blood Glucose Monitoring Suppl (Contour Blood Glucose System) w/Device KIT Use 1 kit 2 (two) times a day before meals 1 kit 0    cholecalciferol (VITAMIN D3) 25 mcg (1,000 units) tablet Take 1,000 Units by mouth daily Take 1 tab. daily      Contour Test test strip USE TO CHECK BLOOD SUGAR ONCE DAILY 50 strip 7    ferrous sulfate 325 (65 Fe) mg tablet Take 325 mg by mouth Take 1 tab. 3 times per week      gabapentin (NEURONTIN) 300 mg capsule Take 1 capsule (300 mg total) by mouth 3 (three) times a day 90 capsule 5    Levomilnacipran HCl ER (FETZIMA) 80 MG extended release capsule Take 1 capsule (80 mg total) by mouth daily 30 capsule 2    levothyroxine 75 mcg tablet TAKE 1 TABLET (75 MCG TOTAL) BY MOUTH DAILY IN THE EARLY MORNING 90 tablet 1    lithium carbonate 300 mg capsule TAKE 1 CAPSULE BY MOUTH 2 TIMES A DAY WITH MEALS. 60 capsule 2    MAGNESIUM MALATE PO Take by mouth Take 1 tab. daily      metFORMIN (GLUCOPHAGE) 500 mg tablet TAKE 1 TABLET BY MOUTH TWICE A DAY WITH FOOD 60 tablet 5    methocarbamol (ROBAXIN) 500 mg tablet TAKE 1 TAB. EVERY 8 HR. AS NEEDED FOR MUSCLE SPASMS 60 tablet 0    mirtazapine (REMERON) 45 MG tablet TAKE 1 TABLET (45 MG TOTAL) BY MOUTH DAILY AT BEDTIME 30 tablet 2    propranolol (INDERAL) 40  "mg tablet Take 0.5 tablets (20 mg total) by mouth daily at bedtime 45 tablet 3     No current facility-administered medications for this visit.        Allergies   Allergen Reactions    Cannabidiol Shortness Of Breath, Itching, Swelling, Anxiety, Palpitations, Confusion, Hypertension, Throat Swelling and Tongue Swelling    Amoxicillin-Pot Clavulanate Hives    Decadrol [Dexamethasone] Other (See Comments)     psychosis    Penicillins Hives     Hives/Uticaria    Tetracyclines & Related Hives      Allergy;        Review of Systems    Video Exam    There were no vitals filed for this visit.    Physical Exam     Behavioral Health Psychotherapy Progress Note    Psychotherapy Provided: Individual Psychotherapy     1. Major depressive disorder, recurrent severe without psychotic features (HCC)        2. Generalized anxiety disorder            Goals addressed in session: Goal 1     DATA: Met with Esther for follow up. Esther shared that about a week ago, she was \"a mess,\" with her thoughts being all over the place and very down, and feeling so fatigued that she could barely function.  She said that she reached out to Dr Ovalle and was taken off her Xanax and dose of Fetzima was increased, and since then she feels much better.  She no longer feels as down, and her thoughts have been much easier to manage.  She reports still feeling very fatigued and having little motivation, but at least now she can push herself to do things.  She noted that she has been cat sitting for a friend for the past week and still has a few more days to go, and she has been lonely there because she is alone and does not have much to do.  She said that she has not been in contact with her friends as much, so this is contributing to her loneliness.  She reports feeling little anxiety and when she does feel anxious it is \"manageable.\" She also said that she is sleeping very well.  Her back pain of the past few months has improved after going to her " "chiropractor which she had put off for a long time.  With less pain and better sleep, she said that she feels this is helping her mood.  Provided support, validation of her feelings and experience, and used supportive and CBT strategies to help Esther take action of feelings of loneliness (connect with friends, call Balbina's mother, etc) and to boost mood further.    During this session, this clinician used the following therapeutic modalities: Client-centered Therapy, Cognitive Behavioral Therapy, and Supportive Psychotherapy    Substance Abuse was not addressed during this session. If the client is diagnosed with a co-occurring substance use disorder, please indicate any changes in the frequency or amount of use: n/a. Stage of change for addressing substance use diagnoses: No substance use/Not applicable    ASSESSMENT:  Esther Griffith presents with a Euthymic/ normal mood.     her affect is Normal range and intensity, which is congruent, with her mood and the content of the session. The client has made progress on their goals.     Esther Griffith presents with a minimal risk of suicide, minimal risk of self-harm, and minimal risk of harm to others.    For any risk assessment that surpasses a \"low\" rating, a safety plan must be developed.    A safety plan was indicated: no  If yes, describe in detail n/a    PLAN: Between sessions, Esther Griffith will focus on self-care and participating in activities that she enjoys (concert, karaoke with friends, projects at home) to help boost mood and maintain progress. At the next session, the therapist will use Client-centered Therapy, Cognitive Behavioral Therapy, and Supportive Psychotherapy to address anxiety, depression.    Behavioral Health Treatment Plan and Discharge Planning: Esther Griffith is aware of and agrees to continue to work on their treatment plan. They have identified and are working toward their discharge goals. yes    Visit start and stop times:    11/15/24  Start " Time: 1300  Stop Time: 1335  Total Visit Time: 35 minutes

## 2024-11-20 DIAGNOSIS — M79.18 MYOFASCIAL PAIN: ICD-10-CM

## 2024-11-20 RX ORDER — METHOCARBAMOL 500 MG/1
TABLET, FILM COATED ORAL
Qty: 60 TABLET | Refills: 0 | Status: SHIPPED | OUTPATIENT
Start: 2024-11-20

## 2024-11-29 ENCOUNTER — TELEMEDICINE (OUTPATIENT)
Dept: BEHAVIORAL/MENTAL HEALTH CLINIC | Facility: CLINIC | Age: 44
End: 2024-11-29
Payer: COMMERCIAL

## 2024-11-29 DIAGNOSIS — F41.1 GENERALIZED ANXIETY DISORDER: ICD-10-CM

## 2024-11-29 DIAGNOSIS — F33.2 MAJOR DEPRESSIVE DISORDER, RECURRENT SEVERE WITHOUT PSYCHOTIC FEATURES (HCC): Primary | ICD-10-CM

## 2024-11-29 PROCEDURE — 90832 PSYTX W PT 30 MINUTES: CPT | Performed by: PSYCHIATRY & NEUROLOGY

## 2024-11-29 NOTE — PSYCH
Virtual Regular Visit    Verification of patient location:    Patient is located at Home in the following state in which I hold an active license [place]      Assessment/Plan:    Problem List Items Addressed This Visit          Behavioral Health    Generalized anxiety disorder    Major depressive disorder, recurrent severe without psychotic features (HCC) - Primary        Reason for visit is   Chief Complaint   Patient presents with    Virtual Regular Visit        Encounter provider HERMELINDA ALBA      Recent Visits  No visits were found meeting these conditions.  Showing recent visits within past 7 days and meeting all other requirements  Today's Visits  Date Type Provider Dept   11/29/24 Telemedicine HERMELINDA [facility] Therapist [place]   Showing today's visits and meeting all other requirements  Future Appointments  No visits were found meeting these conditions.  Showing future appointments within next 150 days and meeting all other requirements       The patient was identified by name and date of birth. Esther Griffith was informed that this is a telemedicine visit and that the visit is being conducted throughthe Epic Embedded platform. She agrees to proceed..  My office door was closed. No one else was in the room.  She acknowledged consent and understanding of privacy and security of the video platform. The patient has agreed to participate and understands they can discontinue the visit at any time.    Patient is aware this is a billable service.       HPI     Past Medical History:   Diagnosis Date    Alcoholism (HCC) 03/15/2001    Anxiety     Blood transfusion declined because patient is Sabianist 05/01/2023    Chronic pain disorder     Chronic sinusitis     Depression     Depression     Diabetes (HCC)     Diabetes mellitus (HCC) 09/01/2022    Fibromyalgia     Migraine     Obesity     Polyarthritis     Last assessed 9/21/2015    Psychiatric disorder     Psychiatric illness      Sleep difficulties     Substance abuse (HCC) 03/15/1994       Past Surgical History:   Procedure Laterality Date    COLONOSCOPY  06/2019    DENTAL SURGERY      HYSTERECTOMY  05/01/2023    HYSTEROSCOPY      Endometrial Biopsy By Hysteroscopy    PA LAPS SUPRACRV HYSTERECT 250 GM/< RMVL TUBE/OVAR N/A 05/01/2023    Procedure: (LSH) W/ BILATERAL SALPINGECTOMY, REMOVAL PARAOVARIAN CYST;  Surgeon: Sai Lopez DO;  Location: [facility];  Service: Gynecology    REMOVAL OF INTRAUTERINE DEVICE (IUD)      US GUIDED BREAST BIOPSY RIGHT COMPLETE Right 06/17/2019    Benign       Current Outpatient Medications   Medication Sig Dispense Refill    b complex vitamins capsule Take 1 capsule by mouth daily      Blood Glucose Monitoring Suppl (Contour Blood Glucose System) w/Device KIT Use 1 kit 2 (two) times a day before meals 1 kit 0    cholecalciferol (VITAMIN D3) 25 mcg (1,000 units) tablet Take 1,000 Units by mouth daily Take 1 tab. daily      Contour Test test strip USE TO CHECK BLOOD SUGAR ONCE DAILY 50 strip 7    ferrous sulfate 325 (65 Fe) mg tablet Take 325 mg by mouth Take 1 tab. 3 times per week      gabapentin (NEURONTIN) 300 mg capsule Take 1 capsule (300 mg total) by mouth 3 (three) times a day 90 capsule 5    Levomilnacipran HCl ER (FETZIMA) 80 MG extended release capsule Take 1 capsule (80 mg total) by mouth daily 30 capsule 2    levothyroxine 75 mcg tablet TAKE 1 TABLET (75 MCG TOTAL) BY MOUTH DAILY IN THE EARLY MORNING 90 tablet 1    lithium carbonate 300 mg capsule TAKE 1 CAPSULE BY MOUTH 2 TIMES A DAY WITH MEALS. 60 capsule 2    MAGNESIUM MALATE PO Take by mouth Take 1 tab. daily      metFORMIN (GLUCOPHAGE) 500 mg tablet TAKE 1 TABLET BY MOUTH TWICE A DAY WITH FOOD 60 tablet 5    methocarbamol (ROBAXIN) 500 mg tablet TAKE 1 TAB. EVERY 8 HR. AS NEEDED FOR MUSCLE SPASMS 60 tablet 0    mirtazapine (REMERON) 45 MG tablet TAKE 1 TABLET (45 MG TOTAL) BY MOUTH DAILY AT BEDTIME 30 tablet 2    propranolol (INDERAL) 40  "mg tablet Take 0.5 tablets (20 mg total) by mouth daily at bedtime 45 tablet 3     No current facility-administered medications for this visit.        Allergies   Allergen Reactions    Cannabidiol Shortness Of Breath, Itching, Swelling, Anxiety, Palpitations, Confusion, Hypertension, Throat Swelling and Tongue Swelling    Amoxicillin-Pot Clavulanate Hives    Decadrol [Dexamethasone] Other (See Comments)     psychosis    Penicillins Hives     Hives/Uticaria    Tetracyclines & Related Hives      Allergy;        Review of Systems    Video Exam    There were no vitals filed for this visit.    Physical Exam     Behavioral Health Psychotherapy Progress Note    Psychotherapy Provided: Individual Psychotherapy     1. Major depressive disorder, recurrent severe without psychotic features (HCC)        2. Generalized anxiety disorder            Goals addressed in session: Goal 1     DATA: Met with Esther for follow up. Esther shared that she has been sick for the past 10 days, so has not been doing much of anything at all.  She shared that her mood has been relatively stable, with her depression being \"medium\" right now.  She said that she has had a little anxiety, worrying about her brother who is about to lose his car, and how that will impact the rest of the family.  However, she said that her pain has been at a manageable level recently, so she is sleeping better and therefore feels better during the day.  She also reported feeling excited about some upcoming events, particularly social gatherings with friends and hosting a karaoke party at her home.  Esther presented as much more animated today, with a brighter affect than previous session.  Provided support, and used CBT and mindfulness techniques to help Esther focus on positive progress while balancing need for rest.      During this session, this clinician used the following therapeutic modalities: Client-centered Therapy and Cognitive Behavioral " "Therapy    Substance Abuse was not addressed during this session. If the client is diagnosed with a co-occurring substance use disorder, please indicate any changes in the frequency or amount of use: n/a. Stage of change for addressing substance use diagnoses: No substance use/Not applicable    ASSESSMENT:  Esther Grififth presents with a Euthymic/ normal mood.     her affect is Normal range and intensity, which is congruent, with her mood and the content of the session. The client has made progress on their goals.     Esther Griffith presents with a minimal risk of suicide, minimal risk of self-harm, and minimal risk of harm to others.    For any risk assessment that surpasses a \"low\" rating, a safety plan must be developed.    A safety plan was indicated: no  If yes, describe in detail n/a    PLAN: Between sessions, Esther Griffith will focus on mindfulness and thought reframing to help manage anxiety and depression symptoms. At the next session, the therapist will use Client-centered Therapy, Cognitive Behavioral Therapy, and Supportive Psychotherapy to address depression, anxiety.    Behavioral Health Treatment Plan and Discharge Planning: Esther Griffith is aware of and agrees to continue to work on their treatment plan. They have identified and are working toward their discharge goals. yes    Visit start and stop times:    11/29/24  Start Time: 1402  Stop Time: 1426  Total Visit Time: 24 minutes      "

## 2024-12-03 ENCOUNTER — OFFICE VISIT (OUTPATIENT)
Dept: FAMILY MEDICINE CLINIC | Facility: CLINIC | Age: 44
End: 2024-12-03
Payer: COMMERCIAL

## 2024-12-03 VITALS
HEIGHT: 61 IN | TEMPERATURE: 98 F | RESPIRATION RATE: 18 BRPM | OXYGEN SATURATION: 98 % | DIASTOLIC BLOOD PRESSURE: 74 MMHG | BODY MASS INDEX: 34.32 KG/M2 | SYSTOLIC BLOOD PRESSURE: 114 MMHG | WEIGHT: 181.8 LBS | HEART RATE: 97 BPM

## 2024-12-03 DIAGNOSIS — E11.42 DIABETIC POLYNEUROPATHY ASSOCIATED WITH TYPE 2 DIABETES MELLITUS (HCC): ICD-10-CM

## 2024-12-03 DIAGNOSIS — M79.7 FIBROMYALGIA: ICD-10-CM

## 2024-12-03 DIAGNOSIS — Z12.31 ENCOUNTER FOR SCREENING MAMMOGRAM FOR MALIGNANT NEOPLASM OF BREAST: ICD-10-CM

## 2024-12-03 DIAGNOSIS — E03.9 ACQUIRED HYPOTHYROIDISM: ICD-10-CM

## 2024-12-03 DIAGNOSIS — E78.5 DYSLIPIDEMIA: ICD-10-CM

## 2024-12-03 DIAGNOSIS — M47.816 LUMBAR SPONDYLOSIS: ICD-10-CM

## 2024-12-03 DIAGNOSIS — E11.65 TYPE 2 DIABETES MELLITUS WITH HYPERGLYCEMIA, WITHOUT LONG-TERM CURRENT USE OF INSULIN (HCC): Primary | ICD-10-CM

## 2024-12-03 DIAGNOSIS — R61 EXCESSIVE SWEATING: ICD-10-CM

## 2024-12-03 DIAGNOSIS — Z00.00 WELL ADULT EXAM: ICD-10-CM

## 2024-12-03 DIAGNOSIS — F41.1 GENERALIZED ANXIETY DISORDER: ICD-10-CM

## 2024-12-03 LAB — SL AMB POCT HEMOGLOBIN AIC: 6.3 (ref ?–6.5)

## 2024-12-03 PROCEDURE — 83036 HEMOGLOBIN GLYCOSYLATED A1C: CPT | Performed by: FAMILY MEDICINE

## 2024-12-03 PROCEDURE — 99396 PREV VISIT EST AGE 40-64: CPT | Performed by: FAMILY MEDICINE

## 2024-12-03 PROCEDURE — 99213 OFFICE O/P EST LOW 20 MIN: CPT | Performed by: FAMILY MEDICINE

## 2024-12-03 NOTE — ASSESSMENT & PLAN NOTE
Patient reports improvements in low back pain.    Recommended home stretching exercises.    Continue Gabapentin, Methocarbamol 500 mg PRN.

## 2024-12-03 NOTE — ASSESSMENT & PLAN NOTE
Lipid panel improved.  Patient does not take statin.    Follow a low-cholesterol, low-fat diet, regular exercise.    Check labs.    Orders:    Comprehensive metabolic panel; Future    Lipid panel; Future

## 2024-12-03 NOTE — ASSESSMENT & PLAN NOTE
Lab Results   Component Value Date    HGBA1C 6.0 (H) 05/15/2024     Hb A1C at goal.  Continue Metformin 500 mg 1 tablet twice daily with meals.      Check eye exam by ophthalmology annually.    Follow-up with podiatry Dr. Werner for routine foot care.  Orders:    POCT hemoglobin A1c    Comprehensive metabolic panel; Future    Albumin / creatinine urine ratio; Future

## 2024-12-03 NOTE — ASSESSMENT & PLAN NOTE
Lab Results   Component Value Date    HGBA1C 6.0 (H) 05/15/2024     Continue Gabapentin.    Follow-up with podiatry.

## 2024-12-03 NOTE — PROGRESS NOTES
Adult Annual Physical  Name: Esther Griffith      : 1980      MRN: [id number]  Encounter Provider: Rosa Lutz MD  Encounter Date: 12/3/2024   Encounter department: [facility]    Assessment & Plan  Type 2 diabetes mellitus with hyperglycemia, without long-term current use of insulin (HCC)    Lab Results   Component Value Date    HGBA1C 6.0 (H) 05/15/2024     Hb A1C at goal.  Continue Metformin 500 mg 1 tablet twice daily with meals.      Check eye exam by ophthalmology annually.    Follow-up with podiatry Dr. Werner for routine foot care.  Orders:    POCT hemoglobin A1c    Comprehensive metabolic panel; Future    Albumin / creatinine urine ratio; Future    Diabetic polyneuropathy associated with type 2 diabetes mellitus (HCC)    Lab Results   Component Value Date    HGBA1C 6.0 (H) 05/15/2024     Continue Gabapentin.    Follow-up with podiatry.       Acquired hypothyroidism  Continue Levothyroxine 75 mcg daily.    Orders:    TSH, 3rd generation with Free T4 reflex; Future    Ambulatory Referral to Endocrinology; Future    Lumbar spondylosis  Patient reports improvements in low back pain.    Recommended home stretching exercises.    Continue Gabapentin, Methocarbamol 500 mg PRN.         Generalized anxiety disorder  Symptoms improved.  Continue current medical therapy as per psychiatry Dr. Ovalle.    Continue psychotherapy every 2 weeks.       Fibromyalgia  Encouraged regular exercise.       Dyslipidemia  Lipid panel improved.  Patient does not take statin.    Follow a low-cholesterol, low-fat diet, regular exercise.    Check labs.    Orders:    Comprehensive metabolic panel; Future    Lipid panel; Future    Well adult exam         Encounter for screening mammogram for malignant neoplasm of breast    Orders:    Mammo screening bilateral w 3d and cad; Future    Excessive sweating    Orders:    Ambulatory Referral to Endocrinology; Future    Immunizations and preventive care  screenings were discussed with patient today. Appropriate education was printed on patient's after visit summary.    Counseling:  Alcohol/drug use: discussed moderation in alcohol intake, the recommendations for healthy alcohol use, and avoidance of illicit drug use.  Dental Health: discussed importance of regular tooth brushing, flossing, and dental visits.  Injury prevention: discussed safety/seat belts, safety helmets, smoke detectors, carbon monoxide detectors, and smoking near bedding or upholstery.  Exercise: the importance of regular exercise/physical activity was discussed. Recommend exercise 3-5 times per week for at least 30 minutes.            Schedule follow-up visit in 6 months.    History of Present Illness     Patient presents for 6 month follow-up visit, physical exam.    PMHx: Type 2 DM, peripheral neuropathy, Dyslipidemia, Obesity, Hypothyroidism, migraine  headaches, Fibromyalgia, CAROL, Depression, chronic low back pain, degenerative spondylosis, OA, Vit D deficiency.      Reviewed all current medications, last blood test results from October 2024.    Type 2 DM - currently taking Metformin 500 mg 1 tablet twice daily with meals.    Reports no hypoglycemic episodes.      Patient is seeing podiatrist Dr. Werner with [facility].  C/o numbness in feet.  Patient tries to follow a healthy diet    Obesity - lost 8 pounds since July 2024.  Reports improvement in chronic low back pain.    CAROL/Depression - followed by psychiatry Dr. Ovalle every 3 months.   Continues to attend psychotherapy every 2 weeks.    C/o excessive sweating for the last 2 years.  Denies heart palpitations.    Migraine headaches - followed by [facility] neurology.  Doing well on Inderal 20 mg daily.    Patient had normal mammogram in January 2024.      Pelvic exam and Pap smear done by gynecology Dr. Lopez in June 2024.    Pap smear was negative.    Denies family history of colon cancer, breast cancer.  Patient had  hysterectomy for endometrial hyperplasia in May 2023.  Pathology was negative for malignancy.    Colonoscopy a.m. done in March 2023, 1 polyp was removed.    Dr. Garcia recommended repeat colonoscopy in 10 years.      Adult Annual Physical:  Patient presents for annual physical.     Diet and Physical Activity:  - Diet/Nutrition: low calorie diet, low carb diet and consuming 3-5 servings of fruits/vegetables daily.  - Exercise: walking and 1-2 times a week on average.    General Health:  - Sleep: sleeps well.  - Hearing: normal hearing bilateral ears.  - Vision: no vision problems.  - Dental: no dental visits for > 1 year.    /GYN Health:  - Follows with GYN: yes.   - Menopause: perimenopausal.   - Contraception:. Hysterectomy in 2023      Advanced Care Planning:  - Has an advanced directive?: yes    - Has a durable medical POA?: yes        Review of Systems   Constitutional:  Positive for fatigue (mild). Negative for activity change, appetite change, chills and fever.   HENT:  Negative for congestion, ear pain, sore throat and trouble swallowing.    Eyes: Negative.    Respiratory:  Negative for cough and shortness of breath.    Cardiovascular:  Negative for chest pain, palpitations and leg swelling.   Gastrointestinal:  Positive for constipation (occsional). Negative for abdominal pain, blood in stool, diarrhea, nausea and vomiting.   Genitourinary:  Negative for difficulty urinating, dysuria, hematuria, pelvic pain and vaginal discharge.   Musculoskeletal:  Positive for back pain (improved). Negative for arthralgias, gait problem and joint swelling.   Skin:  Negative for rash.   Neurological:  Negative for dizziness, syncope, numbness (in feet) and headaches.   Hematological: Negative.    Psychiatric/Behavioral:  Positive for sleep disturbance (improved).         Anxiety /Depression - symptoms improved         Objective     /74 (BP Location: Right arm, Patient Position: Sitting, Cuff Size: Large)   Pulse 97  "  Temp 98 °F (36.7 °C) (Tympanic)   Resp 18   Ht 5' 1\" (1.549 m)   Wt 82.5 kg (181 lb 12.8 oz)   LMP 03/13/2023 (Approximate)   SpO2 98%   BMI 34.35 kg/m²     Physical Exam  Vitals and nursing note reviewed.   Constitutional:       Appearance: Normal appearance. She is obese.   HENT:      Head: Normocephalic and atraumatic.      Right Ear: Tympanic membrane normal.      Left Ear: Tympanic membrane normal.   Eyes:      Conjunctiva/sclera: Conjunctivae normal.      Pupils: Pupils are equal, round, and reactive to light.   Neck:      Vascular: No carotid bruit.   Cardiovascular:      Rate and Rhythm: Regular rhythm. Tachycardia present.      Pulses: no weak pulses.           Dorsalis pedis pulses are 2+ on the right side and 2+ on the left side.      Heart sounds: No murmur heard.  Pulmonary:      Effort: Pulmonary effort is normal.      Breath sounds: Normal breath sounds.   Abdominal:      General: Bowel sounds are normal. There is no distension.      Palpations: Abdomen is soft.      Tenderness: There is no abdominal tenderness.   Musculoskeletal:         General: No swelling. Normal range of motion.      Cervical back: Normal range of motion and neck supple.      Right lower leg: No edema.      Left lower leg: No edema.   Feet:      Right foot:      Skin integrity: No ulcer, skin breakdown, erythema, warmth, callus or dry skin.      Left foot:      Skin integrity: No ulcer, skin breakdown, erythema, warmth, callus or dry skin.   Skin:     General: Skin is warm and dry.      Findings: No rash.   Neurological:      General: No focal deficit present.      Mental Status: She is alert.      Motor: No weakness.      Gait: Gait normal.   Psychiatric:         Mood and Affect: Mood normal.       Patient's shoes and socks removed.    Right Foot/Ankle   Right Foot Inspection  Skin Exam: skin normal and skin intact. No dry skin, no warmth, no callus, no erythema, no maceration, no abnormal color, no pre-ulcer, no ulcer " and no callus.     Toe Exam: No swelling, no tenderness, erythema and  no right toe deformity    Sensory   Monofilament testing: diminished    Vascular  The right DP pulse is 2+.     Left Foot/Ankle  Left Foot Inspection  Skin Exam: skin normal and skin intact. No dry skin, no warmth, no erythema, no maceration, normal color, no pre-ulcer, no ulcer and no callus.     Toe Exam: No swelling, no tenderness, no erythema and no left toe deformity.     Sensory   Monofilament testing: diminished    Vascular  The left DP pulse is 2+.     Assign Risk Category  No deformity present  Loss of protective sensation  No weak pulses  Risk: 1

## 2024-12-03 NOTE — ASSESSMENT & PLAN NOTE
Symptoms improved.  Continue current medical therapy as per psychiatry Dr. Ovalle.    Continue psychotherapy every 2 weeks.

## 2024-12-04 ENCOUNTER — APPOINTMENT (EMERGENCY)
Dept: RADIOLOGY | Facility: HOSPITAL | Age: 44
End: 2024-12-04
Payer: COMMERCIAL

## 2024-12-04 ENCOUNTER — HOSPITAL ENCOUNTER (EMERGENCY)
Facility: HOSPITAL | Age: 44
Discharge: HOME | End: 2024-12-04
Attending: EMERGENCY MEDICINE
Payer: COMMERCIAL

## 2024-12-04 VITALS
TEMPERATURE: 98.3 F | HEIGHT: 61 IN | SYSTOLIC BLOOD PRESSURE: 126 MMHG | DIASTOLIC BLOOD PRESSURE: 76 MMHG | BODY MASS INDEX: 33.99 KG/M2 | HEART RATE: 78 BPM | OXYGEN SATURATION: 99 % | WEIGHT: 180 LBS | RESPIRATION RATE: 17 BRPM

## 2024-12-04 DIAGNOSIS — R07.9 CHEST PAIN: Primary | ICD-10-CM

## 2024-12-04 LAB
ALBUMIN SERPL BCG-MCNC: 4.8 G/DL (ref 3.5–5)
ALP SERPL-CCNC: 54 U/L (ref 34–104)
ALT SERPL W P-5'-P-CCNC: 35 U/L (ref 7–52)
ANION GAP SERPL CALCULATED.3IONS-SCNC: 8 MMOL/L (ref 4–13)
AST SERPL W P-5'-P-CCNC: 34 U/L (ref 13–39)
ATRIAL RATE: 83 BPM
ATRIAL RATE: 91 BPM
BASOPHILS # BLD AUTO: 0.03 THOUSANDS/ÂΜL (ref 0–0.1)
BASOPHILS NFR BLD AUTO: 0 % (ref 0–1)
BILIRUB SERPL-MCNC: 0.47 MG/DL (ref 0.2–1)
BUN SERPL-MCNC: 12 MG/DL (ref 5–25)
CALCIUM SERPL-MCNC: 9.5 MG/DL (ref 8.4–10.2)
CARDIAC TROPONIN I PNL SERPL HS: <2 NG/L (ref ?–50)
CARDIAC TROPONIN I PNL SERPL HS: <2 NG/L (ref ?–50)
CHLORIDE SERPL-SCNC: 103 MMOL/L (ref 96–108)
CO2 SERPL-SCNC: 27 MMOL/L (ref 21–32)
CREAT SERPL-MCNC: 0.8 MG/DL (ref 0.6–1.3)
EOSINOPHIL # BLD AUTO: 0.12 THOUSAND/ÂΜL (ref 0–0.61)
EOSINOPHIL NFR BLD AUTO: 2 % (ref 0–6)
ERYTHROCYTE [DISTWIDTH] IN BLOOD BY AUTOMATED COUNT: 13.2 % (ref 11.6–15.1)
GFR SERPL CREATININE-BSD FRML MDRD: 88 ML/MIN/1.73SQ M
GLUCOSE SERPL-MCNC: 160 MG/DL (ref 65–140)
HCT VFR BLD AUTO: 43.2 % (ref 34.8–46.1)
HGB BLD-MCNC: 14.3 G/DL (ref 11.5–15.4)
IMM GRANULOCYTES # BLD AUTO: 0.02 THOUSAND/UL (ref 0–0.2)
IMM GRANULOCYTES NFR BLD AUTO: 0 % (ref 0–2)
LYMPHOCYTES # BLD AUTO: 2.63 THOUSANDS/ÂΜL (ref 0.6–4.47)
LYMPHOCYTES NFR BLD AUTO: 34 % (ref 14–44)
MCH RBC QN AUTO: 31.5 PG (ref 26.8–34.3)
MCHC RBC AUTO-ENTMCNC: 33.1 G/DL (ref 31.4–37.4)
MCV RBC AUTO: 95 FL (ref 82–98)
MONOCYTES # BLD AUTO: 0.38 THOUSAND/ÂΜL (ref 0.17–1.22)
MONOCYTES NFR BLD AUTO: 5 % (ref 4–12)
NEUTROPHILS # BLD AUTO: 4.51 THOUSANDS/ÂΜL (ref 1.85–7.62)
NEUTS SEG NFR BLD AUTO: 59 % (ref 43–75)
NRBC BLD AUTO-RTO: 0 /100 WBCS
P AXIS: 17 DEGREES
P AXIS: 36 DEGREES
PLATELET # BLD AUTO: 256 THOUSANDS/UL (ref 149–390)
PMV BLD AUTO: 9 FL (ref 8.9–12.7)
POTASSIUM SERPL-SCNC: 3.6 MMOL/L (ref 3.5–5.3)
PR INTERVAL: 140 MS
PR INTERVAL: 140 MS
PROT SERPL-MCNC: 7.1 G/DL (ref 6.4–8.4)
QRS AXIS: 39 DEGREES
QRS AXIS: 4 DEGREES
QRSD INTERVAL: 70 MS
QRSD INTERVAL: 78 MS
QT INTERVAL: 338 MS
QT INTERVAL: 362 MS
QTC INTERVAL: 416 MS
QTC INTERVAL: 425 MS
RBC # BLD AUTO: 4.54 MILLION/UL (ref 3.81–5.12)
SODIUM SERPL-SCNC: 138 MMOL/L (ref 135–147)
T WAVE AXIS: -19 DEGREES
T WAVE AXIS: -32 DEGREES
VENTRICULAR RATE: 83 BPM
VENTRICULAR RATE: 91 BPM
WBC # BLD AUTO: 7.69 THOUSAND/UL (ref 4.31–10.16)

## 2024-12-04 PROCEDURE — 84484 ASSAY OF TROPONIN QUANT: CPT | Performed by: EMERGENCY MEDICINE

## 2024-12-04 PROCEDURE — 80053 COMPREHEN METABOLIC PANEL: CPT | Performed by: EMERGENCY MEDICINE

## 2024-12-04 PROCEDURE — 93005 ELECTROCARDIOGRAM TRACING: CPT

## 2024-12-04 PROCEDURE — 85025 COMPLETE CBC W/AUTO DIFF WBC: CPT | Performed by: EMERGENCY MEDICINE

## 2024-12-04 PROCEDURE — 99285 EMERGENCY DEPT VISIT HI MDM: CPT | Performed by: EMERGENCY MEDICINE

## 2024-12-04 PROCEDURE — 71045 X-RAY EXAM CHEST 1 VIEW: CPT

## 2024-12-04 PROCEDURE — 36415 COLL VENOUS BLD VENIPUNCTURE: CPT

## 2024-12-04 PROCEDURE — 93010 ELECTROCARDIOGRAM REPORT: CPT | Performed by: STUDENT IN AN ORGANIZED HEALTH CARE EDUCATION/TRAINING PROGRAM

## 2024-12-04 PROCEDURE — 99285 EMERGENCY DEPT VISIT HI MDM: CPT

## 2024-12-05 NOTE — ED PROVIDER NOTES
Time reflects when diagnosis was documented in both MDM as applicable and the Disposition within this note       Time User Action Codes Description Comment    12/4/2024  5:03 PM DaveHelenCathryn M Add [R07.9] Chest pain           ED Disposition       ED Disposition   Discharge    Condition   Stable    Date/Time   Wed Dec 4, 2024  5:03 PM    Comment   Esther Griffith discharge to home/self care.                   Assessment & Plan       Medical Decision Making  Patient with chest pain today. Resolved in ED. Troponin is negative x 2. EKG unchanged from baseline. Heart score is 3. Patient to f/u with pcp as outpatient.    Amount and/or Complexity of Data Reviewed  External Data Reviewed: ECG.     Details: See eD course.  Labs: ordered. Decision-making details documented in ED Course.  Radiology: ordered and independent interpretation performed.  ECG/medicine tests: ordered and independent interpretation performed.     Details: See ed course.        ED Course as of 12/04/24 2217   Wed Dec 04, 2024   1505 Repeat EKG reviewed by me. NSR. Isolated inverted T wave in III. This has been present on EKGs dating back to 10/23.   1523 Initial troponin is negative. Will perform 2 hour troponin.   1633 Troponin negative x 2.    1656 Repeat EKG unchanged from prior today and prior in 10/23. T wave inversion lead III. No ischemic changes.   1709 Patient is pain free. Feels well. IV pulled by me.        Medications - No data to display    ED Risk Strat Scores   HEART Risk Score      Flowsheet Row Most Recent Value   Heart Score Risk Calculator    History 0 Filed at: 12/04/2024 1525   ECG 1 Filed at: 12/04/2024 1525   Age 1 Filed at: 12/04/2024 1525   Risk Factors 1 Filed at: 12/04/2024 1525   Troponin 0 Filed at: 12/04/2024 1525   HEART Score 3 Filed at: 12/04/2024 1525                           PERC Rule for PE      Flowsheet Row Most Recent Value   PERC Rule for PE    Age >=50 0 Filed at: 12/04/2024 1534   HR >=100 0 Filed at:  12/04/2024 1534   O2 Sat on room air < 95% 0 Filed at: 12/04/2024 1534   History of PE or DVT 0 Filed at: 12/04/2024 1534   Recent trauma or surgery 0 Filed at: 12/04/2024 1534   Hemoptysis 0 Filed at: 12/04/2024 1534   Exogenous estrogen 0 Filed at: 12/04/2024 1534   Unilateral leg swelling 0 Filed at: 12/04/2024 1534   PERC Rule for PE Results 0 Filed at: 12/04/2024 1534            SBIRT 20yo+      Flowsheet Row Most Recent Value   Initial Alcohol Screen: US AUDIT-C     1. How often do you have a drink containing alcohol? 0 Filed at: 12/04/2024 8724   2. How many drinks containing alcohol do you have on a typical day you are drinking?  0 Filed at: 12/04/2024 2734   3b. FEMALE Any Age, or MALE 65+: How often do you have 4 or more drinks on one occassion? 0 Filed at: 12/04/2024 1334   Audit-C Score 0 Filed at: 12/04/2024 1338   NISHANT: How many times in the past year have you...    Used an illegal drug or used a prescription medication for non-medical reasons? Never Filed at: 12/04/2024 9023                            History of Present Illness       Chief Complaint   Patient presents with    Chest Pain     Per pt she 30 minutes ago pt started to have crushing chest pain with radiating to shoulder blades, also diaphoretic and nauseous.        Past Medical History:   Diagnosis Date    Alcoholism (Formerly [facility]) 03/15/2001    Anxiety     Blood transfusion declined because patient is [facility] 05/01/2023    Chronic pain disorder     Chronic sinusitis     Depression     Depression     Diabetes (Former[facility])     Diabetes mellitus (Former[facility]) 09/01/2022    Fibromyalgia     Migraine     Obesity     Polyarthritis     Last assessed 9/21/2015    Psychiatric disorder     Psychiatric illness     Sleep difficulties     Substance abuse (Formerly [facility]) 03/15/1994      Past Surgical History:   Procedure Laterality Date    COLONOSCOPY  06/2019    DENTAL SURGERY      HYSTERECTOMY  05/01/2023    HYSTEROSCOPY      Endometrial Biopsy By Hysteroscopy    KY LAPS  SUPRACRV HYSTERECT 250 GM/< RMVL TUBE/OVAR N/A 05/01/2023    Procedure: (LSH) W/ BILATERAL SALPINGECTOMY, REMOVAL PARAOVARIAN CYST;  Surgeon: Sai Lopez DO;  Location: [facility];  Service: Gynecology    REMOVAL OF INTRAUTERINE DEVICE (IUD)      US GUIDED BREAST BIOPSY RIGHT COMPLETE Right 06/17/2019    Benign      Family History   Problem Relation Age of Onset    Irregular heart beat Mother     Diabetes Father     Kidney disease Father     Substance Abuse Brother     No Known Problems Brother     Depression Paternal Aunt     Substance Abuse Maternal Uncle     Prostate cancer Maternal Uncle 60    Diabetes Maternal Grandfather     Migraines Maternal Grandfather     Stroke Maternal Grandfather     Diabetes Maternal Grandmother     Rheum arthritis Maternal Grandmother     Melanoma Maternal Grandmother 84    Cancer Maternal Grandmother         Skin Cancer - successfully removed    Stroke Maternal Grandmother     Diabetes Paternal Grandfather     Lung cancer Paternal Grandfather 47    Cancer Paternal Grandfather         Lung Cancer - cause of death    Kidney disease Paternal Grandmother     Rheum arthritis Paternal Grandmother     Depression Paternal Grandmother     Deep vein thrombosis Paternal Grandmother     Diabetes Paternal Grandmother     Alcohol abuse Family     Stomach cancer Family     Completed Suicide  Neg Hx       Social History     Tobacco Use    Smoking status: Never     Passive exposure: Never    Smokeless tobacco: Never    Tobacco comments:     N/A, non-smoker.   Vaping Use    Vaping status: Never Used   Substance Use Topics    Alcohol use: Not Currently     Comment: 3 x year; sober since 2010    Drug use: Not Currently      E-Cigarette/Vaping    E-Cigarette Use Never User       E-Cigarette/Vaping Substances    Nicotine No     THC No     CBD No       I have reviewed and agree with the history as documented.     Chest pain today prior to arrival. Substernal. Had some nausea and sweating at the  time. Not exertional. No radiation. Has not have any recent chest pain symptoms. Patient has hx of DM. No family hx CAD. No sob. Now resolved.  No prior hx of same. No fevers or chills. DDx: acs, atypical chest pain.         Review of Systems   Constitutional:  Negative for activity change, appetite change and fever.   HENT:  Negative for congestion, ear pain, rhinorrhea and sore throat.    Eyes:  Negative for photophobia, pain, redness and visual disturbance.   Respiratory:  Positive for chest tightness. Negative for cough, shortness of breath and wheezing.    Cardiovascular:  Positive for chest pain. Negative for palpitations.   Gastrointestinal:  Positive for nausea. Negative for abdominal pain, diarrhea and vomiting.   Endocrine: Negative for polyuria.   Genitourinary:  Negative for difficulty urinating, dysuria, frequency and urgency.   Musculoskeletal:  Negative for arthralgias and myalgias.   Skin:  Negative for color change and rash.   Allergic/Immunologic: Negative for immunocompromised state.   Neurological:  Negative for dizziness, syncope and light-headedness.   Hematological:  Does not bruise/bleed easily.   Psychiatric/Behavioral:  Negative for confusion.            Objective       ED Triage Vitals [12/04/24 1332]   Temperature Pulse Blood Pressure Respirations SpO2 Patient Position - Orthostatic VS   98.3 °F (36.8 °C) 97 (!) 185/103 20 98 % Sitting      Temp Source Heart Rate Source BP Location FiO2 (%) Pain Score    Oral Monitor Left arm -- 7      Vitals      Date and Time Temp Pulse SpO2 Resp BP Pain Score FACES Pain Rating User   12/04/24 1530 -- 78 99 % 17 126/76 -- --    12/04/24 1430 -- 83 98 % 14 -- -- --    12/04/24 1332 98.3 °F (36.8 °C) 97 98 % 20 185/103 7 -- AM            Physical Exam  Vitals and nursing note reviewed.   Constitutional:       General: She is not in acute distress.     Appearance: She is well-developed.   HENT:      Head: Normocephalic and atraumatic.      Nose: Nose  normal.   Eyes:      General: No scleral icterus.     Conjunctiva/sclera: Conjunctivae normal.   Cardiovascular:      Rate and Rhythm: Normal rate and regular rhythm.      Heart sounds: Normal heart sounds.   Pulmonary:      Effort: Pulmonary effort is normal. No respiratory distress.      Breath sounds: Normal breath sounds. No stridor. No wheezing.   Abdominal:      General: There is no distension.      Palpations: Abdomen is soft.      Tenderness: There is no abdominal tenderness. There is no guarding or rebound.   Musculoskeletal:         General: No deformity.      Cervical back: Normal range of motion and neck supple.   Skin:     General: Skin is warm and dry.      Findings: No rash.   Neurological:      Mental Status: She is alert and oriented to person, place, and time.   Psychiatric:         Thought Content: Thought content normal.         Results Reviewed       Procedure Component Value Units Date/Time    HS Troponin I 2hr [[id number]] Collected: 12/04/24 1544    Lab Status: Final result Specimen: Blood from Arm, Left Updated: 12/04/24 1625     hs TnI 2hr <2 ng/L      Delta 2hr hsTnI --    HS Troponin 0hr (reflex protocol) [[id number]]  (Normal) Collected: 12/04/24 1354    Lab Status: Final result Specimen: Blood from Arm, Left Updated: 12/04/24 1430     hs TnI 0hr <2 ng/L     Comprehensive metabolic panel [[id number]]  (Abnormal) Collected: 12/04/24 1354    Lab Status: Final result Specimen: Blood from Arm, Left Updated: 12/04/24 1425     Sodium 138 mmol/L      Potassium 3.6 mmol/L      Chloride 103 mmol/L      CO2 27 mmol/L      ANION GAP 8 mmol/L      BUN 12 mg/dL      Creatinine 0.80 mg/dL      Glucose 160 mg/dL      Calcium 9.5 mg/dL      AST 34 U/L      ALT 35 U/L      Alkaline Phosphatase 54 U/L      Total Protein 7.1 g/dL      Albumin 4.8 g/dL      Total Bilirubin 0.47 mg/dL      eGFR 88 ml/min/1.73sq m     Narrative:      National Kidney Disease Foundation guidelines for Chronic Kidney Disease (CKD):      Stage 1 with normal or high GFR (GFR > 90 mL/min/1.73 square meters)    Stage 2 Mild CKD (GFR = 60-89 mL/min/1.73 square meters)    Stage 3A Moderate CKD (GFR = 45-59 mL/min/1.73 square meters)    Stage 3B Moderate CKD (GFR = 30-44 mL/min/1.73 square meters)    Stage 4 Severe CKD (GFR = 15-29 mL/min/1.73 square meters)    Stage 5 End Stage CKD (GFR <15 mL/min/1.73 square meters)  Note: GFR calculation is accurate only with a steady state creatinine    CBC and differential [[id number]] Collected: 12/04/24 1354    Lab Status: Final result Specimen: Blood from Arm, Left Updated: 12/04/24 1403     WBC 7.69 Thousand/uL      RBC 4.54 Million/uL      Hemoglobin 14.3 g/dL      Hematocrit 43.2 %      MCV 95 fL      MCH 31.5 pg      MCHC 33.1 g/dL      RDW 13.2 %      MPV 9.0 fL      Platelets 256 Thousands/uL      nRBC 0 /100 WBCs      Segmented % 59 %      Immature Grans % 0 %      Lymphocytes % 34 %      Monocytes % 5 %      Eosinophils Relative 2 %      Basophils Relative 0 %      Absolute Neutrophils 4.51 Thousands/µL      Absolute Immature Grans 0.02 Thousand/uL      Absolute Lymphocytes 2.63 Thousands/µL      Absolute Monocytes 0.38 Thousand/µL      Eosinophils Absolute 0.12 Thousand/µL      Basophils Absolute 0.03 Thousands/µL             XR chest 1 view portable   ED Interpretation by Cathryn Acosta MD (12/04 5008)   No infiltrate      Final Interpretation by Abraham Tejeda MD (12/04 3081)      No acute cardiopulmonary disease.            Workstation performed: [id number]             Procedures    ED Medication and Procedure Management   Prior to Admission Medications   Prescriptions Last Dose Informant Patient Reported? Taking?   Blood Glucose Monitoring Suppl (Contour Blood Glucose System) w/Device KIT   No No   Sig: Use 1 kit 2 (two) times a day before meals   Contour Test test strip   No No   Sig: USE TO CHECK BLOOD SUGAR ONCE DAILY   Levomilnacipran HCl ER (FETZIMA) 80 MG extended release capsule    No No   Sig: Take 1 capsule (80 mg total) by mouth daily   MAGNESIUM MALATE PO   Yes No   Sig: Take by mouth Take 1 tab. daily   b complex vitamins capsule   Yes No   Sig: Take 1 capsule by mouth daily   cholecalciferol (VITAMIN D3) 25 mcg (1,000 units) tablet   Yes No   Sig: Take 1,000 Units by mouth daily Take 1 tab. daily   ferrous sulfate 325 (65 Fe) mg tablet   Yes No   Sig: Take 325 mg by mouth Take 1 tab. 3 times per week   gabapentin (NEURONTIN) 300 mg capsule   No No   Sig: Take 1 capsule (300 mg total) by mouth 3 (three) times a day   levothyroxine 75 mcg tablet   No No   Sig: TAKE 1 TABLET (75 MCG TOTAL) BY MOUTH DAILY IN THE EARLY MORNING   lithium carbonate 300 mg capsule   No No   Sig: TAKE 1 CAPSULE BY MOUTH 2 TIMES A DAY WITH MEALS.   metFORMIN (GLUCOPHAGE) 500 mg tablet   No No   Sig: TAKE 1 TABLET BY MOUTH TWICE A DAY WITH FOOD   methocarbamol (ROBAXIN) 500 mg tablet   No No   Sig: TAKE 1 TAB. EVERY 8 HR. AS NEEDED FOR MUSCLE SPASMS   mirtazapine (REMERON) 45 MG tablet   No No   Sig: TAKE 1 TABLET (45 MG TOTAL) BY MOUTH DAILY AT BEDTIME   propranolol (INDERAL) 40 mg tablet   No No   Sig: Take 0.5 tablets (20 mg total) by mouth daily at bedtime      Facility-Administered Medications: None     Discharge Medication List as of 12/4/2024  5:04 PM        CONTINUE these medications which have NOT CHANGED    Details   b complex vitamins capsule Take 1 capsule by mouth daily, Historical Med      Blood Glucose Monitoring Suppl (Contour Blood Glucose System) w/Device KIT Use 1 kit 2 (two) times a day before meals, Starting Tue 10/4/2022, Normal      cholecalciferol (VITAMIN D3) 25 mcg (1,000 units) tablet Take 1,000 Units by mouth daily Take 1 tab. daily, Historical Med      Contour Test test strip USE TO CHECK BLOOD SUGAR ONCE DAILY, Normal      ferrous sulfate 325 (65 Fe) mg tablet Take 325 mg by mouth Take 1 tab. 3 times per week, Historical Med      gabapentin (NEURONTIN) 300 mg capsule Take 1 capsule  (300 mg total) by mouth 3 (three) times a day, Starting Sun 7/7/2024, Normal      Levomilnacipran HCl ER (FETZIMA) 80 MG extended release capsule Take 1 capsule (80 mg total) by mouth daily, Starting Mon 11/4/2024, Normal      levothyroxine 75 mcg tablet TAKE 1 TABLET (75 MCG TOTAL) BY MOUTH DAILY IN THE EARLY MORNING, Starting Sun 9/29/2024, Normal      lithium carbonate 300 mg capsule TAKE 1 CAPSULE BY MOUTH 2 TIMES A DAY WITH MEALS., Normal      MAGNESIUM MALATE PO Take by mouth Take 1 tab. daily, Historical Med      metFORMIN (GLUCOPHAGE) 500 mg tablet TAKE 1 TABLET BY MOUTH TWICE A DAY WITH FOOD, Starting Tue 7/23/2024, Normal      methocarbamol (ROBAXIN) 500 mg tablet TAKE 1 TAB. EVERY 8 HR. AS NEEDED FOR MUSCLE SPASMS, Normal      mirtazapine (REMERON) 45 MG tablet TAKE 1 TABLET (45 MG TOTAL) BY MOUTH DAILY AT BEDTIME, Starting Fri 10/4/2024, Normal      propranolol (INDERAL) 40 mg tablet Take 0.5 tablets (20 mg total) by mouth daily at bedtime, Starting Thu 2/1/2024, Until Sun 1/26/2025, Normal           No discharge procedures on file.  ED SEPSIS DOCUMENTATION   Time reflects when diagnosis was documented in both MDM as applicable and the Disposition within this note       Time User Action Codes Description Comment    12/4/2024  5:03 PM Cathryn Acosta Add [R07.9] Chest pain                  Cathryn Acosta MD  12/04/24 5734

## 2024-12-06 ENCOUNTER — OFFICE VISIT (OUTPATIENT)
Dept: ENDOCRINOLOGY | Facility: CLINIC | Age: 44
End: 2024-12-06
Payer: COMMERCIAL

## 2024-12-06 ENCOUNTER — TELEPHONE (OUTPATIENT)
Dept: PSYCHIATRY | Facility: CLINIC | Age: 44
End: 2024-12-06

## 2024-12-06 ENCOUNTER — APPOINTMENT (OUTPATIENT)
Dept: LAB | Facility: CLINIC | Age: 44
End: 2024-12-06
Payer: COMMERCIAL

## 2024-12-06 VITALS
HEIGHT: 61 IN | OXYGEN SATURATION: 99 % | BODY MASS INDEX: 34.66 KG/M2 | HEART RATE: 81 BPM | DIASTOLIC BLOOD PRESSURE: 92 MMHG | WEIGHT: 183.6 LBS | SYSTOLIC BLOOD PRESSURE: 130 MMHG

## 2024-12-06 DIAGNOSIS — E11.65 TYPE 2 DIABETES MELLITUS WITH HYPERGLYCEMIA, WITHOUT LONG-TERM CURRENT USE OF INSULIN (HCC): ICD-10-CM

## 2024-12-06 DIAGNOSIS — Z79.899 HIGH RISK MEDICATION USE: ICD-10-CM

## 2024-12-06 DIAGNOSIS — E03.9 ACQUIRED HYPOTHYROIDISM: ICD-10-CM

## 2024-12-06 DIAGNOSIS — E78.5 DYSLIPIDEMIA: ICD-10-CM

## 2024-12-06 DIAGNOSIS — R61 EXCESSIVE SWEATING: Primary | ICD-10-CM

## 2024-12-06 DIAGNOSIS — R61 EXCESSIVE SWEATING: ICD-10-CM

## 2024-12-06 LAB
ALBUMIN SERPL BCG-MCNC: 5 G/DL (ref 3.5–5)
ALP SERPL-CCNC: 58 U/L (ref 34–104)
ALT SERPL W P-5'-P-CCNC: 35 U/L (ref 7–52)
ANION GAP SERPL CALCULATED.3IONS-SCNC: 8 MMOL/L (ref 4–13)
AST SERPL W P-5'-P-CCNC: 35 U/L (ref 13–39)
BILIRUB SERPL-MCNC: 0.47 MG/DL (ref 0.2–1)
BUN SERPL-MCNC: 13 MG/DL (ref 5–25)
CALCIUM SERPL-MCNC: 10.3 MG/DL (ref 8.4–10.2)
CHLORIDE SERPL-SCNC: 102 MMOL/L (ref 96–108)
CO2 SERPL-SCNC: 29 MMOL/L (ref 21–32)
CREAT SERPL-MCNC: 0.8 MG/DL (ref 0.6–1.3)
GFR SERPL CREATININE-BSD FRML MDRD: 88 ML/MIN/1.73SQ M
GLUCOSE SERPL-MCNC: 169 MG/DL (ref 65–140)
LITHIUM SERPL-SCNC: 1.22 MMOL/L (ref 0.6–1.2)
POTASSIUM SERPL-SCNC: 3.9 MMOL/L (ref 3.5–5.3)
PROT SERPL-MCNC: 7.5 G/DL (ref 6.4–8.4)
SODIUM SERPL-SCNC: 139 MMOL/L (ref 135–147)
T4 FREE SERPL-MCNC: 0.82 NG/DL (ref 0.61–1.12)
TSH SERPL DL<=0.05 MIU/L-ACNC: 3.71 UIU/ML (ref 0.45–4.5)

## 2024-12-06 PROCEDURE — 80053 COMPREHEN METABOLIC PANEL: CPT

## 2024-12-06 PROCEDURE — 80178 ASSAY OF LITHIUM: CPT

## 2024-12-06 PROCEDURE — 99244 OFF/OP CNSLTJ NEW/EST MOD 40: CPT | Performed by: INTERNAL MEDICINE

## 2024-12-06 PROCEDURE — 84439 ASSAY OF FREE THYROXINE: CPT

## 2024-12-06 PROCEDURE — 84443 ASSAY THYROID STIM HORMONE: CPT

## 2024-12-06 PROCEDURE — 36415 COLL VENOUS BLD VENIPUNCTURE: CPT

## 2024-12-06 NOTE — ASSESSMENT & PLAN NOTE
Most likely etiologies include lithium therapy induced hypothyroidism VS autoimmune hypothyroidism    Check TSH and free T4 levels  Continue levothyroxine at current dose, and based on labs we will make necessary adjustments for goal TSH of 1-2  Follow-up in 3 months  Orders:    Ambulatory Referral to Endocrinology    TSH, 3rd generation; Future    T4, free; Future

## 2024-12-06 NOTE — TELEPHONE ENCOUNTER
Spoke with Esther. Reviewed Lithium level and Dr. Ovalle would like the level repeated. Reviewed s/s of lithium toxicity. Encouraged her to go to the ED for evaluation/ repeat level if she would develop symptoms. She verbalized understanding.

## 2024-12-06 NOTE — PROGRESS NOTES
Assessment & Plan  Excessive sweating  Likely etiologies include pheochromocytoma VS autonomic dysfunction given history of diabetes mellitus type 2 with diabetes related polyneuropathy.  Unlikely to be secondary to her hypothyroidism given improved TSH after starting levothyroxine therapy.    Rule out pheochromocytoma with 24-hour urine catecholamines and metanephrines  If pheochromocytoma ruled out, would suspect her symptoms are likely from autonomic dysfunction and main goal would be for ongoing well-controlled diabetes mellitus to prevent progression  Follow-up in 3 months  Orders:    Ambulatory Referral to Endocrinology    Catecholamines, fractionated, urine, 24 hour; Future    Metanephrines Fractionated, urine, 24 hour; Future    Acquired hypothyroidism  Most likely etiologies include lithium therapy induced hypothyroidism VS autoimmune hypothyroidism    Check TSH and free T4 levels  Continue levothyroxine at current dose, and based on labs we will make necessary adjustments for goal TSH of 1-2  Follow-up in 3 months  Orders:    Ambulatory Referral to Endocrinology    TSH, 3rd generation; Future    T4, free; Future          HPI:   Esther Griffith -  44 y.o. female with history of hypothyroidism, type 2 diabetes mellitus with diabetes related polyneuropathy, and MDD, that arrives to clinic for evaluation of excessive sweating.    Reports 2 years of excess warmth and sweating.  Sweating occurs more pronounced from the chest up, and is unsure if increases after eating.  Reporting eyes sensitivity to light at night, dizziness with standing, abdominal bloating after eating, and incomplete bladder emptying.  Also reporting fatigue.  Denies flushing, palpitations, headache, diarrhea, constipation, lower extremity swelling, changes in weight, changes in appetite, changes in nail or changes in hair.  Of note, patient was seen in the emergency department December 4, 2024 for evaluation of chest pain, which was  determined to be of noncardiac etiology.  Denies other episodes of chest painlight light .      June 2024 on evaluation was noted to be biochemically hypothyroid and was started on levothyroxine 75 mcg daily.  Since starting levothyroxine reports that energy levels have improved and also her mood has improved as well.    Family history is negative for members with thyroid disease or thyroid cancer.  Denies knowing of anyone with any hormonal issues or diseases.       Patient has no other complaints at this time.      Subjective:  Review of Systems   Constitutional:  Positive for diaphoresis. Negative for appetite change, fatigue and unexpected weight change.   Eyes:  Positive for photophobia. Negative for visual disturbance.   Respiratory:  Negative for chest tightness and shortness of breath.    Cardiovascular:  Negative for chest pain, palpitations and leg swelling.   Gastrointestinal:  Positive for abdominal distention (after food). Negative for abdominal pain, constipation, diarrhea, nausea and vomiting.   Endocrine: Positive for heat intolerance. Negative for polydipsia, polyphagia and polyuria.   Genitourinary:  Negative for difficulty urinating, enuresis, frequency and hematuria.        Retention   Skin:  Negative for color change and wound.   Neurological:  Positive for dizziness (with standing). Negative for tremors, weakness, light-headedness and headaches.        Patient Active Problem List   Diagnosis    Allergic rhinitis    Chronic fatigue    Chronic pain    Chronic migraine without aura without status migrainosus, not intractable    Fibromyalgia    Lyme disease    Muscle spasm    Myalgia    Narcolepsy    Narcolepsy cataplexy syndrome    Sinus disease    Sleep apnea    Snoring    Generalized anxiety disorder    Dyspepsia    Arthralgia of multiple joints    Generalized body aches    Chronic idiopathic constipation    Polyp of colon    Back pain    Sacroiliitis (HCC)    DDD (degenerative disc disease),  lumbar    Well adult exam    Type 2 diabetes mellitus with hyperglycemia, without long-term current use of insulin (HCC)    Dyslipidemia    Diabetic polyneuropathy associated with type 2 diabetes mellitus (HCC)    Lumbar back pain    Lumbar radiculopathy    Blood transfusion declined because patient is Mandaen    Paresthesia    Overdose of undetermined intent    Myofascial pain    Lumbar spondylosis    Leukocytosis    Acquired hypothyroidism    Major depressive disorder, recurrent severe without psychotic features (HCC)    Excessive sweating        Current Outpatient Medications   Medication Sig Dispense Refill    b complex vitamins capsule Take 1 capsule by mouth daily      Blood Glucose Monitoring Suppl (Contour Blood Glucose System) w/Device KIT Use 1 kit 2 (two) times a day before meals 1 kit 0    cholecalciferol (VITAMIN D3) 25 mcg (1,000 units) tablet Take 1,000 Units by mouth daily Take 1 tab. daily      Contour Test test strip USE TO CHECK BLOOD SUGAR ONCE DAILY 50 strip 7    ferrous sulfate 325 (65 Fe) mg tablet Take 325 mg by mouth Take 1 tab. 3 times per week      gabapentin (NEURONTIN) 300 mg capsule Take 1 capsule (300 mg total) by mouth 3 (three) times a day 90 capsule 5    Levomilnacipran HCl ER (FETZIMA) 80 MG extended release capsule Take 1 capsule (80 mg total) by mouth daily 30 capsule 2    levothyroxine 75 mcg tablet TAKE 1 TABLET (75 MCG TOTAL) BY MOUTH DAILY IN THE EARLY MORNING 90 tablet 1    lithium carbonate 300 mg capsule TAKE 1 CAPSULE BY MOUTH 2 TIMES A DAY WITH MEALS. 60 capsule 2    MAGNESIUM MALATE PO Take by mouth Take 1 tab. daily      metFORMIN (GLUCOPHAGE) 500 mg tablet TAKE 1 TABLET BY MOUTH TWICE A DAY WITH FOOD 60 tablet 5    methocarbamol (ROBAXIN) 500 mg tablet TAKE 1 TAB. EVERY 8 HR. AS NEEDED FOR MUSCLE SPASMS 60 tablet 0    mirtazapine (REMERON) 45 MG tablet TAKE 1 TABLET (45 MG TOTAL) BY MOUTH DAILY AT BEDTIME 30 tablet 2    propranolol (INDERAL) 40 mg tablet  "Take 0.5 tablets (20 mg total) by mouth daily at bedtime 45 tablet 3     No current facility-administered medications for this visit.        Allergies   Allergen Reactions    Cannabidiol Shortness Of Breath, Itching, Swelling, Anxiety, Palpitations, Confusion, Hypertension, Throat Swelling and Tongue Swelling    Amoxicillin-Pot Clavulanate Hives    Decadrol [Dexamethasone] Other (See Comments)     psychosis    Penicillins Hives     Hives/Uticaria    Tetracyclines & Related Hives      Allergy;         The following portions of the patient's history were reviewed and updated as appropriate: allergies, current medications, past family history, past medical history, past social history, past surgical history and problem list.             Objective:  /92   Pulse 81   Ht 5' 1\" (1.549 m)   Wt 83.3 kg (183 lb 9.6 oz)   LMP 03/13/2023 (Approximate)   SpO2 99%   BMI 34.69 kg/m²      Body mass index is 34.69 kg/m².     Physical Exam  Vitals reviewed.   Constitutional:       Appearance: Normal appearance. She is not ill-appearing or diaphoretic.   HENT:      Head: Normocephalic and atraumatic.   Eyes:      General: No scleral icterus.     Conjunctiva/sclera: Conjunctivae normal.   Neck:      Thyroid: No thyroid mass, thyromegaly or thyroid tenderness.   Cardiovascular:      Rate and Rhythm: Normal rate and regular rhythm.      Heart sounds: Normal heart sounds. No murmur heard.     No gallop.   Pulmonary:      Effort: Pulmonary effort is normal. No respiratory distress.      Breath sounds: No wheezing or rales.   Abdominal:      General: There is no distension.   Musculoskeletal:         General: Normal range of motion.      Cervical back: Normal range of motion and neck supple.      Right lower leg: No edema.      Left lower leg: No edema.   Lymphadenopathy:      Cervical: No cervical adenopathy.   Skin:     General: Skin is dry.      Coloration: Skin is not jaundiced or pale.   Neurological:      Mental Status: " She is alert and oriented to person, place, and time. Mental status is at baseline.      Motor: No tremor.      Deep Tendon Reflexes:      Reflex Scores:       Bicep reflexes are 2+ on the right side and 2+ on the left side.       Patellar reflexes are 0 on the right side and 0 on the left side.  Psychiatric:         Mood and Affect: Mood normal.         Behavior: Behavior normal.          Labs:  I have personally reviewed the following labs.   Latest Reference Range & Units 06/05/24 12:08 07/17/24 14:12 09/07/24 10:55 10/06/24 14:22 10/18/24 14:40 12/03/24 15:28 12/04/24 13:54   Sodium 135 - 147 mmol/L    135   138   Potassium 3.5 - 5.3 mmol/L    3.8   3.6   Chloride 96 - 108 mmol/L    101   103   Carbon Dioxide 21 - 32 mmol/L    25   27   ANION GAP 4 - 13 mmol/L    9   8   BUN 5 - 25 mg/dL    10   12   Creatinine 0.60 - 1.30 mg/dL    0.75   0.80   GLUCOSE 65 - 140 mg/dL    178 (H)   160 (H)   Calcium 8.4 - 10.2 mg/dL    10.4 (H)   9.5   AST 13 - 39 U/L    29   34   ALT 7 - 52 U/L    36   35   ALK PHOS 34 - 104 U/L    56   54   Total Protein 6.4 - 8.4 g/dL    7.3   7.1   Albumin 3.5 - 5.0 g/dL    4.8   4.8   Total Bilirubin 0.20 - 1.00 mg/dL    0.50   0.47   GFR, Calculated ml/min/1.73sq m    95   88   WBC 4.31 - 10.16 Thousand/uL  11.65 (H) 12.48 (H) 10.62 (H) 11.76 (H)  7.69   RBC 3.81 - 5.12 Million/uL  3.81 4.38 4.58 4.23  4.54   Hemoglobin 11.5 - 15.4 g/dL  12.3 13.8 14.3 13.4  14.3   Hematocrit 34.8 - 46.1 %  36.9 42.8 44.3 41.2  43.2   MCV 82 - 98 fL  97 98 97 97  95   MCH 26.8 - 34.3 pg  32.3 31.5 31.2 31.7  31.5   MCHC 31.4 - 37.4 g/dL  33.3 32.2 32.3 32.5  33.1   RDW 11.6 - 15.1 %  14.3 14.3 13.6 14.4  13.2   Platelet Count 149 - 390 Thousands/uL  259 278 254 297  256   MPV 8.9 - 12.7 fL  9.2 9.3 9.1 9.1  9.0   nRBC /100 WBCs  0 0 0 0  0   LUTEINIZING HORMONE See Comment mIU/mL 5.4         FSH, POC See Comment mIU/mL 10.2         Hemoglobin A1C <=6.5       6.3    ESTRADIOL LEVEL See Comment pg/mL 66.5          TSH 3RD GENERATON 0.450 - 4.500 uIU/mL 4.770 (H)  3.287       FREE T4 0.61 - 1.12 ng/dL 0.60 (L)         (H): Data is abnormally high  (L): Data is abnormally low     Imaging:  No pertinent imagine available for review.         Corby Lord MD  Endocrinology Fellow, PGY-4

## 2024-12-06 NOTE — TELEPHONE ENCOUNTER
Lithium level came back slightly elevated and Dr. Ovalle would like a repeat level drawn. Called Esther and left MARLIN requesting a call back to inform her.

## 2024-12-06 NOTE — PATIENT INSTRUCTIONS
Complete labs at your earliest convenience     Instructions on taking levothyroxine (thyroid medication):  Please take the medication on an empty stomach, at least 30 min before breakfast. If you are taking it at night, please take it a minimum of 4 hr after your last meal.   Separate your thyroid medication form any antiacid, lithium, magnesium, calcium or iron products by at least 4 hours.  Certain medications can interfere with the absorption and metabolism of thyroid hormone, this can alter your thyroid levels, make sure you check with your pharmacy about any drug interactions,  before starting any new medications.    If you miss a dose of thyroid hormone therapy, take it immediately upon noticing or take 2 pills the next day to make up the missed dose.

## 2024-12-06 NOTE — ASSESSMENT & PLAN NOTE
Likely etiologies include pheochromocytoma VS autonomic dysfunction given history of diabetes mellitus type 2 with diabetes related polyneuropathy.  Unlikely to be secondary to her hypothyroidism given improved TSH after starting levothyroxine therapy.    Rule out pheochromocytoma with 24-hour urine catecholamines and metanephrines  If pheochromocytoma ruled out, would suspect her symptoms are likely from autonomic dysfunction and main goal would be for ongoing well-controlled diabetes mellitus to prevent progression  Follow-up in 3 months  Orders:    Ambulatory Referral to Endocrinology    Catecholamines, fractionated, urine, 24 hour; Future    Metanephrines Fractionated, urine, 24 hour; Future

## 2024-12-09 ENCOUNTER — RESULTS FOLLOW-UP (OUTPATIENT)
Dept: PSYCHIATRY | Facility: CLINIC | Age: 44
End: 2024-12-09

## 2024-12-09 ENCOUNTER — APPOINTMENT (OUTPATIENT)
Dept: LAB | Facility: CLINIC | Age: 44
End: 2024-12-09
Payer: COMMERCIAL

## 2024-12-09 DIAGNOSIS — Z79.899 HIGH RISK MEDICATION USE: Primary | ICD-10-CM

## 2024-12-09 DIAGNOSIS — Z79.899 HIGH RISK MEDICATION USE: ICD-10-CM

## 2024-12-09 LAB
CHOLEST SERPL-MCNC: 156 MG/DL (ref ?–200)
CREAT UR-MCNC: 80.6 MG/DL
HDLC SERPL-MCNC: 36 MG/DL
LDLC SERPL CALC-MCNC: 82 MG/DL (ref 0–100)
LITHIUM SERPL-SCNC: 0.61 MMOL/L (ref 0.6–1.2)
MICROALBUMIN UR-MCNC: <7 MG/L
NONHDLC SERPL-MCNC: 120 MG/DL
TRIGL SERPL-MCNC: 188 MG/DL (ref ?–150)

## 2024-12-09 PROCEDURE — 82570 ASSAY OF URINE CREATININE: CPT

## 2024-12-09 PROCEDURE — 36415 COLL VENOUS BLD VENIPUNCTURE: CPT

## 2024-12-09 PROCEDURE — 82043 UR ALBUMIN QUANTITATIVE: CPT

## 2024-12-09 PROCEDURE — 80061 LIPID PANEL: CPT

## 2024-12-09 PROCEDURE — 80178 ASSAY OF LITHIUM: CPT

## 2024-12-09 NOTE — TELEPHONE ENCOUNTER
Received a staff message from Dr. Ovalle that the order was in and to tell Esther to get it done 8 hours after bedtime dose. Reviewed this in a VM ([phone]) and requested she call back with any questions.

## 2024-12-11 ENCOUNTER — OFFICE VISIT (OUTPATIENT)
Dept: FAMILY MEDICINE CLINIC | Facility: CLINIC | Age: 44
End: 2024-12-11
Payer: COMMERCIAL

## 2024-12-11 ENCOUNTER — TELEPHONE (OUTPATIENT)
Age: 44
End: 2024-12-11

## 2024-12-11 VITALS
HEIGHT: 61 IN | RESPIRATION RATE: 18 BRPM | BODY MASS INDEX: 34.97 KG/M2 | DIASTOLIC BLOOD PRESSURE: 84 MMHG | TEMPERATURE: 98.9 F | SYSTOLIC BLOOD PRESSURE: 128 MMHG | HEART RATE: 100 BPM | OXYGEN SATURATION: 100 % | WEIGHT: 185.2 LBS

## 2024-12-11 DIAGNOSIS — R07.9 CHEST PAIN, UNSPECIFIED TYPE: Primary | ICD-10-CM

## 2024-12-11 DIAGNOSIS — E11.65 TYPE 2 DIABETES MELLITUS WITH HYPERGLYCEMIA, WITHOUT LONG-TERM CURRENT USE OF INSULIN (HCC): ICD-10-CM

## 2024-12-11 DIAGNOSIS — R09.89 LABILE BLOOD PRESSURE: ICD-10-CM

## 2024-12-11 DIAGNOSIS — F41.1 GENERALIZED ANXIETY DISORDER: ICD-10-CM

## 2024-12-11 DIAGNOSIS — E78.5 DYSLIPIDEMIA: ICD-10-CM

## 2024-12-11 DIAGNOSIS — E03.9 ACQUIRED HYPOTHYROIDISM: ICD-10-CM

## 2024-12-11 PROCEDURE — 99214 OFFICE O/P EST MOD 30 MIN: CPT | Performed by: FAMILY MEDICINE

## 2024-12-11 NOTE — ASSESSMENT & PLAN NOTE
Continue medical management per psychiatry Dr. Ovalle.  Recommended to practice relaxation techniques, yoga.

## 2024-12-11 NOTE — PROGRESS NOTES
Name: Esther Griffith      : 1980      MRN: [id number]  Encounter Provider: Rosa Lutz MD  Encounter Date: 2024   Encounter department: [facility]    Assessment & Plan  Chest pain, unspecified type  Patient seen in [facility] emergency room on 24 for evaluation of chest pain.  EKG showed no acute ischemic changes.    Troponins were negative.  Patient reports no episodes of chest pain since ER visit.    Due to history of DM, Dyslipidemia will refer for further evaluation to cardiology.    Orders:    Ambulatory Referral to Cardiology; Future    Type 2 diabetes mellitus with hyperglycemia, without long-term current use of insulin (HCC)    Lab Results   Component Value Date    HGBA1C 6.3 2024     Continue Metformin 500 mg 1 tablet twice daily with meals.    Recommended to follow a low-carb diet, work on weight loss.  Encouraged regular exercise.       Acquired hypothyroidism  TSH 3.712.  Continue Levothyroxine 75 mcg daily.  Follow-up with endocrinology Dr. Barber.       Generalized anxiety disorder  Continue medical management per psychiatry Dr. Ovalle.  Recommended to practice relaxation techniques, yoga.       Dyslipidemia  Recommended to follow-up a low-cholesterol, low-fat diet, increase exercise.  Orders:    Ambulatory Referral to Cardiology; Future    Labile blood pressure  Initial /94.  BP on retake 128/84.  Patient may have component of white coat hypertension.    Recommended to follow a low-sodium diet, avoid processed foods.    Encouraged regular exercise, weight reduction.    Orders:    Ambulatory Referral to Cardiology; Future         History of Present Illness     HPI    Patient presents today for ER follow-up visit.      She was seen in [facility] emergency room on 2024 for evaluation of chest pain.      EKG showed no acute ischemic changes, troponins were negative.    Patient denies family history of CAD,  strokes.    She reports no chest pain since ER visit.      Patient was seen for evaluation of excessive sweating by endocrinology Dr. Barber on December 6, 2024.    Dr. Barber recommended to check 24-hour urine for catecholamines, metanephrines to rule out pheochromocytoma.    Also consider to r/o autonomic dysfunction from diabetes.    Patient had blood test done on December 6, 2024.    Glucose 169, potassium 3.9, creatinine 0.80. Calcium 10.3.  Cholesterol 156, triglycerides 188, HDL 36,  LDL 82.  TSH 3.712.    DM Type 2 - patient continues taking Metformin 500 mg 1 tablet twice daily.  Reports no hypoglycemic episodes.    CAROL/ Depression - mood has been stable.  Management per psychiatry Dr. Ovalle.    Review of Systems   Constitutional:  Negative for activity change, appetite change, chills, fatigue and fever.        Excessive sweating    Respiratory:  Negative for cough and shortness of breath.    Cardiovascular:  Positive for chest pain (improved). Negative for palpitations and leg swelling.   Gastrointestinal:  Negative for abdominal pain, diarrhea, nausea and vomiting.        Denies heartburn   Endocrine: Positive for heat intolerance.   Musculoskeletal:  Positive for back pain (improved). Negative for neck pain.   Skin:  Negative for rash.   Neurological:  Negative for dizziness and syncope.   Psychiatric/Behavioral:  Positive for sleep disturbance. Self-injury: improved.        Anxiety /Depression - mood has been stable     Past Medical History:   Diagnosis Date    Alcoholism ([facility]) 03/15/2001    Anxiety     Blood transfusion declined because patient is [place] 05/01/2023    Chronic pain disorder     Chronic sinusitis     Depression     Depression     Diabetes ([facility])     Diabetes mellitus ([facility]) 09/01/2022    Fibromyalgia     Headache(784.0) 03/15/1993    Various types of headaches    Memory loss 03/15/2012    Migraine     Obesity     Polyarthritis     Last assessed 9/21/2015    Psychiatric disorder      Psychiatric illness     Sleep difficulties     Substance abuse (HCC) 03/15/1994     Past Surgical History:   Procedure Laterality Date    COLONOSCOPY  06/2019    DENTAL SURGERY      HYSTERECTOMY  05/01/2023    HYSTEROSCOPY      Endometrial Biopsy By Hysteroscopy    AK LAPS SUPRACRV HYSTERECT 250 GM/< RMVL TUBE/OVAR N/A 05/01/2023    Procedure: (LSH) W/ BILATERAL SALPINGECTOMY, REMOVAL PARAOVARIAN CYST;  Surgeon: Sai Lopez DO;  Location: [facility];  Service: Gynecology    REMOVAL OF INTRAUTERINE DEVICE (IUD)      US GUIDED BREAST BIOPSY RIGHT COMPLETE Right 06/17/2019    Benign     Family History   Problem Relation Age of Onset    Irregular heart beat Mother     Arthritis Mother     Diabetes Father     Kidney disease Father     Diabetes type II Father     Depression Father     Substance Abuse Brother     Alcohol abuse Brother     ADD / ADHD Brother     Drug abuse Brother     No Known Problems Brother     Depression Paternal Aunt         Unsuccessful suicide attempt in young adulthood    Psychiatric Illness Paternal Aunt     Self-Injury Paternal Aunt     Suicide Attempts Paternal Aunt     Substance Abuse Maternal Uncle     Prostate cancer Maternal Uncle 60    Diabetes type II Maternal Uncle     Diabetes Maternal Uncle     Diabetes Maternal Grandfather     Migraines Maternal Grandfather     Stroke Maternal Grandfather     Diabetes type II Maternal Grandfather     Alcohol abuse Maternal Grandfather         Great-Grandfather    Diabetes Maternal Grandmother     Rheum arthritis Maternal Grandmother     Melanoma Maternal Grandmother 84    Cancer Maternal Grandmother         Skin Cancer - successfully removed    Stroke Maternal Grandmother     Diabetes type II Maternal Grandmother     Depression Maternal Grandmother     Dementia Maternal Grandmother     Diabetes Paternal Grandfather     Lung cancer Paternal Grandfather 47    Cancer Paternal Grandfather         Lung Cancer - cause of death    Kidney disease  Paternal Grandmother     Rheum arthritis Paternal Grandmother     Depression Paternal Grandmother         Nervous Breakdown around age 40    Deep vein thrombosis Paternal Grandmother     Diabetes Paternal Grandmother     Diabetes type II Paternal Grandmother     Alcohol abuse Family     Stomach cancer Family     Alcohol abuse Maternal Uncle     Substance Abuse Maternal Uncle     Cancer Maternal Uncle         Prostate & Testicular Cancer    Drug abuse Maternal Uncle     Alcohol abuse Maternal Uncle     Drug abuse Maternal Uncle     Completed Suicide  Neg Hx      Social History     Tobacco Use    Smoking status: Never     Passive exposure: Never    Smokeless tobacco: Never    Tobacco comments:     N/A, non-smoker.   Vaping Use    Vaping status: Never Used   Substance and Sexual Activity    Alcohol use: Not Currently     Comment: 2 drinks per month    Drug use: Not Currently    Sexual activity: Not Currently     Birth control/protection: Abstinence     Current Outpatient Medications on File Prior to Visit   Medication Sig    b complex vitamins capsule Take 1 capsule by mouth daily    Blood Glucose Monitoring Suppl (Contour Blood Glucose System) w/Device KIT Use 1 kit 2 (two) times a day before meals    cholecalciferol (VITAMIN D3) 25 mcg (1,000 units) tablet Take 1,000 Units by mouth daily Take 1 tab. daily    Contour Test test strip USE TO CHECK BLOOD SUGAR ONCE DAILY    ferrous sulfate 325 (65 Fe) mg tablet Take 325 mg by mouth Take 1 tab. 3 times per week    gabapentin (NEURONTIN) 300 mg capsule Take 1 capsule (300 mg total) by mouth 3 (three) times a day    Levomilnacipran HCl ER (FETZIMA) 80 MG extended release capsule Take 1 capsule (80 mg total) by mouth daily    levothyroxine 75 mcg tablet TAKE 1 TABLET (75 MCG TOTAL) BY MOUTH DAILY IN THE EARLY MORNING    lithium carbonate 300 mg capsule TAKE 1 CAPSULE BY MOUTH 2 TIMES A DAY WITH MEALS.    MAGNESIUM MALATE PO Take by mouth Take 1 tab. daily    metFORMIN  "(GLUCOPHAGE) 500 mg tablet TAKE 1 TABLET BY MOUTH TWICE A DAY WITH FOOD    methocarbamol (ROBAXIN) 500 mg tablet TAKE 1 TAB. EVERY 8 HR. AS NEEDED FOR MUSCLE SPASMS    mirtazapine (REMERON) 45 MG tablet TAKE 1 TABLET (45 MG TOTAL) BY MOUTH DAILY AT BEDTIME    propranolol (INDERAL) 40 mg tablet Take 0.5 tablets (20 mg total) by mouth daily at bedtime     Allergies   Allergen Reactions    Cannabidiol Shortness Of Breath, Itching, Swelling, Anxiety, Palpitations, Confusion, Hypertension, Throat Swelling and Tongue Swelling    Amoxicillin-Pot Clavulanate Hives    Decadrol [Dexamethasone] Other (See Comments)     psychosis    Penicillins Hives     Hives/Uticaria    Tetracyclines & Related Hives      Allergy;      Immunization History   Administered Date(s) Administered    COVID-19 MODERNA VACC 0.5 ML IM 03/31/2021, 04/28/2021, 01/19/2022    COVID-19 Pfizer Vac BIVALENT Grady-sucrose 12 Yr+ IM 11/26/2022    COVID-19 Pfizer mRNA vacc PF grady-sucrose 12 yr and older (Comirnaty) 10/07/2024    INFLUENZA 09/28/2009    Influenza Quadrivalent, 6-35 Months IM 10/13/2015, 10/04/2017    Influenza, injectable, quadrivalent, preservative free 0.5 mL 03/26/2019, 12/11/2019, 10/23/2020, 11/07/2023    Influenza, seasonal, injectable 03/04/2014    Tdap 09/29/2009, 08/16/2022     Objective     /84 (BP Location: Left arm, Patient Position: Sitting, Cuff Size: Large)   Pulse 100   Temp 98.9 °F (37.2 °C) (Tympanic)   Resp 18   Ht 5' 1\" (1.549 m)   Wt 84 kg (185 lb 3.2 oz)   LMP 03/13/2023 (Approximate)   SpO2 100%   BMI 34.99 kg/m²     Physical Exam    "

## 2024-12-11 NOTE — ASSESSMENT & PLAN NOTE
Lab Results   Component Value Date    HGBA1C 6.3 12/03/2024     Continue Metformin 500 mg 1 tablet twice daily with meals.    Recommended to follow a low-carb diet, work on weight loss.  Encouraged regular exercise.

## 2024-12-11 NOTE — TELEPHONE ENCOUNTER
Patient called asking where she can drop off 24 hour urine collection. Made aware [facility]. Patient verbalized understanding.

## 2024-12-11 NOTE — ASSESSMENT & PLAN NOTE
Patient seen in [facility] emergency room on 12/4/24 for evaluation of chest pain.  EKG showed no acute ischemic changes.    Troponins were negative.  Patient reports no episodes of chest pain since ER visit.    Due to history of DM, Dyslipidemia will refer for further evaluation to cardiology.    Orders:    Ambulatory Referral to Cardiology; Future

## 2024-12-12 ENCOUNTER — TELEPHONE (OUTPATIENT)
Dept: PSYCHIATRY | Facility: CLINIC | Age: 44
End: 2024-12-12

## 2024-12-12 ENCOUNTER — APPOINTMENT (OUTPATIENT)
Dept: LAB | Facility: HOSPITAL | Age: 44
End: 2024-12-12
Payer: COMMERCIAL

## 2024-12-12 DIAGNOSIS — M79.18 MYOFASCIAL PAIN: ICD-10-CM

## 2024-12-12 DIAGNOSIS — E03.9 ACQUIRED HYPOTHYROIDISM: ICD-10-CM

## 2024-12-12 PROCEDURE — 82384 ASSAY THREE CATECHOLAMINES: CPT

## 2024-12-12 PROCEDURE — 83835 ASSAY OF METANEPHRINES: CPT

## 2024-12-12 NOTE — TELEPHONE ENCOUNTER
Called and LVM and inform that appt 12/16/2024  will be canceled due to provider is out of the office due to family emergency   Writer asked pt to call back to reschedule.

## 2024-12-12 NOTE — ASSESSMENT & PLAN NOTE
Recommended to follow-up a low-cholesterol, low-fat diet, increase exercise.  Orders:    Ambulatory Referral to Cardiology; Future

## 2024-12-12 NOTE — ASSESSMENT & PLAN NOTE
Initial /94.  BP on retake 128/84.  Patient may have component of white coat hypertension.    Recommended to follow a low-sodium diet, avoid processed foods.    Encouraged regular exercise, weight reduction.    Orders:    Ambulatory Referral to Cardiology; Future

## 2024-12-13 ENCOUNTER — TELEMEDICINE (OUTPATIENT)
Dept: BEHAVIORAL/MENTAL HEALTH CLINIC | Facility: CLINIC | Age: 44
End: 2024-12-13
Payer: COMMERCIAL

## 2024-12-13 DIAGNOSIS — F41.1 GENERALIZED ANXIETY DISORDER: ICD-10-CM

## 2024-12-13 DIAGNOSIS — F33.2 MAJOR DEPRESSIVE DISORDER, RECURRENT SEVERE WITHOUT PSYCHOTIC FEATURES (HCC): Primary | ICD-10-CM

## 2024-12-13 PROCEDURE — 90832 PSYTX W PT 30 MINUTES: CPT | Performed by: PSYCHIATRY & NEUROLOGY

## 2024-12-13 RX ORDER — METHOCARBAMOL 500 MG/1
TABLET, FILM COATED ORAL
Qty: 60 TABLET | Refills: 0 | Status: SHIPPED | OUTPATIENT
Start: 2024-12-13

## 2024-12-13 NOTE — BH TREATMENT PLAN
Outpatient Behavioral Health Psychotherapy Treatment Plan    Esther Ncaho  1980     Date of Initial Psychotherapy Assessment:  8/21/2018   Date of Current Treatment Plan: 12/13/24  Treatment Plan Target Date: 6/13/2025  Treatment Plan Expiration Date: 6/13/2025    Diagnosis:   1. Major depressive disorder, recurrent severe without psychotic features (HCC)        2. Generalized anxiety disorder            Area(s) of Need: depression/anxiety/chronic pain    Long Term Goal 1 (in the client's own words): I want to continue to effectively manage the depression and the anxiety (moderate progress as of 12/13/24)    Stage of Change: Action    Target Date for completion: 6/13/2025     Anticipated therapeutic modalities: CBT, mindfulness     People identified to complete this goal: Vidhi Santana      Objective 1: (identify the means of measuring success in meeting the objective): I will continue to be aware of the depression/anxiety and negative self talk messages and redirect to positive and realistic messages (lower depression/anxiety).     Objective 2: I will continue to work with Dr. Ovalle on medication management.     Objective 3: I will continue to make sure I am taking care of myself.     Objective 4: I will continue to reach out to friends to stay socially connected     Objective 5: (identify the means of measuring success in meeting the objective): I will create a daily schedule of household chores to help build motivation and help me feel accomplished      Long Term Goal 2 (in the client's own words): I will continue to work on strengthening my self esteem (some progress as of 12/13/2024)    Stage of Change: Action    Target Date for completion: 6/13/2025     Anticipated therapeutic modalities: CBT     People identified to complete this goal: Vidhi Santana      Objective 1: (identify the means of measuring success in meeting the objective): I will remain aware of those who have impacted me positively and  "disregard the messages from those who were negative influences.     Objective 2: I will review and use my qualities/strengths/assets as reminders of my worth.     Objective 3: I will continue with self care (including using my effective communication skills such as assertiveness).     Objective 4: I will continue to explore options to improve my quality of life (taking classes, finding a creative hobby)       Long Term Goal 3 (in the client's own words): I will continue to learn and implement effective pain management strategies (doing \"ok\" as of 12/13/2024)    Stage of Change: Action    Target Date for completion: 6/13/2025     Anticipated therapeutic modalities: CBT, mindfulness     People identified to complete this goal: Vidhi Santana      Objective 1: (identify the means of measuring success in meeting the objective): I will learn and practice effective pain management strategies.       Objective 2: (identify the means of measuring success in meeting the objective): I will explore yoga as a possible pain management strategy    Objective 3: I will walk and be more physically active as much as I am able.     I am currently under the care of a [facility] psychiatric provider: yes    My [facility] psychiatric provider is: Dr. Ovalle    I am currently taking psychiatric medications: Yes, as prescribed    I feel that I will be ready for discharge from mental health care when I reach the following (measurable goal/objective): Once I have progressed to the point of being able to positively maintain my focus and drive to accomplish the things I want to accomplish in my life.    For children and adults who have a legal guardian:   Has there been any change to custody orders and/or guardianship status? NA. If yes, attach updated documentation.    I have created my Crisis Plan and have been offered a copy of this plan    Behavioral Health Treatment Plan [facility]: Diagnosis and Treatment Plan explained to Esther Griffith " Esther Griffith acknowledges an understanding of their diagnosis. Esther Claudiachristina agrees to this treatment plan.    I have been offered a copy of this Treatment Plan. yes      Esther Griffith, 1980, actively participated in the review and update of this treatment plan during a virtual session, using the Epic Embedded platform.   Esther Griffith  provided verbal consent on 12/13/2024 at 1439 PM. The treatment plan was transcribed into the Electronic Health Record at a later time. Treatment plan also sent to Esther's MyChart for signature.

## 2024-12-13 NOTE — PSYCH
Virtual Regular Visit    Verification of patient location:    Patient is located at Home in the following state in which I hold an active license [place]      Assessment/Plan:    Problem List Items Addressed This Visit          Behavioral Health    Generalized anxiety disorder    Major depressive disorder, recurrent severe without psychotic features (HCC) - Primary       Reason for visit is   Chief Complaint   Patient presents with    Virtual Regular Visit      Encounter provider HERMELINDA ALBA      Recent Visits  Date Type Provider Dept   12/11/24 Office Visit Rosa Lutz MD [facility]   Showing recent visits within past 7 days and meeting all other requirements  Today's Visits  Date Type Provider Dept   12/13/24 Telemedicine HERMELINDA Alba [facility] [place]   Showing today's visits and meeting all other requirements  Future Appointments  No visits were found meeting these conditions.  Showing future appointments within next 150 days and meeting all other requirements       The patient was identified by name and date of birth. Esther Griffith was informed that this is a telemedicine visit and that the visit is being conducted throughthe Epic Embedded platform. She agrees to proceed..  My office door was closed. No one else was in the room.  She acknowledged consent and understanding of privacy and security of the video platform. The patient has agreed to participate and understands they can discontinue the visit at any time.    Patient is aware this is a billable service.     HPI     Past Medical History:   Diagnosis Date    Alcoholism (HCC) 03/15/2001    Anxiety     Blood transfusion declined because patient is [employer] 05/01/2023    Chronic pain disorder     Chronic sinusitis     Depression     Depression     Diabetes (HCC)     Diabetes mellitus (HCC) 09/01/2022    Fibromyalgia     Headache(784.0) 03/15/1993    Various types of headaches    Memory loss  03/15/2012    Migraine     Obesity     Polyarthritis     Last assessed 9/21/2015    Psychiatric disorder     Psychiatric illness     Sleep difficulties     Substance abuse (HCC) 03/15/1994       Past Surgical History:   Procedure Laterality Date    COLONOSCOPY  06/2019    DENTAL SURGERY      HYSTERECTOMY  05/01/2023    HYSTEROSCOPY      Endometrial Biopsy By Hysteroscopy    VA LAPS SUPRACRV HYSTERECT 250 GM/< RMVL TUBE/OVAR N/A 05/01/2023    Procedure: (LSH) W/ BILATERAL SALPINGECTOMY, REMOVAL PARAOVARIAN CYST;  Surgeon: Sai Lopez DO;  Location: [facility];  Service: Gynecology    REMOVAL OF INTRAUTERINE DEVICE (IUD)      US GUIDED BREAST BIOPSY RIGHT COMPLETE Right 06/17/2019    Benign       Current Outpatient Medications   Medication Sig Dispense Refill    b complex vitamins capsule Take 1 capsule by mouth daily      Blood Glucose Monitoring Suppl (Contour Blood Glucose System) w/Device KIT Use 1 kit 2 (two) times a day before meals 1 kit 0    cholecalciferol (VITAMIN D3) 25 mcg (1,000 units) tablet Take 1,000 Units by mouth daily Take 1 tab. daily      Contour Test test strip USE TO CHECK BLOOD SUGAR ONCE DAILY 50 strip 7    ferrous sulfate 325 (65 Fe) mg tablet Take 325 mg by mouth Take 1 tab. 3 times per week      gabapentin (NEURONTIN) 300 mg capsule Take 1 capsule (300 mg total) by mouth 3 (three) times a day 90 capsule 5    Levomilnacipran HCl ER (FETZIMA) 80 MG extended release capsule Take 1 capsule (80 mg total) by mouth daily 30 capsule 2    levothyroxine 75 mcg tablet TAKE 1 TABLET (75 MCG TOTAL) BY MOUTH DAILY IN THE EARLY MORNING 90 tablet 1    lithium carbonate 300 mg capsule TAKE 1 CAPSULE BY MOUTH 2 TIMES A DAY WITH MEALS. 60 capsule 2    MAGNESIUM MALATE PO Take by mouth Take 1 tab. daily      metFORMIN (GLUCOPHAGE) 500 mg tablet TAKE 1 TABLET BY MOUTH TWICE A DAY WITH FOOD 60 tablet 5    methocarbamol (ROBAXIN) 500 mg tablet TAKE 1 TAB. EVERY 8 HR. AS NEEDED FOR MUSCLE SPASMS 60 tablet 0  "   mirtazapine (REMERON) 45 MG tablet TAKE 1 TABLET (45 MG TOTAL) BY MOUTH DAILY AT BEDTIME 30 tablet 2    propranolol (INDERAL) 40 mg tablet Take 0.5 tablets (20 mg total) by mouth daily at bedtime 45 tablet 3     No current facility-administered medications for this visit.        Allergies   Allergen Reactions    Cannabidiol Shortness Of Breath, Itching, Swelling, Anxiety, Palpitations, Confusion, Hypertension, Throat Swelling and Tongue Swelling    Amoxicillin-Pot Clavulanate Hives    Decadrol [Dexamethasone] Other (See Comments)     psychosis    Penicillins Hives     Hives/Uticaria    Tetracyclines & Related Hives      Allergy;        Review of Systems    Video Exam    There were no vitals filed for this visit.    Physical Exam     Behavioral Health Psychotherapy Progress Note    Psychotherapy Provided: Individual Psychotherapy     1. Major depressive disorder, recurrent severe without psychotic features (HCC)        2. Generalized anxiety disorder            Goals addressed in session: Goal 1, Goal 2, and Goal 3      DATA: Met with Esther for follow up. Esther shared that she has been doing \"ok\" right now, with less depression and anxiety than typical for her.  She said that she has had some recent health issues that she is trying to address, but this has been somewhat frustrating rather than anxiety-provoking.  She reports continuing to go out regularly with her friends for karaoke and trivia, which she enjoys a lot, and said that she has noticed that her physical pain has been easier to deal with because she is having a good time socially.  She talked about a party she is planning for her friends in February, and she is working on cleaning her house out so that it is clean and organized by then. She said that she has been working on partializing tasks around the house to make the work feel less overwhelming, and this has been helping.  Provided support, validation of Esther's progress and use of positive " "coping strategies, and encouraged continued focus on same.    During this session, this clinician used the following therapeutic modalities: Client-centered Therapy and Cognitive Behavioral Therapy    Substance Abuse was not addressed during this session. If the client is diagnosed with a co-occurring substance use disorder, please indicate any changes in the frequency or amount of use: n/a. Stage of change for addressing substance use diagnoses: No substance use/Not applicable    ASSESSMENT:  Esther Griffith presents with a Euthymic/ normal mood.     her affect is Normal range and intensity, which is congruent, with her mood and the content of the session. The client has made progress on their goals.     Esther Griffith presents with a minimal risk of suicide, minimal risk of self-harm, and minimal risk of harm to others.    For any risk assessment that surpasses a \"low\" rating, a safety plan must be developed.    A safety plan was indicated: no  If yes, describe in detail n/a    PLAN: Between sessions, Esther Griffith will will continue to engage socially to support mood, and will use partialization and other anxiety management strategies to help her feel more productive and content. At the next session, the therapist will use Client-centered Therapy, Cognitive Behavioral Therapy, and Supportive Psychotherapy to address anxiety, depression.    Behavioral Health Treatment Plan and Discharge Planning: Esther Griffith is aware of and agrees to continue to work on their treatment plan. They have identified and are working toward their discharge goals. yes    Depression Follow-up Plan Completed: Yes    Visit start and stop times:    12/13/24  Start Time: 1411  Stop Time: 1441  Total Visit Time: 30 minutes        "

## 2024-12-16 ENCOUNTER — TELEPHONE (OUTPATIENT)
Age: 44
End: 2024-12-16

## 2024-12-16 NOTE — TELEPHONE ENCOUNTER
Patient contacted the office to schedule a follow up visit with provider. Patient is now scheduled for 1/15/25  at 11am virtually.

## 2024-12-19 LAB
METANEPH 24H UR-MRATE: 77 UG/24 HR (ref 36–209)
METANEPHS 24H UR-MCNC: 48 UG/L
NORMETANEPHRINE 24H UR-MCNC: 221 UG/L
NORMETANEPHRINE 24H UR-MRATE: 354 UG/24 HR (ref 131–612)

## 2024-12-24 ENCOUNTER — TELEMEDICINE (OUTPATIENT)
Dept: BEHAVIORAL/MENTAL HEALTH CLINIC | Facility: CLINIC | Age: 44
End: 2024-12-24
Payer: COMMERCIAL

## 2024-12-24 DIAGNOSIS — F33.2 MAJOR DEPRESSIVE DISORDER, RECURRENT SEVERE WITHOUT PSYCHOTIC FEATURES (HCC): Primary | ICD-10-CM

## 2024-12-24 DIAGNOSIS — F41.1 GENERALIZED ANXIETY DISORDER: ICD-10-CM

## 2024-12-24 PROCEDURE — 90832 PSYTX W PT 30 MINUTES: CPT | Performed by: PSYCHIATRY & NEUROLOGY

## 2024-12-24 NOTE — PSYCH
Virtual Regular Visit    Verification of patient location:    Patient is located at Home in the following state in which I hold an active license [place]      Assessment/Plan:    Problem List Items Addressed This Visit          Behavioral Health    Generalized anxiety disorder    Major depressive disorder, recurrent severe without psychotic features (HCC) - Primary     Reason for visit is   Chief Complaint   Patient presents with    Virtual Regular Visit       Encounter provider HERMELINDA ALBA      Recent Visits  No visits were found meeting these conditions.  Showing recent visits within past 7 days and meeting all other requirements  Today's Visits  Date Type Provider Dept   12/24/24 Telemedicine HERMELINDA Alba [facility] [place]   Showing today's visits and meeting all other requirements  Future Appointments  No visits were found meeting these conditions.  Showing future appointments within next 150 days and meeting all other requirements       The patient was identified by name and date of birth. Esther Griffith was informed that this is a telemedicine visit and that the visit is being conducted throughthe Epic Embedded platform. She agrees to proceed..  My office door was closed. No one else was in the room.  She acknowledged consent and understanding of privacy and security of the video platform. The patient has agreed to participate and understands they can discontinue the visit at any time.    Patient is aware this is a billable service.     HPI     Past Medical History:   Diagnosis Date    Alcoholism (HCC) 03/15/2001    Anxiety     Blood transfusion declined because patient is [employer] 05/01/2023    Chronic pain disorder     Chronic sinusitis     Depression     Depression     Diabetes (HCC)     Diabetes mellitus (HCC) 09/01/2022    Fibromyalgia     Headache(784.0) 03/15/1993    Various types of headaches    Memory loss 03/15/2012    Migraine     Obesity     Polyarthritis      Last assessed 9/21/2015    Psychiatric disorder     Psychiatric illness     Sleep difficulties     Substance abuse (HCC) 03/15/1994       Past Surgical History:   Procedure Laterality Date    COLONOSCOPY  06/2019    DENTAL SURGERY      HYSTERECTOMY  05/01/2023    HYSTEROSCOPY      Endometrial Biopsy By Hysteroscopy    HI LAPS SUPRACRV HYSTERECT 250 GM/< RMVL TUBE/OVAR N/A 05/01/2023    Procedure: (LSH) W/ BILATERAL SALPINGECTOMY, REMOVAL PARAOVARIAN CYST;  Surgeon: Sai Lopez DO;  Location: [facility];  Service: Gynecology    REMOVAL OF INTRAUTERINE DEVICE (IUD)      US GUIDED BREAST BIOPSY RIGHT COMPLETE Right 06/17/2019    Benign       Current Outpatient Medications   Medication Sig Dispense Refill    b complex vitamins capsule Take 1 capsule by mouth daily      Blood Glucose Monitoring Suppl (Contour Blood Glucose System) w/Device KIT Use 1 kit 2 (two) times a day before meals 1 kit 0    cholecalciferol (VITAMIN D3) 25 mcg (1,000 units) tablet Take 1,000 Units by mouth daily Take 1 tab. daily      Contour Test test strip USE TO CHECK BLOOD SUGAR ONCE DAILY 50 strip 7    ferrous sulfate 325 (65 Fe) mg tablet Take 325 mg by mouth Take 1 tab. 3 times per week      gabapentin (NEURONTIN) 300 mg capsule Take 1 capsule (300 mg total) by mouth 3 (three) times a day 90 capsule 5    Levomilnacipran HCl ER (FETZIMA) 80 MG extended release capsule Take 1 capsule (80 mg total) by mouth daily 30 capsule 2    levothyroxine 75 mcg tablet TAKE 1 TABLET (75 MCG TOTAL) BY MOUTH DAILY IN THE EARLY MORNING 90 tablet 1    lithium carbonate 300 mg capsule TAKE 1 CAPSULE BY MOUTH 2 TIMES A DAY WITH MEALS. 60 capsule 2    MAGNESIUM MALATE PO Take by mouth Take 1 tab. daily      metFORMIN (GLUCOPHAGE) 500 mg tablet TAKE 1 TABLET BY MOUTH TWICE A DAY WITH FOOD 60 tablet 5    methocarbamol (ROBAXIN) 500 mg tablet TAKE 1 TAB. EVERY 8 HR. AS NEEDED FOR MUSCLE SPASMS 60 tablet 0    mirtazapine (REMERON) 45 MG tablet TAKE 1 TABLET  (45 MG TOTAL) BY MOUTH DAILY AT BEDTIME 30 tablet 2    propranolol (INDERAL) 40 mg tablet Take 0.5 tablets (20 mg total) by mouth daily at bedtime 45 tablet 3     No current facility-administered medications for this visit.        Allergies   Allergen Reactions    Cannabidiol Shortness Of Breath, Itching, Swelling, Anxiety, Palpitations, Confusion, Hypertension, Throat Swelling and Tongue Swelling    Amoxicillin-Pot Clavulanate Hives    Decadrol [Dexamethasone] Other (See Comments)     psychosis    Penicillins Hives     Hives/Uticaria    Tetracyclines & Related Hives      Allergy;        Review of Systems    Video Exam    There were no vitals filed for this visit.    Physical Exam     Behavioral Health Psychotherapy Progress Note    Psychotherapy Provided: Individual Psychotherapy     1. Major depressive disorder, recurrent severe without psychotic features (HCC)        2. Generalized anxiety disorder            Goals addressed in session: Goal 1     DATA: Met with Esther for follow up. Esther shared that she has been struggling with sleep recently, having random nights where she does not sleep at all, which makes functioning day to day difficult.  She said that she is not thinking about anything specific and is unsure why she is struggling so much with sleep.  She shared some recent events with her family, particularly her younger brother, that have been keeping her busier, but said that some days she just cannot do anything, particularly if she does not feel well physically.  She said that she has a lot of chores to catch up on that she will devote some time to in the next day or two, and noted that her mother has not been making a lot of comments about the state of her place, which has been a pleasant change. Esther talked about missing [place], even moreso lately after running into some old friends and sharing pictures with them. She has been working on trying to allow herself to feel her grief while also  "trying to focus on good memories.  Provided support, validation of Esther's feelings and used CBT and mindfulness strategies to help Esther focus on positive progress, allowing herself to process her feelings and take time for rest and self-care as needed, while also socializing with friends to keep her from isolating and declining in  mood.    During this session, this clinician used the following therapeutic modalities: Client-centered Therapy and Cognitive Behavioral Therapy    Substance Abuse was not addressed during this session. If the client is diagnosed with a co-occurring substance use disorder, please indicate any changes in the frequency or amount of use: n/a. Stage of change for addressing substance use diagnoses: No substance use/Not applicable    ASSESSMENT:  Esther Griffith presents with a Euthymic/ normal mood.     her affect is Normal range and intensity, which is congruent, with her mood and the content of the session. The client has made progress on their goals.     Esther Griffith presents with a minimal risk of suicide, minimal risk of self-harm, and minimal risk of harm to others.    For any risk assessment that surpasses a \"low\" rating, a safety plan must be developed.    A safety plan was indicated: no  If yes, describe in detail n/a    PLAN: Between sessions, Esther Griffith will work on CBT strategies discussed today to help boost mood and prioritize self-care. At the next session, the therapist will use Client-centered Therapy and Cognitive Behavioral Therapy to address depression, anxiety.    Behavioral Health Treatment Plan and Discharge Planning: Esther Griffith is aware of and agrees to continue to work on their treatment plan. They have identified and are working toward their discharge goals. yes    Depression Follow-up Plan Completed: Yes    Visit start and stop times:    12/24/24  Start Time: 0902  Stop Time: 0937  Total Visit Time: 35 minutes      "

## 2024-12-26 LAB
DOPAMINE 24H UR-MRATE: 114 UG/24 HR (ref 0–510)
DOPAMINE UR-MCNC: 71 UG/L
EPINEPH 24H UR-MRATE: <5 UG/24 HR (ref 0–20)
EPINEPH UR-MCNC: <3 UG/L
NOREPINEPH 24H UR-MRATE: 56 UG/24 HR (ref 0–135)
NOREPINEPH UR-MCNC: 35 UG/L

## 2024-12-29 DIAGNOSIS — F33.2 SEVERE EPISODE OF RECURRENT MAJOR DEPRESSIVE DISORDER, WITHOUT PSYCHOTIC FEATURES (HCC): ICD-10-CM

## 2024-12-29 RX ORDER — MIRTAZAPINE 45 MG/1
45 TABLET, FILM COATED ORAL
Qty: 30 TABLET | Refills: 2 | Status: SHIPPED | OUTPATIENT
Start: 2024-12-29 | End: 2025-01-06 | Stop reason: SDUPTHER

## 2024-12-30 PROBLEM — Z00.00 WELL ADULT EXAM: Status: RESOLVED | Noted: 2022-08-13 | Resolved: 2024-12-30

## 2025-01-02 ENCOUNTER — TELEPHONE (OUTPATIENT)
Age: 45
End: 2025-01-02

## 2025-01-02 NOTE — TELEPHONE ENCOUNTER
Patient called in asks if her recent lab results have been reviewed.  Looks like labs from 12/12. Please advise and call patient.

## 2025-01-02 NOTE — PROGRESS NOTES
Assessment:  1. Lumbar spondylosis    2. Generalized anxiety disorder    3. Paresthesia        Plan:  Patient may continue gabapentin as prescribed.  This medication was refilled today  We can repeat lumbar radiofrequency ablation when needed  Patient may continue methocarbamol as prescribed by PCP  Continue with chiropractic therapy  Follow-up in 6 months or sooner if needed    History of Present Illness:    The patient is a 44 y.o. female with a history of fibromyalgia, diabetes, diabetic neuropathy last seen on 07/17/2024  who presents for a follow up office visit in regards to chronic into the lower extremities.  Patient did have a bilateral L3-5 RFA completed in June 2024 with ongoing 90% improvement of her back pain.  She unfortunately has failed SI joint injections and lumbar epidural steroid injections.  She also continues in chiropractic therapy    Patient rates her pain a 2 out of 10 on the numeric pain rating scale.  Pain is intermittent and described as dull aching and throbbing.  She continues on gabapentin 300 mg 3 times daily, and methocarbamol 500 mg as needed    I have personally reviewed and/or updated the patient's past medical history, past surgical history, family history, social history, current medications, allergies, and vital signs today.       Review of Systems:    Review of Systems      Past Medical History:   Diagnosis Date    Alcoholism ([facility]) 03/15/2001    Anxiety     Blood transfusion declined because patient is [employer] 05/01/2023    Chronic pain disorder     Chronic sinusitis     Depression     Depression     Diabetes ([facility])     Diabetes mellitus ([facility]) 09/01/2022    Fibromyalgia     Headache(784.0) 03/15/1993    Various types of headaches    Memory loss 03/15/2012    Migraine     Obesity     Polyarthritis     Last assessed 9/21/2015    Psychiatric disorder     Psychiatric illness     Sleep difficulties     Substance abuse ([facility]) 03/15/1994       Past Surgical History:    Procedure Laterality Date    COLONOSCOPY  06/2019    DENTAL SURGERY      HYSTERECTOMY  05/01/2023    HYSTEROSCOPY      Endometrial Biopsy By Hysteroscopy    NE LAPS SUPRACRV HYSTERECT 250 GM/< RMVL TUBE/OVAR N/A 05/01/2023    Procedure: (LSH) W/ BILATERAL SALPINGECTOMY, REMOVAL PARAOVARIAN CYST;  Surgeon: Sai Lopez DO;  Location: [facility];  Service: Gynecology    REMOVAL OF INTRAUTERINE DEVICE (IUD)      US GUIDED BREAST BIOPSY RIGHT COMPLETE Right 06/17/2019    Benign       Family History   Problem Relation Age of Onset    Irregular heart beat Mother     Arthritis Mother     Diabetes Father     Kidney disease Father     Diabetes type II Father     Depression Father     Substance Abuse Brother     Alcohol abuse Brother     ADD / ADHD Brother     Drug abuse Brother     No Known Problems Brother     Depression Paternal Aunt         Unsuccessful suicide attempt in young adulthood    Psychiatric Illness Paternal Aunt     Self-Injury Paternal Aunt     Suicide Attempts Paternal Aunt     Substance Abuse Maternal Uncle     Prostate cancer Maternal Uncle 60    Diabetes type II Maternal Uncle     Diabetes Maternal Uncle     Diabetes Maternal Grandfather     Migraines Maternal Grandfather     Stroke Maternal Grandfather     Diabetes type II Maternal Grandfather     Alcohol abuse Maternal Grandfather         Great-Grandfather    Diabetes Maternal Grandmother     Rheum arthritis Maternal Grandmother     Melanoma Maternal Grandmother 84    Cancer Maternal Grandmother         Skin Cancer - successfully removed    Stroke Maternal Grandmother     Diabetes type II Maternal Grandmother     Depression Maternal Grandmother     Dementia Maternal Grandmother     Diabetes Paternal Grandfather     Lung cancer Paternal Grandfather 47    Cancer Paternal Grandfather         Lung Cancer - cause of death    Kidney disease Paternal Grandmother     Rheum arthritis Paternal Grandmother     Depression Paternal Grandmother          Nervous Breakdown around age 40    Deep vein thrombosis Paternal Grandmother     Diabetes Paternal Grandmother     Diabetes type II Paternal Grandmother     Alcohol abuse Family     Stomach cancer Family     Alcohol abuse Maternal Uncle     Substance Abuse Maternal Uncle     Cancer Maternal Uncle         Prostate & Testicular Cancer    Drug abuse Maternal Uncle     Alcohol abuse Maternal Uncle     Drug abuse Maternal Uncle     Completed Suicide  Neg Hx        Social History     Occupational History    Occupation: unemployed   Tobacco Use    Smoking status: Never     Passive exposure: Never    Smokeless tobacco: Never    Tobacco comments:     N/A, non-smoker.   Vaping Use    Vaping status: Never Used   Substance and Sexual Activity    Alcohol use: Not Currently     Comment: 2 drinks per month    Drug use: Not Currently    Sexual activity: Not Currently     Birth control/protection: Abstinence         Current Outpatient Medications:     b complex vitamins capsule, Take 1 capsule by mouth daily, Disp: , Rfl:     Blood Glucose Monitoring Suppl (Contour Blood Glucose System) w/Device KIT, Use 1 kit 2 (two) times a day before meals, Disp: 1 kit, Rfl: 0    cholecalciferol (VITAMIN D3) 25 mcg (1,000 units) tablet, Take 1,000 Units by mouth daily Take 1 tab. daily, Disp: , Rfl:     Contour Test test strip, USE TO CHECK BLOOD SUGAR ONCE DAILY, Disp: 50 strip, Rfl: 7    ferrous sulfate 325 (65 Fe) mg tablet, Take 325 mg by mouth Take 1 tab. 3 times per week, Disp: , Rfl:     gabapentin (NEURONTIN) 300 mg capsule, Take 1 capsule (300 mg total) by mouth 3 (three) times a day, Disp: 270 capsule, Rfl: 1    Levomilnacipran HCl ER (FETZIMA) 80 MG extended release capsule, Take 1 capsule (80 mg total) by mouth daily, Disp: 30 capsule, Rfl: 2    levothyroxine 75 mcg tablet, TAKE 1 TABLET (75 MCG TOTAL) BY MOUTH DAILY IN THE EARLY MORNING, Disp: 90 tablet, Rfl: 1    lithium carbonate 300 mg capsule, TAKE 1 CAPSULE BY MOUTH 2 TIMES A  "DAY WITH MEALS., Disp: 60 capsule, Rfl: 2    MAGNESIUM MALATE PO, Take by mouth Take 1 tab. daily, Disp: , Rfl:     metFORMIN (GLUCOPHAGE) 500 mg tablet, TAKE 1 TABLET BY MOUTH TWICE A DAY WITH FOOD, Disp: 60 tablet, Rfl: 5    methocarbamol (ROBAXIN) 500 mg tablet, TAKE 1 TAB. EVERY 8 HR. AS NEEDED FOR MUSCLE SPASMS, Disp: 60 tablet, Rfl: 0    mirtazapine (REMERON) 45 MG tablet, TAKE 1 TABLET (45 MG TOTAL) BY MOUTH DAILY AT BEDTIME, Disp: 30 tablet, Rfl: 2    propranolol (INDERAL) 40 mg tablet, Take 0.5 tablets (20 mg total) by mouth daily at bedtime, Disp: 45 tablet, Rfl: 3    Allergies   Allergen Reactions    Cannabidiol Shortness Of Breath, Itching, Swelling, Anxiety, Palpitations, Confusion, Hypertension, Throat Swelling and Tongue Swelling    Amoxicillin-Pot Clavulanate Hives    Decadrol [Dexamethasone] Other (See Comments)     psychosis    Penicillins Hives     Hives/Uticaria    Tetracyclines & Related Hives      Allergy;        Physical Exam:    Ht 5' 1\" (1.549 m)   Wt 83.9 kg (185 lb)   LMP 03/13/2023 (Approximate)   BMI 34.96 kg/m²     Constitutional:normal, well developed, well nourished, alert, in no distress and non-toxic and no overt pain behavior.  Eyes:anicteric  HEENT:grossly intact  Neck:supple, symmetric, trachea midline and no masses   Pulmonary:even and unlabored  Cardiovascular:No edema or pitting edema present  Skin:Normal without rashes or lesions and well hydrated  Psychiatric:Mood and affect appropriate  Neurologic:Cranial Nerves II-XII grossly intact  Musculoskeletal:normal gait      Imaging  No orders to display         No orders of the defined types were placed in this encounter.      "

## 2025-01-03 ENCOUNTER — OFFICE VISIT (OUTPATIENT)
Dept: PAIN MEDICINE | Facility: CLINIC | Age: 45
End: 2025-01-03
Payer: COMMERCIAL

## 2025-01-03 ENCOUNTER — TELEPHONE (OUTPATIENT)
Dept: ENDOCRINOLOGY | Facility: CLINIC | Age: 45
End: 2025-01-03

## 2025-01-03 VITALS — BODY MASS INDEX: 34.93 KG/M2 | HEIGHT: 61 IN | WEIGHT: 185 LBS

## 2025-01-03 DIAGNOSIS — F41.1 GENERALIZED ANXIETY DISORDER: ICD-10-CM

## 2025-01-03 DIAGNOSIS — R20.2 PARESTHESIA: ICD-10-CM

## 2025-01-03 DIAGNOSIS — E03.9 ACQUIRED HYPOTHYROIDISM: Primary | ICD-10-CM

## 2025-01-03 DIAGNOSIS — M47.816 LUMBAR SPONDYLOSIS: Primary | ICD-10-CM

## 2025-01-03 PROCEDURE — 99214 OFFICE O/P EST MOD 30 MIN: CPT | Performed by: NURSE PRACTITIONER

## 2025-01-03 RX ORDER — LEVOTHYROXINE SODIUM 75 UG/1
TABLET ORAL
Start: 2025-01-03

## 2025-01-03 RX ORDER — GABAPENTIN 300 MG/1
300 CAPSULE ORAL 3 TIMES DAILY
Qty: 270 CAPSULE | Refills: 1 | Status: SHIPPED | OUTPATIENT
Start: 2025-01-03

## 2025-01-04 DIAGNOSIS — E11.65 TYPE 2 DIABETES MELLITUS WITH HYPERGLYCEMIA, WITHOUT LONG-TERM CURRENT USE OF INSULIN (HCC): ICD-10-CM

## 2025-01-06 ENCOUNTER — TELEPHONE (OUTPATIENT)
Dept: PSYCHIATRY | Facility: CLINIC | Age: 45
End: 2025-01-06

## 2025-01-06 DIAGNOSIS — F33.2 SEVERE EPISODE OF RECURRENT MAJOR DEPRESSIVE DISORDER, WITHOUT PSYCHOTIC FEATURES (HCC): ICD-10-CM

## 2025-01-06 DIAGNOSIS — F51.04 PSYCHOPHYSIOLOGICAL INSOMNIA: Primary | ICD-10-CM

## 2025-01-06 DIAGNOSIS — M79.18 MYOFASCIAL PAIN: ICD-10-CM

## 2025-01-06 RX ORDER — MIRTAZAPINE 30 MG/1
30 TABLET, FILM COATED ORAL
Qty: 30 TABLET | Refills: 0 | Status: SHIPPED | OUTPATIENT
Start: 2025-01-06

## 2025-01-06 RX ORDER — TRAZODONE HYDROCHLORIDE 50 MG/1
50 TABLET, FILM COATED ORAL
Qty: 30 TABLET | Refills: 0 | Status: SHIPPED | OUTPATIENT
Start: 2025-01-06

## 2025-01-06 NOTE — TELEPHONE ENCOUNTER
Patient called the RX Refill Line. Message is being forwarded to the office.     Patient is requesting something to help her sleep at night. She stated she is only getting 2-3 hours of sleep a night for the last 2 weeks. She would like something sent to [facility] in [place].    Please contact patient at [phone] to discuss.

## 2025-01-06 NOTE — TELEPHONE ENCOUNTER
Called Esther and reviewed. She doesn't take seroquel (not sure if you meant something else?) and she hasn't taken ambien for 2-3 months. She is in agreement with trying trazodone 50 mg at night and if not effective, taking 100 mg.

## 2025-01-07 RX ORDER — METHOCARBAMOL 500 MG/1
TABLET, FILM COATED ORAL
Qty: 60 TABLET | Refills: 0 | Status: SHIPPED | OUTPATIENT
Start: 2025-01-07

## 2025-01-10 ENCOUNTER — TELEMEDICINE (OUTPATIENT)
Dept: BEHAVIORAL/MENTAL HEALTH CLINIC | Facility: CLINIC | Age: 45
End: 2025-01-10
Payer: COMMERCIAL

## 2025-01-10 DIAGNOSIS — F33.2 MAJOR DEPRESSIVE DISORDER, RECURRENT SEVERE WITHOUT PSYCHOTIC FEATURES (HCC): Primary | ICD-10-CM

## 2025-01-10 DIAGNOSIS — F41.1 GENERALIZED ANXIETY DISORDER: ICD-10-CM

## 2025-01-10 PROCEDURE — 90834 PSYTX W PT 45 MINUTES: CPT | Performed by: PSYCHIATRY & NEUROLOGY

## 2025-01-10 NOTE — PSYCH
Virtual Regular Visit    Verification of patient location:    Patient is located at Home in the following state in which I hold an active license [place]      Assessment/Plan:    Problem List Items Addressed This Visit          Behavioral Health    Generalized anxiety disorder    Major depressive disorder, recurrent severe without psychotic features (HCC) - Primary       Reason for visit is   Chief Complaint   Patient presents with    Virtual Regular Visit      Encounter provider HERMELINDA ALBA      Recent Visits  No visits were found meeting these conditions.  Showing recent visits within past 7 days and meeting all other requirements  Today's Visits  Date Type Provider Dept   01/10/25 Telemedicine HERMELINDA Alba [facility] Therapist [place]   Showing today's visits and meeting all other requirements  Future Appointments  No visits were found meeting these conditions.  Showing future appointments within next 150 days and meeting all other requirements       The patient was identified by name and date of birth. Esther Griffith was informed that this is a telemedicine visit and that the visit is being conducted throughthe Epic Embedded platform. She agrees to proceed..  My office door was closed. No one else was in the room.  She acknowledged consent and understanding of privacy and security of the video platform. The patient has agreed to participate and understands they can discontinue the visit at any time.    Patient is aware this is a billable service.     HPI     Past Medical History:   Diagnosis Date    Alcoholism (HCC) 03/15/2001    Anxiety     Blood transfusion declined because patient is [employer] 05/01/2023    Chronic pain disorder     Chronic sinusitis     Depression     Depression     Diabetes (HCC)     Diabetes mellitus (HCC) 09/01/2022    Fibromyalgia     Headache(784.0) 03/15/1993    Various types of headaches    Memory loss 03/15/2012    Migraine     Obesity     Polyarthritis      Last assessed 9/21/2015    Psychiatric disorder     Psychiatric illness     Sleep difficulties     Substance abuse (HCC) 03/15/1994       Past Surgical History:   Procedure Laterality Date    COLONOSCOPY  06/2019    DENTAL SURGERY      HYSTERECTOMY  05/01/2023    HYSTEROSCOPY      Endometrial Biopsy By Hysteroscopy    TN LAPS SUPRACRV HYSTERECT 250 GM/< RMVL TUBE/OVAR N/A 05/01/2023    Procedure: (LSH) W/ BILATERAL SALPINGECTOMY, REMOVAL PARAOVARIAN CYST;  Surgeon: Sai Lopez DO;  Location: [facility];  Service: Gynecology    REMOVAL OF INTRAUTERINE DEVICE (IUD)      US GUIDED BREAST BIOPSY RIGHT COMPLETE Right 06/17/2019    Benign       Current Outpatient Medications   Medication Sig Dispense Refill    b complex vitamins capsule Take 1 capsule by mouth daily      Blood Glucose Monitoring Suppl (Contour Blood Glucose System) w/Device KIT Use 1 kit 2 (two) times a day before meals 1 kit 0    cholecalciferol (VITAMIN D3) 25 mcg (1,000 units) tablet Take 1,000 Units by mouth daily Take 1 tab. daily      Contour Test test strip USE TO CHECK BLOOD SUGAR ONCE DAILY 50 strip 7    ferrous sulfate 325 (65 Fe) mg tablet Take 325 mg by mouth Take 1 tab. 3 times per week      gabapentin (NEURONTIN) 300 mg capsule Take 1 capsule (300 mg total) by mouth 3 (three) times a day 270 capsule 1    Levomilnacipran HCl ER (FETZIMA) 80 MG extended release capsule Take 1 capsule (80 mg total) by mouth daily 30 capsule 2    levothyroxine 75 mcg tablet Taking 1 tab 6 days a week and 2 tabs 1 day a week      lithium carbonate 300 mg capsule TAKE 1 CAPSULE BY MOUTH 2 TIMES A DAY WITH MEALS. 60 capsule 2    MAGNESIUM MALATE PO Take by mouth Take 1 tab. daily      metFORMIN (GLUCOPHAGE) 500 mg tablet TAKE 1 TABLET BY MOUTH TWICE A DAY WITH FOOD 60 tablet 5    methocarbamol (ROBAXIN) 500 mg tablet TAKE 1 TAB. EVERY 8 HR. AS NEEDED FOR MUSCLE SPASMS 60 tablet 0    mirtazapine (REMERON) 30 mg tablet Take 1 tablet (30 mg total) by mouth  "daily at bedtime 30 tablet 0    propranolol (INDERAL) 40 mg tablet Take 0.5 tablets (20 mg total) by mouth daily at bedtime 45 tablet 3    traZODone (DESYREL) 50 mg tablet Take 1 tablet (50 mg total) by mouth daily at bedtime 30 tablet 0     No current facility-administered medications for this visit.        Allergies   Allergen Reactions    Cannabidiol Shortness Of Breath, Itching, Swelling, Anxiety, Palpitations, Confusion, Hypertension, Throat Swelling and Tongue Swelling    Amoxicillin-Pot Clavulanate Hives    Decadrol [Dexamethasone] Other (See Comments)     psychosis    Penicillins Hives     Hives/Uticaria    Tetracyclines & Related Hives      Allergy;        Review of Systems    Video Exam    There were no vitals filed for this visit.    Physical Exam     Behavioral Health Psychotherapy Progress Note    Psychotherapy Provided: Individual Psychotherapy     1. Major depressive disorder, recurrent severe without psychotic features (HCC)        2. Generalized anxiety disorder            Goals addressed in session: Goal 1     DATA: Met with Esther for follow up. Esther shared that she has been a \"hot mess\" lately, as she has had insomnia recently and has not been getting anywhere near enough sleep.  She said that this has cut back on her ability to function due to increased pain,, and she has had a hard time doing any tasks around the house.  She noted that she just called Dr. Ovalle and got a sleeping aid, sharing that last night was the first night she took it and was able to sleep better.  She said that she is hopeful that some improved sleep will help with her pain, but still follows with spine and pain to do some more testing and treatments that have worked for her in the past (injections).  She talked about still trying to go out with friends on occasion to socialize and have some fun, and also said that she is planning and preparing for her party that she is having in February.  She said that she is " "looking forward to that very much, and is excited about hosting the party, which has given her more motivation to do things around her house to clean and decorate.  She said that with increased pain, her mood has been more down, but she still tries to be conscious of the things that bring her happiness and to not focus on her pain so much.  She said that otherwise not much has been on her mind or particularly stressful.  Provided support, validation of her feelings and experience, and used CBT and strengths-based approaches to help Esther focus on what she is able to do, to be patient with herself when she is not able to be more functional, and to prioritize self-care and rest.     During this session, this clinician used the following therapeutic modalities: Client-centered Therapy and Cognitive Behavioral Therapy    Substance Abuse was not addressed during this session. If the client is diagnosed with a co-occurring substance use disorder, please indicate any changes in the frequency or amount of use: n/a. Stage of change for addressing substance use diagnoses: No substance use/Not applicable    ASSESSMENT:  Esther Griffith presents with a Euthymic/ normal mood.     her affect is Normal range and intensity, which is congruent, with her mood and the content of the session. The client has made progress on their goals.     Esther Griffith presents with a low risk of suicide, minimal risk of self-harm, and minimal risk of harm to others.    For any risk assessment that surpasses a \"low\" rating, a safety plan must be developed.    A safety plan was indicated: no  If yes, describe in detail n/a    PLAN: Between sessions, Esther Griffith will focus on self-care and engaging in activities that bring her happiness and distraction from her pain. At the next session, the therapist will use Client-centered Therapy and Cognitive Behavioral Therapy to address depression, anxiety.    Behavioral Health Treatment Plan and Discharge " Planning: Esther Griffith is aware of and agrees to continue to work on their treatment plan. They have identified and are working toward their discharge goals. yes    Depression Follow-up Plan Completed: Yes    Visit start and stop times:    01/10/25  Start Time: 1005  Stop Time: 1045  Total Visit Time: 40 minutes

## 2025-01-15 ENCOUNTER — TELEMEDICINE (OUTPATIENT)
Dept: PSYCHIATRY | Facility: CLINIC | Age: 45
End: 2025-01-15
Payer: COMMERCIAL

## 2025-01-15 DIAGNOSIS — F41.1 GENERALIZED ANXIETY DISORDER: ICD-10-CM

## 2025-01-15 DIAGNOSIS — F33.9 RECURRENT MAJOR DEPRESSION RESISTANT TO TREATMENT (HCC): ICD-10-CM

## 2025-01-15 DIAGNOSIS — F33.2 MAJOR DEPRESSIVE DISORDER, RECURRENT SEVERE WITHOUT PSYCHOTIC FEATURES (HCC): Primary | ICD-10-CM

## 2025-01-15 PROCEDURE — 99214 OFFICE O/P EST MOD 30 MIN: CPT | Performed by: PSYCHIATRY & NEUROLOGY

## 2025-01-15 RX ORDER — LITHIUM CARBONATE 300 MG/1
300 CAPSULE ORAL 2 TIMES DAILY WITH MEALS
Qty: 60 CAPSULE | Refills: 2 | Status: SHIPPED | OUTPATIENT
Start: 2025-01-15

## 2025-01-15 NOTE — PSYCH
Virtual Regular Visit    Verification of patient location:    Patient is located at Home in the following state in which I hold an active license [place]      Assessment/Plan:    Problem List Items Addressed This Visit          Behavioral Health    Generalized anxiety disorder                  Relevant Medications    lithium carbonate 300 mg capsule    Major depressive disorder, recurrent severe without psychotic features (HCC) - Primary                  Relevant Medications    lithium carbonate 300 mg capsule     Other Visit Diagnoses         Recurrent major depression resistant to treatment (HCC)        Relevant Medications    lithium carbonate 300 mg capsule                      Depression Screening and Follow-up Plan: Patient's depression screening was positive with a PHQ-9 score of 12.   Continue regular follow-up with their mental health provider who is managing their mental health condition(s).       Reason for visit is No chief complaint on file.           Encounter provider Cristina Bray MD      Recent Visits  No visits were found meeting these conditions.  Showing recent visits within past 7 days and meeting all other requirements  Today's Visits  Date Type Provider Dept   01/15/25 Telemedicine Cristina Bray MD [facility]   Showing today's visits and meeting all other requirements  Future Appointments  No visits were found meeting these conditions.  Showing future appointments within next 150 days and meeting all other requirements       The patient was identified by name and date of birth. Esther Griffith was informed that this is a telemedicine visit and that the visit is being conducted throughthe Epic Embedded platform. She agrees to proceed..  My office door was closed. No one else was in the room.  She acknowledged consent and understanding of privacy and security of the video platform. The patient has agreed to participate and understands they can discontinue  the visit at any time.    Patient is aware this is a billable service.     Subjective  Esther Griffith is a 45 y.o. female with MDD and CAROL .Patient remains compliant with medications and denies side effects. She continues to manage her diabetes with medication and diet.    She continues to meet with pain management and has limited benefit from epidural injections.   She restarted  Gabapentin for neuropathy  300 mg po qhs to help with sleep as well.    She stated she was experiencing chest pain after taking Xanax 0.25 mg and she was tapered off the medication and her chest pain stopped.   Currently on Fetzima and recently had dose increase to 80 mg.   Continued lithium 300 mg po bid for depression.   Will continue to meet with counselor every 2 weeks.     Since last seen she was taperd down to 30 mg Mirtazapine and started  on Trazodone 50 mg due to difficulties with sleep. She stated she has been sleeping better now and instead of continuing to taper off Mirtazapine she will like to stay with her current medication regimen.  Nerve ablations in May.  F/u with endocrinology .  Agrees to schedule follow up in 3 months or sooner if needed.          HPI     Past Medical History:   Diagnosis Date    Alcoholism ([facility]) 03/15/2001    Anxiety     Blood transfusion declined because patient is [place] 05/01/2023    Chronic pain disorder     Chronic sinusitis     Depression     Depression     Diabetes ([facility])     Diabetes mellitus ([facility]) 09/01/2022    Fibromyalgia     Headache(784.0) 03/15/1993    Various types of headaches    Memory loss 03/15/2012    Migraine     Obesity     Polyarthritis     Last assessed 9/21/2015    Psychiatric disorder     Psychiatric illness     Sleep difficulties     Substance abuse ([facility]) 03/15/1994       Past Surgical History:   Procedure Laterality Date    COLONOSCOPY  06/2019    DENTAL SURGERY      HYSTERECTOMY  05/01/2023    HYSTEROSCOPY      Endometrial Biopsy By Hysteroscopy    HI LAPS SUPRACRV  HYSTERECT 250 GM/< RMVL TUBE/OVAR N/A 05/01/2023    Procedure: (LS) W/ BILATERAL SALPINGECTOMY, REMOVAL PARAOVARIAN CYST;  Surgeon: Sai Lopez DO;  Location: [facility];  Service: Gynecology    REMOVAL OF INTRAUTERINE DEVICE (IUD)      US GUIDED BREAST BIOPSY RIGHT COMPLETE Right 06/17/2019    Benign       Current Outpatient Medications   Medication Sig Dispense Refill    lithium carbonate 300 mg capsule Take 1 capsule (300 mg total) by mouth 2 (two) times a day with meals 60 capsule 2    b complex vitamins capsule Take 1 capsule by mouth daily      Blood Glucose Monitoring Suppl (Contour Blood Glucose System) w/Device KIT Use 1 kit 2 (two) times a day before meals 1 kit 0    cholecalciferol (VITAMIN D3) 25 mcg (1,000 units) tablet Take 1,000 Units by mouth daily Take 1 tab. daily      Contour Test test strip USE TO CHECK BLOOD SUGAR ONCE DAILY 50 strip 7    ferrous sulfate 325 (65 Fe) mg tablet Take 325 mg by mouth Take 1 tab. 3 times per week      gabapentin (NEURONTIN) 300 mg capsule Take 1 capsule (300 mg total) by mouth 3 (three) times a day 270 capsule 1    Levomilnacipran HCl ER (FETZIMA) 80 MG extended release capsule Take 1 capsule (80 mg total) by mouth daily 30 capsule 2    levothyroxine 75 mcg tablet Taking 1 tab 6 days a week and 2 tabs 1 day a week      MAGNESIUM MALATE PO Take by mouth Take 1 tab. daily      metFORMIN (GLUCOPHAGE) 500 mg tablet TAKE 1 TABLET BY MOUTH TWICE A DAY WITH FOOD 60 tablet 5    methocarbamol (ROBAXIN) 500 mg tablet TAKE 1 TAB. EVERY 8 HR. AS NEEDED FOR MUSCLE SPASMS 60 tablet 0    mirtazapine (REMERON) 30 mg tablet Take 1 tablet (30 mg total) by mouth daily at bedtime 30 tablet 0    propranolol (INDERAL) 40 mg tablet Take 0.5 tablets (20 mg total) by mouth daily at bedtime 45 tablet 3    traZODone (DESYREL) 50 mg tablet Take 1 tablet (50 mg total) by mouth daily at bedtime 30 tablet 0     No current facility-administered medications for this visit.        Allergies    Allergen Reactions    Cannabidiol Shortness Of Breath, Itching, Swelling, Anxiety, Palpitations, Confusion, Hypertension, Throat Swelling and Tongue Swelling    Amoxicillin-Pot Clavulanate Hives    Decadrol [Dexamethasone] Other (See Comments)     psychosis    Penicillins Hives     Hives/Uticaria    Tetracyclines & Related Hives      Allergy;        Review of Systems     Mood Anxiety and Depression   Behavior Normal    Thought Content Disturbing Thoughts, Feelings   General Emotional Problems and Decreased Functioning   Personality Normal   Other Psych Symptoms Normal   Constitutional Negative   ENT Negative   Cardiovascular Negative   Respiratory Negative   Gastrointestinal Negative   Genitourinary Negative   Musculoskeletal Negative   Integumentary Negative   Neurological Negative   Endocrine Normal    Other Symptoms Normal        Laboratory Results: Recent Labs (last 12 months):   Appointment on 12/09/2024   Component Date Value    Lithium Lvl 12/09/2024 0.61    Appointment on 12/06/2024   Component Date Value    Lithium Lvl 12/06/2024 1.22 (H)     Sodium 12/06/2024 139     Potassium 12/06/2024 3.9     Chloride 12/06/2024 102     CO2 12/06/2024 29     ANION GAP 12/06/2024 8     BUN 12/06/2024 13     Creatinine 12/06/2024 0.80     Glucose 12/06/2024 169 (H)     Calcium 12/06/2024 10.3 (H)     AST 12/06/2024 35     ALT 12/06/2024 35     Alkaline Phosphatase 12/06/2024 58     Total Protein 12/06/2024 7.5     Albumin 12/06/2024 5.0     Total Bilirubin 12/06/2024 0.47     eGFR 12/06/2024 88     Cholesterol 12/09/2024 156     Triglycerides 12/09/2024 188 (H)     HDL, Direct 12/09/2024 36 (L)     LDL Calculated 12/09/2024 82     Non-HDL-Chol (CHOL-HDL) 12/09/2024 120     Creatinine, Ur 12/09/2024 80.6     Albumin,U,Random 12/09/2024 <7.0     Albumin Creat Ratio 12/09/2024      Epinephrine, 24H Ur 12/12/2024 <5     Norepinephrine, 24H Ur 12/12/2024 56     Dopamine , 24H Ur 12/12/2024 114     Epinephrine, [place] Ur  12/12/2024 <3     Norepinephrine, [place] Ur 12/12/2024 35     Dopamine, [place] Ur 12/12/2024 71     Metaneph, Total, 24H Ur 12/12/2024 77     Metanephrines, Ur 12/12/2024 48     Normetanephrine, Ur 12/12/2024 221     Normetanephrine, 24H Ur 12/12/2024 354     TSH 3RD GENERATON 12/06/2024 3.712     Free T4 12/06/2024 0.82    Admission on 12/04/2024, Discharged on 12/04/2024   Component Date Value    Ventricular Rate 12/04/2024 91     Atrial Rate 12/04/2024 91     MS Interval 12/04/2024 140     QRSD Interval 12/04/2024 70     QT Interval 12/04/2024 338     QTC Interval 12/04/2024 416     P Axis 12/04/2024 17     QRS Axis 12/04/2024 4     T Wave [place] 12/04/2024 -19     WBC 12/04/2024 7.69     RBC 12/04/2024 4.54     Hemoglobin 12/04/2024 14.3     Hematocrit 12/04/2024 43.2     MCV 12/04/2024 95     MCH 12/04/2024 31.5     MCHC 12/04/2024 33.1     RDW 12/04/2024 13.2     MPV 12/04/2024 9.0     Platelets 12/04/2024 256     nRBC 12/04/2024 0     Segmented % 12/04/2024 59     Immature Grans % 12/04/2024 0     Lymphocytes % 12/04/2024 34     Monocytes % 12/04/2024 5     Eosinophils Relative 12/04/2024 2     Basophils Relative 12/04/2024 0     Absolute Neutrophils 12/04/2024 4.51     Absolute Immature Grans 12/04/2024 0.02     Absolute Lymphocytes 12/04/2024 2.63     Absolute Monocytes 12/04/2024 0.38     Eosinophils Absolute 12/04/2024 0.12     Basophils Absolute 12/04/2024 0.03     Sodium 12/04/2024 138     Potassium 12/04/2024 3.6     Chloride 12/04/2024 103     CO2 12/04/2024 27     ANION GAP 12/04/2024 8     BUN 12/04/2024 12     Creatinine 12/04/2024 0.80     Glucose 12/04/2024 160 (H)     Calcium 12/04/2024 9.5     AST 12/04/2024 34     ALT 12/04/2024 35     Alkaline Phosphatase 12/04/2024 54     Total Protein 12/04/2024 7.1     Albumin 12/04/2024 4.8     Total Bilirubin 12/04/2024 0.47     eGFR 12/04/2024 88     hs TnI 0hr 12/04/2024 <2     hs TnI 2hr 12/04/2024 <2     Delta 2hr hsTnI 12/04/2024      Ventricular Rate  12/04/2024 83     Atrial Rate 12/04/2024 83     NC Interval 12/04/2024 140     QRSD Interval 12/04/2024 78     QT Interval 12/04/2024 362     QTC Interval 12/04/2024 425     P Axis 12/04/2024 36     QRS Axis 12/04/2024 39     T Wave [place] 12/04/2024 -32    Office Visit on 12/03/2024   Component Date Value    Hemoglobin A1C 12/03/2024 6.3    Appointment on 10/18/2024   Component Date Value    WBC 10/18/2024 11.76 (H)     RBC 10/18/2024 4.23     Hemoglobin 10/18/2024 13.4     Hematocrit 10/18/2024 41.2     MCV 10/18/2024 97     MCH 10/18/2024 31.7     MCHC 10/18/2024 32.5     RDW 10/18/2024 14.4     MPV 10/18/2024 9.1     Platelets 10/18/2024 297     nRBC 10/18/2024 0     Segmented % 10/18/2024 57     Immature Grans % 10/18/2024 1     Lymphocytes % 10/18/2024 35     Monocytes % 10/18/2024 6     Eosinophils Relative 10/18/2024 1     Basophils Relative 10/18/2024 0     Absolute Neutrophils 10/18/2024 6.61     Absolute Immature Grans 10/18/2024 0.06     Absolute Lymphocytes 10/18/2024 4.15     Absolute Monocytes 10/18/2024 0.73     Eosinophils Absolute 10/18/2024 0.16     Basophils Absolute 10/18/2024 0.05    Admission on 10/06/2024, Discharged on 10/06/2024   Component Date Value    Ventricular Rate 10/06/2024 104     Atrial Rate 10/06/2024 104     NC Interval 10/06/2024 144     QRSD Interval 10/06/2024 76     QT Interval 10/06/2024 338     QTC Interval 10/06/2024 444     P Axis 10/06/2024 36     QRS [place] 10/06/2024 75     T Wave [place] 10/06/2024 -28     WBC 10/06/2024 10.62 (H)     RBC 10/06/2024 4.58     Hemoglobin 10/06/2024 14.3     Hematocrit 10/06/2024 44.3     MCV 10/06/2024 97     MCH 10/06/2024 31.2     MCHC 10/06/2024 32.3     RDW 10/06/2024 13.6     MPV 10/06/2024 9.1     Platelets 10/06/2024 254     nRBC 10/06/2024 0     Segmented % 10/06/2024 62     Immature Grans % 10/06/2024 0     Lymphocytes % 10/06/2024 31     Monocytes % 10/06/2024 6     Eosinophils Relative 10/06/2024 1     Basophils Relative 10/06/2024  0     Absolute Neutrophils 10/06/2024 6.54     Absolute Immature Grans 10/06/2024 0.04     Absolute Lymphocytes 10/06/2024 3.29     Absolute Monocytes 10/06/2024 0.60     Eosinophils Absolute 10/06/2024 0.12     Basophils Absolute 10/06/2024 0.03     Sodium 10/06/2024 135     Potassium 10/06/2024 3.8     Chloride 10/06/2024 101     CO2 10/06/2024 25     ANION GAP 10/06/2024 9     BUN 10/06/2024 10     Creatinine 10/06/2024 0.75     Glucose 10/06/2024 178 (H)     Calcium 10/06/2024 10.4 (H)     AST 10/06/2024 29     ALT 10/06/2024 36     Alkaline Phosphatase 10/06/2024 56     Total Protein 10/06/2024 7.3     Albumin 10/06/2024 4.8     Total Bilirubin 10/06/2024 0.50     eGFR 10/06/2024 95     D-Dimer, Quant 10/06/2024 <0.27     hs TnI 0hr 10/06/2024 <2     EXT Preg Test, Ur 10/06/2024 Negative     Control 10/06/2024 Valid     hs TnI 2hr 10/06/2024 <2     Delta 2hr hsTnI 10/06/2024     Appointment on 09/07/2024   Component Date Value    WBC 09/07/2024 12.48 (H)     RBC 09/07/2024 4.38     Hemoglobin 09/07/2024 13.8     Hematocrit 09/07/2024 42.8     MCV 09/07/2024 98     MCH 09/07/2024 31.5     MCHC 09/07/2024 32.2     RDW 09/07/2024 14.3     MPV 09/07/2024 9.3     Platelets 09/07/2024 278     nRBC 09/07/2024 0     Segmented % 09/07/2024 68     Immature Grans % 09/07/2024 1     Lymphocytes % 09/07/2024 25     Monocytes % 09/07/2024 5     Eosinophils Relative 09/07/2024 1     Basophils Relative 09/07/2024 0     Absolute Neutrophils 09/07/2024 8.46 (H)     Absolute Immature Grans 09/07/2024 0.06     Absolute Lymphocytes 09/07/2024 3.14     Absolute Monocytes 09/07/2024 0.67     Eosinophils Absolute 09/07/2024 0.12     Basophils Absolute 09/07/2024 0.03    Appointment on 07/17/2024   Component Date Value    WBC 07/17/2024 11.65 (H)     RBC 07/17/2024 3.81     Hemoglobin 07/17/2024 12.3     Hematocrit 07/17/2024 36.9     MCV 07/17/2024 97     MCH 07/17/2024 32.3     MCHC 07/17/2024 33.3     RDW 07/17/2024 14.3     MPV  07/17/2024 9.2     Platelets 07/17/2024 259     nRBC 07/17/2024 0     Segmented % 07/17/2024 67     Immature Grans % 07/17/2024 0     Lymphocytes % 07/17/2024 27     Monocytes % 07/17/2024 5     Eosinophils Relative 07/17/2024 1     Basophils Relative 07/17/2024 0     Absolute Neutrophils 07/17/2024 7.63 (H)     Absolute Immature Grans 07/17/2024 0.05     Absolute Lymphocytes 07/17/2024 3.17     Absolute Monocytes 07/17/2024 0.60     Eosinophils Absolute 07/17/2024 0.16     Basophils Absolute 07/17/2024 0.04     TSH 3RD GENERATON 09/07/2024 3.287    Office Visit on 07/02/2024   Component Date Value    Sed Rate 07/02/2024 3     CRP 07/02/2024 6.3 (H)     SS-A (RO) Ab 07/02/2024 <0.2     SS-B (LA) Ab 07/02/2024 <0.2    Orders Only on 06/26/2024   Component Date Value    Right Eye Diabetic Retin* 06/26/2024 None     Left Eye Diabetic Retino* 06/26/2024 None    There may be more visits with results that are not included.         Substance Abuse History:  Social History     Substance and Sexual Activity   Drug Use Not Currently       Family Psychiatric History:   Family History   Problem Relation Age of Onset    Irregular heart beat Mother     Arthritis Mother     Diabetes Father     Kidney disease Father     Diabetes type II Father     Depression Father     Substance Abuse Brother     Alcohol abuse Brother     ADD / ADHD Brother     Drug abuse Brother     No Known Problems Brother     Depression Paternal Aunt         Unsuccessful suicide attempt in young adulthood    Psychiatric Illness Paternal Aunt     Self-Injury Paternal Aunt     Suicide Attempts Paternal Aunt     Substance Abuse Maternal Uncle     Prostate cancer Maternal Uncle 60    Diabetes type II Maternal Uncle     Diabetes Maternal Uncle     Diabetes Maternal Grandfather     Migraines Maternal Grandfather     Stroke Maternal Grandfather     Diabetes type II Maternal Grandfather     Alcohol abuse Maternal Grandfather         Great-Grandfather    Diabetes  Maternal Grandmother     Rheum arthritis Maternal Grandmother     Melanoma Maternal Grandmother 84    Cancer Maternal Grandmother         Skin Cancer - successfully removed    Stroke Maternal Grandmother     Diabetes type II Maternal Grandmother     Depression Maternal Grandmother     Dementia Maternal Grandmother     Diabetes Paternal Grandfather     Lung cancer Paternal Grandfather 47    Cancer Paternal Grandfather         Lung Cancer - cause of death    Kidney disease Paternal Grandmother     Rheum arthritis Paternal Grandmother     Depression Paternal Grandmother         Nervous Breakdown around age 40    Deep vein thrombosis Paternal Grandmother     Diabetes Paternal Grandmother     Diabetes type II Paternal Grandmother     Alcohol abuse Family     Stomach cancer Family     Alcohol abuse Maternal Uncle     Substance Abuse Maternal Uncle     Cancer Maternal Uncle         Prostate & Testicular Cancer    Drug abuse Maternal Uncle     Alcohol abuse Maternal Uncle     Drug abuse Maternal Uncle     Completed Suicide  Neg Hx        The following portions of the patient's history were reviewed and updated as appropriate: allergies, current medications, past family history, past medical history, past social history, past surgical history, and problem list.    Social History     Socioeconomic History    Marital status: Single     Spouse name: Not on file    Number of children: 0    Years of education: 12 years     Highest education level: GED or equivalent   Occupational History    Occupation: unemployed   Tobacco Use    Smoking status: Never     Passive exposure: Never    Smokeless tobacco: Never    Tobacco comments:     N/A, non-smoker.   Vaping Use    Vaping status: Never Used   Substance and Sexual Activity    Alcohol use: Not Currently     Comment: 2 drinks per month    Drug use: Not Currently    Sexual activity: Not Currently     Birth control/protection: Abstinence   Other Topics Concern    Not on file   Social  History Narrative    Caffeine use        What type of home do you live in: Single house    Age of your home: 46 yrs    How long have you been living there: 42 yrs    Type of heat: Baseboard    Type of fuel: Electric    What type of samir is in your bedroom: Carpet    Do you have the following in or near your home:    Air products: Window air conditioning and Ionic air purifier    Pests: Mice    Pets: Cat    Basement: None     Social Drivers of Health     Financial Resource Strain: High Risk (12/2/2020)    Overall Financial Resource Strain (CARDIA)     Difficulty of Paying Living Expenses: Hard   Food Insecurity: No Food Insecurity (10/16/2023)    Hunger Vital Sign     Worried About Running Out of Food in the Last Year: Never true     Ran Out of Food in the Last Year: Never true   Transportation Needs: No Transportation Needs (10/16/2023)    PRAPARE - Transportation     Lack of Transportation (Medical): No     Lack of Transportation (Non-Medical): No   Physical Activity: Insufficiently Active (10/12/2022)    Exercise Vital Sign     Days of Exercise per Week: 2 days     Minutes of Exercise per Session: 60 min   Stress: Stress Concern Present (4/3/2019)    [employer] - Occupational Stress Questionnaire     Feeling of Stress : To some extent   Social Connections: Moderately Integrated (4/3/2019)    Social Connection and Isolation Panel [NHANES]     Frequency of Communication with Friends and Family: More than three times a week     Frequency of Social Gatherings with Friends and Family: More than three times a week     Attends [employer] Services: More than 4 times per year     Active Member of Clubs or Organizations: Yes     Attends Club or Organization Meetings: More than 4 times per year     Marital Status: Never    Intimate Partner Violence: Not At Risk (4/3/2019)    Humiliation, Afraid, Rape, and Kick questionnaire     Fear of Current or Ex-Partner: No     Emotionally Abused:  No     Physically Abused: No     Sexually Abused: No   Housing Stability: Low Risk  (10/16/2023)    Housing Stability Vital Sign     Unable to Pay for Housing in the Last Year: No     Number of Times Moved in the Last Year: 1     Homeless in the Last Year: No     Social History     Social History Narrative    Caffeine use        What type of home do you live in: Single house    Age of your home: 46 yrs    How long have you been living there: 42 yrs    Type of heat: Baseboard    Type of fuel: Electric    What type of samir is in your bedroom: Carpet    Do you have the following in or near your home:    Air products: Window air conditioning and Ionic air purifier    Pests: Mice    Pets: Cat    Basement: None       Objective:       Mental status:  Appearance calm and cooperative , adequate hygiene and grooming, and good eye contact    Mood dysphoric   Affect affect was constricted   Speech a normal rate and fluent   Thought Processes coherent/organized and normal thought processes   Hallucinations no hallucinations present    Thought Content no delusions   Abnormal Thoughts no suicidal thoughts  and no homicidal thoughts    Orientation  oriented to person and place and time   Remote Memory short term memory intact and long term memory intact   Attention Span concentration intact   Intellect Appears to be of Average Intelligence   Insight Limited insight   Judgement judgment was limited   Muscle Strength N/a   Language no difficulty naming common objects and no difficulty repeating a phrase    Fund of Knowledge displays adequate knowledge of current events, adequate fund of knowledge regarding past history, and adequate fund of knowledge regarding vocabulary                Assessment/Plan:       Diagnoses and all orders for this visit:    Major depressive disorder, recurrent severe without psychotic features (HCC)    Generalized anxiety disorder    Recurrent major depression resistant to treatment (HCC)  -      lithium carbonate 300 mg capsule; Take 1 capsule (300 mg total) by mouth 2 (two) times a day with meals              Assessment & Plan  Major depressive disorder, recurrent severe without psychotic features (HCC)         Generalized anxiety disorder         Recurrent major depression resistant to treatment (HCC)    Orders:    lithium carbonate 300 mg capsule; Take 1 capsule (300 mg total) by mouth 2 (two) times a day with meals           Treatment Recommendations- Risks Benefits      Immediate Medical/Psychiatric/Psychotherapy Treatments and Any Precautions: continue current treatment     Risks, Benefits And Possible Side Effects Of Medications:  {PSYCH RISK, BENEFITS AND POSSIBLE SIDE EFFECTS (Optional):24827    Controlled Medication Discussion: Discussed with patient Black Box warning on concurrent use of benzodiazepines and opioid medications including sedation, respiratory depression, coma and death. Patient understands the risk of treatment with benzodiazepines in addition to opioids and wants to continue taking those medications. , Discussed with patient the risks of sedation, respiratory depression, impairment of ability to drive and potential for abuse and addiction related to treatment with benzodiazepine medications. The patient understands risk of treatment with benzodiazepine medications, agrees to not drive if feels impaired and agrees to take medications as prescribed., and The patient has been filling controlled prescriptions on time as prescribed to [place] Prescription Drug Monitoring program.      Psychotherapy Provided: No                      Visit Time    Visit Start Time: 11:00  Visit Stop Time: 11:30  Total Visit Duration:  30 minutes

## 2025-01-16 ENCOUNTER — TELEPHONE (OUTPATIENT)
Dept: PSYCHIATRY | Facility: CLINIC | Age: 45
End: 2025-01-16

## 2025-01-16 ENCOUNTER — TELEPHONE (OUTPATIENT)
Age: 45
End: 2025-01-16

## 2025-01-16 DIAGNOSIS — G43.709 CHRONIC MIGRAINE WITHOUT AURA WITHOUT STATUS MIGRAINOSUS, NOT INTRACTABLE: Chronic | ICD-10-CM

## 2025-01-16 RX ORDER — PROPRANOLOL HYDROCHLORIDE 40 MG/1
20 TABLET ORAL
Qty: 45 TABLET | Refills: 0 | OUTPATIENT
Start: 2025-01-16 | End: 2026-01-11

## 2025-01-16 NOTE — TELEPHONE ENCOUNTER
Called and left message for patient to return a call to [phone] and schedule 3 month follow up with provider (Cristina Ovalle). Please schedule upon return call. Thank you.

## 2025-01-16 NOTE — TELEPHONE ENCOUNTER
Patient contacted the office to schedule a follow up visit with provider. Patient is now scheduled for 2/27/2025  at 12pm virtually.

## 2025-01-20 ENCOUNTER — TELEPHONE (OUTPATIENT)
Dept: NEUROLOGY | Facility: CLINIC | Age: 45
End: 2025-01-20

## 2025-01-20 DIAGNOSIS — G43.709 CHRONIC MIGRAINE WITHOUT AURA WITHOUT STATUS MIGRAINOSUS, NOT INTRACTABLE: Chronic | ICD-10-CM

## 2025-01-20 RX ORDER — PROPRANOLOL HYDROCHLORIDE 40 MG/1
20 TABLET ORAL
Qty: 45 TABLET | Refills: 0 | Status: CANCELLED | OUTPATIENT
Start: 2025-01-20 | End: 2026-01-15

## 2025-01-20 NOTE — TELEPHONE ENCOUNTER
Reason for call:   [x] Refill   [] Prior Auth  [] Other:     Office:   [] PCP/Provider -   [x] Specialty/Provider - neuro     Medication: propranolol    Dose/Frequency: 40 mg take half tablet at bedtime     Quantity: 45    Pharmacy: [facility] [address]    Does the patient have enough for 3 days?   [x] Yes   [] No - Send as HP to POD

## 2025-01-23 DIAGNOSIS — G43.709 CHRONIC MIGRAINE WITHOUT AURA WITHOUT STATUS MIGRAINOSUS, NOT INTRACTABLE: Chronic | ICD-10-CM

## 2025-01-23 DIAGNOSIS — M79.18 MYOFASCIAL PAIN: ICD-10-CM

## 2025-01-23 RX ORDER — METHOCARBAMOL 500 MG/1
TABLET, FILM COATED ORAL
Qty: 60 TABLET | Refills: 0 | Status: SHIPPED | OUTPATIENT
Start: 2025-01-23

## 2025-01-23 NOTE — TELEPHONE ENCOUNTER
Medication: propranolol (INDERAL) 40 mg tablet     Dose/Frequency: Take 0.5 tablets (20 mg total) by mouth daily at bedtime     Quantity: 45 tablet     Pharmacy: [facility]/pharmacy #[id number] - [place] - [address]  [address] [place] [address]  Phone: [phone]  Fax: [phone]  IDA #: [id number]     Office:   [] PCP/Provider -   [x] Speciality/Provider -     Does the patient have enough for 3 days?   [] Yes   [x] No - Send as HP to POD

## 2025-01-24 ENCOUNTER — TELEMEDICINE (OUTPATIENT)
Dept: BEHAVIORAL/MENTAL HEALTH CLINIC | Facility: CLINIC | Age: 45
End: 2025-01-24
Payer: COMMERCIAL

## 2025-01-24 DIAGNOSIS — F41.1 GENERALIZED ANXIETY DISORDER: ICD-10-CM

## 2025-01-24 DIAGNOSIS — F33.2 MAJOR DEPRESSIVE DISORDER, RECURRENT SEVERE WITHOUT PSYCHOTIC FEATURES (HCC): Primary | ICD-10-CM

## 2025-01-24 PROCEDURE — 90834 PSYTX W PT 45 MINUTES: CPT | Performed by: PSYCHIATRY & NEUROLOGY

## 2025-01-24 NOTE — PSYCH
Virtual Regular Visit    Verification of patient location:    Patient is located at Home in the following state in which I hold an active license [place]      Assessment/Plan:    Problem List Items Addressed This Visit          Behavioral Health    Generalized anxiety disorder    Major depressive disorder, recurrent severe without psychotic features (HCC) - Primary       Reason for visit is   Chief Complaint   Patient presents with    Virtual Regular Visit       Encounter provider HERMELINDA ALBA      Recent Visits  No visits were found meeting these conditions.  Showing recent visits within past 7 days and meeting all other requirements  Today's Visits  Date Type Provider Dept   01/24/25 Telemedicine HERMELINDA [facility] Therapist [place]   Showing today's visits and meeting all other requirements  Future Appointments  No visits were found meeting these conditions.  Showing future appointments within next 150 days and meeting all other requirements       The patient was identified by name and date of birth. Esther Griffith was informed that this is a telemedicine visit and that the visit is being conducted throughthe Epic Embedded platform. She agrees to proceed..  My office door was closed. No one else was in the room.  She acknowledged consent and understanding of privacy and security of the video platform. The patient has agreed to participate and understands they can discontinue the visit at any time.    Patient is aware this is a billable service.     HPI     Past Medical History:   Diagnosis Date    Alcoholism (HCC) 03/15/2001    Anxiety     Blood transfusion declined because patient is Sikh 05/01/2023    Chronic pain disorder     Chronic sinusitis     Depression     Depression     Diabetes (HCC)     Diabetes mellitus (HCC) 09/01/2022    Fibromyalgia     Headache(784.0) 03/15/1993    Various types of headaches    Memory loss 03/15/2012    Migraine     Obesity     Polyarthritis      Last assessed 9/21/2015    Psychiatric disorder     Psychiatric illness     Sleep difficulties     Substance abuse (HCC) 03/15/1994       Past Surgical History:   Procedure Laterality Date    COLONOSCOPY  06/2019    DENTAL SURGERY      HYSTERECTOMY  05/01/2023    HYSTEROSCOPY      Endometrial Biopsy By Hysteroscopy    DC LAPS SUPRACRV HYSTERECT 250 GM/< RMVL TUBE/OVAR N/A 05/01/2023    Procedure: (LSH) W/ BILATERAL SALPINGECTOMY, REMOVAL PARAOVARIAN CYST;  Surgeon: Sai Lopez DO;  Location: [facility];  Service: Gynecology    REMOVAL OF INTRAUTERINE DEVICE (IUD)      US GUIDED BREAST BIOPSY RIGHT COMPLETE Right 06/17/2019    Benign       Current Outpatient Medications   Medication Sig Dispense Refill    b complex vitamins capsule Take 1 capsule by mouth daily      Blood Glucose Monitoring Suppl (Contour Blood Glucose System) w/Device KIT Use 1 kit 2 (two) times a day before meals 1 kit 0    cholecalciferol (VITAMIN D3) 25 mcg (1,000 units) tablet Take 1,000 Units by mouth daily Take 1 tab. daily      Contour Test test strip USE TO CHECK BLOOD SUGAR ONCE DAILY 50 strip 7    ferrous sulfate 325 (65 Fe) mg tablet Take 325 mg by mouth Take 1 tab. 3 times per week      gabapentin (NEURONTIN) 300 mg capsule Take 1 capsule (300 mg total) by mouth 3 (three) times a day 270 capsule 1    Levomilnacipran HCl ER (FETZIMA) 80 MG extended release capsule Take 1 capsule (80 mg total) by mouth daily 30 capsule 2    levothyroxine 75 mcg tablet Taking 1 tab 6 days a week and 2 tabs 1 day a week      lithium carbonate 300 mg capsule Take 1 capsule (300 mg total) by mouth 2 (two) times a day with meals 60 capsule 2    MAGNESIUM MALATE PO Take by mouth Take 1 tab. daily      metFORMIN (GLUCOPHAGE) 500 mg tablet TAKE 1 TABLET BY MOUTH TWICE A DAY WITH FOOD 60 tablet 5    methocarbamol (ROBAXIN) 500 mg tablet TAKE 1 TAB. EVERY 8 HR. AS NEEDED FOR MUSCLE SPASMS 60 tablet 0    mirtazapine (REMERON) 30 mg tablet Take 1 tablet (30  mg total) by mouth daily at bedtime 30 tablet 0    propranolol (INDERAL) 40 mg tablet Take 0.5 tablets (20 mg total) by mouth daily at bedtime 45 tablet 3    traZODone (DESYREL) 50 mg tablet Take 1 tablet (50 mg total) by mouth daily at bedtime 30 tablet 0     No current facility-administered medications for this visit.        Allergies   Allergen Reactions    Cannabidiol Shortness Of Breath, Itching, Swelling, Anxiety, Palpitations, Confusion, Hypertension, Throat Swelling and Tongue Swelling    Amoxicillin-Pot Clavulanate Hives    Decadrol [Dexamethasone] Other (See Comments)     psychosis    Penicillins Hives     Hives/Uticaria    Tetracyclines & Related Hives      Allergy;        Review of Systems    Video Exam    There were no vitals filed for this visit.      Behavioral Health Psychotherapy Progress Note    Psychotherapy Provided: Individual Psychotherapy     1. Major depressive disorder, recurrent severe without psychotic features (HCC)        2. Generalized anxiety disorder            Goals addressed in session: Goal 1     DATA: Met with Esther for follow up. Esther shared that she has been doing better recently, feeling brighter in mood and more motivated to do the things she wants to get done (organizing/cleaning apartment).  She talked about upcoming events that she is looking forward to, including the party she is hosting, , and social events that she enjoys.  She noted, though, that even though she enjoys some social events, she often feels relief when things get canceled because she really enjoys being alone most of the time.  She said that she is still waiting for a neuropsych eval to test if she is on the autism spectrum, noting that she has always felt different and misunderstood because of her social isolation and the way she expresses herself (finds it difficult to put emotions into words and feels that she often hurts others' feelings by saying things differently than others might).   "She said that she would like to understand how her brain works better, and figure out ways to navigate the world so that she does not feel so \"different.\" Processed her perceived differences, and brainstormed ways that she might either be less hard on herself for the way that she functions or different ways to approach the world so that she feels that she functions better, and encouraged follow up on neuropsych eval.    During this session, this clinician used the following therapeutic modalities: Client-centered Therapy and Cognitive Behavioral Therapy    Substance Abuse was not addressed during this session. If the client is diagnosed with a co-occurring substance use disorder, please indicate any changes in the frequency or amount of use: n/a. Stage of change for addressing substance use diagnoses: No substance use/Not applicable    ASSESSMENT:  Esther Griffith presents with a Euthymic/ normal mood.     her affect is Normal range and intensity, which is congruent, with her mood and the content of the session. The client has made progress on their goals.     Esther Griffith presents with a minimal risk of suicide, minimal risk of self-harm, and minimal risk of harm to others.    For any risk assessment that surpasses a \"low\" rating, a safety plan must be developed.    A safety plan was indicated: no  If yes, describe in detail n/a    PLAN: Between sessions, Esther Griffith will focus on small steps daily to help her feel momentum and progress in the larger tasks she wants to get done, and will experiment with alternate ways of interacting socially to help ease distress that she might feel in some social situations. At the next session, the therapist will use Client-centered Therapy and Cognitive Behavioral Therapy to address anxiety, depression.    Behavioral Health Treatment Plan and Discharge Planning: Esther Griffith is aware of and agrees to continue to work on their treatment plan. They have identified and are working " toward their discharge goals. yes    Depression Follow-up Plan Completed: Yes    Visit start and stop times:    01/24/25  Start Time: 1000  Stop Time: 1044  Total Visit Time: 44 minutes

## 2025-01-29 RX ORDER — PROPRANOLOL HYDROCHLORIDE 40 MG/1
20 TABLET ORAL
Qty: 15 TABLET | Refills: 1 | Status: SHIPPED | OUTPATIENT
Start: 2025-01-29 | End: 2026-01-24

## 2025-01-29 NOTE — TELEPHONE ENCOUNTER
Patient calling to check on refill status. Now out of medication. Med refill was requested on 1/23/25.

## 2025-01-30 DIAGNOSIS — F51.04 PSYCHOPHYSIOLOGICAL INSOMNIA: ICD-10-CM

## 2025-01-30 NOTE — TELEPHONE ENCOUNTER
Reason for call:   [x] Refill   [] Prior Auth  [] Other:     Office:   [] PCP/Provider -   [x] Specialty/Provider - Cristina Bray,     Medication: traZODone (DESYREL) 50 mg   Dose/Frequency: Take 1 tablet (50 mg total) by mouth daily at bedtime     Quantity: 30    Pharmacy: [facility]    Does the patient have enough for 3 days?   [x] Yes   [] No - Send as HP to POD

## 2025-01-31 RX ORDER — TRAZODONE HYDROCHLORIDE 50 MG/1
50 TABLET, FILM COATED ORAL
Qty: 30 TABLET | Refills: 0 | Status: SHIPPED | OUTPATIENT
Start: 2025-01-31

## 2025-01-31 NOTE — TELEPHONE ENCOUNTER
LOV 2/1/24   - Return in about 6 months (around 8/1/2024).    Pt is scheduled 3/27/25 at 4 pm  with [facility] office.

## 2025-02-03 DIAGNOSIS — F33.2 SEVERE EPISODE OF RECURRENT MAJOR DEPRESSIVE DISORDER, WITHOUT PSYCHOTIC FEATURES (HCC): ICD-10-CM

## 2025-02-03 RX ORDER — MIRTAZAPINE 30 MG/1
30 TABLET, FILM COATED ORAL
Qty: 30 TABLET | Refills: 0 | Status: SHIPPED | OUTPATIENT
Start: 2025-02-03

## 2025-02-06 DIAGNOSIS — F33.1 MAJOR DEPRESSIVE DISORDER, RECURRENT EPISODE, MODERATE (HCC): ICD-10-CM

## 2025-02-06 NOTE — TELEPHONE ENCOUNTER
Reason for call:   [x] Refill   [] Prior Auth  [] Other:     Office:   [] PCP/Provider -   [x] Specialty/Provider - Cristina Bray MD     Medication: Levomilnacipran HCl ER (FETZIMA) 80 MG extended release capsule    Dose/Frequency: 80 mg    Quantity: 30    Pharmacy: [facility]/pharmacy #[id number] - [place] - [address]      Does the patient have enough for 3 days?   [x] Yes   [] No - Send as HP to POD

## 2025-02-07 ENCOUNTER — TELEMEDICINE (OUTPATIENT)
Dept: BEHAVIORAL/MENTAL HEALTH CLINIC | Facility: CLINIC | Age: 45
End: 2025-02-07
Payer: COMMERCIAL

## 2025-02-07 DIAGNOSIS — F33.2 MAJOR DEPRESSIVE DISORDER, RECURRENT SEVERE WITHOUT PSYCHOTIC FEATURES (HCC): Primary | ICD-10-CM

## 2025-02-07 DIAGNOSIS — F41.1 GENERALIZED ANXIETY DISORDER: ICD-10-CM

## 2025-02-07 PROCEDURE — 90832 PSYTX W PT 30 MINUTES: CPT | Performed by: PSYCHIATRY & NEUROLOGY

## 2025-02-07 NOTE — PSYCH
Virtual Regular Visit    Verification of patient location:    Patient is located at Home in the following state in which I hold an active license [place]      Assessment/Plan:    Problem List Items Addressed This Visit          Behavioral Health    Generalized anxiety disorder    Major depressive disorder, recurrent severe without psychotic features (HCC) - Primary     Reason for visit is   Chief Complaint   Patient presents with    Virtual Regular Visit      Encounter provider HERMELINDA ALBA    Recent Visits  No visits were found meeting these conditions.  Showing recent visits within past 7 days and meeting all other requirements  Today's Visits  Date Type Provider Dept   02/07/25 Telemedicine HERMELINDA [facility] Therapist [place]   Showing today's visits and meeting all other requirements  Future Appointments  No visits were found meeting these conditions.  Showing future appointments within next 150 days and meeting all other requirements       The patient was identified by name and date of birth. Esther Griffith was informed that this is a telemedicine visit and that the visit is being conducted throughthe Epic Embedded platform. She agrees to proceed..  My office door was closed. No one else was in the room.  She acknowledged consent and understanding of privacy and security of the video platform. The patient has agreed to participate and understands they can discontinue the visit at any time.    Patient is aware this is a billable service.     HPI     Past Medical History:   Diagnosis Date    Alcoholism (HCC) 03/15/2001    Anxiety     Blood transfusion declined because patient is Sabianism 05/01/2023    Chronic pain disorder     Chronic sinusitis     Depression     Depression     Diabetes (HCC)     Diabetes mellitus (HCC) 09/01/2022    Fibromyalgia     Headache(784.0) 03/15/1993    Various types of headaches    Memory loss 03/15/2012    Migraine     Obesity     Polyarthritis      Last assessed 9/21/2015    Psychiatric disorder     Psychiatric illness     Sleep difficulties     Substance abuse (HCC) 03/15/1994       Past Surgical History:   Procedure Laterality Date    COLONOSCOPY  06/2019    DENTAL SURGERY      HYSTERECTOMY  05/01/2023    HYSTEROSCOPY      Endometrial Biopsy By Hysteroscopy    AK LAPS SUPRACRV HYSTERECT 250 GM/< RMVL TUBE/OVAR N/A 05/01/2023    Procedure: (LSH) W/ BILATERAL SALPINGECTOMY, REMOVAL PARAOVARIAN CYST;  Surgeon: Sai Lopez DO;  Location: [facility];  Service: Gynecology    REMOVAL OF INTRAUTERINE DEVICE (IUD)      US GUIDED BREAST BIOPSY RIGHT COMPLETE Right 06/17/2019    Benign       Current Outpatient Medications   Medication Sig Dispense Refill    b complex vitamins capsule Take 1 capsule by mouth daily      Blood Glucose Monitoring Suppl (Contour Blood Glucose System) w/Device KIT Use 1 kit 2 (two) times a day before meals 1 kit 0    cholecalciferol (VITAMIN D3) 25 mcg (1,000 units) tablet Take 1,000 Units by mouth daily Take 1 tab. daily      Contour Test test strip USE TO CHECK BLOOD SUGAR ONCE DAILY 50 strip 7    ferrous sulfate 325 (65 Fe) mg tablet Take 325 mg by mouth Take 1 tab. 3 times per week      gabapentin (NEURONTIN) 300 mg capsule Take 1 capsule (300 mg total) by mouth 3 (three) times a day 270 capsule 1    Levomilnacipran HCl ER (FETZIMA) 80 MG extended release capsule Take 1 capsule (80 mg total) by mouth daily 30 capsule 0    levothyroxine 75 mcg tablet Taking 1 tab 6 days a week and 2 tabs 1 day a week      lithium carbonate 300 mg capsule Take 1 capsule (300 mg total) by mouth 2 (two) times a day with meals 60 capsule 2    MAGNESIUM MALATE PO Take by mouth Take 1 tab. daily      metFORMIN (GLUCOPHAGE) 500 mg tablet TAKE 1 TABLET BY MOUTH TWICE A DAY WITH FOOD 60 tablet 5    methocarbamol (ROBAXIN) 500 mg tablet TAKE 1 TAB. EVERY 8 HR. AS NEEDED FOR MUSCLE SPASMS 60 tablet 0    mirtazapine (REMERON) 30 mg tablet TAKE 1 TABLET BY  "MOUTH EVERYDAY AT BEDTIME 30 tablet 0    propranolol (INDERAL) 40 mg tablet Take 0.5 tablets (20 mg total) by mouth daily at bedtime 15 tablet 1    traZODone (DESYREL) 50 mg tablet Take 1 tablet (50 mg total) by mouth daily at bedtime 30 tablet 0     No current facility-administered medications for this visit.        Allergies   Allergen Reactions    Cannabidiol Shortness Of Breath, Itching, Swelling, Anxiety, Palpitations, Confusion, Hypertension, Throat Swelling and Tongue Swelling    Amoxicillin-Pot Clavulanate Hives    Decadrol [Dexamethasone] Other (See Comments)     psychosis    Penicillins Hives     Hives/Uticaria    Tetracyclines & Related Hives      Allergy;        Review of Systems    Video Exam    There were no vitals filed for this visit.    Physical Exam     Behavioral Health Psychotherapy Progress Note    Psychotherapy Provided: Individual Psychotherapy     1. Major depressive disorder, recurrent severe without psychotic features (HCC)        2. Generalized anxiety disorder            Goals addressed in session: Goal 1     DATA: Met with Esther for follow up. Esther shared that since last session, it has \"been a time,\" talking about multiple difficult situations that interrupted her sleep and caused her anxiety (poor cat-sitting job, dad's health/kidney transplant journey).  She said that her mood has not been great because of so much happening all at once, her anxiety has been high worrying about her dad, and said that she has been very fatigued and achy, so has been trying to cut back on activities and rest.  She put off her party that she had planned for this weekend to next month, to give her time to rest and recover and be able to enjoy the party.  Provided support, validation of Esther's concerns, and used mindfulness and CBT strategies to help Esther ease her anxiety symptoms and focus on self-care.  Ended session early today due to Esther's fatigue and not feeling well.    During this " "session, this clinician used the following therapeutic modalities: Client-centered Therapy and Cognitive Behavioral Therapy    Substance Abuse was not addressed during this session. If the client is diagnosed with a co-occurring substance use disorder, please indicate any changes in the frequency or amount of use: n/a. Stage of change for addressing substance use diagnoses: No substance use/Not applicable    ASSESSMENT:  Esther Griffith presents with a Euthymic/ normal and Anxious mood.     her affect is Normal range and intensity, which is congruent, with her mood and the content of the session. The client has made progress on their goals.     Esther Griffith presents with a minimal risk of suicide, minimal risk of self-harm, and minimal risk of harm to others.    For any risk assessment that surpasses a \"low\" rating, a safety plan must be developed.    A safety plan was indicated: no  If yes, describe in detail n/a    PLAN: Between sessions, Esther Griffith will focus on self-care, and will use coping strategies discussed today to help manage anxiety and boost mood. At the next session, the therapist will use Client-centered Therapy and Cognitive Behavioral Therapy to address depression, anxiety.    Behavioral Health Treatment Plan and Discharge Planning: Esther Griffith is aware of and agrees to continue to work on their treatment plan. They have identified and are working toward their discharge goals. yes    Depression Follow-up Plan Completed: Yes    Visit start and stop times:    02/07/25  Start Time: 1001  Stop Time: 1030  Total Visit Time: 29 minutes        "

## 2025-02-18 DIAGNOSIS — M79.18 MYOFASCIAL PAIN: ICD-10-CM

## 2025-02-18 RX ORDER — METHOCARBAMOL 500 MG/1
TABLET, FILM COATED ORAL
Qty: 60 TABLET | Refills: 0 | Status: SHIPPED | OUTPATIENT
Start: 2025-02-18

## 2025-02-21 ENCOUNTER — TELEMEDICINE (OUTPATIENT)
Dept: BEHAVIORAL/MENTAL HEALTH CLINIC | Facility: CLINIC | Age: 45
End: 2025-02-21
Payer: COMMERCIAL

## 2025-02-21 DIAGNOSIS — F33.2 MAJOR DEPRESSIVE DISORDER, RECURRENT SEVERE WITHOUT PSYCHOTIC FEATURES (HCC): Primary | ICD-10-CM

## 2025-02-21 DIAGNOSIS — F41.1 GENERALIZED ANXIETY DISORDER: ICD-10-CM

## 2025-02-21 PROCEDURE — 90832 PSYTX W PT 30 MINUTES: CPT | Performed by: PSYCHIATRY & NEUROLOGY

## 2025-02-21 NOTE — PSYCH
Virtual Regular VisitName: Esther Griffith      : 1980      MRN: [id number]  Encounter Provider: HERMELINDA ALBA  Encounter Date: 2025   Encounter department: [facility] [place]  :  Assessment & Plan  Major depressive disorder, recurrent severe without psychotic features (HCC)    Generalized anxiety disorder       Reason for visit is No chief complaint on file.     Recent Visits  No visits were found meeting these conditions.  Showing recent visits within past 7 days and meeting all other requirements  Today's Visits  Date Type Provider Dept   25 Telemedicine HERMELINDA Alba [facility] [place]   Showing today's visits and meeting all other requirements  Future Appointments  No visits were found meeting these conditions.  Showing future appointments within next 150 days and meeting all other requirements     History of Present Illness     HPI    Past Medical History:   Diagnosis Date    Alcoholism (HCC) 03/15/2001    Anxiety     Blood transfusion declined because patient is [employer] 2023    Chronic pain disorder     Chronic sinusitis     Depression     Depression     Diabetes ([facility])     Diabetes mellitus ([facility]) 2022    Fibromyalgia     Headache(784.0) 03/15/1993    Various types of headaches    Memory loss 03/15/2012    Migraine     Obesity     Polyarthritis     Last assessed 2015    Psychiatric disorder     Psychiatric illness     Sleep difficulties     Substance abuse ([facility]) 03/15/1994     Past Surgical History:   Procedure Laterality Date    COLONOSCOPY  2019    DENTAL SURGERY      HYSTERECTOMY  2023    HYSTEROSCOPY      Endometrial Biopsy By Hysteroscopy    AL LAPS SUPRACRV HYSTERECT 250 GM/< RMVL TUBE/OVAR N/A 2023    Procedure: (LSH) W/ BILATERAL SALPINGECTOMY, REMOVAL PARAOVARIAN CYST;  Surgeon: Sai Lopez DO;  Location: [facility];  Service: Gynecology    REMOVAL OF INTRAUTERINE  DEVICE (IUD)      US GUIDED BREAST BIOPSY RIGHT COMPLETE Right 06/17/2019    Benign     Current Outpatient Medications   Medication Instructions    b complex vitamins capsule 1 capsule, Daily    Blood Glucose Monitoring Suppl (Contour Blood Glucose System) w/Device KIT 1 kit, Does not apply, 2 times daily before meals    cholecalciferol (VITAMIN D3) 1,000 Units, Daily    Contour Test test strip USE TO CHECK BLOOD SUGAR ONCE DAILY    ferrous sulfate 325 mg    gabapentin (NEURONTIN) 300 mg, Oral, 3 times daily    Levomilnacipran HCl ER (FETZIMA) 80 mg, Oral, Daily    levothyroxine 75 mcg tablet Taking 1 tab 6 days a week and 2 tabs 1 day a week    lithium carbonate 300 mg, Oral, 2 times daily with meals    MAGNESIUM MALATE PO Take by mouth Take 1 tab. daily    metFORMIN (GLUCOPHAGE) 500 mg, Oral, 2 times daily with meals    methocarbamol (ROBAXIN) 500 mg tablet TAKE 1 TAB. EVERY 8 HR. AS NEEDED FOR MUSCLE SPASMS    mirtazapine (REMERON) 30 mg, Oral, Daily at bedtime,       propranolol (INDERAL) 20 mg, Oral, Daily at bedtime    traZODone (DESYREL) 50 mg, Oral, Daily at bedtime     Allergies   Allergen Reactions    Cannabidiol Shortness Of Breath, Itching, Swelling, Anxiety, Palpitations, Confusion, Hypertension, Throat Swelling and Tongue Swelling    Amoxicillin-Pot Clavulanate Hives    Decadrol [Dexamethasone] Other (See Comments)     psychosis    Penicillins Hives     Hives/Uticaria    Tetracyclines & Related Hives      Allergy;        Review of Systems    Objective   LMP 03/13/2023 (Approximate)     Video Exam  Physical Exam     Administrative Statements   Encounter provider HERMELINDA BECK    The Patient is located at Home and in the following state in which I hold an active license [place].    The patient was identified by name and date of birth. Esther Griffith was informed that this is a telemedicine visit and that the visit is being conducted through the Epic Embedded platform. She agrees to proceed..  My office  "door was closed. No one else was in the room.  She acknowledged consent and understanding of privacy and security of the video platform. The patient has agreed to participate and understands they can discontinue the visit at any time.    Behavioral Health Psychotherapy Progress Note    Psychotherapy Provided: Individual Psychotherapy     1. Major depressive disorder, recurrent severe without psychotic features (HCC)        2. Generalized anxiety disorder            Goals addressed in session: Goal 1     DATA: Met with Esther for follow up. Esther shared that she has been doing \"ok,\" with ups and downs in her moods.  She said that sometimes she is very hard on herself trying to meet others' expectations, particularly her mother's, and this makes her feel like she is not good enough or that she has shortcomings that are not acceptable.  She said that her parents do not understand depression nor her physical pain, and think that she should just be able to push herself to accomplish things.  Processed those thoughts and how her parents' expectations are affecting Esther's self-esteem, and used CBT and strengths-based techniques to help Esther stop comparing herself to others, to accept her own limitations (physical pain, etc), and allow herself to live her life her own way.  She reported sleeping much better with Trazodone, and said that for the most part she is managing her mood fairly well by keeping busy. Ended session early due to Esther's physical pain being higher today.    During this session, this clinician used the following therapeutic modalities: Client-centered Therapy and Cognitive Behavioral Therapy    Substance Abuse was not addressed during this session. If the client is diagnosed with a co-occurring substance use disorder, please indicate any changes in the frequency or amount of use: n/a. Stage of change for addressing substance use diagnoses: No substance use/Not applicable    ASSESSMENT:  " "Esther Griffith presents with a Euthymic/ normal mood.     her affect is Normal range and intensity, which is congruent, with her mood and the content of the session. The client has made progress on their goals.     Esther Griffith presents with a minimal risk of suicide, minimal risk of self-harm, and minimal risk of harm to others.    For any risk assessment that surpasses a \"low\" rating, a safety plan must be developed.    A safety plan was indicated: no  If yes, describe in detail n/a    PLAN: Between sessions, Esther Griffith will work on self-comparison and finding ways to let go of needing to live up to others' expectations, to help increase self-confidence and boost mood. At the next session, the therapist will use Client-centered Therapy, Cognitive Behavioral Therapy, and Supportive Psychotherapy to address depression, anxiety.    Behavioral Health Treatment Plan and Discharge Planning: Esther Griffith is aware of and agrees to continue to work on their treatment plan. They have identified and are working toward their discharge goals. yes    Depression Follow-up Plan Completed: No    Visit start and stop times:    02/21/25  Start Time: 0959  Stop Time: 1031  Total Visit Time: 32 minutes        "

## 2025-02-27 ENCOUNTER — TELEMEDICINE (OUTPATIENT)
Dept: PSYCHIATRY | Facility: CLINIC | Age: 45
End: 2025-02-27
Payer: COMMERCIAL

## 2025-02-27 ENCOUNTER — TELEPHONE (OUTPATIENT)
Dept: PSYCHIATRY | Facility: CLINIC | Age: 45
End: 2025-02-27

## 2025-02-27 DIAGNOSIS — F51.04 PSYCHOPHYSIOLOGICAL INSOMNIA: ICD-10-CM

## 2025-02-27 DIAGNOSIS — F33.2 SEVERE EPISODE OF RECURRENT MAJOR DEPRESSIVE DISORDER, WITHOUT PSYCHOTIC FEATURES (HCC): ICD-10-CM

## 2025-02-27 DIAGNOSIS — F33.1 MAJOR DEPRESSIVE DISORDER, RECURRENT EPISODE, MODERATE (HCC): ICD-10-CM

## 2025-02-27 DIAGNOSIS — F41.1 GENERALIZED ANXIETY DISORDER: ICD-10-CM

## 2025-02-27 DIAGNOSIS — F33.2 MAJOR DEPRESSIVE DISORDER, RECURRENT SEVERE WITHOUT PSYCHOTIC FEATURES (HCC): Primary | ICD-10-CM

## 2025-02-27 PROCEDURE — 99214 OFFICE O/P EST MOD 30 MIN: CPT | Performed by: PSYCHIATRY & NEUROLOGY

## 2025-02-27 RX ORDER — MIRTAZAPINE 30 MG/1
30 TABLET, FILM COATED ORAL
Qty: 30 TABLET | Refills: 2 | Status: SHIPPED | OUTPATIENT
Start: 2025-02-27

## 2025-02-27 RX ORDER — TRAZODONE HYDROCHLORIDE 100 MG/1
100 TABLET ORAL
Qty: 30 TABLET | Refills: 2 | Status: SHIPPED | OUTPATIENT
Start: 2025-02-27

## 2025-02-27 NOTE — PSYCH
Virtual Regular Visit    Verification of patient location:    Patient is located at Home in the following state in which I hold an active license [place]      Assessment/Plan:    Problem List Items Addressed This Visit          Behavioral Health    Generalized anxiety disorder    Relevant Medications    Levomilnacipran HCl ER (FETZIMA) 80 MG extended release capsule    mirtazapine (REMERON) 30 mg tablet    traZODone (DESYREL) 100 mg tablet    Major depressive disorder, recurrent severe without psychotic features (HCC) - Primary    Relevant Medications    Levomilnacipran HCl ER (FETZIMA) 80 MG extended release capsule    mirtazapine (REMERON) 30 mg tablet    traZODone (DESYREL) 100 mg tablet     Other Visit Diagnoses         Major depressive disorder, recurrent episode, moderate (HCC)        Relevant Medications    Levomilnacipran HCl ER (FETZIMA) 80 MG extended release capsule    mirtazapine (REMERON) 30 mg tablet    traZODone (DESYREL) 100 mg tablet      Severe episode of recurrent major depressive disorder, without psychotic features (HCC)        Relevant Medications    Levomilnacipran HCl ER (FETZIMA) 80 MG extended release capsule    mirtazapine (REMERON) 30 mg tablet    traZODone (DESYREL) 100 mg tablet      Psychophysiological insomnia        Relevant Medications    Levomilnacipran HCl ER (FETZIMA) 80 MG extended release capsule    mirtazapine (REMERON) 30 mg tablet    traZODone (DESYREL) 100 mg tablet                        Depression Screening and Follow-up Plan: Patient's depression screening was positive with a PHQ-9 score of 15.   Continue regular follow-up with their mental health provider who is managing their mental health condition(s).         Reason for visit is No chief complaint on file.           Encounter provider Cristina Bray MD      Recent Visits  No visits were found meeting these conditions.  Showing recent visits within past 7 days and meeting all other requirements  Today's  Visits  Date Type Provider Dept   02/27/25 Telemedicine Cristina Bray MD [facility]   Showing today's visits and meeting all other requirements  Future Appointments  No visits were found meeting these conditions.  Showing future appointments within next 150 days and meeting all other requirements       The patient was identified by name and date of birth. Esther Griffith was informed that this is a telemedicine visit and that the visit is being conducted throughthe Epic Embedded platform. She agrees to proceed..  My office door was closed. No one else was in the room.  She acknowledged consent and understanding of privacy and security of the video platform. The patient has agreed to participate and understands they can discontinue the visit at any time.    Patient is aware this is a billable service.     Subjective  Esther Griffith is a 45 y.o. female with MDD and CAROL .Patient remains compliant with medications and denies side effects. She continues to manage her diabetes with medication and diet.    She continues to meet with pain management and has limited benefit from epidural injections.   She restarted  Gabapentin for neuropathy  300 mg po qhs to help with sleep as well.    She stated she was experiencing chest pain after taking Xanax 0.25 mg and she was tapered off the medication and her chest pain stopped.   Currently on Fetzima and recently had dose increase to 80 mg.   Continued lithium 300 mg po bid for depression.   Will continue to meet with counselor every 2 weeks.     Recently she was taperd down to 30 mg Mirtazapine and started  on Trazodone 50 mg due to difficulties with sleep. She stated she has been sleeping better now and instead of continuing to taper off Mirtazapine she will like to stay with her current medication regimen.  Nerve ablations in May.  F/u with endocrinology .    She stated for the past week she had problems falling and staying asleep, she had to take 100  mg Trazodone po qhs, but today she has been feeling sedated. Agrees to take 100 mg po qhs and go to bed within 30 minutes, the goal is to get 8 hrs of uninterrupted sleep. She is reporting cognitive difficulties that may be due to depression, sleep deprivation and polypharmacy.  Agrees to schedule follow up in 6 weeks or sooner if needed.          HPI     Past Medical History:   Diagnosis Date    Alcoholism (Former[facility]) 03/15/2001    Anxiety     Blood transfusion declined because patient is Restorationist 05/01/2023    Chronic pain disorder     Chronic sinusitis     Depression     Depression     Diabetes ([facility])     Diabetes mellitus ([facility]) 09/01/2022    Fibromyalgia     Headache(784.0) 03/15/1993    Various types of headaches    Memory loss 03/15/2012    Migraine     Obesity     Polyarthritis     Last assessed 9/21/2015    Psychiatric disorder     Psychiatric illness     Sleep difficulties     Substance abuse (Former[facility]) 03/15/1994       Past Surgical History:   Procedure Laterality Date    COLONOSCOPY  06/2019    DENTAL SURGERY      HYSTERECTOMY  05/01/2023    HYSTEROSCOPY      Endometrial Biopsy By Hysteroscopy    PA LAPS SUPRACRV HYSTERECT 250 GM/< RMVL TUBE/OVAR N/A 05/01/2023    Procedure: ([facility]) W/ BILATERAL SALPINGECTOMY, REMOVAL PARAOVARIAN CYST;  Surgeon: aSi Lopez DO;  Location: [facility];  Service: Gynecology    REMOVAL OF INTRAUTERINE DEVICE (IUD)      US GUIDED BREAST BIOPSY RIGHT COMPLETE Right 06/17/2019    Benign       Current Outpatient Medications   Medication Sig Dispense Refill    Levomilnacipran HCl ER (FETZIMA) 80 MG extended release capsule Take 1 capsule (80 mg total) by mouth daily 30 capsule 2    mirtazapine (REMERON) 30 mg tablet Take 1 tablet (30 mg total) by mouth daily at bedtime 30 tablet 2    traZODone (DESYREL) 100 mg tablet Take 1 tablet (100 mg total) by mouth daily at bedtime 30 tablet 2    b complex vitamins capsule Take 1 capsule by mouth daily      Blood Glucose Monitoring Suppl  (Contour Blood Glucose System) w/Device KIT Use 1 kit 2 (two) times a day before meals 1 kit 0    cholecalciferol (VITAMIN D3) 25 mcg (1,000 units) tablet Take 1,000 Units by mouth daily Take 1 tab. daily      Contour Test test strip USE TO CHECK BLOOD SUGAR ONCE DAILY 50 strip 7    ferrous sulfate 325 (65 Fe) mg tablet Take 325 mg by mouth Take 1 tab. 3 times per week      gabapentin (NEURONTIN) 300 mg capsule Take 1 capsule (300 mg total) by mouth 3 (three) times a day 270 capsule 1    levothyroxine 75 mcg tablet Taking 1 tab 6 days a week and 2 tabs 1 day a week      lithium carbonate 300 mg capsule Take 1 capsule (300 mg total) by mouth 2 (two) times a day with meals 60 capsule 2    MAGNESIUM MALATE PO Take by mouth Take 1 tab. daily      metFORMIN (GLUCOPHAGE) 500 mg tablet TAKE 1 TABLET BY MOUTH TWICE A DAY WITH FOOD 60 tablet 5    methocarbamol (ROBAXIN) 500 mg tablet TAKE 1 TAB. EVERY 8 HR. AS NEEDED FOR MUSCLE SPASMS 60 tablet 0    propranolol (INDERAL) 40 mg tablet Take 0.5 tablets (20 mg total) by mouth daily at bedtime 15 tablet 1     No current facility-administered medications for this visit.        Allergies   Allergen Reactions    Cannabidiol Shortness Of Breath, Itching, Swelling, Anxiety, Palpitations, Confusion, Hypertension, Throat Swelling and Tongue Swelling    Amoxicillin-Pot Clavulanate Hives    Decadrol [Dexamethasone] Other (See Comments)     psychosis    Penicillins Hives     Hives/Uticaria    Tetracyclines & Related Hives      Allergy;        Review of Systems     Mood Anxiety and Depression   Behavior Normal    Thought Content Disturbing Thoughts, Feelings   General Emotional Problems and Decreased Functioning   Personality Normal   Other Psych Symptoms Normal   Constitutional Negative   ENT Negative   Cardiovascular Negative   Respiratory Negative   Gastrointestinal Negative   Genitourinary Negative   Musculoskeletal Negative   Integumentary Negative   Neurological Negative   Endocrine  Normal    Other Symptoms Normal        Laboratory Results: Recent Labs (last 12 months):   Appointment on 12/09/2024   Component Date Value    Lithium Lvl 12/09/2024 0.61    Appointment on 12/06/2024   Component Date Value    Lithium Lvl 12/06/2024 1.22 (H)     Sodium 12/06/2024 139     Potassium 12/06/2024 3.9     Chloride 12/06/2024 102     CO2 12/06/2024 29     ANION GAP 12/06/2024 8     BUN 12/06/2024 13     Creatinine 12/06/2024 0.80     Glucose 12/06/2024 169 (H)     Calcium 12/06/2024 10.3 (H)     AST 12/06/2024 35     ALT 12/06/2024 35     Alkaline Phosphatase 12/06/2024 58     Total Protein 12/06/2024 7.5     Albumin 12/06/2024 5.0     Total Bilirubin 12/06/2024 0.47     eGFR 12/06/2024 88     Cholesterol 12/09/2024 156     Triglycerides 12/09/2024 188 (H)     HDL, Direct 12/09/2024 36 (L)     LDL Calculated 12/09/2024 82     Non-HDL-Chol (CHOL-HDL) 12/09/2024 120     Creatinine, Ur 12/09/2024 80.6     Albumin,U,Random 12/09/2024 <7.0     Albumin Creat Ratio 12/09/2024      Epinephrine, 24H Ur 12/12/2024 <5     Norepinephrine, 24H Ur 12/12/2024 56     Dopamine , 24H Ur 12/12/2024 114     Epinephrine, Rand Ur 12/12/2024 <3     Norepinephrine, Rand Ur 12/12/2024 35     Dopamine, Rand Ur 12/12/2024 71     Metaneph, Total, 24H Ur 12/12/2024 77     Metanephrines, Ur 12/12/2024 48     Normetanephrine, Ur 12/12/2024 221     Normetanephrine, 24H Ur 12/12/2024 354     TSH 3RD GENERATON 12/06/2024 3.712     Free T4 12/06/2024 0.82    Admission on 12/04/2024, Discharged on 12/04/2024   Component Date Value    Ventricular Rate 12/04/2024 91     Atrial Rate 12/04/2024 91     ND Interval 12/04/2024 140     QRSD Interval 12/04/2024 70     QT Interval 12/04/2024 338     QTC Interval 12/04/2024 416     P Axis 12/04/2024 17     QRS Axis 12/04/2024 4     T Wave [place] 12/04/2024 -19     WBC 12/04/2024 7.69     RBC 12/04/2024 4.54     Hemoglobin 12/04/2024 14.3     Hematocrit 12/04/2024 43.2     MCV 12/04/2024 95     MCH  12/04/2024 31.5     MCHC 12/04/2024 33.1     RDW 12/04/2024 13.2     MPV 12/04/2024 9.0     Platelets 12/04/2024 256     nRBC 12/04/2024 0     Segmented % 12/04/2024 59     Immature Grans % 12/04/2024 0     Lymphocytes % 12/04/2024 34     Monocytes % 12/04/2024 5     Eosinophils Relative 12/04/2024 2     Basophils Relative 12/04/2024 0     Absolute Neutrophils 12/04/2024 4.51     Absolute Immature Grans 12/04/2024 0.02     Absolute Lymphocytes 12/04/2024 2.63     Absolute Monocytes 12/04/2024 0.38     Eosinophils Absolute 12/04/2024 0.12     Basophils Absolute 12/04/2024 0.03     Sodium 12/04/2024 138     Potassium 12/04/2024 3.6     Chloride 12/04/2024 103     CO2 12/04/2024 27     ANION GAP 12/04/2024 8     BUN 12/04/2024 12     Creatinine 12/04/2024 0.80     Glucose 12/04/2024 160 (H)     Calcium 12/04/2024 9.5     AST 12/04/2024 34     ALT 12/04/2024 35     Alkaline Phosphatase 12/04/2024 54     Total Protein 12/04/2024 7.1     Albumin 12/04/2024 4.8     Total Bilirubin 12/04/2024 0.47     eGFR 12/04/2024 88     hs TnI 0hr 12/04/2024 <2     hs TnI 2hr 12/04/2024 <2     Delta 2hr hsTnI 12/04/2024      Ventricular Rate 12/04/2024 83     Atrial Rate 12/04/2024 83     TN Interval 12/04/2024 140     QRSD Interval 12/04/2024 78     QT Interval 12/04/2024 362     QTC Interval 12/04/2024 425     P Axis 12/04/2024 36     QRS Axis 12/04/2024 39     T Wave [place] 12/04/2024 -32    Office Visit on 12/03/2024   Component Date Value    Hemoglobin A1C 12/03/2024 6.3    Appointment on 10/18/2024   Component Date Value    WBC 10/18/2024 11.76 (H)     RBC 10/18/2024 4.23     Hemoglobin 10/18/2024 13.4     Hematocrit 10/18/2024 41.2     MCV 10/18/2024 97     MCH 10/18/2024 31.7     MCHC 10/18/2024 32.5     RDW 10/18/2024 14.4     MPV 10/18/2024 9.1     Platelets 10/18/2024 297     nRBC 10/18/2024 0     Segmented % 10/18/2024 57     Immature Grans % 10/18/2024 1     Lymphocytes % 10/18/2024 35     Monocytes % 10/18/2024 6      Eosinophils Relative 10/18/2024 1     Basophils Relative 10/18/2024 0     Absolute Neutrophils 10/18/2024 6.61     Absolute Immature Grans 10/18/2024 0.06     Absolute Lymphocytes 10/18/2024 4.15     Absolute Monocytes 10/18/2024 0.73     Eosinophils Absolute 10/18/2024 0.16     Basophils Absolute 10/18/2024 0.05    Admission on 10/06/2024, Discharged on 10/06/2024   Component Date Value    Ventricular Rate 10/06/2024 104     Atrial Rate 10/06/2024 104     VT Interval 10/06/2024 144     QRSD Interval 10/06/2024 76     QT Interval 10/06/2024 338     QTC Interval 10/06/2024 444     P Axis 10/06/2024 36     QRS [place] 10/06/2024 75     T Wave [place] 10/06/2024 -28     WBC 10/06/2024 10.62 (H)     RBC 10/06/2024 4.58     Hemoglobin 10/06/2024 14.3     Hematocrit 10/06/2024 44.3     MCV 10/06/2024 97     MCH 10/06/2024 31.2     MCHC 10/06/2024 32.3     RDW 10/06/2024 13.6     MPV 10/06/2024 9.1     Platelets 10/06/2024 254     nRBC 10/06/2024 0     Segmented % 10/06/2024 62     Immature Grans % 10/06/2024 0     Lymphocytes % 10/06/2024 31     Monocytes % 10/06/2024 6     Eosinophils Relative 10/06/2024 1     Basophils Relative 10/06/2024 0     Absolute Neutrophils 10/06/2024 6.54     Absolute Immature Grans 10/06/2024 0.04     Absolute Lymphocytes 10/06/2024 3.29     Absolute Monocytes 10/06/2024 0.60     Eosinophils Absolute 10/06/2024 0.12     Basophils Absolute 10/06/2024 0.03     Sodium 10/06/2024 135     Potassium 10/06/2024 3.8     Chloride 10/06/2024 101     CO2 10/06/2024 25     ANION GAP 10/06/2024 9     BUN 10/06/2024 10     Creatinine 10/06/2024 0.75     Glucose 10/06/2024 178 (H)     Calcium 10/06/2024 10.4 (H)     AST 10/06/2024 29     ALT 10/06/2024 36     Alkaline Phosphatase 10/06/2024 56     Total Protein 10/06/2024 7.3     Albumin 10/06/2024 4.8     Total Bilirubin 10/06/2024 0.50     eGFR 10/06/2024 95     D-Dimer, Quant 10/06/2024 <0.27     hs TnI 0hr 10/06/2024 <2     EXT Preg Test, Ur 10/06/2024  Negative     Control 10/06/2024 Valid     hs TnI 2hr 10/06/2024 <2     Delta 2hr hsTnI 10/06/2024     Appointment on 09/07/2024   Component Date Value    WBC 09/07/2024 12.48 (H)     RBC 09/07/2024 4.38     Hemoglobin 09/07/2024 13.8     Hematocrit 09/07/2024 42.8     MCV 09/07/2024 98     MCH 09/07/2024 31.5     MCHC 09/07/2024 32.2     RDW 09/07/2024 14.3     MPV 09/07/2024 9.3     Platelets 09/07/2024 278     nRBC 09/07/2024 0     Segmented % 09/07/2024 68     Immature Grans % 09/07/2024 1     Lymphocytes % 09/07/2024 25     Monocytes % 09/07/2024 5     Eosinophils Relative 09/07/2024 1     Basophils Relative 09/07/2024 0     Absolute Neutrophils 09/07/2024 8.46 (H)     Absolute Immature Grans 09/07/2024 0.06     Absolute Lymphocytes 09/07/2024 3.14     Absolute Monocytes 09/07/2024 0.67     Eosinophils Absolute 09/07/2024 0.12     Basophils Absolute 09/07/2024 0.03    Appointment on 07/17/2024   Component Date Value    WBC 07/17/2024 11.65 (H)     RBC 07/17/2024 3.81     Hemoglobin 07/17/2024 12.3     Hematocrit 07/17/2024 36.9     MCV 07/17/2024 97     MCH 07/17/2024 32.3     MCHC 07/17/2024 33.3     RDW 07/17/2024 14.3     MPV 07/17/2024 9.2     Platelets 07/17/2024 259     nRBC 07/17/2024 0     Segmented % 07/17/2024 67     Immature Grans % 07/17/2024 0     Lymphocytes % 07/17/2024 27     Monocytes % 07/17/2024 5     Eosinophils Relative 07/17/2024 1     Basophils Relative 07/17/2024 0     Absolute Neutrophils 07/17/2024 7.63 (H)     Absolute Immature Grans 07/17/2024 0.05     Absolute Lymphocytes 07/17/2024 3.17     Absolute Monocytes 07/17/2024 0.60     Eosinophils Absolute 07/17/2024 0.16     Basophils Absolute 07/17/2024 0.04     TSH 3RD GENERATON 09/07/2024 3.287    Office Visit on 07/02/2024   Component Date Value    Sed Rate 07/02/2024 3     CRP 07/02/2024 6.3 (H)     SS-A (RO) Ab 07/02/2024 <0.2     SS-B (LA) Ab 07/02/2024 <0.2    Orders Only on 06/26/2024   Component Date Value    Right Eye Diabetic  Retin* 06/26/2024 None     Left Eye Diabetic Retino* 06/26/2024 None    There may be more visits with results that are not included.         Substance Abuse History:  Social History     Substance and Sexual Activity   Drug Use Not Currently       Family Psychiatric History:   Family History   Problem Relation Age of Onset    Irregular heart beat Mother     Arthritis Mother     Diabetes Father     Kidney disease Father     Diabetes type II Father     Depression Father     Substance Abuse Brother     Alcohol abuse Brother     ADD / ADHD Brother     Drug abuse Brother     No Known Problems Brother     Depression Paternal Aunt         Unsuccessful suicide attempt in young adulthood    Psychiatric Illness Paternal Aunt     Self-Injury Paternal Aunt     Suicide Attempts Paternal Aunt     Substance Abuse Maternal Uncle     Prostate cancer Maternal Uncle 60    Diabetes type II Maternal Uncle     Diabetes Maternal Uncle     Diabetes Maternal Grandfather     Migraines Maternal Grandfather     Stroke Maternal Grandfather     Diabetes type II Maternal Grandfather     Alcohol abuse Maternal Grandfather         Great-Grandfather    Diabetes Maternal Grandmother     Rheum arthritis Maternal Grandmother     Melanoma Maternal Grandmother 84    Cancer Maternal Grandmother         Skin Cancer - successfully removed    Stroke Maternal Grandmother     Diabetes type II Maternal Grandmother     Depression Maternal Grandmother     Dementia Maternal Grandmother     Diabetes Paternal Grandfather     Lung cancer Paternal Grandfather 47    Cancer Paternal Grandfather         Lung Cancer - cause of death    Kidney disease Paternal Grandmother     Rheum arthritis Paternal Grandmother     Depression Paternal Grandmother         Nervous Breakdown around age 40    Deep vein thrombosis Paternal Grandmother     Diabetes Paternal Grandmother     Diabetes type II Paternal Grandmother     Alcohol abuse Family     Stomach cancer Family     Alcohol abuse  Maternal Uncle     Substance Abuse Maternal Uncle     Cancer Maternal Uncle         Prostate & Testicular Cancer    Drug abuse Maternal Uncle     Alcohol abuse Maternal Uncle     Drug abuse Maternal Uncle     Completed Suicide  Neg Hx        The following portions of the patient's history were reviewed and updated as appropriate: allergies, current medications, past family history, past medical history, past social history, past surgical history, and problem list.    Social History     Socioeconomic History    Marital status: Single     Spouse name: Not on file    Number of children: 0    Years of education: 12 years     Highest education level: GED or equivalent   Occupational History    Occupation: unemployed   Tobacco Use    Smoking status: Never     Passive exposure: Never    Smokeless tobacco: Never    Tobacco comments:     N/A, non-smoker.   Vaping Use    Vaping status: Never Used   Substance and Sexual Activity    Alcohol use: Not Currently     Comment: 2 drinks per month    Drug use: Not Currently    Sexual activity: Not Currently     Birth control/protection: Abstinence   Other Topics Concern    Not on file   Social History Narrative    Caffeine use        What type of home do you live in: Single house    Age of your home: 46 yrs    How long have you been living there: 42 yrs    Type of heat: Baseboard    Type of fuel: Electric    What type of samir is in your bedroom: Carpet    Do you have the following in or near your home:    Air products: Window air conditioning and Ionic air purifier    Pests: Mice    Pets: Cat    Basement: None     Social Drivers of Health     Financial Resource Strain: High Risk (12/2/2020)    Overall Financial Resource Strain (CARDIA)     Difficulty of Paying Living Expenses: Hard   Food Insecurity: No Food Insecurity (10/16/2023)    Hunger Vital Sign     Worried About Running Out of Food in the Last Year: Never true     Ran Out of Food in the Last Year: Never true    Transportation Needs: No Transportation Needs (10/16/2023)    PRAPARE - Transportation     Lack of Transportation (Medical): No     Lack of Transportation (Non-Medical): No   Physical Activity: Insufficiently Active (10/12/2022)    Exercise Vital Sign     Days of Exercise per Week: 2 days     Minutes of Exercise per Session: 60 min   Stress: Stress Concern Present (4/3/2019)    [employer] [place] [employer] - Occupational Stress Questionnaire     Feeling of Stress : To some extent   Social Connections: Moderately Integrated (4/3/2019)    Social Connection and Isolation Panel [NHANES]     Frequency of Communication with Friends and Family: More than three times a week     Frequency of Social Gatherings with Friends and Family: More than three times a week     Attends Buddhist Services: More than 4 times per year     Active Member of Clubs or Organizations: Yes     Attends Club or Organization Meetings: More than 4 times per year     Marital Status: Never    Intimate Partner Violence: Not At Risk (4/3/2019)    Humiliation, Afraid, Rape, and Kick questionnaire     Fear of Current or Ex-Partner: No     Emotionally Abused: No     Physically Abused: No     Sexually Abused: No   Housing Stability: Low Risk  (10/16/2023)    Housing Stability Vital Sign     Unable to Pay for Housing in the Last Year: No     Number of Times Moved in the Last Year: 1     Homeless in the Last Year: No     Social History     Social History Narrative    Caffeine use        What type of home do you live in: Single house    Age of your home: 46 yrs    How long have you been living there: 42 yrs    Type of heat: Baseboard    Type of fuel: Electric    What type of samir is in your bedroom: Carpet    Do you have the following in or near your home:    Air products: Window air conditioning and Ionic air purifier    Pests: Mice    Pets: Cat    Basement: None       Objective:       Mental status:  Appearance calm and cooperative ,  adequate hygiene and grooming, and good eye contact    Mood dysphoric   Affect affect was constricted   Speech a normal rate and fluent   Thought Processes coherent/organized and normal thought processes   Hallucinations no hallucinations present    Thought Content no delusions   Abnormal Thoughts no suicidal thoughts  and no homicidal thoughts    Orientation  oriented to person and place and time   Remote Memory short term memory intact and long term memory intact   Attention Span concentration intact   Intellect Appears to be of Average Intelligence   Insight Limited insight   Judgement judgment was limited   Muscle Strength N/a   Language no difficulty naming common objects and no difficulty repeating a phrase    Fund of Knowledge displays adequate knowledge of current events, adequate fund of knowledge regarding past history, and adequate fund of knowledge regarding vocabulary                Assessment/Plan:       Diagnoses and all orders for this visit:    Major depressive disorder, recurrent severe without psychotic features (HCC)    Generalized anxiety disorder    Major depressive disorder, recurrent episode, moderate (HCC)  -     Levomilnacipran HCl ER (FETZIMA) 80 MG extended release capsule; Take 1 capsule (80 mg total) by mouth daily    Severe episode of recurrent major depressive disorder, without psychotic features (HCC)  -     mirtazapine (REMERON) 30 mg tablet; Take 1 tablet (30 mg total) by mouth daily at bedtime    Psychophysiological insomnia  -     traZODone (DESYREL) 100 mg tablet; Take 1 tablet (100 mg total) by mouth daily at bedtime                Assessment & Plan  Major depressive disorder, recurrent severe without psychotic features (HCC)         Generalized anxiety disorder         Major depressive disorder, recurrent episode, moderate (HCC)    Orders:    Levomilnacipran HCl ER (FETZIMA) 80 MG extended release capsule; Take 1 capsule (80 mg total) by mouth daily    Severe episode of  recurrent major depressive disorder, without psychotic features (HCC)    Orders:    mirtazapine (REMERON) 30 mg tablet; Take 1 tablet (30 mg total) by mouth daily at bedtime    Psychophysiological insomnia    Orders:    traZODone (DESYREL) 100 mg tablet; Take 1 tablet (100 mg total) by mouth daily at bedtime           Treatment Recommendations- Risks Benefits      Immediate Medical/Psychiatric/Psychotherapy Treatments and Any Precautions: continue current treatment     Risks, Benefits And Possible Side Effects Of Medications:  {PSYCH RISK, BENEFITS AND POSSIBLE SIDE EFFECTS (Optional):77725    Controlled Medication Discussion: Discussed with patient Black Box warning on concurrent use of benzodiazepines and opioid medications including sedation, respiratory depression, coma and death. Patient understands the risk of treatment with benzodiazepines in addition to opioids and wants to continue taking those medications. , Discussed with patient the risks of sedation, respiratory depression, impairment of ability to drive and potential for abuse and addiction related to treatment with benzodiazepine medications. The patient understands risk of treatment with benzodiazepine medications, agrees to not drive if feels impaired and agrees to take medications as prescribed., and The patient has been filling controlled prescriptions on time as prescribed to [place] Prescription Drug Monitoring program.      Psychotherapy Provided: No                      Visit Time    Visit Start Time: 12:00  Visit Stop Time: 12:30  Total Visit Duration:  30 minutes

## 2025-03-06 DIAGNOSIS — M79.18 MYOFASCIAL PAIN: ICD-10-CM

## 2025-03-07 ENCOUNTER — TELEMEDICINE (OUTPATIENT)
Dept: BEHAVIORAL/MENTAL HEALTH CLINIC | Facility: CLINIC | Age: 45
End: 2025-03-07
Payer: COMMERCIAL

## 2025-03-07 DIAGNOSIS — F33.2 MAJOR DEPRESSIVE DISORDER, RECURRENT SEVERE WITHOUT PSYCHOTIC FEATURES (HCC): Primary | ICD-10-CM

## 2025-03-07 DIAGNOSIS — F41.1 GENERALIZED ANXIETY DISORDER: ICD-10-CM

## 2025-03-07 PROCEDURE — 90832 PSYTX W PT 30 MINUTES: CPT | Performed by: PSYCHIATRY & NEUROLOGY

## 2025-03-07 NOTE — PSYCH
Virtual Regular Visit    Verification of patient location:    Patient is located at Home in the following state in which I hold an active license [place]      Assessment/Plan:    Problem List Items Addressed This Visit          Behavioral Health    Generalized anxiety disorder    Major depressive disorder, recurrent severe without psychotic features (HCC) - Primary     Reason for visit is No chief complaint on file.       Encounter provider HERMELINDA ALBA      Recent Visits  No visits were found meeting these conditions.  Showing recent visits within past 7 days and meeting all other requirements  Today's Visits  Date Type Provider Dept   03/07/25 Telemedicine HERMELINDA [facility] Therapist [place]   Showing today's visits and meeting all other requirements  Future Appointments  No visits were found meeting these conditions.  Showing future appointments within next 150 days and meeting all other requirements       The patient was identified by name and date of birth. Esther Griffith was informed that this is a telemedicine visit and that the visit is being conducted throughthe Epic Embedded platform. She agrees to proceed..  My office door was closed. No one else was in the room.  She acknowledged consent and understanding of privacy and security of the video platform. The patient has agreed to participate and understands they can discontinue the visit at any time.    Patient is aware this is a billable service.     HPI     Past Medical History:   Diagnosis Date    Alcoholism (HCC) 03/15/2001    Anxiety     Blood transfusion declined because patient is Islam 05/01/2023    Chronic pain disorder     Chronic sinusitis     Depression     Depression     Diabetes (HCC)     Diabetes mellitus (HCC) 09/01/2022    Fibromyalgia     Headache(784.0) 03/15/1993    Various types of headaches    Memory loss 03/15/2012    Migraine     Obesity     Polyarthritis     Last assessed 9/21/2015     Psychiatric disorder     Psychiatric illness     Sleep difficulties     Substance abuse (HCC) 03/15/1994       Past Surgical History:   Procedure Laterality Date    COLONOSCOPY  06/2019    DENTAL SURGERY      HYSTERECTOMY  05/01/2023    HYSTEROSCOPY      Endometrial Biopsy By Hysteroscopy    FL LAPS SUPRACRV HYSTERECT 250 GM/< RMVL TUBE/OVAR N/A 05/01/2023    Procedure: (LSH) W/ BILATERAL SALPINGECTOMY, REMOVAL PARAOVARIAN CYST;  Surgeon: Sai Lopez DO;  Location: [facility];  Service: Gynecology    REMOVAL OF INTRAUTERINE DEVICE (IUD)      US GUIDED BREAST BIOPSY RIGHT COMPLETE Right 06/17/2019    Benign       Current Outpatient Medications   Medication Sig Dispense Refill    b complex vitamins capsule Take 1 capsule by mouth daily      Blood Glucose Monitoring Suppl (Contour Blood Glucose System) w/Device KIT Use 1 kit 2 (two) times a day before meals 1 kit 0    cholecalciferol (VITAMIN D3) 25 mcg (1,000 units) tablet Take 1,000 Units by mouth daily Take 1 tab. daily      Contour Test test strip USE TO CHECK BLOOD SUGAR ONCE DAILY 50 strip 7    ferrous sulfate 325 (65 Fe) mg tablet Take 325 mg by mouth Take 1 tab. 3 times per week      gabapentin (NEURONTIN) 300 mg capsule Take 1 capsule (300 mg total) by mouth 3 (three) times a day 270 capsule 1    Levomilnacipran HCl ER (FETZIMA) 80 MG extended release capsule Take 1 capsule (80 mg total) by mouth daily 30 capsule 2    levothyroxine 75 mcg tablet Taking 1 tab 6 days a week and 2 tabs 1 day a week      lithium carbonate 300 mg capsule Take 1 capsule (300 mg total) by mouth 2 (two) times a day with meals 60 capsule 2    MAGNESIUM MALATE PO Take by mouth Take 1 tab. daily      metFORMIN (GLUCOPHAGE) 500 mg tablet TAKE 1 TABLET BY MOUTH TWICE A DAY WITH FOOD 60 tablet 5    methocarbamol (ROBAXIN) 500 mg tablet TAKE 1 TAB. EVERY 8 HR. AS NEEDED FOR MUSCLE SPASMS 60 tablet 0    mirtazapine (REMERON) 30 mg tablet Take 1 tablet (30 mg total) by mouth daily at  bedtime 30 tablet 2    propranolol (INDERAL) 40 mg tablet Take 0.5 tablets (20 mg total) by mouth daily at bedtime 15 tablet 1    traZODone (DESYREL) 100 mg tablet Take 1 tablet (100 mg total) by mouth daily at bedtime 30 tablet 2     No current facility-administered medications for this visit.        Allergies   Allergen Reactions    Cannabidiol Shortness Of Breath, Itching, Swelling, Anxiety, Palpitations, Confusion, Hypertension, Throat Swelling and Tongue Swelling    Amoxicillin-Pot Clavulanate Hives    Decadrol [Dexamethasone] Other (See Comments)     psychosis    Penicillins Hives     Hives/Uticaria    Tetracyclines & Related Hives      Allergy;        Review of Systems    Video Exam    There were no vitals filed for this visit.    Physical Exam     Behavioral Health Psychotherapy Progress Note    Psychotherapy Provided: Individual Psychotherapy     1. Major depressive disorder, recurrent severe without psychotic features (HCC)        2. Generalized anxiety disorder            Goals addressed in session: Goal 1     DATA: Met with Esther and mother Kat for follow up.  Esther shared that she wanted to address some cognitive and behavioral issues that have gotten more severe recently, and have begun to impact her life.  She said that she has some perfectionistic tendencies, where she has to work on multiple projects (getting distracted from one and moving on to another and another) until they are all in the same phase of completion or all completed at the same time, and will overexert herself in the process.  Sometimes during this process she gets very overwhelmed and will walk away from all of it, leaving things for days before she feels ready to try to tackle them again. Mom said that these habits have appeared to her to create more work for Esther, and the piles of things like laundry or groceries that need to be put away or dishes in the sink bother her a lot.  Mom said that it got to the point where she  "had to stop coming upstairs because it bothered her way more than it bothered Esther, and she has decided to just stay out of it and let Esther manage her own space.  Esther expressed some worry that she might have OCD or possibly even autism, and mom provided some collateral info on how she behaved as a child (overly sensitive to criticism or negativity from peers, would isolate herself socially, and then as teen would engage in riskier behavior like drinking to \"loosen up\" because others seemed to like her more then.  Processed concerns of both Esther and Kat, discussed options for potential screening by psychologist to clarify answers if she chooses, and used CBT strategies to help Esther feel empowered to tackle projects in a more balanced, less overwhelming way (timer, tasks at time of day where she feels better, reminding herself of value other than in performance)    During this session, this clinician used the following therapeutic modalities: Client-centered Therapy, Cognitive Behavioral Therapy, and Supportive Psychotherapy    Substance Abuse was not addressed during this session. If the client is diagnosed with a co-occurring substance use disorder, please indicate any changes in the frequency or amount of use: n/a. Stage of change for addressing substance use diagnoses: No substance use/Not applicable    ASSESSMENT:  Esther Griffith presents with a Euthymic/ normal mood.     her affect is Normal range and intensity, which is congruent, with her mood and the content of the session. The client has made progress on their goals.     Esther Griffith presents with a minimal risk of suicide, minimal risk of self-harm, and minimal risk of harm to others.    For any risk assessment that surpasses a \"low\" rating, a safety plan must be developed.    A safety plan was indicated: no  If yes, describe in detail n/a    PLAN: Between sessions, Esther Griffith will work on skills discussed today to help her tackle projects " and improve self-esteem. At the next session, the therapist will use Client-centered Therapy, Cognitive Behavioral Therapy, and Supportive Psychotherapy to address depression, anxiety, overwhelm.    Behavioral Health Treatment Plan and Discharge Planning: Esther Griffith is aware of and agrees to continue to work on their treatment plan. They have identified and are working toward their discharge goals. yes    Depression Follow-up Plan Completed: Yes    Visit start and stop times:    03/07/25  Start Time: 1102  Stop Time: 1138  Total Visit Time: 36 minutes

## 2025-03-09 DIAGNOSIS — M79.18 MYOFASCIAL PAIN: ICD-10-CM

## 2025-03-10 RX ORDER — METHOCARBAMOL 500 MG/1
TABLET, FILM COATED ORAL
Qty: 60 TABLET | Refills: 0 | OUTPATIENT
Start: 2025-03-10

## 2025-03-10 RX ORDER — METHOCARBAMOL 500 MG/1
TABLET, FILM COATED ORAL
Qty: 90 TABLET | Refills: 0 | Status: SHIPPED | OUTPATIENT
Start: 2025-03-10

## 2025-03-12 ENCOUNTER — OFFICE VISIT (OUTPATIENT)
Dept: ENDOCRINOLOGY | Facility: CLINIC | Age: 45
End: 2025-03-12
Payer: COMMERCIAL

## 2025-03-12 VITALS
HEART RATE: 87 BPM | SYSTOLIC BLOOD PRESSURE: 136 MMHG | WEIGHT: 184.4 LBS | HEIGHT: 61 IN | DIASTOLIC BLOOD PRESSURE: 86 MMHG | OXYGEN SATURATION: 99 % | BODY MASS INDEX: 34.81 KG/M2

## 2025-03-12 DIAGNOSIS — E11.65 TYPE 2 DIABETES MELLITUS WITH HYPERGLYCEMIA, WITHOUT LONG-TERM CURRENT USE OF INSULIN (HCC): ICD-10-CM

## 2025-03-12 DIAGNOSIS — E03.9 ACQUIRED HYPOTHYROIDISM: ICD-10-CM

## 2025-03-12 DIAGNOSIS — E11.43 AUTONOMIC NEUROPATHY DUE TO TYPE 2 DIABETES MELLITUS (HCC): Primary | ICD-10-CM

## 2025-03-12 PROCEDURE — 99214 OFFICE O/P EST MOD 30 MIN: CPT | Performed by: INTERNAL MEDICINE

## 2025-03-12 RX ORDER — GLYCOPYRROLATE 2 MG/1
1 TABLET ORAL DAILY
Qty: 15 TABLET | Refills: 5 | Status: SHIPPED | OUTPATIENT
Start: 2025-03-12 | End: 2025-09-08

## 2025-03-12 NOTE — ASSESSMENT & PLAN NOTE
I suspect that her hyperhidrosis is likely due to autonomic dysfunction as evident in the history.  I recommend doing a complete metabolic work up for neuropathy after which she can start glycopyrrolate to manage her hyperhidrosis.    Lab Results   Component Value Date    HGBA1C 6.3 12/03/2024       Orders:    Vitamin B12; Future    Protein electrophoresis, serum; Future    Protein electrophoresis, urine; Future    Heavy metals screen, urine; Future    glycopyrrolate (ROBINUL) 2 MG tablet; Take 0.5 tablets (1 mg total) by mouth in the morning

## 2025-03-12 NOTE — PROGRESS NOTES
Name: Esther Griffith      : 1980      MRN: [id number]  Encounter Provider: Grazyna Barber MD  Encounter Date: 3/12/2025   Encounter department: [facility] [place]  :  Assessment & Plan  Autonomic neuropathy due to type 2 diabetes mellitus (HCC)  I suspect that her hyperhidrosis is likely due to autonomic dysfunction as evident in the history.  I recommend doing a complete metabolic work up for neuropathy after which she can start glycopyrrolate to manage her hyperhidrosis.    Lab Results   Component Value Date    HGBA1C 6.3 2024       Orders:    Vitamin B12; Future    Protein electrophoresis, serum; Future    Protein electrophoresis, urine; Future    Heavy metals screen, urine; Future    glycopyrrolate (ROBINUL) 2 MG tablet; Take 0.5 tablets (1 mg total) by mouth in the morning    Type 2 diabetes mellitus with hyperglycemia, without long-term current use of insulin (HCC)  This appears to be well-controlled based on hemoglobin A1c.  Lab Results   Component Value Date    HGBA1C 6.3 2024            Acquired hypothyroidism  Continue current thyroid hormone supplementation.               History of Present Illness   HPI  45-year-old woman with hyperhidrosis presents for follow-up.  She states that the hyperhidrosis may be worse.  On further questioning, she does admit to oncoming headlights bothering her when she is driving at night, bloating after she eats and feeling of incomplete bladder emptying.  These are all symptoms of autonomic dysfunction.  On testing for pheochromocytoma, she did not have pheochromocytoma.    Review of Systems   Constitutional:  Negative for chills and fever.   Respiratory:  Negative for shortness of breath.    Cardiovascular:  Negative for chest pain.   Gastrointestinal:  Negative for constipation, diarrhea, nausea and vomiting.   All other systems reviewed and are negative.    Current Outpatient Medications on File Prior to Visit  "  Medication Sig Dispense Refill    b complex vitamins capsule Take 1 capsule by mouth daily      Blood Glucose Monitoring Suppl (Contour Blood Glucose System) w/Device KIT Use 1 kit 2 (two) times a day before meals 1 kit 0    cholecalciferol (VITAMIN D3) 25 mcg (1,000 units) tablet Take 1,000 Units by mouth daily Take 1 tab. daily      Contour Test test strip USE TO CHECK BLOOD SUGAR ONCE DAILY 50 strip 7    ferrous sulfate 325 (65 Fe) mg tablet Take 325 mg by mouth Take 1 tab. 3 times per week      gabapentin (NEURONTIN) 300 mg capsule Take 1 capsule (300 mg total) by mouth 3 (three) times a day 270 capsule 1    Levomilnacipran HCl ER (FETZIMA) 80 MG extended release capsule Take 1 capsule (80 mg total) by mouth daily 30 capsule 2    levothyroxine 75 mcg tablet Taking 1 tab 6 days a week and 2 tabs 1 day a week      lithium carbonate 300 mg capsule Take 1 capsule (300 mg total) by mouth 2 (two) times a day with meals 60 capsule 2    MAGNESIUM MALATE PO Take by mouth Take 1 tab. daily      metFORMIN (GLUCOPHAGE) 500 mg tablet TAKE 1 TABLET BY MOUTH TWICE A DAY WITH FOOD 60 tablet 5    methocarbamol (ROBAXIN) 500 mg tablet TAKE 1 TAB. EVERY 8 HR. AS NEEDED FOR MUSCLE SPASMS 90 tablet 0    mirtazapine (REMERON) 30 mg tablet Take 1 tablet (30 mg total) by mouth daily at bedtime 30 tablet 2    propranolol (INDERAL) 40 mg tablet Take 0.5 tablets (20 mg total) by mouth daily at bedtime 15 tablet 1    traZODone (DESYREL) 100 mg tablet Take 1 tablet (100 mg total) by mouth daily at bedtime 30 tablet 2     No current facility-administered medications on file prior to visit.         Objective   /86   Pulse 87   Ht 5' 1\" (1.549 m)   Wt 83.6 kg (184 lb 6.4 oz)   LMP 03/13/2023 (Approximate)   SpO2 99%   BMI 34.84 kg/m²      Physical Exam  Vitals and nursing note reviewed.   Constitutional:       Appearance: Normal appearance.   HENT:      Head: Normocephalic and atraumatic.   Eyes:      General: No scleral icterus. "        Right eye: No discharge.         Left eye: No discharge.   Pulmonary:      Effort: Pulmonary effort is normal.   Musculoskeletal:         General: Normal range of motion.      Cervical back: Normal range of motion.   Skin:     Coloration: Skin is not jaundiced.   Neurological:      General: No focal deficit present.      Mental Status: She is alert and oriented to person, place, and time.   Psychiatric:         Mood and Affect: Mood normal.         Behavior: Behavior normal.

## 2025-03-12 NOTE — ASSESSMENT & PLAN NOTE
This appears to be well-controlled based on hemoglobin A1c.  Lab Results   Component Value Date    HGBA1C 6.3 12/03/2024

## 2025-03-21 ENCOUNTER — TELEMEDICINE (OUTPATIENT)
Dept: BEHAVIORAL/MENTAL HEALTH CLINIC | Facility: CLINIC | Age: 45
End: 2025-03-21
Payer: COMMERCIAL

## 2025-03-21 DIAGNOSIS — F33.2 MAJOR DEPRESSIVE DISORDER, RECURRENT SEVERE WITHOUT PSYCHOTIC FEATURES (HCC): Primary | ICD-10-CM

## 2025-03-21 DIAGNOSIS — F41.1 GENERALIZED ANXIETY DISORDER: ICD-10-CM

## 2025-03-21 PROCEDURE — 90834 PSYTX W PT 45 MINUTES: CPT | Performed by: PSYCHIATRY & NEUROLOGY

## 2025-03-21 NOTE — PSYCH
"Virtual Regular VisitName: Esther Griffith      : 1980      MRN: [id number]  Encounter Provider: HERMELINDA BECK  Encounter Date: 3/21/2025   Encounter department: [facility] [place]  :  Assessment & Plan  Major depressive disorder, recurrent severe without psychotic features (HCC)    Generalized anxiety disorder    Goals addressed in session: Goal 1     DATA: Met with Esther for follow up. Esther shared that she has been very tired lately, between her chronic pain wearing her down and trying to recover from the party she hosted last weekend.  She also has been working with her mom for an hour a day to declutter her place, and reports that this has been both exhausting and overwhelming even though she enjoys seeing the progress they make.  Esther shared that her mood has been somewhat down mainly due to pain, and said that sleep has been poor, her memory has been bad, and she finds she either cannot concentrate on things or hyperfocuses on things. She said that she is interested in referral to psychologist to assess for ADHD and autism. She continues to worry about her aging parents and sick father, as well as her younger brother who is \"in a bad place.\" She said that she continues to go out with friends for karaoke and trivia, and is in regular contact with her close group of friends. Provided support, validation of her feelings and experience, and used mindfulness and CBT strategies to help Esther focus on goals, to partialize tasks to reduce overwhelm and build motivation, and to take time to herself to rest.    During this session, this clinician used the following therapeutic modalities: Client-centered Therapy, Cognitive Behavioral Therapy, and Supportive Psychotherapy    Substance Abuse was not addressed during this session. If the client is diagnosed with a co-occurring substance use disorder, please indicate any changes in the frequency or amount of use: n/a. " "Stage of change for addressing substance use diagnoses: No substance use/Not applicable    ASSESSMENT:  Esther presents with a Depressed mood. Esther's affect is Normal range and intensity, which is congruent, with their mood and the content of the session. The client has made progress on their goals as evidenced by improved socialization, brighter mood.    Esther presents with a minimal risk of suicide, minimal risk of self-harm, and minimal risk of harm to others.    For any risk assessment that surpasses a \"low\" rating, a safety plan must be developed.    A safety plan was indicated: no  If yes, describe in detail n/a    PLAN: Between sessions, Esther will work on goals in small increments, will remain connected to friends, and will use relaxation techniques to help ease anxiety and overwhelm. At the next session, the therapist will use Client-centered Therapy and Cognitive Behavioral Therapy to address depression, anxiety.    Behavioral Health Treatment Plan [facility]: Diagnosis and Treatment Plan explained to Esther, Etsher relates understanding diagnosis and is agreeable to Treatment Plan. Yes     Depression Follow-up Plan Completed: Yes     Reason for visit is   Chief Complaint   Patient presents with    Virtual Regular Visit      Recent Visits  No visits were found meeting these conditions.  Showing recent visits within past 7 days and meeting all other requirements  Today's Visits  Date Type Provider Dept   03/21/25 Telemedicine [facility]ELINDA [facility]rap[facility] [place]   Showing today's visits and meeting all other requirements  Future Appointments  No visits were found meeting these conditions.  Showing future appointments within next 150 days and meeting all other requirements     History of Present Illness     HPI    Past Medical History   Past Medical History:   Diagnosis Date    Addiction to drug (HCC) 03/15/2001    ADHD (attention deficit hyperactivity disorder) 01/01/2023    " Adjustment disorder 03/15/1993    Alcohol abuse 03/15/2003    Alcoholism ([facility]) 03/15/2001    Anxiety     Blood transfusion declined because patient is Jew 05/01/2023    Chronic pain disorder     Chronic sinusitis     Cognitive impairment 03/15/2022    Depression     Depression     Diabetes ([facility])     Diabetes mellitus ([facility]) 09/01/2022    Fibromyalgia     Hallucination 03/15/2015    Headache(784.0) 03/15/1993    Various types of headaches    Impulse control disorder 03/15/2022    Memory loss 03/15/2012    Migraine     Obesity     Obsessive-compulsive disorder 03/15/2001    Panic attack 03/15/2001    Peripheral neuropathy 03/15/2022    Polyarthritis     Last assessed 9/21/2015    Psychiatric disorder     Psychiatric illness     Sleep difficulties     Substance abuse ([facility]) 03/15/1994     Past Surgical History:   Procedure Laterality Date    COLONOSCOPY  06/2019    DENTAL SURGERY      HYSTERECTOMY  05/01/2023    HYSTEROSCOPY      Endometrial Biopsy By Hysteroscopy    [place] LAPS SUPRACRV HYSTERECT 250 GM/< RMVL TUBE/OVAR N/A 05/01/2023    Procedure: (LS) W/ BILATERAL SALPINGECTOMY, REMOVAL PARAOVARIAN CYST;  Surgeon: Sai Lopez DO;  Location: [facility];  Service: Gynecology    REMOVAL OF INTRAUTERINE DEVICE (IUD)      US GUIDED BREAST BIOPSY RIGHT COMPLETE Right 06/17/2019    Benign     Current Outpatient Medications   Medication Instructions    b complex vitamins capsule 1 capsule, Daily    Blood Glucose Monitoring Suppl (Contour Blood Glucose System) w/Device KIT 1 kit, Does not apply, 2 times daily before meals    cholecalciferol (VITAMIN D3) 1,000 Units, Daily    Contour Test test strip USE TO CHECK BLOOD SUGAR ONCE DAILY    ferrous sulfate 325 mg    gabapentin (NEURONTIN) 300 mg, Oral, 3 times daily    glycopyrrolate (ROBINUL) 1 mg, Oral, Daily    Levomilnacipran HCl ER (FETZIMA) 80 mg, Oral, Daily    levothyroxine 75 mcg tablet Taking 1 tab 6 days a week and 2 tabs 1 day a week    lithium  carbonate 300 mg, Oral, 2 times daily with meals    MAGNESIUM MALATE PO Take by mouth Take 1 tab. daily    metFORMIN (GLUCOPHAGE) 500 mg, Oral, 2 times daily with meals    methocarbamol (ROBAXIN) 500 mg tablet TAKE 1 TAB. EVERY 8 HR. AS NEEDED FOR MUSCLE SPASMS    mirtazapine (REMERON) 30 mg, Oral, Daily at bedtime,       propranolol (INDERAL) 20 mg, Oral, Daily at bedtime    traZODone (DESYREL) 100 mg, Oral, Daily at bedtime     Allergies   Allergen Reactions    Cannabidiol Shortness Of Breath, Itching, Swelling, Anxiety, Palpitations, Confusion, Hypertension, Throat Swelling and Tongue Swelling    Amoxicillin-Pot Clavulanate Hives    Decadrol [Dexamethasone] Other (See Comments)     psychosis    Penicillins Hives     Hives/Uticaria    Tetracyclines & Related Hives      Allergy;        Objective   LMP 03/13/2023 (Approximate)     Video Exam  Physical Exam     Administrative Statements   Encounter provider HERMELINDA BECK    The Patient is located at Home and in the following state in which I hold an active license [place].    The patient was identified by name and date of birth. Esther Griffith was informed that this is a telemedicine visit and that the visit is being conducted through the Epic Embedded platform. She agrees to proceed..  My office door was closed. No one else was in the room.  She acknowledged consent and understanding of privacy and security of the video platform. The patient has agreed to participate and understands they can discontinue the visit at any time.    I have spent a total time of 50 minutes in caring for this patient on the day of the visit/encounter including Counseling / Coordination of care and Documenting in the medical record, not including the time spent for establishing the audio/video connection.    Visit Time  Start Time: 1059  Stop Time: 1149  Total Visit Time: 50 minutes

## 2025-03-25 DIAGNOSIS — M79.18 MYOFASCIAL PAIN: ICD-10-CM

## 2025-03-25 RX ORDER — METHOCARBAMOL 500 MG/1
TABLET, FILM COATED ORAL
Qty: 90 TABLET | Refills: 0 | Status: SHIPPED | OUTPATIENT
Start: 2025-03-25

## 2025-03-25 NOTE — TELEPHONE ENCOUNTER
Reason for call:   [x] Refill   [] Prior Auth  [x] Other: Not a duplicate, script that was sent to pharm on 03/10/2025 was written fo 90 but the pharm only dispensed 30 and now she is running out.    Office:   [x] PCP/Provider - mi gaytan  [] Specialty/Provider -     Medication: methocarbamol (ROBAXIN) 500 mg tablet     Dose/Frequency: TAKE 1 TAB. EVERY 8 HR. AS NEEDED FOR MUSCLE SPASMS     Quantity: 90    Pharmacy: [facility]/pharm[facility] #[id number] - [place] - [address] [phone]     Local Pharmacy   Does the patient have enough for 3 days?   [] Yes   [x] No - Send as HP to POD    Mail Away Pharmacy   Does the patient have enough for 10 days?   [] Yes   [] No - Send as HP to POD

## 2025-03-26 ENCOUNTER — OFFICE VISIT (OUTPATIENT)
Dept: URGENT CARE | Facility: CLINIC | Age: 45
End: 2025-03-26
Payer: COMMERCIAL

## 2025-03-26 VITALS
HEART RATE: 96 BPM | DIASTOLIC BLOOD PRESSURE: 88 MMHG | RESPIRATION RATE: 20 BRPM | TEMPERATURE: 97.9 F | SYSTOLIC BLOOD PRESSURE: 162 MMHG | OXYGEN SATURATION: 98 %

## 2025-03-26 DIAGNOSIS — B34.9 VIRAL ILLNESS: Primary | ICD-10-CM

## 2025-03-26 DIAGNOSIS — J02.9 SORE THROAT: ICD-10-CM

## 2025-03-26 LAB — S PYO AG THROAT QL: NEGATIVE

## 2025-03-26 PROCEDURE — 99213 OFFICE O/P EST LOW 20 MIN: CPT | Performed by: PHYSICIAN ASSISTANT

## 2025-03-26 PROCEDURE — 87636 SARSCOV2 & INF A&B AMP PRB: CPT | Performed by: PHYSICIAN ASSISTANT

## 2025-03-26 PROCEDURE — 87880 STREP A ASSAY W/OPTIC: CPT | Performed by: PHYSICIAN ASSISTANT

## 2025-03-26 NOTE — PROGRESS NOTES
[facility]      NAME: Esther Griffith is a 45 y.o. female  : 1980    MRN: [id number]  DATE: 2025  TIME: 12:33 PM    Assessment and Plan   Viral illness [B34.9]  1. Viral illness        2. Sore throat  POCT rapid ANTIGEN strepA    Covid/Flu- Office Collect Normal          Patient Instructions   Rapid strep test completed and negative. Will send for culture. Please check mychart for results.Infection appears viral.  Recommend symptomatic treatment.  Can take ibuprofen or tylenol as needed for pain or fever.  Over the counter cough and cold medications to help with symptoms.  Use salt water gargles for sore throat and throat lozenges.  Cough drops as needed.  Wash hands frequently to prevent the spread of infection.  Symptoms may persist for 10-14 days.  Risks and benefits discussed. Patient understands and agrees with the plan.      If tests have been performed at [facility], our office will contact you with results if changes need to be made to the care plan discussed with you at the visit.  You can review your full results on [facility]'s MyChart.     Follow up with PCP in 3-5 days.      If any of the following occur, please report to your nearest ED for evaluation or call 911.   Difficultly breathing or shortness of breath  Chest pain  Acutely worsening symptoms.   To present to the ER if symptoms worsen.  Chief Complaint     Chief Complaint   Patient presents with    Sore Throat     Pt has had a sore throat, nasal congestion, runny nose, and cough that started yesterday          History of Present Illness   Esther Griffith presents to the clinic c/o    Sore Throat   This is a new problem. The current episode started yesterday. The problem has been rapidly worsening. The pain is worse on the right side. There has been no fever. The pain is at a severity of 8/10. The pain is severe. Associated symptoms include congestion, coughing, headaches, a hoarse voice, shortness of breath, swollen glands and  trouble swallowing. Pertinent negatives include no abdominal pain, diarrhea, drooling, ear discharge, ear pain, plugged ear sensation, neck pain, stridor or vomiting. She has tried acetaminophen and cool liquids for the symptoms. The treatment provided mild relief.       Review of Systems   Review of Systems   Constitutional:  Positive for fatigue. Negative for chills, diaphoresis and fever.   HENT:  Positive for congestion, hoarse voice, postnasal drip, sinus pressure, sinus pain, sore throat and trouble swallowing. Negative for drooling, ear discharge, ear pain and facial swelling.    Eyes:  Negative for photophobia, pain, discharge, redness, itching and visual disturbance.   Respiratory:  Positive for cough and shortness of breath. Negative for apnea, chest tightness, wheezing and stridor.    Cardiovascular:  Negative for chest pain and palpitations.   Gastrointestinal:  Negative for abdominal pain, diarrhea, nausea and vomiting.   Musculoskeletal:  Positive for myalgias. Negative for neck pain.   Skin:  Negative for color change, rash and wound.   Neurological:  Positive for headaches. Negative for dizziness.   Hematological:  Negative for adenopathy.         Current Medications     Long-Term Medications   Medication Sig Dispense Refill    b complex vitamins capsule Take 1 capsule by mouth daily      Blood Glucose Monitoring Suppl (Contour Blood Glucose System) w/Device KIT Use 1 kit 2 (two) times a day before meals 1 kit 0    ferrous sulfate 325 (65 Fe) mg tablet Take 325 mg by mouth Take 1 tab. 3 times per week      gabapentin (NEURONTIN) 300 mg capsule Take 1 capsule (300 mg total) by mouth 3 (three) times a day 270 capsule 1    glycopyrrolate (ROBINUL) 2 MG tablet Take 0.5 tablets (1 mg total) by mouth in the morning 15 tablet 5    Levomilnacipran HCl ER (FETZIMA) 80 MG extended release capsule Take 1 capsule (80 mg total) by mouth daily 30 capsule 2    levothyroxine 75 mcg tablet Taking 1 tab 6 days a week  and 2 tabs 1 day a week      lithium carbonate 300 mg capsule Take 1 capsule (300 mg total) by mouth 2 (two) times a day with meals 60 capsule 2    metFORMIN (GLUCOPHAGE) 500 mg tablet TAKE 1 TABLET BY MOUTH TWICE A DAY WITH FOOD 60 tablet 5    methocarbamol (ROBAXIN) 500 mg tablet TAKE 1 TAB. EVERY 8 HR. AS NEEDED FOR MUSCLE SPASMS 90 tablet 0    mirtazapine (REMERON) 30 mg tablet Take 1 tablet (30 mg total) by mouth daily at bedtime 30 tablet 2    propranolol (INDERAL) 40 mg tablet Take 0.5 tablets (20 mg total) by mouth daily at bedtime 15 tablet 1    traZODone (DESYREL) 100 mg tablet Take 1 tablet (100 mg total) by mouth daily at bedtime 30 tablet 2       Current Allergies     Allergies as of 03/26/2025 - Reviewed 03/26/2025   Allergen Reaction Noted    Cannabidiol Shortness Of Breath, Itching, Swelling, Anxiety, Palpitations, Confusion, Hypertension, Throat Swelling, and Tongue Swelling 08/18/2022    Amoxicillin-pot clavulanate Hives 10/08/2020    Decadrol [dexamethasone] Other (See Comments) 08/06/2015    Penicillins Hives 04/24/2013    Tetracyclines & related Hives 04/24/2013            The following portions of the patient's history were reviewed and updated as appropriate: allergies, current medications, past family history, past medical history, past social history, past surgical history and problem list.  Past Medical History:   Diagnosis Date    Addiction to drug ([facility]) 03/15/2001    ADHD (attention deficit hyperactivity disorder) 01/01/2023    Adjustment disorder 03/15/1993    Alcohol abuse 03/15/2003    Alcoholism ([facility]) 03/15/2001    Anxiety     Blood transfusion declined because patient is [place] 05/01/2023    Chronic pain disorder     Chronic sinusitis     Cognitive impairment 03/15/2022    Depression     Depression     Diabetes ([facility])     Diabetes mellitus ([facility]) 09/01/2022    Fibromyalgia     Hallucination 03/15/2015    Headache(784.0) 03/15/1993    Various types of headaches    Impulse  control disorder 03/15/2022    Memory loss 03/15/2012    Migraine     Obesity     Obsessive-compulsive disorder 03/15/2001    Panic attack 03/15/2001    Peripheral neuropathy 03/15/2022    Polyarthritis     Last assessed 9/21/2015    Psychiatric disorder     Psychiatric illness     Sleep difficulties     Substance abuse (HCC) 03/15/1994     Past Surgical History:   Procedure Laterality Date    COLONOSCOPY  06/2019    DENTAL SURGERY      HYSTERECTOMY  05/01/2023    HYSTEROSCOPY      Endometrial Biopsy By Hysteroscopy    CT LAPS SUPRACRV HYSTERECT 250 GM/< RMVL TUBE/OVAR N/A 05/01/2023    Procedure: (LSH) W/ BILATERAL SALPINGECTOMY, REMOVAL PARAOVARIAN CYST;  Surgeon: Sai Lopez DO;  Location: [facility];  Service: Gynecology    REMOVAL OF INTRAUTERINE DEVICE (IUD)      US GUIDED BREAST BIOPSY RIGHT COMPLETE Right 06/17/2019    Benign     Social History     Socioeconomic History    Marital status: Single     Spouse name: Not on file    Number of children: 0    Years of education: 12 years     Highest education level: GED or equivalent   Occupational History    Occupation: unemployed   Tobacco Use    Smoking status: Never     Passive exposure: Never    Smokeless tobacco: Never    Tobacco comments:     N/A, non-smoker.   Vaping Use    Vaping status: Never Used   Substance and Sexual Activity    Alcohol use: Not Currently     Comment: 2 drinks per month    Drug use: Not Currently    Sexual activity: Not Currently     Birth control/protection: Abstinence   Other Topics Concern    Not on file   Social History Narrative    Caffeine use        What type of home do you live in: Single house    Age of your home: 46 yrs    How long have you been living there: 42 yrs    Type of heat: Baseboard    Type of fuel: Electric    What type of samir is in your bedroom: Carpet    Do you have the following in or near your home:    Air products: Window air conditioning and Ionic air purifier    Pests: Mice    Pets: Cat     Basement: None     Social Drivers of Health     Financial Resource Strain: High Risk (12/2/2020)    Overall Financial Resource Strain (CARDIA)     Difficulty of Paying Living Expenses: Hard   Food Insecurity: No Food Insecurity (10/16/2023)    Hunger Vital Sign     Worried About Running Out of Food in the Last Year: Never true     Ran Out of Food in the Last Year: Never true   Transportation Needs: No Transportation Needs (10/16/2023)    PRAPARE - Transportation     Lack of Transportation (Medical): No     Lack of Transportation (Non-Medical): No   Physical Activity: Insufficiently Active (10/12/2022)    Exercise Vital Sign     Days of Exercise per Week: 2 days     Minutes of Exercise per Session: 60 min   Stress: Stress Concern Present (4/3/2019)    [place] [employer] - Occupational Stress Questionnaire     Feeling of Stress : To some extent   Social Connections: Moderately Integrated (4/3/2019)    Social Connection and Isolation Panel [NHANES]     Frequency of Communication with Friends and Family: More than three times a week     Frequency of Social Gatherings with Friends and Family: More than three times a week     Attends [employer] Services: More than 4 times per year     Active Member of Clubs or Organizations: Yes     Attends Club or Organization Meetings: More than 4 times per year     Marital Status: Never    Intimate Partner Violence: Not At Risk (4/3/2019)    Humiliation, Afraid, Rape, and Kick questionnaire     Fear of Current or Ex-Partner: No     Emotionally Abused: No     Physically Abused: No     Sexually Abused: No   Housing Stability: Low Risk  (10/16/2023)    Housing Stability Vital Sign     Unable to Pay for Housing in the Last Year: No     Number of Times Moved in the Last Year: 1     Homeless in the Last Year: No       Objective   /88   Pulse 96   Temp 97.9 °F (36.6 °C)   Resp 20   LMP 03/13/2023 (Approximate)   SpO2 98%      Physical Exam     Physical  Exam  Vitals and nursing note reviewed.   Constitutional:       General: She is not in acute distress.     Appearance: She is well-developed. She is not diaphoretic.   HENT:      Head: Normocephalic and atraumatic.      Right Ear: Tympanic membrane and external ear normal.      Left Ear: Tympanic membrane and external ear normal.      Nose:      Right Sinus: Maxillary sinus tenderness and frontal sinus tenderness present.      Left Sinus: Maxillary sinus tenderness and frontal sinus tenderness present.      Mouth/Throat:      Mouth: Mucous membranes are moist.      Pharynx: No oropharyngeal exudate or posterior oropharyngeal erythema.   Eyes:      General: No scleral icterus.        Right eye: No discharge.         Left eye: No discharge.      Conjunctiva/sclera: Conjunctivae normal.   Cardiovascular:      Rate and Rhythm: Normal rate and regular rhythm.      Heart sounds: Normal heart sounds. No murmur heard.     No friction rub. No gallop.   Pulmonary:      Effort: Pulmonary effort is normal. No respiratory distress.      Breath sounds: Normal breath sounds. No decreased breath sounds, wheezing, rhonchi or rales.   Skin:     General: Skin is warm and dry.      Coloration: Skin is not pale.      Findings: No erythema or rash.   Neurological:      Mental Status: She is alert and oriented to person, place, and time.   Psychiatric:         Behavior: Behavior normal.         Thought Content: Thought content normal.         Judgment: Judgment normal.         Haylee Rodriguez PA-C

## 2025-03-27 DIAGNOSIS — E03.9 ACQUIRED HYPOTHYROIDISM: ICD-10-CM

## 2025-03-27 LAB
FLUAV RNA RESP QL NAA+PROBE: NEGATIVE
FLUBV RNA RESP QL NAA+PROBE: NEGATIVE
SARS-COV-2 RNA RESP QL NAA+PROBE: NEGATIVE

## 2025-03-27 RX ORDER — LEVOTHYROXINE SODIUM 75 UG/1
75 TABLET ORAL
Qty: 90 TABLET | Refills: 1 | Status: SHIPPED | OUTPATIENT
Start: 2025-03-27

## 2025-03-28 DIAGNOSIS — G43.709 CHRONIC MIGRAINE WITHOUT AURA WITHOUT STATUS MIGRAINOSUS, NOT INTRACTABLE: Chronic | ICD-10-CM

## 2025-03-28 NOTE — TELEPHONE ENCOUNTER
Reason for call:   [x] Refill   [] Prior Auth  [] Other:     Office:   [] PCP/Provider -   [x] Specialty/Provider - neuro    Medication: propranolol (INDERAL) 40 mg tablet     Dose/Frequency: 20 mg, Oral, Daily at bedtime     Quantity: 15    Pharmacy: [facility]/pharmacy #[id number] - [place] - [address]     [facility]   Does the patient have enough for 3 days?   [x] Yes   [] No - Send as HP to POD    Mail Away Pharmacy   Does the patient have enough for 10 days?   [] Yes   [] No - Send as HP to POD

## 2025-03-29 RX ORDER — PROPRANOLOL HYDROCHLORIDE 40 MG/1
20 TABLET ORAL
Qty: 45 TABLET | Refills: 3 | Status: SHIPPED | OUTPATIENT
Start: 2025-03-29 | End: 2026-03-24

## 2025-04-01 NOTE — TELEPHONE ENCOUNTER
I can increase to 40 mg bid  Rx sent  Will send Trazodone Rx if still needed but hope dose increase will help with sleep as well  Ascites

## 2025-04-02 ENCOUNTER — APPOINTMENT (OUTPATIENT)
Dept: LAB | Facility: CLINIC | Age: 45
End: 2025-04-02
Payer: COMMERCIAL

## 2025-04-02 DIAGNOSIS — E03.9 ACQUIRED HYPOTHYROIDISM: ICD-10-CM

## 2025-04-02 DIAGNOSIS — E11.43 AUTONOMIC NEUROPATHY DUE TO TYPE 2 DIABETES MELLITUS (HCC): ICD-10-CM

## 2025-04-02 LAB
TSH SERPL DL<=0.05 MIU/L-ACNC: 1.1 UIU/ML (ref 0.45–4.5)
VIT B12 SERPL-MCNC: 3035 PG/ML (ref 180–914)

## 2025-04-02 PROCEDURE — 82607 VITAMIN B-12: CPT

## 2025-04-02 PROCEDURE — 83655 ASSAY OF LEAD: CPT

## 2025-04-02 PROCEDURE — 84165 PROTEIN E-PHORESIS SERUM: CPT

## 2025-04-02 PROCEDURE — 82570 ASSAY OF URINE CREATININE: CPT

## 2025-04-02 PROCEDURE — 83825 ASSAY OF MERCURY: CPT

## 2025-04-02 PROCEDURE — 84166 PROTEIN E-PHORESIS/URINE/CSF: CPT

## 2025-04-02 PROCEDURE — 86334 IMMUNOFIX E-PHORESIS SERUM: CPT

## 2025-04-02 PROCEDURE — 86335 IMMUNFIX E-PHORSIS/URINE/CSF: CPT

## 2025-04-02 PROCEDURE — 36415 COLL VENOUS BLD VENIPUNCTURE: CPT

## 2025-04-02 PROCEDURE — 82175 ASSAY OF ARSENIC: CPT

## 2025-04-03 ENCOUNTER — RESULTS FOLLOW-UP (OUTPATIENT)
Dept: OTHER | Facility: HOSPITAL | Age: 45
End: 2025-04-03

## 2025-04-04 ENCOUNTER — TELEMEDICINE (OUTPATIENT)
Dept: BEHAVIORAL/MENTAL HEALTH CLINIC | Facility: CLINIC | Age: 45
End: 2025-04-04
Payer: COMMERCIAL

## 2025-04-04 ENCOUNTER — RESULTS FOLLOW-UP (OUTPATIENT)
Dept: ENDOCRINOLOGY | Facility: CLINIC | Age: 45
End: 2025-04-04

## 2025-04-04 DIAGNOSIS — F33.2 MAJOR DEPRESSIVE DISORDER, RECURRENT SEVERE WITHOUT PSYCHOTIC FEATURES (HCC): Primary | ICD-10-CM

## 2025-04-04 DIAGNOSIS — F41.1 GENERALIZED ANXIETY DISORDER: ICD-10-CM

## 2025-04-04 LAB
ALBUMIN SERPL ELPH-MCNC: 4.35 G/DL (ref 3.2–5.1)
ALBUMIN SERPL ELPH-MCNC: 65.9 % (ref 48–70)
ALBUMIN UR ELPH-MCNC: 100 %
ALPHA1 GLOB MFR UR ELPH: 0 %
ALPHA1 GLOB SERPL ELPH-MCNC: 0.28 G/DL (ref 0.15–0.47)
ALPHA1 GLOB SERPL ELPH-MCNC: 4.3 % (ref 1.8–7)
ALPHA2 GLOB MFR UR ELPH: 0 %
ALPHA2 GLOB SERPL ELPH-MCNC: 0.61 G/DL (ref 0.42–1.04)
ALPHA2 GLOB SERPL ELPH-MCNC: 9.2 % (ref 5.9–14.9)
B-GLOBULIN MFR UR ELPH: 0 %
BETA GLOB ABNORMAL SERPL ELPH-MCNC: 0.44 G/DL (ref 0.31–0.57)
BETA1 GLOB SERPL ELPH-MCNC: 6.6 % (ref 4.7–7.7)
BETA2 GLOB SERPL ELPH-MCNC: 5 % (ref 3.1–7.9)
BETA2+GAMMA GLOB SERPL ELPH-MCNC: 0.33 G/DL (ref 0.2–0.58)
GAMMA GLOB ABNORMAL SERPL ELPH-MCNC: 0.59 G/DL (ref 0.4–1.66)
GAMMA GLOB MFR UR ELPH: 0 %
GAMMA GLOB SERPL ELPH-MCNC: 9 % (ref 6.9–22.3)
IGG/ALB SER: 1.93 {RATIO} (ref 1.1–1.8)
PROT SERPL-MCNC: 6.6 G/DL (ref 6.4–8.4)
PROT UR-MCNC: <4 MG/DL

## 2025-04-04 PROCEDURE — 84166 PROTEIN E-PHORESIS/URINE/CSF: CPT | Performed by: STUDENT IN AN ORGANIZED HEALTH CARE EDUCATION/TRAINING PROGRAM

## 2025-04-04 PROCEDURE — 86335 IMMUNFIX E-PHORSIS/URINE/CSF: CPT | Performed by: STUDENT IN AN ORGANIZED HEALTH CARE EDUCATION/TRAINING PROGRAM

## 2025-04-04 PROCEDURE — 84165 PROTEIN E-PHORESIS SERUM: CPT | Performed by: STUDENT IN AN ORGANIZED HEALTH CARE EDUCATION/TRAINING PROGRAM

## 2025-04-04 PROCEDURE — 86334 IMMUNOFIX E-PHORESIS SERUM: CPT | Performed by: STUDENT IN AN ORGANIZED HEALTH CARE EDUCATION/TRAINING PROGRAM

## 2025-04-04 PROCEDURE — 90834 PSYTX W PT 45 MINUTES: CPT | Performed by: PSYCHIATRY & NEUROLOGY

## 2025-04-04 NOTE — RESULT ENCOUNTER NOTE
B12 level is very high.  Would recommend holding off on supplementation.  The rest of this testing is essentially normal.

## 2025-04-04 NOTE — PSYCH
"Virtual Regular VisitName: Esther Griffith      : 1980      MRN: [id number]  Encounter Provider: HERMELINDA BECK  Encounter Date: 2025   Encounter department: [facility] [place]  :  Assessment & Plan  Major depressive disorder, recurrent severe without psychotic features (HCC)    Generalized anxiety disorder    Goals addressed in session: Goal 1     DATA: Met with Esther for follow up. Esther shared that she has been sick for the past week or so, and said that she has not had the energy or felt well enough to keep up with cleaning her place.  This makes her mother upset, so Esther said that she feels like things are tense between them because she cannot keep up with mom's expectations. Esther shared that she has been trying to keep up with social activities, having gone to a bonfire with old friends a couple of weeks ago.  She was pulled over on the way home, and she said that she has been beating herself up for it since then.  She said that she has been anxious and replaying that incident, thinking about \"what ifs\" and bad things that could have happened, so processed those concerns and used CBT strategies to help Esther move away from what if thinking and be kinder to herself. Esther said that her mood has been somewhat down because of that incident, and has also been more anxious, feeling as if she has a lot of nervous energy and feeling on edge throughout the day.  Encouraged physical activity and engagement in pleasurable activities to try to help ease some of her anxiety.    During this session, this clinician used the following therapeutic modalities: Client-centered Therapy, Cognitive Behavioral Therapy, and Supportive Psychotherapy    Substance Abuse was not addressed during this session. If the client is diagnosed with a co-occurring substance use disorder, please indicate any changes in the frequency or amount of use: n/a. Stage of change for " "addressing substance use diagnoses: No substance use/Not applicable    ASSESSMENT:  Esther presents with a Euthymic/ normal mood. Esther's affect is Normal range and intensity, which is congruent, with their mood and the content of the session. The client has made progress on their goals as evidenced by brighter mood, more productivity and motivation.    Esther presents with a minimal risk of suicide, minimal risk of self-harm, and minimal risk of harm to others.    For any risk assessment that surpasses a \"low\" rating, a safety plan must be developed.    A safety plan was indicated: no  If yes, describe in detail n/a    PLAN: Between sessions, Esther will focus on rest and self-care while sick, and will engage in pleasurable activities and thought reframing to help ease anxiety. At the next session, the therapist will use Client-centered Therapy, Cognitive Behavioral Therapy, and Supportive Psychotherapy to address depression, anxiety.    Behavioral Health Treatment Plan [facility]: Diagnosis and Treatment Plan explained to Esther, Esther relates understanding diagnosis and is agreeable to Treatment Plan. Yes     Depression Follow-up Plan Completed: Yes     Reason for visit is   Chief Complaint   Patient presents with    Virtual Regular Visit      Recent Visits  No visits were found meeting these conditions.  Showing recent visits within past 7 days and meeting all other requirements  Today's Visits  Date Type Provider Dept   04/04/25 Telemedicine HERMELINDA [facility] [place]   Showing today's visits and meeting all other requirements  Future Appointments  No visits were found meeting these conditions.  Showing future appointments within next 150 days and meeting all other requirements     History of Present Illness     HPI    Past Medical History   Past Medical History:   Diagnosis Date    Addiction to drug ([facility]) 03/15/2001    ADHD (attention deficit hyperactivity disorder) " 01/01/2023    Adjustment disorder 03/15/1993    Alcohol abuse 03/15/2003    Alcoholism ([facility]) 03/15/2001    Anxiety     Blood transfusion declined because patient is [employer] 05/01/2023    Chronic pain disorder     Chronic sinusitis     Cognitive impairment 03/15/2022    Depression     Depression     Diabetes ([facility])     Diabetes mellitus ([facility]) 09/01/2022    Fibromyalgia     Hallucination 03/15/2015    Headache(784.0) 03/15/1993    Various types of headaches    Impulse control disorder 03/15/2022    Memory loss 03/15/2012    Migraine     Obesity     Obsessive-compulsive disorder 03/15/2001    Panic attack 03/15/2001    Peripheral neuropathy 03/15/2022    Polyarthritis     Last assessed 9/21/2015    Psychiatric disorder     Psychiatric illness     Sleep difficulties     Substance abuse ([facility]) 03/15/1994     Past Surgical History:   Procedure Laterality Date    COLONOSCOPY  06/2019    DENTAL SURGERY      HYSTERECTOMY  05/01/2023    HYSTEROSCOPY      Endometrial Biopsy By Hysteroscopy    GA LAPS SUPRACRV HYSTERECT 250 GM/< RMVL TUBE/OVAR N/A 05/01/2023    Procedure: (LS) W/ BILATERAL SALPINGECTOMY, REMOVAL PARAOVARIAN CYST;  Surgeon: Sai Lopez DO;  Location: [facility];  Service: Gynecology    REMOVAL OF INTRAUTERINE DEVICE (IUD)      US GUIDED BREAST BIOPSY RIGHT COMPLETE Right 06/17/2019    Benign     Current Outpatient Medications   Medication Instructions    b complex vitamins capsule 1 capsule, Daily    Blood Glucose Monitoring Suppl (Contour Blood Glucose System) w/Device KIT 1 kit, Does not apply, 2 times daily before meals    cholecalciferol (VITAMIN D3) 1,000 Units, Daily    Contour Test test strip USE TO CHECK BLOOD SUGAR ONCE DAILY    ferrous sulfate 325 mg    gabapentin (NEURONTIN) 300 mg, Oral, 3 times daily    glycopyrrolate (ROBINUL) 1 mg, Oral, Daily    Levomilnacipran HCl ER (FETZIMA) 80 mg, Oral, Daily    levothyroxine 75 mcg, Oral, Daily (early morning)    lithium carbonate 300 mg,  Oral, 2 times daily with meals    MAGNESIUM MALATE PO Take by mouth Take 1 tab. daily    metFORMIN (GLUCOPHAGE) 500 mg, Oral, 2 times daily with meals    methocarbamol (ROBAXIN) 500 mg tablet TAKE 1 TAB. EVERY 8 HR. AS NEEDED FOR MUSCLE SPASMS    mirtazapine (REMERON) 30 mg, Oral, Daily at bedtime,       propranolol (INDERAL) 20 mg, Oral, Daily at bedtime    traZODone (DESYREL) 100 mg, Oral, Daily at bedtime     Allergies   Allergen Reactions    Cannabidiol Shortness Of Breath, Itching, Swelling, Anxiety, Palpitations, Confusion, Hypertension, Throat Swelling and Tongue Swelling    Amoxicillin-Pot Clavulanate Hives    Decadrol [Dexamethasone] Other (See Comments)     psychosis    Penicillins Hives     Hives/Uticaria    Tetracyclines & Related Hives      Allergy;        Objective   LMP 03/13/2023 (Approximate)     Video Exam  Physical Exam     Administrative Statements   Encounter provider HERMELINDA BECK    The Patient is located at Home and in the following state in which I hold an active license [place].    The patient was identified by name and date of birth. Esther Griffith was informed that this is a telemedicine visit and that the visit is being conducted through the Epic Embedded platform. She agrees to proceed..  My office door was closed. No one else was in the room.  She acknowledged consent and understanding of privacy and security of the video platform. The patient has agreed to participate and understands they can discontinue the visit at any time.    I have spent a total time of 38 minutes in caring for this patient on the day of the visit/encounter including Counseling / Coordination of care and Documenting in the medical record, not including the time spent for establishing the audio/video connection.    Visit Time  Start Time: 1301  Stop Time: 1339  Total Visit Time: 38 minutes

## 2025-04-07 ENCOUNTER — NURSE TRIAGE (OUTPATIENT)
Age: 45
End: 2025-04-07

## 2025-04-07 ENCOUNTER — OFFICE VISIT (OUTPATIENT)
Dept: URGENT CARE | Facility: CLINIC | Age: 45
End: 2025-04-07
Payer: COMMERCIAL

## 2025-04-07 VITALS
RESPIRATION RATE: 18 BRPM | HEIGHT: 61 IN | BODY MASS INDEX: 34.25 KG/M2 | OXYGEN SATURATION: 99 % | WEIGHT: 181.4 LBS | DIASTOLIC BLOOD PRESSURE: 84 MMHG | SYSTOLIC BLOOD PRESSURE: 126 MMHG | HEART RATE: 61 BPM | TEMPERATURE: 97.5 F

## 2025-04-07 DIAGNOSIS — R05.1 ACUTE COUGH: ICD-10-CM

## 2025-04-07 DIAGNOSIS — J30.2 SEASONAL ALLERGIES: Primary | ICD-10-CM

## 2025-04-07 PROCEDURE — 99213 OFFICE O/P EST LOW 20 MIN: CPT

## 2025-04-07 RX ORDER — BENZONATATE 200 MG/1
200 CAPSULE ORAL 3 TIMES DAILY PRN
Qty: 20 CAPSULE | Refills: 0 | Status: SHIPPED | OUTPATIENT
Start: 2025-04-07

## 2025-04-07 RX ORDER — FLUTICASONE PROPIONATE 50 MCG
1 SPRAY, SUSPENSION (ML) NASAL DAILY
Qty: 9.9 ML | Refills: 0 | Status: SHIPPED | OUTPATIENT
Start: 2025-04-07

## 2025-04-07 NOTE — TELEPHONE ENCOUNTER
"FOLLOW UP: No appts in office today or tomorrow. Advised Urgent Care today.    REASON FOR CONVERSATION: Cold Like Symptoms    SYMPTOMS: see below    OTHER: Pt agreeable with plan. Triage forwarded to office.    DISPOSITION: Go to Urgent Care Now (overriding See Today or Tomorrow in Office)      Reason for Disposition   Patient wants to be seen    Answer Assessment - Initial Assessment Questions  1. ONSET: \"When did the symptoms start?\"       13 days ago    2. COUGH: \"Do you have a cough?\" If Yes, ask: \"Describe the color of your mucus.\" (e.g., clear, white, yellow, green)      Clear    3. RESPIRATORY DISTRESS: \"Describe your breathing.\"       Pt does not report sob. Speaking in full sentences. No audible wheeze    4. FEVER: \"Do you have a fever?\" If Yes, ask: \"What is your temperature, how was it measured, and when did it start?\"      Denies    5. OTHER SYMPTOMS: \"Do you have any other symptoms?\" (e.g., earache, mouth sores, sore throat, wheezing)      Sore throat.         Pt was seen at  for the same on 3/26 and dx with viral illness.Pt states negative for strep, flu, and covid at that time.    Protocols used: Common Cold-Adult-[place]    "

## 2025-04-07 NOTE — PROGRESS NOTES
[facility]        NAME: Esther Griffith is a 45 y.o. female  : 1980    MRN: [id number]  DATE: 2025  TIME: 12:55 PM    Assessment and Plan   Seasonal allergies [J30.2]  1. Seasonal allergies  fluticasone (FLONASE) 50 mcg/act nasal spray      2. Acute cough  benzonatate (TESSALON) 200 MG capsule            Patient Instructions       Follow up with PCP in 3-5 days.  Proceed to  ER if symptoms worsen.    If tests have been performed at [facility], our office will contact you with results if changes need to be made to the care plan discussed with you at the visit.  You can review your full results on [facility].    Chief Complaint     Chief Complaint   Patient presents with    Cold Like Symptoms     Patient was seen here on  for sore throat, over the weekend her symptoms came back with sore throat, fatigue, cough, headache, denies fever, and she took Dayquil yesterday          History of Present Illness       45-year-old female presents for 3 days of cough, congestion, runny nose, postnasal drip, bilateral eye itching.  Denies known sick contacts.  Denies known fevers.  Took DayQuil yesterday.        Review of Systems   Review of Systems   Constitutional:  Negative for chills and fever.   HENT:  Positive for congestion, postnasal drip and rhinorrhea. Negative for ear pain and sore throat.    Eyes:  Positive for itching.   Respiratory:  Positive for cough.    Neurological:  Negative for headaches.         Current Medications       Current Outpatient Medications:     benzonatate (TESSALON) 200 MG capsule, Take 1 capsule (200 mg total) by mouth 3 (three) times a day as needed for cough, Disp: 20 capsule, Rfl: 0    fluticasone (FLONASE) 50 mcg/act nasal spray, 1 spray into each nostril daily, Disp: 9.9 mL, Rfl: 0    b complex vitamins capsule, Take 1 capsule by mouth daily, Disp: , Rfl:     Blood Glucose Monitoring Suppl (Contour Blood Glucose System) w/Device KIT, Use 1 kit 2 (two) times a  day before meals, Disp: 1 kit, Rfl: 0    cholecalciferol (VITAMIN D3) 25 mcg (1,000 units) tablet, Take 1,000 Units by mouth daily Take 1 tab. daily, Disp: , Rfl:     Contour Test test strip, USE TO CHECK BLOOD SUGAR ONCE DAILY, Disp: 50 strip, Rfl: 7    ferrous sulfate 325 (65 Fe) mg tablet, Take 325 mg by mouth Take 1 tab. 3 times per week, Disp: , Rfl:     gabapentin (NEURONTIN) 300 mg capsule, Take 1 capsule (300 mg total) by mouth 3 (three) times a day, Disp: 270 capsule, Rfl: 1    glycopyrrolate (ROBINUL) 2 MG tablet, Take 0.5 tablets (1 mg total) by mouth in the morning, Disp: 15 tablet, Rfl: 5    Levomilnacipran HCl ER (FETZIMA) 80 MG extended release capsule, Take 1 capsule (80 mg total) by mouth daily, Disp: 30 capsule, Rfl: 2    levothyroxine 75 mcg tablet, TAKE 1 TABLET (75 MCG TOTAL) BY MOUTH DAILY IN THE EARLY MORNING, Disp: 90 tablet, Rfl: 1    lithium carbonate 300 mg capsule, Take 1 capsule (300 mg total) by mouth 2 (two) times a day with meals, Disp: 60 capsule, Rfl: 2    MAGNESIUM MALATE PO, Take by mouth Take 1 tab. daily, Disp: , Rfl:     metFORMIN (GLUCOPHAGE) 500 mg tablet, TAKE 1 TABLET BY MOUTH TWICE A DAY WITH FOOD, Disp: 60 tablet, Rfl: 5    methocarbamol (ROBAXIN) 500 mg tablet, TAKE 1 TAB. EVERY 8 HR. AS NEEDED FOR MUSCLE SPASMS, Disp: 90 tablet, Rfl: 0    mirtazapine (REMERON) 30 mg tablet, Take 1 tablet (30 mg total) by mouth daily at bedtime, Disp: 30 tablet, Rfl: 2    propranolol (INDERAL) 40 mg tablet, Take 0.5 tablets (20 mg total) by mouth daily at bedtime, Disp: 45 tablet, Rfl: 3    traZODone (DESYREL) 100 mg tablet, Take 1 tablet (100 mg total) by mouth daily at bedtime, Disp: 30 tablet, Rfl: 2    Current Allergies     Allergies as of 04/07/2025 - Reviewed 04/07/2025   Allergen Reaction Noted    Cannabidiol Shortness Of Breath, Itching, Swelling, Anxiety, Palpitations, Confusion, Hypertension, Throat Swelling, and Tongue Swelling 08/18/2022    Amoxicillin-pot clavulanate Hives  10/08/2020    Decadrol [dexamethasone] Other (See Comments) 08/06/2015    Penicillins Hives 04/24/2013    Tetracyclines & related Hives 04/24/2013            The following portions of the patient's history were reviewed and updated as appropriate: allergies, current medications, past family history, past medical history, past social history, past surgical history and problem list.     Past Medical History:   Diagnosis Date    Addiction to drug ([facility]) 03/15/2001    ADHD (attention deficit hyperactivity disorder) 01/01/2023    Adjustment disorder 03/15/1993    Alcohol abuse 03/15/2003    Alcoholism ([facility]) 03/15/2001    Anxiety     Blood transfusion declined because patient is Judaism 05/01/2023    Chronic pain disorder     Chronic sinusitis     Cognitive impairment 03/15/2022    Depression     Depression     Diabetes ([facility])     Diabetes mellitus ([facility]) 09/01/2022    Fibromyalgia     Hallucination 03/15/2015    Headache(784.0) 03/15/1993    Various types of headaches    Impulse control disorder 03/15/2022    Memory loss 03/15/2012    Migraine     Obesity     Obsessive-compulsive disorder 03/15/2001    Panic attack 03/15/2001    Peripheral neuropathy 03/15/2022    Polyarthritis     Last assessed 9/21/2015    Psychiatric disorder     Psychiatric illness     Sleep difficulties     Substance abuse ([facility]) 03/15/1994       Past Surgical History:   Procedure Laterality Date    COLONOSCOPY  06/2019    DENTAL SURGERY      HYSTERECTOMY  05/01/2023    HYSTEROSCOPY      Endometrial Biopsy By Hysteroscopy    VA LAPS SUPRACRV HYSTERECT 250 GM/< RMVL TUBE/OVAR N/A 05/01/2023    Procedure: (LSH) W/ BILATERAL SALPINGECTOMY, REMOVAL PARAOVARIAN CYST;  Surgeon: Sai Lopez DO;  Location: [facility];  Service: Gynecology    REMOVAL OF INTRAUTERINE DEVICE (IUD)      US GUIDED BREAST BIOPSY RIGHT COMPLETE Right 06/17/2019    Benign       Family History   Problem Relation Age of Onset    Irregular heart beat Mother     Arthritis  Mother     Diabetes Father     Kidney disease Father     Diabetes type II Father     Depression Father     Substance Abuse Brother     Alcohol abuse Brother     ADD / ADHD Brother     Drug abuse Brother     No Known Problems Brother     Depression Paternal Aunt         Unsuccessful suicide attempt in young adulthood    Psychiatric Illness Paternal Aunt     Self-Injury Paternal Aunt     Suicide Attempts Paternal Aunt     Substance Abuse Maternal Uncle     Prostate cancer Maternal Uncle 60    Diabetes type II Maternal Uncle     Diabetes Maternal Uncle     Diabetes Maternal Grandfather     Migraines Maternal Grandfather     Stroke Maternal Grandfather     Diabetes type II Maternal Grandfather     Alcohol abuse Maternal Grandfather         Great-Grandfather    Diabetes Maternal Grandmother     Rheum arthritis Maternal Grandmother     Melanoma Maternal Grandmother 84    Cancer Maternal Grandmother         Skin Cancer - successfully removed    Stroke Maternal Grandmother     Diabetes type II Maternal Grandmother     Depression Maternal Grandmother     Dementia Maternal Grandmother     Diabetes Paternal Grandfather     Lung cancer Paternal Grandfather 47    Cancer Paternal Grandfather         Lung Cancer - cause of death    Kidney disease Paternal Grandmother     Rheum arthritis Paternal Grandmother     Depression Paternal Grandmother         Nervous Breakdown around age 40    Deep vein thrombosis Paternal Grandmother     Diabetes Paternal Grandmother     Diabetes type II Paternal Grandmother     Alcohol abuse Family     Stomach cancer Family     Alcohol abuse Maternal Uncle     Substance Abuse Maternal Uncle     Cancer Maternal Uncle         Prostate & Testicular Cancer    Drug abuse Maternal Uncle     Alcohol abuse Maternal Uncle     Drug abuse Maternal Uncle     Alcohol abuse Maternal Uncle     Drug abuse Maternal Uncle     Alcohol abuse Maternal Uncle     Drug abuse Maternal Uncle     Completed Suicide  Neg Hx   "        Medications have been verified.        Objective   /84 (BP Location: Left arm, Patient Position: Sitting, Cuff Size: Large)   Pulse 61   Temp 97.5 °F (36.4 °C) (Tympanic)   Resp 18   Ht 5' 1\" (1.549 m)   Wt 82.3 kg (181 lb 6.4 oz)   LMP 03/13/2023 (Approximate)   SpO2 99%   BMI 34.28 kg/m²   Patient's last menstrual period was 03/13/2023 (approximate).       Physical Exam     Physical Exam  Vitals and nursing note reviewed.   Constitutional:       General: She is not in acute distress.  HENT:      Head: Normocephalic and atraumatic.      Right Ear: Tympanic membrane, ear canal and external ear normal.      Left Ear: Tympanic membrane, ear canal and external ear normal.      Nose: Nose normal.      Mouth/Throat:      Mouth: Mucous membranes are moist.      Pharynx: No oropharyngeal exudate or posterior oropharyngeal erythema.   Eyes:      Conjunctiva/sclera: Conjunctivae normal.   Cardiovascular:      Rate and Rhythm: Normal rate and regular rhythm.   Pulmonary:      Effort: Pulmonary effort is normal.      Breath sounds: Normal breath sounds.   Lymphadenopathy:      Cervical: No cervical adenopathy.   Neurological:      Mental Status: She is alert.   Psychiatric:         Mood and Affect: Mood normal.         Behavior: Behavior normal.                   "

## 2025-04-09 ENCOUNTER — NURSE TRIAGE (OUTPATIENT)
Age: 45
End: 2025-04-09

## 2025-04-09 NOTE — TELEPHONE ENCOUNTER
"FOLLOW UP: call patient     REASON FOR CONVERSATION: Medication Reaction and Medication Problem    SYMPTOMS: headache, anxiety, heart palpitations, irritability and insomnia    OTHER: patient doubled her dose of Glycopyrrolate on her own. Now having side effects    DISPOSITION: Discuss With PCP and Callback by Nurse Within 1 Hour        Patient called stating she was prescribed Glycopyrrolate 2 mg 0.5 tablets (to equal 1 mg) once daily in the morning. She reports that she did not think that half a tablet was working for her so on Sunday she started taking a full tablet. She began to experience headache, heart palpitations, anxiety, irritability and insomnia on Sunday night. She reports taking the full tablet sun, mon, tues and has not taken any today. Patient is asking if this is a side effect from the full 2 mg dose and is asking if she should continue or stop the medication?   Please advise    Reason for Disposition   Caller has URGENT medicine question about med that PCP or specialist prescribed and triager unable to answer question    Answer Assessment - Initial Assessment Questions  1. NAME of MEDICINE: \"What medicine(s) are you calling about?\"      Glycopyrrolate 2 mg     2. QUESTION: \"What is your question?\" (e.g., double dose of medicine, side effect)      Could this medication be causing my symptoms?    3. PRESCRIBER: \"Who prescribed the medicine?\" Reason: if prescribed by specialist, call should be referred to that group.      Dr. Barber    4. SYMPTOMS: \"Do you have any symptoms?\" If Yes, ask: \"What symptoms are you having?\"  \"How bad are the symptoms (e.g., mild, moderate, severe)      Headache, heart palpitations, anxiety, irritability and insomnia    5. PREGNANCY:  \"Is there any chance that you are pregnant?\" \"When was your last menstrual period?\"      Denies    Protocols used: Medication Question Call-Adult-[place]    "

## 2025-04-09 NOTE — TELEPHONE ENCOUNTER
Regarding: Medication side effects  ----- Message from Jovita JIMENEZ sent at 4/9/2025 12:18 PM EDT -----  Per Pt on Sunday she increased her glycopyrrolate 2 mg to 1 tab daily instead of 0.5 and ever since she has been feeling extremely anxious and also she's been experiencing insomnia. Please review.

## 2025-04-10 ENCOUNTER — TELEMEDICINE (OUTPATIENT)
Dept: PSYCHIATRY | Facility: CLINIC | Age: 45
End: 2025-04-10
Payer: COMMERCIAL

## 2025-04-10 ENCOUNTER — TELEPHONE (OUTPATIENT)
Dept: PSYCHIATRY | Facility: CLINIC | Age: 45
End: 2025-04-10

## 2025-04-10 DIAGNOSIS — F33.9 RECURRENT MAJOR DEPRESSION RESISTANT TO TREATMENT (HCC): ICD-10-CM

## 2025-04-10 DIAGNOSIS — F33.2 MAJOR DEPRESSIVE DISORDER, RECURRENT SEVERE WITHOUT PSYCHOTIC FEATURES (HCC): Primary | ICD-10-CM

## 2025-04-10 DIAGNOSIS — Z79.899 HIGH RISK MEDICATION USE: ICD-10-CM

## 2025-04-10 DIAGNOSIS — F41.1 GENERALIZED ANXIETY DISORDER: ICD-10-CM

## 2025-04-10 LAB
ARSENIC 24H UR-MCNC: NORMAL UG/L (ref 0–9)
CREAT UR-MCNC: 0.38 G/L (ref 0.3–3)
LEAD 24H UR-MCNC: NORMAL UG/L (ref 0–49)
MERCURY 24H UR-MCNC: NORMAL UG/L (ref 0–19)

## 2025-04-10 PROCEDURE — 99214 OFFICE O/P EST MOD 30 MIN: CPT | Performed by: PSYCHIATRY & NEUROLOGY

## 2025-04-10 RX ORDER — LITHIUM CARBONATE 300 MG/1
300 CAPSULE ORAL 2 TIMES DAILY WITH MEALS
Qty: 60 CAPSULE | Refills: 2 | Status: SHIPPED | OUTPATIENT
Start: 2025-04-10

## 2025-04-10 NOTE — PSYCH
Virtual Regular Visit    Verification of patient location:    Patient is located at Home in the following state in which I hold an active license [place]      Assessment/Plan:    Problem List Items Addressed This Visit          Behavioral Health    Generalized anxiety disorder    Relevant Medications    lithium carbonate 300 mg capsule    Other Relevant Orders    Ambulatory referral to Psych Services    Major depressive disorder, recurrent severe without psychotic features (HCC) - Primary    Relevant Medications    lithium carbonate 300 mg capsule    Other Relevant Orders    Ambulatory referral to Psych Services     Other Visit Diagnoses         Recurrent major depression resistant to treatment (HCC)        Relevant Medications    lithium carbonate 300 mg capsule      High risk medication use        Relevant Orders    Lithium level                          Depression Screening and Follow-up Plan: Patient's depression screening was positive with a PHQ-9 score of 11.   Continue regular follow-up with their mental health provider who is managing their mental health condition(s).         Reason for visit is No chief complaint on file.           Encounter provider Cristina Bray MD      Recent Visits  No visits were found meeting these conditions.  Showing recent visits within past 7 days and meeting all other requirements  Today's Visits  Date Type Provider Dept   04/10/25 Telemedicine Cristina Bray MD [facility]   Showing today's visits and meeting all other requirements  Future Appointments  No visits were found meeting these conditions.  Showing future appointments within next 150 days and meeting all other requirements       The patient was identified by name and date of birth. Esther Griffith was informed that this is a telemedicine visit and that the visit is being conducted throughthe Epic Embedded platform. She agrees to proceed..  My office door was closed. No one else was  in the room.  She acknowledged consent and understanding of privacy and security of the video platform. The patient has agreed to participate and understands they can discontinue the visit at any time.    Patient is aware this is a billable service.     Serg Griffith is a 45 y.o. female with MDD and CAROL .Patient remains compliant with medications and denies side effects. She continues to manage her diabetes with medication and diet.    She continues to meet with pain management and has limited benefit from epidural injections.   She restarted  Gabapentin for neuropathy  300 mg po qhs to help with sleep as well.    Currently on Fetzima and recently had dose increase to 80 mg.   Continued lithium 300 mg po bid for depression.       Recently she was taperd down to 30 mg Mirtazapine and started  on Trazodone  due to difficulties with sleep. She stated she has been sleeping better now and instead of continuing to taper off Mirtazapine she will like to stay with her current medication regimen.  Nerve ablations in May.  F/u with endocrinology .    She stated for the past week she had problems falling and staying asleep, she had to take 100 mg Trazodone po qhs, but  has been feeling sedated.  She is reporting cognitive difficulties that may be due to depression, sleep deprivation and polypharmacy.    Since last seen she stated that she started  a new medication for excessive sweating from endocrinologist but the medication caused extreme anxiety and agitation and insomnia.   She contacted her doctor to let him know she is stopping the medication.   She continues Trazodone 100 mg for sleep and has been helpful.   She is interested in ASD and ADHD testing and will send referral for evaluation.   Mood is stable and agrees to continue current treatment      Agrees to schedule follow up in 6 weeks or sooner if needed.          HPI     Past Medical History:   Diagnosis Date    Addiction to drug (HCC) 03/15/2001    ADHD  (attention deficit hyperactivity disorder) 01/01/2023    Adjustment disorder 03/15/1993    Alcohol abuse 03/15/2003    Alcoholism ([facility]) 03/15/2001    Anxiety     Blood transfusion declined because patient is Shinto 05/01/2023    Chronic pain disorder     Chronic sinusitis     Cognitive impairment 03/15/2022    Depression     Depression     Diabetes ([facility])     Diabetes mellitus ([facility]) 09/01/2022    Fibromyalgia     Hallucination 03/15/2015    Headache(784.0) 03/15/1993    Various types of headaches    Impulse control disorder 03/15/2022    Memory loss 03/15/2012    Migraine     Obesity     Obsessive-compulsive disorder 03/15/2001    Panic attack 03/15/2001    Peripheral neuropathy 03/15/2022    Polyarthritis     Last assessed 9/21/2015    Psychiatric disorder     Psychiatric illness     Sleep difficulties     Substance abuse ([facility]) 03/15/1994       Past Surgical History:   Procedure Laterality Date    COLONOSCOPY  06/2019    DENTAL SURGERY      HYSTERECTOMY  05/01/2023    HYSTEROSCOPY      Endometrial Biopsy By Hysteroscopy    MT LAPS SUPRACRV HYSTERECT 250 GM/< RMVL TUBE/OVAR N/A 05/01/2023    Procedure: ([facility]) W/ BILATERAL SALPINGECTOMY, REMOVAL PARAOVARIAN CYST;  Surgeon: Sai Lopez DO;  Location: [facility];  Service: Gynecology    REMOVAL OF INTRAUTERINE DEVICE (IUD)      US GUIDED BREAST BIOPSY RIGHT COMPLETE Right 06/17/2019    Benign       Current Outpatient Medications   Medication Sig Dispense Refill    lithium carbonate 300 mg capsule Take 1 capsule (300 mg total) by mouth 2 (two) times a day with meals 60 capsule 2    b complex vitamins capsule Take 1 capsule by mouth daily      benzonatate (TESSALON) 200 MG capsule Take 1 capsule (200 mg total) by mouth 3 (three) times a day as needed for cough 20 capsule 0    Blood Glucose Monitoring Suppl (Contour Blood Glucose System) w/Device KIT Use 1 kit 2 (two) times a day before meals 1 kit 0    cholecalciferol (VITAMIN D3) 25 mcg (1,000 units)  tablet Take 1,000 Units by mouth daily Take 1 tab. daily      Contour Test test strip USE TO CHECK BLOOD SUGAR ONCE DAILY 50 strip 7    ferrous sulfate 325 (65 Fe) mg tablet Take 325 mg by mouth Take 1 tab. 3 times per week      fluticasone (FLONASE) 50 mcg/act nasal spray 1 spray into each nostril daily 9.9 mL 0    gabapentin (NEURONTIN) 300 mg capsule Take 1 capsule (300 mg total) by mouth 3 (three) times a day 270 capsule 1    glycopyrrolate (ROBINUL) 2 MG tablet Take 0.5 tablets (1 mg total) by mouth in the morning 15 tablet 5    Levomilnacipran HCl ER (FETZIMA) 80 MG extended release capsule Take 1 capsule (80 mg total) by mouth daily 30 capsule 2    levothyroxine 75 mcg tablet TAKE 1 TABLET (75 MCG TOTAL) BY MOUTH DAILY IN THE EARLY MORNING 90 tablet 1    MAGNESIUM MALATE PO Take by mouth Take 1 tab. daily      metFORMIN (GLUCOPHAGE) 500 mg tablet TAKE 1 TABLET BY MOUTH TWICE A DAY WITH FOOD 60 tablet 5    methocarbamol (ROBAXIN) 500 mg tablet TAKE 1 TAB. EVERY 8 HR. AS NEEDED FOR MUSCLE SPASMS 90 tablet 0    mirtazapine (REMERON) 30 mg tablet Take 1 tablet (30 mg total) by mouth daily at bedtime 30 tablet 2    propranolol (INDERAL) 40 mg tablet Take 0.5 tablets (20 mg total) by mouth daily at bedtime 45 tablet 3    traZODone (DESYREL) 100 mg tablet Take 1 tablet (100 mg total) by mouth daily at bedtime 30 tablet 2     No current facility-administered medications for this visit.        Allergies   Allergen Reactions    Cannabidiol Shortness Of Breath, Itching, Swelling, Anxiety, Palpitations, Confusion, Hypertension, Throat Swelling and Tongue Swelling    Amoxicillin-Pot Clavulanate Hives    Decadrol [Dexamethasone] Other (See Comments)     psychosis    Penicillins Hives     Hives/Uticaria    Tetracyclines & Related Hives      Allergy;        Review of Systems     Mood Anxiety and Depression   Behavior Normal    Thought Content Disturbing Thoughts, Feelings   General Emotional Problems and Decreased  Functioning   Personality Normal   Other Psych Symptoms Normal   Constitutional Negative   ENT Negative   Cardiovascular Negative   Respiratory Negative   Gastrointestinal Negative   Genitourinary Negative   Musculoskeletal Negative   Integumentary Negative   Neurological Negative   Endocrine Normal    Other Symptoms Normal        Laboratory Results: Recent Labs (last 12 months):   Appointment on 04/02/2025   Component Date Value    Vitamin B-12 04/02/2025 3,035 (H)     A/G Ratio 04/02/2025 1.93 (H)     Albumin % 04/02/2025 65.9     Albumin 04/02/2025 4.35     Alpha-1 Globulin % 04/02/2025 4.3     Alpha-1 Globulin 04/02/2025 0.28     Alpha-2 Globulin % 04/02/2025 9.2     Alpha-2 Globulin 04/02/2025 0.61     Beta-1 Globulin % 04/02/2025 6.6     Beta-1 Globulin 04/02/2025 0.44     Beta-2 Globulin % 04/02/2025 5.0     Beta-2 Globulin 04/02/2025 0.33     Gamma Globulin % 04/02/2025 9.0     GAMMA CONC 04/02/2025 0.59     Total Protein 04/02/2025 6.6     Total Protein, Urine 04/02/2025 <4.0     Albumin ELP, Urine 04/02/2025 100.0     Alpha-1 Globulin, Urine % 04/02/2025 0.0     Alpha-2 Globulin, Urine % 04/02/2025 0.0     Beta, Urine 04/02/2025 0.0     Gamma Globulin, Urine 04/02/2025 0.0     Case Report 04/02/2025                      Value:Electrophoresis Case                              Case: [id number]                                   Authorizing Provider:  Grazyna Barber MD           Collected:           04/02/2025 1019              Ordering Location:     [facility]      Received:            04/02/2025 1721                                     Hospital Laboratory                                                                                 Services                                                                     Pathologist:           Corby Bellamy MD                                                                            Specimen:                                                                                               SPEP Interpretation 04/02/2025                      Value:The SPEP shows an abnormal distribution in the gamma region.   Immunofixation to be performed.       SPEP Immunofixation Inte* 04/02/2025                      Value:Serum immunofixation shows no monoclonal immunoglobulins.     BRUCE Bellamy MD  Interpretation performed at [facility], [address] [place] [address].       Case Report 04/02/2025                      Value:Electrophoresis Case                              Case: [id number]                                   Authorizing Provider:  Grazyna Barber MD           Collected:           04/02/2025 1019              Ordering Location:     [facility]      Received:            04/02/2025 [facility]                                     Hospital Laboratory                                                                                 Services                                                                     Pathologist:           Corby Bellamy MD                                                                           Specimen:    Urine, Other                                                                               UPEP Interpretation 04/02/2025                      Value:The UPEP shows selective proteinuria.   Immunofixation to be performed.       UPEP Imunnofixation Inte* 04/02/2025                      Value:Urine immunofixation shows no monoclonal immunoglobulins.     BRUCE Bellamy MD  Interpretation performed at [facility], [address] [place] [address].      Office Visit on 03/26/2025   Component Date Value     RAPID STREP A 03/26/2025 Negative     SARS-CoV-2 03/26/2025 Negative     INFLUENZA A PCR 03/26/2025 Negative     INFLUENZA B PCR 03/26/2025 Negative    Appointment  on 12/12/2024   Component Date Value    TSH 3RD GENERATON 04/02/2025 1.102    Appointment on 12/09/2024   Component Date Value    Lithium Lvl 12/09/2024 0.61    Appointment on 12/06/2024   Component Date Value    Lithium Lvl 12/06/2024 1.22 (H)     Sodium 12/06/2024 139     Potassium 12/06/2024 3.9     Chloride 12/06/2024 102     CO2 12/06/2024 29     ANION GAP 12/06/2024 8     BUN 12/06/2024 13     Creatinine 12/06/2024 0.80     Glucose 12/06/2024 169 (H)     Calcium 12/06/2024 10.3 (H)     AST 12/06/2024 35     ALT 12/06/2024 35     Alkaline Phosphatase 12/06/2024 58     Total Protein 12/06/2024 7.5     Albumin 12/06/2024 5.0     Total Bilirubin 12/06/2024 0.47     eGFR 12/06/2024 88     Cholesterol 12/09/2024 156     Triglycerides 12/09/2024 188 (H)     HDL, Direct 12/09/2024 36 (L)     LDL Calculated 12/09/2024 82     Non-HDL-Chol (CHOL-HDL) 12/09/2024 120     Creatinine, Ur 12/09/2024 80.6     Albumin,U,Random 12/09/2024 <7.0     Albumin Creat Ratio 12/09/2024      Epinephrine, 24H Ur 12/12/2024 <5     Norepinephrine, 24H Ur 12/12/2024 56     Dopamine , 24H Ur 12/12/2024 114     Epinephrine, Rand Ur 12/12/2024 <3     Norepinephrine, Rand Ur 12/12/2024 35     Dopamine, Rand Ur 12/12/2024 71     Metaneph, Total, 24H Ur 12/12/2024 77     Metanephrines, Ur 12/12/2024 48     Normetanephrine, Ur 12/12/2024 221     Normetanephrine, 24H Ur 12/12/2024 354     TSH 3RD GENERATON 12/06/2024 3.712     Free T4 12/06/2024 0.82    Admission on 12/04/2024, Discharged on 12/04/2024   Component Date Value    Ventricular Rate 12/04/2024 91     Atrial Rate 12/04/2024 91     RI Interval 12/04/2024 140     QRSD Interval 12/04/2024 70     QT Interval 12/04/2024 338     QTC Interval 12/04/2024 416     P Axis 12/04/2024 17     QRS Axis 12/04/2024 4     T Wave [place] 12/04/2024 -19     WBC 12/04/2024 7.69     RBC 12/04/2024 4.54     Hemoglobin 12/04/2024 14.3     Hematocrit 12/04/2024 43.2     MCV 12/04/2024 95     MCH 12/04/2024 31.5      MCHC 12/04/2024 33.1     RDW 12/04/2024 13.2     MPV 12/04/2024 9.0     Platelets 12/04/2024 256     nRBC 12/04/2024 0     Segmented % 12/04/2024 59     Immature Grans % 12/04/2024 0     Lymphocytes % 12/04/2024 34     Monocytes % 12/04/2024 5     Eosinophils Relative 12/04/2024 2     Basophils Relative 12/04/2024 0     Absolute Neutrophils 12/04/2024 4.51     Absolute Immature Grans 12/04/2024 0.02     Absolute Lymphocytes 12/04/2024 2.63     Absolute Monocytes 12/04/2024 0.38     Eosinophils Absolute 12/04/2024 0.12     Basophils Absolute 12/04/2024 0.03     Sodium 12/04/2024 138     Potassium 12/04/2024 3.6     Chloride 12/04/2024 103     CO2 12/04/2024 27     ANION GAP 12/04/2024 8     BUN 12/04/2024 12     Creatinine 12/04/2024 0.80     Glucose 12/04/2024 160 (H)     Calcium 12/04/2024 9.5     AST 12/04/2024 34     ALT 12/04/2024 35     Alkaline Phosphatase 12/04/2024 54     Total Protein 12/04/2024 7.1     Albumin 12/04/2024 4.8     Total Bilirubin 12/04/2024 0.47     eGFR 12/04/2024 88     hs TnI 0hr 12/04/2024 <2     hs TnI 2hr 12/04/2024 <2     Delta 2hr hsTnI 12/04/2024      Ventricular Rate 12/04/2024 83     Atrial Rate 12/04/2024 83     NV Interval 12/04/2024 140     QRSD Interval 12/04/2024 78     QT Interval 12/04/2024 362     QTC Interval 12/04/2024 425     P Axis 12/04/2024 36     QRS Axis 12/04/2024 39     T Wave [place] 12/04/2024 -32    Office Visit on 12/03/2024   Component Date Value    Hemoglobin A1C 12/03/2024 6.3    Appointment on 10/18/2024   Component Date Value    WBC 10/18/2024 11.76 (H)     RBC 10/18/2024 4.23     Hemoglobin 10/18/2024 13.4     Hematocrit 10/18/2024 41.2     MCV 10/18/2024 97     MCH 10/18/2024 31.7     MCHC 10/18/2024 32.5     RDW 10/18/2024 14.4     MPV 10/18/2024 9.1     Platelets 10/18/2024 297     nRBC 10/18/2024 0     Segmented % 10/18/2024 57     Immature Grans % 10/18/2024 1     Lymphocytes % 10/18/2024 35     Monocytes % 10/18/2024 6     Eosinophils Relative  10/18/2024 1     Basophils Relative 10/18/2024 0     Absolute Neutrophils 10/18/2024 6.61     Absolute Immature Grans 10/18/2024 0.06     Absolute Lymphocytes 10/18/2024 4.15     Absolute Monocytes 10/18/2024 0.73     Eosinophils Absolute 10/18/2024 0.16     Basophils Absolute 10/18/2024 0.05    Admission on 10/06/2024, Discharged on 10/06/2024   Component Date Value    Ventricular Rate 10/06/2024 104     Atrial Rate 10/06/2024 104     [place] Interval 10/06/2024 144     QRSD Interval 10/06/2024 76     QT Interval 10/06/2024 338     QTC Interval 10/06/2024 444     P Axis 10/06/2024 36     QRS [place] 10/06/2024 75     T Wave [place] 10/06/2024 -28     WBC 10/06/2024 10.62 (H)     RBC 10/06/2024 4.58     Hemoglobin 10/06/2024 14.3     Hematocrit 10/06/2024 44.3     MCV 10/06/2024 97     MCH 10/06/2024 31.2     MCHC 10/06/2024 32.3     RDW 10/06/2024 13.6     MPV 10/06/2024 9.1     Platelets 10/06/2024 254     nRBC 10/06/2024 0     Segmented % 10/06/2024 62     Immature Grans % 10/06/2024 0     Lymphocytes % 10/06/2024 31     Monocytes % 10/06/2024 6     Eosinophils Relative 10/06/2024 1     Basophils Relative 10/06/2024 0     Absolute Neutrophils 10/06/2024 6.54     Absolute Immature Grans 10/06/2024 0.04     Absolute Lymphocytes 10/06/2024 3.29     Absolute Monocytes 10/06/2024 0.60     Eosinophils Absolute 10/06/2024 0.12     Basophils Absolute 10/06/2024 0.03     Sodium 10/06/2024 135     Potassium 10/06/2024 3.8     Chloride 10/06/2024 101     CO2 10/06/2024 25     ANION GAP 10/06/2024 9     BUN 10/06/2024 10     Creatinine 10/06/2024 0.75     Glucose 10/06/2024 178 (H)     Calcium 10/06/2024 10.4 (H)     AST 10/06/2024 29     ALT 10/06/2024 36     Alkaline Phosphatase 10/06/2024 56     Total Protein 10/06/2024 7.3     Albumin 10/06/2024 4.8     Total Bilirubin 10/06/2024 0.50     eGFR 10/06/2024 95     D-Dimer, Quant 10/06/2024 <0.27     hs TnI 0hr 10/06/2024 <2     EXT Preg Test, Ur 10/06/2024 Negative     Control  10/06/2024 Valid     hs TnI 2hr 10/06/2024 <2     Delta 2hr hsTnI 10/06/2024     Appointment on 09/07/2024   Component Date Value    WBC 09/07/2024 12.48 (H)     RBC 09/07/2024 4.38     Hemoglobin 09/07/2024 13.8     Hematocrit 09/07/2024 42.8     MCV 09/07/2024 98     MCH 09/07/2024 31.5     MCHC 09/07/2024 32.2     RDW 09/07/2024 14.3     MPV 09/07/2024 9.3     Platelets 09/07/2024 278     nRBC 09/07/2024 0     Segmented % 09/07/2024 68     Immature Grans % 09/07/2024 1     Lymphocytes % 09/07/2024 25     Monocytes % 09/07/2024 5     Eosinophils Relative 09/07/2024 1     Basophils Relative 09/07/2024 0     Absolute Neutrophils 09/07/2024 8.46 (H)     Absolute Immature Grans 09/07/2024 0.06     Absolute Lymphocytes 09/07/2024 3.14     Absolute Monocytes 09/07/2024 0.67     Eosinophils Absolute 09/07/2024 0.12     Basophils Absolute 09/07/2024 0.03    There may be more visits with results that are not included.         Substance Abuse History:  Social History     Substance and Sexual Activity   Drug Use Not Currently       Family Psychiatric History:   Family History   Problem Relation Age of Onset    Irregular heart beat Mother     Arthritis Mother     Diabetes Father     Kidney disease Father     Diabetes type II Father     Depression Father     Substance Abuse Brother     Alcohol abuse Brother     ADD / ADHD Brother     Drug abuse Brother     No Known Problems Brother     Depression Paternal Aunt         Unsuccessful suicide attempt in young adulthood    Psychiatric Illness Paternal Aunt     Self-Injury Paternal Aunt     Suicide Attempts Paternal Aunt     Substance Abuse Maternal Uncle     Prostate cancer Maternal Uncle 60    Diabetes type II Maternal Uncle     Diabetes Maternal Uncle     Diabetes Maternal Grandfather     Migraines Maternal Grandfather     Stroke Maternal Grandfather     Diabetes type II Maternal Grandfather     Alcohol abuse Maternal Grandfather         Great-Grandfather    Diabetes Maternal  Grandmother     Rheum arthritis Maternal Grandmother     Melanoma Maternal Grandmother 84    Cancer Maternal Grandmother         Skin Cancer - successfully removed    Stroke Maternal Grandmother     Diabetes type II Maternal Grandmother     Depression Maternal Grandmother     Dementia Maternal Grandmother     Diabetes Paternal Grandfather     Lung cancer Paternal Grandfather 47    Cancer Paternal Grandfather         Lung Cancer - cause of death    Kidney disease Paternal Grandmother     Rheum arthritis Paternal Grandmother     Depression Paternal Grandmother         Nervous Breakdown around age 40    Deep vein thrombosis Paternal Grandmother     Diabetes Paternal Grandmother     Diabetes type II Paternal Grandmother     Alcohol abuse Family     Stomach cancer Family     Alcohol abuse Maternal Uncle     Substance Abuse Maternal Uncle     Cancer Maternal Uncle         Prostate & Testicular Cancer    Drug abuse Maternal Uncle     Alcohol abuse Maternal Uncle     Drug abuse Maternal Uncle     Alcohol abuse Maternal Uncle     Drug abuse Maternal Uncle     Alcohol abuse Maternal Uncle     Drug abuse Maternal Uncle     Completed Suicide  Neg Hx        The following portions of the patient's history were reviewed and updated as appropriate: allergies, current medications, past family history, past medical history, past social history, past surgical history, and problem list.    Social History     Socioeconomic History    Marital status: Single     Spouse name: Not on file    Number of children: 0    Years of education: 12 years     Highest education level: GED or equivalent   Occupational History    Occupation: unemployed   Tobacco Use    Smoking status: Never     Passive exposure: Never    Smokeless tobacco: Never    Tobacco comments:     N/A, non-smoker.   Vaping Use    Vaping status: Never Used   Substance and Sexual Activity    Alcohol use: Not Currently     Comment: 2 drinks per month    Drug use: Not Currently     Sexual activity: Not Currently     Birth control/protection: Abstinence   Other Topics Concern    Not on file   Social History Narrative    Caffeine use        What type of home do you live in: Single house    Age of your home: 46 yrs    How long have you been living there: 42 yrs    Type of heat: Baseboard    Type of fuel: Electric    What type of samir is in your bedroom: Carpet    Do you have the following in or near your home:    Air products: Window air conditioning and Ionic air purifier    Pests: Mice    Pets: Cat    Basement: None     Social Drivers of Health     Financial Resource Strain: High Risk (12/2/2020)    Overall Financial Resource Strain (CARDIA)     Difficulty of Paying Living Expenses: Hard   Food Insecurity: No Food Insecurity (10/16/2023)    Hunger Vital Sign     Worried About Running Out of Food in the Last Year: Never true     Ran Out of Food in the Last Year: Never true   Transportation Needs: No Transportation Needs (10/16/2023)    PRAPARE - Transportation     Lack of Transportation (Medical): No     Lack of Transportation (Non-Medical): No   Physical Activity: Insufficiently Active (10/12/2022)    Exercise Vital Sign     Days of Exercise per Week: 2 days     Minutes of Exercise per Session: 60 min   Stress: Stress Concern Present (4/3/2019)    [employer] - Occupational Stress Questionnaire     Feeling of Stress : To some extent   Social Connections: Moderately Integrated (4/3/2019)    Social Connection and Isolation Panel [NHANES]     Frequency of Communication with Friends and Family: More than three times a week     Frequency of Social Gatherings with Friends and Family: More than three times a week     Attends Denominational Services: More than 4 times per year     Active Member of Clubs or Organizations: Yes     Attends Club or Organization Meetings: More than 4 times per year     Marital Status: Never    Intimate Partner Violence: Not At Risk  (4/3/2019)    Humiliation, Afraid, Rape, and Kick questionnaire     Fear of Current or Ex-Partner: No     Emotionally Abused: No     Physically Abused: No     Sexually Abused: No   Housing Stability: Low Risk  (10/16/2023)    Housing Stability Vital Sign     Unable to Pay for Housing in the Last Year: No     Number of Times Moved in the Last Year: 1     Homeless in the Last Year: No     Social History     Social History Narrative    Caffeine use        What type of home do you live in: Single house    Age of your home: 46 yrs    How long have you been living there: 42 yrs    Type of heat: Baseboard    Type of fuel: Electric    What type of samir is in your bedroom: Carpet    Do you have the following in or near your home:    Air products: Window air conditioning and Ionic air purifier    Pests: Mice    Pets: Cat    Basement: None       Objective:       Mental status:  Appearance calm and cooperative , adequate hygiene and grooming, and good eye contact    Mood dysphoric   Affect affect was constricted   Speech a normal rate and fluent   Thought Processes coherent/organized and normal thought processes   Hallucinations no hallucinations present    Thought Content no delusions   Abnormal Thoughts no suicidal thoughts  and no homicidal thoughts    Orientation  oriented to person and place and time   Remote Memory short term memory intact and long term memory intact   Attention Span concentration intact   Intellect Appears to be of Average Intelligence   Insight Limited insight   Judgement judgment was limited   Muscle Strength N/a   Language no difficulty naming common objects and no difficulty repeating a phrase    Fund of Knowledge displays adequate knowledge of current events, adequate fund of knowledge regarding past history, and adequate fund of knowledge regarding vocabulary                Assessment/Plan:       Diagnoses and all orders for this visit:    Major depressive disorder, recurrent severe without  psychotic features (HCC)  -     Ambulatory referral to Psych Services; Future    Generalized anxiety disorder  -     Ambulatory referral to Psych Services; Future    Recurrent major depression resistant to treatment (HCC)  -     lithium carbonate 300 mg capsule; Take 1 capsule (300 mg total) by mouth 2 (two) times a day with meals    High risk medication use  -     Lithium level; Future                  Assessment & Plan  Major depressive disorder, recurrent severe without psychotic features (HCC)    Orders:    Ambulatory referral to Psych Services; Future    Generalized anxiety disorder    Orders:    Ambulatory referral to Psych Services; Future    Recurrent major depression resistant to treatment (HCC)    Orders:    lithium carbonate 300 mg capsule; Take 1 capsule (300 mg total) by mouth 2 (two) times a day with meals    High risk medication use    Orders:    Lithium level; Future           Treatment Recommendations- Risks Benefits      Immediate Medical/Psychiatric/Psychotherapy Treatments and Any Precautions: continue current treatment     Risks, Benefits And Possible Side Effects Of Medications:  {PSYCH RISK, BENEFITS AND POSSIBLE SIDE EFFECTS (Optional):70376    Controlled Medication Discussion: Discussed with patient Black Box warning on concurrent use of benzodiazepines and opioid medications including sedation, respiratory depression, coma and death. Patient understands the risk of treatment with benzodiazepines in addition to opioids and wants to continue taking those medications. , Discussed with patient the risks of sedation, respiratory depression, impairment of ability to drive and potential for abuse and addiction related to treatment with benzodiazepine medications. The patient understands risk of treatment with benzodiazepine medications, agrees to not drive if feels impaired and agrees to take medications as prescribed., and The patient has been filling controlled prescriptions on time as  prescribed to [place] Prescription Drug Monitoring program.      Psychotherapy Provided: No      The Patient is located at Home and in the following state in which I hold an active license [place].    The patient was identified by name and date of birth. Patient  was informed that this is a telemedicine visit and that the visit is being conducted through the Epic Embedded platform. She agrees to proceed..  My office door was closed. No one else was in the room.  She acknowledged consent and understanding of privacy and security of the video platform. The patient has agreed to participate and understands they can discontinue the visit at any time.    I have spent a total time of 30 minutes in caring for this patient on the day of the visit/encounter including Prognosis, Risks and benefits of tx options, Instructions for management, Patient and family education, Importance of tx compliance, Risk factor reductions, Impressions, Documenting in the medical record, Reviewing/placing orders in the medical record (including tests, medications, and/or procedures), and Obtaining or reviewing history  , not including the time spent for establishing the audio/video connection.                  Visit Time    Visit Start Time: 10:00  Visit Stop Time: 10:30  Total Visit Duration:  30 minutes

## 2025-04-10 NOTE — PROGRESS NOTES
Progress Note - Sleep Center   Ad Saavedra 42 y o  female MRN: [id number]        Follow Up Evaluation / Problem:     1  Narcolepsy with cataplexy  2  Currently untreated for daytime sleepiness  3  Currently and treated for cataplexy    4  Restless legs syndrome    HPI: Ad Saavedra is a 42y o  year old female  She has been diagnosed as having narcolepsy with cataplexy  Polysomnography completed 02/08/2016 showed no evidence of Obstructive Sleep Apnea or other significant pathology  The following day multiple sleep latency was completed  HEENT Sleep latency over 4 naps was 7 minutes 15 seconds  REM sleep was identified in all 4 naps  She clinically had evidence of sleep paralysis and hypnagogic hallucinations  She later developed episodes of transient weakness in the upper extremities predominantly manifested by dropping things and may have had an event of weakness of the knees as well  ROS: A comprehensive review of systems revealed no significant changes from previous exams    Historical Information     Past History Since Last Sleep Center Visit:     Her treatment plan to this point in time has been problematic  Requests for Xyrem I have been refused by her insurance company  She tells me today that she was unable to obtain Adderall since it was also denied by her insurance  I have documentation of several denials of Xyrem, however, did not have evidence of problems relating to Adderall  She reports today that she has had no further episodes consistent with cataplexy  I have told her to continue watching carefully for any recurrence of this problem    If cataplexy is identified other treatments such as SSRIs, tricyclic antidepressants and clomipramine have proved successful and past     ALLERGIES  Available on medication sheet in chart     MEDICATION  Available on medication sheet in chart    OBJECTIVE    Vital signs areavailable in patient's chart      Physical Exam: No significant changes since previous examination    ASSESSMENT / PLAN    Assessment:   1  Narcolepsy with cataplexy  2  Restless legs syndrome  3  Inability to obtain medical treatment for narcolepsy with cataplexy    Plan:  1  Attempt to obtain stimulants in order to treat daytime sleepiness secondary to narcolepsy  2  Hold treatment for cataplexy at this time    Counseling / Coordination of Care  Total clinic time spent today 25 minutes  Greater than 50% of total time was spent with The patient and / or family counseling and / or coordination of care  A description of the counseling and / or coordination of care: We spoke at length about the results of previous testing and the diagnosis of narcolepsy with cataplexy  She is aware of the complexity and problems related to treatment of her disease  We discussed using stimulant medication to help maintain wakefulness  She is currently not having episodes of cataplexy and does not wish to have treatment at this time  I have told her watch carefully for any signs of weakness  If she experiences any further episode she will contact me at the [facility]  If we are not able to have her approved for Xyrem she may be able to take SSRIs, tricyclic antidepressants or clomipramine  We talked about using Adderall XR  This was prescribed sometime ago  The patient feels that the drug was denied by her insurance  A review of her chart shows no such denial, in fact, there is an approval for both Adderall XR and Adderall IR unfortunately they are both from 2016  We will resubmit for precertification today for 15 mg of Adderall XR and 10 mg of Adderall IR  Hopefully will be able to get both of these approved once again  Britta Otto DO    Board Certified in [place] [employer] 25.8

## 2025-04-14 NOTE — TELEPHONE ENCOUNTER
Caller:  Linsey (Friend)    Topic/issue:   They are requesting a call back with the dosage needed on the warfarin, as nurse will not be back until tomorrow.     Callback Number:   [phone]    Thank you,   Cierra NELLY        Called and spoke to the patient back today.   Since her HH nurse will not be out to see her until tomorrow, she is requesting dosing information for tonight's dose.   Patient instructed to take warfarin 5mg tonight and will call her after we receive the INR results from tomorrow's test with further dosing instructions.     Otis HobbsD     S/w pt, informed her to stop the Tizanidine  Pt said she already stopped and she has not noticed any of the hallucinations or blurred vision since  Pt will seek tx if s/e continue

## 2025-04-15 ENCOUNTER — APPOINTMENT (OUTPATIENT)
Dept: LAB | Facility: CLINIC | Age: 45
End: 2025-04-15
Attending: PSYCHIATRY & NEUROLOGY
Payer: COMMERCIAL

## 2025-04-15 DIAGNOSIS — Z79.899 HIGH RISK MEDICATION USE: ICD-10-CM

## 2025-04-15 LAB — LITHIUM SERPL-SCNC: 0.68 MMOL/L (ref 0.6–1.2)

## 2025-04-15 PROCEDURE — 36415 COLL VENOUS BLD VENIPUNCTURE: CPT

## 2025-04-15 PROCEDURE — 80178 ASSAY OF LITHIUM: CPT

## 2025-04-18 ENCOUNTER — TELEPHONE (OUTPATIENT)
Dept: OTHER | Facility: OTHER | Age: 45
End: 2025-04-18

## 2025-04-18 ENCOUNTER — HOSPITAL ENCOUNTER (EMERGENCY)
Facility: HOSPITAL | Age: 45
Discharge: HOME | End: 2025-04-18
Attending: EMERGENCY MEDICINE
Payer: COMMERCIAL

## 2025-04-18 ENCOUNTER — APPOINTMENT (EMERGENCY)
Dept: RADIOLOGY | Facility: HOSPITAL | Age: 45
End: 2025-04-18
Payer: COMMERCIAL

## 2025-04-18 VITALS
HEIGHT: 60 IN | DIASTOLIC BLOOD PRESSURE: 73 MMHG | RESPIRATION RATE: 16 BRPM | BODY MASS INDEX: 35.34 KG/M2 | TEMPERATURE: 98 F | OXYGEN SATURATION: 100 % | SYSTOLIC BLOOD PRESSURE: 129 MMHG | HEART RATE: 78 BPM | WEIGHT: 180 LBS

## 2025-04-18 DIAGNOSIS — R00.2 PALPITATIONS: ICD-10-CM

## 2025-04-18 DIAGNOSIS — R07.9 CHEST PAIN: Primary | ICD-10-CM

## 2025-04-18 LAB
ALBUMIN SERPL BCG-MCNC: 4.2 G/DL (ref 3.5–5)
ALP SERPL-CCNC: 58 U/L (ref 34–104)
ALT SERPL W P-5'-P-CCNC: 32 U/L (ref 7–52)
ANION GAP SERPL CALCULATED.3IONS-SCNC: 10 MMOL/L (ref 4–13)
AST SERPL W P-5'-P-CCNC: 23 U/L (ref 13–39)
BASOPHILS # BLD AUTO: 0.03 THOUSANDS/ÂΜL (ref 0–0.1)
BASOPHILS NFR BLD AUTO: 0 % (ref 0–1)
BILIRUB SERPL-MCNC: 0.41 MG/DL (ref 0.2–1)
BUN SERPL-MCNC: 9 MG/DL (ref 5–25)
CALCIUM SERPL-MCNC: 9.4 MG/DL (ref 8.4–10.2)
CARDIAC TROPONIN I PNL SERPL HS: <2 NG/L (ref ?–50)
CARDIAC TROPONIN I PNL SERPL HS: <2 NG/L (ref ?–50)
CHLORIDE SERPL-SCNC: 102 MMOL/L (ref 96–108)
CO2 SERPL-SCNC: 25 MMOL/L (ref 21–32)
CREAT SERPL-MCNC: 0.78 MG/DL (ref 0.6–1.3)
EOSINOPHIL # BLD AUTO: 0.13 THOUSAND/ÂΜL (ref 0–0.61)
EOSINOPHIL NFR BLD AUTO: 2 % (ref 0–6)
ERYTHROCYTE [DISTWIDTH] IN BLOOD BY AUTOMATED COUNT: 14 % (ref 11.6–15.1)
GFR SERPL CREATININE-BSD FRML MDRD: 90 ML/MIN/1.73SQ M
GLUCOSE SERPL-MCNC: 209 MG/DL (ref 65–140)
HCT VFR BLD AUTO: 39.3 % (ref 34.8–46.1)
HGB BLD-MCNC: 13.1 G/DL (ref 11.5–15.4)
IMM GRANULOCYTES # BLD AUTO: 0.03 THOUSAND/UL (ref 0–0.2)
IMM GRANULOCYTES NFR BLD AUTO: 0 % (ref 0–2)
LYMPHOCYTES # BLD AUTO: 2.59 THOUSANDS/ÂΜL (ref 0.6–4.47)
LYMPHOCYTES NFR BLD AUTO: 29 % (ref 14–44)
MCH RBC QN AUTO: 31 PG (ref 26.8–34.3)
MCHC RBC AUTO-ENTMCNC: 33.3 G/DL (ref 31.4–37.4)
MCV RBC AUTO: 93 FL (ref 82–98)
MONOCYTES # BLD AUTO: 0.44 THOUSAND/ÂΜL (ref 0.17–1.22)
MONOCYTES NFR BLD AUTO: 5 % (ref 4–12)
NEUTROPHILS # BLD AUTO: 5.65 THOUSANDS/ÂΜL (ref 1.85–7.62)
NEUTS SEG NFR BLD AUTO: 64 % (ref 43–75)
NRBC BLD AUTO-RTO: 0 /100 WBCS
PLATELET # BLD AUTO: 248 THOUSANDS/UL (ref 149–390)
PMV BLD AUTO: 9 FL (ref 8.9–12.7)
POTASSIUM SERPL-SCNC: 4 MMOL/L (ref 3.5–5.3)
PROT SERPL-MCNC: 6.4 G/DL (ref 6.4–8.4)
RBC # BLD AUTO: 4.23 MILLION/UL (ref 3.81–5.12)
SODIUM SERPL-SCNC: 137 MMOL/L (ref 135–147)
WBC # BLD AUTO: 8.87 THOUSAND/UL (ref 4.31–10.16)

## 2025-04-18 PROCEDURE — 96374 THER/PROPH/DIAG INJ IV PUSH: CPT

## 2025-04-18 PROCEDURE — 93005 ELECTROCARDIOGRAM TRACING: CPT

## 2025-04-18 PROCEDURE — 99285 EMERGENCY DEPT VISIT HI MDM: CPT

## 2025-04-18 PROCEDURE — 36415 COLL VENOUS BLD VENIPUNCTURE: CPT

## 2025-04-18 PROCEDURE — 99285 EMERGENCY DEPT VISIT HI MDM: CPT | Performed by: EMERGENCY MEDICINE

## 2025-04-18 PROCEDURE — 85025 COMPLETE CBC W/AUTO DIFF WBC: CPT

## 2025-04-18 PROCEDURE — 71046 X-RAY EXAM CHEST 2 VIEWS: CPT

## 2025-04-18 PROCEDURE — 96376 TX/PRO/DX INJ SAME DRUG ADON: CPT

## 2025-04-18 PROCEDURE — 80053 COMPREHEN METABOLIC PANEL: CPT

## 2025-04-18 PROCEDURE — 84484 ASSAY OF TROPONIN QUANT: CPT

## 2025-04-18 RX ORDER — LORAZEPAM 2 MG/ML
2 INJECTION INTRAMUSCULAR ONCE
Status: COMPLETED | OUTPATIENT
Start: 2025-04-18 | End: 2025-04-18

## 2025-04-18 RX ORDER — LORAZEPAM 2 MG/ML
1 INJECTION INTRAMUSCULAR ONCE
Status: COMPLETED | OUTPATIENT
Start: 2025-04-18 | End: 2025-04-18

## 2025-04-18 RX ADMIN — LORAZEPAM 2 MG: 2 INJECTION INTRAMUSCULAR; INTRAVENOUS at 14:53

## 2025-04-18 RX ADMIN — LORAZEPAM 1 MG: 2 INJECTION INTRAMUSCULAR; INTRAVENOUS at 12:52

## 2025-04-18 NOTE — ED PROVIDER NOTES
Time reflects when diagnosis was documented in both MDM as applicable and the Disposition within this note       Time User Action Codes Description Comment    4/18/2025  3:09 PM Kalyani Feliciano Add [R07.9] Chest pain     4/18/2025  3:09 PM Kalyani Feliciano Add [R00.2] Palpitations           ED Disposition       ED Disposition   Discharge    Condition   Stable    Date/Time   Fri Apr 18, 2025  3:09 PM    Comment   Esther Griffith discharge to home/self care.                   Assessment & Plan       Medical Decision Making  Amount and/or Complexity of Data Reviewed  Labs: ordered. Decision-making details documented in ED Course.  Radiology: ordered.    Risk  Prescription drug management.      Patient is a 45 y.o. female with PMH of fibromyalgia, DM who presents to the ED with chest pain.    Vital signs stable. On exam patient well-appearing in no acute distress, no peripheral edema, intact pulses, no epigastric or upper abdominal tenderness outpatient.    History and physical exam most consistent with anxiety. However, differential diagnosis included but not limited to ACS,, GERD, pneumonia, electrolyte abnormality, anemia, arrhythmia.     Plan: EKG, troponin, CBC, CMP, chest x-ray    View ED course for further discussion on patient workup.     On review of previous records reviewed endocrinology note from 3/12/2025, patient with autonomic neuropathy due to diabetes, A1c 6.3 on 12/3/2024.    All labs reviewed and utilized in the medical decision making process  All radiology studies independently viewed by me and interpreted by the radiologist.  I reviewed all testing with the patient.     Upon re-evaluation patient resting comfortably in no acute distress, no chest pain, palpitations improved with ativan.    Disposition: I have reviewed the patient's vital signs, nursing notes, and other relevant tests/information. I had a detailed discussion with the patient regarding the history, exam findings, and any diagnostic  "results.   Plan to discharge home in stable condition, follow up with PCP.  Discussed with patient who is agreeable to plan.  I discussed discharge instructions, need for follow-up, and oral return precautions for what to return for in addition to the written return precautions and discharge instructions, specifically highlighting areas of special concern.  The patient verbalized understanding of the discharge instructions and warnings that would necessitate return to the Emergency Department including worsening chest pain, difficulty breathing, palpitations.  All questions the patient had were answered prior to discharge to the best of my ability.     Portions of the record may have been created with voice recognition software. Occasional wrong word or \"sound a like\" substitutions may have occurred due to the inherent limitations of voice recognition software. Read the chart carefully and recognize, using context, where substitutions have occurred.    ED Course as of 04/18/25 1543   Fri Apr 18, 2025   1307 CBC and differential  No anemia, no acute leukocytosis   1307 Procedure Note: EKG  Date/Time: 04/18/25 1:07 PM   Interpreted by: Kalyani Feliciano  Indications / Diagnosis: chest pain  ECG reviewed by me, the ED Provider: yes   The EKG demonstrates:  Rate: 85  Rhythm: NSR  Intervals: regular  Axis: regular  QRS/Blocks: regular  ST Changes: Nonspecific T wave inversion in III. No acute ST elevations / depression  Compared to prior EKG on 12/4/24, T wave inversion is new, however did have on prior EKG.      1331 GLUCOSE(!): 209  History of DM   1331 Comprehensive metabolic panel(!)  Normal LFTs    1440 hs TnI 0hr: <2  Low concern for acute ACS, symptoms present since last night   1448 Patient feeling better but having some palpitations, placed on monitor, heart rate stable in the 80s and no evidence of a fib. Will give additional 2mg ativan.        Medications   LORazepam (ATIVAN) injection 1 mg (1 mg Intravenous " Given 4/18/25 1252)   LORazepam (ATIVAN) injection 2 mg (2 mg Intravenous Given 4/18/25 1453)       ED Risk Strat Scores   HEART Risk Score      Flowsheet Row Most Recent Value   Heart Score Risk Calculator    History 0 Filed at: 04/18/2025 1509   ECG 1 Filed at: 04/18/2025 1509   Age 1 Filed at: 04/18/2025 1509   Risk Factors 1 Filed at: 04/18/2025 1509   Troponin 0 Filed at: 04/18/2025 1509   HEART Score 3 Filed at: 04/18/2025 1509          HEART Risk Score      Flowsheet Row Most Recent Value   Heart Score Risk Calculator    History 0 Filed at: 04/18/2025 1509   ECG 1 Filed at: 04/18/2025 1509   Age 1 Filed at: 04/18/2025 1509   Risk Factors 1 Filed at: 04/18/2025 1509   Troponin 0 Filed at: 04/18/2025 1509   HEART Score 3 Filed at: 04/18/2025 1509                      No data recorded        SBIRT 22yo+      Flowsheet Row Most Recent Value   Initial Alcohol Screen: US AUDIT-C     1. How often do you have a drink containing alcohol? 0 Filed at: 04/18/2025 1348   2. How many drinks containing alcohol do you have on a typical day you are drinking?  1 Filed at: 04/18/2025 1348   3b. FEMALE Any Age, or MALE 65+: How often do you have 4 or more drinks on one occassion? 0 Filed at: 04/18/2025 1348   Audit-C Score 1 Filed at: 04/18/2025 1348   NISHANT: How many times in the past year have you...    Used an illegal drug or used a prescription medication for non-medical reasons? Never Filed at: 04/18/2025 1348                            History of Present Illness       Chief Complaint   Patient presents with    Chest Pain     Pt c/o chest pain that started last night  and then resolved. Pt states that awoke with pain after a stressfull dream       Past Medical History:   Diagnosis Date    Addiction to drug (Formerly [facility]) 03/15/2001    ADHD (attention deficit hyperactivity disorder) 01/01/2023    Adjustment disorder 03/15/1993    Alcohol abuse 03/15/2003    Alcoholism (Formerly [facility]) 03/15/2001    Anxiety     Blood transfusion declined because  patient is [employer] 05/01/2023    Chronic pain disorder     Chronic sinusitis     Cognitive impairment 03/15/2022    Depression     Depression     Diabetes ([facility])     Diabetes mellitus ([facility]) 09/01/2022    Fibromyalgia     Hallucination 03/15/2015    Headache(784.0) 03/15/1993    Various types of headaches    Impulse control disorder 03/15/2022    Memory loss 03/15/2012    Migraine     Obesity     Obsessive-compulsive disorder 03/15/2001    Panic attack 03/15/2001    Peripheral neuropathy 03/15/2022    Polyarthritis     Last assessed 9/21/2015    Psychiatric disorder     Psychiatric illness     Sleep difficulties     Substance abuse ([facility]) 03/15/1994      Past Surgical History:   Procedure Laterality Date    COLONOSCOPY  06/2019    DENTAL SURGERY      HYSTERECTOMY  05/01/2023    HYSTEROSCOPY      Endometrial Biopsy By Hysteroscopy    WV LAPS SUPRACRV HYSTERECT 250 GM/< RMVL TUBE/OVAR N/A 05/01/2023    Procedure: (LSH) W/ BILATERAL SALPINGECTOMY, REMOVAL PARAOVARIAN CYST;  Surgeon: Sai Lopez DO;  Location: [facility];  Service: Gynecology    REMOVAL OF INTRAUTERINE DEVICE (IUD)      US GUIDED BREAST BIOPSY RIGHT COMPLETE Right 06/17/2019    Benign      Family History   Problem Relation Age of Onset    Irregular heart beat Mother     Arthritis Mother     Diabetes Father     Kidney disease Father     Diabetes type II Father     Depression Father     Substance Abuse Brother     Alcohol abuse Brother     ADD / ADHD Brother     Drug abuse Brother     No Known Problems Brother     Depression Paternal Aunt         Unsuccessful suicide attempt in young adulthood    Psychiatric Illness Paternal Aunt     Self-Injury Paternal Aunt     Suicide Attempts Paternal Aunt     Substance Abuse Maternal Uncle     Prostate cancer Maternal Uncle 60    Diabetes type II Maternal Uncle     Diabetes Maternal Uncle     Diabetes Maternal Grandfather     Migraines Maternal Grandfather     Stroke Maternal Grandfather     Diabetes  type II Maternal Grandfather     Alcohol abuse Maternal Grandfather         Great-Grandfather    Diabetes Maternal Grandmother     Rheum arthritis Maternal Grandmother     Melanoma Maternal Grandmother 84    Cancer Maternal Grandmother         Skin Cancer - successfully removed    Stroke Maternal Grandmother     Diabetes type II Maternal Grandmother     Depression Maternal Grandmother     Dementia Maternal Grandmother     Diabetes Paternal Grandfather     Lung cancer Paternal Grandfather 47    Cancer Paternal Grandfather         Lung Cancer - cause of death    Kidney disease Paternal Grandmother     Rheum arthritis Paternal Grandmother     Depression Paternal Grandmother         Nervous Breakdown around age 40    Deep vein thrombosis Paternal Grandmother     Diabetes Paternal Grandmother     Diabetes type II Paternal Grandmother     Alcohol abuse Family     Stomach cancer Family     Alcohol abuse Maternal Uncle     Substance Abuse Maternal Uncle     Cancer Maternal Uncle         Prostate & Testicular Cancer    Drug abuse Maternal Uncle     Alcohol abuse Maternal Uncle     Drug abuse Maternal Uncle     Alcohol abuse Maternal Uncle     Drug abuse Maternal Uncle     Alcohol abuse Maternal Uncle     Drug abuse Maternal Uncle     Completed Suicide  Neg Hx       Social History     Tobacco Use    Smoking status: Never     Passive exposure: Never    Smokeless tobacco: Never    Tobacco comments:     N/A, non-smoker.   Vaping Use    Vaping status: Never Used   Substance Use Topics    Alcohol use: Not Currently     Comment: 2 drinks per month    Drug use: Not Currently      E-Cigarette/Vaping    E-Cigarette Use Never User       E-Cigarette/Vaping Substances    Nicotine No     THC No     CBD No       I have reviewed and agree with the history as documented.     HPI  Patient is a 45 y.o. female with history of anxiety, depression, fibromyalgia, DM not on insulin presenting to the emergency department for chest pain. Patient  states that last night she was eating dinner with her family and afterward started having conversations that were very stressful and upsetting to her, patient then developed pain in the center/left side of her chest.  She states the pain lasted for a while but then started to resolve, but then came back again last night.  This morning she again developed pain after having a bad dream.  She states that the pain is similar to pain that she has had in the past, and that she had a negative cardiac workup at that time.  She has diaphoresis at baseline which is unchanged but denies having any shortness of breath, nausea, abdominal pain.  Patient denies having any history of PE/DVT, denies recent travel, immobilization.  Patient does state that she takes medication for anxiety/depression but does not have any breakthrough anxiety medications, and states that she feels like she could use something right now.  Denies having any SI/HI.     Review of Systems   Constitutional:  Negative for fever.   HENT:  Negative for congestion.    Eyes:  Negative for visual disturbance.   Respiratory:  Negative for shortness of breath.    Cardiovascular:  Positive for chest pain. Negative for leg swelling.   Gastrointestinal:  Negative for abdominal pain, diarrhea and vomiting.   Endocrine: Negative for polyuria.   Genitourinary:  Negative for dysuria.   Musculoskeletal:  Negative for gait problem.   Skin:  Negative for rash.   Neurological:  Negative for dizziness.   All other systems reviewed and are negative.          Objective       ED Triage Vitals   Temperature Pulse Blood Pressure Respirations SpO2 Patient Position - Orthostatic VS   04/18/25 1159 04/18/25 1159 04/18/25 1159 04/18/25 1159 04/18/25 1159 04/18/25 1159   98 °F (36.7 °C) 88 129/74 18 99 % Sitting      Temp Source Heart Rate Source BP Location FiO2 (%) Pain Score    04/18/25 1159 04/18/25 1359 04/18/25 1159 -- 04/18/25 1159    Temporal Monitor Left arm  8      Vitals       Date and Time Temp Pulse SpO2 Resp BP Pain Score FACES Pain Rating User   04/18/25 1359 -- 78 100 % 16 129/73 -- -- KIY   04/18/25 1159 98 °F (36.7 °C) 88 99 % 18 129/74 8 -- BG            Physical Exam  Vitals and nursing note reviewed.   Constitutional:       Appearance: Normal appearance.   HENT:      Head: Normocephalic and atraumatic.      Mouth/Throat:      Mouth: Mucous membranes are moist.   Eyes:      Conjunctiva/sclera: Conjunctivae normal.   Cardiovascular:      Rate and Rhythm: Normal rate and regular rhythm.      Pulses: Normal pulses.      Heart sounds: Normal heart sounds. No murmur heard.  Pulmonary:      Effort: Pulmonary effort is normal. No tachypnea or respiratory distress.      Breath sounds: No decreased breath sounds or wheezing.   Abdominal:      Palpations: Abdomen is soft.      Tenderness: There is no abdominal tenderness.   Musculoskeletal:         General: No tenderness.      Cervical back: Neck supple.      Right lower leg: No edema.      Left lower leg: No edema.   Skin:     General: Skin is warm and dry.      Capillary Refill: Capillary refill takes less than 2 seconds.   Neurological:      General: No focal deficit present.      Mental Status: She is alert. Mental status is at baseline.   Psychiatric:         Mood and Affect: Mood normal.         Results Reviewed       Procedure Component Value Units Date/Time    HS Troponin I 2hr [[id number]] Collected: 04/18/25 1452    Lab Status: Final result Specimen: Blood from Arm, Left Updated: 04/18/25 1533     hs TnI 2hr <2 ng/L      Delta 2hr hsTnI --    HS Troponin 0hr (reflex protocol) [[id number]]  (Normal) Collected: 04/18/25 1251    Lab Status: Final result Specimen: Blood from Arm, Left Updated: 04/18/25 1334     hs TnI 0hr <2 ng/L     Comprehensive metabolic panel [[id number]]  (Abnormal) Collected: 04/18/25 1251    Lab Status: Final result Specimen: Blood from Arm, Left Updated: 04/18/25 1322     Sodium 137 mmol/L      Potassium 4.0  mmol/L      Chloride 102 mmol/L      CO2 25 mmol/L      ANION GAP 10 mmol/L      BUN 9 mg/dL      Creatinine 0.78 mg/dL      Glucose 209 mg/dL      Calcium 9.4 mg/dL      AST 23 U/L      ALT 32 U/L      Alkaline Phosphatase 58 U/L      Total Protein 6.4 g/dL      Albumin 4.2 g/dL      Total Bilirubin 0.41 mg/dL      eGFR 90 ml/min/1.73sq m     Narrative:      National Kid[employer] Disease Foundation guidelines for Chronic Kidney Disease (CKD):     Stage 1 with normal or high GFR (GFR > 90 mL/min/1.73 square meters)    Stage 2 Mild CKD (GFR = 60-89 mL/min/1.73 square meters)    Stage 3A Moderate CKD (GFR = 45-59 mL/min/1.73 square meters)    Stage 3B Moderate CKD (GFR = 30-44 mL/min/1.73 square meters)    Stage 4 Severe CKD (GFR = 15-29 mL/min/1.73 square meters)    Stage 5 End Stage CKD (GFR <15 mL/min/1.73 square meters)  Note: GFR calculation is accurate only with a steady state creatinine    CBC and differential [[id number]] Collected: 04/18/25 1251    Lab Status: Final result Specimen: Blood from Arm, Left Updated: 04/18/25 1307     WBC 8.87 Thousand/uL      RBC 4.23 Million/uL      Hemoglobin 13.1 g/dL      Hematocrit 39.3 %      MCV 93 fL      MCH 31.0 pg      MCHC 33.3 g/dL      RDW 14.0 %      MPV 9.0 fL      Platelets 248 Thousands/uL      nRBC 0 /100 WBCs      Segmented % 64 %      Immature Grans % 0 %      Lymphocytes % 29 %      Monocytes % 5 %      Eosinophils Relative 2 %      Basophils Relative 0 %      Absolute Neutrophils 5.65 Thousands/µL      Absolute Immature Grans 0.03 Thousand/uL      Absolute Lymphocytes 2.59 Thousands/µL      Absolute Monocytes 0.44 Thousand/µL      Eosinophils Absolute 0.13 Thousand/µL      Basophils Absolute 0.03 Thousands/µL             XR chest 2 views    (Results Pending)       Procedures    ED Medication and Procedure Management   Prior to Admission Medications   Prescriptions Last Dose Informant Patient Reported? Taking?   Blood Glucose Monitoring Suppl (Contour Blood Glucose  System) w/Device KIT  Self No No   Sig: Use 1 kit 2 (two) times a day before meals   Contour Test test strip  Self No No   Sig: USE TO CHECK BLOOD SUGAR ONCE DAILY   Levomilnacipran HCl ER (FETZIMA) 80 MG extended release capsule  Self No No   Sig: Take 1 capsule (80 mg total) by mouth daily   MAGNESIUM MALATE PO  Self Yes No   Sig: Take by mouth Take 1 tab. daily   b complex vitamins capsule  Self Yes No   Sig: Take 1 capsule by mouth daily   benzonatate (TESSALON) 200 MG capsule   No No   Sig: Take 1 capsule (200 mg total) by mouth 3 (three) times a day as needed for cough   cholecalciferol (VITAMIN D3) 25 mcg (1,000 units) tablet  Self Yes No   Sig: Take 1,000 Units by mouth daily Take 1 tab. daily   ferrous sulfate 325 (65 Fe) mg tablet  Self Yes No   Sig: Take 325 mg by mouth Take 1 tab. 3 times per week   fluticasone (FLONASE) 50 mcg/act nasal spray   No No   Si spray into each nostril daily   gabapentin (NEURONTIN) 300 mg capsule  Self No No   Sig: Take 1 capsule (300 mg total) by mouth 3 (three) times a day   glycopyrrolate (ROBINUL) 2 MG tablet   No No   Sig: Take 0.5 tablets (1 mg total) by mouth in the morning   levothyroxine 75 mcg tablet   No No   Sig: TAKE 1 TABLET (75 MCG TOTAL) BY MOUTH DAILY IN THE EARLY MORNING   lithium carbonate 300 mg capsule   No No   Sig: Take 1 capsule (300 mg total) by mouth 2 (two) times a day with meals   metFORMIN (GLUCOPHAGE) 500 mg tablet  Self No No   Sig: TAKE 1 TABLET BY MOUTH TWICE A DAY WITH FOOD   methocarbamol (ROBAXIN) 500 mg tablet   No No   Sig: TAKE 1 TAB. EVERY 8 HR. AS NEEDED FOR MUSCLE SPASMS   mirtazapine (REMERON) 30 mg tablet  Self No No   Sig: Take 1 tablet (30 mg total) by mouth daily at bedtime   propranolol (INDERAL) 40 mg tablet   No No   Sig: Take 0.5 tablets (20 mg total) by mouth daily at bedtime   traZODone (DESYREL) 100 mg tablet  Self No No   Sig: Take 1 tablet (100 mg total) by mouth daily at bedtime      Facility-Administered  Medications: None     Discharge Medication List as of 4/18/2025  3:10 PM        CONTINUE these medications which have NOT CHANGED    Details   b complex vitamins capsule Take 1 capsule by mouth daily, Historical Med      benzonatate (TESSALON) 200 MG capsule Take 1 capsule (200 mg total) by mouth 3 (three) times a day as needed for cough, Starting Mon 4/7/2025, Normal      Blood Glucose Monitoring Suppl (Contour Blood Glucose System) w/Device KIT Use 1 kit 2 (two) times a day before meals, Starting Tue 10/4/2022, Normal      cholecalciferol (VITAMIN D3) 25 mcg (1,000 units) tablet Take 1,000 Units by mouth daily Take 1 tab. daily, Historical Med      Contour Test test strip USE TO CHECK BLOOD SUGAR ONCE DAILY, Normal      ferrous sulfate 325 (65 Fe) mg tablet Take 325 mg by mouth Take 1 tab. 3 times per week, Historical Med      fluticasone (FLONASE) 50 mcg/act nasal spray 1 spray into each nostril daily, Starting Mon 4/7/2025, Normal      gabapentin (NEURONTIN) 300 mg capsule Take 1 capsule (300 mg total) by mouth 3 (three) times a day, Starting Fri 1/3/2025, Normal      glycopyrrolate (ROBINUL) 2 MG tablet Take 0.5 tablets (1 mg total) by mouth in the morning, Starting Wed 3/12/2025, Until Mon 9/8/2025, Normal      Levomilnacipran HCl ER (FETZIMA) 80 MG extended release capsule Take 1 capsule (80 mg total) by mouth daily, Starting Thu 2/27/2025, Normal      levothyroxine 75 mcg tablet TAKE 1 TABLET (75 MCG TOTAL) BY MOUTH DAILY IN THE EARLY MORNING, Starting Thu 3/27/2025, Normal      lithium carbonate 300 mg capsule Take 1 capsule (300 mg total) by mouth 2 (two) times a day with meals, Starting Thu 4/10/2025, Normal      MAGNESIUM MALATE PO Take by mouth Take 1 tab. daily, Historical Med      metFORMIN (GLUCOPHAGE) 500 mg tablet TAKE 1 TABLET BY MOUTH TWICE A DAY WITH FOOD, Starting Sat 1/4/2025, Normal      methocarbamol (ROBAXIN) 500 mg tablet TAKE 1 TAB. EVERY 8 HR. AS NEEDED FOR MUSCLE SPASMS, Normal       mirtazapine (REMERON) 30 mg tablet Take 1 tablet (30 mg total) by mouth daily at bedtime, Starting Thu 2/27/2025, Normal      propranolol (INDERAL) 40 mg tablet Take 0.5 tablets (20 mg total) by mouth daily at bedtime, Starting Sat 3/29/2025, Until Tue 3/24/2026, Normal      traZODone (DESYREL) 100 mg tablet Take 1 tablet (100 mg total) by mouth daily at bedtime, Starting Thu 2/27/2025, Normal           No discharge procedures on file.  ED SEPSIS DOCUMENTATION   Time reflects when diagnosis was documented in both MDM as applicable and the Disposition within this note       Time User Action Codes Description Comment    4/18/2025  3:09 PM Kalyani Feliciano [R07.9] Chest pain     4/18/2025  3:09 PM Kalyani Feliciano [R00.2] Palpitations                  Kalyani Feliciano DO  04/18/25 1548

## 2025-04-18 NOTE — TELEPHONE ENCOUNTER
Patient is calling because she was seen in the ER today and given a injection of Ativan. Per patient she would like to be put on Ativan given her medical history. Please follow up and advise. Thank you in advance.

## 2025-04-18 NOTE — DISCHARGE INSTRUCTIONS
You were seen in the emergency department today for chest pain / palpitations.    Your testing showed no acute abnormality, your troponin was normal.    Follow up with your primary care provider.     Take Tylenol / Ibuprofen as needed following the instructions on the bottle.     Return to the emergency department for any new or concerning symptoms including worsening pain, palpitations.     [facility].

## 2025-04-19 DIAGNOSIS — M79.18 MYOFASCIAL PAIN: ICD-10-CM

## 2025-04-19 LAB
ATRIAL RATE: 85 BPM
P AXIS: 32 DEGREES
PR INTERVAL: 144 MS
QRS AXIS: 51 DEGREES
QRSD INTERVAL: 74 MS
QT INTERVAL: 386 MS
QTC INTERVAL: 459 MS
T WAVE AXIS: -55 DEGREES
VENTRICULAR RATE: 85 BPM

## 2025-04-19 PROCEDURE — 93010 ELECTROCARDIOGRAM REPORT: CPT | Performed by: INTERNAL MEDICINE

## 2025-04-19 NOTE — ED ATTENDING ATTESTATION
4/18/2025  I, Ihsan Anderson MD, saw and evaluated the patient. I have discussed the patient with the resident/non-physician practitioner and agree with the resident's/non-physician practitioner's findings, Plan of Care, and MDM as documented in the resident's/non-physician practitioner's note, except where noted. All available labs and Radiology studies were reviewed.  I was present for key portions of any procedure(s) performed by the resident/non-physician practitioner and I was immediately available to provide assistance.       At this point I agree with the current assessment done in the Emergency Department.  I have conducted an independent evaluation of this patient a history and physical is as follows:    ED Course     Impression: Chest pain, history of anxiety  Differential diagnosis: ACS MI arrhythmia, palpitations, anxiety, GERD, doubt PE    Plan check ECG, serial troponins, chest x-ray give trial of Ativan reassess    ECG independently interpreted by me: Normal sinus rhythm low voltage T wave abnormalities present pression abnormal ECG    Labs reviewed: CBC unremarkable.  CMP remarkable for elevated glucose.  Initial and 2-hour troponins less than 2 delta of 0    Chest x-ray independently interpreted by me: No acute cardiopulmonary disease.    Patient meets a heart score of 3 per heart pathway will discharge patient home with outpatient follow-up      Critical Care Time  Procedures

## 2025-04-21 ENCOUNTER — TELEPHONE (OUTPATIENT)
Dept: PSYCHIATRY | Facility: CLINIC | Age: 45
End: 2025-04-21

## 2025-04-21 NOTE — TELEPHONE ENCOUNTER
Patient called the RX Refill Line. Message is being forwarded to the office.     Patient is requesting a call back regarding an ER visit she had on Friday. Patient stated that she had a reaction to a medication and was having chest pains and extreme anxiety. Patient stated that the chest pains have subsided but the anxiety is still there. Patient was wondering if something can be prescribed to help with the anxiety    Please contact patient at [phone]

## 2025-04-21 NOTE — TELEPHONE ENCOUNTER
Called Esther - [phone] (ok to leave detailed VM) - Friday her chest pain felt so bad she knew she needed to go to the ED. She had tests done and they gave her ativan which was effective. She said lately her anxiety has been causing discomfort. She has chest tightness and increased HR at times. I informed her Dr. Ovalle is out of office today but returns tomorrow. Her next appointment is on 5/28. Forwarding to provider for review. Clinical will follow up as advised.

## 2025-04-22 DIAGNOSIS — F41.1 GENERALIZED ANXIETY DISORDER: Primary | ICD-10-CM

## 2025-04-22 RX ORDER — LORAZEPAM 1 MG/1
1 TABLET ORAL DAILY PRN
Qty: 30 TABLET | Refills: 0 | Status: SHIPPED | OUTPATIENT
Start: 2025-04-22 | End: 2025-05-02 | Stop reason: SDUPTHER

## 2025-04-22 RX ORDER — METHOCARBAMOL 500 MG/1
TABLET, FILM COATED ORAL
Qty: 90 TABLET | Refills: 0 | Status: SHIPPED | OUTPATIENT
Start: 2025-04-22

## 2025-04-22 NOTE — TELEPHONE ENCOUNTER
Called Esther and informed her that Dr. Ovalle sent a script for lorazepam 1 mg daily as needed for anxiety to the pharmacy. She asked if she takes this medication and still has chest pain should she go to the ED to be evaluated. I informed her if she has continuous chest pain that doesn't improve it would be better to be evaluated to rule out any other causes. She verbalized understanding.

## 2025-04-25 ENCOUNTER — TELEPHONE (OUTPATIENT)
Dept: PSYCHIATRY | Facility: CLINIC | Age: 45
End: 2025-04-25

## 2025-04-25 NOTE — TELEPHONE ENCOUNTER
Patient called the RX Refill Line. Message is being forwarded to the office.     Patient is having a lot of anxiety and she is questioning if she could take another lorazepam 1mg.     Please contact patient at  [phone]

## 2025-05-01 ENCOUNTER — OFFICE VISIT (OUTPATIENT)
Dept: FAMILY MEDICINE CLINIC | Facility: CLINIC | Age: 45
End: 2025-05-01
Payer: COMMERCIAL

## 2025-05-01 VITALS
HEIGHT: 60 IN | DIASTOLIC BLOOD PRESSURE: 86 MMHG | OXYGEN SATURATION: 100 % | WEIGHT: 180.2 LBS | SYSTOLIC BLOOD PRESSURE: 124 MMHG | HEART RATE: 94 BPM | TEMPERATURE: 98 F | BODY MASS INDEX: 35.38 KG/M2 | RESPIRATION RATE: 18 BRPM

## 2025-05-01 DIAGNOSIS — E03.9 ACQUIRED HYPOTHYROIDISM: Primary | ICD-10-CM

## 2025-05-01 DIAGNOSIS — E11.65 TYPE 2 DIABETES MELLITUS WITH HYPERGLYCEMIA, WITHOUT LONG-TERM CURRENT USE OF INSULIN (HCC): ICD-10-CM

## 2025-05-01 DIAGNOSIS — R61 HYPERHIDROSIS: ICD-10-CM

## 2025-05-01 DIAGNOSIS — F33.2 MAJOR DEPRESSIVE DISORDER, RECURRENT SEVERE WITHOUT PSYCHOTIC FEATURES (HCC): ICD-10-CM

## 2025-05-01 DIAGNOSIS — F41.1 GENERALIZED ANXIETY DISORDER: ICD-10-CM

## 2025-05-01 PROCEDURE — 99213 OFFICE O/P EST LOW 20 MIN: CPT | Performed by: NURSE PRACTITIONER

## 2025-05-01 NOTE — PROGRESS NOTES
Name: Esther Griffith      : 1980      MRN: [id number]  Encounter Provider: ADELAIDA Augustine  Encounter Date: 2025   Encounter department: [facility]  :  Assessment & Plan  Acquired hypothyroidism  Manage of endocrinology.  Currently on levothyroxine 75 mcg daily.    Lab Results   Component Value Date    [id number] 1.102 2025              Type 2 diabetes mellitus with hyperglycemia, without long-term current use of insulin (HCC)    Lab Results   Component Value Date    HGBA1C 6.3 2024   Last A1c 6.3.  Up-to-date with foot exams and eye exams.  Continues on metformin.         Generalized anxiety disorder  Continues to follow with psychiatry.  Ativan as needed.         Major depressive disorder, recurrent severe without psychotic features (HCC)  Continues to follow with psychiatry.  Continues on Fetzima         Hyperhidrosis  Patient was evaluated by endocrinology for her hyperhidrosis.  Blood work was ordered and she was started on Robinul.  She was started at a low dose without any relief.  Most recently this was increased to 2 mg.  Soon after starting the higher dose the patient developed symptoms of chest pain, palpitations, and agitation.  She was seen in the emergency room.  She has since stopped this medication.  She did make the endocrinologist aware that she stopped this medication however has not heard back from their office.  She would like to transfer to a female endocrinologist in that practice for further testing.  Patient did have a hysterectomy approximately 3 to 4 years ago but still has her ovaries.  She is worried this may be a hormonal issue.  She will contact her office to make an appointment with a new endocrinologist.                History of Present Illness   Patient recently seen in the emergency room for symptoms of agitation, palpitations and chest pain.  She was recently placed on a medication for hyperhidrosis and it was increased at the  time of the above symptoms.  Evaluation in the emergency room was negative.  She continues with hyperhidrosis.          Review of Systems   Constitutional:  Positive for diaphoresis (Hyperhidrosis). Negative for activity change, fatigue and fever.   HENT:  Negative for congestion, hearing loss, rhinorrhea, trouble swallowing and voice change.    Eyes:  Negative for photophobia, pain, discharge and visual disturbance.   Respiratory:  Negative for cough, chest tightness and shortness of breath.    Cardiovascular:  Negative for chest pain, palpitations and leg swelling.   Gastrointestinal:  Negative for abdominal pain, blood in stool, constipation, nausea and vomiting.   Endocrine: Negative for cold intolerance and heat intolerance.   Genitourinary:  Negative for difficulty urinating, frequency, hematuria, urgency, vaginal bleeding and vaginal discharge.   Musculoskeletal:  Negative for arthralgias and myalgias.   Skin: Negative.    Neurological:  Negative for dizziness, weakness, numbness and headaches.   Psychiatric/Behavioral:  Negative for decreased concentration. The patient is not nervous/anxious.        Objective   /86   Pulse 94   Temp 98 °F (36.7 °C) (Tympanic)   Resp 18   Ht 5' (1.524 m)   Wt 81.7 kg (180 lb 3.2 oz)   LMP 03/13/2023 (Approximate)   SpO2 100%   BMI 35.19 kg/m²      Physical Exam  Vitals reviewed.   Constitutional:       Appearance: Normal appearance. She is obese. She is diaphoretic.   HENT:      Head: Normocephalic.      Nose: Nose normal.      Mouth/Throat:      Mouth: Mucous membranes are moist.      Pharynx: Oropharynx is clear.   Eyes:      Extraocular Movements: Extraocular movements intact.      Pupils: Pupils are equal, round, and reactive to light.   Cardiovascular:      Rate and Rhythm: Normal rate and regular rhythm.      Pulses: Normal pulses.      Heart sounds: Normal heart sounds.   Pulmonary:      Effort: Pulmonary effort is normal.      Breath sounds: Normal  breath sounds.   Musculoskeletal:         General: Normal range of motion.   Skin:     General: Skin is warm.   Neurological:      General: No focal deficit present.      Mental Status: She is alert and oriented to person, place, and time. Mental status is at baseline.   Psychiatric:         Mood and Affect: Mood normal.         Behavior: Behavior normal.         Thought Content: Thought content normal.         Judgment: Judgment normal.

## 2025-05-01 NOTE — ASSESSMENT & PLAN NOTE
Patient was evaluated by endocrinology for her hyperhidrosis.  Blood work was ordered and she was started on Robinul.  She was started at a low dose without any relief.  Most recently this was increased to 2 mg.  Soon after starting the higher dose the patient developed symptoms of chest pain, palpitations, and agitation.  She was seen in the emergency room.  She has since stopped this medication.  She did make the endocrinologist aware that she stopped this medication however has not heard back from their office.  She would like to transfer to a female endocrinologist in that practice for further testing.  Patient did have a hysterectomy approximately 3 to 4 years ago but still has her ovaries.  She is worried this may be a hormonal issue.  She will contact her office to make an appointment with a new endocrinologist.

## 2025-05-01 NOTE — ASSESSMENT & PLAN NOTE
Manage of endocrinology.  Currently on levothyroxine 75 mcg daily.    Lab Results   Component Value Date    [id number] 1.102 04/02/2025

## 2025-05-02 ENCOUNTER — TELEMEDICINE (OUTPATIENT)
Dept: BEHAVIORAL/MENTAL HEALTH CLINIC | Facility: CLINIC | Age: 45
End: 2025-05-02
Payer: COMMERCIAL

## 2025-05-02 DIAGNOSIS — F41.1 GENERALIZED ANXIETY DISORDER: ICD-10-CM

## 2025-05-02 DIAGNOSIS — F33.2 MAJOR DEPRESSIVE DISORDER, RECURRENT SEVERE WITHOUT PSYCHOTIC FEATURES (HCC): Primary | ICD-10-CM

## 2025-05-02 PROCEDURE — 90834 PSYTX W PT 45 MINUTES: CPT | Performed by: PSYCHIATRY & NEUROLOGY
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
## 2025-05-02 NOTE — TELEPHONE ENCOUNTER
Returned Esther's call for clarification on Ativan script.  Esther has enough Ativan to last her until Tuesday.  She did however inform me that provider said she can take Ativan BID but script is written as 1 tablet daily.  She is requesting that script be corrected.  In addition to Ativan BID, she is also taking 0.25 MG of xanax at bedtime.  Informed her that it is not common practice for our providers to prescribe 2 different types of benzodiazepines.  Esther understands but wants to make provider aware that is what she has been doing.  She knows that provider is out of the office today and can wait until Monday to have her refill request addressed.      Will refer to Dr Ovalle for review upon her return to the office.

## 2025-05-02 NOTE — TELEPHONE ENCOUNTER
*PLEASE READ FULL NOTE*  Pt called refill line for Lorazepam. While on the phone she mentioned that Dr. Ovalle stated she could take Lorazepam 2x a day PRN since once a day wasn't helping her anxiety that much. She states that it still doesn't help as much as she would like and she is also taking a Xanax at bedtime. Please determine if script instructions need to be updated to twice a day. Also, since Xanax is no longer on pts active med list please either add if appropriate or contact pt to further discuss.    Pts call back number is [phone]     Reason for call:   [x] Refill   [] Prior Auth  [] Other:     Office:   [] PCP/Provider -   [x] Specialty/Provider - [facility] / Cristina Bray MD     Medication:   ~ LORazepam (ATIVAN) 1 mg tablet - Take 1 tablet (1 mg total) by mouth daily as needed for anxiety     Pharmacy:   [id number] - [place], [place] - [address]     Local Pharmacy   Does the patient have enough for 3 days?   [x] Yes   [] No - Send as HP to POD

## 2025-05-04 RX ORDER — LORAZEPAM 1 MG/1
1 TABLET ORAL 2 TIMES DAILY
Qty: 60 TABLET | Refills: 0 | Status: SHIPPED | OUTPATIENT
Start: 2025-05-04

## 2025-05-05 ENCOUNTER — TELEPHONE (OUTPATIENT)
Age: 45
End: 2025-05-05

## 2025-05-05 NOTE — TELEPHONE ENCOUNTER
Spoke with the pharmacist at [facility]. Reviewed dose had been increased, but a new prescription hadn't been sent. She noted same and appreciated the call to verify dose change.     Spoke with Esther. Reviewed above and that Xanax was last prescribed in October, but had been discontinued due to side effects.  Esther cannot recall the specifics of that. She will follow up at her appointment 5/28/25 or call sooner if assistance is needed.

## 2025-05-05 NOTE — TELEPHONE ENCOUNTER
Cristina Bray MD to Pushpa Pretty RN       5/4/25  4:03 PM  Rx sent for Lorazepam 1 mg po bid, discontinue Xanax ,last prescribed was October and stopped due to side effects

## 2025-05-05 NOTE — TELEPHONE ENCOUNTER
Pharmacy wants a clarification on when the LORazepam (ATIVAN) 1 mg tablet was increased for the patient. They stated taht she should have enough medication from the refill on 4/22. If it was increased before the refill on 5/4 they need an override.

## 2025-05-06 ENCOUNTER — OFFICE VISIT (OUTPATIENT)
Dept: PAIN MEDICINE | Facility: CLINIC | Age: 45
End: 2025-05-06
Payer: COMMERCIAL

## 2025-05-06 VITALS — HEIGHT: 60 IN | WEIGHT: 180 LBS | BODY MASS INDEX: 35.34 KG/M2

## 2025-05-06 DIAGNOSIS — F41.1 GENERALIZED ANXIETY DISORDER: ICD-10-CM

## 2025-05-06 DIAGNOSIS — R20.2 PARESTHESIA: ICD-10-CM

## 2025-05-06 DIAGNOSIS — M47.816 LUMBAR SPONDYLOSIS: Primary | ICD-10-CM

## 2025-05-06 DIAGNOSIS — G89.4 CHRONIC PAIN SYNDROME: ICD-10-CM

## 2025-05-06 PROCEDURE — 99214 OFFICE O/P EST MOD 30 MIN: CPT

## 2025-05-06 RX ORDER — GABAPENTIN 300 MG/1
300 CAPSULE ORAL 3 TIMES DAILY
Qty: 270 CAPSULE | Refills: 1 | Status: SHIPPED | OUTPATIENT
Start: 2025-05-06

## 2025-05-06 NOTE — PROGRESS NOTES
Assessment:  1. Lumbar spondylosis    2. Chronic pain syndrome    3. Paresthesia    4. Generalized anxiety disorder        Plan:  The patient is a 45-year-old female with a history of chronic pain secondary to low back pain and lumbar spondylosis who presents to the office with worsening bilateral low back pain.    At this time, I did instruct patient we can perform a repeat right and left L4-S1 radiofrequency ablation.  Patient last had these procedures done last year which provided her 90% relief of her pain for over 6 months.  Patient would like to proceed and will be scheduled.  Complete risks and benefits including bleeding, infection, tissue reaction, nerve injury and allergic reaction were discussed.  The approach was demonstrated using models and literature was provided.  Verbal and written consent was obtained.    She can continue her current pain medication regimen, refills for gabapentin were sent to the patient's pharmacy.    The patient will follow-up in 6 months for medication prescription refill and reevaluation. The patient was advised to contact the office should their symptoms worsen in the interim. The patient was agreeable and verbalized an understanding.        History of Present Illness:    The patient is a 45 y.o. female with a history of chronic pain secondary to low back pain and lumbar spondylosis.  She was last seen on 1/3/2025 where she was continued on gabapentin 300 mg.  She presents to the office with worsening bilateral low back pain.    She states her pain is worse since last office visit and constant.  She states her pain is worse with activity.  She rates the quality of her pain throbbing, pressure-like, shooting, pins/needles and is currently rating her pain an 8/10 on a numeric scale.    Current pain medications include gabapentin 300 mg 3 times a day and Tylenol and Advil as needed which is helpful.    I have personally reviewed and/or updated the patient's past medical history,  past surgical history, family history, social history, current medications, allergies, and vital signs today.       Review of Systems:    Review of Systems   Respiratory:  Negative for shortness of breath.    Cardiovascular:  Negative for chest pain.   Gastrointestinal:  Negative for constipation, diarrhea, nausea and vomiting.   Musculoskeletal:  Positive for back pain and gait problem. Negative for arthralgias, joint swelling and myalgias.   Skin:  Negative for rash.   Neurological:  Negative for dizziness, seizures and weakness.   All other systems reviewed and are negative.        Past Medical History:   Diagnosis Date    Addiction to drug ([facility]) 03/15/2001    ADHD (attention deficit hyperactivity disorder) 01/01/2023    Adjustment disorder 03/15/1993    Alcohol abuse 03/15/2003    Alcoholism ([facility]) 03/15/2001    Anxiety     Blood transfusion declined because patient is Scientologist 05/01/2023    Chronic pain disorder     Chronic sinusitis     Cognitive impairment 03/15/2022    Depression     Depression     Diabetes ([facility])     Diabetes mellitus ([facility]) 09/01/2022    Fibromyalgia     Hallucination 03/15/2015    Headache(784.0) 03/15/1993    Various types of headaches    Impulse control disorder 03/15/2022    Memory loss 03/15/2012    Migraine     Obesity     Obsessive-compulsive disorder 03/15/2001    Panic attack 03/15/2001    Peripheral neuropathy 03/15/2022    Polyarthritis     Last assessed 9/21/2015    Psychiatric disorder     Psychiatric illness     Sleep difficulties     Substance abuse ([facility]) 03/15/1994       Past Surgical History:   Procedure Laterality Date    COLONOSCOPY  06/2019    DENTAL SURGERY      HYSTERECTOMY  05/01/2023    HYSTEROSCOPY      Endometrial Biopsy By Hysteroscopy    [place] LAPS SUPRACRV HYSTERECT 250 GM/< RMVL TUBE/OVAR N/A 05/01/2023    Procedure: (LSH) W/ BILATERAL SALPINGECTOMY, REMOVAL PARAOVARIAN CYST;  Surgeon: Sai Lopez DO;  Location: [facility];  Service: Gynecology     REMOVAL OF INTRAUTERINE DEVICE (IUD)      US GUIDED BREAST BIOPSY RIGHT COMPLETE Right 06/17/2019    Benign       Family History   Problem Relation Age of Onset    Irregular heart beat Mother     Arthritis Mother     Diabetes Father     Kidney disease Father     Diabetes type II Father     Depression Father     Substance Abuse Brother     Alcohol abuse Brother     ADD / ADHD Brother     Drug abuse Brother     No Known Problems Brother     Depression Paternal Aunt         Unsuccessful suicide attempt in young adulthood    Psychiatric Illness Paternal Aunt     Self-Injury Paternal Aunt     Suicide Attempts Paternal Aunt     Substance Abuse Maternal Uncle     Prostate cancer Maternal Uncle 60    Diabetes type II Maternal Uncle     Diabetes Maternal Uncle     Diabetes Maternal Grandfather     Migraines Maternal Grandfather     Stroke Maternal Grandfather     Diabetes type II Maternal Grandfather     Alcohol abuse Maternal Grandfather         Great-Grandfather    Diabetes Maternal Grandmother     Rheum arthritis Maternal Grandmother     Melanoma Maternal Grandmother 84    Cancer Maternal Grandmother         Skin Cancer - successfully removed    Stroke Maternal Grandmother     Diabetes type II Maternal Grandmother     Depression Maternal Grandmother     Dementia Maternal Grandmother     Diabetes Paternal Grandfather     Lung cancer Paternal Grandfather 47    Cancer Paternal Grandfather         Lung Cancer - cause of death    Kidney disease Paternal Grandmother     Rheum arthritis Paternal Grandmother     Depression Paternal Grandmother         Nervous Breakdown around age 40    Deep vein thrombosis Paternal Grandmother     Diabetes Paternal Grandmother     Diabetes type II Paternal Grandmother     Alcohol abuse Family     Stomach cancer Family     Alcohol abuse Maternal Uncle     Substance Abuse Maternal Uncle     Cancer Maternal Uncle         Prostate & Testicular Cancer    Drug abuse Maternal Uncle     Alcohol abuse  Maternal Uncle     Drug abuse Maternal Uncle     Alcohol abuse Maternal Uncle     Drug abuse Maternal Uncle     Alcohol abuse Maternal Uncle     Drug abuse Maternal Uncle     Completed Suicide  Neg Hx        Social History     Occupational History    Occupation: unemployed   Tobacco Use    Smoking status: Never     Passive exposure: Never    Smokeless tobacco: Never    Tobacco comments:     N/A, non-smoker.   Vaping Use    Vaping status: Never Used   Substance and Sexual Activity    Alcohol use: Not Currently     Comment: 2 drinks per month    Drug use: Not Currently    Sexual activity: Not Currently     Birth control/protection: Abstinence         Current Outpatient Medications:     Blood Glucose Monitoring Suppl (Contour Blood Glucose System) w/Device KIT, Use 1 kit 2 (two) times a day before meals, Disp: 1 kit, Rfl: 0    cholecalciferol (VITAMIN D3) 25 mcg (1,000 units) tablet, Take 1,000 Units by mouth daily Take 1 tab. daily, Disp: , Rfl:     Contour Test test strip, USE TO CHECK BLOOD SUGAR ONCE DAILY, Disp: 50 strip, Rfl: 7    ferrous sulfate 325 (65 Fe) mg tablet, Take 325 mg by mouth Take 1 tab. 3 times per week, Disp: , Rfl:     fluticasone (FLONASE) 50 mcg/act nasal spray, 1 spray into each nostril daily, Disp: 9.9 mL, Rfl: 0    gabapentin (NEURONTIN) 300 mg capsule, Take 1 capsule (300 mg total) by mouth 3 (three) times a day, Disp: 270 capsule, Rfl: 1    Levomilnacipran HCl ER (FETZIMA) 80 MG extended release capsule, Take 1 capsule (80 mg total) by mouth daily, Disp: 30 capsule, Rfl: 2    levothyroxine 75 mcg tablet, TAKE 1 TABLET (75 MCG TOTAL) BY MOUTH DAILY IN THE EARLY MORNING, Disp: 90 tablet, Rfl: 1    lithium carbonate 300 mg capsule, Take 1 capsule (300 mg total) by mouth 2 (two) times a day with meals, Disp: 60 capsule, Rfl: 2    LORazepam (ATIVAN) 1 mg tablet, Take 1 tablet (1 mg total) by mouth 2 (two) times a day, Disp: 60 tablet, Rfl: 0    MAGNESIUM MALATE PO, Take by mouth Take 1 tab.  daily, Disp: , Rfl:     metFORMIN (GLUCOPHAGE) 500 mg tablet, TAKE 1 TABLET BY MOUTH TWICE A DAY WITH FOOD, Disp: 60 tablet, Rfl: 5    methocarbamol (ROBAXIN) 500 mg tablet, TAKE 1 TAB. EVERY 8 HR. AS NEEDED FOR MUSCLE SPASMS, Disp: 90 tablet, Rfl: 0    mirtazapine (REMERON) 30 mg tablet, Take 1 tablet (30 mg total) by mouth daily at bedtime, Disp: 30 tablet, Rfl: 2    propranolol (INDERAL) 40 mg tablet, Take 0.5 tablets (20 mg total) by mouth daily at bedtime, Disp: 45 tablet, Rfl: 3    traZODone (DESYREL) 100 mg tablet, Take 1 tablet (100 mg total) by mouth daily at bedtime, Disp: 30 tablet, Rfl: 2    Allergies   Allergen Reactions    Cannabidiol Shortness Of Breath, Itching, Swelling, Anxiety, Palpitations, Confusion, Hypertension, Throat Swelling and Tongue Swelling    Amoxicillin-Pot Clavulanate Hives    Decadrol [Dexamethasone] Other (See Comments)     psychosis    Penicillins Hives     Hives/Uticaria    Tetracyclines & Related Hives      Allergy;        Physical Exam:    Ht 5' (1.524 m)   Wt 81.6 kg (180 lb)   LMP 03/13/2023 (Approximate)   BMI 35.15 kg/m²     Constitutional:normal, well developed, well nourished, alert, in no distress and non-toxic and no overt pain behavior.  Eyes:anicteric  HEENT:grossly intact  Neck:supple, symmetric, trachea midline and no masses   Pulmonary:even and unlabored  Cardiovascular:No edema or pitting edema present  Skin:Normal without rashes or lesions and well hydrated  Psychiatric:Mood and affect appropriate  Neurologic:Cranial Nerves II-XII grossly intact  Musculoskeletal:normal    Lumbar Spine Exam    Appearance:  Normal lordosis  Palpation/Tenderness:  left lumbar paraspinal tenderness  right lumbar paraspinal tenderness  Sensory:  no sensory deficits noted  Range of Motion:  Flexion:  No limitation  with pain  Extension:  Minimally limited  with pain  Motor Strength:  Left hip flexion:  5/5  Right hip flexion:  5/5  Left knee flexion:  5/5  Left knee extension:   5/5  Right knee flexion:  5/5  Right knee extension:  5/5  Left foot dorsiflexion:  5/5  Left foot plantar flexion:  5/5  Right foot dorsiflexion:  5/5  Right foot plantar flexion:  5/5      Imaging  FL spine and pain procedure    (Results Pending)   FL spine and pain procedure    (Results Pending)         Orders Placed This Encounter   Procedures    FL spine and pain procedure    FL spine and pain procedure

## 2025-05-16 ENCOUNTER — TELEMEDICINE (OUTPATIENT)
Dept: BEHAVIORAL/MENTAL HEALTH CLINIC | Facility: CLINIC | Age: 45
End: 2025-05-16
Payer: COMMERCIAL

## 2025-05-16 DIAGNOSIS — F33.2 MAJOR DEPRESSIVE DISORDER, RECURRENT SEVERE WITHOUT PSYCHOTIC FEATURES (HCC): Primary | ICD-10-CM

## 2025-05-16 DIAGNOSIS — F41.1 GENERALIZED ANXIETY DISORDER: ICD-10-CM

## 2025-05-16 PROCEDURE — 90832 PSYTX W PT 30 MINUTES: CPT | Performed by: PSYCHIATRY & NEUROLOGY

## 2025-05-16 NOTE — PSYCH
Virtual Regular VisitName: Esther Griffith      : 1980      MRN: [id number]  Encounter Provider: HERMELINDA BECK  Encounter Date: 2025   Encounter department: [facility] [place]  :  Assessment & Plan  Major depressive disorder, recurrent severe without psychotic features (HCC)         Generalized anxiety disorder           Major depressive disorder, recurrent severe without psychotic features (HCC)         Generalized anxiety disorder         Goals addressed in session: Goal 1     DATA: Met with Esther for follow up. Esther shared that she is in a lot of pain after doing a lot of work to have another party at her home. She said that while she enjoyed the party, it was not worth the pain that has resulted from the preparation, and that she will not have another one.  She said that it bothered her father to have people over, and she does not want to upset them.  She talked about feeling badly that she cannot help her parents more due to her physical limitations, wishing that she could do more to help them.  Processed her feelings, and used CBT strategies to help Esther focus more on what she can do, even if small tasks, to help her build a sense of purpose and productivity.  During this session, this clinician used the following therapeutic modalities: Client-centered Therapy, Cognitive Behavioral Therapy, and Supportive Psychotherapy    Substance Abuse was not addressed during this session. If the client is diagnosed with a co-occurring substance use disorder, please indicate any changes in the frequency or amount of use: n/a. Stage of change for addressing substance use diagnoses: No substance use/Not applicable    ASSESSMENT:  Esther presents with a Depressed mood. Esther's affect is Normal range and intensity and Tearful, which is congruent, with their mood and the content of the session. The client has made progress on their goals as evidenced by slowly  "improving self-acceptance.    Esther presents with a low risk of suicide, minimal risk of self-harm, and minimal risk of harm to others.    For any risk assessment that surpasses a \"low\" rating, a safety plan must be developed.    A safety plan was indicated: no  If yes, describe in detail n/a    PLAN: Between sessions, Esther will work on allowing herself to take on very small tasks and be gentle with herself. At the next session, the therapist will use Client-centered Therapy, Cognitive Behavioral Therapy, and Supportive Psychotherapy to address depression.    Behavioral Health Treatment Plan [facility]: Diagnosis and Treatment Plan explained to Esther, Esther relates understanding diagnosis and is agreeable to Treatment Plan. Yes     Depression Follow-up Plan Completed: Yes     Reason for visit is   Chief Complaint   Patient presents with    Virtual Regular Visit      Recent Visits  No visits were found meeting these conditions.  Showing recent visits within past 7 days and meeting all other requirements  Today's Visits  Date Type Provider Dept   05/16/25 Telemedicine HERMELINDA [facility] [place]   Showing today's visits and meeting all other requirements  Future Appointments  No visits were found meeting these conditions.  Showing future appointments within next 150 days and meeting all other requirements     History of Present Illness     HPI    Past Medical History   Past Medical History:   Diagnosis Date    Addiction to drug ([facility]) 03/15/2001    ADHD (attention deficit hyperactivity disorder) 01/01/2023    Adjustment disorder 03/15/1993    Alcohol abuse 03/15/2003    Alcoholism ([facility]) 03/15/2001    Anxiety     Blood transfusion declined because patient is Scientology 05/01/2023    Chronic pain disorder     Chronic sinusitis     Cognitive impairment 03/15/2022    Depression     Depression     Diabetes ([facility])     Diabetes mellitus ([facility]) 09/01/2022    Fibromyalgia     " Hallucination 03/15/2015    Headache(784.0) 03/15/1993    Various types of headaches    Impulse control disorder 03/15/2022    Memory loss 03/15/2012    Migraine     Obesity     Obsessive-compulsive disorder 03/15/2001    Panic attack 03/15/2001    Peripheral neuropathy 03/15/2022    Polyarthritis     Last assessed 9/21/2015    Psychiatric disorder     Psychiatric illness     Sleep difficulties     Substance abuse (HCC) 03/15/1994     Past Surgical History:   Procedure Laterality Date    COLONOSCOPY  06/2019    DENTAL SURGERY      HYSTERECTOMY  05/01/2023    HYSTEROSCOPY      Endometrial Biopsy By Hysteroscopy    [place] LAPS SUPRACRV HYSTERECT 250 GM/< RMVL TUBE/OVAR N/A 05/01/2023    Procedure: (LSH) W/ BILATERAL SALPINGECTOMY, REMOVAL PARAOVARIAN CYST;  Surgeon: Sai Lopez DO;  Location: [facility];  Service: Gynecology    REMOVAL OF INTRAUTERINE DEVICE (IUD)      US GUIDED BREAST BIOPSY RIGHT COMPLETE Right 06/17/2019    Benign     Current Outpatient Medications   Medication Instructions    Blood Glucose Monitoring Suppl (Contour Blood Glucose System) w/Device KIT 1 kit, Does not apply, 2 times daily before meals    cholecalciferol (VITAMIN D3) 1,000 Units, Daily    Contour Test test strip USE TO CHECK BLOOD SUGAR ONCE DAILY    ferrous sulfate 325 mg    fluticasone (FLONASE) 50 mcg/act nasal spray 1 spray, Nasal, Daily    gabapentin (NEURONTIN) 300 mg, Oral, 3 times daily    Levomilnacipran HCl ER (FETZIMA) 80 mg, Oral, Daily    levothyroxine 75 mcg, Oral, Daily (early morning)    lithium carbonate 300 mg, Oral, 2 times daily with meals    LORazepam (ATIVAN) 1 mg, Oral, 2 times daily    MAGNESIUM MALATE PO Take by mouth Take 1 tab. daily    metFORMIN (GLUCOPHAGE) 500 mg, Oral, 2 times daily with meals    methocarbamol (ROBAXIN) 500 mg tablet TAKE 1 TAB. EVERY 8 HR. AS NEEDED FOR MUSCLE SPASMS    mirtazapine (REMERON) 30 mg, Oral, Daily at bedtime,       propranolol (INDERAL) 20 mg, Oral, Daily at bedtime     traZODone (DESYREL) 100 mg, Oral, Daily at bedtime     Allergies   Allergen Reactions    Cannabidiol Shortness Of Breath, Itching, Swelling, Anxiety, Palpitations, Confusion, Hypertension, Throat Swelling and Tongue Swelling    Amoxicillin-Pot Clavulanate Hives    Decadrol [Dexamethasone] Other (See Comments)     psychosis    Penicillins Hives     Hives/Uticaria    Tetracyclines & Related Hives      Allergy;        Objective   LMP 03/13/2023 (Approximate)     Video Exam  Physical Exam     Administrative Statements   Encounter provider HERMELINDA BECK    The Patient is located at Home and in the following state in which I hold an active license [place].    The patient was identified by name and date of birth. Esther Griffith was informed that this is a telemedicine visit and that the visit is being conducted through the Epic Embedded platform. She agrees to proceed..  My office door was closed. No one else was in the room.  She acknowledged consent and understanding of privacy and security of the video platform. The patient has agreed to participate and understands they can discontinue the visit at any time.    I have spent a total time of 36 minutes in caring for this patient on the day of the visit/encounter including Counseling / Coordination of care and Documenting in the medical record, not including the time spent for establishing the audio/video connection.    Visit Time  Start Time: 1315  Stop Time: 1351  Total Visit Time: 36 minutes

## 2025-05-18 DIAGNOSIS — F51.04 PSYCHOPHYSIOLOGICAL INSOMNIA: ICD-10-CM

## 2025-05-19 DIAGNOSIS — M79.18 MYOFASCIAL PAIN: ICD-10-CM

## 2025-05-19 RX ORDER — TRAZODONE HYDROCHLORIDE 100 MG/1
100 TABLET ORAL
Qty: 30 TABLET | Refills: 2 | Status: SHIPPED | OUTPATIENT
Start: 2025-05-19

## 2025-05-20 ENCOUNTER — HOSPITAL ENCOUNTER (OUTPATIENT)
Dept: RADIOLOGY | Facility: CLINIC | Age: 45
Discharge: HOME | End: 2025-05-20
Admitting: ANESTHESIOLOGY
Payer: COMMERCIAL

## 2025-05-20 ENCOUNTER — TELEPHONE (OUTPATIENT)
Dept: PAIN MEDICINE | Facility: CLINIC | Age: 45
End: 2025-05-20

## 2025-05-20 VITALS
RESPIRATION RATE: 16 BRPM | SYSTOLIC BLOOD PRESSURE: 126 MMHG | OXYGEN SATURATION: 100 % | DIASTOLIC BLOOD PRESSURE: 84 MMHG | TEMPERATURE: 96.9 F | HEART RATE: 89 BPM

## 2025-05-20 DIAGNOSIS — M47.816 LUMBAR SPONDYLOSIS: ICD-10-CM

## 2025-05-20 PROCEDURE — 64635 DESTROY LUMB/SAC FACET JNT: CPT | Performed by: ANESTHESIOLOGY

## 2025-05-20 PROCEDURE — 64636 DESTROY L/S FACET JNT ADDL: CPT | Performed by: ANESTHESIOLOGY

## 2025-05-20 RX ORDER — BUPIVACAINE HYDROCHLORIDE 5 MG/ML
3 INJECTION, SOLUTION EPIDURAL; INTRACAUDAL; PERINEURAL ONCE
Status: COMPLETED | OUTPATIENT
Start: 2025-05-20 | End: 2025-05-20

## 2025-05-20 RX ORDER — METHOCARBAMOL 500 MG/1
TABLET, FILM COATED ORAL
Qty: 90 TABLET | Refills: 0 | Status: SHIPPED | OUTPATIENT
Start: 2025-05-20

## 2025-05-20 RX ORDER — LIDOCAINE HYDROCHLORIDE 10 MG/ML
5 INJECTION, SOLUTION EPIDURAL; INFILTRATION; INTRACAUDAL; PERINEURAL ONCE
Status: COMPLETED | OUTPATIENT
Start: 2025-05-20 | End: 2025-05-20

## 2025-05-20 RX ADMIN — LIDOCAINE HYDROCHLORIDE 5 ML: 10 INJECTION, SOLUTION EPIDURAL; INFILTRATION; INTRACAUDAL; PERINEURAL at 09:28

## 2025-05-20 RX ADMIN — LIDOCAINE HYDROCHLORIDE 3 ML: 20 INJECTION, SOLUTION EPIDURAL; INFILTRATION; INTRACAUDAL at 09:34

## 2025-05-20 RX ADMIN — BUPIVACAINE HYDROCHLORIDE 3 ML: 5 INJECTION, SOLUTION EPIDURAL; INTRACAUDAL; PERINEURAL at 09:34

## 2025-05-20 NOTE — DISCHARGE INSTR - LAB

## 2025-05-20 NOTE — H&P
History of Present Illness: The patient is a 45 y.o. female who presents with complaints of low back pain.    Past Medical History:   Diagnosis Date    Addiction to drug ([facility]) 03/15/2001    ADHD (attention deficit hyperactivity disorder) 01/01/2023    Adjustment disorder 03/15/1993    Alcohol abuse 03/15/2003    Alcoholism ([facility]) 03/15/2001    Anxiety     Blood transfusion declined because patient is Zoroastrianism 05/01/2023    Chronic pain disorder     Chronic sinusitis     Cognitive impairment 03/15/2022    Depression     Depression     Diabetes ([facility])     Diabetes mellitus ([facility]) 09/01/2022    Fibromyalgia     Hallucination 03/15/2015    Headache(784.0) 03/15/1993    Various types of headaches    Impulse control disorder 03/15/2022    Memory loss 03/15/2012    Migraine     Obesity     Obsessive-compulsive disorder 03/15/2001    Panic attack 03/15/2001    Peripheral neuropathy 03/15/2022    Polyarthritis     Last assessed 9/21/2015    Psychiatric disorder     Psychiatric illness     Sleep difficulties     Substance abuse ([facility]) 03/15/1994       Past Surgical History:   Procedure Laterality Date    COLONOSCOPY  06/2019    DENTAL SURGERY      HYSTERECTOMY  05/01/2023    HYSTEROSCOPY      Endometrial Biopsy By Hysteroscopy    [place] LAPS SUPRACRV HYSTERECT 250 GM/< RMVL TUBE/OVAR N/A 05/01/2023    Procedure: (LSH) W/ BILATERAL SALPINGECTOMY, REMOVAL PARAOVARIAN CYST;  Surgeon: Sai Lopez DO;  [facility];  Service: Gynecology    REMOVAL OF INTRAUTERINE DEVICE (IUD)      US GUIDED BREAST BIOPSY RIGHT COMPLETE Right 06/17/2019    Benign       Current Medications[1]    Allergies[2]    Physical Exam:   Vitals:    05/20/25 0909   BP: 114/78   Pulse: 89   Resp: 16   Temp: (!) 96.9 °F (36.1 °C)   SpO2: 100%     General: Awake, Alert, Oriented x 3, Mood and affect appropriate  Respiratory: Respirations even and unlabored  Cardiovascular: Peripheral pulses intact; no edema  Musculoskeletal Exam: normal  gait    ASA Score: 2    Patient/Chart Verification  Patient ID Verified: Verbal  ID Band Applied: No  Consents Confirmed: To be obtained in the Procedural area  H&P( within 30 days) Verified: To be obtained in the Procedural area  Interval H&P(within 24 hr) Complete (required for Outpatients and Surgery Admit only): To be obtained in the Procedural area  Allergies Reviewed: No  Anticoag/NSAID held?: NA  Currently on antibiotics?: No  Pregnancy denied?: Yes    Assessment:   1. Lumbar spondylosis        Plan: Left L4-S1 RFA         [1]   Current Outpatient Medications:     Blood Glucose Monitoring Suppl (Contour Blood Glucose System) w/Device KIT, Use 1 kit 2 (two) times a day before meals, Disp: 1 kit, Rfl: 0    cholecalciferol (VITAMIN D3) 25 mcg (1,000 units) tablet, Take 1,000 Units by mouth daily Take 1 tab. daily, Disp: , Rfl:     Contour Test test strip, USE TO CHECK BLOOD SUGAR ONCE DAILY, Disp: 50 strip, Rfl: 7    ferrous sulfate 325 (65 Fe) mg tablet, Take 325 mg by mouth Take 1 tab. 3 times per week, Disp: , Rfl:     fluticasone (FLONASE) 50 mcg/act nasal spray, 1 spray into each nostril daily, Disp: 9.9 mL, Rfl: 0    gabapentin (NEURONTIN) 300 mg capsule, Take 1 capsule (300 mg total) by mouth 3 (three) times a day, Disp: 270 capsule, Rfl: 1    Levomilnacipran HCl ER (FETZIMA) 80 MG extended release capsule, Take 1 capsule (80 mg total) by mouth daily, Disp: 30 capsule, Rfl: 2    levothyroxine 75 mcg tablet, TAKE 1 TABLET (75 MCG TOTAL) BY MOUTH DAILY IN THE EARLY MORNING, Disp: 90 tablet, Rfl: 1    lithium carbonate 300 mg capsule, Take 1 capsule (300 mg total) by mouth 2 (two) times a day with meals, Disp: 60 capsule, Rfl: 2    LORazepam (ATIVAN) 1 mg tablet, Take 1 tablet (1 mg total) by mouth 2 (two) times a day, Disp: 60 tablet, Rfl: 0    MAGNESIUM MALATE PO, Take by mouth Take 1 tab. daily, Disp: , Rfl:     metFORMIN (GLUCOPHAGE) 500 mg tablet, TAKE 1 TABLET BY MOUTH TWICE A DAY WITH FOOD, Disp: 60  tablet, Rfl: 5    methocarbamol (ROBAXIN) 500 mg tablet, TAKE 1 TAB. EVERY 8 HR. AS NEEDED FOR MUSCLE SPASMS, Disp: 90 tablet, Rfl: 0    mirtazapine (REMERON) 30 mg tablet, Take 1 tablet (30 mg total) by mouth daily at bedtime, Disp: 30 tablet, Rfl: 2    propranolol (INDERAL) 40 mg tablet, Take 0.5 tablets (20 mg total) by mouth daily at bedtime, Disp: 45 tablet, Rfl: 3    traZODone (DESYREL) 100 mg tablet, TAKE 1 TABLET BY MOUTH DAILY AT BEDTIME, Disp: 30 tablet, Rfl: 2  [2]   Allergies  Allergen Reactions    Cannabidiol Shortness Of Breath, Itching, Swelling, Anxiety, Palpitations, Confusion, Hypertension, Throat Swelling and Tongue Swelling    Amoxicillin-Pot Clavulanate Hives    Decadrol [Dexamethasone] Other (See Comments)     psychosis    Penicillins Hives     Hives/Uticaria    Tetracyclines & Related Hives      Allergy;

## 2025-05-20 NOTE — TELEPHONE ENCOUNTER
Patient is S/P a Left L3-L5 RFA c/ JW on 5/20/25.  Next RFA scheduled for 6/3/25.  Next OVS scheduled for 10/21/25.  Please call on 5/21/25 post RFA. Thank you

## 2025-05-21 NOTE — TELEPHONE ENCOUNTER
S/w pt who reports pain level #3/10 that pt has treated with tylenol. Pt aware to use prn pain meds and can use ice and or heat, 20 mins off and on. Pt denies fever, signs of infection or sunburn like sensation. Pt will cb with any issues. Aware it takes 4-6 weeks for the full effect of the procedure.    Confirmed next procedure

## 2025-05-23 ENCOUNTER — TELEPHONE (OUTPATIENT)
Dept: PSYCHIATRY | Facility: CLINIC | Age: 45
End: 2025-05-23

## 2025-05-23 NOTE — TELEPHONE ENCOUNTER
Patient called the refill line stating that she is experiencing increased stress lately and is requesting to see if the Lorazepam dose could be increased. Patient states that if its not able to be increased she would like to discuss what else could be done. Please contact patient to discuss.

## 2025-05-23 NOTE — TELEPHONE ENCOUNTER
Returned Esther's call.  Informed her that provider is out of the office and will return on Tuesday.  Discussed relaxation techniques and deep breathing.  Informed her that provider will review her request upon her return.    Will refer to Dr Ovalle for review upon her return to the office.

## 2025-05-26 DIAGNOSIS — F33.2 SEVERE EPISODE OF RECURRENT MAJOR DEPRESSIVE DISORDER, WITHOUT PSYCHOTIC FEATURES (HCC): ICD-10-CM

## 2025-05-27 ENCOUNTER — OFFICE VISIT (OUTPATIENT)
Dept: NEUROLOGY | Facility: CLINIC | Age: 45
End: 2025-05-27
Payer: COMMERCIAL

## 2025-05-27 ENCOUNTER — TELEPHONE (OUTPATIENT)
Age: 45
End: 2025-05-27

## 2025-05-27 VITALS
HEART RATE: 91 BPM | DIASTOLIC BLOOD PRESSURE: 78 MMHG | SYSTOLIC BLOOD PRESSURE: 110 MMHG | WEIGHT: 180 LBS | HEIGHT: 60 IN | BODY MASS INDEX: 35.34 KG/M2

## 2025-05-27 DIAGNOSIS — G43.709 CHRONIC MIGRAINE WITHOUT AURA WITHOUT STATUS MIGRAINOSUS, NOT INTRACTABLE: Chronic | ICD-10-CM

## 2025-05-27 DIAGNOSIS — F41.1 GENERALIZED ANXIETY DISORDER: ICD-10-CM

## 2025-05-27 PROCEDURE — 99213 OFFICE O/P EST LOW 20 MIN: CPT | Performed by: PSYCHIATRY & NEUROLOGY

## 2025-05-27 RX ORDER — PROPRANOLOL HYDROCHLORIDE 40 MG/1
20 TABLET ORAL
Qty: 45 TABLET | Refills: 3 | Status: SHIPPED | OUTPATIENT
Start: 2025-05-27 | End: 2026-05-22

## 2025-05-27 RX ORDER — LORAZEPAM 1 MG/1
1 TABLET ORAL EVERY 8 HOURS PRN
Qty: 90 TABLET | Refills: 0 | Status: SHIPPED | OUTPATIENT
Start: 2025-05-27

## 2025-05-27 NOTE — TELEPHONE ENCOUNTER
Patient is calling regarding cancelling an appointment.    Date/Time: 5.30.25 @ 1:00pm    Reason: None Given     Patient was rescheduled: YES [] NO [x]  Patient did not reschedule at this time, due to the upcoming appointment:  6.12.25 @ 1:00pm.

## 2025-05-28 ENCOUNTER — TELEMEDICINE (OUTPATIENT)
Dept: PSYCHIATRY | Facility: CLINIC | Age: 45
End: 2025-05-28
Payer: COMMERCIAL

## 2025-05-28 ENCOUNTER — TELEPHONE (OUTPATIENT)
Dept: PSYCHIATRY | Facility: CLINIC | Age: 45
End: 2025-05-28

## 2025-05-28 DIAGNOSIS — F41.1 GENERALIZED ANXIETY DISORDER: ICD-10-CM

## 2025-05-28 DIAGNOSIS — F33.1 MAJOR DEPRESSIVE DISORDER, RECURRENT EPISODE, MODERATE (HCC): ICD-10-CM

## 2025-05-28 DIAGNOSIS — F33.2 MAJOR DEPRESSIVE DISORDER, RECURRENT SEVERE WITHOUT PSYCHOTIC FEATURES (HCC): Primary | ICD-10-CM

## 2025-05-28 PROCEDURE — 99214 OFFICE O/P EST MOD 30 MIN: CPT | Performed by: PSYCHIATRY & NEUROLOGY

## 2025-05-28 PROCEDURE — 90833 PSYTX W PT W E/M 30 MIN: CPT | Performed by: PSYCHIATRY & NEUROLOGY

## 2025-05-28 RX ORDER — MIRTAZAPINE 30 MG/1
30 TABLET, FILM COATED ORAL
Qty: 30 TABLET | Refills: 2 | Status: SHIPPED | OUTPATIENT
Start: 2025-05-28

## 2025-05-28 NOTE — TELEPHONE ENCOUNTER
Called and left message for patient to return a call to [phone] and schedule 6 week follow up with provider (Cristina Ovalle). Please schedule upon return call. Thank you.

## 2025-05-28 NOTE — BH TREATMENT PLAN
TREATMENT PLAN (Medication Management Only)        [facility]    Name and Date of Birth:  Esther Griffith 45 y.o. 1980  MRN: [id number]  Date of Treatment Plan: May 28, 2025  Diagnosis/Diagnoses:    1. Major depressive disorder, recurrent severe without psychotic features (HCC)    2. Generalized anxiety disorder    3. Major depressive disorder, recurrent episode, moderate (HCC)      Strengths/Personal Resources for Self-Care: taking medications as prescribed, ability to communicate needs.  Area/Areas of need (in own words): anxiety, anxiety symptoms, depression, depressive symptoms  1. Long Term Goal:   improve control of depression.  Target Date:6 months - 11/28/2025  Person/Persons responsible for completion of goal: Esther  2.  Short Term Objective (s) - How will we reach this goal?:   A.  Provider new recommended medication/dosage changes and/or continue medication(s): continue current medications as prescribed.  B.  N/A.  C.  N/A.  Target Date:6 months - 11/28/2025  Person/Persons Responsible for Completion of Goal: Esther  Progress Towards Goals: continuing treatment  Treatment Modality: medication management every 6 weeks  Review due 180 days from date of this plan: 6 months - 11/28/2025  Expected length of service: ongoing treatment unless revised  My Physician/PA/NP and I have developed this plan together and I agree to work on the goals and objectives. I understand the treatment goals that were developed for my treatment.   Electronic Signatures: on file (unless signed below)    Cristina Brya MD 05/28/25

## 2025-05-28 NOTE — PSYCH
Virtual Regular Visit    Verification of patient location:    Patient is located at Home in the following state in which I hold an active license [place]      Assessment/Plan:    Problem List Items Addressed This Visit          Behavioral Health    Generalized anxiety disorder                  Relevant Medications    Levomilnacipran HCl ER (FETZIMA) 80 MG extended release capsule    Major depressive disorder, recurrent severe without psychotic features (HCC) - Primary                  Relevant Medications    Levomilnacipran HCl ER (FETZIMA) 80 MG extended release capsule     Other Visit Diagnoses         Major depressive disorder, recurrent episode, moderate (HCC)        Relevant Medications    Levomilnacipran HCl ER (FETZIMA) 80 MG extended release capsule                PHQ-2/9 Depression Screening    Little interest or pleasure in doing things: 2 - more than half the days  Feeling down, depressed, or hopeless: 1 - several days  Trouble falling or staying asleep, or sleeping too much: 3 - nearly every day  Feeling tired or having little energy: 3 - nearly every day  Poor appetite or overeatin - not at all  Feeling bad about yourself - or that you are a failure or have let yourself or your family down: 1 - several days  Trouble concentrating on things, such as reading the newspaper or watching television: 3 - nearly every day  Moving or speaking so slowly that other people could have noticed. Or the opposite - being so fidgety or restless that you have been moving around a lot more than usual: 0 - not at all  Thoughts that you would be better off dead, or of hurting yourself in some way: 0 - not at all  PHQ-9 Score: 13  PHQ-9 Interpretation: Moderate depression                 Depression Screening and Follow-up Plan:   Continue regular follow-up with their mental health provider who is managing their mental health condition(s).         Reason for visit is No chief complaint on file.           Encounter  provider Cristina Bray MD      Recent Visits  Date Type Provider Dept   05/23/25 Telephone Cristina Bray MD [facility]   Showing recent visits within past 7 days and meeting all other requirements  Today's Visits  Date Type Provider Dept   05/28/25 Telemedicine Cristina Bray MD [facility]   Showing today's visits and meeting all other requirements  Future Appointments  No visits were found meeting these conditions.  Showing future appointments within next 150 days and meeting all other requirements       The patient was identified by name and date of birth. Esther Griffith was informed that this is a telemedicine visit and that the visit is being conducted throughthe Epic Embedded platform. She agrees to proceed..  My office door was closed. No one else was in the room.  She acknowledged consent and understanding of privacy and security of the video platform. The patient has agreed to participate and understands they can discontinue the visit at any time.    Patient is aware this is a billable service.     Subjective  Esther Griffith is a 45 y.o. female with MDD and CAROL .Patient remains compliant with medications and denies side effects. She continues to manage her diabetes with medication and diet.    She continues to meet with pain management and has limited benefit from epidural injections.   She restarted  Gabapentin for neuropathy  300 mg po qhs to help with sleep as well.    Currently on Fetzima and recently had dose increase to 80 mg.   Continued lithium 300 mg po bid for depression.       Recently she was taperd down to 30 mg Mirtazapine and started  on Trazodone  due to difficulties with sleep. She stated she has been sleeping better now and instead of continuing to taper off Mirtazapine she will like to stay with her current medication regimen.  Nerve ablations in May.  F/u with endocrinology .    She stated for the past week she had  problems falling and staying asleep, she had to take 100 mg Trazodone po qhs, but  has been feeling sedated.  She is reporting cognitive difficulties that may be due to depression, sleep deprivation and polypharmacy.    She started  a new medication for excessive sweating from endocrinologist but the medication caused extreme anxiety and agitation and insomnia.   She contacted her doctor to let him know she is stopping the medication.   She continues Trazodone 100 mg for sleep and has been helpful.   She is interested in ASD and ADHD testing and will send referral for evaluation.     Since last seen she stated her father is on waiting list for kidney transplant, and it has been stressful to find to a donor. She is not a candidate because of her health problems and her mother is not a match due to age.   She also mentioned her mother's was recently diagnosed with Lyme's disease.   She stated her anxiety has been higher than usual and she had recent dose increase from 1 mg po bid to 1 mg po tid.  Agrees to continue current treatment.    Agrees to schedule follow up in 6 weeks or sooner if needed.          HPI     Past Medical History:   Diagnosis Date    Addiction to drug ([facility]) 03/15/2001    ADHD (attention deficit hyperactivity disorder) 01/01/2023    Adjustment disorder 03/15/1993    Alcohol abuse 03/15/2003    Alcoholism ([facility]) 03/15/2001    Anxiety     Blood transfusion declined because patient is Islam 05/01/2023    Chronic pain disorder     Chronic sinusitis     Cognitive impairment 03/15/2022    Depression     Depression     Diabetes ([facility])     Diabetes mellitus ([facility]) 09/01/2022    Fibromyalgia     Hallucination 03/15/2015    Headache(784.0) 03/15/1993    Various types of headaches    Impulse control disorder 03/15/2022    Memory loss 03/15/2012    Migraine     Obesity     Obsessive-compulsive disorder 03/15/2001    Panic attack 03/15/2001    Peripheral neuropathy 03/15/2022    Polyarthritis     Last  assessed 9/21/2015    Psychiatric disorder     Psychiatric illness     Sleep difficulties     Substance abuse (HCC) 03/15/1994       Past Surgical History:   Procedure Laterality Date    COLONOSCOPY  06/2019    DENTAL SURGERY      HYSTERECTOMY  05/01/2023    HYSTEROSCOPY      Endometrial Biopsy By Hysteroscopy    SC LAPS SUPRACRV HYSTERECT 250 GM/< RMVL TUBE/OVAR N/A 05/01/2023    Procedure: ([facility]) W/ BILATERAL SALPINGECTOMY, REMOVAL PARAOVARIAN CYST;  Surgeon: Sai Lopez DO;  Location: [facility];  Service: Gynecology    REMOVAL OF INTRAUTERINE DEVICE (IUD)      US GUIDED BREAST BIOPSY RIGHT COMPLETE Right 06/17/2019    Benign       Current Outpatient Medications   Medication Sig Dispense Refill    Levomilnacipran HCl ER (FETZIMA) 80 MG extended release capsule Take 1 capsule (80 mg total) by mouth daily 30 capsule 2    Blood Glucose Monitoring Suppl (Contour Blood Glucose System) w/Device KIT Use 1 kit 2 (two) times a day before meals (Patient not taking: Reported on 5/27/2025) 1 kit 0    cholecalciferol (VITAMIN D3) 25 mcg (1,000 units) tablet Take 1,000 Units by mouth in the morning. Take 1 tab. daily.      Contour Test test strip USE TO CHECK BLOOD SUGAR ONCE DAILY (Patient not taking: Reported on 5/27/2025) 50 strip 7    ferrous sulfate 325 (65 Fe) mg tablet Take 325 mg by mouth Take 1 tab. 3 times per week      fluticasone (FLONASE) 50 mcg/act nasal spray 1 spray into each nostril daily 9.9 mL 0    gabapentin (NEURONTIN) 300 mg capsule Take 1 capsule (300 mg total) by mouth 3 (three) times a day 270 capsule 1    levothyroxine 75 mcg tablet TAKE 1 TABLET (75 MCG TOTAL) BY MOUTH DAILY IN THE EARLY MORNING 90 tablet 1    lithium carbonate 300 mg capsule Take 1 capsule (300 mg total) by mouth 2 (two) times a day with meals 60 capsule 2    LORazepam (ATIVAN) 1 mg tablet Take 1 tablet (1 mg total) by mouth every 8 (eight) hours as needed for anxiety 90 tablet 0    MAGNESIUM MALATE PO Take by mouth Take 1  tab. daily      metFORMIN (GLUCOPHAGE) 500 mg tablet TAKE 1 TABLET BY MOUTH TWICE A DAY WITH FOOD 60 tablet 5    methocarbamol (ROBAXIN) 500 mg tablet TAKE 1 TAB. EVERY 8 HR. AS NEEDED FOR MUSCLE SPASMS 90 tablet 0    mirtazapine (REMERON) 30 mg tablet TAKE 1 TABLET BY MOUTH EVERYDAY AT BEDTIME 30 tablet 2    propranolol (INDERAL) 40 mg tablet Take 0.5 tablets (20 mg total) by mouth daily at bedtime 45 tablet 3    traZODone (DESYREL) 100 mg tablet TAKE 1 TABLET BY MOUTH DAILY AT BEDTIME 30 tablet 2     No current facility-administered medications for this visit.        Allergies   Allergen Reactions    Cannabidiol Shortness Of Breath, Itching, Swelling, Anxiety, Palpitations, Confusion, Hypertension, Throat Swelling and Tongue Swelling    Amoxicillin-Pot Clavulanate Hives    Decadrol [Dexamethasone] Other (See Comments)     psychosis    Penicillins Hives     Hives/Uticaria    Tetracyclines & Related Hives      Allergy;        Review of Systems     Mood Anxiety and Depression   Behavior Normal    Thought Content Disturbing Thoughts, Feelings   General Emotional Problems and Decreased Functioning   Personality Normal   Other Psych Symptoms Normal   Constitutional Negative   ENT Negative   Cardiovascular Negative   Respiratory Negative   Gastrointestinal Negative   Genitourinary Negative   Musculoskeletal Negative   Integumentary Negative   Neurological Negative   Endocrine Normal    Other Symptoms Normal        Laboratory Results: Recent Labs (last 12 months):   Admission on 04/18/2025, Discharged on 04/18/2025   Component Date Value    Ventricular Rate 04/18/2025 85     Atrial Rate 04/18/2025 85     WI Interval 04/18/2025 144     QRSD Interval 04/18/2025 74     QT Interval 04/18/2025 386     QTC Interval 04/18/2025 459     P Axis 04/18/2025 32     QRS [place] 04/18/2025 51     T Wave Axis 04/18/2025 -55     WBC 04/18/2025 8.87     RBC 04/18/2025 4.23     Hemoglobin 04/18/2025 13.1     Hematocrit 04/18/2025 39.3     MCV  04/18/2025 93     MCH 04/18/2025 31.0     MCHC 04/18/2025 33.3     RDW 04/18/2025 14.0     MPV 04/18/2025 9.0     Platelets 04/18/2025 248     nRBC 04/18/2025 0     Segmented % 04/18/2025 64     Immature Grans % 04/18/2025 0     Lymphocytes % 04/18/2025 29     Monocytes % 04/18/2025 5     Eosinophils Relative 04/18/2025 2     Basophils Relative 04/18/2025 0     Absolute Neutrophils 04/18/2025 5.65     Absolute Immature Grans 04/18/2025 0.03     Absolute Lymphocytes 04/18/2025 2.59     Absolute Monocytes 04/18/2025 0.44     Eosinophils Absolute 04/18/2025 0.13     Basophils Absolute 04/18/2025 0.03     Sodium 04/18/2025 137     Potassium 04/18/2025 4.0     Chloride 04/18/2025 102     CO2 04/18/2025 25     ANION GAP 04/18/2025 10     BUN 04/18/2025 9     Creatinine 04/18/2025 0.78     Glucose 04/18/2025 209 (H)     Calcium 04/18/2025 9.4     AST 04/18/2025 23     ALT 04/18/2025 32     Alkaline Phosphatase 04/18/2025 58     Total Protein 04/18/2025 6.4     Albumin 04/18/2025 4.2     Total Bilirubin 04/18/2025 0.41     eGFR 04/18/2025 90     hs TnI 0hr 04/18/2025 <2     hs TnI 2hr 04/18/2025 <2     Delta 2hr hsTnI 04/18/2025      Ventricular Rate 04/18/2025 85     Atrial Rate 04/18/2025 85     VA Interval 04/18/2025 144     QRSD Interval 04/18/2025 74     QT Interval 04/18/2025 386     QTC Interval 04/18/2025 459     P Axis 04/18/2025 32     QRS [place] 04/18/2025 51     T Wave Axis 04/18/2025 -55    Appointment on 04/15/2025   Component Date Value    Lithium Lvl 04/15/2025 0.68    Appointment on 04/02/2025   Component Date Value    Vitamin B-12 04/02/2025 3,035 (H)     A/G Ratio 04/02/2025 1.93 (H)     Albumin % 04/02/2025 65.9     Albumin 04/02/2025 4.35     Alpha-1 Globulin % 04/02/2025 4.3     Alpha-1 Globulin 04/02/2025 0.28     Alpha-2 Globulin % 04/02/2025 9.2     Alpha-2 Globulin 04/02/2025 0.61     Beta-1 Globulin % 04/02/2025 6.6     Beta-1 Globulin 04/02/2025 0.44     Beta-2 Globulin % 04/02/2025 5.0     Beta-2  Globulin 04/02/2025 0.33     Gamma Globulin % 04/02/2025 9.0     Gamma Globulin 04/02/2025 0.59     Total Protein 04/02/2025 6.6     Total Protein, Urine 04/02/2025 <4.0     Albumin ELP, Urine 04/02/2025 100.0     Alpha-1 Globulin, Urine % 04/02/2025 0.0     Alpha-2 Globulin, Urine % 04/02/2025 0.0     Beta, Urine 04/02/2025 0.0     Gamma Globulin, Urine 04/02/2025 0.0     Creatinine(Crt),U 04/02/2025 0.38     Arsenic Total, Urine 04/02/2025 None Detected     Lead, Rand Ur 04/02/2025 None Detected     Mercury, Urine 04/02/2025 None Detected     Case Report 04/02/2025                      Value:Electrophoresis Case                              Case: [id number]                                   Authorizing Provider:  Grazyna Barber MD           Collected:           04/02/2025 1019              Ordering Location:     [facility]      Received:            04/02/2025 [address] [place] [address] Laboratory                                                                                 Services                                                                     Pathologist:           Corby Bellamy MD                                                                           Specimen:                                                                                               SPEP Interpretation 04/02/2025                      Value:The SPEP shows an abnormal distribution in the gamma region.   Immunofixation to be performed.       SPEP Immunofixation Inte* 04/02/2025                      Value:Serum immunofixation shows no monoclonal immunoglobulins.     BRUCE Bellamy MD  Interpretation performed at [facility], [facility] [place] [address].       Case Report 04/02/2025                      Value:Electrophoresis Case                              Case: [id number]                                    Authorizing Provider:  Grazyna Barber MD           Collected:           04/02/2025 1019              Ordering Location:     [facility]      Received:            04/02/2025 1721                                     Hospital Laboratory                                                                                 Services                                                                     Pathologist:           Corby Bellamy MD                                                                           Specimen:    Urine, Other                                                                               UPEP Interpretation 04/02/2025                      Value:The UPEP shows selective proteinuria.   Immunofixation to be performed.       UPEP Imunnofixation Inte* 04/02/2025                      Value:Urine immunofixation shows no monoclonal immunoglobulins.     BRUCE Bellamy MD  Interpretation performed at [facility], [facility] [place] [address].      Office Visit on 03/26/2025   Component Date Value     RAPID STREP A 03/26/2025 Negative     SARS-CoV-2 03/26/2025 Negative     INFLUENZA A PCR 03/26/2025 Negative     INFLUENZA B PCR 03/26/2025 Negative    Appointment on 12/12/2024   Component Date Value    TSH 3RD GENERATON 04/02/2025 1.102    Appointment on 12/09/2024   Component Date Value    Lithium Lvl 12/09/2024 0.61    Appointment on 12/06/2024   Component Date Value    Lithium Lvl 12/06/2024 1.22 (H)     Sodium 12/06/2024 139     Potassium 12/06/2024 3.9     Chloride 12/06/2024 102     CO2 12/06/2024 29     ANION GAP 12/06/2024 8     BUN 12/06/2024 13     Creatinine 12/06/2024 0.80     Glucose 12/06/2024 169 (H)     Calcium 12/06/2024 10.3 (H)     AST 12/06/2024 35     ALT 12/06/2024 35     Alkaline Phosphatase 12/06/2024 58     Total Protein 12/06/2024 7.5     Albumin 12/06/2024 5.0     Total  Bilirubin 12/06/2024 0.47     eGFR 12/06/2024 88     Cholesterol 12/09/2024 156     Triglycerides 12/09/2024 188 (H)     HDL, Direct 12/09/2024 36 (L)     LDL Calculated 12/09/2024 82     Non-HDL-Chol (CHOL-HDL) 12/09/2024 120     Creatinine, Ur 12/09/2024 80.6     Albumin,U,Random 12/09/2024 <7.0     Albumin Creat Ratio 12/09/2024      Epinephrine, 24H Ur 12/12/2024 <5     Norepinephrine, 24H Ur 12/12/2024 56     Dopamine , 24H Ur 12/12/2024 114     Epinephrine, Rand Ur 12/12/2024 <3     Norepinephrine, Rand Ur 12/12/2024 35     Dopamine, Rand Ur 12/12/2024 71     Metaneph, Total, 24H Ur 12/12/2024 77     Metanephrines, Ur 12/12/2024 48     Normetanephrine, Ur 12/12/2024 221     Normetanephrine, 24H Ur 12/12/2024 354     TSH 3RD GENERATON 12/06/2024 3.712     Free T4 12/06/2024 0.82    Admission on 12/04/2024, Discharged on 12/04/2024   Component Date Value    Ventricular Rate 12/04/2024 91     Atrial Rate 12/04/2024 91     SD Interval 12/04/2024 140     QRSD Interval 12/04/2024 70     QT Interval 12/04/2024 338     QTC Interval 12/04/2024 416     P Axis 12/04/2024 17     QRS Axis 12/04/2024 4     T Wave [place] 12/04/2024 -19     WBC 12/04/2024 7.69     RBC 12/04/2024 4.54     Hemoglobin 12/04/2024 14.3     Hematocrit 12/04/2024 43.2     MCV 12/04/2024 95     MCH 12/04/2024 31.5     MCHC 12/04/2024 33.1     RDW 12/04/2024 13.2     MPV 12/04/2024 9.0     Platelets 12/04/2024 256     nRBC 12/04/2024 0     Segmented % 12/04/2024 59     Immature Grans % 12/04/2024 0     Lymphocytes % 12/04/2024 34     Monocytes % 12/04/2024 5     Eosinophils Relative 12/04/2024 2     Basophils Relative 12/04/2024 0     Absolute Neutrophils 12/04/2024 4.51     Absolute Immature Grans 12/04/2024 0.02     Absolute Lymphocytes 12/04/2024 2.63     Absolute Monocytes 12/04/2024 0.38     Eosinophils Absolute 12/04/2024 0.12     Basophils Absolute 12/04/2024 0.03     Sodium 12/04/2024 138     Potassium 12/04/2024 3.6     Chloride 12/04/2024 103      CO2 12/04/2024 27     ANION GAP 12/04/2024 8     BUN 12/04/2024 12     Creatinine 12/04/2024 0.80     Glucose 12/04/2024 160 (H)     Calcium 12/04/2024 9.5     AST 12/04/2024 34     ALT 12/04/2024 35     Alkaline Phosphatase 12/04/2024 54     Total Protein 12/04/2024 7.1     Albumin 12/04/2024 4.8     Total Bilirubin 12/04/2024 0.47     eGFR 12/04/2024 88     hs TnI 0hr 12/04/2024 <2     hs TnI 2hr 12/04/2024 <2     Delta 2hr hsTnI 12/04/2024      Ventricular Rate 12/04/2024 83     Atrial Rate 12/04/2024 83     ND Interval 12/04/2024 140     QRSD Interval 12/04/2024 78     QT Interval 12/04/2024 362     QTC Interval 12/04/2024 425     P Axis 12/04/2024 36     QRS Axis 12/04/2024 39     T Wave [place] 12/04/2024 -32    Office Visit on 12/03/2024   Component Date Value    Hemoglobin A1C 12/03/2024 6.3    Appointment on 10/18/2024   Component Date Value    WBC 10/18/2024 11.76 (H)     RBC 10/18/2024 4.23     Hemoglobin 10/18/2024 13.4     Hematocrit 10/18/2024 41.2     MCV 10/18/2024 97     MCH 10/18/2024 31.7     MCHC 10/18/2024 32.5     RDW 10/18/2024 14.4     MPV 10/18/2024 9.1     Platelets 10/18/2024 297     nRBC 10/18/2024 0     Segmented % 10/18/2024 57     Immature Grans % 10/18/2024 1     Lymphocytes % 10/18/2024 35     Monocytes % 10/18/2024 6     Eosinophils Relative 10/18/2024 1     Basophils Relative 10/18/2024 0     Absolute Neutrophils 10/18/2024 6.61     Absolute Immature Grans 10/18/2024 0.06     Absolute Lymphocytes 10/18/2024 4.15     Absolute Monocytes 10/18/2024 0.73     Eosinophils Absolute 10/18/2024 0.16     Basophils Absolute 10/18/2024 0.05    There may be more visits with results that are not included.         Substance Abuse History:  Social History     Substance and Sexual Activity   Drug Use Not Currently       Family Psychiatric History:   Family History   Problem Relation Name Age of Onset    Irregular heart beat Mother Niurkataylor Griffith     Arthritis Mother Niurka Claudiachristina     Diabetes Father  Erick Emes     Kidney disease Father Erick Emchristina     Diabetes type II Father Erick Emes     Depression Father Eirck Emchristina     Substance Abuse Brother Victoriano Emchristina     Alcohol abuse Brother Victoriano Emes     ADD / ADHD Brother Victoriano Emchristina     Drug abuse Brother Victoriano Emes     No Known Problems Brother      Depression Paternal Aunt Megan Worster         Unsuccessful suicide attempt in young adulthood    Psychiatric Illness Paternal Aunt Megan Worster     Self-Injury Paternal Aunt Megan Worster     Suicide Attempts Paternal Aunt Megan Worster     Substance Abuse Maternal Uncle Richard Jannie     Prostate cancer Maternal Uncle Richard Jannie 60    Diabetes type II Maternal Uncle Richard Jannie     Diabetes Maternal Uncle Richard Jannie     Diabetes Maternal Grandfather Dirk Dayynskmaria teresa     Migraines Maternal Grandfather Dirk Dayynskmaria teresa     Stroke Maternal Grandfather Dirk Vegaskmaria teresa     Diabetes type II Maternal Grandfather Dirk Baczynski     Alcohol abuse Maternal Grandfather Dirk Reisi         Great-Grandfather    Diabetes Maternal Grandmother Kat Valderrama     Rheum arthritis Maternal Grandmother Kat Valderrama     Melanoma Maternal Grandmother Kat Valderrama 84    Cancer Maternal Grandmother Kat Valderrama         Skin Cancer - successfully removed    Stroke Maternal Grandmother Kat Valderrama     Diabetes type II Maternal Grandmother Kat Valderrama     Depression Maternal Grandmother Kat Valderrama     Dementia Maternal Grandmother Kat Valderrama     Diabetes Paternal Grandfather Damascus Emes     Lung cancer Paternal Grandfather Dhruv Emes 47    Cancer Paternal Grandfather Dhruv Emes         Lung Cancer - cause of death    Kidney disease Paternal Grandmother Mimi Emes     Rheum arthritis Paternal Grandmother Mimi Emes     Depression Paternal Grandmother Mimi Emes         Nervous Breakdown around age 40    Deep vein thrombosis Paternal Grandmother Mimi Emes     Diabetes Paternal Grandmother  Mimi Griffith     Diabetes type II Paternal Grandmother Mimi Griffith     Alcohol abuse Family Unknown     Stomach cancer Family MGGM     Alcohol abuse Maternal Uncle Bharat Valderrama     Substance Abuse Maternal Uncle Bharat Valderrama     Cancer Maternal Uncle Bharat Valderrama         Prostate & Testicular Cancer    Drug abuse Maternal Uncle Bharat Valderrama     Alcohol abuse Maternal Uncle Rolf Valderrama     Drug abuse Maternal Uncle Rolf Valderrama     Alcohol abuse Maternal Uncle Bharat Valderrama     Drug abuse Maternal Uncle Bharat Valderrama     Alcohol abuse Maternal Uncle Rolf Vadlerrama     Drug abuse Maternal Uncle Rolf Valderrama     Completed Suicide  Neg Hx         The following portions of the patient's history were reviewed and updated as appropriate: allergies, current medications, past family history, past medical history, past social history, past surgical history, and problem list.    Social History     Socioeconomic History    Marital status: Single     Spouse name: Not on file    Number of children: 0    Years of education: 12 years     Highest education level: GED or equivalent   Occupational History    Occupation: unemployed   Tobacco Use    Smoking status: Never     Passive exposure: Never    Smokeless tobacco: Never    Tobacco comments:     N/A, non-smoker.   Vaping Use    Vaping status: Never Used   Substance and Sexual Activity    Alcohol use: Not Currently     Comment: 2 drinks per month    Drug use: Not Currently    Sexual activity: Not Currently     Birth control/protection: Abstinence   Other Topics Concern    Not on file   Social History Narrative    Caffeine use        What type of home do you live in: Single house    Age of your home: 48 yrs    How long have you been living there: 42 yrs    Type of heat: Baseboard    Type of fuel: Electric    What type of samir is in your bedroom: Carpet    Do you have the following in or near your home:    Air products: Window air conditioning and Ionic air  purifier    Pests: Mice    Pets: Cat    Basement: None     Social Drivers of Health     Financial Resource Strain: High Risk (12/2/2020)    Overall Financial Resource Strain (CARDIA)     Difficulty of Paying Living Expenses: Hard   Food Insecurity: No Food Insecurity (10/16/2023)    Hunger Vital Sign     Worried About Running Out of Food in the Last Year: Never true     Ran Out of Food in the Last Year: Never true   Transportation Needs: No Transportation Needs (10/16/2023)    PRAPARE - Transportation     Lack of Transportation (Medical): No     Lack of Transportation (Non-Medical): No   Physical Activity: Insufficiently Active (10/12/2022)    Exercise Vital Sign     Days of Exercise per Week: 2 days     Minutes of Exercise per Session: 60 min   Stress: Stress Concern Present (4/3/2019)    [employer] - Occupational Stress Questionnaire     Feeling of Stress : To some extent   Social Connections: Moderately Integrated (4/3/2019)    Social Connection and Isolation Panel     Frequency of Communication with Friends and Family: More than three times a week     Frequency of Social Gatherings with Friends and Family: More than three times a week     Attends [employer] Services: More than 4 times per year     Active Member of Clubs or Organizations: Yes     Attends Club or Organization Meetings: More than 4 times per year     Marital Status: Never    Intimate Partner Violence: Not At Risk (4/3/2019)    Humiliation, Afraid, Rape, and Kick questionnaire     Fear of Current or Ex-Partner: No     Emotionally Abused: No     Physically Abused: No     Sexually Abused: No   Housing Stability: Low Risk  (10/16/2023)    Housing Stability Vital Sign     Unable to Pay for Housing in the Last Year: No     Number of Times Moved in the Last Year: 1     Homeless in the Last Year: No     Social History     Social History Narrative    Caffeine use        What type of home do you live in: Single house     Age of your home: 46 yrs    How long have you been living there: 42 yrs    Type of heat: Baseboard    Type of fuel: Electric    What type of samir is in your bedroom: Carpet    Do you have the following in or near your home:    Air products: Window air conditioning and Ionic air purifier    Pests: Mice    Pets: Cat    Basement: None       Objective:       Mental status:  Appearance calm and cooperative , adequate hygiene and grooming, and good eye contact    Mood dysphoric   Affect affect was constricted   Speech a normal rate and fluent   Thought Processes coherent/organized and normal thought processes   Hallucinations no hallucinations present    Thought Content no delusions   Abnormal Thoughts no suicidal thoughts  and no homicidal thoughts    Orientation  oriented to person and place and time   Remote Memory short term memory intact and long term memory intact   Attention Span concentration intact   Intellect Appears to be of Average Intelligence   Insight Limited insight   Judgement judgment was limited   Muscle Strength N/a   Language no difficulty naming common objects and no difficulty repeating a phrase    Fund of Knowledge displays adequate knowledge of current events, adequate fund of knowledge regarding past history, and adequate fund of knowledge regarding vocabulary                Assessment/Plan:       Diagnoses and all orders for this visit:    Major depressive disorder, recurrent severe without psychotic features (HCC)    Generalized anxiety disorder    Major depressive disorder, recurrent episode, moderate (HCC)  -     Levomilnacipran HCl ER (FETZIMA) 80 MG extended release capsule; Take 1 capsule (80 mg total) by mouth daily                  Assessment & Plan  Major depressive disorder, recurrent severe without psychotic features (HCC)         Generalized anxiety disorder         Major depressive disorder, recurrent episode, moderate (HCC)    Orders:    Levomilnacipran HCl ER (FETZIMA) 80 MG  extended release capsule; Take 1 capsule (80 mg total) by mouth daily           Treatment Recommendations- Risks Benefits      Immediate Medical/Psychiatric/Psychotherapy Treatments and Any Precautions: continue current treatment     Risks, Benefits And Possible Side Effects Of Medications:  {PSYCH RISK, BENEFITS AND POSSIBLE SIDE EFFECTS (Optional):51578    Controlled Medication Discussion: Discussed with patient Black Box warning on concurrent use of benzodiazepines and opioid medications including sedation, respiratory depression, coma and death. Patient understands the risk of treatment with benzodiazepines in addition to opioids and wants to continue taking those medications. , Discussed with patient the risks of sedation, respiratory depression, impairment of ability to drive and potential for abuse and addiction related to treatment with benzodiazepine medications. The patient understands risk of treatment with benzodiazepine medications, agrees to not drive if feels impaired and agrees to take medications as prescribed., and The patient has been filling controlled prescriptions on time as prescribed to [place] Prescription Drug Monitoring program.      Psychotherapy Provided: No      The Patient is located at Home and in the following state in which I hold an active license [place].    The patient was identified by name and date of birth. Patient  was informed that this is a telemedicine visit and that the visit is being conducted through the Epic Embedded platform. She agrees to proceed..  My office door was closed. No one else was in the room.  She acknowledged consent and understanding of privacy and security of the video platform. The patient has agreed to participate and understands they can discontinue the visit at any time.    I have spent a total time of 30 minutes in caring for this patient on the day of the visit/encounter including Prognosis, Risks and benefits of tx options, Instructions for  management, Patient and family education, Importance of tx compliance, Risk factor reductions, Impressions, Documenting in the medical record, Reviewing/placing orders in the medical record (including tests, medications, and/or procedures), and Obtaining or reviewing history  , not including the time spent for establishing the audio/video connection.                  Visit Time    Visit Start Time: 1:30  Visit Stop Time: 2:00  Total Visit Duration: 30 minutes

## 2025-05-30 ENCOUNTER — TELEPHONE (OUTPATIENT)
Age: 45
End: 2025-05-30

## 2025-05-30 NOTE — TELEPHONE ENCOUNTER
Patient's pharmacy called and requested verbal approval for dose increase on Lorazepam. Writer transferred them to nurse for assistance.

## 2025-05-30 NOTE — TELEPHONE ENCOUNTER
Spoke to the pharmacist and confirmed that ativan was increased on 5/27. She will process the script. Nothing further needed.

## 2025-05-30 NOTE — TELEPHONE ENCOUNTER
Patient contacted the office to schedule a follow up visit with provider. Patient is now scheduled for 7/9/2025  at 11:30am virtually.

## 2025-06-03 ENCOUNTER — TELEPHONE (OUTPATIENT)
Dept: PAIN MEDICINE | Facility: CLINIC | Age: 45
End: 2025-06-03

## 2025-06-03 ENCOUNTER — HOSPITAL ENCOUNTER (OUTPATIENT)
Dept: RADIOLOGY | Facility: CLINIC | Age: 45
Discharge: HOME | End: 2025-06-03
Payer: COMMERCIAL

## 2025-06-03 VITALS
HEART RATE: 83 BPM | SYSTOLIC BLOOD PRESSURE: 113 MMHG | TEMPERATURE: 97.8 F | OXYGEN SATURATION: 98 % | RESPIRATION RATE: 16 BRPM | DIASTOLIC BLOOD PRESSURE: 79 MMHG

## 2025-06-03 DIAGNOSIS — M47.816 LUMBAR SPONDYLOSIS: ICD-10-CM

## 2025-06-03 PROCEDURE — 64635 DESTROY LUMB/SAC FACET JNT: CPT | Performed by: ANESTHESIOLOGY

## 2025-06-03 PROCEDURE — 64636 DESTROY L/S FACET JNT ADDL: CPT | Performed by: ANESTHESIOLOGY

## 2025-06-03 RX ORDER — BUPIVACAINE HYDROCHLORIDE 5 MG/ML
3 INJECTION, SOLUTION EPIDURAL; INTRACAUDAL; PERINEURAL ONCE
Status: COMPLETED | OUTPATIENT
Start: 2025-06-03 | End: 2025-06-03

## 2025-06-03 RX ORDER — LIDOCAINE HYDROCHLORIDE 10 MG/ML
10 INJECTION, SOLUTION EPIDURAL; INFILTRATION; INTRACAUDAL; PERINEURAL ONCE
Status: COMPLETED | OUTPATIENT
Start: 2025-06-03 | End: 2025-06-03

## 2025-06-03 RX ADMIN — LIDOCAINE HYDROCHLORIDE 3 ML: 20 INJECTION, SOLUTION EPIDURAL; INFILTRATION; INTRACAUDAL at 09:32

## 2025-06-03 RX ADMIN — LIDOCAINE HYDROCHLORIDE 10 ML: 10 INJECTION, SOLUTION EPIDURAL; INFILTRATION; INTRACAUDAL; PERINEURAL at 09:28

## 2025-06-03 RX ADMIN — BUPIVACAINE HYDROCHLORIDE 3 ML: 5 INJECTION, SOLUTION EPIDURAL; INTRACAUDAL; PERINEURAL at 09:32

## 2025-06-03 NOTE — H&P
History of Present Illness: The patient is a 45 y.o. female who presents with complaints of low back pain.    Past Medical History[1]    Past Surgical History[2]    Current Medications[3]    Allergies[4]    Physical Exam:   Vitals:    06/03/25 0900   BP: 121/82   Pulse: 81   Resp: 20   Temp: 97.8 °F (36.6 °C)   SpO2: 100%     General: Awake, Alert, Oriented x 3, Mood and affect appropriate  Respiratory: Respirations even and unlabored  Cardiovascular: Peripheral pulses intact; no edema  Musculoskeletal Exam: normal gait    ASA Score: 2    Patient/Chart Verification  Patient ID Verified: Verbal  ID Band Applied: No  Consents Confirmed: To be obtained in the Procedural area  H&P( within 30 days) Verified: To be obtained in the Procedural area  Interval H&P(within 24 hr) Complete (required for Outpatients and Surgery Admit only): To be obtained in the Procedural area  Allergies Reviewed: Yes  Anticoag/NSAID held?: No  Currently on antibiotics?: No  Pregnancy denied?: Yes  Pregnancy Lab Collected: N/A comment    Assessment:   1. Lumbar spondylosis        Plan: Right L4-S1 RFA         [1]   Past Medical History:  Diagnosis Date    Addiction to drug (Formerly [facility]) 03/15/2001    ADHD (attention deficit hyperactivity disorder) 01/01/2023    Adjustment disorder 03/15/1993    Alcohol abuse 03/15/2003    Alcoholism (Formerly [facility]) 03/15/2001    Anxiety     Blood transfusion declined because patient is [employer] 05/01/2023    Chronic pain disorder     Chronic sinusitis     Cognitive impairment 03/15/2022    Depression     Depression     Diabetes (Formerly [facility])     Diabetes mellitus ([facility]) 09/01/2022    Fibromyalgia     Hallucination 03/15/2015    Headache(784.0) 03/15/1993    Various types of headaches    Impulse control disorder 03/15/2022    Memory loss 03/15/2012    Migraine     Obesity     Obsessive-compulsive disorder 03/15/2001    Panic attack 03/15/2001    Peripheral neuropathy 03/15/2022    Polyarthritis     Last assessed 9/21/2015     Psychiatric disorder     Psychiatric illness     Sleep difficulties     Substance abuse (HCC) 03/15/1994   [2]   Past Surgical History:  Procedure Laterality Date    COLONOSCOPY  06/2019    DENTAL SURGERY      HYSTERECTOMY  05/01/2023    HYSTEROSCOPY      Endometrial Biopsy By Hysteroscopy    LA LAPS SUPRACRV HYSTERECT 250 GM/< RMVL TUBE/OVAR N/A 05/01/2023    Procedure: (LSH) W/ BILATERAL SALPINGECTOMY, REMOVAL PARAOVARIAN CYST;  Surgeon: Sai Lopez DO;  Location: [facility];  Service: Gynecology    REMOVAL OF INTRAUTERINE DEVICE (IUD)      US GUIDED BREAST BIOPSY RIGHT COMPLETE Right 06/17/2019    Benign   [3]   Current Outpatient Medications:     Blood Glucose Monitoring Suppl (Contour Blood Glucose System) w/Device KIT, Use 1 kit 2 (two) times a day before meals (Patient not taking: Reported on 5/27/2025), Disp: 1 kit, Rfl: 0    cholecalciferol (VITAMIN D3) 25 mcg (1,000 units) tablet, Take 1,000 Units by mouth in the morning. Take 1 tab. daily., Disp: , Rfl:     Contour Test test strip, USE TO CHECK BLOOD SUGAR ONCE DAILY (Patient not taking: Reported on 5/27/2025), Disp: 50 strip, Rfl: 7    ferrous sulfate 325 (65 Fe) mg tablet, Take 325 mg by mouth Take 1 tab. 3 times per week, Disp: , Rfl:     fluticasone (FLONASE) 50 mcg/act nasal spray, 1 spray into each nostril daily, Disp: 9.9 mL, Rfl: 0    gabapentin (NEURONTIN) 300 mg capsule, Take 1 capsule (300 mg total) by mouth 3 (three) times a day, Disp: 270 capsule, Rfl: 1    Levomilnacipran HCl ER (FETZIMA) 80 MG extended release capsule, Take 1 capsule (80 mg total) by mouth daily, Disp: 30 capsule, Rfl: 2    levothyroxine 75 mcg tablet, TAKE 1 TABLET (75 MCG TOTAL) BY MOUTH DAILY IN THE EARLY MORNING, Disp: 90 tablet, Rfl: 1    lithium carbonate 300 mg capsule, Take 1 capsule (300 mg total) by mouth 2 (two) times a day with meals, Disp: 60 capsule, Rfl: 2    LORazepam (ATIVAN) 1 mg tablet, Take 1 tablet (1 mg total) by mouth every 8 (eight) hours as  needed for anxiety, Disp: 90 tablet, Rfl: 0    MAGNESIUM MALATE PO, Take by mouth Take 1 tab. daily, Disp: , Rfl:     metFORMIN (GLUCOPHAGE) 500 mg tablet, TAKE 1 TABLET BY MOUTH TWICE A DAY WITH FOOD, Disp: 60 tablet, Rfl: 5    methocarbamol (ROBAXIN) 500 mg tablet, TAKE 1 TAB. EVERY 8 HR. AS NEEDED FOR MUSCLE SPASMS, Disp: 90 tablet, Rfl: 0    mirtazapine (REMERON) 30 mg tablet, TAKE 1 TABLET BY MOUTH EVERYDAY AT BEDTIME, Disp: 30 tablet, Rfl: 2    propranolol (INDERAL) 40 mg tablet, Take 0.5 tablets (20 mg total) by mouth daily at bedtime, Disp: 45 tablet, Rfl: 3    traZODone (DESYREL) 100 mg tablet, TAKE 1 TABLET BY MOUTH DAILY AT BEDTIME, Disp: 30 tablet, Rfl: 2  [4]   Allergies  Allergen Reactions    Cannabidiol Shortness Of Breath, Itching, Swelling, Anxiety, Palpitations, Confusion, Hypertension, Throat Swelling and Tongue Swelling    Amoxicillin-Pot Clavulanate Hives    Decadrol [Dexamethasone] Other (See Comments)     psychosis    Penicillins Hives     Hives/Uticaria    Tetracyclines & Related Hives      Allergy;

## 2025-06-03 NOTE — DISCHARGE INSTR - LAB

## 2025-06-04 NOTE — TELEPHONE ENCOUNTER
Caller: Esther ALFRED    Doctor: Dr. Meneses    Reason for call: Pt is returning the nurse call    Call back#: [phone]

## 2025-06-04 NOTE — TELEPHONE ENCOUNTER
S/w pt, states she is doing well following procedure, pain level  3 / 10, denies s/sx infection. Advised 4-6 weeks for full benefit.     Clerical - please assist with scheduling 6 week f/u OV

## 2025-06-05 NOTE — TELEPHONE ENCOUNTER
Caller: pt    Doctor: Dr. zendejas    Reason for call: pt wants to know if she can use tiger balm? Her back is painful today.    Call back#: [phone]

## 2025-06-05 NOTE — TELEPHONE ENCOUNTER
S/w pt informed her ok to use tiger balm cream, avoid any open areas from RFA. Pt reported she is using Tylenol and ice as well.   [Disease: _____________________] : Disease: [unfilled] [M: ___] : M[unfilled] [AJCC Stage: ____] : AJCC Stage: [unfilled] [de-identified] : moderately differentiated invasive ductal carcinoma  ER >90%; VT 90%; Her 2 IHC 0. [de-identified] : 47 year old female with Stage IV de narayan MBC transferring care from [facility] to [facility].\par \par Patient initially presented in 2015 at the age of 41 when a left breast mass was found on screening mammogram in December of 2015.\par \par 1/14/16 A biopsy of left breast mass showed moderately differentiated invasive ductal carcinoma  measuring at least 1.5 cm, DCIS with intermediate grade nuclear atypia and focal necrosis; ER >90%; CT 90%; Her 2 IHC 0.\par \par 1/22/2016 MRI breast showed a spiculated mass 5.6 x 2.7 cm with multiple enhancing nodules surrounding the mass.  Abnormal L. Axillary adenopathy.  T2 enhancing lesions within sternum and left clavicular head.\par \par 2/2/2016 PET/CT hypermetabolic left. breast mass, small left axillary nodes, multiple hypermetabolic lytic lesions. Also noted was a 1.9 cm soft tissue nodule LLQ of abdomen inseparable from loop of small bowel with SUV 3.\par \par 2/11/16 Biopsy of Manubrium:  Metastatic invasive ductal carcinoma, ER/CT >90%; Her 2 negative.\par \par 8/2016 She had bilateral mastectomy which showed residual 3 cm IDC and 1 negative sentinel node.  Had breast reconstruction subsequently in 5/2017 s/p b/l implants Dr. Luis E Lombardi, plastic surgeon.  \par \par Patient was started on ovarian suppression/letrozole and Xgeva since 2/2016. Patient subsequently had KYLE-BSO. she achieved a rapid clinical and radiological response to therapy.  She remains on letrozole single agent and Xgeva, now every 3 months, last tx 6/2021. \par \par 5/23/19 - vertigo/dizziness due to sinus issues, no metastatic disease on brain MRI.\par \par Genetics [facility] 2016 - negative for any deleterious mutations.  Family history: father with GIST.  maternal aunt with cervical cancer. \par \par Last PET scan CR 5/27/2020.\par Last blood work 6/18/2020, tumor markers normal, CEA 2.1, CA 27.29 12\par \par HEALTH MAINTENANCE\par PCP Dr. Velasquez \par Last dental exam 6/2021, no abnl. \par GYN no longer following, s/p KYLE-BSO\par GI colonoscopy on [facility], no polyps, h/o IBS\par bone density normal 4/15/16, will get more recent report.\par COVID infection in 2/2020; COVID vaccine 8/31/21 2nd dose Pfizer; joint achiness since then\par Has a [employer] bike, trying to exercise; attempting weight loss diet modification \par MSK chronic lower back pain on cymbalta

## 2025-06-08 NOTE — PROGRESS NOTES
Name: Esther Griffith      : 1980      MRN: [id number]  Encounter Provider: Daryl Chinchilla DO  Encounter Date: 2025   Encounter department: [facility]  :  Assessment & Plan  Chronic migraine without aura without status migrainosus, not intractable  43-year-old female here for follow-up for migraines.  Previously followed by Dr. Long and last seen in 2022.   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  Since her last visit she reports that she ran out of her prescription in 2023 and since her headaches have returned and now occurring 1-2 times a month.     Plan:   - Additional Workup: none  - Lifestyle Modifications and Headache Hygiene   Recommended to strive to attain adequate amount of sleep each night to prevent fatigue/sleep deprivation.   Exercise frequently. Eat balance diet with avoidance of fating or skipping meals.Maintain adequate hydration.   Avoid migraine triggers such as: red wine, age cheeses, and scuralose/artificial sweeteners.   Avoidance of tobacco use and limited EtOH and caffeine intake. Stress reduction, consider relaxation therapy, meditation, yoga.   Encouraged to keep a headache diary to help identify potential triggers and monitor treatment effectiveness and response to lifestyle interventions.   - Headache Preventative Rx:  propranolol (Inderal) 20 mg QHS  - Headache Abortives: Continue Naproxen PRN   - Patient was advised to limit OTC or prescription analgesics more than 3 days/wk to prevent Medication Overuse Headache / Rebound Headache  - Discussed the potential side effects of the current medications, patient was understanding and agreeable  - Follow-up visit in 6 months, or sooner as needed should symptoms worsen or fail to respond to treatment plan as outlined    There are no Patient Instructions on file for this visit.     History  of Present Illness   CC: Headaches  HPI: Esther is a 44-year-old female follow-up in regards to her headaches.  She was followed by Dr. Long and was last seen in June 2022.  In review patient has a long standing history of bitemporal periorbital migraines  for that past 20 years. She was previously on Topamax that was prescribed by her PCP without improvement.  Was started on Propanolol 20 mg QHS as a preventative with complete resolution of her headache. She ran out in October 2023 year and headaches returned now occurring 2 times a month no change in frequency or intensity.      He also has recently that she felt as if her anxiety and depression has worsened.  She states while doing some research online she stopped across an article which noted that there is a correlation between mental health and the autism spectrum disorder.  She stated that she stopped back on her youth she had noted characteristics such as lack of empathy, increased sensitivity to stimulation, and preference to be isolated from others.  She voices concern that she thinks she might be on the autism spectrum and would like to seek out a neuropsychological assessment.               Migraine  This is a chronic problem. The current episode started more than 1 month ago. Pertinent negatives include no back pain, diarrhea, dizziness, fever, hearing loss, nausea, neck pain, numbness, photophobia, seizures, sinus pressure, tinnitus or weakness.      Review of Systems   Constitutional:  Negative for chills, fatigue, fever and unexpected weight change.   HENT:  Negative for drooling, hearing loss, sinus pressure, sinus pain, tinnitus, trouble swallowing and voice change.    Eyes:  Negative for photophobia and visual disturbance.   Respiratory:  Negative for chest tightness and shortness of breath.    Cardiovascular:  Negative for chest pain, palpitations and leg swelling.   Gastrointestinal:  Negative for constipation, diarrhea and nausea.   Endocrine:  Negative for cold intolerance.   Genitourinary:  Negative for difficulty urinating.   Musculoskeletal:  Negative for arthralgias, back pain, gait problem, myalgias, neck pain and neck stiffness.   Skin:  Negative for pallor and rash.   Neurological:  Positive for headaches. Negative for dizziness, tremors, seizures, syncope, facial asymmetry, speech difficulty, weakness, light-headedness and numbness.   Psychiatric/Behavioral:  Positive for decreased concentration and dysphoric mood. Negative for confusion and sleep disturbance. The patient is nervous/anxious.     I have personally reviewed the MA's review of systems and made changes as necessary.    Past Medical History   Past Medical History[1]  Past Surgical History[2]  Family History[3]   reports that she has never smoked. She has never been exposed to tobacco smoke. She has never used smokeless tobacco. She reports that she does not currently use alcohol. She reports that she does not currently use drugs.  Allergies[4]   Medications Ordered Prior to Encounter[5]   Social History[6]     Objective   /78 (BP Location: Left arm, Patient Position: Sitting, Cuff Size: Large)   Pulse 91   Ht 5' (1.524 m)   Wt 81.6 kg (180 lb)   LMP 03/13/2023 (Approximate)   BMI 35.15 kg/m²     Physical Exam  Vitals reviewed.   Constitutional:       General: She is not in acute distress.     Appearance: She is not toxic-appearing.   HENT:      Head: Normocephalic and atraumatic.      Right Ear: External ear normal.      Left Ear: External ear normal.     Eyes:      General: Lids are normal.         Right eye: No discharge.         Left eye: No discharge.      Extraocular Movements: Extraocular movements intact.      Conjunctiva/sclera: Conjunctivae normal.      Pupils: Pupils are equal, round, and reactive to light.       Cardiovascular:      Rate and Rhythm: Normal rate.   Pulmonary:      Effort: Pulmonary effort is normal. No respiratory distress.     Musculoskeletal:          General: No tenderness. Normal range of motion.      Cervical back: No rigidity or tenderness.     Skin:     General: Skin is warm.      Coloration: Skin is not jaundiced.     Neurological:      Mental Status: She is alert.     Psychiatric:         Mood and Affect: Mood normal.         Speech: Speech normal.         Behavior: Behavior normal.       Neurological Exam  Mental Status  Alert. Recent and remote memory are intact. Speech is normal. Language is fluent with no aphasia. Attention and concentration are normal. Fund of knowledge is appropriate for level of education.    Cranial Nerves  CN II: Visual acuity is normal. Visual fields full to confrontation.  CN III, IV, VI: Extraocular movements intact bilaterally. Normal lids and orbits bilaterally. Pupils equal round and reactive to light bilaterally.  CN V: Facial sensation is normal.  CN VII: Full and symmetric facial movement.  CN VIII: Hearing is normal.  CN IX, X: Palate elevates symmetrically. Normal gag reflex.  CN XI: Shoulder shrug strength is normal.  CN XII: Tongue midline without atrophy or fasciculations.    Motor  Normal muscle bulk throughout. No fasciculations present. Normal muscle tone. No abnormal involuntary movements. Strength is 5/5 in all four extremities except as noted.    Sensory  Light touch is normal in upper and lower extremities.     Reflexes  Deep tendon reflexes are 2+ and symmetric except as noted.    Coordination  Right: Finger-to-nose normal.Left: Finger-to-nose normal.    Gait  Normal casual, toe, heel and tandem gait.  Unable to be assessed due to the patient's current medical status.    \       [1]   Past Medical History:  Diagnosis Date    Addiction to drug ([facility]) 03/15/2001    ADHD (attention deficit hyperactivity disorder) 01/01/2023    Adjustment disorder 03/15/1993    Alcohol abuse 03/15/2003    Alcoholism ([facility]) 03/15/2001    Anxiety     Blood transfusion declined because patient is Restorationist 05/01/2023     Chronic pain disorder     Chronic sinusitis     Cognitive impairment 03/15/2022    Depression     Depression     Diabetes (HCC)     Diabetes mellitus ([facility]) 09/01/2022    Fibromyalgia     Hallucination 03/15/2015    Headache(784.0) 03/15/1993    Various types of headaches    Impulse control disorder 03/15/2022    Memory loss 03/15/2012    Migraine     Obesity     Obsessive-compulsive disorder 03/15/2001    Panic attack 03/15/2001    Peripheral neuropathy 03/15/2022    Polyarthritis     Last assessed 9/21/2015    Psychiatric disorder     Psychiatric illness     Sleep difficulties     Substance abuse ([facility]) 03/15/1994   [2]   Past Surgical History:  Procedure Laterality Date    COLONOSCOPY  06/2019    DENTAL SURGERY      HYSTERECTOMY  05/01/2023    HYSTEROSCOPY      Endometrial Biopsy By Hysteroscopy    ND LAPS SUPRACRV HYSTERECT 250 GM/< RMVL TUBE/OVAR N/A 05/01/2023    Procedure: (LSH) W/ BILATERAL SALPINGECTOMY, REMOVAL PARAOVARIAN CYST;  Surgeon: Sai Lopez DO;  Location: [facility];  Service: Gynecology    REMOVAL OF INTRAUTERINE DEVICE (IUD)      US GUIDED BREAST BIOPSY RIGHT COMPLETE Right 06/17/2019    Benign   [3]   Family History  Problem Relation Name Age of Onset    Irregular heart beat Mother Niurka Emes     Arthritis Mother Niurka Emes     Diabetes Father Erick Emes     Kidney disease Father Erick Emes     Diabetes type II Father Erick Emes     Depression Father Erick Emes     Substance Abuse Brother Victoriano Emes     Alcohol abuse Brother Victoriano Emes     ADD / ADHD Brother Victoriano Emes     Drug abuse Brother Victoriano Emes     No Known Problems Brother      Depression Paternal Aunt Megan Worster         Unsuccessful suicide attempt in young adulthood    Psychiatric Illness Paternal Aunt Megan Worster     Self-Injury Paternal Aunt Megan Worster     Suicide Attempts Paternal Aunt Megan Worster     Substance Abuse Maternal Uncle Richard Valderrama     Prostate cancer Maternal Uncle Richard Valderrama 60     Diabetes type II Maternal Uncle Richard Jannie     Diabetes Maternal Uncle Richard Jannie     Diabetes Maternal Grandfather Dirk Barbynski     Migraines Maternal Grandfather Dirk Vincent     Stroke Maternal Grandfather Dirk Vincent     Diabetes type II Maternal Grandfather Dirk Reisi     Alcohol abuse Maternal Grandfather Dirk Reisi         Great-Grandfather    Diabetes Maternal Grandmother Kat Jannie     Rheum arthritis Maternal Grandmother Kat Jannie     Melanoma Maternal Grandmother Kat Jannie 84    Cancer Maternal Grandmother Kat Jannie         Skin Cancer - successfully removed    Stroke Maternal Grandmother Kat Jannie     Diabetes type II Maternal Grandmother Kat Jannie     Depression Maternal Grandmother Kat Jannie     Dementia Maternal Grandmother Kat Jannie     Diabetes Paternal Grandfather Dhruv Emes     Lung cancer Paternal Grandfather Dhruv Emes 47    Cancer Paternal Grandfather Fleming Emes         Lung Cancer - cause of death    Kidney disease Paternal Grandmother Mimi Emes     Rheum arthritis Paternal Grandmother Mimi Emes     Depression Paternal Grandmother Mimi Emes         Nervous Breakdown around age 40    Deep vein thrombosis Paternal Grandmother Mimi Emes     Diabetes Paternal Grandmother Mimi Emes     Diabetes type II Paternal Grandmother Mimi Emes     Alcohol abuse Family Unknown     Stomach cancer Family MGGM     Alcohol abuse Maternal Uncle Bharat Jannie     Substance Abuse Maternal Uncle Bharat Jannie     Cancer Maternal Uncle Bharat Jannie         Prostate & Testicular Cancer    Drug abuse Maternal Uncle Bharat Jannie     Alcohol abuse Maternal Uncle Rolf Jannie     Drug abuse Maternal Uncle Rolf Valderrama     Alcohol abuse Maternal Uncle Bharat Jannie     Drug abuse Maternal Uncle Bharat Jannie     Alcohol abuse Maternal Uncle Rolf Jannie     Drug abuse Maternal Uncle Rolf Valderrama     Completed  Suicide  Neg Hx     [4]   Allergies  Allergen Reactions    Cannabidiol Shortness Of Breath, Itching, Swelling, Anxiety, Palpitations, Confusion, Hypertension, Throat Swelling and Tongue Swelling    Amoxicillin-Pot Clavulanate Hives    Decadrol [Dexamethasone] Other (See Comments)     psychosis    Penicillins Hives     Hives/Uticaria    Tetracyclines & Related Hives      Allergy;    [5]   Current Outpatient Medications on File Prior to Visit   Medication Sig Dispense Refill    cholecalciferol (VITAMIN D3) 25 mcg (1,000 units) tablet Take 1,000 Units by mouth in the morning. Take 1 tab. daily.      ferrous sulfate 325 (65 Fe) mg tablet Take 325 mg by mouth Take 1 tab. 3 times per week      fluticasone (FLONASE) 50 mcg/act nasal spray 1 spray into each nostril daily 9.9 mL 0    gabapentin (NEURONTIN) 300 mg capsule Take 1 capsule (300 mg total) by mouth 3 (three) times a day 270 capsule 1    levothyroxine 75 mcg tablet TAKE 1 TABLET (75 MCG TOTAL) BY MOUTH DAILY IN THE EARLY MORNING 90 tablet 1    lithium carbonate 300 mg capsule Take 1 capsule (300 mg total) by mouth 2 (two) times a day with meals 60 capsule 2    MAGNESIUM MALATE PO Take by mouth Take 1 tab. daily      metFORMIN (GLUCOPHAGE) 500 mg tablet TAKE 1 TABLET BY MOUTH TWICE A DAY WITH FOOD 60 tablet 5    methocarbamol (ROBAXIN) 500 mg tablet TAKE 1 TAB. EVERY 8 HR. AS NEEDED FOR MUSCLE SPASMS 90 tablet 0    traZODone (DESYREL) 100 mg tablet TAKE 1 TABLET BY MOUTH DAILY AT BEDTIME 30 tablet 2    Blood Glucose Monitoring Suppl (Contour Blood Glucose System) w/Device KIT Use 1 kit 2 (two) times a day before meals (Patient not taking: Reported on 5/27/2025) 1 kit 0    Contour Test test strip USE TO CHECK BLOOD SUGAR ONCE DAILY (Patient not taking: Reported on 5/27/2025) 50 strip 7    mirtazapine (REMERON) 30 mg tablet TAKE 1 TABLET BY MOUTH EVERYDAY AT BEDTIME 30 tablet 2     No current facility-administered medications on file prior to visit.   [6]   Social  History  Tobacco Use    Smoking status: Never     Passive exposure: Never    Smokeless tobacco: Never    Tobacco comments:     N/A, non-smoker.   Vaping Use    Vaping status: Never Used   Substance and Sexual Activity    Alcohol use: Not Currently     Comment: 2 drinks per month    Drug use: Not Currently    Sexual activity: Not Currently     Birth control/protection: Abstinence

## 2025-06-08 NOTE — ASSESSMENT & PLAN NOTE
43-year-old female here for follow-up for migraines.  Previously followed by Dr. Long and last seen in 6/2022.   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  Since her last visit she reports that she ran out of her prescription in October 2023 and since her headaches have returned and now occurring 1-2 times a month.     Plan:   - Additional Workup: none  - Lifestyle Modifications and Headache Hygiene   Recommended to strive to attain adequate amount of sleep each night to prevent fatigue/sleep deprivation.   Exercise frequently. Eat balance diet with avoidance of fating or skipping meals.Maintain adequate hydration.   Avoid migraine triggers such as: red wine, age cheeses, and scuralose/artificial sweeteners.   Avoidance of tobacco use and limited EtOH and caffeine intake. Stress reduction, consider relaxation therapy, meditation, yoga.   Encouraged to keep a headache diary to help identify potential triggers and monitor treatment effectiveness and response to lifestyle interventions.   - Headache Preventative Rx:  propranolol (Inderal) 20 mg QHS  - Headache Abortives: Continue Naproxen PRN   - Patient was advised to limit OTC or prescription analgesics more than 3 days/wk to prevent Medication Overuse Headache / Rebound Headache  - Discussed the potential side effects of the current medications, patient was understanding and agreeable  - Follow-up visit in 6 months, or sooner as needed should symptoms worsen or fail to respond to treatment plan as outlined

## 2025-06-10 ENCOUNTER — VBI (OUTPATIENT)
Dept: ADMINISTRATIVE | Facility: OTHER | Age: 45
End: 2025-06-10

## 2025-06-10 NOTE — LETTER
Diabetic Eye Exam Form    Date Requested: 25     2nd Request  Patient: Esther Griffith     Please complete form  Patient : 1980      Report received is missing  Referring Provider: ADELAIDA Augustine   Date of service      DIABETIC Eye Exam Date _______________________________      Type of Exam MUST be documented for Diabetic Eye Exams. Please CHECK ONE.     Retinal Exam       Dilated Retinal Exam       OCT       Optomap-Iris Exam      Fundus Photography       Left Eye - Please check Retinopathy or No Retinopathy        Exam did show retinopathy    Exam did not show retinopathy       Right Eye - Please check Retinopathy or No Retinopathy       Exam did show retinopathy    Exam did not show retinopathy       Comments __________________________________________________________    Practice Providing Exam ______________________________________________    Exam Performed By (print name) _______________________________________      Provider Signature ___________________________________________________      These reports are needed for  compliance.    Please fax this completed form and a copy of the Diabetic Eye Exam report to the [facility] Based Department as soon as possible via Fax [phone], attention [place]: Phone [phone]. Our office is located at [address] [place] [address].     We thank you for your assistance in treating our mutual patient.

## 2025-06-10 NOTE — LETTER
Diabetic Eye Exam Form    Date Requested: 06/10/25     Please complete form  Patient: Esther Griffith     Report received is missing  Patient : 1980      Date of service  Referring Provider: ADELAIDA Augustine      DIABETIC Eye Exam Date _______________________________      Type of Exam MUST be documented for Diabetic Eye Exams. Please CHECK ONE.     Retinal Exam       Dilated Retinal Exam       OCT       Optomap-Iris Exam      Fundus Photography       Left Eye - Please check Retinopathy or No Retinopathy        Exam did show retinopathy    Exam did not show retinopathy       Right Eye - Please check Retinopathy or No Retinopathy       Exam did show retinopathy    Exam did not show retinopathy       Comments __________________________________________________________    Practice Providing Exam ______________________________________________    Exam Performed By (print name) _______________________________________      Provider Signature ___________________________________________________      These reports are needed for  compliance.    Please fax this completed form and a copy of the Diabetic Eye Exam report to the [facility] Based Department as soon as possible via Fax [phone], attention [place]: Phone [phone]. Our office is located at [address] [place] [address].     We thank you for your assistance in treating our mutual patient.

## 2025-06-12 ENCOUNTER — TELEMEDICINE (OUTPATIENT)
Dept: BEHAVIORAL/MENTAL HEALTH CLINIC | Facility: CLINIC | Age: 45
End: 2025-06-12
Payer: COMMERCIAL

## 2025-06-12 DIAGNOSIS — F41.1 GENERALIZED ANXIETY DISORDER: ICD-10-CM

## 2025-06-12 DIAGNOSIS — F33.2 MAJOR DEPRESSIVE DISORDER, RECURRENT SEVERE WITHOUT PSYCHOTIC FEATURES (HCC): Primary | ICD-10-CM

## 2025-06-12 PROCEDURE — 90834 PSYTX W PT 45 MINUTES: CPT | Performed by: PSYCHIATRY & NEUROLOGY

## 2025-06-12 NOTE — PSYCH
"Virtual Regular VisitName: Esther Griffith      : 1980      MRN: [id number]  Encounter Provider: HERMELINDA BECK  Encounter Date: 2025   Encounter department: [facility] [place]  :  Assessment & Plan  Major depressive disorder, recurrent severe without psychotic features (HCC)         Generalized anxiety disorder            Goals addressed in session: Goal 1     DATA: Met with Esther for follow up. Esther shared that she has been in quite a lot of physical pain recently, especially after getting an ablation in her lower back that did not work and seemed to make things worse. She said that she has not thought of much else because of the pain, and is trying to do what she can to manage it (working with pain management, only uses Tylenol and Icy Hot along with what doctors have prescribed).  She said that she has not really been going out much at all, but does have some upcoming events that she is looking forward to.  She shared that she has been journaling about her experiences with her father growing up, sharing that he was physically present but not really there.  She has \"sobbed\" while writing about her memories and how they have affected her, and noted that she is trying to get to some kind of forgiveness with it all, even if she does not address it all with her father.  Provided support, validation of Esther's feelings, and encouraged her to continue journaling and processing her feelings and memories so that she can find peace and improve her relationship with her dad.    During this session, this clinician used the following therapeutic modalities: Client-centered Therapy, Cognitive Behavioral Therapy, and Supportive Psychotherapy    Substance Abuse was not addressed during this session. If the client is diagnosed with a co-occurring substance use disorder, please indicate any changes in the frequency or amount of use: n/a. Stage of change for addressing " "substance use diagnoses: No substance use/Not applicable    ASSESSMENT:  Esther presents with a Euthymic/ normal mood. Esther's affect is Normal range and intensity, which is congruent, with their mood and the content of the session. The client has made progress on their goals as evidenced by brighter mood, more focus on self-care and healthy boundaries.    Esther presents with a minimal risk of suicide, minimal risk of self-harm, and minimal risk of harm to others.    For any risk assessment that surpasses a \"low\" rating, a safety plan must be developed.    A safety plan was indicated: no  If yes, describe in detail n/a    PLAN: Between sessions, Esther will continue journaling about memories and feelings regarding her relationship with her father as she was growing up, and will continue to focus on self-care and boundaries to help with physical and emotional pain. At the next session, the therapist will use Client-centered Therapy, Cognitive Behavioral Therapy, and Supportive Psychotherapy to address anxiety, depression.    Behavioral Health Treatment Plan [facility]: Diagnosis and Treatment Plan explained to Esther, Esther relates understanding diagnosis and is agreeable to Treatment Plan. Yes     Depression Follow-up Plan Completed: Yes     Reason for visit is   Chief Complaint   Patient presents with    Virtual Regular Visit      Recent Visits  No visits were found meeting these conditions.  Showing recent visits within past 7 days and meeting all other requirements  Today's Visits  Date Type Provider Dept   06/12/25 Telemedicine HERMELINDA [facility] [place]   Showing today's visits and meeting all other requirements  Future Appointments  No visits were found meeting these conditions.  Showing future appointments within next 150 days and meeting all other requirements     History of Present Illness     HPI    Past Medical History   Past Medical History:   Diagnosis Date    " Addiction to drug ([facility]) 03/15/2001    ADHD (attention deficit hyperactivity disorder) 01/01/2023    Adjustment disorder 03/15/1993    Alcohol abuse 03/15/2003    Alcoholism ([facility]) 03/15/2001    Anxiety     Blood transfusion declined because patient is Sabianism 05/01/2023    Chronic pain disorder     Chronic sinusitis     Cognitive impairment 03/15/2022    Depression     Depression     Diabetes ([facility])     Diabetes mellitus ([facility]) 09/01/2022    Fibromyalgia     Hallucination 03/15/2015    Headache(784.0) 03/15/1993    Various types of headaches    Impulse control disorder 03/15/2022    Memory loss 03/15/2012    Migraine     Obesity     Obsessive-compulsive disorder 03/15/2001    Panic attack 03/15/2001    Peripheral neuropathy 03/15/2022    Polyarthritis     Last assessed 9/21/2015    Psychiatric disorder     Psychiatric illness     Sleep difficulties     Substance abuse ([facility]) 03/15/1994     Past Surgical History:   Procedure Laterality Date    COLONOSCOPY  06/2019    DENTAL SURGERY      HYSTERECTOMY  05/01/2023    HYSTEROSCOPY      Endometrial Biopsy By Hysteroscopy    KS LAPS SUPRACRV HYSTERECT 250 GM/< RMVL TUBE/OVAR N/A 05/01/2023    Procedure: (LS) W/ BILATERAL SALPINGECTOMY, REMOVAL PARAOVARIAN CYST;  Surgeon: Sai Lopez DO;  Location: [facility];  Service: Gynecology    REMOVAL OF INTRAUTERINE DEVICE (IUD)      US GUIDED BREAST BIOPSY RIGHT COMPLETE Right 06/17/2019    Benign     Current Outpatient Medications   Medication Instructions    Blood Glucose Monitoring Suppl (Contour Blood Glucose System) w/Device KIT 1 kit, Does not apply, 2 times daily before meals    cholecalciferol (VITAMIN D3) 1,000 Units, Daily    Contour Test test strip USE TO CHECK BLOOD SUGAR ONCE DAILY    ferrous sulfate 325 mg    fluticasone (FLONASE) 50 mcg/act nasal spray 1 spray, Nasal, Daily    gabapentin (NEURONTIN) 300 mg, Oral, 3 times daily    Levomilnacipran HCl ER (FETZIMA) 80 mg, Oral, Daily    levothyroxine 75  mcg, Oral, Daily (early morning)    lithium carbonate 300 mg, Oral, 2 times daily with meals    LORazepam (ATIVAN) 1 mg, Oral, Every 8 hours PRN    MAGNESIUM MALATE PO Take by mouth Take 1 tab. daily    metFORMIN (GLUCOPHAGE) 500 mg, Oral, 2 times daily with meals    methocarbamol (ROBAXIN) 500 mg tablet TAKE 1 TAB. EVERY 8 HR. AS NEEDED FOR MUSCLE SPASMS    mirtazapine (REMERON) 30 mg, Oral, Daily at bedtime,       propranolol (INDERAL) 20 mg, Oral, Daily at bedtime    traZODone (DESYREL) 100 mg, Oral, Daily at bedtime     Allergies   Allergen Reactions    Cannabidiol Shortness Of Breath, Itching, Swelling, Anxiety, Palpitations, Confusion, Hypertension, Throat Swelling and Tongue Swelling    Amoxicillin-Pot Clavulanate Hives    Decadrol [Dexamethasone] Other (See Comments)     psychosis    Penicillins Hives     Hives/Uticaria    Tetracyclines & Related Hives      Allergy;        Objective   LMP 03/13/2023 (Approximate)     Video Exam  Physical Exam     Administrative Statements   Encounter provider HERMELINDA BECK    The Patient is located at Home and in the following state in which I hold an active license [place].    The patient was identified by name and date of birth. Esther Griffith was informed that this is a telemedicine visit and that the visit is being conducted through the Epic Embedded platform. She agrees to proceed..  My office door was closed. No one else was in the room.  She acknowledged consent and understanding of privacy and security of the video platform. The patient has agreed to participate and understands they can discontinue the visit at any time.    I have spent a total time of 45 minutes in caring for this patient on the day of the visit/encounter including Counseling / Coordination of care and Documenting in the medical record, not including the time spent for establishing the audio/video connection.    Visit Time  Start Time: 1301  Stop Time: 1346  Total Visit Time: 45 minutes

## 2025-06-12 NOTE — BH TREATMENT PLAN
Outpatient Behavioral Health Psychotherapy Treatment Plan    Esther Nacho  1980     Date of Initial Psychotherapy Assessment:  8/21/2018   Date of Current Treatment Plan: 06/12/25  Treatment Plan Target Date: 12/12/2025  Treatment Plan Expiration Date: 12/12/2025    Diagnosis:   1. Major depressive disorder, recurrent severe without psychotic features (HCC)        2. Generalized anxiety disorder            Area(s) of Need: depression/anxiety/chronic pain    Long Term Goal 1 (in the client's own words): I want to continue to effectively manage the depression and the anxiety (continuing progress as of 6/12/2025)    Stage of Change: Action    Target Date for completion: 12/12/2025     Anticipated therapeutic modalities: CBT, mindfulness     People identified to complete this goal: Vidhi Santana      Objective 1: (identify the means of measuring success in meeting the objective): I will continue to be aware of the depression/anxiety and negative self talk messages and redirect to positive and realistic messages (lower depression/anxiety).     Objective 2: I will continue to work with Dr. Ovalle on medication management.     Objective 3: I will continue to make sure I am taking care of myself.     Objective 4: I will continue to reach out to friends to stay socially connected     Objective 5: (identify the means of measuring success in meeting the objective): I will create a daily schedule of household chores to help build motivation and help me feel accomplished      Long Term Goal 2 (in the client's own words): I will continue to work on strengthening my self esteem (mild progress as of 6/12/2025)    Stage of Change: Action    Target Date for completion: 12/12/2025     Anticipated therapeutic modalities: CBT     People identified to complete this goal: Vidhi Santana      Objective 1: (identify the means of measuring success in meeting the objective): I will remain aware of those who have impacted me positively  and disregard the messages from those who were negative influences.     Objective 2: I will review and use my qualities/strengths/assets as reminders of my worth.     Objective 3: I will continue with self care (including using my effective communication skills such as assertiveness).     Objective 4: I will continue to explore options to improve my quality of life (taking classes, finding a creative hobby)       Long Term Goal 3 (in the client's own words): I will continue to learn and implement effective pain management strategies (slow progress as of 6/12/2025)    Stage of Change: Action    Target Date for completion: 12/12/2025     Anticipated therapeutic modalities: CBT, mindfulness     People identified to complete this goal: Vidhi Santana      Objective 1: (identify the means of measuring success in meeting the objective): I will learn and practice effective pain management strategies.       Objective 2: (identify the means of measuring success in meeting the objective): I will explore yoga as a possible pain management strategy (too much pain as of 6/12/2025)    Objective 3: I will walk and be more physically active as much as I am able.     I am currently under the care of a [facility] psychiatric provider: yes    My [facility] psychiatric provider is: Dr. Ovalle    I am currently taking psychiatric medications: Yes, as prescribed    I feel that I will be ready for discharge from mental health care when I reach the following (measurable goal/objective): Once I have progressed to the point of being able to positively maintain my focus and drive to accomplish the things I want to accomplish in my life.    For children and adults who have a legal guardian:   Has there been any change to custody orders and/or guardianship status? NA. If yes, attach updated documentation.    I have created my Crisis Plan and have been offered a copy of this plan    Behavioral Health Treatment Plan [facility]: Diagnosis and  Treatment Plan explained to Esther Griffith Esther Griffith acknowledges an understanding of their diagnosis. Esther Claudiachristina agrees to this treatment plan.    I have been offered a copy of this Treatment Plan. yes      Esther Griffith, 1980, actively participated in the review and update of this treatment plan during a virtual session, using the Epic Embedded platform.   Esther Griffith  provided verbal consent on 6/12/2025 at 1344 PM. The treatment plan was transcribed into the Electronic Health Record at a later time. Treatment plan also sent to Esther's MyChart for signature.

## 2025-06-16 DIAGNOSIS — E11.65 TYPE 2 DIABETES MELLITUS WITH HYPERGLYCEMIA, WITHOUT LONG-TERM CURRENT USE OF INSULIN (HCC): ICD-10-CM

## 2025-06-16 RX ORDER — CARVEDILOL 25 MG/1
TABLET, FILM COATED ORAL
Qty: 50 STRIP | Refills: 7 | Status: CANCELLED | OUTPATIENT
Start: 2025-06-16

## 2025-06-16 NOTE — TELEPHONE ENCOUNTER
Patient called the RX Refill Line. Message is being forwarded to the office.     Patient is requesting a refill of One touch verio test strips and One touch delica plus. Would like that sent to [facility] [address]. Not on active med list to que up. Out of medication.     Please contact patient at [phone]

## 2025-06-17 DIAGNOSIS — M79.18 MYOFASCIAL PAIN: ICD-10-CM

## 2025-06-17 RX ORDER — METHOCARBAMOL 500 MG/1
TABLET, FILM COATED ORAL
Qty: 90 TABLET | Refills: 0 | Status: SHIPPED | OUTPATIENT
Start: 2025-06-17

## 2025-06-19 ENCOUNTER — OFFICE VISIT (OUTPATIENT)
Dept: FAMILY MEDICINE CLINIC | Facility: CLINIC | Age: 45
End: 2025-06-19
Payer: COMMERCIAL

## 2025-06-19 VITALS
HEIGHT: 60 IN | WEIGHT: 184.8 LBS | BODY MASS INDEX: 36.28 KG/M2 | DIASTOLIC BLOOD PRESSURE: 80 MMHG | OXYGEN SATURATION: 97 % | HEART RATE: 85 BPM | SYSTOLIC BLOOD PRESSURE: 124 MMHG | TEMPERATURE: 99.2 F | RESPIRATION RATE: 18 BRPM

## 2025-06-19 DIAGNOSIS — E11.65 TYPE 2 DIABETES MELLITUS WITH HYPERGLYCEMIA, WITHOUT LONG-TERM CURRENT USE OF INSULIN (HCC): Primary | ICD-10-CM

## 2025-06-19 DIAGNOSIS — G47.411 NARCOLEPSY CATAPLEXY SYNDROME: ICD-10-CM

## 2025-06-19 DIAGNOSIS — F33.2 MAJOR DEPRESSIVE DISORDER, RECURRENT SEVERE WITHOUT PSYCHOTIC FEATURES (HCC): ICD-10-CM

## 2025-06-19 DIAGNOSIS — R06.83 SNORING: ICD-10-CM

## 2025-06-19 DIAGNOSIS — E03.9 ACQUIRED HYPOTHYROIDISM: ICD-10-CM

## 2025-06-19 DIAGNOSIS — G43.709 CHRONIC MIGRAINE WITHOUT AURA WITHOUT STATUS MIGRAINOSUS, NOT INTRACTABLE: ICD-10-CM

## 2025-06-19 DIAGNOSIS — M54.16 LUMBAR RADICULOPATHY: ICD-10-CM

## 2025-06-19 DIAGNOSIS — E78.5 DYSLIPIDEMIA: ICD-10-CM

## 2025-06-19 LAB — SL AMB POCT HEMOGLOBIN AIC: 5.7 (ref ?–6.5)

## 2025-06-19 PROCEDURE — 99214 OFFICE O/P EST MOD 30 MIN: CPT | Performed by: NURSE PRACTITIONER

## 2025-06-19 PROCEDURE — 83036 HEMOGLOBIN GLYCOSYLATED A1C: CPT | Performed by: NURSE PRACTITIONER

## 2025-06-19 RX ORDER — TIRZEPATIDE 2.5 MG/.5ML
2.5 INJECTION, SOLUTION SUBCUTANEOUS WEEKLY
Qty: 2 ML | Refills: 0 | Status: SHIPPED | OUTPATIENT
Start: 2025-06-19 | End: 2025-06-26

## 2025-06-19 RX ORDER — BLOOD-GLUCOSE METER
KIT MISCELLANEOUS
Qty: 1 KIT | Refills: 0 | Status: SHIPPED | OUTPATIENT
Start: 2025-06-19

## 2025-06-19 RX ORDER — BLOOD SUGAR DIAGNOSTIC
STRIP MISCELLANEOUS
Qty: 100 EACH | Refills: 3 | Status: SHIPPED | OUTPATIENT
Start: 2025-06-19

## 2025-06-19 NOTE — ASSESSMENT & PLAN NOTE
Lab Results   Component Value Date    HGBA1C 5.7 06/19/2025   Well controlled  Pt prefers to continue monitoring fasting blood sugar daiy for close monitoring- new supplies sent to pharmacy   Pt would like to initiate Mounjaro to continue with good glycemic control and for the improved cardiovascular and metabolic benefits.  Also would likely be beneficial for weight reduction.  Side effects reviewed.  She has no contraindications to this medication.  Given her well controlled diabetes, she can stop Metformin once Mounjaro is initiated   Eye exam and foot exam is UTD    Orders:    POCT hemoglobin A1c    Blood Glucose Monitoring Suppl (OneTouch Verio Reflect) w/Device KIT; Check blood sugars once daily. Please substitute with appropriate alternative as covered by patient's insurance. Dx: E11.65    glucose blood (OneTouch Verio) test strip; Check blood sugars once daily. Please substitute with appropriate alternative as covered by patient's insurance. Dx: E11.65    OneTouch Delica Lancets 33G MISC; Check blood sugars once daily. Please substitute with appropriate alternative as covered by patient's insurance. Dx: E11.65    Tirzepatide (Mounjaro) 2.5 MG/0.5ML SOAJ; Inject 2.5 mg under the skin once a week

## 2025-06-19 NOTE — PROGRESS NOTES
Name: Esther Griffith      : 1980      MRN: [id number]  Encounter Provider: ADELAIDA Man  Encounter Date: 2025   Encounter department: [facility]    Assessment & Plan  Type 2 diabetes mellitus with hyperglycemia, without long-term current use of insulin (HCC)    Lab Results   Component Value Date    HGBA1C 5.7 2025   Well controlled  Pt prefers to continue monitoring fasting blood sugar daiy for close monitoring- new supplies sent to pharmacy   Pt would like to initiate Mounjaro to continue with good glycemic control and for the improved cardiovascular and metabolic benefits.  Also would likely be beneficial for weight reduction.  Side effects reviewed.  She has no contraindications to this medication.  Given her well controlled diabetes, she can stop Metformin once Mounjaro is initiated   Eye exam and foot exam is UTD    Orders:    POCT hemoglobin A1c    Blood Glucose Monitoring Suppl (OneTouch Verio Reflect) w/Device KIT; Check blood sugars once daily. Please substitute with appropriate alternative as covered by patient's insurance. Dx: E11.65    glucose blood (OneTouch Verio) test strip; Check blood sugars once daily. Please substitute with appropriate alternative as covered by patient's insurance. Dx: E11.65    OneTouch Delica Lancets 33G MISC; Check blood sugars once daily. Please substitute with appropriate alternative as covered by patient's insurance. Dx: E11.65    Tirzepatide (Mounjaro) 2.5 MG/0.5ML SOAJ; Inject 2.5 mg under the skin once a week    Major depressive disorder, recurrent severe without psychotic features (HCC)  Stable and managed by Psychiatry          Narcolepsy cataplexy syndrome  Repeat sleep study ordered as last study was completed several years ago   Orders:    Ambulatory Referral to Sleep Medicine; Future    Chronic migraine without aura without status migrainosus, not intractable  Stable and currently being managed by Neurology    Continue current medication regimen           Acquired hypothyroidism  Stable and being managed by Endocrinology  Continue Levothyroxine          Dyslipidemia  Stable, follow a low fat/ low cholesterol diet         Lumbar radiculopathy  Stable and managed by Pain Management          Snoring  Repeat sleep study ordered to evaluate for MONROE  Orders:    Ambulatory Referral to Sleep Medicine; Future         History of Present Illness     HPI  Pt presents for a routine follow up today     Follows with pain management for chronic back issues-- has a hard time with exercising due to pain.  Pt is diabetic type 2, would like to try GLP1 agonist.  POC A1C at 5.7 today, taking Metformin currently. Pt would also like new supplies for monitor her BS once daily at home    Pt reports loud snoring at night, notes a previous sleep study from years ago which showed narcolepsy-- meds were not covered by insurance at that time so she has been untreated.  She has new insurance since then and she would like to have a repeat sleep study.  BMI at 36.09    Following with Endocrinology, Dr. Barber for hypothyroidism     Following with Psychiatry, Dr. Bray for major depression and CAROL    Following with Neurology, Dr. Chinchilla for chronic migraines     Review of Systems   Constitutional:  Positive for fatigue. Negative for activity change, appetite change, chills, diaphoresis, fever and unexpected weight change.   Eyes:  Negative for visual disturbance.   Respiratory:  Negative for cough, chest tightness, shortness of breath and wheezing.    Cardiovascular:  Negative for chest pain, palpitations and leg swelling.   Gastrointestinal:  Negative for abdominal pain, blood in stool, constipation, diarrhea and nausea.   Genitourinary:  Negative for dysuria.   Musculoskeletal:  Positive for arthralgias. Negative for myalgias.   Skin:  Negative for rash and wound.   Neurological:  Positive for headaches. Negative for dizziness, weakness  and numbness.   Psychiatric/Behavioral:  Positive for dysphoric mood. Negative for self-injury, sleep disturbance and suicidal ideas. The patient is nervous/anxious.      Past Medical History[1]  Past Surgical History[2]  Family History[3]  Social History[4]  Medications[5]  Allergies   Allergen Reactions    Cannabidiol Shortness Of Breath, Itching, Swelling, Anxiety, Palpitations, Confusion, Hypertension, Throat Swelling and Tongue Swelling    Amoxicillin-Pot Clavulanate Hives    Decadrol [Dexamethasone] Other (See Comments)     psychosis    Penicillins Hives     Hives/Uticaria    Tetracyclines & Related Hives      Allergy;      Immunization History   Administered Date(s) Administered    COVID-19 MODERNA VACC 0.5 ML IM 03/31/2021, 04/28/2021, 01/19/2022    COVID-19 Pfizer Vac BIVALENT Grady-sucrose 12 Yr+ IM 11/26/2022    COVID-19 Pfizer mRNA vacc PF grady-sucrose 12 yr and older (Comirnaty) 10/07/2024    INFLUENZA 09/28/2009, 11/26/2022    Influenza Injectable, MDCK, Preservative Free, 0.5 mL 10/23/2024    Influenza Quadrivalent, 6-35 Months IM 10/13/2015, 10/04/2017    Influenza, injectable, quadrivalent, preservative free 0.5 mL 03/26/2019, 12/11/2019, 10/23/2020, 11/07/2023    Influenza, seasonal, injectable 03/04/2014    Pneumococcal Conjugate Vaccine 20-valent (Pcv20), Polysace 11/07/2024    Tdap 09/29/2009, 08/16/2022     Objective   /80   Pulse 85   Temp 99.2 °F (37.3 °C) (Tympanic)   Resp 18   Ht 5' (1.524 m)   Wt 83.8 kg (184 lb 12.8 oz)   LMP 03/13/2023 (Approximate)   SpO2 97%   BMI 36.09 kg/m²     Physical Exam  Constitutional:       General: She is not in acute distress.     Appearance: She is well-developed. She is obese. She is not ill-appearing, toxic-appearing or diaphoretic.   HENT:      Head: Normocephalic and atraumatic.     Eyes:      Extraocular Movements: Extraocular movements intact.      Conjunctiva/sclera: Conjunctivae normal.      Pupils: Pupils are equal, round, and reactive  to light.     Neck:      Thyroid: No thyromegaly.     Cardiovascular:      Rate and Rhythm: Normal rate and regular rhythm.      Heart sounds: Normal heart sounds. No murmur heard.  Pulmonary:      Effort: Pulmonary effort is normal. No respiratory distress.      Breath sounds: Normal breath sounds. No wheezing.   Abdominal:      General: Bowel sounds are normal. There is no distension.      Palpations: Abdomen is soft.      Tenderness: There is no abdominal tenderness.     Musculoskeletal:         General: Normal range of motion.      Cervical back: Normal range of motion and neck supple. No rigidity or tenderness.   Lymphadenopathy:      Cervical: No cervical adenopathy.     Skin:     General: Skin is warm and dry.     Neurological:      General: No focal deficit present.      Mental Status: She is alert and oriented to person, place, and time.     Psychiatric:         Mood and Affect: Mood normal.         Behavior: Behavior normal.         Thought Content: Thought content normal.         Judgment: Judgment normal.                [1]   Past Medical History:  Diagnosis Date    Addiction to drug (Formerly [facility]) 03/15/2001    ADHD (attention deficit hyperactivity disorder) 01/01/2023    Adjustment disorder 03/15/1993    Alcohol abuse 03/15/2003    Alcoholism (Formerly [facility]) 03/15/2001    Anxiety     Blood transfusion declined because patient is Rastafarian 05/01/2023    Chronic pain disorder     Chronic sinusitis     Cognitive impairment 03/15/2022    Depression     Depression 03/15/2003    Diabetes (Formerly [facility])     Diabetes mellitus (Formerly [facility]) 09/01/2022    Fibromyalgia     Hallucination 03/15/2015    Headache(784.0) 03/15/1993    Various types of headaches    Impulse control disorder 03/15/2022    Low back pain 05/15/2021    After falling on [employer]    Lumbosacral disc disease 05/15/2021    After falling on [employer]    Memory loss 03/15/2012    Migraine     Obesity     Obsessive-compulsive disorder 03/15/2001    Panic attack  03/15/2001    Peripheral neuropathy 03/15/2022    Polyarthritis     Last assessed 9/21/2015    Psychiatric disorder     Psychiatric illness     Sleep difficulties     Substance abuse (HCC) 03/15/1994   [2]   Past Surgical History:  Procedure Laterality Date    COLONOSCOPY  06/2019    DENTAL SURGERY      HYSTERECTOMY  05/01/2023    HYSTEROSCOPY      Endometrial Biopsy By Hysteroscopy    VT LAPS SUPRACRV HYSTERECT 250 GM/< RMVL TUBE/OVAR N/A 05/01/2023    Procedure: (LSH) W/ BILATERAL SALPINGECTOMY, REMOVAL PARAOVARIAN CYST;  Surgeon: Sai Lopez DO;  Location: [facility];  Service: Gynecology    REMOVAL OF INTRAUTERINE DEVICE (IUD)      US GUIDED BREAST BIOPSY RIGHT COMPLETE Right 06/17/2019    Benign   [3]   Family History  Problem Relation Name Age of Onset    Irregular heart beat Mother Niurka Emchristina     Arthritis Mother Niurka Emchristina     Diabetes Father Erick Emchristina     Kidney disease Father Erick Emchristina     Diabetes type II Father Erick Emchristina     Depression Father Erick Emchristina     Substance Abuse Brother Victoriano Emchristina     Alcohol abuse Brother Victoraino Emchristina     ADD / ADHD Brother Victoriano Emchristina     Drug abuse Brother Victoriano Emchristina     No Known Problems Brother      Depression Paternal Aunt Megan Worster         Unsuccessful suicide attempt in young adulthood    Psychiatric Illness Paternal Aunt Megan Worster     Self-Injury Paternal Aunt Megan Worster     Suicide Attempts Paternal Aunt Megan Worster     Substance Abuse Maternal Uncle Richard Valderrama     Prostate cancer Maternal Uncle Richard Valderrama 60    Diabetes type II Maternal Uncle Richard Valderrama     Diabetes Maternal Uncle Richard Valderrama     Diabetes Maternal Grandfather Pedrito Valderrama     Migraines Maternal Grandfather Pedrito Valderrama     Stroke Maternal Grandfather Pedrito Valderrama     Diabetes type II Maternal Grandfather Pedrito Valderrama     Alcohol abuse Maternal Grandfather Pedrito Valderrama         Great-Grandfather    Diabetes Maternal Grandmother  Kat Jannie     Rheum arthritis Maternal Grandmother Kat Jannie     Melanoma Maternal Grandmother Kat Jannie 84    Cancer Maternal Grandmother Kat Jannie         Skin Cancer - successfully removed    Stroke Maternal Grandmother Kat Jannie     Diabetes type II Maternal Grandmother Kat Jannie     Depression Maternal Grandmother Kat Gabeinski     Dementia Maternal Grandmother Kat Jannie     Neuropathy Maternal Grandmother Kat Jannie         Her feet were so sensitive that she yelped if you touched them    Diabetes Paternal Grandfather Hickory Emes     Lung cancer Paternal Grandfather Dhruv Emes 47    Cancer Paternal Grandfather Dhruv Emes         Lung Cancer - cause of death    Kidney disease Paternal Grandmother Mimi Emes     Rheum arthritis Paternal Grandmother Mimi Emes     Depression Paternal Grandmother Mimi Emes         Nervous Breakdown around age 40    Deep vein thrombosis Paternal Grandmother Mimi Emes     Diabetes Paternal Grandmother Mimi Emes     Diabetes type II Paternal Grandmother Mimi Emes     Alcohol abuse Family Unknown     Stomach cancer Family MGGM     Alcohol abuse Maternal Uncle Bharat Jannie     Substance Abuse Maternal Uncle Bharat Gabeinski     Cancer Maternal Uncle Bharat Gabeinski         Prostate & Testicular Cancer    Drug abuse Maternal Uncle Bharat Gabeinski     Alcohol abuse Maternal Uncle Rolf Jannie     Drug abuse Maternal Uncle Rolf Jannie     Alcohol abuse Maternal Uncle Bharat Gabeinski     Drug abuse Maternal Uncle Bharat Gabeinski     Alcohol abuse Maternal Uncle Rolf Jannie     Drug abuse Maternal Uncle Rolf Jannie     Alcohol abuse Maternal Uncle Bharat Bachinski     Cancer Maternal Uncle Bharat Gabeinski         Prostate & Testicular Cancer    Drug abuse Maternal Uncle Bharat Gabeinski     Substance Abuse Maternal Uncle Bharat Gabeinski     Alcohol abuse Maternal Uncle Rolf Valderrama     Drug abuse Maternal Uncle Rolf Valderrama      Alcohol abuse Maternal Uncle Bharat Valderrama     Cancer Maternal Uncle Bharat Valderrama         Prostate & Testicular Cancer    Drug abuse Maternal Uncle Bharat Valderrama     Alcohol abuse Maternal Uncle Rolf Valderrama     Drug abuse Maternal Uncle Rolf Valderrama     Completed Suicide  Neg Hx     [4]   Social History  Tobacco Use    Smoking status: Never     Passive exposure: Never    Smokeless tobacco: Never    Tobacco comments:     N/A, non-smoker.   Vaping Use    Vaping status: Never Used   Substance and Sexual Activity    Alcohol use: Not Currently     Comment: 2 drinks per month    Drug use: Not Currently    Sexual activity: Not Currently     Birth control/protection: Abstinence   [5]   Current Outpatient Medications on File Prior to Visit   Medication Sig    cholecalciferol (VITAMIN D3) 25 mcg (1,000 units) tablet Take 1,000 Units by mouth in the morning. Take 1 tab. daily.    ferrous sulfate 325 (65 Fe) mg tablet Take 325 mg by mouth Take 1 tab. 3 times per week    fluticasone (FLONASE) 50 mcg/act nasal spray 1 spray into each nostril daily    gabapentin (NEURONTIN) 300 mg capsule Take 1 capsule (300 mg total) by mouth 3 (three) times a day    Levomilnacipran HCl ER (FETZIMA) 80 MG extended release capsule Take 1 capsule (80 mg total) by mouth daily    levothyroxine 75 mcg tablet TAKE 1 TABLET (75 MCG TOTAL) BY MOUTH DAILY IN THE EARLY MORNING    lithium carbonate 300 mg capsule Take 1 capsule (300 mg total) by mouth 2 (two) times a day with meals    LORazepam (ATIVAN) 1 mg tablet Take 1 tablet (1 mg total) by mouth every 8 (eight) hours as needed for anxiety    MAGNESIUM MALATE PO Take by mouth Take 1 tab. daily    metFORMIN (GLUCOPHAGE) 500 mg tablet TAKE 1 TABLET BY MOUTH TWICE A DAY WITH FOOD    methocarbamol (ROBAXIN) 500 mg tablet TAKE 1 TAB. EVERY 8 HR. AS NEEDED FOR MUSCLE SPASMS    methylPREDNISolone 4 MG tablet therapy pack Use as directed on package    mirtazapine (REMERON) 30 mg tablet TAKE 1 TABLET  BY MOUTH EVERYDAY AT BEDTIME    propranolol (INDERAL) 40 mg tablet Take 0.5 tablets (20 mg total) by mouth daily at bedtime    traZODone (DESYREL) 100 mg tablet TAKE 1 TABLET BY MOUTH DAILY AT BEDTIME    Blood Glucose Monitoring Suppl (Contour Blood Glucose System) w/Device KIT Use 1 kit 2 (two) times a day before meals (Patient not taking: Reported on 6/19/2025)    Contour Test test strip USE TO CHECK BLOOD SUGAR ONCE DAILY (Patient not taking: Reported on 6/19/2025)      verbal cues/set-up required/nonverbal cues (demo/gestures)/2 person assist

## 2025-06-20 ENCOUNTER — TELEPHONE (OUTPATIENT)
Dept: SLEEP CENTER | Facility: CLINIC | Age: 45
End: 2025-06-20

## 2025-06-20 ENCOUNTER — TRANSCRIBE ORDERS (OUTPATIENT)
Dept: SLEEP CENTER | Facility: CLINIC | Age: 45
End: 2025-06-20

## 2025-06-20 DIAGNOSIS — R06.83 SNORING: ICD-10-CM

## 2025-06-20 DIAGNOSIS — G47.411 NARCOLEPSY CATAPLEXY SYNDROME: Primary | ICD-10-CM

## 2025-06-24 ENCOUNTER — TELEPHONE (OUTPATIENT)
Age: 45
End: 2025-06-24

## 2025-06-24 NOTE — ASSESSMENT & PLAN NOTE
Repeat sleep study ordered as last study was completed several years ago   Orders:    Ambulatory Referral to Sleep Medicine; Future

## 2025-06-24 NOTE — ASSESSMENT & PLAN NOTE
Stable and currently being managed by Neurology   Continue current medication regimen  {If prescribing CGRP gepants (Nurtec, Ubrelvy, Qulipta) or monoclonal antibodies (Emagality, Ajovy, Aimovig) that currently do not have a prior auth on file, click here to fill out prior auth smartform and then hit F2 with this smartlist to insert prior auth documentation (Optional):[id number]}

## 2025-06-25 NOTE — TELEPHONE ENCOUNTER
PA for Mounjaro 2.5mg SUBMITTED to [employer]    via    []CMM-KEY:   [x]Surescripts-Case ID #: [id number]   []Availity-Auth ID #   []Faxed to plan   []Other website   []Phone call Case ID #     [x]PA sent as URGENT    All office notes, labs and other pertaining documents and studies sent. Clinical questions answered. Awaiting determination from insurance company.     Turnaround time for your insurance to make a decision on your Prior Authorization can take 7-21 business days.

## 2025-06-26 ENCOUNTER — VBI (OUTPATIENT)
Dept: ADMINISTRATIVE | Facility: OTHER | Age: 45
End: 2025-06-26

## 2025-06-26 ENCOUNTER — TELEMEDICINE (OUTPATIENT)
Dept: BEHAVIORAL/MENTAL HEALTH CLINIC | Facility: CLINIC | Age: 45
End: 2025-06-26
Payer: COMMERCIAL

## 2025-06-26 DIAGNOSIS — E11.65 TYPE 2 DIABETES MELLITUS WITH HYPERGLYCEMIA, WITHOUT LONG-TERM CURRENT USE OF INSULIN (HCC): Primary | ICD-10-CM

## 2025-06-26 DIAGNOSIS — F33.2 MAJOR DEPRESSIVE DISORDER, RECURRENT SEVERE WITHOUT PSYCHOTIC FEATURES (HCC): Primary | ICD-10-CM

## 2025-06-26 DIAGNOSIS — F41.1 GENERALIZED ANXIETY DISORDER: ICD-10-CM

## 2025-06-26 PROCEDURE — 90834 PSYTX W PT 45 MINUTES: CPT | Performed by: PSYCHIATRY & NEUROLOGY

## 2025-06-26 NOTE — TELEPHONE ENCOUNTER
06/26/25 12:08 PM     Chart reviewed for Diabetic Eye Exam ; nothing is submitted to the patient's insurance at this time.     Rajni Dykes   [facility]

## 2025-06-26 NOTE — TELEPHONE ENCOUNTER
PA for  Mounjaro 2.5mg DENIED    Reason:(Screenshot if applicable)        Message sent to office clinical pool Yes    Denial letter scanned into Media Yes    We can gladly do an appeal but the process can take about 30-60 days to provide determination. Please have the office staff schedule a Peer to Peer at phone [phone]. If an appeal is truly warranted please have Provider send clinical documentation to the PA department to support the appeal.     **Please follow up with your patient regarding denial and next steps**

## 2025-06-26 NOTE — PSYCH
Virtual Regular VisitName: Esther Griffith      : 1980      MRN: [id number]  Encounter Provider: HERMELINDA BECK  Encounter Date: 2025   Encounter department: [facility] [place]  :  Assessment & Plan  Major depressive disorder, recurrent severe without psychotic features (HCC)         Generalized anxiety disorder         Major depressive disorder, recurrent severe without psychotic features (HCC)         Generalized anxiety disorder       Goals addressed in session: Goal 1     DATA: Met with Esther for follow up. Esther shared that since she had the ablations for her back pain, she has been in excruciating pain and has been very emotional trying to deal with it.  She was dog sitting during today's visit, and noted that it has been too much for her to handle, thinking that she may have to give up dog sitting. She shared that she has continued to try to get out with friends and do things, because that helps lift her mood, which lately has been quite down. She said that the pain has really gotten to her.  Provided support, validation and normalization of her feelings, and encouraged self-care/rest and scheduling social activities to help her mood and to keep her active.    During this session, this clinician used the following therapeutic modalities: Client-centered Therapy, Cognitive Behavioral Therapy, and Supportive Psychotherapy    Substance Abuse was not addressed during this session. If the client is diagnosed with a co-occurring substance use disorder, please indicate any changes in the frequency or amount of use: n/a. Stage of change for addressing substance use diagnoses: No substance use/Not applicable    ASSESSMENT:  Esther presents with a Depressed mood. Esther's affect is Normal range and intensity, which is congruent, with their mood and the content of the session. The client has made progress on their goals as evidenced by more motivation to get out  "and do things on her own as well as improved social contacts.    Esther presents with a minimal risk of suicide, minimal risk of self-harm, and minimal risk of harm to others.    For any risk assessment that surpasses a \"low\" rating, a safety plan must be developed.    A safety plan was indicated: no  If yes, describe in detail n/a    PLAN: Between sessions, Esther will focus on self-care and scheduling social activities to help lift mood and distract from pain. At the next session, the therapist will use Client-centered Therapy, Cognitive Behavioral Therapy, and Supportive Psychotherapy to address depression, anxiety.    Behavioral Health Treatment Plan [facility]: Diagnosis and Treatment Plan explained to Esther, Esther relates understanding diagnosis and is agreeable to Treatment Plan. Yes     Depression Follow-up Plan Completed: Yes     Reason for visit is   Chief Complaint   Patient presents with    Virtual Regular Visit      Recent Visits  No visits were found meeting these conditions.  Showing recent visits within past 7 days and meeting all other requirements  Today's Visits  Date Type Provider Dept   06/26/25 Telemedicine HERMELINDA [facility] [place]   Showing today's visits and meeting all other requirements  Future Appointments  No visits were found meeting these conditions.  Showing future appointments within next 150 days and meeting all other requirements     History of Present Illness     HPI    Past Medical History   Past Medical History:   Diagnosis Date    Addiction to drug ([facility]) 03/15/2001    ADHD (attention deficit hyperactivity disorder) 01/01/2023    Adjustment disorder 03/15/1993    Alcohol abuse 03/15/2003    Alcoholism ([facility]) 03/15/2001    Anxiety     Blood transfusion declined because patient is Sabianist 05/01/2023    Chronic pain disorder     Chronic sinusitis     Cognitive impairment 03/15/2022    Depression     Depression 03/15/2003    Diabetes " (HCC)     Diabetes mellitus (HCC) 09/01/2022    Fibromyalgia     Hallucination 03/15/2015    Headache(784.0) 03/15/1993    Various types of headaches    Impulse control disorder 03/15/2022    Low back pain 05/15/2021    After falling on rollerskates    Lumbosacral disc disease 05/15/2021    After falling on rollerskates    Memory loss 03/15/2012    Migraine     Obesity     Obsessive-compulsive disorder 03/15/2001    Panic attack 03/15/2001    Peripheral neuropathy 03/15/2022    Polyarthritis     Last assessed 9/21/2015    Psychiatric disorder     Psychiatric illness     Sleep difficulties     Substance abuse ([facility]) 03/15/1994     Past Surgical History:   Procedure Laterality Date    COLONOSCOPY  06/2019    DENTAL SURGERY      HYSTERECTOMY  05/01/2023    HYSTEROSCOPY      Endometrial Biopsy By Hysteroscopy    ID LAPS SUPRACRV HYSTERECT 250 GM/< RMVL TUBE/OVAR N/A 05/01/2023    Procedure: (LSH) W/ BILATERAL SALPINGECTOMY, REMOVAL PARAOVARIAN CYST;  Surgeon: Sai Lopez DO;  Location: [facility];  Service: Gynecology    REMOVAL OF INTRAUTERINE DEVICE (IUD)      US GUIDED BREAST BIOPSY RIGHT COMPLETE Right 06/17/2019    Benign     Current Outpatient Medications   Medication Instructions    Blood Glucose Monitoring Suppl (Contour Blood Glucose System) w/Device KIT 1 kit, Does not apply, 2 times daily before meals    Blood Glucose Monitoring Suppl (OneTouch Verio Reflect) w/Device KIT Check blood sugars once daily. Please substitute with appropriate alternative as covered by patient's insurance. Dx: E11.65    cholecalciferol (VITAMIN D3) 1,000 Units, Daily    Contour Test test strip USE TO CHECK BLOOD SUGAR ONCE DAILY    ferrous sulfate 325 mg    fluticasone (FLONASE) 50 mcg/act nasal spray 1 spray, Nasal, Daily    gabapentin (NEURONTIN) 300 mg, Oral, 3 times daily    glucose blood (OneTouch Verio) test strip Check blood sugars once daily. Please substitute with appropriate alternative as covered by patient's  insurance. Dx: E11.65    Levomilnacipran HCl ER (FETZIMA) 80 mg, Oral, Daily    levothyroxine 75 mcg, Oral, Daily (early morning)    lithium carbonate 300 mg, Oral, 2 times daily with meals    LORazepam (ATIVAN) 1 mg, Oral, Every 8 hours PRN    MAGNESIUM MALATE PO Take by mouth Take 1 tab. daily    metFORMIN (GLUCOPHAGE) 500 mg, Oral, 2 times daily with meals    methocarbamol (ROBAXIN) 500 mg tablet TAKE 1 TAB. EVERY 8 HR. AS NEEDED FOR MUSCLE SPASMS    methylPREDNISolone 4 MG tablet therapy pack Use as directed on package    mirtazapine (REMERON) 30 mg, Oral, Daily at bedtime,       Mounjaro 2.5 mg, Subcutaneous, Weekly    OneTouch Delica Lancets 33G MISC Check blood sugars once daily. Please substitute with appropriate alternative as covered by patient's insurance. Dx: E11.65    propranolol (INDERAL) 20 mg, Oral, Daily at bedtime    traZODone (DESYREL) 100 mg, Oral, Daily at bedtime     Allergies   Allergen Reactions    Cannabidiol Shortness Of Breath, Itching, Swelling, Anxiety, Palpitations, Confusion, Hypertension, Throat Swelling and Tongue Swelling    Amoxicillin-Pot Clavulanate Hives    Decadrol [Dexamethasone] Other (See Comments)     psychosis    Penicillins Hives     Hives/Uticaria    Tetracyclines & Related Hives      Allergy;        Objective   LMP 03/13/2023 (Approximate)     Video Exam  Physical Exam     Administrative Statements   Encounter provider HERMELINDA BECK    The Patient is located at Other (friend's house in [place]) and in the following state in which I hold an active license [place].    The patient was identified by name and date of birth. Esther Griffith was informed that this is a telemedicine visit and that the visit is being conducted through the Epic Embedded platform. She agrees to proceed..  My office door was closed. No one else was in the room.  She acknowledged consent and understanding of privacy and security of the video platform. The patient has agreed to participate and understands  they can discontinue the visit at any time.    I have spent a total time of 41 minutes in caring for this patient on the day of the visit/encounter including Counseling / Coordination of care and Documenting in the medical record, not including the time spent for establishing the audio/video connection.    Visit Time  Start Time: 1309  Stop Time: 1350  Total Visit Time: 41 minutes

## 2025-06-29 ENCOUNTER — NURSE TRIAGE (OUTPATIENT)
Dept: OTHER | Facility: OTHER | Age: 45
End: 2025-06-29

## 2025-06-29 ENCOUNTER — HOSPITAL ENCOUNTER (EMERGENCY)
Facility: HOSPITAL | Age: 45
Discharge: HOME | End: 2025-06-29
Attending: EMERGENCY MEDICINE
Payer: COMMERCIAL

## 2025-06-29 VITALS
TEMPERATURE: 98.4 F | SYSTOLIC BLOOD PRESSURE: 129 MMHG | RESPIRATION RATE: 18 BRPM | HEART RATE: 84 BPM | OXYGEN SATURATION: 98 % | DIASTOLIC BLOOD PRESSURE: 79 MMHG

## 2025-06-29 DIAGNOSIS — M79.18 MYOFASCIAL PAIN: ICD-10-CM

## 2025-06-29 DIAGNOSIS — M51.369 DDD (DEGENERATIVE DISC DISEASE), LUMBAR: ICD-10-CM

## 2025-06-29 DIAGNOSIS — G89.29 CHRONIC BACK PAIN: Primary | ICD-10-CM

## 2025-06-29 DIAGNOSIS — M54.9 CHRONIC BACK PAIN: Primary | ICD-10-CM

## 2025-06-29 PROCEDURE — 99284 EMERGENCY DEPT VISIT MOD MDM: CPT | Performed by: EMERGENCY MEDICINE

## 2025-06-29 PROCEDURE — 99282 EMERGENCY DEPT VISIT SF MDM: CPT

## 2025-06-29 PROCEDURE — 96372 THER/PROPH/DIAG INJ SC/IM: CPT

## 2025-06-29 RX ORDER — LIDOCAINE 50 MG/G
1 PATCH TOPICAL DAILY
Qty: 7 PATCH | Refills: 0 | Status: SHIPPED | OUTPATIENT
Start: 2025-06-29 | End: 2025-07-02 | Stop reason: SDUPTHER

## 2025-06-29 RX ORDER — LIDOCAINE 50 MG/G
1 PATCH TOPICAL ONCE
Status: DISCONTINUED | OUTPATIENT
Start: 2025-06-29 | End: 2025-06-29 | Stop reason: HOSPADM

## 2025-06-29 RX ORDER — ACETAMINOPHEN 325 MG/1
975 TABLET ORAL ONCE
Status: COMPLETED | OUTPATIENT
Start: 2025-06-29 | End: 2025-06-29

## 2025-06-29 RX ORDER — IBUPROFEN 400 MG/1
400 TABLET, FILM COATED ORAL EVERY 6 HOURS PRN
Qty: 28 TABLET | Refills: 0 | Status: SHIPPED | OUTPATIENT
Start: 2025-06-29 | End: 2025-07-17

## 2025-06-29 RX ORDER — CYCLOBENZAPRINE HCL 10 MG
10 TABLET ORAL ONCE
Status: COMPLETED | OUTPATIENT
Start: 2025-06-29 | End: 2025-06-29

## 2025-06-29 RX ORDER — ACETAMINOPHEN 500 MG
1000 TABLET ORAL EVERY 6 HOURS PRN
Qty: 30 TABLET | Refills: 0 | Status: SHIPPED | OUTPATIENT
Start: 2025-06-29 | End: 2025-07-06

## 2025-06-29 RX ORDER — KETOROLAC TROMETHAMINE 30 MG/ML
15 INJECTION, SOLUTION INTRAMUSCULAR; INTRAVENOUS ONCE
Status: COMPLETED | OUTPATIENT
Start: 2025-06-29 | End: 2025-06-29

## 2025-06-29 RX ORDER — CYCLOBENZAPRINE HCL 10 MG
10 TABLET ORAL 2 TIMES DAILY PRN
Qty: 20 TABLET | Refills: 0 | Status: SHIPPED | OUTPATIENT
Start: 2025-06-29

## 2025-06-29 RX ADMIN — LIDOCAINE 1 PATCH: 50 PATCH CUTANEOUS at 13:32

## 2025-06-29 RX ADMIN — CYCLOBENZAPRINE HYDROCHLORIDE 10 MG: 10 TABLET, FILM COATED ORAL at 13:32

## 2025-06-29 RX ADMIN — KETOROLAC TROMETHAMINE 15 MG: 30 INJECTION, SOLUTION INTRAMUSCULAR; INTRAVENOUS at 13:32

## 2025-06-29 RX ADMIN — ACETAMINOPHEN 975 MG: 325 TABLET ORAL at 13:32

## 2025-06-29 NOTE — DISCHARGE INSTRUCTIONS
Hello you were seen today for back pain and likely fibromyalgia exacerbation/flare-up    Please take your usual medications as prescribed    Please follow-up with spine and pain and your PCP, as well as rheumatology    Please return to the ER if you have any numbness, weakness, difficulty with having bowel movements or urinating, fever, chills, any new symptoms

## 2025-06-29 NOTE — ED ATTENDING ATTESTATION
6/29/2025  I, April Thomas MD, saw and evaluated the patient. I have discussed the patient with the resident/non-physician practitioner and agree with the resident's/non-physician practitioner's findings, Plan of Care, and MDM as documented in the resident's/non-physician practitioner's note, except where noted. All available labs and Radiology studies were reviewed.  I was present for key portions of any procedure(s) performed by the resident/non-physician practitioner and I was immediately available to provide assistance.       At this point I agree with the current assessment done in the Emergency Department.  I have conducted an independent evaluation of this patient a history and physical is as follows:    Chief Complaint   Patient presents with    Back Pain     Bilateral lower back pain for the past 3 weeks with pain worse the last week. Patient reports she is a pt at spine center and started methyl prednisone on Monday and finished on Friday without any effect.     45-year-old female with past medical history of diabetes mellitus, degenerative disc disease, spondylolysis of lumbar spine, fibromyalgia, presenting with exacerbation of low back pain with radiation down to buttocks and hips, particularly bad over the past 3 weeks.  No pain shooting down either extremity.  Patient has baseline peripheral neuropathy in both feet, this is not new.  No difficulty with bowel or bladder control.  No new trauma.  Patient follows with Dr. Meneses in pain and spine and underwent injections earlier in the month.  Patient has been taking NSAIDs, Tylenol, Robaxin, and gabapentin for her symptoms, however, continues to have significant pain across low back.  Patient has previously followed up with rheumatology but not recently.    ED Triage Vitals [06/29/25 1130]   Temperature Pulse Respirations Blood Pressure SpO2   98.4 °F (36.9 °C) 84 18 129/79 98 %      Temp Source Heart Rate Source Patient Position - Orthostatic VS BP  Location FiO2 (%)   Tympanic -- Sitting Left arm --      Pain Score       9           Constitutional:  Awake, alert, oriented.  No acute distress.  HEENT:  Normocephalic, atraumatic.  Sclera anicteric, conjunctiva not injected.  Moist oral mucosa.  Cardiac:  Appears well-perfused  Respiratory:  Breathing comfortably on room air  Abdomen:  Nondistended  Musculoskeletal: There is diffuse pain throughout lower midline L and S spine, as well as diffusely in right lumbar paraspinal muscle group and left lower paraspinal muscle group.  There is also tenderness over bilateral rhomboid muscles as well as bilateral greater trochanters.   Integument:  No rashes over exposed areas, cap refill less than 2 seconds  Neurologic:  Awake, alert, and oriented x3.  Nonfocal exam.  Psychiatric:  Normal affect      ED Course     Medications   acetaminophen (TYLENOL) tablet 975 mg (975 mg Oral Given 6/29/25 1332)   ketorolac (TORADOL) injection 15 mg (15 mg Intramuscular Given 6/29/25 1332)   cyclobenzaprine (FLEXERIL) tablet 10 mg (10 mg Oral Given 6/29/25 1332)     45 y.o. female presenting with back pain. VS reviewed. Differential diagnosis includes musculoskeletal strain, sprain, radiculopathy, versus another pathology. No red flags such as history of IVDU, malignancy, trauma, saddle anesthesia, difficulty with bowel or bladder control to suggest etiologies such as cauda equina compression due to hematoma, abscess, broad-based disk herniation, transverse myelitis, pathologic fracture, or other emergent/sinister etiology underlying the pain.  I do suspect patient has acute exacerbation of both fibromyalgia and chronic low back pain.  She is already on all the therapies we are able to pursue in the emergency department.  We would like for patient to continue with these and consider returning to rheumatology, as well as trialing massage therapy if at all possible as this appears to have previously helped with fibromyalgia  flareups.  Patient discharged to home with recommendations for symptom control, return precautions, and plan for follow up.

## 2025-06-29 NOTE — TELEPHONE ENCOUNTER
"REASON FOR CONVERSATION: Back Pain    SYMPTOMS: Severe 9/10 lower back pain. Chronic issue for month but pain has worsened in the last 3 weeks. Pain now at its worst. Pt still able to walk. Taking Tylenol 1500mg BID and Aleve once daily in the AM with no relief.    OTHER HEALTH INFORMATION: Finished Medrol dose pack on Friday- states it did not help.    PROTOCOL DISPOSITION: See [facility]PC Within 4 Hours (Or PCP Triage)    CARE ADVICE PROVIDED: Go to [facility] for evaluation or ED. Pt will go to [facility] in [facility].    PRACTICE FOLLOW-UP: No follow up needed at this time.       Reason for Disposition   [1] SEVERE back pain (e.g., excruciating, unable to do any normal activities) AND [2] not improved 2 hours after pain medicine    Answer Assessment - Initial Assessment Questions  1. ONSET: \"When did the pain begin?\" (e.g., minutes, hours, days)      Ongoing chronic issues for month. Recently worse in the last 3 weeks.     2. LOCATION: \"Where does it hurt?\" (upper, mid or lower back)      Lower back.     3. SEVERITY: \"How bad is the pain?\"  (e.g., Scale 1-10; mild, moderate, or severe)      9/10- Tylenol 1500mg BID - LD: AM and Aleve tab once daily LD: 8am    4. PATTERN: \"Is the pain constant?\" (e.g., yes, no; constant, intermittent)       Constant    5. RADIATION: \"Does the pain shoot into your legs or somewhere else?\"      Denies    6. CAUSE:  \"What do you think is causing the back pain?\"       Unknown    7. BACK OVERUSE:  \"Any recent lifting of heavy objects, strenuous work or exercise?\"      Denies    8. MEDICINES: \"What have you taken so far for the pain?\" (e.g., nothing, acetaminophen, NSAIDS)      Just taking Extra strength Tylenol and Aleve    9. NEUROLOGIC SYMPTOMS: \"Do you have any weakness, numbness, or problems with bowel/bladder control?\"      Denies    10. OTHER SYMPTOMS: \"Do you have any other symptoms?\" (e.g., fever, abdomen pain, burning with urination, blood in urine)        No other " symptoms.    Protocols used: Back Pain-Adult-AH

## 2025-06-29 NOTE — TELEPHONE ENCOUNTER
"Regarding: Severe back pain  ----- Message from Yahaira LEBRON sent at 6/29/2025 10:25 AM EDT -----  \"I am calling because I am having severe back pain in my lower back on both sides. I was on a steroid and I finished that on Friday, but I have no relief. I don't know what to do.\"    "

## 2025-07-02 ENCOUNTER — OFFICE VISIT (OUTPATIENT)
Dept: FAMILY MEDICINE CLINIC | Facility: CLINIC | Age: 45
End: 2025-07-02
Payer: COMMERCIAL

## 2025-07-02 VITALS
HEIGHT: 60 IN | OXYGEN SATURATION: 99 % | WEIGHT: 185 LBS | DIASTOLIC BLOOD PRESSURE: 82 MMHG | TEMPERATURE: 97.6 F | BODY MASS INDEX: 36.32 KG/M2 | SYSTOLIC BLOOD PRESSURE: 128 MMHG | RESPIRATION RATE: 17 BRPM | HEART RATE: 90 BPM

## 2025-07-02 DIAGNOSIS — G89.29 CHRONIC BACK PAIN: ICD-10-CM

## 2025-07-02 DIAGNOSIS — M54.9 CHRONIC BACK PAIN: ICD-10-CM

## 2025-07-02 PROCEDURE — 99214 OFFICE O/P EST MOD 30 MIN: CPT | Performed by: NURSE PRACTITIONER

## 2025-07-02 RX ORDER — OXYCODONE HYDROCHLORIDE 5 MG/1
5 TABLET ORAL
Qty: 14 TABLET | Refills: 0 | Status: SHIPPED | OUTPATIENT
Start: 2025-07-02

## 2025-07-02 RX ORDER — LIDOCAINE 50 MG/G
1 PATCH TOPICAL DAILY
Qty: 14 PATCH | Refills: 0 | Status: SHIPPED | OUTPATIENT
Start: 2025-07-02 | End: 2025-07-17

## 2025-07-02 NOTE — PROGRESS NOTES
Name: Esther Griffith      : 1980      MRN: [id number]  Encounter Provider: ADELAIDA Man  Encounter Date: 2025   Encounter department: [facility] [place]    Assessment & Plan  Chronic back pain  ED notes reviewed.  Appears to be an acute exacerbation of chronic back pain.  Pt scheduled to Pain Management who manages her back pain on 25 and Rheumatology for her fibromyalgia also on 25.  Unclear if Flexeril is helping more than Robaxin as she was previously taking.  She would like to try the Flexeril for a few more days.  May continue Lidocaine patches prn and these were refilled today.  I did agree to give her a 2 week duration of Oxycodone 5 mg tablets (to take one tab at HS prn, #14 tablets) for her acute exacerbation.  She is aware that our office WILL NOT refill this medication.  She needs to follow up with Pain Management as schedule/ continue with their recommendations as they are managing her chronic back pain.  Recommend moist heat to back.  No red flags on exam today.   Orders:    lidocaine (Lidoderm) 5 %; Apply 1 patch topically daily over 12 hours for 7 days Remove & Discard patch within 12 hours or as directed by MD    oxyCODONE (Roxicodone) 5 immediate release tablet; Take 1 tablet (5 mg total) by mouth daily at bedtime as needed for moderate pain Max Daily Amount: 5 mg         History of Present Illness     HPI    ED follow up from 25 for acute exacerbation of chronic B/L lower back pain.  No recent injuries or falls.  Pt does follow regularly with Pain Management.  She received Toradol IM, lidocaine patch, Robaxin switched to Flexeril.  Discharged home    Pt notes she is unsure if the Flexeril is helping more than the Robaxin.  She is using the Lidocaine patches and would like this refilled.  Continues with Gabapentin 300 mg TID.  She is requesting something for her pain for a short duration.  Continues with B/L lower back pain, radiating to the right  hip.  She does have neuropathy which is unchanged.  No leg weakness.  Pain is constant, varies in intensity.  No bowel or bladder dysfunction. She is scheduled to see Rheumatology next week for her fibromyalgia and Pain Management on 7/23  Review of Systems   Constitutional:  Negative for activity change, appetite change, chills, diaphoresis, fatigue, fever and unexpected weight change.   Eyes:  Negative for visual disturbance.   Respiratory:  Negative for cough, chest tightness, shortness of breath and wheezing.    Cardiovascular:  Negative for chest pain, palpitations and leg swelling.   Gastrointestinal:  Negative for abdominal pain, blood in stool, constipation, diarrhea and nausea.   Genitourinary:  Negative for dysuria.   Musculoskeletal:  Positive for back pain. Negative for arthralgias, gait problem and myalgias.   Skin:  Negative for rash and wound.   Neurological:  Positive for numbness. Negative for dizziness, weakness and headaches.   Psychiatric/Behavioral:  Negative for dysphoric mood, self-injury, sleep disturbance and suicidal ideas. The patient is not nervous/anxious.      Past Medical History[1]  Past Surgical History[2]  Family History[3]  Social History[4]  Medications[5]  Allergies   Allergen Reactions    Cannabidiol Shortness Of Breath, Itching, Swelling, Anxiety, Palpitations, Confusion, Hypertension, Throat Swelling and Tongue Swelling    Amoxicillin-Pot Clavulanate Hives    Decadrol [Dexamethasone] Other (See Comments)     psychosis    Penicillins Hives     Hives/Uticaria    Tetracyclines & Related Hives      Allergy;      Immunization History   Administered Date(s) Administered    COVID-19 MODERNA VACC 0.5 ML IM 03/31/2021, 04/28/2021, 01/19/2022    COVID-19 Pfizer Vac BIVALENT Grady-sucrose 12 Yr+ IM 11/26/2022    COVID-19 Pfizer mRNA vacc PF grady-sucrose 12 yr and older (Comirnaty) 10/07/2024    INFLUENZA 09/28/2009, 11/26/2022    Influenza Injectable, MDCK, Preservative Free, 0.5 mL  10/23/2024    Influenza Quadrivalent, 6-35 Months IM 10/13/2015, 10/04/2017    Influenza, injectable, quadrivalent, preservative free 0.5 mL 03/26/2019, 12/11/2019, 10/23/2020, 11/07/2023    Influenza, seasonal, injectable 03/04/2014    Pneumococcal Conjugate Vaccine 20-valent (Pcv20), Polysace 11/07/2024    Tdap 09/29/2009, 08/16/2022     Objective   /82 (BP Location: Left arm, Patient Position: Sitting, Cuff Size: Adult)   Pulse 90   Temp 97.6 °F (36.4 °C) (Temporal)   Resp 17   Ht 5' (1.524 m)   Wt 83.9 kg (185 lb)   LMP 03/13/2023 (Approximate)   SpO2 99%   BMI 36.13 kg/m²     Physical Exam  Constitutional:       General: She is not in acute distress.     Appearance: She is well-developed. She is obese. She is not ill-appearing, toxic-appearing or diaphoretic.   HENT:      Head: Normocephalic and atraumatic.     Eyes:      Extraocular Movements: Extraocular movements intact.      Conjunctiva/sclera: Conjunctivae normal.      Pupils: Pupils are equal, round, and reactive to light.     Neck:      Thyroid: No thyromegaly.     Cardiovascular:      Rate and Rhythm: Regular rhythm.      Heart sounds: Normal heart sounds. No murmur heard.  Pulmonary:      Effort: Pulmonary effort is normal. No respiratory distress.      Breath sounds: Normal breath sounds. No wheezing.   Abdominal:      General: Bowel sounds are normal. There is no distension.      Palpations: Abdomen is soft.      Tenderness: There is no abdominal tenderness.     Musculoskeletal:      Cervical back: Normal range of motion and neck supple. No rigidity or tenderness.      Lumbar back: Spasms and tenderness present. No edema, deformity or signs of trauma. Negative right straight leg raise test and negative left straight leg raise test.   Lymphadenopathy:      Cervical: No cervical adenopathy.     Skin:     General: Skin is warm and dry.     Neurological:      General: No focal deficit present.      Mental Status: She is alert and oriented  to person, place, and time.     Psychiatric:         Mood and Affect: Mood normal.         Behavior: Behavior normal.         Thought Content: Thought content normal.         Judgment: Judgment normal.                [1]   Past Medical History:  Diagnosis Date    Addiction to drug ([facility]) 03/15/2001    ADHD (attention deficit hyperactivity disorder) 01/01/2023    Adjustment disorder 03/15/1993    Alcohol abuse 03/15/2003    Alcoholism ([facility]) 03/15/2001    Anxiety     Blood transfusion declined because patient is Yarsanism 05/01/2023    Chronic pain disorder     Chronic sinusitis     Cognitive impairment 03/15/2022    Depression     Depression 03/15/2003    Diabetes ([facility])     Diabetes mellitus ([facility]) 09/01/2022    Fibromyalgia     Hallucination 03/15/2015    Headache(784.0) 03/15/1993    Various types of headaches    Impulse control disorder 03/15/2022    Low back pain 05/15/2021    After falling on rollerskatAlphion    Lumbosacral disc disease 05/15/2021    After falling on rollerskates    Memory loss 03/15/2012    Migraine     Obesity     Obsessive-compulsive disorder 03/15/2001    Panic attack 03/15/2001    Peripheral neuropathy 03/15/2022    Polyarthritis     Last assessed 9/21/2015    Psychiatric disorder     Psychiatric illness     Sleep difficulties     Substance abuse ([facility]) 03/15/1994   [2]   Past Surgical History:  Procedure Laterality Date    COLONOSCOPY  06/2019    DENTAL SURGERY      HYSTERECTOMY  05/01/2023    ((Pt Qnr Sub: .))     HYSTEROSCOPY      Endometrial Biopsy By Hysteroscopy    GA LAPS SUPRACRV HYSTERECT 250 GM/< RMVL TUBE/OVAR N/A 05/01/2023    Procedure: (LSH) W/ BILATERAL SALPINGECTOMY, REMOVAL PARAOVARIAN CYST;  Surgeon: Sai Lopez DO;  Location: [facility];  Service: Gynecology    REMOVAL OF INTRAUTERINE DEVICE (IUD)      US GUIDED BREAST BIOPSY RIGHT COMPLETE Right 06/17/2019    Benign   [3]   Family History  Problem Relation Name Age of Onset    Irregular heart beat Mother iNurka  Emes     Arthritis Mother Niurka Emchristina     Arrhythmia Mother Niurka Griffith     Diabetes Father Erick Emchristina     Kidney disease Father Erick Emchristina     Diabetes type II Father Erick Emchristina     Depression Father Erick Emchristina     Substance Abuse Brother Victoriano Emchristina     Alcohol abuse Brother Victoriano Emchristina     ADD / ADHD Brother Victoriano Emchristina     Drug abuse Brother Victoriano Emchristina     No Known Problems Brother      Depression Paternal Aunt Megan Worster         Unsuccessful suicide attempt in young adulthood    Psychiatric Illness Paternal Aunt Megan Worster     Self-Injury Paternal Aunt Megan Worster     Suicide Attempts Paternal Aunt Megan Worster     Substance Abuse Maternal Uncle Richard Jannie     Prostate cancer Maternal Uncle Richard Jannie 60    Diabetes type II Maternal Uncle Richard Jannie     Diabetes Maternal Uncle Richard Jannie     Diabetes Maternal Grandfather Pedrito Jannie     Migraines Maternal Grandfather Pedrito Valderrama     Stroke Maternal Grandfather Pedrito Valderrama     Diabetes type II Maternal Grandfather Pedrito Valderrama     Alcohol abuse Maternal Grandfather Pedrito Valderrama         Great-Grandfather    Diabetes Maternal Grandmother Kat Valderrama     Rheum arthritis Maternal Grandmother Kat Valderrama     Melanoma Maternal Grandmother Kat Valderrama 84    Cancer Maternal Grandmother Kat Valderrama         Skin Cancer - successfully removed    Stroke Maternal Grandmother Kat Valderrama     Diabetes type II Maternal Grandmother Kat Valderrama     Depression Maternal Grandmother Kat Valderrama     Dementia Maternal Grandmother Kat Valderrama     Neuropathy Maternal Grandmother Kat Valderrama         Her feet were so sensitive that she yelped if you touched them    Diabetes Paternal Grandfather [place] Emes     Lung cancer Paternal Grandfather Dhruv Emes 47    Cancer Paternal Grandfather Dhruv Emes         Lung Cancer - cause of death    Kidney disease Paternal Grandmother Mimi Emes     Rheum  arthritis Paternal Grandmother Mimi Emchristina     Depression Paternal Grandmother Mimi Griffith         Nervous Breakdown around age 40    Deep vein thrombosis Paternal Grandmother Mimi Emchristina     Diabetes Paternal Grandmother Mimi Emchristina     Diabetes type II Paternal Grandmother Mimi Emchristina     Alcohol abuse Family Unknown     Stomach cancer Family MGGM     Alcohol abuse Maternal Uncle Bharat Valderrama     Substance Abuse Maternal Uncle Bharat Valderrama     Cancer Maternal Uncle Bharat Valderrama         Prostate & Testicular Cancer    Drug abuse Maternal Uncle Bharat Bernalinski     Alcohol abuse Maternal Uncle Rolf Valderrama     Drug abuse Maternal Uncle Rolf Valderrama     Alcohol abuse Maternal Uncle Bharat Bernalinski     Drug abuse Maternal Uncle Bharat Bernalinski     Alcohol abuse Maternal Uncle Rolf Valderrama     Drug abuse Maternal Uncle Rolf Bernalinski     Alcohol abuse Maternal Uncle Bharat Bernalinski     Cancer Maternal Uncle Bharat Valderrama         Prostate & Testicular Cancer    Drug abuse Maternal Uncle Bharat Valderrama     Substance Abuse Maternal Uncle Bharat Valderrama     Alcohol abuse Maternal Uncle Rolf Valderrama     Drug abuse Maternal Uncle Rolf Valderrama     Alcohol abuse Maternal Uncle Bharat Valderrama     Cancer Maternal Uncle Bharat Valderrama         Prostate & Testicular Cancer    Drug abuse Maternal Uncle Bharat Valderrama     Alcohol abuse Maternal Uncle Rolf Valderrama     Drug abuse Maternal Uncle Rolf Valderrama     Completed Suicide  Neg Hx     [4]   Social History  Tobacco Use    Smoking status: Never     Passive exposure: Never    Smokeless tobacco: Never    Tobacco comments:     N/A, non-smoker.   Vaping Use    Vaping status: Never Used   Substance and Sexual Activity    Alcohol use: Not Currently     Comment: 2 drinks per month    Drug use: Not Currently    Sexual activity: Not Currently     Birth control/protection: Abstinence   [5]   Current Outpatient Medications on File Prior to Visit   Medication Sig    []  acetaminophen (TYLENOL) 500 mg tablet Take 2 tablets (1,000 mg total) by mouth every 6 (six) hours as needed for mild pain for up to 7 days    Blood Glucose Monitoring Suppl (OneTouch Verio Reflect) w/Device KIT Check blood sugars once daily. Please substitute with appropriate alternative as covered by patient's insurance. Dx: E11.65    cholecalciferol (VITAMIN D3) 25 mcg (1,000 units) tablet Take 1,000 Units by mouth in the morning. Take 1 tab. daily.    cyclobenzaprine (FLEXERIL) 10 mg tablet Take 1 tablet (10 mg total) by mouth 2 (two) times a day as needed for muscle spasms    ferrous sulfate 325 (65 Fe) mg tablet Take 325 mg by mouth Take 1 tab. 3 times per week    fluticasone (FLONASE) 50 mcg/act nasal spray 1 spray into each nostril daily    gabapentin (NEURONTIN) 300 mg capsule Take 1 capsule (300 mg total) by mouth 3 (three) times a day    glucose blood (OneTouch Verio) test strip Check blood sugars once daily. Please substitute with appropriate alternative as covered by patient's insurance. Dx: E11.65    ibuprofen (MOTRIN) 400 mg tablet Take 1 tablet (400 mg total) by mouth every 6 (six) hours as needed for mild pain for up to 7 days    Levomilnacipran HCl ER (FETZIMA) 80 MG extended release capsule Take 1 capsule (80 mg total) by mouth daily    levothyroxine 75 mcg tablet TAKE 1 TABLET (75 MCG TOTAL) BY MOUTH DAILY IN THE EARLY MORNING    lithium carbonate 300 mg capsule Take 1 capsule (300 mg total) by mouth 2 (two) times a day with meals    LORazepam (ATIVAN) 1 mg tablet Take 1 tablet (1 mg total) by mouth every 8 (eight) hours as needed for anxiety    MAGNESIUM MALATE PO Take by mouth Take 1 tab. daily    metFORMIN (GLUCOPHAGE) 500 mg tablet TAKE 1 TABLET BY MOUTH TWICE A DAY WITH FOOD    methocarbamol (ROBAXIN) 500 mg tablet TAKE 1 TAB. EVERY 8 HR. AS NEEDED FOR MUSCLE SPASMS    mirtazapine (REMERON) 30 mg tablet TAKE 1 TABLET BY MOUTH EVERYDAY AT BEDTIME    Oneuch Delica Lancets 33G MISC Check  blood sugars once daily. Please substitute with appropriate alternative as covered by patient's insurance. Dx: E11.65    propranolol (INDERAL) 40 mg tablet Take 0.5 tablets (20 mg total) by mouth daily at bedtime    semaglutide, 0.25 or 0.5 mg/dose, (Ozempic, 0.25 or 0.5 MG/DOSE,) 2 mg/3 mL injection pen 0.25 mg under the skin every 7 days for 4 doses (28 days), THEN 0.5 mg under the skin every 7 days    traZODone (DESYREL) 100 mg tablet TAKE 1 TABLET BY MOUTH DAILY AT BEDTIME    Blood Glucose Monitoring Suppl (Contour Blood Glucose System) w/Device KIT Use 1 kit 2 (two) times a day before meals (Patient not taking: Reported on 6/19/2025)    Contour Test test strip USE TO CHECK BLOOD SUGAR ONCE DAILY (Patient not taking: No sig reported)

## 2025-07-03 ENCOUNTER — HOSPITAL ENCOUNTER (EMERGENCY)
Facility: HOSPITAL | Age: 45
Discharge: HOME | End: 2025-07-03
Attending: EMERGENCY MEDICINE
Payer: COMMERCIAL

## 2025-07-03 VITALS
HEART RATE: 96 BPM | DIASTOLIC BLOOD PRESSURE: 90 MMHG | SYSTOLIC BLOOD PRESSURE: 156 MMHG | OXYGEN SATURATION: 98 % | TEMPERATURE: 98.1 F | RESPIRATION RATE: 17 BRPM

## 2025-07-03 DIAGNOSIS — M54.41 ACUTE BILATERAL LOW BACK PAIN WITH BILATERAL SCIATICA: Primary | ICD-10-CM

## 2025-07-03 DIAGNOSIS — M54.42 ACUTE BILATERAL LOW BACK PAIN WITH BILATERAL SCIATICA: Primary | ICD-10-CM

## 2025-07-03 PROCEDURE — 99282 EMERGENCY DEPT VISIT SF MDM: CPT

## 2025-07-03 PROCEDURE — 96372 THER/PROPH/DIAG INJ SC/IM: CPT

## 2025-07-03 PROCEDURE — 99284 EMERGENCY DEPT VISIT MOD MDM: CPT | Performed by: EMERGENCY MEDICINE

## 2025-07-03 RX ORDER — KETOROLAC TROMETHAMINE 30 MG/ML
15 INJECTION, SOLUTION INTRAMUSCULAR; INTRAVENOUS ONCE
Status: COMPLETED | OUTPATIENT
Start: 2025-07-03 | End: 2025-07-03

## 2025-07-03 RX ORDER — DIAZEPAM 5 MG/1
5 TABLET ORAL ONCE
Status: COMPLETED | OUTPATIENT
Start: 2025-07-03 | End: 2025-07-03

## 2025-07-03 RX ADMIN — KETOROLAC TROMETHAMINE 15 MG: 30 INJECTION, SOLUTION INTRAMUSCULAR; INTRAVENOUS at 15:36

## 2025-07-03 RX ADMIN — DIAZEPAM 5 MG: 5 TABLET ORAL at 15:36

## 2025-07-03 NOTE — ED ATTENDING ATTESTATION
I saw and evaluated the patient. I have discussed the patient with the resident physician and agree with the resident's findings, assessment and plan as documented in the resident physician's note, unless otherwise documented below. All available laboratory and imaging studies were reviewed by myself.  I was present for key portions of any procedure(s) performed by the resident and I was immediately available to provide assistance.     I agree with the current assessment done in the Emergency Department. I have conducted an independent evaluation of this patient    Final Diagnosis:  1. Acute bilateral low back pain with bilateral sciatica            Chief Complaint   Patient presents with    Back Pain     C/o back pain that radiates up and goes down her legs. Denies any trauma. Saw PCP yesterday, ordered her oxycodone 5mg that isnt helping last took last night. 1000am,1000mg of tylenol      This is a 45 y.o. femalewith a history of DM presenting for evaluation of low back pain. Pain started a few weeks ago, constant since onset, sharp/stabbing in quality, worse with movement, radiates down both legs. No history of IV drug use, malignancy, recent fevers, night sweats, saddle anaesthesia, bowel or bladder dysfunction, lower extremity weakness. Denies chills, cough, chest pain, SOB, n/v/d, abdominal pain, urinary symptoms, flank pain, headache, any other complaints.        PMH:   has a past medical history of Addiction to drug ([facility]) (03/15/2001), ADHD (attention deficit hyperactivity disorder) (01/01/2023), Adjustment disorder (03/15/1993), Alcohol abuse (03/15/2003), Alcoholism ([facility]) (03/15/2001), Anxiety, Blood transfusion declined because patient is [facility] (05/01/2023), Chronic pain disorder, Chronic sinusitis, Cognitive impairment (03/15/2022), Depression, Depression (03/15/2003), Diabetes ([facility]), Diabetes mellitus ([facility]) (09/01/2022), Fibromyalgia, Hallucination (03/15/2015), Headache(784.0) (03/15/1993),  Impulse control disorder (03/15/2022), Low back pain (05/15/2021), Lumbosacral disc disease (05/15/2021), Memory loss (03/15/2012), Migraine, Obesity, Obsessive-compulsive disorder (03/15/2001), Panic attack (03/15/2001), Peripheral neuropathy (03/15/2022), Polyarthritis, Psychiatric disorder, Psychiatric illness, Sleep difficulties, and Substance abuse ([facility]) (03/15/1994).    PSH:   has a past surgical history that includes Hysteroscopy; REMOVAL OF INTRAUTERINE DEVICE (IUD); Dental surgery; US guided breast biopsy right complete (Right, 06/17/2019); Colonoscopy (06/2019); pr laps supracrv hysterect 250 gm/< rmvl tube/ovar (N/A, 05/01/2023); and Hysterectomy (05/01/2023).    Social:  Social History     Substance and Sexual Activity   Alcohol Use Not Currently    Comment: 2 drinks per month     Tobacco Use History[1]  Social History     Substance and Sexual Activity   Drug Use Not Currently     PE:  Vitals:    07/03/25 1340   BP: 156/90   Pulse: 96   Resp: 17   Temp: 98.1 °F (36.7 °C)   TempSrc: Temporal   SpO2: 98%         Physical exam:  GENERAL APPEARANCE: Appears comfortable, no acute distress, calm and cooperative   NEURO: GCS 15, no gross focal deficits   HENT: Normocephalic, atraumatic  Eyes: EOMI, normal pupil size   Neck: Full ROM  CV: Appears well perfused  LUNGS: No respiratory distress  MSK: +Bilateral lumbosacral tenderness, no midline tenderness, stepoffs, deformities. Negative bilateral SLR. 5/5 strength in bilateral lower extremities. Sensation intact. Able to ambulate.   SKIN: Normal color      Assessment and plan: This is a 45 y.o. femalewith a history of DM presenting for evaluation of low back pain. No red flag signs/symptoms. Appears musculoskeletal. Will treat with analgesics and refer to PT.            Code Status: Prior  Advance Directive and Living Will:      Power of : Yes  POLST:      Medications   ketorolac (TORADOL) injection 15 mg (15 mg Intramuscular Given 7/3/25 1019)   diazepam  (VALIUM) tablet 5 mg (5 mg Oral Given 7/3/25 1536)     No orders to display     Orders Placed This Encounter   Procedures    Ambulatory Referral to Physical Therapy     Labs Reviewed - No data to display      Time reflects when diagnosis was documented in both MDM as applicable and the Disposition within this note       Time User Action Codes Description Comment    7/3/2025  3:35 PM Buster Gabriel Add [M54.42,  M54.41] Acute bilateral low back pain with bilateral sciatica           ED Disposition       ED Disposition   Discharge    Condition   Stable    Date/Time   u Jul 3, 2025  3:48 PM    Comment   Esther Nacho discharge to home/self care.                   Follow-up Information       Follow up With Specialties Details Why Contact Info    [facility]   [address] [place] [address]  [phone]            Discharge Medication List as of 7/3/2025  3:49 PM        CONTINUE these medications which have NOT CHANGED    Details   acetaminophen (TYLENOL) 500 mg tablet Take 2 tablets (1,000 mg total) by mouth every 6 (six) hours as needed for mild pain for up to 7 days, Starting Sun 6/29/2025, Until Sun 7/6/2025 at 2359, Normal      !! Blood Glucose Monitoring Suppl (Contour Blood Glucose System) w/Device KIT Use 1 kit 2 (two) times a day before meals, Starting Tue 10/4/2022, Normal      !! Blood Glucose Monitoring Suppl (OneTouch Verio Reflect) w/Device KIT Check blood sugars once daily. Please substitute with appropriate alternative as covered by patient's insurance. Dx: E11.65, Normal      cholecalciferol (VITAMIN D3) 25 mcg (1,000 units) tablet Take 1,000 Units by mouth in the morning. Take 1 tab. daily., Historical Med      !! Contour Test test strip USE TO CHECK BLOOD SUGAR ONCE DAILY, Normal      cyclobenzaprine (FLEXERIL) 10 mg tablet Take 1 tablet (10 mg total) by mouth 2 (two) times a day as needed for muscle spasms, Starting Sun 6/29/2025, Normal      ferrous sulfate 325 (65  Fe) mg tablet Take 325 mg by mouth Take 1 tab. 3 times per week, Historical Med      fluticasone (FLONASE) 50 mcg/act nasal spray 1 spray into each nostril daily, Starting Mon 4/7/2025, Normal      gabapentin (NEURONTIN) 300 mg capsule Take 1 capsule (300 mg total) by mouth 3 (three) times a day, Starting Tue 5/6/2025, Normal      !! glucose blood (OneTouch Verio) test strip Check blood sugars once daily. Please substitute with appropriate alternative as covered by patient's insurance. Dx: E11.65, Normal      ibuprofen (MOTRIN) 400 mg tablet Take 1 tablet (400 mg total) by mouth every 6 (six) hours as needed for mild pain for up to 7 days, Starting Sun 6/29/2025, Until Sun 7/6/2025 at 2359, Normal      Levomilnacipran HCl ER (FETZIMA) 80 MG extended release capsule Take 1 capsule (80 mg total) by mouth daily, Starting Wed 5/28/2025, Normal      levothyroxine 75 mcg tablet TAKE 1 TABLET (75 MCG TOTAL) BY MOUTH DAILY IN THE EARLY MORNING, Starting Thu 3/27/2025, Normal      lidocaine (Lidoderm) 5 % Apply 1 patch topically daily over 12 hours for 7 days Remove & Discard patch within 12 hours or as directed by MD, Starting Wed 7/2/2025, Until Wed 7/9/2025, Normal      lithium carbonate 300 mg capsule Take 1 capsule (300 mg total) by mouth 2 (two) times a day with meals, Starting Thu 4/10/2025, Normal      LORazepam (ATIVAN) 1 mg tablet Take 1 tablet (1 mg total) by mouth every 8 (eight) hours as needed for anxiety, Starting Tue 5/27/2025, Normal      MAGNESIUM MALATE PO Take by mouth Take 1 tab. daily, Historical Med      metFORMIN (GLUCOPHAGE) 500 mg tablet TAKE 1 TABLET BY MOUTH TWICE A DAY WITH FOOD, Starting Sat 1/4/2025, Normal      methocarbamol (ROBAXIN) 500 mg tablet TAKE 1 TAB. EVERY 8 HR. AS NEEDED FOR MUSCLE SPASMS, Normal      mirtazapine (REMERON) 30 mg tablet TAKE 1 TABLET BY MOUTH EVERYDAY AT BEDTIME, Starting Wed 5/28/2025, Normal      OneTouch Delica Lancets 33G MISC Check blood sugars once daily.  Please substitute with appropriate alternative as covered by patient's insurance. Dx: E11.65, Normal      oxyCODONE (Roxicodone) 5 immediate release tablet Take 1 tablet (5 mg total) by mouth daily at bedtime as needed for moderate pain Max Daily Amount: 5 mg, Starting Wed 7/2/2025, Normal      propranolol (INDERAL) 40 mg tablet Take 0.5 tablets (20 mg total) by mouth daily at bedtime, Starting Tue 5/27/2025, Until Fri 5/22/2026, Normal      semaglutide, 0.25 or 0.5 mg/dose, (Ozempic, 0.25 or 0.5 MG/DOSE,) 2 mg/3 mL injection pen 0.25 mg under the skin every 7 days for 4 doses (28 days), THEN 0.5 mg under the skin every 7 days, Normal      traZODone (DESYREL) 100 mg tablet TAKE 1 TABLET BY MOUTH DAILY AT BEDTIME, Starting Mon 5/19/2025, Normal       !! - Potential duplicate medications found. Please discuss with provider.          Prior to Admission Medications   Prescriptions Last Dose Informant Patient Reported? Taking?   Blood Glucose Monitoring Suppl (Contour Blood Glucose System) w/Device KIT  Self No No   Sig: Use 1 kit 2 (two) times a day before meals   Patient not taking: Reported on 6/19/2025   Blood Glucose Monitoring Suppl (OneTouch Verio Reflect) w/Device KIT   No No   Sig: Check blood sugars once daily. Please substitute with appropriate alternative as covered by patient's insurance. Dx: E11.65   Contour Test test strip  Self No No   Sig: USE TO CHECK BLOOD SUGAR ONCE DAILY   Patient not taking: No sig reported   LORazepam (ATIVAN) 1 mg tablet  Self No No   Sig: Take 1 tablet (1 mg total) by mouth every 8 (eight) hours as needed for anxiety   Levomilnacipran HCl ER (FETZIMA) 80 MG extended release capsule   No No   Sig: Take 1 capsule (80 mg total) by mouth daily   MAGNESIUM MALATE PO  Self Yes No   Sig: Take by mouth Take 1 tab. daily   OneTouch Delica Lancets 33G MISC   No No   Sig: Check blood sugars once daily. Please substitute with appropriate alternative as covered by patient's insurance. Dx:  E11.65   acetaminophen (TYLENOL) 500 mg tablet   No No   Sig: Take 2 tablets (1,000 mg total) by mouth every 6 (six) hours as needed for mild pain for up to 7 days   cholecalciferol (VITAMIN D3) 25 mcg (1,000 units) tablet  Self Yes No   Sig: Take 1,000 Units by mouth in the morning. Take 1 tab. daily.   cyclobenzaprine (FLEXERIL) 10 mg tablet   No No   Sig: Take 1 tablet (10 mg total) by mouth 2 (two) times a day as needed for muscle spasms   ferrous sulfate 325 (65 Fe) mg tablet  Self Yes No   Sig: Take 325 mg by mouth Take 1 tab. 3 times per week   fluticasone (FLONASE) 50 mcg/act nasal spray  Self No No   Si spray into each nostril daily   gabapentin (NEURONTIN) 300 mg capsule  Self No No   Sig: Take 1 capsule (300 mg total) by mouth 3 (three) times a day   glucose blood (OneTouch Verio) test strip   No No   Sig: Check blood sugars once daily. Please substitute with appropriate alternative as covered by patient's insurance. Dx: E11.65   ibuprofen (MOTRIN) 400 mg tablet   No No   Sig: Take 1 tablet (400 mg total) by mouth every 6 (six) hours as needed for mild pain for up to 7 days   levothyroxine 75 mcg tablet  Self No No   Sig: TAKE 1 TABLET (75 MCG TOTAL) BY MOUTH DAILY IN THE EARLY MORNING   lidocaine (Lidoderm) 5 %   No No   Sig: Apply 1 patch topically daily over 12 hours for 7 days Remove & Discard patch within 12 hours or as directed by MD   lithium carbonate 300 mg capsule  Self No No   Sig: Take 1 capsule (300 mg total) by mouth 2 (two) times a day with meals   metFORMIN (GLUCOPHAGE) 500 mg tablet  Self No No   Sig: TAKE 1 TABLET BY MOUTH TWICE A DAY WITH FOOD   methocarbamol (ROBAXIN) 500 mg tablet   No No   Sig: TAKE 1 TAB. EVERY 8 HR. AS NEEDED FOR MUSCLE SPASMS   mirtazapine (REMERON) 30 mg tablet   No No   Sig: TAKE 1 TABLET BY MOUTH EVERYDAY AT BEDTIME   oxyCODONE (Roxicodone) 5 immediate release tablet   No No   Sig: Take 1 tablet (5 mg total) by mouth daily at bedtime as needed for moderate  "pain Max Daily Amount: 5 mg   propranolol (INDERAL) 40 mg tablet   No No   Sig: Take 0.5 tablets (20 mg total) by mouth daily at bedtime   semaglutide, 0.25 or 0.5 mg/dose, (Ozempic, 0.25 or 0.5 MG/DOSE,) 2 mg/3 mL injection pen   No No   Si.25 mg under the skin every 7 days for 4 doses (28 days), THEN 0.5 mg under the skin every 7 days   traZODone (DESYREL) 100 mg tablet  Self No No   Sig: TAKE 1 TABLET BY MOUTH DAILY AT BEDTIME      Facility-Administered Medications: None         Portions of the record may have been created with voice recognition software. Occasional wrong word or \"sound a like\" substitutions may have occurred due to the inherent limitations of voice recognition software. Read the chart carefully and recognize, using context, where substitutions have occurred.    Electronically signed by:  Mora Esquivel           [1]   Social History  Tobacco Use   Smoking Status Never    Passive exposure: Never   Smokeless Tobacco Never   Tobacco Comments    N/A, non-smoker.     "

## 2025-07-03 NOTE — ED PROVIDER NOTES
Time reflects when diagnosis was documented in both MDM as applicable and the Disposition within this note       Time User Action Codes Description Comment    7/3/2025  3:35 PM Buster Gabriel Add [M54.42,  M54.41] Acute bilateral low back pain with bilateral sciatica           ED Disposition       ED Disposition   Discharge    Condition   Stable    Date/Time   Thu Jul 3, 2025  3:48 PM    Comment   Esther Griffith discharge to home/self care.                   Assessment & Plan       Medical Decision Making  Patient is a 45 y.o. female with PMH of DM, fibromyalgia, anxiety, depression, hypothyroidism presenting with back pain.    Vital signs: Stable    Pertinent physical exam:   Tenderness to palpation of the bilateral lumbar paraspinal regions.  Positive bilateral straight leg raise.  No external signs of back trauma.  No midline cervical tenderness.  No midline thoracic tenderness.  No midline lumbar tenderness.  Nonfocal lower extremity neurologic exam    Differential diagnosis and plan:   Likely muscular back pain  No red flag symptoms.  Very low concern for SCA, cauda equina, fracture or dislocation given lack of trauma, lack of urinary retention, lack of fever, lack of point spinal tenderness  Symptomatic management with Toradol, Valium  Referral to physical therapy  Continue seeing pain management, PCP for further management, continue OTC medications outpatient    View ED course above for further discussion on patient workup.     Review of Previous Medical Records: Reviewed previous ED records, previous pain management records, previous PCP records    All labs reviewed and utilized in the medical decision making process  All radiology studies independently viewed by me and interpreted by the radiologist.  I reviewed all testing with the patient.     ED course:  Patient responded positively to medications.  Slightly improved upon discharge    Reevaluation: Improved    Disposition: Discharge    Portions of  "the record may have been created with voice recognition software. Occasional wrong word or \"sound a like\" substitutions may have occurred due to the inherent limitations of voice recognition software. Read the chart carefully and recognize, using context, where substitutions have occurred.      Risk  Prescription drug management.             Medications   ketorolac (TORADOL) injection 15 mg (15 mg Intramuscular Given 7/3/25 1536)   diazepam (VALIUM) tablet 5 mg (5 mg Oral Given 7/3/25 1536)       ED Risk Strat Scores                    No data recorded                            History of Present Illness       Chief Complaint   Patient presents with    Back Pain     C/o back pain that radiates up and goes down her legs. Denies any trauma. Saw PCP yesterday, ordered her oxycodone 5mg that isnt helping last took last night. 1000am,1000mg of tylenol        Past Medical History[1]   Past Surgical History[2]   Family History[3]   Social History[4]   E-Cigarette/Vaping    E-Cigarette Use Never User       E-Cigarette/Vaping Substances    Nicotine No     THC No     CBD No     Flavoring No     Other No     Unknown No       I have reviewed and agree with the history as documented.     45-year-old female history of DM, fibromyalgia, anxiety, depression, hypothyroidism presenting for low back pain over the last 2 weeks.  Reports constant burning stabbing sensation in the bilateral lower back with radiation down to the buttocks.  Has been intervally taking low amounts of Aleve, as well as Tylenol with minimal relief.  Was evaluated in this ED 6/29, found to have no red flag symptoms, given Toradol, Tylenol, Flexeril, Lidoderm patches.  Patient reports symptoms improved for 78 hours, however pain returned.  Patient was evaluated by PCP 7/2 was prescribed oxycodone at night.  Patient reports oxycodone held then to sleep, but woke up this morning with pain radiating to the sides of her hips.  Patient reports now having some " numbness and tingling sensation shooting down the bilateral legs.    Patient denies trauma, urinary retention or incontinence, neurological symptoms such as weakness, gait abnormality, fever, night sweats, IV drug use, personal history of cancer.        Back Pain  Associated symptoms: numbness    Associated symptoms: no abdominal pain, no chest pain, no dysuria, no fever and no weakness        Review of Systems   Constitutional:  Negative for chills, diaphoresis and fever.   Respiratory:  Negative for shortness of breath.    Cardiovascular:  Negative for chest pain and palpitations.   Gastrointestinal:  Negative for abdominal pain, nausea and vomiting.   Genitourinary:  Negative for decreased urine volume, difficulty urinating, dysuria, flank pain, frequency and urgency.        Denies incontinence   Musculoskeletal:  Positive for back pain. Negative for gait problem, neck pain and neck stiffness.   Neurological:  Positive for numbness. Negative for weakness.           Objective       ED Triage Vitals [07/03/25 1340]   Temperature Pulse Blood Pressure Respirations SpO2 Patient Position - Orthostatic VS   98.1 °F (36.7 °C) 96 156/90 17 98 % --      Temp Source Heart Rate Source BP Location FiO2 (%) Pain Score    Temporal Monitor -- -- 9      Vitals      Date and Time Temp Pulse SpO2 Resp BP Pain Score FACES Pain Rating User   07/03/25 1536 -- -- -- -- -- 8 -- SD   07/03/25 1340 98.1 °F (36.7 °C) 96 98 % 17 156/90 9 -- CS            Physical Exam  Vitals and nursing note reviewed.   Constitutional:       General: She is not in acute distress.     Appearance: Normal appearance. She is not ill-appearing.   HENT:      Head: Normocephalic and atraumatic.      Mouth/Throat:      Mouth: Mucous membranes are moist.     Cardiovascular:      Rate and Rhythm: Normal rate and regular rhythm.      Pulses:           Dorsalis pedis pulses are 2+ on the right side and 2+ on the left side.        Posterior tibial pulses are 2+ on the  right side and 2+ on the left side.   Pulmonary:      Effort: No tachypnea, bradypnea, accessory muscle usage, respiratory distress or retractions.      Breath sounds: No decreased breath sounds, wheezing, rhonchi or rales.   Abdominal:      General: There is no distension.      Palpations: Abdomen is soft. There is no fluid wave.      Tenderness: There is no abdominal tenderness. There is no right CVA tenderness, left CVA tenderness, guarding or rebound.     Musculoskeletal:      Cervical back: No spinous process tenderness.      Comments: Nonfocal lower extremity neurologic exam     Skin:     General: Skin is warm and dry.      Capillary Refill: Capillary refill takes less than 2 seconds.     Neurological:      Mental Status: She is alert.      Sensory: No sensory deficit.      Motor: No weakness.      Gait: Gait normal.      Deep Tendon Reflexes:      Reflex Scores:       Patellar reflexes are 2+ on the right side and 2+ on the left side.       Achilles reflexes are 2+ on the right side and 2+ on the left side.     Comments: No clonus  No saddle anesthesia  Sensation intact in L2, L3, L4, L5, S1 dermatomal distributions       Results Reviewed       None            No orders to display       Procedures    ED Medication and Procedure Management   Prior to Admission Medications   Prescriptions Last Dose Informant Patient Reported? Taking?   Blood Glucose Monitoring Suppl (Contour Blood Glucose System) w/Device KIT  Self No No   Sig: Use 1 kit 2 (two) times a day before meals   Patient not taking: Reported on 6/19/2025   Blood Glucose Monitoring Suppl (OneTouch Verio Reflect) w/Device KIT   No No   Sig: Check blood sugars once daily. Please substitute with appropriate alternative as covered by patient's insurance. Dx: E11.65   Contour Test test strip  Self No No   Sig: USE TO CHECK BLOOD SUGAR ONCE DAILY   Patient not taking: No sig reported   LORazepam (ATIVAN) 1 mg tablet  Self No No   Sig: Take 1 tablet (1 mg  total) by mouth every 8 (eight) hours as needed for anxiety   Levomilnacipran HCl ER (FETZIMA) 80 MG extended release capsule   No No   Sig: Take 1 capsule (80 mg total) by mouth daily   MAGNESIUM MALATE PO  Self Yes No   Sig: Take by mouth Take 1 tab. daily   OneTouch Delica Lancets 33G MISC   No No   Sig: Check blood sugars once daily. Please substitute with appropriate alternative as covered by patient's insurance. Dx: E11.65   acetaminophen (TYLENOL) 500 mg tablet   No No   Sig: Take 2 tablets (1,000 mg total) by mouth every 6 (six) hours as needed for mild pain for up to 7 days   cholecalciferol (VITAMIN D3) 25 mcg (1,000 units) tablet  Self Yes No   Sig: Take 1,000 Units by mouth in the morning. Take 1 tab. daily.   cyclobenzaprine (FLEXERIL) 10 mg tablet   No No   Sig: Take 1 tablet (10 mg total) by mouth 2 (two) times a day as needed for muscle spasms   ferrous sulfate 325 (65 Fe) mg tablet  Self Yes No   Sig: Take 325 mg by mouth Take 1 tab. 3 times per week   fluticasone (FLONASE) 50 mcg/act nasal spray  Self No No   Si spray into each nostril daily   gabapentin (NEURONTIN) 300 mg capsule  Self No No   Sig: Take 1 capsule (300 mg total) by mouth 3 (three) times a day   glucose blood (OneTouch Verio) test strip   No No   Sig: Check blood sugars once daily. Please substitute with appropriate alternative as covered by patient's insurance. Dx: E11.65   ibuprofen (MOTRIN) 400 mg tablet   No No   Sig: Take 1 tablet (400 mg total) by mouth every 6 (six) hours as needed for mild pain for up to 7 days   levothyroxine 75 mcg tablet  Self No No   Sig: TAKE 1 TABLET (75 MCG TOTAL) BY MOUTH DAILY IN THE EARLY MORNING   lidocaine (Lidoderm) 5 %   No No   Sig: Apply 1 patch topically daily over 12 hours for 7 days Remove & Discard patch within 12 hours or as directed by MD   lithium carbonate 300 mg capsule  Self No No   Sig: Take 1 capsule (300 mg total) by mouth 2 (two) times a day with meals   metFORMIN  (GLUCOPHAGE) 500 mg tablet  Self No No   Sig: TAKE 1 TABLET BY MOUTH TWICE A DAY WITH FOOD   methocarbamol (ROBAXIN) 500 mg tablet   No No   Sig: TAKE 1 TAB. EVERY 8 HR. AS NEEDED FOR MUSCLE SPASMS   mirtazapine (REMERON) 30 mg tablet   No No   Sig: TAKE 1 TABLET BY MOUTH EVERYDAY AT BEDTIME   oxyCODONE (Roxicodone) 5 immediate release tablet   No No   Sig: Take 1 tablet (5 mg total) by mouth daily at bedtime as needed for moderate pain Max Daily Amount: 5 mg   propranolol (INDERAL) 40 mg tablet   No No   Sig: Take 0.5 tablets (20 mg total) by mouth daily at bedtime   semaglutide, 0.25 or 0.5 mg/dose, (Ozempic, 0.25 or 0.5 MG/DOSE,) 2 mg/3 mL injection pen   No No   Si.25 mg under the skin every 7 days for 4 doses (28 days), THEN 0.5 mg under the skin every 7 days   traZODone (DESYREL) 100 mg tablet  Self No No   Sig: TAKE 1 TABLET BY MOUTH DAILY AT BEDTIME      Facility-Administered Medications: None     Discharge Medication List as of 7/3/2025  3:49 PM        CONTINUE these medications which have NOT CHANGED    Details   acetaminophen (TYLENOL) 500 mg tablet Take 2 tablets (1,000 mg total) by mouth every 6 (six) hours as needed for mild pain for up to 7 days, Starting Sun 2025, Until Sun 2025 at 2359, Normal      !! Blood Glucose Monitoring Suppl (Contour Blood Glucose System) w/Device KIT Use 1 kit 2 (two) times a day before meals, Starting Tue 10/4/2022, Normal      !! Blood Glucose Monitoring Suppl (OneTouch Verio Reflect) w/Device KIT Check blood sugars once daily. Please substitute with appropriate alternative as covered by patient's insurance. Dx: E11.65, Normal      cholecalciferol (VITAMIN D3) 25 mcg (1,000 units) tablet Take 1,000 Units by mouth in the morning. Take 1 tab. daily., Historical Med      !! Contour Test test strip USE TO CHECK BLOOD SUGAR ONCE DAILY, Normal      cyclobenzaprine (FLEXERIL) 10 mg tablet Take 1 tablet (10 mg total) by mouth 2 (two) times a day as needed for muscle  spasms, Starting Sun 6/29/2025, Normal      ferrous sulfate 325 (65 Fe) mg tablet Take 325 mg by mouth Take 1 tab. 3 times per week, Historical Med      fluticasone (FLONASE) 50 mcg/act nasal spray 1 spray into each nostril daily, Starting Mon 4/7/2025, Normal      gabapentin (NEURONTIN) 300 mg capsule Take 1 capsule (300 mg total) by mouth 3 (three) times a day, Starting Tue 5/6/2025, Normal      !! glucose blood (OneTouch Verio) test strip Check blood sugars once daily. Please substitute with appropriate alternative as covered by patient's insurance. Dx: E11.65, Normal      ibuprofen (MOTRIN) 400 mg tablet Take 1 tablet (400 mg total) by mouth every 6 (six) hours as needed for mild pain for up to 7 days, Starting Sun 6/29/2025, Until Sun 7/6/2025 at 2359, Normal      Levomilnacipran HCl ER (FETZIMA) 80 MG extended release capsule Take 1 capsule (80 mg total) by mouth daily, Starting Wed 5/28/2025, Normal      levothyroxine 75 mcg tablet TAKE 1 TABLET (75 MCG TOTAL) BY MOUTH DAILY IN THE EARLY MORNING, Starting Thu 3/27/2025, Normal      lidocaine (Lidoderm) 5 % Apply 1 patch topically daily over 12 hours for 7 days Remove & Discard patch within 12 hours or as directed by MD, Starting Wed 7/2/2025, Until Wed 7/9/2025, Normal      lithium carbonate 300 mg capsule Take 1 capsule (300 mg total) by mouth 2 (two) times a day with meals, Starting Thu 4/10/2025, Normal      LORazepam (ATIVAN) 1 mg tablet Take 1 tablet (1 mg total) by mouth every 8 (eight) hours as needed for anxiety, Starting Tue 5/27/2025, Normal      MAGNESIUM MALATE PO Take by mouth Take 1 tab. daily, Historical Med      metFORMIN (GLUCOPHAGE) 500 mg tablet TAKE 1 TABLET BY MOUTH TWICE A DAY WITH FOOD, Starting Sat 1/4/2025, Normal      methocarbamol (ROBAXIN) 500 mg tablet TAKE 1 TAB. EVERY 8 HR. AS NEEDED FOR MUSCLE SPASMS, Normal      mirtazapine (REMERON) 30 mg tablet TAKE 1 TABLET BY MOUTH EVERYDAY AT BEDTIME, Starting Wed 5/28/2025, Normal       [employer] Delica Lancets 33G MISC Check blood sugars once daily. Please substitute with appropriate alternative as covered by patient's insurance. Dx: E11.65, Normal      oxyCODONE (Roxicodone) 5 immediate release tablet Take 1 tablet (5 mg total) by mouth daily at bedtime as needed for moderate pain Max Daily Amount: 5 mg, Starting Wed 7/2/2025, Normal      propranolol (INDERAL) 40 mg tablet Take 0.5 tablets (20 mg total) by mouth daily at bedtime, Starting Tue 5/27/2025, Until Fri 5/22/2026, Normal      semaglutide, 0.25 or 0.5 mg/dose, (Ozempic, 0.25 or 0.5 MG/DOSE,) 2 mg/3 mL injection pen 0.25 mg under the skin every 7 days for 4 doses (28 days), THEN 0.5 mg under the skin every 7 days, Normal      traZODone (DESYREL) 100 mg tablet TAKE 1 TABLET BY MOUTH DAILY AT BEDTIME, Starting Mon 5/19/2025, Normal       !! - Potential duplicate medications found. Please discuss with provider.          ED SEPSIS DOCUMENTATION   Time reflects when diagnosis was documented in both MDM as applicable and the Disposition within this note       Time User Action Codes Description Comment    7/3/2025  3:35 PM Buster Gabriel Add [M54.42,  M54.41] Acute bilateral low back pain with bilateral sciatica                    [1]   Past Medical History:  Diagnosis Date    Addiction to drug ([facility]) 03/15/2001    ADHD (attention deficit hyperactivity disorder) 01/01/2023    Adjustment disorder 03/15/1993    Alcohol abuse 03/15/2003    Alcoholism ([facility]) 03/15/2001    Anxiety     Blood transfusion declined because patient is Sabianist 05/01/2023    Chronic pain disorder     Chronic sinusitis     Cognitive impairment 03/15/2022    Depression     Depression 03/15/2003    Diabetes ([facility])     Diabetes mellitus ([facility]) 09/01/2022    Fibromyalgia     Hallucination 03/15/2015    Headache(784.0) 03/15/1993    Various types of headaches    Impulse control disorder 03/15/2022    Low back pain 05/15/2021    After falling on rollerskates     Lumbosacral disc disease 05/15/2021    After falling on [employer]    Memory loss 03/15/2012    Migraine     Obesity     Obsessive-compulsive disorder 03/15/2001    Panic attack 03/15/2001    Peripheral neuropathy 03/15/2022    Polyarthritis     Last assessed 9/21/2015    Psychiatric disorder     Psychiatric illness     Sleep difficulties     Substance abuse ([facility]) 03/15/1994   [2]   Past Surgical History:  Procedure Laterality Date    COLONOSCOPY  06/2019    DENTAL SURGERY      HYSTERECTOMY  05/01/2023    ((Pt Qnr Sub: .))     HYSTEROSCOPY      Endometrial Biopsy By Hysteroscopy    DE LAPS SUPRACRV HYSTERECT 250 GM/< RMVL TUBE/OVAR N/A 05/01/2023    Procedure: (LSH) W/ BILATERAL SALPINGECTOMY, REMOVAL PARAOVARIAN CYST;  Surgeon: Sai Lopez DO;  Location: [facility];  Service: Gynecology    REMOVAL OF INTRAUTERINE DEVICE (IUD)      US GUIDED BREAST BIOPSY RIGHT COMPLETE Right 06/17/2019    Benign   [3]   Family History  Problem Relation Name Age of Onset    Irregular heart beat Mother Niurka Emes     Arthritis Mother Niurka Emes     Arrhythmia Mother Niurka Emes     Diabetes Father Erick Emes     Kidney disease Father Erick Emes     Diabetes type II Father Erick Emes     Depression Father Erick Emes     Substance Abuse Brother Victoriano Emes     Alcohol abuse Brother Victoriano Emes     ADD / ADHD Brother Victoriano Emes     Drug abuse Brother Victoriano Emes     No Known Problems Brother      Depression Paternal Aunt Megan Worster         Unsuccessful suicide attempt in young adulthood    Psychiatric Illness Paternal Aunt Megan Worster     Self-Injury Paternal Aunt Megan Worster     Suicide Attempts Paternal Aunt Megan Worster     Substance Abuse Maternal Uncle Richard Valderrama     Prostate cancer Maternal Uncle Richard Valderrama 60    Diabetes type II Maternal Uncle Richard Valderrama     Diabetes Maternal Uncle Richard Valderrama     Diabetes Maternal Grandfather Pedrito Valderrama     Migraines Maternal Grandfather Pedrito  Jannie     Stroke Maternal Grandfather Pedrito Jannie     Diabetes type II Maternal Grandfather Pedrito Jannie     Alcohol abuse Maternal Grandfather Pedrito Jannie         Great-Grandfather    Diabetes Maternal Grandmother Kat Jannie     Rheum arthritis Maternal Grandmother Kat Janine     Melanoma Maternal Grandmother Kat Jannie 84    Cancer Maternal Grandmother Kat Jannie         Skin Cancer - successfully removed    Stroke Maternal Grandmother Kat Jannie     Diabetes type II Maternal Grandmother Kat Jannie     Depression Maternal Grandmother Kat Jannie     Dementia Maternal Grandmother Kat Jannie     Neuropathy Maternal Grandmother Kat Jannie         Her feet were so sensitive that she yelped if you touched them    Diabetes Paternal Grandfather Dhruv Emes     Lung cancer Paternal Grandfather Dhruv Emes 47    Cancer Paternal Grandfather Dhruv Emes         Lung Cancer - cause of death    Kidney disease Paternal Grandmother Mimi Emes     Rheum arthritis Paternal Grandmother Mimi Emes     Depression Paternal Grandmother Mimi Emes         Nervous Breakdown around age 40    Deep vein thrombosis Paternal Grandmother Mimi Emes     Diabetes Paternal Grandmother Mimi Emes     Diabetes type II Paternal Grandmother Mimi Emes     Alcohol abuse Family Unknown     Stomach cancer Family MGGM     Alcohol abuse Maternal Uncle Bharat Jannie     Substance Abuse Maternal Uncle Bharat Jannie     Cancer Maternal Uncle Bharat Jannie         Prostate & Testicular Cancer    Drug abuse Maternal Uncle Bharat Jannie     Alcohol abuse Maternal Uncle Rolf Jannie     Drug abuse Maternal Uncle Rolf Jannie     Alcohol abuse Maternal Uncle Bharat Jannie     Drug abuse Maternal Uncle Bharat Jannie     Alcohol abuse Maternal Uncle Rolf Jannie     Drug abuse Maternal Uncle Rolf Valderrama     Alcohol abuse Maternal Uncle Bharat Gabeinski     Cancer Maternal Uncle Bharat  Jannie         Prostate & Testicular Cancer    Drug abuse Maternal Uncle Bharat Valderrama     Substance Abuse Maternal Uncle Bharat Valderrama     Alcohol abuse Maternal Uncle Rolf Valderrama     Drug abuse Maternal Uncle Rolf Valderrama     Alcohol abuse Maternal Uncle Bharat Valderrama     Cancer Maternal Uncle Bharat Valderrama         Prostate & Testicular Cancer    Drug abuse Maternal Uncle Bharat Valderrama     Alcohol abuse Maternal Uncle Rolf Valderrama     Drug abuse Maternal Uncle Rofl Valderrama     Completed Suicide  Neg Hx     [4]   Social History  Tobacco Use    Smoking status: Never     Passive exposure: Never    Smokeless tobacco: Never    Tobacco comments:     N/A, non-smoker.   Vaping Use    Vaping status: Never Used   Substance Use Topics    Alcohol use: Not Currently     Comment: 2 drinks per month    Drug use: Not Currently        Buster Gabriel DO  07/03/25 8927

## 2025-07-03 NOTE — DISCHARGE INSTRUCTIONS
You were seen and evaluated in the emergency department for low back pain.  You are found to have muscular low back pain with sciatica.  You were treated with anti-inflammatories, benzodiazepine for muscle relaxation.  You can take 800 mg ibuprofen every 8 hours as needed for pain and Tylenol 1000 mg every 8 hours as needed for pain.  Please continue to take your Flexeril, Lidoderm patch, opioids as prescribed by your primary care physician.  You are being given a prescription for physical therapy.    Please follow-up with your primary care provider for further management of your back pain.  Please continue to try to follow-up with pain management.  Please return to the emergency department if you develop difficulty walking, urinate on yourself, or have persistent vomiting.

## 2025-07-03 NOTE — ED PROVIDER NOTES
Time reflects when diagnosis was documented in both MDM as applicable and the Disposition within this note       Time User Action Codes Description Comment    6/29/2025  1:16 PM Reilly Montgomery Add [M54.9,  G89.29] Chronic back pain     6/29/2025  2:07 PM April Thomas Add [M79.18] Myofascial pain     6/29/2025  2:07 PM April Thomas Add [M51.369] DDD (degenerative disc disease), lumbar           ED Disposition       ED Disposition   Discharge    Condition   Stable    Date/Time   Sun Jun 29, 2025  1:16 PM    Comment   Esther Griffith discharge to home/self care.                   Assessment & Plan       Medical Decision Making  Patient is well-appearing on exam GCS 15, AO x 4.  She follows outpatient for her back pain.  No new injuries.  No red flag signs or symptoms.  Will treat her to the best of her abilities symptomatically.  PCP[facility].  Patient did ask for something stronger for pain and I counseled her that guidelines do not recommend opiates for chronic back pain.  She is already taking NSAIDs, Tylenol at home as well as muscle relaxers and says topical pain relief does not work.    History and physical exam most consistent with: Chronic back pain    Differential also includes but is not limited to: Fibromyalgia, SI joint dysfunction    Considered cauda equina, conus medullaris, fracture however not consistent with history and physical exam    Based on patient's clinical history and physical exam there are no red flag signs or symptoms     Patient denies any additional symptoms on direct questioning except those explicitly noted in the HPI and ROS.     Triage note was reviewed and patient asked directly about concerns mentioned in triage note.     I will order appropriate testing to narrow my differential    Unless otherwise noted:  - There is no language barrier  - Chart was reviewed   - Labs and imaging were reviewed    Risk  OTC drugs.  Prescription drug management.              Medications   acetaminophen (TYLENOL) tablet 975 mg (975 mg Oral Given 6/29/25 1332)   ketorolac (TORADOL) injection 15 mg (15 mg Intramuscular Given 6/29/25 1332)   cyclobenzaprine (FLEXERIL) tablet 10 mg (10 mg Oral Given 6/29/25 1332)       ED Risk Strat Scores                    No data recorded        SBIRT 22yo+      Flowsheet Row Most Recent Value   Initial Alcohol Screen: US AUDIT-C     1. How often do you have a drink containing alcohol? 0 Filed at: 06/29/2025 1131   2. How many drinks containing alcohol do you have on a typical day you are drinking?  0 Filed at: 06/29/2025 1131   3b. FEMALE Any Age, or MALE 65+: How often do you have 4 or more drinks on one occassion? 0 Filed at: 06/29/2025 1131   Audit-C Score 0 Filed at: 06/29/2025 1131   NISHANT: How many times in the past year have you...    Used an illegal drug or used a prescription medication for non-medical reasons? Never Filed at: 06/29/2025 1131                            History of Present Illness       Chief Complaint   Patient presents with    Back Pain     Bilateral lower back pain for the past 3 weeks with pain worse the last week. Patient reports she [facility] at [facility] and started methyl prednisone on Monday and finished on Friday without any effect.       Past Medical History[1]   Past Surgical History[2]   Family History[3]   Social History[4]   E-Cigarette/Vaping    E-Cigarette Use Never User       E-Cigarette/Vaping Substances    Nicotine No     THC No     CBD No     Flavoring No     Other No     Unknown No       I have reviewed and agree with the history as documented.     Patient is a 38 cigarette female with past medical history of diabetes, chronic back pain, fibromyalgia presents emergency department with back pain.  She says this progressively getting worse she does see [facility].  She has no fevers or chills.  No history of IVDU.  No numbness or weakness, no saddle anesthesia.  She is able to walk.  No  injuries.        Review of Systems   Constitutional:  Negative for chills and fever.   Respiratory:  Negative for cough and shortness of breath.    Cardiovascular:  Negative for chest pain and leg swelling.   Gastrointestinal:  Negative for abdominal pain.   Genitourinary:  Negative for dysuria.   Neurological:  Negative for seizures and syncope.   All other systems reviewed and are negative.          Objective       ED Triage Vitals [06/29/25 1130]   Temperature Pulse Blood Pressure Respirations SpO2 Patient Position - Orthostatic VS   98.4 °F (36.9 °C) 84 129/79 18 98 % Sitting      Temp Source Heart Rate Source BP Location FiO2 (%) Pain Score    Tympanic -- Left arm -- 9      Vitals      Date and Time Temp Pulse SpO2 Resp BP Pain Score FACES Pain Rating User   06/29/25 1130 98.4 °F (36.9 °C) 84 98 % 18 129/79 9 -- HB            Physical Exam  Vitals and nursing note reviewed.   Constitutional:       General: She is not in acute distress.     Appearance: Normal appearance. She is not ill-appearing, toxic-appearing or diaphoretic.   HENT:      Head: Normocephalic and atraumatic.      Nose: Nose normal.      Mouth/Throat:      Mouth: Mucous membranes are moist.     Eyes:      General: No scleral icterus.        Right eye: No discharge.         Left eye: No discharge.      Extraocular Movements: Extraocular movements intact.      Conjunctiva/sclera: Conjunctivae normal.      Pupils: Pupils are equal, round, and reactive to light.       Cardiovascular:      Rate and Rhythm: Normal rate and regular rhythm.      Pulses: Normal pulses.      Heart sounds: Normal heart sounds. No murmur heard.     No friction rub. No gallop.   Pulmonary:      Effort: Pulmonary effort is normal. No tachypnea, bradypnea, accessory muscle usage or respiratory distress.      Breath sounds: Normal breath sounds. No stridor. No wheezing, rhonchi or rales.   Chest:      Chest wall: No tenderness.   Abdominal:      General: Abdomen is flat. There  is no distension.      Palpations: Abdomen is soft. There is no mass.      Tenderness: There is no abdominal tenderness. There is no right CVA tenderness, left CVA tenderness, guarding or rebound.      Hernia: No hernia is present.     Musculoskeletal:        Arms:       Cervical back: Normal range of motion and neck supple. No rigidity.      Right lower leg: No edema.      Left lower leg: No edema.      Comments: Bilateral lower extremities neurovascularly intact.  No midline spinal tenderness.  No saddle anesthesia.  Full strength and sensation in both legs, equal bilaterally.  Patient says she does have some diabetic peripheral neuropathy and her sensation feels normal to her.     Skin:     General: Skin is warm and dry.      Capillary Refill: Capillary refill takes less than 2 seconds.      Coloration: Skin is not jaundiced.      Findings: No bruising.     Neurological:      Mental Status: She is alert and oriented to person, place, and time.      GCS: GCS eye subscore is 4. GCS verbal subscore is 5. GCS motor subscore is 6.      Cranial Nerves: No dysarthria or facial asymmetry.     Psychiatric:         Mood and Affect: Mood normal.         Behavior: Behavior normal. Behavior is cooperative.         Thought Content: Thought content normal.         Judgment: Judgment normal.         Results Reviewed       None            No orders to display       Procedures    ED Medication and Procedure Management   Prior to Admission Medications   Prescriptions Last Dose Informant Patient Reported? Taking?   Blood Glucose Monitoring Suppl (Contour Blood Glucose System) w/Device KIT  Self No No   Sig: Use 1 kit 2 (two) times a day before meals   Patient not taking: Reported on 6/19/2025   Blood Glucose Monitoring Suppl (OneTouch Verio Reflect) w/Device KIT   No No   Sig: Check blood sugars once daily. Please substitute with appropriate alternative as covered by patient's insurance. Dx: E11.65   Contour Test test strip  Self No  No   Sig: USE TO CHECK BLOOD SUGAR ONCE DAILY   Patient not taking: No sig reported   LORazepam (ATIVAN) 1 mg tablet  Self No No   Sig: Take 1 tablet (1 mg total) by mouth every 8 (eight) hours as needed for anxiety   Levomilnacipran HCl ER (FETZIMA) 80 MG extended release capsule   No No   Sig: Take 1 capsule (80 mg total) by mouth daily   MAGNESIUM MALATE PO  Self Yes No   Sig: Take by mouth Take 1 tab. daily   OneTouch Delica Lancets 33G MISC   No No   Sig: Check blood sugars once daily. Please substitute with appropriate alternative as covered by patient's insurance. Dx: E11.65   cholecalciferol (VITAMIN D3) 25 mcg (1,000 units) tablet  Self Yes No   Sig: Take 1,000 Units by mouth in the morning. Take 1 tab. daily.   ferrous sulfate 325 (65 Fe) mg tablet  Self Yes No   Sig: Take 325 mg by mouth Take 1 tab. 3 times per week   fluticasone (FLONASE) 50 mcg/act nasal spray  Self No No   Si spray into each nostril daily   gabapentin (NEURONTIN) 300 mg capsule  Self No No   Sig: Take 1 capsule (300 mg total) by mouth 3 (three) times a day   glucose blood (OneTouch Verio) test strip   No No   Sig: Check blood sugars once daily. Please substitute with appropriate alternative as covered by patient's insurance. Dx: E11.65   levothyroxine 75 mcg tablet  Self No No   Sig: TAKE 1 TABLET (75 MCG TOTAL) BY MOUTH DAILY IN THE EARLY MORNING   lithium carbonate 300 mg capsule  Self No No   Sig: Take 1 capsule (300 mg total) by mouth 2 (two) times a day with meals   metFORMIN (GLUCOPHAGE) 500 mg tablet  Self No No   Sig: TAKE 1 TABLET BY MOUTH TWICE A DAY WITH FOOD   methocarbamol (ROBAXIN) 500 mg tablet   No No   Sig: TAKE 1 TAB. EVERY 8 HR. AS NEEDED FOR MUSCLE SPASMS   mirtazapine (REMERON) 30 mg tablet   No No   Sig: TAKE 1 TABLET BY MOUTH EVERYDAY AT BEDTIME   propranolol (INDERAL) 40 mg tablet   No No   Sig: Take 0.5 tablets (20 mg total) by mouth daily at bedtime   semaglutide, 0.25 or 0.5 mg/dose, (Ozempic, 0.25 or 0.5  MG/DOSE,) 2 mg/3 mL injection pen   No No   Si.25 mg under the skin every 7 days for 4 doses (28 days), THEN 0.5 mg under the skin every 7 days   traZODone (DESYREL) 100 mg tablet  Self No No   Sig: TAKE 1 TABLET BY MOUTH DAILY AT BEDTIME      Facility-Administered Medications: None     Discharge Medication List as of 2025  2:10 PM        START taking these medications    Details   acetaminophen (TYLENOL) 500 mg tablet Take 2 tablets (1,000 mg total) by mouth every 6 (six) hours as needed for mild pain for up to 7 days, Starting Sun 2025, Until Sun 2025 at 2359, Normal      cyclobenzaprine (FLEXERIL) 10 mg tablet Take 1 tablet (10 mg total) by mouth 2 (two) times a day as needed for muscle spasms, Starting Sun 2025, Normal      ibuprofen (MOTRIN) 400 mg tablet Take 1 tablet (400 mg total) by mouth every 6 (six) hours as needed for mild pain for up to 7 days, Starting Sun 2025, Until Sun 2025 at 2359, Normal      lidocaine (Lidoderm) 5 % Apply 1 patch topically daily over 12 hours for 7 days Remove & Discard patch within 12 hours or as directed by MD, Starting Sun 2025, Until Sun 2025, Normal           CONTINUE these medications which have NOT CHANGED    Details   !! Blood Glucose Monitoring Suppl (Contour Blood Glucose System) w/Device KIT Use 1 kit 2 (two) times a day before meals, Starting Tue 10/4/2022, Normal      !! Blood Glucose Monitoring Suppl (OneTouch Verio Reflect) w/Device KIT Check blood sugars once daily. Please substitute with appropriate alternative as covered by patient's insurance. Dx: E11.65, Normal      cholecalciferol (VITAMIN D3) 25 mcg (1,000 units) tablet Take 1,000 Units by mouth in the morning. Take 1 tab. daily., Historical Med      !! Contour Test test strip USE TO CHECK BLOOD SUGAR ONCE DAILY, Normal      ferrous sulfate 325 (65 Fe) mg tablet Take 325 mg by mouth Take 1 tab. 3 times per week, Historical Med      fluticasone (FLONASE) 50 mcg/act  nasal spray 1 spray into each nostril daily, Starting Mon 4/7/2025, Normal      gabapentin (NEURONTIN) 300 mg capsule Take 1 capsule (300 mg total) by mouth 3 (three) times a day, Starting Tue 5/6/2025, Normal      !! glucose blood (OneTouch Verio) test strip Check blood sugars once daily. Please substitute with appropriate alternative as covered by patient's insurance. Dx: E11.65, Normal      Levomilnacipran HCl ER (FETZIMA) 80 MG extended release capsule Take 1 capsule (80 mg total) by mouth daily, Starting Wed 5/28/2025, Normal      levothyroxine 75 mcg tablet TAKE 1 TABLET (75 MCG TOTAL) BY MOUTH DAILY IN THE EARLY MORNING, Starting Thu 3/27/2025, Normal      lithium carbonate 300 mg capsule Take 1 capsule (300 mg total) by mouth 2 (two) times a day with meals, Starting Thu 4/10/2025, Normal      LORazepam (ATIVAN) 1 mg tablet Take 1 tablet (1 mg total) by mouth every 8 (eight) hours as needed for anxiety, Starting Tue 5/27/2025, Normal      MAGNESIUM MALATE PO Take by mouth Take 1 tab. daily, Historical Med      metFORMIN (GLUCOPHAGE) 500 mg tablet TAKE 1 TABLET BY MOUTH TWICE A DAY WITH FOOD, Starting Sat 1/4/2025, Normal      methocarbamol (ROBAXIN) 500 mg tablet TAKE 1 TAB. EVERY 8 HR. AS NEEDED FOR MUSCLE SPASMS, Normal      mirtazapine (REMERON) 30 mg tablet TAKE 1 TABLET BY MOUTH EVERYDAY AT BEDTIME, Starting Wed 5/28/2025, Normal      OneTouch Delica Lancets 33G MISC Check blood sugars once daily. Please substitute with appropriate alternative as covered by patient's insurance. Dx: E11.65, Normal      propranolol (INDERAL) 40 mg tablet Take 0.5 tablets (20 mg total) by mouth daily at bedtime, Starting Tue 5/27/2025, Until Fri 5/22/2026, Normal      semaglutide, 0.25 or 0.5 mg/dose, (Ozempic, 0.25 or 0.5 MG/DOSE,) 2 mg/3 mL injection pen 0.25 mg under the skin every 7 days for 4 doses (28 days), THEN 0.5 mg under the skin every 7 days, Normal      traZODone (DESYREL) 100 mg tablet TAKE 1 TABLET BY MOUTH  DAILY AT BEDTIME, Starting Mon 5/19/2025, Normal      methylPREDNISolone 4 MG tablet therapy pack Use as directed on package, Normal       !! - Potential duplicate medications found. Please discuss with provider.          ED SEPSIS DOCUMENTATION   Time reflects when diagnosis was documented in both MDM as applicable and the Disposition within this note       Time User Action Codes Description Comment    6/29/2025  1:16 PM GuanakoReilly cohn Add [M54.9,  G89.29] Chronic back pain     6/29/2025  2:07 PM April Thomas Add [M79.18] Myofascial pain     6/29/2025  2:07 PM April Thomas Add [M51.369] DDD (degenerative disc disease), lumbar                    [1]   Past Medical History:  Diagnosis Date    Addiction to drug ([facility]) 03/15/2001    ADHD (attention deficit hyperactivity disorder) 01/01/2023    Adjustment disorder 03/15/1993    Alcohol abuse 03/15/2003    Alcoholism ([facility]) 03/15/2001    Anxiety     Blood transfusion declined because patient is [facility] 05/01/2023    Chronic pain disorder     Chronic sinusitis     Cognitive impairment 03/15/2022    Depression     Depression 03/15/2003    Diabetes ([facility])     Diabetes mellitus ([facility]) 09/01/2022    Fibromyalgia     Hallucination 03/15/2015    Headache(784.0) 03/15/1993    Various types of headaches    Impulse control disorder 03/15/2022    Low back pain 05/15/2021    After falling on rollerskates    Lumbosacral disc disease 05/15/2021    After falling on rollerskates    Memory loss 03/15/2012    Migraine     Obesity     Obsessive-compulsive disorder 03/15/2001    Panic attack 03/15/2001    Peripheral neuropathy 03/15/2022    Polyarthritis     Last assessed 9/21/2015    Psychiatric disorder     Psychiatric illness     Sleep difficulties     Substance abuse ([facility]) 03/15/1994   [2]   Past Surgical History:  Procedure Laterality Date    COLONOSCOPY  06/2019    DENTAL SURGERY      HYSTERECTOMY  05/01/2023    ((Pt Qnr Sub: .))     HYSTEROSCOPY      Endometrial Biopsy By  Hysteroscopy    NC LAPS SUPRACRV HYSTERECT 250 GM/< RMVL TUBE/OVAR N/A 05/01/2023    Procedure: (LSH) W/ BILATERAL SALPINGECTOMY, REMOVAL PARAOVARIAN CYST;  Surgeon: Sai Lopez DO;  Location: [facility];  Service: Gynecology    REMOVAL OF INTRAUTERINE DEVICE (IUD)      US GUIDED BREAST BIOPSY RIGHT COMPLETE Right 06/17/2019    Benign   [3]   Family History  Problem Relation Name Age of Onset    Irregular heart beat Mother Niurka Emes     Arthritis Mother Niurka Emes     Arrhythmia Mother Niurka Emes     Diabetes Father Erick Emes     Kidney disease Father Erick Emes     Diabetes type II Father Erick Emes     Depression Father Erick Emes     Substance Abuse Brother Victoriano Emchristina     Alcohol abuse Brother Victoriano Emchristina     ADD / ADHD Brother Victoriano Emchristina     Drug abuse Brother Victoriano Emchristina     No Known Problems Brother      Depression Paternal Aunt Megan Worster         Unsuccessful suicide attempt in young adulthood    Psychiatric Illness Paternal Aunt Megan Worster     Self-Injury Paternal Aunt Megan Worster     Suicide Attempts Paternal Aunt Megan Worster     Substance Abuse Maternal Uncle Richard Jannie     Prostate cancer Maternal Uncle Richard Valderrama 60    Diabetes type II Maternal Uncle Richard Valderrama     Diabetes Maternal Uncle Richard Jannie     Diabetes Maternal Grandfather Pedrito Valderrama     Migraines Maternal Grandfather Pedrito Valderrama     Stroke Maternal Grandfather Pedrito Valderrama     Diabetes type II Maternal Grandfather Pedrito Valderrama     Alcohol abuse Maternal Grandfather Pedrito Valderrama         Great-Grandfather    Diabetes Maternal Grandmother Kat Valderrama     Rheum arthritis Maternal Grandmother Kat Valderrama     Melanoma Maternal Grandmother Kat Valderrama 84    Cancer Maternal Grandmother Kat Valderrama         Skin Cancer - successfully removed    Stroke Maternal Grandmother Kat Valderrama     Diabetes type II Maternal Grandmother Kat Valderrama     Depression  Maternal Grandmother Kat Gabeinski     Dementia Maternal Grandmother Kat Bernalinski     Neuropathy Maternal Grandmother Kat Valderrama         Her feet were so sensitive that she yelped if you touched them    Diabetes Paternal Grandfather [place] Emes     Lung cancer Paternal Grandfather [place] Emes 47    Cancer Paternal Grandfather Dhruv Emes         Lung Cancer - cause of death    Kidney disease Paternal Grandmother Mimi Emes     Rheum arthritis Paternal Grandmother Mimi Emes     Depression Paternal Grandmother Mimi Emes         Nervous Breakdown around age 40    Deep vein thrombosis Paternal Grandmother Mimi Emes     Diabetes Paternal Grandmother Mimi Emes     Diabetes type II Paternal Grandmother Mimi Emes     Alcohol abuse Family Unknown     Stomach cancer Family MGGM     Alcohol abuse Maternal Uncle Bharat Gabeinski     Substance Abuse Maternal Uncle Bharat Bachinski     Cancer Maternal Uncle Bharat Gabeinski         Prostate & Testicular Cancer    Drug abuse Maternal Uncle Bharat Gabeinski     Alcohol abuse Maternal Uncle Rolf Jannie     Drug abuse Maternal Uncle Rolf Jannie     Alcohol abuse Maternal Uncle Bharat Gabeinski     Drug abuse Maternal Uncle Bharat Gabeinski     Alcohol abuse Maternal Uncle Rolf Jannie     Drug abuse Maternal Uncle Rolf Jannie     Alcohol abuse Maternal Uncle Bharat Bachinski     Cancer Maternal Uncle Bharat Gabeinski         Prostate & Testicular Cancer    Drug abuse Maternal Uncle Bharat Gabeinski     Substance Abuse Maternal Uncle Bharat Gabeinski     Alcohol abuse Maternal Uncle Rolf Gabeinski     Drug abuse Maternal Uncle Rolf Gabeinski     Alcohol abuse Maternal Uncle Bharat Bachinski     Cancer Maternal Uncle Bharat Gabeinski         Prostate & Testicular Cancer    Drug abuse Maternal Uncle Bharat Gabeinski     Alcohol abuse Maternal Uncle Rolf Gabeinski     Drug abuse Maternal Uncle Rolf Jannie     Completed Suicide  Neg Hx     [4]   Social History  Tobacco Use     Smoking status: Never     Passive exposure: Never    Smokeless tobacco: Never    Tobacco comments:     N/A, non-smoker.   Vaping Use    Vaping status: Never Used   Substance Use Topics    Alcohol use: Not Currently     Comment: 2 drinks per month    Drug use: Not Currently        Reilly Montgomery MD  07/03/25 [id number]

## 2025-07-07 NOTE — PROGRESS NOTES
Name: Esther Griffith      : 1980      MRN: [id number]  Encounter Provider: ADELAIDA Barragan  Encounter Date: 2025   Encounter department: [facility]  :  Assessment & Plan  Lumbar radiculopathy    Orders:    Ambulatory Referral to Chiropractic; Future    Myofascial pain syndrome    Orders:    Ambulatory Referral to Chiropractic; Future      Discussed an L5-S1 LESI for patient's low back and lower extremity symptoms however patient would like to think about this before scheduling. I explained that although she has failed GEORGIANA in the past, we have only performed TFESI approach and that this injection could potentially be more helpful in alleviating pain symptoms.   Discussed potential spinal cord stimulator trial - information provided today  Defer med management to psych/PCP  Follow up with rheumatology as scheduled  Non opioid therapy from our practice secondary to psychiatric history  Chiropractic referral provided today  Follow up PRN      My impressions and treatment recommendations were discussed in detail with the patient who verbalized understanding and had no further questions.  Discharge instructions were provided. I personally saw and examined the patient and I agree with the above discussed plan of care.    History of Present Illness     Esther Griffith is a 45 y.o. female last seen in the office on May 6, 2025 who presents to [facility] with a history of fibromyalgia, diabetes, diabetic neuropathy and history of suicide attempt for follow-up in regards to pain in the Low back with numbness and tingling into the lower extremities. Pain is described as Constant, Sharp, Pressure-like, Shooting, Numbness, and Pins & Needles. On the numeric pain scale of 1-10, the pain typically increases to max of 4 out of 10, which is currently impacting their quality of life and interferes with their activities of daily living.     Patient did complete lumbar  radiofrequency ablation about 4 weeks ago without any improvement of her pain so far in her low back.  She was seen in the emergency room twice over the last week and upon review of their notes, it appears differential diagnoses were between myofascial components and fibromyalgia.  She has attempted bilateral L5 TFESI numerous times in 2023 without relief.  She is also failed SI joint injections.  She was recently prescribed a Medrol Dosepak without any relief.  She has an EMG of the lower extremities which reveals a chronic right L5 radiculopathy.  X-ray of the SI joints is unremarkable.  She maintains on gabapentin 300 mg 3 times daily for her anxiety.  She has tried and failed TCAs, pregabalin, muscle relaxants, and duloxetine in the past.  She was recently prescribed topical Lidoderm patches and oxycodone by her PCP    Review of Systems   Respiratory:  Negative for shortness of breath.    Cardiovascular:  Negative for chest pain.   Gastrointestinal:  Negative for constipation, diarrhea, nausea and vomiting.   Musculoskeletal:  Positive for back pain and gait problem. Negative for arthralgias, joint swelling and myalgias.   Skin:  Negative for rash.   Neurological:  Positive for dizziness. Negative for seizures and weakness.   All other systems reviewed and are negative.      Medical History Reviewed by provider this encounter:     .       Objective   Ht 5' (1.524 m)   Wt 81.2 kg (179 lb)   LMP 03/13/2023 (Approximate)   BMI 34.96 kg/m²      Pain Score:   4  Physical Exam  Constitutional: normal, well developed, well nourished, alert, in no distress and non-toxic and no overt pain behavior.  Eyes: anicteric  HEENT: grossly intact  Neck: supple, symmetric, trachea midline and no masses   Pulmonary: even and unlabored  Cardiovascular: No edema or pitting edema present  Skin: Normal without rashes or lesions and well hydrated  Psychiatric: Mood and affect appropriate  Neurologic: Cranial Nerves II-XII grossly  intact  Musculoskeletal: normal gait    MRI BONY PELVIS WITHOUT CONTRAST     INDICATION:   M46.1: Sacroiliitis, not elsewhere classified  M54.50: Low back pain, unspecified  G89.29: Other chronic pain. Chronic bilateral low back pain with concern for sacroiliitis/spondyloarthropathy.     COMPARISON: CT abdomen/pelvis 6/30/2020. Additional correlation made with sacroiliac joint radiographs 7/17/2024     TECHNIQUE:  Multiplanar/multisequence MR was obtained of the entire pelvis.  Imaging performed on 3.0T MRI     FINDINGS:     RIGHT HIP:  Joint Effusion: None.     Bones: Normal marrow signal demonstrated without hip fracture or AVN. Mild marrow reconversion.     Articular Surface: Mild loss of cartilage along with subchondral cystic changes in the acetabulum, consistent with mild osteoarthritis.     Trochanteric Bursa: Normal.     Muscles:Intact.     Tendons: Mild tendinosis of the hamstrings musculature at its origin on the ischial spine.     LEFT HIP:  Joint Effusion: None.     Bones: Normal marrow signal demonstrated without hip fracture or AVN. Mild marrow reconversion.     Articular Surface:  Mild loss of cartilage along with subchondral cystic changes in the acetabulum, consistent with mild osteoarthritis.     Trochanteric Bursa: Normal.     Muscles:Intact.     Tendons: Mild tendinosis of the hamstrings musculature at its origin on the ischial spine.     BONY PELVIS:  Bones: No acute fracture or suspicious osseous lesion.     Si Joints And Symphysis Pubis:  Intact.     Visualized Lumbar Spine: Moderate endplate degenerative changes and broad-based disc protrusion at L5-S1.     Muscles: Intact.     Pelvic Soft Tissues: Small amount of fluid in the pelvic cul-de-sac.     Subcutaneous Tissues: Normal.     IMPRESSION:     Normal sacroiliac joints without findings to suggest underlying spondyloarthropathy.     Mild bilateral hip osteoarthritis (left greater than right).     Discogenic degenerative changes at L5-S1.      Mild bilateral hamstrings tendinosis.       ACROILIAC JOINTS     INDICATION:   Sacroiliitis, not elsewhere classified.      COMPARISON:  None.     VIEWS:  XR SACROILIAC JOINTS < 3 VIEWS   Images: 2     FINDINGS:     The SI joints appear symmetric without evidence of focal erosions or joint space widening.     Sacral arcuate lines appear intact.     No fracture or pathologic bone lesions seen.     Included portions of the pelvis and lumbar spine are unremarkable.     IMPRESSION:        Unremarkable SI joints.             Narrative & Impression  MRI LUMBAR SPINE WITHOUT CONTRAST     INDICATION: M54.16: Radiculopathy, lumbar region.     COMPARISON: 8/26/2022     TECHNIQUE:  Multiplanar, multisequence imaging of the lumbar spine was performed. .        IMAGE QUALITY:  Diagnostic     FINDINGS:     VERTEBRAL BODIES:  There are 5 lumbar type vertebral bodies.  Normal alignment of the lumbar spine.  No spondylolysis or spondylolisthesis. No scoliosis.  No compression fracture.    Normal marrow signal is identified within the visualized bony   structures.  No discrete marrow lesion.     SACRUM:  Normal signal within the sacrum. No evidence of insufficiency or stress fracture.     DISTAL CORD AND CONUS:  Normal size and signal within the distal cord and conus.     PARASPINAL SOFT TISSUES:  Paraspinal soft tissues are unremarkable.     LOWER THORACIC DISC SPACES:  Normal disc height and signal.  No disc herniation, canal stenosis or foraminal narrowing.     LUMBAR DISC SPACES:     L1-L2:  Normal.     L2-L3: Small broad-based left foraminal disc protrusion without canal stenosis. No foraminal nerve impingement.     L3-L4: Minor annular bulging and mild left greater than right facet arthropathy. No disc herniation, canal stenosis or foraminal nerve impingement.     L4-L5: Mild annular bulging and facet arthropathy. No disc herniation, canal stenosis or foraminal nerve impingement.     L5-S1: Disc desiccation and loss of disc  height with mild diffuse annular bulging. There is a small broad-based right foraminal disc protrusion. Mild canal stenosis. Mild to moderate right foraminal narrowing with disc material abutting the ventral   aspect of the exiting nerve without clear compression or displacement, unchanged from the prior examination.     OTHER FINDINGS:  None.     IMPRESSION:     Stable L5-S1 degenerative disc disease with a small broad-based right foraminal disc protrusion abutting the ventral aspect of the exiting nerve. Correlate for right L5 radiculopathy.     Otherwise minor noncompressive lumbar degenerative changes present

## 2025-07-08 ENCOUNTER — OFFICE VISIT (OUTPATIENT)
Dept: RHEUMATOLOGY | Facility: CLINIC | Age: 45
End: 2025-07-08
Payer: COMMERCIAL

## 2025-07-08 ENCOUNTER — OFFICE VISIT (OUTPATIENT)
Dept: PAIN MEDICINE | Facility: CLINIC | Age: 45
End: 2025-07-08
Payer: COMMERCIAL

## 2025-07-08 ENCOUNTER — APPOINTMENT (OUTPATIENT)
Dept: LAB | Facility: CLINIC | Age: 45
End: 2025-07-08
Payer: COMMERCIAL

## 2025-07-08 VITALS — BODY MASS INDEX: 35.14 KG/M2 | HEIGHT: 60 IN | WEIGHT: 179 LBS

## 2025-07-08 VITALS
OXYGEN SATURATION: 98 % | HEART RATE: 95 BPM | BODY MASS INDEX: 35.43 KG/M2 | SYSTOLIC BLOOD PRESSURE: 124 MMHG | DIASTOLIC BLOOD PRESSURE: 82 MMHG | WEIGHT: 181.4 LBS

## 2025-07-08 DIAGNOSIS — M79.18 MYOFASCIAL PAIN: ICD-10-CM

## 2025-07-08 DIAGNOSIS — M79.7 FIBROMYALGIA: ICD-10-CM

## 2025-07-08 DIAGNOSIS — M51.369 DDD (DEGENERATIVE DISC DISEASE), LUMBAR: ICD-10-CM

## 2025-07-08 DIAGNOSIS — M79.7 FIBROMYALGIA: Primary | ICD-10-CM

## 2025-07-08 DIAGNOSIS — M54.16 LUMBAR RADICULOPATHY: Primary | ICD-10-CM

## 2025-07-08 DIAGNOSIS — M79.18 MYOFASCIAL PAIN SYNDROME: ICD-10-CM

## 2025-07-08 LAB — RHEUMATOID FACT SERPL-ACNC: <10 IU/ML

## 2025-07-08 PROCEDURE — 86364 TISS TRNSGLTMNASE EA IG CLAS: CPT

## 2025-07-08 PROCEDURE — 86200 CCP ANTIBODY: CPT

## 2025-07-08 PROCEDURE — 86431 RHEUMATOID FACTOR QUANT: CPT

## 2025-07-08 PROCEDURE — 36415 COLL VENOUS BLD VENIPUNCTURE: CPT

## 2025-07-08 PROCEDURE — 99214 OFFICE O/P EST MOD 30 MIN: CPT | Performed by: NURSE PRACTITIONER

## 2025-07-08 PROCEDURE — 99215 OFFICE O/P EST HI 40 MIN: CPT | Performed by: INTERNAL MEDICINE

## 2025-07-08 NOTE — ASSESSMENT & PLAN NOTE
Extensive w/u MANJIT, RF, CCP all negative  MRI pelvis per Dr Sanon 8/24 without any evidence for autoimmune/spondyloarthropathy. HLA B 27 negative  At this time no indication for immune medications  F/u with pain management   Orders:    Ambulatory Referral to Rheumatology    RHEUMATOID FACTOR; Future    Cyclic citrul peptide antibody, IgG; Future    Tissue Transglutaminase Ab, IgG; Future

## 2025-07-08 NOTE — ASSESSMENT & PLAN NOTE
Reviewed records per Dr Sanon    Chronic arthralgias, myalgias, fatigue and increased sensitivity to touch suggestive fibromyalgia    I have reviewed fibromyalgia and recommended online resource [facility] website    I have advised her rheumatology here consultative only and further management/recommendations per primary provider and/or pain management    Would have low threshold to r/o sleep related disorders    Recommend low impact aerobics, yoga, alida chi, aqua therapy    Could consider FDA approved medications such as Cymbalta, Savellla, Lyrica or other off label medications such as Gabapentin, Flexeril, Elavil per discretion of primary health care provider    At this time low index of suspicion for inflammatory/autoimmune rheumatic disorders    Will review test results and f/u if any concern for inflammatory rheumatic disorder.    Please f/u with pain management and/or PCP for fibromyalgia care    Orders:    RHEUMATOID FACTOR; Future    Cyclic citrul peptide antibody, IgG; Future    Tissue Transglutaminase Ab, IgG; Future

## 2025-07-08 NOTE — PROGRESS NOTES
Name: Esther Griffith      : 1980      MRN: [id number]  Encounter Provider: Augustine Taylor MD  Encounter Date: 2025   Encounter department: [facility]  :  Assessment & Plan  Myofascial pain  F/u pain management  Orders:    Ambulatory Referral to Rheumatology    RHEUMATOID FACTOR; Future    Cyclic citrul peptide antibody, IgG; Future    Tissue Transglutaminase Ab, IgG; Future    DDD (degenerative disc disease), lumbar  Extensive w/u MANJIT, RF, CCP all negative  MRI pelvis per Dr Sanon  without any evidence for autoimmune/spondyloarthropathy. HLA B 27 negative  At this time no indication for immune medications  F/u with pain management   Orders:    Ambulatory Referral to Rheumatology    RHEUMATOID FACTOR; Future    Cyclic citrul peptide antibody, IgG; Future    Tissue Transglutaminase Ab, IgG; Future    Fibromyalgia  Reviewed records per Dr Sanon    Chronic arthralgias, myalgias, fatigue and increased sensitivity to touch suggestive fibromyalgia    I have reviewed fibromyalgia and recommended online resource [facility] [employer] website    I have advised her rheumatology here consultative only and further management/recommendations per primary provider and/or pain management    Would have low threshold to r/o sleep related disorders    Recommend low impact aerobics, yoga, alida chi, aqua therapy    Could consider FDA approved medications such as Cymbalta, Savellla, Lyrica or other off label medications such as Gabapentin, Flexeril, Elavil per discretion of primary health care provider    At this time low index of suspicion for inflammatory/autoimmune rheumatic disorders    Will review test results and f/u if any concern for inflammatory rheumatic disorder.    Please f/u with pain management and/or PCP for fibromyalgia care    Orders:    RHEUMATOID FACTOR; Future    Cyclic citrul peptide antibody, IgG; Future    Tissue Transglutaminase Ab, IgG; Future        RTC  as needed    History of Present Illness   HPI  Esther Griffith is a 45 y.o. female who presents for f/u chronic pain, fibromyalgia, chronic back pain  History obtained from: patient    She is a former patient Dr Sanon (last seen 7/24)    She has past medical history DM, depression, anxiety, ADHD, neuropathy, substance abuse and alcohol (25 years ago)    H/o depression, anxiety follows with psychiatrist specialist on  SSRIs and Gabapentin    Chronic arthralgias, myalgias, fatigue and poor sleep > 15 years duration    Dx with FMS per Dr Sanon    Extensive w/u MANJIT, RF, CCP all negative    MRI pelvis per Dr Snaon 8/24 without any evidence for autoimmune/spondyloarthropathy. HLA B 27 negative    Failed/IntolerantL Cymbalta and Lyrica    On muscle relaxants and gabapentin per PCP    Also with chronic LBP, DDD, radiculopathy got worse after she fell on roller sk[place]odico. She is being closely monitored per pain management here at [facility] and gets injections         Review of Systems  Pertinent Medical History       --------------------------------------------------------------------------------------------------------        ROS:        All other ROS was reviewed and negative except as above         --------------------------------------------------------------------------------------------------------    Past Medical History    Past Medical History:  Diagnosis Date    Addiction to drug ([facility]) 03/15/2001    ADHD (attention deficit hyperactivity disorder) 01/01/2023    Adjustment disorder 03/15/1993    Alcohol abuse 03/15/2003    Alcoholism ([facility]) 03/15/2001    Anxiety     Blood transfusion declined because patient is Hoahaoism 05/01/2023    Chronic pain disorder     Chronic sinusitis     Cognitive impairment 03/15/2022    Depression     Depression 03/15/2003    Diabetes ([facility])     Diabetes mellitus ([facility]) 09/01/2022    Fibromyalgia     Hallucination 03/15/2015    Headache(784.0) 03/15/1993    Various types of headaches     Impulse control disorder 03/15/2022    Low back pain 05/15/2021    After falling on rollers[employer]    Lumbosacral disc disease 05/15/2021    After falling on rollerskates    Memory loss 03/15/2012    Migraine     Obesity     Obsessive-compulsive disorder 03/15/2001    Panic attack 03/15/2001    Peripheral neuropathy 03/15/2022    Polyarthritis     Last assessed 9/21/2015    Psychiatric disorder     Psychiatric illness     Sleep difficulties     Substance abuse ([facility]) 03/15/1994           Past Surgical History    Past Surgical History:  Procedure Laterality Date    COLONOSCOPY  06/2019    DENTAL SURGERY      HYSTERECTOMY  05/01/2023    ((Pt Qnr Sub: .))     HYSTEROSCOPY      Endometrial Biopsy By Hysteroscopy    GA LAPS SUPRACRV HYSTERECT 250 GM/< RMVL TUBE/OVAR N/A 05/01/2023    Procedure: (LSH) W/ BILATERAL SALPINGECTOMY, REMOVAL PARAOVARIAN CYST;  Surgeon: Sai Lopez DO;  Location: [facility];  Service: Gynecology    REMOVAL OF INTRAUTERINE DEVICE (IUD)      US GUIDED BREAST BIOPSY RIGHT COMPLETE Right 06/17/2019    Benign           Family History    Family History  Problem Relation Name Age of Onset    Irregular heart beat Mother Niurka Emes     Arthritis Mother Niurka Emes     Arrhythmia Mother Niurka Emes     Diabetes Father Erick Emes     Kidney disease Father Erick Emes     Diabetes type II Father Erick Emes     Depression Father Erick Emes     Substance Abuse Brother Victoriano Emes     Alcohol abuse Brother Victoriano Emes     ADD / ADHD Brother Victoriano Emes     Drug abuse Brother Victoriano Emes     No Known Problems Brother      Depression Paternal Aunt Megan Worster         Unsuccessful suicide attempt in young adulthood    Psychiatric Illness Paternal Aunt Megan Worster     Self-Injury Paternal Aunt Megan Worster     Suicide Attempts Paternal Aunt Megan Aguayoer     Substance Abuse Maternal Uncle Richard Valderrama     Prostate cancer Maternal Uncle Richard Valderrama 60    Diabetes type II Maternal Uncle Richard  Jannie     Diabetes Maternal Uncle Richard Jannie     Diabetes Maternal Grandfather Pedrito Jannie     Migraines Maternal Grandfather Pedrito Jannie     Stroke Maternal Grandfather Pedrito Jannie     Diabetes type II Maternal Grandfather Pedrito Jannie     Alcohol abuse Maternal Grandfather Pedrito Jannie         Great-Grandfather    Diabetes Maternal Grandmother Kat Jannie     Rheum arthritis Maternal Grandmother Kat Jannie     Melanoma Maternal Grandmother Kat Jannie 84    Cancer Maternal Grandmother Kat Jannie         Skin Cancer - successfully removed    Stroke Maternal Grandmother Kat Jannie     Diabetes type II Maternal Grandmother Kat Jannie     Depression Maternal Grandmother Kat Jannie     Dementia Maternal Grandmother Kat Jannie     Neuropathy Maternal Grandmother Kat Jannie         Her feet were so sensitive that she yelped if you touched them    Diabetes Paternal Grandfather Iola Emes     Lung cancer Paternal Grandfather Iola Emes 47    Cancer Paternal Grandfather Dhruv Emes         Lung Cancer - cause of death    Kidney disease Paternal Grandmother Mimi Emes     Rheum arthritis Paternal Grandmother Mimi Emes     Depression Paternal Grandmother Mimi Emes         Nervous Breakdown around age 40    Deep vein thrombosis Paternal Grandmother Mimi Emes     Diabetes Paternal Grandmother Mimi Emes     Diabetes type II Paternal Grandmother Mimi Emes     Alcohol abuse Family Unknown     Stomach cancer Family MGGM     Alcohol abuse Maternal Uncle Bharat Jannie     Substance Abuse Maternal Uncle Bharat Jannie     Cancer Maternal Uncle Bharat Jannie         Prostate & Testicular Cancer    Drug abuse Maternal Uncle Bharat Jannie     Alcohol abuse Maternal Uncle Rolf Jannie     Drug abuse Maternal Uncle Rolf Jannie     Alcohol abuse Maternal Uncle Bharat Jannie     Drug abuse Maternal Uncle Bharat Jannie     Alcohol abuse  Maternal Uncle Rolf Valderrama     Drug abuse Maternal Uncle Rolf Valderrama     Alcohol abuse Maternal Uncle Bharat Valderrama     Cancer Maternal Uncle Bharat Valderrama         Prostate & Testicular Cancer    Drug abuse Maternal Uncle Bharat Valderrama     Substance Abuse Maternal Uncle Bharat Valderrama     Alcohol abuse Maternal Uncle Rolf Valderrama     Drug abuse Maternal Uncle Rolf Valderrama     Alcohol abuse Maternal Uncle Bharat Valderrama     Cancer Maternal Uncle Bharat Valderrama         Prostate & Testicular Cancer    Drug abuse Maternal Uncle Bharat Valderrama     Alcohol abuse Maternal Uncle Rolf Valderrama     Drug abuse Maternal Uncle Rolf Valderrama     Completed Suicide  Neg Hx              Social History    Social History  Tobacco Use    Smoking status: Never     Passive exposure: Never    Smokeless tobacco: Never    Tobacco comments:     N/A, non-smoker.   Vaping Use    Vaping status: Never Used   Substance Use Topics    Alcohol use: Not Currently     Comment: 2 drinks per month    Drug use: Not Currently     On disability ( occasionally)     Allergies    Allergies  Allergen Reactions    Cannabidiol Shortness Of Breath, Itching, Swelling, Anxiety, Palpitations, Confusion, Hypertension, Throat Swelling and Tongue Swelling    Amoxicillin-Pot Clavulanate Hives    Decadrol [Dexamethasone] Other (See Comments)     psychosis    Penicillins Hives     Hives/Uticaria    Tetracyclines & Related Hives      Allergy;          Medications    Current Outpatient Medications   Medication Instructions    Blood Glucose Monitoring Suppl (Contour Blood Glucose System) w/Device KIT 1 kit, Does not apply, 2 times daily before meals    Blood Glucose Monitoring Suppl (OneTouch Verio Reflect) w/Device KIT Check blood sugars once daily. Please substitute with appropriate alternative as covered by patient's insurance. Dx: E11.65    cholecalciferol (VITAMIN D3) 1,000 Units, Daily    Contour Test test strip USE TO CHECK BLOOD SUGAR ONCE  DAILY    cyclobenzaprine (FLEXERIL) 10 mg, Oral, 2 times daily PRN    ferrous sulfate 325 mg    fluticasone (FLONASE) 50 mcg/act nasal spray 1 spray, Nasal, Daily    gabapentin (NEURONTIN) 300 mg, Oral, 3 times daily    glucose blood (OneTouch Verio) test strip Check blood sugars once daily. Please substitute with appropriate alternative as covered by patient's insurance. Dx: E11.65    ibuprofen (MOTRIN) 400 mg, Oral, Every 6 hours PRN    Levomilnacipran HCl ER (FETZIMA) 80 mg, Oral, Daily    levothyroxine 75 mcg, Oral, Daily (early morning)    lidocaine (Lidoderm) 5 % 1 patch, Topical, Daily, Remove & Discard patch within 12 hours or as directed by MD    lithium carbonate 300 mg, Oral, 2 times daily with meals    LORazepam (ATIVAN) 1 mg, Oral, Every 8 hours PRN    MAGNESIUM MALATE PO Take by mouth Take 1 tab. daily    metFORMIN (GLUCOPHAGE) 500 mg, Oral, 2 times daily with meals    methocarbamol (ROBAXIN) 500 mg tablet TAKE 1 TAB. EVERY 8 HR. AS NEEDED FOR MUSCLE SPASMS    mirtazapine (REMERON) 30 mg, Oral, Daily at bedtime,       OneTouch Delica Lancets 33G MISC Check blood sugars once daily. Please substitute with appropriate alternative as covered by patient's insurance. Dx: E11.65    oxyCODONE (ROXICODONE) 5 mg, Oral, Daily at bedtime PRN    propranolol (INDERAL) 20 mg, Oral, Daily at bedtime    semaglutide, 0.25 or 0.5 mg/dose, (Ozempic, 0.25 or 0.5 MG/DOSE,) 2 mg/3 mL injection pen 0.25 mg under the skin every 7 days for 4 doses (28 days), THEN 0.5 mg under the skin every 7 days    traZODone (DESYREL) 100 mg, Oral, Daily at bedtime        ________________________________________________________________________      Results Review     Latest Reference Range & Units 11/07/18 11:13 12/06/18 14:16 07/02/24 13:12   ANTI-NUCLEAR ANTIBODY (MANJIT) Negative  Negative Negative    CYCLIC CITRULLINATED PEPTIDE ANTIBODY 0 - 19 units  5    HLA B27   Negative    RHEUMATOID FACTOR Negative  Negative Negative    C-REACTIVE  PROTEIN <3.0 mg/L  4.7 (H) 6.3 (H)   SSA (RO) ANTIBODY 0.0 - 0.9 AI   <0.2   SSB (LA) ANTIBODY 0.0 - 0.9 AI   <0.2   (H): Data is abnormally high           Objective   /82 (BP Location: Left arm, Patient Position: Sitting, Cuff Size: Standard)   Pulse 95   Wt 82.3 kg (181 lb 6.4 oz)   LMP 03/13/2023 (Approximate)   SpO2 98%   BMI 35.43 kg/m²      Physical Exam    GEN: AAO, No apparent distress.  Patient is well developed.  HEENT:  Pupils are equal, round and reactive.  Sclera are clear.  Fundoscopic exam is normal.  External ears are without lesions.  Oral pharynx is clear of ulcers or other lesions.  MMM.   NECK:  Supple.  There is no adenopathy appreciable in anterior or posterior cervical chains or supraclavicularly.  JVP is normal.    HEART: Regular rate and rhythm.  There is no appreciable murmur, gallop or rub.  LUNGS: Clear to auscultation.  ABD:  Soft, without tenderness, rebound or guarding.  No appreciable organomegally.  NEURO: Speech and cognition are normal.  Strength is 5/5 throughout.  Tone is normal.  DTRs are 2/4 at the knees, ankles and elbows.  Gait is normal.  SKIN: There are no rashes or lesions    MUSCULOSKELETAL:   + tender points fibromyalgia    Thank you for involving me in this patient's care.        Augustine Taylor MD  [facility]    I have spent a total time of 43 minutes in caring for this patient on the day of the visit/encounter including Diagnostic results, Risk factor reductions, Impressions, Counseling / Coordination of care, Documenting in the medical record, Reviewing/placing orders in the medical record (including tests, medications, and/or procedures), and Obtaining or reviewing history  .

## 2025-07-09 ENCOUNTER — TELEPHONE (OUTPATIENT)
Dept: PSYCHIATRY | Facility: CLINIC | Age: 45
End: 2025-07-09

## 2025-07-09 ENCOUNTER — TELEMEDICINE (OUTPATIENT)
Dept: PSYCHIATRY | Facility: CLINIC | Age: 45
End: 2025-07-09
Payer: COMMERCIAL

## 2025-07-09 ENCOUNTER — OFFICE VISIT (OUTPATIENT)
Dept: FAMILY MEDICINE CLINIC | Facility: CLINIC | Age: 45
End: 2025-07-09
Payer: COMMERCIAL

## 2025-07-09 VITALS
SYSTOLIC BLOOD PRESSURE: 120 MMHG | HEART RATE: 68 BPM | WEIGHT: 180.4 LBS | BODY MASS INDEX: 35.42 KG/M2 | TEMPERATURE: 97.3 F | RESPIRATION RATE: 16 BRPM | OXYGEN SATURATION: 98 % | HEIGHT: 60 IN | DIASTOLIC BLOOD PRESSURE: 72 MMHG

## 2025-07-09 DIAGNOSIS — F33.9 RECURRENT MAJOR DEPRESSION RESISTANT TO TREATMENT (HCC): ICD-10-CM

## 2025-07-09 DIAGNOSIS — R20.2 PARESTHESIA: ICD-10-CM

## 2025-07-09 DIAGNOSIS — M79.7 FIBROMYALGIA: ICD-10-CM

## 2025-07-09 DIAGNOSIS — F41.1 GENERALIZED ANXIETY DISORDER: ICD-10-CM

## 2025-07-09 DIAGNOSIS — M51.369 DEGENERATION OF INTERVERTEBRAL DISC OF LUMBAR REGION, UNSPECIFIED WHETHER PAIN PRESENT: Primary | ICD-10-CM

## 2025-07-09 PROCEDURE — 99214 OFFICE O/P EST MOD 30 MIN: CPT | Performed by: PSYCHIATRY & NEUROLOGY

## 2025-07-09 PROCEDURE — 99214 OFFICE O/P EST MOD 30 MIN: CPT | Performed by: NURSE PRACTITIONER

## 2025-07-09 RX ORDER — LITHIUM CARBONATE 300 MG/1
300 CAPSULE ORAL 2 TIMES DAILY WITH MEALS
Qty: 60 CAPSULE | Refills: 2 | Status: SHIPPED | OUTPATIENT
Start: 2025-07-09

## 2025-07-09 RX ORDER — LORAZEPAM 1 MG/1
1 TABLET ORAL EVERY 8 HOURS PRN
Qty: 90 TABLET | Refills: 0 | Status: SHIPPED | OUTPATIENT
Start: 2025-07-09

## 2025-07-09 NOTE — PROGRESS NOTES
Name: Esther Griffith      : 1980      MRN: [id number]  Encounter Provider: ADELAIDA Man  Encounter Date: 2025   Encounter department: [facility]    Assessment & Plan  Degeneration of intervertebral disc of lumbar region, unspecified whether pain present  ED notes reviewed, as well as notes from Pain Management and Rheumatology   Further management of chronic back pain per Pain Management   Pt is considering repeat TFESI vs spinal cord stimulator   Gabapentin and Flexeril via Pain Management  Reviewed that our office will not prescribe long term opioids as patient is seeing Pain Management          Fibromyalgia  Recent Rheumatology note reviewed  Lab work as ordered via Rheum  Consider PT and/or aqua therapy again   Consider Chiropractor   Pt is already on Flexeril and gabapentin via Pain Management   Pt follows with Psychiatry and is on various psychiatric medications               History of Present Illness     HPI    ED visit 25 and 7/3/25 for acute exacerbation of chronic B/L lumbar back pain.  No imaging or lab work completed with ED visits  Pt does follow with Pain Management and was evaluated yesterday, 25.  Pt is considering an L5-S1 LESI vs spinal cord stimulator trial.  Continued on Gabapentin and Flexeril   Pt was also evaluated by Rheumatology yesterday, 25 for fibromyalgia.  Lab work ordered but Rheumatology deferred treatment to Pain Management or our office.  She has tried Lyrica in the past caused side effects that she can't recall but would not consider trying this again.  Tried Cymbalta in the past for about a year or so for depression, had headaches and trouble sleeping     Pt is following with Psychiatry, Dr. Ovalle for major depression, anxiety, insomnia.  Currently taking Trazodone, Remeron, Lorazepam 1 mg q8h prn, Lithium, Fetzima,     Review of Systems   Constitutional:  Negative for activity change, appetite change, chills,  diaphoresis, fatigue, fever and unexpected weight change.   Eyes:  Negative for visual disturbance.   Respiratory:  Negative for cough, chest tightness, shortness of breath and wheezing.    Cardiovascular:  Negative for chest pain, palpitations and leg swelling.   Gastrointestinal:  Negative for abdominal pain, blood in stool, constipation, diarrhea and nausea.   Genitourinary:  Negative for dysuria.   Musculoskeletal:  Positive for arthralgias, back pain and myalgias.   Skin:  Negative for rash and wound.   Neurological:  Negative for dizziness, weakness, numbness and headaches.   Psychiatric/Behavioral:  Positive for dysphoric mood and sleep disturbance. Negative for self-injury and suicidal ideas. The patient is nervous/anxious.      Past Medical History[1]  Past Surgical History[2]  Family History[3]  Social History[4]  Medications[5]  Allergies   Allergen Reactions    Cannabidiol Shortness Of Breath, Itching, Swelling, Anxiety, Palpitations, Confusion, Hypertension, Throat Swelling and Tongue Swelling    Amoxicillin-Pot Clavulanate Hives    Decadrol [Dexamethasone] Other (See Comments)     psychosis    Penicillins Hives     Hives/Uticaria    Tetracyclines & Related Hives      Allergy;      Immunization History   Administered Date(s) Administered    COVID-19 MODERNA VACC 0.5 ML IM 03/31/2021, 04/28/2021, 01/19/2022    COVID-19 Pfizer Vac BIVALENT Grady-sucrose 12 Yr+ IM 11/26/2022    COVID-19 Pfizer mRNA vacc PF grady-sucrose 12 yr and older (Comirnaty) 10/07/2024    INFLUENZA 09/28/2009, 11/26/2022    Influenza Injectable, MDCK, Preservative Free, 0.5 mL 10/23/2024    Influenza Quadrivalent, 6-35 Months IM 10/13/2015, 10/04/2017    Influenza, injectable, quadrivalent, preservative free 0.5 mL 03/26/2019, 12/11/2019, 10/23/2020, 11/07/2023    Influenza, seasonal, injectable 03/04/2014    Pneumococcal Conjugate Vaccine 20-valent (Pcv20), Polysace 11/07/2024    Tdap 09/29/2009, 08/16/2022     Objective   /72  (BP Location: Left arm, Patient Position: Sitting, Cuff Size: Adult)   Pulse 68   Temp (!) 97.3 °F (36.3 °C) (Temporal)   Resp 16   Ht 5' (1.524 m)   Wt 81.8 kg (180 lb 6.4 oz)   LMP 03/13/2023 (Approximate)   SpO2 98%   BMI 35.23 kg/m²     Physical Exam  Constitutional:       General: She is not in acute distress.     Appearance: She is well-developed. She is obese. She is not ill-appearing, toxic-appearing or diaphoretic.   HENT:      Head: Normocephalic and atraumatic.     Eyes:      Extraocular Movements: Extraocular movements intact.      Conjunctiva/sclera: Conjunctivae normal.      Pupils: Pupils are equal, round, and reactive to light.     Neck:      Thyroid: No thyromegaly.     Cardiovascular:      Rate and Rhythm: Normal rate and regular rhythm.      Heart sounds: Normal heart sounds. No murmur heard.  Pulmonary:      Effort: Pulmonary effort is normal. No respiratory distress.      Breath sounds: Normal breath sounds. No wheezing.   Abdominal:      General: Bowel sounds are normal. There is no distension.      Palpations: Abdomen is soft.      Tenderness: There is no abdominal tenderness.     Musculoskeletal:         General: Normal range of motion.      Cervical back: Normal range of motion and neck supple. No rigidity or tenderness.      Lumbar back: Swelling, spasms and tenderness present. No deformity or signs of trauma. Negative right straight leg raise test and negative left straight leg raise test.   Lymphadenopathy:      Cervical: No cervical adenopathy.     Skin:     General: Skin is warm and dry.     Neurological:      General: No focal deficit present.      Mental Status: She is alert and oriented to person, place, and time.     Psychiatric:         Mood and Affect: Mood normal.         Behavior: Behavior normal.         Thought Content: Thought content normal.         Judgment: Judgment normal.                [1]   Past Medical History:  Diagnosis Date    Addiction to drug (HCC)  03/15/2001    ADHD (attention deficit hyperactivity disorder) 01/01/2023    Adjustment disorder 03/15/1993    Alcohol abuse 03/15/2003    Alcoholism ([facility]) 03/15/2001    Anxiety     Blood transfusion declined because patient is Islam 05/01/2023    Chronic pain disorder     Chronic sinusitis     Cognitive impairment 03/15/2022    Depression     Depression 03/15/2003    Diabetes ([facility])     Diabetes mellitus ([facility]) 09/01/2022    Fibromyalgia     Hallucination 03/15/2015    Headache(784.0) 03/15/1993    Various types of headaches    Impulse control disorder 03/15/2022    Low back pain 05/15/2021    After falling on rollerskates    Lumbosacral disc disease 05/15/2021    After falling on rollerskates    Memory loss 03/15/2012    Migraine     Obesity     Obsessive-compulsive disorder 03/15/2001    Panic attack 03/15/2001    Peripheral neuropathy 03/15/2022    Polyarthritis     Last assessed 9/21/2015    Psychiatric disorder     Psychiatric illness     Sleep difficulties     Substance abuse ([facility]) 03/15/1994   [2]   Past Surgical History:  Procedure Laterality Date    COLONOSCOPY  06/2019    DENTAL SURGERY      HYSTERECTOMY  05/01/2023    ((Pt Qnr Sub: .))     HYSTEROSCOPY      Endometrial Biopsy By Hysteroscopy    ND LAPS SUPRACRV HYSTERECT 250 GM/< RMVL TUBE/OVAR N/A 05/01/2023    Procedure: (LSH) W/ BILATERAL SALPINGECTOMY, REMOVAL PARAOVARIAN CYST;  Surgeon: Sai Lopez DO;  Location: [facility];  Service: Gynecology    REMOVAL OF INTRAUTERINE DEVICE (IUD)      US GUIDED BREAST BIOPSY RIGHT COMPLETE Right 06/17/2019    Benign   [3]   Family History  Problem Relation Name Age of Onset    Irregular heart beat Mother Niurka Emes     Arthritis Mother Niurka Emes     Arrhythmia Mother Niurka Emes     Diabetes Father Erick Emes     Kidney disease Father Erick Emes     Diabetes type II Father Erick Emes     Depression Father Erick Emes     Substance Abuse Brother Victoriano Emchristina     Alcohol abuse Brother Victoriano Emchristina      ADD / ADHD Brother Victoriano Emchristina     Drug abuse Brother Victoriano Emes     No Known Problems Brother      Depression Paternal Aunt Megan Worster         Unsuccessful suicide attempt in young adulthood    Psychiatric Illness Paternal Aunt Megan Worster     Self-Injury Paternal Aunt Megan Worster     Suicide Attempts Paternal Aunt Megan Aguayoer     Substance Abuse Maternal Uncle Richard Valderrama     Prostate cancer Maternal Uncle Richard Bernalthai 60    Diabetes type II Maternal Uncle Richard Bernalthai     Diabetes Maternal Uncle Richard Bernalthai     Diabetes Maternal Grandfather Pedrito Jannie     Migraines Maternal Grandfather Pedrito Jannie     Stroke Maternal Grandfather Pedrito Jannie     Diabetes type II Maternal Grandfather Pedrito Jannie     Alcohol abuse Maternal Grandfather Pedrito Jannie         Great-Grandfather    Diabetes Maternal Grandmother Kat Valderrama     Rheum arthritis Maternal Grandmother Kat Jannie     Melanoma Maternal Grandmother Kat Valderrama 84    Cancer Maternal Grandmother Kat Jannie         Skin Cancer - successfully removed    Stroke Maternal Grandmother Kat Jannie     Diabetes type II Maternal Grandmother Kat Jannie     Depression Maternal Grandmother Kat Jannie     Dementia Maternal Grandmother Kat Jannie     Neuropathy Maternal Grandmother Kat Jannie         Her feet were so sensitive that she yelped if you touched them    Diabetes Paternal Grandfather Dhruv Emes     Lung cancer Paternal Grandfather [place] Emes 47    Cancer Paternal Grandfather Dhruv Emes         Lung Cancer - cause of death    Kidney disease Paternal Grandmother Mimi Emes     Rheum arthritis Paternal Grandmother Mimi Emes     Depression Paternal Grandmother Mimi Emes         Nervous Breakdown around age 40    Deep vein thrombosis Paternal Grandmother Mimi Emes     Diabetes Paternal Grandmother Mimi Emes     Diabetes type II Paternal Grandmother Mimi Emes      Alcohol abuse Family Unknown     Stomach cancer Family MGGM     Alcohol abuse Maternal Uncle Bharat Valderrama     Substance Abuse Maternal Uncle Bharat Valderrama     Cancer Maternal Uncle Bharat Valderrama         Prostate & Testicular Cancer    Drug abuse Maternal Uncle Bharat Valderrama     Alcohol abuse Maternal Uncle Rolf Valderrama     Drug abuse Maternal Uncle Rolf Valderrama     Alcohol abuse Maternal Uncle Bharat Valderrama     Drug abuse Maternal Uncle Bharat Valderrama     Alcohol abuse Maternal Uncle Rolf Valderrama     Drug abuse Maternal Uncle Rolf Valderrama     Alcohol abuse Maternal Uncle Bharat Valderrama     Cancer Maternal Uncle Bharat Valderrama         Prostate & Testicular Cancer    Drug abuse Maternal Uncle Bharat Valderrama     Substance Abuse Maternal Uncle Bharat Valderrama     Alcohol abuse Maternal Uncle Rolf Valderrmaa     Drug abuse Maternal Uncle Rolf Valderrama     Alcohol abuse Maternal Uncle Bharat Valderrama     Cancer Maternal Uncle Bharat Valderrama         Prostate & Testicular Cancer    Drug abuse Maternal Uncle Bharat Valderrama     Alcohol abuse Maternal Uncle Rolf Valderrama     Drug abuse Maternal Uncle Rolf Valderrama     Completed Suicide  Neg Hx     [4]   Social History  Tobacco Use    Smoking status: Never     Passive exposure: Never    Smokeless tobacco: Never    Tobacco comments:     N/A, non-smoker.   Vaping Use    Vaping status: Never Used   Substance and Sexual Activity    Alcohol use: Not Currently     Comment: 2 drinks per month    Drug use: Not Currently    Sexual activity: Not Currently     Birth control/protection: Abstinence   [5]   Current Outpatient Medications on File Prior to Visit   Medication Sig    Blood Glucose Monitoring Suppl (OneTouch Verio Reflect) w/Device KIT Check blood sugars once daily. Please substitute with appropriate alternative as covered by patient's insurance. Dx: E11.65    cholecalciferol (VITAMIN D3) 25 mcg (1,000 units) tablet Take 1,000 Units by mouth in the morning. Take  1 tab. daily.    cyclobenzaprine (FLEXERIL) 10 mg tablet Take 1 tablet (10 mg total) by mouth 2 (two) times a day as needed for muscle spasms    ferrous sulfate 325 (65 Fe) mg tablet Take 325 mg by mouth Take 1 tab. 3 times per week    fluticasone (FLONASE) 50 mcg/act nasal spray 1 spray into each nostril daily    gabapentin (NEURONTIN) 300 mg capsule Take 1 capsule (300 mg total) by mouth 3 (three) times a day    glucose blood (OneTouch Verio) test strip Check blood sugars once daily. Please substitute with appropriate alternative as covered by patient's insurance. Dx: E11.65    ibuprofen (MOTRIN) 400 mg tablet Take 1 tablet (400 mg total) by mouth every 6 (six) hours as needed for mild pain for up to 7 days    Levomilnacipran HCl ER (FETZIMA) 80 MG extended release capsule Take 1 capsule (80 mg total) by mouth daily    levothyroxine 75 mcg tablet TAKE 1 TABLET (75 MCG TOTAL) BY MOUTH DAILY IN THE EARLY MORNING    lidocaine (Lidoderm) 5 % Apply 1 patch topically daily over 12 hours for 7 days Remove & Discard patch within 12 hours or as directed by MD    lithium carbonate 300 mg capsule Take 1 capsule (300 mg total) by mouth 2 (two) times a day with meals    LORazepam (ATIVAN) 1 mg tablet Take 1 tablet (1 mg total) by mouth every 8 (eight) hours as needed for anxiety    MAGNESIUM MALATE PO Take by mouth Take 1 tab. daily    metFORMIN (GLUCOPHAGE) 500 mg tablet TAKE 1 TABLET BY MOUTH TWICE A DAY WITH FOOD    mirtazapine (REMERON) 30 mg tablet TAKE 1 TABLET BY MOUTH EVERYDAY AT BEDTIME    Oneuch Delica Lancets 33G MISC Check blood sugars once daily. Please substitute with appropriate alternative as covered by patient's insurance. Dx: E11.65    oxyCODONE (Roxicodone) 5 immediate release tablet Take 1 tablet (5 mg total) by mouth daily at bedtime as needed for moderate pain Max Daily Amount: 5 mg    propranolol (INDERAL) 40 mg tablet Take 0.5 tablets (20 mg total) by mouth daily at bedtime    semaglutide, 0.25 or 0.5  mg/dose, (Ozempic, 0.25 or 0.5 MG/DOSE,) 2 mg/3 mL injection pen 0.25 mg under the skin every 7 days for 4 doses (28 days), THEN 0.5 mg under the skin every 7 days    traZODone (DESYREL) 100 mg tablet TAKE 1 TABLET BY MOUTH DAILY AT BEDTIME    [DISCONTINUED] Blood Glucose Monitoring Suppl (Contour Blood Glucose System) w/Device KIT Use 1 kit 2 (two) times a day before meals (Patient not taking: Reported on 6/19/2025)    [DISCONTINUED] Contour Test test strip USE TO CHECK BLOOD SUGAR ONCE DAILY (Patient not taking: No sig reported)    [DISCONTINUED] lithium carbonate 300 mg capsule Take 1 capsule (300 mg total) by mouth 2 (two) times a day with meals (Patient not taking: Reported on 7/9/2025)    [DISCONTINUED] LORazepam (ATIVAN) 1 mg tablet Take 1 tablet (1 mg total) by mouth every 8 (eight) hours as needed for anxiety (Patient not taking: Reported on 7/9/2025)    [DISCONTINUED] methocarbamol (ROBAXIN) 500 mg tablet TAKE 1 TAB. EVERY 8 HR. AS NEEDED FOR MUSCLE SPASMS (Patient not taking: Reported on 7/8/2025)

## 2025-07-09 NOTE — ASSESSMENT & PLAN NOTE
Orders:    LORazepam (ATIVAN) 1 mg tablet; Take 1 tablet (1 mg total) by mouth every 8 (eight) hours as needed for anxiety

## 2025-07-09 NOTE — ASSESSMENT & PLAN NOTE
Recent Rheumatology note reviewed  Lab work as ordered via Rheum  Consider PT and/or aqua therapy again   Consider Chiropractor   Pt is already on Flexeril and gabapentin via Pain Management   Pt follows with Psychiatry and is on various psychiatric medications

## 2025-07-09 NOTE — ASSESSMENT & PLAN NOTE
ED notes reviewed, as well as notes from Pain Management and Rheumatology   Further management of chronic back pain per Pain Management   Pt is considering repeat TFESI vs spinal cord stimulator   Gabapentin and Flexeril via Pain Management  Reviewed that our office will not prescribe long term opioids as patient is seeing Pain Management

## 2025-07-09 NOTE — TELEPHONE ENCOUNTER
Called and left message for patient to return a call to [phone] and schedule 8 week follow up with provider (Cristina Ovalle ). Please schedule upon return call. Thank you.

## 2025-07-09 NOTE — PSYCH
Virtual Regular Visit    Verification of patient location:    Patient is located at Home in the following state in which I hold an active license [place]      Assessment/Plan:    Problem List Items Addressed This Visit          Behavioral Health    Generalized anxiety disorder    Relevant Medications    lithium carbonate 300 mg capsule    LORazepam (ATIVAN) 1 mg tablet       Neurology/Sleep    Paresthesia     Other Visit Diagnoses         Recurrent major depression resistant to treatment (HCC)        Relevant Medications    lithium carbonate 300 mg capsule    LORazepam (ATIVAN) 1 mg tablet                  PHQ-2/9 Depression Screening    Little interest or pleasure in doing things: 1 - several days  Feeling down, depressed, or hopeless: 2 - more than half the days  Trouble falling or staying asleep, or sleeping too much: 3 - nearly every day  Feeling tired or having little energy: 1 - several days  Poor appetite or overeatin - not at all  Feeling bad about yourself - or that you are a failure or have let yourself or your family down: 1 - several days  Trouble concentrating on things, such as reading the newspaper or watching television: 3 - nearly every day  Moving or speaking so slowly that other people could have noticed. Or the opposite - being so fidgety or restless that you have been moving around a lot more than usual: 0 - not at all  Thoughts that you would be better off dead, or of hurting yourself in some way: 0 - not at all  PHQ-9 Score: 11  PHQ-9 Interpretation: Moderate depression                 Depression Screening and Follow-up Plan: Patient's depression screening was positive with a PHQ-9 score of 11.   Continue regular follow-up with their mental health provider who is managing their mental health condition(s).         Reason for visit is No chief complaint on file.           Encounter provider Cristina Bray MD      Recent Visits  No visits were found meeting these  conditions.  Showing recent visits within past 7 days and meeting all other requirements  Today's Visits  Date Type Provider Dept   07/09/25 Telemedicine Cristina Bray MD [facility]   Showing today's visits and meeting all other requirements  Future Appointments  No visits were found meeting these conditions.  Showing future appointments within next 150 days and meeting all other requirements       The patient was identified by name and date of birth. Esther Griffith was informed that this is a telemedicine visit and that the visit is being conducted throughthe Epic Embedded platform. She agrees to proceed..  My office door was closed. No one else was in the room.  She acknowledged consent and understanding of privacy and security of the video platform. The patient has agreed to participate and understands they can discontinue the visit at any time.    Patient is aware this is a billable service.     Subjective  Esther Griffith is a 45 y.o. female with MDD and CAROL .Patient remains compliant with medications and denies side effects. She continues to manage her diabetes with medication and diet.    She continues to meet with pain management and has limited benefit from epidural injections.   She restarted  Gabapentin for neuropathy  300 mg tid is the current dose.    Currently on Fetzima and recently had dose increase to 80 mg.   Continued lithium 300 mg po bid for depression.       Recently she was taperd down to 30 mg Mirtazapine and started  on Trazodone  due to difficulties with sleep. She stated she has been sleeping better now and instead of continuing to taper off Mirtazapine she will like to stay with her current medication regimen.      Patient stated that that she was seen twice in ED due to low back pain. She was advised to follow up with rheumatologist to determine if it is due to fibromyalgia. She also has OA and herniated lumbar disk.  Pain management recommended epidural vs  nerve stimulator.   She is also having neuropathy pain on her feet.  Will like to try dose increase on Gabapentin to 300 mg po bid and 600 mg po qhs. Has own supplies, will call when Rx needed.  Agrees to schedule follow up in 6-8 weeks           HPI     Past Medical History:   Diagnosis Date    Addiction to drug ([facility]) 03/15/2001    ADHD (attention deficit hyperactivity disorder) 01/01/2023    Adjustment disorder 03/15/1993    Alcohol abuse 03/15/2003    Alcoholism ([facility]) 03/15/2001    Anxiety     Blood transfusion declined because patient is [employer]ism 05/01/2023    Chronic pain disorder     Chronic sinusitis     Cognitive impairment 03/15/2022    Depression     Depression 03/15/2003    Diabetes ([facility])     Diabetes mellitus ([facility]) 09/01/2022    Fibromyalgia     Hallucination 03/15/2015    Headache(784.0) 03/15/1993    Various types of headaches    Impulse control disorder 03/15/2022    Low back pain 05/15/2021    After falling on [employer]    Lumbosacral disc disease 05/15/2021    After falling on [employer]    Memory loss 03/15/2012    Migraine     Obesity     Obsessive-compulsive disorder 03/15/2001    Panic attack 03/15/2001    Peripheral neuropathy 03/15/2022    Polyarthritis     Last assessed 9/21/2015    Psychiatric disorder     Psychiatric illness     Sleep difficulties     Substance abuse ([facility]) 03/15/1994       Past Surgical History:   Procedure Laterality Date    COLONOSCOPY  06/2019    DENTAL SURGERY      HYSTERECTOMY  05/01/2023    ((Pt Qnr Sub: .))     HYSTEROSCOPY      Endometrial Biopsy By Hysteroscopy    VT LAPS SUPRACRV HYSTERECT 250 GM/< RMVL TUBE/OVAR N/A 05/01/2023    Procedure: ([facility]) W/ BILATERAL SALPINGECTOMY, REMOVAL PARAOVARIAN CYST;  Surgeon: Sai Lopez DO;  Location: [facility];  Service: Gynecology    REMOVAL OF INTRAUTERINE DEVICE (IUD)      US GUIDED BREAST BIOPSY RIGHT COMPLETE Right 06/17/2019    Benign       Current Outpatient Medications   Medication Sig Dispense  Refill    lithium carbonate 300 mg capsule Take 1 capsule (300 mg total) by mouth 2 (two) times a day with meals 60 capsule 2    LORazepam (ATIVAN) 1 mg tablet Take 1 tablet (1 mg total) by mouth every 8 (eight) hours as needed for anxiety 90 tablet 0    Blood Glucose Monitoring Suppl (OneTouch Verio Reflect) w/Device KIT Check blood sugars once daily. Please substitute with appropriate alternative as covered by patient's insurance. Dx: E11.65 1 kit 0    cholecalciferol (VITAMIN D3) 25 mcg (1,000 units) tablet Take 1,000 Units by mouth in the morning. Take 1 tab. daily.      cyclobenzaprine (FLEXERIL) 10 mg tablet Take 1 tablet (10 mg total) by mouth 2 (two) times a day as needed for muscle spasms 20 tablet 0    ferrous sulfate 325 (65 Fe) mg tablet Take 325 mg by mouth Take 1 tab. 3 times per week      fluticasone (FLONASE) 50 mcg/act nasal spray 1 spray into each nostril daily 9.9 mL 0    gabapentin (NEURONTIN) 300 mg capsule Take 1 capsule (300 mg total) by mouth 3 (three) times a day 270 capsule 1    glucose blood (OneTouch Verio) test strip Check blood sugars once daily. Please substitute with appropriate alternative as covered by patient's insurance. Dx: E11.65 100 each 3    ibuprofen (MOTRIN) 400 mg tablet Take 1 tablet (400 mg total) by mouth every 6 (six) hours as needed for mild pain for up to 7 days 28 tablet 0    Levomilnacipran HCl ER (FETZIMA) 80 MG extended release capsule Take 1 capsule (80 mg total) by mouth daily 30 capsule 2    levothyroxine 75 mcg tablet TAKE 1 TABLET (75 MCG TOTAL) BY MOUTH DAILY IN THE EARLY MORNING 90 tablet 1    lidocaine (Lidoderm) 5 % Apply 1 patch topically daily over 12 hours for 7 days Remove & Discard patch within 12 hours or as directed by MD 14 patch 0    MAGNESIUM MALATE PO Take by mouth Take 1 tab. daily      metFORMIN (GLUCOPHAGE) 500 mg tablet TAKE 1 TABLET BY MOUTH TWICE A DAY WITH FOOD 60 tablet 5    mirtazapine (REMERON) 30 mg tablet TAKE 1 TABLET BY MOUTH  EVERYDAY AT BEDTIME 30 tablet 2    OneTouch Delica Lancets 33G MISC Check blood sugars once daily. Please substitute with appropriate alternative as covered by patient's insurance. Dx: E11.65 100 each 3    oxyCODONE (Roxicodone) 5 immediate release tablet Take 1 tablet (5 mg total) by mouth daily at bedtime as needed for moderate pain Max Daily Amount: 5 mg 14 tablet 0    propranolol (INDERAL) 40 mg tablet Take 0.5 tablets (20 mg total) by mouth daily at bedtime 45 tablet 3    semaglutide, 0.25 or 0.5 mg/dose, (Ozempic, 0.25 or 0.5 MG/DOSE,) 2 mg/3 mL injection pen 0.25 mg under the skin every 7 days for 4 doses (28 days), THEN 0.5 mg under the skin every 7 days 9 mL 0    traZODone (DESYREL) 100 mg tablet TAKE 1 TABLET BY MOUTH DAILY AT BEDTIME 30 tablet 2     No current facility-administered medications for this visit.        Allergies   Allergen Reactions    Cannabidiol Shortness Of Breath, Itching, Swelling, Anxiety, Palpitations, Confusion, Hypertension, Throat Swelling and Tongue Swelling    Amoxicillin-Pot Clavulanate Hives    Decadrol [Dexamethasone] Other (See Comments)     psychosis    Penicillins Hives     Hives/Uticaria    Tetracyclines & Related Hives      Allergy;        Review of Systems     Mood Anxiety and Depression   Behavior Normal    Thought Content Disturbing Thoughts, Feelings   General Emotional Problems and Decreased Functioning   Personality Normal   Other Psych Symptoms Normal   Constitutional Negative   ENT Negative   Cardiovascular Negative   Respiratory Negative   Gastrointestinal Negative   Genitourinary Negative   Musculoskeletal Negative   Integumentary Negative   Neurological Negative   Endocrine Normal    Other Symptoms Normal        Laboratory Results: Recent Labs (last 12 months):   Appointment on 07/08/2025   Component Date Value    RHEUMATOID FACTOR 07/08/2025 <10.00    Office Visit on 06/19/2025   Component Date Value    Hemoglobin A1C 06/19/2025 5.7    Admission on 04/18/2025,  Discharged on 04/18/2025   Component Date Value    Ventricular Rate 04/18/2025 85     Atrial Rate 04/18/2025 85     [place] Interval 04/18/2025 144     QRSD Interval 04/18/2025 74     QT Interval 04/18/2025 386     QTC Interval 04/18/2025 459     P Axis 04/18/2025 32     QRS [place] 04/18/2025 51     T Wave Axis 04/18/2025 -55     WBC 04/18/2025 8.87     RBC 04/18/2025 4.23     Hemoglobin 04/18/2025 13.1     Hematocrit 04/18/2025 39.3     MCV 04/18/2025 93     MCH 04/18/2025 31.0     MCHC 04/18/2025 33.3     RDW 04/18/2025 14.0     MPV 04/18/2025 9.0     Platelets 04/18/2025 248     nRBC 04/18/2025 0     Segmented % 04/18/2025 64     Immature Grans % 04/18/2025 0     Lymphocytes % 04/18/2025 29     Monocytes % 04/18/2025 5     Eosinophils Relative 04/18/2025 2     Basophils Relative 04/18/2025 0     Absolute Neutrophils 04/18/2025 5.65     Absolute Immature Grans 04/18/2025 0.03     Absolute Lymphocytes 04/18/2025 2.59     Absolute Monocytes 04/18/2025 0.44     Eosinophils Absolute 04/18/2025 0.13     Basophils Absolute 04/18/2025 0.03     Sodium 04/18/2025 137     Potassium 04/18/2025 4.0     Chloride 04/18/2025 102     CO2 04/18/2025 25     ANION GAP 04/18/2025 10     BUN 04/18/2025 9     Creatinine 04/18/2025 0.78     Glucose 04/18/2025 209 (H)     Calcium 04/18/2025 9.4     AST 04/18/2025 23     ALT 04/18/2025 32     Alkaline Phosphatase 04/18/2025 58     Total Protein 04/18/2025 6.4     Albumin 04/18/2025 4.2     Total Bilirubin 04/18/2025 0.41     eGFR 04/18/2025 90     hs TnI 0hr 04/18/2025 <2     hs TnI 2hr 04/18/2025 <2     Delta 2hr hsTnI 04/18/2025      Ventricular Rate 04/18/2025 85     Atrial Rate 04/18/2025 85     [place] Interval 04/18/2025 144     QRSD Interval 04/18/2025 74     QT Interval 04/18/2025 386     QTC Interval 04/18/2025 459     P Axis 04/18/2025 32     QRS [place] 04/18/2025 51     T Wave Axis 04/18/2025 -55    Appointment on 04/15/2025   Component Date Value    Lithium Lvl 04/15/2025 0.68     Appointment on 04/02/2025   Component Date Value    Vitamin B-12 04/02/2025 3,035 (H)     A/G Ratio 04/02/2025 1.93 (H)     Albumin % 04/02/2025 65.9     Albumin 04/02/2025 4.35     Alpha-1 Globulin % 04/02/2025 4.3     Alpha-1 Globulin 04/02/2025 0.28     Alpha-2 Globulin % 04/02/2025 9.2     Alpha-2 Globulin 04/02/2025 0.61     Beta-1 Globulin % 04/02/2025 6.6     Beta-1 Globulin 04/02/2025 0.44     Beta-2 Globulin % 04/02/2025 5.0     Beta-2 Globulin 04/02/2025 0.33     Gamma Globulin % 04/02/2025 9.0     Gamma Globulin 04/02/2025 0.59     Total Protein 04/02/2025 6.6     Total Protein, Urine 04/02/2025 <4.0     Albumin ELP, Urine 04/02/2025 100.0     Alpha-1 Globulin, Urine % 04/02/2025 0.0     Alpha-2 Globulin, Urine % 04/02/2025 0.0     Beta, Urine 04/02/2025 0.0     Gamma Globulin, Urine 04/02/2025 0.0     Creatinine(Crt),U 04/02/2025 0.38     Arsenic Total, Urine 04/02/2025 None Detected     Lead, Rand Ur 04/02/2025 None Detected     Mercury, Urine 04/02/2025 None Detected     Case Report 04/02/2025                      Value:Electrophoresis Case                              Case: [id number]                                   Authorizing Provider:  Grazyna Barber MD           Collected:           04/02/2025 1019              Ordering Location:     [facility]      Received:            04/02/2025 1721                                     Hospital Laboratory                                                                                 Services                                                                     Pathologist:           Corby Bellamy MD                                                                           Specimen:                                                                                               SPEP Interpretation 04/02/2025                      Value:The SPEP shows an abnormal  distribution in the gamma region.   Immunofixation to be performed.       SPEP Immunofixation Inte* 04/02/2025                      Value:Serum immunofixation shows no monoclonal immunoglobulins.     BRUCE Bellamy MD  Interpretation performed at [facility], [address] [place] [address].       Case Report 04/02/2025                      Value:Electrophoresis Case                              Case: [id number]                                   Authorizing Provider:  Grazyna Barber MD           Collected:           04/02/2025 1019              Ordering Location:     [facility]      Received:            04/02/2025 [address] [place] [address] Laboratory                                                                                 Services                                                                     Pathologist:           Corby Bellamy MD                                                                           Specimen:    Urine, Other                                                                               UPEP Interpretation 04/02/2025                      Value:The UPEP shows selective proteinuria.   Immunofixation to be performed.       UPEP Imunnofixation Inte* 04/02/2025                      Value:Urine immunofixation shows no monoclonal immunoglobulins.     BRUCE Bellamy MD  Interpretation performed at [facility], [address] [place] [address].      Office Visit on 03/26/2025   Component Date Value     RAPID STREP A 03/26/2025 Negative     SARS-CoV-2 03/26/2025 Negative     INFLUENZA A PCR 03/26/2025 Negative     INFLUENZA B PCR 03/26/2025 Negative    Appointment on 12/12/2024   Component Date Value    TSH 3RD GENERATION 04/02/2025 1.102    Appointment on 12/09/2024   Component Date Value    Lithium Lvl 12/09/2024 0.61    Appointment on 12/06/2024    Component Date Value    Lithium Lvl 12/06/2024 1.22 (H)     Sodium 12/06/2024 139     Potassium 12/06/2024 3.9     Chloride 12/06/2024 102     CO2 12/06/2024 29     ANION GAP 12/06/2024 8     BUN 12/06/2024 13     Creatinine 12/06/2024 0.80     Glucose 12/06/2024 169 (H)     Calcium 12/06/2024 10.3 (H)     AST 12/06/2024 35     ALT 12/06/2024 35     Alkaline Phosphatase 12/06/2024 58     Total Protein 12/06/2024 7.5     Albumin 12/06/2024 5.0     Total Bilirubin 12/06/2024 0.47     eGFR 12/06/2024 88     Cholesterol 12/09/2024 156     Triglycerides 12/09/2024 188 (H)     HDL, Direct 12/09/2024 36 (L)     LDL Calculated 12/09/2024 82     Non-HDL-Chol (CHOL-HDL) 12/09/2024 120     Creatinine, Ur 12/09/2024 80.6     Albumin,U,Random 12/09/2024 <7.0     Albumin Creat Ratio 12/09/2024      Epinephrine, 24H Ur 12/12/2024 <5     Norepinephrine, 24H Ur 12/12/2024 56     Dopamine , 24H Ur 12/12/2024 114     Epinephrine, Rand Ur 12/12/2024 <3     Norepinephrine, Rand Ur 12/12/2024 35     Dopamine, Rand Ur 12/12/2024 71     Metaneph, Total, 24H Ur 12/12/2024 77     Metanephrines, Ur 12/12/2024 48     Normetanephrine, Ur 12/12/2024 221     Normetanephrine, 24H Ur 12/12/2024 354     TSH 3RD GENERATION 12/06/2024 3.712     Free T4 12/06/2024 0.82    Admission on 12/04/2024, Discharged on 12/04/2024   Component Date Value    Ventricular Rate 12/04/2024 91     Atrial Rate 12/04/2024 91     [place] Interval 12/04/2024 140     QRSD Interval 12/04/2024 70     QT Interval 12/04/2024 338     QTC Interval 12/04/2024 416     P Axis 12/04/2024 17     QRS Axis 12/04/2024 4     T Wave Reading 12/04/2024 -19     WBC 12/04/2024 7.69     RBC 12/04/2024 4.54     Hemoglobin 12/04/2024 14.3     Hematocrit 12/04/2024 43.2     MCV 12/04/2024 95     MCH 12/04/2024 31.5     MCHC 12/04/2024 33.1     RDW 12/04/2024 13.2     MPV 12/04/2024 9.0     Platelets 12/04/2024 256     nRBC 12/04/2024 0     Segmented % 12/04/2024 59     Immature Grans % 12/04/2024 0      Lymphocytes % 12/04/2024 34     Monocytes % 12/04/2024 5     Eosinophils Relative 12/04/2024 2     Basophils Relative 12/04/2024 0     Absolute Neutrophils 12/04/2024 4.51     Absolute Immature Grans 12/04/2024 0.02     Absolute Lymphocytes 12/04/2024 2.63     Absolute Monocytes 12/04/2024 0.38     Eosinophils Absolute 12/04/2024 0.12     Basophils Absolute 12/04/2024 0.03     Sodium 12/04/2024 138     Potassium 12/04/2024 3.6     Chloride 12/04/2024 103     CO2 12/04/2024 27     ANION GAP 12/04/2024 8     BUN 12/04/2024 12     Creatinine 12/04/2024 0.80     Glucose 12/04/2024 160 (H)     Calcium 12/04/2024 9.5     AST 12/04/2024 34     ALT 12/04/2024 35     Alkaline Phosphatase 12/04/2024 54     Total Protein 12/04/2024 7.1     Albumin 12/04/2024 4.8     Total Bilirubin 12/04/2024 0.47     eGFR 12/04/2024 88     hs TnI 0hr 12/04/2024 <2     hs TnI 2hr 12/04/2024 <2     Delta 2hr hsTnI 12/04/2024      Ventricular Rate 12/04/2024 83     Atrial Rate 12/04/2024 83     [place] Interval 12/04/2024 140     QRSD Interval 12/04/2024 78     QT Interval 12/04/2024 362     QTC Interval 12/04/2024 425     P Axis 12/04/2024 36     QRS Axis 12/04/2024 39     T Wave [place] 12/04/2024 -32    There may be more visits with results that are not included.         Substance Abuse History:  Social History     Substance and Sexual Activity   Drug Use Not Currently       Family Psychiatric History:   Family History   Problem Relation Name Age of Onset    Irregular heart beat Mother Niurka Emes     Arthritis Mother Niurka Emes     Arrhythmia Mother Niurka Emes     Diabetes Father Erick Emes     Kidney disease Father Erick Emes     Diabetes type II Father Erick Emes     Depression Father Erick Emes     Substance Abuse Brother Victoriano Emes     Alcohol abuse Brother Victoriano Emes     ADD / ADHD Brother Victoriano Emes     Drug abuse Brother Victoriano Emes     No Known Problems Brother      Depression Paternal Aunt Megan Worster         Unsuccessful suicide attempt in  young adulthood    Psychiatric Illness Paternal Aunt Megan Worster     Self-Injury Paternal Aunt Megan Worster     Suicide Attempts Paternal Aunt Megan Worster     Substance Abuse Maternal Uncle Richard Bernalthai     Prostate cancer Maternal Uncle Richard Jannie 60    Diabetes type II Maternal Uncle Richard Jannie     Diabetes Maternal Uncle Richard Bernalthai     Diabetes Maternal Grandfather Pedrito Jannie     Migraines Maternal Grandfather Pedrito Jannie     Stroke Maternal Grandfather Pedrito Jannie     Diabetes type II Maternal Grandfather Pedrito Jannie     Alcohol abuse Maternal Grandfather Pedrito Jannie         Great-Grandfather    Diabetes Maternal Grandmother Kat Jannie     Rheum arthritis Maternal Grandmother Kat Jannie     Melanoma Maternal Grandmother Kat Jannie 84    Cancer Maternal Grandmother Kat Jannie         Skin Cancer - successfully removed    Stroke Maternal Grandmother Kat Jannie     Diabetes type II Maternal Grandmother Kat Jannie     Depression Maternal Grandmother Kat Jannie     Dementia Maternal Grandmother Kat Jannie     Neuropathy Maternal Grandmother Kat Jannie         Her feet were so sensitive that she yelped if you touched them    Diabetes Paternal Grandfather Dhruv Emes     Lung cancer Paternal Grandfather [place] Emes 47    Cancer Paternal Grandfather Dhruv Emes         Lung Cancer - cause of death    Kidney disease Paternal Grandmother Mimi Emes     Rheum arthritis Paternal Grandmother Mimi Emes     Depression Paternal Grandmother Mimi Emes         Nervous Breakdown around age 40    Deep vein thrombosis Paternal Grandmother Mimi Emes     Diabetes Paternal Grandmother Mimi Emes     Diabetes type II Paternal Grandmother Mimi Emes     Alcohol abuse Family Unknown     Stomach cancer Family MGGM     Alcohol abuse Maternal Uncle Bharat Jannie     Substance Abuse Maternal Uncle Bharat Jannie     Cancer  Maternal Uncle Bharat Valderrama         Prostate & Testicular Cancer    Drug abuse Maternal Uncle Bharat Valderrama     Alcohol abuse Maternal Uncle Rolf Valderrama     Drug abuse Maternal Uncle Rolf Valderrama     Alcohol abuse Maternal Uncle Bharat Valderrama     Drug abuse Maternal Uncle Bharat Valderrama     Alcohol abuse Maternal Uncle Rolf Valderrama     Drug abuse Maternal Uncle Rolf Valderrama     Alcohol abuse Maternal Uncle Bharat Valderrama     Cancer Maternal Uncle Bharat Valderrama         Prostate & Testicular Cancer    Drug abuse Maternal Uncle Bharat Valderrama     Substance Abuse Maternal Uncle Bharat Valderrama     Alcohol abuse Maternal Uncle Rolf Valderrama     Drug abuse Maternal Uncle Rolf Valderrama     Alcohol abuse Maternal Uncle Bharat Valderrama     Cancer Maternal Uncle Bharat Valderrama         Prostate & Testicular Cancer    Drug abuse Maternal Uncle Bharat Valderrama     Alcohol abuse Maternal Uncle Rolf Valderrama     Drug abuse Maternal Uncle Rolf Valderrama     Completed Suicide  Neg Hx         The following portions of the patient's history were reviewed and updated as appropriate: allergies, current medications, past family history, past medical history, past social history, past surgical history, and problem list.    Social History     Socioeconomic History    Marital status: Single     Spouse name: Not on file    Number of children: 0    Years of education: 12 years     Highest education level: GED or equivalent   Occupational History    Occupation: unemployed   Tobacco Use    Smoking status: Never     Passive exposure: Never    Smokeless tobacco: Never    Tobacco comments:     N/A, non-smoker.   Vaping Use    Vaping status: Never Used   Substance and Sexual Activity    Alcohol use: Not Currently     Comment: 2 drinks per month    Drug use: Not Currently    Sexual activity: Not Currently     Birth control/protection: Abstinence   Other Topics Concern    Not on file   Social History Narrative    Caffeine use         What type of home do you live in: Single house    Age of your home: 48 yrs    How long have you been living there: 42 yrs    Type of heat: Baseboard    Type of fuel: Electric    What type of samir is in your bedroom: Carpet    Do you have the following in or near your home:    Air products: Window air conditioning and Ionic air purifier    Pests: Mice    Pets: Cat    Basement: None     Social Drivers of Health     Financial Resource Strain: High Risk (12/2/2020)    Overall Financial Resource Strain (CARDIA)     Difficulty of Paying Living Expenses: Hard   Food Insecurity: No Food Insecurity (10/16/2023)    Hunger Vital Sign     Worried About Running Out of Food in the Last Year: Never true     Ran Out of Food in the Last Year: Never true   Transportation Needs: No Transportation Needs (10/16/2023)    PRAPARE - Transportation     Lack of Transportation (Medical): No     Lack of Transportation (Non-Medical): No   Physical Activity: Insufficiently Active (10/12/2022)    Exercise Vital Sign     Days of Exercise per Week: 2 days     Minutes of Exercise per Session: 60 min   Stress: Stress Concern Present (4/3/2019)    [employer] [place] [place] [employer] - Occupational Stress Questionnaire     Feeling of Stress : To some extent   Social Connections: Moderately Integrated (4/3/2019)    Social Connection and Isolation Panel     Frequency of Communication with Friends and Family: More than three times a week     Frequency of Social Gatherings with Friends and Family: More than three times a week     Attends Nondenominational Services: More than 4 times per year     Active Member of Clubs or Organizations: Yes     Attends Club or Organization Meetings: More than 4 times per year     Marital Status: Never    Intimate Partner Violence: Not At Risk (4/3/2019)    Humiliation, Afraid, Rape, and Kick questionnaire     Fear of Current or Ex-Partner: No     Emotionally Abused: No     Physically Abused: No     Sexually Abused:  No   Housing Stability: Low Risk  (10/16/2023)    Housing Stability Vital Sign     Unable to Pay for Housing in the Last Year: No     Number of Times Moved in the Last Year: 1     Homeless in the Last Year: No     Social History     Social History Narrative    Caffeine use        What type of home do you live in: Single house    Age of your home: 46 yrs    How long have you been living there: 42 yrs    Type of heat: Baseboard    Type of fuel: Electric    What type of samir is in your bedroom: Carpet    Do you have the following in or near your home:    Air products: Window air conditioning and Ionic air purifier    Pests: Mice    Pets: Cat    Basement: None       Objective:       Mental status:  Appearance calm and cooperative , adequate hygiene and grooming, and good eye contact    Mood dysphoric   Affect affect was constricted   Speech a normal rate and fluent   Thought Processes coherent/organized and normal thought processes   Hallucinations no hallucinations present    Thought Content no delusions   Abnormal Thoughts no suicidal thoughts  and no homicidal thoughts    Orientation  oriented to person and place and time   Remote Memory short term memory intact and long term memory intact   Attention Span concentration intact   Intellect Appears to be of Average Intelligence   Insight Limited insight   Judgement judgment was limited   Muscle Strength N/a   Language no difficulty naming common objects and no difficulty repeating a phrase    Fund of Knowledge displays adequate knowledge of current events, adequate fund of knowledge regarding past history, and adequate fund of knowledge regarding vocabulary                Assessment/Plan:       Diagnoses and all orders for this visit:    Generalized anxiety disorder  -     LORazepam (ATIVAN) 1 mg tablet; Take 1 tablet (1 mg total) by mouth every 8 (eight) hours as needed for anxiety    Paresthesia    Recurrent major depression resistant to treatment (HCC)  -      lithium carbonate 300 mg capsule; Take 1 capsule (300 mg total) by mouth 2 (two) times a day with meals                    Assessment & Plan  Generalized anxiety disorder    Orders:    LORazepam (ATIVAN) 1 mg tablet; Take 1 tablet (1 mg total) by mouth every 8 (eight) hours as needed for anxiety    Paresthesia         Recurrent major depression resistant to treatment (HCC)    Orders:    lithium carbonate 300 mg capsule; Take 1 capsule (300 mg total) by mouth 2 (two) times a day with meals           Treatment Recommendations- Risks Benefits      Immediate Medical/Psychiatric/Psychotherapy Treatments and Any Precautions: continue current treatment     Risks, Benefits And Possible Side Effects Of Medications:  {PSYCH RISK, BENEFITS AND POSSIBLE SIDE EFFECTS (Optional):64538    Controlled Medication Discussion: Discussed with patient Black Box warning on concurrent use of benzodiazepines and opioid medications including sedation, respiratory depression, coma and death. Patient understands the risk of treatment with benzodiazepines in addition to opioids and wants to continue taking those medications. , Discussed with patient the risks of sedation, respiratory depression, impairment of ability to drive and potential for abuse and addiction related to treatment with benzodiazepine medications. The patient understands risk of treatment with benzodiazepine medications, agrees to not drive if feels impaired and agrees to take medications as prescribed., and The patient has been filling controlled prescriptions on time as prescribed to [place] Prescription Drug Monitoring program.      Psychotherapy Provided: No      The Patient is located at Home and in the following state in which I hold an active license [place].    The patient was identified by name and date of birth. Patient  was informed that this is a telemedicine visit and that the visit is being conducted through the Epic Embedded platform. She agrees to proceed..   My office door was closed. No one else was in the room.  She acknowledged consent and understanding of privacy and security of the video platform. The patient has agreed to participate and understands they can discontinue the visit at any time.    I have spent a total time of 30 minutes in caring for this patient on the day of the visit/encounter including Prognosis, Risks and benefits of tx options, Instructions for management, Patient and family education, Importance of tx compliance, Risk factor reductions, Impressions, Documenting in the medical record, Reviewing/placing orders in the medical record (including tests, medications, and/or procedures), and Obtaining or reviewing history  , not including the time spent for establishing the audio/video connection.                  Visit Time    Visit Start Time: 11:30  Visit Stop Time: 12:00  Total Visit Duration: 30 minutes

## 2025-07-10 ENCOUNTER — OFFICE VISIT (OUTPATIENT)
Age: 45
End: 2025-07-10
Payer: COMMERCIAL

## 2025-07-10 VITALS — HEIGHT: 60 IN | WEIGHT: 180 LBS | BODY MASS INDEX: 35.34 KG/M2

## 2025-07-10 DIAGNOSIS — M99.02 SEGMENTAL DYSFUNCTION OF THORACIC REGION: ICD-10-CM

## 2025-07-10 DIAGNOSIS — M99.04 SEGMENTAL DYSFUNCTION OF SACRAL REGION: Primary | ICD-10-CM

## 2025-07-10 DIAGNOSIS — M79.18 MYOFASCIAL PAIN SYNDROME: ICD-10-CM

## 2025-07-10 DIAGNOSIS — M54.16 LUMBAR RADICULOPATHY: ICD-10-CM

## 2025-07-10 DIAGNOSIS — M99.03 SEGMENTAL DYSFUNCTION OF LUMBAR REGION: ICD-10-CM

## 2025-07-10 PROCEDURE — 98941 CHIROPRACT MANJ 3-4 REGIONS: CPT | Performed by: CHIROPRACTOR

## 2025-07-10 PROCEDURE — 99213 OFFICE O/P EST LOW 20 MIN: CPT | Performed by: CHIROPRACTOR

## 2025-07-10 NOTE — PROGRESS NOTES
Initial date of service: 3/19/24    Diagnoses and all orders for this visit:    Segmental dysfunction of sacral region    Lumbar radiculopathy  -     Ambulatory Referral to Chiropractic    Myofascial pain syndrome  -     Ambulatory Referral to Chiropractic    Segmental dysfunction of lumbar region    Segmental dysfunction of thoracic region    Chronic back/hip pain in the setting of DDD, core/glute deconditioning, hamstring tendonitis and central sensitization, exacerbated by postural/ergonomic stressors. Pt responded to tx with reduced pain and increased ROM    TREATMENT: 93487  Ther-ex: IASTM to affected mm hypertonicities (discussed soreness/ecchymosis up to 36 hrs post procedure); prone on elbows, prone push-ups at shoulders, standing lumbopelvic extension, transitional mvmt education, abdominal bracing; Thoracic mobilization/manipulation: prone P-A mob, supine A-P manip; Lumbar mobilization/manipulation: extension-traction; SIJ Manipulation/Mobilization: R/L SIJ HVLA - long axis distraction    HPI  Esther Griffith is a 45 y.o. female  Chief Complaint   Patient presents with    Back Pain     Middle back pain-5  Lower back pain-5    Sciatica     Pt presents for tx for chronic back pain with sciatica. Pt has undergone PT with modest benefit; undergoing injections with eventual progression to nerve block and ablation. MRI 2/2024 demonstrates diffuse disc issues, somewhat advanced for age. Pelvic MR demonstrates bilateral hamstring tendonosis  7/10: pt reports prolonged standing, transitional mvmts; had 2 epidurals, ablation in interim with benefit; great difficulty sleeping, has sleep     Back Pain  This is a chronic problem. The current episode started more than 1 year ago. The problem occurs constantly. The problem has been waxing and waning since onset. The pain is present in the gluteal, lumbar spine, sacro-iliac and thoracic spine. The quality of the pain is described as aching, burning, cramping, shooting and  stabbing. Pain scale: 3-7/10. Worse during: wrose in am and evening. The symptoms are aggravated by sitting and lying down (crouching, sitting>30 min). Stiffness is present In the morning. Pertinent negatives include no bladder incontinence or bowel incontinence.     Past Medical History:   Diagnosis Date    Addiction to drug ([facility]) 03/15/2001    ADHD (attention deficit hyperactivity disorder) 01/01/2023    Adjustment disorder 03/15/1993    Alcohol abuse 03/15/2003    Alcoholism ([facility]) 03/15/2001    Anxiety     Blood transfusion declined because patient is [employer] 05/01/2023    Chronic pain disorder     Chronic sinusitis     Cognitive impairment 03/15/2022    Depression     Depression 03/15/2003    Diabetes ([facility])     Diabetes mellitus ([facility]) 09/01/2022    Fibromyalgia     Hallucination 03/15/2015    Headache(784.0) 03/15/1993    Various types of headaches    Impulse control disorder 03/15/2022    Low back pain 05/15/2021    After falling on rollerskatSpinal Integration    Lumbosacral disc disease 05/15/2021    After falling on rollerskates    Memory loss 03/15/2012    Migraine     Obesity     Obsessive-compulsive disorder 03/15/2001    Panic attack 03/15/2001    Peripheral neuropathy 03/15/2022    Polyarthritis     Last assessed 9/21/2015    Psychiatric disorder     Psychiatric illness     Sleep difficulties     Substance abuse ([facility]) 03/15/1994      Past Surgical History:   Procedure Laterality Date    COLONOSCOPY  06/2019    DENTAL SURGERY      HYSTERECTOMY  05/01/2023    ((Pt Qnr Sub: .))     HYSTEROSCOPY      Endometrial Biopsy By Hysteroscopy    AR LAPS SUPRACRV HYSTERECT 250 GM/< RMVL TUBE/OVAR N/A 05/01/2023    Procedure: (LSH) W/ BILATERAL SALPINGECTOMY, REMOVAL PARAOVARIAN CYST;  Surgeon: Sai Lopez DO;  Location: [facility];  Service: Gynecology    REMOVAL OF INTRAUTERINE DEVICE (IUD)      US GUIDED BREAST BIOPSY RIGHT COMPLETE Right 06/17/2019    Benign     The following portions of the patient's history  were reviewed and updated as appropriate: allergies, past family history, past medical history, past social history, past surgical history, and problem list.  Review of Systems   Gastrointestinal:  Negative for bowel incontinence.   Genitourinary:  Negative for bladder incontinence.   Musculoskeletal:  Positive for back pain.     Physical Exam    Musculoskeletal:      Thoracic back: Spasms and tenderness present. Decreased range of motion.      Lumbar back: Spasms and tenderness present. Decreased range of motion. Positive right straight leg raise test and positive left straight leg raise test.        Back:       Comments: Pnful and limited in FL 60 degrees, Lrot (in L SIJ), Blf, ext, ext/brot     Neurological:      Mental Status: She is alert and oriented to person, place, and time.      Gait: Gait is intact.     SOFT TISSUE ASSESSMENT: Hypertonicity and tenderness palpated B T10-S1 erector spinae, hip flexor, glute med/min JOINT RESTRICTIONS: T10-S1 and R/L SIJ ORTHO: SI jt point tenderness: -; Анна repeated flexion peripheralizes, extension centralizes; nerissa's, iliac compression, thigh thrust elicit stiffness in R/L SIJ; prone femoral nerve stretch neg for upper lumbar neural tension, elicits R/L SIJ stiffness; sitting root elicits no lbp on R/L; slump test elicits no neural tension into RLE/LLE    Return in about 1 week (around 7/17/2025) for Next scheduled follow up.

## 2025-07-11 ENCOUNTER — TELEMEDICINE (OUTPATIENT)
Dept: BEHAVIORAL/MENTAL HEALTH CLINIC | Facility: CLINIC | Age: 45
End: 2025-07-11
Payer: COMMERCIAL

## 2025-07-11 DIAGNOSIS — F41.1 GENERALIZED ANXIETY DISORDER: ICD-10-CM

## 2025-07-11 DIAGNOSIS — F33.2 MAJOR DEPRESSIVE DISORDER, RECURRENT SEVERE WITHOUT PSYCHOTIC FEATURES (HCC): Primary | ICD-10-CM

## 2025-07-11 PROCEDURE — 90834 PSYTX W PT 45 MINUTES: CPT | Performed by: PSYCHIATRY & NEUROLOGY

## 2025-07-11 NOTE — PSYCH
"Virtual Regular VisitName: Esther Griffith      : 1980      MRN: [id number]  Encounter Provider: HERMELINDA BECK  Encounter Date: 2025   Encounter department: [facility] [place]  :  Assessment & Plan  Major depressive disorder, recurrent severe without psychotic features (HCC)         Generalized anxiety disorder         Goals addressed in session: Goal 1     DATA: Met with Esther for follow up. Esther shared that things have been very stressful for her lately, with extreme lower back pain (two ED trips in a week), and fighting between her brother and father. She said that she understands her brother's point of view, but disagrees with him. While she is trying to be supportive of both family members, she is unsure how more to help.  Processed her concerns and thoughts about the conflict, and used CBT strategies to help her recognize her limits in how much she can support her family members and the importance of taking care of herself.      During this session, this clinician used the following therapeutic modalities: Client-centered Therapy, Cognitive Behavioral Therapy, and Supportive Psychotherapy    Substance Abuse was not addressed during this session. If the client is diagnosed with a co-occurring substance use disorder, please indicate any changes in the frequency or amount of use: n/a. Stage of change for addressing substance use diagnoses: No substance use/Not applicable    ASSESSMENT:  Esther presents with a Euthymic/ normal and Anxious mood. [place]ass affect is Normal range and intensity, which is congruent, with their mood and the content of the session. The client has made progress on their goals as evidenced by more social connection.    Esther presents with a low risk of suicide, minimal risk of self-harm, and minimal risk of harm to others.    For any risk assessment that surpasses a \"low\" rating, a safety plan must be developed.    A safety plan " was indicated: no  If yes, describe in detail n/a    PLAN: Between sessions, Esther will focus on self-care and healthy limits with family. At the next session, the therapist will use Client-centered Therapy, Cognitive Behavioral Therapy, and Supportive Psychotherapy to address anxiety, depression.    Behavioral Health Treatment Plan [facility]: Diagnosis and Treatment Plan explained to Esther, Esther relates understanding diagnosis and is agreeable to Treatment Plan. Yes     Depression Follow-up Plan Completed: Yes     Reason for visit is   Chief Complaint   Patient presents with    Virtual Regular Visit      Recent Visits  No visits were found meeting these conditions.  Showing recent visits within past 7 days and meeting all other requirements  Today's Visits  Date Type Provider Dept   07/11/25 Telemedicine HERMELINDA [facility] [place]   Showing today's visits and meeting all other requirements  Future Appointments  No visits were found meeting these conditions.  Showing future appointments within next 150 days and meeting all other requirements     History of Present Illness     HPI    Past Medical History   Past Medical History:   Diagnosis Date    Addiction to drug ([facility]) 03/15/2001    ADHD (attention deficit hyperactivity disorder) 01/01/2023    Adjustment disorder 03/15/1993    Alcohol abuse 03/15/2003    Alcoholism ([facility]) 03/15/2001    Anxiety     Blood transfusion declined because patient is [employer] 05/01/2023    Chronic pain disorder     Chronic sinusitis     Cognitive impairment 03/15/2022    Depression     Depression 03/15/2003    Diabetes ([facility])     Diabetes mellitus ([facility]) 09/01/2022    Fibromyalgia     Hallucination 03/15/2015    Headache(784.0) 03/15/1993    Various types of headaches    Impulse control disorder 03/15/2022    Low back pain 05/15/2021    After falling on rollerskatNeema    Lumbosacral disc disease 05/15/2021    After falling on rollerskatNeema     Memory loss 03/15/2012    Migraine     Obesity     Obsessive-compulsive disorder 03/15/2001    Panic attack 03/15/2001    Peripheral neuropathy 03/15/2022    Polyarthritis     Last assessed 9/21/2015    Psychiatric disorder     Psychiatric illness     Sleep difficulties     Substance abuse (HCC) 03/15/1994     Past Surgical History:   Procedure Laterality Date    COLONOSCOPY  06/2019    DENTAL SURGERY      HYSTERECTOMY  05/01/2023    ((Pt Qnr Sub: .))     HYSTEROSCOPY      Endometrial Biopsy By Hysteroscopy    AK LAPS SUPRACRV HYSTERECT 250 GM/< RMVL TUBE/OVAR N/A 05/01/2023    Procedure: (LSH) W/ BILATERAL SALPINGECTOMY, REMOVAL PARAOVARIAN CYST;  Surgeon: Sai Lopez DO;  Location: [facility];  Service: Gynecology    REMOVAL OF INTRAUTERINE DEVICE (IUD)      US GUIDED BREAST BIOPSY RIGHT COMPLETE Right 06/17/2019    Benign     Current Outpatient Medications   Medication Instructions    Blood Glucose Monitoring Suppl (OneTouch Verio Reflect) w/Device KIT Check blood sugars once daily. Please substitute with appropriate alternative as covered by patient's insurance. Dx: E11.65    cholecalciferol (VITAMIN D3) 1,000 Units, Daily    cyclobenzaprine (FLEXERIL) 10 mg, Oral, 2 times daily PRN    ferrous sulfate 325 mg    fluticasone (FLONASE) 50 mcg/act nasal spray 1 spray, Nasal, Daily    gabapentin (NEURONTIN) 300 mg, Oral, 3 times daily    glucose blood (OneTouch Verio) test strip Check blood sugars once daily. Please substitute with appropriate alternative as covered by patient's insurance. Dx: E11.65    ibuprofen (MOTRIN) 400 mg, Oral, Every 6 hours PRN    Levomilnacipran HCl ER (FETZIMA) 80 mg, Oral, Daily    levothyroxine 75 mcg, Oral, Daily (early morning)    lidocaine (Lidoderm) 5 % 1 patch, Topical, Daily, Remove & Discard patch within 12 hours or as directed by MD    lithium carbonate 300 mg, Oral, 2 times daily with meals    LORazepam (ATIVAN) 1 mg, Oral, Every 8 hours PRN    MAGNESIUM MALATE PO Take by  mouth Take 1 tab. daily    metFORMIN (GLUCOPHAGE) 500 mg, Oral, 2 times daily with meals    mirtazapine (REMERON) 30 mg, Oral, Daily at bedtime,       OneTouch Delica Lancets 33G MISC Check blood sugars once daily. Please substitute with appropriate alternative as covered by patient's insurance. Dx: E11.65    oxyCODONE (ROXICODONE) 5 mg, Oral, Daily at bedtime PRN    propranolol (INDERAL) 20 mg, Oral, Daily at bedtime    semaglutide, 0.25 or 0.5 mg/dose, (Ozempic, 0.25 or 0.5 MG/DOSE,) 2 mg/3 mL injection pen 0.25 mg under the skin every 7 days for 4 doses (28 days), THEN 0.5 mg under the skin every 7 days    traZODone (DESYREL) 100 mg, Oral, Daily at bedtime     Allergies   Allergen Reactions    Cannabidiol Shortness Of Breath, Itching, Swelling, Anxiety, Palpitations, Confusion, Hypertension, Throat Swelling and Tongue Swelling    Amoxicillin-Pot Clavulanate Hives    Decadrol [Dexamethasone] Other (See Comments)     psychosis    Penicillins Hives     Hives/Uticaria    Tetracyclines & Related Hives      Allergy;        Objective   LMP 03/13/2023 (Approximate)     Video Exam  Physical Exam     Administrative Statements   Encounter provider HERMELINDA BECK    The Patient is located at Home and in the following state in which I hold an active license [place].    The patient was identified by name and date of birth. Esther Griffith was informed that this is a telemedicine visit and that the visit is being conducted through the Epic Embedded platform. She agrees to proceed..  My office door was closed. No one else was in the room.  She acknowledged consent and understanding of privacy and security of the video platform. The patient has agreed to participate and understands they can discontinue the visit at any time.    I have spent a total time of 46 minutes in caring for this patient on the day of the visit/encounter including Counseling / Coordination of care and Documenting in the medical record, not including the time  spent for establishing the audio/video connection.    Visit Time  Start Time: 1003  Stop Time: 1049  Total Visit Time: 46 minutes

## 2025-07-15 ENCOUNTER — TELEPHONE (OUTPATIENT)
Age: 45
End: 2025-07-15

## 2025-07-15 LAB — CCP AB SER IA-ACNC: 0.6 (ref ?–10)

## 2025-07-17 ENCOUNTER — PROCEDURE VISIT (OUTPATIENT)
Age: 45
End: 2025-07-17
Payer: COMMERCIAL

## 2025-07-17 VITALS — HEIGHT: 60 IN | BODY MASS INDEX: 35.34 KG/M2 | WEIGHT: 180 LBS

## 2025-07-17 DIAGNOSIS — M51.9 LUMBAR DISC DISORDER: ICD-10-CM

## 2025-07-17 DIAGNOSIS — M54.16 LUMBAR RADICULOPATHY: Primary | ICD-10-CM

## 2025-07-17 DIAGNOSIS — M99.02 SEGMENTAL DYSFUNCTION OF THORACIC REGION: ICD-10-CM

## 2025-07-17 DIAGNOSIS — M79.18 MYOFASCIAL PAIN SYNDROME: ICD-10-CM

## 2025-07-17 DIAGNOSIS — M99.03 SEGMENTAL DYSFUNCTION OF LUMBAR REGION: ICD-10-CM

## 2025-07-17 DIAGNOSIS — M62.838 MUSCLE SPASM: ICD-10-CM

## 2025-07-17 DIAGNOSIS — M99.04 SEGMENTAL DYSFUNCTION OF SACRAL REGION: ICD-10-CM

## 2025-07-17 LAB — TTG IGG SER IA-ACNC: <1.7 U/ML (ref ?–10)

## 2025-07-17 PROCEDURE — 98941 CHIROPRACT MANJ 3-4 REGIONS: CPT | Performed by: CHIROPRACTOR

## 2025-07-17 NOTE — PROGRESS NOTES
Initial date of service: 3/19/24    Diagnoses and all orders for this visit:    Lumbar radiculopathy    Myofascial pain syndrome    Segmental dysfunction of sacral region    Segmental dysfunction of lumbar region    Segmental dysfunction of thoracic region    Lumbar disc disorder    Muscle spasm    Chronic back/hip pain in the setting of DDD, core/glute deconditioning, hamstring tendonitis and central sensitization, exacerbated by postural/ergonomic stressors. Pt responded to tx with reduced pain and increased ROM    TREATMENT: 98563  Ther-ex: IASTM to affected mm hypertonicities (discussed soreness/ecchymosis up to 36 hrs post procedure); prone on elbows, prone push-ups at shoulders, standing lumbopelvic extension, transitional mvmt education, abdominal bracing; Thoracic mobilization/manipulation: prone P-A mob, supine A-P manip; Lumbar mobilization/manipulation: extension-traction; SIJ Manipulation/Mobilization: R/L SIJ HVLA - long axis distraction    HPI  Esther Griffith is a 45 y.o. female  Chief Complaint   Patient presents with    Back Pain     Middle back pain-5  Lower back pain-5    Sciatica     Pt presents for tx for chronic back pain with sciatica. Pt has undergone PT with modest benefit; undergoing injections with eventual progression to nerve block and ablation. MRI 2/2024 demonstrates diffuse disc issues, somewhat advanced for age. Pelvic MR demonstrates bilateral hamstring tendonosis  7/17: pt reports feeling better after last tx but stress at home causing flare-up; has went out for a few walks but wants to build into daily habit    Back Pain  This is a chronic problem. The current episode started more than 1 year ago. The problem occurs constantly. The problem has been waxing and waning since onset. The pain is present in the gluteal, lumbar spine, sacro-iliac and thoracic spine. The quality of the pain is described as aching, burning, cramping, shooting and stabbing. Pain scale: 3-7/10. Worse during:  wrose in am and evening. The symptoms are aggravated by sitting and lying down (crouching, sitting>30 min). Stiffness is present In the morning. Pertinent negatives include no bladder incontinence or bowel incontinence.     Past Medical History:   Diagnosis Date    Addiction to drug ([facility]) 03/15/2001    ADHD (attention deficit hyperactivity disorder) 01/01/2023    Adjustment disorder 03/15/1993    Alcohol abuse 03/15/2003    Alcoholism ([facility]) 03/15/2001    Anxiety     Blood transfusion declined because patient is Rast[employer] 05/01/2023    Chronic pain disorder     Chronic sinusitis     Cognitive impairment 03/15/2022    Depression     Depression 03/15/2003    Diabetes ([facility])     Diabetes mellitus ([facility]) 09/01/2022    Fibromyalgia     Hallucination 03/15/2015    Headache(784.0) 03/15/1993    Various types of headaches    Impulse control disorder 03/15/2022    Low back pain 05/15/2021    After falling on rollerskates    Lumbosacral disc disease 05/15/2021    After falling on rollerskates    Memory loss 03/15/2012    Migraine     Obesity     Obsessive-compulsive disorder 03/15/2001    Panic attack 03/15/2001    Peripheral neuropathy 03/15/2022    Polyarthritis     Last assessed 9/21/2015    Psychiatric disorder     Psychiatric illness     Sleep difficulties     Substance abuse ([facility]) 03/15/1994      Past Surgical History:   Procedure Laterality Date    COLONOSCOPY  06/2019    DENTAL SURGERY      HYSTERECTOMY  05/01/2023    ((Pt Qnr Sub: .))     HYSTEROSCOPY      Endometrial Biopsy By Hysteroscopy    PA LAPS SUPRACRV HYSTERECT 250 GM/< RMVL TUBE/OVAR N/A 05/01/2023    Procedure: (LSH) W/ BILATERAL SALPINGECTOMY, REMOVAL PARAOVARIAN CYST;  Surgeon: Sai Lopez DO;  Location: [facility];  Service: Gynecology    REMOVAL OF INTRAUTERINE DEVICE (IUD)      US GUIDED BREAST BIOPSY RIGHT COMPLETE Right 06/17/2019    Benign     The following portions of the patient's history were reviewed and updated as appropriate:  allergies, past family history, past medical history, past social history, past surgical history, and problem list.  Review of Systems   Gastrointestinal:  Negative for bowel incontinence.   Genitourinary:  Negative for bladder incontinence.   Musculoskeletal:  Positive for back pain.     Physical Exam    Musculoskeletal:      Thoracic back: Spasms and tenderness present. Decreased range of motion.      Lumbar back: Spasms and tenderness present. Decreased range of motion. Positive right straight leg raise test and positive left straight leg raise test.        Back:       Comments: Pnful and limited in FL 60 degrees, Lrot (in L SIJ), Blf, ext, ext/brot     Neurological:      Mental Status: She is alert and oriented to person, place, and time.      Gait: Gait is intact.     SOFT TISSUE ASSESSMENT: Hypertonicity and tenderness palpated B T10-S1 erector spinae, hip flexor, glute med/min JOINT RESTRICTIONS: T10-S1 and R/L SIJ     Return in about 2 weeks (around 7/31/2025) for Next scheduled follow up.

## 2025-07-23 ENCOUNTER — TELEPHONE (OUTPATIENT)
Age: 45
End: 2025-07-23

## 2025-07-23 NOTE — TELEPHONE ENCOUNTER
/REASON FOR CONVERSATION: Memory Loss and Back Pain    SYMPTOMS: memory loss, LBP     OTHER HEALTH INFORMATION: ED Visits 6/29 & 7/3 for severe LBP that radiated down BL LE into BL feet.     7/8/25 [facility] PM for LBP; Discussed an L5-S1 LESI for patient's low back and lower extremity symptoms and discussed possible Spinal cord stimulator. Referred to Chiropractor. Prescribed Gabapentin and Flexeril. Uses Lidocaine patches and had short dose of 10 day supply of Oxycodone.   --5/20/25 Rhizotomy left L5-S1   --6/3/25 Rhizotomy right L5-S1  7/8/25 Rheum Appt: fibromyalgia, chronic back pain   7/9/25 PCP:pt advised to contact PT/[facility] once she returns from 3 week work trip, consider chiropractor. Pt aware that PCP will not be prescribing opioids.   7/17/25 Chiropractic Appt: pt reported recd no relief.     MEMORY LOSS: pt had Vitamin B 12 level done on 4/2/25: 3,035. Pt stopped taking Vitamin B 12 supplement due to elevated level. Pt did not have repeat level since April. Advised pt to contact Endo to request repeat Vitamin B 12 level. Pt stated PCP aware of memory loss but nothing was addressed per pt. Reported hard to find words.etc x 6 months.     PROTOCOL DISPOSITION: Discuss with Provider and Call Back Patient    CARE ADVICE PROVIDED: Advised pt to take prescribed medications as directed by other specialties to help with LBP. Advised to contact Endo to request repeat Vitamin B 12 level since stopping Vitamin B 12 Supplement.     PRACTICE FOLLOW-UP: Please review and advise if you will see pt for LBP and memory loss. Pt LOV with Neuro 5/27/25 for migraines.     Can contact pt at [phone]

## 2025-07-24 ENCOUNTER — ANNUAL EXAM (OUTPATIENT)
Dept: GYNECOLOGY | Facility: CLINIC | Age: 45
End: 2025-07-24
Payer: COMMERCIAL

## 2025-07-24 ENCOUNTER — APPOINTMENT (OUTPATIENT)
Dept: LAB | Facility: MEDICAL CENTER | Age: 45
End: 2025-07-24
Payer: COMMERCIAL

## 2025-07-24 VITALS
HEIGHT: 60 IN | HEART RATE: 94 BPM | DIASTOLIC BLOOD PRESSURE: 70 MMHG | WEIGHT: 178.8 LBS | BODY MASS INDEX: 35.1 KG/M2 | SYSTOLIC BLOOD PRESSURE: 100 MMHG

## 2025-07-24 DIAGNOSIS — E11.43 AUTONOMIC NEUROPATHY DUE TO TYPE 2 DIABETES MELLITUS (HCC): Primary | ICD-10-CM

## 2025-07-24 DIAGNOSIS — Z01.419 ENCOUNTER FOR GYNECOLOGICAL EXAMINATION WITHOUT ABNORMAL FINDING: Primary | ICD-10-CM

## 2025-07-24 DIAGNOSIS — E11.43 AUTONOMIC NEUROPATHY DUE TO TYPE 2 DIABETES MELLITUS (HCC): ICD-10-CM

## 2025-07-24 LAB — VIT B12 SERPL-MCNC: 2525 PG/ML (ref 180–914)

## 2025-07-24 PROCEDURE — 99396 PREV VISIT EST AGE 40-64: CPT | Performed by: OBSTETRICS & GYNECOLOGY

## 2025-07-24 PROCEDURE — 82607 VITAMIN B-12: CPT

## 2025-07-24 PROCEDURE — G0145 SCR C/V CYTO,THINLAYER,RESCR: HCPCS | Performed by: OBSTETRICS & GYNECOLOGY

## 2025-07-24 PROCEDURE — 36415 COLL VENOUS BLD VENIPUNCTURE: CPT

## 2025-07-24 NOTE — PROGRESS NOTES
Name: Esther Griffith      : 1980      MRN: [id number]  Encounter Provider: Sai Lopez DO  Encounter Date: 2025   Encounter department: [facility]  :  Assessment & Plan  Encounter for gynecological examination without abnormal finding             History of Present Illness   HPI  Esther Griffith is a 45 y.o. female who presents for annual examination.  Status post [facility] BSO in May 2023.  She denies any vaginal irritation, burning, discharge or bleeding.  Denies any dysuria, hematuria or urgency.  She has rare episodes of mild stress urinary incontinence.  No GI complaints.    Colonoscopy 2023.  Normal.      Review of Systems   Constitutional: Negative.    HENT:  Negative for sore throat and trouble swallowing.    Gastrointestinal: Negative.    Genitourinary: Negative.           Objective   LMP 2023 (Approximate)      Physical Exam  Vitals reviewed.     Cardiovascular:      Rate and Rhythm: Normal rate and regular rhythm.      Pulses: Normal pulses.      Heart sounds: Normal heart sounds. No murmur heard.  Pulmonary:      Effort: Pulmonary effort is normal. No respiratory distress.      Breath sounds: Normal breath sounds.   Chest:   Breasts:     Right: No swelling, bleeding, inverted nipple, mass, nipple discharge, skin change or tenderness.      Left: No swelling, bleeding, inverted nipple, mass, nipple discharge, skin change or tenderness.   Abdominal:      General: There is no distension.      Palpations: Abdomen is soft. There is no mass.      Tenderness: There is no abdominal tenderness. There is no guarding or rebound.      Hernia: No hernia is present. There is no hernia in the left inguinal area or right inguinal area.   Genitourinary:     General: Normal vulva.      Labia:         Right: No rash, tenderness or lesion.         Left: No rash, tenderness or lesion.       Vagina: Normal.      Cervix: Normal.      Uterus: Absent.       Adnexa:          Right: No mass, tenderness or fullness.          Left: No mass, tenderness or fullness.       Musculoskeletal:      Cervical back: Normal range of motion and neck supple. No tenderness.   Lymphadenopathy:      Cervical: No cervical adenopathy.      Upper Body:      Right upper body: No supraclavicular, axillary or pectoral adenopathy.      Left upper body: No supraclavicular, axillary or pectoral adenopathy.      Lower Body: No right inguinal adenopathy. No left inguinal adenopathy.     Neurological:      Mental Status: She is alert.

## 2025-07-25 ENCOUNTER — TELEMEDICINE (OUTPATIENT)
Dept: BEHAVIORAL/MENTAL HEALTH CLINIC | Facility: CLINIC | Age: 45
End: 2025-07-25
Payer: COMMERCIAL

## 2025-07-25 DIAGNOSIS — F41.1 GENERALIZED ANXIETY DISORDER: ICD-10-CM

## 2025-07-25 DIAGNOSIS — F33.2 MAJOR DEPRESSIVE DISORDER, RECURRENT SEVERE WITHOUT PSYCHOTIC FEATURES (HCC): Primary | ICD-10-CM

## 2025-07-25 PROCEDURE — 90834 PSYTX W PT 45 MINUTES: CPT | Performed by: PSYCHIATRY & NEUROLOGY

## 2025-07-25 NOTE — PSYCH
"Virtual Regular VisitName: Esther Griffith      : 1980      MRN: [id number]  Encounter Provider: HERMELINDA BECK  Encounter Date: 2025   Encounter department: [facility] [place]  :  Assessment & Plan  Major depressive disorder, recurrent severe without psychotic features (HCC)         Generalized anxiety disorder           Goals addressed in session: Goal 1     DATA: Met with Esther for follow up. Esther shared that she has been in a significant amount of pain over the last couple of weeks, and she feels that she is not getting the help she needs from pain management.  She said that she is considering going to another network for a second opinion, because she cannot tolerate the amount of pain she is in and feels she is running out of options. Esther talked about stress at home, with her father's health declining (waiting for kidney transplant), and her brother's difficult situation. She said that she is going away for almost three weeks to watch a friend's dogs, and is looking forward to the break from everything. She noted that she is still trying to get out with friends when she feels up to it, and a couple of her friends will visit her while she is out of town. She said that her mood has been \"ok\" but she is not sleeping well at all due to her pain. She reports being tired every day, and has significant difficulty concentrating. Provided support, validation of Esther's feelings and concerns, and used mindfulness and CBT strategies to help Esther ease stress response and focus on positive things that are within her control, such as searching for second opinion to try to make progress with her health.    During this session, this clinician used the following therapeutic modalities: Client-centered Therapy, Cognitive Behavioral Therapy, and Supportive Psychotherapy    Substance Abuse was not addressed during this session. If the client is diagnosed with a " "co-occurring substance use disorder, please indicate any changes in the frequency or amount of use: n/a. Stage of change for addressing substance use diagnoses: No substance use/Not applicable    ASSESSMENT:  Esther presents with a Depressed mood. Esther's affect is Normal range and intensity, which is congruent, with their mood and the content of the session. The client has made progress on their goals as evidenced by somewhat improved mood, firmer boundaries.    Esther presents with a low risk of suicide, minimal risk of self-harm, and minimal risk of harm to others.    For any risk assessment that surpasses a \"low\" rating, a safety plan must be developed.    A safety plan was indicated: no  If yes, describe in detail n/a    PLAN: Between sessions, Esther will focus on positive actions that she can take, whether medically or socially, to help ease pain and boost mood. At the next session, the therapist will use Client-centered Therapy, Cognitive Behavioral Therapy, and Supportive Psychotherapy to address depression, anxiety.    Behavioral Health Treatment Plan [facility]: Diagnosis and Treatment Plan explained to Esther, Esther relates understanding diagnosis and is agreeable to Treatment Plan. Yes     Depression Follow-up Plan Completed: Yes     Reason for visit is   Chief Complaint   Patient presents with    Virtual Regular Visit      Recent Visits  No visits were found meeting these conditions.  Showing recent visits within past 7 days and meeting all other requirements  Today's Visits  Date Type Provider Dept   07/25/25 Telemedicine HERMELINDA [facility]rap[facility] [place]   Showing today's visits and meeting all other requirements  Future Appointments  No visits were found meeting these conditions.  Showing future appointments within next 150 days and meeting all other requirements     History of Present Illness     HPI    Past Medical History   Past Medical History:   Diagnosis Date    " Addiction to drug ([facility]) 03/15/2001    ADHD (attention deficit hyperactivity disorder) 01/01/2023    Adjustment disorder 03/15/1993    Alcohol abuse 03/15/2003    Alcoholism ([facility]) 03/15/2001    Anxiety     Blood transfusion declined because patient is [employer] 05/01/2023    Chronic pain disorder     Chronic sinusitis     Cognitive impairment 03/15/2022    Depression     Depression 03/15/2003    Diabetes ([facility])     Diabetes mellitus ([facility]) 09/01/2022    Fibromyalgia     Hallucination 03/15/2015    Headache(784.0) 03/15/1993    Various types of headaches    Impulse control disorder 03/15/2022    Low back pain 05/15/2021    After falling on rollerskates    Lumbosacral disc disease 05/15/2021    After falling on rollerskates    Memory loss 03/15/2012    Migraine     Obesity     Obsessive-compulsive disorder 03/15/2001    Panic attack 03/15/2001    Peripheral neuropathy 03/15/2022    Polyarthritis     Last assessed 9/21/2015    Psychiatric disorder     Psychiatric illness     Sleep difficulties     Substance abuse ([facility]) 03/15/1994     Past Surgical History:   Procedure Laterality Date    COLONOSCOPY  06/2019    DENTAL SURGERY      HYSTERECTOMY  05/01/2023    ((Pt Qnr Sub: .))     HYSTEROSCOPY      Endometrial Biopsy By Hysteroscopy    AL LAPS SUPRACRV HYSTERECT 250 GM/< RMVL TUBE/OVAR N/A 05/01/2023    Procedure: (LSH) W/ BILATERAL SALPINGECTOMY, REMOVAL PARAOVARIAN CYST;  Surgeon: Sai Lopez DO;  Location: [facility];  Service: Gynecology    REMOVAL OF INTRAUTERINE DEVICE (IUD)      US GUIDED BREAST BIOPSY RIGHT COMPLETE Right 06/17/2019    Benign     Current Outpatient Medications   Medication Instructions    Blood Glucose Monitoring Suppl (OneTouch Verio Reflect) w/Device KIT Check blood sugars once daily. Please substitute with appropriate alternative as covered by patient's insurance. Dx: E11.65    cholecalciferol (VITAMIN D3) 1,000 Units, Daily    cyclobenzaprine (FLEXERIL) 10 mg, Oral, 2 times daily  PRN    ferrous sulfate 325 mg    fluticasone (FLONASE) 50 mcg/act nasal spray 1 spray, Nasal, Daily    gabapentin (NEURONTIN) 300 mg, Oral, 3 times daily    glucose blood (OneTouch Verio) test strip Check blood sugars once daily. Please substitute with appropriate alternative as covered by patient's insurance. Dx: E11.65    ibuprofen (MOTRIN) 400 mg, Oral, Every 6 hours PRN    Levomilnacipran HCl ER (FETZIMA) 80 mg, Oral, Daily    levothyroxine 75 mcg, Oral, Daily (early morning)    lidocaine (Lidoderm) 5 % 1 patch, Topical, Daily, Remove & Discard patch within 12 hours or as directed by MD    lithium carbonate 300 mg, Oral, 2 times daily with meals    LORazepam (ATIVAN) 1 mg, Oral, Every 8 hours PRN    MAGNESIUM MALATE PO Take by mouth Take 1 tab. daily    metFORMIN (GLUCOPHAGE) 500 mg, Oral, 2 times daily with meals    mirtazapine (REMERON) 30 mg, Oral, Daily at bedtime,       OneTouch Delica Lancets 33G MISC Check blood sugars once daily. Please substitute with appropriate alternative as covered by patient's insurance. Dx: E11.65    oxyCODONE (ROXICODONE) 5 mg, Oral, Daily at bedtime PRN    propranolol (INDERAL) 20 mg, Oral, Daily at bedtime    semaglutide, 0.25 or 0.5 mg/dose, (Ozempic, 0.25 or 0.5 MG/DOSE,) 2 mg/3 mL injection pen 0.25 mg under the skin every 7 days for 4 doses (28 days), THEN 0.5 mg under the skin every 7 days    traZODone (DESYREL) 100 mg, Oral, Daily at bedtime     Allergies   Allergen Reactions    Cannabidiol Shortness Of Breath, Itching, Swelling, Anxiety, Palpitations, Confusion, Hypertension, Throat Swelling and Tongue Swelling    Amoxicillin-Pot Clavulanate Hives    Decadrol [Dexamethasone] Other (See Comments)     psychosis    Penicillins Hives     Hives/Uticaria    Tetracyclines & Related Hives      Allergy;        Objective   LMP 03/13/2023 (Approximate)     Video Exam  Physical Exam     Administrative Statements   Encounter provider HERMELINDA BECK    The Patient is located at  Home and in the following state in which I hold an active license [place].    The patient was identified by name and date of birth. Esther Griffith was informed that this is a telemedicine visit and that the visit is being conducted through the Epic Embedded platform. She agrees to proceed..  My office door was closed. No one else was in the room.  She acknowledged consent and understanding of privacy and security of the video platform. The patient has agreed to participate and understands they can discontinue the visit at any time.    I have spent a total time of 45 minutes in caring for this patient on the day of the visit/encounter including Counseling / Coordination of care and Documenting in the medical record, not including the time spent for establishing the audio/video connection.    Visit Time  Start Time: 1002  Stop Time: 1047  Total Visit Time: 45 minutes

## 2025-07-30 LAB
LAB AP GYN PRIMARY INTERPRETATION: NORMAL
Lab: NORMAL

## 2025-08-08 ENCOUNTER — TELEMEDICINE (OUTPATIENT)
Dept: BEHAVIORAL/MENTAL HEALTH CLINIC | Facility: CLINIC | Age: 45
End: 2025-08-08
Payer: COMMERCIAL

## 2025-08-08 DIAGNOSIS — F33.2 MAJOR DEPRESSIVE DISORDER, RECURRENT SEVERE WITHOUT PSYCHOTIC FEATURES (HCC): Primary | ICD-10-CM

## 2025-08-08 DIAGNOSIS — F41.1 GENERALIZED ANXIETY DISORDER: ICD-10-CM

## 2025-08-08 PROCEDURE — 90834 PSYTX W PT 45 MINUTES: CPT | Performed by: PSYCHIATRY & NEUROLOGY

## 2025-08-17 ENCOUNTER — HOSPITAL ENCOUNTER (OUTPATIENT)
Dept: SLEEP CENTER | Facility: CLINIC | Age: 45
Discharge: HOME | End: 2025-08-17
Attending: NURSE PRACTITIONER
Payer: COMMERCIAL

## 2025-08-17 DIAGNOSIS — R06.83 SNORING: ICD-10-CM

## 2025-08-17 DIAGNOSIS — G47.411 NARCOLEPSY CATAPLEXY SYNDROME: ICD-10-CM

## 2025-08-17 PROCEDURE — 95810 POLYSOM 6/> YRS 4/> PARAM: CPT | Performed by: PSYCHIATRY & NEUROLOGY

## 2025-08-17 PROCEDURE — 95810 POLYSOM 6/> YRS 4/> PARAM: CPT

## 2025-08-19 ENCOUNTER — PATIENT MESSAGE (OUTPATIENT)
Dept: FAMILY MEDICINE CLINIC | Facility: CLINIC | Age: 45
End: 2025-08-19

## 2025-08-19 DIAGNOSIS — G89.29 CHRONIC BACK PAIN: ICD-10-CM

## 2025-08-19 DIAGNOSIS — M54.9 CHRONIC BACK PAIN: ICD-10-CM

## 2025-08-19 RX ORDER — CYCLOBENZAPRINE HCL 10 MG
10 TABLET ORAL 2 TIMES DAILY PRN
Qty: 20 TABLET | Refills: 0 | Status: SHIPPED | OUTPATIENT
Start: 2025-08-19

## 2025-08-20 DIAGNOSIS — F33.2 SEVERE EPISODE OF RECURRENT MAJOR DEPRESSIVE DISORDER, WITHOUT PSYCHOTIC FEATURES (HCC): ICD-10-CM

## 2025-08-20 RX ORDER — MIRTAZAPINE 30 MG/1
30 TABLET, FILM COATED ORAL
Qty: 30 TABLET | Refills: 2 | Status: SHIPPED | OUTPATIENT
Start: 2025-08-20

## 2025-08-21 ENCOUNTER — VBI (OUTPATIENT)
Dept: ADMINISTRATIVE | Facility: OTHER | Age: 45
End: 2025-08-21

## 2025-08-22 PROBLEM — G47.19 EXCESSIVE DAYTIME SLEEPINESS: Status: ACTIVE | Noted: 2025-08-22

## (undated) DEVICE — 3M™ STERI-STRIP™ REINFORCED ADHESIVE SKIN CLOSURES, R1542, 1/4 IN X 1-1/2 IN (6 MM X 38 MM), 6 STRIPS/ENVELOPE: Brand: 3M™ STERI-STRIP™

## (undated) DEVICE — [HIGH FLOW INSUFFLATOR,  DO NOT USE IF PACKAGE IS DAMAGED,  KEEP DRY,  KEEP AWAY FROM SUNLIGHT,  PROTECT FROM HEAT AND RADIOACTIVE SOURCES.]: Brand: PNEUMOSURE

## (undated) DEVICE — TROCAR: Brand: KII FIOS FIRST ENTRY

## (undated) DEVICE — Device: Brand: OLYMPUS

## (undated) DEVICE — 40595 XL TRENDELENBURG POSITIONING KIT: Brand: 40595 XL TRENDELENBURG POSITIONING KIT

## (undated) DEVICE — PENCIL ELECTROSURG E-Z CLEAN -0035H

## (undated) DEVICE — IRRIG ENDO FLO TUBING

## (undated) DEVICE — TISSUE RETRIEVAL SYSTEM: Brand: INZII RETRIEVAL SYSTEM

## (undated) DEVICE — SCD SEQUENTIAL COMPRESSION COMFORT SLEEVE MEDIUM KNEE LENGTH: Brand: KENDALL SCD

## (undated) DEVICE — LAPAROSCOPIC SMOKE EVAC TUBING

## (undated) DEVICE — DRAPE EQUIPMENT RF WAND

## (undated) DEVICE — M-CLOSE KIT

## (undated) DEVICE — Device

## (undated) DEVICE — PREMIUM DRY TRAY LF: Brand: MEDLINE INDUSTRIES, INC.

## (undated) DEVICE — GLOVE PI ULTRA TOUCH SZ.7.5

## (undated) DEVICE — PLASTIC ADHESIVE BANDAGE: Brand: CURITY

## (undated) DEVICE — TRAY FOLEY 16FR URIMETER SILICONE SURESTEP

## (undated) DEVICE — MAYO STAND COVER: Brand: CONVERTORS

## (undated) DEVICE — STRL COTTON TIP APPLCTR 6IN PK: Brand: CARDINAL HEALTH

## (undated) DEVICE — BETHLEHEM UNIVERSAL GYN LAP PK: Brand: CARDINAL HEALTH

## (undated) DEVICE — TROCAR: Brand: KII SLEEVE

## (undated) DEVICE — CHLORAPREP HI-LITE 26ML ORANGE

## (undated) DEVICE — BLUE HEAT SCOPE WARMER

## (undated) DEVICE — SUT PLAIN 3-0 PS-1 27 IN 1640H

## (undated) DEVICE — LIGASURE LAP SLR/DIV MARYLAND 5MM

## (undated) DEVICE — INSUFFLATION NEEDLE TO ESTABLISH PNEUMOPERITONEUM.: Brand: INSUFFLATION NEEDLE

## (undated) DEVICE — MORCELLATOR LAP LINA XCISE 15 MM OBTURATOR CRDLS

## (undated) DEVICE — ELECTRODE LAP J HOOK E-Z CLEAN 33CM-0021

## (undated) DEVICE — 3000CC GUARDIAN II: Brand: GUARDIAN

## (undated) DEVICE — INTENDED FOR TISSUE SEPARATION, AND OTHER PROCEDURES THAT REQUIRE A SHARP SURGICAL BLADE TO PUNCTURE OR CUT.: Brand: BARD-PARKER SAFETY BLADES SIZE 11, STERILE